# Patient Record
Sex: MALE | Race: BLACK OR AFRICAN AMERICAN | NOT HISPANIC OR LATINO | URBAN - METROPOLITAN AREA
[De-identification: names, ages, dates, MRNs, and addresses within clinical notes are randomized per-mention and may not be internally consistent; named-entity substitution may affect disease eponyms.]

---

## 2014-08-25 RX ORDER — NIFEDIPINE 30 MG
1 TABLET, EXTENDED RELEASE 24 HR ORAL
Qty: 0 | Refills: 0 | DISCHARGE
Start: 2014-08-25

## 2014-08-25 RX ORDER — METOPROLOL TARTRATE 50 MG
2 TABLET ORAL
Qty: 0 | Refills: 0 | COMMUNITY
Start: 2014-08-25

## 2014-08-25 RX ORDER — METOPROLOL TARTRATE 50 MG
1 TABLET ORAL
Qty: 0 | Refills: 0 | COMMUNITY
Start: 2014-08-25

## 2017-10-01 ENCOUNTER — INPATIENT (INPATIENT)
Facility: HOSPITAL | Age: 72
LOS: 3 days | Discharge: HOME | DRG: 69 | End: 2017-10-05
Attending: HOSPITALIST | Admitting: HOSPITALIST
Payer: MEDICARE

## 2017-10-01 VITALS
RESPIRATION RATE: 18 BRPM | SYSTOLIC BLOOD PRESSURE: 138 MMHG | HEART RATE: 77 BPM | TEMPERATURE: 98 F | DIASTOLIC BLOOD PRESSURE: 71 MMHG | OXYGEN SATURATION: 96 %

## 2017-10-01 DIAGNOSIS — Z98.89 OTHER SPECIFIED POSTPROCEDURAL STATES: Chronic | ICD-10-CM

## 2017-10-01 DIAGNOSIS — R47.81 SLURRED SPEECH: ICD-10-CM

## 2017-10-01 LAB
ALBUMIN SERPL ELPH-MCNC: 3.9 G/DL — SIGNIFICANT CHANGE UP (ref 3.3–5)
ALP SERPL-CCNC: 64 U/L — SIGNIFICANT CHANGE UP (ref 40–120)
ALT FLD-CCNC: 6 U/L RC — LOW (ref 10–45)
ANION GAP SERPL CALC-SCNC: 18 MMOL/L — HIGH (ref 5–17)
APTT BLD: 34.4 SEC — SIGNIFICANT CHANGE UP (ref 27.5–37.4)
AST SERPL-CCNC: 27 U/L — SIGNIFICANT CHANGE UP (ref 10–40)
BASOPHILS # BLD AUTO: 0 K/UL — SIGNIFICANT CHANGE UP (ref 0–0.2)
BASOPHILS NFR BLD AUTO: 0.5 % — SIGNIFICANT CHANGE UP (ref 0–2)
BILIRUB SERPL-MCNC: 0.3 MG/DL — SIGNIFICANT CHANGE UP (ref 0.2–1.2)
BLD GP AB SCN SERPL QL: NEGATIVE — SIGNIFICANT CHANGE UP
BUN SERPL-MCNC: 63 MG/DL — HIGH (ref 7–23)
CALCIUM SERPL-MCNC: 8.9 MG/DL — SIGNIFICANT CHANGE UP (ref 8.4–10.5)
CHLORIDE SERPL-SCNC: 96 MMOL/L — SIGNIFICANT CHANGE UP (ref 96–108)
CK MB CFR SERPL CALC: 1.6 NG/ML — SIGNIFICANT CHANGE UP (ref 0–6.7)
CK SERPL-CCNC: 56 U/L — SIGNIFICANT CHANGE UP (ref 30–200)
CO2 SERPL-SCNC: 23 MMOL/L — SIGNIFICANT CHANGE UP (ref 22–31)
CREAT SERPL-MCNC: 9.56 MG/DL — HIGH (ref 0.5–1.3)
EOSINOPHIL # BLD AUTO: 0.3 K/UL — SIGNIFICANT CHANGE UP (ref 0–0.5)
EOSINOPHIL NFR BLD AUTO: 3.6 % — SIGNIFICANT CHANGE UP (ref 0–6)
GLUCOSE SERPL-MCNC: 98 MG/DL — SIGNIFICANT CHANGE UP (ref 70–99)
HCT VFR BLD CALC: 32.8 % — LOW (ref 39–50)
HGB BLD-MCNC: 9.7 G/DL — LOW (ref 13–17)
INR BLD: 1.01 RATIO — SIGNIFICANT CHANGE UP (ref 0.88–1.16)
LYMPHOCYTES # BLD AUTO: 3 K/UL — SIGNIFICANT CHANGE UP (ref 1–3.3)
LYMPHOCYTES # BLD AUTO: 34 % — SIGNIFICANT CHANGE UP (ref 13–44)
MCHC RBC-ENTMCNC: 26 PG — LOW (ref 27–34)
MCHC RBC-ENTMCNC: 29.7 GM/DL — LOW (ref 32–36)
MCV RBC AUTO: 87.6 FL — SIGNIFICANT CHANGE UP (ref 80–100)
MONOCYTES # BLD AUTO: 0.8 K/UL — SIGNIFICANT CHANGE UP (ref 0–0.9)
MONOCYTES NFR BLD AUTO: 9.6 % — SIGNIFICANT CHANGE UP (ref 2–14)
NEUTROPHILS # BLD AUTO: 4.6 K/UL — SIGNIFICANT CHANGE UP (ref 1.8–7.4)
NEUTROPHILS NFR BLD AUTO: 52.2 % — SIGNIFICANT CHANGE UP (ref 43–77)
PLATELET # BLD AUTO: 150 K/UL — SIGNIFICANT CHANGE UP (ref 150–400)
POTASSIUM SERPL-MCNC: 5.8 MMOL/L — HIGH (ref 3.5–5.3)
POTASSIUM SERPL-SCNC: 5.8 MMOL/L — HIGH (ref 3.5–5.3)
PROT SERPL-MCNC: 8.7 G/DL — HIGH (ref 6–8.3)
PROTHROM AB SERPL-ACNC: 10.9 SEC — SIGNIFICANT CHANGE UP (ref 9.8–12.7)
RBC # BLD: 3.74 M/UL — LOW (ref 4.2–5.8)
RBC # FLD: 17.1 % — HIGH (ref 10.3–14.5)
RH IG SCN BLD-IMP: POSITIVE — SIGNIFICANT CHANGE UP
RH IG SCN BLD-IMP: POSITIVE — SIGNIFICANT CHANGE UP
SODIUM SERPL-SCNC: 137 MMOL/L — SIGNIFICANT CHANGE UP (ref 135–145)
TROPONIN T SERPL-MCNC: 0.16 NG/ML — HIGH (ref 0–0.06)
WBC # BLD: 8.8 K/UL — SIGNIFICANT CHANGE UP (ref 3.8–10.5)
WBC # FLD AUTO: 8.8 K/UL — SIGNIFICANT CHANGE UP (ref 3.8–10.5)

## 2017-10-01 PROCEDURE — 71010: CPT | Mod: 26

## 2017-10-01 PROCEDURE — 70450 CT HEAD/BRAIN W/O DYE: CPT | Mod: 26

## 2017-10-01 PROCEDURE — 99223 1ST HOSP IP/OBS HIGH 75: CPT | Mod: GC

## 2017-10-01 PROCEDURE — 93010 ELECTROCARDIOGRAM REPORT: CPT

## 2017-10-01 PROCEDURE — 99291 CRITICAL CARE FIRST HOUR: CPT | Mod: 25,GC

## 2017-10-01 RX ORDER — ACETAMINOPHEN 500 MG
650 TABLET ORAL ONCE
Qty: 0 | Refills: 0 | Status: COMPLETED | OUTPATIENT
Start: 2017-10-01 | End: 2017-10-01

## 2017-10-01 RX ORDER — ASPIRIN/CALCIUM CARB/MAGNESIUM 324 MG
81 TABLET ORAL DAILY
Qty: 0 | Refills: 0 | Status: DISCONTINUED | OUTPATIENT
Start: 2017-10-01 | End: 2017-10-05

## 2017-10-01 RX ORDER — ATORVASTATIN CALCIUM 80 MG/1
80 TABLET, FILM COATED ORAL AT BEDTIME
Qty: 0 | Refills: 0 | Status: DISCONTINUED | OUTPATIENT
Start: 2017-10-01 | End: 2017-10-05

## 2017-10-01 RX ORDER — HEPARIN SODIUM 5000 [USP'U]/ML
5000 INJECTION INTRAVENOUS; SUBCUTANEOUS EVERY 12 HOURS
Qty: 0 | Refills: 0 | Status: DISCONTINUED | OUTPATIENT
Start: 2017-10-01 | End: 2017-10-05

## 2017-10-01 RX ADMIN — Medication 650 MILLIGRAM(S): at 23:59

## 2017-10-01 NOTE — H&P ADULT - NSHPPHYSICALEXAM_GEN_ALL_CORE
ICU Vital Signs Last 24 Hrs  T(C): 36.6 (01 Oct 2017 21:55), Max: 36.6 (01 Oct 2017 21:55)  T(F): 97.8 (01 Oct 2017 21:55), Max: 97.8 (01 Oct 2017 21:55)  HR: 77 (01 Oct 2017 21:55) (77 - 77)  BP: 138/71 (01 Oct 2017 21:55) (138/71 - 138/71)  BP(mean): --  ABP: --  ABP(mean): --  RR: 18 (01 Oct 2017 21:55) (18 - 18)  SpO2: 96% (01 Oct 2017 21:55) (96% - 96%)      PHYSICAL EXAM:  GENERAL: NAD, well-developed  HEAD:  Atraumatic, Normocephalic  EYES: EOMI, PERRLA, conjunctiva and sclera clear  NECK: Supple, No JVD  CHEST/LUNG: Clear to auscultation bilaterally; No wheeze  HEART: Regular rate and rhythm; No murmurs, rubs, or gallops  ABDOMEN: Soft, Nontender, Nondistended; Bowel sounds present  EXTREMITIES:  2+ Peripheral Pulses, No clubbing, cyanosis, or edema  PSYCH: AAOx3  NEUROLOGY: non-focal  SKIN: No rashes or lesions ICU Vital Signs Last 24 Hrs  T(C): 36.6 (01 Oct 2017 21:55), Max: 36.6 (01 Oct 2017 21:55)  T(F): 97.8 (01 Oct 2017 21:55), Max: 97.8 (01 Oct 2017 21:55)  HR: 77 (01 Oct 2017 21:55) (77 - 77)  BP: 138/71 (01 Oct 2017 21:55) (138/71 - 138/71)  BP(mean): --  ABP: --  ABP(mean): --  RR: 18 (01 Oct 2017 21:55) (18 - 18)  SpO2: 96% (01 Oct 2017 21:55) (96% - 96%)      PHYSICAL EXAM:  GENERAL: Mild distress from headache  HEAD:  Atraumatic, Normocephalic  EYES: EOMI, PERRLA, conjunctiva and sclera clear  NECK: Supple, No JVD  CHEST/LUNG: Clear to auscultation bilaterally; No wheeze  HEART: Regular rate and rhythm; II/VI mis systolic murmur on LUSB, non radiating. No rubs or gallops  ABDOMEN: Soft, Nontender, Nondistended; Bowel sounds present  EXTREMITIES:  Significant LE edema 4++ with hyperpigmentation of LE--no skin breakdown noted or discharge   Pulses 2+ LE  PSYCH: AAOx3  NEUROLOGY: Mild dysarthria. AAOX3. Mild left sided facial droop. Following commands. NO lateralization upon tongue protrusion. Able to fully close eyes. Strength 5/5 B/L LE. Endorses mild numbness in LLE. Sensation otherwise grossly intact. CN II-XI intact. Reflexes 2+ throughout.   SKIN: Hyperpigmented LE from chronic venous insufficiency--significant LE edema but no skin breakdown noted at this time  LUE fistula with palpable thrill ICU Vital Signs Last 24 Hrs  T(C): 36.6 (01 Oct 2017 21:55), Max: 36.6 (01 Oct 2017 21:55)  T(F): 97.8 (01 Oct 2017 21:55), Max: 97.8 (01 Oct 2017 21:55)  HR: 77 (01 Oct 2017 21:55) (77 - 77)  BP: 138/71 (01 Oct 2017 21:55) (138/71 - 138/71)  BP(mean): --  ABP: --  ABP(mean): --  RR: 18 (01 Oct 2017 21:55) (18 - 18)  SpO2: 96% (01 Oct 2017 21:55) (96% - 96%)      PHYSICAL EXAM:  GENERAL: Mild distress from headache  HEAD:  Atraumatic, Normocephalic  ENT: EOMI, PERRLA, conjunctiva and sclera clear, supple, No JVD  CHEST/LUNG: Clear to auscultation bilaterally; No wheeze  HEART: Regular rate and rhythm; II/VI mis systolic murmur on LUSB, non radiating. No rubs or gallops  ABDOMEN: Soft, Nontender, Nondistended; Bowel sounds present  EXTREMITIES:  Significant LE edema 4++ with hyperpigmentation of LE--no skin breakdown noted or discharge   Pulses 2+ LE  PSYCH: AAOx3  NEUROLOGY: Mild dysarthria. AAOX3. Mild left sided facial droop. Following commands. NO lateralization upon tongue protrusion. Able to fully close eyes. Strength 5/5 B/L LE. Endorses mild numbness in LLE. Sensation otherwise grossly intact. CN II-XI intact. Reflexes 2+ throughout.   SKIN: Hyperpigmented LE from chronic venous insufficiency--significant LE edema but no skin breakdown noted at this time  LUE fistula with palpable thrill  PSYCH: no depression or anhedonia.   HEME: no cervical lymphadenopathy

## 2017-10-01 NOTE — ED PROVIDER NOTE - CRITICAL CARE PROVIDED
interpretation of diagnostic studies/consultation with other physicians/direct patient care (not related to procedure)/documentation/additional history taking

## 2017-10-01 NOTE — CONSULT NOTE ADULT - ASSESSMENT
72 RH AA M PMH Gout, COPD on home O2, CHF, HTN, Pulmonary HTN, ESRD started on HD 1 month ago, hx of PE (12 years prior) on Aspirin presents with a few minute episode of vertigo and slurred speech. Patient now back to baseline per family. PE grossly non-focal as detailed above. CTH no acute findings. NIHSS 1, Pr-MRS 1 May be 2/2 to toxic/metabolic issue but this may represent TIA vs resolving CVA    -MRI brain, MRA H/N  -A1c, Lipid panel  -C/W Aspirin, add statin if No CI and titrate to LDL  -medical work up and management

## 2017-10-01 NOTE — ED PROVIDER NOTE - PMH
CHF (congestive heart failure)    COPD (chronic obstructive pulmonary disease)    Glomerular disease  Segmental glomerular sclerosis  Gout    Hypertension    Peripheral edema    Pulmonary edema    Pulmonary hypertension    Sleep apnea

## 2017-10-01 NOTE — H&P ADULT - NSHPSOCIALHISTORY_GEN_ALL_CORE
10 pack year smoking hx   No alcohol use. No illicit drug use   Lives with family   On 2 liters home O2 for 3 years   Makes very little urine   Walks with a walker

## 2017-10-01 NOTE — H&P ADULT - ASSESSMENT
Patient is a 72 years old male with PMH of COPD on 2 liters home O2, Pulmonary HTN, ESRD on HD M, W, Fr), HTN, hx of provoked PE who presents with acute onset of slurred speech. Code stroke called with no intervention. CTH neg for ICH

## 2017-10-01 NOTE — H&P ADULT - HISTORY OF PRESENT ILLNESS
Patient is a 72 years old male with PMH of COPD, Pulmonary HTN on 2 liters home O2, ESRD on HD M, W, Fr), HTN, hx of provoked PE who presents with acute onset of slurred speech. Patient was admitted for hypertensive emergency earlier in the month at OSH and was started on new HD from LUE fistula. He is home oxygen since 2014 and uses a walker to ambulate. He was in the bathroom and suddenly developed slurred speech. He screamed for help and his wife was able to unlock the bathroom door and pull him put. No falls or head trauma. His usual BP was 160 systolic prior to HD and now runs around 90 systolic with decrease in his dosing of his BP meds. No recent illnesses, headaches, visual complaints, or worsening SOB prior to the onset of his slurred speech. No fecal or urinary incontinence. No chest pain or palpitations.    Patient afebrile and hemodynamically stable in ED. Code stroke called and patient's CT head done--neg for ICH. Admitted to medicine. Patient complaining of 5/10 frontal headache at the time of this encounter with nausea and one episode of NBNB vomiting during the encounter. Patient is a 72 years old male with PMH of COPD on 2 liters home O2, Pulmonary HTN, ESRD on HD M, W, Fr), HTN, hx of provoked PE who presents with acute onset of slurred speech. Patient was admitted for hypertensive emergency earlier in the month at OSH and was started on new HD from LUE fistula. He is home oxygen since 2014 and uses a walker to ambulate. He was in the bathroom and suddenly developed slurred speech. He screamed for help and his wife was able to unlock the bathroom door and pull him put. No falls or head trauma. His usual BP was 160 systolic prior to HD and now runs around 90 systolic with decrease in his dosing of his BP meds. No recent illnesses, headaches, visual complaints, or worsening SOB prior to the onset of his slurred speech. No fecal or urinary incontinence. No chest pain or palpitations.    Patient afebrile and hemodynamically stable in ED. Code stroke called and patient's CT head done--neg for ICH. Admitted to medicine. Patient complaining of 5/10 frontal headache at the time of this encounter with nausea and one episode of NBNB vomiting during the encounter.

## 2017-10-01 NOTE — H&P ADULT - NSHPREVIEWOFSYSTEMS_GEN_ALL_CORE
GEN: no fevers, chills, no night sweats no weight loss , no swollen lymph nodes    EYES: No blurry vision , no changes in vision  ENT: no sores, no runny nose, no sore throat   PULM: no cough, no shortness of breath   CARD: no chest pain, no palpitations    GI : no abdominal pain , no nausea, no vomiting  : no burning urination, no change in color of urine, no flank pain, no blood in urine   MSK: no swelling   SKIN: no bruising or bleeding, no sores in mouth  , no yellowing of the skin  Neuro: no lightheadedness, no headaches, no weakness, no fainting, no confusion , no seizures GEN: no fevers, chills, no night sweats, POSITIVE weight loss , no swollen lymph nodes    EYES: No blurry vision , no changes in vision  ENT: no sores, no runny nose, no sore throat   PULM: no cough, no shortness of breath   CARD: no chest pain, no palpitations    GI : no abdominal pain , no nausea, no vomiting  : no burning urination, no change in color of urine, no flank pain, no blood in urine   MSK: POSITIVE LE swelling B/L  SKIN: Chronic LE swelling and hyperpigmentation  Neuro: POSITIVE as per HPI GEN: no fevers, chills, no night sweats, POSITIVE weight loss , no swollen lymph nodes    EYES: No blurry vision , no changes in vision  ENT: no sores, no runny nose, no sore throat   PULM: no cough, no shortness of breath   CARD: no chest pain, no palpitations    GI : no abdominal pain , no nausea, no vomiting  : no burning urination, no change in color of urine, no flank pain, no blood in urine   MSK: POSITIVE LE swelling B/L  SKIN: Chronic LE swelling and hyperpigmentation  Neuro: POSITIVE as per HPI  Psych: no depression or anhedonia.

## 2017-10-01 NOTE — ED PROVIDER NOTE - INTERPRETATION
EKG reviewed for rate, rhythm, axis, intervals and segments, including QRS morphology, P wave appearance T wave appearance, WA interval, and QT interval.  I find the EKG to be unremarkable in all of these regards except as follows: 1st deg AVB, L axis, incomplete R bundle, poor R progression

## 2017-10-01 NOTE — ED PROVIDER NOTE - FAMILY HISTORY
Father  Still living? Unknown  Family history of colon cancer, Age at diagnosis: Age Unknown  Family history of heart disease, Age at diagnosis: Age Unknown

## 2017-10-01 NOTE — H&P ADULT - PROBLEM SELECTOR PLAN 3
c/w home PDE 5 inhibitor   Saturating well on 3 liters NC Hold home PDE 5 inhibitor in setting of recent hypotension at home.   Saturating well on 3 liters NC

## 2017-10-01 NOTE — ED ADULT NURSE NOTE - CHPI ED SYMPTOMS NEG
no blurred vision/no weakness/no numbness/no vomiting/no nausea/no fever/no loss of consciousness/no dizziness/no change in level of consciousness/no confusion

## 2017-10-01 NOTE — H&P ADULT - PROBLEM SELECTOR PLAN 1
Slurred speech with left sided facial droop, HA and numbness of LLE  Symptoms largely resolved as per family. Differential includes AMS 2/2 metabolic encephalopathy vs TIA vs Stroke   Likely TIA given presentation. Code stroke called with no intervention   CTH neg for ICH. Will obtain MRI/MRA without contrast given CKD-->ESRD on HD  Start high intensity statin. c/w ASA  Check TTE   Admit to Tele -Slurred speech with left sided facial droop, HA and numbness of LLE  Symptoms largely resolved as per family. Differential includes AMS 2/2 metabolic encephalopathy vs TIA vs Stroke   Likely TIA given presentation. Code stroke called with no intervention   CTH neg for ICH. Will obtain MRI/MRA without contrast given CKD-->ESRD on HD  Start high intensity statin. c/w ASA  Check TTE   Admit to Tele.

## 2017-10-01 NOTE — ED PROVIDER NOTE - ATTENDING CONTRIBUTION TO CARE
71 yo male, ESRD on dialysis M/W/F, noted to have acute onset of slurred speech by family, who called 911.  Code stroke on arrival to the ED.  CT head unrevealing.  Neuro at the bedside -- decided not to administer tPA at this time.  Will admit for inpatient MRI and further work-up.

## 2017-10-01 NOTE — H&P ADULT - NSHPLABSRESULTS_GEN_ALL_CORE
9.7    8.8   )-----------( 150      ( 01 Oct 2017 22:08 )             32.8       10-01    137  |  96  |  63<H>  ----------------------------<  98  5.8<H>   |  23  |  9.56<H>    Ca    8.9      01 Oct 2017 22:08    TPro  8.7<H>  /  Alb  3.9  /  TBili  0.3  /  DBili  x   /  AST  27  /  ALT  6<L>  /  AlkPhos  64  10-01      PT/INR - ( 01 Oct 2017 22:08 )   PT: 10.9 sec;   INR: 1.01 ratio         PTT - ( 01 Oct 2017 22:08 )  PTT:34.4 sec    < from: CT Head No Cont (10.01.17 @ 22:33) >      IMPRESSION:     No acute intracranial bleeding, mass effect, or shift. Advanced chronic   microvascular ischemic changes.    Findings discussed by Dr. Lara with Dr. Samuels on October 1, 2017 at   10:08 PM with read back.    YASMEEN LARA M.D., RADIOLOGY RESIDENT  This document has been electronically signed.  FERNANDO CASTELLANOS M.D., ATTENDING RADIOLOGIST  This document has been electronically signed. Oct  1 2017 10:14PM    EKG: First degree AVB, Incomplete RBBB 9.7    8.8   )-----------( 150      ( 01 Oct 2017 22:08 )             32.8       10-01    137  |  96  |  63<H>  ----------------------------<  98  5.8<H>   |  23  |  9.56<H>    Ca    8.9      01 Oct 2017 22:08    TPro  8.7<H>  /  Alb  3.9  /  TBili  0.3  /  DBili  x   /  AST  27  /  ALT  6<L>  /  AlkPhos  64  10-01      PT/INR - ( 01 Oct 2017 22:08 )   PT: 10.9 sec;   INR: 1.01 ratio         PTT - ( 01 Oct 2017 22:08 )  PTT:34.4 sec    < from: CT Head No Cont (10.01.17 @ 22:33) >      IMPRESSION:     No acute intracranial bleeding, mass effect, or shift. Advanced chronic   microvascular ischemic changes.    Findings discussed by Dr. Lara with Dr. Samuels on October 1, 2017 at   10:08 PM with read back.    YASMEEN LARA M.D., RADIOLOGY RESIDENT  This document has been electronically signed.  FERNANDO CASTELLANOS M.D., ATTENDING RADIOLOGIST  This document has been electronically signed. Oct  1 2017 10:14PM    EKG: First degree AVB, Incomplete LBBB 9.7    8.8   )-----------( 150      ( 01 Oct 2017 22:08 )             32.8       10-01    137  |  96  |  63<H>  ----------------------------<  98  5.8<H>   |  23  |  9.56<H>    Ca    8.9      01 Oct 2017 22:08    TPro  8.7<H>  /  Alb  3.9  /  TBili  0.3  /  DBili  x   /  AST  27  /  ALT  6<L>  /  AlkPhos  64  10-01      PT/INR - ( 01 Oct 2017 22:08 )   PT: 10.9 sec;   INR: 1.01 ratio         PTT - ( 01 Oct 2017 22:08 )  PTT:34.4 sec    < from: CT Head No Cont (10.01.17 @ 22:33) >      IMPRESSION:     No acute intracranial bleeding, mass effect, or shift. Advanced chronic   microvascular ischemic changes.    Findings discussed by Dr. Lara with Dr. Samuels on October 1, 2017 at   10:08 PM with read back.    YASMEEN LARA M.D., RADIOLOGY RESIDENT  This document has been electronically signed.  FERNANDO CASTELLANOS M.D., ATTENDING RADIOLOGIST  This document has been electronically signed. Oct  1 2017 10:14PM    CXR: Personal read: Cephalization with cardiomegaly. Right costal angle clearly visualized.     EKG: First degree AVB, Incomplete LBBB 9.7    8.8   )-----------( 150      ( 01 Oct 2017 22:08 )             32.8       10-01    137  |  96  |  63<H>  ----------------------------<  98  5.8<H>   |  23  |  9.56<H>    Ca    8.9      01 Oct 2017 22:08    TPro  8.7<H>  /  Alb  3.9  /  TBili  0.3  /  DBili  x   /  AST  27  /  ALT  6<L>  /  AlkPhos  64  10-01      PT/INR - ( 01 Oct 2017 22:08 )   PT: 10.9 sec;   INR: 1.01 ratio         PTT - ( 01 Oct 2017 22:08 )  PTT:34.4 sec    < from: CT Head No Cont (10.01.17 @ 22:33) >      IMPRESSION:     No acute intracranial bleeding, mass effect, or shift. Advanced chronic   microvascular ischemic changes.    Findings discussed by Dr. Lara with Dr. Samuels on October 1, 2017 at   10:08 PM with read back.    YASMEEN LARA M.D., RADIOLOGY RESIDENT  This document has been electronically signed.  FERNANDO CASTELALNOS M.D., ATTENDING RADIOLOGIST  This document has been electronically signed. Oct  1 2017 10:14PM    CXR: Personal read: Cephalization with cardiomegaly. Right costal angle clearly visualized.     EKG: First degree AVB, Incomplete LBBB    I personally reviewed & interpreted the lab findings above; chronic anemia; Also labs c/w ESRD (stable).   I personally reviewed & interpreted the radiographic findings; cardiomegaly on chest xray. Head CT shows no infarct.   I personally reviewed & interpreted the EKG findings; no ischemic changes.

## 2017-10-01 NOTE — H&P ADULT - PROBLEM SELECTOR PLAN 2
hx of CHF   Obtain TTE  Monitor Is/Os  Hold antihypertensives to allow for permissive HTN  c/w ASA. Start statin Euvolemic on exam  Obtain TTE  Monitor Is/Os  Hold antihypertensives to allow for permissive HTN  c/w ASA. Start statin.

## 2017-10-01 NOTE — H&P ADULT - PROBLEM SELECTOR PLAN 6
Hx of resistant HTN but wife endorsing relative hypotension ever since he started HD. Will hold off on meds for permissive HTN in setting of possible TIA/Stroke   DASH diet

## 2017-10-01 NOTE — ED ADULT NURSE NOTE - OBJECTIVE STATEMENT
72 y.o male w. PMH gout, pulmonary HTN, sleep apnea, COPD, CHF, HTN BIBA w. c/o slurred speech that started around 2030, pt's family member stated that pt began slurring his speech and "not making sense after his shower." Pt has c/o of headache, denies any CP, SOB, fever, n/v, dizziness, numbness, tingling, diaphoresis, weakness, trauma. Pt is ax0x3, PERRLA, neuro and sensory intact, lungs CTA, +bs abdomen soft non-distended, pt has full ROM of b/l upper and lower extremities, edema noted to b/l lower extremities, fistula on right arm w. +bruit and thrill. CT scan completed, vitals stable, EKG completed, pt placed on continuos cardiac monitoring, nasal cannula applied for comfort, safety and comfort maintained.

## 2017-10-01 NOTE — ED PROVIDER NOTE - OBJECTIVE STATEMENT
72 year old male, past medical history congestive heart failure, COPD, gout, HTN, peripheral edema, ESRD on dialysis MWF, presents to the ED for slurred speech. Wife reports at about 8pm patient was in the bathroom and began having slurred speech. Patient also reports he began experiencing dizziness. On arrival Code stroke was called, after CT scan patient's family states he returned to baseline.   Patient reports no fevers, chills, nausea, vomiting, changes in vision, ear pain, sore throat, chest pain, shortness of breath, abdominal pain, diarrhea, constipation, dysuria, or muscle/joint pain.     primary medical doctor: Dr. Ezio Garcia

## 2017-10-02 DIAGNOSIS — I50.9 HEART FAILURE, UNSPECIFIED: ICD-10-CM

## 2017-10-02 DIAGNOSIS — I10 ESSENTIAL (PRIMARY) HYPERTENSION: ICD-10-CM

## 2017-10-02 DIAGNOSIS — G45.9 TRANSIENT CEREBRAL ISCHEMIC ATTACK, UNSPECIFIED: ICD-10-CM

## 2017-10-02 DIAGNOSIS — J44.9 CHRONIC OBSTRUCTIVE PULMONARY DISEASE, UNSPECIFIED: ICD-10-CM

## 2017-10-02 DIAGNOSIS — M10.9 GOUT, UNSPECIFIED: ICD-10-CM

## 2017-10-02 DIAGNOSIS — N18.6 END STAGE RENAL DISEASE: ICD-10-CM

## 2017-10-02 DIAGNOSIS — I50.32 CHRONIC DIASTOLIC (CONGESTIVE) HEART FAILURE: ICD-10-CM

## 2017-10-02 DIAGNOSIS — Z29.9 ENCOUNTER FOR PROPHYLACTIC MEASURES, UNSPECIFIED: ICD-10-CM

## 2017-10-02 DIAGNOSIS — R60.0 LOCALIZED EDEMA: ICD-10-CM

## 2017-10-02 DIAGNOSIS — I27.2 OTHER SECONDARY PULMONARY HYPERTENSION: ICD-10-CM

## 2017-10-02 LAB
ALBUMIN SERPL ELPH-MCNC: 3.7 G/DL — SIGNIFICANT CHANGE UP (ref 3.3–5)
ALP SERPL-CCNC: 60 U/L — SIGNIFICANT CHANGE UP (ref 40–120)
ALT FLD-CCNC: 8 U/L — LOW (ref 10–45)
ANION GAP SERPL CALC-SCNC: 17 MMOL/L — SIGNIFICANT CHANGE UP (ref 5–17)
ANION GAP SERPL CALC-SCNC: 18 MMOL/L — HIGH (ref 5–17)
ANION GAP SERPL CALC-SCNC: 19 MMOL/L — HIGH (ref 5–17)
AST SERPL-CCNC: 35 U/L — SIGNIFICANT CHANGE UP (ref 10–40)
BASOPHILS # BLD AUTO: 0.02 K/UL — SIGNIFICANT CHANGE UP (ref 0–0.2)
BASOPHILS NFR BLD AUTO: 0.3 % — SIGNIFICANT CHANGE UP (ref 0–2)
BILIRUB SERPL-MCNC: 0.4 MG/DL — SIGNIFICANT CHANGE UP (ref 0.2–1.2)
BUN SERPL-MCNC: 62 MG/DL — HIGH (ref 7–23)
BUN SERPL-MCNC: 64 MG/DL — HIGH (ref 7–23)
BUN SERPL-MCNC: 67 MG/DL — HIGH (ref 7–23)
CALCIUM SERPL-MCNC: 8.7 MG/DL — SIGNIFICANT CHANGE UP (ref 8.4–10.5)
CALCIUM SERPL-MCNC: 9 MG/DL — SIGNIFICANT CHANGE UP (ref 8.4–10.5)
CALCIUM SERPL-MCNC: 9.2 MG/DL — SIGNIFICANT CHANGE UP (ref 8.4–10.5)
CHLORIDE SERPL-SCNC: 94 MMOL/L — LOW (ref 96–108)
CHLORIDE SERPL-SCNC: 96 MMOL/L — SIGNIFICANT CHANGE UP (ref 96–108)
CHLORIDE SERPL-SCNC: 97 MMOL/L — SIGNIFICANT CHANGE UP (ref 96–108)
CHOLEST SERPL-MCNC: 130 MG/DL — SIGNIFICANT CHANGE UP (ref 10–199)
CO2 SERPL-SCNC: 22 MMOL/L — SIGNIFICANT CHANGE UP (ref 22–31)
CO2 SERPL-SCNC: 23 MMOL/L — SIGNIFICANT CHANGE UP (ref 22–31)
CO2 SERPL-SCNC: 23 MMOL/L — SIGNIFICANT CHANGE UP (ref 22–31)
CREAT SERPL-MCNC: 10.04 MG/DL — HIGH (ref 0.5–1.3)
CREAT SERPL-MCNC: 9.36 MG/DL — HIGH (ref 0.5–1.3)
CREAT SERPL-MCNC: 9.59 MG/DL — HIGH (ref 0.5–1.3)
EOSINOPHIL # BLD AUTO: 0.04 K/UL — SIGNIFICANT CHANGE UP (ref 0–0.5)
EOSINOPHIL NFR BLD AUTO: 0.6 % — SIGNIFICANT CHANGE UP (ref 0–6)
FERRITIN SERPL-MCNC: 272 NG/ML — SIGNIFICANT CHANGE UP (ref 30–400)
GLUCOSE SERPL-MCNC: 130 MG/DL — HIGH (ref 70–99)
GLUCOSE SERPL-MCNC: 93 MG/DL — SIGNIFICANT CHANGE UP (ref 70–99)
GLUCOSE SERPL-MCNC: 99 MG/DL — SIGNIFICANT CHANGE UP (ref 70–99)
HBA1C BLD-MCNC: 4.8 % — SIGNIFICANT CHANGE UP (ref 4–5.6)
HBA1C BLD-MCNC: 5 % — SIGNIFICANT CHANGE UP (ref 4–5.6)
HCT VFR BLD CALC: 32 % — LOW (ref 39–50)
HDLC SERPL-MCNC: 40 MG/DL — SIGNIFICANT CHANGE UP (ref 40–125)
HGB BLD-MCNC: 10.3 G/DL — LOW (ref 13–17)
IMM GRANULOCYTES NFR BLD AUTO: 0.3 % — SIGNIFICANT CHANGE UP (ref 0–1.5)
IRON SATN MFR SERPL: 13 % — LOW (ref 16–55)
IRON SATN MFR SERPL: 24 UG/DL — LOW (ref 45–165)
LIPID PNL WITH DIRECT LDL SERPL: 70 MG/DL — SIGNIFICANT CHANGE UP
LYMPHOCYTES # BLD AUTO: 1.21 K/UL — SIGNIFICANT CHANGE UP (ref 1–3.3)
LYMPHOCYTES # BLD AUTO: 17.3 % — SIGNIFICANT CHANGE UP (ref 13–44)
MAGNESIUM SERPL-MCNC: 2.1 MG/DL — SIGNIFICANT CHANGE UP (ref 1.6–2.6)
MCHC RBC-ENTMCNC: 27.2 PG — SIGNIFICANT CHANGE UP (ref 27–34)
MCHC RBC-ENTMCNC: 32.2 GM/DL — SIGNIFICANT CHANGE UP (ref 32–36)
MCV RBC AUTO: 84.7 FL — SIGNIFICANT CHANGE UP (ref 80–100)
MONOCYTES # BLD AUTO: 0.27 K/UL — SIGNIFICANT CHANGE UP (ref 0–0.9)
MONOCYTES NFR BLD AUTO: 3.9 % — SIGNIFICANT CHANGE UP (ref 2–14)
NEUTROPHILS # BLD AUTO: 5.44 K/UL — SIGNIFICANT CHANGE UP (ref 1.8–7.4)
NEUTROPHILS NFR BLD AUTO: 77.6 % — HIGH (ref 43–77)
PHOSPHATE SERPL-MCNC: 5.7 MG/DL — HIGH (ref 2.5–4.5)
PLATELET # BLD AUTO: 172 K/UL — SIGNIFICANT CHANGE UP (ref 150–400)
POTASSIUM SERPL-MCNC: 4.5 MMOL/L — SIGNIFICANT CHANGE UP (ref 3.5–5.3)
POTASSIUM SERPL-MCNC: 5.2 MMOL/L — SIGNIFICANT CHANGE UP (ref 3.5–5.3)
POTASSIUM SERPL-MCNC: 6.2 MMOL/L — CRITICAL HIGH (ref 3.5–5.3)
POTASSIUM SERPL-SCNC: 4.5 MMOL/L — SIGNIFICANT CHANGE UP (ref 3.5–5.3)
POTASSIUM SERPL-SCNC: 5.2 MMOL/L — SIGNIFICANT CHANGE UP (ref 3.5–5.3)
POTASSIUM SERPL-SCNC: 6.2 MMOL/L — CRITICAL HIGH (ref 3.5–5.3)
PROT SERPL-MCNC: 8.5 G/DL — HIGH (ref 6–8.3)
RBC # BLD: 3.78 M/UL — LOW (ref 4.2–5.8)
RBC # FLD: 17.1 % — HIGH (ref 10.3–14.5)
SODIUM SERPL-SCNC: 134 MMOL/L — LOW (ref 135–145)
SODIUM SERPL-SCNC: 137 MMOL/L — SIGNIFICANT CHANGE UP (ref 135–145)
SODIUM SERPL-SCNC: 138 MMOL/L — SIGNIFICANT CHANGE UP (ref 135–145)
TIBC SERPL-MCNC: 190 UG/DL — LOW (ref 220–430)
TOTAL CHOLESTEROL/HDL RATIO MEASUREMENT: 3.3 RATIO — LOW (ref 3.4–9.6)
TRIGL SERPL-MCNC: 100 MG/DL — SIGNIFICANT CHANGE UP (ref 10–149)
TSH SERPL-MCNC: 0.79 UIU/ML — SIGNIFICANT CHANGE UP (ref 0.27–4.2)
UIBC SERPL-MCNC: 166 UG/DL — SIGNIFICANT CHANGE UP (ref 110–370)
WBC # BLD: 7 K/UL — SIGNIFICANT CHANGE UP (ref 3.8–10.5)
WBC # FLD AUTO: 7 K/UL — SIGNIFICANT CHANGE UP (ref 3.8–10.5)

## 2017-10-02 PROCEDURE — 99222 1ST HOSP IP/OBS MODERATE 55: CPT | Mod: GC

## 2017-10-02 PROCEDURE — 70544 MR ANGIOGRAPHY HEAD W/O DYE: CPT | Mod: 26,59

## 2017-10-02 PROCEDURE — 99233 SBSQ HOSP IP/OBS HIGH 50: CPT | Mod: GC

## 2017-10-02 PROCEDURE — 70551 MRI BRAIN STEM W/O DYE: CPT | Mod: 26

## 2017-10-02 RX ORDER — TADALAFIL 10 MG/1
20 TABLET, FILM COATED ORAL DAILY
Qty: 0 | Refills: 0 | Status: DISCONTINUED | OUTPATIENT
Start: 2017-10-02 | End: 2017-10-02

## 2017-10-02 RX ORDER — TADALAFIL 10 MG/1
1 TABLET, FILM COATED ORAL
Qty: 0 | Refills: 0 | COMMUNITY

## 2017-10-02 RX ORDER — DOXERCALCIFEROL 2.5 UG/1
1 CAPSULE ORAL EVERY OTHER DAY
Qty: 0 | Refills: 0 | Status: DISCONTINUED | OUTPATIENT
Start: 2017-10-02 | End: 2017-10-05

## 2017-10-02 RX ORDER — CALCITRIOL 0.5 UG/1
0.25 CAPSULE ORAL DAILY
Qty: 0 | Refills: 0 | Status: DISCONTINUED | OUTPATIENT
Start: 2017-10-02 | End: 2017-10-02

## 2017-10-02 RX ORDER — BUDESONIDE AND FORMOTEROL FUMARATE DIHYDRATE 160; 4.5 UG/1; UG/1
2 AEROSOL RESPIRATORY (INHALATION)
Qty: 0 | Refills: 0 | Status: DISCONTINUED | OUTPATIENT
Start: 2017-10-02 | End: 2017-10-05

## 2017-10-02 RX ORDER — IPRATROPIUM/ALBUTEROL SULFATE 18-103MCG
3 AEROSOL WITH ADAPTER (GRAM) INHALATION EVERY 6 HOURS
Qty: 0 | Refills: 0 | Status: DISCONTINUED | OUTPATIENT
Start: 2017-10-02 | End: 2017-10-05

## 2017-10-02 RX ORDER — DOCUSATE SODIUM 100 MG
100 CAPSULE ORAL THREE TIMES A DAY
Qty: 0 | Refills: 0 | Status: DISCONTINUED | OUTPATIENT
Start: 2017-10-02 | End: 2017-10-05

## 2017-10-02 RX ORDER — ALLOPURINOL 300 MG
100 TABLET ORAL DAILY
Qty: 0 | Refills: 0 | Status: DISCONTINUED | OUTPATIENT
Start: 2017-10-02 | End: 2017-10-05

## 2017-10-02 RX ORDER — FOLIC ACID 0.8 MG
1 TABLET ORAL DAILY
Qty: 0 | Refills: 0 | Status: DISCONTINUED | OUTPATIENT
Start: 2017-10-02 | End: 2017-10-05

## 2017-10-02 RX ORDER — ONDANSETRON 8 MG/1
4 TABLET, FILM COATED ORAL
Qty: 0 | Refills: 0 | Status: DISCONTINUED | OUTPATIENT
Start: 2017-10-02 | End: 2017-10-05

## 2017-10-02 RX ORDER — PANTOPRAZOLE SODIUM 20 MG/1
40 TABLET, DELAYED RELEASE ORAL
Qty: 0 | Refills: 0 | Status: DISCONTINUED | OUTPATIENT
Start: 2017-10-02 | End: 2017-10-05

## 2017-10-02 RX ORDER — ONDANSETRON 8 MG/1
4 TABLET, FILM COATED ORAL EVERY 6 HOURS
Qty: 0 | Refills: 0 | Status: DISCONTINUED | OUTPATIENT
Start: 2017-10-02 | End: 2017-10-02

## 2017-10-02 RX ORDER — TAMSULOSIN HYDROCHLORIDE 0.4 MG/1
0.4 CAPSULE ORAL AT BEDTIME
Qty: 0 | Refills: 0 | Status: DISCONTINUED | OUTPATIENT
Start: 2017-10-02 | End: 2017-10-05

## 2017-10-02 RX ADMIN — PANTOPRAZOLE SODIUM 40 MILLIGRAM(S): 20 TABLET, DELAYED RELEASE ORAL at 06:15

## 2017-10-02 RX ADMIN — HEPARIN SODIUM 5000 UNIT(S): 5000 INJECTION INTRAVENOUS; SUBCUTANEOUS at 18:48

## 2017-10-02 RX ADMIN — ONDANSETRON 4 MILLIGRAM(S): 8 TABLET, FILM COATED ORAL at 02:50

## 2017-10-02 RX ADMIN — HEPARIN SODIUM 5000 UNIT(S): 5000 INJECTION INTRAVENOUS; SUBCUTANEOUS at 06:15

## 2017-10-02 RX ADMIN — BUDESONIDE AND FORMOTEROL FUMARATE DIHYDRATE 2 PUFF(S): 160; 4.5 AEROSOL RESPIRATORY (INHALATION) at 18:48

## 2017-10-02 RX ADMIN — DOXERCALCIFEROL 1 MICROGRAM(S): 2.5 CAPSULE ORAL at 20:33

## 2017-10-02 RX ADMIN — Medication 650 MILLIGRAM(S): at 00:10

## 2017-10-02 RX ADMIN — Medication 100 MILLIGRAM(S): at 06:15

## 2017-10-02 RX ADMIN — Medication 81 MILLIGRAM(S): at 12:33

## 2017-10-02 RX ADMIN — Medication 100 MILLIGRAM(S): at 12:33

## 2017-10-02 RX ADMIN — Medication 1 MILLIGRAM(S): at 12:33

## 2017-10-02 RX ADMIN — BUDESONIDE AND FORMOTEROL FUMARATE DIHYDRATE 2 PUFF(S): 160; 4.5 AEROSOL RESPIRATORY (INHALATION) at 06:15

## 2017-10-02 RX ADMIN — CALCITRIOL 0.25 MICROGRAM(S): 0.5 CAPSULE ORAL at 12:33

## 2017-10-02 NOTE — PROGRESS NOTE ADULT - PROBLEM SELECTOR PLAN 6
Hx of resistant HTN but wife endorsing relative hypotension ever since he started HD. Will hold off on meds for permissive HTN in setting of possible TIA/Stroke   - DASH/renal diet Hx of resistant HTN but wife endorsing relative hypotension ever since he started HD. Will hold off on meds for permissive HTN in setting of possible TIA/Stroke   - DASH/renal diet once cleared by speech and swallow

## 2017-10-02 NOTE — CONSULT NOTE ADULT - ASSESSMENT
72 year old male with PMH of COPD (on home O2), Pulmonary HTN, ESRD on HD MWF from [facility] (x 1 month), HTN, hx of provoked PE admitted for evaluation of acute onset of slurred speech.

## 2017-10-02 NOTE — CONSULT NOTE ADULT - SUBJECTIVE AND OBJECTIVE BOX
[facility] DIVISION OF KIDNEY DISEASES AND HYPERTENSION -- INITIAL CONSULT NOTE  --------------------------------------------------------------------------------  HPI: 72 year old male with PMH of COPD (on home O2), Pulmonary HTN, ESRD on HD MWF from [facility] (x 1 month), HTN, hx of provoked PE admitted for evaluation of acute onset of slurred speech. Patient was recently started on HD one month prior. He follows with outpatient nephrologist Dr. Combs. Last HD was on Friday. Patient does not recall the etiology of his ESRD. Denies having a kidney biopsy in the past for diagnosis. Patient admits to weakness, slurred speech and chronic LE edema. Patient denies CP or SOB.         PAST HISTORY  --------------------------------------------------------------------------------  PAST MEDICAL & SURGICAL HISTORY:  Gout  Peripheral edema  Pulmonary edema  Sleep apnea  Pulmonary hypertension  COPD (chronic obstructive pulmonary disease)  CHF (congestive heart failure)  Glomerular disease: Segmental glomerular sclerosis  Hypertension  H/O right heart catheterization  History of abdominal surgery: polypectomy    FAMILY HISTORY:  Family history of heart disease (Father)  Family history of colon cancer (Father)    PAST SOCIAL HISTORY:    ALLERGIES & MEDICATIONS  --------------------------------------------------------------------------------  Allergies    No Known Allergies    Intolerances      Standing Inpatient Medications  aspirin enteric coated 81 milliGRAM(s) Oral daily  atorvastatin 80 milliGRAM(s) Oral at bedtime  heparin  Injectable 5000 Unit(s) SubCutaneous every 12 hours  pantoprazole    Tablet 40 milliGRAM(s) Oral before breakfast  tamsulosin 0.4 milliGRAM(s) Oral at bedtime  buDESOnide 160 MICROgram(s)/formoterol 4.5 MICROgram(s) Inhaler 2 Puff(s) Inhalation two times a day  allopurinol 100 milliGRAM(s) Oral daily  calcitriol   Capsule 0.25 MICROGram(s) Oral daily  docusate sodium 100 milliGRAM(s) Oral three times a day  folic acid 1 milliGRAM(s) Oral daily    PRN Inpatient Medications  ALBUTerol/ipratropium for Nebulization 3 milliLiter(s) Nebulizer every 6 hours PRN  ondansetron   Disintegrating Tablet 4 milliGRAM(s) Oral two times a day PRN      REVIEW OF SYSTEMS  --------------------------------------------------------------------------------  Gen: + weakness  Skin: No rashes  Head/Eyes/Ears/Mouth: No headache  Respiratory: No dyspnea  CV: No chest pain  GI: No abdominal pain  MSK: +edema  Neuro: No dizziness/lightheadedness    All other systems were reviewed and are negative, except as noted.    VITALS/PHYSICAL EXAM  --------------------------------------------------------------------------------  T(C): 36.8 (10-02-17 @ 07:08), Max: 36.8 (10-01-17 @ 23:52)  HR: 77 (10-02-17 @ 07:08) (73 - 77)  BP: 135/64 (10-02-17 @ 07:08) (130/70 - 139/72)  RR: 18 (10-02-17 @ 07:08) (18 - 18)  SpO2: 100% (10-02-17 @ 07:08) (96% - 100%)  Wt(kg): --        Physical Exam:  	Gen: NAD  	HEENT: supple neck  	Pulm: decreased BS at bases B/L  	CV: RRR, S1S2  	Abd: +BS, soft, nontender/nondistended  	LE: Warm, + lymph edema b/l  	Psych: Normal affect and mood  	Skin: Warm, without rashes  	Vascular access: LUE AVF + thrill, +bruit     LABS/STUDIES  --------------------------------------------------------------------------------              10.3   7.00  >-----------<  172      [10-02-17 @ 07:53]              32.0     134  |  94  |  62  ----------------------------<  130      [10-02-17 @ 07:32]  6.2   |  23  |  9.36        Ca     9.2     [10-02-17 @ 07:32]      Mg     2.1     [10-02-17 @ 07:32]      Phos  5.7     [10-02-17 @ 07:32]    TPro  8.5  /  Alb  3.7  /  TBili  0.4  /  DBili  x   /  AST  35  /  ALT  8   /  AlkPhos  60  [10-02-17 @ 07:32]    PT/INR: PT 10.9 , INR 1.01       [10-01-17 @ 22:08]  PTT: 34.4       [10-01-17 @ 22:08]    Troponin 0.16      [10-01-17 @ 22:08]  CK 56      [10-01-17 @ 22:08]    Creatinine Trend:  SCr 9.36 [10-02 @ 07:32]  SCr 9.59 [10-01 @ 23:56]  SCr 9.56 [10-01 @ 22:08]    Urinalysis - [08-26-14 @ 20:15]      Color YELLOW / Appearance CLEAR / SG 1.012 / pH 5.5      Gluc NEGATIVE / Ketone NEGATIVE  / Bili NEGATIVE / Urobili NORMAL       Blood NEGATIVE / Protein 150 / Leuk Est NEGATIVE / Nitrite NEGATIVE      RBC 0-2 / WBC 0-2 / Hyaline  / Gran  / Sq Epi OCC / Non Sq Epi  / Bacteria       Ferritin 272.0      [10-02-17 @ 07:32]  HbA1c 4.8      [10-02-17 @ 07:53]  TSH 0.79      [10-02-17 @ 07:32]  Lipid: chol 130, , HDL 40, LDL 70      [10-02-17 @ 07:32]

## 2017-10-02 NOTE — PROGRESS NOTE ADULT - PROBLEM SELECTOR PLAN 5
Patient with history of pHTN on tadalifil. Hold home PDE 5 inhibitor in setting of possible hypotension at home.   - continue 2L NC

## 2017-10-02 NOTE — PROGRESS NOTE ADULT - PROBLEM SELECTOR PLAN 3
Patient with hx of COPD on 2L home O2. No dyspnea or worsening work of breathing.   - continue on 2L NC  - Duonebs prn

## 2017-10-02 NOTE — CONSULT NOTE ADULT - ATTENDING COMMENTS
I have seen this patient with the fellow and agree with their assessment and plan. In addition, ESRD pt here with slurred speech.  Plan for HD today, 3.5 hours, UF 1-2kg. He has chronic lymphedema,so much that will come off with HD.  Will get records from the dialysis unit re MELISSA and vitamin D treatment    Buck Sawyer MD  Cell   Pager   Office  I have seen this patient with the fellow and agree with their assessment and plan. In addition, ESRD pt here with slurred speech.  Plan for HD today, 3.5 hours, UF 1-2kg. He has chronic lymphedema,so much that will come off with HD.  Will get records from the dialysis unit re MELISSA and vitamin D treatment  Dc PO calcitriol and start hecterol 1mcg with each dialysis. check PTH    Buck Sawyer MD  Cell   Pager   Office

## 2017-10-02 NOTE — PROGRESS NOTE ADULT - SUBJECTIVE AND OBJECTIVE BOX
Patient is a 72y old  Male who presents with a chief complaint of Slurred Speech (01 Oct 2017 23:35)      SUBJECTIVE / OVERNIGHT EVENTS:  Patient denies current head ache, believes his speech is no longer slurred, and denies any visual changes or blurriness. Additional history obtained, patient states that he was bending down in the bathroom and when he came up he felt dizzy and blurry like the room was spinning. Patient states he had a couple episodes of NBNB vomiting and endorses mild nausea but no further episodes of vomiting. Denies any feelings of weakness or numbness. Denies neck stiffness. Denies recent illness, sick contacts, fevers/chills, night sweats, cough, or URI symptoms. Denies chest pain or shortness of breath.     MEDICATIONS  (STANDING):  aspirin enteric coated 81 milliGRAM(s) Oral daily  atorvastatin 80 milliGRAM(s) Oral at bedtime  heparin  Injectable 5000 Unit(s) SubCutaneous every 12 hours  pantoprazole    Tablet 40 milliGRAM(s) Oral before breakfast  tamsulosin 0.4 milliGRAM(s) Oral at bedtime  buDESOnide 160 MICROgram(s)/formoterol 4.5 MICROgram(s) Inhaler 2 Puff(s) Inhalation two times a day  allopurinol 100 milliGRAM(s) Oral daily  calcitriol   Capsule 0.25 MICROGram(s) Oral daily  docusate sodium 100 milliGRAM(s) Oral three times a day  folic acid 1 milliGRAM(s) Oral daily    MEDICATIONS  (PRN):  ALBUTerol/ipratropium for Nebulization 3 milliLiter(s) Nebulizer every 6 hours PRN Shortness of Breath and/or Wheezing  ondansetron Injectable 4 milliGRAM(s) IV Push every 6 hours PRN Nausea and/or Vomiting      REVIEW OF SYSTEMS:  CONSTITUTIONAL: Denies weakness, fevers and chills  EYES/ENT: Denies visual changes;  denies vertigo and throat pain   NECK: Denies neck pain and stiffness  RESPIRATORY: Denies shortness of breath, endorses occasional cough, no wheezing, hemoptysis  CARDIOVASCULAR: Denies chest pain and palpitations  ENDOCRINE: Denies weight loss/gain. Denies cold or heat intolerance  GASTROINTESTINAL: Denies abdominal or epigastric pain. +nausea, no more episodes of vomiting, and hematemesis; Denies diarrhea & constipation. Denies melena and hematochezia.  GENITOURINARY: Denies dysuria, frequency and hematuria  NEUROLOGICAL: Denies numbness and weakness  SKIN: Denies itching and rashes    T(C): 36.8 (10-02-17 @ 07:08), Max: 36.8 (10-01-17 @ 23:52)  HR: 77 (10-02-17 @ 07:08) (73 - 77)  BP: 135/64 (10-02-17 @ 07:08) (130/70 - 139/72)  RR: 18 (10-02-17 @ 07:08) (18 - 18)  SpO2: 100% (10-02-17 @ 07:08) (96% - 100%)  CAPILLARY BLOOD GLUCOSE  98 (01 Oct 2017 22:39)  106 (01 Oct 2017 21:53)        I&O's Summary      PHYSICAL EXAM:  GENERAL: NAD, resting, overweight  HEAD:  Atraumatic, Normocephalic  EYES: EOMI, PERRLA, conjunctiva and sclera clear  NECK: Supple, No JVD  PULM: Clear to auscultation bilaterally; No wheeze, rales, or ronchi  CARDIAC: Regular rate and rhythm; soft mid-systolic murmur, no rubs or gallops  ABDOMEN: Soft, Nontender, Nondistended; normoactive bowel sounds  EXTREMITIES: 4+ LE edema with signs of chronic venous stasis L>R, scaling skin, no breakdown, difficult to assess pulses LE; +LUE fistula and thrill  PSYCH: AAOx3  NEUROLOGY: mildly slurred speech, ?R sided facial droop, CN intact, 5/5 UE and LE strength, intact sensation bilaterally, normal reflexes  SKIN: b/l signs of hyperpigmentation and chronic venous stasis    LABS:  (10-02 @ 07:53)                        10.3  7.00 )-----------( 172                 32.0    Neutrophils = 5.44 (77.6%)  Lymphocytes = 1.21 (17.3%)  Eosinophils = 0.04 (0.6%)  Basophils = 0.02 (0.3%)  Monocytes = 0.27 (3.9%)  Bands = --%    WBC Trend: 7.00<--, 8.8<--  Hb Trend: 10.3<--, 9.7<--  Plt Trend: 172<--, 150<--  10-01    138  |  97  |  64<H>  ----------------------------<  99  4.5   |  22  |  9.59<H>    Ca    8.7      01 Oct 2017 23:56    TPro  8.7<H>  /  Alb  3.9  /  TBili  0.3  /  DBili  x   /  AST  27  /  ALT  6<L>  /  AlkPhos  64  10-01    Creatinine Trend: 9.59<--, 9.56<--  ( 01 Oct 2017 22:08 )   PT: 10.9 sec;   INR: 1.01 ratio;       PTT:34.4 sec  CARDIAC MARKERS ( 01 Oct 2017 22:08 )  Trop 0.16 ng/mL<H> / CK 56 U/L / CKMB x           EXAM:  CT BRAIN                            PROCEDURE DATE:  10/01/2017            INTERPRETATION:  HISTORY: Dizziness, altered gait, and shortness of   breath.    COMPARISON: None.    TECHNIQUE: Axial noncontrast CT images from the skull base to the vertex   were obtained and submitted for interpretation. Coronal and sagittal   reformatted images were performed. Bone and soft tissue windows were   evaluated.    FINDINGS:         There is no acute intracranial mass-effect, hemorrhage, midline shift, or   abnormal extra-axial fluid collection.     Patchy and confluent regions of periventricular and deep cerebral white   matter hypoattenuation due to chronic microangiopathic ischemic changes.   Atheromatous calcifications along the carotid siphons are present.    Mild centrally predominant cerebral volume loss noted. Cavum septum   pellucidum et vergae is noted. No evidence of hydrocephalus. Basal   cisterns are patent.     Paranasal sinuses and mastoid air cells are clear. Calvarium is intact.     IMPRESSION:     No acute intracranial bleeding, mass effect, or shift. Advanced chronic   microvascular ischemic changes.    CXR:     EKG: First degree AVB, incomplete LBBB

## 2017-10-02 NOTE — PROGRESS NOTE ADULT - PROBLEM SELECTOR PLAN 1
Patient presented with acute onset slurred speech, dizziness, visual changes, and ?left sided facial droop, HA and numbness of LLE. Now denies HA, slurred speech, facial droop, weakness, or visual changes. Symptoms largely resolved as per family. Differential includes AMS 2/2 metabolic encephalopathy vs TIA vs Stroke   Likely TIA given presentation. Code stroke called with no intervention CTH neg for ICH. Will obtain MRI/MRA without contrast given ESRD on HD.  - Start high intensity statin  - c/w ASA  - consider carotid duplex  - Check TTE   - Admit to Tele Patient presented with acute onset slurred speech, dizziness, visual changes, and ?left sided facial droop, HA and numbness of LLE. Now denies HA, slurred speech, facial droop, weakness, or visual changes. Symptoms largely resolved as per family. Differential includes possible low flow TIA vs thrombotic TIA vs Stroke vs metabolic encephalopathy. Code stroke called with no intervention CTH neg for ICH.  - f/u MRI/MRA without contrast given ESRD on HD  - eval vertebral vessels given concern for low flow TIA  - Start high intensity statin  - c/w ASA  - consider carotid duplex vs vertebral duplex  - Check TTE   - Admit to Tele Patient presented with acute onset slurred speech, dizziness, visual changes, and ?left sided facial droop, HA and numbness of LLE. Now denies HA, slurred speech, facial droop, weakness, or visual changes. Symptoms largely resolved as per family. Differential includes possible low flow TIA vs thrombotic TIA vs Stroke vs metabolic encephalopathy. Code stroke called with no intervention CTH neg for ICH.  - f/u MRI/MRA without contrast given ESRD on HD  - consider CT Angiogram w/ contrast and then dialysis to eval vertebrals  - eval vertebral vessels given concern for low flow TIA  - Start high intensity statin  - c/w ASA  - consider carotid duplex vs vertebral duplex  - Check TTE   - Admit to Tele

## 2017-10-02 NOTE — PROGRESS NOTE ADULT - ATTENDING COMMENTS
Agree with above. Story sounds consistent w/ low flow vertebral artery TIA in ESRD patient w/ multiple vascular risk factors for severe atherosclerotic dz, especially while in bathroom and perhaps straining: this would explain transient dizziness and dysarthria. MRI head as above, CTA neck followed by dialysis, agree, w/ concern of low flow TIA. ASA/statin, if evidence of embolic or microvascular stroke on imaging would add dipyridamole to ASA as he was on this prior. Speech and swallow, escalate diet.

## 2017-10-02 NOTE — CONSULT NOTE ADULT - ASSESSMENT
72 year old man with transient episode of vertigo and dysarthria. Neurological exam as above. Would be concerned with TIA.    1) Agree with ASA 81 mg daily  2) Agree with echo, carotid duplex, MRI Head, and MRAHead and Neck  3) Lipid Profile

## 2017-10-02 NOTE — PROGRESS NOTE ADULT - ASSESSMENT
Patient is a 72 years old male with PMH of COPD on 2L home O2, Pulmonary HTN, ESRD (on HD M, W, Fr), HTN, hx of provoked PE who presents with acute onset of dizziness, blurred vision, and slurred speech. Code stroke called with no intervention. CTH neg for ICH. Differential diagnosis includes TIA vs CVA vs metabolic encephalopathy.

## 2017-10-02 NOTE — PROGRESS NOTE ADULT - PROBLEM SELECTOR PLAN 2
Patient started HD for ESRD in June and receives HD M, W, Fr  - consulted house nephrology for dialysis   - electrolytes stable

## 2017-10-02 NOTE — CONSULT NOTE ADULT - SUBJECTIVE AND OBJECTIVE BOX
Patient is a 72 year old man that was admitted with a transient episode of vertigo and dysarthria. Last evening he noted while at home dizziness felt as spinning and also noted that his speech was slurred. He notes a mild HA. He denies other associated neurological complaints. No history of fever, chest pain, trauma. Presently he denies ay complaints.    PMH: COPD, ESRD-dialysis, HTN, Pul HTN, Sleep Apnea, Gout, Peripheral edema    SH :No allergies    Exam: Awake, alert, appropriate            Pupils 2.5mm reactive, EOM intact, no nystagmus, VFF           Neck supple           CN II-XII intact           Motor tone and strength moves all extremities equally and well           Uncooperative with rest of exam.                          10.3   7.00  )-----------( 172      ( 02 Oct 2017 07:53 )             32.0      10-02    137  |  96  |  67<H>  ----------------------------<  93  5.2   |  23  |  10.04<H>    Ca    9.0      02 Oct 2017 13:09  Phos  5.7     10-02  Mg     2.1     10-02    TPro  8.5<H>  /  Alb  3.7  /  TBili  0.4  /  DBili  x   /  AST  35  /  ALT  8<L>  /  AlkPhos  60  10-02    < from: CT Head No Cont (10.01.17 @ 22:33) >    FINDINGS:         There is no acute intracranial mass-effect, hemorrhage, midline shift, or   abnormal extra-axial fluid collection.     Patchy and confluent regions of periventricular and deep cerebral white   matter hypoattenuation due to chronic microangiopathic ischemic changes.   Atheromatous calcifications along the carotid siphons are present.    Mild centrally predominant cerebral volume loss noted. Cavum septum   pellucidum et vergae is noted. No evidence of hydrocephalus. Basal   cisterns are patent.     Paranasal sinuses and mastoid air cells are clear. Calvarium is intact.     IMPRESSION:     No acute intracranial bleeding, mass effect, or shift. Advanced chronic   microvascular ischemic changes.

## 2017-10-03 ENCOUNTER — TRANSCRIPTION ENCOUNTER (OUTPATIENT)
Age: 72
End: 2017-10-03

## 2017-10-03 LAB
ANION GAP SERPL CALC-SCNC: 13 MMOL/L — SIGNIFICANT CHANGE UP (ref 5–17)
BASOPHILS # BLD AUTO: 0.03 K/UL — SIGNIFICANT CHANGE UP (ref 0–0.2)
BASOPHILS NFR BLD AUTO: 0.5 % — SIGNIFICANT CHANGE UP (ref 0–2)
BUN SERPL-MCNC: 37 MG/DL — HIGH (ref 7–23)
CALCIUM SERPL-MCNC: 9.1 MG/DL — SIGNIFICANT CHANGE UP (ref 8.4–10.5)
CALCIUM SERPL-MCNC: 9.1 MG/DL — SIGNIFICANT CHANGE UP (ref 8.4–10.5)
CHLORIDE SERPL-SCNC: 99 MMOL/L — SIGNIFICANT CHANGE UP (ref 96–108)
CO2 SERPL-SCNC: 26 MMOL/L — SIGNIFICANT CHANGE UP (ref 22–31)
CREAT SERPL-MCNC: 7.02 MG/DL — HIGH (ref 0.5–1.3)
EOSINOPHIL # BLD AUTO: 0.2 K/UL — SIGNIFICANT CHANGE UP (ref 0–0.5)
EOSINOPHIL NFR BLD AUTO: 3.1 % — SIGNIFICANT CHANGE UP (ref 0–6)
GLUCOSE SERPL-MCNC: 91 MG/DL — SIGNIFICANT CHANGE UP (ref 70–99)
HCT VFR BLD CALC: 31.4 % — LOW (ref 39–50)
HGB BLD-MCNC: 9.2 G/DL — LOW (ref 13–17)
IMM GRANULOCYTES NFR BLD AUTO: 0.2 % — SIGNIFICANT CHANGE UP (ref 0–1.5)
LYMPHOCYTES # BLD AUTO: 1.26 K/UL — SIGNIFICANT CHANGE UP (ref 1–3.3)
LYMPHOCYTES # BLD AUTO: 19.8 % — SIGNIFICANT CHANGE UP (ref 13–44)
MAGNESIUM SERPL-MCNC: 2.2 MG/DL — SIGNIFICANT CHANGE UP (ref 1.6–2.6)
MCHC RBC-ENTMCNC: 25 PG — LOW (ref 27–34)
MCHC RBC-ENTMCNC: 29.3 GM/DL — LOW (ref 32–36)
MCV RBC AUTO: 85.3 FL — SIGNIFICANT CHANGE UP (ref 80–100)
MONOCYTES # BLD AUTO: 0.52 K/UL — SIGNIFICANT CHANGE UP (ref 0–0.9)
MONOCYTES NFR BLD AUTO: 8.2 % — SIGNIFICANT CHANGE UP (ref 2–14)
NEUTROPHILS # BLD AUTO: 4.33 K/UL — SIGNIFICANT CHANGE UP (ref 1.8–7.4)
NEUTROPHILS NFR BLD AUTO: 68.2 % — SIGNIFICANT CHANGE UP (ref 43–77)
PHOSPHATE SERPL-MCNC: 5.1 MG/DL — HIGH (ref 2.5–4.5)
PLATELET # BLD AUTO: 163 K/UL — SIGNIFICANT CHANGE UP (ref 150–400)
POTASSIUM SERPL-MCNC: 5.1 MMOL/L — SIGNIFICANT CHANGE UP (ref 3.5–5.3)
POTASSIUM SERPL-SCNC: 5.1 MMOL/L — SIGNIFICANT CHANGE UP (ref 3.5–5.3)
PTH-INTACT FLD-MCNC: 184 PG/ML — HIGH (ref 15–65)
RBC # BLD: 3.68 M/UL — LOW (ref 4.2–5.8)
RBC # FLD: 17.3 % — HIGH (ref 10.3–14.5)
SODIUM SERPL-SCNC: 138 MMOL/L — SIGNIFICANT CHANGE UP (ref 135–145)
WBC # BLD: 6.35 K/UL — SIGNIFICANT CHANGE UP (ref 3.8–10.5)
WBC # FLD AUTO: 6.35 K/UL — SIGNIFICANT CHANGE UP (ref 3.8–10.5)

## 2017-10-03 PROCEDURE — 99233 SBSQ HOSP IP/OBS HIGH 50: CPT | Mod: GC

## 2017-10-03 PROCEDURE — 70547 MR ANGIOGRAPHY NECK W/O DYE: CPT | Mod: 26

## 2017-10-03 PROCEDURE — 93880 EXTRACRANIAL BILAT STUDY: CPT | Mod: 26

## 2017-10-03 RX ORDER — NIFEDIPINE 30 MG
90 TABLET, EXTENDED RELEASE 24 HR ORAL DAILY
Qty: 0 | Refills: 0 | Status: DISCONTINUED | OUTPATIENT
Start: 2017-10-03 | End: 2017-10-05

## 2017-10-03 RX ORDER — LOSARTAN POTASSIUM 100 MG/1
50 TABLET, FILM COATED ORAL DAILY
Qty: 0 | Refills: 0 | Status: DISCONTINUED | OUTPATIENT
Start: 2017-10-03 | End: 2017-10-05

## 2017-10-03 RX ORDER — HYDRALAZINE HCL 50 MG
50 TABLET ORAL THREE TIMES A DAY
Qty: 0 | Refills: 0 | Status: DISCONTINUED | OUTPATIENT
Start: 2017-10-03 | End: 2017-10-05

## 2017-10-03 RX ORDER — HYDRALAZINE HCL 50 MG
25 TABLET ORAL ONCE
Qty: 0 | Refills: 0 | Status: COMPLETED | OUTPATIENT
Start: 2017-10-03 | End: 2017-10-03

## 2017-10-03 RX ORDER — HYDRALAZINE HCL 50 MG
25 TABLET ORAL THREE TIMES A DAY
Qty: 0 | Refills: 0 | Status: DISCONTINUED | OUTPATIENT
Start: 2017-10-03 | End: 2017-10-03

## 2017-10-03 RX ADMIN — Medication 100 MILLIGRAM(S): at 12:26

## 2017-10-03 RX ADMIN — ONDANSETRON 4 MILLIGRAM(S): 8 TABLET, FILM COATED ORAL at 16:18

## 2017-10-03 RX ADMIN — Medication 50 MILLIGRAM(S): at 21:24

## 2017-10-03 RX ADMIN — Medication 100 MILLIGRAM(S): at 05:56

## 2017-10-03 RX ADMIN — Medication 90 MILLIGRAM(S): at 12:26

## 2017-10-03 RX ADMIN — PANTOPRAZOLE SODIUM 40 MILLIGRAM(S): 20 TABLET, DELAYED RELEASE ORAL at 05:56

## 2017-10-03 RX ADMIN — TAMSULOSIN HYDROCHLORIDE 0.4 MILLIGRAM(S): 0.4 CAPSULE ORAL at 00:12

## 2017-10-03 RX ADMIN — HEPARIN SODIUM 5000 UNIT(S): 5000 INJECTION INTRAVENOUS; SUBCUTANEOUS at 17:22

## 2017-10-03 RX ADMIN — LOSARTAN POTASSIUM 50 MILLIGRAM(S): 100 TABLET, FILM COATED ORAL at 12:26

## 2017-10-03 RX ADMIN — ATORVASTATIN CALCIUM 80 MILLIGRAM(S): 80 TABLET, FILM COATED ORAL at 21:24

## 2017-10-03 RX ADMIN — ATORVASTATIN CALCIUM 80 MILLIGRAM(S): 80 TABLET, FILM COATED ORAL at 00:12

## 2017-10-03 RX ADMIN — Medication 100 MILLIGRAM(S): at 21:25

## 2017-10-03 RX ADMIN — Medication 100 MILLIGRAM(S): at 00:12

## 2017-10-03 RX ADMIN — HEPARIN SODIUM 5000 UNIT(S): 5000 INJECTION INTRAVENOUS; SUBCUTANEOUS at 05:56

## 2017-10-03 RX ADMIN — Medication 25 MILLIGRAM(S): at 17:23

## 2017-10-03 RX ADMIN — TAMSULOSIN HYDROCHLORIDE 0.4 MILLIGRAM(S): 0.4 CAPSULE ORAL at 21:25

## 2017-10-03 RX ADMIN — BUDESONIDE AND FORMOTEROL FUMARATE DIHYDRATE 2 PUFF(S): 160; 4.5 AEROSOL RESPIRATORY (INHALATION) at 05:57

## 2017-10-03 RX ADMIN — Medication 25 MILLIGRAM(S): at 18:41

## 2017-10-03 RX ADMIN — Medication 1 MILLIGRAM(S): at 12:26

## 2017-10-03 RX ADMIN — Medication 81 MILLIGRAM(S): at 12:26

## 2017-10-03 RX ADMIN — BUDESONIDE AND FORMOTEROL FUMARATE DIHYDRATE 2 PUFF(S): 160; 4.5 AEROSOL RESPIRATORY (INHALATION) at 17:23

## 2017-10-03 NOTE — DISCHARGE NOTE ADULT - MEDICATION SUMMARY - MEDICATIONS TO TAKE
I will START or STAY ON the medications listed below when I get home from the hospital:    rolling walker  -- Please deliver 1 rolling walker. Diagnosis TIA ICD10 45.9  -- Indication: For TIA (transient ischemic attack)    Adcirca 20 mg oral tablet  -- 2 tab(s) by mouth once a day  -- Indication: For Pulmonary hypertension    aspirin 81 mg oral tablet  -- 1 tab(s) by mouth once a day  -- Indication: For CHF (congestive heart failure)    losartan 50 mg oral tablet  -- 1 tab(s) by mouth once a day  -- Indication: For Hypertension    Flomax 0.4 mg oral capsule  -- 1 cap(s) by mouth once a day  -- Indication: For BPH    ondansetron 4 mg oral tablet, disintegrating  -- 1 tab(s) by mouth 2 times a day, As needed, Nausea and/or Vomiting  -- Indication: For Nausea    allopurinol 100 mg oral tablet  -- 1 tab(s) by mouth once a day  -- Indication: For Gout    atorvastatin 80 mg oral tablet  -- 1 tab(s) by mouth once a day (at bedtime)  -- Indication: For Hypertension    metoprolol tartrate 100 mg oral tablet  -- 1 tab(s) by mouth 2 times a day  -- Indication: For Hypertension    albuterol 2.5 mg/3 mL (0.083%) inhalation solution  -- 3 milliliter(s) inhaled every 6-8 hours, As Needed  -- Indication: For COPD (chronic obstructive pulmonary disease)    tiotropium 18 mcg inhalation capsule  -- 1 cap(s) inhaled once a day  -- Indication: For COPD (chronic obstructive pulmonary disease)    Advair Diskus 250 mcg-50 mcg inhalation powder  -- 1 puff(s) inhaled 2 times a day  -- Indication: For COPD (chronic obstructive pulmonary disease)    NIFEdipine 90 mg oral tablet, extended release  -- 1 tab(s) by mouth once a day  -- Indication: For Hypertension    Colace 100 mg oral capsule  -- 1 cap(s) by mouth 3 times a day  -- Indication: For COnstipation    calcium acetate 667 mg oral tablet  -- 1 tab(s) by mouth 3 times a day   -- Indication: For ESRD (end stage renal disease)    PriLOSEC 20 mg oral delayed release capsule  -- 1 cap(s) by mouth once a day  -- Indication: For GERD    hydrALAZINE 50 mg oral tablet  -- 1 tab(s) by mouth 3 times a day  -- Indication: For Hypertension    Hectorol 2 mcg/mL injectable solution  -- 2 mcg/m2 injectable Monday, Wednesday, and Friday with dialysis  -- Indication: For ESRD (end stage renal disease)    folic acid 1 mg oral tablet  -- 1 tab(s) by mouth once a day  -- Indication: For ESRD (end stage renal disease)    calcitriol 0.25 mcg oral capsule  -- 1 cap(s) by mouth once a day  -- Indication: For ESRD (end stage renal disease) I will START or STAY ON the medications listed below when I get home from the hospital:    rolling walker  -- Please deliver 1 rolling walker. Diagnosis TIA ICD10 45.9  -- Indication: For TIA (transient ischemic attack)    Adcirca 20 mg oral tablet  -- 2 tab(s) by mouth once a day  -- Indication: For Pulmonary hypertension    aspirin 81 mg oral tablet  -- 1 tab(s) by mouth once a day  -- Indication: For CHF (congestive heart failure)    losartan 50 mg oral tablet  -- 1 tab(s) by mouth once a day  -- Indication: For Hypertension    Flomax 0.4 mg oral capsule  -- 1 cap(s) by mouth once a day  -- Indication: For BPH    ondansetron 4 mg oral tablet, disintegrating  -- 1 tab(s) by mouth 2 times a day, As needed, Nausea and/or Vomiting  -- Indication: For Nausea    allopurinol 100 mg oral tablet  -- 1 tab(s) by mouth once a day  -- Indication: For Gout    atorvastatin 80 mg oral tablet  -- 1 tab(s) by mouth once a day (at bedtime)  -- Indication: For Hypertension    metoprolol tartrate 100 mg oral tablet  -- 1 tab(s) by mouth 2 times a day  -- Indication: For Hypertension    albuterol 2.5 mg/3 mL (0.083%) inhalation solution  -- 3 milliliter(s) inhaled every 6-8 hours, As Needed  -- Indication: For COPD (chronic obstructive pulmonary disease)    Advair Diskus 250 mcg-50 mcg inhalation powder  -- 1 puff(s) inhaled 2 times a day  -- Indication: For COPD (chronic obstructive pulmonary disease)    tiotropium 18 mcg inhalation capsule  -- 1 cap(s) inhaled once a day  -- Indication: For COPD (chronic obstructive pulmonary disease)    NIFEdipine 90 mg oral tablet, extended release  -- 1 tab(s) by mouth once a day  -- Indication: For Hypertension    Colace 100 mg oral capsule  -- 1 cap(s) by mouth 3 times a day  -- Indication: For COnstipation    calcium acetate 667 mg oral tablet  -- 1 tab(s) by mouth 3 times a day   -- Indication: For ESRD (end stage renal disease)    PriLOSEC 20 mg oral delayed release capsule  -- 1 cap(s) by mouth once a day  -- Indication: For GERD    hydrALAZINE 25 mg oral tablet  -- 1 tab(s) by mouth 3 times a day  -- Indication: For Hypertension    Hectorol 2 mcg/mL injectable solution  -- 2 mcg/m2 injectable Monday, Wednesday, and Friday with dialysis  -- Indication: For ESRD (end stage renal disease)    folic acid 1 mg oral tablet  -- 1 tab(s) by mouth once a day  -- Indication: For ESRD (end stage renal disease)    calcitriol 0.25 mcg oral capsule  -- 1 cap(s) by mouth once a day  -- Indication: For ESRD (end stage renal disease) I will START or STAY ON the medications listed below when I get home from the hospital:    rolling walker  -- Please deliver 1 rolling walker. Diagnosis TIA ICD10 45.9  -- Indication: For TIA (transient ischemic attack)    Adcirca 20 mg oral tablet  -- 2 tab(s) by mouth once a day  -- Indication: For Pulmonary hypertension    aspirin 81 mg oral tablet  -- 1 tab(s) by mouth once a day  -- Indication: For CHF (congestive heart failure)    losartan 50 mg oral tablet  -- 1 tab(s) by mouth once a day  -- Indication: For Hypertension    Flomax 0.4 mg oral capsule  -- 1 cap(s) by mouth once a day  -- Indication: For BPH    ondansetron 4 mg oral tablet, disintegrating  -- 1 tab(s) by mouth 2 times a day, As needed, Nausea and/or Vomiting  -- Indication: For Nausea    allopurinol 100 mg oral tablet  -- 1 tab(s) by mouth once a day  -- Indication: For Gout    atorvastatin 80 mg oral tablet  -- 1 tab(s) by mouth once a day (at bedtime)  -- Indication: For TIA (transient ischemic attack)    metoprolol tartrate 100 mg oral tablet  -- 1 tab(s) by mouth 2 times a day  -- Indication: For Hypertension    albuterol 2.5 mg/3 mL (0.083%) inhalation solution  -- 3 milliliter(s) inhaled every 6-8 hours, As Needed  -- Indication: For COPD (chronic obstructive pulmonary disease)    Advair Diskus 250 mcg-50 mcg inhalation powder  -- 1 puff(s) inhaled 2 times a day  -- Indication: For COPD (chronic obstructive pulmonary disease)    tiotropium 18 mcg inhalation capsule  -- 1 cap(s) inhaled once a day  -- Indication: For COPD (chronic obstructive pulmonary disease)    NIFEdipine 90 mg oral tablet, extended release  -- 1 tab(s) by mouth once a day  -- Indication: For Hypertension    Colace 100 mg oral capsule  -- 1 cap(s) by mouth 3 times a day  -- Indication: For COnstipation    calcium acetate 667 mg oral tablet  -- 1 tab(s) by mouth 3 times a day   -- Indication: For ESRD (end stage renal disease)    PriLOSEC 20 mg oral delayed release capsule  -- 1 cap(s) by mouth once a day  -- Indication: For GERD    hydrALAZINE 25 mg oral tablet  -- 1 tab(s) by mouth 3 times a day   -- It is very important that you take or use this exactly as directed.  Do not skip doses or discontinue unless directed by your doctor.  Some non-prescription drugs may aggravate your condition.  Read all labels carefully.  If a warning appears, check with your doctor before taking.    -- Indication: For Hypertension    Hectorol 2 mcg/mL injectable solution  -- 2 mcg/m2 injectable Monday, Wednesday, and Friday with dialysis  -- Indication: For ESRD (end stage renal disease)    folic acid 1 mg oral tablet  -- 1 tab(s) by mouth once a day  -- Indication: For ESRD (end stage renal disease)    calcitriol 0.25 mcg oral capsule  -- 1 cap(s) by mouth once a day  -- Indication: For ESRD (end stage renal disease)

## 2017-10-03 NOTE — PROGRESS NOTE ADULT - PROBLEM SELECTOR PLAN 3
Hx of resistant HTN but wife endorsing relative hypotension ever since he started HD. Elevated BPs while in the hospital  - will restart losartan and nifedipine and monitor BPs closely

## 2017-10-03 NOTE — DISCHARGE NOTE ADULT - HOSPITAL COURSE
Patient is a 72 years old male with PMH of COPD on 2 liters home O2, Pulmonary HTN, ESRD on HD M, W, Fr), HTN, hx of provoked PE who presents with acute onset of slurred speech. Patient was admitted for hypertensive emergency earlier in the month at OSH and was started on new HD from LUE fistula. He is home oxygen since 2014 and uses a walker to ambulate. He was in the bathroom and suddenly developed slurred speech. He screamed for help and his wife was able to unlock the bathroom door and pull him put. No falls or head trauma. His usual BP was 160 systolic prior to HD and now runs around 90 systolic with decrease in his dosing of his BP meds. No recent illnesses, headaches, visual complaints, or worsening SOB prior to the onset of his slurred speech. No fecal or urinary incontinence. No chest pain or palpitations.    Patient afebrile and hemodynamically stable in ED. Code stroke called and patient's CT head done--neg for ICH. Admitted to medicine. Patient complaining of 5/10 frontal headache at the time of this encounter with nausea and one episode of NBNB vomiting during the encounter.   10/2: Patient admitted to medicine. Work up of low-flow TIA. MRA neck pending. TTE, SSS recs pending. Patient started on diet, tolerating well.   10/3: MRI/MRA head was negative. MRA neck pending. Carotid duplex negative. BPs elevated, restarted home losartan and nifedipine. PT to eval and treat.   MRA Neck: No evidence for hemodynamically significant stenosis within the anterior circulation. 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 Further evaluation with contrast-enhanced imaging can be obtained for further evaluation. Patient is a 71 yo man with PMH of COPD on 2 liters home O2, Pulmonary HTN, ESRD on HD M, W, Fr, HTN, hx of provoked PE who presents with acute onset of slurred speech. Patient was admitted for hypertensive emergency earlier in the month at OSH and was started on new HD from LUE fistula. He is home oxygen dependent since 2014 and uses a walker to ambulate. He was in the bathroom and suddenly developed slurred speech, dizziness, and vertigo. He screamed for help and his wife was able to unlock the bathroom door and pull him put. No falls or head trauma. His usual BP was 160 systolic prior to HD and now runs around 90 systolic with decrease in his dosing of his BP meds. No recent illnesses, headaches, visual complaints, or worsening SOB prior to the onset of his slurred speech. No fecal or urinary incontinence. No chest pain or palpitations. In the ED, patient was afebrile and hemodynamically stable. Code stroke called and patient's CT head done--neg for ICH. Admitted to medicine. Patient complaining of 5/10 frontal headache at the time of this encounter with nausea and one episode of NBNB vomiting during the encounter. Differential of event was suspicious for possible low-flow transient ischemic attack.     MRI/MRA head was negative for evidence of acute stroke. Carotid duplex performed showed no evidence of stenosis. 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   10/2: Patient admitted to medicine. Work up of low-flow TIA. MRA neck pending. TTE, SSS recs pending. Patient started on diet, tolerating well.   10/3: MRI/MRA head was negative. MRA neck pending. Carotid duplex negative. BPs elevated, restarted home losartan and nifedipine. PT to eval and treat.   MRA Neck: No evidence for hemodynamically significant stenosis within the anterior circulation. 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 Further evaluation with contrast-enhanced imaging can be obtained for further evaluation. Patient is a 71 yo man with PMH of COPD on 2 liters home O2, Pulmonary HTN, ESRD on HD M, W, Fr, HTN, hx of provoked PE who presents with acute onset of slurred speech. Patient was admitted for hypertensive emergency earlier in the month at OSH and was started on new HD from LUE fistula. He is home oxygen dependent since 2014 and uses a walker to ambulate. He was in the bathroom and suddenly developed slurred speech, dizziness, and vertigo. He screamed for help and his wife was able to unlock the bathroom door and pull him put. No falls or head trauma. His usual BP was 160 systolic prior to HD and now runs around 90 systolic with decrease in his dosing of his BP meds. No recent illnesses, headaches, visual complaints, or worsening SOB prior to the onset of his slurred speech. No fecal or urinary incontinence. No chest pain or palpitations. In the ED, patient was afebrile and hemodynamically stable. Code stroke called and patient's CT head done--neg for ICH. Admitted to medicine. Patient complaining of 5/10 frontal headache at the time of this encounter with nausea and one episode of NBNB vomiting during the encounter. Differential of event was suspicious for possible low-flow transient ischemic attack.     Neurology was consulted and recommended MRI/MRA head which was negative for evidence of acute stroke. Carotid duplex performed showed no evidence of stenosis. 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     Patient had elevated BPs while in the hospital and losartan and nifedipine were restarted. BPs ***************    Patient was followed by nephrology and received HD MWF, tolerated well without issue. Patient had some continued symptoms of nausea and dizziness while in the hospital, typically triggered by patient bending over.     Patient was seen by PT who recommended ****************** Patient is a 73 yo man with PMH of COPD on 2 liters home O2, Pulmonary HTN, ESRD on HD M, W, Fr, HTN, hx of provoked PE who presents with acute onset of slurred speech. Patient was admitted for hypertensive emergency earlier in the month at OSH and was started on new HD from LUE fistula. He is home oxygen dependent since 2014 and uses a walker to ambulate. He was in the bathroom and suddenly developed slurred speech, dizziness, and vertigo. He screamed for help and his wife was able to unlock the bathroom door and pull him put. No falls or head trauma. His usual BP was 160 systolic prior to HD and now runs around 90 systolic with decrease in his dosing of his BP meds. No recent illnesses, headaches, visual complaints, or worsening SOB prior to the onset of his slurred speech. No fecal or urinary incontinence. No chest pain or palpitations. In the ED, patient was afebrile and hemodynamically stable. Code stroke called and patient's CT head done--neg for ICH. Admitted to medicine. Patient complaining of 5/10 frontal headache at the time of this encounter with nausea and one episode of NBNB vomiting during the encounter. Differential of event was suspicious for possible low-flow transient ischemic attack.     Neurology was consulted and recommended MRI/MRA head which was negative for evidence of acute stroke. Carotid duplex performed showed no evidence of stenosis. 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     Patient had elevated BPs while in the hospital and losartan, nifedipine, and half home-dose of hydralazine were restarted. BPs improved.     Patient was followed by nephrology and received HD MWF, tolerated well without issue. Patient had some continued symptoms of nausea and dizziness while in the hospital, typically triggered by patient bending over. Was seen and evaluated by neurology, may pursue further work up as an outpatient.    Patient was seen by PT who recommended home with PT and rolling walker. Patient was discharged home in safe condition with close follow up with primary care physician, neurology, and nephrology. Patient is a 71 yo man with PMH of COPD on 2 liters home O2, Pulmonary HTN, ESRD on HD M, W, Fr, HTN, hx of provoked PE who presents with acute onset of slurred speech. Patient was admitted for hypertensive emergency earlier in the month at OSH and was started on new HD from LUE fistula. He is home oxygen dependent since 2014 and uses a walker to ambulate. He was in the bathroom and suddenly developed slurred speech, dizziness, and vertigo. He screamed for help and his wife was able to unlock the bathroom door and pull him put. No falls or head trauma. His usual BP was 160 systolic prior to HD and now runs around 90 systolic with decrease in his dosing of his BP meds. No recent illnesses, headaches, visual complaints, or worsening SOB prior to the onset of his slurred speech. No fecal or urinary incontinence. No chest pain or palpitations. In the ED, patient was afebrile and hemodynamically stable. Code stroke called and patient's CT head done--neg for ICH. Admitted to medicine. Patient complaining of 5/10 frontal headache at the time of this encounter with nausea and one episode of NBNB vomiting during the encounter. Differential of event was suspicious for possible low-flow transient ischemic attack.     Neurology was consulted and recommended MRI/MRA head which was negative for evidence of acute stroke. Carotid duplex performed showed no evidence of stenosis. 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     Patient had elevated BPs while in the hospital and losartan, nifedipine, metoprolol 100mg BID, adcirca, and half home-dose of hydralazine were restarted. BPs improved and stabilized in setting of HD.     Patient was followed by nephrology and received HD MWF, tolerated well without issue. Patient had some continued symptoms of nausea and dizziness while in the hospital, typically triggered by patient bending over. Was seen and evaluated by neurology, may pursue further work up as an outpatient.    Patient was seen by PT who recommended home with PT and rolling walker. Patient was discharged home in safe condition with close follow up with primary care physician, neurology, and nephrology. Patient is a 71 yo man with PMH of COPD on 2 liters home O2, Pulmonary HTN, ESRD on HD M, W, Fr, HTN, hx of provoked PE who presents with acute onset of slurred speech. Patient was admitted for hypertensive emergency earlier in the month at OSH and was started on new HD from LUE fistula. He is home oxygen dependent since 2014 and uses a walker to ambulate. He was in the bathroom and suddenly developed slurred speech, dizziness, and vertigo. He screamed for help and his wife was able to unlock the bathroom door and pull him put. No falls or head trauma. His usual BP was 160 systolic prior to HD and now runs around 90 systolic with decrease in his dosing of his BP meds. No recent illnesses, headaches, visual complaints, or worsening SOB prior to the onset of his slurred speech. No fecal or urinary incontinence. No chest pain or palpitations. In the ED, patient was afebrile and hemodynamically stable. Code stroke called and patient's CT head done--neg for ICH. Admitted to medicine. Patient complaining of 5/10 frontal headache at the time of this encounter with nausea and one episode of NBNB vomiting during the encounter. Differential of event was suspicious for possible low-flow transient ischemic attack.     Neurology was consulted and recommended MRI/MRA head which was negative for evidence of acute stroke. Carotid duplex performed showed no evidence of stenosis. 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     Patient had elevated BPs while in the hospital and losartan, nifedipine, metoprolol 100mg BID, adcirca, and 50mg hydralazine TID were restarted. BPs improved and stabilized in setting of HD. Hydralazine was further decreased to 25mg TID at discharge.    Patient was followed by nephrology and received HD MWF, tolerated well without issue. Patient had some continued symptoms of nausea and dizziness while in the hospital, typically triggered by patient bending over. Was seen and evaluated by neurology, may pursue further work up as an outpatient.    Patient was seen by PT who recommended home with PT and rolling walker. Patient was discharged home in safe condition with close follow up with primary care physician, neurology, and nephrology. Patient is a 71 yo man with PMH of COPD on 2 liters home O2, Pulmonary HTN, ESRD on HD M, W, Fr, HTN, hx of provoked PE who presents with acute onset of slurred speech. Patient was admitted for hypertensive emergency earlier in the month at OSH and was started on new HD from LUE fistula. He is home oxygen dependent since 2014 and uses a walker to ambulate. He was in the bathroom and suddenly developed slurred speech, dizziness, and vertigo. He screamed for help and his wife was able to unlock the bathroom door and pull him put. No falls or head trauma. His usual BP was 160 systolic prior to HD and now runs around 90 systolic with decrease in his dosing of his BP meds. No recent illnesses, headaches, visual complaints, or worsening SOB prior to the onset of his slurred speech. No fecal or urinary incontinence. No chest pain or palpitations. In the ED, patient was afebrile and hemodynamically stable. Code stroke called and patient's CT head done--neg for ICH. Admitted to medicine. Patient complaining of 5/10 frontal headache at the time of this encounter with nausea and one episode of NBNB vomiting during the encounter. Differential of event was suspicious for possible low-flow transient ischemic attack.     Neurology was consulted and recommended MRI/MRA head which was negative for evidence of acute stroke. Carotid duplex performed showed no evidence of stenosis. 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     Patient had elevated BPs while in the hospital and losartan, nifedipine, metoprolol 100mg BID, and 50mg hydralazine TID were restarted. BPs improved and stabilized in setting of HD. Hydralazine was further decreased to 25mg TID at discharge, will need to continue to monitor as an outpatient given low 100s systolic while in house. Patient was followed by nephrology and received HD MWF, tolerated well without issue. Patient had some continued symptoms of nausea and dizziness while in the hospital, typically triggered by patient bending over. Was seen and evaluated by neurology, may pursue further work up as an outpatient.    Patient was seen by PT who recommended home with PT and rolling walker. Patient was discharged home in safe condition with close follow up with primary care physician, neurology, and nephrology.      >30 minutes spent on discharge.

## 2017-10-03 NOTE — DISCHARGE NOTE ADULT - CARE PROVIDER_API CALL
Gabriela Wren), Neurology  [address] [place] [address]  Phone: [phone]    Ezio Garcia (MD), Cardiovascular Disease; Internal Medicine  [facility] [place] [id number]  Phone: [phone]  Fax: [phone]    Tristian Freedman), Internal Medicine  [address] [place] [address]  Phone: [phone]  Fax: [phone]

## 2017-10-03 NOTE — PROGRESS NOTE ADULT - ATTENDING COMMENTS
Agree w/ above and discussed with neuro. 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 Would avoid too tight BP control. Discussed w/ family. Still dizzy and nauseated, may be able to go home tomorrow. Rest as above. Agree w/ above and discussed with neuro. 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 Would avoid too tight BP control. Discussed w/ family. Still dizzy and nauseated, may be able to go home tomorrow. Don't feel strongly about echo w/ out infarct on MRI. Rest as above.

## 2017-10-03 NOTE — DISCHARGE NOTE ADULT - CARE PROVIDERS DIRECT ADDRESSES
,DirectAddress_Unknown,DirectAddress_Unknown,[email or web address]

## 2017-10-03 NOTE — DISCHARGE NOTE ADULT - PLAN OF CARE
Decrease symptoms of dizziness, visual changes, and nausea You came into the hospital with symptoms of dizziness, visual changes, slurred speech, and nausea. Some of these symptoms resolved and some continued to happen in the hospital due to some position changes. You were seen by the neurologists who recommended imaging of your brain/neck including MRI, MRA, and ultrasound. All of these tests were negative for signs of stroke. They did find that one of the arteries to your brain had somewhat decreased blood flow. This may be responsible for some of your symptoms, especially if your blood pressures drop and fluid is removed with dialysis. You should follow up with your primary care doctor to closely monitor your blood pressures. In addition, you may follow up with neurology as an outpatient to consider further work up of your dizziness. You were seen by nephrology in the hospital and maintained on dialysis Monday, Wednesday, Friday schedule. Your levels of phosphorous were high and you were started on a medication called calcium acetate to help bring down your levels. Please take this as prescribed. Please follow up with your nephrologist and continue to go to dialysis regularly in order to achieve best results and fluid balance. You have a history of CHF and you were continued on your home medications, but held some of your blood pressure medications due to reported low blood pressures at home. You should continue to take your medications as prescribed in the discharge paperwork. Please follow up with your primary care doctor within 1 week of discharge for further monitoring. Please continue on your home oxygen, spiriva, and albuterol as needed. Please follow up with your primary care doctor within 1 week of discharge for further monitoring. Your blood pressures were high in the hospital, but at home and with new dialysis they were reportedly running lower. We held some of your blood pressure medications while you were in the hospital in order to allow appropriate blood flow to the brain. Since your blood pressures were high, we restarted your home losartan, nifedipine, and half dose of your home hydralazine. Please follow the directions and take all your medicines as prescribed. Please follow up with your primary care doctor within 1 week of discharge for close monitoring of your blood pressures. You came into the hospital with symptoms of dizziness, visual changes, slurred speech, and nausea. These symptoms largely resolved and appeared to be triggered by changes in position. You were seen by the neurologists who recommended imaging of your brain/neck including MRI, MRA, and ultrasound. All of these tests were negative for signs of stroke. They did find that one of the arteries to your brain had somewhat decreased blood flow. This may be responsible for some of your symptoms, especially if your blood pressures drop and fluid is removed with dialysis. You should follow up with your primary care doctor to closely monitor your blood pressures. In addition, you may follow up with neurology as an outpatient to consider further work up of your dizziness. Your blood pressures were high in the hospital, but at home and with new dialysis they were reportedly running lower. We held some of your blood pressure medications while you were in the hospital in order to allow appropriate blood flow to the brain. Since your blood pressures were high, we restarted your home losartan, nifedipine, lower dose of your home metoprolol, adcirca, and half dose of your home hydralazine. Please follow the directions and take all your medicines as prescribed. Please follow up with your primary care doctor within 1 week of discharge for close monitoring of your blood pressures. Your blood pressures were high in the hospital, but at home and with new dialysis they were reportedly running lower. We held some of your blood pressure medications while you were in the hospital in order to allow appropriate blood flow to the brain. Since your blood pressures were high, we restarted your home losartan, nifedipine, lower dose of your home metoprolol and hydralazine, and adcirca. Please follow the directions and take all your medicines as prescribed. Please follow up with your primary care doctor within 1 week of discharge for close monitoring of your blood pressures.

## 2017-10-03 NOTE — PROVIDER CONTACT NOTE (OTHER) - ACTION/TREATMENT ORDERED:
MD Marroquin aware, will continue to monitor pt and assess VS Q4H. Pt scheduled to receive hydralazine 50mg oral at 22:00 give hydralazine as ordered as per MD. Will continue to monitor pt.

## 2017-10-03 NOTE — DISCHARGE NOTE ADULT - MEDICATION SUMMARY - MEDICATIONS TO CHANGE
I will SWITCH the dose or number of times a day I take the medications listed below when I get home from the hospital:    hydrALAZINE 100 mg oral tablet  -- 1 tab(s) by mouth every 8 hours    Metoprolol Tartrate 50 mg oral tablet  -- 1 tab(s) by mouth every 8 hours    Metoprolol Tartrate 50 mg oral tablet  -- 2 tab(s) by mouth 2 times a day

## 2017-10-03 NOTE — DISCHARGE NOTE ADULT - CARE PLAN
Principal Discharge DX:	Transient cerebral ischemia, unspecified type  Secondary Diagnosis:	ESRD (end stage renal disease)  Secondary Diagnosis:	CHF (congestive heart failure)  Secondary Diagnosis:	COPD (chronic obstructive pulmonary disease)  Secondary Diagnosis:	Gout  Secondary Diagnosis:	Hypertension Principal Discharge DX:	Transient cerebral ischemia, unspecified type  Goal:	Decrease symptoms of dizziness, visual changes, and nausea  Instructions for follow-up, activity and diet:	You came into the hospital with symptoms of dizziness, visual changes, slurred speech, and nausea. Some of these symptoms resolved and some continued to happen in the hospital due to some position changes. You were seen by the neurologists who recommended imaging of your brain/neck including MRI, MRA, and ultrasound. All of these tests were negative for signs of stroke. They did find that one of the arteries to your brain had somewhat decreased blood flow. This may be responsible for some of your symptoms, especially if your blood pressures drop and fluid is removed with dialysis. You should follow up with your primary care doctor to closely monitor your blood pressures. In addition, you may follow up with neurology as an outpatient to consider further work up of your dizziness.  Secondary Diagnosis:	ESRD (end stage renal disease)  Instructions for follow-up, activity and diet:	You were seen by nephrology in the hospital and maintained on dialysis Monday, Wednesday, Friday schedule. Your levels of phosphorous were high and you were started on a medication called calcium acetate to help bring down your levels. Please take this as prescribed. Please follow up with your nephrologist and continue to go to dialysis regularly in order to achieve best results and fluid balance.  Secondary Diagnosis:	CHF (congestive heart failure)  Instructions for follow-up, activity and diet:	You have a history of CHF and you were continued on your home medications, but held some of your blood pressure medications due to reported low blood pressures at home. You should continue to take your medications as prescribed in the discharge paperwork. Please follow up with your primary care doctor within 1 week of discharge for further monitoring.  Secondary Diagnosis:	COPD (chronic obstructive pulmonary disease)  Instructions for follow-up, activity and diet:	Please continue on your home oxygen, spiriva, and albuterol as needed. Please follow up with your primary care doctor within 1 week of discharge for further monitoring.  Secondary Diagnosis:	Hypertension  Instructions for follow-up, activity and diet:	Your blood pressures were high in the hospital, but at home and with new dialysis they were reportedly running lower. We held some of your blood pressure medications while you were in the hospital in order to allow appropriate blood flow to the brain. Since your blood pressures were high, we restarted your home losartan, nifedipine, and half dose of your home hydralazine. Please follow the directions and take all your medicines as prescribed. Please follow up with your primary care doctor within 1 week of discharge for close monitoring of your blood pressures. Principal Discharge DX:	Transient cerebral ischemia, unspecified type  Goal:	Decrease symptoms of dizziness, visual changes, and nausea  Instructions for follow-up, activity and diet:	You came into the hospital with symptoms of dizziness, visual changes, slurred speech, and nausea. These symptoms largely resolved and appeared to be triggered by changes in position. You were seen by the neurologists who recommended imaging of your brain/neck including MRI, MRA, and ultrasound. All of these tests were negative for signs of stroke. They did find that one of the arteries to your brain had somewhat decreased blood flow. This may be responsible for some of your symptoms, especially if your blood pressures drop and fluid is removed with dialysis. You should follow up with your primary care doctor to closely monitor your blood pressures. In addition, you may follow up with neurology as an outpatient to consider further work up of your dizziness.  Secondary Diagnosis:	ESRD (end stage renal disease)  Instructions for follow-up, activity and diet:	You were seen by nephrology in the hospital and maintained on dialysis Monday, Wednesday, Friday schedule. Your levels of phosphorous were high and you were started on a medication called calcium acetate to help bring down your levels. Please take this as prescribed. Please follow up with your nephrologist and continue to go to dialysis regularly in order to achieve best results and fluid balance.  Secondary Diagnosis:	CHF (congestive heart failure)  Instructions for follow-up, activity and diet:	You have a history of CHF and you were continued on your home medications, but held some of your blood pressure medications due to reported low blood pressures at home. You should continue to take your medications as prescribed in the discharge paperwork. Please follow up with your primary care doctor within 1 week of discharge for further monitoring.  Secondary Diagnosis:	COPD (chronic obstructive pulmonary disease)  Instructions for follow-up, activity and diet:	Please continue on your home oxygen, spiriva, and albuterol as needed. Please follow up with your primary care doctor within 1 week of discharge for further monitoring.  Secondary Diagnosis:	Hypertension  Instructions for follow-up, activity and diet:	Your blood pressures were high in the hospital, but at home and with new dialysis they were reportedly running lower. We held some of your blood pressure medications while you were in the hospital in order to allow appropriate blood flow to the brain. Since your blood pressures were high, we restarted your home losartan, nifedipine, and half dose of your home hydralazine. Please follow the directions and take all your medicines as prescribed. Please follow up with your primary care doctor within 1 week of discharge for close monitoring of your blood pressures. Principal Discharge DX:	Transient cerebral ischemia, unspecified type  Goal:	Decrease symptoms of dizziness, visual changes, and nausea  Instructions for follow-up, activity and diet:	You came into the hospital with symptoms of dizziness, visual changes, slurred speech, and nausea. These symptoms largely resolved and appeared to be triggered by changes in position. You were seen by the neurologists who recommended imaging of your brain/neck including MRI, MRA, and ultrasound. All of these tests were negative for signs of stroke. They did find that one of the arteries to your brain had somewhat decreased blood flow. This may be responsible for some of your symptoms, especially if your blood pressures drop and fluid is removed with dialysis. You should follow up with your primary care doctor to closely monitor your blood pressures. In addition, you may follow up with neurology as an outpatient to consider further work up of your dizziness.  Secondary Diagnosis:	ESRD (end stage renal disease)  Instructions for follow-up, activity and diet:	You were seen by nephrology in the hospital and maintained on dialysis Monday, Wednesday, Friday schedule. Your levels of phosphorous were high and you were started on a medication called calcium acetate to help bring down your levels. Please take this as prescribed. Please follow up with your nephrologist and continue to go to dialysis regularly in order to achieve best results and fluid balance.  Secondary Diagnosis:	CHF (congestive heart failure)  Instructions for follow-up, activity and diet:	You have a history of CHF and you were continued on your home medications, but held some of your blood pressure medications due to reported low blood pressures at home. You should continue to take your medications as prescribed in the discharge paperwork. Please follow up with your primary care doctor within 1 week of discharge for further monitoring.  Secondary Diagnosis:	COPD (chronic obstructive pulmonary disease)  Instructions for follow-up, activity and diet:	Please continue on your home oxygen, spiriva, and albuterol as needed. Please follow up with your primary care doctor within 1 week of discharge for further monitoring.  Secondary Diagnosis:	Hypertension  Instructions for follow-up, activity and diet:	Your blood pressures were high in the hospital, but at home and with new dialysis they were reportedly running lower. We held some of your blood pressure medications while you were in the hospital in order to allow appropriate blood flow to the brain. Since your blood pressures were high, we restarted your home losartan, nifedipine, lower dose of your home metoprolol, adcirca, and half dose of your home hydralazine. Please follow the directions and take all your medicines as prescribed. Please follow up with your primary care doctor within 1 week of discharge for close monitoring of your blood pressures. Principal Discharge DX:	Transient cerebral ischemia, unspecified type  Goal:	Decrease symptoms of dizziness, visual changes, and nausea  Instructions for follow-up, activity and diet:	You came into the hospital with symptoms of dizziness, visual changes, slurred speech, and nausea. These symptoms largely resolved and appeared to be triggered by changes in position. You were seen by the neurologists who recommended imaging of your brain/neck including MRI, MRA, and ultrasound. All of these tests were negative for signs of stroke. They did find that one of the arteries to your brain had somewhat decreased blood flow. This may be responsible for some of your symptoms, especially if your blood pressures drop and fluid is removed with dialysis. You should follow up with your primary care doctor to closely monitor your blood pressures. In addition, you may follow up with neurology as an outpatient to consider further work up of your dizziness.  Secondary Diagnosis:	ESRD (end stage renal disease)  Instructions for follow-up, activity and diet:	You were seen by nephrology in the hospital and maintained on dialysis Monday, Wednesday, Friday schedule. Your levels of phosphorous were high and you were started on a medication called calcium acetate to help bring down your levels. Please take this as prescribed. Please follow up with your nephrologist and continue to go to dialysis regularly in order to achieve best results and fluid balance.  Secondary Diagnosis:	CHF (congestive heart failure)  Instructions for follow-up, activity and diet:	You have a history of CHF and you were continued on your home medications, but held some of your blood pressure medications due to reported low blood pressures at home. You should continue to take your medications as prescribed in the discharge paperwork. Please follow up with your primary care doctor within 1 week of discharge for further monitoring.  Secondary Diagnosis:	COPD (chronic obstructive pulmonary disease)  Instructions for follow-up, activity and diet:	Please continue on your home oxygen, spiriva, and albuterol as needed. Please follow up with your primary care doctor within 1 week of discharge for further monitoring.  Secondary Diagnosis:	Hypertension  Instructions for follow-up, activity and diet:	Your blood pressures were high in the hospital, but at home and with new dialysis they were reportedly running lower. We held some of your blood pressure medications while you were in the hospital in order to allow appropriate blood flow to the brain. Since your blood pressures were high, we restarted your home losartan, nifedipine, lower dose of your home metoprolol and hydralazine, and adcirca. Please follow the directions and take all your medicines as prescribed. Please follow up with your primary care doctor within 1 week of discharge for close monitoring of your blood pressures.

## 2017-10-03 NOTE — PROGRESS NOTE ADULT - SUBJECTIVE AND OBJECTIVE BOX
Patient is a 72y old  Male who presents with a chief complaint of Slurred Speech (01 Oct 2017 23:35)    SUBJECTIVE / OVERNIGHT EVENTS:  No acute events. Patient more alert this AM, no apparent confusion, dysarthria, or focal weaknesses. Patient has no complaints.     MEDICATIONS  (STANDING):  allopurinol 100 milliGRAM(s) Oral daily  aspirin enteric coated 81 milliGRAM(s) Oral daily  atorvastatin 80 milliGRAM(s) Oral at bedtime  buDESOnide 160 MICROgram(s)/formoterol 4.5 MICROgram(s) Inhaler 2 Puff(s) Inhalation two times a day  docusate sodium 100 milliGRAM(s) Oral three times a day  doxercalciferol Injectable 1 MICROGram(s) IV Push every other day  folic acid 1 milliGRAM(s) Oral daily  heparin  Injectable 5000 Unit(s) SubCutaneous every 12 hours  pantoprazole    Tablet 40 milliGRAM(s) Oral before breakfast  tamsulosin 0.4 milliGRAM(s) Oral at bedtime    MEDICATIONS  (PRN):  ALBUTerol/ipratropium for Nebulization 3 milliLiter(s) Nebulizer every 6 hours PRN Shortness of Breath and/or Wheezing  ondansetron   Disintegrating Tablet 4 milliGRAM(s) Oral two times a day PRN Nausea and/or Vomiting      REVIEW OF SYSTEMS:  CONSTITUTIONAL: Denies weakness, fevers and chills  EYES/ENT: Denies visual changes;  denies vertigo and throat pain   NECK: Denies neck pain and stiffness  RESPIRATORY: Denies shortness of breath, denies cough, wheezing, hemoptysis  CARDIOVASCULAR: Denies chest pain and palpitations  ENDOCRINE: Denies weight loss/gain. Denies cold or heat intolerance  GASTROINTESTINAL: Denies abdominal or epigastric pain. denies nausea, vomiting, and hematemesis; Denies diarrhea & constipation. Denies melena and hematochezia.  GENITOURINARY: Denies dysuria, frequency and hematuria  NEUROLOGICAL: Denies numbness and weakness  SKIN: Denies itching and rashes    T(C): 37.2 (10-03-17 @ 08:25), Max: 78.9 (10-02-17 @ 18:30)  HR: 73 (10-03-17 @ 08:25) (73 - 85)  BP: 149/67 (10-03-17 @ 08:25) (149/67 - 183/96)  RR: 18 (10-03-17 @ 04:16) (18 - 19)  SpO2: 99% (10-03-17 @ 08:25) (97% - 100%)      PHYSICAL EXAM:  GENERAL: NAD, well-developed  HEAD:  Atraumatic, Normocephalic  EYES: EOMI, PERRLA, conjunctiva and sclera clear  NECK: Supple, No JVD  CHEST/LUNG: Clear to auscultation bilaterally; No wheeze  HEART: Regular rate and rhythm; No murmurs, rubs, or gallops  ABDOMEN: Soft, Nontender, Nondistended; Bowel sounds present  EXTREMITIES:  2+ Peripheral Pulses, No clubbing, cyanosis, or edema  PSYCH: AAOx3  NEUROLOGY: non-focal, no dysarthria  SKIN: No rashes or lesions    CAPILLARY BLOOD GLUCOSE        I&O's Summary    02 Oct 2017 07:01  -  03 Oct 2017 07:00  --------------------------------------------------------  IN: 1140 mL / OUT: 2900 mL / NET: -1760 mL    03 Oct 2017 07:01  -  03 Oct 2017 10:57  --------------------------------------------------------  IN: 120 mL / OUT: 0 mL / NET: 120 mL        LABS                        9.2    6.35  )-----------( 163      ( 03 Oct 2017 08:29 )             31.4     10-03    138  |  99  |  37<H>  ----------------------------<  91  5.1   |  26  |  7.02<H>    Ca    9.1      03 Oct 2017 08:34  Phos  5.1     10-03  Mg     2.2     10-03    TPro  8.5<H>  /  Alb  3.7  /  TBili  0.4  /  DBili  x   /  AST  35  /  ALT  8<L>  /  AlkPhos  60  10-02    PT/INR - ( 01 Oct 2017 22:08 )   PT: 10.9 sec;   INR: 1.01 ratio         PTT - ( 01 Oct 2017 22:08 )  PTT:34.4 sec  CARDIAC MARKERS ( 01 Oct 2017 22:08 )  x     / 0.16 ng/mL / 56 U/L / x     / 1.6 ng/mL          Microbiology:  Urine Cx:  Blood Cx:    RADIOLOGY & ADDITIONAL TESTS:  X- Ray:  CT:  Ultrasound: Patient is a 72y old  Male who presents with a chief complaint of Slurred Speech (01 Oct 2017 23:35)    SUBJECTIVE / OVERNIGHT EVENTS:  No acute events. Patient more alert this AM, no apparent confusion, dysarthria, or focal weaknesses. Tolerated dialysis yesterday without issue. Patient has no complaints, wondering when he can be discharged.     MEDICATIONS  (STANDING):  allopurinol 100 milliGRAM(s) Oral daily  aspirin enteric coated 81 milliGRAM(s) Oral daily  atorvastatin 80 milliGRAM(s) Oral at bedtime  buDESOnide 160 MICROgram(s)/formoterol 4.5 MICROgram(s) Inhaler 2 Puff(s) Inhalation two times a day  docusate sodium 100 milliGRAM(s) Oral three times a day  doxercalciferol Injectable 1 MICROGram(s) IV Push every other day  folic acid 1 milliGRAM(s) Oral daily  heparin  Injectable 5000 Unit(s) SubCutaneous every 12 hours  pantoprazole    Tablet 40 milliGRAM(s) Oral before breakfast  tamsulosin 0.4 milliGRAM(s) Oral at bedtime    MEDICATIONS  (PRN):  ALBUTerol/ipratropium for Nebulization 3 milliLiter(s) Nebulizer every 6 hours PRN Shortness of Breath and/or Wheezing  ondansetron   Disintegrating Tablet 4 milliGRAM(s) Oral two times a day PRN Nausea and/or Vomiting      REVIEW OF SYSTEMS:  CONSTITUTIONAL: Denies weakness, fevers and chills  EYES/ENT: Denies visual changes;  denies vertigo and throat pain   NECK: Denies neck pain and stiffness  RESPIRATORY: Denies shortness of breath, denies cough, wheezing, hemoptysis  CARDIOVASCULAR: Denies chest pain and palpitations  ENDOCRINE: Denies weight loss/gain. Denies cold or heat intolerance  GASTROINTESTINAL: Denies abdominal or epigastric pain. denies nausea, vomiting, and hematemesis; Denies diarrhea & constipation. Denies melena and hematochezia.  GENITOURINARY: Denies dysuria, frequency and hematuria  NEUROLOGICAL: Denies numbness and weakness  SKIN: Denies itching and rashes    T(C): 37.2 (10-03-17 @ 08:25), Max: 78.9 (10-02-17 @ 18:30)  HR: 73 (10-03-17 @ 08:25) (73 - 85)  BP: 149/67 (10-03-17 @ 08:25) (149/67 - 183/96)  RR: 18 (10-03-17 @ 04:16) (18 - 19)  SpO2: 99% (10-03-17 @ 08:25) (97% - 100%)    PHYSICAL EXAM:  GENERAL: NAD, resting, overweight  HEAD:  Atraumatic, Normocephalic  EYES: EOMI, PERRLA, conjunctiva and sclera clear  NECK: Supple, No JVD  PULM: Clear to auscultation bilaterally; No wheeze, rales, or ronchi  CARDIAC: Regular rate and rhythm; soft mid-systolic murmur, no rubs or gallops  ABDOMEN: Soft, Nontender, Nondistended; normoactive bowel sounds  EXTREMITIES: evidence of lymphadema, and 2-3+ LE edema with signs of chronic venous stasis L>R, scaling skin, no breakdown, difficult to assess pulses LE; +LUE fistula and thrill (decreased edema following dialysis)  PSYCH: AAOx3  NEUROLOGY: no longer slurred speech, no evidence of facial droop, non-focal exam  SKIN: b/l signs of hyperpigmentation and chronic venous stasis      CAPILLARY BLOOD GLUCOSE    I&O's Summary    02 Oct 2017 07:01  -  03 Oct 2017 07:00  --------------------------------------------------------  IN: 1140 mL / OUT: 2900 mL / NET: -1760 mL    03 Oct 2017 07:01  -  03 Oct 2017 10:57  --------------------------------------------------------  IN: 120 mL / OUT: 0 mL / NET: 120 mL        LABS                        9.2    6.35  )-----------( 163      ( 03 Oct 2017 08:29 )             31.4     10-03    138  |  99  |  37<H>  ----------------------------<  91  5.1   |  26  |  7.02<H>    Ca    9.1      03 Oct 2017 08:34  Phos  5.1     10-03  Mg     2.2     10-03    TPro  8.5<H>  /  Alb  3.7  /  TBili  0.4  /  DBili  x   /  AST  35  /  ALT  8<L>  /  AlkPhos  60  10-02    PT/INR - ( 01 Oct 2017 22:08 )   PT: 10.9 sec;   INR: 1.01 ratio         PTT - ( 01 Oct 2017 22:08 )  PTT:34.4 sec  CARDIAC MARKERS ( 01 Oct 2017 22:08 )  x     / 0.16 ng/mL / 56 U/L / x     / 1.6 ng/mL      MRI/MRA  Impression:  Brain MRI: No acute hemorrhage or infarct. Age-appropriate involutional   changes and microvascular ischemic change.  Brain MRA: No high-grade stenosis or vascular occlusion.

## 2017-10-03 NOTE — DISCHARGE NOTE ADULT - SECONDARY DIAGNOSIS.
ESRD (end stage renal disease) CHF (congestive heart failure) COPD (chronic obstructive pulmonary disease) Gout Hypertension

## 2017-10-03 NOTE — PROGRESS NOTE ADULT - SUBJECTIVE AND OBJECTIVE BOX
Patient and patient;'s wife state that he has been feeling unwell today. He states the dizziness has been persisting as a sense of everything spinning around him.. He also notes nausea. He and his wife denies slurred speech. He denies HA or other complaints.    Exam: Awake, slowed mentation, appropriate            Pupils 2mm reactive, EOM intact, no nystagmus,             CN  II-XII intact     Lipid Profile (10.02.17 @ 07:32)    HDL/Total Cholesterol Ratio Measurement: 3.3 RATIO    Cholesterol, Serum: 130 mg/dL    Triglycerides, Serum: 100 mg/dL    HDL Cholesterol, Serum: 40 mg/dL    Direct LDL: 70: LDL Cholesterol --- Interpretive Comment (for adults 18 and over)  Optimal LDL Level may vary based on clinical situation  Below 70                  Ideal for people at very high risk of heart  disease  Below 100                Ideal for people at risk of heart disease  100 - 129                   [place]  130 - 159                   Borderline high  160 - 189                   High  190 and Above          Very high mg/dL                  Motor tone and strength  normal  < from: VA Duplex Carotid, Bilat (10.03.17 @ 11:50) >    IMPRESSION: No hemodynamically significant carotid artery stenoses.    Measurement of carotid stenosis is based on velocity parameters that   correlate the residual internal carotid diameter with that of the more   distal vessel in accordance with a method such as the [place]   Symptomatic Carotid Endarterectomy Trial (JENISE    < from: MRA Neck w/o Cont (10.03.17 @ 13:09) >    Findings:    The common carotid, internal carotid external carotid arteries appear   within normal limits.    The right vertebral artery is normally visualized. Of the left vertebral   artery is poorly seen and may be hypoplastic.    Impression:    No evidence for hemodynamically significant stenosis within the anterior   circulation.  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 Further evaluation with contrast-enhanced imaging can be   obtained for further evaluation.          < from: MRA Head w/o Cont (10.02.17 @ 15:55) >    FINDINGS:     Brain MRI:    No acute hemorrhage or infarct is identified. Age-appropriate   involutional changes and mild microvascular ischemic change is present.    Flow voids at the skull base are within normal limits.     The superior ophthalmic veins are prominent bilaterally. The nonenhanced   cavernous sinuses are not remarkable in appearance.    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    The paranasal sinuses and tympanomastoid cavities are free of acute   disease.      Brain MRA:     There is no evidence for stenosis. There is focal dropout along the   proximal left posterior cerebral artery which may represent a hypoplastic   left P1 segment. The remainder of the posterior cerebral arteries patent   and there is a well-defined left posterior communicating artery. No   intracranial aneurysm is identified.    Impression:    Brain MRI: No acute hemorrhage or infarct. Age-appropriate involutional   changes and microvascular ischemic change.  Brain MRA: No high-grade stenosis or vascular occlusion.

## 2017-10-03 NOTE — DISCHARGE NOTE ADULT - CONDITION (STATED IN TERMS THAT PERMIT A SPECIFIC MEASURABLE COMPARISON WITH CONDITION ON ADMISSION):
Per family, patient was back to baseline in terms of speech and cognition, significantly improved compared to how he presented. He was discharged in safe condition with close follow up with primary care, neurology, and nephrology.

## 2017-10-03 NOTE — PROVIDER CONTACT NOTE (OTHER) - ASSESSMENT
Pt is A&Ox4 denies chest pain, dizziness, headache, lightheadedness, and SOB. Pt complains of visual changes that started during day shift, pt states "everything looks sideways".

## 2017-10-03 NOTE — PHYSICAL THERAPY INITIAL EVALUATION ADULT - PERTINENT HX OF CURRENT PROBLEM, REHAB EVAL
72 y/oM admitted 10/1 p/w acute onset slurred speech, L sided facial droop, HA and numbness of LLE. Pt states that when he straightened after bending down in bathroom felt dizzy. Endorses NBNB vomiting and mild nausea. Initial CTH neg for ICH. Diff diagnosis includes TIA vs CVA vs metabolic encephalopathy. PMH PMH of COPD on 2 liters home O2, Pulmonary HTN, ESRD on HD M, W, Fr), HTN, hx of provoked PE. Chronic (cont below) Pt is a 72 years old male with PMH of COPD on 2L home O2, Pulmonary HTN, ESRD (on HD M, W, Fr), HTN, hx of provoked PE who presents with acute onset of dizziness, blurred vision, and slurred speech. Pt states that when he straightened after bending down in bathroom he felt dizzy. +CT head Advanced chronic microvascular ischemic changes. MRI/MRA head neg. +Cxray congestive heart failure.

## 2017-10-03 NOTE — PHYSICAL THERAPY INITIAL EVALUATION ADULT - ADDITIONAL COMMENTS
lymphedema. Recent admission for hypertensive emergency at OSH and was started on new HD for LUE fistula.  Home situation/prior level of function- As per H&P, pt lives with wife,  uses rolling walker to ambulate, on home O2. PTA pt lived with wife in PTA pt lived with wife in pvt home, 4 steps to enter +HR, used SC to ambulate, was independent but wife can assist as needed

## 2017-10-03 NOTE — SWALLOW BEDSIDE ASSESSMENT ADULT - SWALLOW EVAL: DIAGNOSIS
SLP attempted to see Pt for swallowing evaluation however as per d/w Nsg the Pt is currently off the floor for testing.   In addition, order for swallowing evaluation was noted to be discontinued. SLP attempted to see Pt for swallowing evaluation however as per d/w Nsg the Pt is currently off the floor for testing.   In addition, consult order for swallowing evaluation was subsequently noted to be discontinued.

## 2017-10-03 NOTE — DISCHARGE NOTE ADULT - PATIENT PORTAL LINK FT
“You can access the [facility] Patient Portal, offered by [facility], by registering with the following website: [email or web address]”

## 2017-10-03 NOTE — PROGRESS NOTE ADULT - PROBLEM SELECTOR PLAN 1
Patient presented with acute onset slurred speech, dizziness, visual changes, and ?left sided facial droop, HA and numbness of LLE. Now denies HA, slurred speech, facial droop, weakness, or visual changes. Symptoms largely resolved as per family. Differential includes possible low flow TIA vs thrombotic TIA vs Stroke vs metabolic encephalopathy. Code stroke called with no intervention CTH neg for ICH.  - MRI/MRA head was negative for intracranial pathology  - f/u MRA neck and duplex of neck vessels  - c/w statin, ASA  - f/u TTE   - Admit to Tele

## 2017-10-03 NOTE — PROVIDER CONTACT NOTE (OTHER) - SITUATION
Pts BP reassessed s/p hydralazine administration by day RN. /79, HR 85. Pt received hydralazine 25mg oral at 17:23 and hydralazine 25mg oral again at 18:41.

## 2017-10-03 NOTE — DISCHARGE NOTE ADULT - NS AS DC STROKE ED MATERIALS
Need for Followup After Discharge/Stroke Warning Signs and Symptoms/Stroke Education Booklet/Call 911 for Stroke/Risk Factors for Stroke/Prescribed Medications

## 2017-10-03 NOTE — PROGRESS NOTE ADULT - PROBLEM SELECTOR PLAN 2
Patient started HD for ESRD in June and receives HD M, W, Fr  - consulted house nephrology for dialysis, received yesterday  - electrolytes stable

## 2017-10-03 NOTE — SWALLOW BEDSIDE ASSESSMENT ADULT - COMMENTS
SLP attempted to see Pt for swallowing evaluation however as per d/w Nsg the Pt is currently off the floor for testing. Consult order received and chart reviewed.

## 2017-10-03 NOTE — DISCHARGE NOTE ADULT - ADDITIONAL INSTRUCTIONS
Please follow up with primary care doctor within 1 week of discharge. Please follow up with Neurology Dr. Gabriela Wren.

## 2017-10-03 NOTE — PROGRESS NOTE ADULT - PROBLEM SELECTOR PLAN 5
Patient appears hypervolemic on exam with significant LE edema, but per patient they have been removing excess fluid at HD.   - Obtain TTE  - Monitor Is/Os  - restarted some of the home BP meds (nifedipine and losartan)  - c/w ASA/statin

## 2017-10-04 LAB
ANION GAP SERPL CALC-SCNC: 16 MMOL/L — SIGNIFICANT CHANGE UP (ref 5–17)
BUN SERPL-MCNC: 43 MG/DL — HIGH (ref 7–23)
CALCIUM SERPL-MCNC: 8.7 MG/DL — SIGNIFICANT CHANGE UP (ref 8.4–10.5)
CHLORIDE SERPL-SCNC: 96 MMOL/L — SIGNIFICANT CHANGE UP (ref 96–108)
CO2 SERPL-SCNC: 24 MMOL/L — SIGNIFICANT CHANGE UP (ref 22–31)
CREAT SERPL-MCNC: 8.65 MG/DL — HIGH (ref 0.5–1.3)
GLUCOSE SERPL-MCNC: 68 MG/DL — LOW (ref 70–99)
HCT VFR BLD CALC: 31.1 % — LOW (ref 39–50)
HGB BLD-MCNC: 9.2 G/DL — LOW (ref 13–17)
MAGNESIUM SERPL-MCNC: 2.1 MG/DL — SIGNIFICANT CHANGE UP (ref 1.6–2.6)
MCHC RBC-ENTMCNC: 25.3 PG — LOW (ref 27–34)
MCHC RBC-ENTMCNC: 29.6 GM/DL — LOW (ref 32–36)
MCV RBC AUTO: 85.7 FL — SIGNIFICANT CHANGE UP (ref 80–100)
PHOSPHATE SERPL-MCNC: 5.8 MG/DL — HIGH (ref 2.5–4.5)
PLATELET # BLD AUTO: 180 K/UL — SIGNIFICANT CHANGE UP (ref 150–400)
POTASSIUM SERPL-MCNC: 5.1 MMOL/L — SIGNIFICANT CHANGE UP (ref 3.5–5.3)
POTASSIUM SERPL-SCNC: 5.1 MMOL/L — SIGNIFICANT CHANGE UP (ref 3.5–5.3)
RBC # BLD: 3.63 M/UL — LOW (ref 4.2–5.8)
RBC # FLD: 17.4 % — HIGH (ref 10.3–14.5)
SODIUM SERPL-SCNC: 136 MMOL/L — SIGNIFICANT CHANGE UP (ref 135–145)
WBC # BLD: 6.39 K/UL — SIGNIFICANT CHANGE UP (ref 3.8–10.5)
WBC # FLD AUTO: 6.39 K/UL — SIGNIFICANT CHANGE UP (ref 3.8–10.5)

## 2017-10-04 PROCEDURE — 99233 SBSQ HOSP IP/OBS HIGH 50: CPT | Mod: GC

## 2017-10-04 PROCEDURE — 93306 TTE W/DOPPLER COMPLETE: CPT | Mod: 26

## 2017-10-04 PROCEDURE — 90935 HEMODIALYSIS ONE EVALUATION: CPT | Mod: GC

## 2017-10-04 RX ORDER — METOPROLOL TARTRATE 50 MG
1 TABLET ORAL
Qty: 0 | Refills: 0 | DISCHARGE
Start: 2017-10-04

## 2017-10-04 RX ORDER — CALCIUM ACETATE 667 MG
667 TABLET ORAL
Qty: 0 | Refills: 0 | Status: DISCONTINUED | OUTPATIENT
Start: 2017-10-04 | End: 2017-10-05

## 2017-10-04 RX ORDER — ATORVASTATIN CALCIUM 80 MG/1
1 TABLET, FILM COATED ORAL
Qty: 0 | Refills: 0 | COMMUNITY
Start: 2017-10-04

## 2017-10-04 RX ORDER — ONDANSETRON 8 MG/1
1 TABLET, FILM COATED ORAL
Qty: 10 | Refills: 0
Start: 2017-10-04 | End: 2017-10-09

## 2017-10-04 RX ORDER — DOXERCALCIFEROL 2.5 UG/1
2 CAPSULE ORAL
Qty: 0 | Refills: 0 | DISCHARGE
Start: 2017-10-04

## 2017-10-04 RX ORDER — HYDRALAZINE HCL 50 MG
1 TABLET ORAL
Qty: 0 | Refills: 0 | COMMUNITY
Start: 2017-10-04

## 2017-10-04 RX ORDER — ASPIRIN/CALCIUM CARB/MAGNESIUM 324 MG
81 TABLET ORAL DAILY
Qty: 0 | Refills: 0 | Status: DISCONTINUED | OUTPATIENT
Start: 2017-10-04 | End: 2017-10-05

## 2017-10-04 RX ORDER — TADALAFIL 10 MG/1
40 TABLET, FILM COATED ORAL DAILY
Qty: 0 | Refills: 0 | Status: DISCONTINUED | OUTPATIENT
Start: 2017-10-04 | End: 2017-10-05

## 2017-10-04 RX ORDER — METOPROLOL TARTRATE 50 MG
100 TABLET ORAL
Qty: 0 | Refills: 0 | Status: DISCONTINUED | OUTPATIENT
Start: 2017-10-04 | End: 2017-10-05

## 2017-10-04 RX ORDER — CALCIUM ACETATE 667 MG
1 TABLET ORAL
Qty: 90 | Refills: 1
Start: 2017-10-04 | End: 2017-12-02

## 2017-10-04 RX ADMIN — LOSARTAN POTASSIUM 50 MILLIGRAM(S): 100 TABLET, FILM COATED ORAL at 05:17

## 2017-10-04 RX ADMIN — Medication 667 MILLIGRAM(S): at 12:49

## 2017-10-04 RX ADMIN — BUDESONIDE AND FORMOTEROL FUMARATE DIHYDRATE 2 PUFF(S): 160; 4.5 AEROSOL RESPIRATORY (INHALATION) at 05:16

## 2017-10-04 RX ADMIN — Medication 100 MILLIGRAM(S): at 22:50

## 2017-10-04 RX ADMIN — Medication 81 MILLIGRAM(S): at 12:28

## 2017-10-04 RX ADMIN — Medication 667 MILLIGRAM(S): at 17:40

## 2017-10-04 RX ADMIN — HEPARIN SODIUM 5000 UNIT(S): 5000 INJECTION INTRAVENOUS; SUBCUTANEOUS at 22:36

## 2017-10-04 RX ADMIN — TAMSULOSIN HYDROCHLORIDE 0.4 MILLIGRAM(S): 0.4 CAPSULE ORAL at 22:35

## 2017-10-04 RX ADMIN — Medication 81 MILLIGRAM(S): at 12:25

## 2017-10-04 RX ADMIN — Medication 90 MILLIGRAM(S): at 05:17

## 2017-10-04 RX ADMIN — Medication 100 MILLIGRAM(S): at 14:10

## 2017-10-04 RX ADMIN — ATORVASTATIN CALCIUM 80 MILLIGRAM(S): 80 TABLET, FILM COATED ORAL at 22:35

## 2017-10-04 RX ADMIN — Medication 1 MILLIGRAM(S): at 12:28

## 2017-10-04 RX ADMIN — Medication 100 MILLIGRAM(S): at 22:36

## 2017-10-04 RX ADMIN — Medication 100 MILLIGRAM(S): at 12:49

## 2017-10-04 RX ADMIN — HEPARIN SODIUM 5000 UNIT(S): 5000 INJECTION INTRAVENOUS; SUBCUTANEOUS at 05:17

## 2017-10-04 RX ADMIN — Medication 50 MILLIGRAM(S): at 22:36

## 2017-10-04 RX ADMIN — Medication 50 MILLIGRAM(S): at 05:16

## 2017-10-04 RX ADMIN — PANTOPRAZOLE SODIUM 40 MILLIGRAM(S): 20 TABLET, DELAYED RELEASE ORAL at 05:15

## 2017-10-04 RX ADMIN — BUDESONIDE AND FORMOTEROL FUMARATE DIHYDRATE 2 PUFF(S): 160; 4.5 AEROSOL RESPIRATORY (INHALATION) at 22:36

## 2017-10-04 RX ADMIN — DOXERCALCIFEROL 1 MICROGRAM(S): 2.5 CAPSULE ORAL at 18:34

## 2017-10-04 RX ADMIN — Medication 100 MILLIGRAM(S): at 05:16

## 2017-10-04 NOTE — PROGRESS NOTE ADULT - ASSESSMENT
Patient is a 72 years old male with PMH of COPD on 2L home O2, Pulmonary HTN, ESRD (on HD M, W, Fr), HTN, hx of provoked PE who presents with acute onset of dizziness, blurred vision, and slurred speech. Code stroke called with no intervention. CTH neg for ICH. Differential diagnosis includes TIA vs CVA vs metabolic encephalopathy. Patient is a 72 years old male with PMH of COPD on 2L home O2, Pulmonary HTN, ESRD (on HD M, W, Fr), HTN, hx of provoked PE who presents with acute onset of dizziness, blurred vision, and slurred speech. Code stroke called with no intervention. CTH neg for ICH. Differential diagnosis includes TIA vs CVA vs metabolic encephalopathy vs peripheral labyrinthitis.

## 2017-10-04 NOTE — PROGRESS NOTE ADULT - PROBLEM SELECTOR PLAN 3
Hx of resistant HTN but wife endorsing relative hypotension ever since he started HD. Elevated BPs while in the hospital  - will restart losartan and nifedipine and monitor BPs closely Hx of resistant HTN but wife endorsing relative hypotension ever since he started HD. Elevated BPs while in the hospital, restarted on losartan and nifedipine  - will restart losartan and nifedipine and monitor BPs closely  - will avoid strict control of BPs given large fluid shifts and exacerbation of symptoms with hypotension Hx of resistant HTN but wife endorsing relative hypotension ever since he started HD. Elevated BPs while in the hospital, restarted on losartan and nifedipine  - continue losartan, nifedipine, and half home dose of hydralazine and monitor BPs closely  - will avoid strict control of BPs given large fluid shifts and exacerbation of symptoms with hypotension

## 2017-10-04 NOTE — PROGRESS NOTE ADULT - PROBLEM SELECTOR PLAN 6
Patient with history of pHTN on tadalifil. Hold home PDE 5 inhibitor in setting of possible hypotension at home.   - continue 2L NC Patient with history of pHTN on tadalifil. Will restart home meds prior to discharge.   - continue 2L NC

## 2017-10-04 NOTE — PROGRESS NOTE ADULT - SUBJECTIVE AND OBJECTIVE BOX
Patient is a 72y old  Male who presents with a chief complaint of Slurred Speech (03 Oct 2017 14:03)      SUBJECTIVE / OVERNIGHT EVENTS:    MEDICATIONS  (STANDING):  allopurinol 100 milliGRAM(s) Oral daily  aspirin enteric coated 81 milliGRAM(s) Oral daily  atorvastatin 80 milliGRAM(s) Oral at bedtime  buDESOnide 160 MICROgram(s)/formoterol 4.5 MICROgram(s) Inhaler 2 Puff(s) Inhalation two times a day  docusate sodium 100 milliGRAM(s) Oral three times a day  doxercalciferol Injectable 1 MICROGram(s) IV Push every other day  folic acid 1 milliGRAM(s) Oral daily  heparin  Injectable 5000 Unit(s) SubCutaneous every 12 hours  hydrALAZINE 50 milliGRAM(s) Oral three times a day  losartan 50 milliGRAM(s) Oral daily  NIFEdipine XL 90 milliGRAM(s) Oral daily  pantoprazole    Tablet 40 milliGRAM(s) Oral before breakfast  tamsulosin 0.4 milliGRAM(s) Oral at bedtime    MEDICATIONS  (PRN):  ALBUTerol/ipratropium for Nebulization 3 milliLiter(s) Nebulizer every 6 hours PRN Shortness of Breath and/or Wheezing  ondansetron   Disintegrating Tablet 4 milliGRAM(s) Oral two times a day PRN Nausea and/or Vomiting      REVIEW OF SYSTEMS:  CONSTITUTIONAL: Denies weakness, fevers and chills  EYES/ENT: Denies visual changes;  denies vertigo and throat pain   NECK: Denies neck pain and stiffness  RESPIRATORY: Denies shortness of breath, denies cough, wheezing, hemoptysis  CARDIOVASCULAR: Denies chest pain and palpitations  ENDOCRINE: Denies weight loss/gain. Denies cold or heat intolerance  GASTROINTESTINAL: Denies abdominal or epigastric pain. denies nausea, vomiting, and hematemesis; Denies diarrhea & constipation. Denies melena and hematochezia.  GENITOURINARY: Denies dysuria, frequency and hematuria  NEUROLOGICAL: Denies numbness and weakness  SKIN: Denies itching and rashes    T(C): 36.8 (10-04-17 @ 04:37), Max: 37.2 (10-03-17 @ 08:25)  HR: 73 (10-04-17 @ 06:05) (72 - 87)  BP: 149/72 (10-04-17 @ 04:37) (149/67 - 183/88)  RR: 18 (10-04-17 @ 04:37) (18 - 18)  SpO2: 99% (10-04-17 @ 06:05) (98% - 100%)      PHYSICAL EXAM:  GENERAL: NAD, well-developed  HEAD:  Atraumatic, Normocephalic  EYES: EOMI, PERRLA, conjunctiva and sclera clear  NECK: Supple, No JVD  CHEST/LUNG: Clear to auscultation bilaterally; No wheeze  HEART: Regular rate and rhythm; No murmurs, rubs, or gallops  ABDOMEN: Soft, Nontender, Nondistended; Bowel sounds present  EXTREMITIES:  2+ Peripheral Pulses, No clubbing, cyanosis, or edema  PSYCH: AAOx3  NEUROLOGY: non-focal  SKIN: No rashes or lesions    CAPILLARY BLOOD GLUCOSE  85 (03 Oct 2017 14:48)        I&O's Summary    03 Oct 2017 07:01  -  04 Oct 2017 07:00  --------------------------------------------------------  IN: 120 mL / OUT: 50 mL / NET: 70 mL        LABS                        9.2    6.35  )-----------( 163      ( 03 Oct 2017 08:29 )             31.4     10-03    138  |  99  |  37<H>  ----------------------------<  91  5.1   |  26  |  7.02<H>    Ca    9.1      03 Oct 2017 08:34  Phos  5.1     10-03  Mg     2.2     10-03                Microbiology:  Urine Cx:  Blood Cx:    RADIOLOGY & ADDITIONAL TESTS:  X- Ray:  CT:  Ultrasound: Patient is a 72y old  Male who presents with a chief complaint of Slurred Speech (03 Oct 2017 14:03)      SUBJECTIVE / OVERNIGHT EVENTS:  No acute events overnight. Patient reports some continued symptoms of dizziness, queasiness, and nausea yesterday. Symptoms appear to be very positionally dependent per history and exacerbated by bending over, laying on one side. Noted that things in the room appeared to be on their side at one point. Would resolve with repositioning. Patient reports decreased PO intake 2/2 some nausea.       MEDICATIONS  (STANDING):  allopurinol 100 milliGRAM(s) Oral daily  aspirin enteric coated 81 milliGRAM(s) Oral daily  atorvastatin 80 milliGRAM(s) Oral at bedtime  buDESOnide 160 MICROgram(s)/formoterol 4.5 MICROgram(s) Inhaler 2 Puff(s) Inhalation two times a day  docusate sodium 100 milliGRAM(s) Oral three times a day  doxercalciferol Injectable 1 MICROGram(s) IV Push every other day  folic acid 1 milliGRAM(s) Oral daily  heparin  Injectable 5000 Unit(s) SubCutaneous every 12 hours  hydrALAZINE 50 milliGRAM(s) Oral three times a day  losartan 50 milliGRAM(s) Oral daily  NIFEdipine XL 90 milliGRAM(s) Oral daily  pantoprazole    Tablet 40 milliGRAM(s) Oral before breakfast  tamsulosin 0.4 milliGRAM(s) Oral at bedtime    MEDICATIONS  (PRN):  ALBUTerol/ipratropium for Nebulization 3 milliLiter(s) Nebulizer every 6 hours PRN Shortness of Breath and/or Wheezing  ondansetron   Disintegrating Tablet 4 milliGRAM(s) Oral two times a day PRN Nausea and/or Vomiting      REVIEW OF SYSTEMS:  CONSTITUTIONAL: Denies weakness, fevers and chills  EYES/ENT: +visual changes;  +vertigo   NECK: +neck pain and stiffness  RESPIRATORY: Denies shortness of breath, denies cough, wheezing, hemoptysis  CARDIOVASCULAR: Denies chest pain and palpitations  ENDOCRINE: Denies weight loss/gain. Denies cold or heat intolerance  GASTROINTESTINAL: Denies abdominal or epigastric pain. +nausea, no vomiting or hematemesis; Denies diarrhea & constipation. Denies melena and hematochezia.  GENITOURINARY: Denies dysuria, frequency and hematuria  NEUROLOGICAL: Denies numbness and weakness, dysarthria, + dizziness  SKIN: Denies itching and rashes    T(C): 36.8 (10-04-17 @ 04:37), Max: 37.2 (10-03-17 @ 08:25)  HR: 73 (10-04-17 @ 06:05) (72 - 87)  BP: 149/72 (10-04-17 @ 04:37) (149/67 - 183/88)  RR: 18 (10-04-17 @ 04:37) (18 - 18)  SpO2: 99% (10-04-17 @ 06:05) (98% - 100%)      PHYSICAL EXAM:  GENERAL: NAD, well-developed  HEAD:  Atraumatic, Normocephalic  EYES: EOMI, PERRLA, conjunctiva and sclera clear  NECK: Supple, No JVD  CHEST/LUNG: Clear to auscultation bilaterally; No wheeze  HEART: Regular rate and rhythm; No murmurs, rubs, or gallops  ABDOMEN: Soft, Nontender, Nondistended; Bowel sounds present  EXTREMITIES: marked lower extremity edema 3+ and signs of chronic edema/lymphedema, dried scaling skin  PSYCH: AAOx3  NEUROLOGY: non-focal; visual changes  SKIN: No rashes or lesions    CAPILLARY BLOOD GLUCOSE  85 (03 Oct 2017 14:48)        I&O's Summary    03 Oct 2017 07:01  -  04 Oct 2017 07:00  --------------------------------------------------------  IN: 120 mL / OUT: 50 mL / NET: 70 mL        LABS                        9.2    6.35  )-----------( 163      ( 03 Oct 2017 08:29 )             31.4     10-03    138  |  99  |  37<H>  ----------------------------<  91  5.1   |  26  |  7.02<H>    Ca    9.1      03 Oct 2017 08:34  Phos  5.1     10-03  Mg     2.2     10-03    RADIOLOGY    EXAM:  MRA NECK W O CONTRAST                        PROCEDURE DATE:  10/03/2017    INTERPRETATION:  Neck MRA:  Clinical indication: Slurred speech, transient ischemic attack  Technique: 2-D time-of-flight technique was utilized to image the vessels   of the neck with 3-D time technique utilized to the bifurcation.  Comparison: Carotid duplex dated 10/3/2017  Findings:  The common carotid, internal carotid external carotid arteries appear   within normal limits.  The right vertebral artery is normally visualized. Of the left vertebral   artery is poorly seen and may be hypoplastic.  Impression:  No evidence for hemodynamically significant stenosis within the anterior   circulation.  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 Further evaluation with contrast-enhanced imaging can be   obtained for further evaluation.      EXAM:  CAROTID DUPLEX BILATERAL                        PROCEDURE DATE:  10/03/2017    INTERPRETATION:  Technique: Grayscale, color and spectral Doppler   examination of both carotid arteries was performed.     HISTORY:  End-stage renal disease, TIA.    There are nodules in both the right and left lobes of the thyroid gland.    There is moderate atheromatous intimal thickening along the course of the   carotid arteries in the neck.    Blood flow velocities are as follows:    RIGHT: PROX CCA = 111 ;  DIST CCA = 72 ;  PROX ICA = 85 ;  DIST ICA =   62 ;  ECA = 90    LEFT   :    PROX CCA = 92 ;  DIST CCA = 91 ;  PROX ICA = 77 ;  DIST ICA =   84 ;  ECA = 81    There is antegrade flow through both vertebral arteries.    IMPRESSION: No hemodynamically significant carotid artery stenoses.    Measurement of carotid stenosis is based on velocity parameters that   correlate the residual internal carotid diameter with that of the more   distal vessel in accordance with a method such as the [place]   Symptomatic Carotid Endarterectomy Trial (NASCET).

## 2017-10-04 NOTE — PROGRESS NOTE ADULT - SUBJECTIVE AND OBJECTIVE BOX
[facility] DIVISION OF KIDNEY DISEASES AND HYPERTENSION -- FOLLOW UP NOTE  --------------------------------------------------------------------------------  Chief Complaint: ESRD on HD    24 hour events/subjective:  Patient seen and examined at bedside. Has no complaints at this time. Plan for HD today.       PAST HISTORY  --------------------------------------------------------------------------------  No significant changes to PMH, PSH, FHx, SHx, unless otherwise noted    ALLERGIES & MEDICATIONS  --------------------------------------------------------------------------------  Allergies    No Known Allergies    Intolerances      Standing Inpatient Medications  allopurinol 100 milliGRAM(s) Oral daily  aspirin enteric coated 81 milliGRAM(s) Oral daily  aspirin enteric coated 81 milliGRAM(s) Oral daily  atorvastatin 80 milliGRAM(s) Oral at bedtime  buDESOnide 160 MICROgram(s)/formoterol 4.5 MICROgram(s) Inhaler 2 Puff(s) Inhalation two times a day  calcium acetate 667 milliGRAM(s) Oral three times a day with meals  docusate sodium 100 milliGRAM(s) Oral three times a day  doxercalciferol Injectable 1 MICROGram(s) IV Push every other day  folic acid 1 milliGRAM(s) Oral daily  heparin  Injectable 5000 Unit(s) SubCutaneous every 12 hours  hydrALAZINE 50 milliGRAM(s) Oral three times a day  losartan 50 milliGRAM(s) Oral daily  metoprolol 100 milliGRAM(s) Oral two times a day  NIFEdipine XL 90 milliGRAM(s) Oral daily  pantoprazole    Tablet 40 milliGRAM(s) Oral before breakfast  tamsulosin 0.4 milliGRAM(s) Oral at bedtime    PRN Inpatient Medications  ALBUTerol/ipratropium for Nebulization 3 milliLiter(s) Nebulizer every 6 hours PRN  ondansetron   Disintegrating Tablet 4 milliGRAM(s) Oral two times a day PRN      REVIEW OF SYSTEMS  --------------------------------------------------------------------------------  Gen: No weakness  Skin: No rashes  Head/Eyes/Ears/Mouth: No headache  Respiratory: No dyspnea  CV: No chest pain  GI: No abdominal pain  MSK: No edema  Neuro: No dizziness/lightheadedness      All other systems were reviewed and are negative, except as noted.    VITALS/PHYSICAL EXAM  --------------------------------------------------------------------------------  T(C): 36.7 (10-04-17 @ 11:52), Max: 36.8 (10-04-17 @ 04:37)  HR: 89 (10-04-17 @ 15:25) (72 - 94)  BP: 150/68 (10-04-17 @ 14:09) (136/68 - 174/83)  RR: 18 (10-04-17 @ 11:52) (18 - 18)  SpO2: 99% (10-04-17 @ 15:25) (96% - 100%)  Wt(kg): --  Height (cm): 170.18 (10-02-17 @ 18:30)  Weight (kg): 107.1 (10-02-17 @ 18:30)  BMI (kg/m2): 37 (10-02-17 @ 18:30)  BSA (m2): 2.17 (10-02-17 @ 18:30)      10-03-17 @ 07:01  -  10-04-17 @ 07:00  --------------------------------------------------------  IN: 120 mL / OUT: 50 mL / NET: 70 mL    10-04-17 @ 07:01  -  10-04-17 @ 17:17  --------------------------------------------------------  IN: 460 mL / OUT: 50 mL / NET: 410 mL      Physical Exam:  	Gen: NAD  	HEENT: supple neck  	Pulm: decreased BS at bases B/L  	CV: RRR, S1S2  	Abd: +BS, soft, nontender/nondistended  	LE: Warm, + lymph edema b/l  	Psych: Normal affect and mood  	Skin: Warm, without rashes  	Vascular access: NOE ABDI + thrill, +bruit   LABS/STUDIES  --------------------------------------------------------------------------------              9.2    6.39  >-----------<  180      [10-04-17 @ 07:55]              31.1     136  |  96  |  43  ----------------------------<  68      [10-04-17 @ 07:55]  5.1   |  24  |  8.65        Ca     8.7     [10-04-17 @ 07:55]      Mg     2.1     [10-04-17 @ 07:55]      Phos  5.8     [10-04-17 @ 07:55]            Creatinine Trend:  SCr 8.65 [10-04 @ 07:55]  SCr 7.02 [10-03 @ 08:34]  SCr 10.04 [10-02 @ 13:09]  SCr 9.36 [10-02 @ 07:32]  SCr 9.59 [10-01 @ 23:56]        Iron 24, TIBC 190, %sat 13      [10-02-17 @ 17:25]  Ferritin 272.0      [10-02-17 @ 07:32]  PTH -- (Ca 9.1)      [10-03-17 @ 08:40]   184  HbA1c 5.0      [10-02-17 @ 15:38]  TSH 0.79      [10-02-17 @ 07:32]  Lipid: chol 130, , HDL 40, LDL 70      [10-02-17 @ 07:32]

## 2017-10-04 NOTE — PROGRESS NOTE ADULT - PROBLEM SELECTOR PLAN 8
HSQ for DVT ppx  PT consult HSQ for DVT ppx  PT consult recommending home with PT and rolling walker

## 2017-10-04 NOTE — PROGRESS NOTE ADULT - PROBLEM SELECTOR PLAN 1
Patient presented with acute onset slurred speech, dizziness, visual changes, and ?left sided facial droop, HA and numbness of LLE. Now denies HA, slurred speech, facial droop, weakness, or visual changes. Symptoms largely resolved as per family. Differential includes possible low flow TIA vs thrombotic TIA vs Stroke vs metabolic encephalopathy. Code stroke called with no intervention CTH neg for ICH.  - MRI/MRA head was negative for intracranial pathology  - f/u MRA neck and duplex of neck vessels  - c/w statin, ASA  - f/u TTE   - Admit to Tele Patient presented with acute onset slurred speech, dizziness, visual changes, and ?left sided facial droop, HA and numbness of LLE. Code stroke called with no intervention CTH neg for ICH. Now without HA, slurred speech, facial droop, or weakness. Symptoms largely resolved as per family. Patient still has some residual dizziness, vertigo, and nausea. Could be in context of low flow TIA vs peripheral causes of vertigo.   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 Code stroke called with no intervention CTH neg for ICH. Now without HA, slurred speech, facial droop, or weakness. Symptoms largely resolved as per family. Patient still has some residual dizziness, vertigo, and nausea. Could be in context of low flow TIA vs peripheral causes of vertigo.   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

## 2017-10-04 NOTE — PROGRESS NOTE ADULT - PROBLEM SELECTOR PLAN 5
Patient appears hypervolemic on exam with significant LE edema, but per patient they have been removing excess fluid at HD.   - Obtain TTE  - Monitor Is/Os  - restarted some of the home BP meds (nifedipine and losartan)  - c/w ASA/statin Patient appears hypervolemic on exam with significant LE edema, but per patient they have been removing excess fluid at HD.   - will continue losartan and nifedipine for now  - Monitor Is/Os  - c/w ASA/statin Patient appears hypervolemic on exam with significant LE edema, but per patient they have been removing excess fluid at HD.   - will continue losartan and nifedipine for now in addition to half dose hydralazine  - Monitor Is/Os  - c/w ASA/statin

## 2017-10-04 NOTE — PROGRESS NOTE ADULT - SUBJECTIVE AND OBJECTIVE BOX
Patient states notes only minimal dizziness today. He also no longer notes nausea. He states no recurrence of slurred speech. He denies HA or other neurological complaints.    Exam: Awake, slowed mentation, appropriate            CN II-XII intact           Motor tone and strength  normal                          9.2    6.39  )-----------( 180      ( 04 Oct 2017 07:55 )             31.1     10-04    136  |  96  |  43<H>  ----------------------------<  68<L>  5.1   |  24  |  8.65<H>    Ca    8.7      04 Oct 2017 07:55  Phos  5.8     10-04  Mg     2.1     10-04

## 2017-10-05 VITALS
SYSTOLIC BLOOD PRESSURE: 145 MMHG | DIASTOLIC BLOOD PRESSURE: 77 MMHG | HEART RATE: 69 BPM | OXYGEN SATURATION: 96 % | RESPIRATION RATE: 18 BRPM | TEMPERATURE: 98 F

## 2017-10-05 PROCEDURE — 70551 MRI BRAIN STEM W/O DYE: CPT

## 2017-10-05 PROCEDURE — 82728 ASSAY OF FERRITIN: CPT

## 2017-10-05 PROCEDURE — 99239 HOSP IP/OBS DSCHRG MGMT >30: CPT

## 2017-10-05 PROCEDURE — 94640 AIRWAY INHALATION TREATMENT: CPT

## 2017-10-05 PROCEDURE — 80048 BASIC METABOLIC PNL TOTAL CA: CPT

## 2017-10-05 PROCEDURE — 82310 ASSAY OF CALCIUM: CPT

## 2017-10-05 PROCEDURE — 71045 X-RAY EXAM CHEST 1 VIEW: CPT

## 2017-10-05 PROCEDURE — 82553 CREATINE MB FRACTION: CPT

## 2017-10-05 PROCEDURE — 94660 CPAP INITIATION&MGMT: CPT

## 2017-10-05 PROCEDURE — 70450 CT HEAD/BRAIN W/O DYE: CPT

## 2017-10-05 PROCEDURE — 84100 ASSAY OF PHOSPHORUS: CPT

## 2017-10-05 PROCEDURE — 99261: CPT

## 2017-10-05 PROCEDURE — 84443 ASSAY THYROID STIM HORMONE: CPT

## 2017-10-05 PROCEDURE — 93306 TTE W/DOPPLER COMPLETE: CPT

## 2017-10-05 PROCEDURE — 83735 ASSAY OF MAGNESIUM: CPT

## 2017-10-05 PROCEDURE — 99291 CRITICAL CARE FIRST HOUR: CPT | Mod: 25

## 2017-10-05 PROCEDURE — 83970 ASSAY OF PARATHORMONE: CPT

## 2017-10-05 PROCEDURE — 85730 THROMBOPLASTIN TIME PARTIAL: CPT

## 2017-10-05 PROCEDURE — 82550 ASSAY OF CK (CPK): CPT

## 2017-10-05 PROCEDURE — 80061 LIPID PANEL: CPT

## 2017-10-05 PROCEDURE — 80053 COMPREHEN METABOLIC PANEL: CPT

## 2017-10-05 PROCEDURE — 85027 COMPLETE CBC AUTOMATED: CPT

## 2017-10-05 PROCEDURE — 86901 BLOOD TYPING SEROLOGIC RH(D): CPT

## 2017-10-05 PROCEDURE — 97162 PT EVAL MOD COMPLEX 30 MIN: CPT

## 2017-10-05 PROCEDURE — 84484 ASSAY OF TROPONIN QUANT: CPT

## 2017-10-05 PROCEDURE — 93880 EXTRACRANIAL BILAT STUDY: CPT

## 2017-10-05 PROCEDURE — 83550 IRON BINDING TEST: CPT

## 2017-10-05 PROCEDURE — 86900 BLOOD TYPING SEROLOGIC ABO: CPT

## 2017-10-05 PROCEDURE — 86850 RBC ANTIBODY SCREEN: CPT

## 2017-10-05 PROCEDURE — 93005 ELECTROCARDIOGRAM TRACING: CPT

## 2017-10-05 PROCEDURE — 83036 HEMOGLOBIN GLYCOSYLATED A1C: CPT

## 2017-10-05 PROCEDURE — 70547 MR ANGIOGRAPHY NECK W/O DYE: CPT

## 2017-10-05 PROCEDURE — 70544 MR ANGIOGRAPHY HEAD W/O DYE: CPT

## 2017-10-05 PROCEDURE — 85610 PROTHROMBIN TIME: CPT

## 2017-10-05 RX ORDER — HYDRALAZINE HCL 50 MG
1 TABLET ORAL
Qty: 90 | Refills: 1
Start: 2017-10-05 | End: 2017-12-03

## 2017-10-05 RX ORDER — HYDRALAZINE HCL 50 MG
1 TABLET ORAL
Qty: 0 | Refills: 0 | COMMUNITY
Start: 2017-10-05

## 2017-10-05 RX ORDER — TADALAFIL 10 MG/1
2 TABLET, FILM COATED ORAL
Qty: 0 | Refills: 0 | DISCHARGE
Start: 2017-10-05

## 2017-10-05 RX ORDER — ATORVASTATIN CALCIUM 80 MG/1
1 TABLET, FILM COATED ORAL
Qty: 30 | Refills: 1
Start: 2017-10-05 | End: 2017-12-03

## 2017-10-05 RX ORDER — HYDRALAZINE HCL 50 MG
25 TABLET ORAL THREE TIMES A DAY
Qty: 0 | Refills: 0 | Status: DISCONTINUED | OUTPATIENT
Start: 2017-10-05 | End: 2017-10-05

## 2017-10-05 RX ORDER — LOSARTAN POTASSIUM 100 MG/1
1 TABLET, FILM COATED ORAL
Qty: 30 | Refills: 0 | OUTPATIENT
Start: 2017-10-05 | End: 2017-11-04

## 2017-10-05 RX ORDER — LOSARTAN POTASSIUM 100 MG/1
1 TABLET, FILM COATED ORAL
Qty: 0 | Refills: 0 | COMMUNITY

## 2017-10-05 RX ORDER — TADALAFIL 10 MG/1
2 TABLET, FILM COATED ORAL
Qty: 0 | Refills: 0 | COMMUNITY

## 2017-10-05 RX ADMIN — Medication 25 MILLIGRAM(S): at 13:07

## 2017-10-05 RX ADMIN — Medication 100 MILLIGRAM(S): at 11:03

## 2017-10-05 RX ADMIN — Medication 100 MILLIGRAM(S): at 05:33

## 2017-10-05 RX ADMIN — TADALAFIL 40 MILLIGRAM(S): 10 TABLET, FILM COATED ORAL at 11:03

## 2017-10-05 RX ADMIN — Medication 90 MILLIGRAM(S): at 05:33

## 2017-10-05 RX ADMIN — HEPARIN SODIUM 5000 UNIT(S): 5000 INJECTION INTRAVENOUS; SUBCUTANEOUS at 07:35

## 2017-10-05 RX ADMIN — Medication 1 MILLIGRAM(S): at 11:03

## 2017-10-05 RX ADMIN — Medication 100 MILLIGRAM(S): at 13:08

## 2017-10-05 RX ADMIN — Medication 100 MILLIGRAM(S): at 05:34

## 2017-10-05 RX ADMIN — Medication 81 MILLIGRAM(S): at 11:03

## 2017-10-05 RX ADMIN — Medication 50 MILLIGRAM(S): at 05:33

## 2017-10-05 RX ADMIN — LOSARTAN POTASSIUM 50 MILLIGRAM(S): 100 TABLET, FILM COATED ORAL at 05:33

## 2017-10-05 RX ADMIN — Medication 667 MILLIGRAM(S): at 11:03

## 2017-10-05 RX ADMIN — Medication 667 MILLIGRAM(S): at 07:35

## 2017-10-05 RX ADMIN — BUDESONIDE AND FORMOTEROL FUMARATE DIHYDRATE 2 PUFF(S): 160; 4.5 AEROSOL RESPIRATORY (INHALATION) at 05:33

## 2017-10-05 RX ADMIN — PANTOPRAZOLE SODIUM 40 MILLIGRAM(S): 20 TABLET, DELAYED RELEASE ORAL at 05:33

## 2017-10-05 NOTE — PROGRESS NOTE ADULT - PROBLEM SELECTOR PLAN 5
Patient appears hypervolemic on exam with significant LE edema, but per patient they have been removing excess fluid at HD.   - will continue modified home BP medication regimen as above  - Monitor Is/Os  - c/w ASA/statin

## 2017-10-05 NOTE — PROGRESS NOTE ADULT - ASSESSMENT
Patient is a 72 years old male with PMH of COPD on 2L home O2, Pulmonary HTN, ESRD (on HD M, W, Fr), HTN, hx of provoked PE who presents with acute onset of dizziness, blurred vision, and slurred speech. Code stroke called with no intervention. CTH neg for ICH. Differential diagnosis includes TIA vs CVA vs metabolic encephalopathy vs peripheral labyrinthitis. MRI/MRA brain unremarkable. 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 Presentation likely consistent with low-flow TIA given labile blood pressures and fluid shifts 2/2 initiation of HD.

## 2017-10-05 NOTE — PROGRESS NOTE ADULT - PROBLEM SELECTOR PROBLEM 3
Hypertension
Hypertension
COPD (chronic obstructive pulmonary disease)
Hypertension, unspecified type
Hypertension

## 2017-10-05 NOTE — PROGRESS NOTE ADULT - PROBLEM SELECTOR PLAN 4
Patient with hx of COPD on 2L home O2. No dyspnea or worsening work of breathing.   - continue on 2L NC  - Duonebs prn
Patient with hx of COPD on 2L home O2. No dyspnea or worsening work of breathing.   - continue on 2L NC  - Duonebs prn
Patient appears hypervolemic on exam with significant LE edema, but per patient they have been removing excess fluid at HD.   - Obtain TTE  - Monitor Is/Os  - Hold antihypertensives to allow for permissive HTN, monitor closely  - c/w ASA  - start statin
Patient with hx of COPD on 2L home O2. No dyspnea or worsening work of breathing.   - continue on 2L NC  - Duonebs prn

## 2017-10-05 NOTE — PROGRESS NOTE ADULT - PROBLEM SELECTOR PLAN 1
Patient presented with acute onset slurred speech, dizziness, visual changes, and ?left sided facial droop, HA and numbness of LLE. Code stroke called with no intervention CTH neg for ICH. Now without HA, slurred speech, facial droop, or weakness. Symptoms largely resolved as per family. Patient still has some residual dizziness, vertigo, and nausea but largely resolved. Symptoms likely in context of low flow TIA given labile blood pressures and large fluid shifts.   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

## 2017-10-05 NOTE — PROGRESS NOTE ADULT - PROBLEM SELECTOR PLAN 3
Hx of resistant HTN but wife endorsing relative hypotension ever since he started HD. Elevated BPs while in the hospital, restarted on losartan and nifedipine  - continue losartan, nifedipine, and half home dose of hydralazine and monitor BPs closely  - restarted metoprolol 100mg BID and adcirca, BPs stable throughout  - will avoid strict control of BPs given large fluid shifts and exacerbation of symptoms with hypotension

## 2017-10-05 NOTE — PROGRESS NOTE ADULT - ATTENDING COMMENTS
Agree with above. Mr. Rivers looks great today. D/C home w/ home PT and rolling walker, d/c summary for more details.

## 2017-10-05 NOTE — PROVIDER CONTACT NOTE (OTHER) - ASSESSMENT
Pt is A&Ox4 denies chest pain, palpitations, dizziness, and SOB. Pt received hydralazine 50mg oral and metoprolol 100mg oral after BP was taken.

## 2017-10-05 NOTE — PROGRESS NOTE ADULT - PROVIDER SPECIALTY LIST ADULT
Internal Medicine
Internal Medicine
Nephrology
Neurology
Internal Medicine
Internal Medicine

## 2017-10-05 NOTE — PROGRESS NOTE ADULT - SUBJECTIVE AND OBJECTIVE BOX
Patient is a 72y old  Male who presents with a chief complaint of Slurred Speech (03 Oct 2017 14:03)    SUBJECTIVE / OVERNIGHT EVENTS:  Patient with concern of headache which resolved on its own. Per patient has had a couple mild HA since hospitalization. Also concern of burning with urination. Of note, patient is ESRD and makes very small amount of urine. Seems to be in slight pain when trying to express urine and unable to make sample for analysis. Patient states this symptom has been chronic for the last week or so. No other concerns regarding vision, dizziness, or chest pain or shortness of breath. Tolerated HD yesterday and BPs have been wnl.     MEDICATIONS  (STANDING):  allopurinol 100 milliGRAM(s) Oral daily  aspirin enteric coated 81 milliGRAM(s) Oral daily  atorvastatin 80 milliGRAM(s) Oral at bedtime  buDESOnide 160 MICROgram(s)/formoterol 4.5 MICROgram(s) Inhaler 2 Puff(s) Inhalation two times a day  calcium acetate 667 milliGRAM(s) Oral three times a day with meals  docusate sodium 100 milliGRAM(s) Oral three times a day  doxercalciferol Injectable 1 MICROGram(s) IV Push every other day  folic acid 1 milliGRAM(s) Oral daily  heparin  Injectable 5000 Unit(s) SubCutaneous every 12 hours  hydrALAZINE 50 milliGRAM(s) Oral three times a day  losartan 50 milliGRAM(s) Oral daily  metoprolol 100 milliGRAM(s) Oral two times a day  NIFEdipine XL 90 milliGRAM(s) Oral daily  pantoprazole    Tablet 40 milliGRAM(s) Oral before breakfast  tadalafil 40 milliGRAM(s) Oral daily  tamsulosin 0.4 milliGRAM(s) Oral at bedtime    MEDICATIONS  (PRN):  ALBUTerol/ipratropium for Nebulization 3 milliLiter(s) Nebulizer every 6 hours PRN Shortness of Breath and/or Wheezing  ondansetron   Disintegrating Tablet 4 milliGRAM(s) Oral two times a day PRN Nausea and/or Vomiting      REVIEW OF SYSTEMS:  CONSTITUTIONAL: Denies weakness, fevers and chills  EYES/ENT: Denies visual changes;  denies vertigo and throat pain   NECK: Denies neck pain and stiffness  RESPIRATORY: Denies shortness of breath, denies cough, wheezing, hemoptysis  CARDIOVASCULAR: Denies chest pain and palpitations  ENDOCRINE: Denies weight loss/gain. Denies cold or heat intolerance  GASTROINTESTINAL: Denies abdominal or epigastric pain. denies nausea, vomiting, and hematemesis; Denies diarrhea & constipation. Denies melena and hematochezia.  GENITOURINARY: +dysuria, patient almost anuric  NEUROLOGICAL: +mild headache. Denies numbness and weakness  SKIN: Denies itching and rashes    T(C): 36.8 (10-05-17 @ 08:28), Max: 36.9 (10-05-17 @ 05:12)  HR: 63 (10-05-17 @ 08:28) (60 - 98)  BP: 107/42 (10-05-17 @ 08:28) (107/42 - 176/96)  RR: 18 (10-05-17 @ 08:28) (18 - 19)  SpO2: 94% (10-05-17 @ 08:28) (91% - 100%)    PHYSICAL EXAM:  GENERAL: NAD, well-developed  HEAD:  Atraumatic, Normocephalic  EYES: EOMI, PERRLA, conjunctiva and sclera clear  NECK: Supple, No JVD  CHEST/LUNG: Clear to auscultation bilaterally, slightly decreased breath sounds at bases; No wheeze  HEART: Regular rate and rhythm; No murmurs, rubs, or gallops  ABDOMEN: Soft, Nontender, Nondistended; Bowel sounds present  EXTREMITIES: marked lower extremity edema 3+ and signs of chronic edema/lymphedema, dried scaling skin, collagen deposition; LUE fistula +thrill  PSYCH: AAOx3  NEUROLOGY: non-focal  SKIN: No rashes or lesions      CAPILLARY BLOOD GLUCOSE        I&O's Summary    04 Oct 2017 07:01  -  05 Oct 2017 07:00  --------------------------------------------------------  IN: 700 mL / OUT: 2050 mL / NET: -1350 mL        LABS                        9.2    6.39  )-----------( 180      ( 04 Oct 2017 07:55 )             31.1     10-04    136  |  96  |  43<H>  ----------------------------<  68<L>  5.1   |  24  |  8.65<H>    Ca    8.7      04 Oct 2017 07:55  Phos  5.8     10-04  Mg     2.1     10-04            Patient name: MAYE ZUNIGA  YOB: 1945   Age: 72 (M)   MR#: [id number]  Study Date: 10/4/2017  [facility]  Study quality: Technically difficult  Referring Physician: Estevan Joya MD  Blood Pressure: 150/68 mmHg  Height: 182 cm  Weight: 158 kg  BSA: 2.7 m2  ------------------------------------------------------------------------  PROCEDURE: Transthoracic echocardiogram with 2-D, M-Mode  and complete spectral and color flow Doppler.  INDICATION: Cardiac murmur, unspecified (R01.1)  ------------------------------------------------------------------------  Dimensions:    Normal Values:  LA:     5.1    2.0 - 4.0 cm  Ao:     4.1    2.0 - 3.8 cm  SEPTUM: 1.6    0.6 - 1.2 cm  PWT:    1.4   0.6 - 1.1 cm  LVIDd:  5.5    3.0 - 5.6 cm  LVIDs:  3.5    1.8 - 4.0 cm  Derived variables:  LVMI: 138 g/m2  RWT: 0.50  Doppler Peak Velocity (m/sec): AoV=1.6  ------------------------------------------------------------------------  Observations:  Mitral Valve: Normal mitral valve. Minimal mitral  regurgitation.  Aortic Valve/Aorta: Aortic valve not well visualized;  aortic valve is trileaflet with normal opening. Peak  transaortic valve gradient equals 10 mm Hg. Grossly mild  aortic regurgitation. Peak left ventricular outflow tract  gradient equals 7 mm Hg.  Aortic Root: 4.1 cm.  LVOT diameter: 2.5 cm.  Left Atrium: Mildly dilated left atrium.  LA volume index =  38 cc/m2. Atrial septal aneurysm seen.  Left Ventricle: Endocardium not well visualized; grossly  normal left ventricular systolic function. Concentric left  ventricular hypertrophy.  Right Heart: Normal right atrium. Normal right ventricular  size and function. Normal tricuspid valve. Minimal  tricuspid regurgitation. Normal pulmonic valve. Minimal  pulmonic regurgitation.  Pericardium/Pleura: Small posterior pericardial effusion.  The effusion measures up to 1.0-1.1 cm posterior to the LV.  No evidence of hemodynamic compromise.  Hemodynamic: Estimated right atrial pressure is 8 mm Hg.  Inadequate tricuspid regurgitation Doppler envelope  precludes estimation of RVSP.  ------------------------------------------------------------------------  Conclusions:  Technically difficult study.  1. Normal mitral valve. Minimal mitral regurgitation.  2. Aortic valve not well visualized; aortic valve is  trileaflet with normal opening. Grossly mild aortic  regurgitation.  3. Mildly dilated left atrium.  LA volume index = 38 cc/m2.  Atrial septal aneurysm seen.  4. Concentric left ventricular hypertrophy.  5. Endocardium not well visualized; grossly normal left  ventricular systolic function.  6. Normal right ventricular size and function.  7. Small posterior pericardial effusion. The effusion  measures up to 1.0-1.1 cm posterior to the LV. No evidence  of hemodynamic compromise.  *** No previous Echo exam.  ------------------------------------------------------------------------  Confirmed on  10/4/2017 - 16:55:02 by Melanie Saucedo M.D.  ------------------------------------------------------------------------

## 2017-10-05 NOTE — PROGRESS NOTE ADULT - SUBJECTIVE AND OBJECTIVE BOX
Patient states he feels well. He states no longer any dizziness. no recurrence of the slurred speech. He denies other complaints.    Exam: Awake, slowed mentation, appropriate          CN II-XII intact          Motor tone and strength normal    < from: Transthoracic Echocardiogram (10.04.17 @ 15:37) >  -----------------------------------  Observations:  Mitral Valve: Normal mitral valve. Minimal mitral  regurgitation.  Aortic Valve/Aorta: Aortic valve not well visualized;  aortic valve is trileaflet with normal opening. Peak  transaortic valve gradient equals 10 mm Hg. Grossly mild  aortic regurgitation. Peak left ventricular outflow tract  gradient equals 7 mm Hg.  Aortic Root: 4.1 cm.  LVOT diameter: 2.5 cm.  Left Atrium: Mildly dilated left atrium.  LA volume index =  38 cc/m2. Atrial septal aneurysm seen.  Left Ventricle: Endocardium not well visualized; grossly  normal left ventricular systolic function. Concentric left  ventricular hypertrophy.  Right Heart: Normal right atrium. Normal right ventricular  size and function. Normal tricuspid valve. Minimal  tricuspid regurgitation. Normal pulmonic valve. Minimal  pulmonic regurgitation.  Pericardium/Pleura: Small posterior pericardial effusion.  The effusion measures up to 1.0-1.1 cm posterior to the LV.  No evidence of hemodynamic compromise.  Hemodynamic: Estimated right atrial pressure is 8 mm Hg.  Inadequate tricuspid regurgitation Doppler envelope  precludes estimation of RVSP.  ------------------------------------------------------------------------  Conclusions:  Technically difficult study.  1. Normal mitral valve. Minimal mitral regurgitation.  2. Aortic valve not well visualized; aortic valve is  trileaflet with normal opening. Grossly mild aortic  regurgitation.

## 2017-10-05 NOTE — PROGRESS NOTE ADULT - PROBLEM SELECTOR PROBLEM 1
Transient cerebral ischemia, unspecified type
Transient cerebral ischemia, unspecified type
ESRD (end stage renal disease)
Transient cerebral ischemia, unspecified type
Transient cerebral ischemia, unspecified type

## 2017-10-05 NOTE — PROGRESS NOTE ADULT - PROBLEM SELECTOR PROBLEM 2
[facility]    [id number] [facility] [address]    Phone:  [phone]       Thank You for choosing us for your health care visit. We are glad to serve you and happy to provide you with this summary of your visit. Please help us to ensure we have accurate records. If you find anything that needs to be changed, please let our staff know as soon as possible.          Your Demographic Information     Patient Name Sex     Peggy Moise Female 1925       Ethnic Group Patient Race    Not of  or  Origin White      Your Visit Details     Date & Time Provider Department    2017 2:00 PM Steven J Heyden, MD [facility]      Your Upcoming Appointment*(Max 10)     2017 10:40 AM CDT   Complete Physical Exam with Tulio Jaime MD   [facility]    [id number] [facility] [address]   [phone]             11:15 AM CDT   Routine Eye Exam with Chana Black MD, [facility] OPH TECH 3   [facility] ([facility])    [address]  [facility] [address]   [phone]              Your Vitals Were     BP Pulse Temp Weight SpO2 BMI    124/80 (BP Location: [facility], Patient Position: Sitting, Cuff Size: Regular) 62 98.3 °F (36.8 °C) (Tympanic) 135 lb (61.2 kg) 96% 21.79 kg/m2    Smoking Status                   Former Smoker           Medications Prescribed or Re-Ordered Today     azithromycin (ZITHROMAX) 250 MG tablet    Si TABLETS TODAY, THEN 1 TABLET DAILY FOR 4 MORE DAYS    Class: Eprescribe    Pharmacy: [id number] - [place], [place] - [id number] AT [facility] [address]  #: [phone]    guaiFENesin-codeine (CHERATUSSIN AC) 100-10 MG/5ML liquid    Sig - Route: Take 5 mLs by mouth every 6 hours for 10 days. - Oral    Class: Normal    Pharmacy: 
[facility] [address] [place], [place] - [id number] AT [facility] [address] [place] Ph #: [phone]      Your Current Medications Are        Disp Refills Start End    azithromycin (ZITHROMAX) 250 MG tablet 6 tablet 0 2017 3/2/2017    Si TABLETS TODAY, THEN 1 TABLET DAILY FOR 4 MORE DAYS    Class: Eprescribe    guaiFENesin-codeine (CHERATUSSIN AC) 100-10 MG/5ML liquid 200 mL 0 2017 3/7/2017    Sig - Route: Take 5 mLs by mouth every 6 hours for 10 days. - Oral    traZODONE (DESYREL) 50 MG tablet 30 tablet 1 2017     Sig - Route: Take 1 tablet by mouth nightly. - Oral    Class: Eprescribe    irbesartan-hydrochlorothiazide (AVALIDE) 300-12.5 MG per tablet 90 tablet 0 2017     Sig - Route: Take 1 tablet by mouth daily. - Oral    Class: Eprescribe    ALPRAZolam (XANAX) 0.5 MG tablet 90 tablet 0 2017     Sig: TAKE 1 TABLET BY MOUTH AT BEDTIME AS NEEDED FOR SLEEP    Notes to Pharmacy: [phone]    Cosign for Ordering: Accepted by Leslie Moreno MD on 1/3/2017  4:33 PM    levothyroxine (SYNTHROID) 25 MCG tablet 90 tablet 0 2016     Sig: One tablet daily    Class: Eprescribe    BYSTOLIC 10 MG tablet 90 tablet 1 2016     Sig: TAKE 1 TABLET BY MOUTH DAILY    Class: Eprescribe    Notes to Pharmacy: **Patient requests 90 days supply**    raloxifene (EVISTA) 60 MG tablet 90 tablet 2 10/17/2016     Sig: TAKE 1 TABLET DAILY    Class: Eprescribe    azelastine (OPTIVAR) 0.05 % ophthalmic solution 6 mL 12 2016     Sig - Route: Place 1 drop into both eyes 2 times daily. - Both Eyes    Class: Eprescribe    Cholecalciferol (VITAMIN D) 2000 UNITS tablet 90 tablet 3 2016     Sig - Route: Take 2,000 Units by mouth daily. - Oral    Class: Script Not Printed    Docusate Calcium (STOOL SOFTENER PO)        Sig - Route: Daily - Oral    Class: Historical Med    aspirin 81 MG tablet 90 tablet 3 2011     Sig - Route: Take 1 tablet by mouth daily. - Oral    
Ascorbic Acid (VITAMIN C) 500 MG OR TABS   2010     Sig - Route: 1 TABLET DAILY - Oral    Class: Historical Med    MULTIPLE VITAMIN PO CAPS OTC 0 10/22/2008     Sig - Route: One daily - Oral    Class: Historical Med    CALCIUM + D 600-200 MG-IU PO TABS OTC 0 6/10/2004     Si tablet three times a day     Class: Script Not Printed      Allergies     Lisinopril     cough      Immunizations History as of 2017     Name Date    HERPES ZOSTER SHINGLES 2009    INFLUENZA QUADRIVALENT 10/14/2015, 10/28/2014    Influenza 10/7/2013, 10/17/2012, 10/12/2011, 10/5/2010, 2009, 10/22/2008, 10/9/2007, 10/30/2006, 10/28/2005, 10/13/2004, 10/15/2003, 10/1/2002    Influenza A novel H1N1 2009    Influenza High Dose Pres Free 2016    Pneumococcal Conjugate 13 Valent 2015    Pneumococcal Polysaccharide Adult 2004    Td:Adult type tetanus/diphtheria 2003    Tdap 10/28/2014      Problem List as of 2017     Essential hypertension, benign    Osteopenia    Unspecified vitamin D deficiency    Other and unspecified hyperlipidemia    Unspecified hypothyroidism    Pain in joint, lower leg    Other vitreous opacities    Posterior vitreous detachment of both eyes    Bilateral dry eyes    Sensorineural hearing loss, asymmetrical    Dizzy    Unspecified disease of nail    Toe pain, bilateral    Ingrown toenail              Patient Instructions    I HAVE GIVEN YOU ZITHROMAX.  YOU WILL TAKE 2 PILLS TODAY AS A SINGLE STARTING DOSE.  STARTING TOMORROW YOU WILL USE 1 PILL DAILY FOR 4 MORE DAYS.     PLEASE USE THE CHERATUSSIN AC AS NEEDED EVERY 6 HOURS FOR COUGH       
ESRD (end stage renal disease)
Edema of lower extremity
ESRD (end stage renal disease)

## 2017-10-05 NOTE — PROGRESS NOTE ADULT - PROBLEM SELECTOR PROBLEM 5
Chronic diastolic congestive heart failure
Chronic diastolic congestive heart failure
Pulmonary hypertension
Chronic diastolic congestive heart failure

## 2017-10-05 NOTE — PROGRESS NOTE ADULT - PROBLEM SELECTOR PLAN 6
Patient with history of pHTN on tadalifil. Will restart home meds prior to discharge.   - continue 2L NC

## 2017-10-05 NOTE — PROGRESS NOTE ADULT - PROBLEM SELECTOR PROBLEM 4
COPD (chronic obstructive pulmonary disease)
COPD (chronic obstructive pulmonary disease)
Chronic diastolic congestive heart failure
COPD (chronic obstructive pulmonary disease)

## 2017-10-05 NOTE — PROGRESS NOTE ADULT - PROBLEM SELECTOR PLAN 2
Patient started HD for ESRD in June and receives HD M, W, Fr  - nephrology following, patient tolerating HD

## 2018-01-01 NOTE — H&P ADULT - NSHPATTENDINGPLANDISCUSS_GEN_ALL_CORE
At [facility], one important tool we use to improve our patient services is our Patient Survey.  Following your visit you may receive our survey in the mail.    Please take the time to complete the survey.    If your visit with us was great, we want to hear about it.    If we can improve, please let us know how.           Your Child's Health  2-Month-Old Visit    Donaldo Mcconnell MD    [facility]  [address] [place]  [address]  Phone [phone]  Fax [phone]      Michael NEREIDA Narayansophie  June 12, 2016    Visit Vitals  Pulse 164   Resp 44   Ht (!) 25\" (63.5 cm)   Wt 5.897 kg   HC 41.5 cm (16.34\")   BMI 14.62 kg/m²       61 %ile (Z= 0.28) based on WHO (Boys, 0-2 years) weight-for-age data using vitals from 2018.  99 %ile (Z= 2.29) based on WHO (Boys, 0-2 years) length-for-age data using vitals from 2018.  97 %ile (Z= 1.83) based on WHO (Boys, 0-2 years) head circumference-for-age data using vitals from 2018.    NUTRITION:  Mother's milk or formula provides all the nutrition that babies need at this age. Because we have less sunlight in our part of the country, infants whose only source of nutrition is mother's milk should have nursing baby vitamins (available without prescription at the drugstore) each day. Formula fed babies should also take vitamins until they are drinking 32 oz of formula a day.    BABYSITTERS:  As much as you love Michael, you will occasionally need a break.  Be sure to leave the following information with your :  Your name, home address, and telephone number.  Where you will be, and a phone number where you can be reached.  Name and phone number of a person to call if you cannot be reached.  Police telephone number (or 911 if available in your area).  Fire Department number (or 911 if available in your area).    Poison Control number ([phone]).    DEVELOPMENT:  Most babies at this age will begin to make experimental noises,  smile, stare at their fists, and gain control over their head movements.    ACCIDENT PREVENTION:  Falls:  Many babies begin to roll over between 3 and 6 months of age.  Be careful not to leave Michael alone where he might roll over and fall.  Scalding:  Adjust your hot water heater to 120-130 degrees F, or use the low setting.  Most water heaters are set at 140 degrees, which will burn a child's skin within 4 seconds.  You will also save a lot of money on your gas or electric bill by turning your water heater down.   Walkers:  Please do not purchase or use walkers.  They are the cause of many accidents and have no developmental advantages.  Stationary \"saucers\" and jumpers provide the advantages without the dangers.  Toys:  Choose toys that have been approved for Michael's age and that encourage the development of appropriate motor skills.    CAR SAFETY:  An approved car seat in the back seat of the car is required by [place] law until Michael is 4 years old and weighs at least 40 pounds.  When you buy a car seat, please check for safety problems for your particular model by calling the [employer] Safety Hotline at 1-959.750.5296. To find a certified car seat  who can determine if your car seat is installed properly, call 4-769-SEAT CHECK.  Take advantage of one of the free programs provided by the police, hospitals, fire departments, and car dealers to make sure that your seat is properly installed.    SUN EXPOSURE:  If you plan to have Michael outside for more than 30 minutes, please follow the guidelines in our Sun Exposure handout.    SMOKING:  Children exposed to tobacco smoke have more ear infections and pneumonia.  The risk of Sudden Infant Death Syndrome (SIDS) is also increased.  If you smoke, please quit.  If you cannot quit, smoke outside.  Do not smoke near your baby, and do not let others smoke near your baby.    MEDICATION FOR FEVER OR PAIN:   Acetaminophen liquid (e.g., Tylenol or Tempra) may be given  every 4 hours as needed for pain or fever.      CHILDREN’S Tylenol/Acetaminophen (160 mg/5 mL)    Child’s Weight: Dose:    6 - 11 pounds:   40 mg (1.25 mL  (1/4 Teaspoon))  12 - 17 pounds:   80 mg (2.5 mL  (1/2 Teaspoon))    Most Recent Immunizations   Administered Date(s) Administered   • None   Deferred Date(s) Deferred   • Hep B, adolescent or pediatric 2018       If Michael develops any of the following reactions within 72 hours after an immunization, notify your pediatrician by calling the pediatric phone nurse:  A temperature of 105 degrees or above.  More than 3 hours of continuous crying.  A shrill, high-pitched cry.  A seizure or fainting spell.  In this case, you should call 911 or go immediately to the emergency room.    NEXT VISIT: 4 MONTHS OF AGE      How to Prevent Tooth Decay in your Baby  Taking good care of your child's teeth must start even before the first tooth appears.  You can prevent tooth decay in your child by following healthy dental habits.  Your pediatrician or pediatric dentist may also call a child's tooth decay early childhood caries.  In the past, it was called baby bottle tooth decay.    Tooth decay can develop as soon as the first tooth erupts, but brushing every day may not be enough to prevent it.  If tooth decay is not prevented, it can be costly to treat.  If left untreated, it can destroy the teeth. This can cause infection, early loss of baby teeth, crooked adult teeth, and decay in adult teeth. Read on to find out how you can help prevent tooth decay from developing in your child.      What causes tooth decay?  Tooth decay develops when a child's teeth and gums are exposed to any liquids or foods other than water for long periods. The liquid or food collects around the teeth, and the natural or added sugars are changed to acid by bacteria in the mouth. This acid then dissolves the outer part of the teeth, causing them to decay.      The most common way this happens is  when parents put their children to bed with a bottle of formula, milk, juice, soft drinks, sugar water, or sugared drinks. It can also occur when children are allowed to drink from a sippy cup, suck on a bottle, or breastfeeding for long periods during the day or night.      Signs of tooth decay:  Tooth decay first appears as white spots at the gum line on the upper front teeth. These spots are hard to see at first -- even for a pediatrician or dentist -- without proper equipment. A child with tooth decay needs to be examined and treated early to stop the decay from spreading and to prevent further damage.      How to prevent tooth decay:  Take the following steps to prevent tooth decay:   · Never put your child to bed with a bottle or food.  Not only does this expose your child's teeth to sugars, it can also put your child at risk for ear infections and choking.   · Only give your child a bottle during meals.  Do not use a bottle or sippy cup as a pacifier or let your child walk around with or drink from them for long periods.     · Teach your child to drink from a cup as soon as possible.  Drinking from a cup is less likely to cause the liquid to collect around the teeth. Also, a cup cannot be taken to bed.    · If your child must have a bottle or sippy cup for long periods, fill it only with water.  During car rides, offer only water if your child is thirsty.      How to clean your child's teeth:  Keep your baby's mouth clean by gently brushing the gums and teeth with water and a soft infant toothbrush or gauze. Once your baby has 8 teeth, you can start using a child-sized toothbrush for daily cleanings.      Brush your child's teeth 2 times a day. The best times to brush are after breakfast and before bed. Start by using a fluoride-free toothpaste. When your child is able to spit and not swallow the toothpaste (usually around 2 to 3 years old), you should continue brushing his or her teeth using a pea-sized  amount of toothpaste containing fluoride.  Swallowing too much toothpaste with fluoride in it can cause white and brown spots on your child's developing adult teeth. Most children are not able to brush their teeth by themselves until they are 6 to 8 years old, when their hand coordination and understanding of thorough plaque removal are better.      TESTING YOUR WATER FOR FLUORIDE    Most water has some fluoride in it but the amount varies. Well water derives it fluoride from the rock it flows through. Depending on the depth of the well, water may have fluoride that is low, just right, or in rare cases, too high. The fact that your neighbor's well is normal has no relationship to the amount of fluoride in your own well.     Teeth are strongest in areas where the fluoride concentration is between 6/10 parts per million and 2 parts per million.    Children need fluoride especially when they are between 6 months and 14 years. This is the time when they are enamelling their permanent teeth.          You can test you water easily, call:    [facility]  [phone]  [address] [place] [address]  [phone]    The test kit may be prepaid by personal check or paid by MasterCard or V[employer].      Once a sample is submitted, you will receive a complete report on your water's fluoride level. Your pediatrician will then be able to advise you whether or not additional fluoride is required. When you call, please have 1) the test results, 2) the ages of your children, 3) your preference for liquid, chewable, or tablet form.    Most municipal water supplies in the [facility] are fluoridated but if you have a reverse osmosis filter it may remove the fluoride. Charcoal filters do not remove fluoride. If your child drinks no water, they may need supplements even if the water has fluoride.      Remember:  Tooth decay can be prevented. Talk with your pediatrician or pediatric dentist if you see any signs of decay in your  child's teeth or you simply have questions about the teeth. With the right care, your child can grow up to have healthy teeth for a lifetime of smiles.      The American Academy of Pediatrics recommends that all infants receive oral health risk assessments by 6 months of age. Infants at higher risk of early tooth decay should be referred to a dentist as early as 6 months of age and no later than 6 months after the first tooth erupts or 12 months of age (whichever comes first).      All children in their early toddler years should have a complete dental exam by a dentist.                             resident, RN, patient

## 2018-08-07 ENCOUNTER — EMERGENCY (EMERGENCY)
Facility: HOSPITAL | Age: 73
LOS: 1 days | Discharge: HOME | End: 2018-08-07
Attending: EMERGENCY MEDICINE
Payer: MEDICARE

## 2018-08-07 VITALS
SYSTOLIC BLOOD PRESSURE: 83 MMHG | DIASTOLIC BLOOD PRESSURE: 49 MMHG | RESPIRATION RATE: 18 BRPM | OXYGEN SATURATION: 93 % | TEMPERATURE: 98 F | HEART RATE: 79 BPM

## 2018-08-07 VITALS
OXYGEN SATURATION: 97 % | HEART RATE: 66 BPM | TEMPERATURE: 98 F | RESPIRATION RATE: 17 BRPM | DIASTOLIC BLOOD PRESSURE: 66 MMHG | SYSTOLIC BLOOD PRESSURE: 113 MMHG

## 2018-08-07 DIAGNOSIS — Z98.89 OTHER SPECIFIED POSTPROCEDURAL STATES: Chronic | ICD-10-CM

## 2018-08-07 PROCEDURE — 99283 EMERGENCY DEPT VISIT LOW MDM: CPT | Mod: 25

## 2018-08-07 PROCEDURE — 71045 X-RAY EXAM CHEST 1 VIEW: CPT

## 2018-08-07 PROCEDURE — 99283 EMERGENCY DEPT VISIT LOW MDM: CPT | Mod: GC

## 2018-08-07 PROCEDURE — 71045 X-RAY EXAM CHEST 1 VIEW: CPT | Mod: 26

## 2018-08-07 RX ORDER — POLYMYXIN B SULF/TRIMETHOPRIM 10000-1/ML
1 DROPS OPHTHALMIC (EYE)
Qty: 1 | Refills: 0
Start: 2018-08-07 | End: 2018-08-13

## 2018-08-07 RX ORDER — POLYMYXIN B SULF/TRIMETHOPRIM 10000-1/ML
1 DROPS OPHTHALMIC (EYE) ONCE
Qty: 0 | Refills: 0 | Status: COMPLETED | OUTPATIENT
Start: 2018-08-07 | End: 2018-08-07

## 2018-08-07 RX ORDER — FLUORESCEIN SODIUM 9 MG
1 STRIP OPHTHALMIC (EYE) ONCE
Qty: 0 | Refills: 0 | Status: COMPLETED | OUTPATIENT
Start: 2018-08-07 | End: 2018-08-07

## 2018-08-07 RX ORDER — ERYTHROMYCIN BASE 5 MG/GRAM
1 OINTMENT (GRAM) OPHTHALMIC (EYE)
Qty: 1 | Refills: 0 | OUTPATIENT
Start: 2018-08-07 | End: 2018-08-16

## 2018-08-07 RX ADMIN — Medication 1 DROP(S): at 17:36

## 2018-08-07 RX ADMIN — Medication 1 APPLICATION(S): at 14:50

## 2018-08-07 NOTE — ED PROVIDER NOTE - MEDICAL DECISION MAKING DETAILS
Goldie Jacobs, DO: 73M here for conjunctivitis. Will check for corneal abrasion & treat clinically. HDS at baseline.

## 2018-08-07 NOTE — ED PROVIDER NOTE - PHYSICAL EXAMINATION
Goldie Jacobs, DO:  Gen: Well appearing, NAD  Head: NCAT  HEENT: PERRL 2mm, MMM, normal conjunctiva, injected scleral of b/l eyes L>R with crusting of L eyelashes, no eyelid edema/erythema, anicteric, neck supple  Lung: CTAB, no adventitious sounds  CV: RRR, no murmurs  Abd: soft, NTND, no rebound or guarding, no CVAT  MSK: Nonpitting edema, no visible deformities, palpable thrill of LUE   Neuro: Ambulatory with stable gait.   Skin: Warm and dry, no evidence of rash  Psych: normal mood and affect Goldie Jacobs, DO:  Gen: Well appearing, NAD  Head: NCAT  HEENT: PERRL 2mm, MMM, normal conjunctiva, injected scleral of b/l eyes L>R with crusting of L eyelashes, EOMI, L eye pressure 14, R eye pressure 11, no corneal abrasions, no eyelid edema/erythema, anicteric, neck supple  Lung: CTAB, no adventitious sounds  CV: RRR, no murmurs  Abd: soft, NTND, no rebound or guarding, no CVAT  MSK: Nonpitting edema, no visible deformities, palpable thrill of LUE   Neuro: Ambulatory with stable gait.   Skin: Warm and dry, no evidence of rash  Psych: normal mood and affect

## 2018-08-07 NOTE — ED PROVIDER NOTE - ATTENDING CONTRIBUTION TO CARE
****ATTENDING**** 74yo male hx listed on HD last dialyzed yesterday presents with b/l eyelid swelling, injection and discharge. As per wife and daughter pt was recently seen at [facility] s/p last dialysis 2 days ago w weakness and hypotension and thought to have too much fluid removed. Pt was hydrated and started on azithromycin "for something in his lung". Since discharge pt feels fine, noticed again lower BP after dialysis yesterday. Pt states he feels fine and only co L eye itching, discharge. Denies any pain in eyes or HA. No chest pain, palp, sob, abd pain. No fever or chills.   On exam, Patient is awake,alert,oriented x 3. Patient is well appearing and in no acute distress. Patient's chest is clear to ausculation, +s1s2. L > R eye injected mild discharge. No perioorbital swelling. Full EOMI. No fluorescein uptake. IOP see resident noted. Abdomen is soft nd/nt +BS. Extremity with no swelling or calf tenderness.   Spoke to pt and family at length. States he is only here to have his eye eval, BP a little low in ED states he has had that intermittently and has not eaten today. Agrees to xray chest to eval need for abx. Denies blood work at this time. Will eval for infection/ulcer. Xray chest. Monitor BP.

## 2018-08-07 NOTE — ED ADULT NURSE NOTE - CAS EDN DISCHARGE ASSESSMENT
Patient left without his home O2 tank, patient and wife do not want to wait for her to go home to retrieve it, they state that they are comfortable driving home without O2 and that patient has done this before, Dr Jacobs aware/Alert and oriented to person, place and time

## 2018-08-07 NOTE — ED PROVIDER NOTE - PLAN OF CARE
1. Please follow up with your PMD in 1-3 days.  2. Use antibiotics drops (1 bottle per eye) every 3 hours for 7 days.  3. Please return to the ED should you have any new or worsening symptoms or have any new concerns.   4. Read all attached.

## 2018-08-07 NOTE — ED PROVIDER NOTE - CARE PLAN
Principal Discharge DX:	Other conjunctivitis of both eyes  Assessment and plan of treatment:	1. Please follow up with your PMD in 1-3 days.  2. Use antibiotics drops (1 bottle per eye) every 3 hours for 7 days.  3. Please return to the ED should you have any new or worsening symptoms or have any new concerns.   4. Read all attached.

## 2018-08-07 NOTE — ED PROVIDER NOTE - PROGRESS NOTE DETAILS
Goldie Jacobs, DO: Patient tolerating PO. Reviewed CXR results with patient - given no clinical symptoms offered shared decision making re: d/cing abx as were prescribed on saturday at [facility] for unknown reasons. Patient never symptomatic. Goldie Jacobs, DO: Wife without O2 tank in car & patient and wife do not want to wait for her to go home to retrieve it. They endorse he goes as long as 1 hour without O2. They both understand and were able to verbalize that as a consequence of this decision they may be at risk for clinical deterioration including permanent disability and death and verbalized this understanding.

## 2018-08-07 NOTE — ED ADULT NURSE NOTE - NSIMPLEMENTINTERV_GEN_ALL_ED
Implemented All Fall Risk Interventions:  [place] [facility] to call system. Call bell, personal items and telephone within reach. Instruct patient to call for assistance. Room bathroom lighting operational. Non-slip footwear when patient is off stretcher. Physically safe environment: no spills, clutter or unnecessary equipment. Stretcher in lowest position, wheels locked, appropriate side rails in place. Provide visual cue, wrist band, yellow gown, etc. Monitor gait and stability. Monitor for mental status changes and reorient to person, place, and time. Review medications for side effects contributing to fall risk. Reinforce activity limits and safety measures with patient and family.

## 2018-08-07 NOTE — ED ADULT NURSE NOTE - OBJECTIVE STATEMENT
74 yo male with PMH HTN, ESRD on HD M/W/F, COPD, pulmonary hypertension on home O2, gout presents to ED c/o bilateral eye discharge, blurred vision, itchiness, and eyelid "puffiness" which is now improved, as per daughter. Patient was recently seen in a different ED for hypotension (patient states that he is normally hypotensive ever since starting dialysis Sept 2017). Also patient was started on azithromycin PO yesterday for eye redness and discharge. Family was concerned that patient could be having an allergic reaction. NO ear pain, throat pain, or shortness of breath.

## 2018-08-07 NOTE — ED PROVIDER NOTE - OBJECTIVE STATEMENT
Goldie Jacobs, DO: 73M with hx of ESRD (MWF) last dialyzed yesterday here for b/l eyelid Goldie Reynaldo, DO: 73M with hx of HTN, ESRD (MWF) last dialyzed yesterday here for b/l eyelid edema associated with scleral erythema with pruritis and L eye discharge on Friday. No fevers, HA, nausea/vomiting, cough, nasal congestion. Goldie Reynaldo, DO: 73M with hx of HTN, CHF, COPD on baseline O2, pulm HTN, ESRD (MWF) c/b baseline hypotension since dialysis Sept 2017 last dialyzed yesterday here for b/l eyelid edema associated with scleral erythema with pruritis and L eye discharge on Friday. No fevers, HA, nausea/vomiting, cough, nasal congestion. Goldie Jacobs, DO: 73M with hx of HTN, CHF, COPD on baseline O2, pulm HTN, ESRD (MWF) c/b baseline hypotension since dialysis Sept 2017 last dialyzed yesterday here for b/l eyelid edema associated with scleral erythema with pruritis and L eye discharge on Friday. No fevers, HA, nausea/vomiting, cough, nasal congestion. Prescribed Azithromycin on Saturday for unknown reason [facility] for SBP <60 believed to be overdialyzed Friday & rehydrated at [facility].

## 2019-05-23 NOTE — ED ADULT TRIAGE NOTE - AS O2 DELIVERY
Brief Postoperative Note      Eamon Rosas is a 82 supplemental O2

## 2019-10-03 NOTE — DISCHARGE NOTE ADULT - NS AS DC FU CFH EJECTION FRACTION >40 %
Patient:   SPENCER, KAMEESHA            MRN: [id number]            FIN: [id number]               Age:   28 years     Sex:  FEMALE     :  91   Associated Diagnoses:   Normal spontaneous vaginal delivery; Labor, precipitous, delivered; 39 weeks gestation of pregnancy   Author:   MIRZA TEJADA      Basic Information   Labor/Delivery Summary:       Results Review: Lab results   17 00:40 WBC 8.9 THOUSAND/mcL    RBC 4.58 MILLION/mcL    Hemoglobin 12.7 gm/dL    Hematocrit 39.5 %    MCV 86.2 fL    MCH 27.7 pg    MCHC 32.2 gm/dL    RDW-CV 13.9 %    Platelet 131 THOUSAND/mcL  LOW    Neutrophils 62 %    Abs Neut 5.5 THOUSAND/mcL    Segs NOT APPLICABLE    Lymph 29 %    Absolute Lymph 2.6 THOUSAND/mcL    Monocytes 8 %    Abs Mono 0.7 THOUSAND/mcL    Eosinophils 1 %    Absolute Eos 0.1 THOUSAND/mcL    Basophils 0 %    Absolute Baso 0.0 THOUSAND/mcL    Analyzer ANC NOT APPLICABLE    Percent NRBC NOT APPLICABLE   .    Gestational Age:          * Note: EGA calculated as of 2017    BALDO: 2017 EGA*: 39 weeks 1 day            Type: Authoritative Method Date: 2017    Method: Ultrasound (2017)  Confirmation: Confirmed  Description: --  Comments: --  Entered by: Daisha Borja on 2017     Other BALDO Calculations for this Pregnancy:   No additional BALDO calculations have been recorded for this pregnancy  .       Procedure   Vaginal Delivery Procedure   Delivery Type: Normal Spontaneous Vaginal Delivery, PRECIPITOUS LABOR; CONTRACTIONS STARTED AT 11:OO PM ON 2017.     Oxytocin Used for: Post Partum.     Anesthesia: Local.     Delivery Date/Time: Date/Time  17 12:47:00.     Infant gender: Male, TIGHT TRUE KNOT X 1 IN UMBIILICAL CORD AND 30% PLACENTAL ABRUPTION.     Weight: 7  lb., 12  oz..     APGAR: APGAR score 1 minute 9 /10, APGAR score 5 minutes 9 /10.     NICU Personnel Present at Delivery: No, PEDIATRICIAN: DR. CRISTY BENÍTEZ PRESENT.     Placenta: Spontaneous.      Umbilical cord vessels: 3.     Episiotomy: None.     Perineal laceration/extension: Other Lacerations LEFT LABIAL TEAR, Repair 3-0 VICRYL.     Estimated Blood Loss: 371  mL.     Uterus Explored: No.     Antibiotics: No.     Shoulder Dystocia: No.     Orders: ROUTINE POSTPARTUM ORDERS.     Diagnosis     Normal spontaneous vaginal delivery ([id number] O80, Diagnosis, Hospitalized, Medical).     Labor, precipitous, delivered ([id number] O62.3, Diagnosis, Hospitalized, Medical).     39 weeks gestation of pregnancy ([id number]-AD Z3A.39, Diagnosis, Hospitalized, Medical).        Counts: Sponge Count Correct, Instrument Count Correct, Needle Count Correct, Vaginal Sweep Performed.              Electronically Signed On 12/25/2017 02:04  __________________________________________________   MIRZA TEJADA                    DELIVERY TIME CORRECTED:  12/25/2017 @ 0047           Electronically Signed On 12/27/2017 01:07  __________________________________________________   MIRZA TEJADA     no yes

## 2021-08-22 ENCOUNTER — INPATIENT (INPATIENT)
Facility: HOSPITAL | Age: 76
LOS: 12 days | Discharge: NURSING, REHAB OR LONG-TERM CARE | End: 2021-09-04
Attending: GENERAL ACUTE CARE HOSPITAL | Admitting: GENERAL ACUTE CARE HOSPITAL
Payer: MEDICARE

## 2021-08-22 VITALS
SYSTOLIC BLOOD PRESSURE: 84 MMHG | RESPIRATION RATE: 20 BRPM | DIASTOLIC BLOOD PRESSURE: 45 MMHG | OXYGEN SATURATION: 84 % | TEMPERATURE: 98 F | HEART RATE: 104 BPM | HEIGHT: 67 IN

## 2021-08-22 DIAGNOSIS — Z98.89 OTHER SPECIFIED POSTPROCEDURAL STATES: Chronic | ICD-10-CM

## 2021-08-22 DIAGNOSIS — I95.9 HYPOTENSION, UNSPECIFIED: ICD-10-CM

## 2021-08-22 LAB
ALBUMIN SERPL ELPH-MCNC: 4.6 G/DL — SIGNIFICANT CHANGE UP (ref 3.3–5)
ALBUMIN SERPL ELPH-MCNC: 5 G/DL — SIGNIFICANT CHANGE UP (ref 3.3–5)
ALP SERPL-CCNC: 152 U/L — HIGH (ref 40–120)
ALP SERPL-CCNC: 160 U/L — HIGH (ref 40–120)
ALT FLD-CCNC: 9 U/L — SIGNIFICANT CHANGE UP (ref 4–41)
ALT FLD-CCNC: 9 U/L — SIGNIFICANT CHANGE UP (ref 4–41)
ANION GAP SERPL CALC-SCNC: 24 MMOL/L — HIGH (ref 7–14)
ANION GAP SERPL CALC-SCNC: 25 MMOL/L — HIGH (ref 7–14)
APTT BLD: 27.2 SEC — SIGNIFICANT CHANGE UP (ref 27–36.3)
AST SERPL-CCNC: 11 U/L — SIGNIFICANT CHANGE UP (ref 4–40)
AST SERPL-CCNC: 13 U/L — SIGNIFICANT CHANGE UP (ref 4–40)
BASE EXCESS BLDV CALC-SCNC: -3.4 MMOL/L — LOW (ref -2–3)
BASOPHILS # BLD AUTO: 0.08 K/UL — SIGNIFICANT CHANGE UP (ref 0–0.2)
BASOPHILS NFR BLD AUTO: 0.6 % — SIGNIFICANT CHANGE UP (ref 0–2)
BILIRUB SERPL-MCNC: 0.4 MG/DL — SIGNIFICANT CHANGE UP (ref 0.2–1.2)
BILIRUB SERPL-MCNC: 0.5 MG/DL — SIGNIFICANT CHANGE UP (ref 0.2–1.2)
BLOOD GAS VENOUS COMPREHENSIVE RESULT: SIGNIFICANT CHANGE UP
BLOOD GAS VENOUS COMPREHENSIVE RESULT: SIGNIFICANT CHANGE UP
BUN SERPL-MCNC: 45 MG/DL — HIGH (ref 7–23)
BUN SERPL-MCNC: 50 MG/DL — HIGH (ref 7–23)
CALCIUM SERPL-MCNC: 10.2 MG/DL — SIGNIFICANT CHANGE UP (ref 8.4–10.5)
CALCIUM SERPL-MCNC: 9.5 MG/DL — SIGNIFICANT CHANGE UP (ref 8.4–10.5)
CHLORIDE BLDV-SCNC: 95 MMOL/L — LOW (ref 96–108)
CHLORIDE SERPL-SCNC: 89 MMOL/L — LOW (ref 98–107)
CHLORIDE SERPL-SCNC: 90 MMOL/L — LOW (ref 98–107)
CO2 BLDV-SCNC: 26.3 MMOL/L — HIGH (ref 22–26)
CO2 SERPL-SCNC: 20 MMOL/L — LOW (ref 22–31)
CO2 SERPL-SCNC: 21 MMOL/L — LOW (ref 22–31)
CREAT SERPL-MCNC: 11.17 MG/DL — HIGH (ref 0.5–1.3)
CREAT SERPL-MCNC: 11.27 MG/DL — HIGH (ref 0.5–1.3)
EOSINOPHIL # BLD AUTO: 0.12 K/UL — SIGNIFICANT CHANGE UP (ref 0–0.5)
EOSINOPHIL NFR BLD AUTO: 0.9 % — SIGNIFICANT CHANGE UP (ref 0–6)
GAS PNL BLDV: 129 MMOL/L — LOW (ref 136–145)
GLUCOSE BLDV-MCNC: 169 MG/DL — HIGH (ref 70–99)
GLUCOSE SERPL-MCNC: 133 MG/DL — HIGH (ref 70–99)
GLUCOSE SERPL-MCNC: 153 MG/DL — HIGH (ref 70–99)
HCO3 BLDV-SCNC: 25 MMOL/L — SIGNIFICANT CHANGE UP (ref 22–29)
HCT VFR BLD CALC: 40.9 % — SIGNIFICANT CHANGE UP (ref 39–50)
HCT VFR BLD CALC: 43.2 % — SIGNIFICANT CHANGE UP (ref 39–50)
HCT VFR BLDA CALC: 39 % — SIGNIFICANT CHANGE UP (ref 39–51)
HGB BLD CALC-MCNC: 13 G/DL — SIGNIFICANT CHANGE UP (ref 13–17)
HGB BLD-MCNC: 12.5 G/DL — LOW (ref 13–17)
HGB BLD-MCNC: 13.1 G/DL — SIGNIFICANT CHANGE UP (ref 13–17)
IANC: 8.91 K/UL — HIGH (ref 1.5–8.5)
IMM GRANULOCYTES NFR BLD AUTO: 0.7 % — SIGNIFICANT CHANGE UP (ref 0–1.5)
INR BLD: 1.27 RATIO — HIGH (ref 0.88–1.16)
LACTATE BLDV-MCNC: 2.6 MMOL/L — HIGH (ref 0.5–2)
LYMPHOCYTES # BLD AUTO: 18.8 % — SIGNIFICANT CHANGE UP (ref 13–44)
LYMPHOCYTES # BLD AUTO: 2.39 K/UL — SIGNIFICANT CHANGE UP (ref 1–3.3)
MAGNESIUM SERPL-MCNC: 2.4 MG/DL — SIGNIFICANT CHANGE UP (ref 1.6–2.6)
MCHC RBC-ENTMCNC: 30.1 PG — SIGNIFICANT CHANGE UP (ref 27–34)
MCHC RBC-ENTMCNC: 30.3 GM/DL — LOW (ref 32–36)
MCHC RBC-ENTMCNC: 30.3 PG — SIGNIFICANT CHANGE UP (ref 27–34)
MCHC RBC-ENTMCNC: 30.6 GM/DL — LOW (ref 32–36)
MCV RBC AUTO: 98.6 FL — SIGNIFICANT CHANGE UP (ref 80–100)
MCV RBC AUTO: 99.8 FL — SIGNIFICANT CHANGE UP (ref 80–100)
MONOCYTES # BLD AUTO: 1.11 K/UL — HIGH (ref 0–0.9)
MONOCYTES NFR BLD AUTO: 8.7 % — SIGNIFICANT CHANGE UP (ref 2–14)
NEUTROPHILS # BLD AUTO: 8.91 K/UL — HIGH (ref 1.8–7.4)
NEUTROPHILS NFR BLD AUTO: 70.3 % — SIGNIFICANT CHANGE UP (ref 43–77)
NRBC # BLD: 0 /100 WBCS — SIGNIFICANT CHANGE UP
NRBC # BLD: 0 /100 WBCS — SIGNIFICANT CHANGE UP
NRBC # FLD: 0.02 K/UL — HIGH
NRBC # FLD: 0.03 K/UL — HIGH
PCO2 BLDV: 56 MMHG — HIGH (ref 42–55)
PH BLDV: 7.25 — LOW (ref 7.32–7.43)
PHOSPHATE SERPL-MCNC: 6.7 MG/DL — HIGH (ref 2.5–4.5)
PLATELET # BLD AUTO: 141 K/UL — LOW (ref 150–400)
PLATELET # BLD AUTO: 145 K/UL — LOW (ref 150–400)
PO2 BLDV: 24 MMHG — SIGNIFICANT CHANGE UP
POTASSIUM BLDV-SCNC: 4.5 MMOL/L — SIGNIFICANT CHANGE UP (ref 3.5–5.1)
POTASSIUM SERPL-MCNC: 4.6 MMOL/L — SIGNIFICANT CHANGE UP (ref 3.5–5.3)
POTASSIUM SERPL-MCNC: 5.1 MMOL/L — SIGNIFICANT CHANGE UP (ref 3.5–5.3)
POTASSIUM SERPL-SCNC: 4.6 MMOL/L — SIGNIFICANT CHANGE UP (ref 3.5–5.3)
POTASSIUM SERPL-SCNC: 5.1 MMOL/L — SIGNIFICANT CHANGE UP (ref 3.5–5.3)
PROT SERPL-MCNC: 10 G/DL — HIGH (ref 6–8.3)
PROT SERPL-MCNC: 11 G/DL — HIGH (ref 6–8.3)
PROTHROM AB SERPL-ACNC: 14.3 SEC — HIGH (ref 10.6–13.6)
RBC # BLD: 4.15 M/UL — LOW (ref 4.2–5.8)
RBC # BLD: 4.33 M/UL — SIGNIFICANT CHANGE UP (ref 4.2–5.8)
RBC # FLD: 16.2 % — HIGH (ref 10.3–14.5)
RBC # FLD: 16.5 % — HIGH (ref 10.3–14.5)
SAO2 % BLDV: 31 % — SIGNIFICANT CHANGE UP
SARS-COV-2 RNA SPEC QL NAA+PROBE: SIGNIFICANT CHANGE UP
SODIUM SERPL-SCNC: 133 MMOL/L — LOW (ref 135–145)
SODIUM SERPL-SCNC: 136 MMOL/L — SIGNIFICANT CHANGE UP (ref 135–145)
WBC # BLD: 12.7 K/UL — HIGH (ref 3.8–10.5)
WBC # BLD: 13.22 K/UL — HIGH (ref 3.8–10.5)
WBC # FLD AUTO: 12.7 K/UL — HIGH (ref 3.8–10.5)
WBC # FLD AUTO: 13.22 K/UL — HIGH (ref 3.8–10.5)

## 2021-08-22 PROCEDURE — 71045 X-RAY EXAM CHEST 1 VIEW: CPT | Mod: 26

## 2021-08-22 PROCEDURE — 93010 ELECTROCARDIOGRAM REPORT: CPT

## 2021-08-22 PROCEDURE — 99291 CRITICAL CARE FIRST HOUR: CPT | Mod: 25,GC

## 2021-08-22 PROCEDURE — 73590 X-RAY EXAM OF LOWER LEG: CPT | Mod: 26,RT

## 2021-08-22 PROCEDURE — 99291 CRITICAL CARE FIRST HOUR: CPT

## 2021-08-22 PROCEDURE — 74177 CT ABD & PELVIS W/CONTRAST: CPT | Mod: 26

## 2021-08-22 RX ORDER — FOLIC ACID 0.8 MG
1 TABLET ORAL DAILY
Refills: 0 | Status: DISCONTINUED | OUTPATIENT
Start: 2021-08-22 | End: 2021-09-04

## 2021-08-22 RX ORDER — NOREPINEPHRINE BITARTRATE/D5W 8 MG/250ML
0.02 PLASTIC BAG, INJECTION (ML) INTRAVENOUS
Qty: 8 | Refills: 0 | Status: DISCONTINUED | OUTPATIENT
Start: 2021-08-22 | End: 2021-08-23

## 2021-08-22 RX ORDER — ALLOPURINOL 300 MG
100 TABLET ORAL DAILY
Refills: 0 | Status: DISCONTINUED | OUTPATIENT
Start: 2021-08-22 | End: 2021-09-04

## 2021-08-22 RX ORDER — MIDODRINE HYDROCHLORIDE 2.5 MG/1
10 TABLET ORAL ONCE
Refills: 0 | Status: COMPLETED | OUTPATIENT
Start: 2021-08-22 | End: 2021-08-22

## 2021-08-22 RX ORDER — POLYETHYLENE GLYCOL 3350 17 G/17G
17 POWDER, FOR SOLUTION ORAL DAILY
Refills: 0 | Status: DISCONTINUED | OUTPATIENT
Start: 2021-08-22 | End: 2021-08-24

## 2021-08-22 RX ORDER — LIDOCAINE 4 G/100G
1 CREAM TOPICAL ONCE
Refills: 0 | Status: DISCONTINUED | OUTPATIENT
Start: 2021-08-22 | End: 2021-08-22

## 2021-08-22 RX ORDER — ACETAMINOPHEN 500 MG
650 TABLET ORAL ONCE
Refills: 0 | Status: COMPLETED | OUTPATIENT
Start: 2021-08-22 | End: 2021-08-22

## 2021-08-22 RX ORDER — SIMETHICONE 80 MG/1
80 TABLET, CHEWABLE ORAL THREE TIMES A DAY
Refills: 0 | Status: DISCONTINUED | OUTPATIENT
Start: 2021-08-22 | End: 2021-09-04

## 2021-08-22 RX ORDER — IPRATROPIUM/ALBUTEROL SULFATE 18-103MCG
3 AEROSOL WITH ADAPTER (GRAM) INHALATION EVERY 6 HOURS
Refills: 0 | Status: DISCONTINUED | OUTPATIENT
Start: 2021-08-22 | End: 2021-09-04

## 2021-08-22 RX ORDER — LIDOCAINE 4 G/100G
1 CREAM TOPICAL ONCE
Refills: 0 | Status: COMPLETED | OUTPATIENT
Start: 2021-08-22 | End: 2021-08-22

## 2021-08-22 RX ORDER — MIDODRINE HYDROCHLORIDE 2.5 MG/1
20 TABLET ORAL EVERY 8 HOURS
Refills: 0 | Status: DISCONTINUED | OUTPATIENT
Start: 2021-08-22 | End: 2021-08-23

## 2021-08-22 RX ORDER — SODIUM CHLORIDE 9 MG/ML
500 INJECTION, SOLUTION INTRAVENOUS ONCE
Refills: 0 | Status: DISCONTINUED | OUTPATIENT
Start: 2021-08-22 | End: 2021-08-22

## 2021-08-22 RX ORDER — HEPARIN SODIUM 5000 [USP'U]/ML
7500 INJECTION INTRAVENOUS; SUBCUTANEOUS EVERY 8 HOURS
Refills: 0 | Status: DISCONTINUED | OUTPATIENT
Start: 2021-08-22 | End: 2021-09-04

## 2021-08-22 RX ORDER — CHLORHEXIDINE GLUCONATE 213 G/1000ML
1 SOLUTION TOPICAL
Refills: 0 | Status: DISCONTINUED | OUTPATIENT
Start: 2021-08-22 | End: 2021-08-24

## 2021-08-22 RX ORDER — MIDODRINE HYDROCHLORIDE 2.5 MG/1
10 TABLET ORAL EVERY 8 HOURS
Refills: 0 | Status: DISCONTINUED | OUTPATIENT
Start: 2021-08-22 | End: 2021-08-22

## 2021-08-22 RX ORDER — ASPIRIN/CALCIUM CARB/MAGNESIUM 324 MG
1 TABLET ORAL
Qty: 0 | Refills: 0 | DISCHARGE

## 2021-08-22 RX ORDER — SODIUM CHLORIDE 9 MG/ML
500 INJECTION INTRAMUSCULAR; INTRAVENOUS; SUBCUTANEOUS ONCE
Refills: 0 | Status: COMPLETED | OUTPATIENT
Start: 2021-08-22 | End: 2021-08-22

## 2021-08-22 RX ORDER — CALCITRIOL 0.5 UG/1
1 CAPSULE ORAL
Qty: 0 | Refills: 0 | DISCHARGE

## 2021-08-22 RX ORDER — TAMSULOSIN HYDROCHLORIDE 0.4 MG/1
1 CAPSULE ORAL
Qty: 0 | Refills: 0 | DISCHARGE

## 2021-08-22 RX ORDER — SEVELAMER CARBONATE 2400 MG/1
800 POWDER, FOR SUSPENSION ORAL
Refills: 0 | Status: DISCONTINUED | OUTPATIENT
Start: 2021-08-22 | End: 2021-09-04

## 2021-08-22 RX ORDER — TIOTROPIUM BROMIDE 18 UG/1
1 CAPSULE ORAL; RESPIRATORY (INHALATION) DAILY
Refills: 0 | Status: DISCONTINUED | OUTPATIENT
Start: 2021-08-22 | End: 2021-09-04

## 2021-08-22 RX ORDER — FAMOTIDINE 10 MG/ML
20 INJECTION INTRAVENOUS ONCE
Refills: 0 | Status: COMPLETED | OUTPATIENT
Start: 2021-08-22 | End: 2021-08-22

## 2021-08-22 RX ORDER — FOLIC ACID 0.8 MG
1 TABLET ORAL
Qty: 0 | Refills: 0 | DISCHARGE

## 2021-08-22 RX ORDER — GABAPENTIN 400 MG/1
200 CAPSULE ORAL DAILY
Refills: 0 | Status: DISCONTINUED | OUTPATIENT
Start: 2021-08-22 | End: 2021-09-04

## 2021-08-22 RX ORDER — PANTOPRAZOLE SODIUM 20 MG/1
40 TABLET, DELAYED RELEASE ORAL
Refills: 0 | Status: DISCONTINUED | OUTPATIENT
Start: 2021-08-22 | End: 2021-08-26

## 2021-08-22 RX ADMIN — POLYETHYLENE GLYCOL 3350 17 GRAM(S): 17 POWDER, FOR SOLUTION ORAL at 18:36

## 2021-08-22 RX ADMIN — Medication 30 MILLILITER(S): at 15:55

## 2021-08-22 RX ADMIN — Medication 1 TABLET(S): at 18:36

## 2021-08-22 RX ADMIN — GABAPENTIN 200 MILLIGRAM(S): 400 CAPSULE ORAL at 18:57

## 2021-08-22 RX ADMIN — SEVELAMER CARBONATE 800 MILLIGRAM(S): 2400 POWDER, FOR SUSPENSION ORAL at 18:36

## 2021-08-22 RX ADMIN — LIDOCAINE 1 PATCH: 4 CREAM TOPICAL at 14:06

## 2021-08-22 RX ADMIN — HEPARIN SODIUM 7500 UNIT(S): 5000 INJECTION INTRAVENOUS; SUBCUTANEOUS at 22:41

## 2021-08-22 RX ADMIN — SIMETHICONE 80 MILLIGRAM(S): 80 TABLET, CHEWABLE ORAL at 22:40

## 2021-08-22 RX ADMIN — LIDOCAINE 1 PATCH: 4 CREAM TOPICAL at 20:00

## 2021-08-22 RX ADMIN — Medication 1 MILLIGRAM(S): at 18:36

## 2021-08-22 RX ADMIN — MIDODRINE HYDROCHLORIDE 10 MILLIGRAM(S): 2.5 TABLET ORAL at 14:58

## 2021-08-22 RX ADMIN — MIDODRINE HYDROCHLORIDE 10 MILLIGRAM(S): 2.5 TABLET ORAL at 09:35

## 2021-08-22 RX ADMIN — MIDODRINE HYDROCHLORIDE 20 MILLIGRAM(S): 2.5 TABLET ORAL at 22:41

## 2021-08-22 RX ADMIN — FAMOTIDINE 20 MILLIGRAM(S): 10 INJECTION INTRAVENOUS at 15:55

## 2021-08-22 RX ADMIN — Medication 650 MILLIGRAM(S): at 08:50

## 2021-08-22 RX ADMIN — SODIUM CHLORIDE 500 MILLILITER(S): 9 INJECTION INTRAMUSCULAR; INTRAVENOUS; SUBCUTANEOUS at 08:51

## 2021-08-22 RX ADMIN — Medication 4.88 MICROGRAM(S)/KG/MIN: at 09:35

## 2021-08-22 RX ADMIN — PANTOPRAZOLE SODIUM 40 MILLIGRAM(S): 20 TABLET, DELAYED RELEASE ORAL at 18:37

## 2021-08-22 RX ADMIN — Medication 100 MILLIGRAM(S): at 18:36

## 2021-08-22 NOTE — ED PROVIDER NOTE - PROGRESS NOTE DETAILS
Lloyd, PGY3: spoke with wife, pt is poor historian. has been having pain in the buttocks region for weeks tried applying cream to area and no improvement, bp is usually low but has been very low recently for the past 2 wks. Lloyd, PGY3: after trial of off levo, pt dropped pressure, ICU reconsulted

## 2021-08-22 NOTE — ED PROVIDER NOTE - ATTENDING CONTRIBUTION TO CARE
I performed a history and physical exam of the patient and discussed their management with the resident and /or advanced care provider. I reviewed the resident and /or ACP's note and agree with the documented findings and plan of care. My medical decision making and observations are found above.  Lungs clear, abd soft. Lumps on lower leg ?E Nodosum

## 2021-08-22 NOTE — ED PROVIDER NOTE - NSICDXPASTSURGICALHX_GEN_ALL_CORE_FT
PAST SURGICAL HISTORY:  H/O right heart catheterization     History of abdominal surgery polypectomy

## 2021-08-22 NOTE — ED PROVIDER NOTE - NS ED ROS FT
GENERAL: No fever, no chills  EYES: no change in vision  HEENT: no trouble swallowing, no trouble speaking  CARDIAC: no chest pain, no palpitations  PULMONARY: no cough, no SOB  GI: no abdominal pain, no nausea, no vomiting, no diarrhea, no constipation  : no dysuria, no frequency, no change in appearance, no odor of urine  SKIN: no rashes  NEURO: no headache, no weakness  MSK: +R hip pain

## 2021-08-22 NOTE — H&P ADULT - ASSESSMENT
77yo M PMHx ESRD on HD via AV fistula LUE MWF, COPD (on 2L home O2), CHF, pulm HTN, known hypotension on home midodrine, DVT s/p IVC filter w/ a work up consistent w/ DVT to the inferior aorta. Pt is hypotensive, needing pressor therapy to maintain appropriate MAPs. Pressor requirement is the primary reason for MICU admission.     #Neurology  - Normal mentation, no active issues    - No indication for sedation   - Monitor for changes in neurological status    #CV    1) Hypotension    - pt w/ a baseline SBP in the 70-80s   - BP 84/45 at triage, most recent = 99/63 w/ Levophed   - Dxd for pressor requirement = chronic hypotension versus sepsis, hypovolemia, cardiogenic, obstructive shock 2/2 PE in a pt w/ DVT, though this is less likely given the presence of IVC filter.   - Will work up for acute infection   - Bedside POCUS once in the unit   - Treat w/ fluids sparingly in a CHF pt   - C/w Levo and home midodrine   - Consider trending lactate to assess for hypoperfusion in a pt w/ chronic hypotension      2) CHF    3) IVC Filter   - No active issues   77yo M PMHx ESRD on HD via AV fistula LUE MWF, COPD (on 2L home O2), CHF, pulm HTN, known hypotension on home midodrine, DVT s/p IVC filter w/ a work up consistent w/ DVT to the inferior aorta. Pt is hypotensive, needing pressor therapy to maintain appropriate MAPs. Pressor requirement is the primary reason for MICU admission.     #Neurology  - Normal mentation, no active issues    - No indication for sedation   - Monitor for changes in neurological status    #CV    1) Hypotension    - pt w/ a baseline SBP in the 70-80s   - BP 84/45 at triage, most recent = 99/63 w/ Levophed   - Dxd for pressor requirement = chronic hypotension versus sepsis, hypovolemia, cardiogenic, obstructive shock 2/2 PE in a pt w/ DVT, though this is less likely given the presence of IVC filter.   - Will work up for acute infection   - Bedside POCUS once in the unit   - Treat w/ fluids sparingly in a CHF pt   - C/w Levo and home midodrine   - Consider trending lactate to assess for hypoperfusion in a pt w/ chronic hypotension      2) CHF  - No e/o of acute HF exacerbation   - Monitor for symptoms  - Judicious use of fluids    3) IVC Filter   - No active issues    # Pulmonary   -    75yo M PMHx ESRD on HD via AV fistula LUE MWF, COPD (on 2L home O2), CHF, pulm HTN, known hypotension on home midodrine, DVT s/p IVC filter w/ a work up consistent w/ DVT to the inferior aorta. Pt is hypotensive, needing pressor therapy to maintain appropriate MAPs. Pressor requirement is the primary reason for MICU admission.     #Neurology  - Normal mentation, no active issues    - No indication for sedation   - Monitor for changes in neurological status    #CV    1) Hypotension    - pt w/ a baseline SBP in the 70-80s   - BP 84/45 at triage, most recent = 99/63 w/ Levophed   - Dxd for pressor requirement = chronic hypotension versus sepsis, hypovolemia, cardiogenic, obstructive shock 2/2 PE in a pt w/ DVT, though this is less likely given the presence of IVC filter.   - Will work up for acute infection   - Bedside POCUS once in the unit   - Treat w/ fluids sparingly in a CHF pt   - C/w Levo and home midodrine   - Consider trending lactate to assess for hypoperfusion in a pt w/ chronic hypotension      2) DVT   - F/u w/ b/l LE duplex US   - Deter    3) CHF  - No e/o of acute HF exacerbation   - Monitor for symptoms  - Judicious use of fluids    4) IVC Filter   - No active issues    # Pulmonary   - COPD pt on home O2   - Hypoxic on admission on RA, normal SpO2 w/ home O2 level   - No active issues   - Continue to monitor O2 reqs, vitals and clinical status    # Renal   - ESRD w/ HD MWF  - Monitor daily e-   - Consult nephro to organize for in-pt HD     # GI  - No active issues   - Start renal diet     # Endo   - R/out adrenal insufficiency and hypothyroidism   - No hx of diabetes, monitor regular sugars and control to goal of < 180 as needed     I                 75yo M PMHx ESRD on HD via AV fistula LUE MWF, COPD (on 2L home O2), CHF, pulm HTN, known hypotension on home midodrine, DVT s/p IVC filter w/ a work up consistent w/ DVT to the inferior aorta. Pt is hypotensive, needing pressor therapy to maintain appropriate MAPs. Pressor requirement is the primary reason for MICU admission.     #Neurology  - Normal mentation, no active issues    - No indication for sedation   - Monitor for changes in neurological status    #CV    1) Hypotension    - pt w/ a baseline SBP in the 70-80s   - BP 84/45 at triage, most recent = 99/63 w/ Levophed   - Dxd for pressor requirement = chronic hypotension versus sepsis, hypovolemia, cardiogenic, obstructive shock 2/2 PE in a pt w/ DVT, though this is less likely given the presence of IVC filter.   - Will work up for acute infection   - Bedside POCUS once in the unit   - Treat w/ fluids sparingly in a CHF pt   - C/w Levo and home midodrine   - Trend lactate to assess for hypoperfusion in a pt w/ chronic hypotension      2) DVT   - E/o of this in CT  - F/u w/ b/l LE duplex US     3) CHF  - No e/o of acute HF exacerbation   - Monitor for symptoms  - Judicious use of fluids  - Last Echo was 4 years ago, will order new study to be done here    4) IVC Filter   - No active issues    # Pulmonary   - COPD pt on home O2   - Hypoxic on admission on RA, normal SpO2 w/ home O2 level   - No active issues   - Continue to monitor O2 reqs, vitals and clinical status    # Renal   - ESRD w/ HD MWF  - Monitor daily e-   - Consult nephro to organize for in-pt HD     # GI  - No active issues   - Start renal diet     # Endo   - R/out adrenal insufficiency and hypothyroidism   - No hx of diabetes, monitor regular sugars and control to goal of < 180 as needed     # ID   - Hypotension, r/out sepsis  - CXR was negative    - Cultures sent, f/u     # PPX  - Heparin DVT PPT     This note was charted by Dileep Talbot MD PGY-1 EM.

## 2021-08-22 NOTE — ED ADULT NURSE NOTE - NSIMPLEMENTINTERV_GEN_ALL_ED
Implemented All Fall Risk Interventions:  [place] to call system. Call bell, personal items and telephone within reach. Instruct patient to call for assistance. Room bathroom lighting operational. Non-slip footwear when patient is off stretcher. Physically safe environment: no spills, clutter or unnecessary equipment. Stretcher in lowest position, wheels locked, appropriate side rails in place. Provide visual cue, wrist band, yellow gown, etc. Monitor gait and stability. Monitor for mental status changes and reorient to person, place, and time. Review medications for side effects contributing to fall risk. Reinforce activity limits and safety measures with patient and family.

## 2021-08-22 NOTE — ED ADULT TRIAGE NOTE - CHIEF COMPLAINT QUOTE
patient C/O of hypotension and right hip pain. SPo2 84% on RA. BP 84/45 charge aware. patient brought directly to room. PM dialysis MWF

## 2021-08-22 NOTE — ED PROVIDER NOTE - OBJECTIVE STATEMENT
75y/o M w/ h/o HTN, CHF, COPD (on 2L O2), pulm HTN, ESRD (MWF) via R AVF last dialysis Friday p/w R hip pain started 4d ago gradual onset stabbing normally walks with assistance and now can't walk at all. Pain worse with movement, constant and non radiating. Pt was hypotensive at dialysis session on Friday and had to get midodrine to complete session. No fevers, chills, n/v/d, chest pain, urinary complaints, abdominal pain. 77y/o M w/ h/o HTN, CHF, COPD (on 2L O2), pulm HTN, hypotension (on midodrine 10mg BID), ESRD (MWF) via L AVF last dialysis Friday p/w R leg pain started today gradual onset stabbing normally walks with assistance and now can't walk at all. Pain worse with movement, constant and non radiating. Pt was hypotensive at dialysis session on Friday and had to get midodrine to complete session. No fevers, chills, n/v/d, chest pain, urinary complaints, abdominal pain.

## 2021-08-22 NOTE — ED PROVIDER NOTE - NSICDXFAMILYHX_GEN_ALL_CORE_FT
FAMILY HISTORY:  Father  Still living? Unknown  Family history of colon cancer, Age at diagnosis: Age Unknown  Family history of heart disease, Age at diagnosis: Age Unknown

## 2021-08-22 NOTE — H&P ADULT - NSHPPHYSICALEXAM_GEN_ALL_CORE
Vital Signs (24 Hrs):  T(C): 37 (08-22-21 @ 12:11), Max: 37.6 (08-22-21 @ 08:30)  HR: 107 (08-22-21 @ 15:32) (73 - 111)  BP: 81/44 (08-22-21 @ 15:32) (66/44 - 908/49)  RR: 18 (08-22-21 @ 15:32) (17 - 24)  SpO2: 97% (08-22-21 @ 15:32) (84% - 100%)  Wt(kg): --  Daily Height in cm: 170.18 (22 Aug 2021 08:02)    Daily     I&O's Summary    PHYSICAL EXAM  GENERAL: NAD, lying comfortably in bed   HEAD:  Atraumatic, Normocephalic  EYES: EOMI b/l, PERRLA b/l, conjunctiva and sclera clear  NECK: Supple, No JVD, No LAD   CHEST/LUNG: Clear to auscultation bilaterally; No wheeze or ronchi  HEART: Regular rate and rhythm; S1 and S2 present, No murmurs, rubs, or gallops  ABDOMEN: Soft, Nontender, Nondistended; Bowel sounds present  EXTREMITIES:  2+ Peripheral Pulses, No clubbing, cyanosis, or edema  NEURO: AAOx3, non-focal   SKIN: No rashes or lesions

## 2021-08-22 NOTE — ED PROVIDER NOTE - NSICDXPASTMEDICALHX_GEN_ALL_CORE_FT
PAST MEDICAL HISTORY:  CHF (congestive heart failure)     COPD (chronic obstructive pulmonary disease)     Glomerular disease Segmental glomerular sclerosis    Gout     Hypertension     Peripheral edema     Pulmonary edema     Pulmonary hypertension     Sleep apnea

## 2021-08-22 NOTE — H&P ADULT - NSHPLABSRESULTS_GEN_ALL_CORE
.  LABS:                         13.1   12.70 )-----------( 145      ( 22 Aug 2021 08:39 )             43.2     08-22    136  |  90<L>  |  45<H>  ----------------------------<  133<H>  5.1   |  21<L>  |  11.17<H>    Ca    10.2      22 Aug 2021 08:39    TPro  11.0<H>  /  Alb  5.0  /  TBili  0.5  /  DBili  x   /  AST  11  /  ALT  9   /  AlkPhos  160<H>  08-22    PT/INR - ( 22 Aug 2021 08:39 )   PT: 14.3 sec;   INR: 1.27 ratio         PTT - ( 22 Aug 2021 08:39 )  PTT:27.2 sec      < from: CT Abdomen and Pelvis w/ IV Cont (08.22.21 @ 13:04) >    PROCEDURE DATE:  Aug 22 2021         INTERPRETATION:  CLINICAL INFORMATION: Right hip pain.    COMPARISON: Correlation is made with CT angiogram of the chest dated 1/4/2008.    CONTRAST/COMPLICATIONS:  IV Contrast: Omnipaque 350  93 cc administered   7 cc discarded  Oral Contrast: NONE  Complications: None reported at time of study completion    PROCEDURE:  CT of the Abdomen and Pelvis was performed.  Sagittal and coronal reformats were performed.    FINDINGS:  LOWER CHEST: Bibasilar atelectasis. Left lower lobe and right middle lobe calcified granulomata. Coronary artery calcifications.    LIVER: Punctate right hepatic lobe calcification. Otherwise, within normal limits.  BILE DUCTS: Normal caliber.  GALLBLADDER: Within normal limits.  SPLEEN: Calcified granulomata. The Small splenule.  PANCREAS: Within normal limits.  ADRENALS: Thickened adrenal glands bilaterally.  KIDNEYS/URETERS: Bilateral atrophic kidneys. Bilateral cysts and indeterminate renal lesions. Indeterminate left upper lobe hypodense lesion with peripheral hyperdense focus measuring 2.6 x 2.0 cm. No hydronephrosis.    BLADDER: Collapsed, limiting evaluation.  REPRODUCTIVE ORGANS: Prostate gland is within normal limits.    BOWEL:Status post partial right hemicolectomy with anastomosis. Surgical clips within the right lower quadrant. No bowel obstruction. Small hiatal hernia.  PERITONEUM: No ascites.  VESSELS: Infrarenal IVC filter in place. Atherosclerotic changes. Bilateralfemoral, external iliac, and common iliac veins are distended and heterogeneous in appearance.  RETROPERITONEUM/LYMPH NODES: Scattered subcentimeter mesenteric lymph nodes.  ABDOMINAL WALL: Diastases of the rectus muscles. Several fat-containing ventral hernias.  BONES: Degenerative changes.    IMPRESSION:  Bilateral femoral, external iliac, and common iliac veins are distended with a heterogeneous appearance, raising consideration of deep venous thrombosis. Doppler is recommended for further evaluation. An infrarenal inferior vena caval filter is present.    Bilateral indeterminate renal lesions and cysts with left upper lobe complex hypodense lesion with peripheral hyperdense focus. Renal ultrasound is recommended for further characterization. Atrophic kidneys.    Findings were discussed with Dr. Russ on 8/22/2021 at 2:03 PM by Dr. Viera with read back confirmation.    < end of copied text >        RADIOLOGY, EKG & ADDITIONAL TESTS: Reviewed.

## 2021-08-22 NOTE — H&P ADULT - ATTENDING COMMENTS
In summary, this is a 75yo M PMHx ESRD on HD via AV fistula LUE MWF, COPD (on 2L home O2), CHF, pulm HTN, known hypotension on home midodrine, DVT s/p IVC filter w/ a work up consistent w/ DVT (unclear as to why he is not on therapeutic anticoagulation from history). Pt is hypotensive, needing pressor therapy to maintain appropriate MAPs. Admitted to ICU for distributive shock requiring intravenous vasopressor therapy. Awake, alert oriented, no neurological changes. Etiology of hypotension is unclear. As per patient, this is close to his baseline pressure. He takes midodrine oral 10mg, will increase to 20 mg and treat with intervenous levophed in the interim. Sepsis workup including cultures sent. Will treat with empiric antibiotics while cultures pending. MRSA swab. Patient also has a history of DVT and has a filter in place. CT Abdomen raised concern for thrombosis below the filter, will obtain dopplers. Will have to obtain further history regarding exact contra-indication to AC. Will also obtain echocardiogram. Continue supplemental O2 at 2lpm (uses 2lpm at home). ESRD with HD dependence, no acute indications for HD, will continue routine HD. Will also send work up for adrenal insufficiency and hypothyroidism. Monitor sugars, Heparin SQ for VTE Px. Goals of care - Full code. GDE performed.     Critically ill patient with frequent bedside visits. Patient seen and examined on rounds and plan of care discussed with team. Prognosis guarded.

## 2021-08-22 NOTE — H&P ADULT - HISTORY OF PRESENT ILLNESS
77yo M PMHx ESRD on HD via AV fistula LUE MWF, COPD (on 2L home O2), CHF, pulm HTN, known hypotension on home midodrine, DVT s/p IVC filter p/w R leg pain that started this morning. States it is a burning pain, thinks he can feels "knots" in his leg. 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 He also endorses generalized abdominal discomfort associated with constipation since Friday. Spoke with wife with patient, his baseline blood pressure is systolic in the 70s to 80s, and on dialysis days it is usually in the 60s. He checks his BP everyday. The wife notes that over the past 2 weeks it has been consistently low in the 70s. Otherwise denies other pain in the body, f/c, n/v, CP, SOB at rest, dysuria.    In the ED, , lowest BP 66/44, 84% RA improved to 99% on 2L NC, not tachypneic. WBC 12.7, bicarb 21 gap 25, VBG pH 7.32, lactate 3.4, CXR negative. CT a/p showed Bilateral femoral, external iliac, and common iliac veins are distended, concerning for DVT. S/p midodrine (home med), 500 cc bolus, started on levophed.

## 2021-08-22 NOTE — ED PROVIDER NOTE - PHYSICAL EXAMINATION
Gen: chronically ill appearing, NAD, non-toxic appearing  Head: normal appearing  HEENT: normal conjunctiva, oral mucosa moist  Lung: no respiratory distress, speaking in full sentences, CTA b/l     CV: regular rate and rhythm, no murmurs, L AVF with palpable thrill  Abd: soft, non distended, non tender  MSK: lymphedema in b/l lower extremities  Neuro: No focal deficits, AAOx3  Skin: Warm  Psych: normal affect Gen: chronically ill appearing, NAD, non-toxic appearing  Head: normal appearing  HEENT: normal conjunctiva, oral mucosa moist  Lung: no respiratory distress, speaking in full sentences, CTA b/l     CV: regular rate and rhythm, no murmurs, L AVF with palpable thrill  Abd: well healed scar midline, obese soft, non distended, non tender  MSK: lymphedema in b/l lower extremities  Neuro: No focal deficits, AAOx3  Skin: Warm, lower extremity with soft bump 2cm on anterior leg without erythema  Psych: normal affect

## 2021-08-22 NOTE — ED ADULT NURSE NOTE - OBJECTIVE STATEMENT
dionne RN: 75 yo male, hx of Gout, peripheral edema, pulmonary edema, HTN, CHF, COPD (on 2L NC at baseline), ESRD (L AV fistula, dialysis MWF- last session FRIDAY), brought in by EMS for c/o R LE pain, diffuse abdominal pain x 2 days. as per EMS, pt found to be hypotensive in field, with oxygen saturation os 86% on RA (pt was not on NC). upon assessment, pt found to be hypotensive. pt endorses he was found to be hypotensive at dialysis on Friday and was given midodrine prior to treatment. pt abdomen large, rounded. pt c/o pain to sacral area, no open wounds/ulcers noted on exam. krystyna LE large, swollen, 2 soft bumps noted to R LE. EKG performed, pt placed on CCM. 20g iv established to R AC, labs drawn as ordered. pt remains hypotensive, mentating well, aox4, receiving IVF as ordered by MD.  Handoff report given to primary RN. MD aware of VS.

## 2021-08-22 NOTE — ED PROVIDER NOTE - CLINICAL SUMMARY MEDICAL DECISION MAKING FREE TEXT BOX
75y/o M w/ multiple medical problems hypotensive and warm concern for septic shock 2/2 abscess in hip. Plan check wbc, lactate, cultures, and imaging. give IV fluids and antibiotics and reassess BP. if persistent hypotension will start levo and ICU consult. 77y/o M w/ multiple medical problems hypotensive and warm concern for septic shock 2/2 abscess?. Plan check wbc, lactate, cultures, and imaging. give IV fluids and antibiotics and reassess BP. if persistent hypotension will start levo and ICU consult. 77y/o M w/ multiple medical problems hypotensive and warm concern for septic shock 2/2 abscess?. Plan check wbc, lactate, cultures, and imaging. give IV fluids and antibiotics and reassess BP. if persistent hypotension will start levo and ICU consult.  [place]: 74yo awake alert with BP 60/p. Patient temp 99. Dialysis pt last got dialysis Friday. Had Low BP at that time as well. Will evaluate for sepsis, bleeding and hypovolemia. No chest pain. 77y/o M w/ multiple medical problems hypotensive and warm concern for septic shock 2/2 abscess, osteo?. Plan check wbc, lactate, cultures, and imaging. give IV fluids and antibiotics and reassess BP. if persistent hypotension will start levo and ICU consult.  [place]: 72yo awake alert with BP 60/p. Patient temp 99. Dialysis pt last got dialysis Friday. Had Low BP at that time as well. Will evaluate for sepsis, bleeding and hypovolemia. No chest pain.

## 2021-08-23 DIAGNOSIS — N18.6 END STAGE RENAL DISEASE: ICD-10-CM

## 2021-08-23 LAB
A1C WITH ESTIMATED AVERAGE GLUCOSE RESULT: 5.5 % — SIGNIFICANT CHANGE UP (ref 4–5.6)
ALBUMIN SERPL ELPH-MCNC: 4 G/DL — SIGNIFICANT CHANGE UP (ref 3.3–5)
ALP SERPL-CCNC: 141 U/L — HIGH (ref 40–120)
ALT FLD-CCNC: 10 U/L — SIGNIFICANT CHANGE UP (ref 4–41)
ANION GAP SERPL CALC-SCNC: 25 MMOL/L — HIGH (ref 7–14)
AST SERPL-CCNC: 12 U/L — SIGNIFICANT CHANGE UP (ref 4–40)
B PERT DNA SPEC QL NAA+PROBE: SIGNIFICANT CHANGE UP
B PERT+PARAPERT DNA PNL SPEC NAA+PROBE: SIGNIFICANT CHANGE UP
BASE EXCESS BLDV CALC-SCNC: -2 MMOL/L — SIGNIFICANT CHANGE UP (ref -2–3)
BASOPHILS # BLD AUTO: 0.11 K/UL — SIGNIFICANT CHANGE UP (ref 0–0.2)
BASOPHILS NFR BLD AUTO: 0.9 % — SIGNIFICANT CHANGE UP (ref 0–2)
BILIRUB SERPL-MCNC: 0.4 MG/DL — SIGNIFICANT CHANGE UP (ref 0.2–1.2)
BLOOD GAS VENOUS COMPREHENSIVE RESULT: SIGNIFICANT CHANGE UP
BORDETELLA PARAPERTUSSIS (RAPRVP): SIGNIFICANT CHANGE UP
BUN SERPL-MCNC: 53 MG/DL — HIGH (ref 7–23)
C PNEUM DNA SPEC QL NAA+PROBE: SIGNIFICANT CHANGE UP
CALCIUM SERPL-MCNC: 9.1 MG/DL — SIGNIFICANT CHANGE UP (ref 8.4–10.5)
CHLORIDE BLDV-SCNC: 96 MMOL/L — SIGNIFICANT CHANGE UP (ref 96–108)
CHLORIDE SERPL-SCNC: 91 MMOL/L — LOW (ref 98–107)
CO2 BLDV-SCNC: 27.4 MMOL/L — HIGH (ref 22–26)
CO2 SERPL-SCNC: 17 MMOL/L — LOW (ref 22–31)
CORTIS AM PEAK SERPL-MCNC: 16.8 UG/DL — SIGNIFICANT CHANGE UP (ref 6–18.4)
CREAT SERPL-MCNC: 11.61 MG/DL — HIGH (ref 0.5–1.3)
EOSINOPHIL # BLD AUTO: 0.15 K/UL — SIGNIFICANT CHANGE UP (ref 0–0.5)
EOSINOPHIL NFR BLD AUTO: 1.2 % — SIGNIFICANT CHANGE UP (ref 0–6)
ESTIMATED AVERAGE GLUCOSE: 111 — SIGNIFICANT CHANGE UP
FLUAV SUBTYP SPEC NAA+PROBE: SIGNIFICANT CHANGE UP
FLUBV RNA SPEC QL NAA+PROBE: SIGNIFICANT CHANGE UP
GAS PNL BLDV: 130 MMOL/L — LOW (ref 136–145)
GLUCOSE BLDV-MCNC: 120 MG/DL — HIGH (ref 70–99)
GLUCOSE SERPL-MCNC: 119 MG/DL — HIGH (ref 70–99)
HADV DNA SPEC QL NAA+PROBE: SIGNIFICANT CHANGE UP
HCO3 BLDV-SCNC: 26 MMOL/L — SIGNIFICANT CHANGE UP (ref 22–29)
HCOV 229E RNA SPEC QL NAA+PROBE: SIGNIFICANT CHANGE UP
HCOV HKU1 RNA SPEC QL NAA+PROBE: SIGNIFICANT CHANGE UP
HCOV NL63 RNA SPEC QL NAA+PROBE: SIGNIFICANT CHANGE UP
HCOV OC43 RNA SPEC QL NAA+PROBE: SIGNIFICANT CHANGE UP
HCT VFR BLD CALC: 38.6 % — LOW (ref 39–50)
HCT VFR BLDA CALC: 37 % — LOW (ref 39–51)
HGB BLD CALC-MCNC: 12.4 G/DL — LOW (ref 13–17)
HGB BLD-MCNC: 11.7 G/DL — LOW (ref 13–17)
HMPV RNA SPEC QL NAA+PROBE: SIGNIFICANT CHANGE UP
HPIV1 RNA SPEC QL NAA+PROBE: SIGNIFICANT CHANGE UP
HPIV2 RNA SPEC QL NAA+PROBE: SIGNIFICANT CHANGE UP
HPIV3 RNA SPEC QL NAA+PROBE: SIGNIFICANT CHANGE UP
HPIV4 RNA SPEC QL NAA+PROBE: SIGNIFICANT CHANGE UP
IANC: 8.68 K/UL — HIGH (ref 1.5–8.5)
IMM GRANULOCYTES NFR BLD AUTO: 0.5 % — SIGNIFICANT CHANGE UP (ref 0–1.5)
LACTATE BLDV-MCNC: 1.4 MMOL/L — SIGNIFICANT CHANGE UP (ref 0.5–2)
LYMPHOCYTES # BLD AUTO: 1.93 K/UL — SIGNIFICANT CHANGE UP (ref 1–3.3)
LYMPHOCYTES # BLD AUTO: 15.9 % — SIGNIFICANT CHANGE UP (ref 13–44)
M PNEUMO DNA SPEC QL NAA+PROBE: SIGNIFICANT CHANGE UP
MAGNESIUM SERPL-MCNC: 2.4 MG/DL — SIGNIFICANT CHANGE UP (ref 1.6–2.6)
MCHC RBC-ENTMCNC: 29.8 PG — SIGNIFICANT CHANGE UP (ref 27–34)
MCHC RBC-ENTMCNC: 30.3 GM/DL — LOW (ref 32–36)
MCV RBC AUTO: 98.5 FL — SIGNIFICANT CHANGE UP (ref 80–100)
MONOCYTES # BLD AUTO: 1.23 K/UL — HIGH (ref 0–0.9)
MONOCYTES NFR BLD AUTO: 10.1 % — SIGNIFICANT CHANGE UP (ref 2–14)
NEUTROPHILS # BLD AUTO: 8.68 K/UL — HIGH (ref 1.8–7.4)
NEUTROPHILS NFR BLD AUTO: 71.4 % — SIGNIFICANT CHANGE UP (ref 43–77)
NRBC # BLD: 0 /100 WBCS — SIGNIFICANT CHANGE UP
NRBC # FLD: 0 K/UL — SIGNIFICANT CHANGE UP
PCO2 BLDV: 56 MMHG — HIGH (ref 42–55)
PH BLDV: 7.27 — LOW (ref 7.32–7.43)
PHOSPHATE SERPL-MCNC: 7.1 MG/DL — HIGH (ref 2.5–4.5)
PLATELET # BLD AUTO: 138 K/UL — LOW (ref 150–400)
PO2 BLDV: 33 MMHG — SIGNIFICANT CHANGE UP
POTASSIUM BLDV-SCNC: 4.7 MMOL/L — SIGNIFICANT CHANGE UP (ref 3.5–5.1)
POTASSIUM SERPL-MCNC: 4.6 MMOL/L — SIGNIFICANT CHANGE UP (ref 3.5–5.3)
POTASSIUM SERPL-SCNC: 4.6 MMOL/L — SIGNIFICANT CHANGE UP (ref 3.5–5.3)
PROCALCITONIN SERPL-MCNC: 2.43 NG/ML — HIGH (ref 0.02–0.1)
PROT SERPL-MCNC: 9.1 G/DL — HIGH (ref 6–8.3)
RAPID RVP RESULT: SIGNIFICANT CHANGE UP
RBC # BLD: 3.92 M/UL — LOW (ref 4.2–5.8)
RBC # FLD: 16.1 % — HIGH (ref 10.3–14.5)
RSV RNA SPEC QL NAA+PROBE: SIGNIFICANT CHANGE UP
RV+EV RNA SPEC QL NAA+PROBE: SIGNIFICANT CHANGE UP
SAO2 % BLDV: 48.9 % — SIGNIFICANT CHANGE UP
SARS-COV-2 RNA SPEC QL NAA+PROBE: SIGNIFICANT CHANGE UP
SODIUM SERPL-SCNC: 133 MMOL/L — LOW (ref 135–145)
TSH SERPL-MCNC: 1 UIU/ML — SIGNIFICANT CHANGE UP (ref 0.27–4.2)
VANCOMYCIN FLD-MCNC: 20.2 UG/ML — SIGNIFICANT CHANGE UP
WBC # BLD: 12.16 K/UL — HIGH (ref 3.8–10.5)
WBC # FLD AUTO: 12.16 K/UL — HIGH (ref 3.8–10.5)

## 2021-08-23 PROCEDURE — 99291 CRITICAL CARE FIRST HOUR: CPT

## 2021-08-23 PROCEDURE — 99222 1ST HOSP IP/OBS MODERATE 55: CPT | Mod: GC

## 2021-08-23 PROCEDURE — 93306 TTE W/DOPPLER COMPLETE: CPT | Mod: 26

## 2021-08-23 PROCEDURE — 93925 LOWER EXTREMITY STUDY: CPT | Mod: 26

## 2021-08-23 RX ORDER — PIPERACILLIN AND TAZOBACTAM 4; .5 G/20ML; G/20ML
3.38 INJECTION, POWDER, LYOPHILIZED, FOR SOLUTION INTRAVENOUS EVERY 12 HOURS
Refills: 0 | Status: DISCONTINUED | OUTPATIENT
Start: 2021-08-23 | End: 2021-08-24

## 2021-08-23 RX ORDER — PIPERACILLIN AND TAZOBACTAM 4; .5 G/20ML; G/20ML
3.38 INJECTION, POWDER, LYOPHILIZED, FOR SOLUTION INTRAVENOUS ONCE
Refills: 0 | Status: COMPLETED | OUTPATIENT
Start: 2021-08-23 | End: 2021-08-23

## 2021-08-23 RX ORDER — NOREPINEPHRINE BITARTRATE/D5W 8 MG/250ML
0.05 PLASTIC BAG, INJECTION (ML) INTRAVENOUS
Qty: 8 | Refills: 0 | Status: DISCONTINUED | OUTPATIENT
Start: 2021-08-23 | End: 2021-08-24

## 2021-08-23 RX ORDER — VANCOMYCIN HCL 1 G
1250 VIAL (EA) INTRAVENOUS ONCE
Refills: 0 | Status: COMPLETED | OUTPATIENT
Start: 2021-08-23 | End: 2021-08-24

## 2021-08-23 RX ORDER — LIDOCAINE 4 G/100G
1 CREAM TOPICAL DAILY
Refills: 0 | Status: DISCONTINUED | OUTPATIENT
Start: 2021-08-23 | End: 2021-09-04

## 2021-08-23 RX ORDER — MIDODRINE HYDROCHLORIDE 2.5 MG/1
5 TABLET ORAL
Refills: 0 | Status: DISCONTINUED | OUTPATIENT
Start: 2021-08-23 | End: 2021-08-24

## 2021-08-23 RX ORDER — MIDODRINE HYDROCHLORIDE 2.5 MG/1
30 TABLET ORAL EVERY 8 HOURS
Refills: 0 | Status: DISCONTINUED | OUTPATIENT
Start: 2021-08-23 | End: 2021-09-04

## 2021-08-23 RX ORDER — LIDOCAINE AND PRILOCAINE CREAM 25; 25 MG/G; MG/G
1 CREAM TOPICAL ONCE
Refills: 0 | Status: COMPLETED | OUTPATIENT
Start: 2021-08-23 | End: 2021-08-25

## 2021-08-23 RX ORDER — ACETAMINOPHEN 500 MG
650 TABLET ORAL EVERY 6 HOURS
Refills: 0 | Status: DISCONTINUED | OUTPATIENT
Start: 2021-08-23 | End: 2021-09-04

## 2021-08-23 RX ORDER — VANCOMYCIN HCL 1 G
1750 VIAL (EA) INTRAVENOUS ONCE
Refills: 0 | Status: COMPLETED | OUTPATIENT
Start: 2021-08-23 | End: 2021-08-23

## 2021-08-23 RX ORDER — HYDROCORTISONE 20 MG
100 TABLET ORAL EVERY 8 HOURS
Refills: 0 | Status: DISCONTINUED | OUTPATIENT
Start: 2021-08-23 | End: 2021-08-24

## 2021-08-23 RX ORDER — LIDOCAINE 4 G/100G
1 CREAM TOPICAL EVERY 24 HOURS
Refills: 0 | Status: DISCONTINUED | OUTPATIENT
Start: 2021-08-23 | End: 2021-08-23

## 2021-08-23 RX ADMIN — SEVELAMER CARBONATE 800 MILLIGRAM(S): 2400 POWDER, FOR SUSPENSION ORAL at 18:03

## 2021-08-23 RX ADMIN — MIDODRINE HYDROCHLORIDE 30 MILLIGRAM(S): 2.5 TABLET ORAL at 14:02

## 2021-08-23 RX ADMIN — LIDOCAINE 1 PATCH: 4 CREAM TOPICAL at 20:00

## 2021-08-23 RX ADMIN — LIDOCAINE 1 PATCH: 4 CREAM TOPICAL at 02:30

## 2021-08-23 RX ADMIN — SIMETHICONE 80 MILLIGRAM(S): 80 TABLET, CHEWABLE ORAL at 23:03

## 2021-08-23 RX ADMIN — GABAPENTIN 200 MILLIGRAM(S): 400 CAPSULE ORAL at 11:48

## 2021-08-23 RX ADMIN — Medication 1 TABLET(S): at 11:48

## 2021-08-23 RX ADMIN — PIPERACILLIN AND TAZOBACTAM 25 GRAM(S): 4; .5 INJECTION, POWDER, LYOPHILIZED, FOR SOLUTION INTRAVENOUS at 18:02

## 2021-08-23 RX ADMIN — MIDODRINE HYDROCHLORIDE 5 MILLIGRAM(S): 2.5 TABLET ORAL at 20:02

## 2021-08-23 RX ADMIN — Medication 100 MILLIGRAM(S): at 23:03

## 2021-08-23 RX ADMIN — HEPARIN SODIUM 7500 UNIT(S): 5000 INJECTION INTRAVENOUS; SUBCUTANEOUS at 05:26

## 2021-08-23 RX ADMIN — SIMETHICONE 80 MILLIGRAM(S): 80 TABLET, CHEWABLE ORAL at 14:02

## 2021-08-23 RX ADMIN — Medication 11.5 MICROGRAM(S)/KG/MIN: at 08:06

## 2021-08-23 RX ADMIN — TIOTROPIUM BROMIDE 1 CAPSULE(S): 18 CAPSULE ORAL; RESPIRATORY (INHALATION) at 15:29

## 2021-08-23 RX ADMIN — SEVELAMER CARBONATE 800 MILLIGRAM(S): 2400 POWDER, FOR SUSPENSION ORAL at 08:07

## 2021-08-23 RX ADMIN — HEPARIN SODIUM 7500 UNIT(S): 5000 INJECTION INTRAVENOUS; SUBCUTANEOUS at 14:02

## 2021-08-23 RX ADMIN — SIMETHICONE 80 MILLIGRAM(S): 80 TABLET, CHEWABLE ORAL at 05:26

## 2021-08-23 RX ADMIN — MIDODRINE HYDROCHLORIDE 30 MILLIGRAM(S): 2.5 TABLET ORAL at 23:08

## 2021-08-23 RX ADMIN — HEPARIN SODIUM 7500 UNIT(S): 5000 INJECTION INTRAVENOUS; SUBCUTANEOUS at 23:09

## 2021-08-23 RX ADMIN — CHLORHEXIDINE GLUCONATE 1 APPLICATION(S): 213 SOLUTION TOPICAL at 06:50

## 2021-08-23 RX ADMIN — Medication 250 MILLIGRAM(S): at 08:41

## 2021-08-23 RX ADMIN — Medication 100 MILLIGRAM(S): at 05:25

## 2021-08-23 RX ADMIN — Medication 100 MILLIGRAM(S): at 11:48

## 2021-08-23 RX ADMIN — PANTOPRAZOLE SODIUM 40 MILLIGRAM(S): 20 TABLET, DELAYED RELEASE ORAL at 06:49

## 2021-08-23 RX ADMIN — LIDOCAINE 1 PATCH: 4 CREAM TOPICAL at 15:09

## 2021-08-23 RX ADMIN — SEVELAMER CARBONATE 800 MILLIGRAM(S): 2400 POWDER, FOR SUSPENSION ORAL at 11:48

## 2021-08-23 RX ADMIN — POLYETHYLENE GLYCOL 3350 17 GRAM(S): 17 POWDER, FOR SOLUTION ORAL at 11:49

## 2021-08-23 RX ADMIN — Medication 1 MILLIGRAM(S): at 11:52

## 2021-08-23 RX ADMIN — Medication 100 MILLIGRAM(S): at 14:02

## 2021-08-23 RX ADMIN — Medication 11.5 MICROGRAM(S)/KG/MIN: at 20:02

## 2021-08-23 RX ADMIN — MIDODRINE HYDROCHLORIDE 30 MILLIGRAM(S): 2.5 TABLET ORAL at 05:25

## 2021-08-23 RX ADMIN — PIPERACILLIN AND TAZOBACTAM 200 GRAM(S): 4; .5 INJECTION, POWDER, LYOPHILIZED, FOR SOLUTION INTRAVENOUS at 08:06

## 2021-08-23 NOTE — PHYSICAL THERAPY INITIAL EVALUATION ADULT - PERTINENT HX OF CURRENT PROBLEM, REHAB EVAL
Pt. is a 76 year old Male PMH: ESRD on HD, COPD, CHF, pulmonary HTN, known hypotension, DVT s/p IVC filter presents with Right leg pain

## 2021-08-23 NOTE — PROGRESS NOTE ADULT - ASSESSMENT
77yo M PMHx ESRD on HD via AV fistula LUE MWF, COPD (on 2L home O2), CHF, pulm HTN, known hypotension on home midodrine, DVT s/p IVC filter w/ a work up consistent w/ DVT to the inferior aorta. Admitted to MICU for hypotension requiring norepinephrine gtt    #Neurology  Alert and orientedx4   - Normal mentation, no active issues    - No indication for sedation   - Monitor for changes in neurological status    # Pulmonary   - COPD pt on home O2   - Hypoxic on admission on RA, normal SpO2 w/ home O2 level   - No active issues   - Continue to monitor O2 reqs, vitals and clinical status    CV    1) Hypotension    - pt w/ a baseline SBP in the 70-80s   - BP 84/45 at triage, most recent = 99/63 w/ Levophed   - Dxd for pressor requirement = chronic hypotension versus sepsis, hypovolemia, cardiogenic, obstructive shock 2/2 PE in a pt w/ DVT, though this is less likely given the presence of IVC filter.   - Will work up for acute infection   - Bedside POCUS once in the unit   - Treat w/ fluids sparingly in a CHF pt   - C/w Levo and home midodrine   - Trend lactate to assess for hypoperfusion in a pt w/ chronic hypotension      2) DVT   - E/o of this in CT  - F/u w/ b/l LE duplex US     3) CHF  - No e/o of acute HF exacerbation   - Monitor for symptoms  - Judicious use of fluids  - Last Echo was 4 years ago, will order new study to be done here    4) IVC Filter   - No active issues        # Renal   - ESRD w/ HD MWF  - Monitor daily e-   - Consult nephro to organize for in-pt HD     # GI  - No active issues   - Start renal diet     # Endo   - R/out adrenal insufficiency and hypothyroidism   - No hx of diabetes, monitor regular sugars and control to goal of < 180 as needed     # ID   - Hypotension, r/out sepsis  - CXR was negative    - Cultures sent, f/u     # PPX  - Heparin DVT PPT    77yo M PMHx ESRD on HD via AV fistula LUE MWF, COPD (on 2L home O2), CHF, pulm HTN, known hypotension on home midodrine, DVT s/p IVC filter w/ a work up consistent w/ DVT to the inferior aorta who presented to the ER with hypotension and leg pain. 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 Now in shock state requiring pressors unclear if sepsis vs chronic hypotension    - pt w/ baseline SBP in the 70-80s c/w midodrine PO and norepinephrine to maintain SBP 80-90's during HD   - Bedside POCUS LV>RV  - F/u b/l LE duplex US to lower extremities   - No evidence of acute CHF exacerbation   - Last Echo was 4 years ago, pending ECHO for this admission       # GI  - No active issues   - tolerating renal diet  -c/w bowel regimen      # /Renal   ESRD w/ HD MWF last HD session Friday   -renal consulted and plan for HD today   -anuric, no moore in place   -will watch electrolytes   -c/w sevelamer       # Endo    No hx of diabetes  -started on stress dose steroids AM cortisol 16.8  -monitor regular sugars and control to goal of < 180 as needed     # ID   No leukocytosis, hx of cellulitis in the past   -mild redness to B/L lower extremities R>L   - empirically started on zosyn (8/22-   ) and vancomycin by level   - CXR negative    - Cultures sent will f/u results     # PPX  - Heparin DVT PPT  -has IVC filter in place     #GOC  -full code   -wife involved in care     77yo M PMHx ESRD on HD via AV fistula LUE MWF, COPD (on 2L home O2), CHF, pulm HTN, known hypotension on home midodrine, DVT s/p IVC filter w/ a work up consistent w/ DVT to the inferior aorta who presented to the ER with hypotension and leg pain. 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 Now in shock state requiring pressors unclear if sepsis vs chronic hypotension    - pt w/ baseline SBP in the 70-80s c/w midodrine PO and norepinephrine to maintain SBP 80-90's during HD   - Bedside POCUS LV>RV  - F/u b/l LE duplex US to lower extremities   - No evidence of acute CHF exacerbation   - Last Echo was 4 years ago, pending ECHO for this admission       # GI  - No active issues   - tolerating renal diet  -c/w bowel regimen      # /Renal   ESRD w/ HD MWF last HD session Friday   -renal consulted and plan for HD today   -anuric, no moore in place   -will watch electrolytes   -c/w sevelamer       # Endo    No hx of diabetes  -started on stress dose steroids AM cortisol 16.8  -monitor regular sugars and control to goal of < 180 as needed     # ID  Mild leukocytosis, hx of cellulitis in the past   -mild redness to B/L lower extremities R>L   - empirically started on zosyn (8/22-   ) and vancomycin by level   - CXR negative    - Cultures sent will f/u results     # PPX  - Heparin DVT PPT  -has IVC filter in place     #GOC  -full code   -wife involved in care

## 2021-08-24 LAB
ALBUMIN SERPL ELPH-MCNC: 3.9 G/DL — SIGNIFICANT CHANGE UP (ref 3.3–5)
ALP SERPL-CCNC: 135 U/L — HIGH (ref 40–120)
ALT FLD-CCNC: 11 U/L — SIGNIFICANT CHANGE UP (ref 4–41)
ANION GAP SERPL CALC-SCNC: 20 MMOL/L — HIGH (ref 7–14)
AST SERPL-CCNC: 12 U/L — SIGNIFICANT CHANGE UP (ref 4–40)
BILIRUB SERPL-MCNC: 0.3 MG/DL — SIGNIFICANT CHANGE UP (ref 0.2–1.2)
BUN SERPL-MCNC: 43 MG/DL — HIGH (ref 7–23)
CALCIUM SERPL-MCNC: 9.3 MG/DL — SIGNIFICANT CHANGE UP (ref 8.4–10.5)
CHLORIDE SERPL-SCNC: 94 MMOL/L — LOW (ref 98–107)
CO2 SERPL-SCNC: 21 MMOL/L — LOW (ref 22–31)
CREAT SERPL-MCNC: 8.81 MG/DL — HIGH (ref 0.5–1.3)
GLUCOSE SERPL-MCNC: 147 MG/DL — HIGH (ref 70–99)
HBV SURFACE AB SER-ACNC: 107 MIU/ML — SIGNIFICANT CHANGE UP
HBV SURFACE AB SER-ACNC: REACTIVE
HBV SURFACE AG SER-ACNC: SIGNIFICANT CHANGE UP
HCT VFR BLD CALC: 39.3 % — SIGNIFICANT CHANGE UP (ref 39–50)
HGB BLD-MCNC: 11.8 G/DL — LOW (ref 13–17)
LACTATE SERPL-SCNC: 1.9 MMOL/L — SIGNIFICANT CHANGE UP (ref 0.5–2)
MAGNESIUM SERPL-MCNC: 2.3 MG/DL — SIGNIFICANT CHANGE UP (ref 1.6–2.6)
MCHC RBC-ENTMCNC: 29.4 PG — SIGNIFICANT CHANGE UP (ref 27–34)
MCHC RBC-ENTMCNC: 30 GM/DL — LOW (ref 32–36)
MCV RBC AUTO: 98 FL — SIGNIFICANT CHANGE UP (ref 80–100)
MRSA PCR RESULT.: SIGNIFICANT CHANGE UP
NRBC # BLD: 0 /100 WBCS — SIGNIFICANT CHANGE UP
NRBC # FLD: 0 K/UL — SIGNIFICANT CHANGE UP
PHOSPHATE SERPL-MCNC: 4.7 MG/DL — HIGH (ref 2.5–4.5)
PLATELET # BLD AUTO: 139 K/UL — LOW (ref 150–400)
POTASSIUM SERPL-MCNC: 4.1 MMOL/L — SIGNIFICANT CHANGE UP (ref 3.5–5.3)
POTASSIUM SERPL-SCNC: 4.1 MMOL/L — SIGNIFICANT CHANGE UP (ref 3.5–5.3)
PROCALCITONIN SERPL-MCNC: 2.41 NG/ML — HIGH (ref 0.02–0.1)
PROT SERPL-MCNC: 9.1 G/DL — HIGH (ref 6–8.3)
RBC # BLD: 4.01 M/UL — LOW (ref 4.2–5.8)
RBC # FLD: 16 % — HIGH (ref 10.3–14.5)
S AUREUS DNA NOSE QL NAA+PROBE: DETECTED
SODIUM SERPL-SCNC: 135 MMOL/L — SIGNIFICANT CHANGE UP (ref 135–145)
WBC # BLD: 12.09 K/UL — HIGH (ref 3.8–10.5)
WBC # FLD AUTO: 12.09 K/UL — HIGH (ref 3.8–10.5)

## 2021-08-24 PROCEDURE — 99232 SBSQ HOSP IP/OBS MODERATE 35: CPT | Mod: GC

## 2021-08-24 PROCEDURE — 99291 CRITICAL CARE FIRST HOUR: CPT

## 2021-08-24 PROCEDURE — 93970 EXTREMITY STUDY: CPT | Mod: 26

## 2021-08-24 RX ORDER — NOREPINEPHRINE BITARTRATE/D5W 8 MG/250ML
0.05 PLASTIC BAG, INJECTION (ML) INTRAVENOUS
Qty: 8 | Refills: 0 | Status: DISCONTINUED | OUTPATIENT
Start: 2021-08-24 | End: 2021-08-24

## 2021-08-24 RX ORDER — PIPERACILLIN AND TAZOBACTAM 4; .5 G/20ML; G/20ML
3.38 INJECTION, POWDER, LYOPHILIZED, FOR SOLUTION INTRAVENOUS EVERY 12 HOURS
Refills: 0 | Status: DISCONTINUED | OUTPATIENT
Start: 2021-08-24 | End: 2021-08-26

## 2021-08-24 RX ORDER — HYDROCORTISONE 20 MG
50 TABLET ORAL EVERY 8 HOURS
Refills: 0 | Status: DISCONTINUED | OUTPATIENT
Start: 2021-08-24 | End: 2021-08-26

## 2021-08-24 RX ORDER — MUPIROCIN 20 MG/G
1 OINTMENT TOPICAL EVERY 12 HOURS
Refills: 0 | Status: DISCONTINUED | OUTPATIENT
Start: 2021-08-24 | End: 2021-08-24

## 2021-08-24 RX ORDER — POLYETHYLENE GLYCOL 3350 17 G/17G
17 POWDER, FOR SOLUTION ORAL
Refills: 0 | Status: DISCONTINUED | OUTPATIENT
Start: 2021-08-24 | End: 2021-09-04

## 2021-08-24 RX ORDER — MIDODRINE HYDROCHLORIDE 2.5 MG/1
10 TABLET ORAL
Refills: 0 | Status: DISCONTINUED | OUTPATIENT
Start: 2021-08-24 | End: 2021-08-25

## 2021-08-24 RX ORDER — MUPIROCIN 20 MG/G
1 OINTMENT TOPICAL EVERY 12 HOURS
Refills: 0 | Status: COMPLETED | OUTPATIENT
Start: 2021-08-24 | End: 2021-08-29

## 2021-08-24 RX ADMIN — HEPARIN SODIUM 7500 UNIT(S): 5000 INJECTION INTRAVENOUS; SUBCUTANEOUS at 22:31

## 2021-08-24 RX ADMIN — SEVELAMER CARBONATE 800 MILLIGRAM(S): 2400 POWDER, FOR SUSPENSION ORAL at 17:17

## 2021-08-24 RX ADMIN — PIPERACILLIN AND TAZOBACTAM 25 GRAM(S): 4; .5 INJECTION, POWDER, LYOPHILIZED, FOR SOLUTION INTRAVENOUS at 06:27

## 2021-08-24 RX ADMIN — TIOTROPIUM BROMIDE 1 CAPSULE(S): 18 CAPSULE ORAL; RESPIRATORY (INHALATION) at 08:47

## 2021-08-24 RX ADMIN — Medication 5 MILLIGRAM(S): at 02:26

## 2021-08-24 RX ADMIN — SIMETHICONE 80 MILLIGRAM(S): 80 TABLET, CHEWABLE ORAL at 06:26

## 2021-08-24 RX ADMIN — SEVELAMER CARBONATE 800 MILLIGRAM(S): 2400 POWDER, FOR SUSPENSION ORAL at 13:13

## 2021-08-24 RX ADMIN — POLYETHYLENE GLYCOL 3350 17 GRAM(S): 17 POWDER, FOR SOLUTION ORAL at 11:35

## 2021-08-24 RX ADMIN — SEVELAMER CARBONATE 800 MILLIGRAM(S): 2400 POWDER, FOR SUSPENSION ORAL at 07:41

## 2021-08-24 RX ADMIN — Medication 1 TABLET(S): at 11:36

## 2021-08-24 RX ADMIN — Medication 50 MILLIGRAM(S): at 22:31

## 2021-08-24 RX ADMIN — HEPARIN SODIUM 7500 UNIT(S): 5000 INJECTION INTRAVENOUS; SUBCUTANEOUS at 06:26

## 2021-08-24 RX ADMIN — PIPERACILLIN AND TAZOBACTAM 25 GRAM(S): 4; .5 INJECTION, POWDER, LYOPHILIZED, FOR SOLUTION INTRAVENOUS at 18:52

## 2021-08-24 RX ADMIN — Medication 166.67 MILLIGRAM(S): at 01:20

## 2021-08-24 RX ADMIN — Medication 1 MILLIGRAM(S): at 11:36

## 2021-08-24 RX ADMIN — Medication 50 MILLIGRAM(S): at 15:20

## 2021-08-24 RX ADMIN — SIMETHICONE 80 MILLIGRAM(S): 80 TABLET, CHEWABLE ORAL at 15:18

## 2021-08-24 RX ADMIN — LIDOCAINE 1 PATCH: 4 CREAM TOPICAL at 00:30

## 2021-08-24 RX ADMIN — PANTOPRAZOLE SODIUM 40 MILLIGRAM(S): 20 TABLET, DELAYED RELEASE ORAL at 06:26

## 2021-08-24 RX ADMIN — Medication 100 MILLIGRAM(S): at 06:26

## 2021-08-24 RX ADMIN — CHLORHEXIDINE GLUCONATE 1 APPLICATION(S): 213 SOLUTION TOPICAL at 06:37

## 2021-08-24 RX ADMIN — MIDODRINE HYDROCHLORIDE 30 MILLIGRAM(S): 2.5 TABLET ORAL at 06:26

## 2021-08-24 RX ADMIN — MIDODRINE HYDROCHLORIDE 30 MILLIGRAM(S): 2.5 TABLET ORAL at 22:31

## 2021-08-24 RX ADMIN — LIDOCAINE 1 PATCH: 4 CREAM TOPICAL at 20:16

## 2021-08-24 RX ADMIN — HEPARIN SODIUM 7500 UNIT(S): 5000 INJECTION INTRAVENOUS; SUBCUTANEOUS at 15:18

## 2021-08-24 RX ADMIN — Medication 100 MILLIGRAM(S): at 11:36

## 2021-08-24 RX ADMIN — GABAPENTIN 200 MILLIGRAM(S): 400 CAPSULE ORAL at 13:08

## 2021-08-24 RX ADMIN — LIDOCAINE 1 PATCH: 4 CREAM TOPICAL at 11:36

## 2021-08-24 NOTE — PROGRESS NOTE ADULT - ASSESSMENT
Pt with ESRD on HD TWI.     Problem/Recommendation - 1:  Problem: ESRD on hemodialysis. Recommendation: pt with ESRD on HD. last HD done on 8/20/21 via LUE AVF. HD consent obtained and kept in pts chart.   Will arrange for HD tomorrow   midodrine increased for  intradialytic hypotension.   Dose medication as per gfr. c  ontinue with sevelamer for hyperphosphatemia.

## 2021-08-24 NOTE — PROGRESS NOTE ADULT - ASSESSMENT
77yo M PMHx ESRD on HD via AV fistula LUE MWF, COPD (on 2L home O2), CHF, pulm HTN, known hypotension on home midodrine, DVT s/p IVC filter w/ a work up consistent w/ DVT to the inferior aorta who presented to the ER with hypotension and leg pain. 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 Now in shock state requiring pressors unclear if sepsis vs chronic hypotension    - pt w/ baseline SBP in the 70-80s c/w midodrine PO and norepinephrine to maintain SBP 80-90's during HD   - Bedside POCUS LV>RV  - F/u b/l LE duplex US to lower extremities   - No evidence of acute CHF exacerbation   - Last Echo was 4 years ago, pending ECHO for this admission       # GI  - No active issues   - tolerating renal diet  -c/w bowel regimen      # /Renal   ESRD w/ HD MWF last HD session Friday   -renal consulted and plan for HD today   -anuric, no moore in place   -will watch electrolytes   -c/w sevelamer       # Endo    No hx of diabetes  -started on stress dose steroids AM cortisol 16.8  -monitor regular sugars and control to goal of < 180 as needed     # ID  Mild leukocytosis, hx of cellulitis in the past   -mild redness to B/L lower extremities R>L   - empirically started on zosyn (8/22-   ) and vancomycin by level   - CXR negative    - Cultures sent will f/u results     # PPX  - Heparin DVT PPT  -has IVC filter in place     #GOC  -full code   -wife involved in care     75yo M PMHx ESRD on HD via AV fistula LUE MWF, COPD (on 2L home O2), CHF, pulm HTN, known hypotension on home midodrine, DVT s/p IVC filter w/ a work up consistent w/ DVT to the inferior aorta who presented to the ER with hypotension and leg pain. Admitted to MICU for hypotension requiring norepinephrine gtt    #Neurology  Alert and oriented x4   - Normal mentation, no active issues   -PT consult in place        # Pulmonary   Hx of Pulmonary HTN (Dr. Freedman pulmononogist) COPD on home 2L home O2, uses trilogy ventilator at night   -remains on 2L NC   -bedside POCUS with mostly alines predominately does not look fluid overloaded   -nocturnal AVAPS    #CV  Hypotensive at baseline on midodrine at home, Hx CHF, DVT, PE, IVC filter, not on AC in the setting of GI bleeds in the past. Now in shock state requiring pressors unclear if sepsis vs chronic hypotension    - pt w/ baseline SBP in the 70-80s c/w midodrine 30mg PO TID   - Bedside POCUS LV>RV  - No evidence of acute CHF exacerbation   - Last Echo was 4 years ago  -s/p TTE on 8/23     # GI  Hx of GIB in the past   -No active issues  - tolerating renal diet  -c/w bowel regimen  increased miralax today   -c/w pantoprazole     # /Renal   ESRD w/ HD MWF   -last HD on 8/23 tolerated 1L off   -will get additional midodrine pre HD sessions in addition to standing midodrine   -renal following    -anuric, no moore in place   -will watch electrolytes   -c/w sevelamer       # Endo    No hx of diabetes  -started on stress dose steroids AM cortisol 16.8  -will continue weaning steroids   -monitor regular sugars and control to goal of < 180 as needed     # ID  Mild leukocytosis, hx of cellulitis in the past   -mild redness to B/L lower extremities R>L   - empirically started on zosyn (8/22-   ) and vancomycin by level but will D/C   - CXR negative    - Cultures sent blood cultures currently with NGTD     # PPX  Hx of DVT's and PE but not on anticoagulation at home d/t GIB in the past and falls   - Heparin SQ for DVT ppx  -has IVC filter in place  -8/24 US showed extensive bilateral deep venous thrombosis of the bilateral lower extremities     #GOC  -full code   -wife involved in care

## 2021-08-25 PROCEDURE — 99232 SBSQ HOSP IP/OBS MODERATE 35: CPT | Mod: GC

## 2021-08-25 RX ORDER — MIDODRINE HYDROCHLORIDE 2.5 MG/1
10 TABLET ORAL
Refills: 0 | Status: DISCONTINUED | OUTPATIENT
Start: 2021-08-25 | End: 2021-09-04

## 2021-08-25 RX ADMIN — SEVELAMER CARBONATE 800 MILLIGRAM(S): 2400 POWDER, FOR SUSPENSION ORAL at 12:04

## 2021-08-25 RX ADMIN — SEVELAMER CARBONATE 800 MILLIGRAM(S): 2400 POWDER, FOR SUSPENSION ORAL at 09:01

## 2021-08-25 RX ADMIN — PIPERACILLIN AND TAZOBACTAM 25 GRAM(S): 4; .5 INJECTION, POWDER, LYOPHILIZED, FOR SOLUTION INTRAVENOUS at 21:26

## 2021-08-25 RX ADMIN — MIDODRINE HYDROCHLORIDE 30 MILLIGRAM(S): 2.5 TABLET ORAL at 14:46

## 2021-08-25 RX ADMIN — HEPARIN SODIUM 7500 UNIT(S): 5000 INJECTION INTRAVENOUS; SUBCUTANEOUS at 12:08

## 2021-08-25 RX ADMIN — SIMETHICONE 80 MILLIGRAM(S): 80 TABLET, CHEWABLE ORAL at 12:03

## 2021-08-25 RX ADMIN — MIDODRINE HYDROCHLORIDE 30 MILLIGRAM(S): 2.5 TABLET ORAL at 21:27

## 2021-08-25 RX ADMIN — Medication 50 MILLIGRAM(S): at 14:47

## 2021-08-25 RX ADMIN — MUPIROCIN 1 APPLICATION(S): 20 OINTMENT TOPICAL at 21:27

## 2021-08-25 RX ADMIN — HEPARIN SODIUM 7500 UNIT(S): 5000 INJECTION INTRAVENOUS; SUBCUTANEOUS at 06:13

## 2021-08-25 RX ADMIN — Medication 50 MILLIGRAM(S): at 21:27

## 2021-08-25 RX ADMIN — MIDODRINE HYDROCHLORIDE 30 MILLIGRAM(S): 2.5 TABLET ORAL at 06:13

## 2021-08-25 RX ADMIN — Medication 50 MILLIGRAM(S): at 06:15

## 2021-08-25 RX ADMIN — HEPARIN SODIUM 7500 UNIT(S): 5000 INJECTION INTRAVENOUS; SUBCUTANEOUS at 21:28

## 2021-08-25 RX ADMIN — LIDOCAINE 1 PATCH: 4 CREAM TOPICAL at 23:21

## 2021-08-25 RX ADMIN — LIDOCAINE AND PRILOCAINE CREAM 1 APPLICATION(S): 25; 25 CREAM TOPICAL at 14:47

## 2021-08-25 RX ADMIN — PANTOPRAZOLE SODIUM 40 MILLIGRAM(S): 20 TABLET, DELAYED RELEASE ORAL at 06:13

## 2021-08-25 RX ADMIN — Medication 100 MILLIGRAM(S): at 12:04

## 2021-08-25 RX ADMIN — GABAPENTIN 200 MILLIGRAM(S): 400 CAPSULE ORAL at 12:08

## 2021-08-25 RX ADMIN — TIOTROPIUM BROMIDE 1 CAPSULE(S): 18 CAPSULE ORAL; RESPIRATORY (INHALATION) at 09:05

## 2021-08-25 RX ADMIN — POLYETHYLENE GLYCOL 3350 17 GRAM(S): 17 POWDER, FOR SOLUTION ORAL at 12:04

## 2021-08-25 RX ADMIN — Medication 1 TABLET(S): at 12:04

## 2021-08-25 RX ADMIN — LIDOCAINE 1 PATCH: 4 CREAM TOPICAL at 12:03

## 2021-08-25 RX ADMIN — Medication 1 MILLIGRAM(S): at 12:04

## 2021-08-25 RX ADMIN — PIPERACILLIN AND TAZOBACTAM 25 GRAM(S): 4; .5 INJECTION, POWDER, LYOPHILIZED, FOR SOLUTION INTRAVENOUS at 06:13

## 2021-08-25 RX ADMIN — SIMETHICONE 80 MILLIGRAM(S): 80 TABLET, CHEWABLE ORAL at 06:13

## 2021-08-25 RX ADMIN — SIMETHICONE 80 MILLIGRAM(S): 80 TABLET, CHEWABLE ORAL at 21:27

## 2021-08-25 RX ADMIN — SIMETHICONE 80 MILLIGRAM(S): 80 TABLET, CHEWABLE ORAL at 00:37

## 2021-08-25 NOTE — PROGRESS NOTE ADULT - ASSESSMENT
Pt with ESRD on HD TWI.     Problem/Recommendation - 1:  Problem: ESRD on hemodialysis. Recommendation: pt with ESRD on HD. last HD done on 8/20/21 via LUE AVF. HD consent obtained and kept in pts chart.   Will arrange for HD today. next 8/27.    midodrine increased for  intradialytic hypotension.   Dose medication as per gfr.   continue with sevelamer for hyperphosphatemia.

## 2021-08-26 LAB
ALBUMIN SERPL ELPH-MCNC: 3.8 G/DL — SIGNIFICANT CHANGE UP (ref 3.3–5)
ALP SERPL-CCNC: 110 U/L — SIGNIFICANT CHANGE UP (ref 40–120)
ALT FLD-CCNC: 10 U/L — SIGNIFICANT CHANGE UP (ref 4–41)
ANION GAP SERPL CALC-SCNC: 18 MMOL/L — HIGH (ref 7–14)
AST SERPL-CCNC: 11 U/L — SIGNIFICANT CHANGE UP (ref 4–40)
BILIRUB SERPL-MCNC: 0.2 MG/DL — SIGNIFICANT CHANGE UP (ref 0.2–1.2)
BUN SERPL-MCNC: 49 MG/DL — HIGH (ref 7–23)
CALCIUM SERPL-MCNC: 9.2 MG/DL — SIGNIFICANT CHANGE UP (ref 8.4–10.5)
CHLORIDE SERPL-SCNC: 92 MMOL/L — LOW (ref 98–107)
CO2 SERPL-SCNC: 23 MMOL/L — SIGNIFICANT CHANGE UP (ref 22–31)
CREAT SERPL-MCNC: 9.12 MG/DL — HIGH (ref 0.5–1.3)
GLUCOSE SERPL-MCNC: 91 MG/DL — SIGNIFICANT CHANGE UP (ref 70–99)
HCT VFR BLD CALC: 39.2 % — SIGNIFICANT CHANGE UP (ref 39–50)
HGB BLD-MCNC: 12.1 G/DL — LOW (ref 13–17)
MAGNESIUM SERPL-MCNC: 2.4 MG/DL — SIGNIFICANT CHANGE UP (ref 1.6–2.6)
MCHC RBC-ENTMCNC: 30 PG — SIGNIFICANT CHANGE UP (ref 27–34)
MCHC RBC-ENTMCNC: 30.9 GM/DL — LOW (ref 32–36)
MCV RBC AUTO: 97.3 FL — SIGNIFICANT CHANGE UP (ref 80–100)
NRBC # BLD: 0 /100 WBCS — SIGNIFICANT CHANGE UP
NRBC # FLD: 0 K/UL — SIGNIFICANT CHANGE UP
PHOSPHATE SERPL-MCNC: 5.7 MG/DL — HIGH (ref 2.5–4.5)
PLATELET # BLD AUTO: 168 K/UL — SIGNIFICANT CHANGE UP (ref 150–400)
POTASSIUM SERPL-MCNC: 4.9 MMOL/L — SIGNIFICANT CHANGE UP (ref 3.5–5.3)
POTASSIUM SERPL-SCNC: 4.9 MMOL/L — SIGNIFICANT CHANGE UP (ref 3.5–5.3)
PROT SERPL-MCNC: 9.1 G/DL — HIGH (ref 6–8.3)
RBC # BLD: 4.03 M/UL — LOW (ref 4.2–5.8)
RBC # FLD: 16 % — HIGH (ref 10.3–14.5)
SODIUM SERPL-SCNC: 133 MMOL/L — LOW (ref 135–145)
WBC # BLD: 11.22 K/UL — HIGH (ref 3.8–10.5)
WBC # FLD AUTO: 11.22 K/UL — HIGH (ref 3.8–10.5)

## 2021-08-26 PROCEDURE — 99358 PROLONG SERVICE W/O CONTACT: CPT

## 2021-08-26 PROCEDURE — 76770 US EXAM ABDO BACK WALL COMP: CPT | Mod: 26

## 2021-08-26 PROCEDURE — 99233 SBSQ HOSP IP/OBS HIGH 50: CPT | Mod: 25

## 2021-08-26 RX ORDER — HYDROCORTISONE 20 MG
25 TABLET ORAL EVERY 8 HOURS
Refills: 0 | Status: DISCONTINUED | OUTPATIENT
Start: 2021-08-26 | End: 2021-08-29

## 2021-08-26 RX ORDER — PANTOPRAZOLE SODIUM 20 MG/1
40 TABLET, DELAYED RELEASE ORAL
Refills: 0 | Status: DISCONTINUED | OUTPATIENT
Start: 2021-08-26 | End: 2021-09-02

## 2021-08-26 RX ADMIN — HEPARIN SODIUM 7500 UNIT(S): 5000 INJECTION INTRAVENOUS; SUBCUTANEOUS at 12:49

## 2021-08-26 RX ADMIN — Medication 50 MILLIGRAM(S): at 12:50

## 2021-08-26 RX ADMIN — SEVELAMER CARBONATE 800 MILLIGRAM(S): 2400 POWDER, FOR SUSPENSION ORAL at 08:25

## 2021-08-26 RX ADMIN — Medication 1 TABLET(S): at 12:49

## 2021-08-26 RX ADMIN — SIMETHICONE 80 MILLIGRAM(S): 80 TABLET, CHEWABLE ORAL at 21:43

## 2021-08-26 RX ADMIN — Medication 100 MILLIGRAM(S): at 12:48

## 2021-08-26 RX ADMIN — SEVELAMER CARBONATE 800 MILLIGRAM(S): 2400 POWDER, FOR SUSPENSION ORAL at 17:07

## 2021-08-26 RX ADMIN — SIMETHICONE 80 MILLIGRAM(S): 80 TABLET, CHEWABLE ORAL at 05:50

## 2021-08-26 RX ADMIN — MUPIROCIN 1 APPLICATION(S): 20 OINTMENT TOPICAL at 17:07

## 2021-08-26 RX ADMIN — POLYETHYLENE GLYCOL 3350 17 GRAM(S): 17 POWDER, FOR SOLUTION ORAL at 23:16

## 2021-08-26 RX ADMIN — TIOTROPIUM BROMIDE 1 CAPSULE(S): 18 CAPSULE ORAL; RESPIRATORY (INHALATION) at 08:25

## 2021-08-26 RX ADMIN — MIDODRINE HYDROCHLORIDE 30 MILLIGRAM(S): 2.5 TABLET ORAL at 12:50

## 2021-08-26 RX ADMIN — Medication 50 MILLIGRAM(S): at 05:51

## 2021-08-26 RX ADMIN — MUPIROCIN 1 APPLICATION(S): 20 OINTMENT TOPICAL at 05:51

## 2021-08-26 RX ADMIN — HEPARIN SODIUM 7500 UNIT(S): 5000 INJECTION INTRAVENOUS; SUBCUTANEOUS at 21:35

## 2021-08-26 RX ADMIN — GABAPENTIN 200 MILLIGRAM(S): 400 CAPSULE ORAL at 12:49

## 2021-08-26 RX ADMIN — LIDOCAINE 1 PATCH: 4 CREAM TOPICAL at 19:48

## 2021-08-26 RX ADMIN — LIDOCAINE 1 PATCH: 4 CREAM TOPICAL at 12:48

## 2021-08-26 RX ADMIN — Medication 25 MILLIGRAM(S): at 21:34

## 2021-08-26 RX ADMIN — SEVELAMER CARBONATE 800 MILLIGRAM(S): 2400 POWDER, FOR SUSPENSION ORAL at 12:49

## 2021-08-26 RX ADMIN — MIDODRINE HYDROCHLORIDE 30 MILLIGRAM(S): 2.5 TABLET ORAL at 21:36

## 2021-08-26 RX ADMIN — Medication 1 MILLIGRAM(S): at 12:49

## 2021-08-26 RX ADMIN — PANTOPRAZOLE SODIUM 40 MILLIGRAM(S): 20 TABLET, DELAYED RELEASE ORAL at 05:51

## 2021-08-26 RX ADMIN — HEPARIN SODIUM 7500 UNIT(S): 5000 INJECTION INTRAVENOUS; SUBCUTANEOUS at 05:51

## 2021-08-26 RX ADMIN — PIPERACILLIN AND TAZOBACTAM 25 GRAM(S): 4; .5 INJECTION, POWDER, LYOPHILIZED, FOR SOLUTION INTRAVENOUS at 08:25

## 2021-08-26 RX ADMIN — SIMETHICONE 80 MILLIGRAM(S): 80 TABLET, CHEWABLE ORAL at 12:49

## 2021-08-26 RX ADMIN — POLYETHYLENE GLYCOL 3350 17 GRAM(S): 17 POWDER, FOR SOLUTION ORAL at 12:48

## 2021-08-26 NOTE — PROGRESS NOTE ADULT - ASSESSMENT
· Assessment	  75yo M PMHx ESRD on HD via AV fistula LUE MWF, COPD (on 2L home O2), CHF, pulm HTN, known hypotension on home midodrine, DVT s/p IVC filter p/w R leg pain that started this morning. States it is a burning pain, thinks he can feels "knots" in his leg. 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 He also endorses generalized abdominal discomfort associated with constipation since Friday. Spoke with wife with patient, his baseline blood pressure is systolic in the 70s to 80s, and on dialysis days it is usually in the 60s. He checks his BP everyday. The wife notes that over the past 2 weeks it has been consistently low in the 70s. Otherwise denies other pain in the body, f/c, n/v, CP, SOB at rest, dysuria.    In the ED, , lowest BP 66/44, 84% RA improved to 99% on 2L NC, not tachypneic. WBC 12.7, bicarb 21 gap 25, VBG pH 7.32, lactate 3.4, CXR negative. CT a/p showed Bilateral femoral, external iliac, and common iliac veins are distended, concerning for DVT. S/p midodrine (home med), 500 cc bolus, started on levophed. (22 Aug 2021 15:32)    pt was admitted MICU for with shock of unclear etiology. recieved Abx and pressure support with pressors and midodrine.. started on stress steroids and now on medicine floor.   In MICU : "  b/l DVT, extensive.  Was off a/c in past due to risk of falls/bleeding and GI bleed, still need more collateral.  He is very clear that he is not supposed to be on a/c though and his pulmonologist agrees."     Shock : Mild leukocytosis, hx of cellulitis in the past   -mild redness to B/L lower extremities R>L   - empirically started on zosyn (8/22-   ) and vanco   - CXR negative    - Cultures sent blood cultures currently with NGTD   - off pressors and on steroids  stress dose and midodrine   - now observe off abx per ID    - fu CT : r/o PE and r/o occult pna     Hx of Pulmonary HTN (Dr. Freedman pulmononogist) COPD on home 2L home O2, uses trilogy ventilator at night   -remains on 2L NC   -bedside POCUS with mostly alines predominately does not look fluid overloaded   -nocturnal AVAPS     Hypotensive at baseline on midodrine at home, Hx CHF, DVT, PE, IVC filter, not on AC in the setting of GI bleeds in the past.    c/w midodrine 30mg PO TID    TTE on 8/23 : NL EF       ESRD w/ HD MWF   cont HD per renal       Hx of DVT's and PE but not on anticoagulation at home d/t GIB in the past and falls   - Heparin SQ for DVT ppx  -has IVC filter in place  -8/24 US showed extensive bilateral deep venous thrombosis of the bilateral lower extremities   - appreciate heme in put : CTPE ordered , had a discussion with wife pt had had two separate "clots" and was taken off AC due to GIB with no obvious source after cohen per her and thats when IVC was placed.   - will hold off on AC for now and consult GI / Vasc     - kidney lesion : will check US and will consider bx     #GOC  -full code

## 2021-08-26 NOTE — PROGRESS NOTE ADULT - ASSESSMENT
75yo with ESRD currently admitted for shock (resolved) and extensive b/l DVT    -----------------------------------------------  # ESRD ON HD  - SCHED: MOOK  - UNIT: [facility] ([place])  - PROVIDER: ----  - ACCESS: LUE AVF  - HEP B: in chart  - CONSENT: in chart  - LAST HD: 8/25  - NEXT HD: 8/27 (orders placed and reviewed)  - HD orders: 180 mins, Qb: 350 to 400, Qd: 400-500, Heparin: none, Na modeling, K Sliding scale, HCO3 35, Ca 2.5, Dialyzer: Revaclear 300, UF (as tolerated): 2L    # HYPOTENSION  - Takes Midodrine 30q8h ATC + midodrine 10mg prior to HD  - Volume overloaded but patient is preload-dependent 2/2 pulm HTN  - Will try 2L UF tomorrow as much as he tolerates    # ANEMIA DUE TO ESRD  - Hgb > 10    # CKD-MBD  - Phos and Ca WNL  - Binder: Renvela    # DISORDERS OF FLUIDS, ELECTROLYTES, AND ACID-BASE  - VOL: Vol overloaded but preload-dependent. Will allow for permissive edema as patient already hypotensive. Challenge with UF as much as hemodynamically tolerated.     # NUTRITION  - Nephro-eliana daily  - Renal diet. Low K. Low Phos.

## 2021-08-27 DIAGNOSIS — I95.9 HYPOTENSION, UNSPECIFIED: ICD-10-CM

## 2021-08-27 LAB
ALBUMIN SERPL ELPH-MCNC: 3.8 G/DL — SIGNIFICANT CHANGE UP (ref 3.3–5)
ALP SERPL-CCNC: 106 U/L — SIGNIFICANT CHANGE UP (ref 40–120)
ALT FLD-CCNC: 10 U/L — SIGNIFICANT CHANGE UP (ref 4–41)
ANION GAP SERPL CALC-SCNC: 22 MMOL/L — HIGH (ref 7–14)
AST SERPL-CCNC: 10 U/L — SIGNIFICANT CHANGE UP (ref 4–40)
BILIRUB SERPL-MCNC: 0.3 MG/DL — SIGNIFICANT CHANGE UP (ref 0.2–1.2)
BUN SERPL-MCNC: 65 MG/DL — HIGH (ref 7–23)
CALCIUM SERPL-MCNC: 9.2 MG/DL — SIGNIFICANT CHANGE UP (ref 8.4–10.5)
CHLORIDE SERPL-SCNC: 91 MMOL/L — LOW (ref 98–107)
CO2 SERPL-SCNC: 21 MMOL/L — LOW (ref 22–31)
CREAT SERPL-MCNC: 10.77 MG/DL — HIGH (ref 0.5–1.3)
CULTURE RESULTS: SIGNIFICANT CHANGE UP
CULTURE RESULTS: SIGNIFICANT CHANGE UP
GLUCOSE SERPL-MCNC: 82 MG/DL — SIGNIFICANT CHANGE UP (ref 70–99)
HCT VFR BLD CALC: 40 % — SIGNIFICANT CHANGE UP (ref 39–50)
HGB BLD-MCNC: 12.2 G/DL — LOW (ref 13–17)
MAGNESIUM SERPL-MCNC: 2.5 MG/DL — SIGNIFICANT CHANGE UP (ref 1.6–2.6)
MCHC RBC-ENTMCNC: 29.7 PG — SIGNIFICANT CHANGE UP (ref 27–34)
MCHC RBC-ENTMCNC: 30.5 GM/DL — LOW (ref 32–36)
MCV RBC AUTO: 97.3 FL — SIGNIFICANT CHANGE UP (ref 80–100)
NRBC # BLD: 0 /100 WBCS — SIGNIFICANT CHANGE UP
NRBC # FLD: 0 K/UL — SIGNIFICANT CHANGE UP
PHOSPHATE SERPL-MCNC: 6.6 MG/DL — HIGH (ref 2.5–4.5)
PLATELET # BLD AUTO: 197 K/UL — SIGNIFICANT CHANGE UP (ref 150–400)
POTASSIUM SERPL-MCNC: 5.3 MMOL/L — SIGNIFICANT CHANGE UP (ref 3.5–5.3)
POTASSIUM SERPL-SCNC: 5.3 MMOL/L — SIGNIFICANT CHANGE UP (ref 3.5–5.3)
PROT SERPL-MCNC: 8.9 G/DL — HIGH (ref 6–8.3)
RBC # BLD: 4.11 M/UL — LOW (ref 4.2–5.8)
RBC # FLD: 15.9 % — HIGH (ref 10.3–14.5)
SODIUM SERPL-SCNC: 134 MMOL/L — LOW (ref 135–145)
SPECIMEN SOURCE: SIGNIFICANT CHANGE UP
SPECIMEN SOURCE: SIGNIFICANT CHANGE UP
WBC # BLD: 11.49 K/UL — HIGH (ref 3.8–10.5)
WBC # FLD AUTO: 11.49 K/UL — HIGH (ref 3.8–10.5)

## 2021-08-27 PROCEDURE — 99233 SBSQ HOSP IP/OBS HIGH 50: CPT | Mod: 25

## 2021-08-27 PROCEDURE — 71275 CT ANGIOGRAPHY CHEST: CPT | Mod: 26

## 2021-08-27 RX ORDER — HYDROMORPHONE HYDROCHLORIDE 2 MG/ML
0.25 INJECTION INTRAMUSCULAR; INTRAVENOUS; SUBCUTANEOUS ONCE
Refills: 0 | Status: DISCONTINUED | OUTPATIENT
Start: 2021-08-27 | End: 2021-08-27

## 2021-08-27 RX ADMIN — HYDROMORPHONE HYDROCHLORIDE 0.25 MILLIGRAM(S): 2 INJECTION INTRAMUSCULAR; INTRAVENOUS; SUBCUTANEOUS at 17:56

## 2021-08-27 RX ADMIN — SIMETHICONE 80 MILLIGRAM(S): 80 TABLET, CHEWABLE ORAL at 22:58

## 2021-08-27 RX ADMIN — SIMETHICONE 80 MILLIGRAM(S): 80 TABLET, CHEWABLE ORAL at 14:15

## 2021-08-27 RX ADMIN — SEVELAMER CARBONATE 800 MILLIGRAM(S): 2400 POWDER, FOR SUSPENSION ORAL at 08:54

## 2021-08-27 RX ADMIN — HEPARIN SODIUM 7500 UNIT(S): 5000 INJECTION INTRAVENOUS; SUBCUTANEOUS at 05:27

## 2021-08-27 RX ADMIN — Medication 100 MILLIGRAM(S): at 14:13

## 2021-08-27 RX ADMIN — LIDOCAINE 1 PATCH: 4 CREAM TOPICAL at 01:00

## 2021-08-27 RX ADMIN — MIDODRINE HYDROCHLORIDE 30 MILLIGRAM(S): 2.5 TABLET ORAL at 05:27

## 2021-08-27 RX ADMIN — HEPARIN SODIUM 7500 UNIT(S): 5000 INJECTION INTRAVENOUS; SUBCUTANEOUS at 14:15

## 2021-08-27 RX ADMIN — HYDROMORPHONE HYDROCHLORIDE 0.25 MILLIGRAM(S): 2 INJECTION INTRAMUSCULAR; INTRAVENOUS; SUBCUTANEOUS at 18:07

## 2021-08-27 RX ADMIN — PANTOPRAZOLE SODIUM 40 MILLIGRAM(S): 20 TABLET, DELAYED RELEASE ORAL at 05:28

## 2021-08-27 RX ADMIN — GABAPENTIN 200 MILLIGRAM(S): 400 CAPSULE ORAL at 14:12

## 2021-08-27 RX ADMIN — HEPARIN SODIUM 7500 UNIT(S): 5000 INJECTION INTRAVENOUS; SUBCUTANEOUS at 23:03

## 2021-08-27 RX ADMIN — Medication 1 MILLIGRAM(S): at 14:12

## 2021-08-27 RX ADMIN — SIMETHICONE 80 MILLIGRAM(S): 80 TABLET, CHEWABLE ORAL at 05:27

## 2021-08-27 RX ADMIN — LIDOCAINE 1 PATCH: 4 CREAM TOPICAL at 19:15

## 2021-08-27 RX ADMIN — MIDODRINE HYDROCHLORIDE 30 MILLIGRAM(S): 2.5 TABLET ORAL at 20:51

## 2021-08-27 RX ADMIN — PANTOPRAZOLE SODIUM 40 MILLIGRAM(S): 20 TABLET, DELAYED RELEASE ORAL at 17:56

## 2021-08-27 RX ADMIN — MIDODRINE HYDROCHLORIDE 10 MILLIGRAM(S): 2.5 TABLET ORAL at 17:56

## 2021-08-27 RX ADMIN — MUPIROCIN 1 APPLICATION(S): 20 OINTMENT TOPICAL at 17:55

## 2021-08-27 RX ADMIN — Medication 25 MILLIGRAM(S): at 22:58

## 2021-08-27 RX ADMIN — LIDOCAINE 1 PATCH: 4 CREAM TOPICAL at 14:13

## 2021-08-27 RX ADMIN — MUPIROCIN 1 APPLICATION(S): 20 OINTMENT TOPICAL at 05:28

## 2021-08-27 RX ADMIN — Medication 25 MILLIGRAM(S): at 14:15

## 2021-08-27 RX ADMIN — Medication 25 MILLIGRAM(S): at 05:28

## 2021-08-27 RX ADMIN — Medication 1 TABLET(S): at 14:13

## 2021-08-27 RX ADMIN — SEVELAMER CARBONATE 800 MILLIGRAM(S): 2400 POWDER, FOR SUSPENSION ORAL at 14:15

## 2021-08-27 RX ADMIN — SEVELAMER CARBONATE 800 MILLIGRAM(S): 2400 POWDER, FOR SUSPENSION ORAL at 17:55

## 2021-08-27 RX ADMIN — TIOTROPIUM BROMIDE 1 CAPSULE(S): 18 CAPSULE ORAL; RESPIRATORY (INHALATION) at 08:54

## 2021-08-27 NOTE — CONSULT NOTE ADULT - CONSULT REQUESTED DATE/TIME
26-Aug-2021 16:14
26-Aug-2021 22:09
22-Aug-2021 15:07
23-Aug-2021 15:29
26-Aug-2021 11:35
27-Aug-2021 14:28

## 2021-08-27 NOTE — CONSULT NOTE ADULT - ASSESSMENT
Assessment  Hypotension: 76y Male with no history of adrenal insufficiency, has not used sterids in the past, admitted with hypotension, on stress dose steroids now, can not be tested for adrenal function, his am cortisol was high normal,? without steroids on board, stable.  Hypotension: On medications, monitored, stable.  ESRD: On HD, monitored.              Arsalan Tian MD  Cell: [phone]  Office: [phone]            
76y male with PMH significant for ESRD on HD via left forearm AV fistula LUE MWF, COPD (on 2L home O2), CHF, pulm HTN, known hypotension, baseline blood pressure is systolic in the 70s to 80s, on home midodrine, DVT s/p IVC filter presented on 8/22/21 with c/o R leg pain that started that morning.     In the ED, , lowest BP 66/44, 84% RA improved to 99% on 2L NC, not tachypneic.   WBC 12.7,   CXR negative.  Started on levophed & antibiotics given hypotension.   Dose of midodrine was increased & pressors weaned off.   CT imaging revealed extensive bilateral LE DVTs.   He remains on antibiotics  Has had no fevers, blood cx are sterile, exam non focal for infectious etiology  Hypotension was worse on presentation but a chronic problem - now stable with high dose midodrine    PLAN:  Stop empiric antibiotics  Follow cx & temp curve  Thank you, will follow  
# EXTENSIVE DVT, LIKELY CHRONIC  # BILATERAL COMMON ILLIAC VEINS  # BILATERAL EXTERNAL ILIAC, COMMON FEMORAL  # IVCF IN PLACE FOR PRIOR DVT  # HISTORY OF GI BLEED    ·	I tried to talk to the patient about his DVT and GIB history but he did not want to discuss the details  ·	Based on limited information available, it seems like he had DVT in the past which was treated with AC which then resulted in rectal bleeding. Unclear if source of rectal bleeding was found/addressed but it seems like IVCF was placed subsequently  ·	Imaging shows extensive DVT which is likely chronic, secondary to the IVC filter  ·	Ideally, full dose AC would be recommended for this but patient unwilling to take full dose AC  ·	Recommend vascular consult given extensive nature of clot  ·	Recommending discussing the pros and cons of AC with the patient; he was unwilling to discuss this with me  ·	Since patient presented with worsening hypotension (has chronic hypotension), CTPE might be reasonable to assess for underlying PE; could be from pulmonary HTN as well  ·	Continue DVT prophylaxis with subQ heparin for now        # ESRD, ON HD  # COPD, ON HOME O2  # CONGESTIVE HEART FAILURE  # PULMONARY HTN  # HYPOTENSION        Lori Pierre MD  Hematology/Oncology Consultant  [facility]  Cell: [phone]  
75yo M PMHx ESRD on HD via AV fistula LUE MWF, COPD (on 2L home O2), CHF, pulm HTN, known hypotension on home midodrine, DVT s/p IVC filter p/w R leg pain. MICU consulted for hypotension.    Pt has known baseline hypotension at home, normally in the systolics in the 70s, takes midodrine 10 mg BID at home. Lactate elevated to 3.4 i/s/o ESRD with associated gap to 25. CT a/p without foci of infection. At this time concern for septic shock is low.    Recommendations:  - Please give another midodrine 10 mg now  - Attempt to wean pressors if possible  - Please draw repeat labs including VBG with lactate  - MICU will continue to evaluate
76 year old man with bilateral DVTs    1. DVT, history of GI bleed. WHile the history is not clearly evident, there does not seem to be any immediate evidence for GI bleeding, as such would proceed with any needed anticoagulation.   -PPI given on a/c and steroids, multiple comorbidities    2. hx of hemicolectomy    3. CHF    4. ESRD    5. COPD
Pt with ESRD on HD TWI.

## 2021-08-27 NOTE — PROGRESS NOTE ADULT - ASSESSMENT
· Assessment	  75yo M PMHx ESRD on HD via AV fistula LUE MWF, COPD (on 2L home O2), CHF, pulm HTN, known hypotension on home midodrine, DVT s/p IVC filter p/w R leg pain that started this morning. States it is a burning pain, thinks he can feels "knots" in his leg. 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 He also endorses generalized abdominal discomfort associated with constipation since Friday. Spoke with wife with patient, his baseline blood pressure is systolic in the 70s to 80s, and on dialysis days it is usually in the 60s. He checks his BP everyday. The wife notes that over the past 2 weeks it has been consistently low in the 70s. Otherwise denies other pain in the body, f/c, n/v, CP, SOB at rest, dysuria.    In the ED, , lowest BP 66/44, 84% RA improved to 99% on 2L NC, not tachypneic. WBC 12.7, bicarb 21 gap 25, VBG pH 7.32, lactate 3.4, CXR negative. CT a/p showed Bilateral femoral, external iliac, and common iliac veins are distended, concerning for DVT. S/p midodrine (home med), 500 cc bolus, started on levophed. (22 Aug 2021 15:32)    pt was admitted MICU for with shock of unclear etiology. recieved Abx and pressure support with pressors and midodrine.. started on stress steroids and now on medicine floor.   In MICU : "  b/l DVT, extensive.  Was off a/c in past due to risk of falls/bleeding and GI bleed, still need more collateral.  He is very clear that he is not supposed to be on a/c though and his pulmonologist agrees."     Shock : Mild leukocytosis, hx of cellulitis in the past   -mild redness to B/L lower extremities R>L   - empirically started on zosyn (8/22-   ) and vanco   - CXR negative    - Cultures sent blood cultures currently with NGTD   - off pressors and on steroids  stress dose and midodrine   - now observe off abx per ID    - fu CT : r/o PE and r/o occult pna .. if postive for PE then likely source IVC filter and will need to consult vasc and discuss Risks/benefits with pt /wife since they have reservations .  restart abx if evidence of pna     Hx of Pulmonary HTN (Dr. Freedman pulmononogist) COPD on home 2L home O2, uses trilogy ventilator at night   -remains on 2L NC   -bedside POCUS with mostly alines predominately does not look fluid overloaded   -nocturnal AVAPS     Hypotensive at baseline on midodrine at home, Hx CHF, DVT, PE, IVC filter, not on AC in the setting of GI bleeds in the past.    c/w midodrine 30mg PO TID    TTE on 8/23 : NL EF     ESRD w/ HD MWF   cont HD per renal       Hx of DVT's and PE but not on anticoagulation at home d/t GIB in the past and falls   - Heparin SQ for DVT ppx  -has IVC filter in place  -8/24 US showed extensive bilateral deep venous thrombosis of the bilateral lower extremities   - appreciate heme in put : CTPE ordered , had a discussion with wife pt had had two separate "clots" and was taken off AC due to GIB with no obvious source after cohen per her and thats when IVC was placed.   - will hold off on AC for now as above   - appreciate GI       - kidney lesion : US noted   consider MR       #GOC  -full code

## 2021-08-27 NOTE — CONSULT NOTE ADULT - PROBLEM SELECTOR RECOMMENDATION 9
pt with ESRD on HD. last HD done on 8/20/21 via LUE AVF. HD consent obtained and kept in pts chart. Will arrange for HD today. Dialysis RN informed. midodrine ordered as pt reported of having intradialytic hypotension. Dose medication as per gfr. continue with sevelamer for hyperphosphatemia.    If you have any questions, please feel free to contact me  Chandra Gaytan  Nephrology Fellow  [phone]  (After 5pm or on weekends please page the on-call fellow).
Suggest to continue steroids for now, once stable may taper down, continue monitoring and FU

## 2021-08-27 NOTE — CONSULT NOTE ADULT - SUBJECTIVE AND OBJECTIVE BOX
[facility] DIVISION OF KIDNEY DISEASES AND HYPERTENSION -- [phone]  -- INITIAL CONSULT NOTE  --------------------------------------------------------------------------------  HPI: 76-year-old Male with PMHx ESRD on HD via LUE AV fistula (MWF), COPD (on 2L home O2), CHF, pulm HTN, known hypotension on home midodrine, DVT s/p IVC filter admitted to [facility] on 8/22/21 for R leg pain. Nephrology consultation requested for ESRD on HD management. pt reported of being on ESRD x 3 years. Doesnt remember the etiology for ERSD (likely HTN). Pt doesnt remember the name of nephrologist. Goes to [facility] hemodialysis center in [facility]. Per pt, last HD was done on 8/20/21 via LUE AVF. C/o pain in R LE. Also reoprted of swelling in b/l LE. Denies SOB, CP, HA, N/V, abdominal pain, or dizziness.     PAST HISTORY  --------------------------------------------------------------------------------  PAST MEDICAL & SURGICAL HISTORY:  Hypertension    Glomerular disease  Segmental glomerular sclerosis    CHF (congestive heart failure)    COPD (chronic obstructive pulmonary disease)    Pulmonary hypertension    Sleep apnea    Pulmonary edema    Peripheral edema    Gout    History of abdominal surgery  polypectomy    H/O right heart catheterization      FAMILY HISTORY:  Family history of colon cancer (Father)    Family history of heart disease (Father)    PAST SOCIAL HISTORY:    ALLERGIES & MEDICATIONS  --------------------------------------------------------------------------------  Allergies    latex (Rash)  No Known Drug Allergies    Intolerances    Standing Inpatient Medications  allopurinol 100 milliGRAM(s) Oral daily  chlorhexidine 4% Liquid 1 Application(s) Topical <User Schedule>  folic acid 1 milliGRAM(s) Oral daily  gabapentin 200 milliGRAM(s) Oral daily  heparin   Injectable 7500 Unit(s) SubCutaneous every 8 hours  hydrocortisone sodium succinate Injectable 100 milliGRAM(s) IV Push every 8 hours  lidocaine   5% Patch 1 Patch Transdermal daily  midodrine 30 milliGRAM(s) Oral every 8 hours  midodrine. 5 milliGRAM(s) Oral <User Schedule>  multivitamin 1 Tablet(s) Oral daily  norepinephrine Infusion 0.05 MICROgram(s)/kG/Min IV Continuous <Continuous>  pantoprazole    Tablet 40 milliGRAM(s) Oral before breakfast  piperacillin/tazobactam IVPB.. 3.375 Gram(s) IV Intermittent every 12 hours  polyethylene glycol 3350 17 Gram(s) Oral daily  sevelamer carbonate 800 milliGRAM(s) Oral three times a day with meals  simethicone 80 milliGRAM(s) Chew three times a day  tiotropium 18 MICROgram(s) Capsule 1 Capsule(s) Inhalation daily    PRN Inpatient Medications  acetaminophen   Tablet .. 650 milliGRAM(s) Oral every 6 hours PRN  albuterol/ipratropium for Nebulization 3 milliLiter(s) Nebulizer every 6 hours PRN    REVIEW OF SYSTEMS  --------------------------------------------------------------------------------  Gen: No fevers/chills  Respiratory: No dyspnea  CV: No chest pain  GI: No abdominal pain  : No dysuria,   MSK: See HPI  Neuro: No dizziness    All other systems were reviewed and are negative, except as noted.    VITALS/PHYSICAL EXAM  --------------------------------------------------------------------------------  T(C): 36.2 (08-23-21 @ 12:00), Max: 37.1 (08-22-21 @ 17:00)  HR: 68 (08-23-21 @ 15:00) (62 - 107)  BP: 92/49 (08-23-21 @ 15:00) (81/44 - 136/87)  RR: 23 (08-23-21 @ 15:00) (15 - 25)  SpO2: 97% (08-23-21 @ 15:00) (92% - 100%)  Wt(kg): --  Height (cm): 170.2 (08-22-21 @ 08:02)  Weight (kg): 122.9 (08-22-21 @ 17:00)  BMI (kg/m2): 42.4 (08-22-21 @ 17:00)  BSA (m2): 2.3 (08-22-21 @ 17:00)    08-22-21 @ 07:01  -  08-23-21 @ 07:00  --------------------------------------------------------  IN: 215.3 mL / OUT: 0 mL / NET: 215.3 mL    08-23-21 @ 07:01  -  08-23-21 @ 15:30  --------------------------------------------------------  IN: 883.4 mL / OUT: 0 mL / NET: 883.4 mL    Physical Exam:  	Gen: NAD, obese, in no distress  	HEENT: MMM, anicteric  	Pulm: CTA B/L  	CV: S1S2+  	Abd: Soft, +BS   	Ext: B/L LE edema +  	Neuro: Awake  	Skin: Warm and dry  	Vascular access: LUE AVF seen, palpable thrill +    LABS/STUDIES  --------------------------------------------------------------------------------              11.7   12.16 >-----------<  138      [08-23-21 @ 02:35]              38.6     133  |  91  |  53  ----------------------------<  119      [08-23-21 @ 02:35]  4.6   |  17  |  11.61        Ca     9.1     [08-23-21 @ 02:35]      Mg     2.40     [08-23-21 @ 02:35]      Phos  7.1     [08-23-21 @ 02:35]    TPro  9.1  /  Alb  4.0  /  TBili  0.4  /  DBili  x   /  AST  12  /  ALT  10  /  AlkPhos  141  [08-23-21 @ 02:35]    PT/INR: PT 14.3 , INR 1.27       [08-22-21 @ 08:39]  PTT: 27.2       [08-22-21 @ 08:39]    CK 54      [08-22-21 @ 08:40]    Creatinine Trend:  SCr 11.61 [08-23 @ 02:35]  SCr 11.27 [08-22 @ 20:20]  SCr 11.17 [08-22 @ 08:39]        HbA1c 5.0      [10-02-17 @ 15:38]  TSH 1.00      [08-23-21 @ 02:35]      
Reason for consult:    HPI:  75yo M PMHx ESRD on HD via AV fistula LUE MWF, COPD (on 2L home O2), CHF, pulm HTN, known hypotension on home midodrine, DVT s/p IVC filter p/w R leg pain that started this morning. States it is a burning pain, thinks he can feels "knots" in his leg. 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 He also endorses generalized abdominal discomfort associated with constipation since Friday. Spoke with wife with patient, his baseline blood pressure is systolic in the 70s to 80s, and on dialysis days it is usually in the 60s. He checks his BP everyday. The wife notes that over the past 2 weeks it has been consistently low in the 70s. Otherwise denies other pain in the body, f/c, n/v, CP, SOB at rest, dysuria.    In the ED, , lowest BP 66/44, 84% RA improved to 99% on 2L NC, not tachypneic. WBC 12.7, bicarb 21 gap 25, VBG pH 7.32, lactate 3.4, CXR negative. CT a/p showed Bilateral femoral, external iliac, and common iliac veins are distended, concerning for DVT. S/p midodrine (home med), 500 cc bolus, started on levophed. (22 Aug 2021 15:32)      PAST MEDICAL & SURGICAL HISTORY:  Hypertension    Glomerular disease  Segmental glomerular sclerosis    CHF (congestive heart failure)    COPD (chronic obstructive pulmonary disease)    Pulmonary hypertension    Sleep apnea    Pulmonary edema    Peripheral edema    Gout    History of abdominal surgery  polypectomy    H/O right heart catheterization        FAMILY HISTORY:  Family history of colon cancer (Father)    Family history of heart disease (Father)        Alochol: Denied  Smoking: Nonsmoker  Drug Use: Denied  Marital Status:         Allergies    latex (Rash)  No Known Drug Allergies    Intolerances        MEDICATIONS  (STANDING):  allopurinol 100 milliGRAM(s) Oral daily  folic acid 1 milliGRAM(s) Oral daily  gabapentin 200 milliGRAM(s) Oral daily  heparin   Injectable 7500 Unit(s) SubCutaneous every 8 hours  hydrocortisone sodium succinate Injectable 50 milliGRAM(s) IV Push every 8 hours  lidocaine   5% Patch 1 Patch Transdermal daily  midodrine 30 milliGRAM(s) Oral every 8 hours  midodrine. 10 milliGRAM(s) Oral <User Schedule>  multivitamin 1 Tablet(s) Oral daily  mupirocin 2% Ointment 1 Application(s) Topical every 12 hours  pantoprazole    Tablet 40 milliGRAM(s) Oral before breakfast  piperacillin/tazobactam IVPB.. 3.375 Gram(s) IV Intermittent every 12 hours  polyethylene glycol 3350 17 Gram(s) Oral two times a day  sevelamer carbonate 800 milliGRAM(s) Oral three times a day with meals  simethicone 80 milliGRAM(s) Chew three times a day  tiotropium 18 MICROgram(s) Capsule 1 Capsule(s) Inhalation daily    MEDICATIONS  (PRN):  acetaminophen   Tablet .. 650 milliGRAM(s) Oral every 6 hours PRN Moderate Pain (4 - 6)  albuterol/ipratropium for Nebulization 3 milliLiter(s) Nebulizer every 6 hours PRN Shortness of Breath and/or Wheezing      ROS  No fever, sweats, chills  No epistaxis, HA, sore throat  No CP, SOB, cough, sputum  No n/v/d, abd pain, melena, hematochezia  No edema  No rash  No anxiety  No back pain, joint pain  No bleeding, bruising  No dysuria, hematuria    T(C): 36.8 (08-26-21 @ 05:47), Max: 36.8 (08-26-21 @ 05:47)  HR: 57 (08-26-21 @ 05:47) (57 - 80)  BP: 122/65 (08-26-21 @ 05:47) (94/50 - 126/67)  RR: 16 (08-26-21 @ 05:47) (16 - 18)  SpO2: 99% (08-26-21 @ 05:47) (96% - 100%)  Wt(kg): --    PE  NAD  Awake, alert  Anicteric, MMM  RRR  CTAB  Abd soft, NT, ND  No c/c/e  No rash grossly  FROM                          12.1   11.22 )-----------( 168      ( 26 Aug 2021 08:33 )             39.2       08-26    133<L>  |  92<L>  |  49<H>  ----------------------------<  91  4.9   |  23  |  9.12<H>    Ca    9.2      26 Aug 2021 08:33  Phos  5.7     08-26  Mg     2.40     08-26    TPro  9.1<H>  /  Alb  3.8  /  TBili  0.2  /  DBili  x   /  AST  11  /  ALT  10  /  AlkPhos  110  08-26  
CHIEF COMPLAINT: R leg pain    HPI:    77yo M PMHx ESRD on HD via AV fistula LUE MWF, COPD (on 2L home O2), CHF, pulm HTN, known hypotension on home midodrine, DVT s/p IVC filter p/w R leg pain that started this morning. States it is a burning pain, thinks he can feels "knots" in his leg. 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 He also endorses generalized abdominal discomfort associated with constipation since Friday. Spoke with wife with patient, his baseline blood pressure is systolic in the 70s to 80s, and on dialysis days it is usually in the 60s. He checks his BP everyday. The wife notes that over the past 2 weeks it has been consistently low in the 70s. Otherwise denies other pain in the body, f/c, n/v, CP, SOB at rest, dysuria.    In the ED, , lowest BP 66/44, 84% RA improved to 99% on 2L NC, not tachypneic. WBC 12.7, bicarb 21 gap 25, VBG pH 7.32, lactate 3.4, CXR negative. CT a/p showed Bilateral femoral, external iliac, and common iliac veins are distended, concerning for DVT. S/p midodrine (home med), 500 cc bolus, started on levophed.    PAST MEDICAL & SURGICAL HISTORY:  Hypertension    Glomerular disease  Segmental glomerular sclerosis    CHF (congestive heart failure)    COPD (chronic obstructive pulmonary disease)    Pulmonary hypertension    Sleep apnea    Pulmonary edema    Peripheral edema    Gout    History of abdominal surgery  polypectomy    H/O right heart catheterization        FAMILY HISTORY:  Family history of colon cancer (Father)    Family history of heart disease (Father)        SOCIAL HISTORY:  Former tobacco user, quit in 2014. No alcohol or drug use. Lives with wife. Uses rolling walker for ambulation. Dependent on wife for most ADLs.    Allergies    No Known Allergies    Intolerances        HOME MEDICATIONS:    REVIEW OF SYSTEMS:    CONSTITUTIONAL: No weakness, fevers or chills  EYES/ENT: No visual changes;  No vertigo or throat pain   MOUTH: No oral lesion, moist  NECK: No pain or stiffness  RESPIRATORY: No cough, wheezing, hemoptysis; No shortness of breath  CARDIOVASCULAR: No chest pain or palpitations  GASTROINTESTINAL: +Abdominal discomfort. No nausea, vomiting, or hematemesis; No diarrhea or constipation. No melena or hematochezia.  GENITOURINARY: No dysuria, frequency or hematuria  NEUROLOGICAL: No numbness or weakness  EXTREMITIES: +right leg pain  SKIN: "leg bumps"  PSYCH: no confusion or altered mental status      OBJECTIVE:  ICU Vital Signs Last 24 Hrs  T(C): 37 (22 Aug 2021 12:11), Max: 37.6 (22 Aug 2021 08:30)  T(F): 98.6 (22 Aug 2021 12:11), Max: 99.7 (22 Aug 2021 08:30)  HR: 96 (22 Aug 2021 14:45) (73 - 106)  BP: 94/60 (22 Aug 2021 14:45) (66/44 - 908/49)  BP(mean): 71 (22 Aug 2021 14:45) (47 - 88)  ABP: --  ABP(mean): --  RR: 18 (22 Aug 2021 14:45) (17 - 24)  SpO2: 97% (22 Aug 2021 14:45) (84% - 100%)        CAPILLARY BLOOD GLUCOSE          PHYSICAL EXAM:  GENERAL: NAD, lying in bed comfortably  HEAD:  Atraumatic, normocephalic  EYES: EOMI, PERRLA, conjunctiva and sclera clear  ENT: Moist mucous membranes  NECK: Supple, no JVD  HEART: Regular rate and rhythm, no murmurs, rubs, or gallops  LUNGS: Unlabored respirations.  Clear to auscultation bilaterally, no crackles, wheezing, or rhonchi  ABDOMEN: Soft, nontender, distended, umbilical hernia present, surgical scar c/d/i  EXTREMITIES: Chronic microvascular changes present on both lower legs, b/l foot edema. Small subcutaneous bumps present along R lower lateral leg. Pedal pulses difficult to appreciate  NERVOUS SYSTEM:  A&Ox3, no focal deficits  SKIN: warm, dry    HOSPITAL MEDICATIONS:  MEDICATIONS  (STANDING):  norepinephrine Infusion 0.02 MICROgram(s)/kG/Min (4.88 mL/Hr) IV Continuous <Continuous>    MEDICATIONS  (PRN):      LABS:                        13.1   12.70 )-----------( 145      ( 22 Aug 2021 08:39 )             43.2     08-22    136  |  90<L>  |  45<H>  ----------------------------<  133<H>  5.1   |  21<L>  |  11.17<H>    Ca    10.2      22 Aug 2021 08:39    TPro  11.0<H>  /  Alb  5.0  /  TBili  0.5  /  DBili  x   /  AST  11  /  ALT  9   /  AlkPhos  160<H>  08-22    PT/INR - ( 22 Aug 2021 08:39 )   PT: 14.3 sec;   INR: 1.27 ratio         PTT - ( 22 Aug 2021 08:39 )  PTT:27.2 sec      Venous Blood Gas:  08-22 @ 08:39  7.32/53/24/27/27.6  VBG Lactate: 3.4    < from: CT Abdomen and Pelvis w/ IV Cont (08.22.21 @ 13:04) >  EXAM:  CT ABDOMEN AND PELVIS IC        PROCEDURE DATE:  Aug 22 2021         INTERPRETATION:  CLINICAL INFORMATION: Right hip pain.    COMPARISON: Correlation is made with CT angiogram of the chest dated 1/4/2008.    CONTRAST/COMPLICATIONS:  IV Contrast: Omnipaque 350  93 cc administered   7 cc discarded  Oral Contrast: NONE  Complications: None reported at time of study completion    PROCEDURE:  CT of the Abdomen and Pelvis was performed.  Sagittal and coronal reformats were performed.    FINDINGS:  LOWER CHEST: Bibasilar atelectasis. Left lower lobe and right middle lobe calcified granulomata. Coronary artery calcifications.    LIVER: Punctate right hepatic lobe calcification. Otherwise, within normal limits.  BILE DUCTS: Normal caliber.  GALLBLADDER: Within normal limits.  SPLEEN: Calcified granulomata. The Small splenule.  PANCREAS: Within normal limits.  ADRENALS: Thickened adrenal glands bilaterally.  KIDNEYS/URETERS: Bilateral atrophic kidneys. Bilateral cysts and indeterminate renal lesions. Indeterminate left upper lobe hypodense lesion with peripheral hyperdense focus measuring 2.6 x 2.0 cm. No hydronephrosis.    BLADDER: Collapsed, limiting evaluation.  REPRODUCTIVE ORGANS: Prostate gland is within normal limits.    BOWEL:Status post partial right hemicolectomy with anastomosis. Surgical clips within the right lower quadrant. No bowel obstruction. Small hiatal hernia.  PERITONEUM: No ascites.  VESSELS: Infrarenal IVC filter in place. Atherosclerotic changes. Bilateralfemoral, external iliac, and common iliac veins are distended and heterogeneous in appearance.  RETROPERITONEUM/LYMPH NODES: Scattered subcentimeter mesenteric lymph nodes.  ABDOMINAL WALL: Diastases of the rectus muscles. Several fat-containing ventral hernias.  BONES: Degenerative changes.    IMPRESSION:  Bilateral femoral, external iliac, and common iliac veins are distended with a heterogeneous appearance, raising consideration of deep venous thrombosis. Doppler is recommended for further evaluation. An infrarenal inferior vena caval filter is present.    Bilateral indeterminate renal lesions and cysts with left upper lobe complex hypodense lesion with peripheral hyperdense focus. Renal ultrasound is recommended for further characterization. Atrophic kidneys.    Findings were discussed with Dr. Russ on 8/22/2021 at 2:03 PM by Dr. Viera with read back confirmation.    --- End of Report ---    < end of copied text >  
Chief Complaint:  Patient is a 76y old  Male who presents with a chief complaint of right leg pain (26 Aug 2021 16:14)      Date of service: 21 @ 22:10    HPI:    The patient is a 76 M w CHF, pHTN, ESRD on HD, COPD who presented with leg pain. He was found to have bilateral DVTs. Per report has has a history of GI bleeding but he does not elaborate. He also had a right hemicolectomy for a colon polyp in . He cannot say when his last colonoscopy was. He currently denies melena.    The patient denies dysphagia, nausea and vomiting, abdominal pain, diarrhea, unintentional weight loss, change in bowel habits or NSAID use.      Allergies:  latex (Rash)  No Known Drug Allergies      Home Medications:    Hospital Medications:  acetaminophen   Tablet .. 650 milliGRAM(s) Oral every 6 hours PRN  albuterol/ipratropium for Nebulization 3 milliLiter(s) Nebulizer every 6 hours PRN  allopurinol 100 milliGRAM(s) Oral daily  folic acid 1 milliGRAM(s) Oral daily  gabapentin 200 milliGRAM(s) Oral daily  heparin   Injectable 7500 Unit(s) SubCutaneous every 8 hours  hydrocortisone sodium succinate Injectable 25 milliGRAM(s) IV Push every 8 hours  lidocaine   5% Patch 1 Patch Transdermal daily  midodrine 30 milliGRAM(s) Oral every 8 hours  midodrine. 10 milliGRAM(s) Oral <User Schedule>  multivitamin 1 Tablet(s) Oral daily  mupirocin 2% Ointment 1 Application(s) Topical every 12 hours  pantoprazole  Injectable 40 milliGRAM(s) IV Push two times a day  polyethylene glycol 3350 17 Gram(s) Oral two times a day  sevelamer carbonate 800 milliGRAM(s) Oral three times a day with meals  simethicone 80 milliGRAM(s) Chew three times a day  tiotropium 18 MICROgram(s) Capsule 1 Capsule(s) Inhalation daily      PMHX/PSHX:  Hypertension    Glomerular disease    CHF (congestive heart failure)    COPD (chronic obstructive pulmonary disease)    Pulmonary hypertension    Sleep apnea    Pulmonary edema    Peripheral edema    Gout    History of abdominal surgery    H/O right heart catheterization        Family history:  Family history of colon cancer (Father)    Family history of heart disease (Father)        Social History:   Denies ethanol use.  Denies illicit drug use.    ROS:     General:  No wt loss, fevers, chills, night sweats, fatigue,   Eyes:  Good vision, no reported pain  ENT:  No sore throat, pain, runny nose, dysphagia  CV:  No pain, palpitations, hypo/hypertension  Resp:  No dyspnea, cough, tachypnea, wheezing  GI:  See HPI  :  No pain, bleeding, incontinence, nocturia  Muscle:  No pain, weakness  Neuro:  No weakness, tingling, memory problems  Psych:  No fatigue, insomnia, mood problems, depression  Endocrine:  No polyuria, polydipsia, cold/heat intolerance  Heme:  No petechiae, ecchymosis, easy bruisability  Integumentary:  No rash, edema      PHYSICAL EXAM:     GENERAL:  Appears stated age, well-groomed, well-nourished, no distress  HEENT:  NC/AT,  conjunctivae anicteric, clear and pink,   NECK: supple, trachea midline  CHEST:  Full & symmetric excursion, no increased effort, breath sounds clear  HEART:  Regular rhythm, no JVD  ABDOMEN:  Soft, non-tender, non-distended, normoactive bowel sounds,  no masses , no hepatosplenomegaly  EXTREMITIES:  no cyanosis,clubbing or edema  SKIN:  No rash, erythema, or, ecchymoses, no jaundice  NEURO:  Alert, non-focal, no asterixis  PSYCH: Appropriate affect, oriented to place and time  RECTAL: Deferred      Vital Signs:  Vital Signs Last 24 Hrs  T(C): 36.9 (26 Aug 2021 21:30), Max: 36.9 (26 Aug 2021 12:43)  T(F): 98.4 (26 Aug 2021 21:30), Max: 98.5 (26 Aug 2021 12:43)  HR: 83 (26 Aug 2021 21:30) (57 - 83)  BP: 106/62 (26 Aug 2021 21:30) (106/62 - 122/65)  BP(mean): --  RR: 17 (26 Aug 2021 21:30) (16 - 18)  SpO2: 100% (26 Aug 2021 21:30) (98% - 100%)  Daily     Daily Weight in k.5 (26 Aug 2021 05:47)    LABS: Labs personally reviewed by me:                        12.1   11.22 )-----------( 168      ( 26 Aug 2021 08:33 )             39.2     08-    133<L>  |  92<L>  |  49<H>  ----------------------------<  91  4.9   |  23  |  9.12<H>    Ca    9.2      26 Aug 2021 08:33  Phos  5.7       Mg     2.40         TPro  9.1<H>  /  Alb  3.8  /  TBili  0.2  /  DBili  x   /  AST  11  /  ALT  10  /  AlkPhos  110      LIVER FUNCTIONS - ( 26 Aug 2021 08:33 )  Alb: 3.8 g/dL / Pro: 9.1 g/dL / ALK PHOS: 110 U/L / ALT: 10 U/L / AST: 11 U/L / GGT: x                   Imaging personally reviewed by me:          
HPI:   Patient is a 76y male with PMH significant for ESRD on HD via left forearm AV fistula LUE MWF, COPD (on 2L home O2), CHF, pulm HTN, known hypotension, baseline blood pressure is systolic in the 70s to 80s, on home midodrine, DVT s/p IVC filter presented on 8/22/21 with c/o R leg pain that started that morning. Reported it as a burning pain,like "knots" in his leg. He also reported generalized abdominal discomfort associated with constipation since 8/20/21. Wife reported that his baseline blood pressure on dialysis days is usually even lower & in the 60s.     In the ED, , lowest BP 66/44, 84% RA improved to 99% on 2L NC, not tachypneic. WBC 12.7, CXR negative. S/p midodrine (home med), 500 cc bolus, started on levophed. Given vancomycin & started on Zosyn. Was admitted to ICU, dose of midodrine was increased from 10 mg TID to 30 mg TID & pressors weaned off. Transferred to the floor but empiric antibiotics were continued. CT imaging revealed extensive bilateral LE DVTs. Started on heparin. he remains on antibiotics - ID called for antibiotics mgmt    REVIEW OF SYSTEMS:  All other review of systems negative (Comprehensive ROS) except chronic low back pain, his RLE pain has been there since 8/22 & it is intermittent, comes & goes & has not changed since he arrived. No abdominal discomfort. All other ROS is neg. Reports that he can bear wt & walk & wants to go home soon     PAST MEDICAL & SURGICAL HISTORY:  Hypertension  Segmental glomerular sclerosis  CHF (congestive heart failure)  COPD (chronic obstructive pulmonary disease)  Pulmonary hypertension  Sleep apnea  Pulmonary edema  Peripheral edema  Gout  History of abdominal surgery - polypectomy  H/O right heart catheterization    Allergies    latex (Rash)  No Known Drug Allergies    Intolerances      Antimicrobials Day #  : 4  piperacillin/tazobactam IVPB.. 3.375 Gram(s) IV Intermittent every 12 hours    Other Medications:  acetaminophen   Tablet .. 650 milliGRAM(s) Oral every 6 hours PRN  albuterol/ipratropium for Nebulization 3 milliLiter(s) Nebulizer every 6 hours PRN  allopurinol 100 milliGRAM(s) Oral daily  folic acid 1 milliGRAM(s) Oral daily  gabapentin 200 milliGRAM(s) Oral daily  heparin   Injectable 7500 Unit(s) SubCutaneous every 8 hours  hydrocortisone sodium succinate Injectable 25 milliGRAM(s) IV Push every 8 hours  lidocaine   5% Patch 1 Patch Transdermal daily  midodrine 30 milliGRAM(s) Oral every 8 hours  midodrine. 10 milliGRAM(s) Oral <User Schedule>  multivitamin 1 Tablet(s) Oral daily  mupirocin 2% Ointment 1 Application(s) Topical every 12 hours  pantoprazole    Tablet 40 milliGRAM(s) Oral before breakfast  polyethylene glycol 3350 17 Gram(s) Oral two times a day  sevelamer carbonate 800 milliGRAM(s) Oral three times a day with meals  simethicone 80 milliGRAM(s) Chew three times a day  tiotropium 18 MICROgram(s) Capsule 1 Capsule(s) Inhalation daily      FAMILY HISTORY:  Family history of colon cancer (Father)  Family history of heart disease (Father)        SOCIAL HISTORY:  Smoking: No    ETOH: No   Drug Use: No         T(F): 98.5 (08-26-21 @ 12:43), Max: 98.5 (08-26-21 @ 12:43)  HR: 65 (08-26-21 @ 12:43)  BP: 106/64 (08-26-21 @ 12:43)  RR: 18 (08-26-21 @ 12:43)  SpO2: 98% (08-26-21 @ 12:43)  Wt(kg): --    PHYSICAL EXAM:  General: alert, no acute distress  Eyes:  anicteric, no conjunctival injection, no discharge  Neck: supple  Lungs: clear to auscultation  Heart: regular rate and rhythm; no murmurs  Abdomen: soft, nondistended, nontender, without mass or organomegaly  Skin: no lesions  Extremities: no clubbing or cyanosis. bilateral 2+ edema. left forearm AVF  Neurologic: alert, oriented, moves all extremities    LAB RESULTS:                        12.1   11.22 )-----------( 168      ( 26 Aug 2021 08:33 )             39.2     08-26    133<L>  |  92<L>  |  49<H>  ----------------------------<  91  4.9   |  23  |  9.12<H>    Ca    9.2      26 Aug 2021 08:33  Phos  5.7     08-26  Mg     2.40     08-26    TPro  9.1<H>  /  Alb  3.8  /  TBili  0.2  /  DBili  x   /  AST  11  /  ALT  10  /  AlkPhos  110  08-26    LIVER FUNCTIONS - ( 26 Aug 2021 08:33 )  Alb: 3.8 g/dL / Pro: 9.1 g/dL / ALK PHOS: 110 U/L / ALT: 10 U/L / AST: 11 U/L / GGT: x               MICROBIOLOGY:  RECENT CULTURES:  08-22 @ 10:38 .Blood Blood-Peripheral     No growth to date.            RADIOLOGY REVIEWED:  < from: CT Abdomen and Pelvis w/ IV Cont (08.22.21 @ 13:04) >  IMPRESSION:  Bilateral femoral, external iliac, and common iliac veins are distended with a heterogeneous appearance, raising consideration of deep venous thrombosis. Doppler is recommended for further evaluation. An infrarenal inferior vena caval filter is present.    Bilateral indeterminate renal lesions and cysts with left upper lobe complex hypodense lesion with peripheral hyperdense focus. Renal ultrasound is recommended for further characterization. Atrophic kidneys.    Findings were discussed with Dr. Russ on 8/22/2021 at 2:03 PM by Dr. Viera with read back confirmation.    < end of copied text >  
      HPI:  77yo M PMHx ESRD on HD via AV fistula LUE MWF, COPD (on 2L home O2), CHF, pulm HTN, known hypotension on home midodrine, DVT s/p IVC filter p/w R leg pain that started this morning. States it is a burning pain, thinks he can feels "knots" in his leg. 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 He also endorses generalized abdominal discomfort associated with constipation since Friday. Spoke with wife with patient, his baseline blood pressure is systolic in the 70s to 80s, and on dialysis days it is usually in the 60s. He checks his BP everyday. The wife notes that over the past 2 weeks it has been consistently low in the 70s. Otherwise denies other pain in the body, f/c, n/v, CP, SOB at rest, dysuria.    In the ED, , lowest BP 66/44, 84% RA improved to 99% on 2L NC, not tachypneic. WBC 12.7, bicarb 21 gap 25, VBG pH 7.32, lactate 3.4, CXR negative. CT a/p showed Bilateral femoral, external iliac, and common iliac veins are distended, concerning for DVT. S/p midodrine (home med), 500 cc bolus, started on levophed. (22 Aug 2021 15:32)  Patient has history of hypotension, has not used steroids in the past, no history of adrenal insufficiency.  Hypertension    Glomerular disease  Segmental glomerular sclerosis    CHF (congestive heart failure)    COPD (chronic obstructive pulmonary disease)    Pulmonary hypertension    Sleep apnea    Pulmonary edema    Peripheral edema    Gout    History of abdominal surgery  polypectomy    H/O right heart catheterization        FAMILY HISTORY:  Family history of colon cancer (Father)    Family history of heart disease (Father)        Social History:    Outpatient Medications:    MEDICATIONS  (STANDING):  allopurinol 100 milliGRAM(s) Oral daily  folic acid 1 milliGRAM(s) Oral daily  gabapentin 200 milliGRAM(s) Oral daily  heparin   Injectable 7500 Unit(s) SubCutaneous every 8 hours  hydrocortisone sodium succinate Injectable 25 milliGRAM(s) IV Push every 8 hours  lidocaine   5% Patch 1 Patch Transdermal daily  midodrine 30 milliGRAM(s) Oral every 8 hours  midodrine. 10 milliGRAM(s) Oral <User Schedule>  multivitamin 1 Tablet(s) Oral daily  mupirocin 2% Ointment 1 Application(s) Topical every 12 hours  pantoprazole  Injectable 40 milliGRAM(s) IV Push two times a day  polyethylene glycol 3350 17 Gram(s) Oral two times a day  sevelamer carbonate 800 milliGRAM(s) Oral three times a day with meals  simethicone 80 milliGRAM(s) Chew three times a day  tiotropium 18 MICROgram(s) Capsule 1 Capsule(s) Inhalation daily    MEDICATIONS  (PRN):  acetaminophen   Tablet .. 650 milliGRAM(s) Oral every 6 hours PRN Moderate Pain (4 - 6)  albuterol/ipratropium for Nebulization 3 milliLiter(s) Nebulizer every 6 hours PRN Shortness of Breath and/or Wheezing      Allergies    latex (Rash)  No Known Drug Allergies    Intolerances      Review of Systems:  Constitutional: No fever, no chills  Eyes: No blurry vision  Neuro: No tremors  HEENT: No pain, no neck swelling  Cardiovascular: No chest pain, no palpitations  Respiratory: No SOB, no cough  GI: No nausea, vomiting, abdominal pain  : No dysuria  Skin: no rash  MSK: Has leg swelling.  Psych: no depression  Endocrine: no polyuria, polydipsia    UNABLE TO OBTAIN    ALL OTHER SYSTEMS REVIEWED AND NEGATIVE        PHYSICAL EXAM:  VITALS: T(C): 37 (08-27-21 @ 05:27)  T(F): 98.6 (08-27-21 @ 05:27), Max: 98.6 (08-27-21 @ 05:27)  HR: 101 (08-27-21 @ 10:30) (62 - 101)  BP: 117/62 (08-27-21 @ 05:27) (106/62 - 117/62)  RR:  (17 - 17)  SpO2:  (97% - 100%)  Wt(kg): --  GENERAL: NAD, well-groomed, well-developed  EYES: No proptosis, no lid lag  HEENT:  Atraumatic, Normocephalic  THYROID: Normal size, no palpable nodules  RESPIRATORY: Clear to auscultation bilaterally; No rales, rhonchi, wheezing  CARDIOVASCULAR: Si S2, No murmurs;  GI: Soft, non distended, normal bowel sounds  SKIN: Dry, intact, No rashes or lesions  MUSCULOSKELETAL: Has BL lower extremity edema.  NEURO:  no tremor, sensation decreased in feet BL,                              12.2   11.49 )-----------( 197      ( 27 Aug 2021 07:23 )             40.0       08-27    134<L>  |  91<L>  |  65<H>  ----------------------------<  82  5.3   |  21<L>  |  10.77<H>    EGFR if : 5<L>  EGFR if non : 4<L>    Ca    9.2      08-27  Mg     2.50     08-27  Phos  6.6     08-27    TPro  8.9<H>  /  Alb  3.8  /  TBili  0.3  /  DBili  x   /  AST  10  /  ALT  10  /  AlkPhos  106  08-27      Thyroid Function Tests:  08-23 @ 02:35 TSH 1.00 FreeT4 -- T3 -- Anti TPO -- Anti Thyroglobulin Ab -- TSI --              Radiology:

## 2021-08-27 NOTE — PROGRESS NOTE ADULT - ASSESSMENT
77yo with ESRD currently admitted for shock (resolved) and extensive b/l DVT    -----------------------------------------------  # ESRD ON HD  - SCHED: MOOK  - UNIT: [facility] ([place])  - PROVIDER: ----  - ACCESS: LUE AVF  - HEP B: in chart  - CONSENT: in chart  - LAST HD: 8/25  - NEXT HD: 8/27 AFTER CT scan (orders placed and reviewed)  - HD orders: 180 mins, Qb: 350 to 400, Qd: 400-500, Heparin: none, Na modeling, K Sliding scale, HCO3 35, Ca 2.5, Dialyzer: Revaclear 300, UF (as tolerated): 2L    # HYPOTENSION  - Takes Midodrine 30q8h ATC + midodrine 10mg prior to HD  - Volume overloaded but patient is preload-dependent 2/2 pulm HTN  - Will try 2L UF     # ANEMIA DUE TO ESRD  - Hgb > 10    # CKD-MBD  - Phos and Ca WNL  - Binder: Renvela    # DISORDERS OF FLUIDS, ELECTROLYTES, AND ACID-BASE  - VOL: Vol overloaded but preload-dependent. Will allow for permissive edema as patient already hypotensive. Challenge with UF as much as hemodynamically tolerated.     # NUTRITION  - Nephro-eliana daily  - Renal diet. Low K. Low Phos. 75yo with ESRD currently admitted for shock (resolved) and extensive b/l DVT    -----------------------------------------------  # ESRD ON HD  - SCHED: MOOK  - UNIT: [facility] ([place])  - PROVIDER: ----  - ACCESS: LUE AVF  - HEP B: in chart  - CONSENT: in chart  - LAST HD: 8/25  - NEXT HD: 8/27 AFTER CT scan (orders placed and reviewed)  - HD orders: 180 mins, Qb: 350 to 400, Qd: 400-500, Heparin: none, Na modeling, K Sliding scale, HCO3 35, Ca 2.5, Dialyzer: Revaclear 300, UF (as tolerated): 2L  - I suspect he may need Revaclear 400    # HYPOTENSION  - Takes Midodrine 30q8h ATC + midodrine 10mg prior to HD  - Volume overloaded but patient is preload-dependent 2/2 pulm HTN  - Will try 2L UF     # ANEMIA DUE TO ESRD  - Hgb > 10    # CKD-MBD  - Phos and Ca WNL  - Binder: Renvela    # DISORDERS OF FLUIDS, ELECTROLYTES, AND ACID-BASE  - VOL: Vol overloaded but preload-dependent. Will allow for permissive edema as patient already hypotensive. Challenge with UF as much as hemodynamically tolerated.     # NUTRITION  - Nephro-eliana daily  - Renal diet. Low K. Low Phos.

## 2021-08-27 NOTE — PROGRESS NOTE ADULT - ASSESSMENT
76y male with PMH significant for ESRD on HD via left forearm AV fistula LUE MWF, COPD (on 2L home O2), CHF, pulm HTN, known hypotension, baseline blood pressure is systolic in the 70s to 80s, on home midodrine, DVT s/p IVC filter presented on 8/22/21 with c/o R leg pain that started that morning.     In the ED, , lowest BP 66/44, 84% RA improved to 99% on 2L NC, not tachypneic.   WBC 12.7,   CXR negative.  Started on levophed & antibiotics given hypotension.   Dose of midodrine was increased & pressors weaned off.   CT imaging revealed extensive bilateral LE DVTs.   He remains on antibiotics  Has had no fevers, blood cx finalized sterile, exam non focal for infectious etiology  Hypotension was worse on presentation but a chronic problem - now stable with high dose midodrine  CT Angio Chest revealed no PE. Stigmata of prior granulomatous disease.    PLAN:  Stable off of empiric antibiotics  No infection found

## 2021-08-28 RX ADMIN — LIDOCAINE 1 PATCH: 4 CREAM TOPICAL at 19:41

## 2021-08-28 RX ADMIN — SIMETHICONE 80 MILLIGRAM(S): 80 TABLET, CHEWABLE ORAL at 22:15

## 2021-08-28 RX ADMIN — SEVELAMER CARBONATE 800 MILLIGRAM(S): 2400 POWDER, FOR SUSPENSION ORAL at 08:54

## 2021-08-28 RX ADMIN — Medication 100 MILLIGRAM(S): at 12:56

## 2021-08-28 RX ADMIN — HEPARIN SODIUM 7500 UNIT(S): 5000 INJECTION INTRAVENOUS; SUBCUTANEOUS at 06:12

## 2021-08-28 RX ADMIN — PANTOPRAZOLE SODIUM 40 MILLIGRAM(S): 20 TABLET, DELAYED RELEASE ORAL at 18:24

## 2021-08-28 RX ADMIN — Medication 1 TABLET(S): at 12:57

## 2021-08-28 RX ADMIN — LIDOCAINE 1 PATCH: 4 CREAM TOPICAL at 02:00

## 2021-08-28 RX ADMIN — HEPARIN SODIUM 7500 UNIT(S): 5000 INJECTION INTRAVENOUS; SUBCUTANEOUS at 12:55

## 2021-08-28 RX ADMIN — SIMETHICONE 80 MILLIGRAM(S): 80 TABLET, CHEWABLE ORAL at 06:12

## 2021-08-28 RX ADMIN — Medication 25 MILLIGRAM(S): at 06:24

## 2021-08-28 RX ADMIN — SEVELAMER CARBONATE 800 MILLIGRAM(S): 2400 POWDER, FOR SUSPENSION ORAL at 18:24

## 2021-08-28 RX ADMIN — SEVELAMER CARBONATE 800 MILLIGRAM(S): 2400 POWDER, FOR SUSPENSION ORAL at 12:56

## 2021-08-28 RX ADMIN — GABAPENTIN 200 MILLIGRAM(S): 400 CAPSULE ORAL at 12:57

## 2021-08-28 RX ADMIN — Medication 1 MILLIGRAM(S): at 12:57

## 2021-08-28 RX ADMIN — MUPIROCIN 1 APPLICATION(S): 20 OINTMENT TOPICAL at 18:24

## 2021-08-28 RX ADMIN — TIOTROPIUM BROMIDE 1 CAPSULE(S): 18 CAPSULE ORAL; RESPIRATORY (INHALATION) at 08:55

## 2021-08-28 RX ADMIN — MIDODRINE HYDROCHLORIDE 30 MILLIGRAM(S): 2.5 TABLET ORAL at 06:13

## 2021-08-28 RX ADMIN — Medication 25 MILLIGRAM(S): at 22:14

## 2021-08-28 RX ADMIN — MIDODRINE HYDROCHLORIDE 30 MILLIGRAM(S): 2.5 TABLET ORAL at 12:56

## 2021-08-28 RX ADMIN — MUPIROCIN 1 APPLICATION(S): 20 OINTMENT TOPICAL at 06:14

## 2021-08-28 RX ADMIN — PANTOPRAZOLE SODIUM 40 MILLIGRAM(S): 20 TABLET, DELAYED RELEASE ORAL at 06:12

## 2021-08-28 RX ADMIN — SIMETHICONE 80 MILLIGRAM(S): 80 TABLET, CHEWABLE ORAL at 12:56

## 2021-08-28 RX ADMIN — LIDOCAINE 1 PATCH: 4 CREAM TOPICAL at 12:57

## 2021-08-28 RX ADMIN — Medication 25 MILLIGRAM(S): at 12:56

## 2021-08-28 RX ADMIN — HEPARIN SODIUM 7500 UNIT(S): 5000 INJECTION INTRAVENOUS; SUBCUTANEOUS at 22:14

## 2021-08-28 NOTE — PROGRESS NOTE ADULT - ASSESSMENT
· Assessment	  77yo M PMHx ESRD on HD via AV fistula LUE MWF, COPD (on 2L home O2), CHF, pulm HTN, known hypotension on home midodrine, DVT s/p IVC filter p/w R leg pain that started this morning. States it is a burning pain, thinks he can feels "knots" in his leg. 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 He also endorses generalized abdominal discomfort associated with constipation since Friday. Spoke with wife with patient, his baseline blood pressure is systolic in the 70s to 80s, and on dialysis days it is usually in the 60s. He checks his BP everyday. The wife notes that over the past 2 weeks it has been consistently low in the 70s. Otherwise denies other pain in the body, f/c, n/v, CP, SOB at rest, dysuria.    In the ED, , lowest BP 66/44, 84% RA improved to 99% on 2L NC, not tachypneic. WBC 12.7, bicarb 21 gap 25, VBG pH 7.32, lactate 3.4, CXR negative. CT a/p showed Bilateral femoral, external iliac, and common iliac veins are distended, concerning for DVT. S/p midodrine (home med), 500 cc bolus, started on levophed. (22 Aug 2021 15:32)    pt was admitted MICU for with shock of unclear etiology. recieved Abx and pressure support with pressors and midodrine.. started on stress steroids and now on medicine floor.   In MICU : "  b/l DVT, extensive.  Was off a/c in past due to risk of falls/bleeding and GI bleed, still need more collateral.  He is very clear that he is not supposed to be on a/c though and his pulmonologist agrees."     Shock : Mild leukocytosis, hx of cellulitis in the past   -mild redness to B/L lower extremities R>L   - empirically started on zosyn (8/22-   ) and vanco   - CXR negative    - Cultures sent blood cultures currently with NGTD   - off pressors and on steroids  stress dose and midodrine   - taper IV hydrocortisone as per endocrinology  - now observe off abx per ID    - fu CT : r/o PE and r/o occult pna .. if postive for PE then likely source IVC filter and will need to consult vasc and discuss Risks/benefits with pt /wife since they have reservations .  restart abx if evidence of pna     Hx of Pulmonary HTN (Dr. Freedman pulmononogist) COPD on home 2L home O2, uses trilogy ventilator at night   -remains on 2L NC   -bedside POCUS with mostly alines predominately does not look fluid overloaded   -nocturnal AVAPS     Hypotensive at baseline on midodrine at home, Hx CHF, DVT, PE, IVC filter, not on AC in the setting of GI bleeds in the past.    c/w midodrine 30mg PO TID    TTE on 8/23 : NL EF     ESRD w/ HD MWF   cont HD per renal       Hx of DVT's and PE but not on anticoagulation at home d/t GIB in the past and falls   - Heparin SQ for DVT ppx  -has IVC filter in place  -8/24 US showed extensive bilateral deep venous thrombosis of the bilateral lower extremities   - appreciate heme in put : CTPE ordered , had a discussion with wife pt had had two separate "clots" and was taken off AC due to GIB with no obvious source after cohen per her and thats when IVC was placed.   - will hold off on AC for now as above   - appreciate GI       - kidney lesion : US noted   consider MR       #GOC  -full code

## 2021-08-28 NOTE — PROGRESS NOTE ADULT - ASSESSMENT
1. DVT, history of GI bleed. WHile the history is not clearly evident, there does not seem to be any immediate evidence for GI bleeding, as such would proceed with any needed anticoagulation.   -PPI given on a/c and steroids, multiple comorbidities    2. hx of hemicolectomy    3. CHF    4. ESRD    5. COPD

## 2021-08-28 NOTE — PROGRESS NOTE ADULT - ASSESSMENT
Assessment  Hypotension: 76y Male with no history of adrenal insufficiency, has not used sterids in the past, admitted with hypotension, on stress dose steroids now, can not be tested for adrenal function, awake and stable.  Hypotension: On medications, monitored, stable.  ESRD: On HD, monitored.              Arsalan Tian MD  Cell: [phone]  Office: [phone]

## 2021-08-29 RX ORDER — HYDROCORTISONE 20 MG
25 TABLET ORAL EVERY 12 HOURS
Refills: 0 | Status: DISCONTINUED | OUTPATIENT
Start: 2021-08-29 | End: 2021-08-30

## 2021-08-29 RX ADMIN — HEPARIN SODIUM 7500 UNIT(S): 5000 INJECTION INTRAVENOUS; SUBCUTANEOUS at 13:54

## 2021-08-29 RX ADMIN — SEVELAMER CARBONATE 800 MILLIGRAM(S): 2400 POWDER, FOR SUSPENSION ORAL at 13:55

## 2021-08-29 RX ADMIN — Medication 1 MILLIGRAM(S): at 13:55

## 2021-08-29 RX ADMIN — Medication 100 MILLIGRAM(S): at 13:56

## 2021-08-29 RX ADMIN — PANTOPRAZOLE SODIUM 40 MILLIGRAM(S): 20 TABLET, DELAYED RELEASE ORAL at 06:43

## 2021-08-29 RX ADMIN — LIDOCAINE 1 PATCH: 4 CREAM TOPICAL at 13:54

## 2021-08-29 RX ADMIN — SEVELAMER CARBONATE 800 MILLIGRAM(S): 2400 POWDER, FOR SUSPENSION ORAL at 08:46

## 2021-08-29 RX ADMIN — LIDOCAINE 1 PATCH: 4 CREAM TOPICAL at 20:39

## 2021-08-29 RX ADMIN — MUPIROCIN 1 APPLICATION(S): 20 OINTMENT TOPICAL at 06:44

## 2021-08-29 RX ADMIN — HEPARIN SODIUM 7500 UNIT(S): 5000 INJECTION INTRAVENOUS; SUBCUTANEOUS at 06:43

## 2021-08-29 RX ADMIN — LIDOCAINE 1 PATCH: 4 CREAM TOPICAL at 01:00

## 2021-08-29 RX ADMIN — SIMETHICONE 80 MILLIGRAM(S): 80 TABLET, CHEWABLE ORAL at 22:52

## 2021-08-29 RX ADMIN — SEVELAMER CARBONATE 800 MILLIGRAM(S): 2400 POWDER, FOR SUSPENSION ORAL at 18:36

## 2021-08-29 RX ADMIN — PANTOPRAZOLE SODIUM 40 MILLIGRAM(S): 20 TABLET, DELAYED RELEASE ORAL at 18:36

## 2021-08-29 RX ADMIN — SIMETHICONE 80 MILLIGRAM(S): 80 TABLET, CHEWABLE ORAL at 06:43

## 2021-08-29 RX ADMIN — Medication 25 MILLIGRAM(S): at 18:36

## 2021-08-29 RX ADMIN — MIDODRINE HYDROCHLORIDE 30 MILLIGRAM(S): 2.5 TABLET ORAL at 13:55

## 2021-08-29 RX ADMIN — GABAPENTIN 200 MILLIGRAM(S): 400 CAPSULE ORAL at 13:56

## 2021-08-29 RX ADMIN — Medication 1 TABLET(S): at 13:55

## 2021-08-29 RX ADMIN — HEPARIN SODIUM 7500 UNIT(S): 5000 INJECTION INTRAVENOUS; SUBCUTANEOUS at 22:52

## 2021-08-29 RX ADMIN — MUPIROCIN 1 APPLICATION(S): 20 OINTMENT TOPICAL at 18:36

## 2021-08-29 RX ADMIN — MIDODRINE HYDROCHLORIDE 30 MILLIGRAM(S): 2.5 TABLET ORAL at 06:44

## 2021-08-29 RX ADMIN — SIMETHICONE 80 MILLIGRAM(S): 80 TABLET, CHEWABLE ORAL at 13:55

## 2021-08-29 RX ADMIN — Medication 25 MILLIGRAM(S): at 06:44

## 2021-08-29 RX ADMIN — TIOTROPIUM BROMIDE 1 CAPSULE(S): 18 CAPSULE ORAL; RESPIRATORY (INHALATION) at 08:55

## 2021-08-29 NOTE — PROGRESS NOTE ADULT - ASSESSMENT
Assessment  Hypotension: 76y Male with no history of adrenal insufficiency, has not used sterids in the past, admitted with hypotension, on stress dose steroids now, can not be tested with cosyntropin stimulation test for adrenal function, awake and stable.  Hypotension: On medications, monitored, stable.  ESRD: On HD, monitored.              Arsalan Tian MD  Cell: [phone]  Office: [phone]

## 2021-08-29 NOTE — PROGRESS NOTE ADULT - ASSESSMENT
· Assessment	  75yo M PMHx ESRD on HD via AV fistula LUE MWF, COPD (on 2L home O2), CHF, pulm HTN, known hypotension on home midodrine, DVT s/p IVC filter p/w R leg pain that started this morning. States it is a burning pain, thinks he can feels "knots" in his leg. 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 He also endorses generalized abdominal discomfort associated with constipation since Friday. Spoke with wife with patient, his baseline blood pressure is systolic in the 70s to 80s, and on dialysis days it is usually in the 60s. He checks his BP everyday. The wife notes that over the past 2 weeks it has been consistently low in the 70s. Otherwise denies other pain in the body, f/c, n/v, CP, SOB at rest, dysuria.    In the ED, , lowest BP 66/44, 84% RA improved to 99% on 2L NC, not tachypneic. WBC 12.7, bicarb 21 gap 25, VBG pH 7.32, lactate 3.4, CXR negative. CT a/p showed Bilateral femoral, external iliac, and common iliac veins are distended, concerning for DVT. S/p midodrine (home med), 500 cc bolus, started on levophed. (22 Aug 2021 15:32)    pt was admitted MICU for with shock of unclear etiology. recieved Abx and pressure support with pressors and midodrine.. started on stress steroids and now on medicine floor.   In MICU : "  b/l DVT, extensive.  Was off a/c in past due to risk of falls/bleeding and GI bleed, still need more collateral.  He is very clear that he is not supposed to be on a/c though and his pulmonologist agrees."     Shock : Mild leukocytosis, hx of cellulitis in the past   -mild redness to B/L lower extremities R>L   - empirically started on zosyn (8/22-   ) and vanco   - CXR negative    - Cultures sent blood cultures currently with NGTD   - off pressors and on steroids  stress dose and midodrine   - taper IV hydrocortisone (now 25 mg IVPB bid) as per endocrinology  - now observe off abx per ID    - CTA 8/27/21 (-) for central PE and PNA    Hx of Pulmonary HTN (Dr. Freedman pulmononogist) COPD on home 2L home O2, uses trilogy ventilator at night   -remains on 2L NC   -bedside POCUS with mostly alines predominately does not look fluid overloaded   -nocturnal AVAPS     Hypotensive at baseline on midodrine at home, Hx CHF, DVT, PE, IVC filter, not on AC in the setting of GI bleeds in the past.    c/w midodrine 30mg PO TID    TTE on 8/23 : NL EF     ESRD w/ HD MWF   cont HD per renal       Hx of DVT's and PE but not on anticoagulation at home d/t GIB in the past and falls   - Heparin SQ for DVT ppx  -has IVC filter in place  -8/24 US showed extensive bilateral deep venous thrombosis of the bilateral lower extremities   - appreciate heme in put : CTPE ordered , had a discussion with wife pt had had two separate "clots" and was taken off AC due to GIB with no obvious source after cohen per her and thats when IVC was placed.   - will hold off on AC for now as above   - appreciate GI       - kidney lesion : US noted   consider MR       #GOC  -full code

## 2021-08-30 LAB
ANION GAP SERPL CALC-SCNC: 18 MMOL/L — HIGH (ref 7–14)
BUN SERPL-MCNC: 86 MG/DL — HIGH (ref 7–23)
CALCIUM SERPL-MCNC: 9.1 MG/DL — SIGNIFICANT CHANGE UP (ref 8.4–10.5)
CHLORIDE SERPL-SCNC: 90 MMOL/L — LOW (ref 98–107)
CO2 SERPL-SCNC: 22 MMOL/L — SIGNIFICANT CHANGE UP (ref 22–31)
CREAT SERPL-MCNC: 12.39 MG/DL — HIGH (ref 0.5–1.3)
GLUCOSE SERPL-MCNC: 108 MG/DL — HIGH (ref 70–99)
HCT VFR BLD CALC: 36.6 % — LOW (ref 39–50)
HGB BLD-MCNC: 11.2 G/DL — LOW (ref 13–17)
MCHC RBC-ENTMCNC: 29.6 PG — SIGNIFICANT CHANGE UP (ref 27–34)
MCHC RBC-ENTMCNC: 30.6 GM/DL — LOW (ref 32–36)
MCV RBC AUTO: 96.8 FL — SIGNIFICANT CHANGE UP (ref 80–100)
NRBC # BLD: 0 /100 WBCS — SIGNIFICANT CHANGE UP
NRBC # FLD: 0 K/UL — SIGNIFICANT CHANGE UP
PLATELET # BLD AUTO: 201 K/UL — SIGNIFICANT CHANGE UP (ref 150–400)
POTASSIUM SERPL-MCNC: 5.1 MMOL/L — SIGNIFICANT CHANGE UP (ref 3.5–5.3)
POTASSIUM SERPL-SCNC: 5.1 MMOL/L — SIGNIFICANT CHANGE UP (ref 3.5–5.3)
RBC # BLD: 3.78 M/UL — LOW (ref 4.2–5.8)
RBC # FLD: 15.9 % — HIGH (ref 10.3–14.5)
SARS-COV-2 RNA SPEC QL NAA+PROBE: SIGNIFICANT CHANGE UP
SODIUM SERPL-SCNC: 130 MMOL/L — LOW (ref 135–145)
WBC # BLD: 10.15 K/UL — SIGNIFICANT CHANGE UP (ref 3.8–10.5)
WBC # FLD AUTO: 10.15 K/UL — SIGNIFICANT CHANGE UP (ref 3.8–10.5)

## 2021-08-30 PROCEDURE — 90935 HEMODIALYSIS ONE EVALUATION: CPT | Mod: GC

## 2021-08-30 RX ORDER — HYDROCORTISONE 20 MG
20 TABLET ORAL DAILY
Refills: 0 | Status: DISCONTINUED | OUTPATIENT
Start: 2021-08-30 | End: 2021-08-31

## 2021-08-30 RX ORDER — HYDROCORTISONE 20 MG
10 TABLET ORAL DAILY
Refills: 0 | Status: DISCONTINUED | OUTPATIENT
Start: 2021-08-30 | End: 2021-08-31

## 2021-08-30 RX ADMIN — MIDODRINE HYDROCHLORIDE 30 MILLIGRAM(S): 2.5 TABLET ORAL at 21:53

## 2021-08-30 RX ADMIN — SIMETHICONE 80 MILLIGRAM(S): 80 TABLET, CHEWABLE ORAL at 21:57

## 2021-08-30 RX ADMIN — Medication 100 MILLIGRAM(S): at 13:12

## 2021-08-30 RX ADMIN — HEPARIN SODIUM 7500 UNIT(S): 5000 INJECTION INTRAVENOUS; SUBCUTANEOUS at 06:02

## 2021-08-30 RX ADMIN — LIDOCAINE 1 PATCH: 4 CREAM TOPICAL at 20:54

## 2021-08-30 RX ADMIN — Medication 1 MILLIGRAM(S): at 13:12

## 2021-08-30 RX ADMIN — SEVELAMER CARBONATE 800 MILLIGRAM(S): 2400 POWDER, FOR SUSPENSION ORAL at 17:27

## 2021-08-30 RX ADMIN — HEPARIN SODIUM 7500 UNIT(S): 5000 INJECTION INTRAVENOUS; SUBCUTANEOUS at 13:13

## 2021-08-30 RX ADMIN — PANTOPRAZOLE SODIUM 40 MILLIGRAM(S): 20 TABLET, DELAYED RELEASE ORAL at 17:27

## 2021-08-30 RX ADMIN — MIDODRINE HYDROCHLORIDE 10 MILLIGRAM(S): 2.5 TABLET ORAL at 05:57

## 2021-08-30 RX ADMIN — TIOTROPIUM BROMIDE 1 CAPSULE(S): 18 CAPSULE ORAL; RESPIRATORY (INHALATION) at 13:14

## 2021-08-30 RX ADMIN — PANTOPRAZOLE SODIUM 40 MILLIGRAM(S): 20 TABLET, DELAYED RELEASE ORAL at 05:58

## 2021-08-30 RX ADMIN — SIMETHICONE 80 MILLIGRAM(S): 80 TABLET, CHEWABLE ORAL at 05:58

## 2021-08-30 RX ADMIN — SEVELAMER CARBONATE 800 MILLIGRAM(S): 2400 POWDER, FOR SUSPENSION ORAL at 13:12

## 2021-08-30 RX ADMIN — Medication 10 MILLIGRAM(S): at 17:26

## 2021-08-30 RX ADMIN — LIDOCAINE 1 PATCH: 4 CREAM TOPICAL at 01:00

## 2021-08-30 RX ADMIN — HEPARIN SODIUM 7500 UNIT(S): 5000 INJECTION INTRAVENOUS; SUBCUTANEOUS at 21:55

## 2021-08-30 RX ADMIN — Medication 25 MILLIGRAM(S): at 05:58

## 2021-08-30 RX ADMIN — LIDOCAINE 1 PATCH: 4 CREAM TOPICAL at 13:13

## 2021-08-30 RX ADMIN — GABAPENTIN 200 MILLIGRAM(S): 400 CAPSULE ORAL at 13:12

## 2021-08-30 RX ADMIN — Medication 1 TABLET(S): at 13:12

## 2021-08-30 RX ADMIN — SIMETHICONE 80 MILLIGRAM(S): 80 TABLET, CHEWABLE ORAL at 13:13

## 2021-08-30 NOTE — PROVIDER CONTACT NOTE (OTHER) - BACKGROUND
Patient is admitted for hypotension. PMH ESRD on HD, DVT s/p IVC filter.
Patient ongoing Hemodialysis with noted BP dropping to 80's-70's
pt admitted for hypotension

## 2021-08-30 NOTE — PROVIDER CONTACT NOTE (OTHER) - ACTION/TREATMENT ORDERED:
Arrow replaced by a new ANABELA arrow under US guidance. Subq Phentolamine ordered, to be administered as per order.
Midodrine 30mg PO
ACP made aware. no new orders at this time reassess BP for improvements

## 2021-08-30 NOTE — PROGRESS NOTE ADULT - ATTENDING COMMENTS
76 M with ESRD on HD, COPD on 2L, CHF, pulm htn on tadalafil, dvt s/p ivc filter (not on a/c any longer) here with shock of unclear etiology.    b/l DVT, extensive.  Was off a/c in past due to risk of falls/bleeding and GI bleed, still need more collateral.  He is very clear that he is not supposed to be on a/c though and his pulmonologist agrees.    Weaned off pressors this AM.  c/w abx, f/u cultures.  Tolerated HD, c/w midodrine tid and extra dose prior to HD sessions.  ESRD on hd - no need for HD today  off tadalafil
76 M with ESRD on HD, COPD on 2L, CHF, pulm htn on tadalafil, dvt s/p ivc filter (not on a/c any longer) here with shock of unclear etiology.  Only complaint is RLE swelling and redness, ?septic shock due to cellulitis.  c/w pressors, f/u blood cultures  c/w empiric abx  POCUS shows grossly normal LVSF, RV not enlarged.  Need to clarify why he's off a/c, clarify past history from outpatient docs.   Monitor off a/c for now  ESRD - will reach out to his HD center for HD today  c/w pressors and target SBP of 90 (is chronically SBP 80s at home, per him and wife)
ESRD on HD    Plan for HD today, will cont to monitor labs    Cont HD TIW and prn as tolerated

## 2021-08-30 NOTE — PROVIDER CONTACT NOTE (OTHER) - SITUATION
ANABELA arrow difficulty flushing. Patient requiring Levophed gtt for hypotension.
pt blood pressure upon assessment is 96/60
VS as of 19:45 - 85/50 HR 73, UF turned off  as of 20:15 - 75/44, HR 72, given 100ml NSS

## 2021-08-30 NOTE — PROVIDER CONTACT NOTE (OTHER) - RECOMMENDATIONS
UF turned off, BFR reduced to 350, given 100ml NSS
Remove current ANABELA arrow, to be replaced under ultrasound guidance.
midodrine given as ordered

## 2021-08-30 NOTE — PROGRESS NOTE ADULT - ASSESSMENT
Assessment  Hypotension: 76y Male with no history of adrenal insufficiency, has not used sterids in the past, admitted with hypotension, was started on stress dose steroids by primary team, can not be tested with cosyntropin stimulation test for adrenal function, awake and stable.  Hypotension: On medications, monitored, stable.  ESRD: On HD, monitored.              Arsalan Tian MD  Cell: [phone]  Office: [phone]

## 2021-08-31 LAB
ANION GAP SERPL CALC-SCNC: 16 MMOL/L — HIGH (ref 7–14)
BUN SERPL-MCNC: 71 MG/DL — HIGH (ref 7–23)
CALCIUM SERPL-MCNC: 9.5 MG/DL — SIGNIFICANT CHANGE UP (ref 8.4–10.5)
CHLORIDE SERPL-SCNC: 93 MMOL/L — LOW (ref 98–107)
CO2 SERPL-SCNC: 26 MMOL/L — SIGNIFICANT CHANGE UP (ref 22–31)
CREAT SERPL-MCNC: 10.26 MG/DL — HIGH (ref 0.5–1.3)
GLUCOSE SERPL-MCNC: 89 MG/DL — SIGNIFICANT CHANGE UP (ref 70–99)
HBV CORE AB SER-ACNC: SIGNIFICANT CHANGE UP
HCT VFR BLD CALC: 37.3 % — LOW (ref 39–50)
HCV AB S/CO SERPL IA: 0.4 S/CO — SIGNIFICANT CHANGE UP (ref 0–0.99)
HCV AB SERPL-IMP: SIGNIFICANT CHANGE UP
HGB BLD-MCNC: 11.5 G/DL — LOW (ref 13–17)
MCHC RBC-ENTMCNC: 29.7 PG — SIGNIFICANT CHANGE UP (ref 27–34)
MCHC RBC-ENTMCNC: 30.8 GM/DL — LOW (ref 32–36)
MCV RBC AUTO: 96.4 FL — SIGNIFICANT CHANGE UP (ref 80–100)
NRBC # BLD: 0 /100 WBCS — SIGNIFICANT CHANGE UP
NRBC # FLD: 0 K/UL — SIGNIFICANT CHANGE UP
PLATELET # BLD AUTO: 187 K/UL — SIGNIFICANT CHANGE UP (ref 150–400)
POTASSIUM SERPL-MCNC: 5.3 MMOL/L — SIGNIFICANT CHANGE UP (ref 3.5–5.3)
POTASSIUM SERPL-SCNC: 5.3 MMOL/L — SIGNIFICANT CHANGE UP (ref 3.5–5.3)
RBC # BLD: 3.87 M/UL — LOW (ref 4.2–5.8)
RBC # FLD: 16.1 % — HIGH (ref 10.3–14.5)
SODIUM SERPL-SCNC: 135 MMOL/L — SIGNIFICANT CHANGE UP (ref 135–145)
WBC # BLD: 9.95 K/UL — SIGNIFICANT CHANGE UP (ref 3.8–10.5)
WBC # FLD AUTO: 9.95 K/UL — SIGNIFICANT CHANGE UP (ref 3.8–10.5)

## 2021-08-31 RX ORDER — HYDROCORTISONE 20 MG
20 TABLET ORAL
Refills: 0 | Status: DISCONTINUED | OUTPATIENT
Start: 2021-09-01 | End: 2021-09-01

## 2021-08-31 RX ORDER — HYDROCORTISONE 20 MG
10 TABLET ORAL
Refills: 0 | Status: COMPLETED | OUTPATIENT
Start: 2021-08-31 | End: 2021-09-01

## 2021-08-31 RX ADMIN — LIDOCAINE 1 PATCH: 4 CREAM TOPICAL at 21:06

## 2021-08-31 RX ADMIN — Medication 20 MILLIGRAM(S): at 06:42

## 2021-08-31 RX ADMIN — PANTOPRAZOLE SODIUM 40 MILLIGRAM(S): 20 TABLET, DELAYED RELEASE ORAL at 06:34

## 2021-08-31 RX ADMIN — Medication 10 MILLIGRAM(S): at 18:38

## 2021-08-31 RX ADMIN — MIDODRINE HYDROCHLORIDE 30 MILLIGRAM(S): 2.5 TABLET ORAL at 13:05

## 2021-08-31 RX ADMIN — SEVELAMER CARBONATE 800 MILLIGRAM(S): 2400 POWDER, FOR SUSPENSION ORAL at 09:17

## 2021-08-31 RX ADMIN — Medication 1 TABLET(S): at 13:03

## 2021-08-31 RX ADMIN — GABAPENTIN 200 MILLIGRAM(S): 400 CAPSULE ORAL at 13:03

## 2021-08-31 RX ADMIN — SEVELAMER CARBONATE 800 MILLIGRAM(S): 2400 POWDER, FOR SUSPENSION ORAL at 18:38

## 2021-08-31 RX ADMIN — HEPARIN SODIUM 7500 UNIT(S): 5000 INJECTION INTRAVENOUS; SUBCUTANEOUS at 13:04

## 2021-08-31 RX ADMIN — Medication 1 MILLIGRAM(S): at 13:03

## 2021-08-31 RX ADMIN — LIDOCAINE 1 PATCH: 4 CREAM TOPICAL at 02:08

## 2021-08-31 RX ADMIN — SIMETHICONE 80 MILLIGRAM(S): 80 TABLET, CHEWABLE ORAL at 13:03

## 2021-08-31 RX ADMIN — SEVELAMER CARBONATE 800 MILLIGRAM(S): 2400 POWDER, FOR SUSPENSION ORAL at 13:02

## 2021-08-31 RX ADMIN — HEPARIN SODIUM 7500 UNIT(S): 5000 INJECTION INTRAVENOUS; SUBCUTANEOUS at 06:37

## 2021-08-31 RX ADMIN — Medication 100 MILLIGRAM(S): at 13:03

## 2021-08-31 RX ADMIN — PANTOPRAZOLE SODIUM 40 MILLIGRAM(S): 20 TABLET, DELAYED RELEASE ORAL at 18:39

## 2021-08-31 RX ADMIN — SIMETHICONE 80 MILLIGRAM(S): 80 TABLET, CHEWABLE ORAL at 06:35

## 2021-08-31 RX ADMIN — MIDODRINE HYDROCHLORIDE 30 MILLIGRAM(S): 2.5 TABLET ORAL at 21:12

## 2021-08-31 RX ADMIN — SIMETHICONE 80 MILLIGRAM(S): 80 TABLET, CHEWABLE ORAL at 21:12

## 2021-08-31 RX ADMIN — TIOTROPIUM BROMIDE 1 CAPSULE(S): 18 CAPSULE ORAL; RESPIRATORY (INHALATION) at 10:16

## 2021-08-31 RX ADMIN — LIDOCAINE 1 PATCH: 4 CREAM TOPICAL at 13:03

## 2021-08-31 RX ADMIN — HEPARIN SODIUM 7500 UNIT(S): 5000 INJECTION INTRAVENOUS; SUBCUTANEOUS at 21:12

## 2021-08-31 NOTE — PROGRESS NOTE ADULT - ASSESSMENT
Assessment  Hypotension: 76y Male with no history of adrenal insufficiency, has not used sterids in the past, admitted with hypotension, was started on stress dose steroids by primary team, can not be tested with cosyntropin stimulation test for adrenal function, awake and stable, steroids tapered to maintenance dose.  Hypotension: On medications, monitored, stable.  ESRD: On HD, monitored.              Arsalan Tian MD  Cell: [phone]  Office: [phone]

## 2021-09-01 ENCOUNTER — TRANSCRIPTION ENCOUNTER (OUTPATIENT)
Age: 76
End: 2021-09-01

## 2021-09-01 DIAGNOSIS — E27.40 UNSPECIFIED ADRENOCORTICAL INSUFFICIENCY: ICD-10-CM

## 2021-09-01 LAB
ANION GAP SERPL CALC-SCNC: 20 MMOL/L — HIGH (ref 7–14)
BUN SERPL-MCNC: 85 MG/DL — HIGH (ref 7–23)
CALCIUM SERPL-MCNC: 9.5 MG/DL — SIGNIFICANT CHANGE UP (ref 8.4–10.5)
CHLORIDE SERPL-SCNC: 90 MMOL/L — LOW (ref 98–107)
CO2 SERPL-SCNC: 22 MMOL/L — SIGNIFICANT CHANGE UP (ref 22–31)
CREAT SERPL-MCNC: 11.76 MG/DL — HIGH (ref 0.5–1.3)
GLUCOSE SERPL-MCNC: 90 MG/DL — SIGNIFICANT CHANGE UP (ref 70–99)
MAGNESIUM SERPL-MCNC: 2.2 MG/DL — SIGNIFICANT CHANGE UP (ref 1.6–2.6)
PHOSPHATE SERPL-MCNC: 5.6 MG/DL — HIGH (ref 2.5–4.5)
POTASSIUM SERPL-MCNC: 4.7 MMOL/L — SIGNIFICANT CHANGE UP (ref 3.5–5.3)
POTASSIUM SERPL-SCNC: 4.7 MMOL/L — SIGNIFICANT CHANGE UP (ref 3.5–5.3)
SODIUM SERPL-SCNC: 132 MMOL/L — LOW (ref 135–145)

## 2021-09-01 PROCEDURE — 99232 SBSQ HOSP IP/OBS MODERATE 35: CPT

## 2021-09-01 RX ORDER — LIDOCAINE AND PRILOCAINE CREAM 25; 25 MG/G; MG/G
1 CREAM TOPICAL
Refills: 0 | Status: DISCONTINUED | OUTPATIENT
Start: 2021-09-01 | End: 2021-09-04

## 2021-09-01 RX ADMIN — TIOTROPIUM BROMIDE 1 CAPSULE(S): 18 CAPSULE ORAL; RESPIRATORY (INHALATION) at 13:56

## 2021-09-01 RX ADMIN — SIMETHICONE 80 MILLIGRAM(S): 80 TABLET, CHEWABLE ORAL at 13:13

## 2021-09-01 RX ADMIN — PANTOPRAZOLE SODIUM 40 MILLIGRAM(S): 20 TABLET, DELAYED RELEASE ORAL at 17:49

## 2021-09-01 RX ADMIN — LIDOCAINE 1 PATCH: 4 CREAM TOPICAL at 13:10

## 2021-09-01 RX ADMIN — Medication 1 TABLET(S): at 13:10

## 2021-09-01 RX ADMIN — LIDOCAINE 1 PATCH: 4 CREAM TOPICAL at 00:47

## 2021-09-01 RX ADMIN — HEPARIN SODIUM 7500 UNIT(S): 5000 INJECTION INTRAVENOUS; SUBCUTANEOUS at 05:31

## 2021-09-01 RX ADMIN — Medication 100 MILLIGRAM(S): at 13:13

## 2021-09-01 RX ADMIN — MIDODRINE HYDROCHLORIDE 30 MILLIGRAM(S): 2.5 TABLET ORAL at 21:25

## 2021-09-01 RX ADMIN — SEVELAMER CARBONATE 800 MILLIGRAM(S): 2400 POWDER, FOR SUSPENSION ORAL at 17:49

## 2021-09-01 RX ADMIN — LIDOCAINE 1 PATCH: 4 CREAM TOPICAL at 18:27

## 2021-09-01 RX ADMIN — HEPARIN SODIUM 7500 UNIT(S): 5000 INJECTION INTRAVENOUS; SUBCUTANEOUS at 13:12

## 2021-09-01 RX ADMIN — SEVELAMER CARBONATE 800 MILLIGRAM(S): 2400 POWDER, FOR SUSPENSION ORAL at 13:12

## 2021-09-01 RX ADMIN — SIMETHICONE 80 MILLIGRAM(S): 80 TABLET, CHEWABLE ORAL at 05:32

## 2021-09-01 RX ADMIN — Medication 1 MILLIGRAM(S): at 13:11

## 2021-09-01 RX ADMIN — GABAPENTIN 200 MILLIGRAM(S): 400 CAPSULE ORAL at 13:11

## 2021-09-01 RX ADMIN — HEPARIN SODIUM 7500 UNIT(S): 5000 INJECTION INTRAVENOUS; SUBCUTANEOUS at 21:40

## 2021-09-01 RX ADMIN — MIDODRINE HYDROCHLORIDE 30 MILLIGRAM(S): 2.5 TABLET ORAL at 13:12

## 2021-09-01 RX ADMIN — Medication 20 MILLIGRAM(S): at 05:33

## 2021-09-01 RX ADMIN — Medication 10 MILLIGRAM(S): at 16:32

## 2021-09-01 RX ADMIN — LIDOCAINE AND PRILOCAINE CREAM 1 APPLICATION(S): 25; 25 CREAM TOPICAL at 10:33

## 2021-09-01 RX ADMIN — SIMETHICONE 80 MILLIGRAM(S): 80 TABLET, CHEWABLE ORAL at 21:25

## 2021-09-01 RX ADMIN — PANTOPRAZOLE SODIUM 40 MILLIGRAM(S): 20 TABLET, DELAYED RELEASE ORAL at 05:31

## 2021-09-01 NOTE — DISCHARGE NOTE PROVIDER - NSDCCPCAREPLAN_GEN_ALL_CORE_FT
PRINCIPAL DISCHARGE DIAGNOSIS  Diagnosis: Hypotension  Assessment and Plan of Treatment: you had a stay in icu and were monitored closely, now your bnlood pressures have improved.   continue midodrine 30mg 3x/day+ 10mg on HD days and follow up with rehab doctor for slow titration down as tolerated***      SECONDARY DISCHARGE DIAGNOSES  Diagnosis: Adrenal insufficiency  Assessment and Plan of Treatment:   started on steroid per endocrinologist--------  -monitor regular sugars and control to goal of < 180 as needed     PRINCIPAL DISCHARGE DIAGNOSIS  Diagnosis: Hypotension  Assessment and Plan of Treatment: Blood pressure improved. Continue Midodrine 30mg 3x/day+ 10mg on HD days and follow up with rehab doctor for slow titration down as tolerated ***      SECONDARY DISCHARGE DIAGNOSES  Diagnosis: ESRD on hemodialysis  Assessment and Plan of Treatment: Please continue to follow your dialysis schedule and refer to your primary provider for further care/recommendations. Continue your medications and supplementation as directed.      Diagnosis: Adrenal insufficiency  Assessment and Plan of Treatment:     Diagnosis: Pulmonary hypertension due to COPD  Assessment and Plan of Treatment: Hold Adcirca upon discharge. Follow up outpatient PCP and/or pulmonologist in 1-2 weeks for further management and resuming as warranted.    Diagnosis: Kidney lesion  Assessment and Plan of Treatment: You were noted incidentally on imaging (CT/US) with bilateral renal lesons. Follow up outpatient PCP in 1-2 weeks for further management and imaging including MRI abdomen to further differentiate.     PRINCIPAL DISCHARGE DIAGNOSIS  Diagnosis: Hypotension  Assessment and Plan of Treatment: Blood pressure improved. Continue Midodrine 30mg 3x/day+ 10mg on HD days and follow up with rehab doctor for slow titration down as tolerated ***      SECONDARY DISCHARGE DIAGNOSES  Diagnosis: Adrenal insufficiency  Assessment and Plan of Treatment: Cosyntropin test done 9/3 and was negative. Pt does not need to be on steroids   ***US renal noted with kidney lesion. will need Outpatient follow up abdominal MRI    Diagnosis: ESRD on hemodialysis  Assessment and Plan of Treatment: Please continue to follow your dialysis schedule and refer to your primary provider for further care/recommendations. Continue your medications and supplementation as directed.      Diagnosis: Pulmonary hypertension due to COPD  Assessment and Plan of Treatment: Hold Adcirca upon discharge. Follow up outpatient PCP and/or pulmonologist in 1-2 weeks for further management and resuming as warranted.    Diagnosis: Kidney lesion  Assessment and Plan of Treatment: You were noted incidentally on imaging (CT/US) with bilateral renal lesons. Follow up outpatient PCP in 1-2 weeks for further management and imaging including MRI abdomen to further differentiate.

## 2021-09-01 NOTE — DISCHARGE NOTE PROVIDER - NSDCCPGOAL_GEN_ALL_CORE_FT
Quality 394a: Meningococcal Immunizations For Adolescents: Patient had one dose of meningococcal vaccine on or between the patient's 11th and 13th birthdays. To get better and follow your care plan as instructed. Detail Level: Detailed Quality 402: Tobacco Use And Help With Quitting Among Adolescents: Patient screened for tobacco and never smoked Quality 394c: Hpv Vaccine For Adolescents: Patient has had at least three HPV vaccines on or between the patient's 9th and 13th birthdays. Quality 394b: Td/Tdap Immunizations For Adolescents: Patient had one tetanus, diphtheria toxoids and acellular pertussis vaccine (Tdap) on or between the patient's 10th and 13th birthdays. Quality 110: Preventive Care And Screening: Influenza Immunization: Influenza Immunization previously received during influenza season

## 2021-09-01 NOTE — DISCHARGE NOTE PROVIDER - CARE PROVIDER_API CALL
ARAMIS COLLADO  Student in an Organized Health Care Education/Training Program  [employer], Phys,    Phone: ()-  Fax: ()-  Follow Up Time:

## 2021-09-01 NOTE — PROGRESS NOTE ADULT - ASSESSMENT
· Assessment	  77yo M PMHx ESRD on HD via AV fistula LUE MWF, COPD (on 2L home O2), CHF, pulm HTN, known hypotension on home midodrine, DVT s/p IVC filter p/w R leg pain that started this morning. States it is a burning pain, thinks he can feels "knots" in his leg. 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 He also endorses generalized abdominal discomfort associated with constipation since Friday. Spoke with wife with patient, his baseline blood pressure is systolic in the 70s to 80s, and on dialysis days it is usually in the 60s. He checks his BP everyday. The wife notes that over the past 2 weeks it has been consistently low in the 70s. Otherwise denies other pain in the body, f/c, n/v, CP, SOB at rest, dysuria.    In the ED, , lowest BP 66/44, 84% RA improved to 99% on 2L NC, not tachypneic. WBC 12.7, bicarb 21 gap 25, VBG pH 7.32, lactate 3.4, CXR negative. CT a/p showed Bilateral femoral, external iliac, and common iliac veins are distended, concerning for DVT. S/p midodrine (home med), 500 cc bolus, started on levophed. (22 Aug 2021 15:32)    pt was admitted MICU for with shock of unclear etiology. recieved Abx and pressure support with pressors and midodrine.. started on stress steroids and now on medicine floor.   In MICU : "  b/l DVT, extensive.  Was off a/c in past due to risk of falls/bleeding and GI bleed, still need more collateral.  He is very clear that he is not supposed to be on a/c though and his pulmonologist agrees."     Shock : Mild leukocytosis, hx of cellulitis in the past   -mild redness to B/L lower extremities R>L   - empirically started on zosyn (8/22-   ) and vanco   - CXR negative    - Cultures sent blood cultures currently with NGTD   - off pressors and on steroids  stress dose and midodrine   - stereoid taper  as per endocrinology: discussed wtih Dr. Tian : planned stim test  - now observe off abx per ID    - CTA 8/27/21 (-) for central PE and PNA    Hx of Pulmonary HTN (Dr. Freedman pulmononogist) COPD on home 2L home O2, uses trilogy ventilator at night   -remains on 2L NC   -bedside POCUS with mostly alines predominately does not look fluid overloaded   -nocturnal AVAPS     Hypotensive at baseline on midodrine at home, Hx CHF, DVT, PE, IVC filter, not on AC in the setting of GI bleeds in the past.    c/w midodrine 30mg PO TID    TTE on 8/23 : NL EF     ESRD w/ HD MWF   cont HD per renal       Hx of DVT's and PE but not on anticoagulation at home d/t GIB in the past and falls   - Heparin SQ for DVT ppx  -has IVC filter in place  -8/24 US showed extensive bilateral deep venous thrombosis of the bilateral lower extremities   - appreciate heme in put : CTPE ordered , had a discussion with wife pt had had two separate "clots" and was taken off AC due to GIB with no obvious source after cohen per her and thats when IVC was placed.   - pt refusing AC       - kidney lesion : US noted   consider MR       #GOC  -full code

## 2021-09-01 NOTE — PROGRESS NOTE ADULT - ASSESSMENT
Assessment  Hypotension: 76y Male with no history of adrenal insufficiency, has not used sterids in the past, admitted with hypotension, was started on stress dose steroids by primary team, can not be tested with cosyntropin stimulation test for adrenal function, awake and stable, steroids tapered to maintenance dose.  Hypotension: On medications, monitored, stable.  ESRD: On HD, monitored.              Arsalan Tian MD  Cell: [phone]  Office: [phone]

## 2021-09-01 NOTE — DISCHARGE NOTE PROVIDER - HOSPITAL COURSE
77yo M PMHx ESRD on HD via AV fistula LUE MWF, COPD (on 2L home O2), CHF, pulm HTN, known hypotension on home midodrine, DVT s/p IVC filter w/ a work up consistent w/ DVT to the inferior aorta who presented to the ER with hypotension and leg pain. Admitted to MICU for hypotension requiring norepinephrine gtt. MICU downgrade 8/24 to medicine.     Hospital Course:  Hypotension   -s/p micu/pressors, transferred to medicine 8/24  -now on midodrine 30mg TID+ 10mg on HD days   -slow titrate at rehab per MD    Pulmonary htn/COPD  -Hx of Pulmonary HTN (Dr. Freedman pulmononogist) COPD on home 2L home O2, uses trilogy ventilator at night   -remains on 2L NC   -bedside POCUS with mostly alines predominately does not look fluid overloaded   -nocturnal AVAPS     -CT Angio Chest 8/27- no PE. Stigmata of prior granulomatous disease.    CV  - Hypotensive at baseline on midodrine at home, Hx CHF, DVT, PE, IVC filter, not on AC in the setting of GI bleeds in the past. Now in shock state requiring pressors unclear if sepsis vs chronic hypotension    - pt w/ baseline SBP in the 70-80s c/w midodrine 30mg PO TID   - Bedside POCUS LV>RV  - No evidence of acute CHF exacerbation   - Last Echo was 4 years ago  - TTE 8/23 -EF 60%, normal LV/RV function, minimal MR     GI  -Hx of GIB in the past   -No active issues  - tolerating renal diet  -c/w bowel regimen  increased miralax   -c/w pantoprazole     /Renal   -ESRD w/ HD MWF   -additional midodrine pre HD sessions in addition to standing midodrine   -c/w sevelamer   -US renal- no hydro, BL renal cysts. +kidney lesion -Per MD obtain nonemergent contrast enhanced abdominal MRI as outpatient   -Hepatitis B/C- neg    RO adrenal insufficiency  -No hx of diabetes  -AM cortisol 16.8- started on stress dose steroids per endo  -weaning steroids 20mg qAM and 10mg qPM  -monitor regular sugars and control to goal of < 180 as needed     Mild leukocytosis, hx of cellulitis in the past   - mild redness to B/L lower extremities R>L   - s/p abx  - CXR negative    - 8/22 BCX ntd  - ID consulted    PPX  - Hx of DVT's and PE but not on anticoagulation at home d/t GIB in the past and falls   - Heparin SQ for DVT ppx  - has IVC filter in place  - 8/24 US showed extensive bilateral deep venous thrombosis of the bilateral lower extremities   - Heme/onc following --- to consider starting AC per recs-----------------    Dispo: rehab    On_________, case was discussed with Dr. Mcgee, patient is medically cleared and optimized for discharge today. All medications were reviewed with attending, and sent to mutually agreed upon pharmacy.   75yo M PMHx ESRD on HD via AV fistula LUE MWF, COPD (on 2L home O2), CHF, pulm HTN, known hypotension on home midodrine, DVT s/p IVC filter w/ a work up consistent w/ DVT to the inferior aorta who presented to the ER with hypotension and leg pain. Admitted to MICU for hypotension requiring norepinephrine gtt.  TTE 8/23 -EF 60%, Normal. MICU downgrade 8/24 to medicine. Patient was started on midodrine 30mg TID+ 10mg on HD days and started on stress dose steroids by endocrinology for possible adrenal insufficiency.     -US renal- no hydro, BL renal cysts. +kidney lesion -Per MD obtain nonemergent contrast enhanced abdominal MRI as outpatient     - 8/24 US showed extensive bilateral deep venous thrombosis of the bilateral lower extremities. CT Angio Chest 8/27- no PE. .  - Heme/onc following --- to consider starting AC per recs-----------------    Dispo: rehab    On_________, case was discussed with Dr. Mcgee, patient is medically cleared and optimized for discharge today. All medications were reviewed with attending, and sent to mutually agreed upon pharmacy.   76 M PMHx ESRD on HD via AV fistula LUE MWF, COPD (on 2L home O2), CHF, pHTN, hypotension on Midodrine, DVT S/P IVC filter wIith work up consistent w/ DVT to the inferior aorta who presented to the ER with hypotension and leg pain. Admitted to MICU for hypotension requiring norepinephrine gtt.  TTE 8/23 -EF 60%, Normal. MICU downgrade 8/24 to medicine. Pt was started on Midodrine 30mg TID+ 10mg on HD days and started on stress dose steroids for concern for adrenal insufficiency. Endocrinology consulted. Pt slowly taper off steroids, BP stable.     US renal noted with kidney lesion. Outpatient follow up abdominal MRI as per attending     Heme consulted for DVT management. Doppler 8/24 with extensive B/L DVT in LE extremities. CTA chest 8/27 negative PE.     On 9/3, case was discussed with Dr. Mcgee, patient is medically cleared and optimized for discharge today to rehab   76 M PMHx ESRD on HD via AV fistula LUE MWF, COPD (on 2L home O2), CHF, pHTN, hypotension on Midodrine, DVT S/P IVC filter wIith work up consistent w/ DVT to the inferior aorta who presented to the ER with hypotension and leg pain. Admitted to MICU for hypotension requiring norepinephrine gtt.  TTE 8/23 -EF 60%, Normal. MICU downgrade 8/24 to medicine. Pt was started on Midodrine 30mg TID+ 10mg on HD days and started on stress dose steroids for concern for adrenal insufficiency. Endocrinology consulted. Pt slowly taper off steroids, BP stable. Cosyntropin test done 9/3 __________________________    US renal noted with kidney lesion. Outpatient follow up abdominal MRI as per attending     Heme consulted for DVT management. Doppler 8/24 with extensive B/L DVT in LE extremities. CTA chest 8/27 negative PE.     On 9/3, case was discussed with Dr. Mcgee, patient is medically cleared and optimized for discharge today to rehab   76 M PMHx ESRD on HD via AV fistula LUE MWF, COPD (on 2L home O2), CHF, pHTN, hypotension on Midodrine, DVT S/P IVC filter wIith work up consistent w/ DVT to the inferior aorta who presented to the ER with hypotension and leg pain. Admitted to MICU for hypotension requiring norepinephrine gtt.  TTE 8/23 -EF 60%, Normal. MICU downgrade 8/24 to medicine. Pt was started on Midodrine 30mg TID+ 10mg on HD days and started on stress dose steroids for concern for adrenal insufficiency. Endocrinology consulted. Pt slowly taper off steroids, BP stable. Cosyntropin test done 9/3 __________________________    US renal noted with kidney lesion. Outpatient follow up abdominal MRI as per attending     Heme consulted for DVT management. Doppler 8/24 with extensive B/L DVT in LE extremities. CTA chest 8/27 negative PE. Pt recommended for A/C but refusing given Hx of bleed.     On 9/3, case was discussed with Dr. Mcgee, patient is medically cleared and optimized for discharge today to rehab   76 M PMHx ESRD on HD via AV fistula LUE MWF, COPD (on 2L home O2), CHF, pHTN, hypotension on Midodrine, DVT S/P IVC filter wIith work up consistent w/ DVT to the inferior aorta who presented to the ER with hypotension and leg pain. Admitted to MICU for hypotension requiring norepinephrine gtt.  TTE 8/23 -EF 60%, Normal. MICU downgrade 8/24 to medicine. Pt was started on Midodrine 30mg TID+ 10mg on HD days and started on stress dose steroids for concern for adrenal insufficiency. Endocrinology consulted. Pt slowly taper off steroids, BP stable. Cosyntropin test done 9/3 and was negative. Pt does not need to be on steroids   US renal noted with kidney lesion. Outpatient follow up abdominal MRI as per attending     Heme consulted for DVT management. Doppler 8/24 with extensive B/L DVT in LE extremities. CTA chest 8/27 negative PE. Pt recommended for A/C but refusing given Hx of bleed.     On 9/3, case was discussed with Dr. Mcgee, patient is medically cleared and optimized for discharge today to rehab

## 2021-09-01 NOTE — PROGRESS NOTE ADULT - ASSESSMENT
# EXTENSIVE DVT, LIKELY CHRONIC  # BILATERAL COMMON ILLIAC VEINS  # BILATERAL EXTERNAL ILIAC, COMMON FEMORAL  # IVCF IN PLACE FOR PRIOR DVT  # HISTORY OF GI BLEED    ·	Pt does not want AC given hx of bleeding  ·	Based on limited information available, it seems like he had DVT in the past which was treated with AC which then resulted in rectal bleeding. Unclear if source of rectal bleeding was found/addressed but it seems like IVCF was placed subsequently  ·	Imaging shows extensive DVT which is likely chronic, secondary to the IVC filter  ·	Ideally, full dose AC would be recommended for this but patient unwilling to take full dose AC given hx of bleed  ·	he has IVC filter in place    Thank you for the courtesy of this consultation and we will continue to follow.    Shiraz Martinez MD  [facility]  Cell: [phone]

## 2021-09-01 NOTE — CHART NOTE - NSCHARTNOTEFT_GEN_A_CORE
76 M with ESRD on HD, COPD on 2L, CHF, pulm htn on tadalafil, dvt s/p ivc filter (not on a/c any longer) here with shock of unclear etiology.  Only complaint is RLE swelling and redness, ?septic shock due to cellulitis.  c/w pressors, f/u blood cultures  c/w empiric abx  POCUS shows grossly normal LVSF, RV not enlarged.  Need to clarify why he's off a/c, clarify past history from outpatient docs.   Monitor off a/c for now  ESRD - will reach out to his HD center for HD today  c/w pressors and target SBP of 90 (is chronically SBP 80s at home, per him and wife)    This patient is critically ill and required frequent bedside visits with therapy change. I have personally provided 35 minutes of critical care time concurrently with the resident/fellow. This time excludes time spent on separate procedures and time spent teaching. I have reviewed the resident/fellow’s documentation and I agree with the assessment and plan of care.
MAR Accept Note  Transfer to: Medicine  Accepting Attending Physician:  Larisa David  Assigned Room:   548B    Patient seen and examined.   Labs and data reviewed.   No findings precluding transfer of service.       HPI/MICU COURSE:   Please refer to MICU transfer note for full details. Briefly, this is a 76M with pmh CHF, pHTN, ESRD on HD via AVF MWF, COPD on 2L home O2, DVT s/p IVC filter (thrombosis of inferior aorta), baseline hypotension on home midodrine.  Admitted to MICU for hypotension requiring levophed.  Weaned now on midodrine 30mg TID plus extra 10mg intra-dialysis.      FOR FOLLOW-UP:  [ ] Trilogy ventilator at night AVAPS, on 2L nc during day.  Hx pHTN.  [ ] IVC filter in place, on subq heparin, not on dvt ppx due to hx GIB.  Extensive DVTs in b/l LE on recent u/s.  [ ] assess need for abx  [ ] aggressive PT  [ ] steroid taper      Kelvin Chapa  EM/IM PGY-3  [id number]
MICU Transfer Note    Transfer from: MICU  Transfer to:  ( x ) Medicine    (  ) Telemetry    (  ) RCU    (  ) Palliative    (  ) Stroke Unit    (  ) _______________  Accepting physician: Dr. Larisa David     75yo M PMHx ESRD on HD via AV fistula LUE MWF, COPD (on 2L home O2), CHF, pulm HTN, known hypotension on home midodrine, DVT s/p IVC filter w/ a work up consistent w/ DVT to the inferior aorta who presented to the ER with hypotension and leg pain. Admitted to MICU for hypotension requiring norepinephrine gtt    #Neurology  Alert and oriented x4   - Normal mentation, no active issues   -PT consult in place        # Pulmonary   Hx of Pulmonary HTN (Dr. Freedman pulmononogist) COPD on home 2L home O2, uses trilogy ventilator at night   -remains on 2L NC   -bedside POCUS with mostly alines predominately does not look fluid overloaded   -nocturnal AVAPS    #CV  Hypotensive at baseline on midodrine at home, Hx CHF, DVT, PE, IVC filter, not on AC in the setting of GI bleeds in the past. Now in shock state requiring pressors unclear if sepsis vs chronic hypotension    - pt w/ baseline SBP in the 70-80s c/w midodrine 30mg PO TID   - Bedside POCUS LV>RV  - No evidence of acute CHF exacerbation   - Last Echo was 4 years ago  -s/p TTE on 8/23     # GI  Hx of GIB in the past   -No active issues  - tolerating renal diet  -c/w bowel regimen  increased miralax today   -c/w pantoprazole     # /Renal   ESRD w/ HD MWF   -last HD on 8/23 tolerated 1L off   -will get additional midodrine pre HD sessions in addition to standing midodrine   -renal following    -anuric, no moore in place   -will watch electrolytes   -c/w sevelamer       # Endo    No hx of diabetes  -started on stress dose steroids AM cortisol 16.8  -will continue weaning steroids   -monitor regular sugars and control to goal of < 180 as needed     # ID  Mild leukocytosis, hx of cellulitis in the past   -mild redness to B/L lower extremities R>L   - empirically started on zosyn (8/22-   ) and vancomycin by level but will D/C   - CXR negative    - Cultures sent blood cultures currently with NGTD     # PPX  Hx of DVT's and PE but not on anticoagulation at home d/t GIB in the past and falls   - Heparin SQ for DVT ppx  -has IVC filter in place  -8/24 US showed extensive bilateral deep venous thrombosis of the bilateral lower extremities     #GOC  -full code   -wife involved in care      For Follow-Up:  -assess need for abx  -aggressive PT   -AVAPs at night   -taper steroids             Vital Signs Last 24 Hrs  T(C): 36.5 (24 Aug 2021 08:00), Max: 36.7 (23 Aug 2021 19:35)  T(F): 97.7 (24 Aug 2021 08:00), Max: 98.1 (24 Aug 2021 00:00)  HR: 74 (24 Aug 2021 13:00) (60 - 99)  BP: 110/53 (24 Aug 2021 12:00) (75/54 - 140/64)  BP(mean): 66 (24 Aug 2021 12:00) (49 - 83)  RR: 19 (24 Aug 2021 13:00) (17 - 24)  SpO2: 96% (24 Aug 2021 13:00) (88% - 100%)  I&O's Summary    23 Aug 2021 07:01  -  24 Aug 2021 07:00  --------------------------------------------------------  IN: 2199.1 mL / OUT: 1900 mL / NET: 299.1 mL    24 Aug 2021 07:01  -  24 Aug 2021 13:41  --------------------------------------------------------  IN: 150 mL / OUT: 0 mL / NET: 150 mL                  MEDICATIONS  (STANDING):  allopurinol 100 milliGRAM(s) Oral daily  chlorhexidine 4% Liquid 1 Application(s) Topical <User Schedule>  folic acid 1 milliGRAM(s) Oral daily  gabapentin 200 milliGRAM(s) Oral daily  heparin   Injectable 7500 Unit(s) SubCutaneous every 8 hours  hydrocortisone sodium succinate Injectable 50 milliGRAM(s) IV Push every 8 hours  lidocaine   5% Patch 1 Patch Transdermal daily  lidocaine/prilocaine Cream 1 Application(s) Topical once  midodrine 30 milliGRAM(s) Oral every 8 hours  midodrine. 10 milliGRAM(s) Oral <User Schedule>  multivitamin 1 Tablet(s) Oral daily  pantoprazole    Tablet 40 milliGRAM(s) Oral before breakfast  piperacillin/tazobactam IVPB.. 3.375 Gram(s) IV Intermittent every 12 hours  polyethylene glycol 3350 17 Gram(s) Oral two times a day  sevelamer carbonate 800 milliGRAM(s) Oral three times a day with meals  simethicone 80 milliGRAM(s) Chew three times a day  tiotropium 18 MICROgram(s) Capsule 1 Capsule(s) Inhalation daily    MEDICATIONS  (PRN):  acetaminophen   Tablet .. 650 milliGRAM(s) Oral every 6 hours PRN Moderate Pain (4 - 6)  albuterol/ipratropium for Nebulization 3 milliLiter(s) Nebulizer every 6 hours PRN Shortness of Breath and/or Wheezing        LABS                                            11.8                  Neurophils% (auto):   x      (08-24 @ 01:47):    12.09)-----------(139          Lymphocytes% (auto):  x                                             39.3                   Eosinphils% (auto):   x        Manual%: Neutrophils x    ; Lymphocytes x    ; Eosinophils x    ; Bands%: x    ; Blasts x                                    135    |  94     |  43                  Calcium: 9.3   / iCa: x      (08-24 @ 01:47)    ----------------------------<  147       Magnesium: 2.30                             4.1     |  21     |  8.81             Phosphorous: 4.7      TPro  9.1    /  Alb  3.9    /  TBili  0.3    /  DBili  x      /  AST  12     /  ALT  11     /  AlkPhos  135    24 Aug 2021 01:47
Vital Signs Last 24 Hrs  T(C): 36.7 (01 Sep 2021 13:09), Max: 37.2 (31 Aug 2021 20:39)  T(F): 98 (01 Sep 2021 13:09), Max: 99 (31 Aug 2021 20:39)  HR: 74 (01 Sep 2021 16:02) (60 - 79)  BP: 108/79 (01 Sep 2021 13:09) (108/79 - 157/82)  BP(mean): --  RR: 18 (01 Sep 2021 13:09) (17 - 18)  SpO2: 96% (01 Sep 2021 16:02) (96% - 100%)                        11.5   9.95  )-----------( 187      ( 31 Aug 2021 06:45 )             37.3   09-01    132<L>  |  90<L>  |  85<H>  ----------------------------<  90  4.7   |  22  |  11.76<H>    Ca    9.5      01 Sep 2021 07:03  Phos  5.6     09-01  Mg     2.20     09-01      Results and case discussed with Dr. Mcgee, keep midodrine dose for now, no anticoagulation per discussion with patient and patient's wife.  NP and attending spoke with Echo Dr. Tian-> for safe dispo, per recs stop steroids today and perform LADD test on friday. Discussed with RN and SW.
Vital Signs Last 24 Hrs  T(C): 36.9 (26 Aug 2021 12:43), Max: 36.9 (26 Aug 2021 12:43)  T(F): 98.5 (26 Aug 2021 12:43), Max: 98.5 (26 Aug 2021 12:43)  HR: 65 (26 Aug 2021 12:43) (57 - 80)  BP: 106/64 (26 Aug 2021 12:43) (94/50 - 126/67)  BP(mean): --  RR: 18 (26 Aug 2021 12:43) (16 - 18)  SpO2: 98% (26 Aug 2021 12:43) (96% - 100%)                        12.1   11.22 )-----------( 168      ( 26 Aug 2021 08:33 )             39.2   08-26    133<L>  |  92<L>  |  49<H>  ----------------------------<  91  4.9   |  23  |  9.12<H>    Ca    9.2      26 Aug 2021 08:33  Phos  5.7     08-26  Mg     2.40     08-26    TPro  9.1<H>  /  Alb  3.8  /  TBili  0.2  /  DBili  x   /  AST  11  /  ALT  10  /  AlkPhos  110  08-26    Results and case discussed with Dr. Mcgee. Recommend to taper down IV Solumedrol to 25mg q8h for now, Endo consult called by attending.  Continue Zosyn for now, ID consulted by attending.  As per MD recs-> CT Chest w/ IVC r/o PE/pna as patient with BL DVTs, Heme/onc consulted.  Discussed with Nephro Dr. Joshua sanchez to perform CT with contrast as patient for HD tomorrow. Called CT tech to expedite.
pt and spouse updated with results, plan to taper down steroid and rehab, all questions answered    MAKENZIE Costa [id number]

## 2021-09-01 NOTE — DISCHARGE NOTE PROVIDER - NSDCMRMEDTOKEN_GEN_ALL_CORE_FT
Adcirca 20 mg oral tablet: 2 tab(s) orally once a day  Advair Diskus 250 mcg-50 mcg inhalation powder: 1 puff(s) inhaled 2 times a day  albuterol 2.5 mg/3 mL (0.083%) inhalation solution: 3 milliliter(s) inhaled every 6-8 hours, As Needed  allopurinol 100 mg oral tablet: 1 tab(s) orally once a day  Colace 100 mg oral capsule: 1 cap(s) orally 3 times a day  Folbee Plus oral tablet: 1 tab(s) orally once a day  gabapentin 100 mg oral tablet: 2 tab(s) orally once a day (at bedtime)  midodrine 10 mg oral tablet: 1 tab(s) orally 2 times a day  MiraLax oral powder for reconstitution: 17 milligram(s) orally once a day  PriLOSEC 20 mg oral delayed release capsule: 1 cap(s) orally once a day  sevelamer carbonate 800 mg oral tablet: 3 tab(s) orally 3 times a day (with meals)  simethicone 80 mg oral tablet, chewable: 1 tab(s) orally 3 times a day (after meals)  tiotropium 18 mcg inhalation capsule: 1 cap(s) inhaled once a day   Adcirca 20 mg oral tablet: 2 tab(s) orally once a day  Advair Diskus 250 mcg-50 mcg inhalation powder: 1 puff(s) inhaled 2 times a day  albuterol 2.5 mg/3 mL (0.083%) inhalation solution: 3 milliliter(s) inhaled every 6-8 hours, As Needed  allopurinol 100 mg oral tablet: 1 tab(s) orally once a day  Colace 100 mg oral capsule: 1 cap(s) orally 3 times a day  Folbee Plus oral tablet: 1 tab(s) orally once a day  gabapentin 100 mg oral tablet: 2 tab(s) orally once a day (at bedtime)  midodrine 10 mg oral tablet: 3 tab(s) orally every 8 hours  midodrine 10 mg oral tablet: 1 tab(s) orally once a day M/W/F prior to HD   MiraLax oral powder for reconstitution: 17 milligram(s) orally once a day  pantoprazole 40 mg oral delayed release tablet: 1 tab(s) orally 2 times a day  sevelamer carbonate 800 mg oral tablet: 1 tab(s) orally 3 times a day (with meals)  simethicone 80 mg oral tablet, chewable: 1 tab(s) orally 3 times a day (after meals)  tiotropium 18 mcg inhalation capsule: 1 cap(s) inhaled once a day   Advair Diskus 250 mcg-50 mcg inhalation powder: 1 puff(s) inhaled 2 times a day  albuterol 2.5 mg/3 mL (0.083%) inhalation solution: 3 milliliter(s) inhaled every 6-8 hours, As Needed  allopurinol 100 mg oral tablet: 1 tab(s) orally once a day  Colace 100 mg oral capsule: 1 cap(s) orally 3 times a day  Folbee Plus oral tablet: 1 tab(s) orally once a day  gabapentin 100 mg oral tablet: 2 tab(s) orally once a day (at bedtime)  lidocaine 5% topical film: Apply topically to affected area once a day  lidocaine-prilocaine 2.5%-2.5% topical cream: 1 application topically 3 times a week on HD days to affected area   midodrine 10 mg oral tablet: 3 tab(s) orally every 8 hours  midodrine 10 mg oral tablet: 1 tab(s) orally once a day M/W/F on HD days   MiraLax oral powder for reconstitution: 17 milligram(s) orally once a day  pantoprazole 40 mg oral delayed release tablet: 1 tab(s) orally 2 times a day  sevelamer carbonate 800 mg oral tablet: 1 tab(s) orally 3 times a day (with meals)  simethicone 80 mg oral tablet, chewable: 1 tab(s) orally 3 times a day (after meals)  tiotropium 18 mcg inhalation capsule: 1 cap(s) inhaled once a day

## 2021-09-01 NOTE — PROGRESS NOTE ADULT - ASSESSMENT
76y male with PMH significant for ESRD on HD via left forearm AV fistula LUE MWF, COPD (on 2L home O2), CHF, pulm HTN, known hypotension, baseline blood pressure is systolic in the 70s to 80s, on home midodrine, DVT s/p IVC filter presented on 8/22/21 with c/o R leg pain that started that morning.     In the ED, , lowest BP 66/44, 84% RA improved to 99% on 2L NC, not tachypneic.   WBC 12.7,   CXR negative.  Started on levophed & antibiotics given hypotension.   Dose of midodrine was increased & pressors weaned off.   CT imaging revealed extensive bilateral LE DVTs.    blood cx finalized no growth   Hypotension was worse on presentation but a chronic problem - now stable with high dose midodrine  He complete a course of antibiotics Zosyn  reviewed vitals signs he remains afebrile  wbc reviewed normal range   as per last ID note no infection was found     PLAN:  continue supportive care as per medical team and consultants   no signs of infection at present re consult us as needed

## 2021-09-02 LAB
ANION GAP SERPL CALC-SCNC: 17 MMOL/L — HIGH (ref 7–14)
BUN SERPL-MCNC: 59 MG/DL — HIGH (ref 7–23)
CALCIUM SERPL-MCNC: 9.6 MG/DL — SIGNIFICANT CHANGE UP (ref 8.4–10.5)
CHLORIDE SERPL-SCNC: 94 MMOL/L — LOW (ref 98–107)
CO2 SERPL-SCNC: 23 MMOL/L — SIGNIFICANT CHANGE UP (ref 22–31)
CREAT SERPL-MCNC: 9.51 MG/DL — HIGH (ref 0.5–1.3)
GLUCOSE SERPL-MCNC: 100 MG/DL — HIGH (ref 70–99)
MAGNESIUM SERPL-MCNC: 2 MG/DL — SIGNIFICANT CHANGE UP (ref 1.6–2.6)
PHOSPHATE SERPL-MCNC: 5.3 MG/DL — HIGH (ref 2.5–4.5)
POTASSIUM SERPL-MCNC: 4.5 MMOL/L — SIGNIFICANT CHANGE UP (ref 3.5–5.3)
POTASSIUM SERPL-SCNC: 4.5 MMOL/L — SIGNIFICANT CHANGE UP (ref 3.5–5.3)
SODIUM SERPL-SCNC: 134 MMOL/L — LOW (ref 135–145)

## 2021-09-02 PROCEDURE — 99232 SBSQ HOSP IP/OBS MODERATE 35: CPT

## 2021-09-02 RX ORDER — PANTOPRAZOLE SODIUM 20 MG/1
40 TABLET, DELAYED RELEASE ORAL
Refills: 0 | Status: DISCONTINUED | OUTPATIENT
Start: 2021-09-02 | End: 2021-09-04

## 2021-09-02 RX ADMIN — GABAPENTIN 200 MILLIGRAM(S): 400 CAPSULE ORAL at 12:25

## 2021-09-02 RX ADMIN — Medication 1 TABLET(S): at 12:24

## 2021-09-02 RX ADMIN — SEVELAMER CARBONATE 800 MILLIGRAM(S): 2400 POWDER, FOR SUSPENSION ORAL at 12:24

## 2021-09-02 RX ADMIN — Medication 100 MILLIGRAM(S): at 12:24

## 2021-09-02 RX ADMIN — SIMETHICONE 80 MILLIGRAM(S): 80 TABLET, CHEWABLE ORAL at 12:25

## 2021-09-02 RX ADMIN — HEPARIN SODIUM 7500 UNIT(S): 5000 INJECTION INTRAVENOUS; SUBCUTANEOUS at 21:49

## 2021-09-02 RX ADMIN — LIDOCAINE 1 PATCH: 4 CREAM TOPICAL at 23:46

## 2021-09-02 RX ADMIN — PANTOPRAZOLE SODIUM 40 MILLIGRAM(S): 20 TABLET, DELAYED RELEASE ORAL at 18:10

## 2021-09-02 RX ADMIN — HEPARIN SODIUM 7500 UNIT(S): 5000 INJECTION INTRAVENOUS; SUBCUTANEOUS at 12:26

## 2021-09-02 RX ADMIN — SEVELAMER CARBONATE 800 MILLIGRAM(S): 2400 POWDER, FOR SUSPENSION ORAL at 18:08

## 2021-09-02 RX ADMIN — LIDOCAINE 1 PATCH: 4 CREAM TOPICAL at 18:29

## 2021-09-02 RX ADMIN — PANTOPRAZOLE SODIUM 40 MILLIGRAM(S): 20 TABLET, DELAYED RELEASE ORAL at 05:33

## 2021-09-02 RX ADMIN — LIDOCAINE 1 PATCH: 4 CREAM TOPICAL at 00:14

## 2021-09-02 RX ADMIN — SIMETHICONE 80 MILLIGRAM(S): 80 TABLET, CHEWABLE ORAL at 21:14

## 2021-09-02 RX ADMIN — SIMETHICONE 80 MILLIGRAM(S): 80 TABLET, CHEWABLE ORAL at 05:32

## 2021-09-02 RX ADMIN — SEVELAMER CARBONATE 800 MILLIGRAM(S): 2400 POWDER, FOR SUSPENSION ORAL at 09:11

## 2021-09-02 RX ADMIN — HEPARIN SODIUM 7500 UNIT(S): 5000 INJECTION INTRAVENOUS; SUBCUTANEOUS at 05:32

## 2021-09-02 RX ADMIN — Medication 1 MILLIGRAM(S): at 12:24

## 2021-09-02 RX ADMIN — TIOTROPIUM BROMIDE 1 CAPSULE(S): 18 CAPSULE ORAL; RESPIRATORY (INHALATION) at 09:15

## 2021-09-02 RX ADMIN — LIDOCAINE 1 PATCH: 4 CREAM TOPICAL at 12:25

## 2021-09-02 NOTE — PROGRESS NOTE ADULT - ASSESSMENT
· Assessment	  75yo M PMHx ESRD on HD via AV fistula LUE MWF, COPD (on 2L home O2), CHF, pulm HTN, known hypotension on home midodrine, DVT s/p IVC filter p/w R leg pain that started this morning. States it is a burning pain, thinks he can feels "knots" in his leg. 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 He also endorses generalized abdominal discomfort associated with constipation since Friday. Spoke with wife with patient, his baseline blood pressure is systolic in the 70s to 80s, and on dialysis days it is usually in the 60s. He checks his BP everyday. The wife notes that over the past 2 weeks it has been consistently low in the 70s. Otherwise denies other pain in the body, f/c, n/v, CP, SOB at rest, dysuria.    In the ED, , lowest BP 66/44, 84% RA improved to 99% on 2L NC, not tachypneic. WBC 12.7, bicarb 21 gap 25, VBG pH 7.32, lactate 3.4, CXR negative. CT a/p showed Bilateral femoral, external iliac, and common iliac veins are distended, concerning for DVT. S/p midodrine (home med), 500 cc bolus, started on levophed. (22 Aug 2021 15:32)    pt was admitted MICU for with shock of unclear etiology. recieved Abx and pressure support with pressors and midodrine.. started on stress steroids and now on medicine floor.   In MICU : "  b/l DVT, extensive.  Was off a/c in past due to risk of falls/bleeding and GI bleed, still need more collateral.  He is very clear that he is not supposed to be on a/c though and his pulmonologist agrees."     Shock : Mild leukocytosis, hx of cellulitis in the past   -mild redness to B/L lower extremities R>L   - empirically started on zosyn (8/22-   ) and vanco   - CXR negative    - Cultures sent blood cultures currently with NGTD   - off pressors and on steroids  stress dose and midodrine   - taper IV hydrocortisone (now 25 mg IVPB bid) as per endocrinology: discussed wtih Dr. Tian : planned stim test  - now observe off abx per ID    - CTA 8/27/21 (-) for central PE and PNA    Hx of Pulmonary HTN (Dr. Freedman pulmononogist) COPD on home 2L home O2, uses trilogy ventilator at night   -remains on 2L NC   -bedside POCUS with mostly alines predominately does not look fluid overloaded   -nocturnal AVAPS     Hypotensive at baseline on midodrine at home, Hx CHF, DVT, PE, IVC filter, not on AC in the setting of GI bleeds in the past.    c/w midodrine 30mg PO TID    TTE on 8/23 : NL EF     ESRD w/ HD MWF   cont HD per renal       Hx of DVT's and PE but not on anticoagulation at home d/t GIB in the past and falls   - Heparin SQ for DVT ppx  -has IVC filter in place  -8/24 US showed extensive bilateral deep venous thrombosis of the bilateral lower extremities   - appreciate heme in put : CTPE ordered , had a discussion with wife pt had had two separate "clots" and was taken off AC due to GIB with no obvious source after cohen per her and thats when IVC was placed.   - pt refusing AC       - kidney lesion : US noted   consider MR       #GOC  -full code

## 2021-09-02 NOTE — PROGRESS NOTE ADULT - ASSESSMENT
Assessment  Hypotension: 76y Male with no history of adrenal insufficiency, has not used sterids in the past, admitted with hypotension, was started on stress dose steroids by primary team, can not be tested with cosyntropin stimulation test for adrenal function, awake and stable, steroids  on hold to get cosyntropin stress test done to R/O adrenal insufficiency.  Hypotension: On medications, monitored, stable.  ESRD: On HD, monitored.              Arsalan Tian MD  Cell: [phone]  Office: [phone]

## 2021-09-03 ENCOUNTER — TRANSCRIPTION ENCOUNTER (OUTPATIENT)
Age: 76
End: 2021-09-03

## 2021-09-03 LAB
ANION GAP SERPL CALC-SCNC: 14 MMOL/L — SIGNIFICANT CHANGE UP (ref 7–14)
BUN SERPL-MCNC: 76 MG/DL — HIGH (ref 7–23)
CALCIUM SERPL-MCNC: 9.6 MG/DL — SIGNIFICANT CHANGE UP (ref 8.4–10.5)
CHLORIDE SERPL-SCNC: 95 MMOL/L — LOW (ref 98–107)
CO2 SERPL-SCNC: 21 MMOL/L — LOW (ref 22–31)
CREAT SERPL-MCNC: 11.59 MG/DL — HIGH (ref 0.5–1.3)
GLUCOSE SERPL-MCNC: 103 MG/DL — HIGH (ref 70–99)
HCT VFR BLD CALC: 35 % — LOW (ref 39–50)
HGB BLD-MCNC: 11.1 G/DL — LOW (ref 13–17)
MAGNESIUM SERPL-MCNC: 2.1 MG/DL — SIGNIFICANT CHANGE UP (ref 1.6–2.6)
MCHC RBC-ENTMCNC: 30.4 PG — SIGNIFICANT CHANGE UP (ref 27–34)
MCHC RBC-ENTMCNC: 31.7 GM/DL — LOW (ref 32–36)
MCV RBC AUTO: 95.9 FL — SIGNIFICANT CHANGE UP (ref 80–100)
NRBC # BLD: 0 /100 WBCS — SIGNIFICANT CHANGE UP
NRBC # FLD: 0 K/UL — SIGNIFICANT CHANGE UP
PHOSPHATE SERPL-MCNC: 5.7 MG/DL — HIGH (ref 2.5–4.5)
PLATELET # BLD AUTO: 183 K/UL — SIGNIFICANT CHANGE UP (ref 150–400)
POTASSIUM SERPL-MCNC: 4.3 MMOL/L — SIGNIFICANT CHANGE UP (ref 3.5–5.3)
POTASSIUM SERPL-SCNC: 4.3 MMOL/L — SIGNIFICANT CHANGE UP (ref 3.5–5.3)
RBC # BLD: 3.65 M/UL — LOW (ref 4.2–5.8)
RBC # FLD: 15.9 % — HIGH (ref 10.3–14.5)
SODIUM SERPL-SCNC: 130 MMOL/L — LOW (ref 135–145)
WBC # BLD: 8.58 K/UL — SIGNIFICANT CHANGE UP (ref 3.8–10.5)
WBC # FLD AUTO: 8.58 K/UL — SIGNIFICANT CHANGE UP (ref 3.8–10.5)

## 2021-09-03 PROCEDURE — 99232 SBSQ HOSP IP/OBS MODERATE 35: CPT

## 2021-09-03 RX ORDER — MIDODRINE HYDROCHLORIDE 2.5 MG/1
1 TABLET ORAL
Qty: 0 | Refills: 0 | DISCHARGE

## 2021-09-03 RX ORDER — PANTOPRAZOLE SODIUM 20 MG/1
1 TABLET, DELAYED RELEASE ORAL
Qty: 0 | Refills: 0 | DISCHARGE
Start: 2021-09-03

## 2021-09-03 RX ORDER — MIDODRINE HYDROCHLORIDE 2.5 MG/1
3 TABLET ORAL
Qty: 0 | Refills: 0 | DISCHARGE
Start: 2021-09-03

## 2021-09-03 RX ORDER — LIDOCAINE 4 G/100G
1 CREAM TOPICAL
Qty: 0 | Refills: 0 | DISCHARGE
Start: 2021-09-03

## 2021-09-03 RX ORDER — LIDOCAINE AND PRILOCAINE CREAM 25; 25 MG/G; MG/G
1 CREAM TOPICAL
Qty: 0 | Refills: 0 | DISCHARGE
Start: 2021-09-03

## 2021-09-03 RX ORDER — SEVELAMER CARBONATE 2400 MG/1
3 POWDER, FOR SUSPENSION ORAL
Qty: 0 | Refills: 0 | DISCHARGE

## 2021-09-03 RX ORDER — OMEPRAZOLE 10 MG/1
1 CAPSULE, DELAYED RELEASE ORAL
Qty: 0 | Refills: 0 | DISCHARGE

## 2021-09-03 RX ORDER — COSYNTROPIN 0.25 MG/ML
0.25 INJECTION, SOLUTION INTRAVENOUS ONCE
Refills: 0 | Status: COMPLETED | OUTPATIENT
Start: 2021-09-03 | End: 2021-09-03

## 2021-09-03 RX ORDER — CHLORHEXIDINE GLUCONATE 213 G/1000ML
1 SOLUTION TOPICAL DAILY
Refills: 0 | Status: DISCONTINUED | OUTPATIENT
Start: 2021-09-03 | End: 2021-09-04

## 2021-09-03 RX ORDER — SEVELAMER CARBONATE 2400 MG/1
1 POWDER, FOR SUSPENSION ORAL
Qty: 0 | Refills: 0 | DISCHARGE
Start: 2021-09-03

## 2021-09-03 RX ADMIN — MIDODRINE HYDROCHLORIDE 30 MILLIGRAM(S): 2.5 TABLET ORAL at 13:03

## 2021-09-03 RX ADMIN — GABAPENTIN 200 MILLIGRAM(S): 400 CAPSULE ORAL at 12:54

## 2021-09-03 RX ADMIN — SIMETHICONE 80 MILLIGRAM(S): 80 TABLET, CHEWABLE ORAL at 05:46

## 2021-09-03 RX ADMIN — LIDOCAINE 1 PATCH: 4 CREAM TOPICAL at 12:52

## 2021-09-03 RX ADMIN — TIOTROPIUM BROMIDE 1 CAPSULE(S): 18 CAPSULE ORAL; RESPIRATORY (INHALATION) at 13:01

## 2021-09-03 RX ADMIN — PANTOPRAZOLE SODIUM 40 MILLIGRAM(S): 20 TABLET, DELAYED RELEASE ORAL at 05:46

## 2021-09-03 RX ADMIN — LIDOCAINE 1 PATCH: 4 CREAM TOPICAL at 19:31

## 2021-09-03 RX ADMIN — HEPARIN SODIUM 7500 UNIT(S): 5000 INJECTION INTRAVENOUS; SUBCUTANEOUS at 05:47

## 2021-09-03 RX ADMIN — HEPARIN SODIUM 7500 UNIT(S): 5000 INJECTION INTRAVENOUS; SUBCUTANEOUS at 13:02

## 2021-09-03 RX ADMIN — MIDODRINE HYDROCHLORIDE 10 MILLIGRAM(S): 2.5 TABLET ORAL at 06:49

## 2021-09-03 RX ADMIN — LIDOCAINE AND PRILOCAINE CREAM 1 APPLICATION(S): 25; 25 CREAM TOPICAL at 05:48

## 2021-09-03 RX ADMIN — Medication 1 TABLET(S): at 12:53

## 2021-09-03 RX ADMIN — SEVELAMER CARBONATE 800 MILLIGRAM(S): 2400 POWDER, FOR SUSPENSION ORAL at 13:02

## 2021-09-03 RX ADMIN — PANTOPRAZOLE SODIUM 40 MILLIGRAM(S): 20 TABLET, DELAYED RELEASE ORAL at 17:37

## 2021-09-03 RX ADMIN — SIMETHICONE 80 MILLIGRAM(S): 80 TABLET, CHEWABLE ORAL at 21:48

## 2021-09-03 RX ADMIN — COSYNTROPIN 0.25 MILLIGRAM(S): 0.25 INJECTION, SOLUTION INTRAVENOUS at 12:52

## 2021-09-03 RX ADMIN — Medication 100 MILLIGRAM(S): at 12:54

## 2021-09-03 RX ADMIN — Medication 1 MILLIGRAM(S): at 12:50

## 2021-09-03 RX ADMIN — CHLORHEXIDINE GLUCONATE 1 APPLICATION(S): 213 SOLUTION TOPICAL at 17:37

## 2021-09-03 RX ADMIN — HEPARIN SODIUM 7500 UNIT(S): 5000 INJECTION INTRAVENOUS; SUBCUTANEOUS at 21:48

## 2021-09-03 RX ADMIN — SEVELAMER CARBONATE 800 MILLIGRAM(S): 2400 POWDER, FOR SUSPENSION ORAL at 17:37

## 2021-09-03 RX ADMIN — SIMETHICONE 80 MILLIGRAM(S): 80 TABLET, CHEWABLE ORAL at 13:01

## 2021-09-03 RX ADMIN — MIDODRINE HYDROCHLORIDE 30 MILLIGRAM(S): 2.5 TABLET ORAL at 21:48

## 2021-09-03 NOTE — DIETITIAN INITIAL EVALUATION ADULT. - PERTINENT MEDS FT
MEDICATIONS  (STANDING):  allopurinol 100 milliGRAM(s) Oral daily  cosyntropin Injectable 0.25 milliGRAM(s) IV Push once  folic acid 1 milliGRAM(s) Oral daily  gabapentin 200 milliGRAM(s) Oral daily  heparin   Injectable 7500 Unit(s) SubCutaneous every 8 hours  lidocaine   5% Patch 1 Patch Transdermal daily  lidocaine/prilocaine Cream 1 Application(s) Topical <User Schedule>  midodrine 30 milliGRAM(s) Oral every 8 hours  midodrine. 10 milliGRAM(s) Oral <User Schedule>  multivitamin 1 Tablet(s) Oral daily  pantoprazole    Tablet 40 milliGRAM(s) Oral two times a day  polyethylene glycol 3350 17 Gram(s) Oral two times a day  sevelamer carbonate 800 milliGRAM(s) Oral three times a day with meals  simethicone 80 milliGRAM(s) Chew three times a day  tiotropium 18 MICROgram(s) Capsule 1 Capsule(s) Inhalation daily

## 2021-09-03 NOTE — DIETITIAN INITIAL EVALUATION ADULT. - OTHER INFO
77 yo. male with a PMH of ESRD on HD MWF, COPD, CHF, pulmonary HTN who presented with hypotension and leg pain then admitted to MICU for with shock of unclear etiology, received antibiotics and pressure support with pressors and midodrine, started on stress steroids and now on medicine floor. Patient reports having good appetite. Noted finishing 100% of breakfast per tray observation. Reports no GI distress or chewing/swallowing difficulties. Has no food allergies. Reports UBW is 267 lbs. x 1 month, CBW is 278.8 lbs. (9/3) +11.8 lbs. (+4%) which can likely be r/t patient receiving HD and fluid shifts. No pressure injuries noted per RN flow sheet. Per RN flow sheet, noted with L/R foot +2 edema (9/2) with none noted now (9/3).     Tried to provide patient renal nutrition therapy education, but patient refused stating "I will not follow it at home anyway". Patient was amenable however to take educational handout home, which RD provided.

## 2021-09-03 NOTE — DIETITIAN INITIAL EVALUATION ADULT. - PERTINENT LABORATORY DATA
09-03 Na 130 mmol/L<L> Glu 103 mg/dL<H> K+ 4.3 mmol/L Cr 11.59 mg/dL<H> BUN 76 mg/dL<H> Phos 5.7 mg/dL<H>

## 2021-09-03 NOTE — DISCHARGE NOTE NURSING/CASE MANAGEMENT/SOCIAL WORK - PATIENT PORTAL LINK FT
You can access the FollowMyHealth Patient Portal offered by [facility] by registering at the following website: [email or web address]. By joining [facility] FollowMyHealth portal, you will also be able to view your health information using other applications (apps) compatible with our system.

## 2021-09-03 NOTE — PROGRESS NOTE ADULT - ASSESSMENT
# EXTENSIVE DVT, LIKELY CHRONIC  # BILATERAL COMMON ILLIAC VEINS  # BILATERAL EXTERNAL ILIAC, COMMON FEMORAL  # IVCF IN PLACE FOR PRIOR DVT  # HISTORY OF GI BLEED    ·	Pt does not want AC given hx of bleeding  ·	Based on limited information available, it seems like he had DVT in the past which was treated with AC which then resulted in rectal bleeding. Unclear if source of rectal bleeding was found/addressed but it seems like IVCF was placed subsequently  ·	Imaging shows extensive DVT which is likely chronic, secondary to the IVC filter  ·	Ideally, full dose AC would be recommended for this but patient unwilling to take full dose AC given hx of bleed  ·	he has IVC filter in place  ·	continues to refuse anticoagulation due to h/o GI bleed   ·	repeat CTA neg for PE

## 2021-09-03 NOTE — DIETITIAN INITIAL EVALUATION ADULT. - ORAL INTAKE PTA/DIET HISTORY
Patient reports having good appetite and PO intake PTA. Patient reports not really following therapeutic diet at and that wife helps out. Patient reports having good appetite and PO intake PTA. Patient reports not really following therapeutic diet at and that wife helps out with meals.

## 2021-09-03 NOTE — DISCHARGE NOTE NURSING/CASE MANAGEMENT/SOCIAL WORK - NSDCPEFALRISK_GEN_ALL_CORE
For information on Fall & injury Prevention, visit [email or web address]

## 2021-09-03 NOTE — PROGRESS NOTE ADULT - ASSESSMENT
Assessment  Hypotension: 76y Male with no history of adrenal insufficiency, has not used sterids in the past, admitted with hypotension, was started on stress dose steroids by ICU/primary team, could not be tested with cosyntropin stimulation test for adrenal function, awake and stable, steroids  on hold to get cosyntropin stress test done to R/O adrenal insufficiency.  Hypotension: On medications, monitored, stable.  ESRD: On HD, monitored.              Arsalan Tian MD  Cell: [phone]  Office: [phone]

## 2021-09-04 VITALS — OXYGEN SATURATION: 95 % | HEART RATE: 77 BPM

## 2021-09-04 LAB — ACTH SER-ACNC: 27.9 PG/ML — SIGNIFICANT CHANGE UP (ref 7.2–63.3)

## 2021-09-04 RX ADMIN — LIDOCAINE 1 PATCH: 4 CREAM TOPICAL at 01:09

## 2021-09-04 RX ADMIN — PANTOPRAZOLE SODIUM 40 MILLIGRAM(S): 20 TABLET, DELAYED RELEASE ORAL at 17:39

## 2021-09-04 RX ADMIN — GABAPENTIN 200 MILLIGRAM(S): 400 CAPSULE ORAL at 12:38

## 2021-09-04 RX ADMIN — SIMETHICONE 80 MILLIGRAM(S): 80 TABLET, CHEWABLE ORAL at 06:20

## 2021-09-04 RX ADMIN — Medication 1 MILLIGRAM(S): at 12:37

## 2021-09-04 RX ADMIN — SEVELAMER CARBONATE 800 MILLIGRAM(S): 2400 POWDER, FOR SUSPENSION ORAL at 09:10

## 2021-09-04 RX ADMIN — LIDOCAINE 1 PATCH: 4 CREAM TOPICAL at 12:36

## 2021-09-04 RX ADMIN — HEPARIN SODIUM 7500 UNIT(S): 5000 INJECTION INTRAVENOUS; SUBCUTANEOUS at 06:20

## 2021-09-04 RX ADMIN — Medication 100 MILLIGRAM(S): at 12:39

## 2021-09-04 RX ADMIN — POLYETHYLENE GLYCOL 3350 17 GRAM(S): 17 POWDER, FOR SOLUTION ORAL at 12:35

## 2021-09-04 RX ADMIN — PANTOPRAZOLE SODIUM 40 MILLIGRAM(S): 20 TABLET, DELAYED RELEASE ORAL at 06:20

## 2021-09-04 RX ADMIN — LIDOCAINE 1 PATCH: 4 CREAM TOPICAL at 18:24

## 2021-09-04 RX ADMIN — SIMETHICONE 80 MILLIGRAM(S): 80 TABLET, CHEWABLE ORAL at 12:37

## 2021-09-04 RX ADMIN — SEVELAMER CARBONATE 800 MILLIGRAM(S): 2400 POWDER, FOR SUSPENSION ORAL at 17:38

## 2021-09-04 RX ADMIN — CHLORHEXIDINE GLUCONATE 1 APPLICATION(S): 213 SOLUTION TOPICAL at 12:39

## 2021-09-04 RX ADMIN — SEVELAMER CARBONATE 800 MILLIGRAM(S): 2400 POWDER, FOR SUSPENSION ORAL at 12:38

## 2021-09-04 RX ADMIN — Medication 1 TABLET(S): at 12:38

## 2021-09-04 RX ADMIN — POLYETHYLENE GLYCOL 3350 17 GRAM(S): 17 POWDER, FOR SOLUTION ORAL at 06:20

## 2021-09-04 RX ADMIN — MIDODRINE HYDROCHLORIDE 30 MILLIGRAM(S): 2.5 TABLET ORAL at 12:40

## 2021-09-04 RX ADMIN — TIOTROPIUM BROMIDE 1 CAPSULE(S): 18 CAPSULE ORAL; RESPIRATORY (INHALATION) at 09:11

## 2021-09-04 RX ADMIN — HEPARIN SODIUM 7500 UNIT(S): 5000 INJECTION INTRAVENOUS; SUBCUTANEOUS at 12:40

## 2021-09-04 NOTE — PROGRESS NOTE ADULT - SUBJECTIVE AND OBJECTIVE BOX
Chief Complaint:  Patient is a 76y old  Male who presents with a chief complaint of right leg pain (28 Aug 2021 13:06)      Date of service 21 @ 20:30      Interval Events:   no abd pain/GI bleeding  Hospital Medications:  acetaminophen   Tablet .. 650 milliGRAM(s) Oral every 6 hours PRN  albuterol/ipratropium for Nebulization 3 milliLiter(s) Nebulizer every 6 hours PRN  allopurinol 100 milliGRAM(s) Oral daily  folic acid 1 milliGRAM(s) Oral daily  gabapentin 200 milliGRAM(s) Oral daily  heparin   Injectable 7500 Unit(s) SubCutaneous every 8 hours  hydrocortisone sodium succinate Injectable 25 milliGRAM(s) IV Push every 8 hours  lidocaine   5% Patch 1 Patch Transdermal daily  midodrine 30 milliGRAM(s) Oral every 8 hours  midodrine. 10 milliGRAM(s) Oral <User Schedule>  multivitamin 1 Tablet(s) Oral daily  mupirocin 2% Ointment 1 Application(s) Topical every 12 hours  pantoprazole  Injectable 40 milliGRAM(s) IV Push two times a day  polyethylene glycol 3350 17 Gram(s) Oral two times a day  sevelamer carbonate 800 milliGRAM(s) Oral three times a day with meals  simethicone 80 milliGRAM(s) Chew three times a day  tiotropium 18 MICROgram(s) Capsule 1 Capsule(s) Inhalation daily        Review of Systems:  General:  No wt loss, fevers, chills, night sweats, fatigue,   Eyes:  Good vision, no reported pain  ENT:  No sore throat, pain, runny nose, dysphagia  CV:  No pain, palpitations, hypo/hypertension  Resp:  No dyspnea, cough, tachypnea, wheezing  GI:  See HPI  :  No pain, bleeding, incontinence, nocturia  Muscle:  No pain, weakness  Neuro:  No weakness, tingling, memory problems  Psych:  No fatigue, insomnia, mood problems, depression  Endocrine:  No polyuria, polydipsia, cold/heat intolerance  Heme:  No petechiae, ecchymosis, easy bruisability  Integumentary:  No rash, edema    PHYSICAL EXAM:   Vital Signs:  Vital Signs Last 24 Hrs  T(C): 36.4 (28 Aug 2021 12:55), Max: 36.7 (28 Aug 2021 06:12)  T(F): 97.5 (28 Aug 2021 12:55), Max: 98 (28 Aug 2021 06:12)  HR: 75 (28 Aug 2021 15:40) (59 - 100)  BP: 125/71 (28 Aug 2021 15:40) (91/61 - 126/63)  BP(mean): --  RR: 18 (28 Aug 2021 12:55) (18 - 18)  SpO2: 97% (28 Aug 2021 12:55) (97% - 99%)  Daily     Daily Weight in k.7 (27 Aug 2021 22:30)      PHYSICAL EXAM:     GENERAL:  Appears stated age, well-groomed, well-nourished, no distress  HEENT:  NC/AT,  conjunctivae anicteric, clear and pink,   NECK: supple, trachea midline  CHEST:  Full & symmetric excursion, no increased effort, breath sounds clear  HEART:  Regular rhythm, no JVD  ABDOMEN:  Soft, non-tender, non-distended, normoactive bowel sounds,  no masses , no hepatosplenomegaly  EXTREMITIES:  no cyanosis,clubbing or edema  SKIN:  No rash, erythema, or, ecchymoses, no jaundice  NEURO:  Alert, non-focal, no asterixis  PSYCH: Appropriate affect, oriented to place and time  RECTAL: Deferred      LABS Personally reviewed by me:                        12.2   11.49 )-----------( 197      ( 27 Aug 2021 07:23 )             40.0       0827    134<L>  |  91<L>  |  65<H>  ----------------------------<  82  5.3   |  21<L>  |  10.77<H>    Ca    9.2      27 Aug 2021 07:23  Phos  6.6     08  Mg     2.50     08    TPro  8.9<H>  /  Alb  3.8  /  TBili  0.3  /  DBili  x   /  AST  10  /  ALT  10  /  AlkPhos  106  08    LIVER FUNCTIONS - ( 27 Aug 2021 07:23 )  Alb: 3.8 g/dL / Pro: 8.9 g/dL / ALK PHOS: 106 U/L / ALT: 10 U/L / AST: 10 U/L / GGT: x                                       12.2   11.49 )-----------( 197      ( 27 Aug 2021 07:23 )             40.0                         12.1   11.22 )-----------( 168      ( 26 Aug 2021 08:33 )             39.2       Imaging personally reviewed by me:          
===========================================  [facility] NEPHROLOGY CONSULT TEAM  -----------------------------------------------------------------------------    Jeremy Haley  Pls contact me via [employer] Teams today for any q's or c's about the patient!  After 5 pm or on weekends use [employer] for fellow on call    ============================================  [facility] DIVISION OF KIDNEY DISEASES AND HYPERTENSION -- FOLLOW UP NOTE  [facility] Fellow pager # [phone]  Nephrology office # [phone]  -----------------------------------------------------------------------------    75yo M PMHx ESRD on HD via AV fistula LUE MWF, COPD (on 2L home O2), CHF, pulm HTN, known hypotension on home midodrine, DVT s/p IVC filter p/w R leg pain that started this morning. States it is a burning pain, thinks he can feels "knots" in his leg. 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 He also endorses generalized abdominal discomfort associated with constipation since Friday. Spoke with wife with patient, his baseline blood pressure is systolic in the 70s to 80s, and on dialysis days it is usually in the 60s. He checks his BP everyday. The wife notes that over the past 2 weeks it has been consistently low in the 70s. Otherwise denies other pain in the body, f/c, n/v, CP, SOB at rest, dysuria.    8/27/2021  - Chart reviewed. Events noted. Pt seen and examined earlier  - No current complaints      ALLERGIES & MEDICATIONS  --------------------------------------------------------------------------------  Allergies    latex (Rash)  No Known Drug Allergies    Intolerances      MEDICATIONS  (STANDING):  allopurinol 100 milliGRAM(s) Oral daily  folic acid 1 milliGRAM(s) Oral daily  gabapentin 200 milliGRAM(s) Oral daily  heparin   Injectable 7500 Unit(s) SubCutaneous every 8 hours  hydrocortisone sodium succinate Injectable 50 milliGRAM(s) IV Push every 8 hours  lidocaine   5% Patch 1 Patch Transdermal daily  midodrine 30 milliGRAM(s) Oral every 8 hours  midodrine. 10 milliGRAM(s) Oral <User Schedule>  multivitamin 1 Tablet(s) Oral daily  mupirocin 2% Ointment 1 Application(s) Topical every 12 hours  pantoprazole    Tablet 40 milliGRAM(s) Oral before breakfast  piperacillin/tazobactam IVPB.. 3.375 Gram(s) IV Intermittent every 12 hours  polyethylene glycol 3350 17 Gram(s) Oral two times a day  sevelamer carbonate 800 milliGRAM(s) Oral three times a day with meals  simethicone 80 milliGRAM(s) Chew three times a day  tiotropium 18 MICROgram(s) Capsule 1 Capsule(s) Inhalation daily    MEDICATIONS  (PRN):  acetaminophen   Tablet .. 650 milliGRAM(s) Oral every 6 hours PRN Moderate Pain (4 - 6)  albuterol/ipratropium for Nebulization 3 milliLiter(s) Nebulizer every 6 hours PRN Shortness of Breath and/or Wheezing      VITALS/PHYSICAL EXAM  --------------------------------------------------------------------------------  Vital Signs Last 24 Hrs  T(C): 37 (27 Aug 2021 05:27), Max: 37 (27 Aug 2021 05:27)  T(F): 98.6 (27 Aug 2021 05:27), Max: 98.6 (27 Aug 2021 05:27)  HR: 101 (27 Aug 2021 10:30) (62 - 101)  BP: 117/62 (27 Aug 2021 05:27) (106/62 - 117/62)  BP(mean): --  RR: 17 (27 Aug 2021 05:27) (17 - 17)  SpO2: 97% (27 Aug 2021 10:30) (97% - 100%)    Physical Exam:  	Gen NAD, obese   	HEENT: no JVD  	Pulm: CTABL  	CV: S1S2,  	Abd: Soft,   	Ext:   - edema B/L LE   	Neuro: Awake and alert  	Skin: Warm and dry          Vascular:  AVF + bruit        LABS/STUDIES  --------------------------------------------------------------------------------           08-27    134<L>  |  91<L>  |  65<H>  ----------------------------<  82  5.3   |  21<L>  |  10.77<H>    Ca    9.2      27 Aug 2021 07:23  Phos  6.6     08-27  Mg     2.50     08-27    TPro  8.9<H>  /  Alb  3.8  /  TBili  0.3  /  DBili  x   /  AST  10  /  ALT  10  /  AlkPhos  106  08-27  
[facility] DIVISION OF KIDNEY DISEASES AND HYPERTENSION -- FOLLOW UP NOTE  [facility] Fellow pager # [phone]  Nephrology office # [phone]  -----------------------------------------------------------------------------    77yo M PMHx ESRD on HD via AV fistula LUE MWF, COPD (on 2L home O2), CHF, pulm HTN, known hypotension on home midodrine, DVT s/p IVC filter p/w R leg pain that started this morning. States it is a burning pain, thinks he can feels "knots" in his leg. 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 He also endorses generalized abdominal discomfort associated with constipation since Friday. Spoke with wife with patient, his baseline blood pressure is systolic in the 70s to 80s, and on dialysis days it is usually in the 60s. He checks his BP everyday. The wife notes that over the past 2 weeks it has been consistently low in the 70s. Otherwise denies other pain in the body, f/c, n/v, CP, SOB at rest, dysuria.    In the ED, , lowest BP 66/44, 84% RA improved to 99% on 2L NC, not tachypneic. WBC 12.7, bicarb 21 gap 25, VBG pH 7.32, lactate 3.4, CXR negative. CT a/p showed Bilateral femoral, external iliac, and common iliac veins are distended, concerning for DVT. S/p midodrine (home med), 500 cc bolus, started on levophed. (22 Aug 2021 15:32)    today seen on the floor while eating . feels better               Hemodialysis Treatment.:     Schedule: Once, Modality: Hemodialysis, Access: Arteriovenous Fistula    Dialyzer: Revaclear 300, Time: 180 Min    Blood Flow: 400 mL/Min , Dialysate Flow: 500 mL/Min, Dialysate Temp: 35, Tubinmm (Adult)    Target Fluid Removal: 1 Liters    Dialysate Electrolytes (mEq/L): Potassium 2, Calcium 2.5, Sodium 140, Bicarbonate 35    Potassium Protocol  -->For serum potassium LESS THAN or EQUAL TO 3.4, notify MD/PA/NP       For serum potassium 3.5 - 4, use 3K dialysate bath       For serum potassium 4.1 - 5.9, use 2K dialysate bath       For serum potassium GREATER THAN or EQUAL TO 6, notify MD/PA/NP    Sodium Modeling (mEq/L): Initial 145, Final 140, Last 30 Min, Gradient Linear    Additional Instructions: Keep SBP>70.  Pt has Hx of intradialytic hypotension, (21 @ 10:43)        ALLERGIES & MEDICATIONS  --------------------------------------------------------------------------------  Allergies    latex (Rash)  No Known Drug Allergies    Intolerances      Standing Inpatient Medications  allopurinol 100 milliGRAM(s) Oral daily  folic acid 1 milliGRAM(s) Oral daily  gabapentin 200 milliGRAM(s) Oral daily  heparin   Injectable 7500 Unit(s) SubCutaneous every 8 hours  hydrocortisone sodium succinate Injectable 50 milliGRAM(s) IV Push every 8 hours  lidocaine   5% Patch 1 Patch Transdermal daily  midodrine 30 milliGRAM(s) Oral every 8 hours  midodrine. 10 milliGRAM(s) Oral <User Schedule>  multivitamin 1 Tablet(s) Oral daily  mupirocin 2% Ointment 1 Application(s) Topical every 12 hours  pantoprazole    Tablet 40 milliGRAM(s) Oral before breakfast  piperacillin/tazobactam IVPB.. 3.375 Gram(s) IV Intermittent every 12 hours  polyethylene glycol 3350 17 Gram(s) Oral two times a day  sevelamer carbonate 800 milliGRAM(s) Oral three times a day with meals  simethicone 80 milliGRAM(s) Chew three times a day  tiotropium 18 MICROgram(s) Capsule 1 Capsule(s) Inhalation daily    PRN Inpatient Medications  acetaminophen   Tablet .. 650 milliGRAM(s) Oral every 6 hours PRN  albuterol/ipratropium for Nebulization 3 milliLiter(s) Nebulizer every 6 hours PRN      VITALS/PHYSICAL EXAM  --------------------------------------------------------------------------------  T(C): 36.3 (21 @ 16:12), Max: 37.2 (21 @ 21:41)  HR: 58 (21 @ 16:12) (56 - 88)  BP: 115/63 (21 @ 16:12) (97/50 - 117/59)  RR: 17 (21 @ 16:12) (17 - 20)  SpO2: 69% (21 @ 14:42) (69% - 100%)  Wt(kg): --        21 @ 07:01  -  21 @ 07:00  --------------------------------------------------------  IN: 150 mL / OUT: 0 mL / NET: 150 mL      Physical Exam:  	Gen NAD, obese   	HEENT: no JVD  	Pulm: CTABL  	CV: S1S2,  	Abd: Soft,   	Ext:   - edema B/L LE   	Neuro: Awake and alert  	Skin: Warm and dry          Vascular:  AVF + bruit          LABS/STUDIES  --------------------------------------------------------------------------------              11.8   12.09 >-----------<  139      [21 @ 01:47]              39.3     Hemoglobin: 11.8 g/dL (21 @ 01:47)    Platelet Count - Automated: 139 K/uL (21 01:47)    135  |  94  |  43  ----------------------------<  147      [21]  4.1   |  21  |  8.81        Ca     9.3     [21]      Mg     2.30     [21]      Phos  4.7     [21]    TPro  9.1  /  Alb  3.9  /  TBili  0.3  /  DBili  x   /  AST  12  /  ALT  11  /  AlkPhos  135  [21]          Creatinine, Serum: 8.81 mg/dL (21)  Creatinine, Serum: 11.61 mg/dL (21 02:35)  Creatinine, Serum: 11.27 mg/dL (21 20:20)  Creatinine, Serum: 11.17 mg/dL (21 08:39)    SODIUM TREND:  Sodium 135 []  Sodium 133 [ 02:35]  Sodium 133 [ 20:20]  Sodium 136 [ 08:39]      Mode: standby    SCr 8.81 []  SCr 11.61 [ 02:35]  SCr 11.27 [ 20:20]  SCr 11.17 [ 08:39]        HbA1c 5.0      [10-02-17 @ 15:38]  TSH 1.00      [21 02:35]    HBsAb 107.0      [21]  HBsAb Reactive      [21]  HBsAg Nonreact      [21]      
Date of service: 21 @ 12:30      Patient is a 76y old  Male who presents with a chief complaint of right leg pain (03 Sep 2021 11:49)                                                               INTERVAL HPI/OVERNIGHT EVENTS:    REVIEW OF SYSTEMS:     CONSTITUTIONAL: No weakness, fevers or chills  EYES/ENT: No visual changes , no ear ache   NECK: No pain or stiffness  RESPIRATORY: No cough, wheezing,  No shortness of breath  CARDIOVASCULAR: No chest pain or palpitations  GASTROINTESTINAL: No abdominal pain  . No nausea, vomiting, or hematemesis; No diarrhea or constipation. No melena or hematochezia.  GENITOURINARY: No dysuria, frequency or hematuria  NEUROLOGICAL: No numbness or weakness  SKIN: No itching, burning, rashes, or lesions                                                                                                                                                                                                                                                                                 Medications:  MEDICATIONS  (STANDING):  allopurinol 100 milliGRAM(s) Oral daily  cosyntropin Injectable 0.25 milliGRAM(s) IV Push once  folic acid 1 milliGRAM(s) Oral daily  gabapentin 200 milliGRAM(s) Oral daily  heparin   Injectable 7500 Unit(s) SubCutaneous every 8 hours  lidocaine   5% Patch 1 Patch Transdermal daily  lidocaine/prilocaine Cream 1 Application(s) Topical <User Schedule>  midodrine 30 milliGRAM(s) Oral every 8 hours  midodrine. 10 milliGRAM(s) Oral <User Schedule>  multivitamin 1 Tablet(s) Oral daily  pantoprazole    Tablet 40 milliGRAM(s) Oral two times a day  polyethylene glycol 3350 17 Gram(s) Oral two times a day  sevelamer carbonate 800 milliGRAM(s) Oral three times a day with meals  simethicone 80 milliGRAM(s) Chew three times a day  tiotropium 18 MICROgram(s) Capsule 1 Capsule(s) Inhalation daily    MEDICATIONS  (PRN):  acetaminophen   Tablet .. 650 milliGRAM(s) Oral every 6 hours PRN Moderate Pain (4 - 6)  albuterol/ipratropium for Nebulization 3 milliLiter(s) Nebulizer every 6 hours PRN Shortness of Breath and/or Wheezing       Allergies    latex (Rash)  No Known Drug Allergies    Intolerances      Vital Signs Last 24 Hrs  T(C): 36.6 (03 Sep 2021 09:35), Max: 36.8 (03 Sep 2021 06:15)  T(F): 97.9 (03 Sep 2021 09:35), Max: 98.2 (03 Sep 2021 06:15)  HR: 61 (03 Sep 2021 09:35) (61 - 83)  BP: 129/69 (03 Sep 2021 09:35) (120/52 - 129/69)  BP(mean): --  RR: 16 (03 Sep 2021 09:35) (16 - 18)  SpO2: 100% (03 Sep 2021 05:44) (96% - 100%)  CAPILLARY BLOOD GLUCOSE           @ 07:01  -  09 @ 12:30  --------------------------------------------------------  IN: 600 mL / OUT: 2600 mL / NET: -2000 mL      Physical Exam:    Daily     Daily Weight in k.5 (03 Sep 2021 09:35)  General:  Well appearing, NAD, not cachetic  HEENT:  Nonicteric, PERRLA  CV:  RRR, S1S2   Lungs:  CTA B/L, no wheezes, rales, rhonchi  Abdomen:  Soft, non-tender, no distended, positive BS  Extremities: edema   Neuro:  AAOx3, non-focal, grossly intact                                                                                                                                                                                                                                                                                                LABS:                               11.1   8.58  )-----------( 183      ( 03 Sep 2021 07:58 )             35.0                      09-03    130<L>  |  95<L>  |  76<H>  ----------------------------<  103<H>  4.3   |  21<L>  |  11.59<H>    Ca    9.6      03 Sep 2021 07:58  Phos  5.7     09-03  Mg     2.10     09-03                         RADIOLOGY & ADDITIONAL TESTS         I personally reviewed: [  ]EKG   [  ]CXR    [  ] CT      A/P:         Discussed with :     Yael consultants' Notes   Time spent :  
Date of service: 21 @ 22:29      Patient is a 76y old  Male who presents with a chief complaint of right leg pain (30 Aug 2021 13:52)                                                               INTERVAL HPI/OVERNIGHT EVENTS:    REVIEW OF SYSTEMS:     CONSTITUTIONAL: No weakness, fevers or chills  RESPIRATORY: No cough, wheezing,  No shortness of breath  CARDIOVASCULAR: No chest pain or palpitations  GASTROINTESTINAL: No abdominal pain  . No nausea, vomiting, or hematemesis; No diarrhea or constipation. No melena or hematochezia.  GENITOURINARY: No dysuria, frequency or hematuria  NEUROLOGICAL: No numbness or weakness                                                                                                                                                                                                                                                                                  Medications:  MEDICATIONS  (STANDING):  allopurinol 100 milliGRAM(s) Oral daily  folic acid 1 milliGRAM(s) Oral daily  gabapentin 200 milliGRAM(s) Oral daily  heparin   Injectable 7500 Unit(s) SubCutaneous every 8 hours  hydrocortisone 20 milliGRAM(s) Oral daily  hydrocortisone 10 milliGRAM(s) Oral daily  lidocaine   5% Patch 1 Patch Transdermal daily  midodrine 30 milliGRAM(s) Oral every 8 hours  midodrine. 10 milliGRAM(s) Oral <User Schedule>  multivitamin 1 Tablet(s) Oral daily  pantoprazole  Injectable 40 milliGRAM(s) IV Push two times a day  polyethylene glycol 3350 17 Gram(s) Oral two times a day  sevelamer carbonate 800 milliGRAM(s) Oral three times a day with meals  simethicone 80 milliGRAM(s) Chew three times a day  tiotropium 18 MICROgram(s) Capsule 1 Capsule(s) Inhalation daily    MEDICATIONS  (PRN):  acetaminophen   Tablet .. 650 milliGRAM(s) Oral every 6 hours PRN Moderate Pain (4 - 6)  albuterol/ipratropium for Nebulization 3 milliLiter(s) Nebulizer every 6 hours PRN Shortness of Breath and/or Wheezing       Allergies    latex (Rash)  No Known Drug Allergies    Intolerances      Vital Signs Last 24 Hrs  T(C): 36.9 (30 Aug 2021 22:02), Max: 36.9 (30 Aug 2021 22:02)  T(F): 98.5 (30 Aug 2021 22:02), Max: 98.5 (30 Aug 2021 22:02)  HR: 83 (30 Aug 2021 22:02) (55 - 83)  BP: 96/60 (30 Aug 2021 22:02) (96/60 - 147/69)  BP(mean): --  RR: 17 (30 Aug 2021 22:02) (16 - 18)  SpO2: 94% (30 Aug 2021 22:02) (94% - 99%)  CAPILLARY BLOOD GLUCOSE          - @ 07:01  -  08 @ 22:29  --------------------------------------------------------  IN: 600 mL / OUT: 2400 mL / NET: -1800 mL      Physical Exam:    Daily     Daily Weight in k (30 Aug 2021 10:00)  General:  Well appearing, NAD, not cachetic  HEENT:  Nonicteric, PERRLA  CV:  RRR, S1S2   Lungs:  CTA B/L, no wheezes, rales, rhonchi  Abdomen:  Soft, non-tender, no distended, positive BS  Extremities:  edema   Neuro:  AAOx3, non-focal, grossly intact                                                                                                                                                                                                                                                                                                LABS:                               11.2   10.15 )-----------( 201      ( 30 Aug 2021 07:13 )             36.6                      08-30    130<L>  |  90<L>  |  86<H>  ----------------------------<  108<H>  5.1   |  22  |  12.39<H>    Ca    9.1      30 Aug 2021 07:13                         RADIOLOGY & ADDITIONAL TESTS         I personally reviewed: [  ]EKG   [  ]CXR    [  ] CT      A/P:         Discussed with :     Yael consultants' Notes   Time spent :  
accept note     HPI:  77yo M PMHx ESRD on HD via AV fistula LUE MWF, COPD (on 2L home O2), CHF, pulm HTN, known hypotension on home midodrine, DVT s/p IVC filter p/w R leg pain that started this morning. States it is a burning pain, thinks he can feels "knots" in his leg. 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 He also endorses generalized abdominal discomfort associated with constipation since Friday. Spoke with wife with patient, his baseline blood pressure is systolic in the 70s to 80s, and on dialysis days it is usually in the 60s. He checks his BP everyday. The wife notes that over the past 2 weeks it has been consistently low in the 70s. Otherwise denies other pain in the body, f/c, n/v, CP, SOB at rest, dysuria.    In the ED, , lowest BP 66/44, 84% RA improved to 99% on 2L NC, not tachypneic. WBC 12.7, bicarb 21 gap 25, VBG pH 7.32, lactate 3.4, CXR negative. CT a/p showed Bilateral femoral, external iliac, and common iliac veins are distended, concerning for DVT. S/p midodrine (home med), 500 cc bolus, started on levophed. (22 Aug 2021 15:32)    pt was admitted MICU for with shock of unclear etiology. recieved Abx and pressure support with pressors and midodrine.. started on stress steroids and now on medicine floor.   In MICU : "  b/l DVT, extensive.  Was off a/c in past due to risk of falls/bleeding and GI bleed, still need more collateral.  He is very clear that he is not supposed to be on a/c though and his pulmonologist agrees."       REVIEW OF SYSTEMS:    CONSTITUTIONAL: No weakness, fevers or chills  EYES/ENT: No visual changes;  No vertigo or throat pain   NECK: No pain or stiffness  RESPIRATORY: No cough, wheezing, hemoptysis; No shortness of breath  CARDIOVASCULAR: No chest pain or palpitations  GASTROINTESTINAL: No abdominal or epigastric pain. No nausea, vomiting, or hematemesis; No diarrhea or constipation. No melena or hematochezia.  GENITOURINARY: No dysuria, frequency or hematuria  NEUROLOGICAL: No numbness or weakness  SKIN: No itching, burning, rashes, or lesions   All other review of systems is negative unless indicated above.      Medications:   MEDICATIONS  (STANDING):  allopurinol 100 milliGRAM(s) Oral daily  folic acid 1 milliGRAM(s) Oral daily  gabapentin 200 milliGRAM(s) Oral daily  heparin   Injectable 7500 Unit(s) SubCutaneous every 8 hours  hydrocortisone sodium succinate Injectable 50 milliGRAM(s) IV Push every 8 hours  lidocaine   5% Patch 1 Patch Transdermal daily  midodrine 30 milliGRAM(s) Oral every 8 hours  midodrine. 10 milliGRAM(s) Oral <User Schedule>  multivitamin 1 Tablet(s) Oral daily  mupirocin 2% Ointment 1 Application(s) Topical every 12 hours  pantoprazole    Tablet 40 milliGRAM(s) Oral before breakfast  piperacillin/tazobactam IVPB.. 3.375 Gram(s) IV Intermittent every 12 hours  polyethylene glycol 3350 17 Gram(s) Oral two times a day  sevelamer carbonate 800 milliGRAM(s) Oral three times a day with meals  simethicone 80 milliGRAM(s) Chew three times a day  tiotropium 18 MICROgram(s) Capsule 1 Capsule(s) Inhalation daily    MEDICATIONS  (PRN):  acetaminophen   Tablet .. 650 milliGRAM(s) Oral every 6 hours PRN Moderate Pain (4 - 6)  albuterol/ipratropium for Nebulization 3 milliLiter(s) Nebulizer every 6 hours PRN Shortness of Breath and/or Wheezing      Allergies    latex (Rash)  No Known Drug Allergies    Intolerances        PAST MEDICAL & SURGICAL HISTORY:  Hypertension    Glomerular disease  Segmental glomerular sclerosis    CHF (congestive heart failure)    COPD (chronic obstructive pulmonary disease)    Pulmonary hypertension    Sleep apnea    Pulmonary edema    Peripheral edema    Gout    History of abdominal surgery  polypectomy    H/O right heart catheterization        Social :    No smoking       No ETOH use            Vital Signs Last 24 Hrs  T(C): 36.3 (25 Aug 2021 16:12), Max: 37.2 (24 Aug 2021 21:41)  T(F): 97.3 (25 Aug 2021 16:12), Max: 99 (24 Aug 2021 21:41)  HR: 58 (25 Aug 2021 16:12) (56 - 88)  BP: 115/63 (25 Aug 2021 16:12) (97/50 - 117/59)  BP(mean): 67 (24 Aug 2021 18:00) (67 - 67)  RR: 17 (25 Aug 2021 16:12) (17 - 20)  SpO2: 69% (25 Aug 2021 14:42) (69% - 100%)  CAPILLARY BLOOD GLUCOSE          08-24 @ 07:01  -  08-25 @ 07:00  --------------------------------------------------------  IN: 150 mL / OUT: 0 mL / NET: 150 mL        Physical Exam:    Daily     Daily   General:  Well appearing, NAD, not cachetic  HEENT:  Nonicteric, PERRLA  CV:  RRR, no murmur, no JVD  Lungs:  CTA B/L, no wheezes, rales, rhonchi  Abdomen:  Soft, non-tender, no distended, positive BS, no hepatosplenomegaly  Extremities:  2+ pulses, no c/c, no edema  Skin:  Warm and dry, no rashes  :  No moore  Neuro:  AAOx3, non-focal, CN II-XII grossly intact  No Restraints    LABS:                        11.8   12.09 )-----------( 139      ( 24 Aug 2021 01:47 )             39.3     08-24    135  |  94<L>  |  43<H>  ----------------------------<  147<H>  4.1   |  21<L>  |  8.81<H>    Ca    9.3      24 Aug 2021 01:47  Phos  4.7     08-24  Mg     2.30     08-24    TPro  9.1<H>  /  Alb  3.9  /  TBili  0.3  /  DBili  x   /  AST  12  /  ALT  11  /  AlkPhos  135<H>  08-24                RADIOLOGY & ADDITIONAL TESTS:    ---------------------------------------------------------------------------  I personally reviewed: [  ]EKG   [  ]CXR    [  ] CT    [  ]Other  ---------------------------------------------------------------------------  
CC: f/u for hypotension     Patient reports no new complaints. Back pain get aggravated with mvmt or with lying flat in the belly. Legs feeling okay    REVIEW OF SYSTEMS:  All other review of systems negative (Comprehensive ROS)    Antimicrobials Day #  :    Other Medications Reviewed    T(F): 98 (08-27-21 @ 14:36), Max: 98.6 (08-27-21 @ 05:27)  HR: 78 (08-27-21 @ 17:56)  BP: 106/55 (08-27-21 @ 17:56)  RR: 17 (08-27-21 @ 17:56)  SpO2: 100% (08-27-21 @ 17:56)  Wt(kg): --    PHYSICAL EXAM:  General: alert, no acute distress  Eyes:  anicteric, no conjunctival injection, no discharge  Neck: supple  Lungs: clear to auscultation  Heart: regular rate and rhythm; no murmurs  Abdomen: soft, nondistended, nontender  Extremities: no clubbing or cyanosis. Bilateral edema. Left forearm AVF - unremarkable   Neurologic: alert, oriented, moves all extremities    LAB RESULTS:                        12.2   11.49 )-----------( 197      ( 27 Aug 2021 07:23 )             40.0     08-27    134<L>  |  91<L>  |  65<H>  ----------------------------<  82  5.3   |  21<L>  |  10.77<H>    Ca    9.2      27 Aug 2021 07:23  Phos  6.6     08-27  Mg     2.50     08-27    TPro  8.9<H>  /  Alb  3.8  /  TBili  0.3  /  DBili  x   /  AST  10  /  ALT  10  /  AlkPhos  106  08-27    LIVER FUNCTIONS - ( 27 Aug 2021 07:23 )  Alb: 3.8 g/dL / Pro: 8.9 g/dL / ALK PHOS: 106 U/L / ALT: 10 U/L / AST: 10 U/L / GGT: x             MICROBIOLOGY:  RECENT CULTURES:            RADIOLOGY REVIEWED:    
Date of service: 08-31-21 @ 22:43      Patient is a 76y old  Male who presents with a chief complaint of right leg pain (31 Aug 2021 10:28)                                                               INTERVAL HPI/OVERNIGHT EVENTS:    REVIEW OF SYSTEMS:     CONSTITUTIONAL: No weakness, fevers or chills  EYES/ENT: No visual changes , no ear ache   NECK: No pain or stiffness  RESPIRATORY: No cough, wheezing,  No shortness of breath  CARDIOVASCULAR: No chest pain or palpitations  GASTROINTESTINAL: No abdominal pain  . No nausea, vomiting, or hematemesis; No diarrhea or constipation. No melena or hematochezia.  GENITOURINARY: No dysuria, frequency or hematuria  NEUROLOGICAL: No numbness or weakness  SKIN: No itching, burning, rashes, or lesions                                                                                                                                                                                                                                                                                 Medications:  MEDICATIONS  (STANDING):  allopurinol 100 milliGRAM(s) Oral daily  folic acid 1 milliGRAM(s) Oral daily  gabapentin 200 milliGRAM(s) Oral daily  heparin   Injectable 7500 Unit(s) SubCutaneous every 8 hours  hydrocortisone 10 milliGRAM(s) Oral <User Schedule>  lidocaine   5% Patch 1 Patch Transdermal daily  midodrine 30 milliGRAM(s) Oral every 8 hours  midodrine. 10 milliGRAM(s) Oral <User Schedule>  multivitamin 1 Tablet(s) Oral daily  pantoprazole  Injectable 40 milliGRAM(s) IV Push two times a day  polyethylene glycol 3350 17 Gram(s) Oral two times a day  sevelamer carbonate 800 milliGRAM(s) Oral three times a day with meals  simethicone 80 milliGRAM(s) Chew three times a day  tiotropium 18 MICROgram(s) Capsule 1 Capsule(s) Inhalation daily    MEDICATIONS  (PRN):  acetaminophen   Tablet .. 650 milliGRAM(s) Oral every 6 hours PRN Moderate Pain (4 - 6)  albuterol/ipratropium for Nebulization 3 milliLiter(s) Nebulizer every 6 hours PRN Shortness of Breath and/or Wheezing       Allergies    latex (Rash)  No Known Drug Allergies    Intolerances      Vital Signs Last 24 Hrs  T(C): 37.2 (31 Aug 2021 20:39), Max: 37.2 (31 Aug 2021 20:39)  T(F): 99 (31 Aug 2021 20:39), Max: 99 (31 Aug 2021 20:39)  HR: 79 (31 Aug 2021 20:39) (54 - 81)  BP: 115/70 (31 Aug 2021 20:39) (114/60 - 122/66)  BP(mean): --  RR: 18 (31 Aug 2021 20:39) (16 - 18)  SpO2: 97% (31 Aug 2021 20:39) (96% - 100%)  CAPILLARY BLOOD GLUCOSE          08-30 @ 07:01  -  08-31 @ 07:00  --------------------------------------------------------  IN: 600 mL / OUT: 2400 mL / NET: -1800 mL      Physical Exam:    Daily     Daily   General:  Well appearing, NAD, not cachetic  HEENT:  Nonicteric, PERRLA  CV:  RRR, S1S2   Lungs:  CTA B/L, no wheezes, rales, rhonchi  Abdomen:  Soft, non-tender, no distended, positive BS  Extremities:  edema   Neuro:  AAOx3, non-focal, grossly intact                                                                                                                                                                                                                                                                                                LABS:                               11.5   9.95  )-----------( 187      ( 31 Aug 2021 06:45 )             37.3                      08-31    135  |  93<L>  |  71<H>  ----------------------------<  89  5.3   |  26  |  10.26<H>    Ca    9.5      31 Aug 2021 06:45                         RADIOLOGY & ADDITIONAL TESTS         I personally reviewed: [  ]EKG   [  ]CXR    [  ] CT      A/P:         Discussed with :     Yael consultants' Notes   Time spent :  
    Chief complaint    Patient is a 76y old  Male who presents with a chief complaint of right leg pain (30 Aug 2021 22:29)   Review of systems  Patient in bed, appears comfortable.    Labs and Fingersticks  CAPILLARY BLOOD GLUCOSE          Anion Gap, Serum: 16 *H* (08-31 @ 06:45)  Anion Gap, Serum: 18 *H* (08-30 @ 07:13)      Calcium, Total Serum: 9.5 (08-31 @ 06:45)  Calcium, Total Serum: 9.1 (08-30 @ 07:13)          08-31    135  |  93<L>  |  71<H>  ----------------------------<  89  5.3   |  26  |  10.26<H>    Ca    9.5      31 Aug 2021 06:45                          11.5   9.95  )-----------( 187      ( 31 Aug 2021 06:45 )             37.3     Medications  MEDICATIONS  (STANDING):  allopurinol 100 milliGRAM(s) Oral daily  folic acid 1 milliGRAM(s) Oral daily  gabapentin 200 milliGRAM(s) Oral daily  heparin   Injectable 7500 Unit(s) SubCutaneous every 8 hours  hydrocortisone 20 milliGRAM(s) Oral daily  hydrocortisone 10 milliGRAM(s) Oral daily  lidocaine   5% Patch 1 Patch Transdermal daily  midodrine 30 milliGRAM(s) Oral every 8 hours  midodrine. 10 milliGRAM(s) Oral <User Schedule>  multivitamin 1 Tablet(s) Oral daily  pantoprazole  Injectable 40 milliGRAM(s) IV Push two times a day  polyethylene glycol 3350 17 Gram(s) Oral two times a day  sevelamer carbonate 800 milliGRAM(s) Oral three times a day with meals  simethicone 80 milliGRAM(s) Chew three times a day  tiotropium 18 MICROgram(s) Capsule 1 Capsule(s) Inhalation daily      Physical Exam  General: Patient comfortable in bed  Vital Signs Last 12 Hrs  T(F): 98.7 (08-31-21 @ 07:18), Max: 98.7 (08-31-21 @ 07:18)  HR: 56 (08-31-21 @ 08:15) (54 - 78)  BP: 122/66 (08-31-21 @ 07:18) (114/60 - 122/66)  BP(mean): --  RR: 17 (08-31-21 @ 07:18) (17 - 17)  SpO2: 96% (08-31-21 @ 08:15) (96% - 100%)  Neck: No palpable thyroid nodules.  CVS: S1S2, No murmurs  Respiratory: No wheezing, no crepitations  GI: Abdomen soft, bowel sounds positive  Musculoskeletal:  edema lower extremities.     Diagnostics          
  Chief Complaint:  Patient is a 76y old  Male who presents with a chief complaint of right leg pain (28 Aug 2021 13:06)          Interval Events:   no abd pain/GI bleeding  Hospital Medications:  acetaminophen   Tablet .. 650 milliGRAM(s) Oral every 6 hours PRN  albuterol/ipratropium for Nebulization 3 milliLiter(s) Nebulizer every 6 hours PRN  allopurinol 100 milliGRAM(s) Oral daily  folic acid 1 milliGRAM(s) Oral daily  gabapentin 200 milliGRAM(s) Oral daily  heparin   Injectable 7500 Unit(s) SubCutaneous every 8 hours  hydrocortisone sodium succinate Injectable 25 milliGRAM(s) IV Push every 8 hours  lidocaine   5% Patch 1 Patch Transdermal daily  midodrine 30 milliGRAM(s) Oral every 8 hours  midodrine. 10 milliGRAM(s) Oral <User Schedule>  multivitamin 1 Tablet(s) Oral daily  mupirocin 2% Ointment 1 Application(s) Topical every 12 hours  pantoprazole  Injectable 40 milliGRAM(s) IV Push two times a day  polyethylene glycol 3350 17 Gram(s) Oral two times a day  sevelamer carbonate 800 milliGRAM(s) Oral three times a day with meals  simethicone 80 milliGRAM(s) Chew three times a day  tiotropium 18 MICROgram(s) Capsule 1 Capsule(s) Inhalation daily        Review of Systems:  General:  No wt loss, fevers, chills, night sweats, fatigue,   Eyes:  Good vision, no reported pain  ENT:  No sore throat, pain, runny nose, dysphagia  CV:  No pain, palpitations, hypo/hypertension  Resp:  No dyspnea, cough, tachypnea, wheezing  GI:  See HPI  :  No pain, bleeding, incontinence, nocturia  Muscle:  No pain, weakness  Neuro:  No weakness, tingling, memory problems  Psych:  No fatigue, insomnia, mood problems, depression  Endocrine:  No polyuria, polydipsia, cold/heat intolerance  Heme:  No petechiae, ecchymosis, easy bruisability  Integumentary:  No rash, edema    PHYSICAL EXAM:   Vital Signs:  Vital Signs Last 24 Hrs  T(C): 36.4 (28 Aug 2021 12:55), Max: 36.7 (28 Aug 2021 06:12)  T(F): 97.5 (28 Aug 2021 12:55), Max: 98 (28 Aug 2021 06:12)  HR: 75 (28 Aug 2021 15:40) (59 - 100)  BP: 125/71 (28 Aug 2021 15:40) (91/61 - 126/63)  BP(mean): --  RR: 18 (28 Aug 2021 12:55) (18 - 18)  SpO2: 97% (28 Aug 2021 12:55) (97% - 99%)  Daily     Daily Weight in k.7 (27 Aug 2021 22:30)      PHYSICAL EXAM:     GENERAL:  Appears stated age, well-groomed, well-nourished, no distress  HEENT:  NC/AT,  conjunctivae anicteric, clear and pink,   NECK: supple, trachea midline  CHEST:  Full & symmetric excursion, no increased effort, breath sounds clear  HEART:  Regular rhythm, no JVD  ABDOMEN:  Soft, non-tender, non-distended, normoactive bowel sounds,  no masses , no hepatosplenomegaly  EXTREMITIES:  no cyanosis,clubbing or edema  SKIN:  No rash, erythema, or, ecchymoses, no jaundice  NEURO:  Alert, non-focal, no asterixis  PSYCH: Appropriate affect, oriented to place and time  RECTAL: Deferred      LABS Personally reviewed by me:                        12.2   11.49 )-----------( 197      ( 27 Aug 2021 07:23 )             40.0       08-27    134<L>  |  91<L>  |  65<H>  ----------------------------<  82  5.3   |  21<L>  |  10.77<H>    Ca    9.2      27 Aug 2021 07:23  Phos  6.6     0827  Mg     2.50     08-27    TPro  8.9<H>  /  Alb  3.8  /  TBili  0.3  /  DBili  x   /  AST  10  /  ALT  10  /  AlkPhos  106  0827    LIVER FUNCTIONS - ( 27 Aug 2021 07:23 )  Alb: 3.8 g/dL / Pro: 8.9 g/dL / ALK PHOS: 106 U/L / ALT: 10 U/L / AST: 10 U/L / GGT: x                                       12.2   11.49 )-----------( 197      ( 27 Aug 2021 07:23 )             40.0                         12.1   11.22 )-----------( 168      ( 26 Aug 2021 08:33 )             39.2       Imaging personally reviewed by me:          
CC: f/u for hypotension     Patient is awake and alert he is seated up in chair, he reports feeling OK, no fevers     REVIEW OF SYSTEMS:  All other review of systems negative (Comprehensive ROS)    Antimicrobials Day #  :    Other Medications Reviewed    Vital Signs Last 24 Hrs  T(C): 36.7 (01 Sep 2021 13:09), Max: 37.2 (31 Aug 2021 20:39)  T(F): 98 (01 Sep 2021 13:09), Max: 99 (31 Aug 2021 20:39)  HR: 74 (01 Sep 2021 16:02) (60 - 79)  BP: 108/79 (01 Sep 2021 13:09) (108/79 - 157/82)  BP(mean): --  RR: 18 (01 Sep 2021 13:09) (17 - 18)  SpO2: 96% (01 Sep 2021 16:02) (96% - 100%)    PHYSICAL EXAM:  General: alert, no acute distress  Eyes:  anicteric, no conjunctival injection, no discharge  Lungs: clear to auscultation  Heart: regular rate and rhythm; no murmurs  Abdomen: soft, nondistended, nontender  Extremities: + Bilateral LE  edema. Left forearm AVF +  Neurologic: alert, oriented, moves all extremities    LAB RESULTS:                                   11.5   9.95  )-----------( 187      ( 31 Aug 2021 06:45 )             37.3     09-01    132<L>  |  90<L>  |  85<H>  ----------------------------<  90  4.7   |  22  |  11.76<H>    Ca    9.5      01 Sep 2021 07:03  Phos  5.6     09-01  Mg     2.20     09-01              MICROBIOLOGY:  RECENT CULTURES:            RADIOLOGY REVIEWED:    
Covering for Dr. Mcgee 8/28-8/29  Sitting up in bed  No new symptoms overnight    Vital Signs Last 24 Hrs  T(C): 36.7 (29 Aug 2021 06:43), Max: 37.2 (28 Aug 2021 22:10)  T(F): 98 (29 Aug 2021 06:43), Max: 98.9 (28 Aug 2021 22:10)  HR: 61 (29 Aug 2021 06:43) (51 - 91)  BP: 108/58 (29 Aug 2021 06:43) (91/61 - 140/91)  BP(mean): --  RR: 17 (29 Aug 2021 06:43) (17 - 18)  SpO2: 98% (29 Aug 2021 06:43) (97% - 100%)    I&O's Summary      Physical Exam:    General: NAD   HEENT:  Nonicteric, PERRLA  CV:  RRR, S1S2   Lungs:  CTA B/L, no wheezes, rales, rhonchi  Abdomen:  Soft, non-tender, no distended, positive BS  Extremities:  non pitting  edema   Neuro:  AAOx3, non-focal, grossly intact                                                                                                                                                                                                                                                                                            LABS:            CAPILLARY BLOOD GLUCOSE                RADIOLOGY & ADDITIONAL TESTS:    Imaging Personally Reviewed:  [x] YES  [ ] NO    Will obtain old records:  [ ] YES  [x] NO          
Date of service: 21 @ 22:33      Patient is a 76y old  Male who presents with a chief complaint of right leg pain (01 Sep 2021 18:43)                                                               INTERVAL HPI/OVERNIGHT EVENTS:    REVIEW OF SYSTEMS:     CONSTITUTIONAL: No weakness, fevers or chills  EYES/ENT: No visual changes , no ear ache   NECK: No pain or stiffness  RESPIRATORY: No cough, wheezing,  No shortness of breath  CARDIOVASCULAR: No chest pain or palpitations  GASTROINTESTINAL: No abdominal pain  . No nausea, vomiting, or hematemesis; No diarrhea or constipation. No melena or hematochezia.  GENITOURINARY: No dysuria, frequency or hematuria  NEUROLOGICAL: No numbness or weakness  SKIN: No itching, burning, rashes, or lesions                                                                                                                                                                                                                                                                                 Medications:  MEDICATIONS  (STANDING):  allopurinol 100 milliGRAM(s) Oral daily  folic acid 1 milliGRAM(s) Oral daily  gabapentin 200 milliGRAM(s) Oral daily  heparin   Injectable 7500 Unit(s) SubCutaneous every 8 hours  lidocaine   5% Patch 1 Patch Transdermal daily  lidocaine/prilocaine Cream 1 Application(s) Topical <User Schedule>  midodrine 30 milliGRAM(s) Oral every 8 hours  midodrine. 10 milliGRAM(s) Oral <User Schedule>  multivitamin 1 Tablet(s) Oral daily  pantoprazole  Injectable 40 milliGRAM(s) IV Push two times a day  polyethylene glycol 3350 17 Gram(s) Oral two times a day  sevelamer carbonate 800 milliGRAM(s) Oral three times a day with meals  simethicone 80 milliGRAM(s) Chew three times a day  tiotropium 18 MICROgram(s) Capsule 1 Capsule(s) Inhalation daily    MEDICATIONS  (PRN):  acetaminophen   Tablet .. 650 milliGRAM(s) Oral every 6 hours PRN Moderate Pain (4 - 6)  albuterol/ipratropium for Nebulization 3 milliLiter(s) Nebulizer every 6 hours PRN Shortness of Breath and/or Wheezing       Allergies    latex (Rash)  No Known Drug Allergies    Intolerances      Vital Signs Last 24 Hrs  T(C): 36.6 (01 Sep 2021 21:22), Max: 36.7 (01 Sep 2021 13:09)  T(F): 97.8 (01 Sep 2021 21:22), Max: 98 (01 Sep 2021 13:09)  HR: 77 (01 Sep 2021 21:22) (60 - 79)  BP: 108/59 (01 Sep 2021 21:22) (108/59 - 157/82)  BP(mean): --  RR: 18 (01 Sep 2021 21:22) (17 - 18)  SpO2: 100% (01 Sep 2021 21:22) (96% - 100%)  CAPILLARY BLOOD GLUCOSE           @ 07:01  -   @ 22:33  --------------------------------------------------------  IN: 600 mL / OUT: 2200 mL / NET: -1600 mL      Physical Exam:    Daily     Daily Weight in k.5 (01 Sep 2021 10:05)  General:  Well appearing, NAD, not cachetic  HEENT:  Nonicteric, PERRLA  CV:  RRR, S1S2   Lungs:  CTA B/L, no wheezes, rales, rhonchi  Abdomen:  Soft, non-tender, no distended, positive BS  Extremities:  2+ pulses, no c/c, no edema  Skin:  Warm and dry, no rashes  :  No moore  Neuro:  AAOx3, non-focal, grossly intact                                                                                                                                                                                                                                                                                                LABS:                               11.5   9.95  )-----------( 187      ( 31 Aug 2021 06:45 )             37.3                          132<L>  |  90<L>  |  85<H>  ----------------------------<  90  4.7   |  22  |  11.76<H>    Ca    9.5      01 Sep 2021 07:03  Phos  5.6     09-  Mg     2.20     09-                         RADIOLOGY & ADDITIONAL TESTS         I personally reviewed: [  ]EKG   [  ]CXR    [  ] CT      A/P:         Discussed with :     Yael consultants' Notes   Time spent :  
Interval Events:   -coming down on pressors     HPI:  77yo M PMHx ESRD on HD via AV fistula LUE MWF, COPD (on 2L home O2), CHF, pulm HTN, known hypotension on home midodrine, DVT s/p IVC filter p/w R leg pain that started this morning. States it is a burning pain, thinks he can feels "knots" in his leg. 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 He also endorses generalized abdominal discomfort associated with constipation since Friday. Spoke with wife with patient, his baseline blood pressure is systolic in the 70s to 80s, and on dialysis days it is usually in the 60s. He checks his BP everyday. The wife notes that over the past 2 weeks it has been consistently low in the 70s. Otherwise denies other pain in the body, f/c, n/v, CP, SOB at rest, dysuria.    In the ED, , lowest BP 66/44, 84% RA improved to 99% on 2L NC, not tachypneic. WBC 12.7, bicarb 21 gap 25, VBG pH 7.32, lactate 3.4, CXR negative. CT a/p showed Bilateral femoral, external iliac, and common iliac veins are distended, concerning for DVT. S/p midodrine (home med), 500 cc bolus, started on levophed. (22 Aug 2021 15:32)      REVIEW OF SYSTEMS:  Constitutional: [ x] negative [ ] fevers [ ] chills [ ] weight loss [ ] weight gain  HEENT: [ x] negative [ ] dry eyes [ ] eye irritation [ ] postnasal drip [ ] nasal congestion  CV: [x ] negative  [ ] chest pain [ ] orthopnea [ ] palpitations [ ] murmur  Resp: [x ] negative [ ] cough [ ] shortness of breath [ ] dyspnea [ ] wheezing [ ] sputum [ ] hemoptysis  GI: [x ] negative [ ] nausea [ ] vomiting [ ] diarrhea [ ] constipation [ ] abd pain [ ] dysphagia   : [x ] negative [ ] dysuria [ ] nocturia [ ] hematuria [ ] increased urinary frequency  Musculoskeletal: [ ] negative [x ] back pain [ ] myalgias [ ] arthralgias [ ] fracture  Skin: [x ] negative [ ] rash [ ] itch  Neurological: [x ] negative [ ] headache [ ] dizziness [ ] syncope [ ] weakness [ ] numbness  Psychiatric: [x ] negative [ ] anxiety [ ] depression  Endocrine: [x ] negative [ ] diabetes [ ] thyroid problem  Hematologic/Lymphatic: [x ] negative [ ] anemia [ ] bleeding problem  Allergic/Immunologic: [x ] negative [ ] itchy eyes [ ] nasal discharge [ ] hives [ ] angioedema  [ ] All other systems negative  [ ] Unable to assess ROS because ________    OBJECTIVE:  ICU Vital Signs Last 24 Hrs  T(C): 36.2 (23 Aug 2021 12:00), Max: 36.2 (22 Aug 2021 20:00)  T(F): 97.1 (23 Aug 2021 12:00), Max: 97.1 (22 Aug 2021 20:00)  HR: 64 (23 Aug 2021 16:00) (62 - 100)  BP: 87/51 (23 Aug 2021 16:00) (87/51 - 136/87)  BP(mean): 59 (23 Aug 2021 16:00) (58 - 97)  ABP: --  ABP(mean): --  RR: 23 (23 Aug 2021 16:00) (16 - 25)  SpO2: 97% (23 Aug 2021 16:00) (92% - 100%)        08-22 @ 07:01  -  08-23 @ 07:00  --------------------------------------------------------  IN: 215.3 mL / OUT: 0 mL / NET: 215.3 mL    08-23 @ 07:01  -  08-23 @ 17:10  --------------------------------------------------------  IN: 887.6 mL / OUT: 0 mL / NET: 887.6 mL      CAPILLARY BLOOD GLUCOSE          Physical Exam:   Constitutional:  no acute distress   HEENT: + PERRLA, EOMI, no drainage or redness  Neck: supple,  No JVD  Respiratory:  dimished breath Sounds equal & clear bilaterally to auscultation, no accessory muscle use noted  Cardiovascular: Regular rate, regular rhythm, normal S1, S2; no murmurs or rub  Gastrointestinal: obese, soft, non-tender, non distended, no hepatosplenomegaly, normal bowel sounds  Extremities: + 2  edema to lower extremities, no cyanosis, no clubbing   Vascular: Equal and normal pulses: 2+ peripheral pulses throughout  Neurological: alert and oriented   Psychiatric: calm, normal mood, normal affect  Musculoskeletal: No joint swelling or deformity; no limitation of movement  Skin: mild redness to lower extremities R>L    HOSPITAL MEDICATIONS:  Standing Meds:  allopurinol 100 milliGRAM(s) Oral daily  chlorhexidine 4% Liquid 1 Application(s) Topical <User Schedule>  folic acid 1 milliGRAM(s) Oral daily  gabapentin 200 milliGRAM(s) Oral daily  heparin   Injectable 7500 Unit(s) SubCutaneous every 8 hours  hydrocortisone sodium succinate Injectable 100 milliGRAM(s) IV Push every 8 hours  lidocaine   5% Patch 1 Patch Transdermal daily  midodrine 30 milliGRAM(s) Oral every 8 hours  midodrine. 5 milliGRAM(s) Oral <User Schedule>  multivitamin 1 Tablet(s) Oral daily  norepinephrine Infusion 0.05 MICROgram(s)/kG/Min IV Continuous <Continuous>  pantoprazole    Tablet 40 milliGRAM(s) Oral before breakfast  piperacillin/tazobactam IVPB.. 3.375 Gram(s) IV Intermittent every 12 hours  polyethylene glycol 3350 17 Gram(s) Oral daily  sevelamer carbonate 800 milliGRAM(s) Oral three times a day with meals  simethicone 80 milliGRAM(s) Chew three times a day  tiotropium 18 MICROgram(s) Capsule 1 Capsule(s) Inhalation daily      PRN Meds:  acetaminophen   Tablet .. 650 milliGRAM(s) Oral every 6 hours PRN  albuterol/ipratropium for Nebulization 3 milliLiter(s) Nebulizer every 6 hours PRN      LABS:                        11.7   12.16 )-----------( 138      ( 23 Aug 2021 02:35 )             38.6     Hgb Trend: 11.7<--, 12.5<--, 13.1<--  08-23    133<L>  |  91<L>  |  53<H>  ----------------------------<  119<H>  4.6   |  17<L>  |  11.61<H>    Ca    9.1      23 Aug 2021 02:35  Phos  7.1     08-23  Mg     2.40     08-23    TPro  9.1<H>  /  Alb  4.0  /  TBili  0.4  /  DBili  x   /  AST  12  /  ALT  10  /  AlkPhos  141<H>  08-23    Creatinine Trend: 11.61<--, 11.27<--, 11.17<--  PT/INR - ( 22 Aug 2021 08:39 )   PT: 14.3 sec;   INR: 1.27 ratio         PTT - ( 22 Aug 2021 08:39 )  PTT:27.2 sec      Venous Blood Gas:  08-23 @ 02:35  7.27/56/33/26/48.9  VBG Lactate: 1.4  Venous Blood Gas:  08-22 @ 20:16  7.25/56/24/25/31.0  VBG Lactate: 2.6  Venous Blood Gas:  08-22 @ 08:39  7.32/53/24/27/27.6  VBG Lactate: 3.4      MICROBIOLOGY:     Culture - Blood (collected 22 Aug 2021 10:38)  Source: .Blood Blood-Peripheral  Preliminary Report (23 Aug 2021 11:02):    No growth to date.    Culture - Blood (collected 22 Aug 2021 10:38)  Source: .Blood Blood-Peripheral  Preliminary Report (23 Aug 2021 11:02):    No growth to date.      RADIOLOGY:  [ ] Reviewed and interpreted by me      
Interval Events:  -IV infiltration with norepinephrine   -tolerated HD     HPI:  75yo M PMHx ESRD on HD via AV fistula LUE MWF, COPD (on 2L home O2), CHF, pulm HTN, known hypotension on home midodrine, DVT s/p IVC filter p/w R leg pain that started this morning. States it is a burning pain, thinks he can feels "knots" in his leg. 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 He also endorses generalized abdominal discomfort associated with constipation since Friday. Spoke with wife with patient, his baseline blood pressure is systolic in the 70s to 80s, and on dialysis days it is usually in the 60s. He checks his BP everyday. The wife notes that over the past 2 weeks it has been consistently low in the 70s. Otherwise denies other pain in the body, f/c, n/v, CP, SOB at rest, dysuria.    In the ED, , lowest BP 66/44, 84% RA improved to 99% on 2L NC, not tachypneic. WBC 12.7, bicarb 21 gap 25, VBG pH 7.32, lactate 3.4, CXR negative. CT a/p showed Bilateral femoral, external iliac, and common iliac veins are distended, concerning for DVT. S/p midodrine (home med), 500 cc bolus, started on levophed. (22 Aug 2021 15:32)    REVIEW OF SYSTEMS:  Constitutional: [ x] negative [ ] fevers [ ] chills [ ] weight loss [ ] weight gain  HEENT: [ x] negative [ ] dry eyes [ ] eye irritation [ ] postnasal drip [ ] nasal congestion  CV: [x ] negative  [ ] chest pain [ ] orthopnea [ ] palpitations [ ] murmur  Resp: [x ] negative [ ] cough [ ] shortness of breath [ ] dyspnea [ ] wheezing [ ] sputum [ ] hemoptysis  GI: [x ] negative [ ] nausea [ ] vomiting [ ] diarrhea [ ] constipation [ ] abd pain [ ] dysphagia   : [x ] negative [ ] dysuria [ ] nocturia [ ] hematuria [ ] increased urinary frequency  Musculoskeletal: [ ] negative [x ] back pain [ ] myalgias [ ] arthralgias [ ] fracture  Skin: [x ] negative [ ] rash [ ] itch  Neurological: [x ] negative [ ] headache [ ] dizziness [ ] syncope [ ] weakness [ ] numbness  Psychiatric: [x ] negative [ ] anxiety [ ] depression  Endocrine: [x ] negative [ ] diabetes [ ] thyroid problem  Hematologic/Lymphatic: [x ] negative [ ] anemia [ ] bleeding problem  Allergic/Immunologic: [x ] negative [ ] itchy eyes [ ] nasal discharge [ ] hives [ ] angioedema  [ ] All other systems negative  [ ] Unable to assess ROS because ________          OBJECTIVE:  ICU Vital Signs Last 24 Hrs  T(C): 36.5 (24 Aug 2021 08:00), Max: 36.7 (23 Aug 2021 19:35)  T(F): 97.7 (24 Aug 2021 08:00), Max: 98.1 (24 Aug 2021 00:00)  HR: 69 (24 Aug 2021 10:00) (60 - 99)  BP: 109/57 (24 Aug 2021 10:00) (75/54 - 140/64)  BP(mean): 69 (24 Aug 2021 10:00) (49 - 83)  ABP: --  ABP(mean): --  RR: 19 (24 Aug 2021 10:00) (17 - 24)  SpO2: 100% (24 Aug 2021 10:00) (88% - 100%)        08-23 @ 07:01 - 08-24 @ 07:00  --------------------------------------------------------  IN: 2199.1 mL / OUT: 1900 mL / NET: 299.1 mL    08-24 @ 07:01  -  08-24 @ 12:51  --------------------------------------------------------  IN: 150 mL / OUT: 0 mL / NET: 150 mL      CAPILLARY BLOOD GLUCOSE      Physical Exam:   Constitutional:  no acute distress   HEENT: + PERRLA, EOMI, no drainage or redness  Neck: supple,  No JVD  Respiratory:  diminished breath Sounds equal & clear bilaterally to auscultation, no accessory muscle use noted  Cardiovascular: Regular rate, regular rhythm, normal S1, S2; no murmurs or rub  Gastrointestinal: obese, soft, non-tender, non distended, no hepatosplenomegaly, normal bowel sounds  Extremities: + 2  edema to lower extremities, no cyanosis, no clubbing   Vascular: Equal and normal pulses: 2+ peripheral pulses throughout  Neurological: alert and oriented   Psychiatric: calm, normal mood, normal affect  Musculoskeletal: No joint swelling or deformity; no limitation of movement  Skin: mild redness to lower extremities R>L          HOSPITAL MEDICATIONS:  Standing Meds:  allopurinol 100 milliGRAM(s) Oral daily  chlorhexidine 4% Liquid 1 Application(s) Topical <User Schedule>  folic acid 1 milliGRAM(s) Oral daily  gabapentin 200 milliGRAM(s) Oral daily  heparin   Injectable 7500 Unit(s) SubCutaneous every 8 hours  hydrocortisone sodium succinate Injectable 50 milliGRAM(s) IV Push every 8 hours  lidocaine   5% Patch 1 Patch Transdermal daily  lidocaine/prilocaine Cream 1 Application(s) Topical once  midodrine 30 milliGRAM(s) Oral every 8 hours  midodrine. 10 milliGRAM(s) Oral <User Schedule>  multivitamin 1 Tablet(s) Oral daily  pantoprazole    Tablet 40 milliGRAM(s) Oral before breakfast  piperacillin/tazobactam IVPB.. 3.375 Gram(s) IV Intermittent every 12 hours  polyethylene glycol 3350 17 Gram(s) Oral two times a day  sevelamer carbonate 800 milliGRAM(s) Oral three times a day with meals  simethicone 80 milliGRAM(s) Chew three times a day  tiotropium 18 MICROgram(s) Capsule 1 Capsule(s) Inhalation daily      PRN Meds:  acetaminophen   Tablet .. 650 milliGRAM(s) Oral every 6 hours PRN  albuterol/ipratropium for Nebulization 3 milliLiter(s) Nebulizer every 6 hours PRN      LABS:                        11.8   12.09 )-----------( 139      ( 24 Aug 2021 01:47 )             39.3     Hgb Trend: 11.8<--, 11.7<--, 12.5<--, 13.1<--  08-24    135  |  94<L>  |  43<H>  ----------------------------<  147<H>  4.1   |  21<L>  |  8.81<H>    Ca    9.3      24 Aug 2021 01:47  Phos  4.7     08-24  Mg     2.30     08-24    TPro  9.1<H>  /  Alb  3.9  /  TBili  0.3  /  DBili  x   /  AST  12  /  ALT  11  /  AlkPhos  135<H>  08-24    Creatinine Trend: 8.81<--, 11.61<--, 11.27<--, 11.17<--        Venous Blood Gas:  08-23 @ 02:35  7.27/56/33/26/48.9  VBG Lactate: 1.4  Venous Blood Gas:  08-22 @ 20:16  7.25/56/24/25/31.0  VBG Lactate: 2.6      MICROBIOLOGY:     Culture - Blood (collected 22 Aug 2021 10:38)  Source: .Blood Blood-Peripheral  Preliminary Report (23 Aug 2021 11:02):    No growth to date.    Culture - Blood (collected 22 Aug 2021 10:38)  Source: .Blood Blood-Peripheral  Preliminary Report (23 Aug 2021 11:02):    No growth to date.      RADIOLOGY:  [ ] Reviewed and interpreted by me      
===========================================  [facility] NEPHROLOGY CONSULT TEAM  -----------------------------------------------------------------------------    Jeremy Haley  Pls contact me via [employer] Teams today for any q's or c's about the patient!  After 5 pm or on weekends use [facility] for fellow on call    ============================================  [facility] DIVISION OF KIDNEY DISEASES AND HYPERTENSION -- FOLLOW UP NOTE  [facility] Fellow pager # [phone]  Nephrology office # [phone]  -----------------------------------------------------------------------------    75yo M PMHx ESRD on HD via AV fistula LUE MWF, COPD (on 2L home O2), CHF, pulm HTN, known hypotension on home midodrine, DVT s/p IVC filter p/w R leg pain that started this morning. States it is a burning pain, thinks he can feels "knots" in his leg. 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 He also endorses generalized abdominal discomfort associated with constipation since Friday. Spoke with wife with patient, his baseline blood pressure is systolic in the 70s to 80s, and on dialysis days it is usually in the 60s. He checks his BP everyday. The wife notes that over the past 2 weeks it has been consistently low in the 70s. Otherwise denies other pain in the body, f/c, n/v, CP, SOB at rest, dysuria.    8/26/2021  - Chart reviewed. Events noted. Pt seen and examined  - Pt with no complaints  - Wants to go home      ALLERGIES & MEDICATIONS  --------------------------------------------------------------------------------  Allergies    latex (Rash)  No Known Drug Allergies    Intolerances      MEDICATIONS  (STANDING):  allopurinol 100 milliGRAM(s) Oral daily  folic acid 1 milliGRAM(s) Oral daily  gabapentin 200 milliGRAM(s) Oral daily  heparin   Injectable 7500 Unit(s) SubCutaneous every 8 hours  hydrocortisone sodium succinate Injectable 50 milliGRAM(s) IV Push every 8 hours  lidocaine   5% Patch 1 Patch Transdermal daily  midodrine 30 milliGRAM(s) Oral every 8 hours  midodrine. 10 milliGRAM(s) Oral <User Schedule>  multivitamin 1 Tablet(s) Oral daily  mupirocin 2% Ointment 1 Application(s) Topical every 12 hours  pantoprazole    Tablet 40 milliGRAM(s) Oral before breakfast  piperacillin/tazobactam IVPB.. 3.375 Gram(s) IV Intermittent every 12 hours  polyethylene glycol 3350 17 Gram(s) Oral two times a day  sevelamer carbonate 800 milliGRAM(s) Oral three times a day with meals  simethicone 80 milliGRAM(s) Chew three times a day  tiotropium 18 MICROgram(s) Capsule 1 Capsule(s) Inhalation daily    MEDICATIONS  (PRN):  acetaminophen   Tablet .. 650 milliGRAM(s) Oral every 6 hours PRN Moderate Pain (4 - 6)  albuterol/ipratropium for Nebulization 3 milliLiter(s) Nebulizer every 6 hours PRN Shortness of Breath and/or Wheezing      VITALS/PHYSICAL EXAM  --------------------------------------------------------------------------------  Vital Signs Last 24 Hrs  T(C): 36.8 (26 Aug 2021 05:47), Max: 36.8 (26 Aug 2021 05:47)  T(F): 98.3 (26 Aug 2021 05:47), Max: 98.3 (26 Aug 2021 05:47)  HR: 57 (26 Aug 2021 05:47) (57 - 80)  BP: 122/65 (26 Aug 2021 05:47) (94/50 - 126/67)  BP(mean): --  RR: 16 (26 Aug 2021 05:47) (16 - 18)  SpO2: 99% (26 Aug 2021 05:47) (96% - 100%)    Physical Exam:  	Gen NAD, obese   	HEENT: no JVD  	Pulm: CTABL  	CV: S1S2,  	Abd: Soft,   	Ext:   - edema B/L LE   	Neuro: Awake and alert  	Skin: Warm and dry          Vascular:  AVF + bruit        LABS/STUDIES  --------------------------------------------------------------------------------            08-26    133<L>  |  92<L>  |  49<H>  ----------------------------<  91  4.9   |  23  |  9.12<H>    Ca    9.2      26 Aug 2021 08:33  Phos  5.7     08-26  Mg     2.40     08-26    TPro  9.1<H>  /  Alb  3.8  /  TBili  0.2  /  DBili  x   /  AST  11  /  ALT  10  /  AlkPhos  110  08-26        =======================================================================  NEPHROLOGY CARE COORDINATION DOCUMENTATION  [x] Inpatient Consult  [ ] Other:  =======================================================================  DIALYSIS INFORMATION:  --- Dialysis schedule: MWF  --- Dialysis provider: ---  --- Dialysis unit: [facility]  --- Dialysis access: LUE AVF  --- Consent obtained for inpatient dialysis: [ X] YES  [ ] NO  ------------------------------------------------------------------------  DIAGNOSTIC REVIEW:  Labs reviewed for acid-base and electrolyte abnormalities: [X ] YES  [ ] NO  Labs reviewed for blood count/ iron derangements: [ X] YES  [ ] NO  ------------------------------------------------------------------------  MEDICATION REVIEW:  --- MELISSA indicated: [ ] YES  [X ] NO  --- PO4 binder: [X ] YES  [ ] NO  --- Vitamin supplementation: [X ] YES  [ ] NO  ------------------------------------------------------------------------  COORDINATION OF CARE:  --- Nephrology consulted for: Maintenance dialysis  --- Patient assessed: 8/26  --- Patient previously seen by Nephroogy: YES  ------------------------------------------------------------------------  CARE PROVIDER DOCUMENTATION:  --- PT: Rehab facility  --- MEDICINE: Notes reviewed  --- MICU: Notes reviewed  --- CM: Pt has home 02 uses 2L N/C, o2 supply by [employer]  equipment [phone]. Pt states he has enough supply at home  ------------------------------------------------------------------------  --- Chart reviewed: 30 Minutes [including time used to gather, review and transfer data to this note]  --- Start: 10:30  --- End: 11:00  Prolonged services rendered, as part of this patient's care provided by Nephrology, include: i.chart review for provider and ancillary service documentation, ii.pertinent diagnostics including laboratory and imaging studies,iii. medication review including PRN use, iv. admission history including previous encounters and notes. Part of Nephrology extended evaluation and management also involves coordination of care with our IDT, the primary and consulting teams. Recommendations based on the information gathered and discussed are outlined in the A&P of our notes.    ************************************************************************        
Covering for Dr. Mcgee 8/28-8/29  Sitting in chair  Breathing feels about the same as yesterday  No chest pain    Vital Signs Last 24 Hrs  T(C): 36.7 (28 Aug 2021 06:12), Max: 36.7 (27 Aug 2021 14:36)  T(F): 98 (28 Aug 2021 06:12), Max: 98.1 (27 Aug 2021 19:10)  HR: 67 (28 Aug 2021 06:12) (59 - 100)  BP: 119/68 (28 Aug 2021 06:12) (106/55 - 126/63)  BP(mean): --  RR: 18 (28 Aug 2021 06:12) (17 - 18)  SpO2: 97% (28 Aug 2021 06:12) (97% - 100%)    I&O's Summary    08-27-21 @ 07:01  -  08-28-21 @ 07:00  --------------------------------------------------------  IN: 500 mL / OUT: 711 mL / NET: -211 mL        Physical Exam:    General: NAD   HEENT:  Nonicteric, PERRLA  CV:  RRR, S1S2   Lungs:  CTA B/L, no wheezes, rales, rhonchi  Abdomen:  Soft, non-tender, no distended, positive BS  Extremities:  non pitting  edema   Neuro:  AAOx3, non-focal, grossly intact                                                                                                                                                                                                                                                                                            LABS:                        12.2   11.49 )-----------( 197      ( 27 Aug 2021 07:23 )             40.0     08-27    134<L>  |  91<L>  |  65<H>  ----------------------------<  82  5.3   |  21<L>  |  10.77<H>    Ca    9.2      27 Aug 2021 07:23  Phos  6.6     08-27  Mg     2.50     08-27    TPro  8.9<H>  /  Alb  3.8  /  TBili  0.3  /  DBili  x   /  AST  10  /  ALT  10  /  AlkPhos  106  08-27      CAPILLARY BLOOD GLUCOSE                RADIOLOGY & ADDITIONAL TESTS:    Imaging Personally Reviewed:  [x] YES  [ ] NO    Will obtain old records:  [ ] YES  [x] NO  
[facility] DIVISION OF KIDNEY DISEASES AND HYPERTENSION -- FOLLOW UP NOTE  MARCY Fellow pager # [phone]  Nephrology office # [phone]  -----------------------------------------------------------------------------    77yo M PMHx ESRD on HD via AV fistula LUE MWF, COPD (on 2L home O2), CHF, pulm HTN, known hypotension on home midodrine, DVT s/p IVC filter p/w R leg pain that started this morning. States it is a burning pain, thinks he can feels "knots" in his leg. 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 He also endorses generalized abdominal discomfort associated with constipation since Friday. Spoke with wife with patient, his baseline blood pressure is systolic in the 70s to 80s, and on dialysis days it is usually in the 60s. He checks his BP everyday. The wife notes that over the past 2 weeks it has been consistently low in the 70s. Otherwise denies other pain in the body, f/c, n/v, CP, SOB at rest, dysuria.    In the ED, , lowest BP 66/44, 84% RA improved to 99% on 2L NC, not tachypneic. WBC 12.7, bicarb 21 gap 25, VBG pH 7.32, lactate 3.4, CXR negative. CT a/p showed Bilateral femoral, external iliac, and common iliac veins are distended, concerning for DVT. S/p midodrine (home med), 500 cc bolus, started on levophed. (22 Aug 2021 15:32)          24 hour events:   s/p Hd yesterday on increased Midodrine dose. off Pressor today VISHNU systolic 80's     Hemodialysis Treatment.:     Schedule: Once, Modality: Hemodialysis, Access: Arteriovenous Fistula    Dialyzer: Revaclear 300, Time: 180 Min    Blood Flow: 400 mL/Min , Dialysate Flow: 500 mL/Min, Dialysate Temp: 35, Tubinmm (Adult)    Target Fluid Removal: 1 Liters    Dialysate Electrolytes (mEq/L):  Calcium 2.5, Bicarbonate 35    Potassium Protocol  -->For serum potassium LESS THAN or EQUAL TO 3.4, notify MD/PA/NP       For serum potassium 3.5 - 4, use 3K dialysate bath       For serum potassium 4.1 - 5.9, use 2K dialysate bath       For serum potassium GREATER THAN or EQUAL TO 6, notify MD/PA/NP    Sodium Modeling (mEq/L): Initial 145, Final 140, Last 30 Min, Gradient Linear    Additional Instructions: Keep SBP>90.  Pt has Hx of intradialytic hypotension, kindly give midodrine 5 mg po as needed (21 @ 15:26)        ALLERGIES & MEDICATIONS  --------------------------------------------------------------------------------  Allergies    latex (Rash)  No Known Drug Allergies    Intolerances      Standing Inpatient Medications  allopurinol 100 milliGRAM(s) Oral daily  folic acid 1 milliGRAM(s) Oral daily  gabapentin 200 milliGRAM(s) Oral daily  heparin   Injectable 7500 Unit(s) SubCutaneous every 8 hours  hydrocortisone sodium succinate Injectable 50 milliGRAM(s) IV Push every 8 hours  lidocaine   5% Patch 1 Patch Transdermal daily  lidocaine/prilocaine Cream 1 Application(s) Topical once  midodrine 30 milliGRAM(s) Oral every 8 hours  midodrine. 10 milliGRAM(s) Oral <User Schedule>  multivitamin 1 Tablet(s) Oral daily  pantoprazole    Tablet 40 milliGRAM(s) Oral before breakfast  piperacillin/tazobactam IVPB.. 3.375 Gram(s) IV Intermittent every 12 hours  polyethylene glycol 3350 17 Gram(s) Oral two times a day  sevelamer carbonate 800 milliGRAM(s) Oral three times a day with meals  simethicone 80 milliGRAM(s) Chew three times a day  tiotropium 18 MICROgram(s) Capsule 1 Capsule(s) Inhalation daily    PRN Inpatient Medications  acetaminophen   Tablet .. 650 milliGRAM(s) Oral every 6 hours PRN  albuterol/ipratropium for Nebulization 3 milliLiter(s) Nebulizer every 6 hours PRN      REVIEW OF SYSTEMS  --------------------------------------------------------------------------------    Gen: No  fevers/chills  Skin: No rashes  Respiratory: no SOB  CV: No chest pain,   GI: No abdominal pain  :  +oliguria   MSK: joint pain/   -swelling;     All other systems were reviewed and are negative, except as noted.      VITALS/PHYSICAL EXAM  --------------------------------------------------------------------------------  T(C): 36.5 (21 @ 08:00), Max: 36.7 (21 @ 19:35)  HR: 74 (21 @ 13:00) (60 - 99)  BP: 110/53 (21 @ 12:00) (75/54 - 140/64)  RR: 19 (21 @ 13:00) (17 - 23)  SpO2: 96% (21 @ 13:00) (88% - 100%)  Wt(kg): --    Weight (kg): 122.9 (21 @ 17:00)      21 @ 07:01  -  21 @ 07:00  --------------------------------------------------------  IN: 2199.1 mL / OUT: 1900 mL / NET: 299.1 mL    21 @ 07:01  -  21 @ 14:34  --------------------------------------------------------  IN: 150 mL / OUT: 0 mL / NET: 150 mL      Physical Exam:  	Gen NAD, Obese   	HEENT: no JVD  	Pulm: CTABL  	CV: S1S2,  	Abd: Soft,   	Ext:  + - edema B/L LE   	Neuro: Awake and alert          Vascular: AVF + bruit          LABS/STUDIES  --------------------------------------------------------------------------------              11.8   12.09 >-----------<  139      [21:47]              39.3     Hemoglobin: 11.8 g/dL (21:47)  Hemoglobin: 11.7 g/dL (21 @ 02:35)    Platelet Count - Automated: 139 K/uL (21:47)  Platelet Count - Automated: 138 K/uL (21 @ 02:35)    135  |  94  |  43  ----------------------------<  147      [21:47]  4.1   |  21  |  8.81        Ca     9.3     [21:47]      Mg     2.30     [21:47]      Phos  4.7     [21:47]    TPro  9.1  /  Alb  3.9  /  TBili  0.3  /  DBili  x   /  AST  12  /  ALT  11  /  AlkPhos  135  [21:47]          Creatinine, Serum: 8.81 mg/dL (21:47)  Creatinine, Serum: 11.61 mg/dL (21 @ 02:35)  Creatinine, Serum: 11.27 mg/dL (08-22-21 @ 20:20)  Creatinine, Serum: 11.17 mg/dL (21 @ 08:39)    SODIUM TREND:  Sodium 135 [:47]  Sodium 133 [ 02:35]  Sodium 133 [ 20:20]  Sodium 136 [ 08:39]        SCr 8.81 [:47]  SCr 11.61 [ 02:35]  SCr 11.27 [ 20:20]  SCr 11.17 [ @ 08:39]        HbA1c 5.0      [10-02-17 @ 15:38]  TSH 1.00      [21 @ 02:35]    HBsAb 107.0      [21 @ 09:47]  HBsAb Reactive      [21 @ :47]  HBsAg Nonreact      [21 @ :47]      
pt seen, overall feeling well    MEDICATIONS  (STANDING):  allopurinol 100 milliGRAM(s) Oral daily  folic acid 1 milliGRAM(s) Oral daily  gabapentin 200 milliGRAM(s) Oral daily  heparin   Injectable 7500 Unit(s) SubCutaneous every 8 hours  hydrocortisone 20 milliGRAM(s) Oral <User Schedule>  hydrocortisone 10 milliGRAM(s) Oral <User Schedule>  lidocaine   5% Patch 1 Patch Transdermal daily  lidocaine/prilocaine Cream 1 Application(s) Topical <User Schedule>  midodrine 30 milliGRAM(s) Oral every 8 hours  midodrine. 10 milliGRAM(s) Oral <User Schedule>  multivitamin 1 Tablet(s) Oral daily  pantoprazole  Injectable 40 milliGRAM(s) IV Push two times a day  polyethylene glycol 3350 17 Gram(s) Oral two times a day  sevelamer carbonate 800 milliGRAM(s) Oral three times a day with meals  simethicone 80 milliGRAM(s) Chew three times a day  tiotropium 18 MICROgram(s) Capsule 1 Capsule(s) Inhalation daily    MEDICATIONS  (PRN):  acetaminophen   Tablet .. 650 milliGRAM(s) Oral every 6 hours PRN Moderate Pain (4 - 6)  albuterol/ipratropium for Nebulization 3 milliLiter(s) Nebulizer every 6 hours PRN Shortness of Breath and/or Wheezing      ROS  No fever, sweats, chills  No epistaxis, HA, sore throat  No CP, SOB, cough, sputum  No n/v/d, abd pain, melena, hematochezia  No edema  No rash  No anxiety  No back pain, joint pain  No bleeding, bruising  No dysuria, hematuria      PE  NAD  Awake, alert  Anicteric, MMM  RRR  CTAB  Abd soft, NT, ND  No c/c/e  No rash grossly  FROM                          11.1   8.58  )-----------( 183      ( 03 Sep 2021 07:58 )             35.0                           11.5   9.95  )-----------( 187      ( 31 Aug 2021 06:45 )             37.3       09-01    132<L>  |  90<L>  |  85<H>  ----------------------------<  90  4.7   |  22  |  11.76<H>    Ca    9.5      01 Sep 2021 07:03  Phos  5.6     09-01  Mg     2.20     09-01        
pt seen, overall feeling well    MEDICATIONS  (STANDING):  allopurinol 100 milliGRAM(s) Oral daily  folic acid 1 milliGRAM(s) Oral daily  gabapentin 200 milliGRAM(s) Oral daily  heparin   Injectable 7500 Unit(s) SubCutaneous every 8 hours  hydrocortisone 20 milliGRAM(s) Oral <User Schedule>  hydrocortisone 10 milliGRAM(s) Oral <User Schedule>  lidocaine   5% Patch 1 Patch Transdermal daily  lidocaine/prilocaine Cream 1 Application(s) Topical <User Schedule>  midodrine 30 milliGRAM(s) Oral every 8 hours  midodrine. 10 milliGRAM(s) Oral <User Schedule>  multivitamin 1 Tablet(s) Oral daily  pantoprazole  Injectable 40 milliGRAM(s) IV Push two times a day  polyethylene glycol 3350 17 Gram(s) Oral two times a day  sevelamer carbonate 800 milliGRAM(s) Oral three times a day with meals  simethicone 80 milliGRAM(s) Chew three times a day  tiotropium 18 MICROgram(s) Capsule 1 Capsule(s) Inhalation daily    MEDICATIONS  (PRN):  acetaminophen   Tablet .. 650 milliGRAM(s) Oral every 6 hours PRN Moderate Pain (4 - 6)  albuterol/ipratropium for Nebulization 3 milliLiter(s) Nebulizer every 6 hours PRN Shortness of Breath and/or Wheezing      ROS  No fever, sweats, chills  No epistaxis, HA, sore throat  No CP, SOB, cough, sputum  No n/v/d, abd pain, melena, hematochezia  No edema  No rash  No anxiety  No back pain, joint pain  No bleeding, bruising  No dysuria, hematuria    Vital Signs Last 24 Hrs  T(C): 36.3 (01 Sep 2021 10:05), Max: 37.2 (31 Aug 2021 20:39)  T(F): 97.4 (01 Sep 2021 10:05), Max: 99 (31 Aug 2021 20:39)  HR: 60 (01 Sep 2021 10:05) (60 - 79)  BP: 124/76 (01 Sep 2021 10:05) (115/70 - 157/82)  BP(mean): --  RR: 17 (01 Sep 2021 10:05) (17 - 18)  SpO2: 100% (01 Sep 2021 06:30) (96% - 100%)    PE  NAD  Awake, alert  Anicteric, MMM  RRR  CTAB  Abd soft, NT, ND  No c/c/e  No rash grossly  FROM                          11.5   9.95  )-----------( 187      ( 31 Aug 2021 06:45 )             37.3       09-01    132<L>  |  90<L>  |  85<H>  ----------------------------<  90  4.7   |  22  |  11.76<H>    Ca    9.5      01 Sep 2021 07:03  Phos  5.6     09-01  Mg     2.20     09-01        
  Chief complaint    Patient is a 76y old  Male who presents with a chief complaint of right leg pain (01 Sep 2021 22:32)   Review of systems  Patient in bed, appears comfortable.    Labs and Fingersticks  CAPILLARY BLOOD GLUCOSE          Anion Gap, Serum: 17 *H* (09-02 @ 05:47)  Anion Gap, Serum: 20 *H* (09-01 @ 07:03)      Calcium, Total Serum: 9.6 (09-02 @ 05:47)  Calcium, Total Serum: 9.5 (09-01 @ 07:03)          09-02    134<L>  |  94<L>  |  59<H>  ----------------------------<  100<H>  4.5   |  23  |  9.51<H>    Ca    9.6      02 Sep 2021 05:47  Phos  5.3     09-02  Mg     2.00     09-02      Medications  MEDICATIONS  (STANDING):  allopurinol 100 milliGRAM(s) Oral daily  folic acid 1 milliGRAM(s) Oral daily  gabapentin 200 milliGRAM(s) Oral daily  heparin   Injectable 7500 Unit(s) SubCutaneous every 8 hours  lidocaine   5% Patch 1 Patch Transdermal daily  lidocaine/prilocaine Cream 1 Application(s) Topical <User Schedule>  midodrine 30 milliGRAM(s) Oral every 8 hours  midodrine. 10 milliGRAM(s) Oral <User Schedule>  multivitamin 1 Tablet(s) Oral daily  pantoprazole    Tablet 40 milliGRAM(s) Oral two times a day  polyethylene glycol 3350 17 Gram(s) Oral two times a day  sevelamer carbonate 800 milliGRAM(s) Oral three times a day with meals  simethicone 80 milliGRAM(s) Chew three times a day  tiotropium 18 MICROgram(s) Capsule 1 Capsule(s) Inhalation daily      Physical Exam  General: Patient comfortable in bed  Vital Signs Last 12 Hrs  T(F): 98.3 (09-02-21 @ 05:37), Max: 98.3 (09-02-21 @ 05:37)  HR: 72 (09-02-21 @ 07:16) (62 - 77)  BP: 128/58 (09-02-21 @ 05:37) (108/59 - 128/58)  BP(mean): --  RR: 18 (09-02-21 @ 05:37) (18 - 18)  SpO2: 97% (09-02-21 @ 07:16) (97% - 100%)  Neck: No palpable thyroid nodules.  CVS: S1S2, No murmurs  Respiratory: No wheezing, no crepitations  GI: Abdomen soft, bowel sounds positive  Musculoskeletal:  edema lower extremities.     Diagnostics          
[facility] Division of Kidney Diseases & Hypertension  FOLLOW UP NOTE  --------------------------------------------------------------------------------  Chief Complaint:    24 hour events/subjective:    dialyzed yesterday. tolerated well         PAST HISTORY  --------------------------------------------------------------------------------  No significant changes to PMH, PSH, FHx, SHx, unless otherwise noted    ALLERGIES & MEDICATIONS  --------------------------------------------------------------------------------  Allergies    latex (Rash)  No Known Drug Allergies    Intolerances      Standing Inpatient Medications  allopurinol 100 milliGRAM(s) Oral daily  folic acid 1 milliGRAM(s) Oral daily  gabapentin 200 milliGRAM(s) Oral daily  heparin   Injectable 7500 Unit(s) SubCutaneous every 8 hours  lidocaine   5% Patch 1 Patch Transdermal daily  lidocaine/prilocaine Cream 1 Application(s) Topical <User Schedule>  midodrine 30 milliGRAM(s) Oral every 8 hours  midodrine. 10 milliGRAM(s) Oral <User Schedule>  multivitamin 1 Tablet(s) Oral daily  pantoprazole    Tablet 40 milliGRAM(s) Oral two times a day  polyethylene glycol 3350 17 Gram(s) Oral two times a day  sevelamer carbonate 800 milliGRAM(s) Oral three times a day with meals  simethicone 80 milliGRAM(s) Chew three times a day  tiotropium 18 MICROgram(s) Capsule 1 Capsule(s) Inhalation daily    PRN Inpatient Medications  acetaminophen   Tablet .. 650 milliGRAM(s) Oral every 6 hours PRN  albuterol/ipratropium for Nebulization 3 milliLiter(s) Nebulizer every 6 hours PRN      VITALS/PHYSICAL EXAM  --------------------------------------------------------------------------------  T(C): 36.8 (09-02-21 @ 05:37), Max: 36.8 (09-02-21 @ 05:37)  HR: 69 (09-02-21 @ 10:33) (62 - 77)  BP: 128/58 (09-02-21 @ 05:37) (108/59 - 128/58)  RR: 18 (09-02-21 @ 05:37) (18 - 18)  SpO2: 97% (09-02-21 @ 10:33) (96% - 100%)  Wt(kg): --        09-01-21 @ 07:01  -  09-02-21 @ 07:00  --------------------------------------------------------  IN: 600 mL / OUT: 2200 mL / NET: -1600 mL      Physical Exam:  	Gen: NAD, well-appearing  	HEENT: PERRL, supple neck, clear oropharynx  	Pulm: CTA B/L  	CV: RRR, S1S2; no rub  	Back: No spinal or CVA tenderness; no sacral edema  	Abd: +BS, soft, nontender/nondistended  	: No suprapubic tenderness  	LE: Warm, FROM, no clubbing, intact strength; no edema  	Psych: Normal affect and mood  	Skin: Warm, without rashes      LABS/STUDIES  --------------------------------------------------------------------------------    134  |  94  |  59  ----------------------------<  100      [09-02-21 @ 05:47]  4.5   |  23  |  9.51        Ca     9.6     [09-02-21 @ 05:47]      Mg     2.00     [09-02-21 @ 05:47]      Phos  5.3     [09-02-21 @ 05:47]            Creatinine Trend:  SCr 9.51 [09-02 @ 05:47]  SCr 11.76 [09-01 @ 07:03]  SCr 10.26 [08-31 @ 06:45]  SCr 12.39 [08-30 @ 07:13]  SCr 10.77 [08-27 @ 07:23]          HBcAb Nonreact      [08-31-21 @ 00:12]  HCV 0.40, Nonreact      [08-31-21 @ 00:12]    
[facility] DIVISION OF KIDNEY DISEASES AND HYPERTENSION -- FOLLOW UP NOTE  --------------------------------------------------------------------------------  Chief Complaint: 75yo M PMHx ESRD on HD via AV fistula [facility] [place], COPD (on 2L home O2), CHF, pulm HTN, known hypotension on home midodrine, DVT s/p IVC filter p/w R leg pain on the day of admission (8/22/21).     he is s/p hemodialysis today. tolerated well     PAST HISTORY  --------------------------------------------------------------------------------  No significant changes to PMH, PSH, FHx, SHx, unless otherwise noted    ALLERGIES & MEDICATIONS  --------------------------------------------------------------------------------  Allergies    latex (Rash)  No Known Drug Allergies    Intolerances    Standing Inpatient Medications  allopurinol 100 milliGRAM(s) Oral daily  folic acid 1 milliGRAM(s) Oral daily  gabapentin 200 milliGRAM(s) Oral daily  heparin   Injectable 7500 Unit(s) SubCutaneous every 8 hours  hydrocortisone 20 milliGRAM(s) Oral daily  hydrocortisone 10 milliGRAM(s) Oral daily  lidocaine   5% Patch 1 Patch Transdermal daily  midodrine 30 milliGRAM(s) Oral every 8 hours  midodrine. 10 milliGRAM(s) Oral <User Schedule>  multivitamin 1 Tablet(s) Oral daily  pantoprazole  Injectable 40 milliGRAM(s) IV Push two times a day  polyethylene glycol 3350 17 Gram(s) Oral two times a day  sevelamer carbonate 800 milliGRAM(s) Oral three times a day with meals  simethicone 80 milliGRAM(s) Chew three times a day  tiotropium 18 MICROgram(s) Capsule 1 Capsule(s) Inhalation daily    PRN Inpatient Medications  acetaminophen   Tablet .. 650 milliGRAM(s) Oral every 6 hours PRN  albuterol/ipratropium for Nebulization 3 milliLiter(s) Nebulizer every 6 hours PRN    REVIEW OF SYSTEMS  --------------------------------------------------------------------------------  Gen: No fevers   Respiratory: No dyspnea  CV: No chest pain  GI: No abdominal pain  : No dysuria  MSK: No  edema  Neuro: no dizziness     VITALS/PHYSICAL EXAM  --------------------------------------------------------------------------------  T(C): 36.7 (08-30-21 @ 10:00), Max: 36.7 (08-30-21 @ 10:00)  HR: 56 (08-30-21 @ 10:00) (55 - 77)  BP: 140/72 (08-30-21 @ 10:00) (108/60 - 147/69)  RR: 17 (08-30-21 @ 10:00) (17 - 18)  SpO2: 98% (08-30-21 @ 10:00) (98% - 99%)  Wt(kg): --    08-30-21 @ 07:01  -  08-30-21 @ 11:53  --------------------------------------------------------  IN: 600 mL / OUT: 2400 mL / NET: -1800 mL    Physical Exam:  	Gen NAD, obese   	HEENT: no JVD  	Pulm: CTABL  	CV: S1S2,  	Abd: Soft,   	Ext:   - edema B/L LE   	Neuro: Awake and alert  	Skin: Warm and dry          Vascular:  AVF + bruit    LABS/STUDIES  --------------------------------------------------------------------------------              11.2   10.15 >-----------<  201      [08-30-21 @ 07:13]              36.6     130  |  90  |  86  ----------------------------<  108      [08-30-21 @ 07:13]  5.1   |  22  |  12.39        Ca     9.1     [08-30-21 @ 07:13]    Creatinine Trend:  SCr 12.39 [08-30 @ 07:13]  SCr 10.77 [08-27 @ 07:23]  SCr 9.12 [08-26 @ 08:33]  SCr 8.81 [08-24 @ 01:47]  SCr 11.61 [08-23 @ 02:35]
    Chief complaint    Patient is a 76y old  Male who presents with a chief complaint of right leg pain (03 Sep 2021 15:41)   Review of systems  Patient in bed, appears comfortable.    Labs and Fingersticks  CAPILLARY BLOOD GLUCOSE          Anion Gap, Serum: 14 (09-03 @ 07:58)      Calcium, Total Serum: 9.6 (09-03 @ 07:58)          09-03    130<L>  |  95<L>  |  76<H>  ----------------------------<  103<H>  4.3   |  21<L>  |  11.59<H>    Ca    9.6      03 Sep 2021 07:58  Phos  5.7     09-03  Mg     2.10     09-03                          11.1   8.58  )-----------( 183      ( 03 Sep 2021 07:58 )             35.0     Medications  MEDICATIONS  (STANDING):  allopurinol 100 milliGRAM(s) Oral daily  chlorhexidine 4% Liquid 1 Application(s) Topical daily  folic acid 1 milliGRAM(s) Oral daily  gabapentin 200 milliGRAM(s) Oral daily  heparin   Injectable 7500 Unit(s) SubCutaneous every 8 hours  lidocaine   5% Patch 1 Patch Transdermal daily  lidocaine/prilocaine Cream 1 Application(s) Topical <User Schedule>  midodrine 30 milliGRAM(s) Oral every 8 hours  midodrine. 10 milliGRAM(s) Oral <User Schedule>  multivitamin 1 Tablet(s) Oral daily  pantoprazole    Tablet 40 milliGRAM(s) Oral two times a day  polyethylene glycol 3350 17 Gram(s) Oral two times a day  sevelamer carbonate 800 milliGRAM(s) Oral three times a day with meals  simethicone 80 milliGRAM(s) Chew three times a day  tiotropium 18 MICROgram(s) Capsule 1 Capsule(s) Inhalation daily      Physical Exam  General: Patient comfortable in bed  Vital Signs Last 12 Hrs  T(F): 98 (09-04-21 @ 13:45), Max: 98 (09-04-21 @ 06:18)  HR: 70 (09-04-21 @ 13:45) (64 - 78)  BP: 119/68 (09-04-21 @ 13:45) (119/68 - 136/67)  BP(mean): --  RR: 17 (09-04-21 @ 13:45) (17 - 18)  SpO2: 96% (09-04-21 @ 13:45) (94% - 100%)  Neck: No palpable thyroid nodules.  CVS: S1S2, No murmurs  Respiratory: No wheezing, no crepitations  GI: Abdomen soft, bowel sounds positive  Musculoskeletal:  edema lower extremities.     Diagnostics          
  Chief complaint    Patient is a 76y old  Male who presents with a chief complaint of right leg pain (03 Sep 2021 10:08)   Review of systems  Patient in bed, appears comfortable.    Labs and Fingersticks  CAPILLARY BLOOD GLUCOSE    Anion Gap, Serum: 14 (09-03 @ 07:58)  Anion Gap, Serum: 17 *H* (09-02 @ 05:47)      Calcium, Total Serum: 9.6 (09-03 @ 07:58)  Calcium, Total Serum: 9.6 (09-02 @ 05:47)          09-03    130<L>  |  95<L>  |  76<H>  ----------------------------<  103<H>  4.3   |  21<L>  |  11.59<H>    Ca    9.6      03 Sep 2021 07:58  Phos  5.7     09-03  Mg     2.10     09-03                          11.1   8.58  )-----------( 183      ( 03 Sep 2021 07:58 )             35.0     Medications  MEDICATIONS  (STANDING):  allopurinol 100 milliGRAM(s) Oral daily  cosyntropin Injectable 0.25 milliGRAM(s) IV Push once  folic acid 1 milliGRAM(s) Oral daily  gabapentin 200 milliGRAM(s) Oral daily  heparin   Injectable 7500 Unit(s) SubCutaneous every 8 hours  lidocaine   5% Patch 1 Patch Transdermal daily  lidocaine/prilocaine Cream 1 Application(s) Topical <User Schedule>  midodrine 30 milliGRAM(s) Oral every 8 hours  midodrine. 10 milliGRAM(s) Oral <User Schedule>  multivitamin 1 Tablet(s) Oral daily  pantoprazole    Tablet 40 milliGRAM(s) Oral two times a day  polyethylene glycol 3350 17 Gram(s) Oral two times a day  sevelamer carbonate 800 milliGRAM(s) Oral three times a day with meals  simethicone 80 milliGRAM(s) Chew three times a day  tiotropium 18 MICROgram(s) Capsule 1 Capsule(s) Inhalation daily      Physical Exam  General: Patient comfortable in bed  Vital Signs Last 12 Hrs  T(F): 97.9 (09-03-21 @ 09:35), Max: 98.2 (09-03-21 @ 06:15)  HR: 61 (09-03-21 @ 09:35) (61 - 73)  BP: 129/69 (09-03-21 @ 09:35) (120/52 - 129/69)  BP(mean): --  RR: 16 (09-03-21 @ 09:35) (16 - 18)  SpO2: 100% (09-03-21 @ 05:44) (98% - 100%)  Neck: No palpable thyroid nodules.  CVS: S1S2, No murmurs  Respiratory: No wheezing, no crepitations  GI: Abdomen soft, bowel sounds positive  Musculoskeletal:  edema lower extremities.     Diagnostics          
Date of service: 08-27-21 @ 14:57      Patient is a 76y old  Male who presents with a chief complaint of right leg pain (27 Aug 2021 14:28)                                                               INTERVAL HPI/OVERNIGHT EVENTS:    REVIEW OF SYSTEMS:     CONSTITUTIONAL: No weakness, fevers or chills  RESPIRATORY: No cough, wheezing,  No shortness of breath  CARDIOVASCULAR: No chest pain or palpitations  GASTROINTESTINAL: No abdominal pain  . No nausea, vomiting, or hematemesis; No diarrhea or constipation. No melena or hematochezia.  GENITOURINARY: No dysuria, frequency or hematuria  NEUROLOGICAL: No numbness or weakness                                                                                                                                                                                                                                                                        Medications:  MEDICATIONS  (STANDING):  allopurinol 100 milliGRAM(s) Oral daily  folic acid 1 milliGRAM(s) Oral daily  gabapentin 200 milliGRAM(s) Oral daily  heparin   Injectable 7500 Unit(s) SubCutaneous every 8 hours  hydrocortisone sodium succinate Injectable 25 milliGRAM(s) IV Push every 8 hours  lidocaine   5% Patch 1 Patch Transdermal daily  midodrine 30 milliGRAM(s) Oral every 8 hours  midodrine. 10 milliGRAM(s) Oral <User Schedule>  multivitamin 1 Tablet(s) Oral daily  mupirocin 2% Ointment 1 Application(s) Topical every 12 hours  pantoprazole  Injectable 40 milliGRAM(s) IV Push two times a day  polyethylene glycol 3350 17 Gram(s) Oral two times a day  sevelamer carbonate 800 milliGRAM(s) Oral three times a day with meals  simethicone 80 milliGRAM(s) Chew three times a day  tiotropium 18 MICROgram(s) Capsule 1 Capsule(s) Inhalation daily    MEDICATIONS  (PRN):  acetaminophen   Tablet .. 650 milliGRAM(s) Oral every 6 hours PRN Moderate Pain (4 - 6)  albuterol/ipratropium for Nebulization 3 milliLiter(s) Nebulizer every 6 hours PRN Shortness of Breath and/or Wheezing       Allergies    latex (Rash)  No Known Drug Allergies    Intolerances      Vital Signs Last 24 Hrs  T(C): 36.7 (27 Aug 2021 14:36), Max: 37 (27 Aug 2021 05:27)  T(F): 98 (27 Aug 2021 14:36), Max: 98.6 (27 Aug 2021 05:27)  HR: 93 (27 Aug 2021 14:36) (62 - 101)  BP: 109/52 (27 Aug 2021 14:36) (106/62 - 117/62)  BP(mean): --  RR: 18 (27 Aug 2021 14:36) (17 - 18)  SpO2: 99% (27 Aug 2021 14:36) (97% - 100%)  CAPILLARY BLOOD GLUCOSE          Physical Exam:    Daily     Daily   General: NAD   HEENT:  Nonicteric, PERRLA  CV:  RRR, S1S2   Lungs:  CTA B/L, no wheezes, rales, rhonchi  Abdomen:  Soft, non-tender, no distended, positive BS  Extremities:  non pitting  edema   Neuro:  AAOx3, non-focal, grossly intact                                                                                                                                                                                                                                                                                                LABS:                               12.2   11.49 )-----------( 197      ( 27 Aug 2021 07:23 )             40.0                      08-27    134<L>  |  91<L>  |  65<H>  ----------------------------<  82  5.3   |  21<L>  |  10.77<H>    Ca    9.2      27 Aug 2021 07:23  Phos  6.6     08-27  Mg     2.50     08-27    TPro  8.9<H>  /  Alb  3.8  /  TBili  0.3  /  DBili  x   /  AST  10  /  ALT  10  /  AlkPhos  106  08-27                       RADIOLOGY & ADDITIONAL TESTS         I personally reviewed: [  ]EKG   [  ]CXR    [  ] CT      A/P:         Discussed with :     Yael consultants' Notes   Time spent :  
Date of service: 21 @ 21:55      Patient is a 76y old  Male who presents with a chief complaint of right leg pain (26 Aug 2021 16:14)                                                               INTERVAL HPI/OVERNIGHT EVENTS:    REVIEW OF SYSTEMS:     CONSTITUTIONAL: No weakness, fevers or chills  RESPIRATORY: No cough, wheezing,  No shortness of breath  CARDIOVASCULAR: No chest pain or palpitations  GASTROINTESTINAL: No abdominal pain  . No nausea, vomiting, or hematemesis; No diarrhea or constipation. No melena or hematochezia.  GENITOURINARY: No dysuria, frequency or hematuria  NEUROLOGICAL: No numbness or weakness                                                                                                                                                                                                                                                                              Medications:  MEDICATIONS  (STANDING):  allopurinol 100 milliGRAM(s) Oral daily  folic acid 1 milliGRAM(s) Oral daily  gabapentin 200 milliGRAM(s) Oral daily  heparin   Injectable 7500 Unit(s) SubCutaneous every 8 hours  hydrocortisone sodium succinate Injectable 25 milliGRAM(s) IV Push every 8 hours  lidocaine   5% Patch 1 Patch Transdermal daily  midodrine 30 milliGRAM(s) Oral every 8 hours  midodrine. 10 milliGRAM(s) Oral <User Schedule>  multivitamin 1 Tablet(s) Oral daily  mupirocin 2% Ointment 1 Application(s) Topical every 12 hours  pantoprazole    Tablet 40 milliGRAM(s) Oral before breakfast  polyethylene glycol 3350 17 Gram(s) Oral two times a day  sevelamer carbonate 800 milliGRAM(s) Oral three times a day with meals  simethicone 80 milliGRAM(s) Chew three times a day  tiotropium 18 MICROgram(s) Capsule 1 Capsule(s) Inhalation daily    MEDICATIONS  (PRN):  acetaminophen   Tablet .. 650 milliGRAM(s) Oral every 6 hours PRN Moderate Pain (4 - 6)  albuterol/ipratropium for Nebulization 3 milliLiter(s) Nebulizer every 6 hours PRN Shortness of Breath and/or Wheezing       Allergies    latex (Rash)  No Known Drug Allergies    Intolerances      Vital Signs Last 24 Hrs  T(C): 36.9 (26 Aug 2021 21:30), Max: 36.9 (26 Aug 2021 12:43)  T(F): 98.4 (26 Aug 2021 21:30), Max: 98.5 (26 Aug 2021 12:43)  HR: 83 (26 Aug 2021 21:30) (57 - 83)  BP: 106/62 (26 Aug 2021 21:30) (106/62 - 122/65)  BP(mean): --  RR: 17 (26 Aug 2021 21:30) (16 - 18)  SpO2: 100% (26 Aug 2021 21:30) (98% - 100%)  CAPILLARY BLOOD GLUCOSE           @ 07:01  -   @ 07:00  --------------------------------------------------------  IN: 400 mL / OUT: 1400 mL / NET: -1000 mL      Physical Exam:    Daily     Daily Weight in k.5 (26 Aug 2021 05:47)  General:  Well appearing, NAD, not cachetic  HEENT:  Nonicteric, PERRLA  CV:  RRR, S1S2   Lungs:  CTA B/L, no wheezes, rales, rhonchi  Abdomen:  Soft, non-tender, no distended, positive BS  Extremities:  2+ pulses, no c/c, no edema  Skin:  Warm and dry, no rashes  :  No moore  Neuro:  AAOx3, non-focal, grossly intact                                                                                                                                                                                                                                                                                                LABS:                               12.1   11.22 )-----------( 168      ( 26 Aug 2021 08:33 )             39.2                          133<L>  |  92<L>  |  49<H>  ----------------------------<  91  4.9   |  23  |  9.12<H>    Ca    9.2      26 Aug 2021 08:33  Phos  5.7       Mg     2.40         TPro  9.1<H>  /  Alb  3.8  /  TBili  0.2  /  DBili  x   /  AST  11  /  ALT  10  /  AlkPhos  110                         RADIOLOGY & ADDITIONAL TESTS         I personally reviewed: [  ]EKG   [  ]CXR    [  ] CT      A/P:         Discussed with :     Yael consultants' Notes   Time spent :      
  Chief complaint    Patient is a 76y old  Male who presents with a chief complaint of right leg pain (01 Sep 2021 18:15)   Review of systems  Patient in bed, appears comfortable.    Labs and Fingersticks  CAPILLARY BLOOD GLUCOSE    Anion Gap, Serum: 20 *H* (09-01 @ 07:03)  Anion Gap, Serum: 16 *H* (08-31 @ 06:45)      Calcium, Total Serum: 9.5 (09-01 @ 07:03)  Calcium, Total Serum: 9.5 (08-31 @ 06:45)          09-01    132<L>  |  90<L>  |  85<H>  ----------------------------<  90  4.7   |  22  |  11.76<H>    Ca    9.5      01 Sep 2021 07:03  Phos  5.6     09-01  Mg     2.20     09-01                          11.5   9.95  )-----------( 187      ( 31 Aug 2021 06:45 )             37.3     Medications  MEDICATIONS  (STANDING):  allopurinol 100 milliGRAM(s) Oral daily  folic acid 1 milliGRAM(s) Oral daily  gabapentin 200 milliGRAM(s) Oral daily  heparin   Injectable 7500 Unit(s) SubCutaneous every 8 hours  lidocaine   5% Patch 1 Patch Transdermal daily  lidocaine/prilocaine Cream 1 Application(s) Topical <User Schedule>  midodrine 30 milliGRAM(s) Oral every 8 hours  midodrine. 10 milliGRAM(s) Oral <User Schedule>  multivitamin 1 Tablet(s) Oral daily  pantoprazole  Injectable 40 milliGRAM(s) IV Push two times a day  polyethylene glycol 3350 17 Gram(s) Oral two times a day  sevelamer carbonate 800 milliGRAM(s) Oral three times a day with meals  simethicone 80 milliGRAM(s) Chew three times a day  tiotropium 18 MICROgram(s) Capsule 1 Capsule(s) Inhalation daily      Physical Exam  General: Patient comfortable in bed  Vital Signs Last 12 Hrs  T(F): 98 (09-01-21 @ 13:09), Max: 98 (09-01-21 @ 13:09)  HR: 74 (09-01-21 @ 16:02) (60 - 74)  BP: 108/79 (09-01-21 @ 13:09) (108/79 - 157/82)  BP(mean): --  RR: 18 (09-01-21 @ 13:09) (17 - 18)  SpO2: 96% (09-01-21 @ 16:02) (96% - 100%)  Neck: No palpable thyroid nodules.  CVS: S1S2, No murmurs  Respiratory: No wheezing, no crepitations  GI: Abdomen soft, bowel sounds positive  Musculoskeletal:  edema lower extremities.     Diagnostics          
  Chief complaint    Patient is a 76y old  Male who presents with a chief complaint of right leg pain (27 Aug 2021 18:50)   Review of systems  Patient in bed, appears comfortable.    Labs and Fingersticks  CAPILLARY BLOOD GLUCOSE    Anion Gap, Serum: 22 *H* (08-27 @ 07:23)      Calcium, Total Serum: 9.2 (08-27 @ 07:23)  Albumin, Serum: 3.8 (08-27 @ 07:23)    Alanine Aminotransferase (ALT/SGPT): 10 (08-27 @ 07:23)  Alkaline Phosphatase, Serum: 106 (08-27 @ 07:23)  Aspartate Aminotransferase (AST/SGOT): 10 (08-27 @ 07:23)        08-27    134<L>  |  91<L>  |  65<H>  ----------------------------<  82  5.3   |  21<L>  |  10.77<H>    Ca    9.2      27 Aug 2021 07:23  Phos  6.6     08-27  Mg     2.50     08-27    TPro  8.9<H>  /  Alb  3.8  /  TBili  0.3  /  DBili  x   /  AST  10  /  ALT  10  /  AlkPhos  106  08-27                        12.2   11.49 )-----------( 197      ( 27 Aug 2021 07:23 )             40.0     Medications  MEDICATIONS  (STANDING):  allopurinol 100 milliGRAM(s) Oral daily  folic acid 1 milliGRAM(s) Oral daily  gabapentin 200 milliGRAM(s) Oral daily  heparin   Injectable 7500 Unit(s) SubCutaneous every 8 hours  hydrocortisone sodium succinate Injectable 25 milliGRAM(s) IV Push every 8 hours  lidocaine   5% Patch 1 Patch Transdermal daily  midodrine 30 milliGRAM(s) Oral every 8 hours  midodrine. 10 milliGRAM(s) Oral <User Schedule>  multivitamin 1 Tablet(s) Oral daily  mupirocin 2% Ointment 1 Application(s) Topical every 12 hours  pantoprazole  Injectable 40 milliGRAM(s) IV Push two times a day  polyethylene glycol 3350 17 Gram(s) Oral two times a day  sevelamer carbonate 800 milliGRAM(s) Oral three times a day with meals  simethicone 80 milliGRAM(s) Chew three times a day  tiotropium 18 MICROgram(s) Capsule 1 Capsule(s) Inhalation daily      Physical Exam  General: Patient comfortable in bed  Vital Signs Last 12 Hrs  T(F): 98 (08-28-21 @ 06:12), Max: 98 (08-28-21 @ 06:12)  HR: 67 (08-28-21 @ 06:12) (67 - 100)  BP: 119/68 (08-28-21 @ 06:12) (119/68 - 119/68)  BP(mean): --  RR: 18 (08-28-21 @ 06:12) (18 - 18)  SpO2: 97% (08-28-21 @ 06:12) (97% - 99%)  Neck: No palpable thyroid nodules.  CVS: S1S2, No murmurs  Respiratory: No wheezing, no crepitations  GI: Abdomen soft, bowel sounds positive  Musculoskeletal:  edema lower extremities.     Diagnostics          
Date of service: 09-02-21 @ 18:28      Patient is a 76y old  Male who presents with a chief complaint of right leg pain (02 Sep 2021 12:26)                                                               INTERVAL HPI/OVERNIGHT EVENTS:    REVIEW OF SYSTEMS:     CONSTITUTIONAL: No weakness, fevers or chills  EYES/ENT: No visual changes , no ear ache   NECK: No pain or stiffness  RESPIRATORY: No cough, wheezing,  No shortness of breath  CARDIOVASCULAR: No chest pain or palpitations  GASTROINTESTINAL: No abdominal pain  . No nausea, vomiting, or hematemesis; No diarrhea or constipation. No melena or hematochezia.  GENITOURINARY: No dysuria, frequency or hematuria  NEUROLOGICAL: No numbness or weakness  SKIN: No itching, burning, rashes, or lesions                                                                                                                                                                                                                                                                                 Medications:  MEDICATIONS  (STANDING):  allopurinol 100 milliGRAM(s) Oral daily  folic acid 1 milliGRAM(s) Oral daily  gabapentin 200 milliGRAM(s) Oral daily  heparin   Injectable 7500 Unit(s) SubCutaneous every 8 hours  lidocaine   5% Patch 1 Patch Transdermal daily  lidocaine/prilocaine Cream 1 Application(s) Topical <User Schedule>  midodrine 30 milliGRAM(s) Oral every 8 hours  midodrine. 10 milliGRAM(s) Oral <User Schedule>  multivitamin 1 Tablet(s) Oral daily  pantoprazole    Tablet 40 milliGRAM(s) Oral two times a day  polyethylene glycol 3350 17 Gram(s) Oral two times a day  sevelamer carbonate 800 milliGRAM(s) Oral three times a day with meals  simethicone 80 milliGRAM(s) Chew three times a day  tiotropium 18 MICROgram(s) Capsule 1 Capsule(s) Inhalation daily    MEDICATIONS  (PRN):  acetaminophen   Tablet .. 650 milliGRAM(s) Oral every 6 hours PRN Moderate Pain (4 - 6)  albuterol/ipratropium for Nebulization 3 milliLiter(s) Nebulizer every 6 hours PRN Shortness of Breath and/or Wheezing       Allergies    latex (Rash)  No Known Drug Allergies    Intolerances      Vital Signs Last 24 Hrs  T(C): 36.7 (02 Sep 2021 13:53), Max: 36.8 (02 Sep 2021 05:37)  T(F): 98 (02 Sep 2021 13:53), Max: 98.3 (02 Sep 2021 05:37)  HR: 72 (02 Sep 2021 15:53) (62 - 77)  BP: 126/59 (02 Sep 2021 13:53) (108/59 - 128/58)  BP(mean): --  RR: 18 (02 Sep 2021 13:53) (18 - 18)  SpO2: 96% (02 Sep 2021 15:53) (96% - 100%)  CAPILLARY BLOOD GLUCOSE          09-01 @ 07:01  -  09-02 @ 07:00  --------------------------------------------------------  IN: 600 mL / OUT: 2200 mL / NET: -1600 mL      Physical Exam:    Daily     Daily   General:  NAD   HEENT:  Nonicteric, PERRLA  CV:  RRR, S1S2   Lungs:  CTA B/L, no wheezes, rales, rhonchi  Abdomen:  Soft, non-tender, no distended, positive BS  Extremities:  2+ pulses, no c/c, no edema  Skin:  Warm and dry, no rashes  :  No moore  Neuro:  AAOx3, non-focal, grossly intact                                                                                                                                                                                                                                                                                                LABS:                            09-02    134<L>  |  94<L>  |  59<H>  ----------------------------<  100<H>  4.5   |  23  |  9.51<H>    Ca    9.6      02 Sep 2021 05:47  Phos  5.3     09-02  Mg     2.00     09-02                         RADIOLOGY & ADDITIONAL TESTS         I personally reviewed: [  ]EKG   [  ]CXR    [  ] CT      A/P:         Discussed with :     Yael consultants' Notes   Time spent :  
[facility] DIVISION OF KIDNEY DISEASES AND HYPERTENSION -- FOLLOW UP NOTE  --------------------------------------------------------------------------------  Chief Complaint: 75yo M PMHx ESRD on HD via AV fistula LUE MWF, COPD (on 2L home O2), CHF, pulm HTN, known hypotension on home midodrine, DVT s/p IVC filter p/w R leg pain on the day of admission (8/22/21). States it is a burning pain, thinks he can feels "knots" in his leg. 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 He also endorses generalized abdominal discomfort associated with constipation since 8/20/21. Spoke with wife with patient, his baseline blood pressure is systolic in the 70s to 80s, and on dialysis days it is usually in the 60s. He checks his BP everyday. The wife notes that over the past 2 weeks it has been consistently low in the 70s. Otherwise denies other pain in the body, f/c, n/v, CP, SOB at rest, dysuria.    24 hour events/subjective: Pt. was seen and examined during HD rounds today. Denies SOB, palpitations, CP, HA, or dizziness.    PAST HISTORY  --------------------------------------------------------------------------------  No significant changes to PMH, PSH, FHx, SHx, unless otherwise noted    ALLERGIES & MEDICATIONS  --------------------------------------------------------------------------------  Allergies    latex (Rash)  No Known Drug Allergies    Intolerances    Standing Inpatient Medications  allopurinol 100 milliGRAM(s) Oral daily  folic acid 1 milliGRAM(s) Oral daily  gabapentin 200 milliGRAM(s) Oral daily  heparin   Injectable 7500 Unit(s) SubCutaneous every 8 hours  hydrocortisone 20 milliGRAM(s) Oral daily  hydrocortisone 10 milliGRAM(s) Oral daily  lidocaine   5% Patch 1 Patch Transdermal daily  midodrine 30 milliGRAM(s) Oral every 8 hours  midodrine. 10 milliGRAM(s) Oral <User Schedule>  multivitamin 1 Tablet(s) Oral daily  pantoprazole  Injectable 40 milliGRAM(s) IV Push two times a day  polyethylene glycol 3350 17 Gram(s) Oral two times a day  sevelamer carbonate 800 milliGRAM(s) Oral three times a day with meals  simethicone 80 milliGRAM(s) Chew three times a day  tiotropium 18 MICROgram(s) Capsule 1 Capsule(s) Inhalation daily    PRN Inpatient Medications  acetaminophen   Tablet .. 650 milliGRAM(s) Oral every 6 hours PRN  albuterol/ipratropium for Nebulization 3 milliLiter(s) Nebulizer every 6 hours PRN    REVIEW OF SYSTEMS  --------------------------------------------------------------------------------  Gen: No fevers   Respiratory: No dyspnea  CV: No chest pain  GI: No abdominal pain  : No dysuria  MSK: No  edema  Neuro: no dizziness     VITALS/PHYSICAL EXAM  --------------------------------------------------------------------------------  T(C): 36.7 (08-30-21 @ 10:00), Max: 36.7 (08-30-21 @ 10:00)  HR: 56 (08-30-21 @ 10:00) (55 - 77)  BP: 140/72 (08-30-21 @ 10:00) (108/60 - 147/69)  RR: 17 (08-30-21 @ 10:00) (17 - 18)  SpO2: 98% (08-30-21 @ 10:00) (98% - 99%)  Wt(kg): --    08-30-21 @ 07:01  -  08-30-21 @ 11:53  --------------------------------------------------------  IN: 600 mL / OUT: 2400 mL / NET: -1800 mL    Physical Exam:  	Gen NAD, obese   	HEENT: no JVD  	Pulm: CTABL  	CV: S1S2,  	Abd: Soft,   	Ext:   - edema B/L LE   	Neuro: Awake and alert  	Skin: Warm and dry          Vascular:  AVF + bruit    LABS/STUDIES  --------------------------------------------------------------------------------              11.2   10.15 >-----------<  201      [08-30-21 @ 07:13]              36.6     130  |  90  |  86  ----------------------------<  108      [08-30-21 @ 07:13]  5.1   |  22  |  12.39        Ca     9.1     [08-30-21 @ 07:13]    Creatinine Trend:  SCr 12.39 [08-30 @ 07:13]  SCr 10.77 [08-27 @ 07:23]  SCr 9.12 [08-26 @ 08:33]  SCr 8.81 [08-24 @ 01:47]  SCr 11.61 [08-23 @ 02:35]
[facility] Division of Kidney Diseases & Hypertension  FOLLOW UP NOTE  --------------------------------------------------------------------------------  Chief Complaint:    24 hour events/subjective:    dialyzed earlier today. tolerated well         PAST HISTORY  --------------------------------------------------------------------------------  No significant changes to PMH, PSH, FHx, SHx, unless otherwise noted    ALLERGIES & MEDICATIONS  --------------------------------------------------------------------------------  Allergies    latex (Rash)  No Known Drug Allergies    Intolerances      Standing Inpatient Medications  allopurinol 100 milliGRAM(s) Oral daily  chlorhexidine 4% Liquid 1 Application(s) Topical daily  folic acid 1 milliGRAM(s) Oral daily  gabapentin 200 milliGRAM(s) Oral daily  heparin   Injectable 7500 Unit(s) SubCutaneous every 8 hours  lidocaine   5% Patch 1 Patch Transdermal daily  lidocaine/prilocaine Cream 1 Application(s) Topical <User Schedule>  midodrine 30 milliGRAM(s) Oral every 8 hours  midodrine. 10 milliGRAM(s) Oral <User Schedule>  multivitamin 1 Tablet(s) Oral daily  pantoprazole    Tablet 40 milliGRAM(s) Oral two times a day  polyethylene glycol 3350 17 Gram(s) Oral two times a day  sevelamer carbonate 800 milliGRAM(s) Oral three times a day with meals  simethicone 80 milliGRAM(s) Chew three times a day  tiotropium 18 MICROgram(s) Capsule 1 Capsule(s) Inhalation daily    PRN Inpatient Medications  acetaminophen   Tablet .. 650 milliGRAM(s) Oral every 6 hours PRN  albuterol/ipratropium for Nebulization 3 milliLiter(s) Nebulizer every 6 hours PRN      VITALS/PHYSICAL EXAM  --------------------------------------------------------------------------------  T(C): 36.6 (09-03-21 @ 12:50), Max: 36.8 (09-03-21 @ 06:15)  HR: 88 (09-03-21 @ 12:50) (61 - 88)  BP: 100/56 (09-03-21 @ 12:50) (100/56 - 129/69)  RR: 16 (09-03-21 @ 12:50) (16 - 18)  SpO2: 98% (09-03-21 @ 12:50) (96% - 100%)  Wt(kg): --        09-03-21 @ 07:01  -  09-03-21 @ 15:41  --------------------------------------------------------  IN: 600 mL / OUT: 2600 mL / NET: -2000 mL      Physical Exam:  	Gen: NAD, well-appearing  	HEENT: supple neck, clear oropharynx  	Pulm: CTA B/L  	CV: RRR, S1S2; no rub  	Abd: +BS, soft, nontender/nondistended  	LE: Warm, no edema  	Psych: Normal affect and mood  	Skin: Warm, without rashes  	    LABS/STUDIES  --------------------------------------------------------------------------------              11.1   8.58  >-----------<  183      [09-03-21 @ 07:58]              35.0     130  |  95  |  76  ----------------------------<  103      [09-03-21 @ 07:58]  4.3   |  21  |  11.59        Ca     9.6     [09-03-21 @ 07:58]      Mg     2.10     [09-03-21 @ 07:58]      Phos  5.7     [09-03-21 @ 07:58]            Creatinine Trend:  SCr 11.59 [09-03 @ 07:58]  SCr 9.51 [09-02 @ 05:47]  SCr 11.76 [09-01 @ 07:03]  SCr 10.26 [08-31 @ 06:45]  SCr 12.39 [08-30 @ 07:13]          HBcAb Nonreact      [08-31-21 @ 00:12]  HCV 0.40, Nonreact      [08-31-21 @ 00:12]    
Chief complaint    Patient is a 76y old  Male who presents with a chief complaint of right leg pain (30 Aug 2021 11:52)   Review of systems  Patient in bed, appears comfortable.    Labs and Fingersticks  CAPILLARY BLOOD GLUCOSE    Anion Gap, Serum: 18 *H* (08-30 @ 07:13)      Calcium, Total Serum: 9.1 (08-30 @ 07:13)          08-30    130<L>  |  90<L>  |  86<H>  ----------------------------<  108<H>  5.1   |  22  |  12.39<H>    Ca    9.1      30 Aug 2021 07:13                          11.2   10.15 )-----------( 201      ( 30 Aug 2021 07:13 )             36.6     Medications  MEDICATIONS  (STANDING):  allopurinol 100 milliGRAM(s) Oral daily  folic acid 1 milliGRAM(s) Oral daily  gabapentin 200 milliGRAM(s) Oral daily  heparin   Injectable 7500 Unit(s) SubCutaneous every 8 hours  hydrocortisone 20 milliGRAM(s) Oral daily  hydrocortisone 10 milliGRAM(s) Oral daily  lidocaine   5% Patch 1 Patch Transdermal daily  midodrine 30 milliGRAM(s) Oral every 8 hours  midodrine. 10 milliGRAM(s) Oral <User Schedule>  multivitamin 1 Tablet(s) Oral daily  pantoprazole  Injectable 40 milliGRAM(s) IV Push two times a day  polyethylene glycol 3350 17 Gram(s) Oral two times a day  sevelamer carbonate 800 milliGRAM(s) Oral three times a day with meals  simethicone 80 milliGRAM(s) Chew three times a day  tiotropium 18 MICROgram(s) Capsule 1 Capsule(s) Inhalation daily      Physical Exam  General: Patient comfortable in bed  Vital Signs Last 12 Hrs  T(F): 98 (08-30-21 @ 10:00), Max: 98 (08-30-21 @ 10:00)  HR: 56 (08-30-21 @ 10:00) (55 - 72)  BP: 140/72 (08-30-21 @ 10:00) (140/72 - 147/69)  BP(mean): --  RR: 17 (08-30-21 @ 10:00) (17 - 18)  SpO2: 98% (08-30-21 @ 10:00) (98% - 98%)  Neck: No palpable thyroid nodules.  CVS: S1S2, No murmurs  Respiratory: No wheezing, no crepitations  GI: Abdomen soft, bowel sounds positive  Musculoskeletal:  edema lower extremities.     Diagnostics          
  Chief complaint    Patient is a 76y old  Male who presents with a chief complaint of right leg pain (29 Aug 2021 11:24)   Review of systems  Patient in bed, appears comfortable.    Labs and Fingersticks  CAPILLARY BLOOD GLUCOSE  Medications  MEDICATIONS  (STANDING):  allopurinol 100 milliGRAM(s) Oral daily  folic acid 1 milliGRAM(s) Oral daily  gabapentin 200 milliGRAM(s) Oral daily  heparin   Injectable 7500 Unit(s) SubCutaneous every 8 hours  hydrocortisone sodium succinate Injectable 25 milliGRAM(s) IV Push every 12 hours  lidocaine   5% Patch 1 Patch Transdermal daily  midodrine 30 milliGRAM(s) Oral every 8 hours  midodrine. 10 milliGRAM(s) Oral <User Schedule>  multivitamin 1 Tablet(s) Oral daily  mupirocin 2% Ointment 1 Application(s) Topical every 12 hours  pantoprazole  Injectable 40 milliGRAM(s) IV Push two times a day  polyethylene glycol 3350 17 Gram(s) Oral two times a day  sevelamer carbonate 800 milliGRAM(s) Oral three times a day with meals  simethicone 80 milliGRAM(s) Chew three times a day  tiotropium 18 MICROgram(s) Capsule 1 Capsule(s) Inhalation daily      Physical Exam  General: Patient comfortable in bed  Vital Signs Last 12 Hrs  T(F): 97.7 (08-29-21 @ 13:55), Max: 98 (08-29-21 @ 06:43)  HR: 73 (08-29-21 @ 13:55) (61 - 73)  BP: 108/60 (08-29-21 @ 13:55) (108/58 - 108/60)  BP(mean): --  RR: 18 (08-29-21 @ 13:55) (17 - 18)  SpO2: 99% (08-29-21 @ 13:55) (98% - 99%)  Neck: No palpable thyroid nodules.  CVS: S1S2, No murmurs  Respiratory: No wheezing, no crepitations  GI: Abdomen soft, bowel sounds positive  Musculoskeletal:  edema lower extremities.     Diagnostics

## 2021-09-04 NOTE — PROGRESS NOTE ADULT - PROVIDER SPECIALTY LIST ADULT
Critical Care
Heme/Onc
Infectious Disease
Internal Medicine
Critical Care
Endocrinology
Endocrinology
Internal Medicine
Nephrology
Gastroenterology
Heme/Onc
Infectious Disease
Internal Medicine
Internal Medicine
Nephrology
Endocrinology
Gastroenterology
Internal Medicine
Internal Medicine
Endocrinology
Internal Medicine
Nephrology
Endocrinology
Endocrinology

## 2021-09-04 NOTE — PROGRESS NOTE ADULT - REASON FOR ADMISSION
right leg pain

## 2021-09-04 NOTE — PROGRESS NOTE ADULT - PROBLEM SELECTOR PROBLEM 1
Hypotension
ESRD on hemodialysis
ESRD on hemodialysis
Hypotension
ESRD on hemodialysis
ESRD on hemodialysis
Hypotension

## 2021-09-04 NOTE — PROGRESS NOTE ADULT - PROBLEM SELECTOR PLAN 1
Continue to hold steroids for now and get cosyntropin stim test done before DC. If needed can resume stress dose steroids.  Discussed with patient and primary team.
Pt. with ESRD on HD TWI (MWF) schedule. last HD was done on 8/27/21 via LUE AVF. Pt seen during HD rounds today. Tolerating treatment well. Continue with midodrine prior to HD as needed. Labs reviewed. Hb stable around 11.2 today.     If you have any questions, please feel free to contact me  Chandra Gaytan  Nephrology Fellow  [phone]  (After 5pm or on weekends please page the on-call fellow).
Since patient is stable on low dose steroids, may hold steroids for now and get cosyntropin stim test done before DC. If needed can resume stress dose steroids.  Discussed with patient and primary team.
Will continue taper down of steroids, monitoring. .
Pt. with ESRD on HD TWI (MW) schedule. For HD tomorrow Continue with midodrine prior to HD as needed. Labs reviewed. Hb stable     gay dhaliwal  nephrology attending   Cell# [phone]   Office- [phone]
Suggest to continue current steroid dose, may get further adjustment of steroids to see if he can get off steroids and be tested for adrenal insufficiency. Patient counseled.
Pt. with ESRD on HD (MWF) schedule. Dialyzed today. Continue with midodrine prior to HD as needed. Labs reviewed. Hb stable     gay dhaliwal  nephrology attending   Cell# [phone]   Office- [phone]
Will continue taper down of steroids, monitoring. .
Pt. with ESRD on HD TWI (MWF) schedule. dialysed today via LUE AVF. Continue with midodrine prior to HD as needed. Labs reviewed. Hb stable     gay dhaliwal  nephrology attending   Cell# [phone]   Office- [phone]
Would need taper down of steroids, monitoring. .
Continue to hold steroids for now. Will get cosyntropin stim test done before DC. If needed can resume stress dose steroids.  Discussed with patient and primary team.
Will continue monitoring labs, will FU  Discussed with patient and primary team.

## 2021-09-04 NOTE — PROGRESS NOTE ADULT - ASSESSMENT
Assessment  Hypotension: 76y Male with no history of adrenal insufficiency, has not used sterids in the past, admitted with hypotension, was started on stress dose steroids by ICU/primary team, awake and stable, off steroid now, am cortisol 18, cosyntropin stimulation test done, labs pending..  Hypotension: On medications, monitored, stable.  ESRD: On HD, monitored.              Arsalan Tian MD  Cell: [phone]  Office: [phone]

## 2021-09-04 NOTE — PROGRESS NOTE ADULT - PROBLEM SELECTOR PLAN 2
On HD, continue as scheduled, renal FU

## 2021-09-09 LAB — CORTIS SERPL-MCNC: 25 UG/DL — HIGH

## 2021-09-10 LAB — CORTIS SERPL-MCNC: 19 UG/DL — SIGNIFICANT CHANGE UP

## 2022-02-10 ENCOUNTER — APPOINTMENT (OUTPATIENT)
Dept: ORTHOPEDIC SURGERY | Facility: CLINIC | Age: 77
End: 2022-02-10

## 2022-04-21 NOTE — H&P ADULT - PSH
H/O right heart catheterization    History of abdominal surgery  polypectomy
Fluid sent for chemistry/Fluid sent for cytology/Fluid sent for gram stain and culture

## 2022-05-24 NOTE — ED PROVIDER NOTE - CADM POA URETHRAL CATHETER
Additional Notes: Patient consent was obtained to proceed with the visit and recommended plan of care after discussion of all risks and benefits, including the risks of COVID-19 exposure. Render Risk Assessment In Note?: no Detail Level: Simple No

## 2022-07-06 ENCOUNTER — INPATIENT (INPATIENT)
Facility: HOSPITAL | Age: 77
LOS: 19 days | Discharge: NURSING, REHAB OR LONG-TERM CARE | DRG: 553 | End: 2022-07-26
Attending: INTERNAL MEDICINE | Admitting: INTERNAL MEDICINE
Payer: MEDICARE

## 2022-07-06 VITALS
OXYGEN SATURATION: 92 % | WEIGHT: 274.92 LBS | TEMPERATURE: 98 F | SYSTOLIC BLOOD PRESSURE: 93 MMHG | DIASTOLIC BLOOD PRESSURE: 54 MMHG | HEIGHT: 67 IN | HEART RATE: 92 BPM | RESPIRATION RATE: 17 BRPM

## 2022-07-06 DIAGNOSIS — N18.6 END STAGE RENAL DISEASE: ICD-10-CM

## 2022-07-06 DIAGNOSIS — Z98.89 OTHER SPECIFIED POSTPROCEDURAL STATES: Chronic | ICD-10-CM

## 2022-07-06 DIAGNOSIS — M25.562 PAIN IN LEFT KNEE: ICD-10-CM

## 2022-07-06 LAB
ALBUMIN SERPL ELPH-MCNC: 4.4 G/DL — SIGNIFICANT CHANGE UP (ref 3.3–5)
ALP SERPL-CCNC: 143 U/L — HIGH (ref 40–120)
ALT FLD-CCNC: 14 U/L — SIGNIFICANT CHANGE UP (ref 10–45)
ANION GAP SERPL CALC-SCNC: 22 MMOL/L — HIGH (ref 5–17)
APTT BLD: 54.2 SEC — HIGH (ref 27.5–35.5)
AST SERPL-CCNC: 39 U/L — SIGNIFICANT CHANGE UP (ref 10–40)
BASOPHILS # BLD AUTO: 0.07 K/UL — SIGNIFICANT CHANGE UP (ref 0–0.2)
BASOPHILS NFR BLD AUTO: 0.7 % — SIGNIFICANT CHANGE UP (ref 0–2)
BILIRUB SERPL-MCNC: 0.4 MG/DL — SIGNIFICANT CHANGE UP (ref 0.2–1.2)
BUN SERPL-MCNC: 52 MG/DL — HIGH (ref 7–23)
CALCIUM SERPL-MCNC: 10.4 MG/DL — SIGNIFICANT CHANGE UP (ref 8.4–10.5)
CHLORIDE SERPL-SCNC: 91 MMOL/L — LOW (ref 96–108)
CO2 SERPL-SCNC: 23 MMOL/L — SIGNIFICANT CHANGE UP (ref 22–31)
CREAT SERPL-MCNC: 11.81 MG/DL — HIGH (ref 0.5–1.3)
EGFR: 4 ML/MIN/1.73M2 — LOW
EOSINOPHIL # BLD AUTO: 0.15 K/UL — SIGNIFICANT CHANGE UP (ref 0–0.5)
EOSINOPHIL NFR BLD AUTO: 1.5 % — SIGNIFICANT CHANGE UP (ref 0–6)
GLUCOSE SERPL-MCNC: 102 MG/DL — HIGH (ref 70–99)
HCT VFR BLD CALC: 43.7 % — SIGNIFICANT CHANGE UP (ref 39–50)
HGB BLD-MCNC: 13.2 G/DL — SIGNIFICANT CHANGE UP (ref 13–17)
IMM GRANULOCYTES NFR BLD AUTO: 0.4 % — SIGNIFICANT CHANGE UP (ref 0–1.5)
INR BLD: 4.3 RATIO — HIGH (ref 0.88–1.16)
LYMPHOCYTES # BLD AUTO: 1.49 K/UL — SIGNIFICANT CHANGE UP (ref 1–3.3)
LYMPHOCYTES # BLD AUTO: 15 % — SIGNIFICANT CHANGE UP (ref 13–44)
MAGNESIUM SERPL-MCNC: 2.4 MG/DL — SIGNIFICANT CHANGE UP (ref 1.6–2.6)
MCHC RBC-ENTMCNC: 28.4 PG — SIGNIFICANT CHANGE UP (ref 27–34)
MCHC RBC-ENTMCNC: 30.2 GM/DL — LOW (ref 32–36)
MCV RBC AUTO: 94 FL — SIGNIFICANT CHANGE UP (ref 80–100)
MONOCYTES # BLD AUTO: 0.81 K/UL — SIGNIFICANT CHANGE UP (ref 0–0.9)
MONOCYTES NFR BLD AUTO: 8.1 % — SIGNIFICANT CHANGE UP (ref 2–14)
NEUTROPHILS # BLD AUTO: 7.39 K/UL — SIGNIFICANT CHANGE UP (ref 1.8–7.4)
NEUTROPHILS NFR BLD AUTO: 74.3 % — SIGNIFICANT CHANGE UP (ref 43–77)
NRBC # BLD: 0 /100 WBCS — SIGNIFICANT CHANGE UP (ref 0–0)
PHOSPHATE SERPL-MCNC: 8.1 MG/DL — HIGH (ref 2.5–4.5)
PLATELET # BLD AUTO: 220 K/UL — SIGNIFICANT CHANGE UP (ref 150–400)
POTASSIUM SERPL-MCNC: 5.3 MMOL/L — SIGNIFICANT CHANGE UP (ref 3.5–5.3)
POTASSIUM SERPL-SCNC: 5.3 MMOL/L — SIGNIFICANT CHANGE UP (ref 3.5–5.3)
PROT SERPL-MCNC: 9.9 G/DL — HIGH (ref 6–8.3)
PROTHROM AB SERPL-ACNC: 50.2 SEC — HIGH (ref 10.5–13.4)
RBC # BLD: 4.65 M/UL — SIGNIFICANT CHANGE UP (ref 4.2–5.8)
RBC # FLD: 15.5 % — HIGH (ref 10.3–14.5)
SARS-COV-2 RNA SPEC QL NAA+PROBE: SIGNIFICANT CHANGE UP
SODIUM SERPL-SCNC: 136 MMOL/L — SIGNIFICANT CHANGE UP (ref 135–145)
WBC # BLD: 9.95 K/UL — SIGNIFICANT CHANGE UP (ref 3.8–10.5)
WBC # FLD AUTO: 9.95 K/UL — SIGNIFICANT CHANGE UP (ref 3.8–10.5)

## 2022-07-06 PROCEDURE — 71045 X-RAY EXAM CHEST 1 VIEW: CPT | Mod: 26

## 2022-07-06 PROCEDURE — 72131 CT LUMBAR SPINE W/O DYE: CPT | Mod: 26,MA

## 2022-07-06 PROCEDURE — 72170 X-RAY EXAM OF PELVIS: CPT | Mod: 26

## 2022-07-06 PROCEDURE — 73562 X-RAY EXAM OF KNEE 3: CPT | Mod: 26,LT

## 2022-07-06 PROCEDURE — 99285 EMERGENCY DEPT VISIT HI MDM: CPT | Mod: FS

## 2022-07-06 PROCEDURE — 70450 CT HEAD/BRAIN W/O DYE: CPT | Mod: 26

## 2022-07-06 PROCEDURE — 99223 1ST HOSP IP/OBS HIGH 75: CPT | Mod: GC

## 2022-07-06 RX ORDER — MIDODRINE HYDROCHLORIDE 2.5 MG/1
1 TABLET ORAL
Qty: 0 | Refills: 0 | DISCHARGE

## 2022-07-06 RX ORDER — SIMETHICONE 80 MG/1
80 TABLET, CHEWABLE ORAL THREE TIMES A DAY
Refills: 0 | Status: DISCONTINUED | OUTPATIENT
Start: 2022-07-06 | End: 2022-07-26

## 2022-07-06 RX ORDER — MIDODRINE HYDROCHLORIDE 2.5 MG/1
10 TABLET ORAL THREE TIMES A DAY
Refills: 0 | Status: DISCONTINUED | OUTPATIENT
Start: 2022-07-06 | End: 2022-07-12

## 2022-07-06 RX ORDER — TIOTROPIUM BROMIDE 18 UG/1
1 CAPSULE ORAL; RESPIRATORY (INHALATION) DAILY
Refills: 0 | Status: DISCONTINUED | OUTPATIENT
Start: 2022-07-06 | End: 2022-07-26

## 2022-07-06 RX ORDER — SEVELAMER CARBONATE 2400 MG/1
800 POWDER, FOR SUSPENSION ORAL
Refills: 0 | Status: DISCONTINUED | OUTPATIENT
Start: 2022-07-06 | End: 2022-07-26

## 2022-07-06 RX ORDER — GABAPENTIN 400 MG/1
100 CAPSULE ORAL AT BEDTIME
Refills: 0 | Status: DISCONTINUED | OUTPATIENT
Start: 2022-07-06 | End: 2022-07-06

## 2022-07-06 RX ORDER — DOCUSATE SODIUM 100 MG
1 CAPSULE ORAL
Qty: 0 | Refills: 0 | DISCHARGE

## 2022-07-06 RX ORDER — ACETAMINOPHEN 500 MG
975 TABLET ORAL ONCE
Refills: 0 | Status: COMPLETED | OUTPATIENT
Start: 2022-07-06 | End: 2022-07-06

## 2022-07-06 RX ORDER — ALBUTEROL 90 UG/1
3 AEROSOL, METERED ORAL
Qty: 0 | Refills: 0 | DISCHARGE

## 2022-07-06 RX ORDER — BUDESONIDE AND FORMOTEROL FUMARATE DIHYDRATE 160; 4.5 UG/1; UG/1
2 AEROSOL RESPIRATORY (INHALATION)
Refills: 0 | Status: DISCONTINUED | OUTPATIENT
Start: 2022-07-06 | End: 2022-07-26

## 2022-07-06 RX ORDER — SIMETHICONE 80 MG/1
1 TABLET, CHEWABLE ORAL
Qty: 0 | Refills: 0 | DISCHARGE

## 2022-07-06 RX ORDER — FLUTICASONE PROPIONATE AND SALMETEROL 50; 250 UG/1; UG/1
1 POWDER ORAL; RESPIRATORY (INHALATION)
Qty: 0 | Refills: 0 | DISCHARGE

## 2022-07-06 RX ORDER — ALLOPURINOL 300 MG
1 TABLET ORAL
Qty: 0 | Refills: 0 | DISCHARGE

## 2022-07-06 RX ORDER — GABAPENTIN 400 MG/1
2 CAPSULE ORAL
Qty: 0 | Refills: 0 | DISCHARGE

## 2022-07-06 RX ORDER — PANTOPRAZOLE SODIUM 20 MG/1
40 TABLET, DELAYED RELEASE ORAL
Refills: 0 | Status: DISCONTINUED | OUTPATIENT
Start: 2022-07-06 | End: 2022-07-26

## 2022-07-06 RX ORDER — POLYETHYLENE GLYCOL 3350 17 G/17G
17 POWDER, FOR SOLUTION ORAL DAILY
Refills: 0 | Status: DISCONTINUED | OUTPATIENT
Start: 2022-07-06 | End: 2022-07-26

## 2022-07-06 RX ORDER — ALLOPURINOL 300 MG
100 TABLET ORAL DAILY
Refills: 0 | Status: DISCONTINUED | OUTPATIENT
Start: 2022-07-06 | End: 2022-07-26

## 2022-07-06 RX ORDER — POLYETHYLENE GLYCOL 3350 17 G/17G
17 POWDER, FOR SOLUTION ORAL
Qty: 0 | Refills: 0 | DISCHARGE

## 2022-07-06 RX ORDER — LIDOCAINE AND PRILOCAINE CREAM 25; 25 MG/G; MG/G
1 CREAM TOPICAL DAILY
Refills: 0 | Status: DISCONTINUED | OUTPATIENT
Start: 2022-07-06 | End: 2022-07-06

## 2022-07-06 RX ADMIN — SEVELAMER CARBONATE 800 MILLIGRAM(S): 2400 POWDER, FOR SUSPENSION ORAL at 20:29

## 2022-07-06 RX ADMIN — Medication 50 MILLIGRAM(S): at 20:29

## 2022-07-06 RX ADMIN — Medication 975 MILLIGRAM(S): at 22:30

## 2022-07-06 RX ADMIN — SIMETHICONE 80 MILLIGRAM(S): 80 TABLET, CHEWABLE ORAL at 21:49

## 2022-07-06 RX ADMIN — MIDODRINE HYDROCHLORIDE 30 MILLIGRAM(S): 2.5 TABLET ORAL at 16:10

## 2022-07-06 RX ADMIN — Medication 975 MILLIGRAM(S): at 21:49

## 2022-07-06 RX ADMIN — BUDESONIDE AND FORMOTEROL FUMARATE DIHYDRATE 2 PUFF(S): 160; 4.5 AEROSOL RESPIRATORY (INHALATION) at 20:30

## 2022-07-06 NOTE — CONSULT NOTE ADULT - ATTENDING COMMENTS
#esrd on hd  HD today  #chronic hypotension- midodrine with HD; confirm outpt BP  #hyperkalemia- check cpk; HD today

## 2022-07-06 NOTE — ED PROVIDER NOTE - CLINICAL SUMMARY MEDICAL DECISION MAKING FREE TEXT BOX
76 yo male with PMHx of ESRD on HD via AV fistula LUE MWF, COPD (on 2L home O2), CHF, pHTN, hypotension on Midodrine, DVT S/P IVC filter p/w fall.  Patient states left knee "gave out." No head trauma or LOC. Patient unable to ambulate since fall.  Due for dialysis today. Fall appears to be mechanical in setting of chronic knee pain.  no focal weakness or concern for acute CVA.  Will obtain labs, CXR, pelvic/knee xray, Lumbar ct.  Patient will likely require admission for dialysis and PT eval for possible rehab.  -Monico Altman PA-C

## 2022-07-06 NOTE — ED PROVIDER NOTE - WR ORDER STATUS 1
Pt is refusing morning meds and IV infusion. IV got infiltrated, RN persuaded pt to get a new IV but he refused.   Performed

## 2022-07-06 NOTE — H&P ADULT - HISTORY OF PRESENT ILLNESS
78 yo male with PMHx of ESRD on HD via AV fistula LUE MWF, COPD (on 2L home O2), CHF, pHTN, hypotension on Midodrine, DVT S/P IVC filter p/w fall.  Patient reports that he was walking out of his house today to go to dialysis when he felt his left knee "give out." Patient landed on his buttocks.  no head trauma or LOC.  On coumadin.  Patient was unable to get up 2/2 left knee pain.  Patient also c/o acute on chronic low back pain since the fall.  Patient reports that he is otherwise feeling well.  Denies headache, blurry vision, focal weakness, numbness, slurred speech, CP, SOB, abd pain, NVD.  Patient does not make urine.  Of note, patient was discharged from [facility] one month ago after receiving PT for BL knee pain and difficulty ambulating.  Patient states that after discharge from rehab he was supposed to receive home PT, but that has not happened.

## 2022-07-06 NOTE — CONSULT NOTE ADULT - PROBLEM SELECTOR RECOMMENDATION 9
Pt. with ESRD on HD three times a week (MWF) presented to [facility] for a fall. Last HD was on 7/4 via LUE AVF. Pt. clinically stable. No CP, SOB or palpitations during evaluation. HD consent obtained from pt, placed in chart. Labs reviewed. Will arrange for HD today once clarification is obtained about BP and Midodrine dose. Pt. denies any lightheadedness and states that his his BP is normally 80s-90s/ 50s-60s. He endorses taking Midodrine but does not recall the dose. Consent obtained for HD and placed in the chart. Will arrange for HD today. Will obtain outpatient records to c/w phos binders and ESAs as previously prescribed.       If you have any questions, please feel free to contact me  Jai Alvarenga  Nephrology Fellow  [phone]; Prefer [employer] TEAMS  (After 5pm or on weekends please page the on-call fellow) Pt. with ESRD on HD three times a week (MWF) presented to [facility] for a fall. Last HD was on 7/4 via LUE AVF. Pt. clinically stable. No CP, SOB or palpitations during evaluation. HD consent obtained from pt, placed in chart. Labs reviewed. Will arrange for HD today once clarification is obtained about BP and Midodrine dose. Pt. denies any lightheadedness and states that his his BP is normally 80s-90s/ 50s-60s. He endorses taking Midodrine but does not recall the dose. Consent obtained for HD and placed in the chart. Will arrange for HD today. Will obtain outpatient records to c/w phos binders and ESAs as previously prescribed. Prior records state that Pt. was on 30mg Q8H here with additional Midodrine given during HD.       If you have any questions, please feel free to contact me  Jai Alvarenga  Nephrology Fellow  [phone]; Prefer [employer] TEAMS  (After 5pm or on weekends please page the on-call fellow)

## 2022-07-06 NOTE — CONSULT NOTE ADULT - ASSESSMENT
Pt. is a 77 y.o. M e/ PMHx. of HTN, HFrEF, SAADIA, Gout and ESRD on HD MWF at [facility] presents after having a fall enroute to the HD center. Nephrology consulted for ESRD management.

## 2022-07-06 NOTE — H&P ADULT - NSHPSOURCEINFORD_GEN_ALL_CORE
History of Present Illness


History of present illness: 


57-year-old woman with a history of hypertension, COPD, irritable bowel 

syndrome, coronary artery disease comes emergency room with complaints of chest 

pain for more than 2 days ago.  Patient states she has a history of coronary 

artery disease, diagnosed in 2014, no intervention was done after the stress 

test, she was placed on statin and aspirin. Pain is in the epigastric area which

she described as a elephant sitting on her chest, constant, intensity 5/10,  

radiating to the left arm, worse with movement, worse with morphine given in the

emergency room.  She stated that today she got dizzy with chest pain.  Admits to

nausea, shortness of breath, no diaphoresis, palpitation


Review Of  Systems:


Constitutional: no weight loss,  chills, fever


Ears, eyes, nose, mouth and throat: no nasal congestion, no nasal discharge, no 

sinus pressure, blurry vision, diplopia


Neck: No neck pain or rigidity.


Cardiovascular: No  palpitations, chest pain


Respiratory: No  cough


Gastrointestinal: No hematochezia, abdominal pain


Genitourinary : no dysuria, frequency , hematuria


Musculoskeletal: no muscle ache , joint pain


Integumentary: no rash, no pruritis


Neurological: no parathesias, focal weakness


Endocrine: no cold or heat intolerance, no polyuria or polydipsia


Hematologic/Lymphatic: no easy bruising, no easy bleeding, no gland swelling


Allergic/Immunologic: no urticaria, no angioedema.





PAST MEDICAL HISTORY: hypertension, COPD





PAST SURGICAL HISTORY:None





SOCIAL HISTORY: No alcohol, no tobacco or drugs





FAMILY HISTORY: hypertension








Medications and Allergies


                                    Allergies











Allergy/AdvReac Type Severity Reaction Status Date / Time


 


amlodipine [From Norvasc] Allergy  Unknown Verified 01/14/20 19:14


 


clarithromycin [From Biaxin] Allergy  Rash Verified 11/17/13 10:45


 


clonidine Allergy  Unknown Verified 01/14/20 19:14


 


grape Allergy  Unknown Verified 01/15/20 03:24


 


hydralazine [Hydralazine] Allergy  Unknown Verified 11/17/13 10:45


 


hydrochlorothiazide Allergy  Unknown Verified 01/14/20 19:14


 


lisinopril Allergy  Unknown Verified 01/14/20 19:14


 


losartan Allergy  Unknown Verified 01/14/20 19:14


 


Penicillins Allergy  Unknown Verified 11/17/13 10:45


 


regadenoson [From Lexiscan] Allergy  Unknown Verified 01/15/20 03:00


 


apple Allergy  Unknown Uncoded 01/15/20 03:24


 


peach Allergy  Unknown Uncoded 01/15/20 03:24


 


strawberry Allergy  Unknown Uncoded 01/15/20 03:24











                                Home Medications











 Medication  Instructions  Recorded  Confirmed  Last Taken  Type


 


ALBUTEROL NEB's [Proventil 0.083% 2.5 mg IH QID 01/14/20 01/14/20 Unknown 

History





NEBS]     


 


Albuterol Sulfate [Proair 90 mcg IH QID MDD 2 PUFFS 01/14/20 01/14/20 Unknown 

History





Digihaler]     


 


Aspirin EC [Halfprin EC] 81 mg PO QDAY 01/14/20 01/14/20 Unknown History


 


Budesonide/Formoterol Fumarate 10.2 gm IH BID MDD 2 PUFFS 01/14/20 01/14/20 

Unknown History





[Symbicort 160-4.5 Mcg Inhaler]     


 


Dicyclomine [Bentyl] 10 mg PO BID MDD 2 CAPSULES 01/14/20 01/14/20 Unknown 

History


 


EPINEPHrine [Epipen] 0.3 mg IJ PRN 01/14/20 01/15/20 Unknown History


 


Fluticasone [Flonase] 2 spray NS BID 01/14/20 01/14/20 Unknown History


 


Ipratropium [Atrovent NEB] 0.5 mg IH Q4HR 01/14/20 01/14/20 Unknown History


 


Loratadine [Claritin] 10 mg PO QDAY 01/14/20 01/14/20 Unknown History


 


Rosuvastatin Calcium [Crestor] 20 mg PO QDAY 01/14/20 01/14/20 Unknown History


 


Sennosides [Senna] 8.6 mg PO QHS MDD 2 TABLETS 01/14/20 01/14/20 Unknown History


 


Tiotropium Bromide [Spiriva] 18 mcg IH QDAY MDD 2 01/14/20 01/14/20 Unknown 

History


 


Triamcinolone 0.1% [Kenalog 0.1% 1 applic TP BID 01/14/20 01/14/20 Unknown 

History





CREAM]     


 


carvediloL [Coreg] 3.125 mg PO BID 01/14/20 01/14/20 Unknown History


 


Acetaminophen [Acetaminophen TAB] 2 tab PO Q4H PRN #15 tablet 01/15/20  Unknown 

Rx


 


oxyCODONE /ACETAMINOPHEN [Percocet 1 tab PO Q6H PRN #15 tablet 01/15/20  Unknown

 Rx





5/325 mg]     











Active Meds: 


Active Medications





Acetaminophen (Tylenol)  650 mg PO Q4H PRN


   PRN Reason: Pain MILD(1-3)/Fever >100.5/HA


Enoxaparin Sodium (Enoxaparin)  30 mg SUB-Q QDAY RIMA


Ondansetron HCl (Zofran)  4 mg IV Q8H PRN


   PRN Reason: Nausea And Vomiting


Oxycodone/Acetaminophen (Percocet 5/325)  1 tab PO Q6H PRN


   PRN Reason: Pain, Moderate (4-6)


Sodium Chloride (Sodium Chloride Flush Syringe 10 Ml)  10 ml IV BID RIMA


Sodium Chloride (Sodium Chloride Flush Syringe 10 Ml)  10 ml IV PRN PRN


   PRN Reason: LINE FLUSH











Exam





- Physical Exam


Narrative exam: 


Gen. appearance: Patient lying in bed, no apparent distress


HEENT: Normocephalic, atraumatic, pupils equally round and reactive to light, 

extraocular movement intact, and no sclericterus,. No JVD or thyromegaly or 

nodule,neck supple, no carotid bruit ,mucous membranes moist, no exudate or e

rythema


Heart: S1, S2, regular rate and rhythm


Lungs: Clear to auscultation bilaterally, breathing comfortable


Abdomen: Positive bowel sounds, nontender, nondistended, no organomegaly


Extremity: No edema, cyanosis, clubbing


Skin: No rash, nodules, warm, dry


Neuro: Oriented 3, cranial nerves II-12 intact, speech is fluent, motor and 

sensory intact








- Constitutional


Vitals: 


                                        











Temp Pulse Resp BP Pulse Ox


 


 98.2 F   51 L  17   145/84   100 


 


 01/14/20 19:30  01/14/20 21:45  01/14/20 21:45  01/14/20 21:45  01/14/20 21:45














Results





- Labs


CBC & Chem 7: 


                                 01/15/20 04:14





                                 01/15/20 04:14


Labs: 


                              Abnormal lab results











  01/14/20 01/14/20 Range/Units





  19:36 19:36 


 


RDW  16.4 H   (13.2-15.2)  %


 


Chloride   107.6 H  ()  mmol/L


 


Carbon Dioxide   20 L  (22-30)  mmol/L


 


Glucose   104 H  ()  mg/dL














- Imaging and Cardiology


Chest x-ray: report reviewed





Assessment and Plan


Assessment


Chest pain


Check cardiac enzymes, check stress test


start aspirin, percocet


Continue outpatient medication





Hypertension


Continue outpatient medications





COPD, stable





DVT prophylaxis Chart(s)/Patient

## 2022-07-06 NOTE — ED ADULT NURSE REASSESSMENT NOTE - NS ED NURSE REASSESS COMMENT FT1
Admitting contacted at ext [phone] to make aware pt requesting food.
Pt in dialysis- unable to obtain VS at this time.
Received call from dialysis RN, states pt completed 3 hrs of treatment w/ 500 mL removed. Awaiting pt return to ED. Will reassess upon return.
Report given to Harjit in dialysis. Made aware of pt VS and current state and situation.
Report received from KEM Mari. Pt A/O x4. Pt states he has "buttocks" pain after fall today and complaining of left knee pain. Full ROM, but difficulty lifting left leg due ot pain. Pt hypotensive and ED Team made aware- pt denies dizziness, HA, SOB. Safety and comfort maintained.
Pt returned form CT. Requesting for food, pt made aware admitting will be contacted to order diet order.
Pt returned to ED from dialysis, pt reporting lower back pain which he states he requested tylenol from dialysis team but they did not reach out to primary team.   Covering NP contacted for request for pain management, NP currently unavailable d/t emergency, will reattempt to contact for pt orders.

## 2022-07-06 NOTE — ED PROVIDER NOTE - ATTENDING CONTRIBUTION TO CARE
I, Rei Brennan, performed a history and physical exam of the patient and discussed their management with the resident and /or advanced care provider. I reviewed the resident and /or ACP's note and agree with the documented findings and plan of care. I was present and available for all procedures. I, Rei Brennan, performed a history and physical exam of the patient and discussed their management with the resident and /or advanced care provider. I reviewed the resident and /or ACP's note and agree with the documented findings and plan of care. I was present and available for all procedures. Labs with INR of 4.3, but otherwise labs WNL with K+ of 5.3 and EKG without acute T waves. CTH pending but clinically low concern ICH or significant head trauma. Patient admitted for HD and PT eval.

## 2022-07-06 NOTE — ED PROVIDER NOTE - NS ED ATTENDING STATEMENT MOD
I have seen and examined this patient and fully participated in the care of this patient as the teaching attending.  The service was shared with the KILO.  I reviewed and verified the documentation and independently performed the documented:

## 2022-07-06 NOTE — ED PROVIDER NOTE - PROGRESS NOTE DETAILS
Patient denies head trauma, but given supratheraputic INR with fall, will obtain head CT.  Patient also c/o right knee pain.  Will X-ray both R and L knee.  pain likely 2/2 chronic OA. Nephrology consulted, will see patient and consent for dialysis. -Monico Altman PA-C

## 2022-07-06 NOTE — ED PROVIDER NOTE - CARE PLAN
1 Principal Discharge DX:	Left knee pain  Secondary Diagnosis:	Fall  Secondary Diagnosis:	ESRD needing dialysis

## 2022-07-06 NOTE — ED PROVIDER NOTE - OBJECTIVE STATEMENT
76 yo male with PMHx of ESRD on HD via AV fistula LUE MWF, COPD (on 2L home O2), CHF, pHTN, hypotension on Midodrine, DVT S/P IVC filter p/w fall.  Patient reports that he was walking out of his house today to go to dialysis when he felt his left knee "give out." Patient landed on his buttocks.  no head trauma or LOC.  Not on AC.  Patient was unable to get up 2/2 left knee pain.  Patient also c/o acute on chronic low back pain since the fall.  Patient reports that he is otherwise feeling well.  Denies headache, blurry vision, focal weakness, numbness, slurred speech, CP, SOB, abd pain, NVD.  Patient does not make urine.  Of note, patient was discharged from [facility] one month ago after receiving PT for BL knee pain and difficulty ambulating.  Patient states that after discharge from rehab he was supposed to receive home PT, but that has not happened. 76 yo male with PMHx of ESRD on HD via AV fistula LUE MWF, COPD (on 2L home O2), CHF, pHTN, hypotension on Midodrine, DVT S/P IVC filter p/w fall.  Patient reports that he was walking out of his house today to go to dialysis when he felt his left knee "give out." Patient landed on his buttocks.  no head trauma or LOC.  On coumadin.  Patient was unable to get up 2/2 left knee pain.  Patient also c/o acute on chronic low back pain since the fall.  Patient reports that he is otherwise feeling well.  Denies headache, blurry vision, focal weakness, numbness, slurred speech, CP, SOB, abd pain, NVD.  Patient does not make urine.  Of note, patient was discharged from [facility] one month ago after receiving PT for BL knee pain and difficulty ambulating.  Patient states that after discharge from rehab he was supposed to receive home PT, but that has not happened.

## 2022-07-06 NOTE — H&P ADULT - ASSESSMENT
Pt. is a 77 y.o. M e/ PMHx. of HTN, HFrEF, SAADIA, Gout and ESRD on HD MWF at [facility] presents after having a fall enroute to the HD center. 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 c/o knee pain and inability to walk. nephrology called. PT kevin ordered. cont outptn meds, check orthostatics. card called  dvt ppx w sc lovenox. ptn w h/o DVT/PE, not on AC 2/2 h/o GI bleeds. h/o COPD with chronic hypoxic respiratory failure on 2 L home O2. presently stable.

## 2022-07-06 NOTE — ED ADULT NURSE NOTE - OBJECTIVE STATEMENT
Patient  is  alert  and  oriented x3.  Color is  good  and skin warm to touch.  His  knees izabela down  while he  was  ambulating, then he fell down.  He is  c/o  generalized body pain.  No  S/S  of  injury noted.

## 2022-07-06 NOTE — ED PROVIDER NOTE - PHYSICAL EXAMINATION
Gen: AAO x 3, NAD  Skin: No rashes............................  HEENT: NC/AT, PERRLA, EOMI, MMM  Resp: unlabored CTAB  Cardiac: rrr s1s2  GI: ND, + large ventral and umbilical hernia (reducible), +BS, Soft, NT  Ext: trace pedal edema BLE.  No calf tenderness  MSK: No midline cervical/thoracic TTP, mild midline lumbar ttp. +left knee ttp, full active and passive ROM, compartments soft, FROM in all extremities   Neuro: Strength 4/5 BLE and 5/5 BUE, sensation intact, clear speech, no facial asymmetry. Gen: AAO x 3, NAD  Skin: No rashes. Two Stage 2 upper gluteal cleft decubitus ulcers.  no bleeding or purulent discharge.  No surrounding erythema   HEENT: NC/AT, PERRLA, EOMI, MMM  Resp: unlabored CTAB  Cardiac: rrr s1s2  GI: ND, + large ventral and umbilical hernia (reducible), +BS, Soft, NT  Ext: trace pedal edema BLE.  No calf tenderness.  2+ DP and PT  MSK: No midline cervical/thoracic TTP, mild midline lumbar ttp. +left knee ttp, full active and passive ROM, compartments soft, FROM in all extremities   Neuro: Strength 4/5 BLE and 5/5 BUE, sensation intact, clear speech, no facial asymmetry. Gen: AAO x 3, NAD  Skin: No rashes. Two Stage 2 upper gluteal cleft decubitus ulcers.  no bleeding or purulent discharge.  No surrounding erythema   HEENT: NC/AT, PERRLA, EOMI, MMM  Resp: unlabored CTAB  Cardiac: rrr s1s2  GI: ND, + large ventral and umbilical hernia (reducible), +BS, Soft, NT  Ext: trace pedal edema BLE.  No calf tenderness.  2+ DP and PT  MSK: No midline cervical/thoracic TTP, mild midline lumbar ttp. +left knee ttp, full active and passive ROM with tenderness of the right and left knee with flexion, compartments soft, FROM in all extremities   Neuro: Strength 4/5 BLE and 5/5 BUE, sensation intact, clear speech, no facial asymmetry.

## 2022-07-06 NOTE — H&P ADULT - NSHPPHYSICALEXAM_GEN_ALL_CORE
T(F): 97.5 (07-06-22 @ 14:40), Max: 98.1 (07-06-22 @ 09:12)  HR: 68 (07-06-22 @ 14:40) (68 - 92)  BP: 98/58 (07-06-22 @ 14:40) (82/55 - 98/58)  RR: 20 (07-06-22 @ 14:40) (17 - 20)  SpO2: 100% (07-06-22 @ 14:40) (92% - 100%)    PHYSICAL EXAM:  GENERAL: NAD, well-developed  HEAD:  Atraumatic, Normocephalic  EYES: EOMI, PERRLA, conjunctiva and sclera clear  NECK: Supple, No JVD  CHEST/LUNG: Clear to auscultation bilaterally; No wheeze  HEART: Regular rate and rhythm; No murmurs, rubs, or gallops  ABDOMEN: Soft, Nontender, Nondistended; Bowel sounds present  EXTREMITIES:  2+ Peripheral Pulses, No clubbing, cyanosis, or edema  PSYCH: AAOx3  NEUROLOGY: non-focal  SKIN: No rashes or lesions

## 2022-07-06 NOTE — CONSULT NOTE ADULT - SUBJECTIVE AND OBJECTIVE BOX
DATE OF SERVICE: 07-06-22 @ 23:05    CHIEF COMPLAINT:Patient is a 77y old  Male who presents with a chief complaint of functional paraplegia post a fall (06 Jul 2022 14:54)      HISTORY OF PRESENT ILLNESS:HPI:  78 yo male with PMHx of ESRD on HD via AV fistula LUE MWF, COPD (on 2L home O2), CHF, pHTN, hypotension on Midodrine, DVT S/P IVC filter p/w fall.  Patient reports that he was walking out of his house today to go to dialysis when he felt his left knee "give out." Patient landed on his buttocks.  no head trauma or LOC.  On coumadin.  Patient was unable to get up 2/2 left knee pain.  Patient also c/o acute on chronic low back pain since the fall.  Patient reports that he is otherwise feeling well.  Denies headache, blurry vision, focal weakness, numbness, slurred speech, CP, SOB, abd pain, NVD.  Patient does not make urine.  Of note, patient was discharged from [facility] one month ago after receiving PT for BL knee pain and difficulty ambulating.  Patient states that after discharge from rehab he was supposed to receive home PT, but that has not happened. (06 Jul 2022 14:54)      PAST MEDICAL & SURGICAL HISTORY:  Hypertension      Glomerular disease  Segmental glomerular sclerosis      CHF (congestive heart failure)      COPD (chronic obstructive pulmonary disease)      Pulmonary hypertension      Sleep apnea      Pulmonary edema      Peripheral edema      Gout      History of abdominal surgery  polypectomy      H/O right heart catheterization              MEDICATIONS:  midodrine. 10 milliGRAM(s) Oral three times a day      budesonide 160 MICROgram(s)/formoterol 4.5 MICROgram(s) Inhaler 2 Puff(s) Inhalation two times a day  tiotropium 18 MICROgram(s) Capsule 1 Capsule(s) Inhalation daily    pregabalin 50 milliGRAM(s) Oral two times a day    pantoprazole    Tablet 40 milliGRAM(s) Oral before breakfast  polyethylene glycol 3350 17 Gram(s) Oral daily  simethicone 80 milliGRAM(s) Chew three times a day    allopurinol 100 milliGRAM(s) Oral daily    multivitamin 1 Tablet(s) Oral daily      FAMILY HISTORY:  Family history of colon cancer (Father)    Family history of heart disease (Father)        Non-contributory    SOCIAL HISTORY:    [ ] not a smoker    Allergies    latex (Rash)  No Known Drug Allergies    Intolerances    	    REVIEW OF SYSTEMS: PT VERY POOR HISTORIAN  CONSTITUTIONAL: No fever  EYES: No eye pain, visual disturbances, or discharge  ENMT:  No difficulty hearing, tinnitus  NECK: No pain or stiffness  RESPIRATORY: No cough, wheezing,  CARDIOVASCULAR: No chest pain, palpitations, passing out, dizziness, or leg swelling  GASTROINTESTINAL:  No nausea, vomiting, diarrhea or constipation. No melena.  GENITOURINARY: No dysuria, hematuria  NEUROLOGICAL: No stroke like symptoms  SKIN: No burning or lesions   ENDOCRINE: No heat or cold intolerance  MUSCULOSKELETAL: No joint pain or swelling  PSYCHIATRIC: No  anxiety, mood swings  HEME/LYMPH: No bleeding gums  ALLERGY AND IMMUNOLOGIC: No hives or eczema	    All other ROS negative    PHYSICAL EXAM:  T(C): 36.4 (07-06-22 @ 21:00), Max: 36.9 (07-06-22 @ 16:15)  HR: 77 (07-06-22 @ 21:00) (60 - 92)  BP: 91/54 (07-06-22 @ 21:00) (82/55 - 104/68)  RR: 20 (07-06-22 @ 21:00) (17 - 20)  SpO2: 95% (07-06-22 @ 21:00) (92% - 100%)  Wt(kg): --  I&O's Summary    06 Jul 2022 07:01  -  06 Jul 2022 23:05  --------------------------------------------------------  IN: 0 mL / OUT: 500 mL / NET: -500 mL        Appearance: Normal	  HEENT:   Normal oral mucosa, EOMI	  Cardiovascular:  S1 S2, No JVD,    Respiratory: Lungs clear to auscultation	  Psychiatry: Alert  Gastrointestinal:  Soft, Non-tender, + BS	  Skin: No rashes   Neurologic: Non-focal  Extremities:  No edema  Vascular: Peripheral pulses palpable    	    	  	  CARDIAC MARKERS:  Labs personally reviewed by me                                  13.2   9.95  )-----------( 220      ( 06 Jul 2022 10:29 )             43.7     07-06    136  |  91<L>  |  52<H>  ----------------------------<  102<H>  5.3   |  23  |  11.81<H>    Ca    10.4      06 Jul 2022 10:29  Phos  8.1     07-06  Mg     2.4     07-06    TPro  9.9<H>  /  Alb  4.4  /  TBili  0.4  /  DBili  x   /  AST  39  /  ALT  14  /  AlkPhos  143<H>  07-06        Radiology: Personally reviewed by me -   < from: Xray Chest 1 View AP/PA (07.06.22 @ 10:42) >  Impression:  Left midlung linear atelectasis. Otherwise, clear lungs as visualized.   Somewhat limited exam.          Assessment and Plan:   · Assessment	  78 yo male with PMHx of ESRD on HD via AV fistula LUE MWF, COPD (on 2L home O2), CHF, pHTN, hypotension on Midodrine, DVT S/P IVC filter p/w fall.  Patient reports that he was walking out of his house today to go to dialysis when he felt his left knee "give out." Patient landed on his buttocks.  no head trauma or LOC.  On coumadin.  Patient was unable to get up 2/2 left knee pain.  Patient also c/o acute on chronic low back pain since the fall.  Patient reports that he is otherwise feeling well.  Denies headache, blurry vision, focal weakness, numbness, slurred speech, CP, SOB, abd pain, NVD.  Patient does not make urine.  Of note, patient was discharged from [facility] one month ago after receiving PT for BL knee pain and difficulty ambulating.  Patient states that after discharge from rehab he was supposed to receive home PT, but that has not happened. (06 Jul 2022 14:54)    1. Chronic hypotension with baseline SBP noted in the 80s prior admit a year ago  - was discharged on Midodrine 10mg TID    2. Hx of LE DVT prior admit -- declined AC    3. ?Hx of HF - prior echo with preserved EF and no mention of pulm HTN      Differential diagnosis and plan of care discussed with patient after the evaluation. Counseling on diet, nutritional counseling, weight management, exercise and medication compliance was done.   Advanced care planning/advanced directives discussed with patient/family. DNR status including forceful chest compressions to attempt to restart the heart, ventilator support/artificial breathing, electric shock, artificial nutrition, health care proxy, Molst form all discussed with pt. Pt wishes to consider. More than fifteen minutes spent on discussing advanced directives.        Roberto Latif DO [facility]  Cardiovascular Medicine  [address] [place] [address], [address]  Office [phone]  Cell [phone]

## 2022-07-06 NOTE — ED ADULT NURSE NOTE - NSIMPLEMENTINTERV_GEN_ALL_ED
Implemented All Fall Risk Interventions:  [place] to call system. Call bell, personal items and telephone within reach. Instruct patient to call for assistance. Room bathroom lighting operational. Non-slip footwear when patient is off stretcher. Physically safe environment: no spills, clutter or unnecessary equipment. Stretcher in lowest position, wheels locked, appropriate side rails in place. Provide visual cue, wrist band, yellow gown, etc. Monitor gait and stability. Monitor for mental status changes and reorient to person, place, and time. Review medications for side effects contributing to fall risk. Reinforce activity limits and safety measures with patient and family.

## 2022-07-06 NOTE — CONSULT NOTE ADULT - SUBJECTIVE AND OBJECTIVE BOX
[facility] DIVISION OF KIDNEY DISEASES AND HYPERTENSION -- [phone]  -- INITIAL CONSULT NOTE  --------------------------------------------------------------------------------  HPI:  Pt. is a 77 y.o. M e/ PMHx. of HTN, HFrEF, SAADIA, Gout and ESRD on HD MWF at [facility] presents after having a fall enroute to the HD center. Nephrology consulted for ESRD management. Pt. does not recall his nephrologist and denies any issues with dialysis. He is edematous but states he is not worse than usual. Denies any syncope and attributes his fall to his knee "buckling." Pt. has notably low BP which Pt. states is chronic and for which he takes Midodrine.       PAST HISTORY  --------------------------------------------------------------------------------  PAST MEDICAL & SURGICAL HISTORY:  Hypertension      Glomerular disease  Segmental glomerular sclerosis      CHF (congestive heart failure)      COPD (chronic obstructive pulmonary disease)      Pulmonary hypertension      Sleep apnea      Pulmonary edema      Peripheral edema      Gout      History of abdominal surgery  polypectomy      H/O right heart catheterization        FAMILY HISTORY:  Family history of colon cancer (Father)    Family history of heart disease (Father)      PAST SOCIAL HISTORY:    ALLERGIES & MEDICATIONS  --------------------------------------------------------------------------------  Allergies    latex (Rash)  No Known Drug Allergies    Intolerances      Standing Inpatient Medications    PRN Inpatient Medications      REVIEW OF SYSTEMS  --------------------------------------------------------------------------------  Gen: No fevers/chills  Skin: No rashes  Head/Eyes/Ears: Normal hearing,   Respiratory: No dyspnea, cough  CV: No chest pain  GI: No abdominal pain, diarrhea  : No dysuria, hematuria  MSK: +edema, knee pain  Heme: No easy bruising or bleeding  Psych: No significant depression    All other systems were reviewed and are negative, except as noted.    VITALS/PHYSICAL EXAM  --------------------------------------------------------------------------------  T(C): 36.4 (07-06-22 @ 14:40), Max: 36.7 (07-06-22 @ 09:12)  HR: 68 (07-06-22 @ 14:40) (68 - 92)  BP: 98/58 (07-06-22 @ 14:40) (82/55 - 98/58)  RR: 20 (07-06-22 @ 14:40) (17 - 20)  SpO2: 100% (07-06-22 @ 14:40) (92% - 100%)  Wt(kg): --  Height (cm): 170.2 (07-06-22 @ 09:12)  Weight (kg): 124.7 (07-06-22 @ 09:12)  BMI (kg/m2): 43 (07-06-22 @ 09:12)  BSA (m2): 2.32 (07-06-22 @ 09:12)      Physical Exam:  	Gen: NAD  	HEENT: MMM  	Pulm: CTA B/L  	CV: S1S2  	Abd: Soft, +BS   	Ext: ++ LE edema B/L  	Neuro: Awake  	Skin: Warm and dry  	Vascular access: LUE AVF, thrills+    LABS/STUDIES  --------------------------------------------------------------------------------              13.2   9.95  >-----------<  220      [07-06-22 @ 10:29]              43.7     136  |  91  |  52  ----------------------------<  102      [07-06-22 @ 10:29]  5.3   |  23  |  11.81        Ca     10.4     [07-06-22 @ 10:29]      Mg     2.4     [07-06-22 @ 10:29]      Phos  8.1     [07-06-22 @ 10:29]    TPro  9.9  /  Alb  4.4  /  TBili  0.4  /  DBili  x   /  AST  39  /  ALT  14  /  AlkPhos  143  [07-06-22 @ 10:29]    PT/INR: PT 50.2 , INR 4.30       [07-06-22 @ 10:29]  PTT: 54.2       [07-06-22 @ 10:29]      Creatinine Trend:  SCr 11.81 [07-06 @ 10:29]    HbA1c 5.0      [10-02-17 @ 15:38]  TSH 1.00      [08-23-21 @ 02:35]    HBsAb 107.0      [08-24-21 @ 09:47]  HBsAb Reactive      [08-24-21 @ 09:47]  HBsAg Nonreact      [08-24-21 @ 09:47]  HBcAb Nonreact      [08-31-21 @ 00:12]  HCV 0.40, Nonreact      [08-31-21 @ 00:12]

## 2022-07-07 LAB
ANION GAP SERPL CALC-SCNC: 21 MMOL/L — HIGH (ref 5–17)
APTT BLD: 53.1 SEC — HIGH (ref 27.5–35.5)
BUN SERPL-MCNC: 40 MG/DL — HIGH (ref 7–23)
CALCIUM SERPL-MCNC: 9.9 MG/DL — SIGNIFICANT CHANGE UP (ref 8.4–10.5)
CHLORIDE SERPL-SCNC: 91 MMOL/L — LOW (ref 96–108)
CO2 SERPL-SCNC: 25 MMOL/L — SIGNIFICANT CHANGE UP (ref 22–31)
CREAT SERPL-MCNC: 9.67 MG/DL — HIGH (ref 0.5–1.3)
EGFR: 5 ML/MIN/1.73M2 — LOW
GLUCOSE BLDC GLUCOMTR-MCNC: 99 MG/DL — SIGNIFICANT CHANGE UP (ref 70–99)
GLUCOSE SERPL-MCNC: 106 MG/DL — HIGH (ref 70–99)
INR BLD: 4.46 RATIO — HIGH (ref 0.88–1.16)
POTASSIUM SERPL-MCNC: 4.6 MMOL/L — SIGNIFICANT CHANGE UP (ref 3.5–5.3)
POTASSIUM SERPL-SCNC: 4.6 MMOL/L — SIGNIFICANT CHANGE UP (ref 3.5–5.3)
PROTHROM AB SERPL-ACNC: 52.5 SEC — HIGH (ref 10.5–13.4)
SODIUM SERPL-SCNC: 137 MMOL/L — SIGNIFICANT CHANGE UP (ref 135–145)

## 2022-07-07 PROCEDURE — 99232 SBSQ HOSP IP/OBS MODERATE 35: CPT

## 2022-07-07 RX ADMIN — SIMETHICONE 80 MILLIGRAM(S): 80 TABLET, CHEWABLE ORAL at 05:39

## 2022-07-07 RX ADMIN — SEVELAMER CARBONATE 800 MILLIGRAM(S): 2400 POWDER, FOR SUSPENSION ORAL at 11:51

## 2022-07-07 RX ADMIN — MIDODRINE HYDROCHLORIDE 10 MILLIGRAM(S): 2.5 TABLET ORAL at 05:39

## 2022-07-07 RX ADMIN — Medication 50 MILLIGRAM(S): at 06:13

## 2022-07-07 RX ADMIN — SEVELAMER CARBONATE 800 MILLIGRAM(S): 2400 POWDER, FOR SUSPENSION ORAL at 18:34

## 2022-07-07 RX ADMIN — MIDODRINE HYDROCHLORIDE 10 MILLIGRAM(S): 2.5 TABLET ORAL at 11:51

## 2022-07-07 RX ADMIN — Medication 50 MILLIGRAM(S): at 21:25

## 2022-07-07 RX ADMIN — PANTOPRAZOLE SODIUM 40 MILLIGRAM(S): 20 TABLET, DELAYED RELEASE ORAL at 07:05

## 2022-07-07 RX ADMIN — TIOTROPIUM BROMIDE 1 CAPSULE(S): 18 CAPSULE ORAL; RESPIRATORY (INHALATION) at 13:45

## 2022-07-07 RX ADMIN — SIMETHICONE 80 MILLIGRAM(S): 80 TABLET, CHEWABLE ORAL at 21:25

## 2022-07-07 RX ADMIN — Medication 1 TABLET(S): at 11:27

## 2022-07-07 RX ADMIN — BUDESONIDE AND FORMOTEROL FUMARATE DIHYDRATE 2 PUFF(S): 160; 4.5 AEROSOL RESPIRATORY (INHALATION) at 05:39

## 2022-07-07 RX ADMIN — POLYETHYLENE GLYCOL 3350 17 GRAM(S): 17 POWDER, FOR SOLUTION ORAL at 18:55

## 2022-07-07 RX ADMIN — Medication 100 MILLIGRAM(S): at 11:27

## 2022-07-07 RX ADMIN — BUDESONIDE AND FORMOTEROL FUMARATE DIHYDRATE 2 PUFF(S): 160; 4.5 AEROSOL RESPIRATORY (INHALATION) at 18:35

## 2022-07-07 RX ADMIN — SEVELAMER CARBONATE 800 MILLIGRAM(S): 2400 POWDER, FOR SUSPENSION ORAL at 09:06

## 2022-07-07 RX ADMIN — SIMETHICONE 80 MILLIGRAM(S): 80 TABLET, CHEWABLE ORAL at 18:34

## 2022-07-07 RX ADMIN — MIDODRINE HYDROCHLORIDE 10 MILLIGRAM(S): 2.5 TABLET ORAL at 18:35

## 2022-07-07 NOTE — CONSULT NOTE ADULT - SUBJECTIVE AND OBJECTIVE BOX
PULMONARY CONSULT  Dragan Ortiz MD  [phone]    Initial HPI on admission:  HPI:  76 yo male with PMHx of ESRD on HD via AV fistula LUE MWF, COPD (on 2L home O2), CHF, pHTN, hypotension on Midodrine, DVT S/P IVC filter p/w fall.  Patient reports that he was walking out of his house today to go to dialysis when he felt his left knee "give out." Patient landed on his buttocks.  no head trauma or LOC.  On coumadin.  Patient was unable to get up 2/2 left knee pain.  Patient also c/o acute on chronic low back pain since the fall.  Patient reports that he is otherwise feeling well.  Denies headache, blurry vision, focal weakness, numbness, slurred speech, CP, SOB, abd pain, NVD.  Patient does not make urine.  Of note, patient was discharged from [facility] one month ago after receiving PT for BL knee pain and difficulty ambulating.  Patient states that after discharge from rehab he was supposed to receive home PT, but that has not happened. (06 Jul 2022 14:54)      BRIEF HOSPITAL COURSE: ***    PAST MEDICAL & SURGICAL HISTORY:  Hypertension      Glomerular disease  Segmental glomerular sclerosis      CHF (congestive heart failure)      COPD (chronic obstructive pulmonary disease)      Pulmonary hypertension      Sleep apnea      Pulmonary edema      Peripheral edema      Gout      History of abdominal surgery  polypectomy      H/O right heart catheterization        Allergies    latex (Rash)  No Known Drug Allergies    Intolerances      FAMILY HISTORY:  Family history of colon cancer (Father)    Family history of heart disease (Father)      REVIEW OF SYSTEMS      General:	    Skin/Breast:  	  Ophthalmologic:  	  ENMT:	    Respiratory and Thorax:  	  Cardiovascular:	    Gastrointestinal:	    Genitourinary:	    Musculoskeletal:	    Neurological:	    Psychiatric:	    Hematology/Lymphatics:	    Endocrine:	    Allergic/Immunologic:	  Social history:       Medications:  MEDICATIONS  (STANDING):  allopurinol 100 milliGRAM(s) Oral daily  budesonide 160 MICROgram(s)/formoterol 4.5 MICROgram(s) Inhaler 2 Puff(s) Inhalation two times a day  midodrine. 10 milliGRAM(s) Oral three times a day  multivitamin 1 Tablet(s) Oral daily  pantoprazole    Tablet 40 milliGRAM(s) Oral before breakfast  polyethylene glycol 3350 17 Gram(s) Oral daily  pregabalin 50 milliGRAM(s) Oral two times a day  sevelamer carbonate 800 milliGRAM(s) Oral three times a day with meals  simethicone 80 milliGRAM(s) Chew three times a day  tiotropium 18 MICROgram(s) Capsule 1 Capsule(s) Inhalation daily    MEDICATIONS  (PRN):    Vital Signs Last 24 Hrs  T(C): 36.6 (07 Jul 2022 11:51), Max: 36.9 (06 Jul 2022 16:15)  T(F): 97.8 (07 Jul 2022 11:51), Max: 98.5 (06 Jul 2022 16:15)  HR: 92 (07 Jul 2022 11:51) (60 - 92)  BP: 94/53 (07 Jul 2022 11:51) (90/56 - 104/68)  BP(mean): 71 (06 Jul 2022 14:40) (71 - 71)  RR: 20 (07 Jul 2022 11:51) (18 - 20)  SpO2: 100% (07 Jul 2022 11:51) (94% - 100%)          07-06 @ 07:01  -  07-07 @ 07:00  --------------------------------------------------------  IN: 0 mL / OUT: 500 mL / NET: -500 mL      LABS:                        13.2   9.95  )-----------( 220      ( 06 Jul 2022 10:29 )             43.7     07-06    136  |  91<L>  |  52<H>  ----------------------------<  102<H>  5.3   |  23  |  11.81<H>    Ca    10.4      06 Jul 2022 10:29  Phos  8.1     07-06  Mg     2.4     07-06    TPro  9.9<H>  /  Alb  4.4  /  TBili  0.4  /  DBili  x   /  AST  39  /  ALT  14  /  AlkPhos  143<H>  07-06      PT/INR - ( 06 Jul 2022 10:29 )   PT: 50.2 sec;   INR: 4.30 ratio         PTT - ( 06 Jul 2022 10:29 )  PTT:54.2 sec          Physical Examination:    General: No acute distress.      HEENT: Pupils equal, reactive to light.  Symmetric.    PULM: Clear to auscultation bilaterally, no significant sputum production    CVS: Regular rate and rhythm, no murmurs, rubs, or gallops    ABD: Soft, nondistended, nontender, normoactive bowel sounds, no masses    EXT: No edema, nontender    SKIN: Warm and well perfused, no rashes noted.    NEURO: Alert, oriented, interactive, nonfocal    RADIOLOGY REVIEWED PERSONALLY  CXR:    CT chest:    TTE:     PULMONARY CONSULT  Dragan Ortiz MD  [phone]    Initial HPI on admission:  HPI:  78 yo male with PMHx of ESRD on HD via AV fistula LUE MWF, COPD (on 2L home O2), CHF, pHTN, hypotension on Midodrine, DVT S/P IVC filter p/w fall.  Patient reports that he was walking out of his house today to go to dialysis when he felt his left knee "give out." Patient landed on his buttocks.  no head trauma or LOC.  On coumadin.  Patient was unable to get up 2/2 left knee pain.  Patient also c/o acute on chronic low back pain since the fall.  Patient reports that he is otherwise feeling well.  Denies headache, blurry vision, focal weakness, numbness, slurred speech, CP, SOB, abd pain, NVD.  Patient does not make urine.  Of note, patient was discharged from [facility] one month ago after receiving PT for BL knee pain and difficulty ambulating.  Patient states that after discharge from rehab he was supposed to receive home PT, but that has not happened.        PAST MEDICAL & SURGICAL HISTORY:  Hypertension      Glomerular disease  Segmental glomerular sclerosis      CHF (congestive heart failure)      COPD (chronic obstructive pulmonary disease)      Pulmonary hypertension      Sleep apnea      Pulmonary edema      Peripheral edema      Gout      History of abdominal surgery  polypectomy      H/O right heart catheterization        Allergies    latex (Rash)  No Known Drug Allergies    Intolerances      FAMILY HISTORY:  Family history of colon cancer (Father)    Family history of heart disease (Father)    SH:  Non smoker  Denies ETOH     Review of Systems:  Other Review of Systems: All other review of systems negative, except as noted in HPI      Allergies and Intolerances:        Allergies:  	No Known Drug Allergies:   	latex: Latex, Rash      Medications:  MEDICATIONS  (STANDING):  allopurinol 100 milliGRAM(s) Oral daily  budesonide 160 MICROgram(s)/formoterol 4.5 MICROgram(s) Inhaler 2 Puff(s) Inhalation two times a day  midodrine. 10 milliGRAM(s) Oral three times a day  multivitamin 1 Tablet(s) Oral daily  pantoprazole    Tablet 40 milliGRAM(s) Oral before breakfast  polyethylene glycol 3350 17 Gram(s) Oral daily  pregabalin 50 milliGRAM(s) Oral two times a day  sevelamer carbonate 800 milliGRAM(s) Oral three times a day with meals  simethicone 80 milliGRAM(s) Chew three times a day  tiotropium 18 MICROgram(s) Capsule 1 Capsule(s) Inhalation daily    MEDICATIONS  (PRN):    Vital Signs Last 24 Hrs  T(C): 36.6 (07 Jul 2022 11:51), Max: 36.9 (06 Jul 2022 16:15)  T(F): 97.8 (07 Jul 2022 11:51), Max: 98.5 (06 Jul 2022 16:15)  HR: 92 (07 Jul 2022 11:51) (60 - 92)  BP: 94/53 (07 Jul 2022 11:51) (90/56 - 104/68)  BP(mean): 71 (06 Jul 2022 14:40) (71 - 71)  RR: 20 (07 Jul 2022 11:51) (18 - 20)  SpO2: 100% (07 Jul 2022 11:51) (94% - 100%)      07-06 @ 07:01  -  07-07 @ 07:00  --------------------------------------------------------  IN: 0 mL / OUT: 500 mL / NET: -500 mL      LABS:                        13.2   9.95  )-----------( 220      ( 06 Jul 2022 10:29 )             43.7     07-06    136  |  91<L>  |  52<H>  ----------------------------<  102<H>  5.3   |  23  |  11.81<H>    Ca    10.4      06 Jul 2022 10:29  Phos  8.1     07-06  Mg     2.4     07-06    TPro  9.9<H>  /  Alb  4.4  /  TBili  0.4  /  DBili  x   /  AST  39  /  ALT  14  /  AlkPhos  143<H>  07-06      PT/INR - ( 06 Jul 2022 10:29 )   PT: 50.2 sec;   INR: 4.30 ratio         PTT - ( 06 Jul 2022 10:29 )  PTT:54.2 sec          Physical Examination:    General: Non toxic, No acute distress.      HEENT: Pupils equal, reactive to light.  Symmetric.    PULM: Distant, no wheeze or rhonchi    CVS: Regular rate and rhythm, no murmurs, rubs, or gallops    ABD: Soft, nondistended, nontender, normoactive bowel sounds, no masses    EXT: Trace - 1+ LE edema    SKIN: Warm and well perfused, no rashes noted.    NEURO: Alert, oriented, interactive, nonfocal    RADIOLOGY REVIEWED PERSONALLY  CXR:    CT chest:    TTE:

## 2022-07-07 NOTE — PROGRESS NOTE ADULT - ATTENDING COMMENTS
#esrd on hd  hd mwf  s/p hd yesterday  no urgent indication for hd today  next hd tomorrow  #chronic hypotension- midodrine with HD; confirm outpt BP  #hyperkalemia- check cpk;  lokelma as needed;   #anemia of ckd- hold MELISSA; monitor hb trend

## 2022-07-07 NOTE — PROGRESS NOTE ADULT - SUBJECTIVE AND OBJECTIVE BOX
Date of Service   07-07-22 @ 13:37    Patient is a 77y old  Male who presents with a chief complaint of functional paraplegia post a fall (06 Jul 2022 17:05)      INTERVAL HISTORY: pt feels ok, wife at bedside     REVIEW OF SYSTEMS:   CONSTITUTIONAL: No weakness  EYES/ENT: No visual changes; No throat pain  Neck: No pain or stiffness  Respiratory: No cough, wheezing, No shortness of breath  CARDIOVASCULAR: no chest pain or palpitations  GASTROINTESTINAL: No abdominal pain, no nausea, vomiting or hematemesis  GENITOURINARY: No dysuria, frequency or hematuria  NEUROLOGICAL: No stroke like symptoms  SKIN: No rashes    	  MEDICATIONS:  midodrine. 10 milliGRAM(s) Oral three times a day        PHYSICAL EXAM:  T(C): 36.6 (07-07-22 @ 11:51), Max: 36.9 (07-06-22 @ 16:15)  HR: 92 (07-07-22 @ 11:51) (60 - 92)  BP: 94/53 (07-07-22 @ 11:51) (90/56 - 104/68)  RR: 20 (07-07-22 @ 11:51) (18 - 20)  SpO2: 100% (07-07-22 @ 11:51) (94% - 100%)  Wt(kg): --  I&O's Summary    06 Jul 2022 07:01  -  07 Jul 2022 07:00  --------------------------------------------------------  IN: 0 mL / OUT: 500 mL / NET: -500 mL          Appearance: In no distress	  HEENT:    PERRL, EOMI	  Cardiovascular:  S1 S2, No JVD  Respiratory: Lungs clear to auscultation	  Gastrointestinal:  Soft, Non-tender, + BS	  Vasculature:  No edema of LE  Psychiatric: Appropriate affect   Neuro: no acute focal deficits                               13.2   9.95  )-----------( 220      ( 06 Jul 2022 10:29 )             43.7     07-06    136  |  91<L>  |  52<H>  ----------------------------<  102<H>  5.3   |  23  |  11.81<H>    Ca    10.4      06 Jul 2022 10:29  Phos  8.1     07-06  Mg     2.4     07-06    TPro  9.9<H>  /  Alb  4.4  /  TBili  0.4  /  DBili  x   /  AST  39  /  ALT  14  /  AlkPhos  143<H>  07-06        Labs personally reviewed      ASSESSMENT/PLAN: 	  78 yo male with PMHx of ESRD on HD via AV fistula LUE MWF, COPD (on 2L home O2), CHF, pHTN, hypotension on Midodrine, DVT S/P IVC filter p/w fall.  Patient reports that he was walking out of his house today to go to dialysis when he felt his left knee "give out." Patient landed on his buttocks.  no head trauma or LOC.  On coumadin.  Patient was unable to get up 2/2 left knee pain.  Patient also c/o acute on chronic low back pain since the fall.  Patient reports that he is otherwise feeling well.  Denies headache, blurry vision, focal weakness, numbness, slurred speech, CP, SOB, abd pain, NVD.  Patient does not make urine.  Of note, patient was discharged from [facility] one month ago after receiving PT for BL knee pain and difficulty ambulating.  Patient states that after discharge from rehab he was supposed to receive home PT, but that has not happened. (06 Jul 2022 14:54).    1. Chronic hypotension   - with baseline SBP noted in the 80s prior admit a year ago  - was discharged on Midodrine 10mg TID  - BP soft   - PT eval     2. Hx of LE DVT prior admit   - s/p IVC filter   - on warfarin at home   - declined AC    3. ?Hx of HF  - prior echo with preserved EF and no mention of pulm HTN  - on HD for fluid management       Mendy JUNG-BC   Roberto Latif DO [facility]  Cardiovascular Medicine  [address], [address]  Office: [phone]

## 2022-07-07 NOTE — PROGRESS NOTE ADULT - SUBJECTIVE AND OBJECTIVE BOX
[facility] DIVISION OF KIDNEY DISEASES AND HYPERTENSION -- FOLLOW UP NOTE  --------------------------------------------------------------------------------  Chief Complaint:/subjective: uncomfortable due to his 'back sore'  denies sob, chest pain    24 hour events: s/p hd yesterday        PAST HISTORY  --------------------------------------------------------------------------------  No significant changes to PMH, PSH, FHx, SHx, unless otherwise noted    ALLERGIES & MEDICATIONS  --------------------------------------------------------------------------------  Allergies    latex (Rash)  No Known Drug Allergies    Intolerances      Standing Inpatient Medications  allopurinol 100 milliGRAM(s) Oral daily  budesonide 160 MICROgram(s)/formoterol 4.5 MICROgram(s) Inhaler 2 Puff(s) Inhalation two times a day  midodrine. 10 milliGRAM(s) Oral three times a day  multivitamin 1 Tablet(s) Oral daily  pantoprazole    Tablet 40 milliGRAM(s) Oral before breakfast  polyethylene glycol 3350 17 Gram(s) Oral daily  pregabalin 50 milliGRAM(s) Oral two times a day  sevelamer carbonate 800 milliGRAM(s) Oral three times a day with meals  simethicone 80 milliGRAM(s) Chew three times a day  tiotropium 18 MICROgram(s) Capsule 1 Capsule(s) Inhalation daily    PRN Inpatient Medications      REVIEW OF SYSTEMS  --------------------------------------------------------------------------------  Gen: No weight changes, fatigue, fevers/chills, weakness  Skin: No rashes  Head/Eyes/Ears/Mouth: No headache;   Respiratory: No dyspnea, cough  CV: No chest pain, PND, orthopnea  GI: No abdominal pain, diarrhea, constipation, nausea, vomiting  : No increased frequency, dysuria, hematuria, nocturia  MSK: No joint pain/swelling; no back pain; no edema  Neuro: No dizziness/lightheadedness, weakness  Heme: No easy bruising or bleeding  Psych: No significant nervousness, anxiety, stress, depression    All other systems were reviewed and are negative, except as noted.    VITALS/PHYSICAL EXAM  --------------------------------------------------------------------------------  T(C): 36.6 (07-07-22 @ 11:51), Max: 36.9 (07-06-22 @ 16:15)  HR: 92 (07-07-22 @ 11:51) (60 - 92)  BP: 94/53 (07-07-22 @ 11:51) (90/56 - 104/68)  RR: 20 (07-07-22 @ 11:51) (18 - 20)  SpO2: 100% (07-07-22 @ 11:51) (94% - 100%)  Wt(kg): --  Adult Advanced Hemodynamics Last 24 Hrs  ABP: --  ABP(mean): --  CVP(mm Hg): --  CO: --  CI: --  PA: --  PA(mean): --  PCWP: --  SVR: --  SVRI: --  Height (cm): 170.2 (07-06-22 @ 09:12)  Weight (kg): 124.7 (07-06-22 @ 09:12)  BMI (kg/m2): 43 (07-06-22 @ 09:12)  BSA (m2): 2.32 (07-06-22 @ 09:12)      07-06-22 @ 07:01  -  07-07-22 @ 07:00  --------------------------------------------------------  IN: 0 mL / OUT: 500 mL / NET: -500 mL      Physical Exam:  	Gen: NAD,   	HEENT:   no jvp  	Pulm: CTA B/L  	CV: RRR, S1S2;    	Back:   no sacral edema  	Abd: +BS, soft,   	: No suprapubic tenderness  	Ext: 1+ BL LE edema  	Neuro: awake  	Psych: alert  	Skin: Warm,    	Vascular access: LAVF +B+T    LABS/STUDIES  --------------------------------------------------------------------------------              13.2   9.95  >-----------<  220      [07-06-22 @ 10:29]              43.7     Hemoglobin: 13.2 g/dL (07-06-22 @ 10:29)    Platelet Count - Automated: 220 K/uL (07-06-22 @ 10:29)    136  |  91  |  52  ----------------------------<  102      [07-06-22 @ 10:29]  5.3   |  23  |  11.81        Ca     10.4     [07-06-22 @ 10:29]      Mg     2.4     [07-06-22 @ 10:29]      Phos  8.1     [07-06-22 @ 10:29]    TPro  9.9  /  Alb  4.4  /  TBili  0.4  /  DBili  x   /  AST  39  /  ALT  14  /  AlkPhos  143  [07-06-22 @ 10:29]    PT/INR: PT 50.2 , INR 4.30       [07-06-22 @ 10:29]  PTT: 54.2       [07-06-22 @ 10:29]      Creatinine Trend:  SCr 11.81 [07-06 @ 10:29]        HbA1c 5.0      [10-02-17 @ 15:38]  TSH 1.00      [08-23-21 @ 02:35]

## 2022-07-07 NOTE — PROGRESS NOTE ADULT - ASSESSMENT
Pt. is a 77 y.o. M e/ PMHx. of HTN, HFrEF, SAADIA, Gout and ESRD on HD MWF at [facility], [place], operated by [facility] presents after having a fall enroute to the HD center. Nephrology consulted for ESRD management.

## 2022-07-07 NOTE — CONSULT NOTE ADULT - ASSESSMENT
77M with COPD on home O2, ESRD on HD, admitted with leg weakness, R knee pains/p mechanical fall. Patient denied recent fever, chills, change in chronic dyspnea, or productive cough.     REC    Titrate nasal cannula to sat 90-92%  Continue symbicort and spiriva  No further pulmonary intervention at this time

## 2022-07-07 NOTE — PROGRESS NOTE ADULT - ASSESSMENT
ASSESSMENT/PLAN: 	  78 yo male with PMHx of ESRD on HD via AV fistula LUE MWF, COPD (on 2L home O2), CHF, pHTN, hypotension on Midodrine, DVT S/P IVC filter p/w fall.  Patient reports that he was walking out of his house today to go to dialysis when he felt his left knee "give out." Patient landed on his buttocks.  no head trauma or LOC.  On coumadin.  Patient was unable to get up 2/2 left knee pain.  Patient also c/o acute on chronic low back pain since the fall.  Patient reports that he is otherwise feeling well.  Denies headache, blurry vision, focal weakness, numbness, slurred speech, CP, SOB, abd pain, NVD.  Patient does not make urine.  Of note, patient was discharged from [facility] one month ago after receiving PT for BL knee pain and difficulty ambulating.  Patient states that after discharge from rehab he was supposed to receive home PT, but that has not happened.    1. Chronic hypotension   - with baseline SBP noted in the 80s prior admit a year ago  - was discharged on Midodrine 10mg TID  - BP soft , check orthostatics  - PT eval     2. Hx of LE DVT prior admit   - s/p IVC filter   - on warfarin at home       3. ?Hx of HF  - prior echo with preserved EF and no mention of pulm HTN  - on HD for fluid management

## 2022-07-07 NOTE — PROGRESS NOTE ADULT - SUBJECTIVE AND OBJECTIVE BOX
77y old  Male who presents with a chief complaint of functional paraplegia post a fall (06 Jul 2022 17:05)    SUBJECTIVE: pt feels ok, wife at bedside     	  MEDICATIONS:  midodrine. 10 milliGRAM(s) Oral three times a day        PHYSICAL EXAM:  T(C): 36.6 (07-07-22 @ 11:51), Max: 36.9 (07-06-22 @ 16:15)  HR: 92 (07-07-22 @ 11:51) (60 - 92)  BP: 94/53 (07-07-22 @ 11:51) (90/56 - 104/68)  RR: 20 (07-07-22 @ 11:51) (18 - 20)  SpO2: 100% (07-07-22 @ 11:51) (94% - 100%)  Wt(kg): --  I&O's Summary    06 Jul 2022 07:01  -  07 Jul 2022 07:00  --------------------------------------------------------  IN: 0 mL / OUT: 500 mL / NET: -500 mL          Appearance: In no distress	  HEENT:    PERRL, EOMI	  Cardiovascular:  S1 S2, No JVD  Respiratory: Lungs clear to auscultation	  Gastrointestinal:  Soft, Non-tender, + BS	  Vasculature:  No edema of LE  Psychiatric: Appropriate affect   Neuro: no acute focal deficits                               13.2   9.95  )-----------( 220      ( 06 Jul 2022 10:29 )             43.7     07-06    136  |  91<L>  |  52<H>  ----------------------------<  102<H>  5.3   |  23  |  11.81<H>    Ca    10.4      06 Jul 2022 10:29  Phos  8.1     07-06  Mg     2.4     07-06    TPro  9.9<H>  /  Alb  4.4  /  TBili  0.4  /  DBili  x   /  AST  39  /  ALT  14  /  AlkPhos  143<H>  07-06

## 2022-07-08 LAB
CK SERPL-CCNC: 55 U/L — SIGNIFICANT CHANGE UP (ref 30–200)
HCT VFR BLD CALC: 41.9 % — SIGNIFICANT CHANGE UP (ref 39–50)
HGB BLD-MCNC: 12.6 G/DL — LOW (ref 13–17)
INR BLD: 3.52 RATIO — HIGH (ref 0.88–1.16)
MCHC RBC-ENTMCNC: 28.5 PG — SIGNIFICANT CHANGE UP (ref 27–34)
MCHC RBC-ENTMCNC: 30.1 GM/DL — LOW (ref 32–36)
MCV RBC AUTO: 94.8 FL — SIGNIFICANT CHANGE UP (ref 80–100)
NRBC # BLD: 0 /100 WBCS — SIGNIFICANT CHANGE UP (ref 0–0)
PLATELET # BLD AUTO: 207 K/UL — SIGNIFICANT CHANGE UP (ref 150–400)
PROTHROM AB SERPL-ACNC: 41 SEC — HIGH (ref 10.5–13.4)
RBC # BLD: 4.42 M/UL — SIGNIFICANT CHANGE UP (ref 4.2–5.8)
RBC # FLD: 15.3 % — HIGH (ref 10.3–14.5)
WBC # BLD: 7.83 K/UL — SIGNIFICANT CHANGE UP (ref 3.8–10.5)
WBC # FLD AUTO: 7.83 K/UL — SIGNIFICANT CHANGE UP (ref 3.8–10.5)

## 2022-07-08 PROCEDURE — 99233 SBSQ HOSP IP/OBS HIGH 50: CPT | Mod: GC

## 2022-07-08 RX ORDER — CHLORHEXIDINE GLUCONATE 213 G/1000ML
1 SOLUTION TOPICAL
Refills: 0 | Status: DISCONTINUED | OUTPATIENT
Start: 2022-07-08 | End: 2022-07-26

## 2022-07-08 RX ADMIN — Medication 50 MILLIGRAM(S): at 05:47

## 2022-07-08 RX ADMIN — CHLORHEXIDINE GLUCONATE 1 APPLICATION(S): 213 SOLUTION TOPICAL at 17:25

## 2022-07-08 RX ADMIN — SIMETHICONE 80 MILLIGRAM(S): 80 TABLET, CHEWABLE ORAL at 07:49

## 2022-07-08 RX ADMIN — SIMETHICONE 80 MILLIGRAM(S): 80 TABLET, CHEWABLE ORAL at 14:03

## 2022-07-08 RX ADMIN — PANTOPRAZOLE SODIUM 40 MILLIGRAM(S): 20 TABLET, DELAYED RELEASE ORAL at 07:49

## 2022-07-08 RX ADMIN — MIDODRINE HYDROCHLORIDE 10 MILLIGRAM(S): 2.5 TABLET ORAL at 17:11

## 2022-07-08 RX ADMIN — Medication 1 TABLET(S): at 14:04

## 2022-07-08 RX ADMIN — MIDODRINE HYDROCHLORIDE 10 MILLIGRAM(S): 2.5 TABLET ORAL at 12:17

## 2022-07-08 RX ADMIN — SEVELAMER CARBONATE 800 MILLIGRAM(S): 2400 POWDER, FOR SUSPENSION ORAL at 07:46

## 2022-07-08 RX ADMIN — MIDODRINE HYDROCHLORIDE 10 MILLIGRAM(S): 2.5 TABLET ORAL at 05:47

## 2022-07-08 RX ADMIN — Medication 50 MILLIGRAM(S): at 17:14

## 2022-07-08 RX ADMIN — Medication 100 MILLIGRAM(S): at 14:04

## 2022-07-08 RX ADMIN — SIMETHICONE 80 MILLIGRAM(S): 80 TABLET, CHEWABLE ORAL at 21:36

## 2022-07-08 RX ADMIN — SEVELAMER CARBONATE 800 MILLIGRAM(S): 2400 POWDER, FOR SUSPENSION ORAL at 17:11

## 2022-07-08 RX ADMIN — TIOTROPIUM BROMIDE 1 CAPSULE(S): 18 CAPSULE ORAL; RESPIRATORY (INHALATION) at 14:03

## 2022-07-08 RX ADMIN — SEVELAMER CARBONATE 800 MILLIGRAM(S): 2400 POWDER, FOR SUSPENSION ORAL at 14:04

## 2022-07-08 RX ADMIN — POLYETHYLENE GLYCOL 3350 17 GRAM(S): 17 POWDER, FOR SOLUTION ORAL at 14:04

## 2022-07-08 RX ADMIN — BUDESONIDE AND FORMOTEROL FUMARATE DIHYDRATE 2 PUFF(S): 160; 4.5 AEROSOL RESPIRATORY (INHALATION) at 05:51

## 2022-07-08 RX ADMIN — BUDESONIDE AND FORMOTEROL FUMARATE DIHYDRATE 2 PUFF(S): 160; 4.5 AEROSOL RESPIRATORY (INHALATION) at 17:11

## 2022-07-08 NOTE — PATIENT PROFILE ADULT - FALL HARM RISK - HARM RISK INTERVENTIONS

## 2022-07-08 NOTE — GOALS OF CARE CONVERSATION - ADVANCED CARE PLANNING - CONVERSATION DETAILS
Pt is currently at dialysis spoke with spouse Patty.  Introduced self and role of the PCE for goals of care conversation.  No documents found on patient window search.  Spouse reports she is the HCP and has a completed form at the dialysis center.  She has been caregiver to pt as he has had multiple hospital admissions.  Pt and spouse recently experienced the passing of pts sister and was on life support.  Very difficult decision to end life support.  Pt and spouse have spoken about what they would want in hospital treatment.      CPR/ intubation procedures and possible complications explained.  Pt has spoken with spouse and wants to maintain quality of life.  Does not want to prolong life on machines or tube feedings,   Treatment at this time is ongoing with dialysis and oxygen.  Remain full code and will discuss with private physician.  Introduced MOLST form and time limited trials of interventions.    Hospital staff option to complete forms was offered and will complete when at home.  Pt instructed to provide a copy when it is done for medical records.  Contact information for further needs provided.  No Jain exemptions noted. Code BHJN provided via email [email or web address]      Mila Smith RN  Palliative Care Educator  [phone] cell

## 2022-07-08 NOTE — PROGRESS NOTE ADULT - SUBJECTIVE AND OBJECTIVE BOX
[facility] DIVISION OF KIDNEY DISEASES AND HYPERTENSION -- FOLLOW UP NOTE  --------------------------------------------------------------------------------  HPI:  Pt. is a 77 y.o. M e/ PMHx. of HTN, HFrEF, SAADIA, Gout and ESRD on HD MWF at [facility] presents after having a fall enroute to the HD center. Nephrology consulted for ESRD management. Pt. does not recall his nephrologist and denies any issues with dialysis. He is edematous but states he is not worse than usual. Denies any syncope and attributes his fall to his knee "buckling." Pt. has notably low BP which Pt. states is chronic and for which he takes Midodrine.     Pt. seen this AM, Denies any acute complaints this AM. For HD today.    PAST HISTORY  --------------------------------------------------------------------------------  No significant changes to PMH, PSH, FHx, SHx, unless otherwise noted    ALLERGIES & MEDICATIONS  --------------------------------------------------------------------------------  Allergies    latex (Rash)  No Known Drug Allergies    Intolerances      Standing Inpatient Medications  allopurinol 100 milliGRAM(s) Oral daily  budesonide 160 MICROgram(s)/formoterol 4.5 MICROgram(s) Inhaler 2 Puff(s) Inhalation two times a day  midodrine. 10 milliGRAM(s) Oral three times a day  multivitamin 1 Tablet(s) Oral daily  pantoprazole    Tablet 40 milliGRAM(s) Oral before breakfast  polyethylene glycol 3350 17 Gram(s) Oral daily  pregabalin 50 milliGRAM(s) Oral two times a day  sevelamer carbonate 800 milliGRAM(s) Oral three times a day with meals  simethicone 80 milliGRAM(s) Chew three times a day  tiotropium 18 MICROgram(s) Capsule 1 Capsule(s) Inhalation daily    PRN Inpatient Medications      REVIEW OF SYSTEMS  --------------------------------------------------------------------------------  Gen: No fevers/chills  Skin: No rashes  Head/Eyes/Ears: Normal hearing,   Respiratory: No dyspnea, cough  CV: No chest pain  GI: No abdominal pain, diarrhea  : No dysuria, hematuria  MSK: No  edema  Heme: No easy bruising or bleeding  Psych: No significant depression      All other systems were reviewed and are negative, except as noted.    VITALS/PHYSICAL EXAM  --------------------------------------------------------------------------------  T(C): 36.6 (07-08-22 @ 01:00), Max: 36.6 (07-07-22 @ 11:51)  HR: 83 (07-08-22 @ 01:00) (74 - 92)  BP: 105/54 (07-08-22 @ 01:00) (92/55 - 105/54)  RR: 18 (07-08-22 @ 01:00) (17 - 20)  SpO2: 98% (07-08-22 @ 01:00) (96% - 100%)  Wt(kg): --  Height (cm): 170.2 (07-06-22 @ 09:12)  Weight (kg): 124.7 (07-06-22 @ 09:12)  BMI (kg/m2): 43 (07-06-22 @ 09:12)  BSA (m2): 2.32 (07-06-22 @ 09:12)      07-07-22 @ 07:01  -  07-08-22 @ 07:00  --------------------------------------------------------  IN: 0 mL / OUT: 0 mL / NET: 0 mL          Physical Exam:  	Gen: NAD  	HEENT: MMM  	Pulm: CTA B/L  	CV: S1S2  	Abd: Soft, +BS   	Ext: + LE edema B/L improving, chronic skin changes   	Neuro: Awake  	Skin: Warm and dry  	Vascular access: LUE AVF, thrills+      LABS/STUDIES  --------------------------------------------------------------------------------              13.2   9.95  >-----------<  220      [07-06-22 @ 10:29]              43.7     137  |  91  |  40  ----------------------------<  106      [07-07-22 @ 16:39]  4.6   |  25  |  9.67        Ca     9.9     [07-07-22 @ 16:39]      Mg     2.4     [07-06-22 @ 10:29]      Phos  8.1     [07-06-22 @ 10:29]    TPro  9.9  /  Alb  4.4  /  TBili  0.4  /  DBili  x   /  AST  39  /  ALT  14  /  AlkPhos  143  [07-06-22 @ 10:29]    PT/INR: PT 52.5 , INR 4.46       [07-07-22 @ 16:39]  PTT: 53.1       [07-07-22 @ 16:39]      Creatinine Trend:  SCr 9.67 [07-07 @ 16:39]  SCr 11.81 [07-06 @ 10:29]        HbA1c 5.0      [10-02-17 @ 15:38]  TSH 1.00      [08-23-21 @ 02:35]

## 2022-07-08 NOTE — PROGRESS NOTE ADULT - ASSESSMENT
COPD - stable at this time  ESRD  Knee pain and LE weakness post fall  Gout  HfPef    REC    Continue current bronchodilator regimen  Titrate oxygen sat to 90-92% on nasal cannula

## 2022-07-08 NOTE — PHYSICAL THERAPY INITIAL EVALUATION ADULT - PERTINENT HX OF CURRENT PROBLEM, REHAB EVAL
78 yo male with PMHx of ESRD on HD via AV fistula LUE MWF, COPD (on 2L home O2), CHF, pHTN, hypotension on Midodrine, DVT S/P IVC filter p/w fall.  Patient states left knee "gave out.

## 2022-07-08 NOTE — PHYSICAL THERAPY INITIAL EVALUATION ADULT - ADDITIONAL COMMENTS
as per care coordination notes: in a private house with 4 outdoor steps and 1 flight indoors.  Pt states that he ambulated with a rollator walker and assistance as well as required assistance with ADLs prior to hospitalization.

## 2022-07-08 NOTE — PROGRESS NOTE ADULT - SUBJECTIVE AND OBJECTIVE BOX
Patient is a 77y old  Male who presents with a chief complaint of functional paraplegia post a fall (08 Jul 2022 07:43)      SUBJECTIVE / OVERNIGHT EVENTS: no new c/o    MEDICATIONS  (STANDING):  allopurinol 100 milliGRAM(s) Oral daily  budesonide 160 MICROgram(s)/formoterol 4.5 MICROgram(s) Inhaler 2 Puff(s) Inhalation two times a day  midodrine. 10 milliGRAM(s) Oral three times a day  multivitamin 1 Tablet(s) Oral daily  pantoprazole    Tablet 40 milliGRAM(s) Oral before breakfast  polyethylene glycol 3350 17 Gram(s) Oral daily  pregabalin 50 milliGRAM(s) Oral two times a day  sevelamer carbonate 800 milliGRAM(s) Oral three times a day with meals  simethicone 80 milliGRAM(s) Chew three times a day  tiotropium 18 MICROgram(s) Capsule 1 Capsule(s) Inhalation daily    MEDICATIONS  (PRN):      Vital Signs Last 24 Hrs  T(F): 98 (07-08-22 @ 07:45), Max: 98 (07-08-22 @ 07:45)  HR: 76 (07-08-22 @ 07:45) (74 - 92)  BP: 115/56 (07-08-22 @ 07:45) (92/55 - 115/56)  RR: 18 (07-08-22 @ 07:45) (17 - 20)  SpO2: 99% (07-08-22 @ 07:45) (96% - 100%)  Telemetry:   CAPILLARY BLOOD GLUCOSE      POCT Blood Glucose.: 99 mg/dL (07 Jul 2022 12:19)    I&O's Summary    07 Jul 2022 07:01  -  08 Jul 2022 07:00  --------------------------------------------------------  IN: 0 mL / OUT: 0 mL / NET: 0 mL    08 Jul 2022 07:01  -  08 Jul 2022 09:21  --------------------------------------------------------  IN: 250 mL / OUT: 0 mL / NET: 250 mL        PHYSICAL EXAM:  GENERAL: NAD, well-developed  HEAD:  Atraumatic, Normocephalic  EYES: EOMI, PERRLA, conjunctiva and sclera clear  NECK: Supple, No JVD  CHEST/LUNG: Clear to auscultation bilaterally; No wheeze  HEART: Regular rate and rhythm; No murmurs, rubs, or gallops  ABDOMEN: Soft, Nontender, Nondistended; Bowel sounds present  EXTREMITIES:  2+ Peripheral Pulses, No clubbing, cyanosis, or edema  PSYCH: AAOx3  NEUROLOGY: non-focal  SKIN: No rashes or lesions    LABS:                        13.2   9.95  )-----------( 220      ( 06 Jul 2022 10:29 )             43.7     07-07    137  |  91<L>  |  40<H>  ----------------------------<  106<H>  4.6   |  25  |  9.67<H>    Ca    9.9      07 Jul 2022 16:39  Phos  8.1     07-06  Mg     2.4     07-06    TPro  9.9<H>  /  Alb  4.4  /  TBili  0.4  /  DBili  x   /  AST  39  /  ALT  14  /  AlkPhos  143<H>  07-06    PT/INR - ( 07 Jul 2022 16:39 )   PT: 52.5 sec;   INR: 4.46 ratio         PTT - ( 07 Jul 2022 16:39 )  PTT:53.1 sec          RADIOLOGY & ADDITIONAL TESTS:    Imaging Personally Reviewed:    Consultant(s) Notes Reviewed:      Care Discussed with Consultants/Other Providers:

## 2022-07-08 NOTE — PROGRESS NOTE ADULT - SUBJECTIVE AND OBJECTIVE BOX
Follow-up Pulm Progress Note  Dragan Ortiz MD  [phone]    Seen during HD  SLeeping comfortably supine on nasal cannula  No new resp issues    Medications:  Vital Signs Last 24 Hrs  T(C): 36.2 (08 Jul 2022 09:10), Max: 36.7 (08 Jul 2022 07:45)  T(F): 97.2 (08 Jul 2022 09:10), Max: 98 (08 Jul 2022 07:45)  HR: 69 (08 Jul 2022 09:10) (69 - 83)  BP: 91/45 (08 Jul 2022 09:10) (91/45 - 115/56)  BP(mean): --  RR: 17 (08 Jul 2022 09:10) (17 - 18)  SpO2: 95% (08 Jul 2022 09:10) (95% - 100%)    Parameters below as of 08 Jul 2022 09:10  Patient On (Oxygen Delivery Method): nasal cannula  O2 Flow (L/min): 2      07-07 @ 07:01  -  07-08 @ 07:00  --------------------------------------------------------  IN: 0 mL / OUT: 0 mL / NET: 0 mL    LABS:                        12.6   7.83  )-----------( 207      ( 08 Jul 2022 09:58 )             41.9     07-07    137  |  91<L>  |  40<H>  ----------------------------<  106<H>  4.6   |  25  |  9.67<H>    Ca    9.9      07 Jul 2022 16:39        PT/INR - ( 08 Jul 2022 09:58 )   PT: 41.0 sec;   INR: 3.52 ratio         PTT - ( 07 Jul 2022 16:39 )  PTT:53.1 sec      Physical Examination:  PULM: No wheeze or rhonchi  CVS: Regular rate and rhythm, no murmurs, rubs, or gallops  ABD: Soft, non-tender  EXT:  No clubbing, cyanosis, or edema    RADIOLOGY REVIEWED  CXR:    CT chest:    TTE:

## 2022-07-08 NOTE — PROGRESS NOTE ADULT - ASSESSMENT
Pt. is a 77 y.o. M e/ PMHx. of HTN, HFrEF, SAADIA, Gout and ESRD on HD MWF at [facility] presents after having a fall enroute to the HD center. Nephrology consulted for ESRD management.

## 2022-07-08 NOTE — PROGRESS NOTE ADULT - ASSESSMENT
Pt. is a 77 y.o. M e/ PMHx. of HTN, HFrEF, SAADIA, Gout and ESRD on HD MWF at [facility] presents after having a fall enroute to the HD center. 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 c/o knee pain and inability to walk. nephrology called. PT kevin ordered. cont outptn meds, check orthostatics. card called  dvt ppx w sc lovenox. ptn w h/o DVT/PE, in the past not on AC 2/2 h/o GI bleeds, but admitted on COumadin, will trend INR.  h/o COPD with chronic hypoxic respiratory failure on 2 L home O2. presently stable.

## 2022-07-08 NOTE — PROGRESS NOTE ADULT - ATTENDING COMMENTS
#esrd on hd  hd mwf  hd today  next hd 7/11  #chronic hypotension- midodrine with HD;    #hyperkalemia- check cpk;  lokelma as needed;   #anemia of ckd- hold MELISSA; monitor hb trend  #hyperphos- on renvela; check serum phos daily

## 2022-07-08 NOTE — PROGRESS NOTE ADULT - SUBJECTIVE AND OBJECTIVE BOX
DATE OF SERVICE: 07-08-22 @ 10:51    Patient is a 77y old  Male who presents with a chief complaint of functional paraplegia post a fall (08 Jul 2022 09:21)      INTERVAL HISTORY: Feels ok. C/o back pain where wound is. Awaiting Pain consult.     REVIEW OF SYSTEMS:  CONSTITUTIONAL: No weakness  EYES/ENT: No visual changes;  No throat pain   NECK: No pain or stiffness  RESPIRATORY: No cough, wheezing; No shortness of breath  CARDIOVASCULAR: No chest pain or palpitations  GASTROINTESTINAL: No abdominal  pain. No nausea, vomiting, or hematemesis  GENITOURINARY: No dysuria, frequency or hematuria  NEUROLOGICAL: No stroke like symptoms  SKIN: No rashes    	  MEDICATIONS:  midodrine. 10 milliGRAM(s) Oral three times a day        PHYSICAL EXAM:  T(C): 36.2 (07-08-22 @ 09:10), Max: 36.7 (07-08-22 @ 07:45)  HR: 69 (07-08-22 @ 09:10) (69 - 92)  BP: 91/45 (07-08-22 @ 09:10) (91/45 - 115/56)  RR: 17 (07-08-22 @ 09:10) (17 - 20)  SpO2: 95% (07-08-22 @ 09:10) (95% - 100%)  Wt(kg): --  I&O's Summary    07 Jul 2022 07:01  -  08 Jul 2022 07:00  --------------------------------------------------------  IN: 0 mL / OUT: 0 mL / NET: 0 mL    08 Jul 2022 07:01  -  08 Jul 2022 10:51  --------------------------------------------------------  IN: 250 mL / OUT: 0 mL / NET: 250 mL          Appearance: In no distress	  HEENT:    PERRL, EOMI	  Cardiovascular:  S1 S2, No JVD  Respiratory: Lungs clear to auscultation	  Gastrointestinal:  Soft, Non-tender, + BS	  Vascularature:  No edema of LE  Psychiatric: Appropriate affect   Neuro: no acute focal deficits                               12.6   7.83  )-----------( 207      ( 08 Jul 2022 09:58 )             41.9     07-07    137  |  91<L>  |  40<H>  ----------------------------<  106<H>  4.6   |  25  |  9.67<H>    Ca    9.9      07 Jul 2022 16:39          Labs personally reviewed      ASSESSMENT/PLAN: 	    78 yo male with PMHx of ESRD on HD via AV fistula LUE MWF, COPD (on 2L home O2), CHF, pHTN, hypotension on Midodrine, DVT S/P IVC filter p/w fall.  Patient reports that he was walking out of his house today to go to dialysis when he felt his left knee "give out." Patient landed on his buttocks.  no head trauma or LOC.  On coumadin.  Patient was unable to get up 2/2 left knee pain.  Patient also c/o acute on chronic low back pain since the fall.  Patient reports that he is otherwise feeling well.  Denies headache, blurry vision, focal weakness, numbness, slurred speech, CP, SOB, abd pain, NVD.  Patient does not make urine.  Of note, patient was discharged from [facility] one month ago after receiving PT for BL knee pain and difficulty ambulating.  Patient states that after discharge from rehab he was supposed to receive home PT, but that has not happened. (06 Jul 2022 14:54).    1. Chronic hypotension   - with baseline SBP noted in the 80s prior admit a year ago  - was discharged on Midodrine 10mg TID  - BP soft   - PT eval     2. Hx of LE DVT prior admit   - s/p IVC filter   - on warfarin at home   - declined AC    3. ?Hx of HF  - prior echo with preserved EF and no mention of pulm HTN  - on HD for fluid management     Estefania Forbes, AYLA-NP   Roberto Latif DO [facility]  Cardiovascular Medicine  [address], [address]  Office: [phone]  Cell: [phone] DATE OF SERVICE: 07-08-22 @ 10:51    Patient is a 77y old  Male who presents with a chief complaint of functional paraplegia post a fall (08 Jul 2022 09:21)      INTERVAL HISTORY: Feels ok. C/o back pain where wound is. Awaiting Pain consult.     REVIEW OF SYSTEMS:  CONSTITUTIONAL: No weakness  EYES/ENT: No visual changes;  No throat pain   NECK: No pain or stiffness  RESPIRATORY: No cough, wheezing; No shortness of breath  CARDIOVASCULAR: No chest pain or palpitations  GASTROINTESTINAL: No abdominal  pain. No nausea, vomiting, or hematemesis  GENITOURINARY: No dysuria, frequency or hematuria  NEUROLOGICAL: No stroke like symptoms  SKIN: No rashes    	  MEDICATIONS:  midodrine. 10 milliGRAM(s) Oral three times a day        PHYSICAL EXAM:  T(C): 36.2 (07-08-22 @ 09:10), Max: 36.7 (07-08-22 @ 07:45)  HR: 69 (07-08-22 @ 09:10) (69 - 92)  BP: 91/45 (07-08-22 @ 09:10) (91/45 - 115/56)  RR: 17 (07-08-22 @ 09:10) (17 - 20)  SpO2: 95% (07-08-22 @ 09:10) (95% - 100%)  Wt(kg): --  I&O's Summary    07 Jul 2022 07:01  -  08 Jul 2022 07:00  --------------------------------------------------------  IN: 0 mL / OUT: 0 mL / NET: 0 mL    08 Jul 2022 07:01  -  08 Jul 2022 10:51  --------------------------------------------------------  IN: 250 mL / OUT: 0 mL / NET: 250 mL          Appearance: In no distress	  HEENT:    PERRL, EOMI	  Cardiovascular:  S1 S2, No JVD  Respiratory: Lungs clear to auscultation	  Gastrointestinal:  Soft, Non-tender, + BS	  Vascularature:  No edema of LE  Psychiatric: Appropriate affect   Neuro: no acute focal deficits                               12.6   7.83  )-----------( 207      ( 08 Jul 2022 09:58 )             41.9     07-07    137  |  91<L>  |  40<H>  ----------------------------<  106<H>  4.6   |  25  |  9.67<H>    Ca    9.9      07 Jul 2022 16:39          Labs personally reviewed      ASSESSMENT/PLAN: 	    78 yo male with PMHx of ESRD on HD via AV fistula LUE MWF, COPD (on 2L home O2), CHF, pHTN, hypotension on Midodrine, DVT S/P IVC filter p/w fall.  Patient reports that he was walking out of his house today to go to dialysis when he felt his left knee "give out." Patient landed on his buttocks.  no head trauma or LOC.  On coumadin.  Patient was unable to get up 2/2 left knee pain.  Patient also c/o acute on chronic low back pain since the fall.  Patient reports that he is otherwise feeling well.  Denies headache, blurry vision, focal weakness, numbness, slurred speech, CP, SOB, abd pain, NVD.  Patient does not make urine.  Of note, patient was discharged from [facility] one month ago after receiving PT for BL knee pain and difficulty ambulating.  Patient states that after discharge from rehab he was supposed to receive home PT, but that has not happened. (06 Jul 2022 14:54).    1. Chronic hypotension   - with baseline SBP noted in the 80s prior admit a year ago  - was discharged on Midodrine 10mg TID  - BP soft   - PT eval     2. Hx of LE DVT prior admit   - s/p IVC filter   - on warfarin at home   - declined AC --> Multiple attempts to reach patient's wife, daughter  and the patient's pharmacy to verify whether the patient was previously taking AC unsuccessful.    3. ?Hx of HF  - prior echo with preserved EF and no mention of pulm HTN  - on HD for fluid management     Estefania Forbes, AYLA-NP   Roberto Latif DO [facility]  Cardiovascular Medicine  [address], [address]  Office: [phone]  Cell: [phone] DATE OF SERVICE: 07-08-22 @ 10:51    Patient is a 77y old  Male who presents with a chief complaint of functional paraplegia post a fall (08 Jul 2022 09:21)      INTERVAL HISTORY: Feels ok. C/o back pain where wound is. Awaiting Pain consult.     REVIEW OF SYSTEMS:  CONSTITUTIONAL: No weakness  EYES/ENT: No visual changes;  No throat pain   NECK: No pain or stiffness  RESPIRATORY: No cough, wheezing; No shortness of breath  CARDIOVASCULAR: No chest pain or palpitations  GASTROINTESTINAL: No abdominal  pain. No nausea, vomiting, or hematemesis  GENITOURINARY: No dysuria, frequency or hematuria  NEUROLOGICAL: No stroke like symptoms  SKIN: No rashes    	  MEDICATIONS:  midodrine. 10 milliGRAM(s) Oral three times a day        PHYSICAL EXAM:  T(C): 36.2 (07-08-22 @ 09:10), Max: 36.7 (07-08-22 @ 07:45)  HR: 69 (07-08-22 @ 09:10) (69 - 92)  BP: 91/45 (07-08-22 @ 09:10) (91/45 - 115/56)  RR: 17 (07-08-22 @ 09:10) (17 - 20)  SpO2: 95% (07-08-22 @ 09:10) (95% - 100%)  Wt(kg): --  I&O's Summary    07 Jul 2022 07:01  -  08 Jul 2022 07:00  --------------------------------------------------------  IN: 0 mL / OUT: 0 mL / NET: 0 mL    08 Jul 2022 07:01  -  08 Jul 2022 10:51  --------------------------------------------------------  IN: 250 mL / OUT: 0 mL / NET: 250 mL          Appearance: In no distress	  HEENT:    PERRL, EOMI	  Cardiovascular:  S1 S2, No JVD  Respiratory: Lungs clear to auscultation	  Gastrointestinal:  Soft, Non-tender, + BS	  Vascularature:  No edema of LE  Psychiatric: Appropriate affect   Neuro: no acute focal deficits                               12.6   7.83  )-----------( 207      ( 08 Jul 2022 09:58 )             41.9     07-07    137  |  91<L>  |  40<H>  ----------------------------<  106<H>  4.6   |  25  |  9.67<H>    Ca    9.9      07 Jul 2022 16:39          Labs personally reviewed      ASSESSMENT/PLAN: 	    76 yo male with PMHx of ESRD on HD via AV fistula LUE MWF, COPD (on 2L home O2), CHF, pHTN, hypotension on Midodrine, DVT S/P IVC filter p/w fall.  Patient reports that he was walking out of his house today to go to dialysis when he felt his left knee "give out." Patient landed on his buttocks.  no head trauma or LOC.  On coumadin.  Patient was unable to get up 2/2 left knee pain.  Patient also c/o acute on chronic low back pain since the fall.  Patient reports that he is otherwise feeling well.  Denies headache, blurry vision, focal weakness, numbness, slurred speech, CP, SOB, abd pain, NVD.  Patient does not make urine.  Of note, patient was discharged from [facility] one month ago after receiving PT for BL knee pain and difficulty ambulating.  Patient states that after discharge from rehab he was supposed to receive home PT, but that has not happened. (06 Jul 2022 14:54).    1. Chronic hypotension   - with baseline SBP noted in the 80s prior admit a year ago  - was discharged on Midodrine 10mg TID  - BP soft   - PT eval     2. Hx of LE DVT prior admit   - s/p IVC filter   - ? warfarin at home as INR is elevated  -  --> Multiple attempts to reach patient's wife, daughter  and the patient's pharmacy to verify whether the patient was previously taking AC unsuccessful.  - called pharmacy to assist with reconciliation    3. ?Hx of HF  - prior echo with preserved EF and no mention of pulm HTN  - on HD for fluid management         Estefania Forbes, AYLA-NP   Roberto Latif, DO [facility]  Cardiovascular Medicine  [address] [place] [address], [address]  Office: [phone]  Cell: [phone] DATE OF SERVICE: 07-08-22 @ 10:51    Patient is a 77y old  Male who presents with a chief complaint of functional paraplegia post a fall (08 Jul 2022 09:21)      INTERVAL HISTORY: Feels ok. C/o back pain where wound is. Awaiting Pain consult.     REVIEW OF SYSTEMS:  CONSTITUTIONAL: No weakness  EYES/ENT: No visual changes;  No throat pain   NECK: No pain or stiffness  RESPIRATORY: No cough, wheezing; No shortness of breath  CARDIOVASCULAR: No chest pain or palpitations  GASTROINTESTINAL: No abdominal  pain. No nausea, vomiting, or hematemesis  GENITOURINARY: No dysuria, frequency or hematuria  NEUROLOGICAL: No stroke like symptoms  SKIN: No rashes    	  MEDICATIONS:  midodrine. 10 milliGRAM(s) Oral three times a day        PHYSICAL EXAM:  T(C): 36.2 (07-08-22 @ 09:10), Max: 36.7 (07-08-22 @ 07:45)  HR: 69 (07-08-22 @ 09:10) (69 - 92)  BP: 91/45 (07-08-22 @ 09:10) (91/45 - 115/56)  RR: 17 (07-08-22 @ 09:10) (17 - 20)  SpO2: 95% (07-08-22 @ 09:10) (95% - 100%)  Wt(kg): --  I&O's Summary    07 Jul 2022 07:01  -  08 Jul 2022 07:00  --------------------------------------------------------  IN: 0 mL / OUT: 0 mL / NET: 0 mL    08 Jul 2022 07:01  -  08 Jul 2022 10:51  --------------------------------------------------------  IN: 250 mL / OUT: 0 mL / NET: 250 mL          Appearance: In no distress	  HEENT:    PERRL, EOMI	  Cardiovascular:  S1 S2, No JVD  Respiratory: Lungs clear to auscultation	  Gastrointestinal:  Soft, Non-tender, + BS	  Vascularature:  No edema of LE  Psychiatric: Appropriate affect   Neuro: no acute focal deficits                               12.6   7.83  )-----------( 207      ( 08 Jul 2022 09:58 )             41.9     07-07    137  |  91<L>  |  40<H>  ----------------------------<  106<H>  4.6   |  25  |  9.67<H>    Ca    9.9      07 Jul 2022 16:39          Labs personally reviewed      ASSESSMENT/PLAN: 	    78 yo male with PMHx of ESRD on HD via AV fistula LUE MWF, COPD (on 2L home O2), CHF, pHTN, hypotension on Midodrine, DVT S/P IVC filter p/w fall.  Patient reports that he was walking out of his house today to go to dialysis when he felt his left knee "give out." Patient landed on his buttocks.  no head trauma or LOC.  On coumadin.  Patient was unable to get up 2/2 left knee pain.  Patient also c/o acute on chronic low back pain since the fall.  Patient reports that he is otherwise feeling well.  Denies headache, blurry vision, focal weakness, numbness, slurred speech, CP, SOB, abd pain, NVD.  Patient does not make urine.  Of note, patient was discharged from [facility] one month ago after receiving PT for BL knee pain and difficulty ambulating.  Patient states that after discharge from rehab he was supposed to receive home PT, but that has not happened. (06 Jul 2022 14:54).    1. Chronic hypotension   - with baseline SBP noted in the 80s prior admit a year ago  - was discharged on Midodrine 10mg TID  - BP soft   - PT eval     2. 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 Discussed with wife Patty as bedside who is in agreement as well   - rec resume coumadin with INR <2.5, may need lower dose, home dose 7.5mg    3. ?Hx of HF  - prior echo with preserved EF and no mention of pulm HTN  - on HD for fluid management         Estefania Andrei, AG-NP   Roberto Javdan, DO [facility]  Cardiovascular Medicine  [address] [place] [address], [address]  Office: [phone]  Cell: [phone]

## 2022-07-08 NOTE — PROGRESS NOTE ADULT - PROBLEM SELECTOR PLAN 1
Pt. with ESRD on HD three times a week (MWF) presented to [facility] for a fall. Last HD was on 7/6 via LUE AVF. Pt. clinically stable. No CP, SOB or palpitations during evaluation. Most recent labs reviewed. Will arrange for HD today once clarification is obtained about BP and Midodrine dose. Pt. denies any lightheadedness and states that his his BP is normally 80s-90s/ 50s-60s. He endorses taking Midodrine but does not recall the dose. Consent obtained for HD and placed in the chart. Will arrange for HD today. Increase SEvelamer to 1600mg TID. HgB at goal. Prior records state that Pt. was on 30mg Q8H here with additional Midodrine given during HD.       If you have any questions, please feel free to contact me  Jai Alvarenga  Nephrology Fellow  [phone]; Prefer [employer] TEAMS  (After 5pm or on weekends please page the on-call fellow).

## 2022-07-09 LAB
APTT BLD: 31.1 SEC — SIGNIFICANT CHANGE UP (ref 27.5–35.5)
INR BLD: 1.76 RATIO — HIGH (ref 0.88–1.16)
PROTHROM AB SERPL-ACNC: 20.5 SEC — HIGH (ref 10.5–13.4)
SARS-COV-2 RNA SPEC QL NAA+PROBE: SIGNIFICANT CHANGE UP

## 2022-07-09 RX ADMIN — BUDESONIDE AND FORMOTEROL FUMARATE DIHYDRATE 2 PUFF(S): 160; 4.5 AEROSOL RESPIRATORY (INHALATION) at 17:16

## 2022-07-09 RX ADMIN — BUDESONIDE AND FORMOTEROL FUMARATE DIHYDRATE 2 PUFF(S): 160; 4.5 AEROSOL RESPIRATORY (INHALATION) at 05:56

## 2022-07-09 RX ADMIN — MIDODRINE HYDROCHLORIDE 10 MILLIGRAM(S): 2.5 TABLET ORAL at 05:55

## 2022-07-09 RX ADMIN — Medication 50 MILLIGRAM(S): at 05:55

## 2022-07-09 RX ADMIN — PANTOPRAZOLE SODIUM 40 MILLIGRAM(S): 20 TABLET, DELAYED RELEASE ORAL at 05:55

## 2022-07-09 RX ADMIN — SEVELAMER CARBONATE 800 MILLIGRAM(S): 2400 POWDER, FOR SUSPENSION ORAL at 14:46

## 2022-07-09 RX ADMIN — POLYETHYLENE GLYCOL 3350 17 GRAM(S): 17 POWDER, FOR SOLUTION ORAL at 11:28

## 2022-07-09 RX ADMIN — MIDODRINE HYDROCHLORIDE 10 MILLIGRAM(S): 2.5 TABLET ORAL at 17:16

## 2022-07-09 RX ADMIN — SEVELAMER CARBONATE 800 MILLIGRAM(S): 2400 POWDER, FOR SUSPENSION ORAL at 17:16

## 2022-07-09 RX ADMIN — SIMETHICONE 80 MILLIGRAM(S): 80 TABLET, CHEWABLE ORAL at 14:46

## 2022-07-09 RX ADMIN — SIMETHICONE 80 MILLIGRAM(S): 80 TABLET, CHEWABLE ORAL at 21:05

## 2022-07-09 RX ADMIN — TIOTROPIUM BROMIDE 1 CAPSULE(S): 18 CAPSULE ORAL; RESPIRATORY (INHALATION) at 11:29

## 2022-07-09 RX ADMIN — Medication 50 MILLIGRAM(S): at 17:17

## 2022-07-09 RX ADMIN — Medication 1 TABLET(S): at 11:29

## 2022-07-09 RX ADMIN — CHLORHEXIDINE GLUCONATE 1 APPLICATION(S): 213 SOLUTION TOPICAL at 08:24

## 2022-07-09 RX ADMIN — SIMETHICONE 80 MILLIGRAM(S): 80 TABLET, CHEWABLE ORAL at 05:56

## 2022-07-09 RX ADMIN — SEVELAMER CARBONATE 800 MILLIGRAM(S): 2400 POWDER, FOR SUSPENSION ORAL at 08:12

## 2022-07-09 RX ADMIN — MIDODRINE HYDROCHLORIDE 10 MILLIGRAM(S): 2.5 TABLET ORAL at 11:28

## 2022-07-09 RX ADMIN — Medication 100 MILLIGRAM(S): at 11:29

## 2022-07-09 NOTE — PROGRESS NOTE ADULT - SUBJECTIVE AND OBJECTIVE BOX
Patient is a 77y old  Male who presents with a chief complaint of functional paraplegia post a fall (08 Jul 2022 12:14)      SUBJECTIVE / OVERNIGHT EVENTS: ptn awaiting BORIS placement    MEDICATIONS  (STANDING):  allopurinol 100 milliGRAM(s) Oral daily  budesonide 160 MICROgram(s)/formoterol 4.5 MICROgram(s) Inhaler 2 Puff(s) Inhalation two times a day  chlorhexidine 2% Cloths 1 Application(s) Topical <User Schedule>  midodrine. 10 milliGRAM(s) Oral three times a day  multivitamin 1 Tablet(s) Oral daily  pantoprazole    Tablet 40 milliGRAM(s) Oral before breakfast  polyethylene glycol 3350 17 Gram(s) Oral daily  pregabalin 50 milliGRAM(s) Oral two times a day  sevelamer carbonate 800 milliGRAM(s) Oral three times a day with meals  simethicone 80 milliGRAM(s) Chew three times a day  tiotropium 18 MICROgram(s) Capsule 1 Capsule(s) Inhalation daily    MEDICATIONS  (PRN):      Vital Signs Last 24 Hrs  T(F): 97.6 (07-09-22 @ 00:24), Max: 97.8 (07-08-22 @ 15:20)  HR: 68 (07-09-22 @ 00:24) (68 - 89)  BP: 91/50 (07-09-22 @ 00:24) (82/50 - 115/54)  RR: 18 (07-09-22 @ 00:24) (18 - 18)  SpO2: 99% (07-09-22 @ 00:24) (95% - 99%)  Telemetry:   CAPILLARY BLOOD GLUCOSE        I&O's Summary    08 Jul 2022 07:01  -  09 Jul 2022 07:00  --------------------------------------------------------  IN: 430 mL / OUT: 1000 mL / NET: -570 mL        PHYSICAL EXAM:  GENERAL: NAD, well-developed  HEAD:  Atraumatic, Normocephalic  EYES: EOMI, PERRLA, conjunctiva and sclera clear  NECK: Supple, No JVD  CHEST/LUNG: Clear to auscultation bilaterally; No wheeze  HEART: Regular rate and rhythm; No murmurs, rubs, or gallops  ABDOMEN: Soft, Nontender, Nondistended; Bowel sounds present  EXTREMITIES:  2+ Peripheral Pulses, No clubbing, cyanosis, or edema  PSYCH: AAOx3  NEUROLOGY: non-focal  SKIN: No rashes or lesions    LABS:                        12.6   7.83  )-----------( 207      ( 08 Jul 2022 09:58 )             41.9     07-07    137  |  91<L>  |  40<H>  ----------------------------<  106<H>  4.6   |  25  |  9.67<H>    Ca    9.9      07 Jul 2022 16:39      PT/INR - ( 08 Jul 2022 09:58 )   PT: 41.0 sec;   INR: 3.52 ratio         PTT - ( 07 Jul 2022 16:39 )  PTT:53.1 sec  CARDIAC MARKERS ( 08 Jul 2022 09:58 )  x     / x     / 55 U/L / x     / x              RADIOLOGY & ADDITIONAL TESTS:    Imaging Personally Reviewed:    Consultant(s) Notes Reviewed:      Care Discussed with Consultants/Other Providers:

## 2022-07-09 NOTE — PROGRESS NOTE ADULT - ASSESSMENT
Pt. is a 77 y.o. M e/ PMHx. of HTN, HFrEF, SAADIA, Gout and [facility] presents after having a fall enroute to the HD center. 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 c/o knee pain and inability to walk. nephrology called. PT kevin ordered. cont outptn meds, check orthostatics. card called  dvt ppx w sc lovenox. ptn w h/o DVT/PE, in the past not on AC 2/2 h/o GI bleeds, but admitted on COumadin, will trend INR.  h/o COPD with chronic hypoxic respiratory failure on 2 L home O2. presently stable. awaiting BORIS placement

## 2022-07-10 ENCOUNTER — TRANSCRIPTION ENCOUNTER (OUTPATIENT)
Age: 77
End: 2022-07-10

## 2022-07-10 LAB
INR BLD: 1.66 RATIO — HIGH (ref 0.88–1.16)
PROTHROM AB SERPL-ACNC: 19.3 SEC — HIGH (ref 10.5–13.4)

## 2022-07-10 RX ORDER — ONDANSETRON 8 MG/1
4 TABLET, FILM COATED ORAL ONCE
Refills: 0 | Status: COMPLETED | OUTPATIENT
Start: 2022-07-10 | End: 2022-07-10

## 2022-07-10 RX ORDER — LIDOCAINE 4 G/100G
1 CREAM TOPICAL ONCE
Refills: 0 | Status: COMPLETED | OUTPATIENT
Start: 2022-07-10 | End: 2022-07-10

## 2022-07-10 RX ORDER — WARFARIN SODIUM 2.5 MG/1
5 TABLET ORAL ONCE
Refills: 0 | Status: COMPLETED | OUTPATIENT
Start: 2022-07-10 | End: 2022-07-10

## 2022-07-10 RX ADMIN — ONDANSETRON 4 MILLIGRAM(S): 8 TABLET, FILM COATED ORAL at 23:26

## 2022-07-10 RX ADMIN — MIDODRINE HYDROCHLORIDE 10 MILLIGRAM(S): 2.5 TABLET ORAL at 12:30

## 2022-07-10 RX ADMIN — LIDOCAINE 1 PATCH: 4 CREAM TOPICAL at 16:25

## 2022-07-10 RX ADMIN — SEVELAMER CARBONATE 800 MILLIGRAM(S): 2400 POWDER, FOR SUSPENSION ORAL at 07:46

## 2022-07-10 RX ADMIN — SEVELAMER CARBONATE 800 MILLIGRAM(S): 2400 POWDER, FOR SUSPENSION ORAL at 17:01

## 2022-07-10 RX ADMIN — Medication 100 MILLIGRAM(S): at 12:31

## 2022-07-10 RX ADMIN — Medication 50 MILLIGRAM(S): at 17:01

## 2022-07-10 RX ADMIN — LIDOCAINE 1 PATCH: 4 CREAM TOPICAL at 05:13

## 2022-07-10 RX ADMIN — SIMETHICONE 80 MILLIGRAM(S): 80 TABLET, CHEWABLE ORAL at 05:16

## 2022-07-10 RX ADMIN — WARFARIN SODIUM 5 MILLIGRAM(S): 2.5 TABLET ORAL at 07:46

## 2022-07-10 RX ADMIN — BUDESONIDE AND FORMOTEROL FUMARATE DIHYDRATE 2 PUFF(S): 160; 4.5 AEROSOL RESPIRATORY (INHALATION) at 05:18

## 2022-07-10 RX ADMIN — SIMETHICONE 80 MILLIGRAM(S): 80 TABLET, CHEWABLE ORAL at 21:31

## 2022-07-10 RX ADMIN — SIMETHICONE 80 MILLIGRAM(S): 80 TABLET, CHEWABLE ORAL at 13:32

## 2022-07-10 RX ADMIN — CHLORHEXIDINE GLUCONATE 1 APPLICATION(S): 213 SOLUTION TOPICAL at 06:21

## 2022-07-10 RX ADMIN — BUDESONIDE AND FORMOTEROL FUMARATE DIHYDRATE 2 PUFF(S): 160; 4.5 AEROSOL RESPIRATORY (INHALATION) at 17:01

## 2022-07-10 RX ADMIN — SEVELAMER CARBONATE 800 MILLIGRAM(S): 2400 POWDER, FOR SUSPENSION ORAL at 12:31

## 2022-07-10 RX ADMIN — MIDODRINE HYDROCHLORIDE 10 MILLIGRAM(S): 2.5 TABLET ORAL at 05:16

## 2022-07-10 RX ADMIN — TIOTROPIUM BROMIDE 1 CAPSULE(S): 18 CAPSULE ORAL; RESPIRATORY (INHALATION) at 12:31

## 2022-07-10 RX ADMIN — POLYETHYLENE GLYCOL 3350 17 GRAM(S): 17 POWDER, FOR SOLUTION ORAL at 12:31

## 2022-07-10 RX ADMIN — Medication 50 MILLIGRAM(S): at 05:16

## 2022-07-10 RX ADMIN — PANTOPRAZOLE SODIUM 40 MILLIGRAM(S): 20 TABLET, DELAYED RELEASE ORAL at 05:16

## 2022-07-10 RX ADMIN — Medication 1 TABLET(S): at 12:31

## 2022-07-10 RX ADMIN — LIDOCAINE 1 PATCH: 4 CREAM TOPICAL at 07:26

## 2022-07-10 RX ADMIN — MIDODRINE HYDROCHLORIDE 10 MILLIGRAM(S): 2.5 TABLET ORAL at 17:00

## 2022-07-10 NOTE — DIETITIAN INITIAL EVALUATION ADULT - ORAL INTAKE PTA/DIET HISTORY
Pt reports good PO intake and appetite PTA, consumes regular diet at home. Reports being aware of renal restrictions as well as need to restrict high potassium phosphorus, sodium foods at home as warranted, states he still consumes all foods but eats smaller portions of items. Meets with dietitian at dialysis center regularly. NKFA. Pt denies chewing/swallowing difficulty, nausea, vomiting, diarrhea, constipation.

## 2022-07-10 NOTE — DISCHARGE NOTE PROVIDER - NSDCCPCAREPLAN_GEN_ALL_CORE_FT
PRINCIPAL DISCHARGE DIAGNOSIS  Diagnosis: Left knee pain  Assessment and Plan of Treatment: pt s/p fall on his way to dialysis at home , imagings were negative for fracture . patient saw PT and recommended rehab      SECONDARY DISCHARGE DIAGNOSES  Diagnosis: ESRD needing dialysis  Assessment and Plan of Treatment: Avoid taking (NSAIDs) - (ex: Ibuprofen, Advil, Celebrex, Naprosyn)  Avoid taking any nephrotoxic agents (can harm kidneys) - Intravenous contrast for diagnostic testing, combination cold medications.  Have all medications adjusted for your renal function by your Health Care Provider.  Blood pressure control is important.  Take all medication as prescribed.      Diagnosis: Fall  Assessment and Plan of Treatment:      PRINCIPAL DISCHARGE DIAGNOSIS  Diagnosis: Left knee pain  Assessment and Plan of Treatment: pt s/p fall on his way to dialysis at home , imagings were negative for fracture . patient saw PT and recommended rehab      SECONDARY DISCHARGE DIAGNOSES  Diagnosis: N&V (nausea and vomiting)  Assessment and Plan of Treatment: You required an EGD  to evaluate the reason of our nasea and vomiting.  You endoscopy :with reflux esophagitis, gastritis and duodenitis. Please follow up with Gastroenterology for final biopsy results. Please continue taking reglan and carafate as prescibe.      Diagnosis: Hypoglycemia  Assessment and Plan of Treatment: Likely related to poor food intake and ESRD. You were ruled out for adrenal insufficency. Encourage to increase po intake, nutritional supplements as tolerated.    Diagnosis: Fall  Assessment and Plan of Treatment: Causes of fall may be due to illness, change in the medicines you take, unsafe or unfamiliar setting and conditions that affect eyesight, hearing, muscle strength, or balance.                                                            Certain medicines used for sleep, anxiety, or depression can cause falls. Adding new medicines, or changing doses of some medicines, can also affect your risk of falling. Your doctor might switch you to a different medicine.                                        Prevent falls by make your home safer – To avoid falling at home, get rid of things that might make you trip or slip. This might include furniture, electrical cords, clutter, and loose rugs. Keep your home well lit to avoid falls or accidents. Avoid storing things in high places so you don't have to reach or climb.                             Wear sturdy shoes that fit well – Wearing shoes with high heels or slippery soles, or shoes that are too loose, can lead to falls.                                                                                                                       Take vitamin D pills which might lower the risk of falls in older people. This is because vitamin D helps make bones and muscles stronger.                                                                                                                   Use a cane, walker, and other safety devices as these devices help you avoid falling, too. These include grab bars or a sturdy seat for the shower, non-slip bath mats, and hand rails or treads for the stairs (to prevent slipping). If you worry that you could fall, there are also alarm buttons that let you call for help if you fall and can't get up.   It is important to tell your doctor about any times you have fallen or almost fallen.  Seek immediate help for pain or injury after a fall.      Diagnosis: ESRD needing dialysis  Assessment and Plan of Treatment: Avoid taking (NSAIDs) - (ex: Ibuprofen, Advil, Celebrex, Naprosyn)  Avoid taking any nephrotoxic agents (can harm kidneys) - Intravenous contrast for diagnostic testing, combination cold medications.  Have all medications adjusted for your renal function by your Health Care Provider.  Blood pressure control is important.  Take all medication as prescribed.      Diagnosis: DVT, lower extremity  Assessment and Plan of Treatment: Please continue taking coumadin. Please have BORIS monitor INR levels as you are not at therpeutic dose.

## 2022-07-10 NOTE — DIETITIAN INITIAL EVALUATION ADULT - OTHER INFO
Weight:  Pt reports usual dry weight as ~ 117-118Kg (257-259lbs) Weight per previous RD note was 278.8 lbs. (9/3/21). Current dosing weight is 274lbs. Post HD weight noted as 249lbs. Weight fluctuations likely related to fluid shifts, will continue to monitor.

## 2022-07-10 NOTE — PROGRESS NOTE ADULT - SUBJECTIVE AND OBJECTIVE BOX
Patient is a 77y old  Male who presents with a chief complaint of functional paraplegia post a fall (10 Jul 2022 17:08)      SUBJECTIVE / OVERNIGHT EVENTS: no new c/o    MEDICATIONS  (STANDING):  allopurinol 100 milliGRAM(s) Oral daily  budesonide 160 MICROgram(s)/formoterol 4.5 MICROgram(s) Inhaler 2 Puff(s) Inhalation two times a day  chlorhexidine 2% Cloths 1 Application(s) Topical <User Schedule>  midodrine. 10 milliGRAM(s) Oral three times a day  multivitamin 1 Tablet(s) Oral daily  pantoprazole    Tablet 40 milliGRAM(s) Oral before breakfast  polyethylene glycol 3350 17 Gram(s) Oral daily  pregabalin 50 milliGRAM(s) Oral two times a day  sevelamer carbonate 800 milliGRAM(s) Oral three times a day with meals  simethicone 80 milliGRAM(s) Chew three times a day  tiotropium 18 MICROgram(s) Capsule 1 Capsule(s) Inhalation daily    MEDICATIONS  (PRN):      Vital Signs Last 24 Hrs  T(F): 97.7 (07-10-22 @ 09:05), Max: 98.8 (07-09-22 @ 23:51)  HR: 73 (07-10-22 @ 09:05) (63 - 73)  BP: 90/58 (07-10-22 @ 09:30) (84/44 - 94/48)  RR: 18 (07-10-22 @ 09:05) (18 - 18)  SpO2: 96% (07-10-22 @ 09:05) (96% - 96%)  Telemetry:   CAPILLARY BLOOD GLUCOSE        I&O's Summary    09 Jul 2022 07:01  -  10 Jul 2022 07:00  --------------------------------------------------------  IN: 620 mL / OUT: 0 mL / NET: 620 mL        PHYSICAL EXAM:  GENERAL: NAD, well-developed  HEAD:  Atraumatic, Normocephalic  EYES: EOMI, PERRLA, conjunctiva and sclera clear  NECK: Supple, No JVD  CHEST/LUNG: Clear to auscultation bilaterally; No wheeze  HEART: Regular rate and rhythm; No murmurs, rubs, or gallops  ABDOMEN: Soft, Nontender, Nondistended; Bowel sounds present  EXTREMITIES:  2+ Peripheral Pulses, No clubbing, cyanosis, or edema  PSYCH: AAOx3  NEUROLOGY: non-focal  SKIN: No rashes or lesions    LABS:          PT/INR - ( 10 Jul 2022 06:46 )   PT: 19.3 sec;   INR: 1.66 ratio         PTT - ( 09 Jul 2022 21:59 )  PTT:31.1 sec          RADIOLOGY & ADDITIONAL TESTS:    Imaging Personally Reviewed:    Consultant(s) Notes Reviewed:      Care Discussed with Consultants/Other Providers:

## 2022-07-10 NOTE — PROGRESS NOTE ADULT - ASSESSMENT
Pt. is a 77 y.o. M e/ PMHx. of HTN, HFrEF, SAADIA, Gout and ESRD on HD MWF at [facility] presents after having a fall enroute to the HD center. 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 c/o knee pain and inability to walk. nephrology called. PT kevin ordered. cont outptn meds, check orthostatics. card called  dvt ppx w sc lovenox. ptn w h/o DVT/PE, in the past not on AC 2/2 h/o GI bleeds, but admitted on COumadin, will trend INR.  h/o COPD with chronic hypoxic respiratory failure on 2 L home O2. presently stable. awaiting BORIS placement

## 2022-07-10 NOTE — DISCHARGE NOTE PROVIDER - HOSPITAL COURSE
78 yo male with PMHx of ESRD on HD via AV fistula LUE MWF, COPD (on 2L home O2), CHF, pHTN, hypotension on Midodrine, DVT S/P IVC filter p/w fall.  Patient reports that he was walking out of his house today to go to dialysis when he felt his left knee "give out." Patient landed on his buttocks.  no head trauma or LOC.  On coumadin.  Patient was unable to get up 2/2 left knee pain.  Patient also c/o acute on chronic low back pain since the fall.  Patient reports that he is otherwise feeling well.  Denies headache, blurry vision, focal weakness, numbness, slurred speech, CP, SOB, abd pain, NVD.  Patient does not make urine.  Of note, patient was discharged from [facility] one month ago after receiving PT for BL knee pain and difficulty ambulating.  Patient states that after discharge from rehab he was supposed to receive home PT, but that has not happened.    PAtient was admitted for evaluation following a fall , all imaging were negative , seen by Physical therapy and recommended [facility]. Patient received dialysis while inpatient . Patient medically cleared for discharge . Please monitor Pt/INR for LE DVT.   78 yo male with PMHx of ESRD on HD via AV fistula LUE MWF, COPD (on 2L home O2), CHF, pHTN, hypotension on Midodrine, DVT S/P IVC filter p/w fall.  Patient reports that he was walking out of his house today to go to dialysis when he felt his left knee "give out." Patient landed on his buttocks. Pt denies any  head trauma or LOC.  On coumadin.  Patient was unable to get up 2/2 left knee pain.  Patient also c/o acute on chronic low back pain since the fall.  Patient reports that he is otherwise feeling well.  Denies headache, blurry vision, focal weakness, numbness, slurred speech, CP, SOB, abd pain, NVD.  Patient does not make urine. Of note, patient was discharged from [facility] one month ago after receiving PT for BL knee pain and difficulty ambulating. Patient states that after discharge from rehab he was supposed to receive home PT, but that has not happened.  Patient was admitted for evaluation following a fall, all imaging were negative. Patient received dialysis while inpatient. HD MWF, volume status is stable. Pt also noted with elevated INR on admission, coumadin held and then resumed.   Pt course c/b episodes of vomiting and episodes of hypoglycemia. CT A/P scan with dilated stomach, no obstruction noted. Pt was  seen by GI, tolerated CLD. S/p EGD 7/19, has severe " linitis" type erythema with multiple gastric ulcers, biopsies were taken. Awaiting pathology.  Pt also  seen by Endocrine, Acth stim test completed and patient ruled out for AI.  Fingerstick readings often inaccurate in patient. Encourage to increase po intake, nutritional supplements as tolerated.   Pt with no further vomiting, now  tolerating a soft diet, has BMs, no further  episodes of hypoglycemia; on Carafate, reglan, PPI,     seen by Physical therapy and recommended [facility]. In  discussion  with Dr. Benavides, pt is medically stable for discharge to [facility] and to follow up with his PCP and GI.   76 yo male with PMHx of ESRD on HD via AV fistula LUE MWF, COPD (on 2L home O2), CHF, pHTN, hypotension on Midodrine, DVT S/P IVC filter p/w fall.  Patient reports that he was walking out of his house today to go to dialysis when he felt his left knee "give out." Patient landed on his buttocks. Pt denies any  head trauma or LOC.  On coumadin.  Patient was unable to get up 2/2 left knee pain.  Patient also c/o acute on chronic low back pain since the fall.  Patient reports that he is otherwise feeling well.  Denies headache, blurry vision, focal weakness, numbness, slurred speech, CP, SOB, abd pain, NVD.  Patient does not make urine. Of note, patient was discharged from [facility] one month ago after receiving PT for BL knee pain and difficulty ambulating. Patient states that after discharge from rehab he was supposed to receive home PT, but that has not happened.  Patient was admitted for evaluation following a fall, all imaging were negative. Patient received dialysis while inpatient. HD MWF, volume status is stable. Pt also noted with elevated INR on admission, coumadin held and then resumed.   Pt course c/b episodes of vomiting and episodes of hypoglycemia. CT A/P scan with dilated stomach, no obstruction noted. Pt was  seen by GI, tolerated CLD. S/p EGD 7/19, has severe " linitis" type erythema with multiple gastric ulcers, biopsies were taken. Awaiting pathology.  Pt also  seen by Endocrine, Acth stim test completed and patient ruled out for AI.  Fingerstick readings often inaccurate in patient. Encourage to increase po intake, nutritional supplements as tolerated.   Pt with no further vomiting, now  tolerating a soft diet, has BMs, no further  episodes of hypoglycemia; on Carafate, reglan, PPI,     Pt seen by Physical therapy and recommended [facility]. In  discussion  with Dr. Hein, pt is medically stable for discharge to [facility] and to follow up with his PCP and GI.

## 2022-07-10 NOTE — DISCHARGE NOTE PROVIDER - DETAILS OF MALNUTRITION DIAGNOSIS/DIAGNOSES
This patient has been assessed with a concern for Malnutrition and was treated during this hospitalization for the following Nutrition diagnosis/diagnoses:     -  07/22/2022: Severe protein-calorie malnutrition   -  07/22/2022: Morbid obesity (BMI > 40)

## 2022-07-10 NOTE — DIETITIAN INITIAL EVALUATION ADULT - REASON FOR ADMISSION
Left knee pain    Chart reviewed, events noted. This is a "77 y.o. M e/ PMHx. of HTN, HFrEF, SAADIA, Gout and ESRD on HD MWF at [facility] presents after having a fall enroute to the HD center."

## 2022-07-10 NOTE — DIETITIAN INITIAL EVALUATION ADULT - ENERGY INTAKE
Adequate (%) In-house pt reports appetite is good though notes he doesn't always complete meals due to dislike of items. Obtained food preferences, will honor as able. Pt encouraged to participate in ordering meals, provided menu for review. No acute GI distress noted. Pt declining need for renal diet education at this time in both written and verbal form. Patient with no nutrition-related questions at this time. Made aware RD remains available as needed.

## 2022-07-10 NOTE — DIETITIAN INITIAL EVALUATION ADULT - NS FNS DIET ORDER
Diet, Renal Restrictions:   For patients receiving Renal Replacement - No Protein Restr, No Conc K, No Conc Phos, Low Sodium (07-07-22 @ 05:46) [Active]

## 2022-07-10 NOTE — DISCHARGE NOTE PROVIDER - CARE PROVIDER_API CALL
Ortega Fitch (DO)  Gastroenterology; Internal Medicine  [address] [place] [id number]  Phone: [phone]  Fax: [phone]  Follow Up Time:

## 2022-07-10 NOTE — DISCHARGE NOTE PROVIDER - NSDCCPTREATMENT_GEN_ALL_CORE_FT
PRINCIPAL PROCEDURE  Procedure: UGI endoscopy  Findings and Treatment: - Mildly severe reflux esophagitis.                       - Gastritis. Biopsied.                       - Duodenitis. Biopsied.  Recommendation:      - Return patient to [facility] for ongoing care.                       - Full liquid diet.                       - Await pathology results.                       - Cont present medications                       - Add carafate 1g four times a day

## 2022-07-10 NOTE — DIETITIAN INITIAL EVALUATION ADULT - PERTINENT MEDS FT
MEDICATIONS  (STANDING):  allopurinol 100 milliGRAM(s) Oral daily  budesonide 160 MICROgram(s)/formoterol 4.5 MICROgram(s) Inhaler 2 Puff(s) Inhalation two times a day  chlorhexidine 2% Cloths 1 Application(s) Topical <User Schedule>  midodrine. 10 milliGRAM(s) Oral three times a day  multivitamin 1 Tablet(s) Oral daily  pantoprazole    Tablet 40 milliGRAM(s) Oral before breakfast  polyethylene glycol 3350 17 Gram(s) Oral daily  pregabalin 50 milliGRAM(s) Oral two times a day  sevelamer carbonate 800 milliGRAM(s) Oral three times a day with meals  simethicone 80 milliGRAM(s) Chew three times a day  tiotropium 18 MICROgram(s) Capsule 1 Capsule(s) Inhalation daily    MEDICATIONS  (PRN):

## 2022-07-10 NOTE — DIETITIAN INITIAL EVALUATION ADULT - ADD RECOMMEND
1) Continue current renal diet as tolerated. 2) Recommend addition of Nepro 1x daily. 3) Recommend addition of Nephrovite daily as feasible. 4) RD to remain available and follow-up as medically appropriate.  1) Continue current renal diet as tolerated. 2) Recommend addition of Nepro 1x daily. 3) Consider change multivitamin to Nephrovite daily as feasible. 4) RD to remain available and follow-up as medically appropriate.

## 2022-07-10 NOTE — DIETITIAN INITIAL EVALUATION ADULT - PERTINENT LABORATORY DATA
7/8: Potassium 4.6, Creatinine 9.67, BUN 40  A1C with Estimated Average Glucose Result: 5.5 % (08-23-21 @ 02:35)

## 2022-07-10 NOTE — DISCHARGE NOTE PROVIDER - NSDCMRMEDTOKEN_GEN_ALL_CORE_FT
albuterol 0.63 mg/3 mL (0.021%) inhalation solution: 3 milliliter(s) inhaled 3 times a day, As Needed  allopurinol 100 mg oral tablet: 1 tab(s) orally once a day  Colace 100 mg oral capsule: 1 cap(s) orally 3 times a day  folic acid 1 mg oral tablet: 1 tab(s) orally once a day  hydrocortisone 1% topical cream: Apply topically to affected area 2 times a day, As Needed  lidocaine 4% patch: Apply topically to affected area (bilateral knees) once a day  lidocaine 5% patch: Apply topically to affected area (back) once a day  Lyrica 50 mg oral capsule: 1 cap(s) orally 2 times a day  midodrine 10 mg oral tablet: 1 tab(s) orally 3 times a day  MiraLax oral powder for reconstitution: 17 gram(s) orally once a day, As Needed  Nephro-Gita oral tablet: 1 tab(s) orally once a day  pantoprazole 40 mg oral delayed release tablet: 1 tab(s) orally once a day  senna (sennosides) 8.6 mg oral tablet: 2 tab(s) orally once a day (at bedtime)  sevelamer carbonate 800 mg oral tablet: 1 tab(s) orally 3 times a day (with meals)  Trelegy Ellipta 100 mcg-62.5 mcg-25 mcg/inh inhalation powder: 1 puff(s) inhaled once a day  Tylenol 325 mg oral tablet: 2 tab(s) orally 2 times a day  Vitamin D3 25 mcg (1000 intl units) oral tablet: 1 tab(s) orally once a day  warfarin 7.5 mg oral tablet: 1 tab(s) orally once a day (in the evening)  zinc oxide 20% topical ointment: Apply topically to affected area (on buttocks) once a day   albuterol 0.63 mg/3 mL (0.021%) inhalation solution: 3 milliliter(s) inhaled 3 times a day, As Needed  allopurinol 100 mg oral tablet: 1 tab(s) orally once a day  budesonide-formoterol 160 mcg-4.5 mcg/inh inhalation aerosol: 2 puff(s) inhaled 2 times a day  Colace 100 mg oral capsule: 1 cap(s) orally 3 times a day  folic acid 1 mg oral tablet: 1 tab(s) orally once a day  metoclopramide 5 mg oral tablet: 1 tab(s) orally every 8 hours  midodrine 10 mg oral tablet: 2 tab(s) orally 3 times a day  MiraLax oral powder for reconstitution: 17 gram(s) orally once a day, As Needed  Multiple Vitamins oral tablet: 1 tab(s) orally once a day  Nephro-Gita oral tablet: 1 tab(s) orally once a day  pantoprazole 40 mg oral delayed release tablet: 1 tab(s) orally once a day  polyethylene glycol 3350 oral powder for reconstitution: 17 gram(s) orally once a day  pregabalin 50 mg oral capsule: 1 cap(s) orally 2 times a day  senna (sennosides) 8.6 mg oral tablet: 2 tab(s) orally once a day (at bedtime)  sevelamer carbonate 800 mg oral tablet: 1 tab(s) orally 3 times a day (with meals)  simethicone 80 mg oral tablet, chewable: 1 tab(s) orally 3 times a day  sucralfate 1 g/10 mL oral suspension: 10 milliliter(s) orally 2 times a day  Trelegy Ellipta 100 mcg-62.5 mcg-25 mcg/inh inhalation powder: 1 puff(s) inhaled once a day  Vitamin D3 25 mcg (1000 intl units) oral tablet: 1 tab(s) orally once a day  warfarin 7.5 mg oral tablet: 1 tab(s) orally once a day (in the evening)  zinc oxide 20% topical ointment: Apply topically to affected area (on buttocks) once a day

## 2022-07-11 LAB
ANION GAP SERPL CALC-SCNC: 21 MMOL/L — HIGH (ref 5–17)
BUN SERPL-MCNC: 43 MG/DL — HIGH (ref 7–23)
CALCIUM SERPL-MCNC: 10.4 MG/DL — SIGNIFICANT CHANGE UP (ref 8.4–10.5)
CHLORIDE SERPL-SCNC: 90 MMOL/L — LOW (ref 96–108)
CO2 SERPL-SCNC: 27 MMOL/L — SIGNIFICANT CHANGE UP (ref 22–31)
CREAT SERPL-MCNC: 8.4 MG/DL — HIGH (ref 0.5–1.3)
EGFR: 6 ML/MIN/1.73M2 — LOW
GLUCOSE SERPL-MCNC: 91 MG/DL — SIGNIFICANT CHANGE UP (ref 70–99)
HAV IGM SER-ACNC: SIGNIFICANT CHANGE UP
HBV CORE IGM SER-ACNC: SIGNIFICANT CHANGE UP
HBV SURFACE AG SER-ACNC: SIGNIFICANT CHANGE UP
HCV AB S/CO SERPL IA: 0.39 S/CO — SIGNIFICANT CHANGE UP (ref 0–0.99)
HCV AB SERPL-IMP: SIGNIFICANT CHANGE UP
INR BLD: 1.36 RATIO — HIGH (ref 0.88–1.16)
MRSA PCR RESULT.: SIGNIFICANT CHANGE UP
POTASSIUM SERPL-MCNC: 4.6 MMOL/L — SIGNIFICANT CHANGE UP (ref 3.5–5.3)
POTASSIUM SERPL-SCNC: 4.6 MMOL/L — SIGNIFICANT CHANGE UP (ref 3.5–5.3)
PREALB SERPL-MCNC: 35 MG/DL — SIGNIFICANT CHANGE UP (ref 20–40)
PROTHROM AB SERPL-ACNC: 15.8 SEC — HIGH (ref 10.5–13.4)
S AUREUS DNA NOSE QL NAA+PROBE: SIGNIFICANT CHANGE UP
SODIUM SERPL-SCNC: 138 MMOL/L — SIGNIFICANT CHANGE UP (ref 135–145)

## 2022-07-11 PROCEDURE — 99233 SBSQ HOSP IP/OBS HIGH 50: CPT | Mod: GC

## 2022-07-11 PROCEDURE — 74176 CT ABD & PELVIS W/O CONTRAST: CPT | Mod: 26

## 2022-07-11 RX ORDER — METOCLOPRAMIDE HCL 10 MG
10 TABLET ORAL ONCE
Refills: 0 | Status: COMPLETED | OUTPATIENT
Start: 2022-07-11 | End: 2022-07-11

## 2022-07-11 RX ADMIN — SEVELAMER CARBONATE 800 MILLIGRAM(S): 2400 POWDER, FOR SUSPENSION ORAL at 08:08

## 2022-07-11 RX ADMIN — MIDODRINE HYDROCHLORIDE 10 MILLIGRAM(S): 2.5 TABLET ORAL at 12:32

## 2022-07-11 RX ADMIN — SIMETHICONE 80 MILLIGRAM(S): 80 TABLET, CHEWABLE ORAL at 05:28

## 2022-07-11 RX ADMIN — Medication 1 TABLET(S): at 12:32

## 2022-07-11 RX ADMIN — TIOTROPIUM BROMIDE 1 CAPSULE(S): 18 CAPSULE ORAL; RESPIRATORY (INHALATION) at 12:33

## 2022-07-11 RX ADMIN — Medication 50 MILLIGRAM(S): at 05:29

## 2022-07-11 RX ADMIN — BUDESONIDE AND FORMOTEROL FUMARATE DIHYDRATE 2 PUFF(S): 160; 4.5 AEROSOL RESPIRATORY (INHALATION) at 05:29

## 2022-07-11 RX ADMIN — Medication 50 MILLIGRAM(S): at 17:21

## 2022-07-11 RX ADMIN — SEVELAMER CARBONATE 800 MILLIGRAM(S): 2400 POWDER, FOR SUSPENSION ORAL at 12:32

## 2022-07-11 RX ADMIN — BUDESONIDE AND FORMOTEROL FUMARATE DIHYDRATE 2 PUFF(S): 160; 4.5 AEROSOL RESPIRATORY (INHALATION) at 17:22

## 2022-07-11 RX ADMIN — CHLORHEXIDINE GLUCONATE 1 APPLICATION(S): 213 SOLUTION TOPICAL at 05:34

## 2022-07-11 RX ADMIN — Medication 10 MILLIGRAM(S): at 18:26

## 2022-07-11 RX ADMIN — PANTOPRAZOLE SODIUM 40 MILLIGRAM(S): 20 TABLET, DELAYED RELEASE ORAL at 05:28

## 2022-07-11 RX ADMIN — MIDODRINE HYDROCHLORIDE 10 MILLIGRAM(S): 2.5 TABLET ORAL at 05:28

## 2022-07-11 RX ADMIN — SIMETHICONE 80 MILLIGRAM(S): 80 TABLET, CHEWABLE ORAL at 21:30

## 2022-07-11 RX ADMIN — MIDODRINE HYDROCHLORIDE 10 MILLIGRAM(S): 2.5 TABLET ORAL at 17:21

## 2022-07-11 RX ADMIN — Medication 100 MILLIGRAM(S): at 12:32

## 2022-07-11 NOTE — PROGRESS NOTE ADULT - ASSESSMENT
Pt. is a 77 y.o. M e/ PMHx. of HTN, HFrEF, SAADIA, Gout and ESRD on HD MWF at [facility] presents after having a fall enroute to the HD center. Nephrology consulted for ESRD management.

## 2022-07-11 NOTE — CONSULT NOTE ADULT - ASSESSMENT
vomiting  ESRD    check CT a/p   zofran prn   cont with simethicone   diet as tolerated  HD as per renal   will follow  dw pt     Advanced care planning was discussed with patient and family.  Advanced care planning forms were reviewed and discussed.  Risks, benefits and alternatives of gastroenterologic procedures were discussed in detail and all questions were answered.    30 minutes spent.

## 2022-07-11 NOTE — PROGRESS NOTE ADULT - PROBLEM SELECTOR PLAN 1
Pt. with ESRD on HD three times a week (MWF) presented to [facility] for a fall. Last HD was on 7/9 via LUE AVF. Pt. clinically stable. No CP, SOB or palpitations during evaluation. Most recent labs reviewed. Will arrange for HD today once clarification is obtained about BP and Midodrine dose. Pt. denies any lightheadedness and states that his his BP is normally 80s-90s/ 50s-60s. He endorses taking Midodrine but does not recall the dose. Consent obtained for HD and placed in the chart. Will arrange for HD today. Increase Sevelamer to 1600mg TID. HgB at goal. Prior records state that Pt. was on 30mg Q8H here with additional Midodrine given during HD.       If you have any questions, please feel free to contact me  Jai Alvarenga  Nephrology Fellow  [phone]; Prefer [employer] TEAMS  (After 5pm or on weekends please page the on-call fellow).

## 2022-07-11 NOTE — PROGRESS NOTE ADULT - SUBJECTIVE AND OBJECTIVE BOX
[facility] DIVISION OF KIDNEY DISEASES AND HYPERTENSION -- FOLLOW UP NOTE  --------------------------------------------------------------------------------  HPI:  Pt. is a 77 y.o. M e/ PMHx. of HTN, HFrEF, SAADIA, Gout and ESRD on HD MWF at [facility] presents after having a fall enroute to the HD center. Nephrology consulted for ESRD management. Pt. does not recall his nephrologist and denies any issues with dialysis. He is edematous but states he is not worse than usual. Denies any syncope and attributes his fall to his knee "buckling." Pt. has notably low BP which Pt. states is chronic and for which he takes Midodrine.     Pt. seen this AM. Pt. vomited overnight. Now on NC. sleeping. for HD today.      PAST HISTORY  --------------------------------------------------------------------------------  No significant changes to PMH, PSH, FHx, SHx, unless otherwise noted    ALLERGIES & MEDICATIONS  --------------------------------------------------------------------------------  Allergies    latex (Rash)  No Known Drug Allergies    Intolerances      Standing Inpatient Medications  allopurinol 100 milliGRAM(s) Oral daily  budesonide 160 MICROgram(s)/formoterol 4.5 MICROgram(s) Inhaler 2 Puff(s) Inhalation two times a day  chlorhexidine 2% Cloths 1 Application(s) Topical <User Schedule>  midodrine. 10 milliGRAM(s) Oral three times a day  multivitamin 1 Tablet(s) Oral daily  pantoprazole    Tablet 40 milliGRAM(s) Oral before breakfast  polyethylene glycol 3350 17 Gram(s) Oral daily  pregabalin 50 milliGRAM(s) Oral two times a day  sevelamer carbonate 800 milliGRAM(s) Oral three times a day with meals  simethicone 80 milliGRAM(s) Chew three times a day  tiotropium 18 MICROgram(s) Capsule 1 Capsule(s) Inhalation daily    PRN Inpatient Medications      REVIEW OF SYSTEMS  --------------------------------------------------------------------------------  Ddi not obtain    VITALS/PHYSICAL EXAM  --------------------------------------------------------------------------------  T(C): 36.6 (07-10-22 @ 23:34), Max: 36.6 (07-10-22 @ 23:34)  HR: 81 (07-11-22 @ 05:24) (73 - 81)  BP: 102/59 (07-11-22 @ 05:24) (84/44 - 107/56)  RR: 18 (07-10-22 @ 23:34) (18 - 18)  SpO2: 99% (07-10-22 @ 23:34) (96% - 99%)  Wt(kg): --        07-10-22 @ 07:01  -  07-11-22 @ 07:00  --------------------------------------------------------  IN: 600 mL / OUT: 0 mL / NET: 600 mL        Physical Exam:  	Gen: NAD  	HEENT: MMM  	Pulm: lying flat on 2LNC breathing comfortbaly  	CV: no reported chest pain  	Abd: Soft, +BS   	Ext: + LE edema B/L improving, chronic skin changes   	Neuro: Awake  	Skin: Warm and dry  	Vascular access: LUE AVF, thrills+      LABS/STUDIES  --------------------------------------------------------------------------------          PT/INR: PT 19.3 , INR 1.66       [07-10-22 @ 06:46]  PTT: 31.1       [07-09-22 @ 21:59]      Creatinine Trend:  SCr 9.67 [07-07 @ 16:39]  SCr 11.81 [07-06 @ 10:29]        HbA1c 5.0      [10-02-17 @ 15:38]  TSH 1.00      [08-23-21 @ 02:35]

## 2022-07-11 NOTE — PROGRESS NOTE ADULT - SUBJECTIVE AND OBJECTIVE BOX
Date of Service   07-11-22 @ 14:58    Patient is a 77y old  Male who presents with a chief complaint of functional paraplegia post a fall (11 Jul 2022 07:44)      INTERVAL HISTORY: pt feels ok     REVIEW OF SYSTEMS:   CONSTITUTIONAL: No weakness  EYES/ENT: No visual changes; No throat pain  Neck: No pain or stiffness  Respiratory: No cough, wheezing, No shortness of breath  CARDIOVASCULAR: no chest pain or palpitations  GASTROINTESTINAL: No abdominal pain, no nausea, vomiting or hematemesis  GENITOURINARY: No dysuria, frequency or hematuria  NEUROLOGICAL: No stroke like symptoms  SKIN: No rashes    	  MEDICATIONS:  midodrine. 10 milliGRAM(s) Oral three times a day        PHYSICAL EXAM:  T(C): 36.3 (07-11-22 @ 12:51), Max: 36.6 (07-10-22 @ 23:34)  HR: 90 (07-11-22 @ 12:51) (73 - 90)  BP: 94/57 (07-11-22 @ 12:51) (94/57 - 107/56)  RR: 17 (07-11-22 @ 12:51) (17 - 18)  SpO2: 94% (07-11-22 @ 12:51) (93% - 99%)  Wt(kg): --  I&O's Summary    10 Jul 2022 07:01  -  11 Jul 2022 07:00  --------------------------------------------------------  IN: 600 mL / OUT: 0 mL / NET: 600 mL    11 Jul 2022 07:01  -  11 Jul 2022 14:58  --------------------------------------------------------  IN: 0 mL / OUT: 0 mL / NET: 0 mL          Appearance: In no distress	  HEENT:    PERRL, EOMI	  Cardiovascular:  S1 S2, No JVD  Respiratory: Lungs clear to auscultation	  Gastrointestinal:  Soft, Non-tender, + BS	  Vasculature:  No edema of LE  Psychiatric: Appropriate affect   Neuro: no acute focal deficits           07-11    138  |  90<L>  |  43<H>  ----------------------------<  91  4.6   |  27  |  8.40<H>    Ca    10.4      11 Jul 2022 13:23          Labs personally reviewed      ASSESSMENT/PLAN: 	  78 yo male with PMHx of ESRD on HD via AV fistula LUE MWF, COPD (on 2L home O2), CHF, pHTN, hypotension on Midodrine, DVT S/P IVC filter p/w fall.  Patient reports that he was walking out of his house today to go to dialysis when he felt his left knee "give out." Patient landed on his buttocks.  no head trauma or LOC.  On coumadin.  Patient was unable to get up 2/2 left knee pain.  Patient also c/o acute on chronic low back pain since the fall.  Patient reports that he is otherwise feeling well.  Denies headache, blurry vision, focal weakness, numbness, slurred speech, CP, SOB, abd pain, NVD.  Patient does not make urine.  Of note, patient was discharged from [facility] one month ago after receiving PT for BL knee pain and difficulty ambulating.  Patient states that after discharge from rehab he was supposed to receive home PT, but that has not happened. (06 Jul 2022 14:54).    1. Chronic hypotension   - with baseline SBP noted in the 80s prior admit a year ago  - was discharged on Midodrine 10mg TID  - BP soft   - PT eval     2. Hx of LE DVT prior admit   - s/p IVC filter   - on Warfarin at home as INR is elevated  - rec resume coumadin with INR <2.5,   - INR 1.36 today     3. ?Hx of HF  - prior echo with preserved EF 60% and no mention of pulm HTN  - on HD for fluid management     4. ESRD   - HD per renal   - monitor Lytes and creat   - avoid nephrotoxins       Mendy Valle [facility]   Roberto Latif DO [facility]  Cardiovascular Medicine  [address], [address]  Office: [phone]

## 2022-07-11 NOTE — CONSULT NOTE ADULT - SUBJECTIVE AND OBJECTIVE BOX
[facility]  Avi Riddle PA-C  [facility] [place] [address]  [phone]      Chief Complaint:  Patient is a 77y old  Male who presents with a chief complaint of functional paraplegia post a fall (2022 07:44)      HPI: 76 yo male with PMHx of ESRD on HD via AV fistula LUE MWF, COPD (on 2L home O2), CHF, pHTN, hypotension on Midodrine, DVT S/P IVC filter p/w fall.  Patient reports that he was walking out of his house today to go to dialysis when he felt his left knee "give out." Patient landed on his buttocks.  no head trauma or LOC.  On coumadin.  Patient was unable to get up 2/2 left knee pain.  Patient also c/o acute on chronic low back pain since the fall.  Patient reports that he is otherwise feeling well.  Denies headache, blurry vision, focal weakness, numbness, slurred speech, CP, SOB, abd pain, NVD.  Patient does not make urine.  Of note, patient was discharged from [facility] one month ago after receiving PT for BL knee pain and difficulty ambulating.  Patient states that after discharge from rehab he was supposed to receive home PT, but that has not happened.    GI asked to consult for vomiting  -pt seen and examined. Pt reports episode of emesis last night after eating chicken for dinner and again this morning at dialysis. Received Zofran last night which helped with his symptoms.   -Currently he denies being nauseous but feels like he will vomit again. Also c/o feeling gassy and distended. His last BM was yesterday, no diarrhea. No abdominal pain.  -Of note pt continues to by hypotensive.     Allergies:  latex (Rash)  No Known Drug Allergies      Medications:  allopurinol 100 milliGRAM(s) Oral daily  budesonide 160 MICROgram(s)/formoterol 4.5 MICROgram(s) Inhaler 2 Puff(s) Inhalation two times a day  chlorhexidine 2% Cloths 1 Application(s) Topical <User Schedule>  midodrine. 10 milliGRAM(s) Oral three times a day  multivitamin 1 Tablet(s) Oral daily  pantoprazole    Tablet 40 milliGRAM(s) Oral before breakfast  polyethylene glycol 3350 17 Gram(s) Oral daily  pregabalin 50 milliGRAM(s) Oral two times a day  sevelamer carbonate 800 milliGRAM(s) Oral three times a day with meals  simethicone 80 milliGRAM(s) Chew three times a day  tiotropium 18 MICROgram(s) Capsule 1 Capsule(s) Inhalation daily      PMHX/PSHX:  Hypertension    Glomerular disease    CHF (congestive heart failure)    COPD (chronic obstructive pulmonary disease)    Pulmonary hypertension    Sleep apnea    Pulmonary edema    Peripheral edema    Gout    History of abdominal surgery    H/O right heart catheterization        Family history:  Family history of colon cancer (Father)    Family history of heart disease (Father)        Social History:     ROS:     General:  No wt loss, fevers, chills, night sweats, fatigue,   Eyes:  Good vision, no reported pain  ENT:  No sore throat, pain, runny nose, dysphagia  CV:  No pain, palpitations, + hypo/hypertension  Resp:  No dyspnea, cough, tachypnea, wheezing  GI:  No pain, + nausea,  + vomiting, No diarrhea, No constipation, No weight loss, No fever, No pruritis, No rectal bleeding, No tarry stools, No dysphagia,  :  No pain, bleeding, incontinence, nocturia  Muscle:  No pain, weakness  Neuro:  No weakness, tingling, memory problems  Psych:  No fatigue, insomnia, mood problems, depression  Endocrine:  No polyuria, polydipsia, cold/heat intolerance  Heme:  No petechiae, ecchymosis, easy bruisability  Skin:  No rash, tattoos, scars, edema      PHYSICAL EXAM:   Vital Signs:  Vital Signs Last 24 Hrs  T(C): 36.3 (2022 12:51), Max: 36.6 (10 Jul 2022 23:34)  T(F): 97.4 (2022 12:51), Max: 97.8 (10 Jul 2022 23:34)  HR: 90 (2022 12:51) (73 - 90)  BP: 94/57 (2022 12:51) (94/57 - 107/56)  BP(mean): --  RR: 17 (2022 12:51) (17 - 18)  SpO2: 94% (2022 12:51) (93% - 99%)    Parameters below as of 2022 12:51  Patient On (Oxygen Delivery Method): nasal cannula  O2 Flow (L/min): 2    Daily     Daily Weight in k (2022 08:40)    GENERAL:  Appears stated age,   HEENT:  NC/AT,    CHEST:  Full & symmetric excursion,   HEART:  Regular rhythm  ABDOMEN:  Soft, non-tender, distended,   EXTEREMITIES:  no cyanosis,clubbing or edema  SKIN:  No rash  NEURO:  Alert,    LABS:        138  |  90<L>  |  43<H>  ----------------------------<  91  4.6   |  27  |  8.40<H>    Ca    10.4      2022 13:23        PT/INR - ( 2022 13:23 )   PT: 15.8 sec;   INR: 1.36 ratio         PTT - ( 2022 21:59 )  PTT:31.1 sec        Imaging:

## 2022-07-11 NOTE — CHART NOTE - NSCHARTNOTEFT_GEN_A_CORE
Notified by RN, patient c/o nausea s/p vomitus x1 after eating chicken salad. s/p Zofran x1  Patient seen at bedside. Patient alert and oriented. Reported feeling improved after zofran with no more nausea or vomiting events. Denied fever, chills, SOB, CP, or abdominal pain. Reports regular BMs, + flatus.   Noted vomitus in the basin. Non bloody, visible food particles.  Exam:   Alert and oriented, NAD. lungs clear. s1s2, RRR, Abdomen obese, soft NTND, + bowel sounds.  - improved s/p zofran  - if recurrent symptoms, will obtain imaging  - monitor pt closely  - f/u with Attending in am    Vitals:T(F): 97.8 (07-10-22 @ 23:34)  HR: 76 (07-10-22 @ 23:34)  BP: 107/56 (07-10-22 @ 23:34)  RR: 18 (07-10-22 @ 23:34)  SpO2: 99% (07-10-22 @ 23:34)    Mei Escalante NP  [facility]  [id number]

## 2022-07-11 NOTE — PROGRESS NOTE ADULT - ASSESSMENT
Pt. is a 77 y.o. M e/ PMHx. of HTN, HFrEF, SAADIA, Gout and ESRD on HD MWF at [facility] presents after having a fall enroute to the HD center. 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 c/o knee pain and inability to walk. nephrology called. PT kevin ordered. cont outptn meds, check orthostatics. card called   ptn w h/o DVT/PE, in the past not on AC 2/2 h/o GI bleeds, but admitted on COumadin, will trend INR.  h/o COPD with chronic hypoxic respiratory failure on 2 L home O2. presently stable. episode of vomiting, seen by GI, awaiting CT A/P. awaiting BORIS placement

## 2022-07-11 NOTE — CHART NOTE - NSCHARTNOTEFT_GEN_A_CORE
Informed by RN that pt had episode of emesis at dialysis and again upon returning. Pt seen in bed and endorsing feeling nauseous. Informed by RN that pt had episode of emesis at dialysis and again upon returning. Pt seen in bed and endorsing feeling nauseous. Per RN pt vomited dark brown liquid   Patient is a 77y old  Male who presents with a chief complaint of functional paraplegia post a fall (11 Jul 2022 16:42)      Vital Signs Last 24 Hrs  T(C): 36.4 (11 Jul 2022 15:25), Max: 36.6 (10 Jul 2022 23:34)  T(F): 97.5 (11 Jul 2022 15:25), Max: 97.8 (10 Jul 2022 23:34)  HR: 100 (11 Jul 2022 15:25) (73 - 100)  BP: 90/57 (11 Jul 2022 15:25) (90/57 - 107/56)  BP(mean): --  RR: 18 (11 Jul 2022 15:25) (17 - 18)  SpO2: 95% (11 Jul 2022 15:25) (93% - 99%)    Parameters below as of 11 Jul 2022 15:25  Patient On (Oxygen Delivery Method): nasal cannula  O2 Flow (L/min): 2        Labs:      07-11    138  |  90<L>  |  43<H>  ----------------------------<  91  4.6   |  27  |  8.40<H>    Ca    10.4      11 Jul 2022 13:23        Physical Exam:  General: WN/WD NAD  Neurology: A&Ox3, nonfocal, QUINONES x 4  Head:  Normocephalic, atraumatic  Respiratory: CTA B/L  CV: RRR, S1S2  Abdominal: Soft, NT, ND no palpable mass, hypoactive sounds   MSK:  + peripheral pulses, FROM all 4 extremity    Assessment & Plan:  HPI:  76 yo male with PMHx of ESRD on HD via AV fistula LUE MWF, COPD (on 2L home O2), CHF, pHTN, hypotension on Midodrine, DVT S/P IVC filter p/w fall.  Patient reports that he was walking out of his house today to go to dialysis when he felt his left knee "give out." Patient landed on his buttocks.  no head trauma or LOC.  On coumadin.  Patient was unable to get up 2/2 left knee pain.  Patient also c/o acute on chronic low back pain since the fall.  Patient reports that he is otherwise feeling well.  Denies headache, blurry vision, focal weakness, numbness, slurred speech, CP, SOB, abd pain, NVD.  Patient does not make urine.  Of note, patient was discharged from [facility] one month ago after receiving PT for BL knee pain and difficulty ambulating.  Patient states that after discharge from rehab he was supposed to receive home PT, but that has not happened. (06 Jul 2022 14:54)  Pt now with dark brown emesis   >Will keep NPO   >GI consult - rec CT A/P   >will hold coumadin after CT results   >reglan for nausea prn   DEVAUGHN Raymond NP

## 2022-07-11 NOTE — PROGRESS NOTE ADULT - SUBJECTIVE AND OBJECTIVE BOX
Patient is a 77y old  Male who presents with a chief complaint of functional paraplegia post a fall (11 Jul 2022 14:55)      SUBJECTIVE / OVERNIGHT EVENTS: ptn had an episode of vomiting in HD today    MEDICATIONS  (STANDING):  allopurinol 100 milliGRAM(s) Oral daily  budesonide 160 MICROgram(s)/formoterol 4.5 MICROgram(s) Inhaler 2 Puff(s) Inhalation two times a day  chlorhexidine 2% Cloths 1 Application(s) Topical <User Schedule>  midodrine. 10 milliGRAM(s) Oral three times a day  multivitamin 1 Tablet(s) Oral daily  pantoprazole    Tablet 40 milliGRAM(s) Oral before breakfast  polyethylene glycol 3350 17 Gram(s) Oral daily  pregabalin 50 milliGRAM(s) Oral two times a day  sevelamer carbonate 800 milliGRAM(s) Oral three times a day with meals  simethicone 80 milliGRAM(s) Chew three times a day  tiotropium 18 MICROgram(s) Capsule 1 Capsule(s) Inhalation daily    MEDICATIONS  (PRN):      Vital Signs Last 24 Hrs  T(F): 97.5 (07-11-22 @ 15:25), Max: 97.8 (07-10-22 @ 23:34)  HR: 100 (07-11-22 @ 15:25) (73 - 100)  BP: 90/57 (07-11-22 @ 15:25) (90/57 - 107/56)  RR: 18 (07-11-22 @ 15:25) (17 - 18)  SpO2: 95% (07-11-22 @ 15:25) (93% - 99%)  Telemetry:   CAPILLARY BLOOD GLUCOSE        I&O's Summary    10 Jul 2022 07:01  -  11 Jul 2022 07:00  --------------------------------------------------------  IN: 600 mL / OUT: 0 mL / NET: 600 mL    11 Jul 2022 07:01  -  11 Jul 2022 16:42  --------------------------------------------------------  IN: 0 mL / OUT: 0 mL / NET: 0 mL        PHYSICAL EXAM:  GENERAL: NAD, well-developed  HEAD:  Atraumatic, Normocephalic  EYES: EOMI, PERRLA, conjunctiva and sclera clear  NECK: Supple, No JVD  CHEST/LUNG: Clear to auscultation bilaterally; No wheeze  HEART: Regular rate and rhythm; No murmurs, rubs, or gallops  ABDOMEN: Soft, Nontender, Nondistended; Bowel sounds present  EXTREMITIES:  2+ Peripheral Pulses, No clubbing, cyanosis, or edema  PSYCH: AAOx3  NEUROLOGY: non-focal  SKIN: No rashes or lesions    LABS:    07-11    138  |  90<L>  |  43<H>  ----------------------------<  91  4.6   |  27  |  8.40<H>    Ca    10.4      11 Jul 2022 13:23      PT/INR - ( 11 Jul 2022 13:23 )   PT: 15.8 sec;   INR: 1.36 ratio         PTT - ( 09 Jul 2022 21:59 )  PTT:31.1 sec          RADIOLOGY & ADDITIONAL TESTS:    Imaging Personally Reviewed:    Consultant(s) Notes Reviewed:      Care Discussed with Consultants/Other Providers:

## 2022-07-11 NOTE — PROGRESS NOTE ADULT - ATTENDING COMMENTS
#esrd on hd  hd mwf  hd today  next hd wed  #chronic hypotension- midodrine with HD;    #hyperkalemia- k stable;  lokelma as needed;   #anemia of ckd- hold MELISSA; monitor hb trend  #hyperphos- on renvela; check serum phos daily

## 2022-07-12 LAB
A1C WITH ESTIMATED AVERAGE GLUCOSE RESULT: 5.8 % — HIGH (ref 4–5.6)
ANION GAP SERPL CALC-SCNC: 19 MMOL/L — HIGH (ref 5–17)
BUN SERPL-MCNC: 67 MG/DL — HIGH (ref 7–23)
CALCIUM SERPL-MCNC: 10.4 MG/DL — SIGNIFICANT CHANGE UP (ref 8.4–10.5)
CHLORIDE SERPL-SCNC: 90 MMOL/L — LOW (ref 96–108)
CO2 SERPL-SCNC: 27 MMOL/L — SIGNIFICANT CHANGE UP (ref 22–31)
CREAT SERPL-MCNC: 10.61 MG/DL — HIGH (ref 0.5–1.3)
EGFR: 5 ML/MIN/1.73M2 — LOW
ESTIMATED AVERAGE GLUCOSE: 120 MG/DL — HIGH (ref 68–114)
GLUCOSE BLDC GLUCOMTR-MCNC: 111 MG/DL — HIGH (ref 70–99)
GLUCOSE BLDC GLUCOMTR-MCNC: 176 MG/DL — HIGH (ref 70–99)
GLUCOSE BLDC GLUCOMTR-MCNC: 36 MG/DL — CRITICAL LOW (ref 70–99)
GLUCOSE BLDC GLUCOMTR-MCNC: 43 MG/DL — CRITICAL LOW (ref 70–99)
GLUCOSE BLDC GLUCOMTR-MCNC: 49 MG/DL — CRITICAL LOW (ref 70–99)
GLUCOSE BLDC GLUCOMTR-MCNC: 49 MG/DL — CRITICAL LOW (ref 70–99)
GLUCOSE BLDC GLUCOMTR-MCNC: 66 MG/DL — LOW (ref 70–99)
GLUCOSE BLDC GLUCOMTR-MCNC: 76 MG/DL — SIGNIFICANT CHANGE UP (ref 70–99)
GLUCOSE BLDC GLUCOMTR-MCNC: 78 MG/DL — SIGNIFICANT CHANGE UP (ref 70–99)
GLUCOSE BLDC GLUCOMTR-MCNC: 82 MG/DL — SIGNIFICANT CHANGE UP (ref 70–99)
GLUCOSE BLDC GLUCOMTR-MCNC: 84 MG/DL — SIGNIFICANT CHANGE UP (ref 70–99)
GLUCOSE BLDC GLUCOMTR-MCNC: 87 MG/DL — SIGNIFICANT CHANGE UP (ref 70–99)
GLUCOSE BLDC GLUCOMTR-MCNC: 92 MG/DL — SIGNIFICANT CHANGE UP (ref 70–99)
GLUCOSE SERPL-MCNC: 87 MG/DL — SIGNIFICANT CHANGE UP (ref 70–99)
HCT VFR BLD CALC: 44.4 % — SIGNIFICANT CHANGE UP (ref 39–50)
HGB BLD-MCNC: 13.4 G/DL — SIGNIFICANT CHANGE UP (ref 13–17)
INR BLD: 1.37 RATIO — HIGH (ref 0.88–1.16)
MCHC RBC-ENTMCNC: 28 PG — SIGNIFICANT CHANGE UP (ref 27–34)
MCHC RBC-ENTMCNC: 30.2 GM/DL — LOW (ref 32–36)
MCV RBC AUTO: 92.7 FL — SIGNIFICANT CHANGE UP (ref 80–100)
NRBC # BLD: 0 /100 WBCS — SIGNIFICANT CHANGE UP (ref 0–0)
PLATELET # BLD AUTO: 182 K/UL — SIGNIFICANT CHANGE UP (ref 150–400)
POTASSIUM SERPL-MCNC: 5 MMOL/L — SIGNIFICANT CHANGE UP (ref 3.5–5.3)
POTASSIUM SERPL-SCNC: 5 MMOL/L — SIGNIFICANT CHANGE UP (ref 3.5–5.3)
PROTHROM AB SERPL-ACNC: 15.8 SEC — HIGH (ref 10.5–13.4)
RBC # BLD: 4.79 M/UL — SIGNIFICANT CHANGE UP (ref 4.2–5.8)
RBC # FLD: 15.5 % — HIGH (ref 10.3–14.5)
SODIUM SERPL-SCNC: 136 MMOL/L — SIGNIFICANT CHANGE UP (ref 135–145)
WBC # BLD: 8.36 K/UL — SIGNIFICANT CHANGE UP (ref 3.8–10.5)
WBC # FLD AUTO: 8.36 K/UL — SIGNIFICANT CHANGE UP (ref 3.8–10.5)

## 2022-07-12 RX ORDER — MIDODRINE HYDROCHLORIDE 2.5 MG/1
5 TABLET ORAL ONCE
Refills: 0 | Status: COMPLETED | OUTPATIENT
Start: 2022-07-12 | End: 2022-07-12

## 2022-07-12 RX ORDER — DEXTROSE 50 % IN WATER 50 %
25 SYRINGE (ML) INTRAVENOUS ONCE
Refills: 0 | Status: COMPLETED | OUTPATIENT
Start: 2022-07-12 | End: 2022-07-12

## 2022-07-12 RX ORDER — WARFARIN SODIUM 2.5 MG/1
5 TABLET ORAL ONCE
Refills: 0 | Status: COMPLETED | OUTPATIENT
Start: 2022-07-12 | End: 2022-07-12

## 2022-07-12 RX ORDER — DEXTROSE 50 % IN WATER 50 %
50 SYRINGE (ML) INTRAVENOUS ONCE
Refills: 0 | Status: COMPLETED | OUTPATIENT
Start: 2022-07-12 | End: 2022-07-12

## 2022-07-12 RX ORDER — ONDANSETRON 8 MG/1
4 TABLET, FILM COATED ORAL ONCE
Refills: 0 | Status: COMPLETED | OUTPATIENT
Start: 2022-07-12 | End: 2022-07-12

## 2022-07-12 RX ORDER — MIDODRINE HYDROCHLORIDE 2.5 MG/1
15 TABLET ORAL THREE TIMES A DAY
Refills: 0 | Status: DISCONTINUED | OUTPATIENT
Start: 2022-07-12 | End: 2022-07-18

## 2022-07-12 RX ADMIN — SIMETHICONE 80 MILLIGRAM(S): 80 TABLET, CHEWABLE ORAL at 21:29

## 2022-07-12 RX ADMIN — PANTOPRAZOLE SODIUM 40 MILLIGRAM(S): 20 TABLET, DELAYED RELEASE ORAL at 05:24

## 2022-07-12 RX ADMIN — ONDANSETRON 4 MILLIGRAM(S): 8 TABLET, FILM COATED ORAL at 21:29

## 2022-07-12 RX ADMIN — SEVELAMER CARBONATE 800 MILLIGRAM(S): 2400 POWDER, FOR SUSPENSION ORAL at 11:50

## 2022-07-12 RX ADMIN — MIDODRINE HYDROCHLORIDE 10 MILLIGRAM(S): 2.5 TABLET ORAL at 18:12

## 2022-07-12 RX ADMIN — BUDESONIDE AND FORMOTEROL FUMARATE DIHYDRATE 2 PUFF(S): 160; 4.5 AEROSOL RESPIRATORY (INHALATION) at 18:12

## 2022-07-12 RX ADMIN — MIDODRINE HYDROCHLORIDE 10 MILLIGRAM(S): 2.5 TABLET ORAL at 05:22

## 2022-07-12 RX ADMIN — SIMETHICONE 80 MILLIGRAM(S): 80 TABLET, CHEWABLE ORAL at 05:21

## 2022-07-12 RX ADMIN — Medication 25 MILLILITER(S): at 06:23

## 2022-07-12 RX ADMIN — Medication 30 MILLILITER(S): at 21:29

## 2022-07-12 RX ADMIN — Medication 1 TABLET(S): at 11:51

## 2022-07-12 RX ADMIN — WARFARIN SODIUM 5 MILLIGRAM(S): 2.5 TABLET ORAL at 12:43

## 2022-07-12 RX ADMIN — SIMETHICONE 80 MILLIGRAM(S): 80 TABLET, CHEWABLE ORAL at 14:36

## 2022-07-12 RX ADMIN — Medication 50 MILLIGRAM(S): at 05:22

## 2022-07-12 RX ADMIN — CHLORHEXIDINE GLUCONATE 1 APPLICATION(S): 213 SOLUTION TOPICAL at 05:24

## 2022-07-12 RX ADMIN — Medication 100 MILLIGRAM(S): at 11:51

## 2022-07-12 RX ADMIN — TIOTROPIUM BROMIDE 1 CAPSULE(S): 18 CAPSULE ORAL; RESPIRATORY (INHALATION) at 11:50

## 2022-07-12 RX ADMIN — Medication 25 MILLILITER(S): at 07:01

## 2022-07-12 RX ADMIN — Medication 50 MILLIGRAM(S): at 18:17

## 2022-07-12 RX ADMIN — BUDESONIDE AND FORMOTEROL FUMARATE DIHYDRATE 2 PUFF(S): 160; 4.5 AEROSOL RESPIRATORY (INHALATION) at 05:21

## 2022-07-12 RX ADMIN — SEVELAMER CARBONATE 800 MILLIGRAM(S): 2400 POWDER, FOR SUSPENSION ORAL at 18:12

## 2022-07-12 RX ADMIN — Medication 50 MILLILITER(S): at 12:43

## 2022-07-12 RX ADMIN — MIDODRINE HYDROCHLORIDE 10 MILLIGRAM(S): 2.5 TABLET ORAL at 11:50

## 2022-07-12 NOTE — PROGRESS NOTE ADULT - ASSESSMENT
77yo M PMHx ESRD on HD via AV fistula LUE MWF, COPD (on 2L home O2), CHF, pulm HTN, known hypotension on home midodrine, DVT s/p IVC filter p/w R leg pain that started this morning. States it is a burning pain, thinks he can feels "knots" in his leg. 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 He also endorses generalized abdominal discomfort associated with constipation since Friday. Spoke with wife with patient, his baseline blood pressure is systolic in the 70s to 80s, and on dialysis days it is usually in the 60s. He checks his BP everyday. The wife notes that over the past 2 weeks it has been consistently low in the 70s. Otherwise denies other pain in the body, f/c, n/v, CP, SOB at rest, dysuria.    In the ED, , lowest BP 66/44, 84% RA improved to 99% on 2L NC, not tachypneic. WBC 12.7, bicarb 21 gap 25, VBG pH 7.32, lactate 3.4, CXR negative. CT a/p showed Bilateral femoral, external iliac, and common iliac veins are distended, concerning for DVT. S/p midodrine (home med), 500 cc bolus, started on levophed. (22 Aug 2021 15:32)    pt was admitted MICU for with shock of unclear etiology. recieved Abx and pressure support with pressors and midodrine.. started on stress steroids and now on medicine floor.   In MICU : "  b/l DVT, extensive.  Was off a/c in past due to risk of falls/bleeding and GI bleed, still need more collateral.  He is very clear that he is not supposed to be on a/c though and his pulmonologist agrees."     Shock : Mild leukocytosis, hx of cellulitis in the past   -mild redness to B/L lower extremities R>L   - empirically started on zosyn (8/22-   ) and vanco   - CXR negative    - Cultures sent blood cultures currently with NGTD   - off pressors and on steroids  stress dose and midodrine     Hx of Pulmonary HTN (Dr. Freedman pulmononogist) COPD on home 2L home O2, uses trilogy ventilator at night   -remains on 2L NC   -bedside POCUS with mostly alines predominately does not look fluid overloaded   -nocturnal AVAPS     Hypotensive at baseline on midodrine at home, Hx CHF, DVT, PE, IVC filter, not on AC in the setting of GI bleeds in the past.    c/w midodrine 30mg PO TID    TTE on 8/23 : NL EF       ESRD w/ HD MWF   cont HD per renal       Hx of DVT's and PE but not on anticoagulation at home d/t GIB in the past and falls   - Heparin SQ for DVT ppx  -has IVC filter in place  -8/24 US showed extensive bilateral deep venous thrombosis of the bilateral lower extremities     #GOC  -full code      Yes

## 2022-07-12 NOTE — ADVANCED PRACTICE NURSE CONSULT - REASON FOR CONSULT
Requested by staff to assess skin status: buttocks. PMH is noted:  78 yo male with PMHx of ESRD on HD via AV fistula LUE MWF, COPD (on 2L home O2), CHF, pHTN, hypotension on Midodrine, DVT S/P IVC filter p/w fall.  Patient reports that he was walking out of his house today to go to dialysis when he felt his left knee "give out." Patient landed on his buttocks.  no head trauma or LOC.  On coumadin.  Patient was unable to get up 2/2 left knee pain.  Patient also c/o acute on chronic low back pain since the fall.  Patient reports that he is otherwise feeling well.  Denies headache, blurry vision, focal weakness, numbness, slurred speech, CP, SOB, abd pain, NVD.  Patient does not make urine.  Of note, patient was discharged from [facility] one month ago after receiving PT for BL knee pain and difficulty ambulating.  Patient states that after discharge from rehab he was supposed to receive home PT, but that has not happened.

## 2022-07-12 NOTE — PROGRESS NOTE ADULT - ASSESSMENT
vomiting  ESRD    CT a/p with moderately distended stomach, no obstruction   zofran prn nausea   cont with simethicone   diet as tolerated  HD as per renal   will follow  dw pt     Advanced care planning was discussed with patient and family.  Advanced care planning forms were reviewed and discussed.  Risks, benefits and alternatives of gastroenterologic procedures were discussed in detail and all questions were answered.    30 minutes spent.  vomiting  ESRD    CT a/p with moderately distended stomach, no obstruction   zofran prn nausea   cont with simethicone   PPI daily  diet as tolerated  HD as per renal   will follow  dw pt     Advanced care planning was discussed with patient and family.  Advanced care planning forms were reviewed and discussed.  Risks, benefits and alternatives of gastroenterologic procedures were discussed in detail and all questions were answered.    30 minutes spent.

## 2022-07-12 NOTE — ADVANCED PRACTICE NURSE CONSULT - ASSESSMENT
The pt was encountered on [employer]- Mr Rivers is in a [employer] P 500 support surface and was able to assist somewhat with turning himself to his side- will recommend Complete [employer]ir boots for off-loading the heels.  As the pt was a/w a pressure injury, he was seen by nutrition and a consult is noted.  Upon assessment, staff had applied a foam dressing to the sacrum and b/l buttocks- it was gently peeled back to assess the skin. The pt presents with 2 small superficial wounds- one on each buttocks which measure approximately 2cmx 1cm x0cm the wounds are open, red, moist. the periwound skin is hyperpigmented  Given location and presentation, suggest that this may be a stage 2 pressure injury and MASD- as the pt fell on his buttocks prior to admission suggest that trauma may have also contributed.  Pt reports a comfort with the foam dressing - will recommend to continue with same.

## 2022-07-12 NOTE — PROGRESS NOTE ADULT - SUBJECTIVE AND OBJECTIVE BOX
DATE OF SERVICE: 07-12-22 @ 10:50    Patient is a 77y old  Male who presents with a chief complaint of functional paraplegia post a fall (12 Jul 2022 09:35)      INTERVAL HISTORY: Feels ok. Hypoglycemic this am. Planning for BORIS as dispo.     REVIEW OF SYSTEMS:  CONSTITUTIONAL: No weakness  EYES/ENT: No visual changes;  No throat pain   NECK: No pain or stiffness  RESPIRATORY: No cough, wheezing; No shortness of breath  CARDIOVASCULAR: No chest pain or palpitations  GASTROINTESTINAL: No abdominal  pain. No nausea, vomiting, or hematemesis  GENITOURINARY: No dysuria, frequency or hematuria  NEUROLOGICAL: No stroke like symptoms  SKIN: No rashes    	  MEDICATIONS:  midodrine. 10 milliGRAM(s) Oral three times a day        PHYSICAL EXAM:  T(C): 36.9 (07-12-22 @ 08:32), Max: 37.2 (07-11-22 @ 23:41)  HR: 68 (07-12-22 @ 08:32) (68 - 100)  BP: 90/47 (07-12-22 @ 08:32) (90/47 - 95/52)  RR: 18 (07-12-22 @ 08:32) (17 - 18)  SpO2: 98% (07-12-22 @ 08:32) (92% - 98%)  Wt(kg): --  I&O's Summary    11 Jul 2022 07:01  -  12 Jul 2022 07:00  --------------------------------------------------------  IN: 0 mL / OUT: 0 mL / NET: 0 mL    12 Jul 2022 07:01  -  12 Jul 2022 10:50  --------------------------------------------------------  IN: 0 mL / OUT: 0 mL / NET: 0 mL          Appearance: In no distress	  HEENT:    PERRL, EOMI	  Cardiovascular:  S1 S2, No JVD  Respiratory: Lungs clear to auscultation	  Gastrointestinal:  Soft, Non-tender, + BS	  Vascularature:  No edema of LE  Psychiatric: Appropriate affect   Neuro: no acute focal deficits           07-11    138  |  90<L>  |  43<H>  ----------------------------<  91  4.6   |  27  |  8.40<H>    Ca    10.4      11 Jul 2022 13:23          Labs personally reviewed      ASSESSMENT/PLAN: 	    76 yo male with PMHx of ESRD on HD via AV fistula LUE MWF, COPD (on 2L home O2), CHF, pHTN, hypotension on Midodrine, DVT S/P IVC filter p/w fall.  Patient reports that he was walking out of his house today to go to dialysis when he felt his left knee "give out." Patient landed on his buttocks.  no head trauma or LOC.  On coumadin.  Patient was unable to get up 2/2 left knee pain.  Patient also c/o acute on chronic low back pain since the fall.  Patient reports that he is otherwise feeling well.  Denies headache, blurry vision, focal weakness, numbness, slurred speech, CP, SOB, abd pain, NVD.  Patient does not make urine.  Of note, patient was discharged from [facility] one month ago after receiving PT for BL knee pain and difficulty ambulating.  Patient states that after discharge from rehab he was supposed to receive home PT, but that has not happened. (06 Jul 2022 14:54).    1. Chronic hypotension   - with baseline SBP noted in the 80s prior admit a year ago  - was discharged on Midodrine 10mg TID  - BP soft   - PT eval   - BP continues to be SBP 90s: consider increase of Midodrine to 15mg PO TID    2. Hx of LE DVT prior admit   - s/p IVC filter   - on Warfarin at home as INR is elevated  - rec resume coumadin with INR <2.5,   - INR 1.36 7/11    3. ?Hx of HF  - prior echo with preserved EF 60% and no mention of pulm HTN  - on HD for fluid management     4. ESRD   - HD per renal   - monitor Lytes and creat   - avoid nephrotoxins         Estefania Forbes, AYLA-LEONA Latif DO [facility]  Cardiovascular Medicine  [address] [place] [address], [address]  Office: [phone]  Cell: [phone]

## 2022-07-12 NOTE — PROGRESS NOTE ADULT - SUBJECTIVE AND OBJECTIVE BOX
Patient is a 77y old  Male who presents with a chief complaint of functional paraplegia post a fall (12 Jul 2022 11:51)      SUBJECTIVE / OVERNIGHT EVENTS: tolerating po today, HD MWF, didnt complete full HD yesterday 2/2 vomiting, volume status is stable. if vomiting recurs will start Reglan for possible gastroparesis. got D50 today for an episode of hypoglycemia while NPO. GI following    MEDICATIONS  (STANDING):  allopurinol 100 milliGRAM(s) Oral daily  budesonide 160 MICROgram(s)/formoterol 4.5 MICROgram(s) Inhaler 2 Puff(s) Inhalation two times a day  chlorhexidine 2% Cloths 1 Application(s) Topical <User Schedule>  midodrine. 10 milliGRAM(s) Oral three times a day  multivitamin 1 Tablet(s) Oral daily  pantoprazole    Tablet 40 milliGRAM(s) Oral before breakfast  polyethylene glycol 3350 17 Gram(s) Oral daily  pregabalin 50 milliGRAM(s) Oral two times a day  sevelamer carbonate 800 milliGRAM(s) Oral three times a day with meals  simethicone 80 milliGRAM(s) Chew three times a day  tiotropium 18 MICROgram(s) Capsule 1 Capsule(s) Inhalation daily    MEDICATIONS  (PRN):      Vital Signs Last 24 Hrs  T(F): 98.8 (07-12-22 @ 16:16), Max: 98.9 (07-11-22 @ 23:41)  HR: 74 (07-12-22 @ 16:16) (68 - 92)  BP: 94/50 (07-12-22 @ 16:16) (88/42 - 95/52)  RR: 17 (07-12-22 @ 16:16) (17 - 18)  SpO2: 96% (07-12-22 @ 16:16) (92% - 98%)  Telemetry:   CAPILLARY BLOOD GLUCOSE      POCT Blood Glucose.: 92 mg/dL (12 Jul 2022 16:41)  POCT Blood Glucose.: 176 mg/dL (12 Jul 2022 13:28)  POCT Blood Glucose.: 36 mg/dL (12 Jul 2022 12:15)  POCT Blood Glucose.: 49 mg/dL (12 Jul 2022 12:13)  POCT Blood Glucose.: 82 mg/dL (12 Jul 2022 07:50)  POCT Blood Glucose.: 84 mg/dL (12 Jul 2022 07:21)  POCT Blood Glucose.: 87 mg/dL (12 Jul 2022 06:55)  POCT Blood Glucose.: 66 mg/dL (12 Jul 2022 06:52)  POCT Blood Glucose.: 78 mg/dL (12 Jul 2022 06:40)  POCT Blood Glucose.: 43 mg/dL (12 Jul 2022 06:37)  POCT Blood Glucose.: 49 mg/dL (12 Jul 2022 06:36)  POCT Blood Glucose.: 76 mg/dL (12 Jul 2022 05:45)    I&O's Summary    11 Jul 2022 07:01  -  12 Jul 2022 07:00  --------------------------------------------------------  IN: 0 mL / OUT: 0 mL / NET: 0 mL    12 Jul 2022 07:01  -  12 Jul 2022 17:04  --------------------------------------------------------  IN: 480 mL / OUT: 0 mL / NET: 480 mL        PHYSICAL EXAM:  GENERAL: NAD, well-developed  HEAD:  Atraumatic, Normocephalic  EYES: EOMI, PERRLA, conjunctiva and sclera clear  NECK: Supple, No JVD  CHEST/LUNG: Clear to auscultation bilaterally; No wheeze  HEART: Regular rate and rhythm; No murmurs, rubs, or gallops  ABDOMEN: Soft, Nontender, Nondistended; Bowel sounds present  EXTREMITIES:  2+ Peripheral Pulses, No clubbing, cyanosis, or edema  PSYCH: AAOx3  NEUROLOGY: non-focal  SKIN: No rashes or lesions    LABS:                        13.4   8.36  )-----------( 182      ( 12 Jul 2022 11:22 )             44.4     07-12    136  |  90<L>  |  67<H>  ----------------------------<  87  5.0   |  27  |  10.61<H>    Ca    10.4      12 Jul 2022 11:22      PT/INR - ( 12 Jul 2022 11:22 )   PT: 15.8 sec;   INR: 1.37 ratio                   RADIOLOGY & ADDITIONAL TESTS:    Imaging Personally Reviewed:    Consultant(s) Notes Reviewed:      Care Discussed with Consultants/Other Providers:

## 2022-07-12 NOTE — PROGRESS NOTE ADULT - ASSESSMENT
COPD - stable at this time  ESRD  Knee pain and LE weakness post fall  Gout  HfPef  Vomiting    REC    Continue current bronchodilator regimen  Titrate oxygen sat to 90-92% on nasal cannula

## 2022-07-12 NOTE — PROGRESS NOTE ADULT - SUBJECTIVE AND OBJECTIVE BOX
Follow-up Pulm Progress Note  Dragan Ortiz MD  [phone]    AFebrile  No new respiratory complaints  Breathing comfortably in bed on nasal cannula    Vital Signs Last 24 Hrs  T(C): 36.9 (12 Jul 2022 08:32), Max: 37.2 (11 Jul 2022 23:41)  T(F): 98.4 (12 Jul 2022 08:32), Max: 98.9 (11 Jul 2022 23:41)  HR: 68 (12 Jul 2022 08:32) (68 - 100)  BP: 90/47 (12 Jul 2022 08:32) (90/47 - 95/52)  BP(mean): --  RR: 18 (12 Jul 2022 08:32) (17 - 18)  SpO2: 98% (12 Jul 2022 08:32) (92% - 98%)    Parameters below as of 12 Jul 2022 08:32  Patient On (Oxygen Delivery Method): nasal cannula  O2 Flow (L/min): 2                          13.4   8.36  )-----------( 182      ( 12 Jul 2022 11:22 )             44.4     07-11    138  |  90<L>  |  43<H>  ----------------------------<  91  4.6   |  27  |  8.40<H>    Ca    10.4      11 Jul 2022 13:23      Physical Examination:  PULM: No wheeze or rhonchi  CVS: Regular rate and rhythm, no murmurs, rubs, or gallops  ABD: Soft, non-tender  EXT:  No clubbing, cyanosis, or edema    RADIOLOGY REVIEWED  CXR:    CT chest:    TTE:

## 2022-07-12 NOTE — ADVANCED PRACTICE NURSE CONSULT - RECOMMEDATIONS
Will recommend the followin. Sacrum, b/l buttocks: Cavilon to periwound skin, Allevyn foam, change every 3 days and prn for soiling/drainage continue to monitor  2. continue to encourage mobility  3. complete CAir boots  4. Seat cushion when OOB to chair  5.Nutrition support as pt condition allows  Tx plan discussed with RN

## 2022-07-12 NOTE — PROGRESS NOTE ADULT - SUBJECTIVE AND OBJECTIVE BOX
[facility]  Avi Riddle PA-C  [facility] [place] [address]  [phone]      INTERVAL HPI/OVERNIGHT EVENTS:  pt seen and examined sitting in bed comfortably  feeling much better today, no nausea or vomiting since last night   wants to try breakfast (wants solid food not CLD)    MEDICATIONS  (STANDING):  allopurinol 100 milliGRAM(s) Oral daily  budesonide 160 MICROgram(s)/formoterol 4.5 MICROgram(s) Inhaler 2 Puff(s) Inhalation two times a day  chlorhexidine 2% Cloths 1 Application(s) Topical <User Schedule>  midodrine. 10 milliGRAM(s) Oral three times a day  multivitamin 1 Tablet(s) Oral daily  pantoprazole    Tablet 40 milliGRAM(s) Oral before breakfast  polyethylene glycol 3350 17 Gram(s) Oral daily  pregabalin 50 milliGRAM(s) Oral two times a day  sevelamer carbonate 800 milliGRAM(s) Oral three times a day with meals  simethicone 80 milliGRAM(s) Chew three times a day  tiotropium 18 MICROgram(s) Capsule 1 Capsule(s) Inhalation daily    MEDICATIONS  (PRN):      Allergies    latex (Rash)  No Known Drug Allergies    Intolerances        ROS:   General:  No wt loss, fevers, chills, night sweats, fatigue,   Eyes:  Good vision, no reported pain  ENT:  No sore throat, pain, runny nose, dysphagia  CV:  No pain, palpitations, hypo/hypertension  Resp:  No dyspnea, cough, tachypnea, wheezing  GI:  No pain, No nausea, No vomiting, No diarrhea, No constipation, No weight loss, No fever, No pruritis, No rectal bleeding, No tarry stools, No dysphagia,  :  No pain, bleeding, incontinence, nocturia  Muscle:  No pain, weakness  Neuro:  No weakness, tingling, memory problems  Psych:  No fatigue, insomnia, mood problems, depression  Endocrine:  No polyuria, polydipsia, cold/heat intolerance  Heme:  No petechiae, ecchymosis, easy bruisability  Skin:  No rash, tattoos, scars, edema      PHYSICAL EXAM:   Vital Signs:  Vital Signs Last 24 Hrs  T(C): 36.9 (12 Jul 2022 08:32), Max: 37.2 (11 Jul 2022 23:41)  T(F): 98.4 (12 Jul 2022 08:32), Max: 98.9 (11 Jul 2022 23:41)  HR: 68 (12 Jul 2022 08:32) (68 - 100)  BP: 90/47 (12 Jul 2022 08:32) (90/47 - 95/52)  BP(mean): --  RR: 18 (12 Jul 2022 08:32) (17 - 18)  SpO2: 98% (12 Jul 2022 08:32) (92% - 98%)    Parameters below as of 12 Jul 2022 08:32  Patient On (Oxygen Delivery Method): nasal cannula  O2 Flow (L/min): 2    Daily     Daily     GENERAL:  Appears stated age,   HEENT:  NC/AT,    CHEST:  Full & symmetric excursion,   HEART:  Regular rhythm,  ABDOMEN:  Soft, non-tender, non-distended,  EXTEREMITIES:  no cyanosis  SKIN:  No rash  NEURO:  Alert,       LABS:    07-11    138  |  90<L>  |  43<H>  ----------------------------<  91  4.6   |  27  |  8.40<H>    Ca    10.4      11 Jul 2022 13:23      PT/INR - ( 11 Jul 2022 13:23 )   PT: 15.8 sec;   INR: 1.36 ratio               RADIOLOGY & ADDITIONAL TESTS:  < from: CT Abdomen and Pelvis No Cont (07.11.22 @ 22:02) >    ACC: [id number] EXAM:  CT ABDOMEN AND PELVIS                          PROCEDURE DATE:  07/11/2022          INTERPRETATION:  CLINICAL INFORMATION: Vomiting, gassy distention.    COMPARISON: CT abdomen pelvis 8/22/2022. CT chest 8/27/2021.    CONTRAST/COMPLICATIONS:  IV Contrast: NONE  Oral Contrast: NONE  Complications: None reported at time of study completion    PROCEDURE:  CT of the Abdomen and Pelvis was performed.  Sagittal and coronal reformats were performed.    FINDINGS:  Evaluation of thesolid organs and vasculature is limited without   intravenous contrast.    LOWER CHEST: Basilar subsegmental atelectasis. Left lower lobe and right   middle lobe calcified granulomas. Coronary artery calcification.   Calcified right hilar lymph nodes.    LIVER: Few punctate calcified granulomas.  BILE DUCTS: Normal caliber.  GALLBLADDER: Within normal limits.  SPLEEN: Scattered tiny calcified granulomas.  PANCREAS: Within normal limits.  ADRENALS: Bilateral adrenal gland thickening, unchanged.  KIDNEYS/URETERS: Atrophic bilateral kidneys. Bilateral renal cysts and   subcentimeter hypodensities too small to characterize.    BLADDER: Minimally distended.  REPRODUCTIVE ORGANS: Prostate within normal limits.    BOWEL: Stomach is moderately distended. No small bowel obstruction.   Status post right hemicolectomy. Mild to moderate stool in the distal   rectosigmoid colon.  PERITONEUM: No ascites.  VESSELS: Atherosclerotic changes. Infrarenal IVC filter.  RETROPERITONEUM/LYMPH NODES: No lymphadenopathy.  ABDOMINAL WALL: Rectus diastases. Multiple ventral hernias containing fat   and small knuckle of bowel. Abdominal wall collateral vessels.  BONES: Degenerative changes.    IMPRESSION:  No small bowel obstruction. Stomach is moderately distended. Mild to   moderate stool in the distal rectosigmoid colon.        --- End of Report ---

## 2022-07-13 LAB
APTT BLD: 29.1 SEC — SIGNIFICANT CHANGE UP (ref 27.5–35.5)
GAMMA INTERFERON BACKGROUND BLD IA-ACNC: 0.01 IU/ML — SIGNIFICANT CHANGE UP
GLUCOSE BLDC GLUCOMTR-MCNC: 107 MG/DL — HIGH (ref 70–99)
GLUCOSE BLDC GLUCOMTR-MCNC: 109 MG/DL — HIGH (ref 70–99)
GLUCOSE BLDC GLUCOMTR-MCNC: 92 MG/DL — SIGNIFICANT CHANGE UP (ref 70–99)
INR BLD: 1.38 RATIO — HIGH (ref 0.88–1.16)
M TB IFN-G BLD-IMP: NEGATIVE — SIGNIFICANT CHANGE UP
M TB IFN-G CD4+ BCKGRND COR BLD-ACNC: 0 IU/ML — SIGNIFICANT CHANGE UP
M TB IFN-G CD4+CD8+ BCKGRND COR BLD-ACNC: 0 IU/ML — SIGNIFICANT CHANGE UP
PROTHROM AB SERPL-ACNC: 16.1 SEC — HIGH (ref 10.5–13.4)
QUANT TB PLUS MITOGEN MINUS NIL: 6.03 IU/ML — SIGNIFICANT CHANGE UP
SARS-COV-2 RNA SPEC QL NAA+PROBE: SIGNIFICANT CHANGE UP

## 2022-07-13 PROCEDURE — 99233 SBSQ HOSP IP/OBS HIGH 50: CPT | Mod: GC

## 2022-07-13 PROCEDURE — 74018 RADEX ABDOMEN 1 VIEW: CPT | Mod: 26

## 2022-07-13 RX ORDER — PANTOPRAZOLE SODIUM 20 MG/1
40 TABLET, DELAYED RELEASE ORAL ONCE
Refills: 0 | Status: COMPLETED | OUTPATIENT
Start: 2022-07-13 | End: 2022-07-13

## 2022-07-13 RX ORDER — ACETAMINOPHEN 500 MG
1000 TABLET ORAL ONCE
Refills: 0 | Status: COMPLETED | OUTPATIENT
Start: 2022-07-13 | End: 2022-07-13

## 2022-07-13 RX ORDER — WARFARIN SODIUM 2.5 MG/1
5 TABLET ORAL ONCE
Refills: 0 | Status: COMPLETED | OUTPATIENT
Start: 2022-07-13 | End: 2022-07-13

## 2022-07-13 RX ORDER — ONDANSETRON 8 MG/1
4 TABLET, FILM COATED ORAL ONCE
Refills: 0 | Status: COMPLETED | OUTPATIENT
Start: 2022-07-13 | End: 2022-07-13

## 2022-07-13 RX ORDER — METOCLOPRAMIDE HCL 10 MG
10 TABLET ORAL EVERY 6 HOURS
Refills: 0 | Status: DISCONTINUED | OUTPATIENT
Start: 2022-07-13 | End: 2022-07-13

## 2022-07-13 RX ORDER — METOCLOPRAMIDE HCL 10 MG
10 TABLET ORAL EVERY 6 HOURS
Refills: 0 | Status: DISCONTINUED | OUTPATIENT
Start: 2022-07-13 | End: 2022-07-14

## 2022-07-13 RX ORDER — METOCLOPRAMIDE HCL 10 MG
10 TABLET ORAL ONCE
Refills: 0 | Status: DISCONTINUED | OUTPATIENT
Start: 2022-07-13 | End: 2022-07-13

## 2022-07-13 RX ADMIN — MIDODRINE HYDROCHLORIDE 15 MILLIGRAM(S): 2.5 TABLET ORAL at 11:55

## 2022-07-13 RX ADMIN — Medication 400 MILLIGRAM(S): at 20:09

## 2022-07-13 RX ADMIN — MIDODRINE HYDROCHLORIDE 15 MILLIGRAM(S): 2.5 TABLET ORAL at 06:14

## 2022-07-13 RX ADMIN — Medication 1000 MILLIGRAM(S): at 21:00

## 2022-07-13 RX ADMIN — SIMETHICONE 80 MILLIGRAM(S): 80 TABLET, CHEWABLE ORAL at 14:31

## 2022-07-13 RX ADMIN — CHLORHEXIDINE GLUCONATE 1 APPLICATION(S): 213 SOLUTION TOPICAL at 06:10

## 2022-07-13 RX ADMIN — TIOTROPIUM BROMIDE 1 CAPSULE(S): 18 CAPSULE ORAL; RESPIRATORY (INHALATION) at 14:31

## 2022-07-13 RX ADMIN — MIDODRINE HYDROCHLORIDE 15 MILLIGRAM(S): 2.5 TABLET ORAL at 18:41

## 2022-07-13 RX ADMIN — Medication 50 MILLIGRAM(S): at 06:09

## 2022-07-13 RX ADMIN — POLYETHYLENE GLYCOL 3350 17 GRAM(S): 17 POWDER, FOR SOLUTION ORAL at 14:32

## 2022-07-13 RX ADMIN — MIDODRINE HYDROCHLORIDE 5 MILLIGRAM(S): 2.5 TABLET ORAL at 00:06

## 2022-07-13 RX ADMIN — SIMETHICONE 80 MILLIGRAM(S): 80 TABLET, CHEWABLE ORAL at 23:11

## 2022-07-13 RX ADMIN — SEVELAMER CARBONATE 800 MILLIGRAM(S): 2400 POWDER, FOR SUSPENSION ORAL at 14:31

## 2022-07-13 RX ADMIN — PANTOPRAZOLE SODIUM 40 MILLIGRAM(S): 20 TABLET, DELAYED RELEASE ORAL at 23:10

## 2022-07-13 RX ADMIN — ONDANSETRON 4 MILLIGRAM(S): 8 TABLET, FILM COATED ORAL at 16:08

## 2022-07-13 RX ADMIN — Medication 100 MILLIGRAM(S): at 14:31

## 2022-07-13 RX ADMIN — Medication 1 TABLET(S): at 14:32

## 2022-07-13 RX ADMIN — PANTOPRAZOLE SODIUM 40 MILLIGRAM(S): 20 TABLET, DELAYED RELEASE ORAL at 06:09

## 2022-07-13 RX ADMIN — SEVELAMER CARBONATE 800 MILLIGRAM(S): 2400 POWDER, FOR SUSPENSION ORAL at 08:02

## 2022-07-13 RX ADMIN — WARFARIN SODIUM 5 MILLIGRAM(S): 2.5 TABLET ORAL at 23:11

## 2022-07-13 RX ADMIN — SIMETHICONE 80 MILLIGRAM(S): 80 TABLET, CHEWABLE ORAL at 06:09

## 2022-07-13 RX ADMIN — Medication 10 MILLIGRAM(S): at 20:08

## 2022-07-13 RX ADMIN — BUDESONIDE AND FORMOTEROL FUMARATE DIHYDRATE 2 PUFF(S): 160; 4.5 AEROSOL RESPIRATORY (INHALATION) at 06:09

## 2022-07-13 NOTE — PROGRESS NOTE ADULT - ASSESSMENT
Pt. is a 77 y.o. M e/ PMHx. of HTN, HFrEF, SAADIA, Gout and ESRD on HD MWF at [facility] presents after having a fall enroute to the HD center. Nephrology consulted for ESRD management.

## 2022-07-13 NOTE — PROGRESS NOTE ADULT - SUBJECTIVE AND OBJECTIVE BOX
Follow-up Pulm Progress Note  Dragan Ortiz MD  [phone]    AFebrile  No new respiratory complaints - see undergoing HD  CPAP 8 started  Unable to reach outpatient pulmonogist for Trilogy settings ( Dr Tristian Freedman [phone])    Vital Signs Last 24 Hrs  T(C): 36.5 (13 Jul 2022 09:30), Max: 37.1 (12 Jul 2022 16:16)  T(F): 97.7 (13 Jul 2022 09:30), Max: 98.8 (12 Jul 2022 16:16)  HR: 64 (13 Jul 2022 09:30) (64 - 81)  BP: 93/52 (13 Jul 2022 09:30) (81/47 - 95/50)  BP(mean): --  RR: 18 (13 Jul 2022 09:30) (17 - 18)  SpO2: 98% (13 Jul 2022 09:30) (92% - 98%)    Parameters below as of 13 Jul 2022 09:30  Patient On (Oxygen Delivery Method): nasal cannula                         13.4   8.36  )-----------( 182      ( 12 Jul 2022 11:22 )             44.4     07-12    136  |  90<L>  |  67<H>  ----------------------------<  87  5.0   |  27  |  10.61<H>    Ca    10.4      12 Jul 2022 11:22      Physical Examination:  PULM: No wheeze or rhonchi  CVS: Regular rate and rhythm, no murmurs, rubs, or gallops  ABD: Soft, non-tender  EXT:  No clubbing, cyanosis, or edema    RADIOLOGY REVIEWED  CXR:    CT chest:    TTE:

## 2022-07-13 NOTE — PROGRESS NOTE ADULT - SUBJECTIVE AND OBJECTIVE BOX
[facility] DIVISION OF KIDNEY DISEASES AND HYPERTENSION -- FOLLOW UP NOTE  --------------------------------------------------------------------------------  HPI:  Pt. is a 77 y.o. M e/ PMHx. of HTN, HFrEF, SAADIA, Gout and ESRD on HD MWF at [facility] presents after having a fall enroute to the HD center. Nephrology consulted for ESRD management. Pt. does not recall his nephrologist and denies any issues with dialysis. He is edematous but states he is not worse than usual. Denies any syncope and attributes his fall to his knee "buckling." Pt. has notably low BP which Pt. states is chronic and for which he takes Midodrine.     Pt. seen this AM. Denies any nausea currently, states no abdominal pain now. For HD today. Midodrine increased.     PAST HISTORY  --------------------------------------------------------------------------------  No significant changes to PMH, PSH, FHx, SHx, unless otherwise noted    ALLERGIES & MEDICATIONS  --------------------------------------------------------------------------------  Allergies    latex (Rash)  No Known Drug Allergies    Intolerances      Standing Inpatient Medications  allopurinol 100 milliGRAM(s) Oral daily  budesonide 160 MICROgram(s)/formoterol 4.5 MICROgram(s) Inhaler 2 Puff(s) Inhalation two times a day  chlorhexidine 2% Cloths 1 Application(s) Topical <User Schedule>  midodrine. 15 milliGRAM(s) Oral three times a day  multivitamin 1 Tablet(s) Oral daily  pantoprazole    Tablet 40 milliGRAM(s) Oral before breakfast  polyethylene glycol 3350 17 Gram(s) Oral daily  pregabalin 50 milliGRAM(s) Oral two times a day  sevelamer carbonate 800 milliGRAM(s) Oral three times a day with meals  simethicone 80 milliGRAM(s) Chew three times a day  tiotropium 18 MICROgram(s) Capsule 1 Capsule(s) Inhalation daily    PRN Inpatient Medications      REVIEW OF SYSTEMS  --------------------------------------------------------------------------------  Gen: No fevers/chills  Skin: No rashes  Head/Eyes/Ears: Normal hearing,   Respiratory: No dyspnea, cough  CV: No chest pain  GI: No abdominal pain, diarrhea  : No dysuria, hematuria  MSK: + edema, LE weakness      All other systems were reviewed and are negative, except as noted.    VITALS/PHYSICAL EXAM  --------------------------------------------------------------------------------  T(C): 37 (07-12-22 @ 23:57), Max: 37.1 (07-12-22 @ 16:16)  HR: 65 (07-13-22 @ 05:50) (65 - 92)  BP: 95/50 (07-13-22 @ 05:50) (81/47 - 95/50)  RR: 18 (07-12-22 @ 23:57) (17 - 18)  SpO2: 94% (07-13-22 @ 03:54) (92% - 98%)  Wt(kg): --        07-12-22 @ 07:01  -  07-13-22 @ 07:00  --------------------------------------------------------  IN: 480 mL / OUT: 0 mL / NET: 480 mL      Physical Exam:  	Gen: NAD  	HEENT: MMM  	Pulm: CTA B/L  	CV: S1S2  	Abd: Soft, +BS   	Ext: + LE edema B/L improving, chronic skin changes   	Neuro: Awake  	Skin: Warm and dry  	Vascular access: LUE AVF, thrills+      LABS/STUDIES  --------------------------------------------------------------------------------              13.4   8.36  >-----------<  182      [07-12-22 @ 11:22]              44.4     136  |  90  |  67  ----------------------------<  87      [07-12-22 @ 11:22]  5.0   |  27  |  10.61        Ca     10.4     [07-12-22 @ 11:22]      PT/INR: PT 15.8 , INR 1.37       [07-12-22 @ 11:22]      Creatinine Trend:  SCr 10.61 [07-12 @ 11:22]  SCr 8.40 [07-11 @ 13:23]  SCr 9.67 [07-07 @ 16:39]  SCr 11.81 [07-06 @ 10:29]        HbA1c 5.0      [10-02-17 @ 15:38]  TSH 1.00      [08-23-21 @ 02:35]    HBsAg Nonreact      [07-11-22 @ 13:23]  HCV 0.39, Nonreact      [07-11-22 @ 13:23]

## 2022-07-13 NOTE — PROGRESS NOTE ADULT - SUBJECTIVE AND OBJECTIVE BOX
[place] [facility]  Avi Riddle PA-C  [facility] [place] [address]  [phone]      INTERVAL HPI/OVERNIGHT EVENTS:  pt seen and examined sitting in bed comfortably  no further nausea or vomiting  tolerating diet     MEDICATIONS  (STANDING):  allopurinol 100 milliGRAM(s) Oral daily  budesonide 160 MICROgram(s)/formoterol 4.5 MICROgram(s) Inhaler 2 Puff(s) Inhalation two times a day  chlorhexidine 2% Cloths 1 Application(s) Topical <User Schedule>  midodrine. 10 milliGRAM(s) Oral three times a day  multivitamin 1 Tablet(s) Oral daily  pantoprazole    Tablet 40 milliGRAM(s) Oral before breakfast  polyethylene glycol 3350 17 Gram(s) Oral daily  pregabalin 50 milliGRAM(s) Oral two times a day  sevelamer carbonate 800 milliGRAM(s) Oral three times a day with meals  simethicone 80 milliGRAM(s) Chew three times a day  tiotropium 18 MICROgram(s) Capsule 1 Capsule(s) Inhalation daily    MEDICATIONS  (PRN):      Allergies    latex (Rash)  No Known Drug Allergies    Intolerances        ROS:   General:  No wt loss, fevers, chills, night sweats, fatigue,   Eyes:  Good vision, no reported pain  ENT:  No sore throat, pain, runny nose, dysphagia  CV:  No pain, palpitations, hypo/hypertension  Resp:  No dyspnea, cough, tachypnea, wheezing  GI:  No pain, No nausea, No vomiting, No diarrhea, No constipation, No weight loss, No fever, No pruritis, No rectal bleeding, No tarry stools, No dysphagia,  :  No pain, bleeding, incontinence, nocturia  Muscle:  No pain, weakness  Neuro:  No weakness, tingling, memory problems  Psych:  No fatigue, insomnia, mood problems, depression  Endocrine:  No polyuria, polydipsia, cold/heat intolerance  Heme:  No petechiae, ecchymosis, easy bruisability  Skin:  No rash, tattoos, scars, edema      PHYSICAL EXAM:   Vital Signs Last 24 Hrs  T(C): 36.5 (13 Jul 2022 09:30), Max: 37.1 (12 Jul 2022 16:16)  T(F): 97.7 (13 Jul 2022 09:30), Max: 98.8 (12 Jul 2022 16:16)  HR: 64 (13 Jul 2022 09:30) (64 - 92)  BP: 93/52 (13 Jul 2022 09:30) (81/47 - 95/50)  BP(mean): --  RR: 18 (13 Jul 2022 09:30) (17 - 18)  SpO2: 98% (13 Jul 2022 09:30) (92% - 98%)    Parameters below as of 13 Jul 2022 09:30  Patient On (Oxygen Delivery Method): nasal cannula       Daily     Daily     GENERAL:  Appears stated age,   HEENT:  NC/AT,    CHEST:  Full & symmetric excursion,   HEART:  Regular rhythm,  ABDOMEN:  Soft, non-tender, non-distended,  EXTEREMITIES:  no cyanosis  SKIN:  No rash  NEURO:  Alert,       LABS:                        13.4   8.36  )-----------( 182      ( 12 Jul 2022 11:22 )             44.4   07-12    136  |  90<L>  |  67<H>  ----------------------------<  87  5.0   |  27  |  10.61<H>    Ca    10.4      12 Jul 2022 11:22        RADIOLOGY & ADDITIONAL TESTS:  < from: CT Abdomen and Pelvis No Cont (07.11.22 @ 22:02) >    ACC: [id number] EXAM:  CT ABDOMEN AND PELVIS                          PROCEDURE DATE:  07/11/2022          INTERPRETATION:  CLINICAL INFORMATION: Vomiting, gassy distention.    COMPARISON: CT abdomen pelvis 8/22/2022. CT chest 8/27/2021.    CONTRAST/COMPLICATIONS:  IV Contrast: NONE  Oral Contrast: NONE  Complications: None reported at time of study completion    PROCEDURE:  CT of the Abdomen and Pelvis was performed.  Sagittal and coronal reformats were performed.    FINDINGS:  Evaluation of thesolid organs and vasculature is limited without   intravenous contrast.    LOWER CHEST: Basilar subsegmental atelectasis. Left lower lobe and right   middle lobe calcified granulomas. Coronary artery calcification.   Calcified right hilar lymph nodes.    LIVER: Few punctate calcified granulomas.  BILE DUCTS: Normal caliber.  GALLBLADDER: Within normal limits.  SPLEEN: Scattered tiny calcified granulomas.  PANCREAS: Within normal limits.  ADRENALS: Bilateral adrenal gland thickening, unchanged.  KIDNEYS/URETERS: Atrophic bilateral kidneys. Bilateral renal cysts and   subcentimeter hypodensities too small to characterize.    BLADDER: Minimally distended.  REPRODUCTIVE ORGANS: Prostate within normal limits.    BOWEL: Stomach is moderately distended. No small bowel obstruction.   Status post right hemicolectomy. Mild to moderate stool in the distal   rectosigmoid colon.  PERITONEUM: No ascites.  VESSELS: Atherosclerotic changes. Infrarenal IVC filter.  RETROPERITONEUM/LYMPH NODES: No lymphadenopathy.  ABDOMINAL WALL: Rectus diastases. Multiple ventral hernias containing fat   and small knuckle of bowel. Abdominal wall collateral vessels.  BONES: Degenerative changes.    IMPRESSION:  No small bowel obstruction. Stomach is moderately distended. Mild to   moderate stool in the distal rectosigmoid colon.        --- End of Report ---

## 2022-07-13 NOTE — PROGRESS NOTE ADULT - SUBJECTIVE AND OBJECTIVE BOX
DATE OF SERVICE: 07-13-22 @ 13:24    Patient is a 77y old  Male who presents with a chief complaint of functional paraplegia post a fall (13 Jul 2022 10:52)      INTERVAL HISTORY: Feels ok.     REVIEW OF SYSTEMS:  CONSTITUTIONAL: No weakness  EYES/ENT: No visual changes;  No throat pain   NECK: No pain or stiffness  RESPIRATORY: No cough, wheezing; No shortness of breath  CARDIOVASCULAR: No chest pain or palpitations  GASTROINTESTINAL: No abdominal  pain. No nausea, vomiting, or hematemesis  GENITOURINARY: No dysuria, frequency or hematuria  NEUROLOGICAL: No stroke like symptoms  SKIN: No rashes      	  MEDICATIONS:  midodrine. 15 milliGRAM(s) Oral three times a day        PHYSICAL EXAM:  T(C): 36.5 (07-13-22 @ 09:30), Max: 37.1 (07-12-22 @ 16:16)  HR: 64 (07-13-22 @ 09:30) (64 - 92)  BP: 93/52 (07-13-22 @ 09:30) (81/47 - 95/50)  RR: 18 (07-13-22 @ 09:30) (17 - 18)  SpO2: 98% (07-13-22 @ 09:30) (92% - 98%)  Wt(kg): --  I&O's Summary    12 Jul 2022 07:01  -  13 Jul 2022 07:00  --------------------------------------------------------  IN: 480 mL / OUT: 0 mL / NET: 480 mL          Appearance: In no distress	  HEENT:    PERRL, EOMI	  Cardiovascular:  S1 S2, No JVD  Respiratory: Lungs clear to auscultation	  Gastrointestinal:  Soft, Non-tender, + BS	  Vascularature:  No edema of LE  Psychiatric: Appropriate affect   Neuro: no acute focal deficits                               13.4   8.36  )-----------( 182      ( 12 Jul 2022 11:22 )             44.4     07-12    136  |  90<L>  |  67<H>  ----------------------------<  87  5.0   |  27  |  10.61<H>    Ca    10.4      12 Jul 2022 11:22          Labs personally reviewed      ASSESSMENT/PLAN: 	    78 yo male with PMHx of ESRD on HD via AV fistula LUE MWF, COPD (on 2L home O2), CHF, pHTN, hypotension on Midodrine, DVT S/P IVC filter p/w fall.  Patient reports that he was walking out of his house today to go to dialysis when he felt his left knee "give out." Patient landed on his buttocks.  no head trauma or LOC.  On coumadin.  Patient was unable to get up 2/2 left knee pain.  Patient also c/o acute on chronic low back pain since the fall.  Patient reports that he is otherwise feeling well.  Denies headache, blurry vision, focal weakness, numbness, slurred speech, CP, SOB, abd pain, NVD.  Patient does not make urine.  Of note, patient was discharged from [facility] one month ago after receiving PT for BL knee pain and difficulty ambulating.  Patient states that after discharge from rehab he was supposed to receive home PT, but that has not happened. (06 Jul 2022 14:54).    1. Chronic hypotension   - with baseline SBP noted in the 80s prior admit a year ago  - was discharged on Midodrine 10mg TID  - BP soft   - PT eval   - BP continues to be SBP 90s: consider increase of Midodrine to 15mg PO TID  - Midodrine 15mg PO TID, cont to closely monitor BP    2. Hx of LE DVT prior admit   - s/p IVC filter   - on Warfarin at home as INR is elevated  - rec resume coumadin with INR <2.5,   - INR 1.36 7/11    3. ?Hx of HF  - prior echo with preserved EF 60% and no mention of pulm HTN  - on HD for fluid management     4. ESRD   - HD per renal   - monitor Lytes and creat   - avoid nephrotoxins             AYLA Velez-LEONA Latif DO [facility]  Cardiovascular Medicine  [address], [address]  Office: [phone]  Cell: [phone]

## 2022-07-13 NOTE — PROGRESS NOTE ADULT - ASSESSMENT
Pt. is a 77 y.o. M e/ PMHx. of HTN, HFrEF, SAADIA, Gout and ESRD on HD MWF at [facility] presents after having a fall enroute to the HD center. 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 c/o knee pain and inability to walk. nephrology called. PT kevin ordered. cont outptn meds, check orthostatics. card called  ptn w h/o DVT/PE, in the past not on AC 2/2 h/o GI bleeds, but admitted on COumadin, will trend INR.    h/o COPD with chronic hypoxic respiratory failure on 2 L home O2. presently stable.   episode of vomiting on 7/11 while in HD, CT scan w dilated stomach, no obstruction, seen by GI,   tolerating po today, HD MWF, didnt complete full HD yesterday 2/2 vomiting, volume status is stable.   if vomiting recurs will start Reglan for possible gastroparesis. got D50 today for an episode of hypoglycemia while NPO. GI following    awaiting BORIS placement

## 2022-07-13 NOTE — PROGRESS NOTE ADULT - SUBJECTIVE AND OBJECTIVE BOX
Patient is a 77y old  Male who presents with a chief complaint of functional paraplegia post a fall (13 Jul 2022 13:40)      SUBJECTIVE / OVERNIGHT EVENTS: d/w renal if vomiting could be 2/2 uremia since ptn didnt tolerate HD on Monday. will try for longer HD today, BP improved on Midodrine 15 mg tid. ptn hasn't vomited for 24 hrs    MEDICATIONS  (STANDING):  allopurinol 100 milliGRAM(s) Oral daily  budesonide 160 MICROgram(s)/formoterol 4.5 MICROgram(s) Inhaler 2 Puff(s) Inhalation two times a day  chlorhexidine 2% Cloths 1 Application(s) Topical <User Schedule>  midodrine. 15 milliGRAM(s) Oral three times a day  multivitamin 1 Tablet(s) Oral daily  pantoprazole    Tablet 40 milliGRAM(s) Oral before breakfast  polyethylene glycol 3350 17 Gram(s) Oral daily  pregabalin 50 milliGRAM(s) Oral two times a day  sevelamer carbonate 800 milliGRAM(s) Oral three times a day with meals  simethicone 80 milliGRAM(s) Chew three times a day  tiotropium 18 MICROgram(s) Capsule 1 Capsule(s) Inhalation daily    MEDICATIONS  (PRN):      Vital Signs Last 24 Hrs  T(F): 97.3 (07-13-22 @ 14:00), Max: 98.8 (07-12-22 @ 16:16)  HR: 84 (07-13-22 @ 14:00) (64 - 84)  BP: 104/53 (07-13-22 @ 14:00) (81/47 - 104/53)  RR: 18 (07-13-22 @ 14:00) (17 - 18)  SpO2: 98% (07-13-22 @ 14:00) (92% - 98%)  Telemetry:   CAPILLARY BLOOD GLUCOSE      POCT Blood Glucose.: 111 mg/dL (12 Jul 2022 22:21)  POCT Blood Glucose.: 92 mg/dL (12 Jul 2022 16:41)    I&O's Summary    12 Jul 2022 07:01  -  13 Jul 2022 07:00  --------------------------------------------------------  IN: 480 mL / OUT: 0 mL / NET: 480 mL    13 Jul 2022 07:01  -  13 Jul 2022 15:51  --------------------------------------------------------  IN: 1100 mL / OUT: 2800 mL / NET: -1700 mL        PHYSICAL EXAM:  GENERAL: NAD, well-developed  HEAD:  Atraumatic, Normocephalic  EYES: EOMI, PERRLA, conjunctiva and sclera clear  NECK: Supple, No JVD  CHEST/LUNG: Clear to auscultation bilaterally; No wheeze  HEART: Regular rate and rhythm; No murmurs, rubs, or gallops  ABDOMEN: Soft, Nontender, Nondistended; Bowel sounds present  EXTREMITIES:  2+ Peripheral Pulses, No clubbing, cyanosis, or edema  PSYCH: AAOx3  NEUROLOGY: non-focal  SKIN: No rashes or lesions    LABS:                        13.4   8.36  )-----------( 182      ( 12 Jul 2022 11:22 )             44.4     07-12    136  |  90<L>  |  67<H>  ----------------------------<  87  5.0   |  27  |  10.61<H>    Ca    10.4      12 Jul 2022 11:22      PT/INR - ( 13 Jul 2022 10:44 )   PT: 16.1 sec;   INR: 1.38 ratio         PTT - ( 13 Jul 2022 10:44 )  PTT:29.1 sec          RADIOLOGY & ADDITIONAL TESTS:    Imaging Personally Reviewed:    Consultant(s) Notes Reviewed:      Care Discussed with Consultants/Other Providers:

## 2022-07-13 NOTE — PROGRESS NOTE ADULT - ATTENDING COMMENTS
#esrd on hd  hd mwf  hd today  next hd fri  #chronic hypotension- midodrine with HD;    #hyperkalemia- k stable;  lokelma as needed; Hd today  #anemia of ckd- hold MELISSA; monitor hb trend  #hyperphos- on renvela; check serum phos daily  #vomiting/nausea improved today ?gastroparesis;

## 2022-07-13 NOTE — PROGRESS NOTE ADULT - PROBLEM SELECTOR PLAN 1
Pt. with ESRD on HD three times a week (MWF) presented to [facility] for a fall. Last HD was on 7/9 via LUE AVF. Pt. clinically stable. No CP, SOB or palpitations during evaluation. Most recent labs reviewed. Will arrange for HD today. Pt. denies any lightheadedness and states that his his BP is normally 80s-90s/ 50s-60s. He endorses taking Midodrine but does not recall the dose. Consent obtained for HD and placed in the chart. Will arrange for HD today. C/w Sevelamer to 1600mg TID. HgB at goal. Prior records state that Pt. was on 30mg Q8H here with additional Midodrine given during HD during prior admission. Now on Midodrine 15mg. Will extend HD time to 4 hours and incrase DFR to 600. Unlikely that BUN is causing nausea, more likely 2/2 distended stomach 2/2 gastroparesis?.       If you have any questions, please feel free to contact me  Jai Alvarenga  Nephrology Fellow  [phone]; Prefer [employer] TEAMS  (After 5pm or on weekends please page the on-call fellow). Pt. with ESRD on HD three times a week (MWF) presented to [facility] for a fall. Last HD was on 7/9 via LUE AVF. Pt. clinically stable. No CP, SOB or palpitations during evaluation. Most recent labs reviewed. Will arrange for HD today. Pt. denies any lightheadedness and states that his his BP is normally 80s-90s/ 50s-60s. He endorses taking Midodrine but does not recall the dose. Consent obtained for HD and placed in the chart. Will arrange for HD today. C/w Sevelamer to 1600mg TID. HgB at goal. Prior records state that Pt. was on 30mg Q8H here with additional Midodrine given during HD during prior admission. Now on Midodrine 15mg. Will extend HD time to 4 hours and increase DFR to 600. Unlikely that BUN is causing nausea       If you have any questions, please feel free to contact me  Jai Alvarenga  Nephrology Fellow  [phone]; Prefer [employer] TEAMS  (After 5pm or on weekends please page the on-call fellow).

## 2022-07-13 NOTE — PROGRESS NOTE ADULT - ASSESSMENT
vomiting  ESRD    CT a/p with moderately distended stomach, no obstruction   zofran prn nausea   cont with simethicone   PPI daily  diet as tolerated  HD as per renal   will follow   dw pt     Advanced care planning was discussed with patient and family.  Advanced care planning forms were reviewed and discussed.  Risks, benefits and alternatives of gastroenterologic procedures were discussed in detail and all questions were answered.    30 minutes spent.

## 2022-07-13 NOTE — PROGRESS NOTE ADULT - ASSESSMENT
COPD - stable at this time  ESRD  Knee pain and LE weakness post fall  Gout  HfPef  Vomiting    REC    Continue current bronchodilator regimen  CPAP 8cm overnight pending outpatient settings (Pulm office closed)  Titrate oxygen sat to 90-92% on nasal cannula

## 2022-07-14 LAB
ANION GAP SERPL CALC-SCNC: 19 MMOL/L — HIGH (ref 5–17)
BUN SERPL-MCNC: 56 MG/DL — HIGH (ref 7–23)
CALCIUM SERPL-MCNC: 10.5 MG/DL — SIGNIFICANT CHANGE UP (ref 8.4–10.5)
CHLORIDE SERPL-SCNC: 90 MMOL/L — LOW (ref 96–108)
CO2 SERPL-SCNC: 25 MMOL/L — SIGNIFICANT CHANGE UP (ref 22–31)
CREAT SERPL-MCNC: 8.43 MG/DL — HIGH (ref 0.5–1.3)
EGFR: 6 ML/MIN/1.73M2 — LOW
GLUCOSE BLDC GLUCOMTR-MCNC: 104 MG/DL — HIGH (ref 70–99)
GLUCOSE BLDC GLUCOMTR-MCNC: 109 MG/DL — HIGH (ref 70–99)
GLUCOSE BLDC GLUCOMTR-MCNC: 31 MG/DL — CRITICAL LOW (ref 70–99)
GLUCOSE BLDC GLUCOMTR-MCNC: 37 MG/DL — CRITICAL LOW (ref 70–99)
GLUCOSE SERPL-MCNC: 93 MG/DL — SIGNIFICANT CHANGE UP (ref 70–99)
HCT VFR BLD CALC: 44.2 % — SIGNIFICANT CHANGE UP (ref 39–50)
HGB BLD-MCNC: 13.7 G/DL — SIGNIFICANT CHANGE UP (ref 13–17)
INR BLD: 1.46 RATIO — HIGH (ref 0.88–1.16)
MCHC RBC-ENTMCNC: 28.5 PG — SIGNIFICANT CHANGE UP (ref 27–34)
MCHC RBC-ENTMCNC: 31 GM/DL — LOW (ref 32–36)
MCV RBC AUTO: 91.9 FL — SIGNIFICANT CHANGE UP (ref 80–100)
NRBC # BLD: 0 /100 WBCS — SIGNIFICANT CHANGE UP (ref 0–0)
PLATELET # BLD AUTO: 150 K/UL — SIGNIFICANT CHANGE UP (ref 150–400)
POTASSIUM SERPL-MCNC: 5 MMOL/L — SIGNIFICANT CHANGE UP (ref 3.5–5.3)
POTASSIUM SERPL-SCNC: 5 MMOL/L — SIGNIFICANT CHANGE UP (ref 3.5–5.3)
PROTHROM AB SERPL-ACNC: 16.9 SEC — HIGH (ref 10.5–13.4)
RBC # BLD: 4.81 M/UL — SIGNIFICANT CHANGE UP (ref 4.2–5.8)
RBC # FLD: 15.4 % — HIGH (ref 10.3–14.5)
SODIUM SERPL-SCNC: 134 MMOL/L — LOW (ref 135–145)
WBC # BLD: 10.38 K/UL — SIGNIFICANT CHANGE UP (ref 3.8–10.5)
WBC # FLD AUTO: 10.38 K/UL — SIGNIFICANT CHANGE UP (ref 3.8–10.5)

## 2022-07-14 RX ORDER — DEXTROSE 50 % IN WATER 50 %
50 SYRINGE (ML) INTRAVENOUS ONCE
Refills: 0 | Status: COMPLETED | OUTPATIENT
Start: 2022-07-14 | End: 2022-07-14

## 2022-07-14 RX ORDER — METOCLOPRAMIDE HCL 10 MG
5 TABLET ORAL EVERY 6 HOURS
Refills: 0 | Status: DISCONTINUED | OUTPATIENT
Start: 2022-07-14 | End: 2022-07-14

## 2022-07-14 RX ORDER — METOCLOPRAMIDE HCL 10 MG
5 TABLET ORAL EVERY 8 HOURS
Refills: 0 | Status: DISCONTINUED | OUTPATIENT
Start: 2022-07-14 | End: 2022-07-26

## 2022-07-14 RX ORDER — WARFARIN SODIUM 2.5 MG/1
5 TABLET ORAL ONCE
Refills: 0 | Status: COMPLETED | OUTPATIENT
Start: 2022-07-14 | End: 2022-07-14

## 2022-07-14 RX ADMIN — Medication 50 MILLIGRAM(S): at 06:30

## 2022-07-14 RX ADMIN — Medication 10 MILLIGRAM(S): at 06:31

## 2022-07-14 RX ADMIN — BUDESONIDE AND FORMOTEROL FUMARATE DIHYDRATE 2 PUFF(S): 160; 4.5 AEROSOL RESPIRATORY (INHALATION) at 17:22

## 2022-07-14 RX ADMIN — Medication 100 MILLIGRAM(S): at 11:39

## 2022-07-14 RX ADMIN — SEVELAMER CARBONATE 800 MILLIGRAM(S): 2400 POWDER, FOR SUSPENSION ORAL at 08:27

## 2022-07-14 RX ADMIN — SIMETHICONE 80 MILLIGRAM(S): 80 TABLET, CHEWABLE ORAL at 06:29

## 2022-07-14 RX ADMIN — TIOTROPIUM BROMIDE 1 CAPSULE(S): 18 CAPSULE ORAL; RESPIRATORY (INHALATION) at 11:43

## 2022-07-14 RX ADMIN — MIDODRINE HYDROCHLORIDE 15 MILLIGRAM(S): 2.5 TABLET ORAL at 11:38

## 2022-07-14 RX ADMIN — PANTOPRAZOLE SODIUM 40 MILLIGRAM(S): 20 TABLET, DELAYED RELEASE ORAL at 06:29

## 2022-07-14 RX ADMIN — Medication 5 MILLIGRAM(S): at 21:08

## 2022-07-14 RX ADMIN — SIMETHICONE 80 MILLIGRAM(S): 80 TABLET, CHEWABLE ORAL at 14:22

## 2022-07-14 RX ADMIN — SIMETHICONE 80 MILLIGRAM(S): 80 TABLET, CHEWABLE ORAL at 21:05

## 2022-07-14 RX ADMIN — BUDESONIDE AND FORMOTEROL FUMARATE DIHYDRATE 2 PUFF(S): 160; 4.5 AEROSOL RESPIRATORY (INHALATION) at 06:29

## 2022-07-14 RX ADMIN — SEVELAMER CARBONATE 800 MILLIGRAM(S): 2400 POWDER, FOR SUSPENSION ORAL at 17:24

## 2022-07-14 RX ADMIN — Medication 1 TABLET(S): at 11:38

## 2022-07-14 RX ADMIN — MIDODRINE HYDROCHLORIDE 15 MILLIGRAM(S): 2.5 TABLET ORAL at 17:22

## 2022-07-14 RX ADMIN — POLYETHYLENE GLYCOL 3350 17 GRAM(S): 17 POWDER, FOR SOLUTION ORAL at 11:38

## 2022-07-14 RX ADMIN — Medication 50 MILLIGRAM(S): at 17:21

## 2022-07-14 RX ADMIN — Medication 5 MILLIGRAM(S): at 14:22

## 2022-07-14 RX ADMIN — WARFARIN SODIUM 5 MILLIGRAM(S): 2.5 TABLET ORAL at 21:05

## 2022-07-14 RX ADMIN — SEVELAMER CARBONATE 800 MILLIGRAM(S): 2400 POWDER, FOR SUSPENSION ORAL at 11:43

## 2022-07-14 RX ADMIN — CHLORHEXIDINE GLUCONATE 1 APPLICATION(S): 213 SOLUTION TOPICAL at 06:29

## 2022-07-14 RX ADMIN — MIDODRINE HYDROCHLORIDE 15 MILLIGRAM(S): 2.5 TABLET ORAL at 06:28

## 2022-07-14 RX ADMIN — Medication 50 MILLILITER(S): at 17:17

## 2022-07-14 RX ADMIN — Medication 5 MILLIGRAM(S): at 09:24

## 2022-07-14 NOTE — PROGRESS NOTE ADULT - ASSESSMENT
vomiting  ESRD    CT a/p with moderately distended stomach, no obstruction   cont with simethicone   PPI daily  diet as tolerated  start reglan q6h OTC for N/V  HD as per renal   will follow   dw pt and wife over the phone     Advanced care planning was discussed with patient and family.  Advanced care planning forms were reviewed and discussed.  Risks, benefits and alternatives of gastroenterologic procedures were discussed in detail and all questions were answered.    30 minutes spent.  vomiting  ESRD    CT a/p with moderately distended stomach, no obstruction   cont with simethicone   PPI daily  diet as tolerated  start reglan q8h OTC for N/V  HD as per renal   will follow   dw pt and wife over the phone     Advanced care planning was discussed with patient and family.  Advanced care planning forms were reviewed and discussed.  Risks, benefits and alternatives of gastroenterologic procedures were discussed in detail and all questions were answered.    30 minutes spent.

## 2022-07-14 NOTE — PROGRESS NOTE ADULT - SUBJECTIVE AND OBJECTIVE BOX
Patient is a 77y old  Male who presents with a chief complaint of functional paraplegia post a fall (14 Jul 2022 15:37)      SUBJECTIVE / OVERNIGHT EVENTS: no new events today. tolerating CLD. on reglan, GI following. post prandial vomiting most likely 2/2 gastroparesis    MEDICATIONS  (STANDING):  allopurinol 100 milliGRAM(s) Oral daily  budesonide 160 MICROgram(s)/formoterol 4.5 MICROgram(s) Inhaler 2 Puff(s) Inhalation two times a day  chlorhexidine 2% Cloths 1 Application(s) Topical <User Schedule>  metoclopramide 5 milliGRAM(s) Oral every 8 hours  midodrine. 15 milliGRAM(s) Oral three times a day  multivitamin 1 Tablet(s) Oral daily  pantoprazole    Tablet 40 milliGRAM(s) Oral before breakfast  polyethylene glycol 3350 17 Gram(s) Oral daily  pregabalin 50 milliGRAM(s) Oral two times a day  sevelamer carbonate 800 milliGRAM(s) Oral three times a day with meals  simethicone 80 milliGRAM(s) Chew three times a day  tiotropium 18 MICROgram(s) Capsule 1 Capsule(s) Inhalation daily    MEDICATIONS  (PRN):      Vital Signs Last 24 Hrs  T(F): 97.4 (07-14-22 @ 16:01), Max: 98.1 (07-14-22 @ 00:48)  HR: 74 (07-14-22 @ 16:01) (74 - 89)  BP: 84/54 (07-14-22 @ 16:01) (84/54 - 139/82)  RR: 18 (07-14-22 @ 16:01) (18 - 18)  SpO2: 92% (07-14-22 @ 16:01) (91% - 95%)  Telemetry:   CAPILLARY BLOOD GLUCOSE      POCT Blood Glucose.: 92 mg/dL (13 Jul 2022 21:00)  POCT Blood Glucose.: 107 mg/dL (13 Jul 2022 18:20)  POCT Blood Glucose.: 109 mg/dL (13 Jul 2022 16:48)    I&O's Summary    13 Jul 2022 07:01  -  14 Jul 2022 07:00  --------------------------------------------------------  IN: 1300 mL / OUT: 2800 mL / NET: -1500 mL        PHYSICAL EXAM:  GENERAL: NAD, well-developed  HEAD:  Atraumatic, Normocephalic  EYES: EOMI, PERRLA, conjunctiva and sclera clear  NECK: Supple, No JVD  CHEST/LUNG: Clear to auscultation bilaterally; No wheeze  HEART: Regular rate and rhythm; No murmurs, rubs, or gallops  ABDOMEN: Soft, Nontender, Nondistended; Bowel sounds present  EXTREMITIES:  2+ Peripheral Pulses, No clubbing, cyanosis, or edema  PSYCH: AAOx3  NEUROLOGY: non-focal  SKIN: No rashes or lesions    LABS:                        13.7   10.38 )-----------( 150      ( 14 Jul 2022 10:02 )             44.2     07-14    134<L>  |  90<L>  |  56<H>  ----------------------------<  93  5.0   |  25  |  8.43<H>    Ca    10.5      14 Jul 2022 10:03      PT/INR - ( 14 Jul 2022 10:02 )   PT: 16.9 sec;   INR: 1.46 ratio         PTT - ( 13 Jul 2022 10:44 )  PTT:29.1 sec          RADIOLOGY & ADDITIONAL TESTS:    Imaging Personally Reviewed:    Consultant(s) Notes Reviewed:      Care Discussed with Consultants/Other Providers:

## 2022-07-14 NOTE — PROGRESS NOTE ADULT - SUBJECTIVE AND OBJECTIVE BOX
Date of Service   07-14-22 @ 15:37    Patient is a 77y old  Male who presents with a chief complaint of functional paraplegia post a fall (14 Jul 2022 11:11)      INTERVAL HISTORY: pt feels ok     REVIEW OF SYSTEMS:   CONSTITUTIONAL: No weakness  EYES/ENT: No visual changes; No throat pain  Neck: No pain or stiffness  Respiratory: No cough, wheezing, No shortness of breath  CARDIOVASCULAR: no chest pain or palpitations  GASTROINTESTINAL: No abdominal pain, no nausea, vomiting or hematemesis  GENITOURINARY: No dysuria, frequency or hematuria  NEUROLOGICAL: No stroke like symptoms  SKIN: No rashes    	  MEDICATIONS:  midodrine. 15 milliGRAM(s) Oral three times a day        PHYSICAL EXAM:  T(C): 36.6 (07-14-22 @ 08:47), Max: 36.7 (07-14-22 @ 00:48)  HR: 81 (07-14-22 @ 10:12) (77 - 89)  BP: 99/54 (07-14-22 @ 08:47) (96/56 - 139/82)  RR: 18 (07-14-22 @ 08:47) (18 - 18)  SpO2: 92% (07-14-22 @ 10:12) (91% - 95%)  Wt(kg): --  I&O's Summary    13 Jul 2022 07:01  -  14 Jul 2022 07:00  --------------------------------------------------------  IN: 1300 mL / OUT: 2800 mL / NET: -1500 mL          Appearance: In no distress	  HEENT:    PERRL, EOMI	  Cardiovascular:  S1 S2, No JVD  Respiratory: Lungs clear to auscultation	  Gastrointestinal:  Soft, Non-tender, + BS	  Vasculature:  No edema of LE  Psychiatric: Appropriate affect   Neuro: no acute focal deficits                               13.7   10.38 )-----------( 150      ( 14 Jul 2022 10:02 )             44.2     07-14    134<L>  |  90<L>  |  56<H>  ----------------------------<  93  5.0   |  25  |  8.43<H>    Ca    10.5      14 Jul 2022 10:03          Labs personally reviewed      ASSESSMENT/PLAN: 	  78 yo male with PMHx of ESRD on HD via AV fistula LUE MWF, COPD (on 2L home O2), CHF, pHTN, hypotension on Midodrine, DVT S/P IVC filter p/w fall.  Patient reports that he was walking out of his house today to go to dialysis when he felt his left knee "give out." Patient landed on his buttocks.  no head trauma or LOC.  On coumadin.  Patient was unable to get up 2/2 left knee pain.  Patient also c/o acute on chronic low back pain since the fall.  Patient reports that he is otherwise feeling well.  Denies headache, blurry vision, focal weakness, numbness, slurred speech, CP, SOB, abd pain, NVD.  Patient does not make urine.  Of note, patient was discharged from [facility] one month ago after receiving PT for BL knee pain and difficulty ambulating.  Patient states that after discharge from rehab he was supposed to receive home PT, but that has not happened. (06 Jul 2022 14:54).    1. Chronic hypotension   - with baseline SBP noted in the 80s prior admit a year ago  - was discharged on Midodrine 10mg TID  - BP soft   - PT eval   - BP continues to be SBP 90s: consider increase of Midodrine to 15mg PO TID  - Midodrine 15mg PO TID, cont to closely monitor BP  - monitor for nausea and vomiting     2. Hx of LE DVT prior admit   - s/p IVC filter   - on Warfarin at home as INR is elevated  - rec resume coumadin with INR <2.5,   - INR 1.46    3. ?Hx of HF  - prior echo with preserved EF 60% and no mention of pulm HTN  - on HD for fluid management     4. ESRD   - HD per renal   - monitor Lytes and creat   - avoid nephrotoxins         Mendy JUNG-BC   Roberto Latif DO [facility]  Cardiovascular Medicine  [address], [address]  Office: [phone]

## 2022-07-14 NOTE — PROGRESS NOTE ADULT - ASSESSMENT
Pt. is a 77 y.o. M e/ PMHx. of HTN, HFrEF, SAADIA, Gout and ESRD on HD MWF at [facility] presents after having a fall enroute to the HD center. 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 c/o knee pain and inability to walk. nephrology called. PT kevin ordered. cont outptn meds, check orthostatics. card called  ptn w h/o DVT/PE, in the past not on AC 2/2 h/o GI bleeds, but admitted on COumadin, will trend INR.    h/o COPD with chronic hypoxic respiratory failure on 2 L home O2. presently stable.   episode of vomiting on 7/11 while in HD, CT scan w dilated stomach, no obstruction, seen by GI,   tolerating po today, HD MWF,volume status is stable.   tolerating CLD. on reglan, GI following. post prandial vomiting most likely 2/2 gastroparesis    awaiting BORIS placement

## 2022-07-14 NOTE — CHART NOTE - NSCHARTNOTEFT_GEN_A_CORE
Informed by RN that pt was hypoglycemic 37. Pt seen laying in bed A+O X 4. Pt still endorsing having nausea today. Instructed RN to give 1 amp of D50. Pt is currently on clear liquids and GI is following ; recommended to start Reglan q 8 hrs standing. Discussed episode with Dr. Hein, and will call endocrine consult. Will continue to monitor blood sugars 3x /day.   Paul DELGADILLO   [phone]

## 2022-07-14 NOTE — PROGRESS NOTE ADULT - SUBJECTIVE AND OBJECTIVE BOX
[facility]  Avi Riddle PA-C  [facility] [place] [address]  [phone]      INTERVAL HPI/OVERNIGHT EVENTS:  pt seen and examined, events noted  had 3 episodes of N/V last night after eating chicken salad     MEDICATIONS  (STANDING):  allopurinol 100 milliGRAM(s) Oral daily  budesonide 160 MICROgram(s)/formoterol 4.5 MICROgram(s) Inhaler 2 Puff(s) Inhalation two times a day  chlorhexidine 2% Cloths 1 Application(s) Topical <User Schedule>  midodrine. 10 milliGRAM(s) Oral three times a day  multivitamin 1 Tablet(s) Oral daily  pantoprazole    Tablet 40 milliGRAM(s) Oral before breakfast  polyethylene glycol 3350 17 Gram(s) Oral daily  pregabalin 50 milliGRAM(s) Oral two times a day  sevelamer carbonate 800 milliGRAM(s) Oral three times a day with meals  simethicone 80 milliGRAM(s) Chew three times a day  tiotropium 18 MICROgram(s) Capsule 1 Capsule(s) Inhalation daily    MEDICATIONS  (PRN):      Allergies    latex (Rash)  No Known Drug Allergies    Intolerances        ROS:   General:  No wt loss, fevers, chills, night sweats, fatigue,   Eyes:  Good vision, no reported pain  ENT:  No sore throat, pain, runny nose, dysphagia  CV:  No pain, palpitations, hypo/hypertension  Resp:  No dyspnea, cough, tachypnea, wheezing  GI:  No pain, + nausea, + vomiting, No diarrhea, No constipation, No weight loss, No fever, No pruritis, No rectal bleeding, No tarry stools, No dysphagia,  :  No pain, bleeding, incontinence, nocturia  Muscle:  No pain, weakness  Neuro:  No weakness, tingling, memory problems  Psych:  No fatigue, insomnia, mood problems, depression  Endocrine:  No polyuria, polydipsia, cold/heat intolerance  Heme:  No petechiae, ecchymosis, easy bruisability  Skin:  No rash, tattoos, scars, edema      PHYSICAL EXAM:   Vital Signs Last 24 Hrs  T(C): 36.6 (14 Jul 2022 08:47), Max: 36.7 (14 Jul 2022 00:48)  T(F): 97.9 (14 Jul 2022 08:47), Max: 98.1 (14 Jul 2022 00:48)  HR: 81 (14 Jul 2022 10:12) (77 - 89)  BP: 99/54 (14 Jul 2022 08:47) (96/56 - 139/82)  BP(mean): --  RR: 18 (14 Jul 2022 08:47) (18 - 18)  SpO2: 92% (14 Jul 2022 10:12) (91% - 98%)    Parameters below as of 14 Jul 2022 08:47  Patient On (Oxygen Delivery Method): nasal cannula  O2 Flow (L/min): 2    Daily     Daily     GENERAL:  Appears stated age,   HEENT:  NC/AT,    CHEST:  Full & symmetric excursion,   HEART:  Regular rhythm,  ABDOMEN:  Soft, non-tender, non-distended,  EXTEREMITIES:  no cyanosis  SKIN:  No rash  NEURO:  Alert,       LABS:                        13.7   10.38 )-----------( 150      ( 14 Jul 2022 10:02 )             44.2   07-14    134<L>  |  90<L>  |  56<H>  ----------------------------<  93  5.0   |  25  |  8.43<H>    Ca    10.5      14 Jul 2022 10:03          RADIOLOGY & ADDITIONAL TESTS:  < from: CT Abdomen and Pelvis No Cont (07.11.22 @ 22:02) >    ACC: [id number] EXAM:  CT ABDOMEN AND PELVIS                          PROCEDURE DATE:  07/11/2022          INTERPRETATION:  CLINICAL INFORMATION: Vomiting, gassy distention.    COMPARISON: CT abdomen pelvis 8/22/2022. CT chest 8/27/2021.    CONTRAST/COMPLICATIONS:  IV Contrast: NONE  Oral Contrast: NONE  Complications: None reported at time of study completion    PROCEDURE:  CT of the Abdomen and Pelvis was performed.  Sagittal and coronal reformats were performed.    FINDINGS:  Evaluation of thesolid organs and vasculature is limited without   intravenous contrast.    LOWER CHEST: Basilar subsegmental atelectasis. Left lower lobe and right   middle lobe calcified granulomas. Coronary artery calcification.   Calcified right hilar lymph nodes.    LIVER: Few punctate calcified granulomas.  BILE DUCTS: Normal caliber.  GALLBLADDER: Within normal limits.  SPLEEN: Scattered tiny calcified granulomas.  PANCREAS: Within normal limits.  ADRENALS: Bilateral adrenal gland thickening, unchanged.  KIDNEYS/URETERS: Atrophic bilateral kidneys. Bilateral renal cysts and   subcentimeter hypodensities too small to characterize.    BLADDER: Minimally distended.  REPRODUCTIVE ORGANS: Prostate within normal limits.    BOWEL: Stomach is moderately distended. No small bowel obstruction.   Status post right hemicolectomy. Mild to moderate stool in the distal   rectosigmoid colon.  PERITONEUM: No ascites.  VESSELS: Atherosclerotic changes. Infrarenal IVC filter.  RETROPERITONEUM/LYMPH NODES: No lymphadenopathy.  ABDOMINAL WALL: Rectus diastases. Multiple ventral hernias containing fat   and small knuckle of bowel. Abdominal wall collateral vessels.  BONES: Degenerative changes.    IMPRESSION:  No small bowel obstruction. Stomach is moderately distended. Mild to   moderate stool in the distal rectosigmoid colon.        --- End of Report ---

## 2022-07-15 DIAGNOSIS — K31.84 GASTROPARESIS: ICD-10-CM

## 2022-07-15 DIAGNOSIS — E16.2 HYPOGLYCEMIA, UNSPECIFIED: ICD-10-CM

## 2022-07-15 DIAGNOSIS — I95.9 HYPOTENSION, UNSPECIFIED: ICD-10-CM

## 2022-07-15 LAB
ANION GAP SERPL CALC-SCNC: 21 MMOL/L — HIGH (ref 5–17)
BUN SERPL-MCNC: 75 MG/DL — HIGH (ref 7–23)
CALCIUM SERPL-MCNC: 10.2 MG/DL — SIGNIFICANT CHANGE UP (ref 8.4–10.5)
CHLORIDE SERPL-SCNC: 86 MMOL/L — LOW (ref 96–108)
CO2 SERPL-SCNC: 24 MMOL/L — SIGNIFICANT CHANGE UP (ref 22–31)
CREAT SERPL-MCNC: 10.21 MG/DL — HIGH (ref 0.5–1.3)
EGFR: 5 ML/MIN/1.73M2 — LOW
GLUCOSE BLDC GLUCOMTR-MCNC: 102 MG/DL — HIGH (ref 70–99)
GLUCOSE BLDC GLUCOMTR-MCNC: 144 MG/DL — HIGH (ref 70–99)
GLUCOSE BLDC GLUCOMTR-MCNC: 76 MG/DL — SIGNIFICANT CHANGE UP (ref 70–99)
GLUCOSE SERPL-MCNC: 91 MG/DL — SIGNIFICANT CHANGE UP (ref 70–99)
INR BLD: 1.65 RATIO — HIGH (ref 0.88–1.16)
POTASSIUM SERPL-MCNC: 5.7 MMOL/L — HIGH (ref 3.5–5.3)
POTASSIUM SERPL-SCNC: 5.7 MMOL/L — HIGH (ref 3.5–5.3)
PROTHROM AB SERPL-ACNC: 19.2 SEC — HIGH (ref 10.5–13.4)
SODIUM SERPL-SCNC: 131 MMOL/L — LOW (ref 135–145)

## 2022-07-15 PROCEDURE — 99232 SBSQ HOSP IP/OBS MODERATE 35: CPT | Mod: GC

## 2022-07-15 RX ORDER — MIDODRINE HYDROCHLORIDE 2.5 MG/1
5 TABLET ORAL ONCE
Refills: 0 | Status: COMPLETED | OUTPATIENT
Start: 2022-07-15 | End: 2022-07-15

## 2022-07-15 RX ORDER — ACETAMINOPHEN 500 MG
650 TABLET ORAL ONCE
Refills: 0 | Status: COMPLETED | OUTPATIENT
Start: 2022-07-15 | End: 2022-07-15

## 2022-07-15 RX ADMIN — SEVELAMER CARBONATE 800 MILLIGRAM(S): 2400 POWDER, FOR SUSPENSION ORAL at 14:42

## 2022-07-15 RX ADMIN — MIDODRINE HYDROCHLORIDE 15 MILLIGRAM(S): 2.5 TABLET ORAL at 17:05

## 2022-07-15 RX ADMIN — Medication 50 MILLIGRAM(S): at 17:07

## 2022-07-15 RX ADMIN — SIMETHICONE 80 MILLIGRAM(S): 80 TABLET, CHEWABLE ORAL at 05:21

## 2022-07-15 RX ADMIN — MIDODRINE HYDROCHLORIDE 15 MILLIGRAM(S): 2.5 TABLET ORAL at 05:22

## 2022-07-15 RX ADMIN — SEVELAMER CARBONATE 800 MILLIGRAM(S): 2400 POWDER, FOR SUSPENSION ORAL at 08:18

## 2022-07-15 RX ADMIN — Medication 5 MILLIGRAM(S): at 21:05

## 2022-07-15 RX ADMIN — BUDESONIDE AND FORMOTEROL FUMARATE DIHYDRATE 2 PUFF(S): 160; 4.5 AEROSOL RESPIRATORY (INHALATION) at 17:06

## 2022-07-15 RX ADMIN — SEVELAMER CARBONATE 800 MILLIGRAM(S): 2400 POWDER, FOR SUSPENSION ORAL at 17:06

## 2022-07-15 RX ADMIN — PANTOPRAZOLE SODIUM 40 MILLIGRAM(S): 20 TABLET, DELAYED RELEASE ORAL at 05:22

## 2022-07-15 RX ADMIN — MIDODRINE HYDROCHLORIDE 15 MILLIGRAM(S): 2.5 TABLET ORAL at 12:26

## 2022-07-15 RX ADMIN — Medication 10 MILLIGRAM(S): at 17:06

## 2022-07-15 RX ADMIN — Medication 1 TABLET(S): at 14:43

## 2022-07-15 RX ADMIN — Medication 650 MILLIGRAM(S): at 14:30

## 2022-07-15 RX ADMIN — Medication 5 MILLIGRAM(S): at 05:22

## 2022-07-15 RX ADMIN — SIMETHICONE 80 MILLIGRAM(S): 80 TABLET, CHEWABLE ORAL at 21:05

## 2022-07-15 RX ADMIN — Medication 100 MILLIGRAM(S): at 14:43

## 2022-07-15 RX ADMIN — POLYETHYLENE GLYCOL 3350 17 GRAM(S): 17 POWDER, FOR SOLUTION ORAL at 14:43

## 2022-07-15 RX ADMIN — TIOTROPIUM BROMIDE 1 CAPSULE(S): 18 CAPSULE ORAL; RESPIRATORY (INHALATION) at 14:42

## 2022-07-15 RX ADMIN — Medication 650 MILLIGRAM(S): at 13:30

## 2022-07-15 RX ADMIN — BUDESONIDE AND FORMOTEROL FUMARATE DIHYDRATE 2 PUFF(S): 160; 4.5 AEROSOL RESPIRATORY (INHALATION) at 05:21

## 2022-07-15 RX ADMIN — MIDODRINE HYDROCHLORIDE 5 MILLIGRAM(S): 2.5 TABLET ORAL at 10:34

## 2022-07-15 RX ADMIN — CHLORHEXIDINE GLUCONATE 1 APPLICATION(S): 213 SOLUTION TOPICAL at 05:28

## 2022-07-15 RX ADMIN — Medication 5 MILLIGRAM(S): at 14:44

## 2022-07-15 RX ADMIN — Medication 50 MILLIGRAM(S): at 05:25

## 2022-07-15 RX ADMIN — SIMETHICONE 80 MILLIGRAM(S): 80 TABLET, CHEWABLE ORAL at 14:43

## 2022-07-15 NOTE — PROGRESS NOTE ADULT - ASSESSMENT
vomiting  ESRD    CT a/p with moderately distended stomach, no obstruction   cont with simethicone   PPI daily  full liquid  advance diet as tolerated  cont reglan q8h OTC for N/V   HD as per renal   will follow   dw pt and wife over the phone     Advanced care planning was discussed with patient and family.  Advanced care planning forms were reviewed and discussed.  Risks, benefits and alternatives of gastroenterologic procedures were discussed in detail and all questions were answered.    30 minutes spent.

## 2022-07-15 NOTE — PROGRESS NOTE ADULT - SUBJECTIVE AND OBJECTIVE BOX
Patient is a 77y old  Male who presents with a chief complaint of functional paraplegia post a fall (15 Jul 2022 14:07)      SUBJECTIVE / OVERNIGHT EVENTS: no further vomiting, tolerating clears, seen in HD. advance diet as per GI to full liquid    MEDICATIONS  (STANDING):  acetaminophen     Tablet .. 650 milliGRAM(s) Oral once  allopurinol 100 milliGRAM(s) Oral daily  bisacodyl Suppository 10 milliGRAM(s) Rectal once  budesonide 160 MICROgram(s)/formoterol 4.5 MICROgram(s) Inhaler 2 Puff(s) Inhalation two times a day  chlorhexidine 2% Cloths 1 Application(s) Topical <User Schedule>  metoclopramide 5 milliGRAM(s) Oral every 8 hours  midodrine. 15 milliGRAM(s) Oral three times a day  multivitamin 1 Tablet(s) Oral daily  pantoprazole    Tablet 40 milliGRAM(s) Oral before breakfast  polyethylene glycol 3350 17 Gram(s) Oral daily  pregabalin 50 milliGRAM(s) Oral two times a day  sevelamer carbonate 800 milliGRAM(s) Oral three times a day with meals  simethicone 80 milliGRAM(s) Chew three times a day  tiotropium 18 MICROgram(s) Capsule 1 Capsule(s) Inhalation daily    MEDICATIONS  (PRN):      Vital Signs Last 24 Hrs  T(F): 96.8 (07-15-22 @ 14:00), Max: 98.9 (07-15-22 @ 00:37)  HR: 90 (07-15-22 @ 14:00) (74 - 105)  BP: 98/50 (07-15-22 @ 14:00) (84/45 - 105/58)  RR: 17 (07-15-22 @ 14:00) (17 - 20)  SpO2: 98% (07-15-22 @ 14:00) (91% - 98%)  Telemetry:   CAPILLARY BLOOD GLUCOSE      POCT Blood Glucose.: 76 mg/dL (15 Jul 2022 07:57)  POCT Blood Glucose.: 104 mg/dL (14 Jul 2022 22:07)  POCT Blood Glucose.: 109 mg/dL (14 Jul 2022 18:51)  POCT Blood Glucose.: 31 mg/dL (14 Jul 2022 16:43)  POCT Blood Glucose.: 37 mg/dL (14 Jul 2022 16:40)    I&O's Summary    14 Jul 2022 07:01  -  15 Jul 2022 07:00  --------------------------------------------------------  IN: 0 mL / OUT: 0 mL / NET: 0 mL    15 Jul 2022 07:01  -  15 Jul 2022 14:13  --------------------------------------------------------  IN: 0 mL / OUT: 1000 mL / NET: -1000 mL        PHYSICAL EXAM:  GENERAL: NAD, well-developed  HEAD:  Atraumatic, Normocephalic  EYES: EOMI, PERRLA, conjunctiva and sclera clear  NECK: Supple, No JVD  CHEST/LUNG: Clear to auscultation bilaterally; No wheeze  HEART: Regular rate and rhythm; No murmurs, rubs, or gallops  ABDOMEN: Soft, Nontender, Nondistended; Bowel sounds present  EXTREMITIES:  2+ Peripheral Pulses, No clubbing, cyanosis, or edema  PSYCH: AAOx3  NEUROLOGY: non-focal  SKIN: No rashes or lesions    LABS:                        13.7   10.38 )-----------( 150      ( 14 Jul 2022 10:02 )             44.2     07-15    131<L>  |  86<L>  |  75<H>  ----------------------------<  91  5.7<H>   |  24  |  10.21<H>    Ca    10.2      15 Jul 2022 07:38      PT/INR - ( 15 Jul 2022 07:43 )   PT: 19.2 sec;   INR: 1.65 ratio                   RADIOLOGY & ADDITIONAL TESTS:    Imaging Personally Reviewed:    Consultant(s) Notes Reviewed:      Care Discussed with Consultants/Other Providers:

## 2022-07-15 NOTE — PROGRESS NOTE ADULT - ASSESSMENT
Pt. is a 77 y.o. M e/ PMHx. of HTN, HFrEF, SAADIA, Gout and ESRD on HD MWF at [facility] [place] presents after having a fall enroute to the HD center. 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 c/o knee pain and inability to walk. nephrology called. PT kevin ordered. cont outptn meds, check orthostatics. card called  ptn w h/o DVT/PE, in the past not on AC 2/2 h/o GI bleeds, but admitted on COumadin, will trend INR.    h/o COPD with chronic hypoxic respiratory failure on 2 L home O2. presently stable.   episode of vomiting on 7/11 while in HD, CT scan w dilated stomach, no obstruction, seen by GI,   tolerating po today, HD MWF,volume status is stable.   tolerating CLD. on reglan, GI following. post prandial vomiting most likely 2/2 gastroparesis. advance diet to full liquids today    awaiting BORIS placement

## 2022-07-15 NOTE — PROGRESS NOTE ADULT - SUBJECTIVE AND OBJECTIVE BOX
Date of Service   07-15-22 @ 15:26    Patient is a 77y old  Male who presents with a chief complaint of functional paraplegia post a fall (15 Jul 2022 14:13)      INTERVAL HISTORY: pt feels ok     REVIEW OF SYSTEMS:   CONSTITUTIONAL: No weakness  EYES/ENT: No visual changes; No throat pain  Neck: No pain or stiffness  Respiratory: No cough, wheezing, No shortness of breath  CARDIOVASCULAR: no chest pain or palpitations  GASTROINTESTINAL: No abdominal pain, no nausea, vomiting or hematemesis  GENITOURINARY: No dysuria, frequency or hematuria  NEUROLOGICAL: No stroke like symptoms  SKIN: No rashes    	  MEDICATIONS:  midodrine. 15 milliGRAM(s) Oral three times a day        PHYSICAL EXAM:  T(C): 36 (07-15-22 @ 14:00), Max: 37.2 (07-15-22 @ 00:37)  HR: 90 (07-15-22 @ 14:00) (74 - 105)  BP: 98/50 (07-15-22 @ 14:00) (84/45 - 105/58)  RR: 17 (07-15-22 @ 14:00) (17 - 20)  SpO2: 98% (07-15-22 @ 14:00) (91% - 98%)  Wt(kg): --  I&O's Summary    14 Jul 2022 07:01  -  15 Jul 2022 07:00  --------------------------------------------------------  IN: 0 mL / OUT: 0 mL / NET: 0 mL    15 Jul 2022 07:01  -  15 Jul 2022 15:26  --------------------------------------------------------  IN: 0 mL / OUT: 1000 mL / NET: -1000 mL          Appearance: In no distress	  HEENT:    PERRL, EOMI	  Cardiovascular:  S1 S2, No JVD  Respiratory: Lungs clear to auscultation	  Gastrointestinal:  Soft, Non-tender, + BS	  Vasculature:  No edema of LE  Psychiatric: Appropriate affect   Neuro: no acute focal deficits                               13.7   10.38 )-----------( 150      ( 14 Jul 2022 10:02 )             44.2     07-15    131<L>  |  86<L>  |  75<H>  ----------------------------<  91  5.7<H>   |  24  |  10.21<H>    Ca    10.2      15 Jul 2022 07:38          Labs personally reviewed      ASSESSMENT/PLAN: 	  76 yo male with PMHx of ESRD on HD via AV fistula LUE MWF, COPD (on 2L home O2), CHF, pHTN, hypotension on Midodrine, DVT S/P IVC filter p/w fall.  Patient reports that he was walking out of his house today to go to dialysis when he felt his left knee "give out." Patient landed on his buttocks. No head trauma or LOC.  On coumadin.  Patient was unable to get up 2/2 left knee pain.  Patient also c/o acute on chronic low back pain since the fall.  Patient reports that he is otherwise feeling well.  Denies headache, blurry vision, focal weakness, numbness, slurred speech, CP, SOB, abd pain, NVD.  Patient does not make urine.  Of note, patient was discharged from [facility] one month ago after receiving PT for BL knee pain and difficulty ambulating.  Patient states that after discharge from rehab he was supposed to receive home PT, but that has not happened. (06 Jul 2022 14:54).    1. Chronic hypotension   - with baseline SBP noted in the 80s prior admit a year ago  - was discharged on Midodrine 10mg TID  - BP soft   - PT eval   - BP continues to be SBP 90s: consider increase of Midodrine to 15mg PO TID  - Midodrine 15mg PO TID, cont to closely monitor BP  - monitor for nausea and vomiting     2. Hx of LE DVT prior admit   - s/p IVC filter   - on Warfarin at home as INR is elevated  - rec resume coumadin with INR <2.5,   - INR 1.65    3. ?Hx of HF  - prior echo with preserved EF 60% and no mention of pulm HTN  - on HD for fluid management     4. ESRD   - HD per renal   - monitor Lytes and creat   - avoid nephrotoxins           Mendy Valle [facility]   Roberto Latif DO [facility]  Cardiovascular Medicine  [address] [place] [address], [address]  Office: [phone]

## 2022-07-15 NOTE — CONSULT NOTE ADULT - PROBLEM SELECTOR RECOMMENDATION 2
Will check AM cortisol. Suggest to continue medications, monitoring, FU primary team recommendations. negative...

## 2022-07-15 NOTE — CONSULT NOTE ADULT - SUBJECTIVE AND OBJECTIVE BOX
HPI:  78 yo male with PMHx of ESRD on HD via AV fistula LUE MWF, COPD (on 2L home O2), CHF, pHTN, hypotension on Midodrine, DVT S/P IVC filter p/w fall.  Patient reports that he was walking out of his house today to go to dialysis when he felt his left knee "give out." Patient landed on his buttocks.  no head trauma or LOC.  On coumadin.  Patient was unable to get up 2/2 left knee pain.  Patient also c/o acute on chronic low back pain since the fall.  Patient reports that he is otherwise feeling well.  Denies headache, blurry vision, focal weakness, numbness, slurred speech, CP, SOB, abd pain, NVD.  Patient does not make urine.  Of note, patient was discharged from [facility] one month ago after receiving PT for BL knee pain and difficulty ambulating.  Patient states that after discharge from rehab he was supposed to receive home PT, but that has not happened. (06 Jul 2022 14:54)    Patient is nondiabetic, was not taking any hypoglycemic agents, had several hypoglycemic episode.  Endo was consulted for further management and recommendations.    PAST MEDICAL & SURGICAL HISTORY:  Hypertension      Glomerular disease  Segmental glomerular sclerosis      CHF (congestive heart failure)      COPD (chronic obstructive pulmonary disease)      Pulmonary hypertension      Sleep apnea      Pulmonary edema      Peripheral edema      Gout      History of abdominal surgery  polypectomy      H/O right heart catheterization          FAMILY HISTORY:  Family history of colon cancer (Father)    Family history of heart disease (Father)        Social History:    Outpatient Medications:    MEDICATIONS  (STANDING):  acetaminophen     Tablet .. 650 milliGRAM(s) Oral once  allopurinol 100 milliGRAM(s) Oral daily  bisacodyl Suppository 10 milliGRAM(s) Rectal once  budesonide 160 MICROgram(s)/formoterol 4.5 MICROgram(s) Inhaler 2 Puff(s) Inhalation two times a day  chlorhexidine 2% Cloths 1 Application(s) Topical <User Schedule>  metoclopramide 5 milliGRAM(s) Oral every 8 hours  midodrine. 15 milliGRAM(s) Oral three times a day  multivitamin 1 Tablet(s) Oral daily  pantoprazole    Tablet 40 milliGRAM(s) Oral before breakfast  polyethylene glycol 3350 17 Gram(s) Oral daily  pregabalin 50 milliGRAM(s) Oral two times a day  sevelamer carbonate 800 milliGRAM(s) Oral three times a day with meals  simethicone 80 milliGRAM(s) Chew three times a day  tiotropium 18 MICROgram(s) Capsule 1 Capsule(s) Inhalation daily    MEDICATIONS  (PRN):      Allergies    latex (Rash)  No Known Drug Allergies    Intolerances      Review of Systems:  Constitutional: No fever, no chills  Eyes: No blurry vision  Neuro: No tremors  HEENT: No pain, no neck swelling  Cardiovascular: No chest pain, no palpitations  Respiratory: Has SOB, no cough  GI: No nausea, vomiting, abdominal pain  : No dysuria  Skin: no rash  MSK: Has leg swelling.  Psych: no depression  Endocrine: no polyuria, polydipsia    ALL OTHER SYSTEMS REVIEWED AND NEGATIVE    UNABLE TO OBTAIN    PHYSICAL EXAM:  VITALS: T(C): 36 (07-15-22 @ 14:00)  T(F): 96.8 (07-15-22 @ 14:00), Max: 98.9 (07-15-22 @ 00:37)  HR: 90 (07-15-22 @ 14:00) (74 - 105)  BP: 98/50 (07-15-22 @ 14:00) (84/45 - 105/58)  RR:  (17 - 20)  SpO2:  (91% - 98%)  Wt(kg): --  GENERAL: NAD, well-groomed, well-developed  EYES: No proptosis, no lid lag  HEENT:  Atraumatic, Normocephalic  THYROID: Normal size, no palpable nodules  RESPIRATORY: Clear to auscultation bilaterally; No rales, rhonchi, wheezing  CARDIOVASCULAR: Si S2, No murmurs;  GI: Soft, non distended, normal bowel sounds  SKIN: Dry, intact, No rashes or lesions  MUSCULOSKELETAL: Has BL lower extremity edema.  NEURO:  no tremor, sensation decreased in feet BL,    POCT Blood Glucose.: 76 mg/dL (07-15-22 @ 07:57)  POCT Blood Glucose.: 104 mg/dL (07-14-22 @ 22:07)  POCT Blood Glucose.: 109 mg/dL (07-14-22 @ 18:51)  POCT Blood Glucose.: 31 mg/dL (07-14-22 @ 16:43)  POCT Blood Glucose.: 37 mg/dL (07-14-22 @ 16:40)  POCT Blood Glucose.: 92 mg/dL (07-13-22 @ 21:00)  POCT Blood Glucose.: 107 mg/dL (07-13-22 @ 18:20)  POCT Blood Glucose.: 109 mg/dL (07-13-22 @ 16:48)  POCT Blood Glucose.: 111 mg/dL (07-12-22 @ 22:21)  POCT Blood Glucose.: 92 mg/dL (07-12-22 @ 16:41)                            13.7   10.38 )-----------( 150      ( 14 Jul 2022 10:02 )             44.2       07-15    131<L>  |  86<L>  |  75<H>  ----------------------------<  91  5.7<H>   |  24  |  10.21<H>    eGFR: 5<L>    Ca    10.2      07-15        Thyroid Function Tests:              Radiology:              HPI:  76 yo male with PMHx of ESRD on HD via AV fistula LUE MWF, COPD (on 2L home O2), CHF, pHTN, hypotension on Midodrine, DVT S/P IVC filter p/w fall.  Patient reports that he was walking out of his house today to go to dialysis when he felt his left knee "give out." Patient landed on his buttocks.  no head trauma or LOC.  On coumadin.  Patient was unable to get up 2/2 left knee pain.  Patient also c/o acute on chronic low back pain since the fall.  Patient reports that he is otherwise feeling well.  Denies headache, blurry vision, focal weakness, numbness, slurred speech, CP, SOB, abd pain, NVD.  Patient does not make urine.  Of note, patient was discharged from [facility] one month ago after receiving PT for BL knee pain and difficulty ambulating.  Patient states that after discharge from rehab he was supposed to receive home PT, but that has not happened. (06 Jul 2022 14:54)    Patient is nondiabetic, was not taking any hypoglycemic agents, had several hypoglycemic episode.  Endo was consulted for further management and recommendations.    PAST MEDICAL & SURGICAL HISTORY:  Hypertension      Glomerular disease  Segmental glomerular sclerosis      CHF (congestive heart failure)      COPD (chronic obstructive pulmonary disease)      Pulmonary hypertension      Sleep apnea      Pulmonary edema      Peripheral edema      Gout      History of abdominal surgery  polypectomy      H/O right heart catheterization          FAMILY HISTORY:  Family history of colon cancer (Father)    Family history of heart disease (Father)        Social History:    Outpatient Medications:    MEDICATIONS  (STANDING):  acetaminophen     Tablet .. 650 milliGRAM(s) Oral once  allopurinol 100 milliGRAM(s) Oral daily  bisacodyl Suppository 10 milliGRAM(s) Rectal once  budesonide 160 MICROgram(s)/formoterol 4.5 MICROgram(s) Inhaler 2 Puff(s) Inhalation two times a day  chlorhexidine 2% Cloths 1 Application(s) Topical <User Schedule>  metoclopramide 5 milliGRAM(s) Oral every 8 hours  midodrine. 15 milliGRAM(s) Oral three times a day  multivitamin 1 Tablet(s) Oral daily  pantoprazole    Tablet 40 milliGRAM(s) Oral before breakfast  polyethylene glycol 3350 17 Gram(s) Oral daily  pregabalin 50 milliGRAM(s) Oral two times a day  sevelamer carbonate 800 milliGRAM(s) Oral three times a day with meals  simethicone 80 milliGRAM(s) Chew three times a day  tiotropium 18 MICROgram(s) Capsule 1 Capsule(s) Inhalation daily    MEDICATIONS  (PRN):      Allergies    latex (Rash)  No Known Drug Allergies    Intolerances      Review of Systems:  Constitutional: No fever, no chills  Eyes: No blurry vision  Neuro: No tremors  HEENT: No pain, no neck swelling  Cardiovascular: No chest pain, no palpitations  Respiratory: Has SOB, no cough  GI: No nausea, vomiting, abdominal pain  : No dysuria  Skin: no rash  MSK: Has leg swelling.  Psych: no depression  Endocrine: no polyuria, polydipsia    ALL OTHER SYSTEMS REVIEWED AND NEGATIVE    UNABLE TO OBTAIN    PHYSICAL EXAM:  VITALS: T(C): 36 (07-15-22 @ 14:00)  T(F): 96.8 (07-15-22 @ 14:00), Max: 98.9 (07-15-22 @ 00:37)  HR: 90 (07-15-22 @ 14:00) (74 - 105)  BP: 98/50 (07-15-22 @ 14:00) (84/45 - 105/58)  RR:  (17 - 20)  SpO2:  (91% - 98%)  Wt(kg): --  GENERAL: NAD, well-groomed, well-developed  EYES: No proptosis, no lid lag  HEENT:  Atraumatic, Normocephalic  THYROID: Normal size, no palpable nodules  RESPIRATORY: Clear to auscultation bilaterally; No rales, rhonchi, wheezing  CARDIOVASCULAR: Si S2, No murmurs;  GI: Soft, non distended, normal bowel sounds  SKIN: Dry, intact, No rashes or lesions  MUSCULOSKELETAL: Has BL lower extremity edema.  NEURO:  no tremor, sensation decreased in feet BL,    POCT Blood Glucose.: 76 mg/dL (07-15-22 @ 07:57)  POCT Blood Glucose.: 104 mg/dL (07-14-22 @ 22:07)  POCT Blood Glucose.: 109 mg/dL (07-14-22 @ 18:51)  POCT Blood Glucose.: 31 mg/dL (07-14-22 @ 16:43)  POCT Blood Glucose.: 37 mg/dL (07-14-22 @ 16:40)  POCT Blood Glucose.: 92 mg/dL (07-13-22 @ 21:00)  POCT Blood Glucose.: 107 mg/dL (07-13-22 @ 18:20)  POCT Blood Glucose.: 109 mg/dL (07-13-22 @ 16:48)  POCT Blood Glucose.: 111 mg/dL (07-12-22 @ 22:21)  POCT Blood Glucose.: 92 mg/dL (07-12-22 @ 16:41)                            13.7   10.38 )-----------( 150      ( 14 Jul 2022 10:02 )             44.2       07-15    131<L>  |  86<L>  |  75<H>  ----------------------------<  91  5.7<H>   |  24  |  10.21<H>    eGFR: 5<L>    Ca    10.2      07-15        Thyroid Function Tests:              Radiology:

## 2022-07-15 NOTE — CONSULT NOTE ADULT - ASSESSMENT
Assessment  Hypoglycemia: 77y nondiabetic male, was not taking any hypoglycemic agents, admitted s/p mechanical fall, patient had several hypoglycemic episodes, had N/V, on liquid diet.  Hypotension: on midodrine, monitored.  ?Gastroparesis: On meds, monitored.  ESRD: On hemodialysis, Monitor labs/BMP      Arsalan Tian MD  Cell: [phone]  Office: [phone]         Assessment  Hypoglycemia: 77y nondiabetic male, was not taking any hypoglycemic agents, admitted s/p mechanical fall, patient had several hypoglycemic episodes, had N/V,  on liquid diet.  Hypotension: on midodrine, monitored.  ?Gastroparesis: On meds, monitored.  ESRD: On hemodialysis, Monitor labs/BMP      Arsalan Tian MD  Cell: [phone]  Office: [phone]

## 2022-07-15 NOTE — PROGRESS NOTE ADULT - SUBJECTIVE AND OBJECTIVE BOX
[facility]  Avi Riddle PA-C  [address] [place] [address]  [phone]      INTERVAL HPI/OVERNIGHT EVENTS:  pt seen and examined, no new events   continued to have nausea yesterday  not eating too much, doesn't like the CLD, will try Fulls and monitor   also has not had BM in a few days, last documented BM was 7/12    MEDICATIONS  (STANDING):  allopurinol 100 milliGRAM(s) Oral daily  budesonide 160 MICROgram(s)/formoterol 4.5 MICROgram(s) Inhaler 2 Puff(s) Inhalation two times a day  chlorhexidine 2% Cloths 1 Application(s) Topical <User Schedule>  midodrine. 10 milliGRAM(s) Oral three times a day  multivitamin 1 Tablet(s) Oral daily  pantoprazole    Tablet 40 milliGRAM(s) Oral before breakfast  polyethylene glycol 3350 17 Gram(s) Oral daily  pregabalin 50 milliGRAM(s) Oral two times a day  sevelamer carbonate 800 milliGRAM(s) Oral three times a day with meals  simethicone 80 milliGRAM(s) Chew three times a day  tiotropium 18 MICROgram(s) Capsule 1 Capsule(s) Inhalation daily    MEDICATIONS  (PRN):      Allergies    latex (Rash)  No Known Drug Allergies    Intolerances        ROS:   General:  No wt loss, fevers, chills, night sweats, fatigue,   Eyes:  Good vision, no reported pain  ENT:  No sore throat, pain, runny nose, dysphagia  CV:  No pain, palpitations, hypo/hypertension  Resp:  No dyspnea, cough, tachypnea, wheezing  GI:  No pain, + nausea, + vomiting, No diarrhea, + constipation, No weight loss, No fever, No pruritis, No rectal bleeding, No tarry stools, No dysphagia,  :  No pain, bleeding, incontinence, nocturia  Muscle:  No pain, weakness  Neuro:  No weakness, tingling, memory problems  Psych:  No fatigue, insomnia, mood problems, depression  Endocrine:  No polyuria, polydipsia, cold/heat intolerance  Heme:  No petechiae, ecchymosis, easy bruisability  Skin:  No rash, tattoos, scars, edema      PHYSICAL EXAM:   Vital Signs Last 24 Hrs  T(C): 35.9 (15 Jul 2022 09:34), Max: 37.2 (15 Jul 2022 00:37)  T(F): 96.6 (15 Jul 2022 09:34), Max: 98.9 (15 Jul 2022 00:37)  HR: 105 (15 Jul 2022 09:34) (74 - 105)  BP: 84/45 (15 Jul 2022 09:34) (84/45 - 105/58)  BP(mean): --  RR: 18 (15 Jul 2022 09:34) (18 - 20)  SpO2: 91% (15 Jul 2022 09:34) (91% - 94%)    Parameters below as of 15 Jul 2022 09:34  Patient On (Oxygen Delivery Method): nasal cannula  O2 Flow (L/min): 2    Daily     Daily     GENERAL:  Appears stated age,   HEENT:  NC/AT,    CHEST:  Full & symmetric excursion,   HEART:  Regular rhythm,  ABDOMEN:  Soft, non-tender, non-distended,  EXTEREMITIES:  no cyanosis  SKIN:  No rash  NEURO:  Alert,       LABS:                        13.7   10.38 )-----------( 150      ( 14 Jul 2022 10:02 )             44.2   07-15    131<L>  |  86<L>  |  75<H>  ----------------------------<  91  5.7<H>   |  24  |  10.21<H>    Ca    10.2      15 Jul 2022 07:38      RADIOLOGY & ADDITIONAL TESTS:  < from: CT Abdomen and Pelvis No Cont (07.11.22 @ 22:02) >    ACC: [id number] EXAM:  CT ABDOMEN AND PELVIS                          PROCEDURE DATE:  07/11/2022          INTERPRETATION:  CLINICAL INFORMATION: Vomiting, gassy distention.    COMPARISON: CT abdomen pelvis 8/22/2022. CT chest 8/27/2021.    CONTRAST/COMPLICATIONS:  IV Contrast: NONE  Oral Contrast: NONE  Complications: None reported at time of study completion    PROCEDURE:  CT of the Abdomen and Pelvis was performed.  Sagittal and coronal reformats were performed.    FINDINGS:  Evaluation of thesolid organs and vasculature is limited without   intravenous contrast.    LOWER CHEST: Basilar subsegmental atelectasis. Left lower lobe and right   middle lobe calcified granulomas. Coronary artery calcification.   Calcified right hilar lymph nodes.    LIVER: Few punctate calcified granulomas.  BILE DUCTS: Normal caliber.  GALLBLADDER: Within normal limits.  SPLEEN: Scattered tiny calcified granulomas.  PANCREAS: Within normal limits.  ADRENALS: Bilateral adrenal gland thickening, unchanged.  KIDNEYS/URETERS: Atrophic bilateral kidneys. Bilateral renal cysts and   subcentimeter hypodensities too small to characterize.    BLADDER: Minimally distended.  REPRODUCTIVE ORGANS: Prostate within normal limits.    BOWEL: Stomach is moderately distended. No small bowel obstruction.   Status post right hemicolectomy. Mild to moderate stool in the distal   rectosigmoid colon.  PERITONEUM: No ascites.  VESSELS: Atherosclerotic changes. Infrarenal IVC filter.  RETROPERITONEUM/LYMPH NODES: No lymphadenopathy.  ABDOMINAL WALL: Rectus diastases. Multiple ventral hernias containing fat   and small knuckle of bowel. Abdominal wall collateral vessels.  BONES: Degenerative changes.    IMPRESSION:  No small bowel obstruction. Stomach is moderately distended. Mild to   moderate stool in the distal rectosigmoid colon.        --- End of Report ---

## 2022-07-15 NOTE — PROGRESS NOTE ADULT - ATTENDING COMMENTS
I have seen this patient with the fellow and agree with their assessment and plan. I was physically present for significant portions of the evaluation and management (E/M) service provided.  I agree with the above history, physical, and plan which I have reviewed and edited where appropriate.    low bp during hd  required additional 5mg of midodrine, uf minimal  keep blood flow 350 for now  needs 20mg TID standing midodrine likely    Buck Sawyer MD  Pager   Office     Contact me directly via [employer] Teams     (After 5 pm or on weekends please page the on-call fellow/attending, can check [email or web address] for schedule. Login is [employer] ns, schedule under [facility] medicine, psych, derm)      For weekend coverage, call Dr. Bear Mayes( fellow) or Dr Mary Rawls( attending)

## 2022-07-15 NOTE — CONSULT NOTE ADULT - PROBLEM SELECTOR RECOMMENDATION 9
Likely nutritional hypoglycemia in the setting of N/V and ESRD.  Will check AM cortisol and TFTs.  Encourage to increase po intake, nutritional supplements as tolerated. May need continuous dextrose for now.  Will continue monitoring and FU.

## 2022-07-15 NOTE — PROGRESS NOTE ADULT - ASSESSMENT
Pt. is a 77 y.o. M e/ PMHx. of HTN, HFrEF, SAADIA, Gout and ESRD on HD MWF at [facility] presents after having a fall enroute to the HD center. Nephrology consulted for ESRD management.

## 2022-07-15 NOTE — PROGRESS NOTE ADULT - PROBLEM SELECTOR PLAN 1
Pt. with ESRD on HD three times a week (MWF) presented to [facility] for a fall. Last HD was on 7/13 via LUE AVF. Pt. clinically stable. No CP, SOB or palpitations during evaluation. Most recent labs reviewed. Will arrange for HD today. Pt. denies any lightheadedness and states that his his BP is normally 80s-90s/ 50s-60s. He endorses taking Midodrine but does not recall the dose. Consent obtained for HD and placed in the chart. Will arrange for HD today. HgB at goal. Prior records state that Pt. was on 30mg Q8H here with additional Midodrine given during HD during prior admission. Increase Midodrine to 20mg TID. Consider discharging on this dose. C/w HD time to 4 hours and DFR to 600.     #Hyperphosphatemia- c/w Sevelamer 800mg TID.     If you have any questions, please feel free to contact me  Jai Alvarenga  Nephrology Fellow  [phone]; Prefer [employer] TEAMS  (After 5pm or on weekends please page the on-call fellow).

## 2022-07-15 NOTE — PROGRESS NOTE ADULT - SUBJECTIVE AND OBJECTIVE BOX
[facility] DIVISION OF KIDNEY DISEASES AND HYPERTENSION -- FOLLOW UP NOTE  --------------------------------------------------------------------------------  HPI:  Pt. is a 77 y.o. M e/ PMHx. of HTN, HFrEF, SAADIA, Gout and ESRD on HD MWF at [facility] [place] presents after having a fall enroute to the HD center. Nephrology consulted for ESRD management. Pt. does not recall his nephrologist and denies any issues with dialysis. He is edematous but states he is not worse than usual. Denies any syncope and attributes his fall to his knee "buckling." Pt. has notably low BP which Pt. states is chronic and for which he takes Midodrine.     Pt. seen this AM. Denies any nausea currently, states no abdominal pain now. For HD today. Hypotensive more than usual this AM Midodrine increased. Pt. asymptomatic.       PAST HISTORY  --------------------------------------------------------------------------------  No significant changes to PMH, PSH, FHx, SHx, unless otherwise noted    ALLERGIES & MEDICATIONS  --------------------------------------------------------------------------------  Allergies    latex (Rash)  No Known Drug Allergies    Intolerances      Standing Inpatient Medications  acetaminophen     Tablet .. 650 milliGRAM(s) Oral once  allopurinol 100 milliGRAM(s) Oral daily  bisacodyl Suppository 10 milliGRAM(s) Rectal once  budesonide 160 MICROgram(s)/formoterol 4.5 MICROgram(s) Inhaler 2 Puff(s) Inhalation two times a day  chlorhexidine 2% Cloths 1 Application(s) Topical <User Schedule>  metoclopramide 5 milliGRAM(s) Oral every 8 hours  midodrine. 15 milliGRAM(s) Oral three times a day  multivitamin 1 Tablet(s) Oral daily  pantoprazole    Tablet 40 milliGRAM(s) Oral before breakfast  polyethylene glycol 3350 17 Gram(s) Oral daily  pregabalin 50 milliGRAM(s) Oral two times a day  sevelamer carbonate 800 milliGRAM(s) Oral three times a day with meals  simethicone 80 milliGRAM(s) Chew three times a day  tiotropium 18 MICROgram(s) Capsule 1 Capsule(s) Inhalation daily    PRN Inpatient Medications      REVIEW OF SYSTEMS  --------------------------------------------------------------------------------  Gen: No fevers/chills  Skin: No rashes  Head/Eyes/Ears: Normal hearing,   Respiratory: No dyspnea, cough  CV: No chest pain  GI: No abdominal pain, diarrhea  : No dysuria, hematuria  MSK: + edema, LE weakness    All other systems were reviewed and are negative, except as noted.    VITALS/PHYSICAL EXAM  --------------------------------------------------------------------------------  T(C): 36 (07-15-22 @ 14:00), Max: 37.2 (07-15-22 @ 00:37)  HR: 90 (07-15-22 @ 14:00) (74 - 105)  BP: 98/50 (07-15-22 @ 14:00) (84/45 - 105/58)  RR: 17 (07-15-22 @ 14:00) (17 - 20)  SpO2: 98% (07-15-22 @ 14:00) (91% - 98%)  Wt(kg): --        07-14-22 @ 07:01  -  07-15-22 @ 07:00  --------------------------------------------------------  IN: 0 mL / OUT: 0 mL / NET: 0 mL    07-15-22 @ 07:01  -  07-15-22 @ 14:07  --------------------------------------------------------  IN: 0 mL / OUT: 1000 mL / NET: -1000 mL    Physical Exam:  	Gen: NAD  	HEENT: MMM  	Pulm: CTA B/L  	CV: S1S2  	Abd: Soft, +BS   	Ext: + LE edema B/L improving, chronic skin changes   	Neuro: Awake  	Skin: Warm and dry  	Vascular access: franco GUTIÉRREZ+    LABS/STUDIES  --------------------------------------------------------------------------------              13.7   10.38 >-----------<  150      [07-14-22 @ 10:02]              44.2     131  |  86  |  75  ----------------------------<  91      [07-15-22 @ 07:38]  5.7   |  24  |  10.21        Ca     10.2     [07-15-22 @ 07:38]      PT/INR: PT 19.2 , INR 1.65       [07-15-22 @ 07:43]      Creatinine Trend:  SCr 10.21 [07-15 @ 07:38]  SCr 8.43 [07-14 @ 10:03]  SCr 10.61 [07-12 @ 11:22]  SCr 8.40 [07-11 @ 13:23]  SCr 9.67 [07-07 @ 16:39]        HbA1c 5.0      [10-02-17 @ 15:38]  TSH 1.00      [08-23-21 @ 02:35]    HBsAg Nonreact      [07-11-22 @ 13:23]  HCV 0.39, Nonreact      [07-11-22 @ 13:23]

## 2022-07-16 LAB
ANION GAP SERPL CALC-SCNC: 17 MMOL/L — SIGNIFICANT CHANGE UP (ref 5–17)
BUN SERPL-MCNC: 47 MG/DL — HIGH (ref 7–23)
CALCIUM SERPL-MCNC: 10.4 MG/DL — SIGNIFICANT CHANGE UP (ref 8.4–10.5)
CHLORIDE SERPL-SCNC: 90 MMOL/L — LOW (ref 96–108)
CO2 SERPL-SCNC: 27 MMOL/L — SIGNIFICANT CHANGE UP (ref 22–31)
CORTIS AM PEAK SERPL-MCNC: 18.2 UG/DL — SIGNIFICANT CHANGE UP (ref 6–18.4)
CREAT SERPL-MCNC: 7.39 MG/DL — HIGH (ref 0.5–1.3)
EGFR: 7 ML/MIN/1.73M2 — LOW
GLUCOSE BLDC GLUCOMTR-MCNC: 109 MG/DL — HIGH (ref 70–99)
GLUCOSE BLDC GLUCOMTR-MCNC: 109 MG/DL — HIGH (ref 70–99)
GLUCOSE BLDC GLUCOMTR-MCNC: 35 MG/DL — CRITICAL LOW (ref 70–99)
GLUCOSE BLDC GLUCOMTR-MCNC: 51 MG/DL — CRITICAL LOW (ref 70–99)
GLUCOSE BLDC GLUCOMTR-MCNC: 56 MG/DL — LOW (ref 70–99)
GLUCOSE BLDC GLUCOMTR-MCNC: 92 MG/DL — SIGNIFICANT CHANGE UP (ref 70–99)
GLUCOSE BLDC GLUCOMTR-MCNC: 96 MG/DL — SIGNIFICANT CHANGE UP (ref 70–99)
GLUCOSE SERPL-MCNC: 101 MG/DL — HIGH (ref 70–99)
HCT VFR BLD CALC: 42.8 % — SIGNIFICANT CHANGE UP (ref 39–50)
HGB BLD-MCNC: 13.3 G/DL — SIGNIFICANT CHANGE UP (ref 13–17)
MCHC RBC-ENTMCNC: 28.7 PG — SIGNIFICANT CHANGE UP (ref 27–34)
MCHC RBC-ENTMCNC: 31.1 GM/DL — LOW (ref 32–36)
MCV RBC AUTO: 92.2 FL — SIGNIFICANT CHANGE UP (ref 80–100)
NRBC # BLD: 0 /100 WBCS — SIGNIFICANT CHANGE UP (ref 0–0)
PLATELET # BLD AUTO: 127 K/UL — LOW (ref 150–400)
POTASSIUM SERPL-MCNC: 5 MMOL/L — SIGNIFICANT CHANGE UP (ref 3.5–5.3)
POTASSIUM SERPL-SCNC: 5 MMOL/L — SIGNIFICANT CHANGE UP (ref 3.5–5.3)
RBC # BLD: 4.64 M/UL — SIGNIFICANT CHANGE UP (ref 4.2–5.8)
RBC # FLD: 15.5 % — HIGH (ref 10.3–14.5)
SODIUM SERPL-SCNC: 134 MMOL/L — LOW (ref 135–145)
T4 FREE SERPL-MCNC: 1.2 NG/DL — SIGNIFICANT CHANGE UP (ref 0.9–1.8)
TSH SERPL-MCNC: 1.48 UIU/ML — SIGNIFICANT CHANGE UP (ref 0.27–4.2)
WBC # BLD: 14.6 K/UL — HIGH (ref 3.8–10.5)
WBC # FLD AUTO: 14.6 K/UL — HIGH (ref 3.8–10.5)

## 2022-07-16 RX ADMIN — Medication 100 MILLIGRAM(S): at 12:07

## 2022-07-16 RX ADMIN — Medication 5 MILLIGRAM(S): at 05:57

## 2022-07-16 RX ADMIN — Medication 5 MILLIGRAM(S): at 22:01

## 2022-07-16 RX ADMIN — TIOTROPIUM BROMIDE 1 CAPSULE(S): 18 CAPSULE ORAL; RESPIRATORY (INHALATION) at 12:07

## 2022-07-16 RX ADMIN — SIMETHICONE 80 MILLIGRAM(S): 80 TABLET, CHEWABLE ORAL at 13:04

## 2022-07-16 RX ADMIN — SEVELAMER CARBONATE 800 MILLIGRAM(S): 2400 POWDER, FOR SUSPENSION ORAL at 12:08

## 2022-07-16 RX ADMIN — MIDODRINE HYDROCHLORIDE 15 MILLIGRAM(S): 2.5 TABLET ORAL at 05:57

## 2022-07-16 RX ADMIN — Medication 50 MILLIGRAM(S): at 05:56

## 2022-07-16 RX ADMIN — Medication 1 TABLET(S): at 12:08

## 2022-07-16 RX ADMIN — Medication 5 MILLIGRAM(S): at 13:05

## 2022-07-16 RX ADMIN — SIMETHICONE 80 MILLIGRAM(S): 80 TABLET, CHEWABLE ORAL at 22:01

## 2022-07-16 RX ADMIN — BUDESONIDE AND FORMOTEROL FUMARATE DIHYDRATE 2 PUFF(S): 160; 4.5 AEROSOL RESPIRATORY (INHALATION) at 18:14

## 2022-07-16 RX ADMIN — SEVELAMER CARBONATE 800 MILLIGRAM(S): 2400 POWDER, FOR SUSPENSION ORAL at 18:13

## 2022-07-16 RX ADMIN — Medication 50 MILLIGRAM(S): at 18:14

## 2022-07-16 RX ADMIN — SIMETHICONE 80 MILLIGRAM(S): 80 TABLET, CHEWABLE ORAL at 05:57

## 2022-07-16 RX ADMIN — SEVELAMER CARBONATE 800 MILLIGRAM(S): 2400 POWDER, FOR SUSPENSION ORAL at 09:16

## 2022-07-16 RX ADMIN — CHLORHEXIDINE GLUCONATE 1 APPLICATION(S): 213 SOLUTION TOPICAL at 09:17

## 2022-07-16 RX ADMIN — MIDODRINE HYDROCHLORIDE 15 MILLIGRAM(S): 2.5 TABLET ORAL at 18:13

## 2022-07-16 RX ADMIN — PANTOPRAZOLE SODIUM 40 MILLIGRAM(S): 20 TABLET, DELAYED RELEASE ORAL at 07:37

## 2022-07-16 RX ADMIN — BUDESONIDE AND FORMOTEROL FUMARATE DIHYDRATE 2 PUFF(S): 160; 4.5 AEROSOL RESPIRATORY (INHALATION) at 05:57

## 2022-07-16 RX ADMIN — MIDODRINE HYDROCHLORIDE 15 MILLIGRAM(S): 2.5 TABLET ORAL at 12:07

## 2022-07-16 NOTE — PROGRESS NOTE ADULT - ASSESSMENT
Assessment  Hypoglycemia: 77y nondiabetic male, was not taking any hypoglycemic agents, admitted s/p mechanical fall, patient had  hypoglycemic episodes, had N/V,  on liquid diet.  blood sugar this AM 96 .saw the nutritionist this AM.  Hypotension: on midodrine, monitored.  ?Gastroparesis: On meds, monitored.  ESRD: On hemodialysis, Monitor labs/BMP      tonia Wilkins MD  Cell: [phone]             Problem/Recommendation - 1:  ·  Problem: Hypoglycemia.   ·  Recommendation: Likely nutritional hypoglycemia in the setting of N/V and ESRD. blood sugar this AM is good at 96.  please get AM cortisol and TFTs.( discussed with the NP)  Encourage to increase po intake, nutritional supplements as tolerated. May need continuous dextrose for now.  Will continue monitoring and FU.     Problem/Recommendation - 2:  ·  Problem: Hypotension.   ·  Recommendation: Will check AM cortisol. Suggest to continue medications, monitoring, FU primary team recommendations.     Problem/Recommendation - 3:  ·  Problem: Gastroparesis.   ·  Recommendation: Suggest to continue medications, monitoring, FU primary team recommendations.     Problem/Recommendation - 4:  ·  Problem: ESRD on hemodialysis.   ·  Recommendation: On HD, continue as scheduled, renal FU

## 2022-07-16 NOTE — PROGRESS NOTE ADULT - ASSESSMENT
vomiting  ESRD    CT a/p with moderately distended stomach, no obstruction   cont with simethicone   PPI daily  cont full liquid  advance diet as tolerated  cont reglan q8h OTC for N/V   HD as per renal   will follow   dw pt and wife over the phone     Advanced care planning was discussed with patient and family.  Advanced care planning forms were reviewed and discussed.  Risks, benefits and alternatives of gastroenterologic procedures were discussed in detail and all questions were answered.    30 minutes spent.

## 2022-07-16 NOTE — PROGRESS NOTE ADULT - SUBJECTIVE AND OBJECTIVE BOX
Date of Service   07-16-22 @ 11:37    Patient is a 77y old  Male who presents with a chief complaint of functional paraplegia post a fall (16 Jul 2022 10:27)      INTERVAL HISTORY: pt feels ok  REVIEW OF SYSTEMS:   CONSTITUTIONAL: No weakness  EYES/ENT: No visual changes; No throat pain  Neck: No pain or stiffness  Respiratory: No cough, wheezing, No shortness of breath  CARDIOVASCULAR: no chest pain or palpitations  GASTROINTESTINAL: No abdominal pain, no nausea, vomiting or hematemesis  GENITOURINARY: No dysuria, frequency or hematuria  NEUROLOGICAL: No stroke like symptoms  SKIN: No rashes    	  MEDICATIONS:  midodrine. 15 milliGRAM(s) Oral three times a day        PHYSICAL EXAM:  T(C): 36.8 (07-16-22 @ 10:45), Max: 37.2 (07-15-22 @ 23:28)  HR: 91 (07-16-22 @ 10:45) (88 - 100)  BP: 75/46 (07-16-22 @ 10:45) (75/46 - 98/50)  RR: 18 (07-16-22 @ 10:45) (17 - 18)  SpO2: 95% (07-16-22 @ 10:45) (92% - 98%)  Wt(kg): --  I&O's Summary    15 Jul 2022 07:01  -  16 Jul 2022 07:00  --------------------------------------------------------  IN: 0 mL / OUT: 1000 mL / NET: -1000 mL    16 Jul 2022 07:01  -  16 Jul 2022 11:37  --------------------------------------------------------  IN: 240 mL / OUT: 0 mL / NET: 240 mL          Appearance: In no distress	  HEENT:    PERRL, EOMI	  Cardiovascular:  S1 S2, No JVD  Respiratory: Lungs clear to auscultation	  Gastrointestinal:  Soft, Non-tender, + BS	  Vasculature:  No edema of LE  Psychiatric: Appropriate affect   Neuro: no acute focal deficits                               13.3   14.60 )-----------( 127      ( 16 Jul 2022 07:23 )             42.8     07-16    134<L>  |  90<L>  |  47<H>  ----------------------------<  101<H>  5.0   |  27  |  7.39<H>    Ca    10.4      16 Jul 2022 07:14          Labs personally reviewed      ASSESSMENT/PLAN: 	  76 yo male with PMHx of ESRD on HD via AV fistula LUE MWF, COPD (on 2L home O2), CHF, pHTN, hypotension on Midodrine, DVT S/P IVC filter p/w fall.  Patient reports that he was walking out of his house today to go to dialysis when he felt his left knee "give out." Patient landed on his buttocks. No head trauma or LOC.  On coumadin.  Patient was unable to get up 2/2 left knee pain.  Patient also c/o acute on chronic low back pain since the fall.  Patient reports that he is otherwise feeling well.  Denies headache, blurry vision, focal weakness, numbness, slurred speech, CP, SOB, abd pain, NVD.  Patient does not make urine.  Of note, patient was discharged from [facility] one month ago after receiving PT for BL knee pain and difficulty ambulating.  Patient states that after discharge from rehab he was supposed to receive home PT, but that has not happened. (06 Jul 2022 14:54).    1. Chronic hypotension   - with baseline SBP noted in the 80s prior admit a year ago  - was discharged on Midodrine 10mg TID  - BP soft   - PT eval   - BP continues to be SBP 90s  - Midodrine 15mg PO TID, cont to closely monitor BP  - monitor for nausea and vomiting     2. Hx of LE DVT prior admit   - s/p IVC filter   - on Warfarin at home as INR is elevated  - rec resume coumadin with INR <2.5,   - INR 1.65    3. ?Hx of HF  - prior echo with preserved EF 60% and no mention of pulm HTN  - on HD for fluid management     4. ESRD   - HD per renal   - monitor Lytes and creat   - avoid nephrotoxins     Mendy Valle [facility]   Roberto Latif DO [facility]  Cardiovascular Medicine  [address] [place] [address], [address]  Office: [phone]   Date of Service   07-16-22 @ 11:37    Patient is a 77y old  Male who presents with a chief complaint of functional paraplegia post a fall (16 Jul 2022 10:27)      INTERVAL HISTORY: pt feels ok  REVIEW OF SYSTEMS:   CONSTITUTIONAL: No weakness  EYES/ENT: No visual changes; No throat pain  Neck: No pain or stiffness  Respiratory: No cough, wheezing, No shortness of breath  CARDIOVASCULAR: no chest pain or palpitations  GASTROINTESTINAL: No abdominal pain, no nausea, vomiting or hematemesis  GENITOURINARY: No dysuria, frequency or hematuria  NEUROLOGICAL: No stroke like symptoms  SKIN: No rashes    	  MEDICATIONS:  midodrine. 15 milliGRAM(s) Oral three times a day        PHYSICAL EXAM:  T(C): 36.8 (07-16-22 @ 10:45), Max: 37.2 (07-15-22 @ 23:28)  HR: 91 (07-16-22 @ 10:45) (88 - 100)  BP: 75/46 (07-16-22 @ 10:45) (75/46 - 98/50)  RR: 18 (07-16-22 @ 10:45) (17 - 18)  SpO2: 95% (07-16-22 @ 10:45) (92% - 98%)  Wt(kg): --  I&O's Summary    15 Jul 2022 07:01  -  16 Jul 2022 07:00  --------------------------------------------------------  IN: 0 mL / OUT: 1000 mL / NET: -1000 mL    16 Jul 2022 07:01  -  16 Jul 2022 11:37  --------------------------------------------------------  IN: 240 mL / OUT: 0 mL / NET: 240 mL          Appearance: In no distress	  HEENT:    PERRL, EOMI	  Cardiovascular:  S1 S2, No JVD  Respiratory: Lungs clear to auscultation	  Gastrointestinal:  Soft, Non-tender, + BS	  Vasculature:  No edema of LE  Psychiatric: Appropriate affect   Neuro: no acute focal deficits                               13.3   14.60 )-----------( 127      ( 16 Jul 2022 07:23 )             42.8     07-16    134<L>  |  90<L>  |  47<H>  ----------------------------<  101<H>  5.0   |  27  |  7.39<H>    Ca    10.4      16 Jul 2022 07:14          Labs personally reviewed      ASSESSMENT/PLAN: 	  76 yo male with PMHx of ESRD on HD via AV fistula LUE MWF, COPD (on 2L home O2), CHF, pHTN, hypotension on Midodrine, DVT S/P IVC filter p/w fall.  Patient reports that he was walking out of his house today to go to dialysis when he felt his left knee "give out." Patient landed on his buttocks. No head trauma or LOC.  On coumadin.  Patient was unable to get up 2/2 left knee pain.  Patient also c/o acute on chronic low back pain since the fall.  Patient reports that he is otherwise feeling well.  Denies headache, blurry vision, focal weakness, numbness, slurred speech, CP, SOB, abd pain, NVD.  Patient does not make urine.  Of note, patient was discharged from [facility] one month ago after receiving PT for BL knee pain and difficulty ambulating.  Patient states that after discharge from rehab he was supposed to receive home PT, but that has not happened. (06 Jul 2022 14:54).    1. Chronic hypotension   - with baseline SBP noted in the 80s prior admit a year ago  - was discharged on Midodrine 10mg TID  - BP soft   - PT eval   - BP continues to be SBP 90s  - Midodrine 15mg PO TID, cont to closely monitor BP  - monitor for nausea and vomiting     2. Hx of LE DVT prior admit   - s/p IVC filter   - on Warfarin at home as INR is elevated  - rec resume coumadin with INR <2.5,   - INR 1.65    3. ?Hx of HF  - prior echo with preserved EF 60% and no mention of pulm HTN  - on HD for fluid management     4. ESRD   - HD per renal   - monitor Lytes and creat   - avoid nephrotoxins           Mendy Valle [facility]   Roberto Latif DO [facility]  Cardiovascular Medicine  [address] [place] [address], [address]  Office: [phone]   Date of Service   07-16-22 @ 11:37    Patient is a 77y old  Male who presents with a chief complaint of functional paraplegia post a fall (16 Jul 2022 10:27)      INTERVAL HISTORY: pt feels ok  REVIEW OF SYSTEMS:   CONSTITUTIONAL: No weakness  EYES/ENT: No visual changes; No throat pain  Neck: No pain or stiffness  Respiratory: No cough, wheezing, No shortness of breath  CARDIOVASCULAR: no chest pain or palpitations  GASTROINTESTINAL: No abdominal pain, no nausea, vomiting or hematemesis  GENITOURINARY: No dysuria, frequency or hematuria  NEUROLOGICAL: No stroke like symptoms  SKIN: No rashes    	  MEDICATIONS:  midodrine. 15 milliGRAM(s) Oral three times a day        PHYSICAL EXAM:  T(C): 36.8 (07-16-22 @ 10:45), Max: 37.2 (07-15-22 @ 23:28)  HR: 91 (07-16-22 @ 10:45) (88 - 100)  BP: 75/46 (07-16-22 @ 10:45) (75/46 - 98/50)  RR: 18 (07-16-22 @ 10:45) (17 - 18)  SpO2: 95% (07-16-22 @ 10:45) (92% - 98%)  Wt(kg): --  I&O's Summary    15 Jul 2022 07:01  -  16 Jul 2022 07:00  --------------------------------------------------------  IN: 0 mL / OUT: 1000 mL / NET: -1000 mL    16 Jul 2022 07:01  -  16 Jul 2022 11:37  --------------------------------------------------------  IN: 240 mL / OUT: 0 mL / NET: 240 mL          Appearance: In no distress	  HEENT:    PERRL, EOMI	  Cardiovascular:  S1 S2, No JVD  Respiratory: Lungs clear to auscultation	  Gastrointestinal:  Soft, Non-tender, + BS	  Vasculature:  No edema of LE  Psychiatric: Appropriate affect   Neuro: no acute focal deficits                               13.3   14.60 )-----------( 127      ( 16 Jul 2022 07:23 )             42.8     07-16    134<L>  |  90<L>  |  47<H>  ----------------------------<  101<H>  5.0   |  27  |  7.39<H>    Ca    10.4      16 Jul 2022 07:14          Labs personally reviewed      ASSESSMENT/PLAN: 	  76 yo male with PMHx of ESRD on HD via AV fistula LUE MWF, COPD (on 2L home O2), CHF, pHTN, hypotension on Midodrine, DVT S/P IVC filter p/w fall.  Patient reports that he was walking out of his house today to go to dialysis when he felt his left knee "give out." Patient landed on his buttocks. No head trauma or LOC.  On coumadin.  Patient was unable to get up 2/2 left knee pain.  Patient also c/o acute on chronic low back pain since the fall.  Patient reports that he is otherwise feeling well.  Denies headache, blurry vision, focal weakness, numbness, slurred speech, CP, SOB, abd pain, NVD.  Patient does not make urine.  Of note, patient was discharged from [facility] one month ago after receiving PT for BL knee pain and difficulty ambulating.  Patient states that after discharge from rehab he was supposed to receive home PT, but that has not happened. (06 Jul 2022 14:54).    1. Chronic hypotension   - with baseline SBP noted in the 80s prior admit a year ago  - was discharged on Midodrine 10mg TID  - BP soft   - PT eval   - BP continues to be SBP 90s  - Midodrine 15mg PO TID, cont to closely monitor BP  - monitor for nausea and vomiting   - may add a trial of florinef 0.1mg if BP remains low despite midodrine     2. Hx of LE DVT prior admit   - s/p IVC filter   - on Warfarin at home as INR is elevated  - rec resume coumadin with INR <2.5,   - INR 1.65    3. ?Hx of HF  - prior echo with preserved EF 60% and no mention of pulm HTN  - on HD for fluid management     4. ESRD   - HD per renal   - monitor Lytes and creat   - avoid nephrotoxins           Mendy Valle [facility]   Roberto Latif DO [facility]  Cardiovascular Medicine  [address] [place] [address], [address]  Office: [phone]

## 2022-07-16 NOTE — PROGRESS NOTE ADULT - ASSESSMENT
Pt. is a 77 y.o. M e/ PMHx. of HTN, HFrEF, SAADIA, Gout and ESRD on HD MWF at [facility] [place] presents after having a fall enroute to the HD center. 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 c/o knee pain and inability to walk. nephrology called. PT kevin ordered. cont outptn meds, check orthostatics. card called  ptn w h/o DVT/PE, in the past not on AC 2/2 h/o GI bleeds, but admitted on COumadin, will trend INR.    h/o COPD with chronic hypoxic respiratory failure on 2 L home O2. presently stable.   episode of vomiting on 7/11 while in HD, CT scan w dilated stomach, no obstruction, seen by GI,   tolerating po today, HD MWF,volume status is stable.   tolerating full liquids. on reglan, GI following. post prandial vomiting most likely 2/2 gastroparesis. advance diet to solids  today    awaiting BORIS placement

## 2022-07-16 NOTE — PROGRESS NOTE ADULT - SUBJECTIVE AND OBJECTIVE BOX
Endocrinology Attending Covering for Dr. Tian      Chief complaint  Patient is a 77y old  Male who presents with a chief complaint of functional paraplegia post a fall (16 Jul 2022 11:37)   Review of systems  Patient in bed, looks comfortable, no fever,  had no hypoglycemia.    Labs and Fingersticks  CAPILLARY BLOOD GLUCOSE      POCT Blood Glucose.: 109 mg/dL (16 Jul 2022 11:48)  POCT Blood Glucose.: 96 mg/dL (16 Jul 2022 07:50)  POCT Blood Glucose.: 144 mg/dL (15 Jul 2022 21:29)  POCT Blood Glucose.: 102 mg/dL (15 Jul 2022 16:35)      Anion Gap, Serum: 17 (07-16 @ 07:14)  Anion Gap, Serum: 21 *H* (07-15 @ 07:38)      Calcium, Total Serum: 10.4 (07-16 @ 07:14)  Calcium, Total Serum: 10.2 (07-15 @ 07:38)          07-16    134<L>  |  90<L>  |  47<H>  ----------------------------<  101<H>  5.0   |  27  |  7.39<H>    Ca    10.4      16 Jul 2022 07:14                          13.3   14.60 )-----------( 127      ( 16 Jul 2022 07:23 )             42.8     Medications  MEDICATIONS  (STANDING):  allopurinol 100 milliGRAM(s) Oral daily  budesonide 160 MICROgram(s)/formoterol 4.5 MICROgram(s) Inhaler 2 Puff(s) Inhalation two times a day  chlorhexidine 2% Cloths 1 Application(s) Topical <User Schedule>  metoclopramide 5 milliGRAM(s) Oral every 8 hours  midodrine. 15 milliGRAM(s) Oral three times a day  multivitamin 1 Tablet(s) Oral daily  pantoprazole    Tablet 40 milliGRAM(s) Oral before breakfast  polyethylene glycol 3350 17 Gram(s) Oral daily  pregabalin 50 milliGRAM(s) Oral two times a day  sevelamer carbonate 800 milliGRAM(s) Oral three times a day with meals  simethicone 80 milliGRAM(s) Chew three times a day  tiotropium 18 MICROgram(s) Capsule 1 Capsule(s) Inhalation daily      Physical Exam  General: Patient comfortable in bed  Vital Signs Last 12 Hrs  T(F): 98.2 (07-16-22 @ 10:45), Max: 98.2 (07-16-22 @ 10:45)  HR: 91 (07-16-22 @ 10:45) (91 - 95)  BP: 75/46 (07-16-22 @ 10:45) (75/46 - 75/46)  BP(mean): --  RR: 18 (07-16-22 @ 10:45) (18 - 18)  SpO2: 95% (07-16-22 @ 10:45) (92% - 95%)  Neck: No palpable thyroid nodules.  CVS: S1S2, No murmurs  Respiratory: No wheezing, no crepitations  GI: Abdomen soft, bowel sounds positive  Musculoskeletal:  edema lower extremities.   Skin: No skin rashes, no ecchymosis    Diagnostics

## 2022-07-16 NOTE — PROGRESS NOTE ADULT - SUBJECTIVE AND OBJECTIVE BOX
Patient is a 77y old  Male who presents with a chief complaint of functional paraplegia post a fall (16 Jul 2022 12:55)      SUBJECTIVE / OVERNIGHT EVENTS: no further vomiting, tolerating full liquids    MEDICATIONS  (STANDING):  allopurinol 100 milliGRAM(s) Oral daily  budesonide 160 MICROgram(s)/formoterol 4.5 MICROgram(s) Inhaler 2 Puff(s) Inhalation two times a day  chlorhexidine 2% Cloths 1 Application(s) Topical <User Schedule>  metoclopramide 5 milliGRAM(s) Oral every 8 hours  midodrine. 15 milliGRAM(s) Oral three times a day  multivitamin 1 Tablet(s) Oral daily  pantoprazole    Tablet 40 milliGRAM(s) Oral before breakfast  polyethylene glycol 3350 17 Gram(s) Oral daily  pregabalin 50 milliGRAM(s) Oral two times a day  sevelamer carbonate 800 milliGRAM(s) Oral three times a day with meals  simethicone 80 milliGRAM(s) Chew three times a day  tiotropium 18 MICROgram(s) Capsule 1 Capsule(s) Inhalation daily    MEDICATIONS  (PRN):      Vital Signs Last 24 Hrs  T(F): 98.2 (07-16-22 @ 10:45), Max: 99 (07-15-22 @ 23:28)  HR: 91 (07-16-22 @ 15:16) (91 - 100)  BP: 75/46 (07-16-22 @ 10:45) (75/46 - 88/50)  RR: 18 (07-16-22 @ 10:45) (17 - 18)  SpO2: 95% (07-16-22 @ 15:16) (92% - 95%)  Telemetry:   CAPILLARY BLOOD GLUCOSE      POCT Blood Glucose.: 109 mg/dL (16 Jul 2022 11:48)  POCT Blood Glucose.: 96 mg/dL (16 Jul 2022 07:50)  POCT Blood Glucose.: 144 mg/dL (15 Jul 2022 21:29)  POCT Blood Glucose.: 102 mg/dL (15 Jul 2022 16:35)    I&O's Summary    15 Jul 2022 07:01  -  16 Jul 2022 07:00  --------------------------------------------------------  IN: 0 mL / OUT: 1000 mL / NET: -1000 mL    16 Jul 2022 07:01  -  16 Jul 2022 16:06  --------------------------------------------------------  IN: 240 mL / OUT: 0 mL / NET: 240 mL        PHYSICAL EXAM:  GENERAL: NAD, well-developed  HEAD:  Atraumatic, Normocephalic  EYES: EOMI, PERRLA, conjunctiva and sclera clear  NECK: Supple, No JVD  CHEST/LUNG: Clear to auscultation bilaterally; No wheeze  HEART: Regular rate and rhythm; No murmurs, rubs, or gallops  ABDOMEN: Soft, Nontender, Nondistended; Bowel sounds present  EXTREMITIES:  2+ Peripheral Pulses, No clubbing, cyanosis, or edema  PSYCH: AAOx3  NEUROLOGY: non-focal  SKIN: No rashes or lesions    LABS:                        13.3   14.60 )-----------( 127      ( 16 Jul 2022 07:23 )             42.8     07-16    134<L>  |  90<L>  |  47<H>  ----------------------------<  101<H>  5.0   |  27  |  7.39<H>    Ca    10.4      16 Jul 2022 07:14      PT/INR - ( 15 Jul 2022 07:43 )   PT: 19.2 sec;   INR: 1.65 ratio                   RADIOLOGY & ADDITIONAL TESTS:    Imaging Personally Reviewed:    Consultant(s) Notes Reviewed:      Care Discussed with Consultants/Other Providers:

## 2022-07-16 NOTE — PROGRESS NOTE ADULT - SUBJECTIVE AND OBJECTIVE BOX
[facility]  Avi Riddle PA-C  [address] [place] [address] [phone]      INTERVAL HPI/OVERNIGHT EVENTS:  pt seen and examined, no new events   states he isn't nauseous but his stomach is "messed up" from dulcolax yesterday   had multiple BMs yesterday   does not want to eat    MEDICATIONS  (STANDING):  allopurinol 100 milliGRAM(s) Oral daily  budesonide 160 MICROgram(s)/formoterol 4.5 MICROgram(s) Inhaler 2 Puff(s) Inhalation two times a day  chlorhexidine 2% Cloths 1 Application(s) Topical <User Schedule>  midodrine. 10 milliGRAM(s) Oral three times a day  multivitamin 1 Tablet(s) Oral daily  pantoprazole    Tablet 40 milliGRAM(s) Oral before breakfast  polyethylene glycol 3350 17 Gram(s) Oral daily  pregabalin 50 milliGRAM(s) Oral two times a day  sevelamer carbonate 800 milliGRAM(s) Oral three times a day with meals  simethicone 80 milliGRAM(s) Chew three times a day  tiotropium 18 MICROgram(s) Capsule 1 Capsule(s) Inhalation daily    MEDICATIONS  (PRN):      Allergies    latex (Rash)  No Known Drug Allergies    Intolerances        ROS:   General:  No wt loss, fevers, chills, night sweats, fatigue,   Eyes:  Good vision, no reported pain  ENT:  No sore throat, pain, runny nose, dysphagia  CV:  No pain, palpitations, hypo/hypertension  Resp:  No dyspnea, cough, tachypnea, wheezing  GI:  No pain, + nausea, No vomiting, No diarrhea, + constipation, No weight loss, No fever, No pruritis, No rectal bleeding, No tarry stools, No dysphagia,  :  No pain, bleeding, incontinence, nocturia  Muscle:  No pain, weakness  Neuro:  No weakness, tingling, memory problems  Psych:  No fatigue, insomnia, mood problems, depression  Endocrine:  No polyuria, polydipsia, cold/heat intolerance  Heme:  No petechiae, ecchymosis, easy bruisability  Skin:  No rash, tattoos, scars, edema      PHYSICAL EXAM:   Vital Signs Last 24 Hrs  T(C): 37.2 (15 Jul 2022 23:28), Max: 37.2 (15 Jul 2022 23:28)  T(F): 99 (15 Jul 2022 23:28), Max: 99 (15 Jul 2022 23:28)  HR: 95 (16 Jul 2022 08:33) (88 - 100)  BP: 88/50 (15 Jul 2022 23:28) (82/52 - 98/50)  BP(mean): --  RR: 17 (15 Jul 2022 23:28) (17 - 18)  SpO2: 92% (16 Jul 2022 08:33) (92% - 98%)    Parameters below as of 15 Jul 2022 23:28  Patient On (Oxygen Delivery Method): nasal cannula      Daily     Daily     GENERAL:  Appears stated age,   HEENT:  NC/AT,    CHEST:  Full & symmetric excursion,   HEART:  Regular rhythm,  ABDOMEN:  Soft, non-tender, non-distended,  EXTEREMITIES:  no cyanosis  SKIN:  No rash  NEURO:  Alert,       LABS:                        13.3   14.60 )-----------( 127      ( 16 Jul 2022 07:23 )             42.8   07-16    134<L>  |  90<L>  |  47<H>  ----------------------------<  101<H>  5.0   |  27  |  7.39<H>    Ca    10.4      16 Jul 2022 07:14        RADIOLOGY & ADDITIONAL TESTS:  < from: CT Abdomen and Pelvis No Cont (07.11.22 @ 22:02) >    ACC: [id number] EXAM:  CT ABDOMEN AND PELVIS                          PROCEDURE DATE:  07/11/2022          INTERPRETATION:  CLINICAL INFORMATION: Vomiting, gassy distention.    COMPARISON: CT abdomen pelvis 8/22/2022. CT chest 8/27/2021.    CONTRAST/COMPLICATIONS:  IV Contrast: NONE  Oral Contrast: NONE  Complications: None reported at time of study completion    PROCEDURE:  CT of the Abdomen and Pelvis was performed.  Sagittal and coronal reformats were performed.    FINDINGS:  Evaluation of thesolid organs and vasculature is limited without   intravenous contrast.    LOWER CHEST: Basilar subsegmental atelectasis. Left lower lobe and right   middle lobe calcified granulomas. Coronary artery calcification.   Calcified right hilar lymph nodes.    LIVER: Few punctate calcified granulomas.  BILE DUCTS: Normal caliber.  GALLBLADDER: Within normal limits.  SPLEEN: Scattered tiny calcified granulomas.  PANCREAS: Within normal limits.  ADRENALS: Bilateral adrenal gland thickening, unchanged.  KIDNEYS/URETERS: Atrophic bilateral kidneys. Bilateral renal cysts and   subcentimeter hypodensities too small to characterize.    BLADDER: Minimally distended.  REPRODUCTIVE ORGANS: Prostate within normal limits.    BOWEL: Stomach is moderately distended. No small bowel obstruction.   Status post right hemicolectomy. Mild to moderate stool in the distal   rectosigmoid colon.  PERITONEUM: No ascites.  VESSELS: Atherosclerotic changes. Infrarenal IVC filter.  RETROPERITONEUM/LYMPH NODES: No lymphadenopathy.  ABDOMINAL WALL: Rectus diastases. Multiple ventral hernias containing fat   and small knuckle of bowel. Abdominal wall collateral vessels.  BONES: Degenerative changes.    IMPRESSION:  No small bowel obstruction. Stomach is moderately distended. Mild to   moderate stool in the distal rectosigmoid colon.        --- End of Report ---

## 2022-07-17 LAB
ANION GAP SERPL CALC-SCNC: 16 MMOL/L — SIGNIFICANT CHANGE UP (ref 5–17)
BUN SERPL-MCNC: 64 MG/DL — HIGH (ref 7–23)
CALCIUM SERPL-MCNC: 10.1 MG/DL — SIGNIFICANT CHANGE UP (ref 8.4–10.5)
CHLORIDE SERPL-SCNC: 90 MMOL/L — LOW (ref 96–108)
CK MB CFR SERPL CALC: 1.1 NG/ML — SIGNIFICANT CHANGE UP (ref 0–6.7)
CK MB CFR SERPL CALC: 1.1 NG/ML — SIGNIFICANT CHANGE UP (ref 0–6.7)
CK MB CFR SERPL CALC: 1.3 NG/ML — SIGNIFICANT CHANGE UP (ref 0–6.7)
CK SERPL-CCNC: 24 U/L — LOW (ref 30–200)
CK SERPL-CCNC: 25 U/L — LOW (ref 30–200)
CK SERPL-CCNC: 26 U/L — LOW (ref 30–200)
CO2 SERPL-SCNC: 28 MMOL/L — SIGNIFICANT CHANGE UP (ref 22–31)
CORTIS AM PEAK SERPL-MCNC: 19.2 UG/DL — HIGH (ref 6–18.4)
CREAT SERPL-MCNC: 9.79 MG/DL — HIGH (ref 0.5–1.3)
EGFR: 5 ML/MIN/1.73M2 — LOW
GLUCOSE BLDC GLUCOMTR-MCNC: 100 MG/DL — HIGH (ref 70–99)
GLUCOSE BLDC GLUCOMTR-MCNC: 102 MG/DL — HIGH (ref 70–99)
GLUCOSE BLDC GLUCOMTR-MCNC: 118 MG/DL — HIGH (ref 70–99)
GLUCOSE BLDC GLUCOMTR-MCNC: 93 MG/DL — SIGNIFICANT CHANGE UP (ref 70–99)
GLUCOSE SERPL-MCNC: 102 MG/DL — HIGH (ref 70–99)
HCT VFR BLD CALC: 41.4 % — SIGNIFICANT CHANGE UP (ref 39–50)
HGB BLD-MCNC: 12.5 G/DL — LOW (ref 13–17)
MCHC RBC-ENTMCNC: 28.2 PG — SIGNIFICANT CHANGE UP (ref 27–34)
MCHC RBC-ENTMCNC: 30.2 GM/DL — LOW (ref 32–36)
MCV RBC AUTO: 93.2 FL — SIGNIFICANT CHANGE UP (ref 80–100)
NRBC # BLD: 0 /100 WBCS — SIGNIFICANT CHANGE UP (ref 0–0)
PLATELET # BLD AUTO: 173 K/UL — SIGNIFICANT CHANGE UP (ref 150–400)
POTASSIUM SERPL-MCNC: 5.6 MMOL/L — HIGH (ref 3.5–5.3)
POTASSIUM SERPL-SCNC: 5.6 MMOL/L — HIGH (ref 3.5–5.3)
RBC # BLD: 4.44 M/UL — SIGNIFICANT CHANGE UP (ref 4.2–5.8)
RBC # FLD: 15.5 % — HIGH (ref 10.3–14.5)
SODIUM SERPL-SCNC: 134 MMOL/L — LOW (ref 135–145)
T4 AB SER-ACNC: 4.6 UG/DL — SIGNIFICANT CHANGE UP (ref 4.6–12)
TROPONIN T, HIGH SENSITIVITY RESULT: 200 NG/L — HIGH (ref 0–51)
TROPONIN T, HIGH SENSITIVITY RESULT: 202 NG/L — HIGH (ref 0–51)
TROPONIN T, HIGH SENSITIVITY RESULT: 210 NG/L — HIGH (ref 0–51)
TSH SERPL-MCNC: 1.32 UIU/ML — SIGNIFICANT CHANGE UP (ref 0.27–4.2)
WBC # BLD: 14.42 K/UL — HIGH (ref 3.8–10.5)
WBC # FLD AUTO: 14.42 K/UL — HIGH (ref 3.8–10.5)

## 2022-07-17 PROCEDURE — 74018 RADEX ABDOMEN 1 VIEW: CPT | Mod: 26

## 2022-07-17 PROCEDURE — 93010 ELECTROCARDIOGRAM REPORT: CPT

## 2022-07-17 PROCEDURE — 71250 CT THORAX DX C-: CPT | Mod: 26

## 2022-07-17 PROCEDURE — 71045 X-RAY EXAM CHEST 1 VIEW: CPT | Mod: 26

## 2022-07-17 RX ORDER — SODIUM CHLORIDE 9 MG/ML
1000 INJECTION, SOLUTION INTRAVENOUS
Refills: 0 | Status: DISCONTINUED | OUTPATIENT
Start: 2022-07-17 | End: 2022-07-26

## 2022-07-17 RX ORDER — FAMOTIDINE 10 MG/ML
10 INJECTION INTRAVENOUS ONCE
Refills: 0 | Status: COMPLETED | OUTPATIENT
Start: 2022-07-17 | End: 2022-07-17

## 2022-07-17 RX ORDER — PANTOPRAZOLE SODIUM 20 MG/1
30 TABLET, DELAYED RELEASE ORAL ONCE
Refills: 0 | Status: COMPLETED | OUTPATIENT
Start: 2022-07-17 | End: 2022-07-17

## 2022-07-17 RX ADMIN — MIDODRINE HYDROCHLORIDE 15 MILLIGRAM(S): 2.5 TABLET ORAL at 17:40

## 2022-07-17 RX ADMIN — SEVELAMER CARBONATE 800 MILLIGRAM(S): 2400 POWDER, FOR SUSPENSION ORAL at 07:52

## 2022-07-17 RX ADMIN — PANTOPRAZOLE SODIUM 40 MILLIGRAM(S): 20 TABLET, DELAYED RELEASE ORAL at 05:15

## 2022-07-17 RX ADMIN — PANTOPRAZOLE SODIUM 30 MILLIGRAM(S): 20 TABLET, DELAYED RELEASE ORAL at 01:29

## 2022-07-17 RX ADMIN — MIDODRINE HYDROCHLORIDE 15 MILLIGRAM(S): 2.5 TABLET ORAL at 12:12

## 2022-07-17 RX ADMIN — BUDESONIDE AND FORMOTEROL FUMARATE DIHYDRATE 2 PUFF(S): 160; 4.5 AEROSOL RESPIRATORY (INHALATION) at 07:52

## 2022-07-17 RX ADMIN — Medication 5 MILLIGRAM(S): at 15:43

## 2022-07-17 RX ADMIN — Medication 50 MILLIGRAM(S): at 05:15

## 2022-07-17 RX ADMIN — TIOTROPIUM BROMIDE 1 CAPSULE(S): 18 CAPSULE ORAL; RESPIRATORY (INHALATION) at 12:12

## 2022-07-17 RX ADMIN — SIMETHICONE 80 MILLIGRAM(S): 80 TABLET, CHEWABLE ORAL at 15:43

## 2022-07-17 RX ADMIN — Medication 50 MILLIGRAM(S): at 17:40

## 2022-07-17 RX ADMIN — SEVELAMER CARBONATE 800 MILLIGRAM(S): 2400 POWDER, FOR SUSPENSION ORAL at 12:11

## 2022-07-17 RX ADMIN — Medication 1 TABLET(S): at 12:13

## 2022-07-17 RX ADMIN — CHLORHEXIDINE GLUCONATE 1 APPLICATION(S): 213 SOLUTION TOPICAL at 05:22

## 2022-07-17 RX ADMIN — Medication 100 MILLIGRAM(S): at 12:12

## 2022-07-17 RX ADMIN — SIMETHICONE 80 MILLIGRAM(S): 80 TABLET, CHEWABLE ORAL at 21:37

## 2022-07-17 RX ADMIN — BUDESONIDE AND FORMOTEROL FUMARATE DIHYDRATE 2 PUFF(S): 160; 4.5 AEROSOL RESPIRATORY (INHALATION) at 17:41

## 2022-07-17 RX ADMIN — SEVELAMER CARBONATE 800 MILLIGRAM(S): 2400 POWDER, FOR SUSPENSION ORAL at 17:40

## 2022-07-17 RX ADMIN — Medication 5 MILLIGRAM(S): at 21:37

## 2022-07-17 RX ADMIN — Medication 5 MILLIGRAM(S): at 05:14

## 2022-07-17 RX ADMIN — SIMETHICONE 80 MILLIGRAM(S): 80 TABLET, CHEWABLE ORAL at 05:14

## 2022-07-17 RX ADMIN — SODIUM CHLORIDE 30 MILLILITER(S): 9 INJECTION, SOLUTION INTRAVENOUS at 00:18

## 2022-07-17 RX ADMIN — POLYETHYLENE GLYCOL 3350 17 GRAM(S): 17 POWDER, FOR SOLUTION ORAL at 12:12

## 2022-07-17 RX ADMIN — MIDODRINE HYDROCHLORIDE 15 MILLIGRAM(S): 2.5 TABLET ORAL at 05:14

## 2022-07-17 RX ADMIN — FAMOTIDINE 10 MILLIGRAM(S): 10 INJECTION INTRAVENOUS at 00:18

## 2022-07-17 NOTE — PROGRESS NOTE ADULT - SUBJECTIVE AND OBJECTIVE BOX
Date of Service   07-17-22 @ 11:25    Patient is a 77y old  Male who presents with a chief complaint of functional paraplegia post a fall (17 Jul 2022 10:23)      INTERVAL HISTORY: pt feels ok, had nausea and chest pain last night     REVIEW OF SYSTEMS:   CONSTITUTIONAL: No weakness  EYES/ENT: No visual changes; No throat pain  Neck: No pain or stiffness  Respiratory: No cough, wheezing, No shortness of breath  CARDIOVASCULAR: no chest pain or palpitations  GASTROINTESTINAL: No abdominal pain, no nausea, vomiting or hematemesis  GENITOURINARY: No dysuria, frequency or hematuria  NEUROLOGICAL: No stroke like symptoms  SKIN: No rashes    	  MEDICATIONS:  midodrine. 15 milliGRAM(s) Oral three times a day        PHYSICAL EXAM:  T(C): 37.2 (07-17-22 @ 10:46), Max: 37.2 (07-17-22 @ 10:46)  HR: 81 (07-17-22 @ 10:46) (81 - 98)  BP: 89/51 (07-17-22 @ 10:46) (77/48 - 96/58)  RR: 18 (07-17-22 @ 10:46) (18 - 18)  SpO2: 97% (07-17-22 @ 10:46) (93% - 97%)  Wt(kg): --  I&O's Summary    16 Jul 2022 07:01  -  17 Jul 2022 07:00  --------------------------------------------------------  IN: 240 mL / OUT: 0 mL / NET: 240 mL          Appearance: In no distress	  HEENT:    PERRL, EOMI	  Cardiovascular:  S1 S2, No JVD  Respiratory: Lungs clear to auscultation	  Gastrointestinal:  Soft, Non-tender, + BS	  Vasculature:  No edema of LE  Psychiatric: Appropriate affect   Neuro: no acute focal deficits                               12.5   14.42 )-----------( 173      ( 17 Jul 2022 06:53 )             41.4     07-17    134<L>  |  90<L>  |  64<H>  ----------------------------<  102<H>  5.6<H>   |  28  |  9.79<H>    Ca    10.1      17 Jul 2022 06:51          Labs personally reviewed      ASSESSMENT/PLAN: 		  78 yo male with PMHx of ESRD on HD via AV fistula LUE MWF, COPD (on 2L home O2), CHF, pHTN, hypotension on Midodrine, DVT S/P IVC filter p/w fall.  Patient reports that he was walking out of his house today to go to dialysis when he felt his left knee "give out." Patient landed on his buttocks. No head trauma or LOC.  On coumadin.  Patient was unable to get up 2/2 left knee pain.  Patient also c/o acute on chronic low back pain since the fall.  Patient reports that he is otherwise feeling well.  Denies headache, blurry vision, focal weakness, numbness, slurred speech, CP, SOB, abd pain, NVD.  Patient does not make urine.  Of note, patient was discharged from [facility] one month ago after receiving PT for BL knee pain and difficulty ambulating.  Patient states that after discharge from rehab he was supposed to receive home PT, but that has not happened. (06 Jul 2022 14:54).    1. Chronic hypotension   - with baseline SBP noted in the 80s prior admit a year ago  - was discharged on Midodrine 10mg TID  - BP soft   - PT eval   - BP continues to be SBP 90s  - Midodrine 15mg PO TID, cont to closely monitor BP  - monitor for nausea and vomiting   - may add a trial of florinef 0.1mg if BP remains low despite midodrine     2. Hx of LE DVT prior admit   - s/p IVC filter   - on Warfarin at home as INR is elevated  - rec resume coumadin with INR <2.5,   - monitor INR     3. ?Hx of HF  - prior echo with preserved EF 60% and no mention of pulm HTN  - on HD for fluid management     4. ESRD   - HD per renal   - monitor Lytes and creat   - avoid nephrotoxins     5. Chest Pain   - Repeat EKG noted   - Troponin noted   - TTE orderedd       Mendy JUNG-BC   Roberto Latif DO [facility]  [address], [address]  Office: [phone]

## 2022-07-17 NOTE — PROGRESS NOTE ADULT - ASSESSMENT
Assessment  Hypoglycemia: 77y nondiabetic male, was not taking any hypoglycemic agents, admitted s/p mechanical fall, blood sugars are improving and in acceptable range now, last hypoglycemic episode yesterday, ?pseudohypoglycemias?. Patient appears comfortable, remains on full liquid diet.  Hypotension: AM cortisol WNL, on midodrine, monitored.  ?Gastroparesis: On meds, monitored.  ESRD: On hemodialysis, Monitor labs/BMP      tonia Claudette HUGO  Cell: [phone]             Problem/Recommendation - 1:  ·  Problem: Hypoglycemia.   ·  Recommendation: ?Pseudohypoglycemia. Hypoglycemias have all been with low FS and not blood sugar.. Suggest to check blood sugar at the time of hypoglycemia to confirm real low.  Likely nutritional hypoglycemia in the setting of N/V and ESRD.   Encourage to increase po intake, nutritional supplements as tolerated. May need continuous dextrose for now.  Will continue monitoring and FU.     Problem/Recommendation - 2:  ·  Problem: Hypotension.   ·  Recommendation: AM cortisol WNL. Suggest to continue medications, monitoring, FU primary team recommendations.     Problem/Recommendation - 3:  ·  Problem: Gastroparesis.   ·  Recommendation: Suggest to continue medications, monitoring, FU primary team recommendations.     Problem/Recommendation - 4:  ·  Problem: ESRD on hemodialysis.   ·  Recommendation: On HD, continue as scheduled, renal FU Assessment  Hypoglycemia: 77y nondiabetic male, was not taking any hypoglycemic agents, admitted s/p mechanical fall, blood sugars are improving and in acceptable range now, last hypoglycemic episode yesterday, ?pseudohypoglycemias?. Patient appears comfortable, remains on full liquid diet.  Hypotension: AM cortisol WNL, on midodrine, monitored.  ?Gastroparesis: On meds, monitored.  ESRD: On hemodialysis, Monitor labs/BMP      tonia Claudette HUGO  Cell: [phone]             Problem/Recommendation - 1:  ·  Problem: Hypoglycemia.   ·  Recommendation: ?Pseudohypoglycemia. Hypoglycemias have all been with low FS and not blood sugar.. Suggest to check blood sugar at the time of hypoglycemia to confirm real low. Also check insulin antibody, igf-1, igf-2, cortisol level at the time of hypoglycemia.  Likely nutritional hypoglycemia in the setting of N/V and ESRD.   Encourage to increase po intake, nutritional supplements as tolerated. May need continuous dextrose for now.  Will continue monitoring and FU.     Problem/Recommendation - 2:  ·  Problem: Hypotension.   ·  Recommendation: AM cortisol WNL. Suggest to continue medications, monitoring, FU primary team recommendations.     Problem/Recommendation - 3:  ·  Problem: Gastroparesis.   ·  Recommendation: Suggest to continue medications, monitoring, FU primary team recommendations.     Problem/Recommendation - 4:  ·  Problem: ESRD on hemodialysis.   ·  Recommendation: On HD, continue as scheduled, renal FU

## 2022-07-17 NOTE — PROGRESS NOTE ADULT - SUBJECTIVE AND OBJECTIVE BOX
[facility]  Avi Riddle PA-C  [facility] [address] [place] [address]  [phone]      INTERVAL HPI/OVERNIGHT EVENTS:  pt seen and examined, no new events   denies nausea, tolerating solid diet    MEDICATIONS  (STANDING):  allopurinol 100 milliGRAM(s) Oral daily  budesonide 160 MICROgram(s)/formoterol 4.5 MICROgram(s) Inhaler 2 Puff(s) Inhalation two times a day  chlorhexidine 2% Cloths 1 Application(s) Topical <User Schedule>  midodrine. 10 milliGRAM(s) Oral three times a day  multivitamin 1 Tablet(s) Oral daily  pantoprazole    Tablet 40 milliGRAM(s) Oral before breakfast  polyethylene glycol 3350 17 Gram(s) Oral daily  pregabalin 50 milliGRAM(s) Oral two times a day  sevelamer carbonate 800 milliGRAM(s) Oral three times a day with meals  simethicone 80 milliGRAM(s) Chew three times a day  tiotropium 18 MICROgram(s) Capsule 1 Capsule(s) Inhalation daily    MEDICATIONS  (PRN):      Allergies    latex (Rash)  No Known Drug Allergies    Intolerances        ROS:   General:  No wt loss, fevers, chills, night sweats, fatigue,   Eyes:  Good vision, no reported pain  ENT:  No sore throat, pain, runny nose, dysphagia  CV:  No pain, palpitations, hypo/hypertension  Resp:  No dyspnea, cough, tachypnea, wheezing  GI:  No pain, + nausea, No vomiting, No diarrhea, + constipation, No weight loss, No fever, No pruritis, No rectal bleeding, No tarry stools, No dysphagia,  :  No pain, bleeding, incontinence, nocturia  Muscle:  No pain, weakness  Neuro:  No weakness, tingling, memory problems  Psych:  No fatigue, insomnia, mood problems, depression  Endocrine:  No polyuria, polydipsia, cold/heat intolerance  Heme:  No petechiae, ecchymosis, easy bruisability  Skin:  No rash, tattoos, scars, edema      PHYSICAL EXAM:   Vital Signs Last 24 Hrs  T(C): 37.2 (15 Jul 2022 23:28), Max: 37.2 (15 Jul 2022 23:28)  T(F): 99 (15 Jul 2022 23:28), Max: 99 (15 Jul 2022 23:28)  HR: 95 (16 Jul 2022 08:33) (88 - 100)  BP: 88/50 (15 Jul 2022 23:28) (82/52 - 98/50)  BP(mean): --  RR: 17 (15 Jul 2022 23:28) (17 - 18)  SpO2: 92% (16 Jul 2022 08:33) (92% - 98%)    Parameters below as of 15 Jul 2022 23:28  Patient On (Oxygen Delivery Method): nasal cannula      Daily     Daily     GENERAL:  Appears stated age,   HEENT:  NC/AT,    CHEST:  Full & symmetric excursion,   HEART:  Regular rhythm,  ABDOMEN:  Soft, non-tender, non-distended,  EXTEREMITIES:  no cyanosis  SKIN:  No rash  NEURO:  Alert,       LABS:                        13.3   14.60 )-----------( 127      ( 16 Jul 2022 07:23 )             42.8   07-16    134<L>  |  90<L>  |  47<H>  ----------------------------<  101<H>  5.0   |  27  |  7.39<H>    Ca    10.4      16 Jul 2022 07:14        RADIOLOGY & ADDITIONAL TESTS:  < from: CT Abdomen and Pelvis No Cont (07.11.22 @ 22:02) >    ACC: [id number] EXAM:  CT ABDOMEN AND PELVIS                          PROCEDURE DATE:  07/11/2022          INTERPRETATION:  CLINICAL INFORMATION: Vomiting, gassy distention.    COMPARISON: CT abdomen pelvis 8/22/2022. CT chest 8/27/2021.    CONTRAST/COMPLICATIONS:  IV Contrast: NONE  Oral Contrast: NONE  Complications: None reported at time of study completion    PROCEDURE:  CT of the Abdomen and Pelvis was performed.  Sagittal and coronal reformats were performed.    FINDINGS:  Evaluation of thesolid organs and vasculature is limited without   intravenous contrast.    LOWER CHEST: Basilar subsegmental atelectasis. Left lower lobe and right   middle lobe calcified granulomas. Coronary artery calcification.   Calcified right hilar lymph nodes.    LIVER: Few punctate calcified granulomas.  BILE DUCTS: Normal caliber.  GALLBLADDER: Within normal limits.  SPLEEN: Scattered tiny calcified granulomas.  PANCREAS: Within normal limits.  ADRENALS: Bilateral adrenal gland thickening, unchanged.  KIDNEYS/URETERS: Atrophic bilateral kidneys. Bilateral renal cysts and   subcentimeter hypodensities too small to characterize.    BLADDER: Minimally distended.  REPRODUCTIVE ORGANS: Prostate within normal limits.    BOWEL: Stomach is moderately distended. No small bowel obstruction.   Status post right hemicolectomy. Mild to moderate stool in the distal   rectosigmoid colon.  PERITONEUM: No ascites.  VESSELS: Atherosclerotic changes. Infrarenal IVC filter.  RETROPERITONEUM/LYMPH NODES: No lymphadenopathy.  ABDOMINAL WALL: Rectus diastases. Multiple ventral hernias containing fat   and small knuckle of bowel. Abdominal wall collateral vessels.  BONES: Degenerative changes.    IMPRESSION:  No small bowel obstruction. Stomach is moderately distended. Mild to   moderate stool in the distal rectosigmoid colon.        --- End of Report ---

## 2022-07-17 NOTE — PROGRESS NOTE ADULT - SUBJECTIVE AND OBJECTIVE BOX
Chief complaint  Patient is a 77y old  Male who presents with a chief complaint of functional paraplegia post a fall (17 Jul 2022 11:25)   Review of systems  Patient in bed, looks comfortable, last hypoglycemic episode yesterday.    Labs and Fingersticks  CAPILLARY BLOOD GLUCOSE      POCT Blood Glucose.: 118 mg/dL (17 Jul 2022 11:29)  POCT Blood Glucose.: 100 mg/dL (17 Jul 2022 08:15)  POCT Blood Glucose.: 92 mg/dL (16 Jul 2022 21:28)  POCT Blood Glucose.: 109 mg/dL (16 Jul 2022 17:55)  POCT Blood Glucose.: 56 mg/dL (16 Jul 2022 17:22)  POCT Blood Glucose.: 35 mg/dL (16 Jul 2022 17:03)  POCT Blood Glucose.: 51 mg/dL (16 Jul 2022 16:59)      Anion Gap, Serum: 16 (07-17 @ 06:51)  Anion Gap, Serum: 17 (07-16 @ 07:14)      Calcium, Total Serum: 10.1 (07-17 @ 06:51)  Calcium, Total Serum: 10.4 (07-16 @ 07:14)          07-17    134<L>  |  90<L>  |  64<H>  ----------------------------<  102<H>  5.6<H>   |  28  |  9.79<H>    Ca    10.1      17 Jul 2022 06:51                          12.5   14.42 )-----------( 173      ( 17 Jul 2022 06:53 )             41.4     Medications  MEDICATIONS  (STANDING):  allopurinol 100 milliGRAM(s) Oral daily  budesonide 160 MICROgram(s)/formoterol 4.5 MICROgram(s) Inhaler 2 Puff(s) Inhalation two times a day  chlorhexidine 2% Cloths 1 Application(s) Topical <User Schedule>  lactated ringers. 1000 milliLiter(s) (30 mL/Hr) IV Continuous <Continuous>  metoclopramide 5 milliGRAM(s) Oral every 8 hours  midodrine. 15 milliGRAM(s) Oral three times a day  multivitamin 1 Tablet(s) Oral daily  pantoprazole    Tablet 40 milliGRAM(s) Oral before breakfast  polyethylene glycol 3350 17 Gram(s) Oral daily  pregabalin 50 milliGRAM(s) Oral two times a day  sevelamer carbonate 800 milliGRAM(s) Oral three times a day with meals  simethicone 80 milliGRAM(s) Chew three times a day  tiotropium 18 MICROgram(s) Capsule 1 Capsule(s) Inhalation daily      Physical Exam  General: Patient comfortable in bed  Vital Signs Last 12 Hrs  T(F): 99 (07-17-22 @ 10:46), Max: 99 (07-17-22 @ 10:46)  HR: 81 (07-17-22 @ 10:46) (81 - 90)  BP: 89/51 (07-17-22 @ 10:46) (89/51 - 89/51)  BP(mean): --  RR: 18 (07-17-22 @ 10:46) (18 - 18)  SpO2: 97% (07-17-22 @ 10:46) (97% - 97%)    Diagnostics    Cortisol AM, Serum: AM Sched. Collection: 16-Jul-2022 06:00 (07-15 @ 14:04)  Free Thyroxine, Serum: AM Sched. Collection: 16-Jul-2022 06:00 (07-15 @ 14:04)  Thyroid Stimulating Hormone, Serum: AM Sched. Collection: 16-Jul-2022 06:00 (07-15 @ 14:04)

## 2022-07-17 NOTE — PROGRESS NOTE ADULT - SUBJECTIVE AND OBJECTIVE BOX
Patient is a 77y old  Male who presents with a chief complaint of functional paraplegia post a fall (17 Jul 2022 12:38)      SUBJECTIVE / OVERNIGHT EVENTS:    MEDICATIONS  (STANDING):  allopurinol 100 milliGRAM(s) Oral daily  budesonide 160 MICROgram(s)/formoterol 4.5 MICROgram(s) Inhaler 2 Puff(s) Inhalation two times a day  chlorhexidine 2% Cloths 1 Application(s) Topical <User Schedule>  lactated ringers. 1000 milliLiter(s) (30 mL/Hr) IV Continuous <Continuous>  metoclopramide 5 milliGRAM(s) Oral every 8 hours  midodrine. 15 milliGRAM(s) Oral three times a day  multivitamin 1 Tablet(s) Oral daily  pantoprazole    Tablet 40 milliGRAM(s) Oral before breakfast  polyethylene glycol 3350 17 Gram(s) Oral daily  pregabalin 50 milliGRAM(s) Oral two times a day  sevelamer carbonate 800 milliGRAM(s) Oral three times a day with meals  simethicone 80 milliGRAM(s) Chew three times a day  tiotropium 18 MICROgram(s) Capsule 1 Capsule(s) Inhalation daily    MEDICATIONS  (PRN):      Vital Signs Last 24 Hrs  T(F): 97.6 (07-17-22 @ 15:43), Max: 99 (07-17-22 @ 10:46)  HR: 79 (07-17-22 @ 15:43) (79 - 98)  BP: 109/59 (07-17-22 @ 15:43) (77/48 - 109/59)  RR: 18 (07-17-22 @ 15:43) (18 - 18)  SpO2: 96% (07-17-22 @ 15:43) (93% - 97%)  Telemetry:   CAPILLARY BLOOD GLUCOSE      POCT Blood Glucose.: 93 mg/dL (17 Jul 2022 16:26)  POCT Blood Glucose.: 118 mg/dL (17 Jul 2022 11:29)  POCT Blood Glucose.: 100 mg/dL (17 Jul 2022 08:15)  POCT Blood Glucose.: 92 mg/dL (16 Jul 2022 21:28)  POCT Blood Glucose.: 109 mg/dL (16 Jul 2022 17:55)  POCT Blood Glucose.: 56 mg/dL (16 Jul 2022 17:22)  POCT Blood Glucose.: 35 mg/dL (16 Jul 2022 17:03)  POCT Blood Glucose.: 51 mg/dL (16 Jul 2022 16:59)    I&O's Summary    16 Jul 2022 07:01  -  17 Jul 2022 07:00  --------------------------------------------------------  IN: 240 mL / OUT: 0 mL / NET: 240 mL    17 Jul 2022 07:01  -  17 Jul 2022 16:44  --------------------------------------------------------  IN: 480 mL / OUT: 0 mL / NET: 480 mL        PHYSICAL EXAM:  GENERAL: NAD, well-developed  HEAD:  Atraumatic, Normocephalic  EYES: EOMI, PERRLA, conjunctiva and sclera clear  NECK: Supple, No JVD  CHEST/LUNG: Clear to auscultation bilaterally; No wheeze  HEART: Regular rate and rhythm; No murmurs, rubs, or gallops  ABDOMEN: Soft, Nontender, Nondistended; Bowel sounds present  EXTREMITIES:  2+ Peripheral Pulses, No clubbing, cyanosis, or edema  PSYCH: AAOx3  NEUROLOGY: non-focal  SKIN: No rashes or lesions    LABS:                        12.5   14.42 )-----------( 173      ( 17 Jul 2022 06:53 )             41.4     07-17    134<L>  |  90<L>  |  64<H>  ----------------------------<  102<H>  5.6<H>   |  28  |  9.79<H>    Ca    10.1      17 Jul 2022 06:51        CARDIAC MARKERS ( 17 Jul 2022 09:25 )  x     / x     / 25 U/L / x     / 1.3 ng/mL  CARDIAC MARKERS ( 17 Jul 2022 01:28 )  x     / x     / 24 U/L / x     / 1.1 ng/mL  CARDIAC MARKERS ( 17 Jul 2022 00:38 )  x     / x     / 26 U/L / x     / 1.1 ng/mL          RADIOLOGY & ADDITIONAL TESTS:    Imaging Personally Reviewed:    Consultant(s) Notes Reviewed:      Care Discussed with Consultants/Other Providers:   Patient is a 77y old  Male who presents with a chief complaint of functional paraplegia post a fall (17 Jul 2022 12:38)      SUBJECTIVE / OVERNIGHT EVENTS: ongoing vomiting, will d/w GI. get Ct A/P    MEDICATIONS  (STANDING):  allopurinol 100 milliGRAM(s) Oral daily  budesonide 160 MICROgram(s)/formoterol 4.5 MICROgram(s) Inhaler 2 Puff(s) Inhalation two times a day  chlorhexidine 2% Cloths 1 Application(s) Topical <User Schedule>  lactated ringers. 1000 milliLiter(s) (30 mL/Hr) IV Continuous <Continuous>  metoclopramide 5 milliGRAM(s) Oral every 8 hours  midodrine. 15 milliGRAM(s) Oral three times a day  multivitamin 1 Tablet(s) Oral daily  pantoprazole    Tablet 40 milliGRAM(s) Oral before breakfast  polyethylene glycol 3350 17 Gram(s) Oral daily  pregabalin 50 milliGRAM(s) Oral two times a day  sevelamer carbonate 800 milliGRAM(s) Oral three times a day with meals  simethicone 80 milliGRAM(s) Chew three times a day  tiotropium 18 MICROgram(s) Capsule 1 Capsule(s) Inhalation daily    MEDICATIONS  (PRN):      Vital Signs Last 24 Hrs  T(F): 97.6 (07-17-22 @ 15:43), Max: 99 (07-17-22 @ 10:46)  HR: 79 (07-17-22 @ 15:43) (79 - 98)  BP: 109/59 (07-17-22 @ 15:43) (77/48 - 109/59)  RR: 18 (07-17-22 @ 15:43) (18 - 18)  SpO2: 96% (07-17-22 @ 15:43) (93% - 97%)  Telemetry:   CAPILLARY BLOOD GLUCOSE      POCT Blood Glucose.: 93 mg/dL (17 Jul 2022 16:26)  POCT Blood Glucose.: 118 mg/dL (17 Jul 2022 11:29)  POCT Blood Glucose.: 100 mg/dL (17 Jul 2022 08:15)  POCT Blood Glucose.: 92 mg/dL (16 Jul 2022 21:28)  POCT Blood Glucose.: 109 mg/dL (16 Jul 2022 17:55)  POCT Blood Glucose.: 56 mg/dL (16 Jul 2022 17:22)  POCT Blood Glucose.: 35 mg/dL (16 Jul 2022 17:03)  POCT Blood Glucose.: 51 mg/dL (16 Jul 2022 16:59)    I&O's Summary    16 Jul 2022 07:01  -  17 Jul 2022 07:00  --------------------------------------------------------  IN: 240 mL / OUT: 0 mL / NET: 240 mL    17 Jul 2022 07:01  -  17 Jul 2022 16:44  --------------------------------------------------------  IN: 480 mL / OUT: 0 mL / NET: 480 mL        PHYSICAL EXAM:  GENERAL: NAD, well-developed  HEAD:  Atraumatic, Normocephalic  EYES: EOMI, PERRLA, conjunctiva and sclera clear  NECK: Supple, No JVD  CHEST/LUNG: Clear to auscultation bilaterally; No wheeze  HEART: Regular rate and rhythm; No murmurs, rubs, or gallops  ABDOMEN: Soft, Nontender, Nondistended; Bowel sounds present  EXTREMITIES:  2+ Peripheral Pulses, No clubbing, cyanosis, or edema  PSYCH: AAOx3  NEUROLOGY: non-focal  SKIN: No rashes or lesions    LABS:                        12.5   14.42 )-----------( 173      ( 17 Jul 2022 06:53 )             41.4     07-17    134<L>  |  90<L>  |  64<H>  ----------------------------<  102<H>  5.6<H>   |  28  |  9.79<H>    Ca    10.1      17 Jul 2022 06:51        CARDIAC MARKERS ( 17 Jul 2022 09:25 )  x     / x     / 25 U/L / x     / 1.3 ng/mL  CARDIAC MARKERS ( 17 Jul 2022 01:28 )  x     / x     / 24 U/L / x     / 1.1 ng/mL  CARDIAC MARKERS ( 17 Jul 2022 00:38 )  x     / x     / 26 U/L / x     / 1.1 ng/mL          RADIOLOGY & ADDITIONAL TESTS:    Imaging Personally Reviewed:    Consultant(s) Notes Reviewed:      Care Discussed with Consultants/Other Providers:

## 2022-07-17 NOTE — PROGRESS NOTE ADULT - ASSESSMENT
Pt. is a 77 y.o. M e/ PMHx. of HTN, HFrEF, SAADIA, Gout and ESRD on HD MWF at [facility] [place] presents after having a fall enroute to the HD center. 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 c/o knee pain and inability to walk. nephrology called. PT kevin ordered. cont outptn meds, check orthostatics. card called  ptn w h/o DVT/PE, in the past not on AC 2/2 h/o GI bleeds, but admitted on COumadin, will hold until EGD is clear  h/o COPD with chronic hypoxic respiratory failure on 2 L home O2. presently stable.   episode of vomiting on 7/11 while in HD, CT scan w dilated stomach, no obstruction, seen by GI, tolerate CLD, didnt tolerate full liquids.  ptn w ongoing vomiting, will order rpt Ct A/P , does the ptn need an EGD? , will d/w GI   HD MWF,volume status is stable.   on reglan, bc vomiting was presumed to be 2/2 gastroparesis.     awaiting BORIS placement once medically cleared

## 2022-07-18 LAB
ANION GAP SERPL CALC-SCNC: 19 MMOL/L — HIGH (ref 5–17)
APTT BLD: 30 SEC — SIGNIFICANT CHANGE UP (ref 27.5–35.5)
BUN SERPL-MCNC: 81 MG/DL — HIGH (ref 7–23)
CALCIUM SERPL-MCNC: 9.8 MG/DL — SIGNIFICANT CHANGE UP (ref 8.4–10.5)
CHLORIDE SERPL-SCNC: 87 MMOL/L — LOW (ref 96–108)
CO2 SERPL-SCNC: 24 MMOL/L — SIGNIFICANT CHANGE UP (ref 22–31)
CREAT SERPL-MCNC: 11.84 MG/DL — HIGH (ref 0.5–1.3)
EGFR: 4 ML/MIN/1.73M2 — LOW
GLUCOSE BLDC GLUCOMTR-MCNC: 105 MG/DL — HIGH (ref 70–99)
GLUCOSE BLDC GLUCOMTR-MCNC: 116 MG/DL — HIGH (ref 70–99)
GLUCOSE SERPL-MCNC: 89 MG/DL — SIGNIFICANT CHANGE UP (ref 70–99)
HCT VFR BLD CALC: 41.2 % — SIGNIFICANT CHANGE UP (ref 39–50)
HGB BLD-MCNC: 12.5 G/DL — LOW (ref 13–17)
INR BLD: 1.64 RATIO — HIGH (ref 0.88–1.16)
MCHC RBC-ENTMCNC: 28 PG — SIGNIFICANT CHANGE UP (ref 27–34)
MCHC RBC-ENTMCNC: 30.3 GM/DL — LOW (ref 32–36)
MCV RBC AUTO: 92.4 FL — SIGNIFICANT CHANGE UP (ref 80–100)
NRBC # BLD: 0 /100 WBCS — SIGNIFICANT CHANGE UP (ref 0–0)
PLATELET # BLD AUTO: 227 K/UL — SIGNIFICANT CHANGE UP (ref 150–400)
POTASSIUM SERPL-MCNC: 5.5 MMOL/L — HIGH (ref 3.5–5.3)
POTASSIUM SERPL-SCNC: 5.5 MMOL/L — HIGH (ref 3.5–5.3)
PROTHROM AB SERPL-ACNC: 18.9 SEC — HIGH (ref 10.5–13.4)
RBC # BLD: 4.46 M/UL — SIGNIFICANT CHANGE UP (ref 4.2–5.8)
RBC # FLD: 15.4 % — HIGH (ref 10.3–14.5)
SARS-COV-2 RNA SPEC QL NAA+PROBE: SIGNIFICANT CHANGE UP
SODIUM SERPL-SCNC: 130 MMOL/L — LOW (ref 135–145)
WBC # BLD: 11.95 K/UL — HIGH (ref 3.8–10.5)
WBC # FLD AUTO: 11.95 K/UL — HIGH (ref 3.8–10.5)

## 2022-07-18 PROCEDURE — 90935 HEMODIALYSIS ONE EVALUATION: CPT | Mod: GC

## 2022-07-18 PROCEDURE — 74177 CT ABD & PELVIS W/CONTRAST: CPT | Mod: 26

## 2022-07-18 RX ORDER — MIDODRINE HYDROCHLORIDE 2.5 MG/1
5 TABLET ORAL THREE TIMES A DAY
Refills: 0 | Status: DISCONTINUED | OUTPATIENT
Start: 2022-07-18 | End: 2022-07-18

## 2022-07-18 RX ORDER — MIDODRINE HYDROCHLORIDE 2.5 MG/1
20 TABLET ORAL THREE TIMES A DAY
Refills: 0 | Status: DISCONTINUED | OUTPATIENT
Start: 2022-07-18 | End: 2022-07-26

## 2022-07-18 RX ORDER — MIDODRINE HYDROCHLORIDE 2.5 MG/1
5 TABLET ORAL ONCE
Refills: 0 | Status: COMPLETED | OUTPATIENT
Start: 2022-07-18 | End: 2022-07-18

## 2022-07-18 RX ADMIN — SEVELAMER CARBONATE 800 MILLIGRAM(S): 2400 POWDER, FOR SUSPENSION ORAL at 07:54

## 2022-07-18 RX ADMIN — Medication 1 TABLET(S): at 14:07

## 2022-07-18 RX ADMIN — TIOTROPIUM BROMIDE 1 CAPSULE(S): 18 CAPSULE ORAL; RESPIRATORY (INHALATION) at 14:04

## 2022-07-18 RX ADMIN — Medication 50 MILLIGRAM(S): at 06:00

## 2022-07-18 RX ADMIN — SIMETHICONE 80 MILLIGRAM(S): 80 TABLET, CHEWABLE ORAL at 21:03

## 2022-07-18 RX ADMIN — CHLORHEXIDINE GLUCONATE 1 APPLICATION(S): 213 SOLUTION TOPICAL at 07:55

## 2022-07-18 RX ADMIN — POLYETHYLENE GLYCOL 3350 17 GRAM(S): 17 POWDER, FOR SOLUTION ORAL at 14:05

## 2022-07-18 RX ADMIN — Medication 5 MILLIGRAM(S): at 14:05

## 2022-07-18 RX ADMIN — Medication 5 MILLIGRAM(S): at 21:03

## 2022-07-18 RX ADMIN — MIDODRINE HYDROCHLORIDE 15 MILLIGRAM(S): 2.5 TABLET ORAL at 14:04

## 2022-07-18 RX ADMIN — SIMETHICONE 80 MILLIGRAM(S): 80 TABLET, CHEWABLE ORAL at 05:59

## 2022-07-18 RX ADMIN — Medication 50 MILLIGRAM(S): at 17:30

## 2022-07-18 RX ADMIN — SIMETHICONE 80 MILLIGRAM(S): 80 TABLET, CHEWABLE ORAL at 14:04

## 2022-07-18 RX ADMIN — MIDODRINE HYDROCHLORIDE 20 MILLIGRAM(S): 2.5 TABLET ORAL at 17:30

## 2022-07-18 RX ADMIN — PANTOPRAZOLE SODIUM 40 MILLIGRAM(S): 20 TABLET, DELAYED RELEASE ORAL at 06:00

## 2022-07-18 RX ADMIN — Medication 100 MILLIGRAM(S): at 14:06

## 2022-07-18 RX ADMIN — BUDESONIDE AND FORMOTEROL FUMARATE DIHYDRATE 2 PUFF(S): 160; 4.5 AEROSOL RESPIRATORY (INHALATION) at 17:29

## 2022-07-18 RX ADMIN — Medication 5 MILLIGRAM(S): at 05:59

## 2022-07-18 RX ADMIN — SEVELAMER CARBONATE 800 MILLIGRAM(S): 2400 POWDER, FOR SUSPENSION ORAL at 14:05

## 2022-07-18 RX ADMIN — MIDODRINE HYDROCHLORIDE 15 MILLIGRAM(S): 2.5 TABLET ORAL at 05:58

## 2022-07-18 RX ADMIN — SEVELAMER CARBONATE 800 MILLIGRAM(S): 2400 POWDER, FOR SUSPENSION ORAL at 17:29

## 2022-07-18 RX ADMIN — BUDESONIDE AND FORMOTEROL FUMARATE DIHYDRATE 2 PUFF(S): 160; 4.5 AEROSOL RESPIRATORY (INHALATION) at 05:57

## 2022-07-18 RX ADMIN — MIDODRINE HYDROCHLORIDE 5 MILLIGRAM(S): 2.5 TABLET ORAL at 09:40

## 2022-07-18 NOTE — PROGRESS NOTE ADULT - ASSESSMENT
Pt. is a 77 y.o. M e/ PMHx. of HTN, HFrEF, SAADIA, Gout and ESRD on HD MWF at [facility] [place] presents after having a fall enroute to the HD center. 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 c/o knee pain and inability to walk. nephrology called. PT kevin ordered. cont outptn meds, check orthostatics. card called  ptn w h/o DVT/PE, in the past not on AC 2/2 h/o GI bleeds, but admitted on COumadin, will hold until EGD is clear  h/o COPD with chronic hypoxic respiratory failure on 2 L home O2. presently stable.   episode of vomiting on 7/11 while in HD, CT scan w dilated stomach, no obstruction, seen by GI, tolerate CLD, didnt tolerate full liquids.  ptn w ongoing vomiting, awaiting rpt Ct A/P and EGD is being planned by GI in am  HD MWF,volume status is stable.   on reglan, bc vomiting was presumed to be 2/2 gastroparesis.     awaiting BORIS placement once medically cleared

## 2022-07-18 NOTE — PROGRESS NOTE ADULT - SUBJECTIVE AND OBJECTIVE BOX
[facility]  Avi Riddle PA-C  [facility] [place] [address]  [phone]      INTERVAL HPI/OVERNIGHT EVENTS:  pt seen and examined, events noted  vomited again last night and this morning  denies nausea      MEDICATIONS  (STANDING):  allopurinol 100 milliGRAM(s) Oral daily  budesonide 160 MICROgram(s)/formoterol 4.5 MICROgram(s) Inhaler 2 Puff(s) Inhalation two times a day  chlorhexidine 2% Cloths 1 Application(s) Topical <User Schedule>  midodrine. 10 milliGRAM(s) Oral three times a day  multivitamin 1 Tablet(s) Oral daily  pantoprazole    Tablet 40 milliGRAM(s) Oral before breakfast  polyethylene glycol 3350 17 Gram(s) Oral daily  pregabalin 50 milliGRAM(s) Oral two times a day  sevelamer carbonate 800 milliGRAM(s) Oral three times a day with meals  simethicone 80 milliGRAM(s) Chew three times a day  tiotropium 18 MICROgram(s) Capsule 1 Capsule(s) Inhalation daily    MEDICATIONS  (PRN):      Allergies    latex (Rash)  No Known Drug Allergies    Intolerances        ROS:   General:  No wt loss, fevers, chills, night sweats, fatigue,   Eyes:  Good vision, no reported pain  ENT:  No sore throat, pain, runny nose, dysphagia  CV:  No pain, palpitations, hypo/hypertension  Resp:  No dyspnea, cough, tachypnea, wheezing  GI:  No pain, No nausea, + vomiting, No diarrhea, + constipation, No weight loss, No fever, No pruritis, No rectal bleeding, No tarry stools, No dysphagia,  :  No pain, bleeding, incontinence, nocturia  Muscle:  No pain, weakness  Neuro:  No weakness, tingling, memory problems  Psych:  No fatigue, insomnia, mood problems, depression  Endocrine:  No polyuria, polydipsia, cold/heat intolerance  Heme:  No petechiae, ecchymosis, easy bruisability  Skin:  No rash, tattoos, scars, edema      PHYSICAL EXAM:   Vital Signs Last 24 Hrs  T(C): 36.8 (18 Jul 2022 08:49), Max: 37.2 (17 Jul 2022 10:46)  T(F): 98.2 (18 Jul 2022 08:49), Max: 99 (17 Jul 2022 10:46)  HR: 81 (18 Jul 2022 08:49) (78 - 90)  BP: 90/54 (18 Jul 2022 08:49) (89/51 - 113/57)  BP(mean): --  RR: 18 (18 Jul 2022 08:49) (17 - 18)  SpO2: 90% (18 Jul 2022 08:49) (90% - 99%)    Parameters below as of 18 Jul 2022 08:49  Patient On (Oxygen Delivery Method): nasal cannula  O2 Flow (L/min): 2    Daily     Daily     GENERAL:  Appears stated age,   HEENT:  NC/AT,    CHEST:  Full & symmetric excursion,   HEART:  Regular rhythm,  ABDOMEN:  Soft, non-tender, non-distended,  EXTEREMITIES:  no cyanosis  SKIN:  No rash  NEURO:  Alert,       LABS:                                   12.5   14.42 )-----------( 173      ( 17 Jul 2022 06:53 )             41.4   07-17    134<L>  |  90<L>  |  64<H>  ----------------------------<  102<H>  5.6<H>   |  28  |  9.79<H>    Ca    10.1      17 Jul 2022 06:51        RADIOLOGY & ADDITIONAL TESTS:  < from: CT Abdomen and Pelvis No Cont (07.11.22 @ 22:02) >    ACC: [id number] EXAM:  CT ABDOMEN AND PELVIS                          PROCEDURE DATE:  07/11/2022          INTERPRETATION:  CLINICAL INFORMATION: Vomiting, gassy distention.    COMPARISON: CT abdomen pelvis 8/22/2022. CT chest 8/27/2021.    CONTRAST/COMPLICATIONS:  IV Contrast: NONE  Oral Contrast: NONE  Complications: None reported at time of study completion    PROCEDURE:  CT of the Abdomen and Pelvis was performed.  Sagittal and coronal reformats were performed.    FINDINGS:  Evaluation of thesolid organs and vasculature is limited without   intravenous contrast.    LOWER CHEST: Basilar subsegmental atelectasis. Left lower lobe and right   middle lobe calcified granulomas. Coronary artery calcification.   Calcified right hilar lymph nodes.    LIVER: Few punctate calcified granulomas.  BILE DUCTS: Normal caliber.  GALLBLADDER: Within normal limits.  SPLEEN: Scattered tiny calcified granulomas.  PANCREAS: Within normal limits.  ADRENALS: Bilateral adrenal gland thickening, unchanged.  KIDNEYS/URETERS: Atrophic bilateral kidneys. Bilateral renal cysts and   subcentimeter hypodensities too small to characterize.    BLADDER: Minimally distended.  REPRODUCTIVE ORGANS: Prostate within normal limits.    BOWEL: Stomach is moderately distended. No small bowel obstruction.   Status post right hemicolectomy. Mild to moderate stool in the distal   rectosigmoid colon.  PERITONEUM: No ascites.  VESSELS: Atherosclerotic changes. Infrarenal IVC filter.  RETROPERITONEUM/LYMPH NODES: No lymphadenopathy.  ABDOMINAL WALL: Rectus diastases. Multiple ventral hernias containing fat   and small knuckle of bowel. Abdominal wall collateral vessels.  BONES: Degenerative changes.    IMPRESSION:  No small bowel obstruction. Stomach is moderately distended. Mild to   moderate stool in the distal rectosigmoid colon.        --- End of Report ---

## 2022-07-18 NOTE — PROGRESS NOTE ADULT - ASSESSMENT
Assessment  Hypoglycemia: 77y nondiabetic male, was not taking any hypoglycemic agents, admitted s/p mechanical fall, blood sugars are improving and in acceptable range now, no new hypoglycemias. Patient had new episode of N/V, back on clear liquid diet, planning endoscopy tomorrow.  Hypotension: AM cortisol WNL, on midodrine, monitored.  ?Gastroparesis: On meds, monitored.  ESRD: On hemodialysis, Monitor labs/BMP      tonia Claudette HUGO  Cell: [phone]             Problem/Recommendation - 1:  ·  Problem: Hypoglycemia.   ·  Recommendation: Likely nutritional hypoglycemia in the setting of N/V and ESRD.   ?Pseudohypoglycemia. Hypoglycemias have all been with low FS and not blood sugar.. Suggest to check blood sugar at the time of hypoglycemia to confirm real low. Also check insulin antibody, igf-1, igf-2, cortisol level at the time of hypoglycemia.  Encourage to increase po intake, nutritional supplements as tolerated. May need continuous dextrose for now.  Will continue monitoring and FU.     Problem/Recommendation - 2:  ·  Problem: Hypotension.   ·  Recommendation: AM cortisol WNL. Suggest to continue medications, monitoring, FU primary team recommendations.     Problem/Recommendation - 3:  ·  Problem: Gastroparesis.   ·  Recommendation: Suggest to continue medications, monitoring, FU primary team recommendations.     Problem/Recommendation - 4:  ·  Problem: ESRD on hemodialysis.   ·  Recommendation: On HD, continue as scheduled, renal FU

## 2022-07-18 NOTE — PROGRESS NOTE ADULT - ASSESSMENT
Pt. is a 77 y.o. M e/ PMHx. of HTN, HFrEF, SAADIA, Gout and ESRD on HD MWF at [facility] presents after having a fall enroute to the HD center. Nephrology consulted for ESRD management.

## 2022-07-18 NOTE — CONSULT NOTE ADULT - SUBJECTIVE AND OBJECTIVE BOX
[facility], Division of Infectious Diseases  EMELYN Elaine, RATNA Lockett G. Casimir  [phone]  ([phone] - weekdays after 5pm and weekends)    MAYE ZUNIGA  77y, Male  [id number]    HPI--  HPI:  76 y/o male PMHx of ESRD on HD via AV fistula LUE MWF, COPD (on 2L home O2), CHF, pHTN, hypotension on Midodrine, DVT S/P IVC filter who presented after his L knee gave out. Patient reports on his way to dialysis he felt his left knee "give out." Patient landed on his buttocks. Patient was unable to get up 2/2 left knee pain. Patient also c/o acute on chronic low back pain since the fall. Patient reports that he is otherwise feeling well. Endorses nausea and emesis during this hospitalization. GI was consulted and he is tentatively planned for EGD. He also endorsed diarrhea while inpatient but states this has resolved.   He denies fever, chills, SOB, abd pain headache, blurry vision, focal weakness, numbness, slurred speech, CP, SOB, abd pain, NVD.  Patient does not make urine.  Of note, patient was discharged from [facility] one month ago after receiving PT for BL knee pain and difficulty ambulating.  Patient states that after discharge from rehab he was supposed to receive home PT, but that has not happened. (06 Jul 2022 14:54)    ROS: 10 point review of systems completed, pertinent positives and negatives as per HPI.    Allergies: latex (Rash)  No Known Drug Allergies    PMH -- Hypertension    Glomerular disease    CHF (congestive heart failure)    COPD (chronic obstructive pulmonary disease)    Pulmonary hypertension    Sleep apnea    Pulmonary edema    Peripheral edema    Gout      PSH -- History of abdominal surgery    H/O right heart catheterization      FH -- Family history of colon cancer (Father)    Family history of heart disease (Father)      Social History -- denies tobacco, alcohol or illicit drug use  Travel/Environmental/Occupational History: ***    Physical Exam--  Vital Signs Last 24 Hrs  T(F): 97.5 (18 Jul 2022 12:59), Max: 98.2 (18 Jul 2022 08:49)  HR: 80 (18 Jul 2022 14:16) (78 - 86)  BP: 93/41 (18 Jul 2022 14:16) (90/54 - 113/57)  RR: 18 (18 Jul 2022 14:16) (17 - 18)  SpO2: 94% (18 Jul 2022 14:16) (90% - 99%)  General: nontoxic-appearing, no acute distress  HEENT: anicteric  Neck: Not rigid. No LAD  Lungs: Clear bilaterally without rales, wheezing or rhonchi  Heart: S1, S2, normal rate. No murmur, AVF L arm covered w/ dressings  Abdomen: Soft. Nondistended. Nontender  Back: No costovertebral angle tenderness.  Extremities: No LE edema. no decreased passive ROM of either knee 2/2 pain, some stiffness noted w/ movement of L knee  Skin: Warm. Dry. Good turgor. No rash. No excoriations between toes  Psychiatric: Appropriate affect and mood for situation.   Lines: PIV w/o surrounding tenderness or erythema    Laboratory & Imaging Data--  CBC:                       12.5   11.95 )-----------( 227      ( 18 Jul 2022 10:46 )             41.2     WBC Count: 11.95 K/uL (07-18-22 @ 10:46)  WBC Count: 14.42 K/uL (07-17-22 @ 06:53)  WBC Count: 14.60 K/uL (07-16-22 @ 07:23)  WBC Count: 10.38 K/uL (07-14-22 @ 10:02)  WBC Count: 8.36 K/uL (07-12-22 @ 11:22)    CMP: 07-18    130<L>  |  87<L>  |  81<H>  ----------------------------<  89  5.5<H>   |  24  |  11.84<H>    Ca    9.8      18 Jul 2022 10:47          Microbiology:     Radiology--  ***  Active Medications--  allopurinol 100 milliGRAM(s) Oral daily  budesonide 160 MICROgram(s)/formoterol 4.5 MICROgram(s) Inhaler 2 Puff(s) Inhalation two times a day  chlorhexidine 2% Cloths 1 Application(s) Topical <User Schedule>  lactated ringers. 1000 milliLiter(s) IV Continuous <Continuous>  metoclopramide 5 milliGRAM(s) Oral every 8 hours  midodrine. 15 milliGRAM(s) Oral three times a day  multivitamin 1 Tablet(s) Oral daily  pantoprazole    Tablet 40 milliGRAM(s) Oral before breakfast  polyethylene glycol 3350 17 Gram(s) Oral daily  pregabalin 50 milliGRAM(s) Oral two times a day  sevelamer carbonate 800 milliGRAM(s) Oral three times a day with meals  simethicone 80 milliGRAM(s) Chew three times a day  tiotropium 18 MICROgram(s) Capsule 1 Capsule(s) Inhalation daily    Antimicrobials:     Immunologic:

## 2022-07-18 NOTE — PROGRESS NOTE ADULT - SUBJECTIVE AND OBJECTIVE BOX
Chief complaint  Patient is a 77y old  Male who presents with a chief complaint of functional paraplegia post a fall (18 Jul 2022 10:29)   Review of systems  Patient for HD, no new hypoglycemic episodes.    Labs and Fingersticks  CAPILLARY BLOOD GLUCOSE      POCT Blood Glucose.: 116 mg/dL (18 Jul 2022 07:44)  POCT Blood Glucose.: 102 mg/dL (17 Jul 2022 22:19)  POCT Blood Glucose.: 93 mg/dL (17 Jul 2022 16:26)      Anion Gap, Serum: 19 *H* (07-18 @ 10:47)  Anion Gap, Serum: 16 (07-17 @ 06:51)      Calcium, Total Serum: 9.8 (07-18 @ 10:47)  Calcium, Total Serum: 10.1 (07-17 @ 06:51)          07-18    130<L>  |  87<L>  |  81<H>  ----------------------------<  89  5.5<H>   |  24  |  11.84<H>    Ca    9.8      18 Jul 2022 10:47                          12.5   11.95 )-----------( 227      ( 18 Jul 2022 10:46 )             41.2     Medications  MEDICATIONS  (STANDING):  allopurinol 100 milliGRAM(s) Oral daily  budesonide 160 MICROgram(s)/formoterol 4.5 MICROgram(s) Inhaler 2 Puff(s) Inhalation two times a day  chlorhexidine 2% Cloths 1 Application(s) Topical <User Schedule>  lactated ringers. 1000 milliLiter(s) (30 mL/Hr) IV Continuous <Continuous>  metoclopramide 5 milliGRAM(s) Oral every 8 hours  midodrine. 15 milliGRAM(s) Oral three times a day  multivitamin 1 Tablet(s) Oral daily  pantoprazole    Tablet 40 milliGRAM(s) Oral before breakfast  polyethylene glycol 3350 17 Gram(s) Oral daily  pregabalin 50 milliGRAM(s) Oral two times a day  sevelamer carbonate 800 milliGRAM(s) Oral three times a day with meals  simethicone 80 milliGRAM(s) Chew three times a day  tiotropium 18 MICROgram(s) Capsule 1 Capsule(s) Inhalation daily      Physical Exam  Vital Signs Last 12 Hrs  T(F): 97.5 (07-18-22 @ 12:59), Max: 98.2 (07-18-22 @ 08:49)  HR: 81 (07-18-22 @ 12:59) (81 - 86)  BP: 101/52 (07-18-22 @ 12:59) (90/54 - 113/57)  BP(mean): --  RR: 18 (07-18-22 @ 12:59) (17 - 18)  SpO2: 96% (07-18-22 @ 12:59) (90% - 98%)    Diagnostics    Cortisol AM, Serum: AM Sched. Collection: 16-Jul-2022 06:00 (07-15 @ 14:04)  Free Thyroxine, Serum: AM Sched. Collection: 16-Jul-2022 06:00 (07-15 @ 14:04)  Thyroid Stimulating Hormone, Serum: AM Sched. Collection: 16-Jul-2022 06:00 (07-15 @ 14:04)

## 2022-07-18 NOTE — PROGRESS NOTE ADULT - SUBJECTIVE AND OBJECTIVE BOX
[facility] DIVISION OF KIDNEY DISEASES AND HYPERTENSION -- FOLLOW UP NOTE  -------------------------------------------------------------------------------  HPI:  Pt. is a 77 y.o. M e/ PMHx. of HTN, HFrEF, SAADIA, Gout and ESRD on HD MWF at [facility] presents after having a fall enroute to the HD center. Nephrology consulted for ESRD management. Pt. does not recall his nephrologist and denies any issues with dialysis. He is edematous but states he is not worse than usual. Denies any syncope and attributes his fall to his knee "buckling." Pt. has notably low BP which Pt. states is chronic and for which he takes Midodrine.     Pt. seen this AM. States he [id number] early this AM but denies any nausea before hand or now. Now back on NC. Planned for HD today. Decreased PO intake.      PAST HISTORY  --------------------------------------------------------------------------------  No significant changes to PMH, PSH, FHx, SHx, unless otherwise noted    ALLERGIES & MEDICATIONS  --------------------------------------------------------------------------------  Allergies    latex (Rash)  No Known Drug Allergies    Intolerances      Standing Inpatient Medications  allopurinol 100 milliGRAM(s) Oral daily  budesonide 160 MICROgram(s)/formoterol 4.5 MICROgram(s) Inhaler 2 Puff(s) Inhalation two times a day  chlorhexidine 2% Cloths 1 Application(s) Topical <User Schedule>  lactated ringers. 1000 milliLiter(s) IV Continuous <Continuous>  metoclopramide 5 milliGRAM(s) Oral every 8 hours  midodrine. 15 milliGRAM(s) Oral three times a day  multivitamin 1 Tablet(s) Oral daily  pantoprazole    Tablet 40 milliGRAM(s) Oral before breakfast  polyethylene glycol 3350 17 Gram(s) Oral daily  pregabalin 50 milliGRAM(s) Oral two times a day  sevelamer carbonate 800 milliGRAM(s) Oral three times a day with meals  simethicone 80 milliGRAM(s) Chew three times a day  tiotropium 18 MICROgram(s) Capsule 1 Capsule(s) Inhalation daily    PRN Inpatient Medications      REVIEW OF SYSTEMS  --------------------------------------------------------------------------------  Gen: No fevers/chills  Skin: No rashes  Head/Eyes/Ears: Normal hearing,   Respiratory: No dyspnea, cough  CV: No chest pain  GI: No abdominal pain, diarrhea; vomited+  : No dysuria, hematuria  MSK: + edema, LE weakness    All other systems were reviewed and are negative, except as noted.    VITALS/PHYSICAL EXAM  --------------------------------------------------------------------------------  T(C): 36.6 (07-18-22 @ 05:55), Max: 37.2 (07-17-22 @ 10:46)  HR: 86 (07-18-22 @ 06:38) (78 - 90)  BP: 113/57 (07-18-22 @ 05:55) (89/51 - 113/57)  RR: 17 (07-18-22 @ 05:55) (17 - 18)  SpO2: 95% (07-18-22 @ 06:38) (95% - 99%)  Wt(kg): --        07-17-22 @ 07:01  -  07-18-22 @ 07:00  --------------------------------------------------------  IN: 980 mL / OUT: 0 mL / NET: 980 mL      Physical Exam:  	Gen: NAD  	HEENT: MMM, on NC  	Pulm: CTA B/L  	CV: S1S2  	Abd: Soft, +BS   	Ext: + LE edema B/L improving, chronic skin changes   	Neuro: Awake  	Skin: Warm and dry  	Vascular access: LUE AVF, thrills+    LABS/STUDIES  --------------------------------------------------------------------------------              12.5   14.42 >-----------<  173      [07-17-22 @ 06:53]              41.4     134  |  90  |  64  ----------------------------<  102      [07-17-22 @ 06:51]  5.6   |  28  |  9.79        Ca     10.1     [07-17-22 @ 06:51]          CK 25      [07-17-22 @ 09:25]    Creatinine Trend:  SCr 9.79 [07-17 @ 06:51]  SCr 7.39 [07-16 @ 07:14]  SCr 10.21 [07-15 @ 07:38]  SCr 8.43 [07-14 @ 10:03]  SCr 10.61 [07-12 @ 11:22]        HbA1c 5.0      [10-02-17 @ 15:38]  TSH 1.32      [07-17-22 @ 06:53]    HBsAg Nonreact      [07-11-22 @ 13:23]  HCV 0.39, Nonreact      [07-11-22 @ 13:23]

## 2022-07-18 NOTE — PROGRESS NOTE ADULT - ASSESSMENT
vomiting  ESRD    CT a/p with moderately distended stomach, no obstruction   cont with simethicone   PPI daily  cont reglan q8h OTC for N/V   HD as per renal   plan for upper gastrointestinal endoscopy tomorrow morning   CLD today   NPO after MN  will need covid swab within 72H of procedure   will follow   dw pt and wife over the phone     Advanced care planning was discussed with patient and family.  Advanced care planning forms were reviewed and discussed.  Risks, benefits and alternatives of gastroenterologic procedures were discussed in detail and all questions were answered.    30 minutes spent.

## 2022-07-18 NOTE — CONSULT NOTE ADULT - CONSULT REQUESTED DATE/TIME
18-Jul-2022 14:58
06-Jul-2022
06-Jul-2022 12:52
07-Jul-2022 13:45
11-Jul-2022 14:56
15-Jul-2022 14:08

## 2022-07-18 NOTE — CONSULT NOTE ADULT - REASON FOR ADMISSION
functional paraplegia post a fall

## 2022-07-18 NOTE — PROGRESS NOTE ADULT - PROBLEM SELECTOR PLAN 1
Pt. with ESRD on HD three times a week (MWF) presented to [facility] for a fall. Last HD was on 7/13 via LUE AVF. Pt. clinically stable. No CP, SOB or palpitations during evaluation. Most recent labs reviewed. Will arrange for HD today. Pt. denies any lightheadedness and states that his his BP is normally 80s-90s/ 50s-60s. He endorses taking Midodrine but does not recall the dose. Consent obtained for HD and placed in the chart. Will arrange for HD today. HgB at goal. Prior records state that Pt. was on 30mg Q8H here with additional Midodrine given during HD during prior admission. Increase Midodrine to 20mg TID. Consider discharging on this dose. C/w HD time to 4 hours and DFR to 600.     #Hyperphosphatemia- c/w Sevelamer 800mg TID. Check Phos.    If you have any questions, please feel free to contact me  Jai Alvarenga  Nephrology Fellow  [phone]; Prefer [employer] TEAMS  (After 5pm or on weekends please page the on-call fellow).

## 2022-07-18 NOTE — PROGRESS NOTE ADULT - SUBJECTIVE AND OBJECTIVE BOX
Patient is a 77y old  Male who presents with a chief complaint of functional paraplegia post a fall (18 Jul 2022 14:00)      SUBJECTIVE / OVERNIGHT EVENTS: ptn w ongoing vomiting, awaiting rpt Ct A/P and EGD is being planned by GI    MEDICATIONS  (STANDING):  allopurinol 100 milliGRAM(s) Oral daily  budesonide 160 MICROgram(s)/formoterol 4.5 MICROgram(s) Inhaler 2 Puff(s) Inhalation two times a day  chlorhexidine 2% Cloths 1 Application(s) Topical <User Schedule>  lactated ringers. 1000 milliLiter(s) (30 mL/Hr) IV Continuous <Continuous>  metoclopramide 5 milliGRAM(s) Oral every 8 hours  midodrine. 15 milliGRAM(s) Oral three times a day  multivitamin 1 Tablet(s) Oral daily  pantoprazole    Tablet 40 milliGRAM(s) Oral before breakfast  polyethylene glycol 3350 17 Gram(s) Oral daily  pregabalin 50 milliGRAM(s) Oral two times a day  sevelamer carbonate 800 milliGRAM(s) Oral three times a day with meals  simethicone 80 milliGRAM(s) Chew three times a day  tiotropium 18 MICROgram(s) Capsule 1 Capsule(s) Inhalation daily    MEDICATIONS  (PRN):      Vital Signs Last 24 Hrs  T(F): 97.5 (07-18-22 @ 12:59), Max: 98.2 (07-18-22 @ 08:49)  HR: 80 (07-18-22 @ 14:16) (78 - 86)  BP: 93/41 (07-18-22 @ 14:16) (90/54 - 113/57)  RR: 18 (07-18-22 @ 14:16) (17 - 18)  SpO2: 94% (07-18-22 @ 14:16) (90% - 99%)  Telemetry:   CAPILLARY BLOOD GLUCOSE      POCT Blood Glucose.: 116 mg/dL (18 Jul 2022 07:44)  POCT Blood Glucose.: 102 mg/dL (17 Jul 2022 22:19)  POCT Blood Glucose.: 93 mg/dL (17 Jul 2022 16:26)    I&O's Summary    17 Jul 2022 07:01  -  18 Jul 2022 07:00  --------------------------------------------------------  IN: 980 mL / OUT: 0 mL / NET: 980 mL    18 Jul 2022 07:01  -  18 Jul 2022 15:00  --------------------------------------------------------  IN: 0 mL / OUT: 1000 mL / NET: -1000 mL        PHYSICAL EXAM:  GENERAL: NAD, well-developed  HEAD:  Atraumatic, Normocephalic  EYES: EOMI, PERRLA, conjunctiva and sclera clear  NECK: Supple, No JVD  CHEST/LUNG: Clear to auscultation bilaterally; No wheeze  HEART: Regular rate and rhythm; No murmurs, rubs, or gallops  ABDOMEN: Soft, Nontender, Nondistended; Bowel sounds present  EXTREMITIES:  2+ Peripheral Pulses, No clubbing, cyanosis, or edema  PSYCH: AAOx3  NEUROLOGY: non-focal  SKIN: No rashes or lesions    LABS:                        12.5   11.95 )-----------( 227      ( 18 Jul 2022 10:46 )             41.2     07-18    130<L>  |  87<L>  |  81<H>  ----------------------------<  89  5.5<H>   |  24  |  11.84<H>    Ca    9.8      18 Jul 2022 10:47      PT/INR - ( 18 Jul 2022 10:47 )   PT: 18.9 sec;   INR: 1.64 ratio         PTT - ( 18 Jul 2022 10:47 )  PTT:30.0 sec  CARDIAC MARKERS ( 17 Jul 2022 09:25 )  x     / x     / 25 U/L / x     / 1.3 ng/mL  CARDIAC MARKERS ( 17 Jul 2022 01:28 )  x     / x     / 24 U/L / x     / 1.1 ng/mL  CARDIAC MARKERS ( 17 Jul 2022 00:38 )  x     / x     / 26 U/L / x     / 1.1 ng/mL          RADIOLOGY & ADDITIONAL TESTS:    Imaging Personally Reviewed:    Consultant(s) Notes Reviewed:      Care Discussed with Consultants/Other Providers:

## 2022-07-18 NOTE — CONSULT NOTE ADULT - ASSESSMENT
76 y/o male PMHx of ESRD on HD via AV fistula LUE MWF, COPD (on 2L home O2), CHF, pHTN, hypotension on Midodrine, DVT S/P IVC filter who presented after his L knee gave out.     leukocytosis  remains afebrile for duration of hospital course  leukocytosis first noted 7/16  s/p CT chest w/o evidence of focal consolidation  s/p CT abd/pelvis 7/11- No small bowel obstruction. Stomach is moderately distended. Mild to moderate stool in the distal rectosigmoid colon.  only localizing symptoms are chronic b/l knee pains (L>R) which patient attributes to "bone on bone", bouts of nausea/ emesis  no erythema or warmth or decreased ROM of either knee on exam  PIV w/o surrounding erythema or tenderness  small superficial wound on buttock, unable to visualize buttock completely but spoke with nurse- stage 2  leukocytosis improving off antibiotics  continue to monitor off antibiotics  f/u EGD tentatively planned for tomorrow  monitor temps, continue to trend wbc    ESRD  HD per nephrology    Edmundo Olmstead M.D.  [facility], Division of Infectious Diseases  [phone]  After 5pm on weekdays and all day on weekends - please call [phone]  76 y/o male PMHx of ESRD on HD via AV fistula LUE MWF, COPD (on 2L home O2), CHF, pHTN, hypotension on Midodrine, DVT S/P IVC filter who presented after his L knee gave out.     leukocytosis  remains afebrile for duration of hospital course  leukocytosis first noted 7/16  s/p CT chest w/o evidence of focal consolidation  s/p CT abd/pelvis 7/11- No small bowel obstruction. Stomach is moderately distended. Mild to moderate stool in the distal rectosigmoid colon.  only localizing symptoms are chronic b/l knee pains (L>R) which patient attributes to "bone on bone", bouts of nausea/ emesis  no erythema or warmth or decreased ROM of either knee on exam  PIV w/o surrounding erythema or tenderness  small superficial wound on buttock, unable to visualize buttock completely but spoke with nurse- stage 2  leukocytosis improving off antibiotics  continue to monitor off antibiotics  f/u EGD tentatively planned for tomorrow  monitor temps, continue to trend wbc  f/u repeat CT abd/pelvis    ESRD  HD per nephrology    Edmundo Olmstead M.D.  [facility], Division of Infectious Diseases  [phone]  After 5pm on weekdays and all day on weekends - please call [phone]

## 2022-07-18 NOTE — PROGRESS NOTE ADULT - ATTENDING COMMENTS
ESRD:   HD today  BP remains low. On midodrine    Rest per Dr. Lyle Meek MD  O: [phone]  Contact me on teams ESRD:   Seen at HD  Tolerating OK  Maintain on current prescription  BP remains low. On midodrine  For EGD in am     Rest per Dr. Lyle Meek MD  O: [phone]  Contact me on teams

## 2022-07-18 NOTE — PROGRESS NOTE ADULT - SUBJECTIVE AND OBJECTIVE BOX
Date of Service   07-18-22 @ 13:56    Patient is a 77y old  Male who presents with a chief complaint of functional paraplegia post a fall (18 Jul 2022 10:29)      INTERVAL HISTORY: pt feels ok     REVIEW OF SYSTEMS:   CONSTITUTIONAL: No weakness  EYES/ENT: No visual changes; No throat pain  Neck: No pain or stiffness  Respiratory: No cough, wheezing, No shortness of breath  CARDIOVASCULAR: no chest pain or palpitations  GASTROINTESTINAL: No abdominal pain, no nausea, vomiting or hematemesis  GENITOURINARY: No dysuria, frequency or hematuria  NEUROLOGICAL: No stroke like symptoms  SKIN: No rashes    	  MEDICATIONS:  midodrine. 15 milliGRAM(s) Oral three times a day        PHYSICAL EXAM:  T(C): 36.4 (07-18-22 @ 12:59), Max: 36.8 (07-18-22 @ 08:49)  HR: 81 (07-18-22 @ 12:59) (78 - 90)  BP: 101/52 (07-18-22 @ 12:59) (90/54 - 113/57)  RR: 18 (07-18-22 @ 12:59) (17 - 18)  SpO2: 96% (07-18-22 @ 12:59) (90% - 99%)  Wt(kg): --  I&O's Summary    17 Jul 2022 07:01  -  18 Jul 2022 07:00  --------------------------------------------------------  IN: 980 mL / OUT: 0 mL / NET: 980 mL    18 Jul 2022 07:01  -  18 Jul 2022 13:56  --------------------------------------------------------  IN: 0 mL / OUT: 1000 mL / NET: -1000 mL          Appearance: In no distress	  HEENT:    PERRL, EOMI	  Cardiovascular:  S1 S2, No JVD  Respiratory: Lungs clear to auscultation	  Gastrointestinal:  Soft, Non-tender, + BS	  Vasculature:  No edema of LE  Psychiatric: Appropriate affect   Neuro: no acute focal deficits                               12.5   11.95 )-----------( 227      ( 18 Jul 2022 10:46 )             41.2     07-18    130<L>  |  87<L>  |  81<H>  ----------------------------<  89  5.5<H>   |  24  |  11.84<H>    Ca    9.8      18 Jul 2022 10:47          Labs personally reviewed      ASSESSMENT/PLAN: 			  78 yo male with PMHx of ESRD on HD via AV fistula LUE MWF, COPD (on 2L home O2), CHF, pHTN, hypotension on Midodrine, DVT S/P IVC filter p/w fall.  Patient reports that he was walking out of his house today to go to dialysis when he felt his left knee "give out." Patient landed on his buttocks. No head trauma or LOC.  On coumadin.  Patient was unable to get up 2/2 left knee pain.  Patient also c/o acute on chronic low back pain since the fall.  Patient reports that he is otherwise feeling well.  Denies headache, blurry vision, focal weakness, numbness, slurred speech, CP, SOB, abd pain, NVD.  Patient does not make urine.  Of note, patient was discharged from [facility] one month ago after receiving PT for BL knee pain and difficulty ambulating.  Patient states that after discharge from rehab he was supposed to receive home PT, but that has not happened. (06 Jul 2022 14:54).    1. Chronic hypotension   - with baseline SBP noted in the 80s prior admit a year ago  - was discharged on Midodrine 10mg TID  - BP soft   - PT eval   - BP continues to be SBP 90s  - Midodrine 15mg PO TID, cont to closely monitor BP  - monitor for nausea and vomiting   - may add a trial of low dose florinef if BP remains low despite midodrine    2. Hx of LE DVT prior admit   - s/p IVC filter   - on Warfarin at home as INR is elevated  - rec resume coumadin with INR <2.5,   - monitor INR     3. ?Hx of HF  - prior echo with preserved EF 60% and no mention of pulm HTN  - on HD for fluid management     4. ESRD   - HD per renal   - monitor Lytes and creat   - avoid nephrotoxins     5. Chest Pain   - Repeat EKG noted   - Troponin noted   - TTE ordered  - GI following  plan for upper gastrointestinal endoscopy tomorrow morning         Mendy JUNG-BC   Roberto Latif DO [facility]  Cardiovascular Medicine  [address] [place] [address], [address]  Office: [phone]

## 2022-07-19 ENCOUNTER — RESULT REVIEW (OUTPATIENT)
Age: 77
End: 2022-07-19

## 2022-07-19 LAB
ALBUMIN SERPL ELPH-MCNC: 3.6 G/DL — SIGNIFICANT CHANGE UP (ref 3.3–5)
ALP SERPL-CCNC: 134 U/L — HIGH (ref 40–120)
ALT FLD-CCNC: 10 U/L — SIGNIFICANT CHANGE UP (ref 10–45)
ANION GAP SERPL CALC-SCNC: 17 MMOL/L — SIGNIFICANT CHANGE UP (ref 5–17)
AST SERPL-CCNC: 24 U/L — SIGNIFICANT CHANGE UP (ref 10–40)
BASOPHILS # BLD AUTO: 0.06 K/UL — SIGNIFICANT CHANGE UP (ref 0–0.2)
BASOPHILS NFR BLD AUTO: 0.6 % — SIGNIFICANT CHANGE UP (ref 0–2)
BILIRUB SERPL-MCNC: 0.4 MG/DL — SIGNIFICANT CHANGE UP (ref 0.2–1.2)
BUN SERPL-MCNC: 50 MG/DL — HIGH (ref 7–23)
CALCIUM SERPL-MCNC: 10 MG/DL — SIGNIFICANT CHANGE UP (ref 8.4–10.5)
CHLORIDE SERPL-SCNC: 91 MMOL/L — LOW (ref 96–108)
CO2 SERPL-SCNC: 23 MMOL/L — SIGNIFICANT CHANGE UP (ref 22–31)
CREAT SERPL-MCNC: 8.74 MG/DL — HIGH (ref 0.5–1.3)
EGFR: 6 ML/MIN/1.73M2 — LOW
EOSINOPHIL # BLD AUTO: 0.31 K/UL — SIGNIFICANT CHANGE UP (ref 0–0.5)
EOSINOPHIL NFR BLD AUTO: 2.9 % — SIGNIFICANT CHANGE UP (ref 0–6)
GLUCOSE BLDC GLUCOMTR-MCNC: 117 MG/DL — HIGH (ref 70–99)
GLUCOSE BLDC GLUCOMTR-MCNC: 122 MG/DL — HIGH (ref 70–99)
GLUCOSE BLDC GLUCOMTR-MCNC: 84 MG/DL — SIGNIFICANT CHANGE UP (ref 70–99)
GLUCOSE BLDC GLUCOMTR-MCNC: 84 MG/DL — SIGNIFICANT CHANGE UP (ref 70–99)
GLUCOSE SERPL-MCNC: 79 MG/DL — SIGNIFICANT CHANGE UP (ref 70–99)
HCT VFR BLD CALC: 42.8 % — SIGNIFICANT CHANGE UP (ref 39–50)
HGB BLD-MCNC: 13 G/DL — SIGNIFICANT CHANGE UP (ref 13–17)
IMM GRANULOCYTES NFR BLD AUTO: 0.4 % — SIGNIFICANT CHANGE UP (ref 0–1.5)
LYMPHOCYTES # BLD AUTO: 2.15 K/UL — SIGNIFICANT CHANGE UP (ref 1–3.3)
LYMPHOCYTES # BLD AUTO: 20.3 % — SIGNIFICANT CHANGE UP (ref 13–44)
MCHC RBC-ENTMCNC: 28.4 PG — SIGNIFICANT CHANGE UP (ref 27–34)
MCHC RBC-ENTMCNC: 30.4 GM/DL — LOW (ref 32–36)
MCV RBC AUTO: 93.4 FL — SIGNIFICANT CHANGE UP (ref 80–100)
MONOCYTES # BLD AUTO: 0.96 K/UL — HIGH (ref 0–0.9)
MONOCYTES NFR BLD AUTO: 9.1 % — SIGNIFICANT CHANGE UP (ref 2–14)
NEUTROPHILS # BLD AUTO: 7.06 K/UL — SIGNIFICANT CHANGE UP (ref 1.8–7.4)
NEUTROPHILS NFR BLD AUTO: 66.7 % — SIGNIFICANT CHANGE UP (ref 43–77)
NRBC # BLD: 0 /100 WBCS — SIGNIFICANT CHANGE UP (ref 0–0)
PLATELET # BLD AUTO: 172 K/UL — SIGNIFICANT CHANGE UP (ref 150–400)
POTASSIUM SERPL-MCNC: 5.6 MMOL/L — HIGH (ref 3.5–5.3)
POTASSIUM SERPL-SCNC: 5.6 MMOL/L — HIGH (ref 3.5–5.3)
PROT SERPL-MCNC: 8.7 G/DL — HIGH (ref 6–8.3)
RBC # BLD: 4.58 M/UL — SIGNIFICANT CHANGE UP (ref 4.2–5.8)
RBC # FLD: 15.6 % — HIGH (ref 10.3–14.5)
SODIUM SERPL-SCNC: 131 MMOL/L — LOW (ref 135–145)
WBC # BLD: 10.58 K/UL — HIGH (ref 3.8–10.5)
WBC # FLD AUTO: 10.58 K/UL — HIGH (ref 3.8–10.5)

## 2022-07-19 PROCEDURE — 88305 TISSUE EXAM BY PATHOLOGIST: CPT | Mod: 26

## 2022-07-19 PROCEDURE — 88341 IMHCHEM/IMCYTCHM EA ADD ANTB: CPT | Mod: 26

## 2022-07-19 PROCEDURE — 88342 IMHCHEM/IMCYTCHM 1ST ANTB: CPT | Mod: 26

## 2022-07-19 RX ORDER — SUCRALFATE 1 G
1 TABLET ORAL
Refills: 0 | Status: DISCONTINUED | OUTPATIENT
Start: 2022-07-19 | End: 2022-07-24

## 2022-07-19 RX ADMIN — SEVELAMER CARBONATE 800 MILLIGRAM(S): 2400 POWDER, FOR SUSPENSION ORAL at 15:07

## 2022-07-19 RX ADMIN — PANTOPRAZOLE SODIUM 40 MILLIGRAM(S): 20 TABLET, DELAYED RELEASE ORAL at 06:37

## 2022-07-19 RX ADMIN — BUDESONIDE AND FORMOTEROL FUMARATE DIHYDRATE 2 PUFF(S): 160; 4.5 AEROSOL RESPIRATORY (INHALATION) at 06:36

## 2022-07-19 RX ADMIN — Medication 1 TABLET(S): at 15:07

## 2022-07-19 RX ADMIN — MIDODRINE HYDROCHLORIDE 20 MILLIGRAM(S): 2.5 TABLET ORAL at 21:53

## 2022-07-19 RX ADMIN — SIMETHICONE 80 MILLIGRAM(S): 80 TABLET, CHEWABLE ORAL at 15:06

## 2022-07-19 RX ADMIN — SIMETHICONE 80 MILLIGRAM(S): 80 TABLET, CHEWABLE ORAL at 06:36

## 2022-07-19 RX ADMIN — Medication 50 MILLIGRAM(S): at 06:36

## 2022-07-19 RX ADMIN — TIOTROPIUM BROMIDE 1 CAPSULE(S): 18 CAPSULE ORAL; RESPIRATORY (INHALATION) at 15:06

## 2022-07-19 RX ADMIN — CHLORHEXIDINE GLUCONATE 1 APPLICATION(S): 213 SOLUTION TOPICAL at 06:45

## 2022-07-19 RX ADMIN — Medication 5 MILLIGRAM(S): at 06:37

## 2022-07-19 RX ADMIN — Medication 100 MILLIGRAM(S): at 15:07

## 2022-07-19 RX ADMIN — Medication 5 MILLIGRAM(S): at 21:54

## 2022-07-19 RX ADMIN — MIDODRINE HYDROCHLORIDE 20 MILLIGRAM(S): 2.5 TABLET ORAL at 15:06

## 2022-07-19 RX ADMIN — MIDODRINE HYDROCHLORIDE 20 MILLIGRAM(S): 2.5 TABLET ORAL at 06:41

## 2022-07-19 RX ADMIN — BUDESONIDE AND FORMOTEROL FUMARATE DIHYDRATE 2 PUFF(S): 160; 4.5 AEROSOL RESPIRATORY (INHALATION) at 16:50

## 2022-07-19 RX ADMIN — Medication 50 MILLIGRAM(S): at 16:49

## 2022-07-19 RX ADMIN — SIMETHICONE 80 MILLIGRAM(S): 80 TABLET, CHEWABLE ORAL at 21:54

## 2022-07-19 RX ADMIN — Medication 1 GRAM(S): at 16:49

## 2022-07-19 RX ADMIN — Medication 5 MILLIGRAM(S): at 15:07

## 2022-07-19 RX ADMIN — POLYETHYLENE GLYCOL 3350 17 GRAM(S): 17 POWDER, FOR SOLUTION ORAL at 15:07

## 2022-07-19 RX ADMIN — Medication 1 GRAM(S): at 21:54

## 2022-07-19 NOTE — PROGRESS NOTE ADULT - SUBJECTIVE AND OBJECTIVE BOX
Patient is a 77y old  Male who presents with a chief complaint of functional paraplegia post a fall (19 Jul 2022 10:33)      SUBJECTIVE / OVERNIGHT EVENTS: s/p EGD, has severe " linitis" type erythema with multiple gastric ulcers, biopsies were taken. for now will cont on full liquids only. d/w GI    MEDICATIONS  (STANDING):  allopurinol 100 milliGRAM(s) Oral daily  budesonide 160 MICROgram(s)/formoterol 4.5 MICROgram(s) Inhaler 2 Puff(s) Inhalation two times a day  chlorhexidine 2% Cloths 1 Application(s) Topical <User Schedule>  lactated ringers. 1000 milliLiter(s) (30 mL/Hr) IV Continuous <Continuous>  metoclopramide 5 milliGRAM(s) Oral every 8 hours  midodrine. 20 milliGRAM(s) Oral three times a day  multivitamin 1 Tablet(s) Oral daily  pantoprazole    Tablet 40 milliGRAM(s) Oral before breakfast  polyethylene glycol 3350 17 Gram(s) Oral daily  pregabalin 50 milliGRAM(s) Oral two times a day  sevelamer carbonate 800 milliGRAM(s) Oral three times a day with meals  simethicone 80 milliGRAM(s) Chew three times a day  sucralfate suspension 1 Gram(s) Oral four times a day  tiotropium 18 MICROgram(s) Capsule 1 Capsule(s) Inhalation daily    MEDICATIONS  (PRN):      Vital Signs Last 24 Hrs  T(F): 97.4 (07-19-22 @ 12:50), Max: 99.4 (07-18-22 @ 17:06)  HR: 92 (07-19-22 @ 13:50) (58 - 96)  BP: 95/52 (07-19-22 @ 13:50) (80/49 - 109/58)  RR: 20 (07-19-22 @ 13:50) (14 - 20)  SpO2: 99% (07-19-22 @ 13:50) (91% - 100%)  Telemetry:   CAPILLARY BLOOD GLUCOSE      POCT Blood Glucose.: 84 mg/dL (19 Jul 2022 08:35)  POCT Blood Glucose.: 105 mg/dL (18 Jul 2022 16:43)    I&O's Summary    18 Jul 2022 07:01  -  19 Jul 2022 07:00  --------------------------------------------------------  IN: 150 mL / OUT: 1000 mL / NET: -850 mL        PHYSICAL EXAM:  GENERAL: NAD, well-developed  HEAD:  Atraumatic, Normocephalic  EYES: EOMI, PERRLA, conjunctiva and sclera clear  NECK: Supple, No JVD  CHEST/LUNG: Clear to auscultation bilaterally; No wheeze  HEART: Regular rate and rhythm; No murmurs, rubs, or gallops  ABDOMEN: Soft, Nontender, Nondistended; Bowel sounds present  EXTREMITIES:  2+ Peripheral Pulses, No clubbing, cyanosis, or edema  PSYCH: AAOx3  NEUROLOGY: non-focal  SKIN: No rashes or lesions    LABS:                        13.0   10.58 )-----------( 172      ( 19 Jul 2022 07:25 )             42.8     07-19    131<L>  |  91<L>  |  50<H>  ----------------------------<  79  5.6<H>   |  23  |  8.74<H>    Ca    10.0      19 Jul 2022 07:25    TPro  8.7<H>  /  Alb  3.6  /  TBili  0.4  /  DBili  x   /  AST  24  /  ALT  10  /  AlkPhos  134<H>  07-19    PT/INR - ( 18 Jul 2022 10:47 )   PT: 18.9 sec;   INR: 1.64 ratio         PTT - ( 18 Jul 2022 10:47 )  PTT:30.0 sec          RADIOLOGY & ADDITIONAL TESTS:    Imaging Personally Reviewed:    Consultant(s) Notes Reviewed:      Care Discussed with Consultants/Other Providers:

## 2022-07-19 NOTE — PRE PROCEDURE NOTE - PRE PROCEDURE EVALUATION
Attending Physician:                            Procedure: egd    Indication for Procedure: abnormal CT   ________________________________________________________  PAST MEDICAL & SURGICAL HISTORY:  Hypertension      Glomerular disease  Segmental glomerular sclerosis      CHF (congestive heart failure)      COPD (chronic obstructive pulmonary disease)      Pulmonary hypertension      Sleep apnea      Pulmonary edema      Peripheral edema      Gout      History of abdominal surgery  polypectomy      H/O right heart catheterization        ALLERGIES:  latex (Rash)  No Known Drug Allergies    HOME MEDICATIONS:  albuterol 0.63 mg/3 mL (0.021%) inhalation solution: 3 milliliter(s) inhaled 3 times a day, As Needed  allopurinol 100 mg oral tablet: 1 tab(s) orally once a day  Colace 100 mg oral capsule: 1 cap(s) orally 3 times a day  folic acid 1 mg oral tablet: 1 tab(s) orally once a day  hydrocortisone 1% topical cream: Apply topically to affected area 2 times a day, As Needed  lidocaine 4% patch: Apply topically to affected area (bilateral knees) once a day  lidocaine 5% patch: Apply topically to affected area (back) once a day  Lyrica 50 mg oral capsule: 1 cap(s) orally 2 times a day  midodrine 10 mg oral tablet: 1 tab(s) orally 3 times a day  MiraLax oral powder for reconstitution: 17 gram(s) orally once a day, As Needed  Nephro-Gita oral tablet: 1 tab(s) orally once a day  pantoprazole 40 mg oral delayed release tablet: 1 tab(s) orally once a day  senna (sennosides) 8.6 mg oral tablet: 2 tab(s) orally once a day (at bedtime)  sevelamer carbonate 800 mg oral tablet: 1 tab(s) orally 3 times a day (with meals)  Trelegy Ellipta 100 mcg-62.5 mcg-25 mcg/inh inhalation powder: 1 puff(s) inhaled once a day  Tylenol 325 mg oral tablet: 2 tab(s) orally 2 times a day  Vitamin D3 25 mcg (1000 intl units) oral tablet: 1 tab(s) orally once a day  warfarin 7.5 mg oral tablet: 1 tab(s) orally once a day (in the evening)  zinc oxide 20% topical ointment: Apply topically to affected area (on buttocks) once a day    AICD/PPM: [ ] yes   [x ] no    PERTINENT LAB DATA:                        13.0   10.58 )-----------( 172      ( 19 Jul 2022 07:25 )             42.8     07-19    131<L>  |  91<L>  |  50<H>  ----------------------------<  79  5.6<H>   |  23  |  8.74<H>    Ca    10.0      19 Jul 2022 07:25    TPro  8.7<H>  /  Alb  3.6  /  TBili  0.4  /  DBili  x   /  AST  24  /  ALT  10  /  AlkPhos  134<H>  07-19    PT/INR - ( 18 Jul 2022 10:47 )   PT: 18.9 sec;   INR: 1.64 ratio         PTT - ( 18 Jul 2022 10:47 )  PTT:30.0 sec            PHYSICAL EXAMINATION:    T(C): 36.2  HR: 79  BP: 101/54  RR: 17  SpO2: 98%    Constitutional: NAD  HEENT: PERRLA, EOMI,    Neck:  No JVD  Respiratory: CTAB/L  Cardiovascular: S1 and S2  Gastrointestinal: BS+, soft, NT/ND  Extremities: No peripheral edema  Neurological: A/O x 3, no focal deficits  Psychiatric: Normal mood, normal affect  Skin: No rashes    ASA Class: I [ ]  II [ ]  III [x ]  IV [ ]    COMMENTS:    The patient is a suitable candidate for the planned procedure unless box checked [ ]  No, explain:

## 2022-07-19 NOTE — PROGRESS NOTE ADULT - SUBJECTIVE AND OBJECTIVE BOX
[facility], Division of Infectious Diseases  EMELYN Elaine Y. Patel, S. Shah, G. Casimir  [phone]  ([phone] - weekdays after 5pm and weekends)    Name: MAYE ZUNIGA  Age/Gender: 77y Male  MRN: [id number]    Interval History:  Patient seen this morning.   Notes reviewed.   No concerning overnight events.  Afebrile.   spouse at bedside  reports patient w/ abd pain this morning though this not present currently  pt states he feels nauseated     Allergies: latex (Rash)  No Known Drug Allergies      Objective:  Vitals:   T(F): 99.2 (07-19-22 @ 08:43), Max: 99.4 (07-18-22 @ 17:06)  HR: 80 (07-19-22 @ 08:43) (58 - 89)  BP: 109/58 (07-19-22 @ 08:43) (93/41 - 109/58)  RR: 18 (07-19-22 @ 08:43) (18 - 18)  SpO2: 93% (07-19-22 @ 08:43) (91% - 96%)  Physical Examination:  General: no acute distress, appears uncomfortable  HEENT: anicteric, 2L NC  Cardio: S1, S2, normal rate, L arm AVF covered w/ dressing  Resp: clear to auscultation b/l  Abd: soft, distended, non-tender  Ext: no LE edema  Skin: warm, dry, no visible rash   lines: PIV w/o surrounding erythema or tenderness    Laboratory Studies:  CBC:                       13.0   10.58 )-----------( 172      ( 19 Jul 2022 07:25 )             42.8     WBC Trend:  10.58 07-19-22 @ 07:25  11.95 07-18-22 @ 10:46  14.42 07-17-22 @ 06:53  14.60 07-16-22 @ 07:23  10.38 07-14-22 @ 10:02  8.36 07-12-22 @ 11:22    CMP: 07-19    131<L>  |  91<L>  |  50<H>  ----------------------------<  79  5.6<H>   |  23  |  8.74<H>    Ca    10.0      19 Jul 2022 07:25    TPro  8.7<H>  /  Alb  3.6  /  TBili  0.4  /  DBili  x   /  AST  24  /  ALT  10  /  AlkPhos  134<H>  07-19      LIVER FUNCTIONS - ( 19 Jul 2022 07:25 )  Alb: 3.6 g/dL / Pro: 8.7 g/dL / ALK PHOS: 134 U/L / ALT: 10 U/L / AST: 24 U/L / GGT: x               Microbiology: reviewed     Radiology: reviewed     Medications:  allopurinol 100 milliGRAM(s) Oral daily  budesonide 160 MICROgram(s)/formoterol 4.5 MICROgram(s) Inhaler 2 Puff(s) Inhalation two times a day  chlorhexidine 2% Cloths 1 Application(s) Topical <User Schedule>  lactated ringers. 1000 milliLiter(s) IV Continuous <Continuous>  metoclopramide 5 milliGRAM(s) Oral every 8 hours  midodrine. 20 milliGRAM(s) Oral three times a day  multivitamin 1 Tablet(s) Oral daily  pantoprazole    Tablet 40 milliGRAM(s) Oral before breakfast  polyethylene glycol 3350 17 Gram(s) Oral daily  pregabalin 50 milliGRAM(s) Oral two times a day  sevelamer carbonate 800 milliGRAM(s) Oral three times a day with meals  simethicone 80 milliGRAM(s) Chew three times a day  tiotropium 18 MICROgram(s) Capsule 1 Capsule(s) Inhalation daily    Antimicrobials:

## 2022-07-19 NOTE — PROGRESS NOTE ADULT - ASSESSMENT
Assessment  Hypoglycemia: 77y nondiabetic male, was not taking any hypoglycemic agents, admitted s/p mechanical fall, blood sugars are trending within overall acceptable range now, no new hypoglycemias, AM cortisol WNL. Patient with N/V, s/p endoscopy today, diet advanced to full liquids.  Hypotension: AM cortisol WNL, on midodrine, monitored.  ?Gastroparesis: On meds, monitored.  ESRD: On hemodialysis, Monitor labs/BMP      tonia Wilkins MD  Cell: [phone]             Problem/Recommendation - 1:  ·  Problem: Hypoglycemia.   ·  Recommendation: Likely nutritional hypoglycemia in the setting of N/V and ESRD.   ?Pseudohypoglycemia. Hypoglycemias have all been with low FS and not blood sugar.. Suggest to check blood sugar at the time of hypoglycemia to confirm real low. Also check insulin antibody, igf-1, igf-2, cortisol level at the time of hypoglycemia.  Encourage to increase po intake, nutritional supplements as tolerated. May need continuous dextrose for now.  Will continue monitoring and FU.     Problem/Recommendation - 2:  ·  Problem: Hypotension.   ·  Recommendation: AM cortisol WNL. Suggest to continue medications, monitoring, FU primary team recommendations.     Problem/Recommendation - 3:  ·  Problem: Gastroparesis.   ·  Recommendation: Suggest to continue medications, monitoring, FU primary team recommendations.     Problem/Recommendation - 4:  ·  Problem: ESRD on hemodialysis.   ·  Recommendation: On HD, continue as scheduled, renal FU

## 2022-07-19 NOTE — PROGRESS NOTE ADULT - ASSESSMENT
Pt. is a 77 y.o. M e/ PMHx. of HTN, HFrEF, SAADIA, Gout and ESRD on HD MWF at [facility] presents after having a fall enroute to the HD center. Nephrology consulted for ESRD management.

## 2022-07-19 NOTE — PROGRESS NOTE ADULT - PROBLEM SELECTOR PLAN 1
Pt. with ESRD on HD three times a week (MWF) presented to [facility] for a fall. Last HD was on 7/13 via LUE AVF. Pt. clinically stable. No CP, SOB or palpitations during evaluation. Most recent labs reviewed. Will arrange for HD today. Pt. denies any lightheadedness and states that his his BP is normally 80s-90s/ 50s-60s. He endorses taking Midodrine but does not recall the dose. Consent obtained for HD and placed in the chart. Will arrange for HD today. HgB at goal. Prior records state that Pt. was on 30mg Q8H here with additional Midodrine given during HD during prior admission. Increase Midodrine to 20mg TID. Consider discharging on this dose. C/w HD time to 4 hours and DFR to 600 on regular HD days. Plan for short session today.     All other factors for measuring effective HD were ok. On appropriate diet.     #Hyperphosphatemia- c/w Sevelamer 800mg TID. Check Phos.    If you have any questions, please feel free to contact me  Jai Alvarenga  Nephrology Fellow  [phone]; Prefer [employer] TEAMS  (After 5pm or on weekends please page the on-call fellow).

## 2022-07-19 NOTE — PROGRESS NOTE ADULT - ASSESSMENT
Pt. is a 77 y.o. M e/ PMHx. of HTN, HFrEF, SAADIA, Gout and ESRD on HD MWF at [facility] presents after having a fall enroute to the HD center. 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 c/o knee pain and inability to walk. nephrology called. PT kevin ordered. cont outptn meds, check orthostatics. card called  ptn w h/o DVT/PE, in the past not on AC 2/2 h/o GI bleeds, but admitted on COumadin, will hold until EGD is clear  h/o COPD with chronic hypoxic respiratory failure on 2 L home O2. presently stable.   episode of vomiting on 7/11 while in HD, CT scan w dilated stomach, no obstruction, seen by GI, tolerated CLD,   s/p EGD 7/19, has severe " linitis" type erythema with multiple gastric ulcers, biopsies were taken. for now will cont on full liquids only. d/w GI    HD MWF,volume status is stable.   will cont reglan for gastroparesis    awaiting BORIS placement once medically cleared

## 2022-07-19 NOTE — PROGRESS NOTE ADULT - SUBJECTIVE AND OBJECTIVE BOX
Chief complaint  Patient is a 77y old  Male who presents with a chief complaint of functional paraplegia post a fall (19 Jul 2022 14:12)   Review of systems  Endoscopy today, no hypoglycemic episodes.    Labs and Fingersticks  CAPILLARY BLOOD GLUCOSE      POCT Blood Glucose.: 84 mg/dL (19 Jul 2022 08:35)  POCT Blood Glucose.: 105 mg/dL (18 Jul 2022 16:43)      Anion Gap, Serum: 17 (07-19 @ 07:25)  Anion Gap, Serum: 19 *H* (07-18 @ 10:47)      Calcium, Total Serum: 10.0 (07-19 @ 07:25)  Calcium, Total Serum: 9.8 (07-18 @ 10:47)  Albumin, Serum: 3.6 (07-19 @ 07:25)    Alanine Aminotransferase (ALT/SGPT): 10 (07-19 @ 07:25)  Alkaline Phosphatase, Serum: 134 *H* (07-19 @ 07:25)  Aspartate Aminotransferase (AST/SGOT): 24 (07-19 @ 07:25)        07-19    131<L>  |  91<L>  |  50<H>  ----------------------------<  79  5.6<H>   |  23  |  8.74<H>    Ca    10.0      19 Jul 2022 07:25    TPro  8.7<H>  /  Alb  3.6  /  TBili  0.4  /  DBili  x   /  AST  24  /  ALT  10  /  AlkPhos  134<H>  07-19                        13.0   10.58 )-----------( 172      ( 19 Jul 2022 07:25 )             42.8     Medications  MEDICATIONS  (STANDING):  allopurinol 100 milliGRAM(s) Oral daily  budesonide 160 MICROgram(s)/formoterol 4.5 MICROgram(s) Inhaler 2 Puff(s) Inhalation two times a day  chlorhexidine 2% Cloths 1 Application(s) Topical <User Schedule>  lactated ringers. 1000 milliLiter(s) (30 mL/Hr) IV Continuous <Continuous>  metoclopramide 5 milliGRAM(s) Oral every 8 hours  midodrine. 20 milliGRAM(s) Oral three times a day  multivitamin 1 Tablet(s) Oral daily  pantoprazole    Tablet 40 milliGRAM(s) Oral before breakfast  polyethylene glycol 3350 17 Gram(s) Oral daily  pregabalin 50 milliGRAM(s) Oral two times a day  sevelamer carbonate 800 milliGRAM(s) Oral three times a day with meals  simethicone 80 milliGRAM(s) Chew three times a day  sucralfate suspension 1 Gram(s) Oral four times a day  tiotropium 18 MICROgram(s) Capsule 1 Capsule(s) Inhalation daily      Physical Exam  Vital Signs Last 12 Hrs  T(F): 97.4 (07-19-22 @ 12:50), Max: 99.2 (07-19-22 @ 08:43)  HR: 92 (07-19-22 @ 13:50) (79 - 96)  BP: 95/52 (07-19-22 @ 13:50) (80/49 - 109/58)  BP(mean): --  RR: 20 (07-19-22 @ 13:50) (14 - 20)  SpO2: 99% (07-19-22 @ 13:50) (93% - 100%)    Diagnostics    Cortisol AM, Serum: AM Sched. Collection: 16-Jul-2022 06:00 (07-15 @ 14:04)  Free Thyroxine, Serum: AM Sched. Collection: 16-Jul-2022 06:00 (07-15 @ 14:04)  Thyroid Stimulating Hormone, Serum: AM Sched. Collection: 16-Jul-2022 06:00 (07-15 @ 14:04)

## 2022-07-19 NOTE — PROGRESS NOTE ADULT - SUBJECTIVE AND OBJECTIVE BOX
DATE OF SERVICE: 07-19-22 @ 16:49    Patient is a 77y old  Male who presents with a chief complaint of functional paraplegia post a fall (19 Jul 2022 15:09)      INTERVAL HISTORY: Feels ok.     REVIEW OF SYSTEMS:  CONSTITUTIONAL: No weakness  EYES/ENT: No visual changes;  No throat pain   NECK: No pain or stiffness  RESPIRATORY: No cough, wheezing; No shortness of breath  CARDIOVASCULAR: No chest pain or palpitations  GASTROINTESTINAL: No abdominal  pain. No nausea, vomiting, or hematemesis  GENITOURINARY: No dysuria, frequency or hematuria  NEUROLOGICAL: No stroke like symptoms  SKIN: No rashes    	  MEDICATIONS:  midodrine. 20 milliGRAM(s) Oral three times a day        PHYSICAL EXAM:  T(C): 36.7 (07-19-22 @ 15:21), Max: 37.4 (07-18-22 @ 17:06)  HR: 75 (07-19-22 @ 15:25) (58 - 96)  BP: 88/46 (07-19-22 @ 15:21) (80/49 - 109/58)  RR: 18 (07-19-22 @ 15:21) (14 - 20)  SpO2: 90% (07-19-22 @ 15:25) (90% - 100%)  Wt(kg): --  I&O's Summary    18 Jul 2022 07:01  -  19 Jul 2022 07:00  --------------------------------------------------------  IN: 150 mL / OUT: 1000 mL / NET: -850 mL      Height (cm): 170.2 (07-19 @ 11:52)  Weight (kg): 124.7 (07-19 @ 11:52)  BMI (kg/m2): 43 (07-19 @ 11:52)  BSA (m2): 2.32 (07-19 @ 11:52)    Appearance: In no distress	  HEENT:    PERRL, EOMI	  Cardiovascular:  S1 S2, No JVD  Respiratory: Lungs clear to auscultation	  Gastrointestinal:  Soft, Non-tender, + BS	  Vascularature:  No edema of LE  Psychiatric: Appropriate affect   Neuro: no acute focal deficits                               13.0   10.58 )-----------( 172      ( 19 Jul 2022 07:25 )             42.8     07-19    131<L>  |  91<L>  |  50<H>  ----------------------------<  79  5.6<H>   |  23  |  8.74<H>    Ca    10.0      19 Jul 2022 07:25    TPro  8.7<H>  /  Alb  3.6  /  TBili  0.4  /  DBili  x   /  AST  24  /  ALT  10  /  AlkPhos  134<H>  07-19        Labs personally reviewed      ASSESSMENT/PLAN: 	    76 yo male with PMHx of ESRD on HD via AV fistula LUE MWF, COPD (on 2L home O2), CHF, pHTN, hypotension on Midodrine, DVT S/P IVC filter p/w fall.  Patient reports that he was walking out of his house today to go to dialysis when he felt his left knee "give out." Patient landed on his buttocks. No head trauma or LOC.  On coumadin.  Patient was unable to get up 2/2 left knee pain.  Patient also c/o acute on chronic low back pain since the fall.  Patient reports that he is otherwise feeling well.  Denies headache, blurry vision, focal weakness, numbness, slurred speech, CP, SOB, abd pain, NVD.  Patient does not make urine.  Of note, patient was discharged from [facility] one month ago after receiving PT for BL knee pain and difficulty ambulating.  Patient states that after discharge from rehab he was supposed to receive home PT, but that has not happened. (06 Jul 2022 14:54).    1. Chronic hypotension   - with baseline SBP noted in the 80s prior admit a year ago  - was discharged on Midodrine 10mg TID  - BP soft   - PT eval   - BP continues to be SBP 90s  - Midodrine 15mg PO TID, cont to closely monitor BP  - monitor for nausea and vomiting   - may add a trial of low dose florinef if BP remains low despite midodrine    2. Hx of LE DVT prior admit   - s/p IVC filter   - on Warfarin at home as INR is elevated  - rec resume coumadin with INR <2.5,   - monitor INR     3. ?Hx of HF  - prior echo with preserved EF 60% and no mention of pulm HTN  - on HD for fluid management     4. ESRD   - HD per renal   - monitor Lytes and creat   - avoid nephrotoxins     5. Chest Pain   - Repeat EKG noted   - Troponin noted   - TTE ordered  - GI following  plan for upper gastrointestinal endoscopy tomorrow morning   - s/p Endoscopy: severe " linitis" type erythema with multiple gastric ulcers, biopsies were taken  - CLD for now, GI following          Estefania Forbes, AYLA-NP   Roberto Latif DO [facility]  Cardiovascular Medicine  [address] [place] [address], [address]  Office: [phone]  Cell: [phone]

## 2022-07-19 NOTE — PROGRESS NOTE ADULT - SUBJECTIVE AND OBJECTIVE BOX
[facility] DIVISION OF KIDNEY DISEASES AND HYPERTENSION -- FOLLOW UP NOTE  --------------------------------------------------------------------------------  HPI:  Pt. is a 77 y.o. M e/ PMHx. of HTN, HFrEF, SAADIA, Gout and ESRD on HD MWF at [facility] presents after having a fall enroute to the HD center. Nephrology consulted for ESRD management. Pt. does not recall his nephrologist and denies any issues with dialysis. He is edematous but states he is not worse than usual. Denies any syncope and attributes his fall to his knee "buckling." Pt. has notably low BP which Pt. states is chronic and for which he takes Midodrine.     Pt. seen this afternoon after EGD. Potassium remained elevated even after HD yesterday. Will do short session of HD today      PAST HISTORY  --------------------------------------------------------------------------------  No significant changes to PMH, PSH, FHx, SHx, unless otherwise noted    ALLERGIES & MEDICATIONS  --------------------------------------------------------------------------------  Allergies    latex (Rash)  No Known Drug Allergies    Intolerances      Standing Inpatient Medications  allopurinol 100 milliGRAM(s) Oral daily  budesonide 160 MICROgram(s)/formoterol 4.5 MICROgram(s) Inhaler 2 Puff(s) Inhalation two times a day  chlorhexidine 2% Cloths 1 Application(s) Topical <User Schedule>  lactated ringers. 1000 milliLiter(s) IV Continuous <Continuous>  metoclopramide 5 milliGRAM(s) Oral every 8 hours  midodrine. 20 milliGRAM(s) Oral three times a day  multivitamin 1 Tablet(s) Oral daily  pantoprazole    Tablet 40 milliGRAM(s) Oral before breakfast  polyethylene glycol 3350 17 Gram(s) Oral daily  pregabalin 50 milliGRAM(s) Oral two times a day  sevelamer carbonate 800 milliGRAM(s) Oral three times a day with meals  simethicone 80 milliGRAM(s) Chew three times a day  sucralfate suspension 1 Gram(s) Oral four times a day  tiotropium 18 MICROgram(s) Capsule 1 Capsule(s) Inhalation daily    PRN Inpatient Medications      REVIEW OF SYSTEMS  --------------------------------------------------------------------------------  Gen: No fevers/chills  Skin: No rashes  Head/Eyes/Ears: Normal hearing,   Respiratory: Sore throat  CV: No chest pain  GI: No abdominal pain, diarrhea  : No dysuria, hematuria  MSK: No  edema  Heme: No easy bruising or bleeding  Psych: No significant depression      All other systems were reviewed and are negative, except as noted.    VITALS/PHYSICAL EXAM  --------------------------------------------------------------------------------  T(C): 36.3 (07-19-22 @ 12:50), Max: 37.4 (07-18-22 @ 17:06)  HR: 92 (07-19-22 @ 13:50) (58 - 96)  BP: 95/52 (07-19-22 @ 13:50) (80/49 - 109/58)  RR: 20 (07-19-22 @ 13:50) (14 - 20)  SpO2: 99% (07-19-22 @ 13:50) (91% - 100%)  Wt(kg): --  Height (cm): 170.2 (07-19-22 @ 11:52)  Weight (kg): 124.7 (07-19-22 @ 11:52)  BMI (kg/m2): 43 (07-19-22 @ 11:52)  BSA (m2): 2.32 (07-19-22 @ 11:52)      07-18-22 @ 07:01  -  07-19-22 @ 07:00  --------------------------------------------------------  IN: 150 mL / OUT: 1000 mL / NET: -850 mL    Physical Exam:  	Gen: NAD  	HEENT: MMM, on NC  	Pulm: CTA B/L  	CV: S1S2  	Abd: Soft, +BS   	Ext: + LE edema B/L improving, chronic skin changes   	Neuro: Awake  	Skin: Warm and dry  	Vascular access: LUE AVF, thrills+    LABS/STUDIES  --------------------------------------------------------------------------------              13.0   10.58 >-----------<  172      [07-19-22 @ 07:25]              42.8     131  |  91  |  50  ----------------------------<  79      [07-19-22 @ 07:25]  5.6   |  23  |  8.74        Ca     10.0     [07-19-22 @ 07:25]    TPro  8.7  /  Alb  3.6  /  TBili  0.4  /  DBili  x   /  AST  24  /  ALT  10  /  AlkPhos  134  [07-19-22 @ 07:25]    PT/INR: PT 18.9 , INR 1.64       [07-18-22 @ 10:47]  PTT: 30.0       [07-18-22 @ 10:47]      Creatinine Trend:  SCr 8.74 [07-19 @ 07:25]  SCr 11.84 [07-18 @ 10:47]  SCr 9.79 [07-17 @ 06:51]  SCr 7.39 [07-16 @ 07:14]  SCr 10.21 [07-15 @ 07:38]        HbA1c 5.0      [10-02-17 @ 15:38]  TSH 1.32      [07-17-22 @ 06:53]    HBsAg Nonreact      [07-11-22 @ 13:23]  HCV 0.39, Nonreact      [07-11-22 @ 13:23]

## 2022-07-19 NOTE — PROGRESS NOTE ADULT - ASSESSMENT
76 y/o male PMHx of ESRD on HD via AV fistula LUE MWF, COPD (on 2L home O2), CHF, pHTN, hypotension on Midodrine, DVT S/P IVC filter who presented after his L knee gave out.     leukocytosis  remains afebrile for duration of hospital course  leukocytosis first noted 7/16  s/p CT chest w/o evidence of focal consolidation  s/p CT abd/pelvis 7/11- No small bowel obstruction. Stomach is moderately distended. Mild to moderate stool in the distal rectosigmoid colon.  only localizing symptoms are chronic b/l knee pains (L>R) which patient attributes to "bone on bone", bouts of nausea/ emesis  no erythema or warmth or decreased ROM of either knee on exam  no evidence of SSTI on exam  leukocytosis improving off antibiotics  continue to monitor off antibiotics  monitor temps, continue to trend wbc  f/u CT abd/pelvis read  f/u EGD tentatively planned for today    nausea  currently on scheduled reglan    ESRD  HD per nephrology    Edmundo Olmstead M.D.  [facility], Division of Infectious Diseases  [phone]  After 5pm on weekdays and all day on weekends - please call [phone]  76 y/o male PMHx of ESRD on HD via AV fistula LUE MWF, COPD (on 2L home O2), CHF, pHTN, hypotension on Midodrine, DVT S/P IVC filter who presented after his L knee gave out.     leukocytosis- improving  remains afebrile for duration of hospital course  leukocytosis first noted 7/16  s/p CT chest w/o evidence of focal consolidation  s/p CT abd/pelvis 7/11- No small bowel obstruction. Stomach is moderately distended. Mild to moderate stool in the distal rectosigmoid colon.  only localizing symptoms are chronic b/l knee pains (L>R) which patient attributes to "bone on bone", bouts of nausea/ emesis  no erythema or warmth or decreased ROM of either knee on exam  no evidence of SSTI on exam  leukocytosis improving off antibiotics  continue to monitor off antibiotics  monitor temps, continue to trend wbc  f/u CT abd/pelvis read  f/u EGD tentatively planned for today    nausea  currently on scheduled reglan    ESRD  HD per nephrology    Edmundo Olmstead M.D.  [facility], Division of Infectious Diseases  [phone]  After 5pm on weekdays and all day on weekends - please call [phone]

## 2022-07-20 LAB
ALBUMIN SERPL ELPH-MCNC: 3.8 G/DL — SIGNIFICANT CHANGE UP (ref 3.3–5)
ALP SERPL-CCNC: 123 U/L — HIGH (ref 40–120)
ALT FLD-CCNC: 9 U/L — LOW (ref 10–45)
ANION GAP SERPL CALC-SCNC: 14 MMOL/L — SIGNIFICANT CHANGE UP (ref 5–17)
AST SERPL-CCNC: 20 U/L — SIGNIFICANT CHANGE UP (ref 10–40)
BILIRUB SERPL-MCNC: 0.3 MG/DL — SIGNIFICANT CHANGE UP (ref 0.2–1.2)
BUN SERPL-MCNC: 44 MG/DL — HIGH (ref 7–23)
CALCIUM SERPL-MCNC: 9.9 MG/DL — SIGNIFICANT CHANGE UP (ref 8.4–10.5)
CHLORIDE SERPL-SCNC: 92 MMOL/L — LOW (ref 96–108)
CO2 SERPL-SCNC: 26 MMOL/L — SIGNIFICANT CHANGE UP (ref 22–31)
CORTIS F PM SERPL-MCNC: 11.9 UG/DL — HIGH (ref 2.7–10.5)
CREAT SERPL-MCNC: 7.86 MG/DL — HIGH (ref 0.5–1.3)
EGFR: 7 ML/MIN/1.73M2 — LOW
GLUCOSE BLDC GLUCOMTR-MCNC: 105 MG/DL — HIGH (ref 70–99)
GLUCOSE BLDC GLUCOMTR-MCNC: 118 MG/DL — HIGH (ref 70–99)
GLUCOSE BLDC GLUCOMTR-MCNC: 128 MG/DL — HIGH (ref 70–99)
GLUCOSE BLDC GLUCOMTR-MCNC: 25 MG/DL — CRITICAL LOW (ref 70–99)
GLUCOSE BLDC GLUCOMTR-MCNC: 32 MG/DL — CRITICAL LOW (ref 70–99)
GLUCOSE BLDC GLUCOMTR-MCNC: 46 MG/DL — CRITICAL LOW (ref 70–99)
GLUCOSE BLDC GLUCOMTR-MCNC: 62 MG/DL — LOW (ref 70–99)
GLUCOSE BLDC GLUCOMTR-MCNC: 80 MG/DL — SIGNIFICANT CHANGE UP (ref 70–99)
GLUCOSE BLDC GLUCOMTR-MCNC: 97 MG/DL — SIGNIFICANT CHANGE UP (ref 70–99)
GLUCOSE SERPL-MCNC: 131 MG/DL — HIGH (ref 70–99)
GLUCOSE SERPL-MCNC: 138 MG/DL — HIGH (ref 70–99)
HCT VFR BLD CALC: 41.4 % — SIGNIFICANT CHANGE UP (ref 39–50)
HGB BLD-MCNC: 12.5 G/DL — LOW (ref 13–17)
INR BLD: 1.62 RATIO — HIGH (ref 0.88–1.16)
MCHC RBC-ENTMCNC: 27.7 PG — SIGNIFICANT CHANGE UP (ref 27–34)
MCHC RBC-ENTMCNC: 30.2 GM/DL — LOW (ref 32–36)
MCV RBC AUTO: 91.8 FL — SIGNIFICANT CHANGE UP (ref 80–100)
NRBC # BLD: 0 /100 WBCS — SIGNIFICANT CHANGE UP (ref 0–0)
PLATELET # BLD AUTO: 182 K/UL — SIGNIFICANT CHANGE UP (ref 150–400)
POTASSIUM SERPL-MCNC: 5.1 MMOL/L — SIGNIFICANT CHANGE UP (ref 3.5–5.3)
POTASSIUM SERPL-SCNC: 5.1 MMOL/L — SIGNIFICANT CHANGE UP (ref 3.5–5.3)
PROT SERPL-MCNC: 8.6 G/DL — HIGH (ref 6–8.3)
PROTHROM AB SERPL-ACNC: 18.7 SEC — HIGH (ref 10.5–13.4)
RBC # BLD: 4.51 M/UL — SIGNIFICANT CHANGE UP (ref 4.2–5.8)
RBC # FLD: 15.4 % — HIGH (ref 10.3–14.5)
SARS-COV-2 RNA SPEC QL NAA+PROBE: SIGNIFICANT CHANGE UP
SODIUM SERPL-SCNC: 132 MMOL/L — LOW (ref 135–145)
WBC # BLD: 11.32 K/UL — HIGH (ref 3.8–10.5)
WBC # FLD AUTO: 11.32 K/UL — HIGH (ref 3.8–10.5)

## 2022-07-20 PROCEDURE — 90935 HEMODIALYSIS ONE EVALUATION: CPT | Mod: GC

## 2022-07-20 RX ORDER — WARFARIN SODIUM 2.5 MG/1
5 TABLET ORAL ONCE
Refills: 0 | Status: COMPLETED | OUTPATIENT
Start: 2022-07-20 | End: 2022-07-20

## 2022-07-20 RX ORDER — DEXTROSE 50 % IN WATER 50 %
50 SYRINGE (ML) INTRAVENOUS ONCE
Refills: 0 | Status: COMPLETED | OUTPATIENT
Start: 2022-07-20 | End: 2022-07-20

## 2022-07-20 RX ADMIN — Medication 5 MILLIGRAM(S): at 14:08

## 2022-07-20 RX ADMIN — SIMETHICONE 80 MILLIGRAM(S): 80 TABLET, CHEWABLE ORAL at 21:47

## 2022-07-20 RX ADMIN — SIMETHICONE 80 MILLIGRAM(S): 80 TABLET, CHEWABLE ORAL at 14:08

## 2022-07-20 RX ADMIN — Medication 1 TABLET(S): at 14:01

## 2022-07-20 RX ADMIN — SIMETHICONE 80 MILLIGRAM(S): 80 TABLET, CHEWABLE ORAL at 05:06

## 2022-07-20 RX ADMIN — Medication 100 MILLIGRAM(S): at 14:03

## 2022-07-20 RX ADMIN — Medication 5 MILLIGRAM(S): at 21:47

## 2022-07-20 RX ADMIN — MIDODRINE HYDROCHLORIDE 20 MILLIGRAM(S): 2.5 TABLET ORAL at 08:04

## 2022-07-20 RX ADMIN — MIDODRINE HYDROCHLORIDE 20 MILLIGRAM(S): 2.5 TABLET ORAL at 17:36

## 2022-07-20 RX ADMIN — BUDESONIDE AND FORMOTEROL FUMARATE DIHYDRATE 2 PUFF(S): 160; 4.5 AEROSOL RESPIRATORY (INHALATION) at 05:07

## 2022-07-20 RX ADMIN — Medication 50 MILLIGRAM(S): at 05:05

## 2022-07-20 RX ADMIN — BUDESONIDE AND FORMOTEROL FUMARATE DIHYDRATE 2 PUFF(S): 160; 4.5 AEROSOL RESPIRATORY (INHALATION) at 17:37

## 2022-07-20 RX ADMIN — Medication 50 MILLILITER(S): at 13:48

## 2022-07-20 RX ADMIN — POLYETHYLENE GLYCOL 3350 17 GRAM(S): 17 POWDER, FOR SOLUTION ORAL at 14:00

## 2022-07-20 RX ADMIN — Medication 1 GRAM(S): at 21:47

## 2022-07-20 RX ADMIN — MIDODRINE HYDROCHLORIDE 20 MILLIGRAM(S): 2.5 TABLET ORAL at 14:00

## 2022-07-20 RX ADMIN — Medication 1 GRAM(S): at 17:35

## 2022-07-20 RX ADMIN — WARFARIN SODIUM 5 MILLIGRAM(S): 2.5 TABLET ORAL at 13:59

## 2022-07-20 RX ADMIN — Medication 1 GRAM(S): at 05:09

## 2022-07-20 RX ADMIN — Medication 5 MILLIGRAM(S): at 05:06

## 2022-07-20 RX ADMIN — Medication 1 GRAM(S): at 14:01

## 2022-07-20 RX ADMIN — SEVELAMER CARBONATE 800 MILLIGRAM(S): 2400 POWDER, FOR SUSPENSION ORAL at 17:36

## 2022-07-20 RX ADMIN — Medication 50 MILLIGRAM(S): at 17:35

## 2022-07-20 RX ADMIN — SEVELAMER CARBONATE 800 MILLIGRAM(S): 2400 POWDER, FOR SUSPENSION ORAL at 08:03

## 2022-07-20 RX ADMIN — CHLORHEXIDINE GLUCONATE 1 APPLICATION(S): 213 SOLUTION TOPICAL at 05:12

## 2022-07-20 RX ADMIN — SEVELAMER CARBONATE 800 MILLIGRAM(S): 2400 POWDER, FOR SUSPENSION ORAL at 14:01

## 2022-07-20 RX ADMIN — TIOTROPIUM BROMIDE 1 CAPSULE(S): 18 CAPSULE ORAL; RESPIRATORY (INHALATION) at 14:00

## 2022-07-20 RX ADMIN — PANTOPRAZOLE SODIUM 40 MILLIGRAM(S): 20 TABLET, DELAYED RELEASE ORAL at 05:06

## 2022-07-20 NOTE — PROGRESS NOTE ADULT - SUBJECTIVE AND OBJECTIVE BOX
Patient is a 77y old  Male who presents with a chief complaint of functional paraplegia post a fall (20 Jul 2022 07:46)      SUBJECTIVE / OVERNIGHT EVENTS: no new events, on full liquid diet. will dc on same    MEDICATIONS  (STANDING):  allopurinol 100 milliGRAM(s) Oral daily  budesonide 160 MICROgram(s)/formoterol 4.5 MICROgram(s) Inhaler 2 Puff(s) Inhalation two times a day  chlorhexidine 2% Cloths 1 Application(s) Topical <User Schedule>  lactated ringers. 1000 milliLiter(s) (30 mL/Hr) IV Continuous <Continuous>  metoclopramide 5 milliGRAM(s) Oral every 8 hours  midodrine. 20 milliGRAM(s) Oral three times a day  multivitamin 1 Tablet(s) Oral daily  pantoprazole    Tablet 40 milliGRAM(s) Oral before breakfast  polyethylene glycol 3350 17 Gram(s) Oral daily  pregabalin 50 milliGRAM(s) Oral two times a day  sevelamer carbonate 800 milliGRAM(s) Oral three times a day with meals  simethicone 80 milliGRAM(s) Chew three times a day  sucralfate suspension 1 Gram(s) Oral four times a day  tiotropium 18 MICROgram(s) Capsule 1 Capsule(s) Inhalation daily    MEDICATIONS  (PRN):      Vital Signs Last 24 Hrs  T(F): 97.3 (07-20-22 @ 08:55), Max: 98.8 (07-20-22 @ 00:01)  HR: 77 (07-20-22 @ 09:20) (68 - 96)  BP: 83/47 (07-20-22 @ 08:55) (80/49 - 120/60)  RR: 18 (07-20-22 @ 08:55) (14 - 20)  SpO2: 92% (07-20-22 @ 09:20) (90% - 100%)  Telemetry:   CAPILLARY BLOOD GLUCOSE      POCT Blood Glucose.: 97 mg/dL (20 Jul 2022 07:25)  POCT Blood Glucose.: 122 mg/dL (19 Jul 2022 21:35)  POCT Blood Glucose.: 117 mg/dL (19 Jul 2022 16:33)  POCT Blood Glucose.: 84 mg/dL (19 Jul 2022 15:01)    I&O's Summary    19 Jul 2022 07:01  -  20 Jul 2022 07:00  --------------------------------------------------------  IN: 0 mL / OUT: 500 mL / NET: -500 mL        PHYSICAL EXAM:  GENERAL: NAD, well-developed  HEAD:  Atraumatic, Normocephalic  EYES: EOMI, PERRLA, conjunctiva and sclera clear  NECK: Supple, No JVD  CHEST/LUNG: Clear to auscultation bilaterally; No wheeze  HEART: Regular rate and rhythm; No murmurs, rubs, or gallops  ABDOMEN: Soft, Nontender, Nondistended; Bowel sounds present  EXTREMITIES:  2+ Peripheral Pulses, No clubbing, cyanosis, or edema  PSYCH: AAOx3  NEUROLOGY: non-focal  SKIN: No rashes or lesions    LABS:                        12.5   11.32 )-----------( 182      ( 20 Jul 2022 09:08 )             41.4     07-19    131<L>  |  91<L>  |  50<H>  ----------------------------<  79  5.6<H>   |  23  |  8.74<H>    Ca    10.0      19 Jul 2022 07:25    TPro  8.7<H>  /  Alb  3.6  /  TBili  0.4  /  DBili  x   /  AST  24  /  ALT  10  /  AlkPhos  134<H>  07-19    PT/INR - ( 18 Jul 2022 10:47 )   PT: 18.9 sec;   INR: 1.64 ratio         PTT - ( 18 Jul 2022 10:47 )  PTT:30.0 sec          RADIOLOGY & ADDITIONAL TESTS:    Imaging Personally Reviewed:    Consultant(s) Notes Reviewed:      Care Discussed with Consultants/Other Providers:

## 2022-07-20 NOTE — PROGRESS NOTE ADULT - SUBJECTIVE AND OBJECTIVE BOX
Chief complaint  Patient is a 77y old  Male who presents with a chief complaint of functional paraplegia post a fall (20 Jul 2022 12:03)   Review of systems  Patient in bed, had ?hypoglycemia after dialysis, appears asymptomatic in NAD.    Labs and Fingersticks  CAPILLARY BLOOD GLUCOSE      POCT Blood Glucose.: 62 mg/dL (20 Jul 2022 14:15)  POCT Blood Glucose.: 46 mg/dL (20 Jul 2022 13:37)  POCT Blood Glucose.: 25 mg/dL (20 Jul 2022 13:35)  POCT Blood Glucose.: 97 mg/dL (20 Jul 2022 07:25)  POCT Blood Glucose.: 122 mg/dL (19 Jul 2022 21:35)  POCT Blood Glucose.: 117 mg/dL (19 Jul 2022 16:33)  POCT Blood Glucose.: 84 mg/dL (19 Jul 2022 15:01)      Anion Gap, Serum: 14 (07-20 @ 09:08)  Anion Gap, Serum: 17 (07-19 @ 07:25)      Calcium, Total Serum: 9.9 (07-20 @ 09:08)  Calcium, Total Serum: 10.0 (07-19 @ 07:25)  Albumin, Serum: 3.8 (07-20 @ 09:08)  Albumin, Serum: 3.6 (07-19 @ 07:25)    Alanine Aminotransferase (ALT/SGPT): 9 *L* (07-20 @ 09:08)  Alanine Aminotransferase (ALT/SGPT): 10 (07-19 @ 07:25)  Alkaline Phosphatase, Serum: 123 *H* (07-20 @ 09:08)  Alkaline Phosphatase, Serum: 134 *H* (07-19 @ 07:25)  Aspartate Aminotransferase (AST/SGOT): 20 (07-20 @ 09:08)  Aspartate Aminotransferase (AST/SGOT): 24 (07-19 @ 07:25)        07-20    132<L>  |  92<L>  |  44<H>  ----------------------------<  131<H>  5.1   |  26  |  7.86<H>    Ca    9.9      20 Jul 2022 09:08    TPro  8.6<H>  /  Alb  3.8  /  TBili  0.3  /  DBili  x   /  AST  20  /  ALT  9<L>  /  AlkPhos  123<H>  07-20                        12.5   11.32 )-----------( 182      ( 20 Jul 2022 09:08 )             41.4     Medications  MEDICATIONS  (STANDING):  allopurinol 100 milliGRAM(s) Oral daily  budesonide 160 MICROgram(s)/formoterol 4.5 MICROgram(s) Inhaler 2 Puff(s) Inhalation two times a day  chlorhexidine 2% Cloths 1 Application(s) Topical <User Schedule>  lactated ringers. 1000 milliLiter(s) (30 mL/Hr) IV Continuous <Continuous>  metoclopramide 5 milliGRAM(s) Oral every 8 hours  midodrine. 20 milliGRAM(s) Oral three times a day  multivitamin 1 Tablet(s) Oral daily  pantoprazole    Tablet 40 milliGRAM(s) Oral before breakfast  polyethylene glycol 3350 17 Gram(s) Oral daily  pregabalin 50 milliGRAM(s) Oral two times a day  sevelamer carbonate 800 milliGRAM(s) Oral three times a day with meals  simethicone 80 milliGRAM(s) Chew three times a day  sucralfate suspension 1 Gram(s) Oral four times a day  tiotropium 18 MICROgram(s) Capsule 1 Capsule(s) Inhalation daily      Physical Exam  General: Patient comfortable in bed  Vital Signs Last 12 Hrs  T(F): 97 (07-20-22 @ 13:05), Max: 97.9 (07-20-22 @ 08:10)  HR: 70 (07-20-22 @ 13:32) (63 - 77)  BP: 90/58 (07-20-22 @ 13:32) (83/47 - 90/58)  BP(mean): --  RR: 18 (07-20-22 @ 13:32) (18 - 18)  SpO2: 98% (07-20-22 @ 13:32) (92% - 98%)    Diagnostics    Cortisol AM, Serum: AM Sched. Collection: 16-Jul-2022 06:00 (07-15 @ 14:04)  Free Thyroxine, Serum: AM Sched. Collection: 16-Jul-2022 06:00 (07-15 @ 14:04)  Thyroid Stimulating Hormone, Serum: AM Sched. Collection: 16-Jul-2022 06:00 (07-15 @ 14:04)

## 2022-07-20 NOTE — PROVIDER CONTACT NOTE (HYPOGLYCEMIA EVENT) - NS PROVIDER CONTACT NOTE-TREATMENT-HYPO
4 oz Fruit Juice (Specify quantity, date/time)/Dextrose 50% 25 Grams IV Push
Dextrose 50% 25 Grams IV Push

## 2022-07-20 NOTE — PROGRESS NOTE ADULT - SUBJECTIVE AND OBJECTIVE BOX
[facility] DIVISION OF KIDNEY DISEASES AND HYPERTENSION -- FOLLOW UP NOTE  --------------------------------------------------------------------------------  HPI:  Pt. is a 77 y.o. M e/ PMHx. of HTN, HFrEF, SAADIA, Gout and ESRD on HD MWF at [facility] presents after having a fall enroute to the HD center. Nephrology consulted for ESRD management. Pt. does not recall his nephrologist and denies any issues with dialysis. He is edematous but states he is not worse than usual. Denies any syncope and attributes his fall to his knee "buckling." Pt. has notably low BP which Pt. states is chronic and for which he takes Midodrine.     Pt. seen this AM. Tolerated HD yesterday. Planned for HD today. Pt. on NC at home. Remains asymptomatic even when SBP is in 60s-70s. Nausea better.    PAST HISTORY  --------------------------------------------------------------------------------  No significant changes to PMH, PSH, FHx, SHx, unless otherwise noted    ALLERGIES & MEDICATIONS  --------------------------------------------------------------------------------  Allergies    latex (Rash)  No Known Drug Allergies    Intolerances      Standing Inpatient Medications  allopurinol 100 milliGRAM(s) Oral daily  budesonide 160 MICROgram(s)/formoterol 4.5 MICROgram(s) Inhaler 2 Puff(s) Inhalation two times a day  chlorhexidine 2% Cloths 1 Application(s) Topical <User Schedule>  lactated ringers. 1000 milliLiter(s) IV Continuous <Continuous>  metoclopramide 5 milliGRAM(s) Oral every 8 hours  midodrine. 20 milliGRAM(s) Oral three times a day  multivitamin 1 Tablet(s) Oral daily  pantoprazole    Tablet 40 milliGRAM(s) Oral before breakfast  polyethylene glycol 3350 17 Gram(s) Oral daily  pregabalin 50 milliGRAM(s) Oral two times a day  sevelamer carbonate 800 milliGRAM(s) Oral three times a day with meals  simethicone 80 milliGRAM(s) Chew three times a day  sucralfate suspension 1 Gram(s) Oral four times a day  tiotropium 18 MICROgram(s) Capsule 1 Capsule(s) Inhalation daily    PRN Inpatient Medications      REVIEW OF SYSTEMS  --------------------------------------------------------------------------------  Gen: No fevers/chills  Skin: No rashes  Head/Eyes/Ears: Normal hearing,   Respiratory: Sore throat  CV: No chest pain  GI: No abdominal pain, diarrhea  : No dysuria, hematuria  MSK: No edema  Heme: No easy bruising or bleeding  Psych: No significant depression      All other systems were reviewed and are negative, except as noted.    VITALS/PHYSICAL EXAM  --------------------------------------------------------------------------------  T(C): 37.1 (07-20-22 @ 00:01), Max: 37.3 (07-19-22 @ 08:43)  HR: 81 (07-20-22 @ 00:01) (70 - 96)  BP: 94/49 (07-20-22 @ 00:01) (80/49 - 120/60)  RR: 18 (07-20-22 @ 00:01) (14 - 20)  SpO2: 93% (07-20-22 @ 00:01) (90% - 100%)  Wt(kg): --  Height (cm): 170.2 (07-19-22 @ 11:52)  Weight (kg): 124.7 (07-19-22 @ 11:52)  BMI (kg/m2): 43 (07-19-22 @ 11:52)  BSA (m2): 2.32 (07-19-22 @ 11:52)      07-19-22 @ 07:01  -  07-20-22 @ 07:00  --------------------------------------------------------  IN: 0 mL / OUT: 500 mL / NET: -500 mL      Physical Exam:  	Gen: NAD  	HEENT: MMM, on NC  	Pulm: CTA B/L  	CV: S1S2  	Abd: Soft, +BS   	Ext: + LE edema B/L improving, chronic skin changes   	Neuro: Awake  	Skin: Warm and dry  	Vascular access: franco GUTIÉRREZ+    LABS/STUDIES  --------------------------------------------------------------------------------              13.0   10.58 >-----------<  172      [07-19-22 @ 07:25]              42.8     131  |  91  |  50  ----------------------------<  79      [07-19-22 @ 07:25]  5.6   |  23  |  8.74        Ca     10.0     [07-19-22 @ 07:25]    TPro  8.7  /  Alb  3.6  /  TBili  0.4  /  DBili  x   /  AST  24  /  ALT  10  /  AlkPhos  134  [07-19-22 @ 07:25]    PT/INR: PT 18.9 , INR 1.64       [07-18-22 @ 10:47]  PTT: 30.0       [07-18-22 @ 10:47]      Creatinine Trend:  SCr 8.74 [07-19 @ 07:25]  SCr 11.84 [07-18 @ 10:47]  SCr 9.79 [07-17 @ 06:51]  SCr 7.39 [07-16 @ 07:14]  SCr 10.21 [07-15 @ 07:38]        HbA1c 5.0      [10-02-17 @ 15:38]  TSH 1.32      [07-17-22 @ 06:53]    HBsAg Nonreact      [07-11-22 @ 13:23]  HCV 0.39, Nonreact      [07-11-22 @ 13:23]

## 2022-07-20 NOTE — PROGRESS NOTE ADULT - ASSESSMENT
76 y/o male PMHx of ESRD on HD via AV fistula LUE MWF, COPD (on 2L home O2), CHF, pHTN, hypotension on Midodrine, DVT S/P IVC filter who presented after his L knee gave out.     leukocytosis  remains afebrile for duration of hospital course  leukocytosis first noted 7/16  s/p CT chest w/o evidence of focal consolidation  s/p CT abd/pelvis 7/11- No small bowel obstruction. Stomach is moderately distended. Mild to moderate stool in the distal rectosigmoid colon.  no erythema or warmth or decreased ROM of either knee on exam  no evidence of SSTI on exam  s/p repeat CT abd/pelvis unrevealing  nausea and abdominal discomfort now resolved  leukocytosis likely reactive  patient w/o signs or symptoms of infection at this time  continue to monitor off antibiotics  attempted to call patient's spouse to give an update, however, unable to reach    nausea- pt reports resolved today  on scheduled reglan  GI following  s/p EGD 7/20- esophagitis, gastritis, gastric ulcers, duodenitis, s/p biopsies  f/u pathology  started on carafate    ESRD  HD per nephrology    Edmundo Olmstead M.D.  [facility], Division of Infectious Diseases  [phone]  After 5pm on weekdays and all day on weekends - please call [phone]

## 2022-07-20 NOTE — PROGRESS NOTE ADULT - ASSESSMENT
vomiting  ESRD    CT a/p with moderately distended stomach, no obstruction   cont with simethicone   PPI PID  add Carafate QID  cont reglan q8h OTC for N/V   HD as per renal   s/p upper gastrointestinal endoscopy with reflux esophagitis, gastritis and duodenitis  cont full liquid diet   f/u path results   will follow   dw pt and wife over the phone     Advanced care planning was discussed with patient and family.  Advanced care planning forms were reviewed and discussed.  Risks, benefits and alternatives of gastroenterologic procedures were discussed in detail and all questions were answered.    30 minutes spent.

## 2022-07-20 NOTE — PROGRESS NOTE ADULT - ATTENDING COMMENTS
ESRD:   Seen at HD  Tolerating OK  Maintain on current prescription  BP remains low. On midodrine  CT with distended stomach, esophagitis+. Biopsy done  Would use carafate only for a short period of time as it contains aluminium and is at risk for aluminium accumulation given he is ESRD    Rest per Dr. Lyle Meek MD  O: [phone]  Contact me on teams

## 2022-07-20 NOTE — PROGRESS NOTE ADULT - SUBJECTIVE AND OBJECTIVE BOX
[facility]  Avi Riddle PA-C  [address] [place] [address]  [phone]      INTERVAL HPI/OVERNIGHT EVENTS:  pt seen and examined, no new events  s/p upper gastrointestinal endoscopy  tolerating fulls so far     MEDICATIONS  (STANDING):  allopurinol 100 milliGRAM(s) Oral daily  budesonide 160 MICROgram(s)/formoterol 4.5 MICROgram(s) Inhaler 2 Puff(s) Inhalation two times a day  chlorhexidine 2% Cloths 1 Application(s) Topical <User Schedule>  midodrine. 10 milliGRAM(s) Oral three times a day  multivitamin 1 Tablet(s) Oral daily  pantoprazole    Tablet 40 milliGRAM(s) Oral before breakfast  polyethylene glycol 3350 17 Gram(s) Oral daily  pregabalin 50 milliGRAM(s) Oral two times a day  sevelamer carbonate 800 milliGRAM(s) Oral three times a day with meals  simethicone 80 milliGRAM(s) Chew three times a day  tiotropium 18 MICROgram(s) Capsule 1 Capsule(s) Inhalation daily    MEDICATIONS  (PRN):      Allergies    latex (Rash)  No Known Drug Allergies    Intolerances        ROS:   General:  No wt loss, fevers, chills, night sweats, fatigue,   Eyes:  Good vision, no reported pain  ENT:  No sore throat, pain, runny nose, dysphagia  CV:  No pain, palpitations, hypo/hypertension  Resp:  No dyspnea, cough, tachypnea, wheezing  GI:  No pain, No nausea, No vomiting, No diarrhea, No constipation, No weight loss, No fever, No pruritis, No rectal bleeding, No tarry stools, No dysphagia,  :  No pain, bleeding, incontinence, nocturia  Muscle:  No pain, weakness  Neuro:  No weakness, tingling, memory problems  Psych:  No fatigue, insomnia, mood problems, depression  Endocrine:  No polyuria, polydipsia, cold/heat intolerance  Heme:  No petechiae, ecchymosis, easy bruisability  Skin:  No rash, tattoos, scars, edema      PHYSICAL EXAM:   Vital Signs Last 24 Hrs  T(C): 36.3 (20 Jul 2022 08:55), Max: 37.1 (20 Jul 2022 00:01)  T(F): 97.3 (20 Jul 2022 08:55), Max: 98.8 (20 Jul 2022 00:01)  HR: 77 (20 Jul 2022 09:20) (68 - 96)  BP: 83/47 (20 Jul 2022 08:55) (80/49 - 120/60)  BP(mean): --  RR: 18 (20 Jul 2022 08:55) (14 - 20)  SpO2: 92% (20 Jul 2022 09:20) (90% - 100%)    Parameters below as of 20 Jul 2022 08:55  Patient On (Oxygen Delivery Method): nasal cannula  O2 Flow (L/min): 2    Daily     Daily     GENERAL:  Appears stated age,   HEENT:  NC/AT,    CHEST:  Full & symmetric excursion,   HEART:  Regular rhythm,  ABDOMEN:  Soft, non-tender, non-distended,  EXTEREMITIES:  no cyanosis  SKIN:  No rash  NEURO:  Alert,       LABS:                        12.5   11.32 )-----------( 182      ( 20 Jul 2022 09:08 )             41.4   07-20    132<L>  |  92<L>  |  44<H>  ----------------------------<  131<H>  5.1   |  26  |  7.86<H>    Ca    9.9      20 Jul 2022 09:08    TPro  8.6<H>  /  Alb  3.8  /  TBili  0.3  /  DBili  x   /  AST  20  /  ALT  9<L>  /  AlkPhos  123<H>  07-20    RADIOLOGY & ADDITIONAL TESTS:  < from: CT Abdomen and Pelvis No Cont (07.11.22 @ 22:02) >    ACC: [id number] EXAM:  CT ABDOMEN AND PELVIS                          PROCEDURE DATE:  07/11/2022          INTERPRETATION:  CLINICAL INFORMATION: Vomiting, gassy distention.    COMPARISON: CT abdomen pelvis 8/22/2022. CT chest 8/27/2021.    CONTRAST/COMPLICATIONS:  IV Contrast: NONE  Oral Contrast: NONE  Complications: None reported at time of study completion    PROCEDURE:  CT of the Abdomen and Pelvis was performed.  Sagittal and coronal reformats were performed.    FINDINGS:  Evaluation of thesolid organs and vasculature is limited without   intravenous contrast.    LOWER CHEST: Basilar subsegmental atelectasis. Left lower lobe and right   middle lobe calcified granulomas. Coronary artery calcification.   Calcified right hilar lymph nodes.    LIVER: Few punctate calcified granulomas.  BILE DUCTS: Normal caliber.  GALLBLADDER: Within normal limits.  SPLEEN: Scattered tiny calcified granulomas.  PANCREAS: Within normal limits.  ADRENALS: Bilateral adrenal gland thickening, unchanged.  KIDNEYS/URETERS: Atrophic bilateral kidneys. Bilateral renal cysts and   subcentimeter hypodensities too small to characterize.    BLADDER: Minimally distended.  REPRODUCTIVE ORGANS: Prostate within normal limits.    BOWEL: Stomach is moderately distended. No small bowel obstruction.   Status post right hemicolectomy. Mild to moderate stool in the distal   rectosigmoid colon.  PERITONEUM: No ascites.  VESSELS: Atherosclerotic changes. Infrarenal IVC filter.  RETROPERITONEUM/LYMPH NODES: No lymphadenopathy.  ABDOMINAL WALL: Rectus diastases. Multiple ventral hernias containing fat   and small knuckle of bowel. Abdominal wall collateral vessels.  BONES: Degenerative changes.    IMPRESSION:  No small bowel obstruction. Stomach is moderately distended. Mild to   moderate stool in the distal rectosigmoid colon.        --- End of Report ---    < from: Upper Endoscopy (07.19.22 @ 11:42) >  [facility]  ____________________________________________________________________________________________________  Patient Name: Kaleb Rivers                    MRN: [id number]  Account Number: [id number]                     YOB: 1945  Room: Endoscopy Room 2                           Gender: Male  Attending MD: Ortega Fitch MD                 Procedure Date No Time: 7/19/2022  ____________________________________________________________________________________________________     Procedure:           Upper GI endoscopy  Indications:         Epigastric abdominal pain, Abnormal CT of the GI tract  Providers:           Ortega Fitch MD  Medicines:           General Anesthesia  Complications:       No immediate complications.  ____________________________________________________________________________________________________    < end of copied text >  < from: Upper Endoscopy (07.19.22 @ 11:42) >  Impression:          - Mildly severe reflux esophagitis.                       - Gastritis. Biopsied.                       - Duodenitis. Biopsied.  Recommendation:      - Return patient to hospital [facility] for ongoing care.                       - Full liquid diet.                       - Await pathology results.                       - Cont present medications                       - Add carafate 1g four times a day    < end of copied text >

## 2022-07-20 NOTE — PROGRESS NOTE ADULT - SUBJECTIVE AND OBJECTIVE BOX
[facility], Division of Infectious Diseases  EMELYN Elaine Y. Patel, S. Shah, G. Casimir  [phone]  ([phone] - weekdays after 5pm and weekends)    Name: MAYE ZUNIGA  Age/Gender: 77y Male  MRN: [id number]    Interval History:  Patient seen this morning.   Notes reviewed.   No concerning overnight events.  Afebrile.  reports abd pain/ discomfort and nausea resolved  s/p EGD today      Allergies: latex (Rash)  No Known Drug Allergies      Objective:  Vitals:   T(F): 97.3 (07-20-22 @ 08:55), Max: 98.8 (07-20-22 @ 00:01)  HR: 77 (07-20-22 @ 09:20) (68 - 96)  BP: 83/47 (07-20-22 @ 08:55) (80/49 - 120/60)  RR: 18 (07-20-22 @ 08:55) (14 - 20)  SpO2: 92% (07-20-22 @ 09:20) (90% - 100%)  Physical Examination:  General: no acute distress  HEENT: anicteric, NC  Cardio: S1, S2, normal rate, L arm AVF covered w/ dressing  Resp: clear to auscultation anteriorly  Abd: soft, distended, non-tender  Ext: trace LE edema  Skin: warm, dry    Laboratory Studies:  CBC:                       12.5   11.32 )-----------( 182      ( 20 Jul 2022 09:08 )             41.4     WBC Trend:  11.32 07-20-22 @ 09:08  10.58 07-19-22 @ 07:25  11.95 07-18-22 @ 10:46  14.42 07-17-22 @ 06:53  14.60 07-16-22 @ 07:23  10.38 07-14-22 @ 10:02    CMP: 07-20    132<L>  |  92<L>  |  44<H>  ----------------------------<  131<H>  5.1   |  26  |  7.86<H>    Ca    9.9      20 Jul 2022 09:08    TPro  8.6<H>  /  Alb  3.8  /  TBili  0.3  /  DBili  x   /  AST  20  /  ALT  9<L>  /  AlkPhos  123<H>  07-20      LIVER FUNCTIONS - ( 20 Jul 2022 09:08 )  Alb: 3.8 g/dL / Pro: 8.6 g/dL / ALK PHOS: 123 U/L / ALT: 9 U/L / AST: 20 U/L / GGT: x               Microbiology: reviewed     Radiology: reviewed     Medications:  allopurinol 100 milliGRAM(s) Oral daily  budesonide 160 MICROgram(s)/formoterol 4.5 MICROgram(s) Inhaler 2 Puff(s) Inhalation two times a day  chlorhexidine 2% Cloths 1 Application(s) Topical <User Schedule>  lactated ringers. 1000 milliLiter(s) IV Continuous <Continuous>  metoclopramide 5 milliGRAM(s) Oral every 8 hours  midodrine. 20 milliGRAM(s) Oral three times a day  multivitamin 1 Tablet(s) Oral daily  pantoprazole    Tablet 40 milliGRAM(s) Oral before breakfast  polyethylene glycol 3350 17 Gram(s) Oral daily  pregabalin 50 milliGRAM(s) Oral two times a day  sevelamer carbonate 800 milliGRAM(s) Oral three times a day with meals  simethicone 80 milliGRAM(s) Chew three times a day  sucralfate suspension 1 Gram(s) Oral four times a day  tiotropium 18 MICROgram(s) Capsule 1 Capsule(s) Inhalation daily  warfarin 5 milliGRAM(s) Oral once    Antimicrobials:

## 2022-07-20 NOTE — PROGRESS NOTE ADULT - PROBLEM SELECTOR PLAN 1
Pt. with ESRD on HD three times a week (MWF) presented to [facility] for a fall. Last HD was on 7/13 via LUE AVF. Pt. clinically stable. No CP, SOB or palpitations during evaluation. Most recent labs reviewed. Will arrange for HD today. Pt. denies any lightheadedness and states that his his BP is normally 80s-90s/ 50s-60s. He endorses taking Midodrine but does not recall the dose. Consent obtained for HD and placed in the chart. Will arrange for HD today. HgB at goal. Prior records state that Pt. was on 30mg Q8H here with additional Midodrine given during HD during prior admission. C/w Midodrine to 20mg TID. Consider discharging on this dose. C/w HD time to 4 hours and DFR to 600 on regular HD days. Plan for HD today.     #Hyperphosphatemia- c/w Sevelamer 800mg TID. Check Phos.    If you have any questions, please feel free to contact me  Jai Alvarenga  Nephrology Fellow  [phone]; Prefer [employer] TEAMS  (After 5pm or on weekends please page the on-call fellow).

## 2022-07-20 NOTE — PROGRESS NOTE ADULT - ASSESSMENT
Pt. is a 77 y.o. M e/ PMHx. of HTN, HFrEF, SAADIA, Gout and ESRD on HD MWF at [facility] presents after having a fall enroute to the HD center. 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 c/o knee pain and inability to walk. nephrology called. PT kevin ordered. cont outptn meds, check orthostatics. card called  ptn w h/o DVT/PE, in the past not on AC 2/2 h/o GI bleeds, but admitted on COumadin, will resume  h/o COPD with chronic hypoxic respiratory failure on 2 L home O2. presently stable.   episode of vomiting on 7/11 while in HD, CT scan w dilated stomach, no obstruction, seen by GI, tolerated CLD,   s/p EGD 7/19, has severe " linitis" type erythema with multiple gastric ulcers, biopsies were taken. for now will cont on full liquids only. d/w GI    HD MWF,volume status is stable.   will cont reglan for gastroparesis    awaiting BORIS placement once medically cleared

## 2022-07-20 NOTE — PROVIDER CONTACT NOTE (HYPOGLYCEMIA EVENT) - NS PROVIDER CONTACT BACKGROUND-HYPO
Age: 77y    Gender: Male    POCT Blood Glucose:  118 mg/dL (07-20-22 @ 15:19)  80 mg/dL (07-20-22 @ 14:50)  32 mg/dL (07-20-22 @ 14:48)  62 mg/dL (07-20-22 @ 14:15)  46 mg/dL (07-20-22 @ 13:37)  25 mg/dL (07-20-22 @ 13:35)  97 mg/dL (07-20-22 @ 07:25)  122 mg/dL (07-19-22 @ 21:35)      eMAR:allopurinol   100 milliGRAM(s) Oral (07-20-22 @ 14:03)    dextrose 50% Injectable   50 milliLiter(s) IV Push (07-20-22 @ 13:48)    
Age: 77y    Gender: Male    POCT Blood Glucose:  109 mg/dL (07-14-22 @ 18:51)  31 mg/dL (07-14-22 @ 16:43)  37 mg/dL (07-14-22 @ 16:40)  92 mg/dL (07-13-22 @ 21:00)      eMAR:allopurinol   100 milliGRAM(s) Oral (07-14-22 @ 11:39)    dextrose 50% Injectable   50 milliLiter(s) IV Push (07-14-22 @ 17:17)

## 2022-07-20 NOTE — PROGRESS NOTE ADULT - ASSESSMENT
Assessment  Hypoglycemia: 77y nondiabetic male, was not taking any hypoglycemic agents, admitted s/p mechanical fall, blood sugars were in overall acceptable range, had ?hypoglycemic episode s/p dialysis. Patient asymptomatic with severely low FS readings (25, 46, 52), question if these are real hypoglycemias or low FS due to poor perfusion.. AM cortisol WNL. Patient is s/p endoscopy, tolerating full liquid diet, NAD.  Hypotension: AM cortisol WNL, on midodrine, monitored.  ?Gastroparesis: On meds, monitored.  ESRD: On hemodialysis, Monitor labs/BMP      tonia Claudette HUGO  Cell: [phone]             Problem/Recommendation - 1:  ·  Problem: Hypoglycemia.   ·  Recommendation: ?Pseudohypoglycemia. Hypoglycemias have all been with low FS and not blood sugar..  Will order serum glucose, insulin antibody, igf-1, igf-2, cortisol level at the time of hypoglycemia.  Suggest to start dextrose infusion D5% 30mL/hr after these labs are obtained.  Encourage to increase po intake, nutritional supplements as tolerated.   Will continue monitoring and FU.     Problem/Recommendation - 2:  ·  Problem: Hypotension.   ·  Recommendation: AM cortisol WNL. Suggest to continue medications, monitoring, FU primary team recommendations.     Problem/Recommendation - 3:  ·  Problem: Gastroparesis.   ·  Recommendation: Suggest to continue medications, monitoring, FU primary team recommendations.     Problem/Recommendation - 4:  ·  Problem: ESRD on hemodialysis.   ·  Recommendation: On HD, continue as scheduled, renal FU

## 2022-07-20 NOTE — PROGRESS NOTE ADULT - SUBJECTIVE AND OBJECTIVE BOX
Date of Service   07-20-22 @ 11:22    Patient is a 77y old  Male who presents with a chief complaint of functional paraplegia post a fall (20 Jul 2022 09:34)      INTERVAL HISTORY: pt feels ok     REVIEW OF SYSTEMS:   CONSTITUTIONAL: No weakness  EYES/ENT: No visual changes; No throat pain  Neck: No pain or stiffness  Respiratory: No cough, wheezing, No shortness of breath  CARDIOVASCULAR: no chest pain or palpitations  GASTROINTESTINAL: No abdominal pain, no nausea, vomiting or hematemesis  GENITOURINARY: No dysuria, frequency or hematuria  NEUROLOGICAL: No stroke like symptoms  SKIN: No rashes    	  MEDICATIONS:  midodrine. 20 milliGRAM(s) Oral three times a day        PHYSICAL EXAM:  T(C): 36.3 (07-20-22 @ 08:55), Max: 37.1 (07-20-22 @ 00:01)  HR: 77 (07-20-22 @ 09:20) (68 - 96)  BP: 83/47 (07-20-22 @ 08:55) (80/49 - 120/60)  RR: 18 (07-20-22 @ 08:55) (14 - 20)  SpO2: 92% (07-20-22 @ 09:20) (90% - 100%)  Wt(kg): --  I&O's Summary    19 Jul 2022 07:01  -  20 Jul 2022 07:00  --------------------------------------------------------  IN: 0 mL / OUT: 500 mL / NET: -500 mL      Height (cm): 170.2 (07-19 @ 11:52)  Weight (kg): 124.7 (07-19 @ 11:52)  BMI (kg/m2): 43 (07-19 @ 11:52)  BSA (m2): 2.32 (07-19 @ 11:52)    Appearance: In no distress	  HEENT:    PERRL, EOMI	  Cardiovascular:  S1 S2, No JVD  Respiratory: Lungs clear to auscultation	  Gastrointestinal:  Soft, Non-tender, + BS	  Vasculature:  No edema of LE  Psychiatric: Appropriate affect   Neuro: no acute focal deficits                               12.5   11.32 )-----------( 182      ( 20 Jul 2022 09:08 )             41.4     07-20    132<L>  |  92<L>  |  44<H>  ----------------------------<  131<H>  5.1   |  26  |  7.86<H>    Ca    9.9      20 Jul 2022 09:08    TPro  8.6<H>  /  Alb  3.8  /  TBili  0.3  /  DBili  x   /  AST  20  /  ALT  9<L>  /  AlkPhos  123<H>  07-20        Labs personally reviewed      ASSESSMENT/PLAN: 	  78 yo male with PMHx of ESRD on HD via AV fistula LUE MWF, COPD (on 2L home O2), CHF, pHTN, hypotension on Midodrine, DVT S/P IVC filter p/w fall.  Patient reports that he was walking out of his house today to go to dialysis when he felt his left knee "give out." Patient landed on his buttocks. No head trauma or LOC.  On coumadin.  Patient was unable to get up 2/2 left knee pain.  Patient also c/o acute on chronic low back pain since the fall.  Patient reports that he is otherwise feeling well.  Denies headache, blurry vision, focal weakness, numbness, slurred speech, CP, SOB, abd pain, NVD.  Patient does not make urine.  Of note, patient was discharged from [facility] one month ago after receiving PT for BL knee pain and difficulty ambulating.  Patient states that after discharge from rehab he was supposed to receive home PT, but that has not happened. (06 Jul 2022 14:54).    1. Chronic hypotension   - with baseline SBP noted in the 80s prior admit a year ago  - was discharged on Midodrine 10mg TID  - BP soft   - PT eval   - BP continues to be SBP 90s  - monitor for nausea and vomiting   - may add a trial of low dose florinef if BP remains low despite midodrine  - uptitrated to  Midodrine 20mg PO TID, cont to closely monitor BP    2. Hx of LE DVT prior admit   - s/p IVC filter   - on Warfarin at home as INR is elevated  - rec resume coumadin with INR <2.5,   - monitor INR     3. ?Hx of HF  - prior echo with preserved EF 60% and no mention of pulm HTN  - on HD for fluid management     4. ESRD   - HD per renal   - monitor Lytes and creat   - avoid nephrotoxins     5. Chest Pain   - Repeat EKG noted   - Troponin noted   - TTE ordered  - GI following  plan for upper gastrointestinal endoscopy tomorrow morning   - s/p Endoscopy: severe " linitis" type erythema with multiple gastric ulcers, biopsies were taken  - Full liquid diet for now, GI following          Mendy Valle [facility]   Roberto Latif DO [facility]  Cardiovascular Medicine  [address] [place] [address], [address]  Office: [phone]

## 2022-07-21 LAB
ALBUMIN SERPL ELPH-MCNC: 3.6 G/DL — SIGNIFICANT CHANGE UP (ref 3.3–5)
ALP SERPL-CCNC: 125 U/L — HIGH (ref 40–120)
ALT FLD-CCNC: 11 U/L — SIGNIFICANT CHANGE UP (ref 10–45)
ANION GAP SERPL CALC-SCNC: 15 MMOL/L — SIGNIFICANT CHANGE UP (ref 5–17)
AST SERPL-CCNC: 27 U/L — SIGNIFICANT CHANGE UP (ref 10–40)
BASOPHILS # BLD AUTO: 0.05 K/UL — SIGNIFICANT CHANGE UP (ref 0–0.2)
BASOPHILS NFR BLD AUTO: 0.5 % — SIGNIFICANT CHANGE UP (ref 0–2)
BILIRUB SERPL-MCNC: 0.3 MG/DL — SIGNIFICANT CHANGE UP (ref 0.2–1.2)
BUN SERPL-MCNC: 32 MG/DL — HIGH (ref 7–23)
CALCIUM SERPL-MCNC: 9.7 MG/DL — SIGNIFICANT CHANGE UP (ref 8.4–10.5)
CHLORIDE SERPL-SCNC: 92 MMOL/L — LOW (ref 96–108)
CO2 SERPL-SCNC: 26 MMOL/L — SIGNIFICANT CHANGE UP (ref 22–31)
CREAT SERPL-MCNC: 7.57 MG/DL — HIGH (ref 0.5–1.3)
EGFR: 7 ML/MIN/1.73M2 — LOW
EOSINOPHIL # BLD AUTO: 0.45 K/UL — SIGNIFICANT CHANGE UP (ref 0–0.5)
EOSINOPHIL NFR BLD AUTO: 4.9 % — SIGNIFICANT CHANGE UP (ref 0–6)
GLUCOSE BLDC GLUCOMTR-MCNC: 103 MG/DL — HIGH (ref 70–99)
GLUCOSE BLDC GLUCOMTR-MCNC: 131 MG/DL — HIGH (ref 70–99)
GLUCOSE BLDC GLUCOMTR-MCNC: 88 MG/DL — SIGNIFICANT CHANGE UP (ref 70–99)
GLUCOSE BLDC GLUCOMTR-MCNC: 99 MG/DL — SIGNIFICANT CHANGE UP (ref 70–99)
GLUCOSE SERPL-MCNC: 96 MG/DL — SIGNIFICANT CHANGE UP (ref 70–99)
HCT VFR BLD CALC: 43.2 % — SIGNIFICANT CHANGE UP (ref 39–50)
HGB BLD-MCNC: 12.6 G/DL — LOW (ref 13–17)
IMM GRANULOCYTES NFR BLD AUTO: 1 % — SIGNIFICANT CHANGE UP (ref 0–1.5)
INR BLD: 1.36 RATIO — HIGH (ref 0.88–1.16)
INR BLD: 1.36 RATIO — HIGH (ref 0.88–1.16)
LYMPHOCYTES # BLD AUTO: 2.11 K/UL — SIGNIFICANT CHANGE UP (ref 1–3.3)
LYMPHOCYTES # BLD AUTO: 23.1 % — SIGNIFICANT CHANGE UP (ref 13–44)
MCHC RBC-ENTMCNC: 27.8 PG — SIGNIFICANT CHANGE UP (ref 27–34)
MCHC RBC-ENTMCNC: 29.2 GM/DL — LOW (ref 32–36)
MCV RBC AUTO: 95.4 FL — SIGNIFICANT CHANGE UP (ref 80–100)
MONOCYTES # BLD AUTO: 1.02 K/UL — HIGH (ref 0–0.9)
MONOCYTES NFR BLD AUTO: 11.2 % — SIGNIFICANT CHANGE UP (ref 2–14)
NEUTROPHILS # BLD AUTO: 5.4 K/UL — SIGNIFICANT CHANGE UP (ref 1.8–7.4)
NEUTROPHILS NFR BLD AUTO: 59.3 % — SIGNIFICANT CHANGE UP (ref 43–77)
NRBC # BLD: 0 /100 WBCS — SIGNIFICANT CHANGE UP (ref 0–0)
PLATELET # BLD AUTO: 141 K/UL — LOW (ref 150–400)
POTASSIUM SERPL-MCNC: 4.9 MMOL/L — SIGNIFICANT CHANGE UP (ref 3.5–5.3)
POTASSIUM SERPL-SCNC: 4.9 MMOL/L — SIGNIFICANT CHANGE UP (ref 3.5–5.3)
PROT SERPL-MCNC: 8.5 G/DL — HIGH (ref 6–8.3)
PROTHROM AB SERPL-ACNC: 15.8 SEC — HIGH (ref 10.5–13.4)
PROTHROM AB SERPL-ACNC: 15.8 SEC — HIGH (ref 10.5–13.4)
RBC # BLD: 4.53 M/UL — SIGNIFICANT CHANGE UP (ref 4.2–5.8)
RBC # FLD: 15.2 % — HIGH (ref 10.3–14.5)
SODIUM SERPL-SCNC: 133 MMOL/L — LOW (ref 135–145)
WBC # BLD: 9.12 K/UL — SIGNIFICANT CHANGE UP (ref 3.8–10.5)
WBC # FLD AUTO: 9.12 K/UL — SIGNIFICANT CHANGE UP (ref 3.8–10.5)

## 2022-07-21 PROCEDURE — 93306 TTE W/DOPPLER COMPLETE: CPT | Mod: 26

## 2022-07-21 RX ORDER — COSYNTROPIN 0.25 MG/ML
0.25 INJECTION, SOLUTION INTRAVENOUS ONCE
Refills: 0 | Status: COMPLETED | OUTPATIENT
Start: 2022-07-21 | End: 2022-07-21

## 2022-07-21 RX ORDER — SODIUM CHLORIDE 9 MG/ML
250 INJECTION, SOLUTION INTRAVENOUS ONCE
Refills: 0 | Status: COMPLETED | OUTPATIENT
Start: 2022-07-21 | End: 2022-07-21

## 2022-07-21 RX ORDER — WARFARIN SODIUM 2.5 MG/1
5 TABLET ORAL ONCE
Refills: 0 | Status: COMPLETED | OUTPATIENT
Start: 2022-07-21 | End: 2022-07-21

## 2022-07-21 RX ORDER — ACETAMINOPHEN 500 MG
650 TABLET ORAL ONCE
Refills: 0 | Status: COMPLETED | OUTPATIENT
Start: 2022-07-21 | End: 2022-07-21

## 2022-07-21 RX ADMIN — MIDODRINE HYDROCHLORIDE 20 MILLIGRAM(S): 2.5 TABLET ORAL at 05:36

## 2022-07-21 RX ADMIN — SIMETHICONE 80 MILLIGRAM(S): 80 TABLET, CHEWABLE ORAL at 05:58

## 2022-07-21 RX ADMIN — SEVELAMER CARBONATE 800 MILLIGRAM(S): 2400 POWDER, FOR SUSPENSION ORAL at 08:36

## 2022-07-21 RX ADMIN — SIMETHICONE 80 MILLIGRAM(S): 80 TABLET, CHEWABLE ORAL at 11:31

## 2022-07-21 RX ADMIN — TIOTROPIUM BROMIDE 1 CAPSULE(S): 18 CAPSULE ORAL; RESPIRATORY (INHALATION) at 11:31

## 2022-07-21 RX ADMIN — Medication 5 MILLIGRAM(S): at 21:28

## 2022-07-21 RX ADMIN — Medication 1 GRAM(S): at 17:43

## 2022-07-21 RX ADMIN — BUDESONIDE AND FORMOTEROL FUMARATE DIHYDRATE 2 PUFF(S): 160; 4.5 AEROSOL RESPIRATORY (INHALATION) at 17:43

## 2022-07-21 RX ADMIN — Medication 5 MILLIGRAM(S): at 06:00

## 2022-07-21 RX ADMIN — Medication 50 MILLIGRAM(S): at 17:43

## 2022-07-21 RX ADMIN — CHLORHEXIDINE GLUCONATE 1 APPLICATION(S): 213 SOLUTION TOPICAL at 05:59

## 2022-07-21 RX ADMIN — MIDODRINE HYDROCHLORIDE 20 MILLIGRAM(S): 2.5 TABLET ORAL at 11:31

## 2022-07-21 RX ADMIN — BUDESONIDE AND FORMOTEROL FUMARATE DIHYDRATE 2 PUFF(S): 160; 4.5 AEROSOL RESPIRATORY (INHALATION) at 05:58

## 2022-07-21 RX ADMIN — Medication 1 GRAM(S): at 23:34

## 2022-07-21 RX ADMIN — Medication 650 MILLIGRAM(S): at 12:46

## 2022-07-21 RX ADMIN — SODIUM CHLORIDE 125 MILLILITER(S): 9 INJECTION, SOLUTION INTRAVENOUS at 06:30

## 2022-07-21 RX ADMIN — Medication 1 GRAM(S): at 11:47

## 2022-07-21 RX ADMIN — POLYETHYLENE GLYCOL 3350 17 GRAM(S): 17 POWDER, FOR SOLUTION ORAL at 11:30

## 2022-07-21 RX ADMIN — Medication 1 TABLET(S): at 11:32

## 2022-07-21 RX ADMIN — Medication 650 MILLIGRAM(S): at 11:47

## 2022-07-21 RX ADMIN — Medication 1 GRAM(S): at 05:58

## 2022-07-21 RX ADMIN — MIDODRINE HYDROCHLORIDE 20 MILLIGRAM(S): 2.5 TABLET ORAL at 17:42

## 2022-07-21 RX ADMIN — Medication 5 MILLIGRAM(S): at 11:32

## 2022-07-21 RX ADMIN — COSYNTROPIN 0.25 MILLIGRAM(S): 0.25 INJECTION, SOLUTION INTRAVENOUS at 15:17

## 2022-07-21 RX ADMIN — SEVELAMER CARBONATE 800 MILLIGRAM(S): 2400 POWDER, FOR SUSPENSION ORAL at 17:42

## 2022-07-21 RX ADMIN — WARFARIN SODIUM 5 MILLIGRAM(S): 2.5 TABLET ORAL at 21:28

## 2022-07-21 RX ADMIN — SIMETHICONE 80 MILLIGRAM(S): 80 TABLET, CHEWABLE ORAL at 21:28

## 2022-07-21 RX ADMIN — PANTOPRAZOLE SODIUM 40 MILLIGRAM(S): 20 TABLET, DELAYED RELEASE ORAL at 05:58

## 2022-07-21 RX ADMIN — SEVELAMER CARBONATE 800 MILLIGRAM(S): 2400 POWDER, FOR SUSPENSION ORAL at 11:31

## 2022-07-21 RX ADMIN — Medication 100 MILLIGRAM(S): at 11:30

## 2022-07-21 NOTE — PROGRESS NOTE ADULT - SUBJECTIVE AND OBJECTIVE BOX
Patient is a 77y old  Male who presents with a chief complaint of functional paraplegia post a fall (21 Jul 2022 13:20)      SUBJECTIVE / OVERNIGHT EVENTS: no further vomiting today, cleared by GI for dc planning    MEDICATIONS  (STANDING):  allopurinol 100 milliGRAM(s) Oral daily  budesonide 160 MICROgram(s)/formoterol 4.5 MICROgram(s) Inhaler 2 Puff(s) Inhalation two times a day  chlorhexidine 2% Cloths 1 Application(s) Topical <User Schedule>  lactated ringers. 1000 milliLiter(s) (30 mL/Hr) IV Continuous <Continuous>  metoclopramide 5 milliGRAM(s) Oral every 8 hours  midodrine. 20 milliGRAM(s) Oral three times a day  multivitamin 1 Tablet(s) Oral daily  pantoprazole    Tablet 40 milliGRAM(s) Oral before breakfast  polyethylene glycol 3350 17 Gram(s) Oral daily  pregabalin 50 milliGRAM(s) Oral two times a day  sevelamer carbonate 800 milliGRAM(s) Oral three times a day with meals  simethicone 80 milliGRAM(s) Chew three times a day  sucralfate suspension 1 Gram(s) Oral four times a day  tiotropium 18 MICROgram(s) Capsule 1 Capsule(s) Inhalation daily    MEDICATIONS  (PRN):      Vital Signs Last 24 Hrs  T(F): 98.6 (07-21-22 @ 00:03), Max: 98.6 (07-21-22 @ 00:03)  HR: 67 (07-21-22 @ 09:57) (67 - 82)  BP: 103/50 (07-21-22 @ 08:54) (60/50 - 103/50)  RR: 18 (07-21-22 @ 08:54) (18 - 18)  SpO2: 100% (07-21-22 @ 09:57) (93% - 100%)  Telemetry:   CAPILLARY BLOOD GLUCOSE      POCT Blood Glucose.: 103 mg/dL (21 Jul 2022 11:46)  POCT Blood Glucose.: 88 mg/dL (21 Jul 2022 07:59)  POCT Blood Glucose.: 128 mg/dL (20 Jul 2022 21:46)  POCT Blood Glucose.: 105 mg/dL (20 Jul 2022 16:35)  POCT Blood Glucose.: 118 mg/dL (20 Jul 2022 15:19)  POCT Blood Glucose.: 80 mg/dL (20 Jul 2022 14:50)  POCT Blood Glucose.: 32 mg/dL (20 Jul 2022 14:48)  POCT Blood Glucose.: 62 mg/dL (20 Jul 2022 14:15)    I&O's Summary    20 Jul 2022 07:01  -  21 Jul 2022 07:00  --------------------------------------------------------  IN: 200 mL / OUT: 2000 mL / NET: -1800 mL        PHYSICAL EXAM:  GENERAL: NAD, well-developed  HEAD:  Atraumatic, Normocephalic  EYES: EOMI, PERRLA, conjunctiva and sclera clear  NECK: Supple, No JVD  CHEST/LUNG: Clear to auscultation bilaterally; No wheeze  HEART: Regular rate and rhythm; No murmurs, rubs, or gallops  ABDOMEN: Soft, Nontender, Nondistended; Bowel sounds present  EXTREMITIES:  2+ Peripheral Pulses, No clubbing, cyanosis, or edema  PSYCH: AAOx3  NEUROLOGY: non-focal  SKIN: No rashes or lesions    LABS:                        12.5   11.32 )-----------( 182      ( 20 Jul 2022 09:08 )             41.4     07-20    x   |  x   |  x   ----------------------------<  138<H>  x    |  x   |  x     Ca    9.9      20 Jul 2022 09:08    TPro  8.6<H>  /  Alb  3.8  /  TBili  0.3  /  DBili  x   /  AST  20  /  ALT  9<L>  /  AlkPhos  123<H>  07-20    PT/INR - ( 20 Jul 2022 09:08 )   PT: 18.7 sec;   INR: 1.62 ratio                   RADIOLOGY & ADDITIONAL TESTS:    Imaging Personally Reviewed:    Consultant(s) Notes Reviewed:      Care Discussed with Consultants/Other Providers:

## 2022-07-21 NOTE — PROGRESS NOTE ADULT - ASSESSMENT
vomiting  ESRD    CT a/p with moderately distended stomach, no obstruction   cont with simethicone   PPI PID  Carafate QID; will re-eval need for long term based on pathology  cont reglan q8h OTC for N/V   HD as per renal   s/p upper gastrointestinal endoscopy with reflux esophagitis, gastritis and duodenitis  cont full liquid diet ; if stable then advance to soft diet on 7/22 am  f/u path results   will follow       Advanced care planning was discussed with patient and family.  Advanced care planning forms were reviewed and discussed.  Risks, benefits and alternatives of gastroenterologic procedures were discussed in detail and all questions were answered.    30 minutes spent.

## 2022-07-21 NOTE — PROGRESS NOTE ADULT - SUBJECTIVE AND OBJECTIVE BOX
DATE OF SERVICE: 07-21-22 @ 11:26    Patient is a 77y old  Male who presents with a chief complaint of functional paraplegia post a fall (20 Jul 2022 14:46)      INTERVAL HISTORY: Feels ok. Was hypotensive overnight.    REVIEW OF SYSTEMS:  CONSTITUTIONAL: No weakness  EYES/ENT: No visual changes;  No throat pain   NECK: No pain or stiffness  RESPIRATORY: No cough, wheezing; No shortness of breath  CARDIOVASCULAR: No chest pain or palpitations  GASTROINTESTINAL: No abdominal  pain. No nausea, vomiting, or hematemesis  GENITOURINARY: No dysuria, frequency or hematuria  NEUROLOGICAL: No stroke like symptoms  SKIN: No rashes    	  MEDICATIONS:  midodrine. 20 milliGRAM(s) Oral three times a day        PHYSICAL EXAM:  T(C): 37 (07-21-22 @ 00:03), Max: 37 (07-21-22 @ 00:03)  HR: 67 (07-21-22 @ 09:57) (63 - 82)  BP: 103/50 (07-21-22 @ 08:54) (60/50 - 103/50)  RR: 18 (07-21-22 @ 08:54) (18 - 18)  SpO2: 100% (07-21-22 @ 09:57) (93% - 100%)  Wt(kg): --  I&O's Summary    20 Jul 2022 07:01  -  21 Jul 2022 07:00  --------------------------------------------------------  IN: 200 mL / OUT: 2000 mL / NET: -1800 mL          Appearance: In no distress	  HEENT:    PERRL, EOMI	  Cardiovascular:  S1 S2, No JVD  Respiratory: Lungs clear to auscultation	  Gastrointestinal:  Soft, Non-tender, + BS	  Vascularature:  No edema of LE  Psychiatric: Appropriate affect   Neuro: no acute focal deficits                               12.5   11.32 )-----------( 182      ( 20 Jul 2022 09:08 )             41.4     07-20    x   |  x   |  x   ----------------------------<  138<H>  x    |  x   |  x     Ca    9.9      20 Jul 2022 09:08    TPro  8.6<H>  /  Alb  3.8  /  TBili  0.3  /  DBili  x   /  AST  20  /  ALT  9<L>  /  AlkPhos  123<H>  07-20        Labs personally reviewed      ASSESSMENT/PLAN: 	    78 yo male with PMHx of ESRD on HD via AV fistula LUE MWF, COPD (on 2L home O2), CHF, pHTN, hypotension on Midodrine, DVT S/P IVC filter p/w fall.  Patient reports that he was walking out of his house today to go to dialysis when he felt his left knee "give out." Patient landed on his buttocks. No head trauma or LOC.  On coumadin.  Patient was unable to get up 2/2 left knee pain.  Patient also c/o acute on chronic low back pain since the fall.  Patient reports that he is otherwise feeling well.  Denies headache, blurry vision, focal weakness, numbness, slurred speech, CP, SOB, abd pain, NVD.  Patient does not make urine.  Of note, patient was discharged from [facility] one month ago after receiving PT for BL knee pain and difficulty ambulating.  Patient states that after discharge from rehab he was supposed to receive home PT, but that has not happened. (06 Jul 2022 14:54).    1. Chronic hypotension   - with baseline SBP noted in the 80s prior admit a year ago  - was discharged on Midodrine 10mg TID  - BP soft   - PT eval   - BP continues to be SBP 90s  - monitor for nausea and vomiting   - may add a trial of low dose florinef if BP remains low despite midodrine  - uptitrated to  Midodrine 20mg PO TID, cont to closely monitor BP  - Appreciate Endocrine recs  - C/w Midodrine 20mg PO TID. Consider Florinef 0/.1mg PO daily and uptitrate as needed.    2. Hx of LE DVT prior admit   - s/p IVC filter   - on Warfarin at home as INR is elevated  - rec resume coumadin with INR <2.5,   - monitor INR     3. ?Hx of HF  - prior echo with preserved EF 60% and no mention of pulm HTN  - on HD for fluid management     4. ESRD   - HD per renal   - monitor Lytes and creat   - avoid nephrotoxins     5. Chest Pain   - Repeat EKG noted   - Troponin noted   - TTE ordered  - GI following  plan for upper gastrointestinal endoscopy tomorrow morning   - s/p Endoscopy: severe " linitis" type erythema with multiple gastric ulcers, biopsies were taken  - Full liquid diet for now, GI following          AYLA Velez-NP   Roberto Latif DO [facility]  Cardiovascular Medicine  [address] [place] [address], [address]  Office: [phone]  Cell: [phone]

## 2022-07-21 NOTE — PROGRESS NOTE ADULT - ASSESSMENT
Assessment  Hypoglycemia: 77y nondiabetic male, was not taking any hypoglycemic agents, admitted s/p mechanical fall, blood sugars are fluctuating, had ?hypoglycemic episode s/p dialysis yesterday, blood sugars improving today, no new hypoglycemic episodes, eating partial meals on full liquid diet, denies acute complaints.  Patient asymptomatic with apparent severely low FS readings (25, 46, 52), question if these are real hypoglycemias or low FS due to poor perfusion.. AM cortisol appropriately elevated, remaining labs pending.   Hypotension: AM cortisol WNL, on midodrine, monitored.  ?Gastroparesis: On meds, monitored.  ESRD: On hemodialysis, Monitor labs/BMP      tonia Claudette HUGO  Cell: [phone]             Problem/Recommendation - 1:  ·  Problem: Hypoglycemia.   ·  Recommendation: ?Pseudohypoglycemia. Hypoglycemias have all been with low FS and not blood sugar..  Insulin antibody, igf-1, igf-2, cortisol level obtained at the time of hypoglycemia, labs pending..  Encourage to increase po intake, nutritional supplements as tolerated.   Will continue monitoring and FU.  Discussed plan with patient and wife.     Problem/Recommendation - 2:  ·  Problem: Hypotension.   ·  Recommendation: AM cortisol WNL. Suggest to continue medications, monitoring, FU primary team recommendations.     Problem/Recommendation - 3:  ·  Problem: Gastroparesis.   ·  Recommendation: Suggest to continue medications, monitoring, FU primary team recommendations.     Problem/Recommendation - 4:  ·  Problem: ESRD on hemodialysis.   ·  Recommendation: On HD, continue as scheduled, renal FU Assessment  Hypoglycemia: 77y nondiabetic male, was not taking any hypoglycemic agents, admitted s/p mechanical fall, blood sugars are fluctuating, had ?hypoglycemic episode s/p dialysis yesterday, blood sugars improving today, no new hypoglycemic episodes, eating partial meals on full liquid diet, denies acute complaints.  Cortisol level inappropriately low at the time of hypoglycemia. He denies history of steroid use though is hypotensive, hyponatremic, will need cosyntropin stim test to R/O AI..  Fingerstick readings often inaccurate in patients with ESRD. Patient asymptomatic with apparent severely low FS readings (25, 46, 52), question if these are real hypoglycemias or low FS due to poor perfusion.. AM cortisol appropriately elevated, remaining labs pending.   Hypotension: AM cortisol WNL, on midodrine, monitored.  ?Gastroparesis: On meds, monitored.  ESRD: On hemodialysis, Monitor labs/BMP      tonia Wilkins MD  Cell: [phone]             Problem/Recommendation - 1:  ·  Problem: Hypoglycemia.   ·  Recommendation: Will do cosyntropin stim test to R/O AI..  ?Pseudohypoglycemia. Hypoglycemias have all been with low FS and not blood sugar.  **Please check serum blood glucose the next time FS < 70 BEFORE dextrose administration**.  Insulin antibody, igf-1, igf-2, cortisol level obtained at the time of hypoglycemia, labs pending..  Encourage to increase po intake, nutritional supplements as tolerated.   Will continue monitoring and FU.  Discussed plan with patient and wife.     Problem/Recommendation - 2:  ·  Problem: Hypotension.   ·  Recommendation: AM cortisol WNL. Suggest to continue medications, monitoring, FU primary team recommendations.     Problem/Recommendation - 3:  ·  Problem: Gastroparesis.   ·  Recommendation: Suggest to continue medications, monitoring, FU primary team recommendations.     Problem/Recommendation - 4:  ·  Problem: ESRD on hemodialysis.   ·  Recommendation: On HD, continue as scheduled, renal FU Assessment  Hypoglycemia: 77y nondiabetic male, was not taking any hypoglycemic agents, admitted s/p mechanical fall, blood sugars are fluctuating, had ?hypoglycemic episode s/p dialysis yesterday, blood sugars improving today, no new hypoglycemic episodes, eating partial meals on full liquid diet, denies acute complaints.  Cortisol level inappropriately low at the time of hypoglycemia. He denies history of steroid use though is hypotensive, hyponatremic, will need cosyntropin stim test to R/O AI..  Fingerstick readings often inaccurate in patients with ESRD. Patient asymptomatic with apparent severely low FS readings (25, 46, 52), question if these are real hypoglycemias or low FS due to poor perfusion.. AM cortisol appropriately elevated, remaining labs pending.   Hypotension: AM cortisol WNL, on midodrine, monitored.  ?Gastroparesis: On meds, monitored.  ESRD: On hemodialysis, Monitor labs/BMP      tonia Wilkins MD  Cell: [phone]             Problem/Recommendation - 1:  ·  Problem: Hypoglycemia.   ·  Recommendation: Will do cosyntropin stim test to R/O AI..  ?Pseudohypoglycemia. Hypoglycemias have all been with low FS and not blood sugar.  **Please check serum blood glucose the next time FS < 70 BEFORE dextrose administration**.   get cortisol Insulin antibody, igf-1, igf-2, cortisol level obtained at the time of hypoglycemia, labs pending..  Encourage to increase po intake, nutritional supplements as tolerated.   Will continue monitoring and FU.  Discussed plan with patient and wife.  Will Get Acth  (cosyntropin)stm test     Problem/Recommendation - 2:  ·  Problem: Hypotension.   ·  Recommendation: AM cortisol WNL. Suggest to continue medications, monitoring, FU primary team recommendations.     Problem/Recommendation - 3:  ·  Problem: Gastroparesis.   ·  Recommendation: Suggest to continue medications, monitoring, FU primary team recommendations.     Problem/Recommendation - 4:  ·  Problem: ESRD on hemodialysis.   ·  Recommendation: On HD, continue as scheduled, renal FU

## 2022-07-21 NOTE — PROGRESS NOTE ADULT - SUBJECTIVE AND OBJECTIVE BOX
[facility]  Avi Riddle PA-C  [address] [place] [address]  [phone]      INTERVAL HPI/OVERNIGHT EVENTS:  pt seen and examined, no new events  tolerating fulls so far     MEDICATIONS  (STANDING):  allopurinol 100 milliGRAM(s) Oral daily  budesonide 160 MICROgram(s)/formoterol 4.5 MICROgram(s) Inhaler 2 Puff(s) Inhalation two times a day  chlorhexidine 2% Cloths 1 Application(s) Topical <User Schedule>  midodrine. 10 milliGRAM(s) Oral three times a day  multivitamin 1 Tablet(s) Oral daily  pantoprazole    Tablet 40 milliGRAM(s) Oral before breakfast  polyethylene glycol 3350 17 Gram(s) Oral daily  pregabalin 50 milliGRAM(s) Oral two times a day  sevelamer carbonate 800 milliGRAM(s) Oral three times a day with meals  simethicone 80 milliGRAM(s) Chew three times a day  tiotropium 18 MICROgram(s) Capsule 1 Capsule(s) Inhalation daily    MEDICATIONS  (PRN):      Allergies    latex (Rash)  No Known Drug Allergies    Intolerances        ROS:   General:  No wt loss, fevers, chills, night sweats, fatigue,   Eyes:  Good vision, no reported pain  ENT:  No sore throat, pain, runny nose, dysphagia  CV:  No pain, palpitations, hypo/hypertension  Resp:  No dyspnea, cough, tachypnea, wheezing  GI:  No pain, No nausea, No vomiting, No diarrhea, No constipation, No weight loss, No fever, No pruritis, No rectal bleeding, No tarry stools, No dysphagia,  :  No pain, bleeding, incontinence, nocturia  Muscle:  No pain, weakness  Neuro:  No weakness, tingling, memory problems  Psych:  No fatigue, insomnia, mood problems, depression  Endocrine:  No polyuria, polydipsia, cold/heat intolerance  Heme:  No petechiae, ecchymosis, easy bruisability  Skin:  No rash, tattoos, scars, edema      PHYSICAL EXAM:   Vital Signs Last 24 Hrs  T(C): 36.3 (20 Jul 2022 08:55), Max: 37.1 (20 Jul 2022 00:01)  T(F): 97.3 (20 Jul 2022 08:55), Max: 98.8 (20 Jul 2022 00:01)  HR: 77 (20 Jul 2022 09:20) (68 - 96)  BP: 83/47 (20 Jul 2022 08:55) (80/49 - 120/60)  BP(mean): --  RR: 18 (20 Jul 2022 08:55) (14 - 20)  SpO2: 92% (20 Jul 2022 09:20) (90% - 100%)    Parameters below as of 20 Jul 2022 08:55  Patient On (Oxygen Delivery Method): nasal cannula  O2 Flow (L/min): 2    Daily     Daily     GENERAL:  Appears stated age,   HEENT:  NC/AT,    CHEST:  Full & symmetric excursion,   HEART:  Regular rhythm,  ABDOMEN:  Soft, non-tender, non-distended,  EXTEREMITIES:  no cyanosis  SKIN:  No rash  NEURO:  Alert,       LABS:                        12.5   11.32 )-----------( 182      ( 20 Jul 2022 09:08 )             41.4   07-20    132<L>  |  92<L>  |  44<H>  ----------------------------<  131<H>  5.1   |  26  |  7.86<H>    Ca    9.9      20 Jul 2022 09:08    TPro  8.6<H>  /  Alb  3.8  /  TBili  0.3  /  DBili  x   /  AST  20  /  ALT  9<L>  /  AlkPhos  123<H>  07-20    RADIOLOGY & ADDITIONAL TESTS:  < from: CT Abdomen and Pelvis No Cont (07.11.22 @ 22:02) >    ACC: [id number] EXAM:  CT ABDOMEN AND PELVIS                          PROCEDURE DATE:  07/11/2022          INTERPRETATION:  CLINICAL INFORMATION: Vomiting, gassy distention.    COMPARISON: CT abdomen pelvis 8/22/2022. CT chest 8/27/2021.    CONTRAST/COMPLICATIONS:  IV Contrast: NONE  Oral Contrast: NONE  Complications: None reported at time of study completion    PROCEDURE:  CT of the Abdomen and Pelvis was performed.  Sagittal and coronal reformats were performed.    FINDINGS:  Evaluation of thesolid organs and vasculature is limited without   intravenous contrast.    LOWER CHEST: Basilar subsegmental atelectasis. Left lower lobe and right   middle lobe calcified granulomas. Coronary artery calcification.   Calcified right hilar lymph nodes.    LIVER: Few punctate calcified granulomas.  BILE DUCTS: Normal caliber.  GALLBLADDER: Within normal limits.  SPLEEN: Scattered tiny calcified granulomas.  PANCREAS: Within normal limits.  ADRENALS: Bilateral adrenal gland thickening, unchanged.  KIDNEYS/URETERS: Atrophic bilateral kidneys. Bilateral renal cysts and   subcentimeter hypodensities too small to characterize.    BLADDER: Minimally distended.  REPRODUCTIVE ORGANS: Prostate within normal limits.    BOWEL: Stomach is moderately distended. No small bowel obstruction.   Status post right hemicolectomy. Mild to moderate stool in the distal   rectosigmoid colon.  PERITONEUM: No ascites.  VESSELS: Atherosclerotic changes. Infrarenal IVC filter.  RETROPERITONEUM/LYMPH NODES: No lymphadenopathy.  ABDOMINAL WALL: Rectus diastases. Multiple ventral hernias containing fat   and small knuckle of bowel. Abdominal wall collateral vessels.  BONES: Degenerative changes.    IMPRESSION:  No small bowel obstruction. Stomach is moderately distended. Mild to   moderate stool in the distal rectosigmoid colon.        --- End of Report ---    < from: Upper Endoscopy (07.19.22 @ 11:42) >  [facility]  ____________________________________________________________________________________________________  Patient Name: Kaleb Rivers                    MRN: [id number]  Account Number: [id number]                     YOB: 1945  Room: Endoscopy Room 2                           Gender: Male  Attending MD: Ortega Fitch MD                 Procedure Date No Time: 7/19/2022  ____________________________________________________________________________________________________     Procedure:           Upper GI endoscopy  Indications:         Epigastric abdominal pain, Abnormal CT of the GI tract  Providers:           Ortega Fitch MD  Medicines:           General Anesthesia  Complications:       No immediate complications.  ____________________________________________________________________________________________________    < end of copied text >  < from: Upper Endoscopy (07.19.22 @ 11:42) >  Impression:          - Mildly severe reflux esophagitis.                       - Gastritis. Biopsied.                       - Duodenitis. Biopsied.  Recommendation:      - Return patient to hospital [facility] for ongoing care.                       - Full liquid diet.                       - Await pathology results.                       - Cont present medications                       - Add carafate 1g four times a day    < end of copied text >

## 2022-07-21 NOTE — PROGRESS NOTE ADULT - SUBJECTIVE AND OBJECTIVE BOX
[facility], Division of Infectious Diseases  EMELYN Elaine Y. Patel, S. Shah, G. Casimir  [phone]  ([phone] - weekdays after 5pm and weekends)    Name: MAYE ZUNIGA  Age/Gender: 77y Male  MRN: [id number]    Interval History:  Patient seen this morning.   Notes reviewed.   No concerning overnight events.  Afebrile.   denies SOB, chest pain, abd pain, diarrhea  feels he has not received enough PT    Allergies: latex (Rash)  No Known Drug Allergies      Objective:  Vitals:   T(F): 98.6 (07-21-22 @ 00:03), Max: 98.6 (07-21-22 @ 00:03)  HR: 67 (07-21-22 @ 09:57) (67 - 82)  BP: 103/50 (07-21-22 @ 08:54) (60/50 - 103/50)  RR: 18 (07-21-22 @ 08:54) (18 - 18)  SpO2: 100% (07-21-22 @ 09:57) (93% - 100%)  Physical Examination:  General: no acute distress  HEENT: anicteric, NC  Cardio: S1, S2, normal rate, L arm AVF  Resp: clear to auscultation anteriorly  Abd: soft, distended, non-tender  Ext: trace LE edema  Skin: warm, dry  PIV w/o surrounding erythema, tenderness or warmth    Laboratory Studies:  CBC:                       12.5   11.32 )-----------( 182      ( 20 Jul 2022 09:08 )             41.4     WBC Trend:  11.32 07-20-22 @ 09:08  10.58 07-19-22 @ 07:25  11.95 07-18-22 @ 10:46  14.42 07-17-22 @ 06:53  14.60 07-16-22 @ 07:23    CMP: 07-20    x   |  x   |  x   ----------------------------<  138<H>  x    |  x   |  x     Ca    9.9      20 Jul 2022 09:08    TPro  8.6<H>  /  Alb  3.8  /  TBili  0.3  /  DBili  x   /  AST  20  /  ALT  9<L>  /  AlkPhos  123<H>  07-20      LIVER FUNCTIONS - ( 20 Jul 2022 09:08 )  Alb: 3.8 g/dL / Pro: 8.6 g/dL / ALK PHOS: 123 U/L / ALT: 9 U/L / AST: 20 U/L / GGT: x               Microbiology: reviewed     Radiology: reviewed     Medications:  allopurinol 100 milliGRAM(s) Oral daily  budesonide 160 MICROgram(s)/formoterol 4.5 MICROgram(s) Inhaler 2 Puff(s) Inhalation two times a day  chlorhexidine 2% Cloths 1 Application(s) Topical <User Schedule>  lactated ringers. 1000 milliLiter(s) IV Continuous <Continuous>  metoclopramide 5 milliGRAM(s) Oral every 8 hours  midodrine. 20 milliGRAM(s) Oral three times a day  multivitamin 1 Tablet(s) Oral daily  pantoprazole    Tablet 40 milliGRAM(s) Oral before breakfast  polyethylene glycol 3350 17 Gram(s) Oral daily  pregabalin 50 milliGRAM(s) Oral two times a day  sevelamer carbonate 800 milliGRAM(s) Oral three times a day with meals  simethicone 80 milliGRAM(s) Chew three times a day  sucralfate suspension 1 Gram(s) Oral four times a day  tiotropium 18 MICROgram(s) Capsule 1 Capsule(s) Inhalation daily    Antimicrobials:

## 2022-07-21 NOTE — PROGRESS NOTE ADULT - ASSESSMENT
Pt. is a 77 y.o. M e/ PMHx. of HTN, HFrEF, SAADIA, Gout and ESRD on HD MWF at [facility] presents after having a fall enroute to the HD center. 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 c/o knee pain and inability to walk. nephrology called. PT kevin ordered. cont outptn meds, check orthostatics. card called  ptn w h/o DVT/PE, in the past not on AC 2/2 h/o GI bleeds, but admitted on COumadin, will resume  h/o COPD with chronic hypoxic respiratory failure on 2 L home O2. presently stable.   episode of vomiting on 7/11 while in HD, CT scan w dilated stomach, no obstruction, seen by GI, tolerated CLD,   s/p EGD 7/19, has severe " linitis" type erythema with multiple gastric ulcers, biopsies were taken. for now will cont on full liquids only. d/w GI. cleared for DC. tolerating liquid diet    HD MWF,volume status is stable.   will cont reglan for gastroparesis    awaiting BORIS placement once medically cleared

## 2022-07-21 NOTE — PROGRESS NOTE ADULT - SUBJECTIVE AND OBJECTIVE BOX
Chief complaint  Patient is a 77y old  Male who presents with a chief complaint of functional paraplegia post a fall (21 Jul 2022 11:26)   Review of systems  Patient in bed, appears alert and comfortable, no new hypoglycemic episodes today.    Labs and Fingersticks  CAPILLARY BLOOD GLUCOSE      POCT Blood Glucose.: 103 mg/dL (21 Jul 2022 11:46)  POCT Blood Glucose.: 88 mg/dL (21 Jul 2022 07:59)  POCT Blood Glucose.: 128 mg/dL (20 Jul 2022 21:46)  POCT Blood Glucose.: 105 mg/dL (20 Jul 2022 16:35)  POCT Blood Glucose.: 118 mg/dL (20 Jul 2022 15:19)  POCT Blood Glucose.: 80 mg/dL (20 Jul 2022 14:50)  POCT Blood Glucose.: 32 mg/dL (20 Jul 2022 14:48)  POCT Blood Glucose.: 62 mg/dL (20 Jul 2022 14:15)  POCT Blood Glucose.: 46 mg/dL (20 Jul 2022 13:37)  POCT Blood Glucose.: 25 mg/dL (20 Jul 2022 13:35)      Anion Gap, Serum: 14 (07-20 @ 09:08)      Calcium, Total Serum: 9.9 (07-20 @ 09:08)  Albumin, Serum: 3.8 (07-20 @ 09:08)    Alanine Aminotransferase (ALT/SGPT): 9 *L* (07-20 @ 09:08)  Alkaline Phosphatase, Serum: 123 *H* (07-20 @ 09:08)  Aspartate Aminotransferase (AST/SGOT): 20 (07-20 @ 09:08)        07-20    x   |  x   |  x   ----------------------------<  138<H>  x    |  x   |  x     Ca    9.9      20 Jul 2022 09:08    TPro  8.6<H>  /  Alb  3.8  /  TBili  0.3  /  DBili  x   /  AST  20  /  ALT  9<L>  /  AlkPhos  123<H>  07-20                        12.5   11.32 )-----------( 182      ( 20 Jul 2022 09:08 )             41.4     Medications  MEDICATIONS  (STANDING):  allopurinol 100 milliGRAM(s) Oral daily  budesonide 160 MICROgram(s)/formoterol 4.5 MICROgram(s) Inhaler 2 Puff(s) Inhalation two times a day  chlorhexidine 2% Cloths 1 Application(s) Topical <User Schedule>  lactated ringers. 1000 milliLiter(s) (30 mL/Hr) IV Continuous <Continuous>  metoclopramide 5 milliGRAM(s) Oral every 8 hours  midodrine. 20 milliGRAM(s) Oral three times a day  multivitamin 1 Tablet(s) Oral daily  pantoprazole    Tablet 40 milliGRAM(s) Oral before breakfast  polyethylene glycol 3350 17 Gram(s) Oral daily  pregabalin 50 milliGRAM(s) Oral two times a day  sevelamer carbonate 800 milliGRAM(s) Oral three times a day with meals  simethicone 80 milliGRAM(s) Chew three times a day  sucralfate suspension 1 Gram(s) Oral four times a day  tiotropium 18 MICROgram(s) Capsule 1 Capsule(s) Inhalation daily      Physical Exam  General: Patient comfortable in bed  Vital Signs Last 12 Hrs  T(F): --  HR: 67 (07-21-22 @ 09:57) (67 - 82)  BP: 103/50 (07-21-22 @ 08:54) (60/50 - 103/50)  BP(mean): --  RR: 18 (07-21-22 @ 08:54) (18 - 18)  SpO2: 100% (07-21-22 @ 09:57) (94% - 100%)  Neck: No palpable thyroid nodules.    Diagnostics    Insulin Antibodies: STAT  Addl Info: PLEASE DRAW LABS WHILE PATIENT IS HYPOGLYCEMIC (07-20 @ 14:57)  Insulin-like Growth Factor Protein 2: STAT  Addl Info: PLEASE DRAW LABS WHILE PATIENT IS HYPOGLYCEMIC (07-20 @ 14:54)  Insulin-Like Growth Factor 1: STAT  Addl Info: PLEASE DRAW LABS WHILE PATIENT IS HYPOGLYCEMIC (07-20 @ 14:54)  Cortisol PM, Serum: STAT  Addl Info: PLEASE DRAW LABS WHILE PATIENT IS HYPOGLYCEMIC (07-20 @ 14:54)  Glucose, Serum: STAT  Addl Info: PLEASE CHECK SERUM GLUCOSE WHILE HYPOGLYCEMIC (07-20 @ 14:54)  Cortisol AM, Serum: AM Sched. Collection: 16-Jul-2022 06:00 (07-15 @ 14:04)  Free Thyroxine, Serum: AM Sched. Collection: 16-Jul-2022 06:00 (07-15 @ 14:04)  Thyroid Stimulating Hormone, Serum: AM Sched. Collection: 16-Jul-2022 06:00 (07-15 @ 14:04)

## 2022-07-21 NOTE — PROVIDER CONTACT NOTE (OTHER) - RECOMMENDATIONS
Recommended giving another dose of midodrine or changing medication.
Notify Provider
Notify provider.
NP aware
provider notified

## 2022-07-22 LAB
ANION GAP SERPL CALC-SCNC: 17 MMOL/L — SIGNIFICANT CHANGE UP (ref 5–17)
BUN SERPL-MCNC: 42 MG/DL — HIGH (ref 7–23)
CALCIUM SERPL-MCNC: 9.3 MG/DL — SIGNIFICANT CHANGE UP (ref 8.4–10.5)
CHLORIDE SERPL-SCNC: 91 MMOL/L — LOW (ref 96–108)
CO2 SERPL-SCNC: 23 MMOL/L — SIGNIFICANT CHANGE UP (ref 22–31)
CORTIS F PM SERPL-MCNC: 27.1 UG/DL — HIGH (ref 2.7–10.5)
CREAT SERPL-MCNC: 9.75 MG/DL — HIGH (ref 0.5–1.3)
EGFR: 5 ML/MIN/1.73M2 — LOW
GLUCOSE BLDC GLUCOMTR-MCNC: 106 MG/DL — HIGH (ref 70–99)
GLUCOSE BLDC GLUCOMTR-MCNC: 95 MG/DL — SIGNIFICANT CHANGE UP (ref 70–99)
GLUCOSE BLDC GLUCOMTR-MCNC: 98 MG/DL — SIGNIFICANT CHANGE UP (ref 70–99)
GLUCOSE BLDC GLUCOMTR-MCNC: 99 MG/DL — SIGNIFICANT CHANGE UP (ref 70–99)
GLUCOSE SERPL-MCNC: 116 MG/DL — HIGH (ref 70–99)
HCT VFR BLD CALC: 41.5 % — SIGNIFICANT CHANGE UP (ref 39–50)
HGB BLD-MCNC: 12.5 G/DL — LOW (ref 13–17)
INR BLD: 1.77 RATIO — HIGH (ref 0.88–1.16)
MCHC RBC-ENTMCNC: 28.2 PG — SIGNIFICANT CHANGE UP (ref 27–34)
MCHC RBC-ENTMCNC: 30.1 GM/DL — LOW (ref 32–36)
MCV RBC AUTO: 93.5 FL — SIGNIFICANT CHANGE UP (ref 80–100)
NRBC # BLD: 0 /100 WBCS — SIGNIFICANT CHANGE UP (ref 0–0)
PLATELET # BLD AUTO: 200 K/UL — SIGNIFICANT CHANGE UP (ref 150–400)
POTASSIUM SERPL-MCNC: 4.6 MMOL/L — SIGNIFICANT CHANGE UP (ref 3.5–5.3)
POTASSIUM SERPL-SCNC: 4.6 MMOL/L — SIGNIFICANT CHANGE UP (ref 3.5–5.3)
PROTHROM AB SERPL-ACNC: 20.5 SEC — HIGH (ref 10.5–13.4)
RBC # BLD: 4.44 M/UL — SIGNIFICANT CHANGE UP (ref 4.2–5.8)
RBC # FLD: 15.1 % — HIGH (ref 10.3–14.5)
SODIUM SERPL-SCNC: 131 MMOL/L — LOW (ref 135–145)
WBC # BLD: 10.34 K/UL — SIGNIFICANT CHANGE UP (ref 3.8–10.5)
WBC # FLD AUTO: 10.34 K/UL — SIGNIFICANT CHANGE UP (ref 3.8–10.5)

## 2022-07-22 PROCEDURE — 99233 SBSQ HOSP IP/OBS HIGH 50: CPT | Mod: FS,GC

## 2022-07-22 RX ORDER — WARFARIN SODIUM 2.5 MG/1
5 TABLET ORAL ONCE
Refills: 0 | Status: COMPLETED | OUTPATIENT
Start: 2022-07-22 | End: 2022-07-22

## 2022-07-22 RX ADMIN — Medication 50 MILLIGRAM(S): at 05:31

## 2022-07-22 RX ADMIN — Medication 1 GRAM(S): at 12:22

## 2022-07-22 RX ADMIN — Medication 5 MILLIGRAM(S): at 22:37

## 2022-07-22 RX ADMIN — POLYETHYLENE GLYCOL 3350 17 GRAM(S): 17 POWDER, FOR SOLUTION ORAL at 12:22

## 2022-07-22 RX ADMIN — PANTOPRAZOLE SODIUM 40 MILLIGRAM(S): 20 TABLET, DELAYED RELEASE ORAL at 05:30

## 2022-07-22 RX ADMIN — SEVELAMER CARBONATE 800 MILLIGRAM(S): 2400 POWDER, FOR SUSPENSION ORAL at 17:05

## 2022-07-22 RX ADMIN — Medication 100 MILLIGRAM(S): at 12:22

## 2022-07-22 RX ADMIN — Medication 50 MILLIGRAM(S): at 17:05

## 2022-07-22 RX ADMIN — MIDODRINE HYDROCHLORIDE 20 MILLIGRAM(S): 2.5 TABLET ORAL at 05:29

## 2022-07-22 RX ADMIN — WARFARIN SODIUM 5 MILLIGRAM(S): 2.5 TABLET ORAL at 22:40

## 2022-07-22 RX ADMIN — Medication 1 GRAM(S): at 05:29

## 2022-07-22 RX ADMIN — MIDODRINE HYDROCHLORIDE 20 MILLIGRAM(S): 2.5 TABLET ORAL at 12:22

## 2022-07-22 RX ADMIN — SIMETHICONE 80 MILLIGRAM(S): 80 TABLET, CHEWABLE ORAL at 05:29

## 2022-07-22 RX ADMIN — BUDESONIDE AND FORMOTEROL FUMARATE DIHYDRATE 2 PUFF(S): 160; 4.5 AEROSOL RESPIRATORY (INHALATION) at 05:29

## 2022-07-22 RX ADMIN — SIMETHICONE 80 MILLIGRAM(S): 80 TABLET, CHEWABLE ORAL at 14:18

## 2022-07-22 RX ADMIN — Medication 1 GRAM(S): at 23:10

## 2022-07-22 RX ADMIN — Medication 1 GRAM(S): at 17:05

## 2022-07-22 RX ADMIN — BUDESONIDE AND FORMOTEROL FUMARATE DIHYDRATE 2 PUFF(S): 160; 4.5 AEROSOL RESPIRATORY (INHALATION) at 17:06

## 2022-07-22 RX ADMIN — MIDODRINE HYDROCHLORIDE 20 MILLIGRAM(S): 2.5 TABLET ORAL at 17:05

## 2022-07-22 RX ADMIN — SEVELAMER CARBONATE 800 MILLIGRAM(S): 2400 POWDER, FOR SUSPENSION ORAL at 12:22

## 2022-07-22 RX ADMIN — Medication 5 MILLIGRAM(S): at 05:29

## 2022-07-22 RX ADMIN — Medication 5 MILLIGRAM(S): at 14:17

## 2022-07-22 RX ADMIN — TIOTROPIUM BROMIDE 1 CAPSULE(S): 18 CAPSULE ORAL; RESPIRATORY (INHALATION) at 12:27

## 2022-07-22 RX ADMIN — SEVELAMER CARBONATE 800 MILLIGRAM(S): 2400 POWDER, FOR SUSPENSION ORAL at 07:47

## 2022-07-22 RX ADMIN — SIMETHICONE 80 MILLIGRAM(S): 80 TABLET, CHEWABLE ORAL at 22:39

## 2022-07-22 RX ADMIN — Medication 1 TABLET(S): at 12:22

## 2022-07-22 RX ADMIN — CHLORHEXIDINE GLUCONATE 1 APPLICATION(S): 213 SOLUTION TOPICAL at 07:49

## 2022-07-22 NOTE — PROGRESS NOTE ADULT - SUBJECTIVE AND OBJECTIVE BOX
[facility], Division of Infectious Diseases  EMELYN Elaine Y. Patel, S. Shah, G. Casimir  [phone]  ([phone] - weekdays after 5pm and weekends)    Name: MAYE ZUNIGA  Age/Gender: 77y Male  MRN: [id number]    Interval History:  Patient seen this morning.   Notes reviewed.   No concerning overnight events.  Afebrile.   states he wishes to leave the hospital soon    Allergies: latex (Rash)  No Known Drug Allergies      Objective:  Vitals:   T(F): 98.7 (07-22-22 @ 08:32), Max: 98.7 (07-22-22 @ 08:32)  HR: 69 (07-22-22 @ 10:25) (60 - 74)  BP: 90/58 (07-22-22 @ 09:00) (90/58 - 104/56)  RR: 18 (07-22-22 @ 08:32) (17 - 18)  SpO2: 97% (07-22-22 @ 10:25) (97% - 100%)  Physical Examination:  General: no acute distress  HEENT: anicteric, NC  Cardio: S1, S2, normal rate, L arm AVF  Resp: clear to auscultation anteriorly  Abd: soft, distended, non-tender  Ext: trace LE edema  Skin: warm, dry  PIV w/o surrounding erythema, tenderness or warmth    Laboratory Studies:  CBC:                       12.6   9.12  )-----------( 141      ( 21 Jul 2022 17:22 )             43.2     WBC Trend:  9.12 07-21-22 @ 17:22  11.32 07-20-22 @ 09:08  10.58 07-19-22 @ 07:25  11.95 07-18-22 @ 10:46  14.42 07-17-22 @ 06:53  14.60 07-16-22 @ 07:23    CMP: 07-21    133<L>  |  92<L>  |  32<H>  ----------------------------<  96  4.9   |  26  |  7.57<H>    Ca    9.7      21 Jul 2022 17:22    TPro  8.5<H>  /  Alb  3.6  /  TBili  0.3  /  DBili  x   /  AST  27  /  ALT  11  /  AlkPhos  125<H>  07-21      LIVER FUNCTIONS - ( 21 Jul 2022 17:22 )  Alb: 3.6 g/dL / Pro: 8.5 g/dL / ALK PHOS: 125 U/L / ALT: 11 U/L / AST: 27 U/L / GGT: x               Microbiology: reviewed     Radiology: reviewed     Medications:  allopurinol 100 milliGRAM(s) Oral daily  budesonide 160 MICROgram(s)/formoterol 4.5 MICROgram(s) Inhaler 2 Puff(s) Inhalation two times a day  chlorhexidine 2% Cloths 1 Application(s) Topical <User Schedule>  lactated ringers. 1000 milliLiter(s) IV Continuous <Continuous>  metoclopramide 5 milliGRAM(s) Oral every 8 hours  midodrine. 20 milliGRAM(s) Oral three times a day  multivitamin 1 Tablet(s) Oral daily  pantoprazole    Tablet 40 milliGRAM(s) Oral before breakfast  polyethylene glycol 3350 17 Gram(s) Oral daily  pregabalin 50 milliGRAM(s) Oral two times a day  sevelamer carbonate 800 milliGRAM(s) Oral three times a day with meals  simethicone 80 milliGRAM(s) Chew three times a day  sucralfate suspension 1 Gram(s) Oral four times a day  tiotropium 18 MICROgram(s) Capsule 1 Capsule(s) Inhalation daily    Antimicrobials:

## 2022-07-22 NOTE — PROGRESS NOTE ADULT - ASSESSMENT
Pt. is a 77 y.o. M e/ PMHx. of HTN, HFrEF, SAADIA, Gout and ESRD on HD MWF at [facility] [place] presents after having a fall enroute to the HD center. 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 c/o knee pain and inability to walk. nephrology called. PT kevin ordered. cont outptn meds, check orthostatics. card called  ptn w h/o DVT/PE, in the past not on AC 2/2 h/o GI bleeds, but admitted on COumadin, will resume  h/o COPD with chronic hypoxic respiratory failure on 2 L home O2. presently stable.   episode of vomiting on 7/11 while in HD, CT scan w dilated stomach, no obstruction, seen by GI, tolerated CLD,   s/p EGD 7/19, has severe " linitis" type erythema with multiple gastric ulcers, biopsies were taken. for now will cont on full liquids only. d/w GI. cleared for DC. tolerating liquid diet    HD MWF,volume status is stable.   will cont reglan for gastroparesis    awaiting BORIS placement once medically cleared

## 2022-07-22 NOTE — PROGRESS NOTE ADULT - ASSESSMENT
vomiting  ESRD    CT a/p with moderately distended stomach, no obstruction   cont with simethicone   PPI PID  Carafate QID; will re-eval need for long term based on pathology  cont reglan q8h OTC for N/V   HD as per renal   s/p upper gastrointestinal endoscopy with reflux esophagitis, gastritis and duodenitis  diet as mari  f/u path results   d/w patient and wife over phone  will follow       Advanced care planning was discussed with patient and family.  Advanced care planning forms were reviewed and discussed.  Risks, benefits and alternatives of gastroenterologic procedures were discussed in detail and all questions were answered.    30 minutes spent.

## 2022-07-22 NOTE — DIETITIAN NUTRITION RISK NOTIFICATION - TREATMENT: THE FOLLOWING DIET HAS BEEN RECOMMENDED
Diet, Renal Restrictions:   For patients receiving Renal Replacement - No Protein Restr, No Conc K, No Conc Phos, Low Sodium  Soft and Bite Sized (SOFTBTSZ)  Supplement Feeding Modality:  Oral  Nepro Cans or Servings Per Day:  1       Frequency:  Daily (07-21-22 @ 20:27) [Active]

## 2022-07-22 NOTE — CHART NOTE - NSCHARTNOTEFT_GEN_A_CORE
Nutrition Follow Up Note  Patient seen for: nutrition follow-up     Chart reviewed, events noted. Per chart "Pt. is a 77 y.o. M e/ PMHx. of HTN, HFrEF, SAADIA, Gout and ESRD on HD MWF at [facility] [place] presents after having a fall enroute to the HD center." DCP likely BORIS.    Source: [X] Patient       [x] EMR        [X] Family at bedside (Wife)    Diet Order:   Diet, Renal Restrictions:   For patients receiving Renal Replacement - No Protein Restr, No Conc K, No Conc Phos, Low Sodium  Soft and Bite Sized (SOFTBTSZ)  Supplement Feeding Modality:  Oral  Nepro Cans or Servings Per Day:  1       Frequency:  Daily (22)    - Is current order appropriate/adequate? [] Yes  [X]  No:     - PO intake :   [] >75%  Adequate    [] 50-75%  Fair       [X] <50%  Poor    - Nutrition-related concerns:      - Pt reports poor appetite/PO intake, <50% of meals x > 1 week in setting of GI distress.       - S/p upper gastrointestinal endoscopy with reflux esophagitis, gastritis and duodenitis. Diet advanced clear liquids --> full liquids --> soft and bite-sized.      - Now C/o poor PO intake secondary to texture modified diet, requesting diet advancement as feasible.       - Amenable to receiving oral nutritional supplement to optimize PO intake: Nepro 3x/day = 1275 janie, 57 Gm protein       - DM2 (A1C 5.8%). BG controlled, no hypoglycemic episodes noted. No insulin regimen noted.       - ESRD on HD MWF, Nephrology following. K+ WNL today , no updated Phos but trending WNL this admission. Per chart monitor for need of Phos binders.     GI:   - N/V improving (ordered for Reglan)   - Last BM today .     - Bowel Regimen? [X] Yes  (Miralax)  - Ordered for Simethicone    Nutrition focused physical exam conducted:    Subcutaneous fat loss: [moderate ] Orbital fat pads region, [mild ]Buccal fat region  Muscle wasting: [ moderate]Temples region    Weights:   Dosing weight in k.6 (-06)  Daily Weight in k (-), Weight in k (-), Weight in k.4 (-20), Weight in k (), Weight in k.2 (), Weight in k.2 ()  ** Weight fluctuating in setting of HD. Weight changes likely secondary to fluid shifts. RD to continue to monitor weight trends as able.     Nutritionally Pertinent MEDICATIONS  (STANDING):  allopurinol  lactated ringers.  metoclopramide  midodrine.  multivitamin  pantoprazole    Tablet  polyethylene glycol 3350  simethicone  sucralfate suspension    Pertinent Labs:  @ 17:22: Na 133<L>, BUN 32<H>, Cr 7.57<H>, BG 96, K+ 4.9, Alk Phos 125<H>, ALT/SGPT 11, AST/SGOT 27, HbA1c --    A1C with Estimated Average Glucose Result: 5.8 % (22 @ 11:22)    Finger Sticks:  POCT Blood Glucose.: 106 mg/dL ( @ 11:57)  POCT Blood Glucose.: 98 mg/dL ( @ 08:07)  POCT Blood Glucose.: 131 mg/dL ( @ 21:20)  POCT Blood Glucose.: 99 mg/dL ( @ 16:30)    Skin per nursing documentation: krystyna. buttocks pressure injury (stage 2)   Edema: 2+ krystyna. feet    Estimated Needs:   [X] no change since previous assessment  Estimated Nutrient Needs:  - Energy: 5600-3758 janie/day (20-23 al/kg) ** based on dosing weight 113 kg  - Protein: 80-93 Gm/day (1.2-1.4 Gm/kg) ** Based on IBW   - Fluid: Defer to team     Previous Nutrition Diagnosis: Increased nutrient needs   Nutrition Diagnosis is: [X] ongoing  [] resolved [] not applicable     New Nutrition Diagnosis: [X] Severe acute Malnutrition RT decreased ability to consume adequate protein-energy in setting of increased physiological demand for nutrients AEB <50% E Rx > 5 days, moderate muscle/fat depletion, mild edema     Nutrition Care Plan:  [X] In Progress  [] Achieved  [] Not applicable    Nutrition Interventions:     Education Provided:       [X] Yes:  [] No: Reviewed renal diet with emphasis on increased protein-energy needs secondary to HD. Literature provided, questions answered.       Recommendations:         [X] Advance diet as medically feasible: soft and bite sized --> Regular, RRT restrictions            [X] Increase oral nutrition supplement: Nepro 3x/day     [X] Encourage adequate consumption of meals/supplements to optimize protein-energy intake.      [X] Change multivitamin to Nephro-Gita.      [X] Diet education reviewed, reinforce as needed.      [X] New malnutrition notification sent.     Monitoring and Evaluation:   Continue to monitor nutritional intake, tolerance to diet prescription, weights, labs, skin integrity    RD remains available upon request and will follow up per protocol  SKY Obrien [facility] Pager #[phone] Nutrition Follow Up Note  Patient seen for: nutrition follow-up     Chart reviewed, events noted. Per chart "Pt. is a 77 y.o. M e/ PMHx. of HTN, HFrEF, SAADIA, Gout and ESRD on HD MWF at [facility] [place] presents after having a fall enroute to the HD center." DCP likely BORIS.    Source: [X] Patient       [x] EMR        [X] Family at bedside (Wife)    Diet Order:   Diet, Renal Restrictions:   For patients receiving Renal Replacement - No Protein Restr, No Conc K, No Conc Phos, Low Sodium  Soft and Bite Sized (SOFTBTSZ)  Supplement Feeding Modality:  Oral  Nepro Cans or Servings Per Day:  1       Frequency:  Daily (22)    - Is current order appropriate/adequate? [] Yes  [X]  No:     - PO intake :   [] >75%  Adequate    [] 50-75%  Fair       [X] <50%  Poor    - Nutrition-related concerns:      - Pt reports poor appetite/PO intake, <50% of meals x > 1 week in setting of GI distress.       - S/p upper gastrointestinal endoscopy with reflux esophagitis, gastritis and duodenitis. Diet advanced clear liquids --> full liquids --> soft and bite-sized.      - Now C/o poor PO intake secondary to texture modified diet, requesting diet advancement as feasible.       - Amenable to receiving oral nutritional supplement to optimize PO intake: Nepro 3x/day = 1275 janie, 57 Gm protein       - DM2 (A1C 5.8%). BG controlled, no hypoglycemic episodes noted. No insulin regimen noted.       - ESRD on HD MWF, Nephrology following. K+ WNL today , no updated Phos but trending WNL this admission. Per chart monitor for need of Phos binders.     GI:   - N/V improving (ordered for Reglan)   - Last BM today .     - Bowel Regimen? [X] Yes  (Miralax)  - Ordered for Simethicone    Nutrition focused physical exam conducted:    Subcutaneous fat loss: [moderate ] Orbital fat pads region, [mild ]Buccal fat region  Muscle wasting: [ moderate]Temples region    Weights:   Dosing weight in k.6 (-06)  Daily Weight in k (-), Weight in k (-), Weight in k.4 (-20), Weight in k (), Weight in k.2 (), Weight in k.2 ()  ** Weight fluctuating in setting of HD. Weight changes likely secondary to fluid shifts. RD to continue to monitor weight trends as able.     Nutritionally Pertinent MEDICATIONS  (STANDING):  allopurinol  lactated ringers.  metoclopramide  midodrine.  multivitamin  pantoprazole    Tablet  polyethylene glycol 3350  simethicone  sucralfate suspension    Pertinent Labs:  @ 17:22: Na 133<L>, BUN 32<H>, Cr 7.57<H>, BG 96, K+ 4.9, Alk Phos 125<H>, ALT/SGPT 11, AST/SGOT 27, HbA1c --    A1C with Estimated Average Glucose Result: 5.8 % (22 @ 11:22)    Finger Sticks:  POCT Blood Glucose.: 106 mg/dL ( @ 11:57)  POCT Blood Glucose.: 98 mg/dL ( @ 08:07)  POCT Blood Glucose.: 131 mg/dL ( @ 21:20)  POCT Blood Glucose.: 99 mg/dL ( @ 16:30)    Skin per nursing documentation: krystyna. buttocks pressure injury (stage 2)   Edema: 2+ krystyna. feet    Estimated Needs:   [X] no change since previous assessment  Estimated Nutrient Needs:  - Energy: 1884-8432 janie/day (20-23 al/kg) ** based on dosing weight 113 kg  - Protein: 80-93 Gm/day (1.2-1.4 Gm/kg) ** Based on IBW   - Fluid: Defer to team     Previous Nutrition Diagnosis: Increased nutrient needs   Nutrition Diagnosis is: [X] ongoing  [] resolved [] not applicable     New Nutrition Diagnosis: [X] Severe acute Malnutrition RT decreased ability to consume adequate protein-energy in setting of increased physiological demand for nutrients AEB <50% E Rx > 5 days, moderate muscle/fat depletion, mild edema     Nutrition Care Plan:  [X] In Progress  [] Achieved  [] Not applicable    Nutrition Interventions:     Education Provided:       [X] Yes:  [] No: Reviewed renal diet with emphasis on increased protein-energy needs secondary to HD. Literature provided, questions answered.       Recommendations:         [X] Advance diet as medically feasible: soft and bite sized --> Regular, RRT restrictions            [X] Increase oral nutrition supplement: Nepro 3x/day     [X] Encourage adequate consumption of meals/supplements to optimize protein-energy intake.      [X] Change multivitamin to Nephro-Gita.      [X] Continue Phos binders unless contraindicated.      [X] Diet education reviewed, reinforce as needed.      [X] New malnutrition notification sent.     Monitoring and Evaluation:   Continue to monitor nutritional intake, tolerance to diet prescription, weights, labs, skin integrity    RD remains available upon request and will follow up per protocol  Sydnee Smith RDN CDN [facility] Pager #[phone]

## 2022-07-22 NOTE — PROGRESS NOTE ADULT - PROBLEM SELECTOR PLAN 1
Pt. with ESRD on HD three times a week (MWF) presented to [facility] for a fall. Last HD was on 7/13 via LUE AVF. Pt. clinically stable. No CP, SOB or palpitations during evaluation. Most recent labs reviewed. Will arrange for HD today. Pt. denies any lightheadedness and states that his his BP is normally 80s-90s/ 50s-60s. He endorses taking Midodrine but does not recall the dose. Consent obtained for HD and placed in the chart. Will arrange for HD today. HgB at goal. Prior records state that Pt. was on 30mg Q8H here with additional Midodrine given during HD during prior admission. C/w Midodrine to 20mg TID. Consider discharging on this dose. C/w HD time to 4 hours and DFR to 600 on regular HD days. Plan for HD today.     No long-term Aluminum based PO substances.     #Hyperphosphatemia- c/w Sevelamer 800mg TID. Check Phos as Aluminum based substances are potent phos binders. Based on level re-evaluate need for Sevelamer now.      If you have any questions, please feel free to contact me  Jai Alvarenga  Nephrology Fellow  [phone]; Prefer [employer] TEAMS  (After 5pm or on weekends please page the on-call fellow).

## 2022-07-22 NOTE — PROGRESS NOTE ADULT - SUBJECTIVE AND OBJECTIVE BOX
Chief complaint  Patient is a 77y old  Male who presents with a chief complaint of functional paraplegia post a fall (22 Jul 2022 08:12)   Review of systems  Patient in bed, looks comfortable, no new hypoglycemic episodes.    Labs and Fingersticks  CAPILLARY BLOOD GLUCOSE      POCT Blood Glucose.: 106 mg/dL (22 Jul 2022 11:57)  POCT Blood Glucose.: 98 mg/dL (22 Jul 2022 08:07)  POCT Blood Glucose.: 131 mg/dL (21 Jul 2022 21:20)  POCT Blood Glucose.: 99 mg/dL (21 Jul 2022 16:30)      Anion Gap, Serum: 15 (07-21 @ 17:22)      Calcium, Total Serum: 9.7 (07-21 @ 17:22)  Albumin, Serum: 3.6 (07-21 @ 17:22)    Alanine Aminotransferase (ALT/SGPT): 11 (07-21 @ 17:22)  Alkaline Phosphatase, Serum: 125 *H* (07-21 @ 17:22)  Aspartate Aminotransferase (AST/SGOT): 27 (07-21 @ 17:22)        07-21    133<L>  |  92<L>  |  32<H>  ----------------------------<  96  4.9   |  26  |  7.57<H>    Ca    9.7      21 Jul 2022 17:22    TPro  8.5<H>  /  Alb  3.6  /  TBili  0.3  /  DBili  x   /  AST  27  /  ALT  11  /  AlkPhos  125<H>  07-21                        12.6   9.12  )-----------( 141      ( 21 Jul 2022 17:22 )             43.2     Medications  MEDICATIONS  (STANDING):  allopurinol 100 milliGRAM(s) Oral daily  budesonide 160 MICROgram(s)/formoterol 4.5 MICROgram(s) Inhaler 2 Puff(s) Inhalation two times a day  chlorhexidine 2% Cloths 1 Application(s) Topical <User Schedule>  lactated ringers. 1000 milliLiter(s) (30 mL/Hr) IV Continuous <Continuous>  metoclopramide 5 milliGRAM(s) Oral every 8 hours  midodrine. 20 milliGRAM(s) Oral three times a day  multivitamin 1 Tablet(s) Oral daily  pantoprazole    Tablet 40 milliGRAM(s) Oral before breakfast  polyethylene glycol 3350 17 Gram(s) Oral daily  pregabalin 50 milliGRAM(s) Oral two times a day  sevelamer carbonate 800 milliGRAM(s) Oral three times a day with meals  simethicone 80 milliGRAM(s) Chew three times a day  sucralfate suspension 1 Gram(s) Oral four times a day  tiotropium 18 MICROgram(s) Capsule 1 Capsule(s) Inhalation daily      Physical Exam  General: Patient comfortable in bed  Vital Signs Last 12 Hrs  T(F): 98.7 (07-22-22 @ 08:32), Max: 98.7 (07-22-22 @ 08:32)  HR: 69 (07-22-22 @ 10:25) (60 - 74)  BP: 90/58 (07-22-22 @ 09:00) (90/58 - 101/57)  BP(mean): --  RR: 18 (07-22-22 @ 08:32) (17 - 18)  SpO2: 97% (07-22-22 @ 10:25) (97% - 100%)  Neck: No palpable thyroid nodules.  CVS: S1S2, No murmurs  Respiratory: No wheezing, no crepitations  GI: Abdomen soft, bowel sounds positive  Musculoskeletal:  edema lower extremities.   Skin: No skin rashes, no ecchymosis    Diagnostics    Cortisol PM, Serum: Routine  Addl Info: Please draw cortisol lab exactly 45 minutes after cosyntropin injection. Thank you (07-21 @ 14:33)  Glucose, Serum: Routine  Addl Info: Please draw lab when FS < 70. Must obtain lab BEFORE dextrose administration. Thank you. (07-21 @ 14:33)  Insulin Antibodies: STAT  Addl Info: PLEASE DRAW LABS WHILE PATIENT IS HYPOGLYCEMIC (07-20 @ 14:57)  Insulin-like Growth Factor Protein 2: STAT  Addl Info: PLEASE DRAW LABS WHILE PATIENT IS HYPOGLYCEMIC (07-20 @ 14:54)  Insulin-Like Growth Factor 1: STAT  Addl Info: PLEASE DRAW LABS WHILE PATIENT IS HYPOGLYCEMIC (07-20 @ 14:54)  Cortisol PM, Serum: STAT  Addl Info: PLEASE DRAW LABS WHILE PATIENT IS HYPOGLYCEMIC (07-20 @ 14:54)  Glucose, Serum: STAT  Addl Info: PLEASE CHECK SERUM GLUCOSE WHILE HYPOGLYCEMIC (07-20 @ 14:54)  Cortisol AM, Serum: AM Sched. Collection: 16-Jul-2022 06:00 (07-15 @ 14:04)  Free Thyroxine, Serum: AM Sched. Collection: 16-Jul-2022 06:00 (07-15 @ 14:04)  Thyroid Stimulating Hormone, Serum: AM Sched. Collection: 16-Jul-2022 06:00 (07-15 @ 14:04)

## 2022-07-22 NOTE — PROGRESS NOTE ADULT - ASSESSMENT
Pt. is a 77 y.o. M e/ PMHx. of HTN, HFrEF, SAADIA, Gout and ESRD on HD MWF at [facility], [place], operated by [facility] presents after having a fall enroute to the HD center. Nephrology consulted for ESRD management.

## 2022-07-22 NOTE — PROGRESS NOTE ADULT - SUBJECTIVE AND OBJECTIVE BOX
DATE OF SERVICE: 07-22-22 @ 15:20    Patient is a 77y old  Male who presents with a chief complaint of functional paraplegia post a fall (22 Jul 2022 13:35)      INTERVAL HISTORY: Feels ok.     REVIEW OF SYSTEMS:  CONSTITUTIONAL: No weakness  EYES/ENT: No visual changes;  No throat pain   NECK: No pain or stiffness  RESPIRATORY: No cough, wheezing; No shortness of breath  CARDIOVASCULAR: No chest pain or palpitations  GASTROINTESTINAL: No abdominal  pain. No nausea, vomiting, or hematemesis  GENITOURINARY: No dysuria, frequency or hematuria  NEUROLOGICAL: No stroke like symptoms  SKIN: No rashes    	  MEDICATIONS:  midodrine. 20 milliGRAM(s) Oral three times a day        PHYSICAL EXAM:  T(C): 37.1 (07-22-22 @ 08:32), Max: 37.1 (07-22-22 @ 08:32)  HR: 69 (07-22-22 @ 10:25) (60 - 74)  BP: 90/58 (07-22-22 @ 09:00) (90/58 - 104/56)  RR: 18 (07-22-22 @ 08:32) (17 - 18)  SpO2: 97% (07-22-22 @ 10:25) (97% - 100%)  Wt(kg): --  I&O's Summary    21 Jul 2022 07:01  -  22 Jul 2022 07:00  --------------------------------------------------------  IN: 650 mL / OUT: 0 mL / NET: 650 mL          Appearance: In no distress	  HEENT:    PERRL, EOMI	  Cardiovascular:  S1 S2, No JVD  Respiratory: Lungs clear to auscultation	  Gastrointestinal:  Soft, Non-tender, + BS	  Vascularature:  No edema of LE  Psychiatric: Appropriate affect   Neuro: no acute focal deficits                               12.6   9.12  )-----------( 141      ( 21 Jul 2022 17:22 )             43.2     07-21    133<L>  |  92<L>  |  32<H>  ----------------------------<  96  4.9   |  26  |  7.57<H>    Ca    9.7      21 Jul 2022 17:22    TPro  8.5<H>  /  Alb  3.6  /  TBili  0.3  /  DBili  x   /  AST  27  /  ALT  11  /  AlkPhos  125<H>  07-21        Labs personally reviewed    TTE with Doppler (w/Cont) (07.21.22 @ 10:04) >  1. Increased relative wall thickness with normal left  ventricular mass index, consistent with concentric left  ventricular remodeling.  2. Hyperdynamic left ventricular systolic function.  Endocardial visualization enhanced with intravenous  injection of Ultrasonic Enhancing Agent (Lumason).  3. The right ventricle is not well visualized; grossly  normal right ventricular systolic function.  No parastenal images without Definity        ASSESSMENT/PLAN: 	      76 yo male with PMHx of ESRD on HD via AV fistula LUE MWF, COPD (on 2L home O2), CHF, pHTN, hypotension on Midodrine, DVT S/P IVC filter p/w fall.  Patient reports that he was walking out of his house today to go to dialysis when he felt his left knee "give out." Patient landed on his buttocks. No head trauma or LOC.  On coumadin.  Patient was unable to get up 2/2 left knee pain.  Patient also c/o acute on chronic low back pain since the fall.  Patient reports that he is otherwise feeling well.  Denies headache, blurry vision, focal weakness, numbness, slurred speech, CP, SOB, abd pain, NVD.  Patient does not make urine.  Of note, patient was discharged from [facility] one month ago after receiving PT for BL knee pain and difficulty ambulating.  Patient states that after discharge from rehab he was supposed to receive home PT, but that has not happened. (06 Jul 2022 14:54).    1. Chronic hypotension   - with baseline SBP noted in the 80s prior admit a year ago  - was discharged on Midodrine 10mg TID  - BP soft   - PT eval   - BP continues to be SBP 90s  - monitor for nausea and vomiting   - may add a trial of low dose florinef if BP remains low despite midodrine  - uptitrated to  Midodrine 20mg PO TID, cont to closely monitor BP  - Appreciate Endocrine recs  - C/w Midodrine 20mg PO TID. Consider Florinef 0/.1mg PO daily and uptitrate as needed.    2. Hx of LE DVT prior admit   - s/p IVC filter   - on Warfarin at home as INR is elevated  - rec resume coumadin with INR <2.5,   - monitor INR     3. ?Hx of HF  - prior echo with preserved EF 60% and no mention of pulm HTN  - on HD for fluid management     4. ESRD   - HD per renal   - monitor Lytes and creat   - avoid nephrotoxins     5. Chest Pain   - Repeat EKG noted   - Troponin noted   - TTE shows LVEF 70-75%, hyperdynamic LV systolic function, grossly normal RV systolic function  - GI following  plan for upper gastrointestinal endoscopy tomorrow morning   - s/p Endoscopy: severe " linitis" type erythema with multiple gastric ulcers, biopsies were taken  - Full liquid diet for now, GI following      Estefania Forbes, AYLA-NP   Roberto Latif DO [facility]  Cardiovascular Medicine  [address] [place] [address], [address]  Office: [phone]  Cell: [phone]

## 2022-07-22 NOTE — PROGRESS NOTE ADULT - SUBJECTIVE AND OBJECTIVE BOX
[facility]  Avi Riddle PA-C  [address] [place] [address]  [phone]      INTERVAL HPI/OVERNIGHT EVENTS:  pt seen and examined, no new events  stomach back to normal  no vomiting  +bm    MEDICATIONS  (STANDING):  allopurinol 100 milliGRAM(s) Oral daily  budesonide 160 MICROgram(s)/formoterol 4.5 MICROgram(s) Inhaler 2 Puff(s) Inhalation two times a day  chlorhexidine 2% Cloths 1 Application(s) Topical <User Schedule>  midodrine. 10 milliGRAM(s) Oral three times a day  multivitamin 1 Tablet(s) Oral daily  pantoprazole    Tablet 40 milliGRAM(s) Oral before breakfast  polyethylene glycol 3350 17 Gram(s) Oral daily  pregabalin 50 milliGRAM(s) Oral two times a day  sevelamer carbonate 800 milliGRAM(s) Oral three times a day with meals  simethicone 80 milliGRAM(s) Chew three times a day  tiotropium 18 MICROgram(s) Capsule 1 Capsule(s) Inhalation daily    MEDICATIONS  (PRN):      Allergies    latex (Rash)  No Known Drug Allergies    Intolerances        ROS:   General:  No wt loss, fevers, chills, night sweats, fatigue,   Eyes:  Good vision, no reported pain  ENT:  No sore throat, pain, runny nose, dysphagia  CV:  No pain, palpitations, hypo/hypertension  Resp:  No dyspnea, cough, tachypnea, wheezing  GI:  No pain, No nausea, No vomiting, No diarrhea, No constipation, No weight loss, No fever, No pruritis, No rectal bleeding, No tarry stools, No dysphagia,  :  No pain, bleeding, incontinence, nocturia  Muscle:  No pain, weakness  Neuro:  No weakness, tingling, memory problems  Psych:  No fatigue, insomnia, mood problems, depression  Endocrine:  No polyuria, polydipsia, cold/heat intolerance  Heme:  No petechiae, ecchymosis, easy bruisability  Skin:  No rash, tattoos, scars, edema      PHYSICAL EXAM:   Vital Signs Last 24 Hrs  T(C): 36.3 (20 Jul 2022 08:55), Max: 37.1 (20 Jul 2022 00:01)  T(F): 97.3 (20 Jul 2022 08:55), Max: 98.8 (20 Jul 2022 00:01)  HR: 77 (20 Jul 2022 09:20) (68 - 96)  BP: 83/47 (20 Jul 2022 08:55) (80/49 - 120/60)  BP(mean): --  RR: 18 (20 Jul 2022 08:55) (14 - 20)  SpO2: 92% (20 Jul 2022 09:20) (90% - 100%)    Parameters below as of 20 Jul 2022 08:55  Patient On (Oxygen Delivery Method): nasal cannula  O2 Flow (L/min): 2    Daily     Daily     GENERAL:  Appears stated age,   HEENT:  NC/AT,    CHEST:  Full & symmetric excursion,   HEART:  Regular rhythm,  ABDOMEN:  Soft, non-tender, non-distended,  EXTEREMITIES:  no cyanosis  SKIN:  No rash  NEURO:  Alert,       LABS:                        12.5   11.32 )-----------( 182      ( 20 Jul 2022 09:08 )             41.4   07-20    132<L>  |  92<L>  |  44<H>  ----------------------------<  131<H>  5.1   |  26  |  7.86<H>    Ca    9.9      20 Jul 2022 09:08    TPro  8.6<H>  /  Alb  3.8  /  TBili  0.3  /  DBili  x   /  AST  20  /  ALT  9<L>  /  AlkPhos  123<H>  07-20    RADIOLOGY & ADDITIONAL TESTS:  < from: CT Abdomen and Pelvis No Cont (07.11.22 @ 22:02) >    ACC: [id number] EXAM:  CT ABDOMEN AND PELVIS                          PROCEDURE DATE:  07/11/2022          INTERPRETATION:  CLINICAL INFORMATION: Vomiting, gassy distention.    COMPARISON: CT abdomen pelvis 8/22/2022. CT chest 8/27/2021.    CONTRAST/COMPLICATIONS:  IV Contrast: NONE  Oral Contrast: NONE  Complications: None reported at time of study completion    PROCEDURE:  CT of the Abdomen and Pelvis was performed.  Sagittal and coronal reformats were performed.    FINDINGS:  Evaluation of thesolid organs and vasculature is limited without   intravenous contrast.    LOWER CHEST: Basilar subsegmental atelectasis. Left lower lobe and right   middle lobe calcified granulomas. Coronary artery calcification.   Calcified right hilar lymph nodes.    LIVER: Few punctate calcified granulomas.  BILE DUCTS: Normal caliber.  GALLBLADDER: Within normal limits.  SPLEEN: Scattered tiny calcified granulomas.  PANCREAS: Within normal limits.  ADRENALS: Bilateral adrenal gland thickening, unchanged.  KIDNEYS/URETERS: Atrophic bilateral kidneys. Bilateral renal cysts and   subcentimeter hypodensities too small to characterize.    BLADDER: Minimally distended.  REPRODUCTIVE ORGANS: Prostate within normal limits.    BOWEL: Stomach is moderately distended. No small bowel obstruction.   Status post right hemicolectomy. Mild to moderate stool in the distal   rectosigmoid colon.  PERITONEUM: No ascites.  VESSELS: Atherosclerotic changes. Infrarenal IVC filter.  RETROPERITONEUM/LYMPH NODES: No lymphadenopathy.  ABDOMINAL WALL: Rectus diastases. Multiple ventral hernias containing fat   and small knuckle of bowel. Abdominal wall collateral vessels.  BONES: Degenerative changes.    IMPRESSION:  No small bowel obstruction. Stomach is moderately distended. Mild to   moderate stool in the distal rectosigmoid colon.        --- End of Report ---    < from: Upper Endoscopy (07.19.22 @ 11:42) >  [facility]  ____________________________________________________________________________________________________  Patient Name: Kaleb Rivers                    MRN: [id number]  Account Number: [id number]                     YOB: 1945  Room: Endoscopy Room 2                           Gender: Male  Attending MD: Ortega Fitch MD                 Procedure Date No Time: 7/19/2022  ____________________________________________________________________________________________________     Procedure:           Upper GI endoscopy  Indications:         Epigastric abdominal pain, Abnormal CT of the GI tract  Providers:           Ortega Fitch MD  Medicines:           General Anesthesia  Complications:       No immediate complications.  ____________________________________________________________________________________________________    < end of copied text >  < from: Upper Endoscopy (07.19.22 @ 11:42) >  Impression:          - Mildly severe reflux esophagitis.                       - Gastritis. Biopsied.                       - Duodenitis. Biopsied.  Recommendation:      - Return patient to hospital [facility] for ongoing care.                       - Full liquid diet.                       - Await pathology results.                       - Cont present medications                       - Add carafate 1g four times a day    < end of copied text >

## 2022-07-22 NOTE — PROGRESS NOTE ADULT - ASSESSMENT
Assessment  Hypoglycemia: 77y nondiabetic male, was not taking any hypoglycemic agents, admitted s/p mechanical fall, blood sugars are fluctuating, had prior hypoglycemic episodes, blood sugars improving and in acceptable range now, no new hypoglycemic episodes. Diet advanced now, eating meals, appears comfortable in NAD, eager for DC.  Cortisol level inappropriately low at the time of hypoglycemia. He denies history of steroid use though hypotensive, hyponatremic, stim test completed and patient ruled out for AI.  Fingerstick readings often inaccurate in patients with ESRD. Patient asymptomatic with apparent severely low FS readings (25, 46, 52), question if these are real hypoglycemias or low FS due to poor perfusion.. AM cortisol appropriately elevated, remaining labs pending.   Hypotension: AM cortisol WNL, on midodrine, monitored.  ?Gastroparesis: On meds, monitored.  ESRD: On hemodialysis, Monitor labs/BMP      toniahoda Wilkins MD  Cell: [phone]             Problem/Recommendation - 1:  ·  Problem: Hypoglycemia.   ·  Recommendation: S/P cosyntropin stim test, patient ruled out for AI.  ?Pseudohypoglycemia. Hypoglycemias have all been with low FS and not blood sugar.  **Please check serum blood glucose the next time FS < 70 BEFORE dextrose administration**.   get cortisol Insulin antibody, igf-1, igf-2, cortisol level obtained at the time of hypoglycemia, labs pending..  Encourage to increase po intake, nutritional supplements as tolerated.   Will continue monitoring and FU.  Discussed plan with patient and wife.     Problem/Recommendation - 2:  ·  Problem: Hypotension.   ·  Recommendation: AM cortisol WNL. Suggest to continue medications, monitoring, FU primary team recommendations.     Problem/Recommendation - 3:  ·  Problem: Gastroparesis.   ·  Recommendation: Suggest to continue medications, monitoring, FU primary team recommendations.     Problem/Recommendation - 4:  ·  Problem: ESRD on hemodialysis.   ·  Recommendation: On HD, continue as scheduled, renal FU

## 2022-07-22 NOTE — PROGRESS NOTE ADULT - ASSESSMENT
76 y/o male PMHx of ESRD on HD via AV fistula LUE MWF, COPD (on 2L home O2), CHF, pHTN, hypotension on Midodrine, DVT S/P IVC filter who presented after his L knee gave out.     leukocytosis- resolved  remains afebrile for duration of hospital course  leukocytosis first noted 7/16  s/p CT chest w/o evidence of focal consolidation  s/p CT abd/pelvis 7/11- No small bowel obstruction. Stomach is moderately distended. Mild to moderate stool in the distal rectosigmoid colon.  no erythema or warmth or decreased ROM of either knee on exam  no evidence of SSTI on exam  s/p repeat CT abd/pelvis unrevealing  nausea and abdominal discomfort now resolved  leukocytosis likely reactive  patient w/o signs or symptoms of infection at this time  monitor off antibiotics    nausea- improved  on scheduled reglan  s/p EGD 7/20- esophagitis, gastritis, gastric ulcers, duodenitis, s/p biopsies  on PPI, carafate, simethicone  f/u pathology  GI following    ESRD  HD per nephrology    Edmundo Olmstead M.D.  [facility], Division of Infectious Diseases  [phone]  After 5pm on weekdays and all day on weekends - please call [phone]

## 2022-07-22 NOTE — PROGRESS NOTE ADULT - ATTENDING COMMENTS
ESRD:   for HD today  BP remains low. On midodrine  CT with distended stomach, esophagitis+. Biopsy done  Would use carafate only for a short period of time as it contains aluminium and is at risk for aluminium accumulation given he is ESRD    Rest per Dr. Lyle Meek MD  O: [phone]  Contact me on teams

## 2022-07-22 NOTE — PROGRESS NOTE ADULT - SUBJECTIVE AND OBJECTIVE BOX
Patient is a 77y old  Male who presents with a chief complaint of functional paraplegia post a fall (21 Jul 2022 16:46)      SUBJECTIVE / OVERNIGHT EVENTS: ptn is refusing to wear CPAP at night    MEDICATIONS  (STANDING):  allopurinol 100 milliGRAM(s) Oral daily  budesonide 160 MICROgram(s)/formoterol 4.5 MICROgram(s) Inhaler 2 Puff(s) Inhalation two times a day  chlorhexidine 2% Cloths 1 Application(s) Topical <User Schedule>  lactated ringers. 1000 milliLiter(s) (30 mL/Hr) IV Continuous <Continuous>  metoclopramide 5 milliGRAM(s) Oral every 8 hours  midodrine. 20 milliGRAM(s) Oral three times a day  multivitamin 1 Tablet(s) Oral daily  pantoprazole    Tablet 40 milliGRAM(s) Oral before breakfast  polyethylene glycol 3350 17 Gram(s) Oral daily  pregabalin 50 milliGRAM(s) Oral two times a day  sevelamer carbonate 800 milliGRAM(s) Oral three times a day with meals  simethicone 80 milliGRAM(s) Chew three times a day  sucralfate suspension 1 Gram(s) Oral four times a day  tiotropium 18 MICROgram(s) Capsule 1 Capsule(s) Inhalation daily    MEDICATIONS  (PRN):      Vital Signs Last 24 Hrs  T(F): 97.8 (07-22-22 @ 05:27), Max: 98.5 (07-21-22 @ 23:54)  HR: 65 (07-22-22 @ 06:12) (60 - 73)  BP: 101/57 (07-22-22 @ 05:27) (96/56 - 104/56)  RR: 17 (07-22-22 @ 05:27) (17 - 18)  SpO2: 98% (07-22-22 @ 06:12) (98% - 100%)  Telemetry:   CAPILLARY BLOOD GLUCOSE      POCT Blood Glucose.: 131 mg/dL (21 Jul 2022 21:20)  POCT Blood Glucose.: 99 mg/dL (21 Jul 2022 16:30)  POCT Blood Glucose.: 103 mg/dL (21 Jul 2022 11:46)  POCT Blood Glucose.: 88 mg/dL (21 Jul 2022 07:59)    I&O's Summary    21 Jul 2022 07:01  -  22 Jul 2022 07:00  --------------------------------------------------------  IN: 250 mL / OUT: 0 mL / NET: 250 mL        PHYSICAL EXAM:  GENERAL: NAD, well-developed  HEAD:  Atraumatic, Normocephalic  EYES: EOMI, PERRLA, conjunctiva and sclera clear  NECK: Supple, No JVD  CHEST/LUNG: Clear to auscultation bilaterally; No wheeze  HEART: Regular rate and rhythm; No murmurs, rubs, or gallops  ABDOMEN: Soft, Nontender, Nondistended; Bowel sounds present  EXTREMITIES:  2+ Peripheral Pulses, No clubbing, cyanosis, or edema  PSYCH: AAOx3  NEUROLOGY: non-focal  SKIN: No rashes or lesions    LABS:                        12.6   9.12  )-----------( 141      ( 21 Jul 2022 17:22 )             43.2     07-21    133<L>  |  92<L>  |  32<H>  ----------------------------<  96  4.9   |  26  |  7.57<H>    Ca    9.7      21 Jul 2022 17:22    TPro  8.5<H>  /  Alb  3.6  /  TBili  0.3  /  DBili  x   /  AST  27  /  ALT  11  /  AlkPhos  125<H>  07-21    PT/INR - ( 21 Jul 2022 17:22 )   PT: 15.8 sec;   INR: 1.36 ratio                   RADIOLOGY & ADDITIONAL TESTS:    Imaging Personally Reviewed:    Consultant(s) Notes Reviewed:      Care Discussed with Consultants/Other Providers:

## 2022-07-22 NOTE — PROGRESS NOTE ADULT - SUBJECTIVE AND OBJECTIVE BOX
[facility] DIVISION OF KIDNEY DISEASES AND HYPERTENSION -- FOLLOW UP NOTE  --------------------------------------------------------------------------------  HPI:  Pt. is a 77 y.o. M e/ PMHx. of HTN, HFrEF, SAADIA, Gout and ESRD on HD MWF at [facility] presents after having a fall enroute to the HD center. Nephrology consulted for ESRD management. Pt. does not recall his nephrologist and denies any issues with dialysis. He is edematous but states he is not worse than usual. Denies any syncope and attributes his fall to his knee "buckling." Pt. has notably low BP which Pt. states is chronic and for which he takes Midodrine.     Pt. seen this AM. States had some abdominal pain after drinking apple juice, pain not associated with eating. States needs to have a bowel movement. Given 250 cc bolus of fluids yesterday.      PAST HISTORY  --------------------------------------------------------------------------------  No significant changes to PMH, PSH, FHx, SHx, unless otherwise noted    ALLERGIES & MEDICATIONS  --------------------------------------------------------------------------------  Allergies    latex (Rash)  No Known Drug Allergies    Intolerances      Standing Inpatient Medications  allopurinol 100 milliGRAM(s) Oral daily  budesonide 160 MICROgram(s)/formoterol 4.5 MICROgram(s) Inhaler 2 Puff(s) Inhalation two times a day  chlorhexidine 2% Cloths 1 Application(s) Topical <User Schedule>  lactated ringers. 1000 milliLiter(s) IV Continuous <Continuous>  metoclopramide 5 milliGRAM(s) Oral every 8 hours  midodrine. 20 milliGRAM(s) Oral three times a day  multivitamin 1 Tablet(s) Oral daily  pantoprazole    Tablet 40 milliGRAM(s) Oral before breakfast  polyethylene glycol 3350 17 Gram(s) Oral daily  pregabalin 50 milliGRAM(s) Oral two times a day  sevelamer carbonate 800 milliGRAM(s) Oral three times a day with meals  simethicone 80 milliGRAM(s) Chew three times a day  sucralfate suspension 1 Gram(s) Oral four times a day  tiotropium 18 MICROgram(s) Capsule 1 Capsule(s) Inhalation daily    PRN Inpatient Medications      REVIEW OF SYSTEMS  --------------------------------------------------------------------------------  Gen: No fevers/chills  Skin: No rashes  Head/Eyes/Ears: Normal hearing,   Respiratory: No dyspnea, cough  CV: No chest pain  GI: some abdominal pain overnight  : No dysuria, hematuria  MSK: No  edema  Heme: No easy bruising or bleeding  Psych: No significant depression      All other systems were reviewed and are negative, except as noted.    VITALS/PHYSICAL EXAM  --------------------------------------------------------------------------------  T(C): 36.6 (07-22-22 @ 05:27), Max: 36.9 (07-21-22 @ 23:54)  HR: 65 (07-22-22 @ 06:12) (60 - 73)  BP: 101/57 (07-22-22 @ 05:27) (96/56 - 104/56)  RR: 17 (07-22-22 @ 05:27) (17 - 18)  SpO2: 98% (07-22-22 @ 06:12) (98% - 100%)  Wt(kg): --        07-21-22 @ 07:01  -  07-22-22 @ 07:00  --------------------------------------------------------  IN: 650 mL / OUT: 0 mL / NET: 650 mL    Physical Exam:  	Gen: NAD  	HEENT: MMM, on NC  	Pulm: CTA B/L  	CV: S1S2  	Abd: Soft, +BS   	Ext: + LE edema B/L improving, chronic skin changes   	Neuro: Awake  	Skin: Warm and dry  	Vascular access: LUE AVF, thrills+      LABS/STUDIES  --------------------------------------------------------------------------------              12.6   9.12  >-----------<  141      [07-21-22 @ 17:22]              43.2     133  |  92  |  32  ----------------------------<  96      [07-21-22 @ 17:22]  4.9   |  26  |  7.57        Ca     9.7     [07-21-22 @ 17:22]    TPro  8.5  /  Alb  3.6  /  TBili  0.3  /  DBili  x   /  AST  27  /  ALT  11  /  AlkPhos  125  [07-21-22 @ 17:22]    PT/INR: PT 15.8 , INR 1.36       [07-21-22 @ 17:22]      Creatinine Trend:  SCr 7.57 [07-21 @ 17:22]  SCr 7.86 [07-20 @ 09:08]  SCr 8.74 [07-19 @ 07:25]  SCr 11.84 [07-18 @ 10:47]  SCr 9.79 [07-17 @ 06:51]        HbA1c 5.0      [10-02-17 @ 15:38]  TSH 1.32      [07-17-22 @ 06:53]    HBsAg Nonreact      [07-11-22 @ 13:23]  HCV 0.39, Nonreact      [07-11-22 @ 13:23]

## 2022-07-23 LAB
ANION GAP SERPL CALC-SCNC: 16 MMOL/L — SIGNIFICANT CHANGE UP (ref 5–17)
BUN SERPL-MCNC: 30 MG/DL — HIGH (ref 7–23)
CALCIUM SERPL-MCNC: 10.3 MG/DL — SIGNIFICANT CHANGE UP (ref 8.4–10.5)
CHLORIDE SERPL-SCNC: 92 MMOL/L — LOW (ref 96–108)
CO2 SERPL-SCNC: 27 MMOL/L — SIGNIFICANT CHANGE UP (ref 22–31)
CREAT SERPL-MCNC: 7.22 MG/DL — HIGH (ref 0.5–1.3)
EGFR: 7 ML/MIN/1.73M2 — LOW
GLUCOSE BLDC GLUCOMTR-MCNC: 102 MG/DL — HIGH (ref 70–99)
GLUCOSE BLDC GLUCOMTR-MCNC: 106 MG/DL — HIGH (ref 70–99)
GLUCOSE BLDC GLUCOMTR-MCNC: 85 MG/DL — SIGNIFICANT CHANGE UP (ref 70–99)
GLUCOSE BLDC GLUCOMTR-MCNC: 89 MG/DL — SIGNIFICANT CHANGE UP (ref 70–99)
GLUCOSE SERPL-MCNC: 64 MG/DL — LOW (ref 70–99)
INR BLD: 1.6 RATIO — HIGH (ref 0.88–1.16)
INSULIN-LIKE GROWTH FACTOR 1 INTERPRETATION: SIGNIFICANT CHANGE UP
INSULIN-LIKE GROWTH FACTOR 1: 112 NG/ML — SIGNIFICANT CHANGE UP (ref 22–221)
POTASSIUM SERPL-MCNC: 4.9 MMOL/L — SIGNIFICANT CHANGE UP (ref 3.5–5.3)
POTASSIUM SERPL-SCNC: 4.9 MMOL/L — SIGNIFICANT CHANGE UP (ref 3.5–5.3)
PROTHROM AB SERPL-ACNC: 18.7 SEC — HIGH (ref 10.5–13.4)
SODIUM SERPL-SCNC: 135 MMOL/L — SIGNIFICANT CHANGE UP (ref 135–145)
TSH SERPL-MCNC: 0.81 UIU/ML — SIGNIFICANT CHANGE UP (ref 0.27–4.2)

## 2022-07-23 RX ORDER — WARFARIN SODIUM 2.5 MG/1
5 TABLET ORAL ONCE
Refills: 0 | Status: COMPLETED | OUTPATIENT
Start: 2022-07-23 | End: 2022-07-23

## 2022-07-23 RX ADMIN — PANTOPRAZOLE SODIUM 40 MILLIGRAM(S): 20 TABLET, DELAYED RELEASE ORAL at 06:01

## 2022-07-23 RX ADMIN — Medication 5 MILLIGRAM(S): at 21:17

## 2022-07-23 RX ADMIN — MIDODRINE HYDROCHLORIDE 20 MILLIGRAM(S): 2.5 TABLET ORAL at 11:26

## 2022-07-23 RX ADMIN — BUDESONIDE AND FORMOTEROL FUMARATE DIHYDRATE 2 PUFF(S): 160; 4.5 AEROSOL RESPIRATORY (INHALATION) at 06:00

## 2022-07-23 RX ADMIN — CHLORHEXIDINE GLUCONATE 1 APPLICATION(S): 213 SOLUTION TOPICAL at 08:36

## 2022-07-23 RX ADMIN — Medication 50 MILLIGRAM(S): at 17:21

## 2022-07-23 RX ADMIN — SEVELAMER CARBONATE 800 MILLIGRAM(S): 2400 POWDER, FOR SUSPENSION ORAL at 08:32

## 2022-07-23 RX ADMIN — Medication 1 GRAM(S): at 11:26

## 2022-07-23 RX ADMIN — Medication 1 GRAM(S): at 23:17

## 2022-07-23 RX ADMIN — Medication 1 GRAM(S): at 17:21

## 2022-07-23 RX ADMIN — Medication 100 MILLIGRAM(S): at 11:27

## 2022-07-23 RX ADMIN — MIDODRINE HYDROCHLORIDE 20 MILLIGRAM(S): 2.5 TABLET ORAL at 17:21

## 2022-07-23 RX ADMIN — Medication 5 MILLIGRAM(S): at 13:18

## 2022-07-23 RX ADMIN — SEVELAMER CARBONATE 800 MILLIGRAM(S): 2400 POWDER, FOR SUSPENSION ORAL at 17:19

## 2022-07-23 RX ADMIN — SIMETHICONE 80 MILLIGRAM(S): 80 TABLET, CHEWABLE ORAL at 21:17

## 2022-07-23 RX ADMIN — Medication 1 TABLET(S): at 11:27

## 2022-07-23 RX ADMIN — WARFARIN SODIUM 5 MILLIGRAM(S): 2.5 TABLET ORAL at 17:21

## 2022-07-23 RX ADMIN — Medication 5 MILLIGRAM(S): at 06:00

## 2022-07-23 RX ADMIN — SIMETHICONE 80 MILLIGRAM(S): 80 TABLET, CHEWABLE ORAL at 06:00

## 2022-07-23 RX ADMIN — BUDESONIDE AND FORMOTEROL FUMARATE DIHYDRATE 2 PUFF(S): 160; 4.5 AEROSOL RESPIRATORY (INHALATION) at 17:22

## 2022-07-23 RX ADMIN — SIMETHICONE 80 MILLIGRAM(S): 80 TABLET, CHEWABLE ORAL at 13:19

## 2022-07-23 RX ADMIN — Medication 1 GRAM(S): at 06:00

## 2022-07-23 RX ADMIN — TIOTROPIUM BROMIDE 1 CAPSULE(S): 18 CAPSULE ORAL; RESPIRATORY (INHALATION) at 11:27

## 2022-07-23 RX ADMIN — MIDODRINE HYDROCHLORIDE 20 MILLIGRAM(S): 2.5 TABLET ORAL at 06:01

## 2022-07-23 RX ADMIN — SEVELAMER CARBONATE 800 MILLIGRAM(S): 2400 POWDER, FOR SUSPENSION ORAL at 17:22

## 2022-07-23 RX ADMIN — Medication 50 MILLIGRAM(S): at 06:02

## 2022-07-23 NOTE — PROGRESS NOTE ADULT - SUBJECTIVE AND OBJECTIVE BOX
[facility]  Avi Riddle PA-C  [address] [place] [address]  [phone]      INTERVAL HPI/OVERNIGHT EVENTS:  pt seen and examined, no new events  stomach back to normal  no vomiting  +bm    MEDICATIONS  (STANDING):  allopurinol 100 milliGRAM(s) Oral daily  budesonide 160 MICROgram(s)/formoterol 4.5 MICROgram(s) Inhaler 2 Puff(s) Inhalation two times a day  chlorhexidine 2% Cloths 1 Application(s) Topical <User Schedule>  midodrine. 10 milliGRAM(s) Oral three times a day  multivitamin 1 Tablet(s) Oral daily  pantoprazole    Tablet 40 milliGRAM(s) Oral before breakfast  polyethylene glycol 3350 17 Gram(s) Oral daily  pregabalin 50 milliGRAM(s) Oral two times a day  sevelamer carbonate 800 milliGRAM(s) Oral three times a day with meals  simethicone 80 milliGRAM(s) Chew three times a day  tiotropium 18 MICROgram(s) Capsule 1 Capsule(s) Inhalation daily    MEDICATIONS  (PRN):      Allergies    latex (Rash)  No Known Drug Allergies    Intolerances        ROS:   General:  No wt loss, fevers, chills, night sweats, fatigue,   Eyes:  Good vision, no reported pain  ENT:  No sore throat, pain, runny nose, dysphagia  CV:  No pain, palpitations, hypo/hypertension  Resp:  No dyspnea, cough, tachypnea, wheezing  GI:  No pain, No nausea, No vomiting, No diarrhea, No constipation, No weight loss, No fever, No pruritis, No rectal bleeding, No tarry stools, No dysphagia,  :  No pain, bleeding, incontinence, nocturia  Muscle:  No pain, weakness  Neuro:  No weakness, tingling, memory problems  Psych:  No fatigue, insomnia, mood problems, depression  Endocrine:  No polyuria, polydipsia, cold/heat intolerance  Heme:  No petechiae, ecchymosis, easy bruisability  Skin:  No rash, tattoos, scars, edema      PHYSICAL EXAM:   Vital Signs Last 24 Hrs  T(C): 36.3 (20 Jul 2022 08:55), Max: 37.1 (20 Jul 2022 00:01)  T(F): 97.3 (20 Jul 2022 08:55), Max: 98.8 (20 Jul 2022 00:01)  HR: 77 (20 Jul 2022 09:20) (68 - 96)  BP: 83/47 (20 Jul 2022 08:55) (80/49 - 120/60)  BP(mean): --  RR: 18 (20 Jul 2022 08:55) (14 - 20)  SpO2: 92% (20 Jul 2022 09:20) (90% - 100%)    Parameters below as of 20 Jul 2022 08:55  Patient On (Oxygen Delivery Method): nasal cannula  O2 Flow (L/min): 2    Daily     Daily     GENERAL:  Appears stated age,   HEENT:  NC/AT,    CHEST:  Full & symmetric excursion,   HEART:  Regular rhythm,  ABDOMEN:  Soft, non-tender, non-distended,  EXTEREMITIES:  no cyanosis  SKIN:  No rash  NEURO:  Alert,       LABS:                        12.5   11.32 )-----------( 182      ( 20 Jul 2022 09:08 )             41.4   07-20    132<L>  |  92<L>  |  44<H>  ----------------------------<  131<H>  5.1   |  26  |  7.86<H>    Ca    9.9      20 Jul 2022 09:08    TPro  8.6<H>  /  Alb  3.8  /  TBili  0.3  /  DBili  x   /  AST  20  /  ALT  9<L>  /  AlkPhos  123<H>  07-20    RADIOLOGY & ADDITIONAL TESTS:  < from: CT Abdomen and Pelvis No Cont (07.11.22 @ 22:02) >    ACC: [id number] EXAM:  CT ABDOMEN AND PELVIS                          PROCEDURE DATE:  07/11/2022          INTERPRETATION:  CLINICAL INFORMATION: Vomiting, gassy distention.    COMPARISON: CT abdomen pelvis 8/22/2022. CT chest 8/27/2021.    CONTRAST/COMPLICATIONS:  IV Contrast: NONE  Oral Contrast: NONE  Complications: None reported at time of study completion    PROCEDURE:  CT of the Abdomen and Pelvis was performed.  Sagittal and coronal reformats were performed.    FINDINGS:  Evaluation of thesolid organs and vasculature is limited without   intravenous contrast.    LOWER CHEST: Basilar subsegmental atelectasis. Left lower lobe and right   middle lobe calcified granulomas. Coronary artery calcification.   Calcified right hilar lymph nodes.    LIVER: Few punctate calcified granulomas.  BILE DUCTS: Normal caliber.  GALLBLADDER: Within normal limits.  SPLEEN: Scattered tiny calcified granulomas.  PANCREAS: Within normal limits.  ADRENALS: Bilateral adrenal gland thickening, unchanged.  KIDNEYS/URETERS: Atrophic bilateral kidneys. Bilateral renal cysts and   subcentimeter hypodensities too small to characterize.    BLADDER: Minimally distended.  REPRODUCTIVE ORGANS: Prostate within normal limits.    BOWEL: Stomach is moderately distended. No small bowel obstruction.   Status post right hemicolectomy. Mild to moderate stool in the distal   rectosigmoid colon.  PERITONEUM: No ascites.  VESSELS: Atherosclerotic changes. Infrarenal IVC filter.  RETROPERITONEUM/LYMPH NODES: No lymphadenopathy.  ABDOMINAL WALL: Rectus diastases. Multiple ventral hernias containing fat   and small knuckle of bowel. Abdominal wall collateral vessels.  BONES: Degenerative changes.    IMPRESSION:  No small bowel obstruction. Stomach is moderately distended. Mild to   moderate stool in the distal rectosigmoid colon.        --- End of Report ---    < from: Upper Endoscopy (07.19.22 @ 11:42) >  [facility]  ____________________________________________________________________________________________________  Patient Name: Kaleb Rivers                    MRN: [id number]  Account Number: [id number]                     YOB: 1945  Room: Endoscopy Room 2                           Gender: Male  Attending MD: Ortega Fitch MD                 Procedure Date No Time: 7/19/2022  ____________________________________________________________________________________________________     Procedure:           Upper GI endoscopy  Indications:         Epigastric abdominal pain, Abnormal CT of the GI tract  Providers:           Ortega Fitch MD  Medicines:           General Anesthesia  Complications:       No immediate complications.  ____________________________________________________________________________________________________    < end of copied text >  < from: Upper Endoscopy (07.19.22 @ 11:42) >  Impression:          - Mildly severe reflux esophagitis.                       - Gastritis. Biopsied.                       - Duodenitis. Biopsied.  Recommendation:      - Return patient to [facility] for ongoing care.                       - Full liquid diet.                       - Await pathology results.                       - Cont present medications                       - Add carafate 1g four times a day    < end of copied text >

## 2022-07-23 NOTE — PROGRESS NOTE ADULT - ASSESSMENT
Pt. is a 77 y.o. M e/ PMHx. of HTN, HFrEF, SAADIA, Gout and ESRD on HD MWF at [facility] [place] presents after having a fall enroute to the HD center. 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 c/o knee pain and inability to walk. nephrology called. PT kevin ordered. cont outptn meds, check orthostatics. card called  ptn w h/o DVT/PE, in the past not on AC 2/2 h/o GI bleeds, but admitted on COumadin, will resume  h/o COPD with chronic hypoxic respiratory failure on 2 L home O2. presently stable.   episode of vomiting on 7/11 while in HD, CT scan w dilated stomach, no obstruction, seen by GI, tolerated CLD,   s/p EGD 7/19, has severe " linitis" type erythema with multiple gastric ulcers, biopsies were taken.     HD MWF,volume status is stable.   will cont reglan for gastroparesis  F/up with biopsy

## 2022-07-23 NOTE — PROGRESS NOTE ADULT - SUBJECTIVE AND OBJECTIVE BOX
Endocrinology Attending Covering for Dr. Tian      Chief complaint  Patient is a 77y old  Male who presents with a chief complaint of functional paraplegia post a fall (22 Jul 2022 17:06)   Review of systems  Patient in bed, looks comfortable, no fever,  had no hypoglycemia.    Labs and Fingersticks  CAPILLARY BLOOD GLUCOSE      POCT Blood Glucose.: 85 mg/dL (23 Jul 2022 08:06)  POCT Blood Glucose.: 99 mg/dL (22 Jul 2022 22:52)  POCT Blood Glucose.: 95 mg/dL (22 Jul 2022 16:55)  POCT Blood Glucose.: 106 mg/dL (22 Jul 2022 11:57)      Anion Gap, Serum: 17 (07-22 @ 18:02)  Anion Gap, Serum: 15 (07-21 @ 17:22)      Calcium, Total Serum: 9.3 (07-22 @ 18:02)  Calcium, Total Serum: 9.7 (07-21 @ 17:22)  Albumin, Serum: 3.6 (07-21 @ 17:22)    Alanine Aminotransferase (ALT/SGPT): 11 (07-21 @ 17:22)  Alkaline Phosphatase, Serum: 125 *H* (07-21 @ 17:22)  Aspartate Aminotransferase (AST/SGOT): 27 (07-21 @ 17:22)        07-22    131<L>  |  91<L>  |  42<H>  ----------------------------<  116<H>  4.6   |  23  |  9.75<H>    Ca    9.3      22 Jul 2022 18:02    TPro  8.5<H>  /  Alb  3.6  /  TBili  0.3  /  DBili  x   /  AST  27  /  ALT  11  /  AlkPhos  125<H>  07-21                        12.5   10.34 )-----------( 200      ( 22 Jul 2022 18:02 )             41.5     Medications  MEDICATIONS  (STANDING):  allopurinol 100 milliGRAM(s) Oral daily  budesonide 160 MICROgram(s)/formoterol 4.5 MICROgram(s) Inhaler 2 Puff(s) Inhalation two times a day  chlorhexidine 2% Cloths 1 Application(s) Topical <User Schedule>  lactated ringers. 1000 milliLiter(s) (30 mL/Hr) IV Continuous <Continuous>  metoclopramide 5 milliGRAM(s) Oral every 8 hours  midodrine. 20 milliGRAM(s) Oral three times a day  multivitamin 1 Tablet(s) Oral daily  pantoprazole    Tablet 40 milliGRAM(s) Oral before breakfast  polyethylene glycol 3350 17 Gram(s) Oral daily  pregabalin 50 milliGRAM(s) Oral two times a day  sevelamer carbonate 800 milliGRAM(s) Oral three times a day with meals  simethicone 80 milliGRAM(s) Chew three times a day  sucralfate suspension 1 Gram(s) Oral four times a day  tiotropium 18 MICROgram(s) Capsule 1 Capsule(s) Inhalation daily      Physical Exam  General: Patient comfortable in bed  Vital Signs Last 12 Hrs  T(F): 99.1 (07-23-22 @ 10:03), Max: 99.1 (07-23-22 @ 10:03)  HR: 79 (07-23-22 @ 10:03) (79 - 100)  BP: 73/46 (07-23-22 @ 10:03) (73/46 - 93/52)  BP(mean): --  RR: 18 (07-23-22 @ 10:03) (17 - 18)  SpO2: 97% (07-23-22 @ 10:03) (97% - 99%)  Neck: No palpable thyroid nodules.  CVS: S1S2, No murmurs  Respiratory: No wheezing, no crepitations  GI: Abdomen soft, bowel sounds positive  Musculoskeletal:  edema lower extremities.   Skin: No skin rashes, no ecchymosis    Diagnostics

## 2022-07-23 NOTE — PROGRESS NOTE ADULT - ASSESSMENT
Assessment  Hypoglycemia: 77y nondiabetic male, was not taking any hypoglycemic agents, admitted s/p mechanical fall, blood sugars are fluctuating, had prior hypoglycemic episodes, blood sugars improving and in acceptable range now, no new hypoglycemic episodes. eating meals, appears comfortable in NAD, eager for DC.  Acth stim test completed and patient ruled out for AI.  Fingerstick readings often inaccurate in patients with ESRD. Patient asymptomatic with apparent severely low FS readings (25, 46, 52), question if these are real hypoglycemias or low FS due to poor perfusion.. AM cortisol appropriately elevated, remaining labs pending.   Hypotension: AM cortisol WNL, on midodrine, monitored.  ?Gastroparesis: On meds, monitored.  ESRD: On hemodialysis, Monitor labs/BMP      tonia Claudette HUGO  Cell: [phone]             Problem/Recommendation - 1:  ·  Problem: Hypoglycemia.   ·  Recommendation: S/P cosyntropin stim test, patient ruled out for AI.  ?Pseudohypoglycemia. Hypoglycemias have all been with low FS and not blood sugar.  **Please check serum blood glucose the next time FS < 70 BEFORE dextrose administration**.   get cortisol Insulin antibody, igf-1, igf-2, cortisol level obtained at the time of hypoglycemia,  Encourage to increase po intake, nutritional supplements as tolerated.   Will continue monitoring and FU.  Discussed plan with patient and wife.     Problem/Recommendation - 2:  ·  Problem: Hypotension.   ·  Recommendation: AM cortisol WNL. Suggest to continue medications, monitoring, FU primary team recommendations.     Problem/Recommendation - 3:  ·  Problem: Gastroparesis.   ·  Recommendation: Suggest to continue medications, monitoring, FU primary team recommendations.     Problem/Recommendation - 4:  ·  Problem: ESRD on hemodialysis.   ·  Recommendation: On HD, continue as scheduled, renal FU

## 2022-07-23 NOTE — PROGRESS NOTE ADULT - SUBJECTIVE AND OBJECTIVE BOX
Patient is a 77y old  Male who presents with a chief complaint of functional paraplegia post a fall (23 Jul 2022 14:49)      SUBJECTIVE / OVERNIGHT EVENTS:    Events noted.  CONSTITUTIONAL: No fever,  or fatigue  RESPIRATORY: No cough, wheezing,  No shortness of breath  CARDIOVASCULAR: No chest pain, palpitations, dizziness, or leg swelling  GASTROINTESTINAL: No abdominal or epigastric pain. No nausea, vomiting.  NEUROLOGICAL: No headache    MEDICATIONS  (STANDING):  allopurinol 100 milliGRAM(s) Oral daily  budesonide 160 MICROgram(s)/formoterol 4.5 MICROgram(s) Inhaler 2 Puff(s) Inhalation two times a day  chlorhexidine 2% Cloths 1 Application(s) Topical <User Schedule>  lactated ringers. 1000 milliLiter(s) (30 mL/Hr) IV Continuous <Continuous>  metoclopramide 5 milliGRAM(s) Oral every 8 hours  midodrine. 20 milliGRAM(s) Oral three times a day  multivitamin 1 Tablet(s) Oral daily  pantoprazole    Tablet 40 milliGRAM(s) Oral before breakfast  polyethylene glycol 3350 17 Gram(s) Oral daily  pregabalin 50 milliGRAM(s) Oral two times a day  sevelamer carbonate 800 milliGRAM(s) Oral three times a day with meals  simethicone 80 milliGRAM(s) Chew three times a day  sucralfate suspension 1 Gram(s) Oral four times a day  tiotropium 18 MICROgram(s) Capsule 1 Capsule(s) Inhalation daily    MEDICATIONS  (PRN):        CAPILLARY BLOOD GLUCOSE      POCT Blood Glucose.: 102 mg/dL (23 Jul 2022 21:19)  POCT Blood Glucose.: 89 mg/dL (23 Jul 2022 16:45)  POCT Blood Glucose.: 106 mg/dL (23 Jul 2022 12:15)  POCT Blood Glucose.: 85 mg/dL (23 Jul 2022 08:06)    I&O's Summary    22 Jul 2022 07:01  -  23 Jul 2022 07:00  --------------------------------------------------------  IN: 300 mL / OUT: 2000 mL / NET: -1700 mL    23 Jul 2022 07:01  -  24 Jul 2022 00:42  --------------------------------------------------------  IN: 150 mL / OUT: 0 mL / NET: 150 mL        T(C): 36.5 (07-23-22 @ 23:39), Max: 37.4 (07-23-22 @ 17:30)  HR: 83 (07-23-22 @ 23:39) (79 - 98)  BP: 76/47 (07-23-22 @ 23:39) (73/46 - 94/52)  RR: 18 (07-23-22 @ 23:39) (17 - 18)  SpO2: 97% (07-23-22 @ 23:39) (97% - 99%)    PHYSICAL EXAM:  GENERAL: NAD  NECK: Supple, No JVD  CHEST/LUNG: Clear to auscultation bilaterally; No wheezing.  HEART: Regular rate and rhythm; No murmurs, rubs, or gallops  ABDOMEN: Soft, Nontender, Nondistended; Bowel sounds present  EXTREMITIES:   No edema  NEUROLOGY: AAO       LABS:                        12.5   10.34 )-----------( 200      ( 22 Jul 2022 18:02 )             41.5     07-23    135  |  92<L>  |  30<H>  ----------------------------<  64<L>  4.9   |  27  |  7.22<H>    Ca    10.3      23 Jul 2022 12:36      PT/INR - ( 23 Jul 2022 12:40 )   PT: 18.7 sec;   INR: 1.60 ratio                 CAPILLARY BLOOD GLUCOSE      POCT Blood Glucose.: 102 mg/dL (23 Jul 2022 21:19)  POCT Blood Glucose.: 89 mg/dL (23 Jul 2022 16:45)  POCT Blood Glucose.: 106 mg/dL (23 Jul 2022 12:15)  POCT Blood Glucose.: 85 mg/dL (23 Jul 2022 08:06)        RADIOLOGY & ADDITIONAL TESTS:    Imaging Personally Reviewed:    Consultant(s) Notes Reviewed:      Care Discussed with Consultants/Other Providers:    Man Alida, MD, CMD, FACP    [address] [place] [address]  Office Tel: [phone]  Cell: [phone]

## 2022-07-23 NOTE — PROVIDER CONTACT NOTE (OTHER) - BACKGROUND
pt admitted for fall at home
pt had dialysis on 7/22. Pt arrived to floor at 2215 back from dialysis. 4 hr treatment 2Liters removed
77 year old male admitted for Knee pain and s/p fall.
pt. admitted for left Knee pain, s/p fall,  PMH of CHF, ESRD on HD, HTN
Pt admitted with acute renal failure
pt admitted for fall at home and left knee pain
77 year old male admitted for knee pain s/p fall.

## 2022-07-24 LAB
GLUCOSE BLDC GLUCOMTR-MCNC: 111 MG/DL — HIGH (ref 70–99)
GLUCOSE BLDC GLUCOMTR-MCNC: 115 MG/DL — HIGH (ref 70–99)
GLUCOSE BLDC GLUCOMTR-MCNC: 128 MG/DL — HIGH (ref 70–99)
GLUCOSE BLDC GLUCOMTR-MCNC: 94 MG/DL — SIGNIFICANT CHANGE UP (ref 70–99)
INR BLD: 1.55 RATIO — HIGH (ref 0.88–1.16)
PROTHROM AB SERPL-ACNC: 17.9 SEC — HIGH (ref 10.5–13.4)
SARS-COV-2 RNA SPEC QL NAA+PROBE: SIGNIFICANT CHANGE UP

## 2022-07-24 RX ORDER — MIDODRINE HYDROCHLORIDE 2.5 MG/1
5 TABLET ORAL ONCE
Refills: 0 | Status: COMPLETED | OUTPATIENT
Start: 2022-07-24 | End: 2022-07-24

## 2022-07-24 RX ORDER — SENNA PLUS 8.6 MG/1
1 TABLET ORAL ONCE
Refills: 0 | Status: COMPLETED | OUTPATIENT
Start: 2022-07-24 | End: 2022-07-24

## 2022-07-24 RX ORDER — WARFARIN SODIUM 2.5 MG/1
5 TABLET ORAL ONCE
Refills: 0 | Status: COMPLETED | OUTPATIENT
Start: 2022-07-24 | End: 2022-07-24

## 2022-07-24 RX ORDER — SUCRALFATE 1 G
1 TABLET ORAL
Refills: 0 | Status: DISCONTINUED | OUTPATIENT
Start: 2022-07-24 | End: 2022-07-26

## 2022-07-24 RX ADMIN — Medication 5 MILLIGRAM(S): at 13:57

## 2022-07-24 RX ADMIN — Medication 1 GRAM(S): at 11:07

## 2022-07-24 RX ADMIN — MIDODRINE HYDROCHLORIDE 20 MILLIGRAM(S): 2.5 TABLET ORAL at 17:05

## 2022-07-24 RX ADMIN — Medication 1 GRAM(S): at 17:05

## 2022-07-24 RX ADMIN — Medication 100 MILLIGRAM(S): at 11:08

## 2022-07-24 RX ADMIN — WARFARIN SODIUM 5 MILLIGRAM(S): 2.5 TABLET ORAL at 21:27

## 2022-07-24 RX ADMIN — Medication 5 MILLIGRAM(S): at 21:28

## 2022-07-24 RX ADMIN — MIDODRINE HYDROCHLORIDE 5 MILLIGRAM(S): 2.5 TABLET ORAL at 23:59

## 2022-07-24 RX ADMIN — Medication 1 TABLET(S): at 11:08

## 2022-07-24 RX ADMIN — Medication 5 MILLIGRAM(S): at 05:02

## 2022-07-24 RX ADMIN — MIDODRINE HYDROCHLORIDE 20 MILLIGRAM(S): 2.5 TABLET ORAL at 11:08

## 2022-07-24 RX ADMIN — SIMETHICONE 80 MILLIGRAM(S): 80 TABLET, CHEWABLE ORAL at 05:02

## 2022-07-24 RX ADMIN — MIDODRINE HYDROCHLORIDE 20 MILLIGRAM(S): 2.5 TABLET ORAL at 00:00

## 2022-07-24 RX ADMIN — TIOTROPIUM BROMIDE 1 CAPSULE(S): 18 CAPSULE ORAL; RESPIRATORY (INHALATION) at 11:08

## 2022-07-24 RX ADMIN — Medication 50 MILLIGRAM(S): at 17:04

## 2022-07-24 RX ADMIN — POLYETHYLENE GLYCOL 3350 17 GRAM(S): 17 POWDER, FOR SOLUTION ORAL at 11:07

## 2022-07-24 RX ADMIN — SEVELAMER CARBONATE 800 MILLIGRAM(S): 2400 POWDER, FOR SUSPENSION ORAL at 13:56

## 2022-07-24 RX ADMIN — SEVELAMER CARBONATE 800 MILLIGRAM(S): 2400 POWDER, FOR SUSPENSION ORAL at 17:05

## 2022-07-24 RX ADMIN — CHLORHEXIDINE GLUCONATE 1 APPLICATION(S): 213 SOLUTION TOPICAL at 08:57

## 2022-07-24 RX ADMIN — BUDESONIDE AND FORMOTEROL FUMARATE DIHYDRATE 2 PUFF(S): 160; 4.5 AEROSOL RESPIRATORY (INHALATION) at 05:04

## 2022-07-24 RX ADMIN — SIMETHICONE 80 MILLIGRAM(S): 80 TABLET, CHEWABLE ORAL at 13:57

## 2022-07-24 RX ADMIN — Medication 1 GRAM(S): at 05:03

## 2022-07-24 RX ADMIN — PANTOPRAZOLE SODIUM 40 MILLIGRAM(S): 20 TABLET, DELAYED RELEASE ORAL at 05:04

## 2022-07-24 RX ADMIN — SIMETHICONE 80 MILLIGRAM(S): 80 TABLET, CHEWABLE ORAL at 21:28

## 2022-07-24 RX ADMIN — MIDODRINE HYDROCHLORIDE 5 MILLIGRAM(S): 2.5 TABLET ORAL at 05:32

## 2022-07-24 RX ADMIN — BUDESONIDE AND FORMOTEROL FUMARATE DIHYDRATE 2 PUFF(S): 160; 4.5 AEROSOL RESPIRATORY (INHALATION) at 17:05

## 2022-07-24 RX ADMIN — SEVELAMER CARBONATE 800 MILLIGRAM(S): 2400 POWDER, FOR SUSPENSION ORAL at 08:56

## 2022-07-24 RX ADMIN — Medication 50 MILLIGRAM(S): at 05:02

## 2022-07-24 RX ADMIN — SENNA PLUS 1 TABLET(S): 8.6 TABLET ORAL at 22:35

## 2022-07-24 NOTE — PROGRESS NOTE ADULT - SUBJECTIVE AND OBJECTIVE BOX
Patient is a 77y old  Male who presents with a chief complaint of functional paraplegia post a fall (24 Jul 2022 11:54)      SUBJECTIVE / OVERNIGHT EVENTS:    Events noted.  CONSTITUTIONAL: No fever,  or fatigue  RESPIRATORY: No cough, wheezing,  No shortness of breath  CARDIOVASCULAR: No chest pain, palpitations, dizziness, or leg swelling  GASTROINTESTINAL: No abdominal or epigastric pain. No nausea, vomiting.  NEUROLOGICAL: No headache    MEDICATIONS  (STANDING):  allopurinol 100 milliGRAM(s) Oral daily  budesonide 160 MICROgram(s)/formoterol 4.5 MICROgram(s) Inhaler 2 Puff(s) Inhalation two times a day  chlorhexidine 2% Cloths 1 Application(s) Topical <User Schedule>  lactated ringers. 1000 milliLiter(s) (30 mL/Hr) IV Continuous <Continuous>  metoclopramide 5 milliGRAM(s) Oral every 8 hours  midodrine. 20 milliGRAM(s) Oral three times a day  multivitamin 1 Tablet(s) Oral daily  pantoprazole    Tablet 40 milliGRAM(s) Oral before breakfast  polyethylene glycol 3350 17 Gram(s) Oral daily  pregabalin 50 milliGRAM(s) Oral two times a day  sevelamer carbonate 800 milliGRAM(s) Oral three times a day with meals  simethicone 80 milliGRAM(s) Chew three times a day  sucralfate suspension 1 Gram(s) Oral two times a day  tiotropium 18 MICROgram(s) Capsule 1 Capsule(s) Inhalation daily  warfarin 5 milliGRAM(s) Oral once    MEDICATIONS  (PRN):        CAPILLARY BLOOD GLUCOSE      POCT Blood Glucose.: 111 mg/dL (24 Jul 2022 16:56)  POCT Blood Glucose.: 115 mg/dL (24 Jul 2022 12:12)  POCT Blood Glucose.: 94 mg/dL (24 Jul 2022 07:38)  POCT Blood Glucose.: 102 mg/dL (23 Jul 2022 21:19)    I&O's Summary    23 Jul 2022 07:01  -  24 Jul 2022 07:00  --------------------------------------------------------  IN: 450 mL / OUT: 0 mL / NET: 450 mL        T(C): 36.6 (07-24-22 @ 16:30), Max: 37.1 (07-24-22 @ 09:48)  HR: 82 (07-24-22 @ 16:45) (75 - 86)  BP: 84/41 (07-24-22 @ 16:30) (72/40 - 95/44)  RR: 18 (07-24-22 @ 16:30) (17 - 18)  SpO2: 99% (07-24-22 @ 16:45) (97% - 100%)    PHYSICAL EXAM:  GENERAL: NAD  NECK: Supple, No JVD  CHEST/LUNG: Clear to auscultation bilaterally; No wheezing.  HEART: Regular rate and rhythm; No murmurs, rubs, or gallops  ABDOMEN: Soft, Nontender, Nondistended; Bowel sounds present  EXTREMITIES:   No edema  NEUROLOGY: AAO       LABS:    07-23    135  |  92<L>  |  30<H>  ----------------------------<  64<L>  4.9   |  27  |  7.22<H>    Ca    10.3      23 Jul 2022 12:36      PT/INR - ( 24 Jul 2022 09:53 )   PT: 17.9 sec;   INR: 1.55 ratio                 CAPILLARY BLOOD GLUCOSE      POCT Blood Glucose.: 111 mg/dL (24 Jul 2022 16:56)  POCT Blood Glucose.: 115 mg/dL (24 Jul 2022 12:12)  POCT Blood Glucose.: 94 mg/dL (24 Jul 2022 07:38)  POCT Blood Glucose.: 102 mg/dL (23 Jul 2022 21:19)        RADIOLOGY & ADDITIONAL TESTS:    Imaging Personally Reviewed:    Consultant(s) Notes Reviewed:      Care Discussed with Consultants/Other Providers:    Man Irwin MD, CMD, FACP    [address] [place] [address]  Office Tel: [phone]  Cell: [phone]

## 2022-07-24 NOTE — PROVIDER CONTACT NOTE (OTHER) - REASON
Patient NPO- FS 76 this AM and BP 90/48
pt BP 72/40. HR 86. MEWs score 7
pt vomited in dialysis
hypotension
pt BP 76/47. HR 83- MEWs score is now 7
Hypotension
Pt. complained of pain 10/10
pt BP 77/42. MEWs score 7
Pt /105

## 2022-07-24 NOTE — PROGRESS NOTE ADULT - ASSESSMENT
Pt. is a 77 y.o. M e/ PMHx. of HTN, HFrEF, SAADIA, Gout and ESRD on HD MWF at [facility] [place] presents after having a fall enroute to the HD center. 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 c/o knee pain and inability to walk. nephrology called. PT kevin ordered. cont outptn meds, check orthostatics. card called  ptn w h/o DVT/PE, in the past not on AC 2/2 h/o GI bleeds, but admitted on COumadin, will resume  h/o COPD with chronic hypoxic respiratory failure on 2 L home O2. presently stable.   episode of vomiting on 7/11 while in HD, CT scan w dilated stomach, no obstruction, seen by GI, tolerated CLD,   s/p EGD 7/19, has severe " linitis" type erythema with multiple gastric ulcers, biopsies were taken.     HD MWF,volume status is stable.   will cont reglan for gastroparesis  F/up with biopsy

## 2022-07-24 NOTE — PROGRESS NOTE ADULT - SUBJECTIVE AND OBJECTIVE BOX
Endocrinology Attending Covering for Dr. Tian      Chief complaint  Patient is a 77y old  Male who presents with a chief complaint of functional paraplegia post a fall (23 Jul 2022 15:42)   Review of systems  Patient in bed, looks comfortable, no fever,  had no hypoglycemia.    Labs and Fingersticks  CAPILLARY BLOOD GLUCOSE      POCT Blood Glucose.: 94 mg/dL (24 Jul 2022 07:38)  POCT Blood Glucose.: 102 mg/dL (23 Jul 2022 21:19)  POCT Blood Glucose.: 89 mg/dL (23 Jul 2022 16:45)  POCT Blood Glucose.: 106 mg/dL (23 Jul 2022 12:15)      Anion Gap, Serum: 16 (07-23 @ 12:36)  Anion Gap, Serum: 17 (07-22 @ 18:02)      Calcium, Total Serum: 10.3 (07-23 @ 12:36)  Calcium, Total Serum: 9.3 (07-22 @ 18:02)          07-23    135  |  92<L>  |  30<H>  ----------------------------<  64<L>  4.9   |  27  |  7.22<H>    Ca    10.3      23 Jul 2022 12:36                          12.5   10.34 )-----------( 200      ( 22 Jul 2022 18:02 )             41.5     Medications  MEDICATIONS  (STANDING):  allopurinol 100 milliGRAM(s) Oral daily  budesonide 160 MICROgram(s)/formoterol 4.5 MICROgram(s) Inhaler 2 Puff(s) Inhalation two times a day  chlorhexidine 2% Cloths 1 Application(s) Topical <User Schedule>  lactated ringers. 1000 milliLiter(s) (30 mL/Hr) IV Continuous <Continuous>  metoclopramide 5 milliGRAM(s) Oral every 8 hours  midodrine. 20 milliGRAM(s) Oral three times a day  multivitamin 1 Tablet(s) Oral daily  pantoprazole    Tablet 40 milliGRAM(s) Oral before breakfast  polyethylene glycol 3350 17 Gram(s) Oral daily  pregabalin 50 milliGRAM(s) Oral two times a day  sevelamer carbonate 800 milliGRAM(s) Oral three times a day with meals  simethicone 80 milliGRAM(s) Chew three times a day  sucralfate suspension 1 Gram(s) Oral four times a day  tiotropium 18 MICROgram(s) Capsule 1 Capsule(s) Inhalation daily      Physical Exam  General: Patient comfortable in bed  Vital Signs Last 12 Hrs  T(F): 98.8 (07-24-22 @ 09:48), Max: 98.8 (07-24-22 @ 09:48)  HR: 81 (07-24-22 @ 09:48) (75 - 86)  BP: 84/44 (07-24-22 @ 09:48) (72/40 - 95/44)  BP(mean): --  RR: 18 (07-24-22 @ 09:48) (17 - 18)  SpO2: 97% (07-24-22 @ 09:48) (97% - 99%)  Neck: No palpable thyroid nodules.  CVS: S1S2, No murmurs  Respiratory: No wheezing, no crepitations  GI: Abdomen soft, bowel sounds positive  Musculoskeletal:  edema lower extremities.   Skin: No skin rashes, no ecchymosis    Diagnostics

## 2022-07-24 NOTE — PROVIDER CONTACT NOTE (OTHER) - DATE AND TIME:
21-Jul-2022 00:15
23-Jul-2022 05:54
24-Jul-2022 05:25
06-Jul-2022 21:28
21-Jul-2022 06:00
12-Jul-2022 06:00
23-Jul-2022 23:50
10-Jul-2022 00:12
11-Jul-2022 12:44

## 2022-07-24 NOTE — PROVIDER CONTACT NOTE (OTHER) - ASSESSMENT
Pt with BP of 86/48. pt is AOX4, asymptomatic. Denies chest pain, SOB or any discomfort at this time. all other vitals within normal limits
Pt's initial BP was 60/50 @0500. Asymptomatic at that time, all other vitals within normal limits. Midodrine given and BP rechecked 1 hour after. BP still only at 82/42. Pt asymptomatic at this time. Denies chest pain, SOB, or any discomfort. Pt in no apparent distress.
Pt. a&o x4, pt. stated that he has a generalized pain 10/10.
No acute distress noted
Pt /105  AO4, asymptomatic, no c/o pain or discomfort
all other VSS. pt afebrile. pt denies SOB, chest pain and n/v/d. Pt asymptomatic. pt A&OX4.
pt asymptomatic, pt denies SOB, chest pain and n/v/d. no s/s of active bleeding
vss but hypotensive at baseline. no signs of bleeding
all other VSS, pt asymptomatic, pt denies SOB, chest pain and n/v/d

## 2022-07-24 NOTE — PROGRESS NOTE ADULT - ASSESSMENT
Assessment  Hypoglycemia: 77y nondiabetic male, was not taking any hypoglycemic agents, admitted s/p mechanical fall, blood sugars are fluctuating, had prior hypoglycemic episodes, blood sugars improving and in acceptable range now, no new hypoglycemic episodes. eating meals, appears comfortable in NAD, eager for DC.  Acth stim test completed and patient ruled out for AI.  Fingerstick readings often inaccurate in patients with ESRD. Patient asymptomatic with apparent severely low FS readings (25, 46, 52), question if these are real hypoglycemias or low FS due to poor perfusion.. AM cortisol appropriately elevated, remaining labs pending.   Hypotension: AM cortisol WNL, on midodrine, monitored.  ?Gastroparesis: On meds, monitored.  ESRD: On hemodialysis, Monitor labs/BMP      tonia Claudette HUGO  Cell: [phone]             Problem/Recommendation - 1:  ·  Problem: Hypoglycemia.   ·  Recommendation: S/P cosyntropin stim test, patient ruled out for AI.  ?Pseudohypoglycemia. Hypoglycemias have all been with low FS and not blood sugar.  **Please check serum blood glucose the next time FS < 70 BEFORE dextrose administration**.   get cortisol Insulin antibody, igf-1, igf-2, cortisol level obtained at the time of hypoglycemia,  Encourage to increase po intake, nutritional supplements as tolerated.   Will continue monitoring and FU.  Discussed plan with patient and wife.     Problem/Recommendation - 2:  ·  Problem: Hypotension.   ·  Recommendation: AM cortisol WNL. Suggest to continue medications, monitoring, FU primary team recommendations.     Problem/Recommendation - 3:  ·  Problem: Gastroparesis.   ·  Recommendation: Suggest to continue medications, monitoring, FU primary team recommendations.     Problem/Recommendation - 4:  ·  Problem: ESRD on hemodialysis.   ·  Recommendation: On HD, continue as scheduled, renal FU

## 2022-07-24 NOTE — PROVIDER CONTACT NOTE (OTHER) - SITUATION
pt BP 72/40. HR 86. MEWs score 7
pt BP 76/47. HR 83- MEWs score is now 7
Pt /105
pt vomited in dialysis, RN called to say he vomited dark liquid. pt vomited overnight x1 as well, zofran was given at that time
Pt with BP of 86/48, HR 79
pt BP 77/42. MEWs score 7
Patient NPO- FS 76 this AM and BP 90/48    FS reassessed with multiple skewed results, highest results received was 87    FS after second half amp of d50- 84- provider notified
Pt with BP of 82/42.
Pt. complained of pain 10/10

## 2022-07-24 NOTE — PROVIDER CONTACT NOTE (OTHER) - NAME OF MD/NP/PA/DO NOTIFIED:
Dottie HERNANDEZ
Stephenie Leo
DAVID Matta
Damaris Gracia
DAVID Matta
Ionie Chambers NP
DAVID Gracia
LEONA Raymond
Monico Lugo

## 2022-07-24 NOTE — PROGRESS NOTE ADULT - ASSESSMENT
vomiting  ESRD    CT a/p with moderately distended stomach, no obstruction   cont with simethicone   PPI PID  decrease carafate to bid to see if he can tolerate de-escalation  cont reglan q8h OTC for N/V   HD as per renal   s/p upper gastrointestinal endoscopy with reflux esophagitis, gastritis and duodenitis  diet as mari  f/u path results   d/w patient and wife over phone  will follow       Advanced care planning was discussed with patient and family.  Advanced care planning forms were reviewed and discussed.  Risks, benefits and alternatives of gastroenterologic procedures were discussed in detail and all questions were answered.    30 minutes spent.

## 2022-07-24 NOTE — PROGRESS NOTE ADULT - SUBJECTIVE AND OBJECTIVE BOX
[facility]  Avi Riddle PA-C  [address] [place] [address]  [phone]      INTERVAL HPI/OVERNIGHT EVENTS:  pt seen and examined, no new events  stomach back to normal  no vomiting  +bm    MEDICATIONS  (STANDING):  allopurinol 100 milliGRAM(s) Oral daily  budesonide 160 MICROgram(s)/formoterol 4.5 MICROgram(s) Inhaler 2 Puff(s) Inhalation two times a day  chlorhexidine 2% Cloths 1 Application(s) Topical <User Schedule>  midodrine. 10 milliGRAM(s) Oral three times a day  multivitamin 1 Tablet(s) Oral daily  pantoprazole    Tablet 40 milliGRAM(s) Oral before breakfast  polyethylene glycol 3350 17 Gram(s) Oral daily  pregabalin 50 milliGRAM(s) Oral two times a day  sevelamer carbonate 800 milliGRAM(s) Oral three times a day with meals  simethicone 80 milliGRAM(s) Chew three times a day  tiotropium 18 MICROgram(s) Capsule 1 Capsule(s) Inhalation daily    MEDICATIONS  (PRN):      Allergies    latex (Rash)  No Known Drug Allergies    Intolerances        ROS:   General:  No wt loss, fevers, chills, night sweats, fatigue,   Eyes:  Good vision, no reported pain  ENT:  No sore throat, pain, runny nose, dysphagia  CV:  No pain, palpitations, hypo/hypertension  Resp:  No dyspnea, cough, tachypnea, wheezing  GI:  No pain, No nausea, No vomiting, No diarrhea, No constipation, No weight loss, No fever, No pruritis, No rectal bleeding, No tarry stools, No dysphagia,  :  No pain, bleeding, incontinence, nocturia  Muscle:  No pain, weakness  Neuro:  No weakness, tingling, memory problems  Psych:  No fatigue, insomnia, mood problems, depression  Endocrine:  No polyuria, polydipsia, cold/heat intolerance  Heme:  No petechiae, ecchymosis, easy bruisability  Skin:  No rash, tattoos, scars, edema      PHYSICAL EXAM:   Vital Signs Last 24 Hrs  T(C): 36.3 (20 Jul 2022 08:55), Max: 37.1 (20 Jul 2022 00:01)  T(F): 97.3 (20 Jul 2022 08:55), Max: 98.8 (20 Jul 2022 00:01)  HR: 77 (20 Jul 2022 09:20) (68 - 96)  BP: 83/47 (20 Jul 2022 08:55) (80/49 - 120/60)  BP(mean): --  RR: 18 (20 Jul 2022 08:55) (14 - 20)  SpO2: 92% (20 Jul 2022 09:20) (90% - 100%)    Parameters below as of 20 Jul 2022 08:55  Patient On (Oxygen Delivery Method): nasal cannula  O2 Flow (L/min): 2    Daily     Daily     GENERAL:  Appears stated age,   HEENT:  NC/AT,    CHEST:  Full & symmetric excursion,   HEART:  Regular rhythm,  ABDOMEN:  Soft, non-tender, non-distended,  EXTEREMITIES:  no cyanosis  SKIN:  No rash  NEURO:  Alert,       LABS:                        12.5   11.32 )-----------( 182      ( 20 Jul 2022 09:08 )             41.4   07-20    132<L>  |  92<L>  |  44<H>  ----------------------------<  131<H>  5.1   |  26  |  7.86<H>    Ca    9.9      20 Jul 2022 09:08    TPro  8.6<H>  /  Alb  3.8  /  TBili  0.3  /  DBili  x   /  AST  20  /  ALT  9<L>  /  AlkPhos  123<H>  07-20    RADIOLOGY & ADDITIONAL TESTS:  < from: CT Abdomen and Pelvis No Cont (07.11.22 @ 22:02) >    ACC: [id number] EXAM:  CT ABDOMEN AND PELVIS                          PROCEDURE DATE:  07/11/2022          INTERPRETATION:  CLINICAL INFORMATION: Vomiting, gassy distention.    COMPARISON: CT abdomen pelvis 8/22/2022. CT chest 8/27/2021.    CONTRAST/COMPLICATIONS:  IV Contrast: NONE  Oral Contrast: NONE  Complications: None reported at time of study completion    PROCEDURE:  CT of the Abdomen and Pelvis was performed.  Sagittal and coronal reformats were performed.    FINDINGS:  Evaluation of thesolid organs and vasculature is limited without   intravenous contrast.    LOWER CHEST: Basilar subsegmental atelectasis. Left lower lobe and right   middle lobe calcified granulomas. Coronary artery calcification.   Calcified right hilar lymph nodes.    LIVER: Few punctate calcified granulomas.  BILE DUCTS: Normal caliber.  GALLBLADDER: Within normal limits.  SPLEEN: Scattered tiny calcified granulomas.  PANCREAS: Within normal limits.  ADRENALS: Bilateral adrenal gland thickening, unchanged.  KIDNEYS/URETERS: Atrophic bilateral kidneys. Bilateral renal cysts and   subcentimeter hypodensities too small to characterize.    BLADDER: Minimally distended.  REPRODUCTIVE ORGANS: Prostate within normal limits.    BOWEL: Stomach is moderately distended. No small bowel obstruction.   Status post right hemicolectomy. Mild to moderate stool in the distal   rectosigmoid colon.  PERITONEUM: No ascites.  VESSELS: Atherosclerotic changes. Infrarenal IVC filter.  RETROPERITONEUM/LYMPH NODES: No lymphadenopathy.  ABDOMINAL WALL: Rectus diastases. Multiple ventral hernias containing fat   and small knuckle of bowel. Abdominal wall collateral vessels.  BONES: Degenerative changes.    IMPRESSION:  No small bowel obstruction. Stomach is moderately distended. Mild to   moderate stool in the distal rectosigmoid colon.        --- End of Report ---    < from: Upper Endoscopy (07.19.22 @ 11:42) >  [facility]  ____________________________________________________________________________________________________  Patient Name: Kaleb Rivers                    MRN: [id number]  Account Number: [id number]                     YOB: 1945  Room: Endoscopy Room 2                           Gender: Male  Attending MD: Otrega Fitch MD                 Procedure Date No Time: 7/19/2022  ____________________________________________________________________________________________________     Procedure:           Upper GI endoscopy  Indications:         Epigastric abdominal pain, Abnormal CT of the GI tract  Providers:           Ortega Fitch MD  Medicines:           General Anesthesia  Complications:       No immediate complications.  ____________________________________________________________________________________________________    < end of copied text >  < from: Upper Endoscopy (07.19.22 @ 11:42) >  Impression:          - Mildly severe reflux esophagitis.                       - Gastritis. Biopsied.                       - Duodenitis. Biopsied.  Recommendation:      - Return patient to hospital [facility] for ongoing care.                       - Full liquid diet.                       - Await pathology results.                       - Cont present medications                       - Add carafate 1g four times a day    < end of copied text >

## 2022-07-24 NOTE — PROVIDER CONTACT NOTE (OTHER) - ACTION/TREATMENT ORDERED:
DAVID Gracia says okay to give 6AM dose of midodrine 20 mg oral now
np aware will order CT/xray
provider aware   Will continue with current POC and update as needed.
Monico Lugo says to give the 6am- 20mg ordered dose of midodrine. No new orders at this time
Provider aware. 1 time order for 927 mg tylenol.
Administer a half an amp of d50 and reassess FS. Administer standing dose of midodrine as ordered  FS of 87- Administer another half an amp of D50 and reassess FS. FS 84- BMP ordered
DAVID Gracia ordered 5mg midodrine
Provider made aware. Order for LR bolus of 250ml over 2 hours. Bolus started as ordered. No other orders at this time.
Provider made aware. no new orders at this time. Will continue to monitor vitals.

## 2022-07-25 LAB
ANION GAP SERPL CALC-SCNC: 16 MMOL/L — SIGNIFICANT CHANGE UP (ref 5–17)
APTT BLD: 30.2 SEC — SIGNIFICANT CHANGE UP (ref 27.5–35.5)
BUN SERPL-MCNC: 56 MG/DL — HIGH (ref 7–23)
CALCIUM SERPL-MCNC: 9.5 MG/DL — SIGNIFICANT CHANGE UP (ref 8.4–10.5)
CHLORIDE SERPL-SCNC: 89 MMOL/L — LOW (ref 96–108)
CO2 SERPL-SCNC: 24 MMOL/L — SIGNIFICANT CHANGE UP (ref 22–31)
CREAT SERPL-MCNC: 10.78 MG/DL — HIGH (ref 0.5–1.3)
EGFR: 5 ML/MIN/1.73M2 — LOW
GLUCOSE BLDC GLUCOMTR-MCNC: 103 MG/DL — HIGH (ref 70–99)
GLUCOSE BLDC GLUCOMTR-MCNC: 81 MG/DL — SIGNIFICANT CHANGE UP (ref 70–99)
GLUCOSE BLDC GLUCOMTR-MCNC: 98 MG/DL — SIGNIFICANT CHANGE UP (ref 70–99)
GLUCOSE SERPL-MCNC: 119 MG/DL — HIGH (ref 70–99)
HCT VFR BLD CALC: 39.1 % — SIGNIFICANT CHANGE UP (ref 39–50)
HGB BLD-MCNC: 11.9 G/DL — LOW (ref 13–17)
INR BLD: 1.45 RATIO — HIGH (ref 0.88–1.16)
MCHC RBC-ENTMCNC: 28.1 PG — SIGNIFICANT CHANGE UP (ref 27–34)
MCHC RBC-ENTMCNC: 30.4 GM/DL — LOW (ref 32–36)
MCV RBC AUTO: 92.4 FL — SIGNIFICANT CHANGE UP (ref 80–100)
NRBC # BLD: 0 /100 WBCS — SIGNIFICANT CHANGE UP (ref 0–0)
PLATELET # BLD AUTO: 209 K/UL — SIGNIFICANT CHANGE UP (ref 150–400)
POTASSIUM SERPL-MCNC: 4.8 MMOL/L — SIGNIFICANT CHANGE UP (ref 3.5–5.3)
POTASSIUM SERPL-SCNC: 4.8 MMOL/L — SIGNIFICANT CHANGE UP (ref 3.5–5.3)
PROTHROM AB SERPL-ACNC: 16.9 SEC — HIGH (ref 10.5–13.4)
RBC # BLD: 4.23 M/UL — SIGNIFICANT CHANGE UP (ref 4.2–5.8)
RBC # FLD: 15.2 % — HIGH (ref 10.3–14.5)
SARS-COV-2 RNA SPEC QL NAA+PROBE: SIGNIFICANT CHANGE UP
SODIUM SERPL-SCNC: 129 MMOL/L — LOW (ref 135–145)
WBC # BLD: 13.74 K/UL — HIGH (ref 3.8–10.5)
WBC # FLD AUTO: 13.74 K/UL — HIGH (ref 3.8–10.5)

## 2022-07-25 PROCEDURE — 90935 HEMODIALYSIS ONE EVALUATION: CPT | Mod: GC

## 2022-07-25 RX ORDER — WARFARIN SODIUM 2.5 MG/1
7 TABLET ORAL ONCE
Refills: 0 | Status: COMPLETED | OUTPATIENT
Start: 2022-07-25 | End: 2022-07-25

## 2022-07-25 RX ORDER — MIDODRINE HYDROCHLORIDE 2.5 MG/1
10 TABLET ORAL ONCE
Refills: 0 | Status: COMPLETED | OUTPATIENT
Start: 2022-07-25 | End: 2022-07-25

## 2022-07-25 RX ADMIN — SIMETHICONE 80 MILLIGRAM(S): 80 TABLET, CHEWABLE ORAL at 21:10

## 2022-07-25 RX ADMIN — SEVELAMER CARBONATE 800 MILLIGRAM(S): 2400 POWDER, FOR SUSPENSION ORAL at 08:28

## 2022-07-25 RX ADMIN — Medication 5 MILLIGRAM(S): at 21:10

## 2022-07-25 RX ADMIN — CHLORHEXIDINE GLUCONATE 1 APPLICATION(S): 213 SOLUTION TOPICAL at 08:29

## 2022-07-25 RX ADMIN — Medication 100 MILLIGRAM(S): at 14:20

## 2022-07-25 RX ADMIN — MIDODRINE HYDROCHLORIDE 10 MILLIGRAM(S): 2.5 TABLET ORAL at 08:35

## 2022-07-25 RX ADMIN — Medication 50 MILLIGRAM(S): at 17:17

## 2022-07-25 RX ADMIN — MIDODRINE HYDROCHLORIDE 20 MILLIGRAM(S): 2.5 TABLET ORAL at 14:18

## 2022-07-25 RX ADMIN — SEVELAMER CARBONATE 800 MILLIGRAM(S): 2400 POWDER, FOR SUSPENSION ORAL at 14:19

## 2022-07-25 RX ADMIN — MIDODRINE HYDROCHLORIDE 20 MILLIGRAM(S): 2.5 TABLET ORAL at 05:56

## 2022-07-25 RX ADMIN — BUDESONIDE AND FORMOTEROL FUMARATE DIHYDRATE 2 PUFF(S): 160; 4.5 AEROSOL RESPIRATORY (INHALATION) at 05:56

## 2022-07-25 RX ADMIN — PANTOPRAZOLE SODIUM 40 MILLIGRAM(S): 20 TABLET, DELAYED RELEASE ORAL at 08:27

## 2022-07-25 RX ADMIN — SIMETHICONE 80 MILLIGRAM(S): 80 TABLET, CHEWABLE ORAL at 05:55

## 2022-07-25 RX ADMIN — Medication 1 GRAM(S): at 05:55

## 2022-07-25 RX ADMIN — Medication 5 MILLIGRAM(S): at 05:56

## 2022-07-25 RX ADMIN — SIMETHICONE 80 MILLIGRAM(S): 80 TABLET, CHEWABLE ORAL at 14:18

## 2022-07-25 RX ADMIN — POLYETHYLENE GLYCOL 3350 17 GRAM(S): 17 POWDER, FOR SOLUTION ORAL at 14:19

## 2022-07-25 RX ADMIN — TIOTROPIUM BROMIDE 1 CAPSULE(S): 18 CAPSULE ORAL; RESPIRATORY (INHALATION) at 14:19

## 2022-07-25 RX ADMIN — Medication 1 GRAM(S): at 17:12

## 2022-07-25 RX ADMIN — MIDODRINE HYDROCHLORIDE 20 MILLIGRAM(S): 2.5 TABLET ORAL at 17:12

## 2022-07-25 RX ADMIN — WARFARIN SODIUM 7 MILLIGRAM(S): 2.5 TABLET ORAL at 21:10

## 2022-07-25 RX ADMIN — Medication 5 MILLIGRAM(S): at 14:20

## 2022-07-25 RX ADMIN — Medication 50 MILLIGRAM(S): at 05:56

## 2022-07-25 RX ADMIN — BUDESONIDE AND FORMOTEROL FUMARATE DIHYDRATE 2 PUFF(S): 160; 4.5 AEROSOL RESPIRATORY (INHALATION) at 17:13

## 2022-07-25 RX ADMIN — SEVELAMER CARBONATE 800 MILLIGRAM(S): 2400 POWDER, FOR SUSPENSION ORAL at 17:13

## 2022-07-25 RX ADMIN — Medication 1 TABLET(S): at 14:18

## 2022-07-25 NOTE — PROGRESS NOTE ADULT - SUBJECTIVE AND OBJECTIVE BOX
[facility]  Avi Riddle PA-C  [address] [place] [address]  [phone]      INTERVAL HPI/OVERNIGHT EVENTS:  pt seen and examined, no new events      MEDICATIONS  (STANDING):  allopurinol 100 milliGRAM(s) Oral daily  budesonide 160 MICROgram(s)/formoterol 4.5 MICROgram(s) Inhaler 2 Puff(s) Inhalation two times a day  chlorhexidine 2% Cloths 1 Application(s) Topical <User Schedule>  midodrine. 10 milliGRAM(s) Oral three times a day  multivitamin 1 Tablet(s) Oral daily  pantoprazole    Tablet 40 milliGRAM(s) Oral before breakfast  polyethylene glycol 3350 17 Gram(s) Oral daily  pregabalin 50 milliGRAM(s) Oral two times a day  sevelamer carbonate 800 milliGRAM(s) Oral three times a day with meals  simethicone 80 milliGRAM(s) Chew three times a day  tiotropium 18 MICROgram(s) Capsule 1 Capsule(s) Inhalation daily    MEDICATIONS  (PRN):      Allergies    latex (Rash)  No Known Drug Allergies    Intolerances        ROS:   General:  No wt loss, fevers, chills, night sweats, fatigue,   Eyes:  Good vision, no reported pain  ENT:  No sore throat, pain, runny nose, dysphagia  CV:  No pain, palpitations, hypo/hypertension  Resp:  No dyspnea, cough, tachypnea, wheezing  GI:  No pain, No nausea, No vomiting, No diarrhea, No constipation, No weight loss, No fever, No pruritis, No rectal bleeding, No tarry stools, No dysphagia,  :  No pain, bleeding, incontinence, nocturia  Muscle:  No pain, weakness  Neuro:  No weakness, tingling, memory problems  Psych:  No fatigue, insomnia, mood problems, depression  Endocrine:  No polyuria, polydipsia, cold/heat intolerance  Heme:  No petechiae, ecchymosis, easy bruisability  Skin:  No rash, tattoos, scars, edema      PHYSICAL EXAM:   Vital Signs Last 24 Hrs  T(C): 36.3 (20 Jul 2022 08:55), Max: 37.1 (20 Jul 2022 00:01)  T(F): 97.3 (20 Jul 2022 08:55), Max: 98.8 (20 Jul 2022 00:01)  HR: 77 (20 Jul 2022 09:20) (68 - 96)  BP: 83/47 (20 Jul 2022 08:55) (80/49 - 120/60)  BP(mean): --  RR: 18 (20 Jul 2022 08:55) (14 - 20)  SpO2: 92% (20 Jul 2022 09:20) (90% - 100%)    Parameters below as of 20 Jul 2022 08:55  Patient On (Oxygen Delivery Method): nasal cannula  O2 Flow (L/min): 2    Daily     Daily     GENERAL:  Appears stated age,   HEENT:  NC/AT,    CHEST:  Full & symmetric excursion,   HEART:  Regular rhythm,  ABDOMEN:  Soft, non-tender, non-distended,  EXTEREMITIES:  no cyanosis  SKIN:  No rash  NEURO:  Alert,       LABS:                        12.5   11.32 )-----------( 182      ( 20 Jul 2022 09:08 )             41.4   07-20    132<L>  |  92<L>  |  44<H>  ----------------------------<  131<H>  5.1   |  26  |  7.86<H>    Ca    9.9      20 Jul 2022 09:08    TPro  8.6<H>  /  Alb  3.8  /  TBili  0.3  /  DBili  x   /  AST  20  /  ALT  9<L>  /  AlkPhos  123<H>  07-20    RADIOLOGY & ADDITIONAL TESTS:  < from: CT Abdomen and Pelvis No Cont (07.11.22 @ 22:02) >    ACC: [id number] EXAM:  CT ABDOMEN AND PELVIS                          PROCEDURE DATE:  07/11/2022          INTERPRETATION:  CLINICAL INFORMATION: Vomiting, gassy distention.    COMPARISON: CT abdomen pelvis 8/22/2022. CT chest 8/27/2021.    CONTRAST/COMPLICATIONS:  IV Contrast: NONE  Oral Contrast: NONE  Complications: None reported at time of study completion    PROCEDURE:  CT of the Abdomen and Pelvis was performed.  Sagittal and coronal reformats were performed.    FINDINGS:  Evaluation of thesolid organs and vasculature is limited without   intravenous contrast.    LOWER CHEST: Basilar subsegmental atelectasis. Left lower lobe and right   middle lobe calcified granulomas. Coronary artery calcification.   Calcified right hilar lymph nodes.    LIVER: Few punctate calcified granulomas.  BILE DUCTS: Normal caliber.  GALLBLADDER: Within normal limits.  SPLEEN: Scattered tiny calcified granulomas.  PANCREAS: Within normal limits.  ADRENALS: Bilateral adrenal gland thickening, unchanged.  KIDNEYS/URETERS: Atrophic bilateral kidneys. Bilateral renal cysts and   subcentimeter hypodensities too small to characterize.    BLADDER: Minimally distended.  REPRODUCTIVE ORGANS: Prostate within normal limits.    BOWEL: Stomach is moderately distended. No small bowel obstruction.   Status post right hemicolectomy. Mild to moderate stool in the distal   rectosigmoid colon.  PERITONEUM: No ascites.  VESSELS: Atherosclerotic changes. Infrarenal IVC filter.  RETROPERITONEUM/LYMPH NODES: No lymphadenopathy.  ABDOMINAL WALL: Rectus diastases. Multiple ventral hernias containing fat   and small knuckle of bowel. Abdominal wall collateral vessels.  BONES: Degenerative changes.    IMPRESSION:  No small bowel obstruction. Stomach is moderately distended. Mild to   moderate stool in the distal rectosigmoid colon.        --- End of Report ---    < from: Upper Endoscopy (07.19.22 @ 11:42) >  [facility]  ____________________________________________________________________________________________________  Patient Name: Kaleb Rivers                    MRN: [id number]  Account Number: [id number]                     YOB: 1945  Room: Endoscopy Room 2                           Gender: Male  Attending MD: Ortega Fitch MD                 Procedure Date No Time: 7/19/2022  ____________________________________________________________________________________________________     Procedure:           Upper GI endoscopy  Indications:         Epigastric abdominal pain, Abnormal CT of the GI tract  Providers:           Ortega Fitch MD  Medicines:           General Anesthesia  Complications:       No immediate complications.  ____________________________________________________________________________________________________    < end of copied text >  < from: Upper Endoscopy (07.19.22 @ 11:42) >  Impression:          - Mildly severe reflux esophagitis.                       - Gastritis. Biopsied.                       - Duodenitis. Biopsied.  Recommendation:      - Return patient to hospital [facility] for ongoing care.                       - Full liquid diet.                       - Await pathology results.                       - Cont present medications                       - Add carafate 1g four times a day    < end of copied text >

## 2022-07-25 NOTE — PROGRESS NOTE ADULT - SUBJECTIVE AND OBJECTIVE BOX
[facility], Division of Infectious Diseases  EMELYN Elaine Y. Patel, S. Shah, G. Casimir  [phone]  ([phone] - weekdays after 5pm and weekends)    Name: MAYE ZUNIGA  Age/Gender: 77y Male  MRN: [id number]    Interval History:  Patient seen and examined this morning.   Feels well, has no complaints, wants to go home.   Notes reviewed. No concerning overnight events  Afebrile     Allergies: latex (Rash)  No Known Drug Allergies    Objective:  Vitals:   T(F): 97 (07-25-22 @ 09:10), Max: 99 (07-24-22 @ 23:37)  HR: 81 (07-25-22 @ 09:10) (77 - 98)  BP: 89/50 (07-25-22 @ 09:10) (76/50 - 89/50)  RR: 18 (07-25-22 @ 09:10) (18 - 18)  SpO2: 92% (07-25-22 @ 09:10) (92% - 100%)  Physical Examination:  General: no acute distress, nontoxic   HEENT: NC/AT, anicteric, neck supple  Respiratory: clear to auscultation bilaterally  Cardiovascular: S1 and S2 present, normal rate  Gastrointestinal: soft, nontender, nondistended  Extremities: no edema, no cyanosis  Skin: no visible rash    Laboratory Studies:  CBC:                       11.9   13.74 )-----------( 209      ( 25 Jul 2022 10:43 )             39.1     WBC Trend:  13.74 07-25-22 @ 10:43  10.34 07-22-22 @ 18:02  9.12 07-21-22 @ 17:22  11.32 07-20-22 @ 09:08  10.58 07-19-22 @ 07:25    CMP: 07-25    129<L>  |  89<L>  |  56<H>  ----------------------------<  119<H>  4.8   |  24  |  10.78<H>    Ca    9.5      25 Jul 2022 10:43    Creatinine, Serum: 10.78 mg/dL (07-25-22 @ 10:43)  Creatinine, Serum: 7.22 mg/dL (07-23-22 @ 12:36)  Creatinine, Serum: 9.75 mg/dL (07-22-22 @ 18:02)  Creatinine, Serum: 7.57 mg/dL (07-21-22 @ 17:22)  Creatinine, Serum: 7.86 mg/dL (07-20-22 @ 09:08)  Creatinine, Serum: 8.74 mg/dL (07-19-22 @ 07:25)    Microbiology: reviewed   Radiology: reviewed     Medications:  allopurinol 100 milliGRAM(s) Oral daily  budesonide 160 MICROgram(s)/formoterol 4.5 MICROgram(s) Inhaler 2 Puff(s) Inhalation two times a day  chlorhexidine 2% Cloths 1 Application(s) Topical <User Schedule>  lactated ringers. 1000 milliLiter(s) IV Continuous <Continuous>  metoclopramide 5 milliGRAM(s) Oral every 8 hours  midodrine. 20 milliGRAM(s) Oral three times a day  multivitamin 1 Tablet(s) Oral daily  pantoprazole    Tablet 40 milliGRAM(s) Oral before breakfast  polyethylene glycol 3350 17 Gram(s) Oral daily  pregabalin 50 milliGRAM(s) Oral two times a day  sevelamer carbonate 800 milliGRAM(s) Oral three times a day with meals  simethicone 80 milliGRAM(s) Chew three times a day  sucralfate suspension 1 Gram(s) Oral two times a day  tiotropium 18 MICROgram(s) Capsule 1 Capsule(s) Inhalation daily    Antimicrobials:

## 2022-07-25 NOTE — PROGRESS NOTE ADULT - SUBJECTIVE AND OBJECTIVE BOX
[facility] DIVISION OF KIDNEY DISEASES AND HYPERTENSION -- FOLLOW UP NOTE  --------------------------------------------------------------------------------  HPI:  Pt. is a 77 y.o. M e/ PMHx. of HTN, HFrEF, SAADIA, Gout and ESRD on HD MWF at [facility] presents after having a fall enroute to the HD center. Nephrology consulted for ESRD management. Pt. does not recall his nephrologist and denies any issues with dialysis. He is edematous but states he is not worse than usual. Denies any syncope and attributes his fall to his knee "buckling." Pt. has notably low BP which Pt. states is chronic and for which he takes Midodrine.     Pt. seen this AM. Nausea has improved. Plan for HD today with additional PRN Midodrine if needed.       PAST HISTORY  --------------------------------------------------------------------------------  No significant changes to PMH, PSH, FHx, SHx, unless otherwise noted    ALLERGIES & MEDICATIONS  --------------------------------------------------------------------------------  Allergies    latex (Rash)  No Known Drug Allergies    Intolerances      Standing Inpatient Medications  allopurinol 100 milliGRAM(s) Oral daily  budesonide 160 MICROgram(s)/formoterol 4.5 MICROgram(s) Inhaler 2 Puff(s) Inhalation two times a day  chlorhexidine 2% Cloths 1 Application(s) Topical <User Schedule>  lactated ringers. 1000 milliLiter(s) IV Continuous <Continuous>  metoclopramide 5 milliGRAM(s) Oral every 8 hours  midodrine. 20 milliGRAM(s) Oral three times a day  multivitamin 1 Tablet(s) Oral daily  pantoprazole    Tablet 40 milliGRAM(s) Oral before breakfast  polyethylene glycol 3350 17 Gram(s) Oral daily  pregabalin 50 milliGRAM(s) Oral two times a day  sevelamer carbonate 800 milliGRAM(s) Oral three times a day with meals  simethicone 80 milliGRAM(s) Chew three times a day  sucralfate suspension 1 Gram(s) Oral two times a day  tiotropium 18 MICROgram(s) Capsule 1 Capsule(s) Inhalation daily    PRN Inpatient Medications  midodrine. 10 milliGRAM(s) Oral once PRN      REVIEW OF SYSTEMS  --------------------------------------------------------------------------------  Gen: No fevers/chills  Skin: No rashes  Head/Eyes/Ears: Normal hearing,   Respiratory: No dyspnea, cough  CV: No chest pain  GI: no abdominal pain overnight, nausea improved  : No dysuria, hematuria  MSK: No  edema  Heme: No easy bruising or bleeding  Psych: No significant depression      All other systems were reviewed and are negative, except as noted.    VITALS/PHYSICAL EXAM  --------------------------------------------------------------------------------  T(C): 37.2 (07-24-22 @ 23:37), Max: 37.2 (07-24-22 @ 23:37)  HR: 85 (07-25-22 @ 05:30) (75 - 98)  BP: 76/50 (07-24-22 @ 23:37) (76/50 - 84/44)  RR: 18 (07-24-22 @ 23:37) (18 - 18)  SpO2: 98% (07-25-22 @ 05:30) (96% - 100%)  Wt(kg): --        07-24-22 @ 07:01  -  07-25-22 @ 07:00  --------------------------------------------------------  IN: 0 mL / OUT: 0 mL / NET: 0 mL    Physical Exam:  	Gen: NAD  	HEENT: MMM, on NC  	Pulm: CTA B/L  	CV: S1S2  	Abd: Soft, +BS   	Ext: + LE edema B/L improving, chronic skin changes   	Neuro: Awake  	Skin: Warm and dry  	Vascular access: LUE AVF, thrills+      LABS/STUDIES  --------------------------------------------------------------------------------    135  |  92  |  30  ----------------------------<  64      [07-23-22 @ 12:36]  4.9   |  27  |  7.22        Ca     10.3     [07-23-22 @ 12:36]      PT/INR: PT 17.9 , INR 1.55       [07-24-22 @ 09:53]      Creatinine Trend:  SCr 7.22 [07-23 @ 12:36]  SCr 9.75 [07-22 @ 18:02]  SCr 7.57 [07-21 @ 17:22]  SCr 7.86 [07-20 @ 09:08]  SCr 8.74 [07-19 @ 07:25]        HbA1c 5.0      [10-02-17 @ 15:38]  TSH 0.81      [07-22-22 @ 18:02]    HBsAg Nonreact      [07-11-22 @ 13:23]  HCV 0.39, Nonreact      [07-11-22 @ 13:23]

## 2022-07-25 NOTE — PROGRESS NOTE ADULT - ATTENDING COMMENTS
ESRD:   for HD today  BP remains low. On midodrine  CT with distended stomach, esophagitis+. Biopsy done       Rest per Dr. Lyle Meek MD  O: [phone]  Contact me on teams ESRD:   Seen at HD. Tolerating OK  Using sodium profile and low dialysate temp  BP remains low. received additional dose of  midodrine prior to HD  CT with distended stomach, esophagitis+. Biopsy done  consider starting northera for low BP       Rest per Dr. Lyle Meek MD  O: [phone]  Contact me on teams

## 2022-07-25 NOTE — PROGRESS NOTE ADULT - SUBJECTIVE AND OBJECTIVE BOX
Patient is a 77y old  Male who presents with a chief complaint of functional paraplegia post a fall (25 Jul 2022 14:21)      SUBJECTIVE / OVERNIGHT EVENTS: no further vomiting, tolerating a soft diet, has BMs, no episodes of hypoglecemia, on carafate, reglan, PPI, INR still subtherapeutic, will place on coumadin 7 mg tonight    MEDICATIONS  (STANDING):  allopurinol 100 milliGRAM(s) Oral daily  budesonide 160 MICROgram(s)/formoterol 4.5 MICROgram(s) Inhaler 2 Puff(s) Inhalation two times a day  chlorhexidine 2% Cloths 1 Application(s) Topical <User Schedule>  lactated ringers. 1000 milliLiter(s) (30 mL/Hr) IV Continuous <Continuous>  metoclopramide 5 milliGRAM(s) Oral every 8 hours  midodrine. 20 milliGRAM(s) Oral three times a day  multivitamin 1 Tablet(s) Oral daily  pantoprazole    Tablet 40 milliGRAM(s) Oral before breakfast  polyethylene glycol 3350 17 Gram(s) Oral daily  pregabalin 50 milliGRAM(s) Oral two times a day  sevelamer carbonate 800 milliGRAM(s) Oral three times a day with meals  simethicone 80 milliGRAM(s) Chew three times a day  sucralfate suspension 1 Gram(s) Oral two times a day  tiotropium 18 MICROgram(s) Capsule 1 Capsule(s) Inhalation daily  warfarin 7 milliGRAM(s) Oral once    MEDICATIONS  (PRN):      Vital Signs Last 24 Hrs  T(F): 97 (07-25-22 @ 13:40), Max: 99 (07-24-22 @ 23:37)  HR: 82 (07-25-22 @ 16:07) (68 - 98)  BP: 98/56 (07-25-22 @ 13:40) (76/50 - 98/56)  RR: 18 (07-25-22 @ 13:40) (18 - 18)  SpO2: 98% (07-25-22 @ 16:07) (92% - 100%)  Telemetry:   CAPILLARY BLOOD GLUCOSE      POCT Blood Glucose.: 98 mg/dL (25 Jul 2022 08:12)  POCT Blood Glucose.: 128 mg/dL (24 Jul 2022 21:53)  POCT Blood Glucose.: 111 mg/dL (24 Jul 2022 16:56)    I&O's Summary    24 Jul 2022 07:01  -  25 Jul 2022 07:00  --------------------------------------------------------  IN: 0 mL / OUT: 0 mL / NET: 0 mL    25 Jul 2022 07:01  -  25 Jul 2022 16:14  --------------------------------------------------------  IN: 0 mL / OUT: 1500 mL / NET: -1500 mL        PHYSICAL EXAM:  GENERAL: NAD, well-developed  HEAD:  Atraumatic, Normocephalic  EYES: EOMI, PERRLA, conjunctiva and sclera clear  NECK: Supple, No JVD  CHEST/LUNG: Clear to auscultation bilaterally; No wheeze  HEART: Regular rate and rhythm; No murmurs, rubs, or gallops  ABDOMEN: Soft, Nontender, Nondistended; Bowel sounds present  EXTREMITIES:  2+ Peripheral Pulses, No clubbing, cyanosis, or edema  PSYCH: AAOx3  NEUROLOGY: non-focal  SKIN: No rashes or lesions    LABS:                        11.9   13.74 )-----------( 209      ( 25 Jul 2022 10:43 )             39.1     07-25    129<L>  |  89<L>  |  56<H>  ----------------------------<  119<H>  4.8   |  24  |  10.78<H>    Ca    9.5      25 Jul 2022 10:43      PT/INR - ( 25 Jul 2022 10:43 )   PT: 16.9 sec;   INR: 1.45 ratio         PTT - ( 25 Jul 2022 10:43 )  PTT:30.2 sec          RADIOLOGY & ADDITIONAL TESTS:    Imaging Personally Reviewed:    Consultant(s) Notes Reviewed:      Care Discussed with Consultants/Other Providers:

## 2022-07-25 NOTE — PROGRESS NOTE ADULT - PROBLEM SELECTOR PLAN 1
Pt. with ESRD on HD three times a week (MWF) presented to [facility] for a fall. Last HD was on 7/13 via LUE AVF. Pt. clinically stable. No CP, SOB or palpitations during evaluation. Most recent labs reviewed. Will arrange for HD today. Pt. denies any lightheadedness and states that his his BP is normally 80s-90s/ 50s-60s. He endorses taking Midodrine but does not recall the dose. Consent obtained for HD and placed in the chart. Will arrange for HD today. HgB at goal. Prior records state that Pt. was on 30mg Q8H here with additional Midodrine given during HD during prior admission. C/w Midodrine to 20mg TID. Consider discharging on this dose. C/w HD time to 4 hours and DFR to 600 on regular HD days. Plan for HD today.     No long-term Aluminum based PO substances.     #Hyperphosphatemia- c/w Sevelamer 800mg TID. Check Phos as Aluminum based substances are potent phos binders. Based on level re-evaluate need for Sevelamer now.      If you have any questions, please feel free to contact me  Jai Alvarenga  Nephrology Fellow  [phone]; Prefer [employer] TEAMS  (After 5pm or on weekends please page the on-call fellow). Pt. with ESRD on HD three times a week (MWF) presented to [facility] for a fall. Last HD was on 7/13 via LUE AVF. Pt. clinically stable. No CP, SOB or palpitations during evaluation. Most recent labs reviewed. Will arrange for HD today. Pt. denies any lightheadedness and states that his his BP is normally 80s-90s/ 50s-60s. He endorses taking Midodrine but does not recall the dose. Consent obtained for HD and placed in the chart. Will arrange for HD today. HgB at goal. Prior records state that Pt. was on 30mg Q8H here with additional Midodrine given during HD during prior admission. C/w Midodrine to 20mg TID. Consider discharging on this dose. C/w HD time to 4 hours and DFR to 600 on regular HD days. Plan for HD today. Consider starting Droxidopa 100mg TID to help increase blood pressure.     No long-term Aluminum based PO substances.     #Hyperphosphatemia- c/w Sevelamer 800mg TID. Check Phos as Aluminum based substances are potent phos binders. Based on level re-evaluate need for Sevelamer now.      If you have any questions, please feel free to contact me  Jai Alvarenga  Nephrology Fellow  [phone]; Prefer [employer] TEAMS  (After 5pm or on weekends please page the on-call fellow).

## 2022-07-25 NOTE — PROGRESS NOTE ADULT - ASSESSMENT
Assessment  Hypoglycemia: 77y nondiabetic male, was not taking any hypoglycemic agents, admitted s/p mechanical fall, blood sugars are improving and in acceptable range now, no new hypoglycemic episodes, eating meals, no acute events, pending egd pathology.  Acth stim test completed and patient ruled out for AI.  Fingerstick readings often inaccurate in patients with ESRD. Patient asymptomatic with apparent severely low FS readings (25, 46, 52), question if these are real hypoglycemias or low FS due to poor perfusion.. AM cortisol appropriately elevated.  Hypotension: AM cortisol WNL, on midodrine, monitored.  ?Gastroparesis: On meds, monitored.  ESRD: On hemodialysis, Monitor labs/BMP      tonia Wilkins MD  Cell: [phone]             Problem/Recommendation - 1:  ·  Problem: Hypoglycemia.   ·  Recommendation: Likely nutritional hypoglycemias. Blood sugars improved with diet advancement/improved appetite.  S/P cosyntropin stim test, patient ruled out for AI.  ?Pseudohypoglycemia. Hypoglycemias have all been with low FS and not blood sugar.  **Please check serum blood glucose the next time FS < 70 BEFORE dextrose administration**.   get cortisol Insulin antibody, igf-1, igf-2, cortisol level obtained at the time of hypoglycemia,  Encourage to increase po intake, nutritional supplements as tolerated.   Will continue monitoring and FU.  Discussed plan with patient and wife.     Problem/Recommendation - 2:  ·  Problem: Hypotension.   ·  Recommendation: AM cortisol WNL. Suggest to continue medications, monitoring, FU primary team recommendations.     Problem/Recommendation - 3:  ·  Problem: Gastroparesis.   ·  Recommendation: Suggest to continue medications, monitoring, FU primary team recommendations.     Problem/Recommendation - 4:  ·  Problem: ESRD on hemodialysis.   ·  Recommendation: On HD, continue as scheduled, renal FU

## 2022-07-25 NOTE — PROGRESS NOTE ADULT - ASSESSMENT
Pt. is a 77 y.o. M e/ PMHx. of HTN, HFrEF, SAADIA, Gout and ESRD on HD MWF at [facility] presents after having a fall enroute to the HD center. Nephrology consulted for ESRD management.

## 2022-07-25 NOTE — PROGRESS NOTE ADULT - ASSESSMENT
76 y/o male PMHx of ESRD on HD via AV fistula LUE MWF, COPD (on 2L home O2), CHF, pHTN, hypotension on Midodrine, DVT S/P IVC filter who presented after his L knee gave out.     leukocytosis  remains afebrile for duration of hospital course  leukocytosis first noted 7/16, reoslved but trended up to ~13k today  s/p CT chest w/o evidence of focal consolidation  s/p CT abd/pelvis 7/11- No small bowel obstruction. Stomach is moderately distended. Mild to moderate stool in the distal rectosigmoid colon.  no erythema or warmth or decreased ROM of either knee on exam  no evidence of SSTI on exam  s/p repeat CT abd/pelvis unrevealing  nausea and abdominal discomfort now resolved  leukocytosis likely reactive  no focal findings on exam, no new complaints, remains afebrile  patient w/o signs or symptoms of infection at this time  monitor off antibiotics    nausea- improved  on scheduled reglan  s/p EGD 7/20- reflux esophagitis, gastritis, gastric ulcers, duodenitis, s/p biopsies  on PPI, carafate, simethicone  f/u pathology  GI following    ESRD  HD per nephrology      D/w patient and wife, Patty, over the phone  Neal Singh M.D.  [facility], Division of Infectious Diseases  [phone]  After 5pm on weekdays and all day on weekends - please call [phone]

## 2022-07-25 NOTE — PROGRESS NOTE ADULT - ASSESSMENT
Pt. is a 77 y.o. M e/ PMHx. of HTN, HFrEF, SAADIA, Gout and ESRD on HD MWF at [facility] presents after having a fall enroute to the HD center. 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 c/o knee pain and inability to walk. nephrology called. PT kevin ordered. cont outptn meds, check orthostatics. card called  ptn w h/o DVT/PE, in the past not on AC 2/2 h/o GI bleeds, but admitted on Coumadin, will resume  h/o COPD with chronic hypoxic respiratory failure on 2 L home O2. presently stable.   episode of vomiting on 7/11 while in HD, CT scan w dilated stomach, no obstruction, seen by GI, tolerated CLD,   s/p EGD 7/19, has severe " linitis" type erythema with multiple gastric ulcers, biopsies were taken.     HD MWF,volume status is stable.   no further vomiting, tolerating a soft diet, has BMs, no episodes of hypoglycemia  on carafate, reglan, PPI,   INR still subtherapeutic, will place on coumadin 7 mg tonight  F/up with biopsy

## 2022-07-25 NOTE — PROGRESS NOTE ADULT - SUBJECTIVE AND OBJECTIVE BOX
Chief complaint  Patient is a 77y old  Male who presents with a chief complaint of functional paraplegia post a fall (25 Jul 2022 08:53)   Review of systems  No acute events, no hypoglycemic episodes.    Labs and Fingersticks  CAPILLARY BLOOD GLUCOSE      POCT Blood Glucose.: 98 mg/dL (25 Jul 2022 08:12)  POCT Blood Glucose.: 128 mg/dL (24 Jul 2022 21:53)  POCT Blood Glucose.: 111 mg/dL (24 Jul 2022 16:56)      Anion Gap, Serum: 16 (07-25 @ 10:43)      Calcium, Total Serum: 9.5 (07-25 @ 10:43)          07-25    129<L>  |  89<L>  |  56<H>  ----------------------------<  119<H>  4.8   |  24  |  10.78<H>    Ca    9.5      25 Jul 2022 10:43                          11.9   13.74 )-----------( 209      ( 25 Jul 2022 10:43 )             39.1     Medications  MEDICATIONS  (STANDING):  allopurinol 100 milliGRAM(s) Oral daily  budesonide 160 MICROgram(s)/formoterol 4.5 MICROgram(s) Inhaler 2 Puff(s) Inhalation two times a day  chlorhexidine 2% Cloths 1 Application(s) Topical <User Schedule>  lactated ringers. 1000 milliLiter(s) (30 mL/Hr) IV Continuous <Continuous>  metoclopramide 5 milliGRAM(s) Oral every 8 hours  midodrine. 20 milliGRAM(s) Oral three times a day  multivitamin 1 Tablet(s) Oral daily  pantoprazole    Tablet 40 milliGRAM(s) Oral before breakfast  polyethylene glycol 3350 17 Gram(s) Oral daily  pregabalin 50 milliGRAM(s) Oral two times a day  sevelamer carbonate 800 milliGRAM(s) Oral three times a day with meals  simethicone 80 milliGRAM(s) Chew three times a day  sucralfate suspension 1 Gram(s) Oral two times a day  tiotropium 18 MICROgram(s) Capsule 1 Capsule(s) Inhalation daily      Physical Exam  Vital Signs Last 12 Hrs  T(F): 97 (07-25-22 @ 13:40), Max: 99 (07-25-22 @ 08:19)  HR: 68 (07-25-22 @ 13:40) (68 - 85)  BP: 98/56 (07-25-22 @ 13:40) (77/48 - 98/56)  BP(mean): --  RR: 18 (07-25-22 @ 13:40) (18 - 18)  SpO2: 97% (07-25-22 @ 13:40) (92% - 100%)    Diagnostics    Cortisol PM, Serum: Routine  Addl Info: Please draw cortisol lab exactly 45 minutes after cosyntropin injection. Thank you (07-21 @ 14:33)  Glucose, Serum: Routine  Addl Info: Please draw lab when FS < 70. Must obtain lab BEFORE dextrose administration. Thank you.  Cancel Reason: Lab Operations Cancel (07-21 @ 14:33)  Insulin Antibodies: STAT  Addl Info: PLEASE DRAW LABS WHILE PATIENT IS HYPOGLYCEMIC (07-20 @ 14:57)  Insulin-like Growth Factor Protein 2: STAT  Addl Info: PLEASE DRAW LABS WHILE PATIENT IS HYPOGLYCEMIC (07-20 @ 14:54)  Insulin-Like Growth Factor 1: STAT  Addl Info: PLEASE DRAW LABS WHILE PATIENT IS HYPOGLYCEMIC (07-20 @ 14:54)  Cortisol PM, Serum: STAT  Addl Info: PLEASE DRAW LABS WHILE PATIENT IS HYPOGLYCEMIC (07-20 @ 14:54)  Glucose, Serum: STAT  Addl Info: PLEASE CHECK SERUM GLUCOSE WHILE HYPOGLYCEMIC (07-20 @ 14:54)  Cortisol AM, Serum: AM Sched. Collection: 16-Jul-2022 06:00 (07-15 @ 14:04)  Free Thyroxine, Serum: AM Sched. Collection: 16-Jul-2022 06:00 (07-15 @ 14:04)  Thyroid Stimulating Hormone, Serum: AM Sched. Collection: 16-Jul-2022 06:00 (07-15 @ 14:04)

## 2022-07-26 ENCOUNTER — TRANSCRIPTION ENCOUNTER (OUTPATIENT)
Age: 77
End: 2022-07-26

## 2022-07-26 VITALS
RESPIRATION RATE: 18 BRPM | TEMPERATURE: 99 F | SYSTOLIC BLOOD PRESSURE: 98 MMHG | DIASTOLIC BLOOD PRESSURE: 55 MMHG | OXYGEN SATURATION: 100 % | HEART RATE: 72 BPM

## 2022-07-26 LAB
GLUCOSE BLDC GLUCOMTR-MCNC: 73 MG/DL — SIGNIFICANT CHANGE UP (ref 70–99)
GLUCOSE BLDC GLUCOMTR-MCNC: 85 MG/DL — SIGNIFICANT CHANGE UP (ref 70–99)
SURGICAL PATHOLOGY STUDY: SIGNIFICANT CHANGE UP

## 2022-07-26 PROCEDURE — 87641 MR-STAPH DNA AMP PROBE: CPT

## 2022-07-26 PROCEDURE — 85027 COMPLETE CBC AUTOMATED: CPT

## 2022-07-26 PROCEDURE — 84436 ASSAY OF TOTAL THYROXINE: CPT

## 2022-07-26 PROCEDURE — 97110 THERAPEUTIC EXERCISES: CPT

## 2022-07-26 PROCEDURE — 80053 COMPREHEN METABOLIC PANEL: CPT

## 2022-07-26 PROCEDURE — 88305 TISSUE EXAM BY PATHOLOGIST: CPT

## 2022-07-26 PROCEDURE — 74177 CT ABD & PELVIS W/CONTRAST: CPT

## 2022-07-26 PROCEDURE — 84100 ASSAY OF PHOSPHORUS: CPT

## 2022-07-26 PROCEDURE — 94660 CPAP INITIATION&MGMT: CPT

## 2022-07-26 PROCEDURE — 74176 CT ABD & PELVIS W/O CONTRAST: CPT

## 2022-07-26 PROCEDURE — 93005 ELECTROCARDIOGRAM TRACING: CPT

## 2022-07-26 PROCEDURE — 82550 ASSAY OF CK (CPK): CPT

## 2022-07-26 PROCEDURE — 97530 THERAPEUTIC ACTIVITIES: CPT

## 2022-07-26 PROCEDURE — 73562 X-RAY EXAM OF KNEE 3: CPT

## 2022-07-26 PROCEDURE — 99261: CPT

## 2022-07-26 PROCEDURE — 72131 CT LUMBAR SPINE W/O DYE: CPT | Mod: MA

## 2022-07-26 PROCEDURE — 93306 TTE W/DOPPLER COMPLETE: CPT

## 2022-07-26 PROCEDURE — 80074 ACUTE HEPATITIS PANEL: CPT

## 2022-07-26 PROCEDURE — 74018 RADEX ABDOMEN 1 VIEW: CPT

## 2022-07-26 PROCEDURE — 88341 IMHCHEM/IMCYTCHM EA ADD ANTB: CPT

## 2022-07-26 PROCEDURE — U0005: CPT

## 2022-07-26 PROCEDURE — 72170 X-RAY EXAM OF PELVIS: CPT

## 2022-07-26 PROCEDURE — 84134 ASSAY OF PREALBUMIN: CPT

## 2022-07-26 PROCEDURE — 83520 IMMUNOASSAY QUANT NOS NONAB: CPT

## 2022-07-26 PROCEDURE — 71250 CT THORAX DX C-: CPT

## 2022-07-26 PROCEDURE — 85610 PROTHROMBIN TIME: CPT

## 2022-07-26 PROCEDURE — 82947 ASSAY GLUCOSE BLOOD QUANT: CPT

## 2022-07-26 PROCEDURE — 83036 HEMOGLOBIN GLYCOSYLATED A1C: CPT

## 2022-07-26 PROCEDURE — 83735 ASSAY OF MAGNESIUM: CPT

## 2022-07-26 PROCEDURE — 97162 PT EVAL MOD COMPLEX 30 MIN: CPT

## 2022-07-26 PROCEDURE — 86480 TB TEST CELL IMMUN MEASURE: CPT

## 2022-07-26 PROCEDURE — 84305 ASSAY OF SOMATOMEDIN: CPT

## 2022-07-26 PROCEDURE — 82962 GLUCOSE BLOOD TEST: CPT

## 2022-07-26 PROCEDURE — 80048 BASIC METABOLIC PNL TOTAL CA: CPT

## 2022-07-26 PROCEDURE — 99285 EMERGENCY DEPT VISIT HI MDM: CPT

## 2022-07-26 PROCEDURE — 82533 TOTAL CORTISOL: CPT

## 2022-07-26 PROCEDURE — 84484 ASSAY OF TROPONIN QUANT: CPT

## 2022-07-26 PROCEDURE — 85730 THROMBOPLASTIN TIME PARTIAL: CPT

## 2022-07-26 PROCEDURE — 71045 X-RAY EXAM CHEST 1 VIEW: CPT

## 2022-07-26 PROCEDURE — 70450 CT HEAD/BRAIN W/O DYE: CPT | Mod: MA

## 2022-07-26 PROCEDURE — 94640 AIRWAY INHALATION TREATMENT: CPT

## 2022-07-26 PROCEDURE — U0003: CPT

## 2022-07-26 PROCEDURE — 36415 COLL VENOUS BLD VENIPUNCTURE: CPT

## 2022-07-26 PROCEDURE — 84439 ASSAY OF FREE THYROXINE: CPT

## 2022-07-26 PROCEDURE — C9399: CPT

## 2022-07-26 PROCEDURE — 85025 COMPLETE CBC W/AUTO DIFF WBC: CPT

## 2022-07-26 PROCEDURE — 84443 ASSAY THYROID STIM HORMONE: CPT

## 2022-07-26 PROCEDURE — 87640 STAPH A DNA AMP PROBE: CPT

## 2022-07-26 PROCEDURE — 86337 INSULIN ANTIBODIES: CPT

## 2022-07-26 PROCEDURE — 82553 CREATINE MB FRACTION: CPT

## 2022-07-26 RX ORDER — LIDOCAINE 4 G/100G
0 CREAM TOPICAL
Qty: 0 | Refills: 0 | DISCHARGE

## 2022-07-26 RX ORDER — SIMETHICONE 80 MG/1
1 TABLET, CHEWABLE ORAL
Qty: 0 | Refills: 0 | DISCHARGE
Start: 2022-07-26

## 2022-07-26 RX ORDER — ALLOPURINOL 300 MG
1 TABLET ORAL
Qty: 0 | Refills: 0 | DISCHARGE

## 2022-07-26 RX ORDER — POLYETHYLENE GLYCOL 3350 17 G/17G
17 POWDER, FOR SOLUTION ORAL
Qty: 0 | Refills: 0 | DISCHARGE
Start: 2022-07-26

## 2022-07-26 RX ORDER — LIDOCAINE 4 G/100G
1 CREAM TOPICAL
Qty: 0 | Refills: 0 | DISCHARGE

## 2022-07-26 RX ORDER — SEVELAMER CARBONATE 2400 MG/1
1 POWDER, FOR SUSPENSION ORAL
Qty: 0 | Refills: 0 | DISCHARGE

## 2022-07-26 RX ORDER — SUCRALFATE 1 G
10 TABLET ORAL
Qty: 0 | Refills: 0 | DISCHARGE
Start: 2022-07-26

## 2022-07-26 RX ORDER — BUDESONIDE AND FORMOTEROL FUMARATE DIHYDRATE 160; 4.5 UG/1; UG/1
2 AEROSOL RESPIRATORY (INHALATION)
Qty: 0 | Refills: 0 | DISCHARGE
Start: 2022-07-26

## 2022-07-26 RX ORDER — WARFARIN SODIUM 2.5 MG/1
7.5 TABLET ORAL ONCE
Refills: 0 | Status: COMPLETED | OUTPATIENT
Start: 2022-07-26 | End: 2022-07-26

## 2022-07-26 RX ORDER — MIDODRINE HYDROCHLORIDE 2.5 MG/1
2 TABLET ORAL
Qty: 0 | Refills: 0 | DISCHARGE
Start: 2022-07-26

## 2022-07-26 RX ORDER — METOCLOPRAMIDE HCL 10 MG
1 TABLET ORAL
Qty: 0 | Refills: 0 | DISCHARGE
Start: 2022-07-26

## 2022-07-26 RX ORDER — SEVELAMER CARBONATE 2400 MG/1
1 POWDER, FOR SUSPENSION ORAL
Qty: 0 | Refills: 0 | DISCHARGE
Start: 2022-07-26

## 2022-07-26 RX ORDER — MIDODRINE HYDROCHLORIDE 2.5 MG/1
1 TABLET ORAL
Qty: 0 | Refills: 0 | DISCHARGE

## 2022-07-26 RX ORDER — HYDROCORTISONE 1 %
1 OINTMENT (GRAM) TOPICAL
Qty: 0 | Refills: 0 | DISCHARGE

## 2022-07-26 RX ORDER — ALLOPURINOL 300 MG
1 TABLET ORAL
Qty: 0 | Refills: 0 | DISCHARGE
Start: 2022-07-26

## 2022-07-26 RX ORDER — ACETAMINOPHEN 500 MG
2 TABLET ORAL
Qty: 0 | Refills: 0 | DISCHARGE

## 2022-07-26 RX ADMIN — PANTOPRAZOLE SODIUM 40 MILLIGRAM(S): 20 TABLET, DELAYED RELEASE ORAL at 06:05

## 2022-07-26 RX ADMIN — Medication 100 MILLIGRAM(S): at 12:18

## 2022-07-26 RX ADMIN — Medication 5 MILLIGRAM(S): at 13:43

## 2022-07-26 RX ADMIN — CHLORHEXIDINE GLUCONATE 1 APPLICATION(S): 213 SOLUTION TOPICAL at 09:49

## 2022-07-26 RX ADMIN — Medication 5 MILLIGRAM(S): at 13:45

## 2022-07-26 RX ADMIN — TIOTROPIUM BROMIDE 1 CAPSULE(S): 18 CAPSULE ORAL; RESPIRATORY (INHALATION) at 12:19

## 2022-07-26 RX ADMIN — SIMETHICONE 80 MILLIGRAM(S): 80 TABLET, CHEWABLE ORAL at 06:05

## 2022-07-26 RX ADMIN — BUDESONIDE AND FORMOTEROL FUMARATE DIHYDRATE 2 PUFF(S): 160; 4.5 AEROSOL RESPIRATORY (INHALATION) at 06:04

## 2022-07-26 RX ADMIN — SIMETHICONE 80 MILLIGRAM(S): 80 TABLET, CHEWABLE ORAL at 13:45

## 2022-07-26 RX ADMIN — Medication 5 MILLIGRAM(S): at 06:05

## 2022-07-26 RX ADMIN — MIDODRINE HYDROCHLORIDE 20 MILLIGRAM(S): 2.5 TABLET ORAL at 06:05

## 2022-07-26 RX ADMIN — SEVELAMER CARBONATE 800 MILLIGRAM(S): 2400 POWDER, FOR SUSPENSION ORAL at 12:17

## 2022-07-26 RX ADMIN — POLYETHYLENE GLYCOL 3350 17 GRAM(S): 17 POWDER, FOR SOLUTION ORAL at 12:17

## 2022-07-26 RX ADMIN — SEVELAMER CARBONATE 800 MILLIGRAM(S): 2400 POWDER, FOR SUSPENSION ORAL at 09:47

## 2022-07-26 RX ADMIN — WARFARIN SODIUM 7.5 MILLIGRAM(S): 2.5 TABLET ORAL at 17:19

## 2022-07-26 RX ADMIN — Medication 1 TABLET(S): at 12:17

## 2022-07-26 RX ADMIN — Medication 1 GRAM(S): at 06:05

## 2022-07-26 RX ADMIN — MIDODRINE HYDROCHLORIDE 20 MILLIGRAM(S): 2.5 TABLET ORAL at 13:44

## 2022-07-26 RX ADMIN — Medication 50 MILLIGRAM(S): at 17:21

## 2022-07-26 RX ADMIN — Medication 50 MILLIGRAM(S): at 06:05

## 2022-07-26 NOTE — PROGRESS NOTE ADULT - ASSESSMENT
Assessment  Hypoglycemia: 77y nondiabetic male, was not taking any hypoglycemic agents, admitted s/p mechanical fall, blood sugars are trending within overall acceptable range, FS trending down today (73, 81, 85), no new hypoglycemias. Patient reports frustration with the foods he is getting, refusing to eat, no acute events, pending egd pathology.  Acth stim test completed and patient ruled out for AI.  Fingerstick readings often inaccurate in patients with ESRD. Patient asymptomatic with apparent severely low FS readings (25, 46, 52), question if these are real hypoglycemias or low FS due to poor perfusion.. AM cortisol appropriately elevated.  Hypotension: AM cortisol WNL, on midodrine, monitored.  ?Gastroparesis: On meds, monitored.  ESRD: On hemodialysis, Monitor labs/BMP      tonia Wilkins MD  Cell: [phone]             Problem/Recommendation - 1:  ·  Problem: Hypoglycemia.   ·  Recommendation: Likely nutritional hypoglycemias. Blood sugars improved with diet advancement/improved appetite. Encouraged patient to increase PO intake, nutritional supplements as tolerated.  S/P cosyntropin stim test, patient ruled out for AI.  ?Pseudohypoglycemia. Hypoglycemias have all been with low FS and not blood sugar.  **Please check serum blood glucose the next time FS < 70 BEFORE dextrose administration**.   get cortisol Insulin antibody, igf-1, igf-2, cortisol level obtained at the time of hypoglycemia,  Encourage to increase po intake, nutritional supplements as tolerated.   Will continue monitoring and FU.  Discussed plan with patient and wife.     Problem/Recommendation - 2:  ·  Problem: Hypotension.   ·  Recommendation: AM cortisol WNL. Suggest to continue medications, monitoring, FU primary team recommendations.     Problem/Recommendation - 3:  ·  Problem: Gastroparesis.   ·  Recommendation: Suggest to continue medications, monitoring, FU primary team recommendations.     Problem/Recommendation - 4:  ·  Problem: ESRD on hemodialysis.   ·  Recommendation: On HD, continue as scheduled, renal FU

## 2022-07-26 NOTE — PROGRESS NOTE ADULT - PROBLEM SELECTOR PROBLEM 1
ESRD on hemodialysis

## 2022-07-26 NOTE — PROGRESS NOTE ADULT - SUBJECTIVE AND OBJECTIVE BOX
Chief complaint  Patient is a 77y old  Male who presents with a chief complaint of functional paraplegia post a fall (26 Jul 2022 11:36)   Review of systems  Patient in bed, looks comfortable, no hypoglycemic episodes.    Labs and Fingersticks  CAPILLARY BLOOD GLUCOSE      POCT Blood Glucose.: 85 mg/dL (26 Jul 2022 11:49)  POCT Blood Glucose.: 73 mg/dL (26 Jul 2022 08:00)  POCT Blood Glucose.: 81 mg/dL (25 Jul 2022 21:18)  POCT Blood Glucose.: 103 mg/dL (25 Jul 2022 16:54)      Anion Gap, Serum: 16 (07-25 @ 10:43)      Calcium, Total Serum: 9.5 (07-25 @ 10:43)          07-25    129<L>  |  89<L>  |  56<H>  ----------------------------<  119<H>  4.8   |  24  |  10.78<H>    Ca    9.5      25 Jul 2022 10:43                          11.9   13.74 )-----------( 209      ( 25 Jul 2022 10:43 )             39.1     Medications  MEDICATIONS  (STANDING):  allopurinol 100 milliGRAM(s) Oral daily  bisacodyl 5 milliGRAM(s) Oral once  budesonide 160 MICROgram(s)/formoterol 4.5 MICROgram(s) Inhaler 2 Puff(s) Inhalation two times a day  chlorhexidine 2% Cloths 1 Application(s) Topical <User Schedule>  lactated ringers. 1000 milliLiter(s) (30 mL/Hr) IV Continuous <Continuous>  metoclopramide 5 milliGRAM(s) Oral every 8 hours  midodrine. 20 milliGRAM(s) Oral three times a day  multivitamin 1 Tablet(s) Oral daily  pantoprazole    Tablet 40 milliGRAM(s) Oral before breakfast  polyethylene glycol 3350 17 Gram(s) Oral daily  pregabalin 50 milliGRAM(s) Oral two times a day  sevelamer carbonate 800 milliGRAM(s) Oral three times a day with meals  simethicone 80 milliGRAM(s) Chew three times a day  sucralfate suspension 1 Gram(s) Oral two times a day  tiotropium 18 MICROgram(s) Capsule 1 Capsule(s) Inhalation daily      Physical Exam  General: Patient comfortable in bed  Vital Signs Last 12 Hrs  T(F): 98.6 (07-26-22 @ 09:01), Max: 98.6 (07-26-22 @ 09:01)  HR: 74 (07-26-22 @ 10:10) (70 - 74)  BP: 98/51 (07-26-22 @ 09:01) (98/51 - 98/51)  BP(mean): --  RR: 18 (07-26-22 @ 09:01) (18 - 18)  SpO2: 98% (07-26-22 @ 10:10) (98% - 100%)  Neck: No palpable thyroid nodules.  CVS: S1S2, No murmurs  Respiratory: No wheezing, no crepitations  GI: Abdomen soft, bowel sounds positive  Musculoskeletal:  edema lower extremities.   Skin: No skin rashes, no ecchymosis    Diagnostics    Cortisol PM, Serum: Routine  Addl Info: Please draw cortisol lab exactly 45 minutes after cosyntropin injection. Thank you (07-21 @ 14:33)  Glucose, Serum: Routine  Addl Info: Please draw lab when FS < 70. Must obtain lab BEFORE dextrose administration. Thank you.  Cancel Reason: Lab Operations Cancel (07-21 @ 14:33)  Insulin Antibodies: STAT  Addl Info: PLEASE DRAW LABS WHILE PATIENT IS HYPOGLYCEMIC (07-20 @ 14:57)  Insulin-like Growth Factor Protein 2: STAT  Addl Info: PLEASE DRAW LABS WHILE PATIENT IS HYPOGLYCEMIC (07-20 @ 14:54)  Insulin-Like Growth Factor 1: STAT  Addl Info: PLEASE DRAW LABS WHILE PATIENT IS HYPOGLYCEMIC (07-20 @ 14:54)  Cortisol PM, Serum: STAT  Addl Info: PLEASE DRAW LABS WHILE PATIENT IS HYPOGLYCEMIC (07-20 @ 14:54)  Glucose, Serum: STAT  Addl Info: PLEASE CHECK SERUM GLUCOSE WHILE HYPOGLYCEMIC (07-20 @ 14:54)  Cortisol AM, Serum: AM Sched. Collection: 16-Jul-2022 06:00 (07-15 @ 14:04)  Free Thyroxine, Serum: AM Sched. Collection: 16-Jul-2022 06:00 (07-15 @ 14:04)  Thyroid Stimulating Hormone, Serum: AM Sched. Collection: 16-Jul-2022 06:00 (07-15 @ 14:04)

## 2022-07-26 NOTE — PROGRESS NOTE ADULT - ASSESSMENT
vomiting  ESRD    CT a/p with moderately distended stomach, no obstruction   cont with simethicone   PPI PID  decrease carafate to bid to see if he can tolerate de-escalation  cont reglan q8h OTC for N/V   HD as per renal   s/p upper gastrointestinal endoscopy with reflux esophagitis, gastritis and duodenitis  diet as mari  f/u path results   d/w patient and wife over phone  will follow       Advanced care planning was discussed with patient and family.  Advanced care planning forms were reviewed and discussed.  Risks, benefits and alternatives of gastroenterologic procedures were discussed in detail and all questions were answered.    30 minutes spent.  vomiting  ESRD    CT a/p with moderately distended stomach, no obstruction   cont with simethicone   PPI PID  decrease carafate to bid to see if he can tolerate de-escalation  cont reglan q8h OTC for N/V   HD as per renal   s/p upper gastrointestinal endoscopy with reflux esophagitis, gastritis and duodenitis  diet as mari  path results noted, dw with pt and wife  d/w patient and wife over phone  will follow       Advanced care planning was discussed with patient and family.  Advanced care planning forms were reviewed and discussed.  Risks, benefits and alternatives of gastroenterologic procedures were discussed in detail and all questions were answered.    30 minutes spent.

## 2022-07-26 NOTE — PROGRESS NOTE ADULT - NS ATTEND BILL GEN_ALL_CORE
Attending to bill

## 2022-07-26 NOTE — PROGRESS NOTE ADULT - PROVIDER SPECIALTY LIST ADULT
Cardiology
Cardiology
Endocrinology
Gastroenterology
Nephrology
Cardiology
Endocrinology
Gastroenterology
Gastroenterology
Internal Medicine
Cardiology
Endocrinology
Gastroenterology
Infectious Disease
Internal Medicine
Pulmonology
Pulmonology
Endocrinology
Endocrinology
Gastroenterology
Infectious Disease
Internal Medicine
Nephrology
Nephrology
Pulmonology
Internal Medicine
Nephrology

## 2022-07-26 NOTE — PROGRESS NOTE ADULT - SUBJECTIVE AND OBJECTIVE BOX
[facility]  Avi Riddle PA-C  [address] [place] [address]  [phone]      INTERVAL HPI/OVERNIGHT EVENTS:  pt seen and examined, no new events  tolerating diet without N/V  no bm for 3 days       MEDICATIONS  (STANDING):  allopurinol 100 milliGRAM(s) Oral daily  budesonide 160 MICROgram(s)/formoterol 4.5 MICROgram(s) Inhaler 2 Puff(s) Inhalation two times a day  chlorhexidine 2% Cloths 1 Application(s) Topical <User Schedule>  midodrine. 10 milliGRAM(s) Oral three times a day  multivitamin 1 Tablet(s) Oral daily  pantoprazole    Tablet 40 milliGRAM(s) Oral before breakfast  polyethylene glycol 3350 17 Gram(s) Oral daily  pregabalin 50 milliGRAM(s) Oral two times a day  sevelamer carbonate 800 milliGRAM(s) Oral three times a day with meals  simethicone 80 milliGRAM(s) Chew three times a day  tiotropium 18 MICROgram(s) Capsule 1 Capsule(s) Inhalation daily    MEDICATIONS  (PRN):      Allergies    latex (Rash)  No Known Drug Allergies    Intolerances        ROS:   General:  No wt loss, fevers, chills, night sweats, fatigue,   Eyes:  Good vision, no reported pain  ENT:  No sore throat, pain, runny nose, dysphagia  CV:  No pain, palpitations, hypo/hypertension  Resp:  No dyspnea, cough, tachypnea, wheezing  GI:  No pain, No nausea, No vomiting, No diarrhea, + constipation, No weight loss, No fever, No pruritis, No rectal bleeding, No tarry stools, No dysphagia,  :  No pain, bleeding, incontinence, nocturia  Muscle:  No pain, weakness  Neuro:  No weakness, tingling, memory problems  Psych:  No fatigue, insomnia, mood problems, depression  Endocrine:  No polyuria, polydipsia, cold/heat intolerance  Heme:  No petechiae, ecchymosis, easy bruisability  Skin:  No rash, tattoos, scars, edema      PHYSICAL EXAM:   Vital Signs Last 24 Hrs  T(C): 37 (26 Jul 2022 09:01), Max: 37.8 (25 Jul 2022 23:26)  T(F): 98.6 (26 Jul 2022 09:01), Max: 100 (25 Jul 2022 23:26)  HR: 74 (26 Jul 2022 10:10) (68 - 85)  BP: 98/51 (26 Jul 2022 09:01) (81/45 - 98/56)  BP(mean): --  RR: 18 (26 Jul 2022 09:01) (18 - 18)  SpO2: 98% (26 Jul 2022 10:10) (97% - 100%)    Parameters below as of 26 Jul 2022 09:01  Patient On (Oxygen Delivery Method): nasal cannula      Daily     Daily     GENERAL:  Appears stated age,   HEENT:  NC/AT,    CHEST:  Full & symmetric excursion,   HEART:  Regular rhythm,  ABDOMEN:  Soft, non-tender, non-distended,  EXTEREMITIES:  no cyanosis  SKIN:  No rash  NEURO:  Alert,       LABS:                        11.9   13.74 )-----------( 209      ( 25 Jul 2022 10:43 )             39.1   07-25    129<L>  |  89<L>  |  56<H>  ----------------------------<  119<H>  4.8   |  24  |  10.78<H>    Ca    9.5      25 Jul 2022 10:43        RADIOLOGY & ADDITIONAL TESTS:  < from: CT Abdomen and Pelvis No Cont (07.11.22 @ 22:02) >    ACC: [id number] EXAM:  CT ABDOMEN AND PELVIS                          PROCEDURE DATE:  07/11/2022          INTERPRETATION:  CLINICAL INFORMATION: Vomiting, gassy distention.    COMPARISON: CT abdomen pelvis 8/22/2022. CT chest 8/27/2021.    CONTRAST/COMPLICATIONS:  IV Contrast: NONE  Oral Contrast: NONE  Complications: None reported at time of study completion    PROCEDURE:  CT of the Abdomen and Pelvis was performed.  Sagittal and coronal reformats were performed.    FINDINGS:  Evaluation of thesolid organs and vasculature is limited without   intravenous contrast.    LOWER CHEST: Basilar subsegmental atelectasis. Left lower lobe and right   middle lobe calcified granulomas. Coronary artery calcification.   Calcified right hilar lymph nodes.    LIVER: Few punctate calcified granulomas.  BILE DUCTS: Normal caliber.  GALLBLADDER: Within normal limits.  SPLEEN: Scattered tiny calcified granulomas.  PANCREAS: Within normal limits.  ADRENALS: Bilateral adrenal gland thickening, unchanged.  KIDNEYS/URETERS: Atrophic bilateral kidneys. Bilateral renal cysts and   subcentimeter hypodensities too small to characterize.    BLADDER: Minimally distended.  REPRODUCTIVE ORGANS: Prostate within normal limits.    BOWEL: Stomach is moderately distended. No small bowel obstruction.   Status post right hemicolectomy. Mild to moderate stool in the distal   rectosigmoid colon.  PERITONEUM: No ascites.  VESSELS: Atherosclerotic changes. Infrarenal IVC filter.  RETROPERITONEUM/LYMPH NODES: No lymphadenopathy.  ABDOMINAL WALL: Rectus diastases. Multiple ventral hernias containing fat   and small knuckle of bowel. Abdominal wall collateral vessels.  BONES: Degenerative changes.    IMPRESSION:  No small bowel obstruction. Stomach is moderately distended. Mild to   moderate stool in the distal rectosigmoid colon.        --- End of Report ---    < from: Upper Endoscopy (07.19.22 @ 11:42) >  [facility]  ____________________________________________________________________________________________________  Patient Name: Kaleb Rivers                    MRN: [id number]  Account Number: [id number]                     YOB: 1945  Room: Endoscopy Room 2                           Gender: Male  Attending MD: Ortega Fitch MD                 Procedure Date No Time: 7/19/2022  ____________________________________________________________________________________________________     Procedure:           Upper GI endoscopy  Indications:         Epigastric abdominal pain, Abnormal CT of the GI tract  Providers:           rOtega Fitch MD  Medicines:           General Anesthesia  Complications:       No immediate complications.  ____________________________________________________________________________________________________    < end of copied text >  < from: Upper Endoscopy (07.19.22 @ 11:42) >  Impression:          - Mildly severe reflux esophagitis.                       - Gastritis. Biopsied.                       - Duodenitis. Biopsied.  Recommendation:      - Return patient to hospital [facility] for ongoing care.                       - Full liquid diet.                       - Await pathology results.                       - Cont present medications                       - Add carafate 1g four times a day    < end of copied text >

## 2022-07-26 NOTE — PROGRESS NOTE ADULT - ASSESSMENT
78 y/o male PMHx of ESRD on HD via AV fistula LUE MWF, COPD (on 2L home O2), CHF, pHTN, hypotension on Midodrine, DVT S/P IVC filter who presented after his L knee gave out.     leukocytosis  remains afebrile for duration of hospital course  leukocytosis first noted 7/16, resolved but trended up to ~13k yesterday, clinically feels better  s/p CT chest w/o evidence of focal consolidation  s/p CT abd/pelvis 7/11- No small bowel obstruction. Stomach is moderately distended. Mild to moderate stool in the distal rectosigmoid colon.  no erythema or warmth or decreased ROM of either knee on exam  no evidence of SSTI on exam  s/p repeat CT abd/pelvis unrevealing  nausea and abdominal discomfort now resolved  leukocytosis likely reactive  no focal findings on exam, no new complaints, remains afebrile  patient w/o signs or symptoms of infection at this time  continue off antibiotics    nausea- improved  on scheduled reglan  s/p EGD 7/20- reflux esophagitis, gastritis, gastric ulcers, duodenitis, s/p biopsies  on PPI, carafate, simethicone  pathology reviewed, has active gastritis, no evidence of H.pylori, no parasites   GI following    ESRD  HD per nephrology      Neal Singh M.D.  [facility], Division of Infectious Diseases  [phone]  After 5pm on weekdays and all day on weekends - please call [phone]

## 2022-07-26 NOTE — PROGRESS NOTE ADULT - SUBJECTIVE AND OBJECTIVE BOX
[facility], Division of Infectious Diseases  EMELYN Elaine Y. Patel, S. Shah, G. Casimir  [phone]  ([phone] - weekdays after 5pm and weekends)    Name: MAYE ZUNIGA  Age/Gender: 77y Male  MRN: [id number]    Interval History:  Patient seen and examined this morning.   Says he feels well, frustrated about his INR.  Denies fever, chills, pain or any other complaints.   Notes reviewed. Afebrile     Allergies: latex (Rash)  No Known Drug Allergies    Objective:  Vitals:   T(F): 98.6 (07-26-22 @ 09:01), Max: 100 (07-25-22 @ 23:26)  HR: 74 (07-26-22 @ 10:10) (70 - 85)  BP: 98/51 (07-26-22 @ 09:01) (81/45 - 98/51)  RR: 18 (07-26-22 @ 09:01) (18 - 18)  SpO2: 98% (07-26-22 @ 10:10) (98% - 100%)  Physical Examination:  General: no acute distress  HEENT: NC/AT, anicteric, neck supple  Respiratory: no acc muscle use, breathing comfortably  Cardiovascular: S1 and S2 present  Gastrointestinal: normal appearing, nondistended  Extremities: no edema, no cyanosis  Skin: no visible rash    Laboratory Studies:  CBC:                       11.9   13.74 )-----------( 209      ( 25 Jul 2022 10:43 )             39.1     WBC Trend:  13.74 07-25-22 @ 10:43  10.34 07-22-22 @ 18:02  9.12 07-21-22 @ 17:22  11.32 07-20-22 @ 09:08    CMP: 07-25    129<L>  |  89<L>  |  56<H>  ----------------------------<  119<H>  4.8   |  24  |  10.78<H>    Ca    9.5      25 Jul 2022 10:43      Creatinine, Serum: 10.78 mg/dL (07-25-22 @ 10:43)  Creatinine, Serum: 7.22 mg/dL (07-23-22 @ 12:36)  Creatinine, Serum: 9.75 mg/dL (07-22-22 @ 18:02)  Creatinine, Serum: 7.57 mg/dL (07-21-22 @ 17:22)  Creatinine, Serum: 7.86 mg/dL (07-20-22 @ 09:08)    Microbiology: reviewed   Radiology: reviewed     Medications:  allopurinol 100 milliGRAM(s) Oral daily  budesonide 160 MICROgram(s)/formoterol 4.5 MICROgram(s) Inhaler 2 Puff(s) Inhalation two times a day  chlorhexidine 2% Cloths 1 Application(s) Topical <User Schedule>  lactated ringers. 1000 milliLiter(s) IV Continuous <Continuous>  metoclopramide 5 milliGRAM(s) Oral every 8 hours  midodrine. 20 milliGRAM(s) Oral three times a day  multivitamin 1 Tablet(s) Oral daily  pantoprazole    Tablet 40 milliGRAM(s) Oral before breakfast  polyethylene glycol 3350 17 Gram(s) Oral daily  pregabalin 50 milliGRAM(s) Oral two times a day  sevelamer carbonate 800 milliGRAM(s) Oral three times a day with meals  simethicone 80 milliGRAM(s) Chew three times a day  sucralfate suspension 1 Gram(s) Oral two times a day  tiotropium 18 MICROgram(s) Capsule 1 Capsule(s) Inhalation daily

## 2022-07-26 NOTE — DISCHARGE NOTE NURSING/CASE MANAGEMENT/SOCIAL WORK - NSDCPEFALRISK_GEN_ALL_CORE
For information on Fall & Injury Prevention, visit: [email or web address] OR  [email or web address] OR  https://www.cdc.gov/steadi/patient.html

## 2022-07-26 NOTE — DISCHARGE NOTE NURSING/CASE MANAGEMENT/SOCIAL WORK - PATIENT PORTAL LINK FT
You can access the FollowMyHealth Patient Portal offered by [facility] by registering at the following website: [email or web address]. By joining [facility]’s FollowMyHealth portal, you will also be able to view your health information using other applications (apps) compatible with our system.

## 2022-07-26 NOTE — PROGRESS NOTE ADULT - ASSESSMENT
Pt. is a 77 y.o. M e/ PMHx. of HTN, HFrEF, SAADIA, Gout and ESRD on HD MWF at [facility] [place] presents after having a fall enroute to the HD center. 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 c/o knee pain and inability to walk. nephrology called. PT kevin ordered. cont outptn meds, check orthostatics. card called  ptn w h/o DVT/PE, in the past not on AC 2/2 h/o GI bleeds, but admitted on Coumadin, will resume  h/o COPD with chronic hypoxic respiratory failure on 2 L home O2. presently stable.   episode of vomiting on 7/11 while in HD, CT scan w dilated stomach, no obstruction, seen by GI, tolerated CLD,   s/p EGD 7/19, has severe " linitis" type erythema with multiple gastric ulcers, biopsies were taken.     HD MWF,volume status is stable.   no further vomiting, tolerating a soft diet, has BMs, no episodes of hypoglycemia  on carafate, reglan, PPI,   INR still subtherapeutic, will place on coumadin 7 mg tonight  F/up with biopsy

## 2022-07-26 NOTE — PROGRESS NOTE ADULT - NS_MD_PANP_GEN_ALL_CORE

## 2022-07-26 NOTE — PROGRESS NOTE ADULT - NS ATTEND OPT1 GEN_ALL_CORE
I attest my time as attending is greater than 50% of the total combined time spent on qualifying patient care activities by the PA/NP and attending.

## 2022-07-26 NOTE — PROGRESS NOTE ADULT - NS ATTEND AMEND GEN_ALL_CORE FT
Pt care and plan discussed and reviewed with NP. Plan as outlined above edited by me to reflect our discussion.
I have made amendments to the documentation where necessary. Additional comments: I have made amendments to the documentation where necessary. Additional comments: I attest my time as attending is greater than 50% of the total combined time spent on qualifying patient care activities by the PA/NP and attending.     I have made amendments to the documentation where necessary. Additional comments: Pt seen and examined, plan of care discussed with the NP, spent 30 min on the case.
I have made amendments to the documentation where necessary. Additional comments: I have made amendments to the documentation where necessary. Additional comments: I have made amendments to the documentation where necessary. Additional comments: I attest my time as attending is greater than 50% of the total combined time spent on qualifying patient care activities by the PA/NP and attending.     I have made amendments to the documentation where necessary. Additional comments: Pt seen and examined, plan of care discussed with the NP, spent 30 min on the case.
Pt care and plan discussed and reviewed with NP. Plan as outlined above edited by me to reflect our discussion.
I have made amendments to the documentation where necessary. Additional comments: I attest my time as attending is greater than 50% of the total combined time spent on qualifying patient care activities by the PA/NP and attending.     I have made amendments to the documentation where necessary. Additional comments: Pt seen and examined, plan of care discussed with the NP, spent 30 min on the case.
I have made amendments to the documentation where necessary. Additional comments: I have made amendments to the documentation where necessary. Additional comments: I have made amendments to the documentation where necessary. Additional comments: I have made amendments to the documentation where necessary. Additional comments: I have made amendments to the documentation where necessary. Additional comments: I have made amendments to the documentation where necessary. Additional comments: I attest my time as attending is greater than 50% of the total combined time spent on qualifying patient care activities by the PA/NP and attending.
I have made amendments to the documentation where necessary. Additional comments: Patient seen and examined today at bedside. Chart, labs, vitals, radiology reviewed. Above H&P reviewed and edited where appropriate. Agree with history and physical exam. Agree with assessment and plan. I reviewed the overnight course of events and discussed the care with the patient and their family  35 minutes spent on total encounter of which more than fifty percent of the encounter was spent counseling and/or coordinating care by the attending physician.
Pt care and plan discussed and reviewed with NP. Plan as outlined above edited by me to reflect our discussion.
I have made amendments to the documentation where necessary. Additional comments: I have made amendments to the documentation where necessary. Additional comments: I attest my time as attending is greater than 50% of the total combined time spent on qualifying patient care activities by the PA/NP and attending.     I have made amendments to the documentation where necessary. Additional comments: Pt seen and examined, plan of care discussed with the NP, spent 30 min on the case.
I have made amendments to the documentation where necessary. Additional comments: I have made amendments to the documentation where necessary. Additional comments: I have made amendments to the documentation where necessary. Additional comments: I attest my time as attending is greater than 50% of the total combined time spent on qualifying patient care activities by the PA/NP and attending.     I have made amendments to the documentation where necessary. Additional comments: Pt seen and examined, plan of care discussed with the NP, spent 30 min on the case.
I have made amendments to the documentation where necessary. Additional comments: I have made amendments to the documentation where necessary. Additional comments: I have made amendments to the documentation where necessary. Additional comments: I have made amendments to the documentation where necessary. Additional comments: I attest my time as attending is greater than 50% of the total combined time spent on qualifying patient care activities by the PA/NP and attending.

## 2022-07-26 NOTE — PROGRESS NOTE ADULT - REASON FOR ADMISSION
functional paraplegia post a fall

## 2022-07-26 NOTE — PROGRESS NOTE ADULT - SUBJECTIVE AND OBJECTIVE BOX
DATE OF SERVICE: 07-26-22 @ 11:36    Patient is a 77y old  Male who presents with a chief complaint of functional paraplegia post a fall (26 Jul 2022 11:31)      INTERVAL HISTORY: Feels ok.     REVIEW OF SYSTEMS:  CONSTITUTIONAL: No weakness  EYES/ENT: No visual changes;  No throat pain   NECK: No pain or stiffness  RESPIRATORY: No cough, wheezing; No shortness of breath  CARDIOVASCULAR: No chest pain or palpitations  GASTROINTESTINAL: No abdominal  pain. No nausea, vomiting, or hematemesis  GENITOURINARY: No dysuria, frequency or hematuria  NEUROLOGICAL: No stroke like symptoms  SKIN: No rashes    	  MEDICATIONS:  midodrine. 20 milliGRAM(s) Oral three times a day        PHYSICAL EXAM:  T(C): 37 (07-26-22 @ 09:01), Max: 37.8 (07-25-22 @ 23:26)  HR: 74 (07-26-22 @ 10:10) (68 - 85)  BP: 98/51 (07-26-22 @ 09:01) (81/45 - 98/56)  RR: 18 (07-26-22 @ 09:01) (18 - 18)  SpO2: 98% (07-26-22 @ 10:10) (97% - 100%)  Wt(kg): --  I&O's Summary    25 Jul 2022 07:01  -  26 Jul 2022 07:00  --------------------------------------------------------  IN: 0 mL / OUT: 1500 mL / NET: -1500 mL          Appearance: In no distress	  HEENT:    PERRL, EOMI	  Cardiovascular:  S1 S2, No JVD  Respiratory: Lungs clear to auscultation	  Gastrointestinal:  Soft, Non-tender, + BS	  Vascularature:  No edema of LE  Psychiatric: Appropriate affect   Neuro: no acute focal deficits                               11.9   13.74 )-----------( 209      ( 25 Jul 2022 10:43 )             39.1     07-25    129<L>  |  89<L>  |  56<H>  ----------------------------<  119<H>  4.8   |  24  |  10.78<H>    Ca    9.5      25 Jul 2022 10:43          Labs personally reviewed      ASSESSMENT/PLAN: 	    76 yo male with PMHx of ESRD on HD via AV fistula LUE MWF, COPD (on 2L home O2), CHF, pHTN, hypotension on Midodrine, DVT S/P IVC filter p/w fall.  Patient reports that he was walking out of his house today to go to dialysis when he felt his left knee "give out." Patient landed on his buttocks. No head trauma or LOC.  On coumadin.  Patient was unable to get up 2/2 left knee pain.  Patient also c/o acute on chronic low back pain since the fall.  Patient reports that he is otherwise feeling well.  Denies headache, blurry vision, focal weakness, numbness, slurred speech, CP, SOB, abd pain, NVD.  Patient does not make urine.  Of note, patient was discharged from [facility] one month ago after receiving PT for BL knee pain and difficulty ambulating.  Patient states that after discharge from rehab he was supposed to receive home PT, but that has not happened. (06 Jul 2022 14:54).    1. Chronic hypotension   - with baseline SBP noted in the 80s prior admit a year ago  - was discharged on Midodrine 10mg TID  - BP soft   - PT eval   - BP continues to be SBP 90s  - monitor for nausea and vomiting   - may add a trial of low dose florinef if BP remains low despite midodrine  - uptitrated to  Midodrine 20mg PO TID, cont to closely monitor BP  - Appreciate Endocrine recs  - C/w Midodrine 20mg PO TID. Consider Florinef 0.1mg PO daily and uptitrate as needed.    2. Hx of LE DVT prior admit   - s/p IVC filter   - on Warfarin at home as INR is elevated  - rec resume coumadin with INR <2.5,   - monitor INR     3. ?Hx of HF  - prior echo with preserved EF 60% and no mention of pulm HTN  - on HD for fluid management     4. ESRD   - HD per renal   - monitor Lytes and creat   - avoid nephrotoxins     5. Chest Pain   - Repeat EKG noted   - Troponin noted   - TTE shows LVEF 70-75%, hyperdynamic LV systolic function, grossly normal RV systolic function  - GI following  plan for upper gastrointestinal endoscopy tomorrow morning   - s/p Endoscopy: severe " linitis" type erythema with multiple gastric ulcers, biopsies were taken  - Full liquid diet for now, GI following        EMPERATRIZ Velez DO [facility]  Cardiovascular Medicine  [address] [place] [address], [address]  Office: [phone]  Cell: [phone]

## 2022-07-26 NOTE — PROGRESS NOTE ADULT - NUTRITIONAL ASSESSMENT
This patient has been assessed with a concern for Malnutrition and has been determined to have a diagnosis/diagnoses of Severe protein-calorie malnutrition and Morbid obesity (BMI > 40).    This patient is being managed with:   Diet Renal Restrictions-  For patients receiving Renal Replacement - No Protein Restr No Conc K No Conc Phos Low Sodium  Soft and Bite Sized (SOFTBTSZ)  Supplement Feeding Modality:  Oral  Nepro Cans or Servings Per Day:  1       Frequency:  Three Times a day  Entered: Jul 22 2022  4:34PM    Diet Renal Restrictions-  For patients receiving Renal Replacement - No Protein Restr No Conc K No Conc Phos Low Sodium  Soft and Bite Sized (SOFTBTSZ)  Supplement Feeding Modality:  Oral  Nepro Cans or Servings Per Day:  1       Frequency:  Daily  Entered: Jul 21 2022  8:26PM    The following pending diet order is being considered for treatment of Severe protein-calorie malnutrition and Morbid obesity (BMI > 40):null

## 2022-07-26 NOTE — PROGRESS NOTE ADULT - SUBJECTIVE AND OBJECTIVE BOX
Patient is a 77y old  Male who presents with a chief complaint of functional paraplegia post a fall (26 Jul 2022 12:51)      SUBJECTIVE / OVERNIGHT EVENTS: no new events, tolerating po intake but doesn't like the diet prescribed by GI, path still pending     MEDICATIONS  (STANDING):  allopurinol 100 milliGRAM(s) Oral daily  budesonide 160 MICROgram(s)/formoterol 4.5 MICROgram(s) Inhaler 2 Puff(s) Inhalation two times a day  chlorhexidine 2% Cloths 1 Application(s) Topical <User Schedule>  lactated ringers. 1000 milliLiter(s) (30 mL/Hr) IV Continuous <Continuous>  metoclopramide 5 milliGRAM(s) Oral every 8 hours  midodrine. 20 milliGRAM(s) Oral three times a day  multivitamin 1 Tablet(s) Oral daily  pantoprazole    Tablet 40 milliGRAM(s) Oral before breakfast  polyethylene glycol 3350 17 Gram(s) Oral daily  pregabalin 50 milliGRAM(s) Oral two times a day  sevelamer carbonate 800 milliGRAM(s) Oral three times a day with meals  simethicone 80 milliGRAM(s) Chew three times a day  sucralfate suspension 1 Gram(s) Oral two times a day  tiotropium 18 MICROgram(s) Capsule 1 Capsule(s) Inhalation daily    MEDICATIONS  (PRN):      Vital Signs Last 24 Hrs  T(F): 98.7 (07-26-22 @ 15:27), Max: 100 (07-25-22 @ 23:26)  HR: 72 (07-26-22 @ 15:27) (70 - 83)  BP: 98/55 (07-26-22 @ 15:27) (81/45 - 98/55)  RR: 18 (07-26-22 @ 15:27) (18 - 18)  SpO2: 100% (07-26-22 @ 15:27) (98% - 100%)  Telemetry:   CAPILLARY BLOOD GLUCOSE      POCT Blood Glucose.: 85 mg/dL (26 Jul 2022 11:49)  POCT Blood Glucose.: 73 mg/dL (26 Jul 2022 08:00)    I&O's Summary    25 Jul 2022 07:01  -  26 Jul 2022 07:00  --------------------------------------------------------  IN: 0 mL / OUT: 1500 mL / NET: -1500 mL        PHYSICAL EXAM:  GENERAL: NAD, well-developed  HEAD:  Atraumatic, Normocephalic  EYES: EOMI, PERRLA, conjunctiva and sclera clear  NECK: Supple, No JVD  CHEST/LUNG: Clear to auscultation bilaterally; No wheeze  HEART: Regular rate and rhythm; No murmurs, rubs, or gallops  ABDOMEN: Soft, Nontender, Nondistended; Bowel sounds present  EXTREMITIES:  2+ Peripheral Pulses, No clubbing, cyanosis, or edema  PSYCH: AAOx3  NEUROLOGY: non-focal  SKIN: No rashes or lesions    LABS:                        11.9   13.74 )-----------( 209      ( 25 Jul 2022 10:43 )             39.1     07-25    129<L>  |  89<L>  |  56<H>  ----------------------------<  119<H>  4.8   |  24  |  10.78<H>    Ca    9.5      25 Jul 2022 10:43      PT/INR - ( 25 Jul 2022 10:43 )   PT: 16.9 sec;   INR: 1.45 ratio         PTT - ( 25 Jul 2022 10:43 )  PTT:30.2 sec          RADIOLOGY & ADDITIONAL TESTS:    Imaging Personally Reviewed:    Consultant(s) Notes Reviewed:      Care Discussed with Consultants/Other Providers:

## 2022-07-30 LAB — INSULIN ANTIBODIES: <5 UU/ML — SIGNIFICANT CHANGE UP

## 2022-08-01 LAB — IGF BP2 SERPL-MCNC: 908 NG/ML — SIGNIFICANT CHANGE UP

## 2022-10-21 NOTE — ED PROVIDER NOTE - CONSTITUTIONAL APPEARANCE HYGIENE, MLM
Screening Questions  BLUE  KIND OF PREP RED  LOCATION [review exclusion criteria] GREEN  SEDATION TYPE        Y Are you active on mychart?       Carlene Boothe, OVIDIO CNP    Ordering/Referring Provider?        MEDICA What type of coverage do you have?      N Have you had a positive covid test in the last 90 days?     23.4 1. BMI  [BMI 40+ - review exclusion criteria]    Y  2. Are you able to give consent for your medical care? [IF NO,RN REVIEW]        N  3. Are you taking any prescription pain medications on a routine schedule?        3a. EXTENDED PREP What kind of prescription?     N 4. Do you have any chemical dependencies such as alcohol, street drugs, or methadone?    N 5. Do you have any history of post-traumatic stress syndrome, severe anxiety or history of psychosis?      **If yes 3- 5 , please schedule with MAC sedation.**          IF YES TO ANY 6 - 10 - HOSPITAL SETTING ONLY.     N 6.   Do you need assistance transferring?     N 7.   Have you had a heart or lung transplant?    N 8.   Are you currently on dialysis?   N 9.   Do you use daily home oxygen?   N 10. Do you take nitroglycerin?   10a.  If yes, how often?     11. [FEMALES]  N Are you currently pregnant?    11a.  If yes, how many weeks? [ Greater than 12 weeks, OR NEEDED]    N 12. Do you have Pulmonary Hypertension? *NEED PAC APPT AT [facility]*     N 13. [review exclusion criteria]  Do you have any implantable devices in your body (pacemaker, defib, LVAD)?    N 14. In the past 6 months, have you had any heart related issues including cardiomyopathy or heart attack?     14a.  If yes, did it require cardiac stenting if so when?     N 15. Have you had a stroke or Transient ischemic attack (TIA - aka  mini stroke ) within 6 months?      N 16. Do you have mod to severe Obstructive Sleep Apnea?  [Hospital only - Ok at [facility]]    N 17. Do you have SEVERE AND UNCONTROLLED asthma? *NEED PAC APPT AT [facility]*     N 18. Are you currently taking  "any blood thinners?     18a. If yes, inform patient to \"follow up w/ ordering provider for bridging instructions.\"    N 19. Do you take the medication Phentermine?    19a. If yes, \"Hold for 7 days before procedure.  Please consult your prescribing provider if you have questions about holding this medication.\"     N  20. Do you have chronic kidney disease?      N  21. Do you have a diagnosis of diabetes?     Y  22. On a regular basis do you go 3-5 days between bowel movements?      23. Preferred LOCAL Pharmacy for Pre Prescription    [ LIST ONLY ONE PHARMACY]        [id number] [place] [facility] [address] AT [facility] [address]        - CLOSING REMINDERS -    Informed patient they will need an adult    Cannot take any type of public or medical transportation alone    Conscious Sedation- Needs  for 6 hours after the procedure       MAC/General-Needs  for 24 hours after procedure    Pre-Procedure Covid test to be completed [[facility] PCR Testing Required]    Confirmed Nurse will call to complete assessment       - SCHEDULING DETAILS -     TAWNYA  Surgeon    11/16  Date of Procedure  Lower Endoscopy [Colonoscopy]  Type of Procedure Scheduled   [facility] Location  EXTENDED PREP - If you answer yes to questions #1, #3, #22 ([place] and CF pts)Which Colonoscopy Prep was Sent?     MAC, PER LOCATION Sedation Type     N PAC / Pre-op Required         Additional comments:          " well appearing

## 2022-10-31 NOTE — ED ADULT NURSE NOTE - INCIDENT LOCATION
home Consent (Scalp)/Introductory Paragraph: The rationale for Mohs was explained to the patient and consent was obtained. The risks, benefits and alternatives to therapy were discussed in detail. Specifically, the risks of changes in hair growth pattern secondary to repair, infection, scarring, bleeding, prolonged wound healing, incomplete removal, allergy to anesthesia, nerve injury and recurrence were addressed. Prior to the procedure, the treatment site was clearly identified and confirmed by the patient. All components of Universal Protocol/PAUSE Rule completed.

## 2023-01-16 ENCOUNTER — INPATIENT (INPATIENT)
Facility: HOSPITAL | Age: 78
LOS: 0 days | Discharge: NURSING, REHAB OR LONG-TERM CARE | End: 2023-01-17
Attending: INTERNAL MEDICINE | Admitting: INTERNAL MEDICINE
Payer: MEDICARE

## 2023-01-16 VITALS
DIASTOLIC BLOOD PRESSURE: 43 MMHG | OXYGEN SATURATION: 95 % | SYSTOLIC BLOOD PRESSURE: 95 MMHG | TEMPERATURE: 98 F | HEART RATE: 88 BPM | RESPIRATION RATE: 20 BRPM

## 2023-01-16 DIAGNOSIS — I95.9 HYPOTENSION, UNSPECIFIED: ICD-10-CM

## 2023-01-16 DIAGNOSIS — Z98.89 OTHER SPECIFIED POSTPROCEDURAL STATES: Chronic | ICD-10-CM

## 2023-01-16 DIAGNOSIS — E87.5 HYPERKALEMIA: ICD-10-CM

## 2023-01-16 DIAGNOSIS — N18.6 END STAGE RENAL DISEASE: ICD-10-CM

## 2023-01-16 LAB
ALBUMIN SERPL ELPH-MCNC: 4.4 G/DL — SIGNIFICANT CHANGE UP (ref 3.3–5)
ALP SERPL-CCNC: 107 U/L — SIGNIFICANT CHANGE UP (ref 40–120)
ALT FLD-CCNC: 7 U/L — SIGNIFICANT CHANGE UP (ref 4–41)
ANION GAP SERPL CALC-SCNC: 17 MMOL/L — HIGH (ref 7–14)
ANION GAP SERPL CALC-SCNC: 24 MMOL/L — HIGH (ref 7–14)
AST SERPL-CCNC: 26 U/L — SIGNIFICANT CHANGE UP (ref 4–40)
BASE EXCESS BLDV CALC-SCNC: -13.8 MMOL/L — LOW (ref -2–3)
BASE EXCESS BLDV CALC-SCNC: 2.4 MMOL/L — SIGNIFICANT CHANGE UP (ref -2–3)
BASOPHILS # BLD AUTO: 0.08 K/UL — SIGNIFICANT CHANGE UP (ref 0–0.2)
BASOPHILS NFR BLD AUTO: 0.9 % — SIGNIFICANT CHANGE UP (ref 0–2)
BILIRUB SERPL-MCNC: 0.3 MG/DL — SIGNIFICANT CHANGE UP (ref 0.2–1.2)
BLOOD GAS VENOUS COMPREHENSIVE RESULT: SIGNIFICANT CHANGE UP
BLOOD GAS VENOUS COMPREHENSIVE RESULT: SIGNIFICANT CHANGE UP
BUN SERPL-MCNC: 54 MG/DL — HIGH (ref 7–23)
BUN SERPL-MCNC: 58 MG/DL — HIGH (ref 7–23)
CALCIUM SERPL-MCNC: 7 MG/DL — LOW (ref 8.4–10.5)
CALCIUM SERPL-MCNC: 8 MG/DL — LOW (ref 8.4–10.5)
CHLORIDE BLDV-SCNC: 93 MMOL/L — LOW (ref 96–108)
CHLORIDE BLDV-SCNC: 98 MMOL/L — SIGNIFICANT CHANGE UP (ref 96–108)
CHLORIDE SERPL-SCNC: 102 MMOL/L — SIGNIFICANT CHANGE UP (ref 98–107)
CHLORIDE SERPL-SCNC: 92 MMOL/L — LOW (ref 98–107)
CO2 BLDV-SCNC: 18.8 MMOL/L — LOW (ref 22–26)
CO2 BLDV-SCNC: 31.7 MMOL/L — HIGH (ref 22–26)
CO2 SERPL-SCNC: 20 MMOL/L — LOW (ref 22–31)
CO2 SERPL-SCNC: 21 MMOL/L — LOW (ref 22–31)
CREAT SERPL-MCNC: 10.01 MG/DL — HIGH (ref 0.5–1.3)
CREAT SERPL-MCNC: 11.57 MG/DL — HIGH (ref 0.5–1.3)
DIALYSIS INSTRUMENT RESULT - HEPATITIS B SURFACE ANTIGEN: NEGATIVE — SIGNIFICANT CHANGE UP
EGFR: 4 ML/MIN/1.73M2 — LOW
EGFR: 5 ML/MIN/1.73M2 — LOW
EOSINOPHIL # BLD AUTO: 0.5 K/UL — SIGNIFICANT CHANGE UP (ref 0–0.5)
EOSINOPHIL NFR BLD AUTO: 5.8 % — SIGNIFICANT CHANGE UP (ref 0–6)
FLUAV AG NPH QL: SIGNIFICANT CHANGE UP
FLUBV AG NPH QL: SIGNIFICANT CHANGE UP
GAS PNL BLDV: 110 MMOL/L — CRITICAL LOW (ref 136–145)
GAS PNL BLDV: 128 MMOL/L — LOW (ref 136–145)
GLUCOSE BLDV-MCNC: 85 MG/DL — SIGNIFICANT CHANGE UP (ref 70–99)
GLUCOSE BLDV-MCNC: 89 MG/DL — SIGNIFICANT CHANGE UP (ref 70–99)
GLUCOSE SERPL-MCNC: 81 MG/DL — SIGNIFICANT CHANGE UP (ref 70–99)
GLUCOSE SERPL-MCNC: 88 MG/DL — SIGNIFICANT CHANGE UP (ref 70–99)
HCO3 BLDV-SCNC: 17 MMOL/L — LOW (ref 22–29)
HCO3 BLDV-SCNC: 30 MMOL/L — HIGH (ref 22–29)
HCT VFR BLD CALC: 42.6 % — SIGNIFICANT CHANGE UP (ref 39–50)
HCT VFR BLDA CALC: 40 % — SIGNIFICANT CHANGE UP (ref 39–51)
HCT VFR BLDA CALC: 41 % — SIGNIFICANT CHANGE UP (ref 39–51)
HGB BLD CALC-MCNC: 13.4 G/DL — SIGNIFICANT CHANGE UP (ref 13–17)
HGB BLD CALC-MCNC: 13.8 G/DL — SIGNIFICANT CHANGE UP (ref 13–17)
HGB BLD-MCNC: 12.9 G/DL — LOW (ref 13–17)
IANC: 5.26 K/UL — SIGNIFICANT CHANGE UP (ref 1.8–7.4)
IMM GRANULOCYTES NFR BLD AUTO: 0.4 % — SIGNIFICANT CHANGE UP (ref 0–0.9)
INR BLD: 2.61 RATIO — HIGH (ref 0.88–1.16)
LACTATE BLDV-MCNC: 2.2 MMOL/L — HIGH (ref 0.5–2)
LACTATE BLDV-MCNC: 2.6 MMOL/L — HIGH (ref 0.5–2)
LYMPHOCYTES # BLD AUTO: 1.99 K/UL — SIGNIFICANT CHANGE UP (ref 1–3.3)
LYMPHOCYTES # BLD AUTO: 23.3 % — SIGNIFICANT CHANGE UP (ref 13–44)
MCHC RBC-ENTMCNC: 29.1 PG — SIGNIFICANT CHANGE UP (ref 27–34)
MCHC RBC-ENTMCNC: 30.3 GM/DL — LOW (ref 32–36)
MCV RBC AUTO: 95.9 FL — SIGNIFICANT CHANGE UP (ref 80–100)
MONOCYTES # BLD AUTO: 0.69 K/UL — SIGNIFICANT CHANGE UP (ref 0–0.9)
MONOCYTES NFR BLD AUTO: 8.1 % — SIGNIFICANT CHANGE UP (ref 2–14)
NEUTROPHILS # BLD AUTO: 5.26 K/UL — SIGNIFICANT CHANGE UP (ref 1.8–7.4)
NEUTROPHILS NFR BLD AUTO: 61.5 % — SIGNIFICANT CHANGE UP (ref 43–77)
NRBC # BLD: 0 /100 WBCS — SIGNIFICANT CHANGE UP (ref 0–0)
NRBC # FLD: 0 K/UL — SIGNIFICANT CHANGE UP (ref 0–0)
PCO2 BLDV: 58 MMHG — HIGH (ref 42–55)
PCO2 BLDV: 60 MMHG — HIGH (ref 42–55)
PH BLDV: 7.06 — LOW (ref 7.32–7.43)
PH BLDV: 7.32 — SIGNIFICANT CHANGE UP (ref 7.32–7.43)
PLATELET # BLD AUTO: 203 K/UL — SIGNIFICANT CHANGE UP (ref 150–400)
PO2 BLDV: 148 MMHG — SIGNIFICANT CHANGE UP
PO2 BLDV: 49 MMHG — SIGNIFICANT CHANGE UP
POTASSIUM BLDV-SCNC: SIGNIFICANT CHANGE UP MMOL/L (ref 3.5–5.1)
POTASSIUM BLDV-SCNC: SIGNIFICANT CHANGE UP MMOL/L (ref 3.5–5.1)
POTASSIUM SERPL-MCNC: 5.3 MMOL/L — SIGNIFICANT CHANGE UP (ref 3.5–5.3)
POTASSIUM SERPL-MCNC: 5.9 MMOL/L — HIGH (ref 3.5–5.3)
POTASSIUM SERPL-SCNC: 5.3 MMOL/L — SIGNIFICANT CHANGE UP (ref 3.5–5.3)
POTASSIUM SERPL-SCNC: 5.9 MMOL/L — HIGH (ref 3.5–5.3)
PROT SERPL-MCNC: 9.2 G/DL — HIGH (ref 6–8.3)
PROTHROM AB SERPL-ACNC: 30.6 SEC — HIGH (ref 10.5–13.4)
RBC # BLD: 4.44 M/UL — SIGNIFICANT CHANGE UP (ref 4.2–5.8)
RBC # FLD: 15 % — HIGH (ref 10.3–14.5)
RSV RNA NPH QL NAA+NON-PROBE: SIGNIFICANT CHANGE UP
SAO2 % BLDV: 76.5 % — SIGNIFICANT CHANGE UP
SAO2 % BLDV: 99.1 % — SIGNIFICANT CHANGE UP
SARS-COV-2 RNA SPEC QL NAA+PROBE: SIGNIFICANT CHANGE UP
SODIUM SERPL-SCNC: 137 MMOL/L — SIGNIFICANT CHANGE UP (ref 135–145)
SODIUM SERPL-SCNC: 139 MMOL/L — SIGNIFICANT CHANGE UP (ref 135–145)
WBC # BLD: 8.55 K/UL — SIGNIFICANT CHANGE UP (ref 3.8–10.5)
WBC # FLD AUTO: 8.55 K/UL — SIGNIFICANT CHANGE UP (ref 3.8–10.5)

## 2023-01-16 PROCEDURE — 99285 EMERGENCY DEPT VISIT HI MDM: CPT

## 2023-01-16 RX ORDER — BUDESONIDE AND FORMOTEROL FUMARATE DIHYDRATE 160; 4.5 UG/1; UG/1
2 AEROSOL RESPIRATORY (INHALATION)
Refills: 0 | Status: DISCONTINUED | OUTPATIENT
Start: 2023-01-16 | End: 2023-01-17

## 2023-01-16 RX ORDER — METOCLOPRAMIDE HCL 10 MG
5 TABLET ORAL
Refills: 0 | Status: DISCONTINUED | OUTPATIENT
Start: 2023-01-16 | End: 2023-01-17

## 2023-01-16 RX ORDER — SEVELAMER CARBONATE 2400 MG/1
800 POWDER, FOR SUSPENSION ORAL THREE TIMES A DAY
Refills: 0 | Status: DISCONTINUED | OUTPATIENT
Start: 2023-01-16 | End: 2023-01-17

## 2023-01-16 RX ORDER — MIDODRINE HYDROCHLORIDE 2.5 MG/1
30 TABLET ORAL THREE TIMES A DAY
Refills: 0 | Status: DISCONTINUED | OUTPATIENT
Start: 2023-01-16 | End: 2023-01-17

## 2023-01-16 RX ADMIN — SEVELAMER CARBONATE 800 MILLIGRAM(S): 2400 POWDER, FOR SUSPENSION ORAL at 21:32

## 2023-01-16 NOTE — H&P ADULT - NSHPPHYSICALEXAM_GEN_ALL_CORE
T(C): 36.4 (01-16-23 @ 21:03), Max: 36.7 (01-16-23 @ 16:14)  HR: 73 (01-16-23 @ 21:03) (68 - 88)  BP: 104/58 (01-16-23 @ 21:03) (95/43 - 114/53)  RR: 16 (01-16-23 @ 21:03) (15 - 20)  SpO2: 99% (01-16-23 @ 21:03) (95% - 99%)    PHYSICAL EXAM:  GENERAL: NAD, well-developed  HEAD:  Atraumatic, Normocephalic  EYES: EOMI, PERRLA, conjunctiva and sclera clear  NECK: Supple, No JVD  CHEST/LUNG: Clear to auscultation bilaterally; No wheeze  HEART: Regular rate and rhythm; No murmurs, rubs, or gallops  ABDOMEN: Soft, Nontender, Nondistended; Bowel sounds present  EXTREMITIES:  2+ Peripheral Pulses, No clubbing, cyanosis, or edema  PSYCH: AAOx3  NEUROLOGY: non-focal  SKIN: No rashes or lesions

## 2023-01-16 NOTE — ED PROVIDER NOTE - OBJECTIVE STATEMENT
ESRD on HD via AV fistula LUE MWF, COPD (on 2L home O2), CHF, pHTN, hypotension on Midodrine, DVT S/P IVC presents from rehab for hypotension. Pt was scheduled for HD today had BP taken before hand was 80/50s missed HD and was told to come to ED> pt reports taking midodrine 30 min prior to HD. not having any sx. no cp sob abd pain n/v HA dizziness.

## 2023-01-16 NOTE — ED PROVIDER NOTE - ATTENDING CONTRIBUTION TO CARE
Attending Statement: I have personally seen and examined this patient. I have fully participated in the care of this patient. I have reviewed all pertinent clinical information, including history physical exam, plan and the Resident's note and agree except as noted  77-year-old male history of hypotension on Midrin, end-stage renal disease on hemodialysis Monday Wednesday Friday, COPD on nasal cannula 2 L baseline, CHF, DVT status post IVC brought in by EMS from hemodialysis center for low blood pressure.  Patient was going for routine hemodialysis, blood pressure was 80 over 50s therefore staff did not feel comfortable dialyzing him patient was sent to the ER.  Patient denies any complaints at this time other than being hungry.  States he was given his midodrine before he left to hemodialysis and was given IV fluids with EMS, feels "I am fine" denies chest pain, no shortness of breath, not dizziness.  Vital signs noted patient resting in bed ANO x3 no respiratory distress soft nontender abdomen.  Has a fistula to left upper extremity.  Plan EKG labs and renal for hemodialysis.  Telemetry monitoring monitor pressure

## 2023-01-16 NOTE — ED PROVIDER NOTE - PHYSICAL EXAMINATION
Gen: NAD, non-toxic appearing  Head: normal appearing  HEENT: normal conjunctiva, oral mucosa moist  Lung: no respiratory distress, speaking in full sentences, CTA b/l     CV: regular rate and rhythm, no murmurs /58 in room   Abd: soft, non distended, non tender   MSK: fistula LUE   Neuro: No focal deficits, AAOx3  Skin: Warm  Psych: normal affect

## 2023-01-16 NOTE — ED PROVIDER NOTE - PROGRESS NOTE DETAILS
Repeat potassium improved at 5.3 moderately hemolyzed pending repeat VBG and admission for hemodialysis.  Renal following outpatient Trevor Ortiz MD:  Discussed with Dr. Hein, admitting to hospital for dialysis, patient stable awake and alert.

## 2023-01-16 NOTE — CONSULT NOTE ADULT - ATTENDING COMMENTS
Hypotension  ESRD on HD  Hyperkalemia    HD overnight without UF, patient does not make urine. Please monitor respiratory status as we could not take off fluid with BP.   Cont midodrine, also would fluid restrict  Monitor labs closely

## 2023-01-16 NOTE — CONSULT NOTE ADULT - PROBLEM SELECTOR RECOMMENDATION 9
Pt. with ESRD on HD TIW (MWF) admitted to [facility] for hypotension. Last outpatient HD was on Friday, 1/13/23 at [facility] via [facility]. Pt. clinically stable. Labs reviewed. HD consent obtained from pt, placed in chart. Will arrange for HD tomorrow AM 1/17/23. HD orders placed and discussed with RN to keep pt on first shift. Continue Midodrine prior to HD. Check serum phosphorus levels. Monitor BP and labs.

## 2023-01-16 NOTE — ED PROVIDER NOTE - CLINICAL SUMMARY MEDICAL DECISION MAKING FREE TEXT BOX
ESRD on HD via AV fistula LUE MWF, COPD (on 2L home O2), CHF, pHTN, hypotension on Midodrine, DVT S/P IVC p/w episode of hypo T and missed HD. pt asymptomatic normotensive in ed room. other vss. c/f known hypotension  low c/f for infectious etiology given resolution. will check e- abnormality vs metabolic vs cardiac. plan labs ekg renal cs for HD and admission.

## 2023-01-16 NOTE — ED ADULT TRIAGE NOTE - CHIEF COMPLAINT QUOTE
Pt arrives from [address] [place] [address] rehab for hypotension. Pt was unable to get dialysis because his BP was to low. BP in 80's upon EMS arrival. Pt hx COPD and 02 dependent. Pt with left AV fistula.

## 2023-01-16 NOTE — ED ADULT NURSE NOTE - OBJECTIVE STATEMENT
Patient presents to the ED from NH for hypotension and was unable to get dialysis because of low BP. AV fistula left arm. As per triage, EMS stated BP in 80s systolic. Patient is AA&Ox3, in no acute distress. No pallor, no cyanosis. Respirations even, unlabored on 2 L O2 via nasal canula. No pallor, no cyanosis. Patient states he is on 2L in rehab. Hx. COPD. Labs drawn and sent.

## 2023-01-16 NOTE — H&P ADULT - ASSESSMENT
76 yo male w h/o orthostatic hypotension on mIdodrine, ESRD on HD via AV fistula LUE MWF, COPD (on 2L home O2), CHF, pHTN,, DVT S/P IVC filter presents from rehab for hypotension.   Pt was scheduled for HD today had BP taken before hand was 80/50s missed HD and was told to come to ED.  Ptn reports taking midodrine 30 min prior to HD. not having any sx. no cp sob abd pain n/v HA dizziness. Initially had Hyperkalemia in the ED which was improved post fluids. House Renal consulted  ptn is medically stable  resume outptn meds  check PT INR  renal to arrange for HD

## 2023-01-16 NOTE — H&P ADULT - HISTORY OF PRESENT ILLNESS
78 yo male w h/o orthostatic hypotension on mIdodrine, ESRD on HD via AV fistula LUE MWF, COPD (on 2L home O2), CHF, pHTN,, DVT S/P IVC filter presents from rehab for hypotension.   Pt was scheduled for HD today had BP taken before hand was 80/50s missed HD and was told to come to ED.  Ptn reports taking midodrine 30 min prior to HD. not having any sx. no cp sob abd pain n/v HA dizziness. Initially had Hyperkalemia in the ED which was improved post fluids. House Renal consulted

## 2023-01-16 NOTE — ED ADULT NURSE NOTE - CHIEF COMPLAINT QUOTE
Pt arrives from [address] [place] [address] rehab for hypotension. Pt was unable to get dialysis because his BP was to low. BP in 80's upon EMS arrival. Pt hx COPD and 02 dependent. Pt with left AV fistula.

## 2023-01-16 NOTE — CONSULT NOTE ADULT - SUBJECTIVE AND OBJECTIVE BOX
[facility] DIVISION OF KIDNEY DISEASES AND HYPERTENSION -- [phone]  -- INITIAL CONSULT NOTE  --------------------------------------------------------------------------------  HPI: 77-year-old  with PMH of ESRD on HD TIW (MWF), sent to [facility] rehab center due to profound hypotension. Pt was noted to have low BP in 80s prior to HD today at rehab. HD was not performed and was hence referred to [facility] ER for further evaluation. Nephrology consulted for ESRD/HD management.     Pt. seen and examined today. Pt. receives his maintenance HD TIW MWF at [facility]. Pt.'s does not remember name of his outpatient nephrologist. Last OP HD was done on 1/13/23 via LUE AVF. Pt says he gets HD for 3.5 hours. Pt was noted to have low SBP in 90s on arrival in ER. Pt received IVF, repeat BP improved to 114/53 mm hg.     Pt reports feeling better. No fever, CP, SOB, HA, or dizziness during evaluation today. Pt. says he has chronic hypotension and takes midodrine 10 mg prior to HD.    PAST HISTORY  --------------------------------------------------------------------------------  PAST MEDICAL & SURGICAL HISTORY:  Hypertension  Glomerular disease  Segmental glomerular sclerosis  CHF (congestive heart failure)  COPD (chronic obstructive pulmonary disease)  Pulmonary hypertension  Sleep apnea  Pulmonary edema  Peripheral edema  Gout  History of abdominal surgery  polypectomy  H/O right heart catheterization        FAMILY HISTORY:  Family history of colon cancer (Father)    Family history of heart disease (Father)      PAST SOCIAL HISTORY:    ALLERGIES & MEDICATIONS  --------------------------------------------------------------------------------  Allergies    latex (Rash)  No Known Drug Allergies    Intolerances      Standing Inpatient Medications  budesonide 160 MICROgram(s)/formoterol 4.5 MICROgram(s) Inhaler 2 Puff(s) Inhalation two times a day  metoclopramide 5 milliGRAM(s) Oral three times a day before meals  midodrine. 30 milliGRAM(s) Oral three times a day  pregabalin 50 milliGRAM(s) Oral two times a day  sevelamer carbonate 800 milliGRAM(s) Oral three times a day    PRN Inpatient Medications      REVIEW OF SYSTEMS  --------------------------------------------------------------------------------  Gen: No fevers/chills  Head/Eyes/Ears: No HA  Respiratory: No dyspnea, cough  CV: No chest pain, see HPI  GI: No abdominal pain, diarrhea  : see HPI  MSK: No  edema  Skin: No rashes  Heme: No easy bruising or bleeding    All other systems were reviewed and are negative, except as noted.    VITALS/PHYSICAL EXAM  --------------------------------------------------------------------------------  T(C): 36.4 (01-16-23 @ 21:03), Max: 36.7 (01-16-23 @ 16:14)  HR: 73 (01-16-23 @ 21:03) (68 - 88)  BP: 104/58 (01-16-23 @ 21:03) (95/43 - 114/53)  RR: 16 (01-16-23 @ 21:03) (15 - 20)  SpO2: 99% (01-16-23 @ 21:03) (95% - 99%)  Wt(kg): --      Physical Exam:  	Gen: elderly male, resting, NAD  	HEENT: Anicteric  	Pulm: Fair entry B/L  	CV: S1S2+  	Abd: Soft, +BS   	Ext: trace LE edema B/L,   	Neuro: Awake        	Skin: Warm and dry, chronic LE skin changes  	Dialysis access: LUE AVF, thrill felt and bruit heard    LABS/STUDIES  --------------------------------------------------------------------------------              12.9   8.55  >-----------<  203      [01-16-23 @ 17:10]              42.6     139  |  102  |  54  ----------------------------<  81      [01-16-23 @ 18:53]  5.3   |  20  |  10.01        Ca     7.0     [01-16-23 @ 18:53]    Creatinine Trend:  SCr 10.01 [01-16 @ 18:53]  SCr 11.57 [01-16 @ 17:10]    HBsAb 107.0      [08-24-21 @ 09:47]  HBsAb Reactive      [08-24-21 @ 09:47]  HBsAg Nonreact      [07-11-22 @ 13:23]  HBcAb Nonreact      [08-31-21 @ 00:12]  HCV 0.39, Nonreact      [07-11-22 @ 13:23] [facility] DIVISION OF KIDNEY DISEASES AND HYPERTENSION -- [phone]  -- INITIAL CONSULT NOTE  --------------------------------------------------------------------------------  HPI: 77-year-old  with PMH of ESRD on HD TIW (MWF), sent to [facility] rehab center due to profound hypotension. Pt was noted to have low BP in 80s prior to HD today at rehab. HD was not performed and was hence referred to [facility] ER for further evaluation. Nephrology consulted for ESRD/HD management.     Pt. seen and examined today. Pt. receives his maintenance HD TIW MWF at [facility]. Pt.'s does not remember name of his outpatient nephrologist. Last OP HD was done on 1/13/23 via LUE AVF. Pt says he gets HD for 3.5 hours. Pt was noted to have low SBP in 90s on arrival in ER. Pt received IVF, repeat BP improved to 114/53 mm hg.     Pt reports feeling better. No fever, CP, SOB, HA, or dizziness during evaluation today. Pt. says he has chronic hypotension and takes midodrine 10 mg prior to HD.    PAST HISTORY  --------------------------------------------------------------------------------  PAST MEDICAL & SURGICAL HISTORY:  Hypertension  Glomerular disease  Segmental glomerular sclerosis  CHF (congestive heart failure)  COPD (chronic obstructive pulmonary disease)  Pulmonary hypertension  Sleep apnea  Pulmonary edema  Peripheral edema  Gout  History of abdominal surgery  polypectomy  H/O right heart catheterization        FAMILY HISTORY:  Family history of colon cancer (Father)    Family history of heart disease (Father)      PAST SOCIAL HISTORY: No smoking, drugs or alcohol use    ALLERGIES & MEDICATIONS  --------------------------------------------------------------------------------  Allergies    latex (Rash)  No Known Drug Allergies    Intolerances      Standing Inpatient Medications  budesonide 160 MICROgram(s)/formoterol 4.5 MICROgram(s) Inhaler 2 Puff(s) Inhalation two times a day  metoclopramide 5 milliGRAM(s) Oral three times a day before meals  midodrine. 30 milliGRAM(s) Oral three times a day  pregabalin 50 milliGRAM(s) Oral two times a day  sevelamer carbonate 800 milliGRAM(s) Oral three times a day    PRN Inpatient Medications      REVIEW OF SYSTEMS  --------------------------------------------------------------------------------  Gen: No fevers/chills  Head/Eyes/Ears: No HA  Respiratory: No dyspnea, cough  CV: No chest pain, see HPI  GI: No abdominal pain, diarrhea  : see HPI  MSK: No  edema  Skin: No rashes  Heme: No easy bruising or bleeding    All other systems were reviewed and are negative, except as noted.    VITALS/PHYSICAL EXAM  --------------------------------------------------------------------------------  T(C): 36.4 (01-16-23 @ 21:03), Max: 36.7 (01-16-23 @ 16:14)  HR: 73 (01-16-23 @ 21:03) (68 - 88)  BP: 104/58 (01-16-23 @ 21:03) (95/43 - 114/53)  RR: 16 (01-16-23 @ 21:03) (15 - 20)  SpO2: 99% (01-16-23 @ 21:03) (95% - 99%)  Wt(kg): --      Physical Exam:  	Gen: elderly male, resting, NAD  	HEENT: Anicteric  	Pulm: Fair entry B/L  	CV: S1S2+  	Abd: Soft, +BS   	Ext: trace LE edema B/L,   	Neuro: Awake        	Skin: Warm and dry, chronic LE skin changes  	Dialysis access: LUE AVF, thrill felt and bruit heard    LABS/STUDIES  --------------------------------------------------------------------------------              12.9   8.55  >-----------<  203      [01-16-23 @ 17:10]              42.6     139  |  102  |  54  ----------------------------<  81      [01-16-23 @ 18:53]  5.3   |  20  |  10.01        Ca     7.0     [01-16-23 @ 18:53]    Creatinine Trend:  SCr 10.01 [01-16 @ 18:53]  SCr 11.57 [01-16 @ 17:10]    HBsAb 107.0      [08-24-21 @ 09:47]  HBsAb Reactive      [08-24-21 @ 09:47]  HBsAg Nonreact      [07-11-22 @ 13:23]  HBcAb Nonreact      [08-31-21 @ 00:12]  HCV 0.39, Nonreact      [07-11-22 @ 13:23]

## 2023-01-17 ENCOUNTER — TRANSCRIPTION ENCOUNTER (OUTPATIENT)
Age: 78
End: 2023-01-17

## 2023-01-17 VITALS
SYSTOLIC BLOOD PRESSURE: 99 MMHG | RESPIRATION RATE: 19 BRPM | DIASTOLIC BLOOD PRESSURE: 52 MMHG | OXYGEN SATURATION: 98 % | HEART RATE: 68 BPM

## 2023-01-17 LAB
ANION GAP SERPL CALC-SCNC: 20 MMOL/L — HIGH (ref 7–14)
BUN SERPL-MCNC: 64 MG/DL — HIGH (ref 7–23)
CALCIUM SERPL-MCNC: 7.8 MG/DL — LOW (ref 8.4–10.5)
CHLORIDE SERPL-SCNC: 95 MMOL/L — LOW (ref 98–107)
CO2 SERPL-SCNC: 26 MMOL/L — SIGNIFICANT CHANGE UP (ref 22–31)
CREAT SERPL-MCNC: 12.63 MG/DL — HIGH (ref 0.5–1.3)
EGFR: 4 ML/MIN/1.73M2 — LOW
GLUCOSE SERPL-MCNC: 82 MG/DL — SIGNIFICANT CHANGE UP (ref 70–99)
HBV CORE AB SER-ACNC: SIGNIFICANT CHANGE UP
HBV SURFACE AB SER-ACNC: 46.9 MIU/ML — SIGNIFICANT CHANGE UP
HBV SURFACE AG SER-ACNC: SIGNIFICANT CHANGE UP
HCT VFR BLD CALC: 39.5 % — SIGNIFICANT CHANGE UP (ref 39–50)
HCV AB S/CO SERPL IA: 0.35 S/CO — SIGNIFICANT CHANGE UP (ref 0–0.99)
HCV AB SERPL-IMP: SIGNIFICANT CHANGE UP
HGB BLD-MCNC: 11.6 G/DL — LOW (ref 13–17)
INR BLD: 2.63 RATIO — HIGH (ref 0.88–1.16)
MCHC RBC-ENTMCNC: 28.4 PG — SIGNIFICANT CHANGE UP (ref 27–34)
MCHC RBC-ENTMCNC: 29.4 GM/DL — LOW (ref 32–36)
MCV RBC AUTO: 96.8 FL — SIGNIFICANT CHANGE UP (ref 80–100)
NRBC # BLD: 0 /100 WBCS — SIGNIFICANT CHANGE UP (ref 0–0)
NRBC # FLD: 0 K/UL — SIGNIFICANT CHANGE UP (ref 0–0)
PLATELET # BLD AUTO: 196 K/UL — SIGNIFICANT CHANGE UP (ref 150–400)
POTASSIUM SERPL-MCNC: 4.8 MMOL/L — SIGNIFICANT CHANGE UP (ref 3.5–5.3)
POTASSIUM SERPL-SCNC: 4.8 MMOL/L — SIGNIFICANT CHANGE UP (ref 3.5–5.3)
PROTHROM AB SERPL-ACNC: 30.8 SEC — HIGH (ref 10.5–13.4)
RBC # BLD: 4.08 M/UL — LOW (ref 4.2–5.8)
RBC # FLD: 15.1 % — HIGH (ref 10.3–14.5)
SODIUM SERPL-SCNC: 141 MMOL/L — SIGNIFICANT CHANGE UP (ref 135–145)
WBC # BLD: 7.91 K/UL — SIGNIFICANT CHANGE UP (ref 3.8–10.5)
WBC # FLD AUTO: 7.91 K/UL — SIGNIFICANT CHANGE UP (ref 3.8–10.5)

## 2023-01-17 PROCEDURE — 71045 X-RAY EXAM CHEST 1 VIEW: CPT | Mod: 26

## 2023-01-17 PROCEDURE — 99223 1ST HOSP IP/OBS HIGH 75: CPT | Mod: FS,GC

## 2023-01-17 RX ORDER — FLUTICASONE FUROATE, UMECLIDINIUM BROMIDE AND VILANTEROL TRIFENATATE 200; 62.5; 25 UG/1; UG/1; UG/1
1 POWDER RESPIRATORY (INHALATION)
Qty: 0 | Refills: 0 | DISCHARGE

## 2023-01-17 RX ORDER — MIDODRINE HYDROCHLORIDE 2.5 MG/1
5 TABLET ORAL ONCE
Refills: 0 | Status: COMPLETED | OUTPATIENT
Start: 2023-01-17 | End: 2023-01-17

## 2023-01-17 RX ORDER — FOLIC ACID 0.8 MG
1 TABLET ORAL
Qty: 0 | Refills: 0 | DISCHARGE

## 2023-01-17 RX ORDER — SENNA PLUS 8.6 MG/1
2 TABLET ORAL
Qty: 0 | Refills: 0 | DISCHARGE

## 2023-01-17 RX ORDER — POLYETHYLENE GLYCOL 3350 17 G/17G
17 POWDER, FOR SOLUTION ORAL
Qty: 0 | Refills: 0 | DISCHARGE

## 2023-01-17 RX ORDER — CHOLECALCIFEROL (VITAMIN D3) 125 MCG
1 CAPSULE ORAL
Qty: 0 | Refills: 0 | DISCHARGE

## 2023-01-17 RX ORDER — MIDODRINE HYDROCHLORIDE 2.5 MG/1
3 TABLET ORAL
Qty: 0 | Refills: 0 | DISCHARGE
Start: 2023-01-17

## 2023-01-17 RX ORDER — WARFARIN SODIUM 2.5 MG/1
7 TABLET ORAL ONCE
Refills: 0 | Status: DISCONTINUED | OUTPATIENT
Start: 2023-01-17 | End: 2023-01-17

## 2023-01-17 RX ORDER — ALBUTEROL 90 UG/1
3 AEROSOL, METERED ORAL
Qty: 0 | Refills: 0 | DISCHARGE

## 2023-01-17 RX ORDER — ZINC OXIDE 200 MG/G
0 OINTMENT TOPICAL
Qty: 0 | Refills: 0 | DISCHARGE

## 2023-01-17 RX ORDER — PANTOPRAZOLE SODIUM 20 MG/1
1 TABLET, DELAYED RELEASE ORAL
Qty: 0 | Refills: 0 | DISCHARGE

## 2023-01-17 RX ORDER — DOCUSATE SODIUM 100 MG
1 CAPSULE ORAL
Qty: 0 | Refills: 0 | DISCHARGE

## 2023-01-17 RX ORDER — WARFARIN SODIUM 2.5 MG/1
1 TABLET ORAL
Qty: 0 | Refills: 0 | DISCHARGE

## 2023-01-17 RX ORDER — MIDODRINE HYDROCHLORIDE 2.5 MG/1
1 TABLET ORAL
Qty: 0 | Refills: 0 | DISCHARGE

## 2023-01-17 RX ORDER — CALCIUM ACETATE 667 MG
2 TABLET ORAL
Qty: 0 | Refills: 0 | DISCHARGE

## 2023-01-17 RX ADMIN — MIDODRINE HYDROCHLORIDE 30 MILLIGRAM(S): 2.5 TABLET ORAL at 13:19

## 2023-01-17 RX ADMIN — MIDODRINE HYDROCHLORIDE 30 MILLIGRAM(S): 2.5 TABLET ORAL at 05:26

## 2023-01-17 RX ADMIN — Medication 50 MILLIGRAM(S): at 05:26

## 2023-01-17 RX ADMIN — SEVELAMER CARBONATE 800 MILLIGRAM(S): 2400 POWDER, FOR SUSPENSION ORAL at 13:19

## 2023-01-17 RX ADMIN — SEVELAMER CARBONATE 800 MILLIGRAM(S): 2400 POWDER, FOR SUSPENSION ORAL at 05:26

## 2023-01-17 RX ADMIN — Medication 5 MILLIGRAM(S): at 13:19

## 2023-01-17 RX ADMIN — MIDODRINE HYDROCHLORIDE 5 MILLIGRAM(S): 2.5 TABLET ORAL at 03:05

## 2023-01-17 NOTE — DISCHARGE NOTE NURSING/CASE MANAGEMENT/SOCIAL WORK - PATIENT PORTAL LINK FT
You can access the FollowMyHealth Patient Portal offered by [facility] by registering at the following website: [email or web address]. By joining [facility]’s FollowMyHealth portal, you will also be able to view your health information using other applications (apps) compatible with our system.

## 2023-01-17 NOTE — CHART NOTE - NSCHARTNOTEFT_GEN_A_CORE
SW made aware patient has been cleared for discharge and requires transportation to return to facility, [facility] @ [address] [place] [address], Phone: [phone], spoke with admissions department to confirm patient can return. Ambulance set up with [facility] [phone], spoke with Janessa, confirmation 371A,  within the hour. Non emergent ambulance form placed in chart. Team aware. SW to remain available and assist as needed.

## 2023-01-17 NOTE — PROGRESS NOTE ADULT - SUBJECTIVE AND OBJECTIVE BOX
Patient is a 77y old  Male who presents with a chief complaint of hypotension (17 Jan 2023 14:15)      SUBJECTIVE / OVERNIGHT EVENTS: s/p HD, medically stable, BP is stable, dc back to [facility]    MEDICATIONS  (STANDING):  budesonide 160 MICROgram(s)/formoterol 4.5 MICROgram(s) Inhaler 2 Puff(s) Inhalation two times a day  metoclopramide 5 milliGRAM(s) Oral three times a day before meals  midodrine. 30 milliGRAM(s) Oral three times a day  pregabalin 50 milliGRAM(s) Oral two times a day  sevelamer carbonate 800 milliGRAM(s) Oral three times a day  warfarin 7 milliGRAM(s) Oral once    MEDICATIONS  (PRN):      Vital Signs Last 24 Hrs  T(F): 98.4 (01-17-23 @ 11:04), Max: 98.4 (01-17-23 @ 11:04)  HR: 68 (01-17-23 @ 14:25) (57 - 73)  BP: 99/52 (01-17-23 @ 14:25) (91/47 - 104/58)  RR: 19 (01-17-23 @ 14:25) (15 - 19)  SpO2: 98% (01-17-23 @ 14:25) (98% - 100%)  Telemetry:   CAPILLARY BLOOD GLUCOSE        I&O's Summary    17 Jan 2023 07:01  -  17 Jan 2023 20:00  --------------------------------------------------------  IN: 600 mL / OUT: 1500 mL / NET: -900 mL        PHYSICAL EXAM:  GENERAL: NAD, well-developed  HEAD:  Atraumatic, Normocephalic  EYES: EOMI, PERRLA, conjunctiva and sclera clear  NECK: Supple, No JVD  CHEST/LUNG: Clear to auscultation bilaterally; No wheeze  HEART: Regular rate and rhythm; No murmurs, rubs, or gallops  ABDOMEN: Soft, Nontender, Nondistended; Bowel sounds present  EXTREMITIES:  2+ Peripheral Pulses, No clubbing, cyanosis, or edema  PSYCH: AAOx3  NEUROLOGY: non-focal  SKIN: No rashes or lesions    LABS:                        11.6   7.91  )-----------( 196      ( 17 Jan 2023 06:10 )             39.5     01-17    141  |  95<L>  |  64<H>  ----------------------------<  82  4.8   |  26  |  12.63<H>    Ca    7.8<L>      17 Jan 2023 06:10    TPro  9.2<H>  /  Alb  4.4  /  TBili  0.3  /  DBili  x   /  AST  26  /  ALT  7   /  AlkPhos  107  01-16    PT/INR - ( 17 Jan 2023 06:10 )   PT: 30.8 sec;   INR: 2.63 ratio                   RADIOLOGY & ADDITIONAL TESTS:    Imaging Personally Reviewed:    Consultant(s) Notes Reviewed:      Care Discussed with Consultants/Other Providers:

## 2023-01-17 NOTE — ED ADULT NURSE REASSESSMENT NOTE - NS ED NURSE REASSESS COMMENT FT1
Break coverage RN. Patient resting in stretcher, respirations even and unlabored. Continues on cardiac monitor-NS. Continues on 3L nasal canula spO2 100%. Patient denies any complaints at this time. Report given to Dialysis RN. Patient awaiting transport. Stretcher in lowest position, wheels locked, appropriate side rails in place.
Morning labs collected and sent to lab, morning medications administered per orders prior to dialysis as requested by dialysis RN. Patient taken to dialysis with transport. IV line intact and patent.
Pt is alert orientedx4, pt going back to [facility]. Report given to EMS and pts D/C. 2 IV removed
Pt alert orientedx4, resting in bed, call bell within reach. Pt denies Cp, SOB or lightheadedness
pt received A&Ox4 offering no complaints at this time. pt resting comfortably in bed at this time. remains on continuos monitor and 3 L NC, 99%. NSR noted. L AV fistula intact. respirations even and unlabored. no acute distress noted. denies SOB, c/p, N/V/D, HA. pt to be dialyzed tomorrow morning as per admitting MD. awaiting bed assignment. safety maintained side rails up, call bell within reach

## 2023-01-17 NOTE — DISCHARGE NOTE PROVIDER - HOSPITAL COURSE
· Assessment    76 yo male w h/o orthostatic hypotension on mIdodrine, ESRD on HD via AV fistula LUE MWF, COPD (on 2L home O2), CHF, pHTN,, DVT S/P IVC filter presents from rehab for hypotension.  Pt was scheduled for HD today had BP taken before hand was 80/50s missed HD but was told to come to ED. Ptn reports taking midodrine 30 min prior to HD. not having any sx. no cp sob abd pain n/v HA dizziness. Initially had Hyperkalemia in the ED which was improved post fluids. House Renal consulted and coordinated HD.  Dialysis was completed and patient remained stable.    Case reviewed with Dr. Hein on 1/17/23 and patient was cleared for discharge.

## 2023-01-17 NOTE — PROGRESS NOTE ADULT - ASSESSMENT
76 yo male w h/o orthostatic hypotension on mIdodrine, ESRD on HD via AV fistula LUE MWF, COPD (on 2L home O2), CHF, pHTN,, DVT S/P IVC filter presents from rehab for hypotension.   1/16: Pt was scheduled for HD today had BP taken before hand was 80/50s missed HD and was told to come to ED.  Ptn reports taking midodrine 30 min prior to HD. not having any sx. no cp sob abd pain n/v HA dizziness. Initially had Hyperkalemia in the ED which was improved post fluids. House Renal consulted  ptn is medically stable  resume outptn meds  check PT INR  renal to arrange for HD  1/17: s/p HD, medically stable, BP is stable, dc back to SNF. dose w coumadin, INR is therapeutic

## 2023-01-17 NOTE — DISCHARGE NOTE PROVIDER - CARE PROVIDER_API CALL
Vilma Edmonds)  Internal Medicine  [address] [place] [address]  Phone: [phone]  Fax: [phone]  Follow Up Time:

## 2023-01-17 NOTE — PHARMACOTHERAPY INTERVENTION NOTE - COMMENTS
Medication history is complete. Medication list updated in Outpatient Medication Record (OMR). Please call spectra [phone] if you have any questions.

## 2023-01-17 NOTE — DISCHARGE NOTE PROVIDER - NSDCCPCAREPLAN_GEN_ALL_CORE_FT
PRINCIPAL DISCHARGE DIAGNOSIS  Diagnosis: Hypotension  Assessment and Plan of Treatment: You came to the hospital with low blood pressure. Your midodrine dose was adjusted and you were able to complete dialysis. Please resume your medications and dialysis as scheduled.      SECONDARY DISCHARGE DIAGNOSES  Diagnosis: ESRD on hemodialysis  Assessment and Plan of Treatment: Please follow up with your nephrologist for management, and continue you schedule hemodialysis.

## 2023-01-17 NOTE — DISCHARGE NOTE PROVIDER - NSDCMRMEDTOKEN_GEN_ALL_CORE_FT
albuterol 0.63 mg/3 mL (0.021%) inhalation solution: 3 milliliter(s) inhaled 3 times a day, As Needed  allopurinol 100 mg oral tablet: 1 tab(s) orally once a day  budesonide-formoterol 160 mcg-4.5 mcg/inh inhalation aerosol: 2 puff(s) inhaled 2 times a day  Colace 100 mg oral capsule: 1 cap(s) orally 3 times a day  folic acid 1 mg oral tablet: 1 tab(s) orally once a day  metoclopramide 5 mg oral tablet: 1 tab(s) orally every 8 hours  midodrine 10 mg oral tablet: 2 tab(s) orally 3 times a day  MiraLax oral powder for reconstitution: 17 gram(s) orally once a day, As Needed  Multiple Vitamins oral tablet: 1 tab(s) orally once a day  Nephro-Gita oral tablet: 1 tab(s) orally once a day  pantoprazole 40 mg oral delayed release tablet: 1 tab(s) orally once a day  polyethylene glycol 3350 oral powder for reconstitution: 17 gram(s) orally once a day  pregabalin 50 mg oral capsule: 1 cap(s) orally 2 times a day  senna (sennosides) 8.6 mg oral tablet: 2 tab(s) orally once a day (at bedtime)  sevelamer carbonate 800 mg oral tablet: 1 tab(s) orally 3 times a day (with meals)  simethicone 80 mg oral tablet, chewable: 1 tab(s) orally 3 times a day  sucralfate 1 g/10 mL oral suspension: 10 milliliter(s) orally 2 times a day  Trelegy Ellipta 100 mcg-62.5 mcg-25 mcg/inh inhalation powder: 1 puff(s) inhaled once a day  Vitamin D3 25 mcg (1000 intl units) oral tablet: 1 tab(s) orally once a day  warfarin 7.5 mg oral tablet: 1 tab(s) orally once a day (in the evening)  zinc oxide 20% topical ointment: Apply topically to affected area (on buttocks) once a day   allopurinol 100 mg oral tablet: 1 tab(s) orally once a day  Aspercreme with Lidocaine 4% topical film: Apply topically to affected area once a day  Bengay Vanishing Scent 2.5% topical gel: Apply topically to affected area 2 times a day  budesonide 0.5 mg/2 mL inhalation suspension: 2 milliliter(s) inhaled 2 times a day  Cepacol Sore Throat 15 mg-3.6 mg mucous membrane lozenge: 1 lozenge mucous membrane 4 times a day  DuoNeb 0.5 mg-2.5 mg/3 mL inhalation solution: 3 milliliter(s) inhaled 4 times a day, As Needed  famotidine 20 mg oral tablet: 1 tab(s) orally once a day  levETIRAcetam 500 mg oral tablet: 1 tab(s) orally once a day  Lyrica 100 mg oral capsule: 1 cap(s) orally 2 times a day  midodrine 10 mg oral tablet: 3 tab(s) orally 3 times a day  oxyCODONE 5 mg oral tablet: 1 tab(s) orally every 6 hours, As Needed  Senna 8.6 mg oral tablet: 2 tab(s) orally once a day (at bedtime)  Sensipar 60 mg oral tablet: 1 tab(s) orally once a day  Tylenol Extra Strength 500 mg oral tablet: 2 tab(s) orally every 8 hours, As Needed  warfarin 4 mg oral tablet: 2 tab(s) orally once a day (at bedtime)

## 2023-01-17 NOTE — DISCHARGE NOTE NURSING/CASE MANAGEMENT/SOCIAL WORK - NSDCPEFALRISK_GEN_ALL_CORE
For information on Fall & Injury Prevention, visit: [email or web address] OR  [email or web address] OR  https://www.cdc.gov/steadi/patient.html

## 2023-01-19 ENCOUNTER — INPATIENT (INPATIENT)
Facility: HOSPITAL | Age: 78
LOS: 5 days | Discharge: NURSING, REHAB OR LONG-TERM CARE | End: 2023-01-25
Attending: INTERNAL MEDICINE | Admitting: INTERNAL MEDICINE
Payer: MEDICARE

## 2023-01-19 VITALS
SYSTOLIC BLOOD PRESSURE: 98 MMHG | HEART RATE: 81 BPM | TEMPERATURE: 98 F | RESPIRATION RATE: 16 BRPM | OXYGEN SATURATION: 90 % | DIASTOLIC BLOOD PRESSURE: 44 MMHG

## 2023-01-19 DIAGNOSIS — I95.9 HYPOTENSION, UNSPECIFIED: ICD-10-CM

## 2023-01-19 DIAGNOSIS — Z98.89 OTHER SPECIFIED POSTPROCEDURAL STATES: Chronic | ICD-10-CM

## 2023-01-19 LAB
ALBUMIN SERPL ELPH-MCNC: 5.3 G/DL — HIGH (ref 3.3–5)
ALP SERPL-CCNC: 133 U/L — HIGH (ref 40–120)
ALT FLD-CCNC: 7 U/L — SIGNIFICANT CHANGE UP (ref 4–41)
ANION GAP SERPL CALC-SCNC: 20 MMOL/L — HIGH (ref 7–14)
APTT BLD: 34.2 SEC — SIGNIFICANT CHANGE UP (ref 27–36.3)
AST SERPL-CCNC: 27 U/L — SIGNIFICANT CHANGE UP (ref 4–40)
BASOPHILS # BLD AUTO: 0.06 K/UL — SIGNIFICANT CHANGE UP (ref 0–0.2)
BASOPHILS NFR BLD AUTO: 0.7 % — SIGNIFICANT CHANGE UP (ref 0–2)
BILIRUB SERPL-MCNC: 0.4 MG/DL — SIGNIFICANT CHANGE UP (ref 0.2–1.2)
BLD GP AB SCN SERPL QL: NEGATIVE — SIGNIFICANT CHANGE UP
BLOOD GAS VENOUS COMPREHENSIVE RESULT: SIGNIFICANT CHANGE UP
BUN SERPL-MCNC: 35 MG/DL — HIGH (ref 7–23)
CALCIUM SERPL-MCNC: 10.2 MG/DL — SIGNIFICANT CHANGE UP (ref 8.4–10.5)
CHLORIDE SERPL-SCNC: 91 MMOL/L — LOW (ref 98–107)
CO2 SERPL-SCNC: 30 MMOL/L — SIGNIFICANT CHANGE UP (ref 22–31)
CREAT SERPL-MCNC: 8.97 MG/DL — HIGH (ref 0.5–1.3)
EGFR: 6 ML/MIN/1.73M2 — LOW
EOSINOPHIL # BLD AUTO: 0.16 K/UL — SIGNIFICANT CHANGE UP (ref 0–0.5)
EOSINOPHIL NFR BLD AUTO: 1.8 % — SIGNIFICANT CHANGE UP (ref 0–6)
FLUAV AG NPH QL: SIGNIFICANT CHANGE UP
FLUBV AG NPH QL: SIGNIFICANT CHANGE UP
GLUCOSE SERPL-MCNC: 96 MG/DL — SIGNIFICANT CHANGE UP (ref 70–99)
HCT VFR BLD CALC: 49.6 % — SIGNIFICANT CHANGE UP (ref 39–50)
HGB BLD-MCNC: 14.9 G/DL — SIGNIFICANT CHANGE UP (ref 13–17)
IANC: 5.95 K/UL — SIGNIFICANT CHANGE UP (ref 1.8–7.4)
IMM GRANULOCYTES NFR BLD AUTO: 0.3 % — SIGNIFICANT CHANGE UP (ref 0–0.9)
INR BLD: 2.22 RATIO — HIGH (ref 0.88–1.16)
LYMPHOCYTES # BLD AUTO: 2.25 K/UL — SIGNIFICANT CHANGE UP (ref 1–3.3)
LYMPHOCYTES # BLD AUTO: 24.6 % — SIGNIFICANT CHANGE UP (ref 13–44)
MAGNESIUM SERPL-MCNC: 2.5 MG/DL — SIGNIFICANT CHANGE UP (ref 1.6–2.6)
MCHC RBC-ENTMCNC: 28.8 PG — SIGNIFICANT CHANGE UP (ref 27–34)
MCHC RBC-ENTMCNC: 30 GM/DL — LOW (ref 32–36)
MCV RBC AUTO: 95.8 FL — SIGNIFICANT CHANGE UP (ref 80–100)
MONOCYTES # BLD AUTO: 0.69 K/UL — SIGNIFICANT CHANGE UP (ref 0–0.9)
MONOCYTES NFR BLD AUTO: 7.5 % — SIGNIFICANT CHANGE UP (ref 2–14)
NEUTROPHILS # BLD AUTO: 5.95 K/UL — SIGNIFICANT CHANGE UP (ref 1.8–7.4)
NEUTROPHILS NFR BLD AUTO: 65.1 % — SIGNIFICANT CHANGE UP (ref 43–77)
NRBC # BLD: 0 /100 WBCS — SIGNIFICANT CHANGE UP (ref 0–0)
NRBC # FLD: 0 K/UL — SIGNIFICANT CHANGE UP (ref 0–0)
NT-PROBNP SERPL-SCNC: 749 PG/ML — HIGH
PLATELET # BLD AUTO: 203 K/UL — SIGNIFICANT CHANGE UP (ref 150–400)
POTASSIUM SERPL-MCNC: 5 MMOL/L — SIGNIFICANT CHANGE UP (ref 3.5–5.3)
POTASSIUM SERPL-SCNC: 5 MMOL/L — SIGNIFICANT CHANGE UP (ref 3.5–5.3)
PROT SERPL-MCNC: 10.7 G/DL — HIGH (ref 6–8.3)
PROTHROM AB SERPL-ACNC: 26 SEC — HIGH (ref 10.5–13.4)
RBC # BLD: 5.18 M/UL — SIGNIFICANT CHANGE UP (ref 4.2–5.8)
RBC # FLD: 15.4 % — HIGH (ref 10.3–14.5)
RH IG SCN BLD-IMP: POSITIVE — SIGNIFICANT CHANGE UP
RSV RNA NPH QL NAA+NON-PROBE: SIGNIFICANT CHANGE UP
SARS-COV-2 RNA SPEC QL NAA+PROBE: SIGNIFICANT CHANGE UP
SODIUM SERPL-SCNC: 141 MMOL/L — SIGNIFICANT CHANGE UP (ref 135–145)
TROPONIN T, HIGH SENSITIVITY RESULT: 217 NG/L — CRITICAL HIGH
TROPONIN T, HIGH SENSITIVITY RESULT: 261 NG/L — CRITICAL HIGH
TSH SERPL-MCNC: 1.31 UIU/ML — SIGNIFICANT CHANGE UP (ref 0.27–4.2)
WBC # BLD: 9.14 K/UL — SIGNIFICANT CHANGE UP (ref 3.8–10.5)
WBC # FLD AUTO: 9.14 K/UL — SIGNIFICANT CHANGE UP (ref 3.8–10.5)

## 2023-01-19 PROCEDURE — 99285 EMERGENCY DEPT VISIT HI MDM: CPT

## 2023-01-19 RX ORDER — MIDODRINE HYDROCHLORIDE 2.5 MG/1
30 TABLET ORAL ONCE
Refills: 0 | Status: COMPLETED | OUTPATIENT
Start: 2023-01-19 | End: 2023-01-19

## 2023-01-19 RX ORDER — ACETAMINOPHEN 500 MG
650 TABLET ORAL EVERY 6 HOURS
Refills: 0 | Status: DISCONTINUED | OUTPATIENT
Start: 2023-01-19 | End: 2023-01-25

## 2023-01-19 RX ORDER — SODIUM CHLORIDE 9 MG/ML
100 INJECTION INTRAMUSCULAR; INTRAVENOUS; SUBCUTANEOUS
Refills: 0 | Status: DISCONTINUED | OUTPATIENT
Start: 2023-01-19 | End: 2023-01-25

## 2023-01-19 RX ORDER — OXYCODONE HYDROCHLORIDE 5 MG/1
5 TABLET ORAL EVERY 6 HOURS
Refills: 0 | Status: DISCONTINUED | OUTPATIENT
Start: 2023-01-19 | End: 2023-01-25

## 2023-01-19 RX ORDER — LEVETIRACETAM 250 MG/1
500 TABLET, FILM COATED ORAL
Refills: 0 | Status: DISCONTINUED | OUTPATIENT
Start: 2023-01-19 | End: 2023-01-25

## 2023-01-19 RX ORDER — BUDESONIDE, MICRONIZED 100 %
0.5 POWDER (GRAM) MISCELLANEOUS
Refills: 0 | Status: DISCONTINUED | OUTPATIENT
Start: 2023-01-19 | End: 2023-01-25

## 2023-01-19 RX ORDER — ALLOPURINOL 300 MG
100 TABLET ORAL DAILY
Refills: 0 | Status: DISCONTINUED | OUTPATIENT
Start: 2023-01-19 | End: 2023-01-25

## 2023-01-19 RX ORDER — SENNA PLUS 8.6 MG/1
2 TABLET ORAL AT BEDTIME
Refills: 0 | Status: DISCONTINUED | OUTPATIENT
Start: 2023-01-19 | End: 2023-01-25

## 2023-01-19 RX ORDER — FAMOTIDINE 10 MG/ML
20 INJECTION INTRAVENOUS DAILY
Refills: 0 | Status: DISCONTINUED | OUTPATIENT
Start: 2023-01-19 | End: 2023-01-25

## 2023-01-19 RX ORDER — MIDODRINE HYDROCHLORIDE 2.5 MG/1
30 TABLET ORAL THREE TIMES A DAY
Refills: 0 | Status: DISCONTINUED | OUTPATIENT
Start: 2023-01-19 | End: 2023-01-22

## 2023-01-19 RX ORDER — WARFARIN SODIUM 2.5 MG/1
7 TABLET ORAL ONCE
Refills: 0 | Status: COMPLETED | OUTPATIENT
Start: 2023-01-19 | End: 2023-01-19

## 2023-01-19 RX ORDER — CINACALCET 30 MG/1
60 TABLET, FILM COATED ORAL DAILY
Refills: 0 | Status: DISCONTINUED | OUTPATIENT
Start: 2023-01-19 | End: 2023-01-23

## 2023-01-19 RX ORDER — LIDOCAINE 4 G/100G
1 CREAM TOPICAL EVERY 24 HOURS
Refills: 0 | Status: DISCONTINUED | OUTPATIENT
Start: 2023-01-19 | End: 2023-01-25

## 2023-01-19 RX ADMIN — WARFARIN SODIUM 7 MILLIGRAM(S): 2.5 TABLET ORAL at 22:58

## 2023-01-19 RX ADMIN — Medication 650 MILLIGRAM(S): at 21:06

## 2023-01-19 RX ADMIN — LIDOCAINE 1 PATCH: 4 CREAM TOPICAL at 21:06

## 2023-01-19 RX ADMIN — MIDODRINE HYDROCHLORIDE 30 MILLIGRAM(S): 2.5 TABLET ORAL at 19:02

## 2023-01-19 RX ADMIN — SENNA PLUS 2 TABLET(S): 8.6 TABLET ORAL at 22:58

## 2023-01-19 RX ADMIN — Medication 0.5 MILLIGRAM(S): at 20:11

## 2023-01-19 NOTE — ED ADULT NURSE NOTE - NSIMPLEMENTINTERV_GEN_ALL_ED
Implemented All Universal Safety Interventions:  [place] to call system. Call bell, personal items and telephone within reach. Instruct patient to call for assistance. Room bathroom lighting operational. Non-slip footwear when patient is off stretcher. Physically safe environment: no spills, clutter or unnecessary equipment. Stretcher in lowest position, wheels locked, appropriate side rails in place.

## 2023-01-19 NOTE — ED PROVIDER NOTE - OBJECTIVE STATEMENT
76 yo male w h/o orthostatic hypotension on mIdodrine, ESRD on HD via AV fistula LUE MWF, COPD (on 2L home O2), CHF, pHTN,, DVT S/P IVC filter presents from rehab for hypotension. Went to HD yesterday, was very tired afterwards more so than usual. BP noted to be ~60/40 after, but was monitored given post HD. This AM was again hypotensive to 70s, so given 250cc then 400cc and his AM dose of midodrine 30. However 1hr later, BP again was down to 80s so sent to ED. Was admitted for 36hr 1/16/23 for same sxs, ultimately dc after HD session. Baseline BP ~100/50 upon reviewing prior charts. Denies HA, ams, focal weakness/paresthesias, CP, SOB, abd pain NVDC, fevers, rashes.

## 2023-01-19 NOTE — ED PROVIDER NOTE - CLINICAL SUMMARY MEDICAL DECISION MAKING FREE TEXT BOX
Magan PGY2: 76 yo male w h/o orthostatic hypotension on midodrine, ESRD on HD via AV fistula LUE MWF, COPD (on 2L home O2), CHF, pHTN,, DVT S/P IVC filter presents from rehab for persistent episodic hypotension. No clear infectious sxs, hx of orthostatics but now low even on his midodrine. Will check labs including BCx, VBG, trop, EKG. Check TSH, cortisol. Will need re-admit for eval of these persistent sxs.

## 2023-01-19 NOTE — H&P ADULT - HISTORY OF PRESENT ILLNESS
76 yo male w h/o severe orthostatic hypotension on midodrine, ESRD on HD via AV fistula NOE DELVALLE, who was DCed few days ago from [facility] for severe orthostatic hypotension. ptn did well during his hospitalization. His chronic NF is not comfortable dispensing 30 mg of Midodrine( his usual dose is 30 mg tid) Ptn was sent to the ED for further management Here his SBP is 70  Ptn went to HD yesterday, was very tired afterwards more so than usual. BP noted to be ~60/40 after, but was monitored given he was post HD. This AM was again hypotensive to 70s, so given 250cc then 400cc and his AM dose of midodrine 30. However 1hr later, BP again was down to 80s so sent to ED.      Denies HA, ams, focal weakness/paresthesias, CP, SOB, abd pain N/V/D/C, fevers, rashes.    PMH also includes: COPD (on 2L home O2), CHF, pHTN,, DVT S/P IVC filter

## 2023-01-19 NOTE — ED ADULT NURSE NOTE - CHIEF COMPLAINT QUOTE
Pt w/ hx of htn copd O2 dependent 2L, CHF, ESRD on HD MWF arrives from [facility] for asymptomatic hypotension x 1 month Per EMS PT was 70/40 received 1 Liter NS, and Midodrine 30 mg tablet x 3 today at NH.  Pt is aaox4 denies SOB, dizziness, numbness tingling headache, confusion,

## 2023-01-19 NOTE — ED ADULT TRIAGE NOTE - CHIEF COMPLAINT QUOTE
Pt w/ hx of htn copd O2 dependent 2L, CHF, ESRD on HD MWF arrives from [facility] for asymptomatic hypotension x 1 month Per EMS PT was 70/40 received 1 Liter NS, and Midodrine 30 mg tablet x 3 today at NH.  Pt is aaox4 denies SOB, dizziness, numbness tingling headache, confusion,

## 2023-01-19 NOTE — ED ADULT NURSE NOTE - OBJECTIVE STATEMENT
Pt a&ox4, sent by [facility] for hypotension. pmhx orthostatic hypotension on midodrine, ESRD on HD via AV fistula LUE MWF, COPD (on 2L home O2), CHF, pHTN,, DVT S/P IVC filter presents from rehab for hypotension. Pt has left AV fistula, last dialysis yesterday, with 4L removed. Pt is asymptomatic and denies cp, SOB, n/v, fever/chills, lightheadedness, dizziness. Breathing even, unlabored; no signs of distress.

## 2023-01-19 NOTE — ED PROVIDER NOTE - PHYSICAL EXAMINATION
CONSTITUTIONAL: obese M lying in stretcher, tired appearing  SKIN: Warm dry, some tenting of skin  HEAD: NCAT  EYES: NL inspection  ENT: dry oral mucosa  NECK: Supple; non tender.  CARD: S1s2, regular.   RESP: no acute respiratory distress, exam limited by positioning/body habitus, no large crackles  ABD: large pannus, NTND  EXT: trace pitting pedal edema  NEURO: Grossly non-focal  PSYCH: Cooperative, appropriate.

## 2023-01-19 NOTE — H&P ADULT - ASSESSMENT
76 yo male w h/o orthostatic hypotension on mIdodrine, ESRD on HD via AV fistula LUE MWF, COPD (on 2L home O2), CHF, pHTN,, DVT S/P IVC filter presents from rehab for hypotension. Ptn was admitted 1/16-17 for severe hypotension, tolerated HD while inptn without hypotension while on Midodrine 30 mg tid and DCed back to the facility  Ptn now presents w severe hypotension post HD again. Ptn states 4 liters of UF was removed  IVF and Midodrine given  Renal consult w Dr Ferrari called, Card consult w Dr. Richards called. Other causes of hypotension to be ruled out  will rpt TTE( last one in 7/2022)  Maybe less fluid should be removed during HD  cont outptn meds  SBP is now in the 90s

## 2023-01-19 NOTE — ED PROVIDER NOTE - ATTENDING CONTRIBUTION TO CARE
Brief HPI:  78 yo male w h/o orthostatic hypotension on mIdodrine, ESRD on HD via AV fistula LUE MWF, COPD (on 2L home O2), CHF, pHTN,, DVT S/P IVC filter presents from rehab for hypotension.  Patient reports compliance with midodrine.      Vitals:   Reviewed    Exam:    GEN:  Non-toxic appearing, non-distressed, speaking full sentences, non-diaphoretic, AAOx3  HEENT:  NCAT, neck supple, EOMI, PERRLA, sclera anicteric, no conjunctival pallor or injection, no stridor, normal voice, no tonsillar exudate, uvula midline  CV:  regular rhythm and rate, s1/s2 audible, no murmurs, rubs or gallops, peripheral pulses 2+ and symmetric  PULM:  non-labored respirations, lungs clear to auscultation bilaterally, no wheezes, crackles or rales  ABD:  non distended, non-tender, no rebound, no guarding, negative Umanzor's sign, bowel sounds normal, no cvat  MSK:  no gross deformity, non-tender extremities and joints, range of motion grossly normal appearing, no extremity edema, extremities warm and well perfused   NEURO:  AAOx3, CN II-XII intact, motor 5/5 in upper and lower extremities bilaterally, sensation grossly intact in extremities and trunk, finger to nose testing wnl, no nystagmus, negative Romberg, no pronator drift, no gait deficit  SKIN:  warm, dry, no rash or vesicles     A/P:  78 yo male w h/o orthostatic hypotension on mIdodrine, ESRD on HD via AV fistula-*-+   LUE MWF, COPD (on 2L home O2), CHF, pHTN,, DVT S/P IVC filter presents from rehab for hypotension.  Hypotensive on arrival.  Well perfused on exam.  No obvious infectious etiology.  Plan for labs.  Spoke with Dr. Ferrari (nephrologist) who would like patient admitted for cardiology workup.

## 2023-01-19 NOTE — H&P ADULT - NSHPPHYSICALEXAM_GEN_ALL_CORE
T(C): 36.7 (01-19-23 @ 17:41), Max: 36.9 (01-19-23 @ 16:46)  HR: 78 (01-19-23 @ 19:02) (75 - 81)  BP: 91/51 (01-19-23 @ 19:47) (85/56 - 103/58)  RR: 18 (01-19-23 @ 19:02) (16 - 18)  SpO2: 94% (01-19-23 @ 19:02) (90% - 100%)    PHYSICAL EXAM:  GENERAL: NAD, well-developed  HEAD:  Atraumatic, Normocephalic  EYES: EOMI, PERRLA, conjunctiva and sclera clear  NECK: Supple, No JVD  CHEST/LUNG: Clear to auscultation bilaterally; No wheeze  HEART: Regular rate and rhythm; No murmurs, rubs, or gallops  ABDOMEN: Soft, Nontender, Nondistended; Bowel sounds present  EXTREMITIES:  2+ Peripheral Pulses, No clubbing, cyanosis, or edema  PSYCH: AAOx3  NEUROLOGY: non-focal  SKIN: No rashes or lesions

## 2023-01-19 NOTE — ED PROVIDER NOTE - PROGRESS NOTE DETAILS
Magan PGY2: spoke to Dr Hein, admit to her service. Dr Haro called as well, expressed concern that bp is incompatible with HD. WIll need further eval and improvement before dc.

## 2023-01-20 LAB
ALBUMIN SERPL ELPH-MCNC: 4.5 G/DL — SIGNIFICANT CHANGE UP (ref 3.3–5)
ALBUMIN SERPL ELPH-MCNC: 4.6 G/DL — SIGNIFICANT CHANGE UP (ref 3.3–5)
ALP SERPL-CCNC: 112 U/L — SIGNIFICANT CHANGE UP (ref 40–120)
ALT FLD-CCNC: 14 U/L — SIGNIFICANT CHANGE UP (ref 4–41)
ANION GAP SERPL CALC-SCNC: 19 MMOL/L — HIGH (ref 7–14)
ANION GAP SERPL CALC-SCNC: 21 MMOL/L — HIGH (ref 7–14)
AST SERPL-CCNC: 42 U/L — HIGH (ref 4–40)
BASOPHILS # BLD AUTO: 0.07 K/UL — SIGNIFICANT CHANGE UP (ref 0–0.2)
BASOPHILS NFR BLD AUTO: 0.7 % — SIGNIFICANT CHANGE UP (ref 0–2)
BILIRUB SERPL-MCNC: 0.4 MG/DL — SIGNIFICANT CHANGE UP (ref 0.2–1.2)
BUN SERPL-MCNC: 36 MG/DL — HIGH (ref 7–23)
BUN SERPL-MCNC: 44 MG/DL — HIGH (ref 7–23)
CALCIUM SERPL-MCNC: 9.2 MG/DL — SIGNIFICANT CHANGE UP (ref 8.4–10.5)
CALCIUM SERPL-MCNC: 9.3 MG/DL — SIGNIFICANT CHANGE UP (ref 8.4–10.5)
CHLORIDE SERPL-SCNC: 92 MMOL/L — LOW (ref 98–107)
CHLORIDE SERPL-SCNC: 92 MMOL/L — LOW (ref 98–107)
CK MB BLD-MCNC: 1.2 % — SIGNIFICANT CHANGE UP (ref 0–2.5)
CK MB CFR SERPL CALC: 2 NG/ML — SIGNIFICANT CHANGE UP
CK SERPL-CCNC: 169 U/L — SIGNIFICANT CHANGE UP (ref 30–200)
CO2 SERPL-SCNC: 22 MMOL/L — SIGNIFICANT CHANGE UP (ref 22–31)
CO2 SERPL-SCNC: 26 MMOL/L — SIGNIFICANT CHANGE UP (ref 22–31)
CREAT SERPL-MCNC: 8.59 MG/DL — HIGH (ref 0.5–1.3)
CREAT SERPL-MCNC: 9.99 MG/DL — HIGH (ref 0.5–1.3)
EGFR: 5 ML/MIN/1.73M2 — LOW
EGFR: 6 ML/MIN/1.73M2 — LOW
EOSINOPHIL # BLD AUTO: 0.59 K/UL — HIGH (ref 0–0.5)
EOSINOPHIL NFR BLD AUTO: 6.3 % — HIGH (ref 0–6)
GLUCOSE SERPL-MCNC: 78 MG/DL — SIGNIFICANT CHANGE UP (ref 70–99)
GLUCOSE SERPL-MCNC: 87 MG/DL — SIGNIFICANT CHANGE UP (ref 70–99)
HCT VFR BLD CALC: 47.9 % — SIGNIFICANT CHANGE UP (ref 39–50)
HGB BLD-MCNC: 14.1 G/DL — SIGNIFICANT CHANGE UP (ref 13–17)
IANC: 5.62 K/UL — SIGNIFICANT CHANGE UP (ref 1.8–7.4)
IMM GRANULOCYTES NFR BLD AUTO: 0.4 % — SIGNIFICANT CHANGE UP (ref 0–0.9)
INR BLD: 2.43 RATIO — HIGH (ref 0.88–1.16)
LYMPHOCYTES # BLD AUTO: 2.12 K/UL — SIGNIFICANT CHANGE UP (ref 1–3.3)
LYMPHOCYTES # BLD AUTO: 22.6 % — SIGNIFICANT CHANGE UP (ref 13–44)
MCHC RBC-ENTMCNC: 28.7 PG — SIGNIFICANT CHANGE UP (ref 27–34)
MCHC RBC-ENTMCNC: 29.4 GM/DL — LOW (ref 32–36)
MCV RBC AUTO: 97.6 FL — SIGNIFICANT CHANGE UP (ref 80–100)
MONOCYTES # BLD AUTO: 0.94 K/UL — HIGH (ref 0–0.9)
MONOCYTES NFR BLD AUTO: 10 % — SIGNIFICANT CHANGE UP (ref 2–14)
NEUTROPHILS # BLD AUTO: 5.62 K/UL — SIGNIFICANT CHANGE UP (ref 1.8–7.4)
NEUTROPHILS NFR BLD AUTO: 60 % — SIGNIFICANT CHANGE UP (ref 43–77)
NRBC # BLD: 0 /100 WBCS — SIGNIFICANT CHANGE UP (ref 0–0)
NRBC # FLD: 0 K/UL — SIGNIFICANT CHANGE UP (ref 0–0)
PHOSPHATE SERPL-MCNC: 6.2 MG/DL — HIGH (ref 2.5–4.5)
PLATELET # BLD AUTO: 189 K/UL — SIGNIFICANT CHANGE UP (ref 150–400)
POTASSIUM SERPL-MCNC: 5.3 MMOL/L — SIGNIFICANT CHANGE UP (ref 3.5–5.3)
POTASSIUM SERPL-MCNC: SIGNIFICANT CHANGE UP MMOL/L (ref 3.5–5.3)
POTASSIUM SERPL-SCNC: 5.3 MMOL/L — SIGNIFICANT CHANGE UP (ref 3.5–5.3)
POTASSIUM SERPL-SCNC: SIGNIFICANT CHANGE UP MMOL/L (ref 3.5–5.3)
PROT SERPL-MCNC: 10.1 G/DL — HIGH (ref 6–8.3)
PROTHROM AB SERPL-ACNC: 28.5 SEC — HIGH (ref 10.5–13.4)
PTH-INTACT FLD-MCNC: 630 PG/ML — HIGH (ref 15–65)
RBC # BLD: 4.91 M/UL — SIGNIFICANT CHANGE UP (ref 4.2–5.8)
RBC # FLD: 15.2 % — HIGH (ref 10.3–14.5)
SODIUM SERPL-SCNC: 135 MMOL/L — SIGNIFICANT CHANGE UP (ref 135–145)
SODIUM SERPL-SCNC: 137 MMOL/L — SIGNIFICANT CHANGE UP (ref 135–145)
TROPONIN T, HIGH SENSITIVITY RESULT: 212 NG/L — CRITICAL HIGH
WBC # BLD: 9.38 K/UL — SIGNIFICANT CHANGE UP (ref 3.8–10.5)
WBC # FLD AUTO: 9.38 K/UL — SIGNIFICANT CHANGE UP (ref 3.8–10.5)

## 2023-01-20 PROCEDURE — 99223 1ST HOSP IP/OBS HIGH 75: CPT | Mod: GC

## 2023-01-20 PROCEDURE — 93306 TTE W/DOPPLER COMPLETE: CPT | Mod: 26

## 2023-01-20 RX ORDER — MIDODRINE HYDROCHLORIDE 2.5 MG/1
30 TABLET ORAL ONCE
Refills: 0 | Status: COMPLETED | OUTPATIENT
Start: 2023-01-20 | End: 2023-01-20

## 2023-01-20 RX ORDER — CHLORHEXIDINE GLUCONATE 213 G/1000ML
1 SOLUTION TOPICAL DAILY
Refills: 0 | Status: DISCONTINUED | OUTPATIENT
Start: 2023-01-20 | End: 2023-01-25

## 2023-01-20 RX ORDER — WARFARIN SODIUM 2.5 MG/1
7 TABLET ORAL ONCE
Refills: 0 | Status: COMPLETED | OUTPATIENT
Start: 2023-01-20 | End: 2023-01-21

## 2023-01-20 RX ORDER — SODIUM CHLORIDE 9 MG/ML
250 INJECTION INTRAMUSCULAR; INTRAVENOUS; SUBCUTANEOUS ONCE
Refills: 0 | Status: COMPLETED | OUTPATIENT
Start: 2023-01-20 | End: 2023-01-20

## 2023-01-20 RX ORDER — SODIUM CHLORIDE 9 MG/ML
200 INJECTION INTRAMUSCULAR; INTRAVENOUS; SUBCUTANEOUS ONCE
Refills: 0 | Status: DISCONTINUED | OUTPATIENT
Start: 2023-01-20 | End: 2023-01-20

## 2023-01-20 RX ADMIN — LIDOCAINE 1 PATCH: 4 CREAM TOPICAL at 12:08

## 2023-01-20 RX ADMIN — MIDODRINE HYDROCHLORIDE 30 MILLIGRAM(S): 2.5 TABLET ORAL at 18:31

## 2023-01-20 RX ADMIN — Medication 0.5 MILLIGRAM(S): at 09:43

## 2023-01-20 RX ADMIN — CHLORHEXIDINE GLUCONATE 1 APPLICATION(S): 213 SOLUTION TOPICAL at 18:31

## 2023-01-20 RX ADMIN — FAMOTIDINE 20 MILLIGRAM(S): 10 INJECTION INTRAVENOUS at 09:44

## 2023-01-20 RX ADMIN — MIDODRINE HYDROCHLORIDE 30 MILLIGRAM(S): 2.5 TABLET ORAL at 13:03

## 2023-01-20 RX ADMIN — LEVETIRACETAM 500 MILLIGRAM(S): 250 TABLET, FILM COATED ORAL at 06:37

## 2023-01-20 RX ADMIN — CINACALCET 60 MILLIGRAM(S): 30 TABLET, FILM COATED ORAL at 09:47

## 2023-01-20 RX ADMIN — Medication 100 MILLIGRAM(S): at 06:37

## 2023-01-20 RX ADMIN — Medication 100 MILLIGRAM(S): at 18:32

## 2023-01-20 RX ADMIN — SODIUM CHLORIDE 250 MILLILITER(S): 9 INJECTION INTRAMUSCULAR; INTRAVENOUS; SUBCUTANEOUS at 03:41

## 2023-01-20 RX ADMIN — Medication 100 MILLIGRAM(S): at 09:44

## 2023-01-20 RX ADMIN — MIDODRINE HYDROCHLORIDE 30 MILLIGRAM(S): 2.5 TABLET ORAL at 02:19

## 2023-01-20 RX ADMIN — LEVETIRACETAM 500 MILLIGRAM(S): 250 TABLET, FILM COATED ORAL at 18:31

## 2023-01-20 RX ADMIN — MIDODRINE HYDROCHLORIDE 30 MILLIGRAM(S): 2.5 TABLET ORAL at 09:43

## 2023-01-20 NOTE — ED ADULT NURSE REASSESSMENT NOTE - NS ED NURSE REASSESS COMMENT FT1
Covering MD Lowe made aware of patient vital signs. No new orders at this time. Patient in no distress and offered no complaints. Nursing care continues

## 2023-01-20 NOTE — PROVIDER CONTACT NOTE (OTHER) - ASSESSMENT
Patient hypotensive. 63/35, recheck 66/40 manual, recheck 71/43. Patient at baseline mental status, denies symptoms. Patient supposed to be going to dialysis now. RN administered midodrine 30mg PO as per orders.

## 2023-01-20 NOTE — PROVIDER CONTACT NOTE (OTHER) - ASSESSMENT
Patient BP low. 86/39, recheck 88/54. Patient asymptomatic, states he feels "like I usually feel." AM dose of midodrine was rescheduled by night nurse. Patient last received midodrine around 2am.

## 2023-01-20 NOTE — CONSULT NOTE ADULT - ASSESSMENT
77yM with PMHx ESRD on HD (AV fistula NOE, MWF), COPD (2L home O2) with mild pulmonary hypertension, DVT s/p IVC filter, hypotension on midodrine 30mg 4x/day presents from nephrology office for low BP. admitted to medicine for management of hypotension and MICU consulted for persistently low BP.    Assessment: BP has increased after most recent dose of midodrine from 60s/40s to 85/51. Pt now wearing appropriately sized BP cuff, unable to take BP from L arm as he has an AV fistula. Pt asymptomatic on MICU exam. Has received 5 doses of midodrine and 250cc bolus in the 26 hours, reports to be taking his midodrine 4x daily at home. Pt is due for HD today but due to the hypotension and no acute indications on labwork of exam to necessitate HD tonight and no volume overload or increase in O2 requirements to indicate volume overload at this time. TTE doesn't show evidence of HF or obstructive pathology that would be contributing to his hypotension although his HR is inappropriately low for his BP (midodrine likely contributing), no signs on exam or in lab work to indicate sepsis, no new bleeding that would cause hemorrhagic shock.      - Hold HD for tonight and reassess tomorrow  - Ensure midodrine is given q6hrs rather than TID  - Pt not a candidate for MICU at this time, does not require IV vasopressors at this time
76 yo male w h/o severe orthostatic hypotension on midodrine, ESRD on HD via AV fistula NOE DELVALLE, who was DCed few days ago from [facility] for severe orthostatic hypotension. ptn did well during his hospitalization. His chronic NF is not comfortable dispensing 30 mg of Midodrine( his usual dose is 30 mg tid) Ptn was sent to the ED for further management Here his SBP is 70  Ptn went to HD yesterday, was very tired afterwards more so than usual. BP noted to be ~60/40 after, but was monitored given he was post HD. This AM was again hypotensive to 70s, so given 250cc then 400cc and his AM dose of midodrine 30. However 1hr later, BP again was down to 80s so sent to ED.  Nephrology was consulted for dialysis needs.      ESRD on HD Monday, Wednesday and Friday   Via AVF  Last HD was on 1/18  Nephrologist: Dr. Ty Massey  No need for urgent HD tonight  HD tomorrow  Consent in the dialysis unit  Renal diet  Renal dose all medications for GFR<10.   BP remains low. On midodrine      Hypotension  Currently acceptable  Continue Midodrine      Respiratory acidosis on VBG  Defer to primary.     CKD-MBD  Tertiary  hyperparathyroidism.   On Sensipar of 60 mg po daily  PTH once  Phosphorus and calcium daily.

## 2023-01-20 NOTE — PATIENT PROFILE ADULT - FALL HARM RISK - HARM RISK INTERVENTIONS
Assistance with ambulation/Assistance OOB with selected safe patient handling equipment/Communicate Risk of Fall with Harm to all staff/Reinforce activity limits and safety measures with patient and family/Sit up slowly, dangle for a short time, stand at bedside before walking/Tailored Fall Risk Interventions/Visual Cue: Yellow wristband and red socks/Bed in lowest position, wheels locked, appropriate side rails in place/Call bell, personal items and telephone in reach/Instruct patient to call for assistance before getting out of bed or chair/Non-slip footwear when patient is out of bed/[place] to call system/Physically safe environment - no spills, clutter or unnecessary equipment/Purposeful Proactive Rounding/Room/bathroom lighting operational, light cord in reach

## 2023-01-20 NOTE — PROVIDER CONTACT NOTE (OTHER) - ACTION/TREATMENT ORDERED:
Provider aware. Give midodrine. Provider aware. Give midodrine. Patient should still receive 3 doses ordered for today.

## 2023-01-20 NOTE — PROVIDER CONTACT NOTE (OTHER) - BACKGROUND
admitted with hypotension
Patient admitted for hypotension
admitted with hypotension
admitted with hyoptension

## 2023-01-20 NOTE — CONSULT NOTE ADULT - SUBJECTIVE AND OBJECTIVE BOX
Reason for Consultation:    HPI:  77yM with PMHx ESRD on HD (AV fistula LUE, MWF), COPD (2L home O2) with mild pulmonary hypertension, DVT s/p IVC filter, hypotension on midodrine 30mg 4x/day presents from nephrology office for low BP. Recently hospitalized from 1/16-1/17 for low BP during which he received HD and was DC'd on the midodrine. Pt denies any symptoms associated with his hypotension but reports he has always had low BP. Feels well at this time. In the ER was found to have BP 80s/40s-90s/50s, otherwise labs appear to be at baseline, no WBC elevation and no tachycardia or fever to indicate new infection. Was given midodrine 30mg 5 times since arrival (1/19 @ 1902, 1/20 @ 0219, 0943, 1303, 1831) and given a 250cc NS bolus. Maintaining 100%on 2L NC. This afternoon his BP dropped to 63/35, pt still asymptomatic, increased to 85/51 by 2112 after his midodrine at 1831. Admitted to medicine for management of hypotension. TTE showing grossly normal LV and RV function, mild pulmonary hypertension, no major valve abnormalities.     MICU consulted for persistent hypotension and BP in the 60s/40s.       PAST MEDICAL & SURGICAL HISTORY:  Hypertension      Glomerular disease  Segmental glomerular sclerosis      CHF (congestive heart failure)      COPD (chronic obstructive pulmonary disease)      Pulmonary hypertension      Sleep apnea      Pulmonary edema      Peripheral edema      Gout      History of abdominal surgery  polypectomy      H/O right heart catheterization          FAMILY HISTORY:  Family history of colon cancer (Father)    Family history of heart disease (Father)          Allergies    latex (Rash)  No Known Drug Allergies    Intolerances        HOME MEDICATIONS:  midodrine 30mg QID    OBJECTIVE:  ICU Vital Signs Last 24 Hrs  T(C): 36.6 (20 Jan 2023 21:12), Max: 36.6 (20 Jan 2023 02:09)  T(F): 97.9 (20 Jan 2023 21:12), Max: 97.9 (20 Jan 2023 07:35)  HR: 68 (20 Jan 2023 21:12) (62 - 78)  BP: 85/51 (20 Jan 2023 21:12) (63/35 - 93/43)  BP(mean): 65 (20 Jan 2023 14:40) (63 - 65)  ABP: --  ABP(mean): --  RR: 17 (20 Jan 2023 21:12) (15 - 17)  SpO2: 100% (20 Jan 2023 21:12) (97% - 100%)    O2 Parameters below as of 20 Jan 2023 21:12  Patient On (Oxygen Delivery Method): nasal cannula  O2 Flow (L/min): 2            CAPILLARY BLOOD GLUCOSE      POCT Blood Glucose.: 82 mg/dL (19 Jan 2023 16:06)      PHYSICAL EXAM:  GENERAL: Sitting comfortably in bed in no acute distress, arouses to voice and answers questions appropriately  NEURO: Alert and Oriented to person, place, date and situation. Pupils symmetric, No ptosis. No facial asymmetry or dysarthria, no tremor noted.  HEENT: No conjunctival injection or scleral icterus.   CARD: Normal rate and regular rhythm, no murmurs and no gallops appreciated.  RESP: Clear to auscultation bilaterally, No wheezes, rales or rhonchi.   ABD:  Nondistended, Soft and nontender to palpation in all quadrants, no guarding, no rigidity. No masses appreciated.  EXT: trace pedal edema. 2+DP pulses bilaterally.      HOSPITAL MEDICATIONS:  MEDICATIONS  (STANDING):  allopurinol 100 milliGRAM(s) Oral daily  buDESOnide    Inhalation Suspension 0.5 milliGRAM(s) Inhalation two times a day  chlorhexidine 2% Cloths 1 Application(s) Topical daily  cinacalcet 60 milliGRAM(s) Oral daily  famotidine    Tablet 20 milliGRAM(s) Oral daily  levETIRAcetam 500 milliGRAM(s) Oral two times a day  lidocaine   4% Patch 1 Patch Transdermal every 24 hours  midodrine. 30 milliGRAM(s) Oral three times a day  pregabalin 100 milliGRAM(s) Oral two times a day  senna 2 Tablet(s) Oral at bedtime  warfarin 7 milliGRAM(s) Oral once    MEDICATIONS  (PRN):  acetaminophen     Tablet .. 650 milliGRAM(s) Oral every 6 hours PRN Temp greater or equal to 38C (100.4F), Mild Pain (1 - 3)  oxyCODONE    IR 5 milliGRAM(s) Oral every 6 hours PRN Severe Pain (7 - 10)  sodium chloride 0.9% Bolus. 100 milliLiter(s) IV Bolus every 5 minutes PRN SBP LESS THAN or EQUAL to 90 mmHg      LABS:                        14.1   9.38  )-----------( 189      ( 20 Jan 2023 06:35 )             47.9     01-20    137  |  92<L>  |  44<H>  ----------------------------<  78  5.3   |  26  |  9.99<H>    Ca    9.2      20 Jan 2023 06:35  Phos  6.2     01-20  Mg     2.50     01-19    TPro  x   /  Alb  4.6  /  TBili  x   /  DBili  x   /  AST  x   /  ALT  x   /  AlkPhos  x   01-20    PT/INR - ( 20 Jan 2023 06:35 )   PT: 28.5 sec;   INR: 2.43 ratio         PTT - ( 19 Jan 2023 17:57 )  PTT:34.2 sec      Venous Blood Gas:  01-19 @ 17:57  7.30/69/33/34/45.8  VBG Lactate: 3.1    
[facility]   MD CHESTER DUEÑAS MD KRISTINE SOLTANPOUR, DO ANGELA WONG, PA        TEL:  OFFICE: [phone]  From 5pm-7am answering service [phone]    --- INITIAL RENAL CONSULT NOTE ---date of service 01-19-23 @ 22:18    HPI:  78 yo male w h/o severe orthostatic hypotension on midodrine, ESRD on HD via AV fistula NOE DELVALLE, who was DCed few days ago from [facility] for severe orthostatic hypotension. ptn did well during his hospitalization. His chronic NF is not comfortable dispensing 30 mg of Midodrine( his usual dose is 30 mg tid) Ptn was sent to the ED for further management Here his SBP is 70  Ptn went to HD yesterday, was very tired afterwards more so than usual. BP noted to be ~60/40 after, but was monitored given he was post HD. This AM was again hypotensive to 70s, so given 250cc then 400cc and his AM dose of midodrine 30. However 1hr later, BP again was down to 80s so sent to ED.  Nephrology was consulted for dialysis needs.        Allergies:  latex (Rash)  No Known Drug Allergies      PAST MEDICAL & SURGICAL HISTORY:  Hypertension      Glomerular disease  Segmental glomerular sclerosis      CHF (congestive heart failure)      COPD (chronic obstructive pulmonary disease)      Pulmonary hypertension      Sleep apnea      Pulmonary edema      Peripheral edema      Gout      History of abdominal surgery  polypectomy      H/O right heart catheterization        Home Medications:  allopurinol 100 mg oral tablet: 1 tab(s) orally once a day (19 Jan 2023 19:49)  Aspercreme with Lidocaine 4% topical film: Apply topically to affected area once a day (19 Jan 2023 19:49)  Bengay Vanishing Scent 2.5% topical gel: Apply topically to affected area 2 times a day (19 Jan 2023 19:49)  budesonide 0.5 mg/2 mL inhalation suspension: 2 milliliter(s) inhaled 2 times a day (19 Jan 2023 19:49)  Cepacol Sore Throat 15 mg-3.6 mg mucous membrane lozenge: 1 lozenge mucous membrane 4 times a day (19 Jan 2023 19:49)  DuoNeb 0.5 mg-2.5 mg/3 mL inhalation solution: 3 milliliter(s) inhaled 4 times a day, As Needed (19 Jan 2023 19:49)  famotidine 20 mg oral tablet: 1 tab(s) orally once a day (19 Jan 2023 19:49)  levETIRAcetam 500 mg oral tablet: 1 tab(s) orally once a day (19 Jan 2023 19:49)  Lyrica 100 mg oral capsule: 1 cap(s) orally 2 times a day (19 Jan 2023 19:49)  midodrine 10 mg oral tablet: 3 tab(s) orally 3 times a day (19 Jan 2023 19:49)  oxyCODONE 5 mg oral tablet: 1 tab(s) orally every 6 hours, As Needed (19 Jan 2023 19:49)  Senna 8.6 mg oral tablet: 2 tab(s) orally once a day (at bedtime) (19 Jan 2023 19:49)  Sensipar 60 mg oral tablet: 1 tab(s) orally once a day (19 Jan 2023 19:49)  Tylenol Extra Strength 500 mg oral tablet: 2 tab(s) orally every 8 hours, As Needed (19 Jan 2023 19:49)  warfarin 4 mg oral tablet: 2 tab(s) orally once a day (at bedtime) (19 Jan 2023 19:49)    Home Medications Reviewed    Hospital Medications:   MEDICATIONS  (STANDING):  allopurinol 100 milliGRAM(s) Oral daily  buDESOnide    Inhalation Suspension 0.5 milliGRAM(s) Inhalation two times a day  cinacalcet 60 milliGRAM(s) Oral daily  famotidine    Tablet 20 milliGRAM(s) Oral daily  levETIRAcetam 500 milliGRAM(s) Oral two times a day  lidocaine   4% Patch 1 Patch Transdermal every 24 hours  midodrine. 30 milliGRAM(s) Oral three times a day  pregabalin 100 milliGRAM(s) Oral two times a day  senna 2 Tablet(s) Oral at bedtime  warfarin 7 milliGRAM(s) Oral once      SOCIAL HISTORY:  Denies ETOh, Smoking,     FAMILY HISTORY:  Family history of colon cancer (Father)    Family history of heart disease (Father)        REVIEW OF SYSTEMS:  CONSTITUTIONAL: No weakness, fevers or chills  EYES/ENT: No visual changes;  No vertigo or throat pain   NECK: No pain or stiffness  RESPIRATORY: No cough, wheezing, hemoptysis; No shortness of breath  CARDIOVASCULAR: No chest pain or palpitations.  GASTROINTESTINAL: No abdominal or epigastric pain. No nausea, vomiting, or hematemesis; No diarrhea or constipation. No melena or hematochezia.  GENITOURINARY: No dysuria, frequency, foamy urine, urinary urgency, incontinence or hematuria  NEUROLOGICAL: No numbness or weakness  SKIN: No itching, burning, rashes, or lesions   VASCULAR: No bilateral lower extremity edema.   All other review of systems is negative unless indicated above.    VITALS:  T(F): 98.1 (01-19-23 @ 17:41), Max: 98.4 (01-19-23 @ 16:46)  HR: 78 (01-19-23 @ 19:02)  BP: 101/53 (01-19-23 @ 21:05)  RR: 18 (01-19-23 @ 19:02)  SpO2: 94% (01-19-23 @ 19:02)  Wt(kg): --        PHYSICAL EXAM:  General: NAD  HEENT: anicteric sclera, oropharynx clear, MMM  Neck: No JVD  Respiratory: CTAB, no wheezes, rales or rhonchi  Cardiovascular: S1, S2, RRR  Gastrointestinal: BS+, soft, NT/ND  Extremities: No cyanosis or clubbing. No peripheral edema  Neurological: A/O x 3, no focal deficits  Psychiatric: Normal mood, normal affect  : No CVA tenderness. No moore.   Skin: No rashes  Vascular Access: AVF with positive thrill and bruit.     LABS:  01-19    141  |  91<L>  |  35<H>  ----------------------------<  96  5.0   |  30  |  8.97<H>    Ca    10.2      19 Jan 2023 17:57  Mg     2.50     01-19    TPro  10.7<H>  /  Alb  5.3<H>  /  TBili  0.4  /  DBili      /  AST  27  /  ALT  7   /  AlkPhos  133<H>  01-19    Creatinine Trend: 8.97 <--, 12.63 <--, 10.01 <--, 11.57 <--                        14.9   9.14  )-----------( 203      ( 19 Jan 2023 17:57 )             49.6     Urine Studies:        RADIOLOGY & ADDITIONAL STUDIES:  ACC: [id number] EXAM:  XR CHEST AP OR PA 1V                          PROCEDURE DATE:  01/17/2023          INTERPRETATION:  Chest one view    HISTORY: Hypotension    COMPARISON STUDY: 7/17/2022    Frontal expiratory view of the chest shows the heart to be similar in   size. The lungs show mild bilateral atelectasis and there is no evidence   of pneumothorax nor pleural effusion.    IMPRESSION:  Bilateral atelectasis.        Thank you for the courtesy of this referral.    --- End of Report ---            STEWART MACDONALD MD; Attending Interventional Radiologist  This document has been electronically signed. Jan 19 2023  8:51AM                
Cardiovascular Disease Initial Evaluation  Date of Service: 01-20-23 @ 10:15    CHIEF COMPLAINT: Low BP    HISTORY OF PRESENT ILLNESS:    This is a 77 year old man with orthostatic hypotension on midodrine, ESRD on HD, prior DVT on warfarin and compensated systolic CHF who presented to [facility] on [facility]/19/2023 for further management of hypotension.   The patient went to HD, was very tired afterwards more so than usual. BP noted to be ~60/40 after.    Currently he denies chest pain or SOB.       Allergies  latex (Rash)  No Known Drug Allergies        MEDICATIONS:  midodrine. 30 milliGRAM(s) Oral three times a day  warfarin 7 milliGRAM(s) Oral once      buDESOnide    Inhalation Suspension 0.5 milliGRAM(s) Inhalation two times a day    acetaminophen     Tablet .. 650 milliGRAM(s) Oral every 6 hours PRN  levETIRAcetam 500 milliGRAM(s) Oral two times a day  oxyCODONE    IR 5 milliGRAM(s) Oral every 6 hours PRN  pregabalin 100 milliGRAM(s) Oral two times a day    famotidine    Tablet 20 milliGRAM(s) Oral daily  senna 2 Tablet(s) Oral at bedtime    allopurinol 100 milliGRAM(s) Oral daily  cinacalcet 60 milliGRAM(s) Oral daily    chlorhexidine 2% Cloths 1 Application(s) Topical daily  lidocaine   4% Patch 1 Patch Transdermal every 24 hours  sodium chloride 0.9% Bolus. 100 milliLiter(s) IV Bolus every 5 minutes PRN      PAST MEDICAL & SURGICAL HISTORY:  Hypertension      Glomerular disease  Segmental glomerular sclerosis      CHF (congestive heart failure)      COPD (chronic obstructive pulmonary disease)      Pulmonary hypertension      Sleep apnea      Pulmonary edema      Peripheral edema      Gout      History of abdominal surgery  polypectomy      H/O right heart catheterization          FAMILY HISTORY:  Family history of colon cancer (Father)    Family history of heart disease (Father)        SOCIAL HISTORY:    The patient is a nonsmoker       REVIEW OF SYSTEMS:  See HPI, otherwise complete 14 point review of systems negative      PHYSICAL EXAM:  T(C): 36.6 (01-20-23 @ 07:35), Max: 36.9 (01-19-23 @ 16:46)  HR: 64 (01-20-23 @ 09:11) (62 - 81)  BP: 88/54 (01-20-23 @ 09:11) (76/56 - 103/58)  RR: 15 (01-20-23 @ 09:10) (15 - 18)  SpO2: 100% (01-20-23 @ 09:10) (90% - 100%)  Wt(kg): --  I&O's Summary      Appearance: No Acute Distress; resting comfortably  HEENT:  Normal oral mucosa, PERRL, EOMI	  Cardiovascular: Normal S1 S2, No JVD, No murmurs/rubs/gallops  Respiratory: Normal respiratory effort; Lungs clear to auscultation bilaterally  Gastrointestinal:  Soft, Non-tender, + BS	  Skin: No rashes, No ecchymoses, No cyanosis	  Neurologic: Non-focal; no weakness  Extremities: 1+ edema  Vascular: Peripheral pulses palpable 2+ bilaterally  Psychiatry: A & O x 3, Mood & affect appropriate    Laboratory Data:	 	    CBC Full  -  ( 20 Jan 2023 06:35 )  WBC Count : 9.38 K/uL  Hemoglobin : 14.1 g/dL  Hematocrit : 47.9 %  Platelet Count - Automated : 189 K/uL  Mean Cell Volume : 97.6 fL  Mean Cell Hemoglobin : 28.7 pg  Mean Cell Hemoglobin Concentration : 29.4 gm/dL  Auto Neutrophil # : 5.62 K/uL  Auto Lymphocyte # : 2.12 K/uL  Auto Monocyte # : 0.94 K/uL  Auto Eosinophil # : 0.59 K/uL  Auto Basophil # : 0.07 K/uL  Auto Neutrophil % : 60.0 %  Auto Lymphocyte % : 22.6 %  Auto Monocyte % : 10.0 %  Auto Eosinophil % : 6.3 %  Auto Basophil % : 0.7 %    01-20    137  |  92<L>  |  44<H>  ----------------------------<  78  5.3   |  26  |  9.99<H>  01-19    135  |  92<L>  |  36<H>  ----------------------------<  87  TNP   |  22  |  8.59<H>    Ca    9.2      20 Jan 2023 06:35  Ca    9.3      19 Jan 2023 23:02  Phos  6.2     01-20  Mg     2.50     01-19    TPro  x   /  Alb  4.6  /  TBili  x   /  DBili  x   /  AST  x   /  ALT  x   /  AlkPhos  x   01-20  TPro  10.1<H>  /  Alb  4.5  /  TBili  0.4  /  DBili  x   /  AST  42<H>  /  ALT  14  /  AlkPhos  112  01-19      proBNP: Serum Pro-Brain Natriuretic Peptide: 749 pg/mL (01-19 @ 17:57)    Lipid Profile:   HgA1c:   TSH: Thyroid Stimulating Hormone, Serum: 1.31 uIU/mL (01-19 @ 17:57)        Interpretation of Telemetry: Sinus	    ECG:  	Sinus; first degree AV block; L axis deviation; diffuse q-waves      Assessment: 77 year old man with orthostatic hypotension on midodrine, ESRD on HD, prior DVT on warfarin and compensated systolic CHF presents with hypotension.     Plan of Care:    #Orthostatic hypotension-  Likely due to poor vascular tone and calcified vessels in the setting of ESRD.  EKG is notable for diffuse q-waves.   Please obtain an echocardiogram.  Continue midodrine as ordered.     #Prior DVT-  Extensive DVT burden in 2021.  Warfarin as per the primary team.    #ESRD-  HD as BP tolerates.  Renal input noted.       52 minutes spent on total encounter; more than 50% of the visit was spent counseling and/or coordinating care by the attending physician.   	  Darrion Richards MD Located within [facility]  Cardiovascular Diseases  [phone]

## 2023-01-20 NOTE — PROVIDER CONTACT NOTE (OTHER) - NAME OF MD/NP/PA/DO NOTIFIED:
ACP Johnnie Augustin
ACP [place] [id number]
GEORGE LARA
[facility] [id number]
GEORGE LUNDBERG
Johnnie Augustin, ACP

## 2023-01-20 NOTE — PROVIDER CONTACT NOTE (OTHER) - DATE AND TIME:
20-Jan-2023 09:15
20-Jan-2023 19:30
20-Jan-2023 11:02
20-Jan-2023 13:18
20-Jan-2023 14:55
20-Jan-2023 18:51

## 2023-01-20 NOTE — PROVIDER CONTACT NOTE (OTHER) - ASSESSMENT
Patient BP low. 84/50, recheck 80/60 manual. Patient asymptomatic. Received midodrine 30mg around 0943.

## 2023-01-20 NOTE — ED ADULT NURSE REASSESSMENT NOTE - NS ED NURSE REASSESS COMMENT FT1
Covering MD Lowe made aware of patient vitals. Patient offered no complaints and no emergent distress noted. Nursing care continues

## 2023-01-20 NOTE — PROVIDER CONTACT NOTE (OTHER) - ASSESSMENT
Patient BP 78/33, asymptomatic. Pending transport to dialysis if SBP >80. Patient received evening dose of midodrine 30mg PO. Patient BP 78/33, asymptomatic. Pending transport to dialysis if SBP >80. Patient received evening dose of midodrine 30mg PO around 1830.

## 2023-01-21 LAB
ANION GAP SERPL CALC-SCNC: 20 MMOL/L — HIGH (ref 7–14)
BUN SERPL-MCNC: 62 MG/DL — HIGH (ref 7–23)
CALCIUM SERPL-MCNC: 8.5 MG/DL — SIGNIFICANT CHANGE UP (ref 8.4–10.5)
CHLORIDE SERPL-SCNC: 92 MMOL/L — LOW (ref 98–107)
CO2 SERPL-SCNC: 24 MMOL/L — SIGNIFICANT CHANGE UP (ref 22–31)
CREAT SERPL-MCNC: 11.85 MG/DL — HIGH (ref 0.5–1.3)
EGFR: 4 ML/MIN/1.73M2 — LOW
GLUCOSE SERPL-MCNC: 72 MG/DL — SIGNIFICANT CHANGE UP (ref 70–99)
HCT VFR BLD CALC: 48.2 % — SIGNIFICANT CHANGE UP (ref 39–50)
HGB BLD-MCNC: 14.3 G/DL — SIGNIFICANT CHANGE UP (ref 13–17)
INR BLD: 3.28 RATIO — HIGH (ref 0.88–1.16)
MAGNESIUM SERPL-MCNC: 2.4 MG/DL — SIGNIFICANT CHANGE UP (ref 1.6–2.6)
MCHC RBC-ENTMCNC: 28.4 PG — SIGNIFICANT CHANGE UP (ref 27–34)
MCHC RBC-ENTMCNC: 29.7 GM/DL — LOW (ref 32–36)
MCV RBC AUTO: 95.6 FL — SIGNIFICANT CHANGE UP (ref 80–100)
MRSA PCR RESULT.: SIGNIFICANT CHANGE UP
NRBC # BLD: 0 /100 WBCS — SIGNIFICANT CHANGE UP (ref 0–0)
NRBC # FLD: 0 K/UL — SIGNIFICANT CHANGE UP (ref 0–0)
PHOSPHATE SERPL-MCNC: 6.7 MG/DL — HIGH (ref 2.5–4.5)
PLATELET # BLD AUTO: 193 K/UL — SIGNIFICANT CHANGE UP (ref 150–400)
POTASSIUM SERPL-MCNC: 5.6 MMOL/L — HIGH (ref 3.5–5.3)
POTASSIUM SERPL-SCNC: 5.6 MMOL/L — HIGH (ref 3.5–5.3)
PROTHROM AB SERPL-ACNC: 38.5 SEC — HIGH (ref 10.5–13.4)
RBC # BLD: 5.04 M/UL — SIGNIFICANT CHANGE UP (ref 4.2–5.8)
RBC # FLD: 14.9 % — HIGH (ref 10.3–14.5)
S AUREUS DNA NOSE QL NAA+PROBE: SIGNIFICANT CHANGE UP
SODIUM SERPL-SCNC: 136 MMOL/L — SIGNIFICANT CHANGE UP (ref 135–145)
WBC # BLD: 9.67 K/UL — SIGNIFICANT CHANGE UP (ref 3.8–10.5)
WBC # FLD AUTO: 9.67 K/UL — SIGNIFICANT CHANGE UP (ref 3.8–10.5)

## 2023-01-21 RX ORDER — CHLORHEXIDINE GLUCONATE 213 G/1000ML
1 SOLUTION TOPICAL DAILY
Refills: 0 | Status: DISCONTINUED | OUTPATIENT
Start: 2023-01-21 | End: 2023-01-25

## 2023-01-21 RX ORDER — WARFARIN SODIUM 2.5 MG/1
5 TABLET ORAL ONCE
Refills: 0 | Status: DISCONTINUED | OUTPATIENT
Start: 2023-01-21 | End: 2023-01-21

## 2023-01-21 RX ADMIN — WARFARIN SODIUM 7 MILLIGRAM(S): 2.5 TABLET ORAL at 01:36

## 2023-01-21 RX ADMIN — Medication 100 MILLIGRAM(S): at 18:26

## 2023-01-21 RX ADMIN — LEVETIRACETAM 500 MILLIGRAM(S): 250 TABLET, FILM COATED ORAL at 06:19

## 2023-01-21 RX ADMIN — MIDODRINE HYDROCHLORIDE 30 MILLIGRAM(S): 2.5 TABLET ORAL at 11:35

## 2023-01-21 RX ADMIN — Medication 0.5 MILLIGRAM(S): at 21:28

## 2023-01-21 RX ADMIN — LIDOCAINE 1 PATCH: 4 CREAM TOPICAL at 22:10

## 2023-01-21 RX ADMIN — LIDOCAINE 1 PATCH: 4 CREAM TOPICAL at 01:37

## 2023-01-21 RX ADMIN — MIDODRINE HYDROCHLORIDE 30 MILLIGRAM(S): 2.5 TABLET ORAL at 06:19

## 2023-01-21 RX ADMIN — MIDODRINE HYDROCHLORIDE 30 MILLIGRAM(S): 2.5 TABLET ORAL at 18:27

## 2023-01-21 RX ADMIN — SENNA PLUS 2 TABLET(S): 8.6 TABLET ORAL at 22:10

## 2023-01-21 RX ADMIN — SENNA PLUS 2 TABLET(S): 8.6 TABLET ORAL at 01:37

## 2023-01-21 RX ADMIN — Medication 100 MILLIGRAM(S): at 06:19

## 2023-01-21 RX ADMIN — LEVETIRACETAM 500 MILLIGRAM(S): 250 TABLET, FILM COATED ORAL at 18:27

## 2023-01-22 LAB
ANION GAP SERPL CALC-SCNC: 15 MMOL/L — HIGH (ref 7–14)
BUN SERPL-MCNC: 45 MG/DL — HIGH (ref 7–23)
CALCIUM SERPL-MCNC: 9.3 MG/DL — SIGNIFICANT CHANGE UP (ref 8.4–10.5)
CHLORIDE SERPL-SCNC: 93 MMOL/L — LOW (ref 98–107)
CO2 SERPL-SCNC: 28 MMOL/L — SIGNIFICANT CHANGE UP (ref 22–31)
CREAT SERPL-MCNC: 9.97 MG/DL — HIGH (ref 0.5–1.3)
EGFR: 5 ML/MIN/1.73M2 — LOW
GLUCOSE BLDC GLUCOMTR-MCNC: 84 MG/DL — SIGNIFICANT CHANGE UP (ref 70–99)
GLUCOSE SERPL-MCNC: 87 MG/DL — SIGNIFICANT CHANGE UP (ref 70–99)
HCT VFR BLD CALC: 47.1 % — SIGNIFICANT CHANGE UP (ref 39–50)
HGB BLD-MCNC: 14 G/DL — SIGNIFICANT CHANGE UP (ref 13–17)
INR BLD: 2.96 RATIO — HIGH (ref 0.88–1.16)
MAGNESIUM SERPL-MCNC: 2.2 MG/DL — SIGNIFICANT CHANGE UP (ref 1.6–2.6)
MCHC RBC-ENTMCNC: 28.3 PG — SIGNIFICANT CHANGE UP (ref 27–34)
MCHC RBC-ENTMCNC: 29.7 GM/DL — LOW (ref 32–36)
MCV RBC AUTO: 95.3 FL — SIGNIFICANT CHANGE UP (ref 80–100)
NRBC # BLD: 0 /100 WBCS — SIGNIFICANT CHANGE UP (ref 0–0)
NRBC # FLD: 0 K/UL — SIGNIFICANT CHANGE UP (ref 0–0)
PHOSPHATE SERPL-MCNC: 6.7 MG/DL — HIGH (ref 2.5–4.5)
PLATELET # BLD AUTO: 176 K/UL — SIGNIFICANT CHANGE UP (ref 150–400)
POTASSIUM SERPL-MCNC: 5.1 MMOL/L — SIGNIFICANT CHANGE UP (ref 3.5–5.3)
POTASSIUM SERPL-SCNC: 5.1 MMOL/L — SIGNIFICANT CHANGE UP (ref 3.5–5.3)
PROTHROM AB SERPL-ACNC: 34.7 SEC — HIGH (ref 10.5–13.4)
RBC # BLD: 4.94 M/UL — SIGNIFICANT CHANGE UP (ref 4.2–5.8)
RBC # FLD: 15 % — HIGH (ref 10.3–14.5)
SODIUM SERPL-SCNC: 136 MMOL/L — SIGNIFICANT CHANGE UP (ref 135–145)
WBC # BLD: 8.96 K/UL — SIGNIFICANT CHANGE UP (ref 3.8–10.5)
WBC # FLD AUTO: 8.96 K/UL — SIGNIFICANT CHANGE UP (ref 3.8–10.5)

## 2023-01-22 RX ORDER — WARFARIN SODIUM 2.5 MG/1
3 TABLET ORAL ONCE
Refills: 0 | Status: COMPLETED | OUTPATIENT
Start: 2023-01-22 | End: 2023-01-22

## 2023-01-22 RX ORDER — MIDODRINE HYDROCHLORIDE 2.5 MG/1
30 TABLET ORAL
Refills: 0 | Status: DISCONTINUED | OUTPATIENT
Start: 2023-01-22 | End: 2023-01-25

## 2023-01-22 RX ORDER — MIDODRINE HYDROCHLORIDE 2.5 MG/1
60 TABLET ORAL
Refills: 0 | Status: DISCONTINUED | OUTPATIENT
Start: 2023-01-22 | End: 2023-01-22

## 2023-01-22 RX ORDER — MIDODRINE HYDROCHLORIDE 2.5 MG/1
20 TABLET ORAL
Refills: 0 | Status: DISCONTINUED | OUTPATIENT
Start: 2023-01-22 | End: 2023-01-25

## 2023-01-22 RX ORDER — MIDODRINE HYDROCHLORIDE 2.5 MG/1
40 TABLET ORAL THREE TIMES A DAY
Refills: 0 | Status: DISCONTINUED | OUTPATIENT
Start: 2023-01-22 | End: 2023-01-22

## 2023-01-22 RX ADMIN — CINACALCET 60 MILLIGRAM(S): 30 TABLET, FILM COATED ORAL at 12:57

## 2023-01-22 RX ADMIN — WARFARIN SODIUM 3 MILLIGRAM(S): 2.5 TABLET ORAL at 22:48

## 2023-01-22 RX ADMIN — MIDODRINE HYDROCHLORIDE 30 MILLIGRAM(S): 2.5 TABLET ORAL at 05:22

## 2023-01-22 RX ADMIN — Medication 0.5 MILLIGRAM(S): at 07:48

## 2023-01-22 RX ADMIN — MIDODRINE HYDROCHLORIDE 40 MILLIGRAM(S): 2.5 TABLET ORAL at 12:57

## 2023-01-22 RX ADMIN — LEVETIRACETAM 500 MILLIGRAM(S): 250 TABLET, FILM COATED ORAL at 05:22

## 2023-01-22 RX ADMIN — CHLORHEXIDINE GLUCONATE 1 APPLICATION(S): 213 SOLUTION TOPICAL at 13:08

## 2023-01-22 RX ADMIN — Medication 0.5 MILLIGRAM(S): at 22:06

## 2023-01-22 RX ADMIN — LIDOCAINE 1 PATCH: 4 CREAM TOPICAL at 05:23

## 2023-01-22 RX ADMIN — FAMOTIDINE 20 MILLIGRAM(S): 10 INJECTION INTRAVENOUS at 12:57

## 2023-01-22 RX ADMIN — LEVETIRACETAM 500 MILLIGRAM(S): 250 TABLET, FILM COATED ORAL at 17:41

## 2023-01-22 RX ADMIN — MIDODRINE HYDROCHLORIDE 30 MILLIGRAM(S): 2.5 TABLET ORAL at 17:41

## 2023-01-22 RX ADMIN — SENNA PLUS 2 TABLET(S): 8.6 TABLET ORAL at 22:48

## 2023-01-22 RX ADMIN — MIDODRINE HYDROCHLORIDE 30 MILLIGRAM(S): 2.5 TABLET ORAL at 23:04

## 2023-01-22 RX ADMIN — Medication 100 MILLIGRAM(S): at 17:41

## 2023-01-22 RX ADMIN — Medication 100 MILLIGRAM(S): at 05:22

## 2023-01-22 RX ADMIN — LIDOCAINE 1 PATCH: 4 CREAM TOPICAL at 10:00

## 2023-01-22 RX ADMIN — Medication 100 MILLIGRAM(S): at 12:57

## 2023-01-22 NOTE — PROVIDER CONTACT NOTE (CHANGE IN STATUS NOTIFICATION) - ASSESSMENT
Pt A&Ox4, states feeling like he is about to fall asleep, denies dizziness, lightheadedness, shortness of breath

## 2023-01-22 NOTE — RAPID RESPONSE TEAM SUMMARY - NSADDTLFINDINGSRRT_GEN_ALL_CORE
On my arrival, the patient is noted to be alert and oriented to person, place, and time. His temperature measures about 97 degrees orally. His heart rates is in the 70s and a normal sinus rhythm is noted on bedside telemetry. He is maintaining an oxygen saturation greater than 95% on nasal cannula. On repeat blood pressure assessment, the patient is noted to have a blood pressure that measures in the 80s systolic with a mean arterial pressure between 60 and 65.

## 2023-01-22 NOTE — RAPID RESPONSE TEAM SUMMARY - NSSITUATIONBACKGROUNDRRT_GEN_ALL_CORE
The patient is a 77-year-old man who is followed for end-stage kidney disease on hemodialysis, heart failure with preserved ejection fraction, and previous deep venous thrombus who was admitted for evaluation and management of hypotension, requiring administration of 40 mg of oral midodrine three times daily, and limiting the patient's ability to participate with hemodialysis. A medical rapid response was called today because the patient's blood pressure was noted to measure in the 60s systolic on doppler.

## 2023-01-22 NOTE — RAPID RESPONSE TEAM SUMMARY - NSOTHERINTERVENTIONSRRT_GEN_ALL_CORE
On further chart review performed subsequent to the medical rapid response, I note that the patient has had recurrent admissions to our hospital for this same issue--that his blood pressure is very low and that this limits his ability to receive hemodialysis. Yesterday, only  On further chart review performed subsequent to the medical rapid response, I note that the patient has had recurrent admissions to our hospital for this same issue--that his blood pressure is very low and that this limits his ability to receive hemodialysis. Yesterday, only 200 ccs of fluid (net) could be removed by hemodialysis. This admission, a robust workup has been performed looking for reversible etiologies of this patient's hypotension. He is not febrile, and he his blood cultures have demonstrated no growth to date. His echocardiogram does not demonstrate a large pericardial effusion or a distended right ventricle to suggest obstructive shock. The patient is very edematous and is not having vomiting, diarrhea, or blood loss. He is not in an acute arrhythmia, and he has not had any acute detriment to his myocardium (ventricular function similar on tte this admission as compared to tte on previous admissions).     Unfortunately, I feel that it is most likely that this patient's hypotension is a result of progressive vascular stiffening that has occurred over time as this patient has suffered from hypertension and end-stage kidney disease. This is limiting his ability to compensate hemodynamically for fluid shifts that occur during hemodialysis. While the primary team can consider transitioning the patient's midodrine from 40 mg every eight hours to 30 mg every six hours (same total daily dose but less time between each dose may limit blood pressure dayna between doses), ultimately, this patient's condition will progress, and it will be very difficult for him to obtain effective hemodialysis. I recommend strongly that the primary team discuss with the patient and with his family that the patient is nearing the end of his life. They can consult palliative care as needed to navigate this discussion.

## 2023-01-23 LAB
ANION GAP SERPL CALC-SCNC: 21 MMOL/L — HIGH (ref 7–14)
BUN SERPL-MCNC: 70 MG/DL — HIGH (ref 7–23)
CALCIUM SERPL-MCNC: 8 MG/DL — LOW (ref 8.4–10.5)
CHLORIDE SERPL-SCNC: 92 MMOL/L — LOW (ref 98–107)
CO2 SERPL-SCNC: 22 MMOL/L — SIGNIFICANT CHANGE UP (ref 22–31)
CREAT SERPL-MCNC: 12.16 MG/DL — HIGH (ref 0.5–1.3)
EGFR: 4 ML/MIN/1.73M2 — LOW
GLUCOSE SERPL-MCNC: 135 MG/DL — HIGH (ref 70–99)
HCT VFR BLD CALC: 43.6 % — SIGNIFICANT CHANGE UP (ref 39–50)
HGB BLD-MCNC: 13.3 G/DL — SIGNIFICANT CHANGE UP (ref 13–17)
INR BLD: 2.31 RATIO — HIGH (ref 0.88–1.16)
MAGNESIUM SERPL-MCNC: 2.1 MG/DL — SIGNIFICANT CHANGE UP (ref 1.6–2.6)
MCHC RBC-ENTMCNC: 28.9 PG — SIGNIFICANT CHANGE UP (ref 27–34)
MCHC RBC-ENTMCNC: 30.5 GM/DL — LOW (ref 32–36)
MCV RBC AUTO: 94.6 FL — SIGNIFICANT CHANGE UP (ref 80–100)
NRBC # BLD: 0 /100 WBCS — SIGNIFICANT CHANGE UP (ref 0–0)
NRBC # FLD: 0 K/UL — SIGNIFICANT CHANGE UP (ref 0–0)
PHOSPHATE SERPL-MCNC: 7.7 MG/DL — HIGH (ref 2.5–4.5)
PLATELET # BLD AUTO: 199 K/UL — SIGNIFICANT CHANGE UP (ref 150–400)
POTASSIUM SERPL-MCNC: 4.9 MMOL/L — SIGNIFICANT CHANGE UP (ref 3.5–5.3)
POTASSIUM SERPL-SCNC: 4.9 MMOL/L — SIGNIFICANT CHANGE UP (ref 3.5–5.3)
PROTHROM AB SERPL-ACNC: 27 SEC — HIGH (ref 10.5–13.4)
RBC # BLD: 4.61 M/UL — SIGNIFICANT CHANGE UP (ref 4.2–5.8)
RBC # FLD: 14.8 % — HIGH (ref 10.3–14.5)
SODIUM SERPL-SCNC: 135 MMOL/L — SIGNIFICANT CHANGE UP (ref 135–145)
WBC # BLD: 8.4 K/UL — SIGNIFICANT CHANGE UP (ref 3.8–10.5)
WBC # FLD AUTO: 8.4 K/UL — SIGNIFICANT CHANGE UP (ref 3.8–10.5)

## 2023-01-23 RX ORDER — CINACALCET 30 MG/1
30 TABLET, FILM COATED ORAL
Refills: 0 | Status: DISCONTINUED | OUTPATIENT
Start: 2023-01-23 | End: 2023-01-25

## 2023-01-23 RX ORDER — SEVELAMER CARBONATE 2400 MG/1
1600 POWDER, FOR SUSPENSION ORAL
Refills: 0 | Status: DISCONTINUED | OUTPATIENT
Start: 2023-01-23 | End: 2023-01-25

## 2023-01-23 RX ORDER — WARFARIN SODIUM 2.5 MG/1
4 TABLET ORAL ONCE
Refills: 0 | Status: DISCONTINUED | OUTPATIENT
Start: 2023-01-23 | End: 2023-01-23

## 2023-01-23 RX ORDER — WARFARIN SODIUM 2.5 MG/1
5 TABLET ORAL ONCE
Refills: 0 | Status: COMPLETED | OUTPATIENT
Start: 2023-01-23 | End: 2023-01-23

## 2023-01-23 RX ADMIN — FAMOTIDINE 20 MILLIGRAM(S): 10 INJECTION INTRAVENOUS at 15:28

## 2023-01-23 RX ADMIN — Medication 100 MILLIGRAM(S): at 15:28

## 2023-01-23 RX ADMIN — MIDODRINE HYDROCHLORIDE 30 MILLIGRAM(S): 2.5 TABLET ORAL at 17:30

## 2023-01-23 RX ADMIN — SEVELAMER CARBONATE 1600 MILLIGRAM(S): 2400 POWDER, FOR SUSPENSION ORAL at 17:31

## 2023-01-23 RX ADMIN — Medication 0.5 MILLIGRAM(S): at 22:44

## 2023-01-23 RX ADMIN — Medication 100 MILLIGRAM(S): at 06:07

## 2023-01-23 RX ADMIN — CHLORHEXIDINE GLUCONATE 1 APPLICATION(S): 213 SOLUTION TOPICAL at 15:30

## 2023-01-23 RX ADMIN — MIDODRINE HYDROCHLORIDE 30 MILLIGRAM(S): 2.5 TABLET ORAL at 12:36

## 2023-01-23 RX ADMIN — LEVETIRACETAM 500 MILLIGRAM(S): 250 TABLET, FILM COATED ORAL at 06:07

## 2023-01-23 RX ADMIN — Medication 100 MILLIGRAM(S): at 17:34

## 2023-01-23 RX ADMIN — CHLORHEXIDINE GLUCONATE 1 APPLICATION(S): 213 SOLUTION TOPICAL at 09:33

## 2023-01-23 RX ADMIN — WARFARIN SODIUM 5 MILLIGRAM(S): 2.5 TABLET ORAL at 21:26

## 2023-01-23 RX ADMIN — LEVETIRACETAM 500 MILLIGRAM(S): 250 TABLET, FILM COATED ORAL at 17:31

## 2023-01-23 RX ADMIN — MIDODRINE HYDROCHLORIDE 20 MILLIGRAM(S): 2.5 TABLET ORAL at 09:32

## 2023-01-23 RX ADMIN — Medication 0.5 MILLIGRAM(S): at 09:43

## 2023-01-23 RX ADMIN — SENNA PLUS 2 TABLET(S): 8.6 TABLET ORAL at 21:27

## 2023-01-23 RX ADMIN — MIDODRINE HYDROCHLORIDE 30 MILLIGRAM(S): 2.5 TABLET ORAL at 06:08

## 2023-01-23 NOTE — PHYSICAL THERAPY INITIAL EVALUATION ADULT - ASR WT BEARING STATUS EVAL
Patient's home called and spoke to parent to let know orthotics are in and to setup appointment with nurse for orthotic pickup.   no weight-bearing restrictions

## 2023-01-23 NOTE — PHYSICAL THERAPY INITIAL EVALUATION ADULT - PATIENT/FAMILY/SIGNIFICANT OTHER GOALS STATEMENT, PT EVAL
Assessment    1  Encounter for preventive health examination (V70 0) (Z00 00)    Plan  Health Maintenance    · Begin a limited exercise program ; Status:Complete;   Done: 20NBJ5679   · There are many exercise options for seniors ; Status:Complete;   Done: 21VAF2762    Discussion/Summary  Impression: Subsequent Annual Wellness Visit  Cardiovascular screening and counseling: screening is current  Diabetes screening and counseling: screening is current  Colorectal cancer screening and counseling: screening not indicated  Breast cancer screening and counseling: screening not indicated  Cervical cancer screening and counseling: screening not indicated  Immunizations: influenza vaccine is up to date this year and the lifetime pneumococcal vaccine has been completed  Patient Discussion: plan discussed with the patient, follow-up visit needed in one year  History of Present Illness  Welcome to Medicare and Wellness Visits: The patient is being seen for the subsequent annual wellness visit and The patient's questionnaire was reviewed with them and a copy is in the chart as well  Diet and Physical Activity: Current diet includes WATER 2  Functional Ability/Level of Safety:   Home safety risk factors:  loose rugs  Co-Managers and Medical Equipment/Suppliers: See Patient Care Team   Reviewed Updated [address] [place] [address] [address]:   Last Medicare Wellness Visit Information was reviewed, patient interviewed and updates made to the record today  Patient Care Team    Care Team Member Role Specialty Office Number   Heather HADLEY  Cardiology [phone]   Glenna HADLEY  Dermatology [phone]     Review of Systems    Cardiovascular: no chest pain  Respiratory: no shortness of breath  Gastrointestinal: no abdominal pain  Active Problems     1  Abnormal blood chemistry (790 6) (R79 9)   2  Anticoagulated by anticoagulation treatment (V58 61) (Z79 01)   3   History of Bereavement, uncomplicated (G58 38) (Z63 4)   4  Encounter for screening mammogram for breast cancer (V76 12) (Z12 31)   5  Impaired fasting glucose (790 21) (R73 01)   6  Mild mitral regurgitation (424 0) (I34 0)   7  Need for influenza vaccination (V04 81) (Z23)   8  Need for pneumococcal vaccine (V03 82) (Z23)   9  Osteoporosis screening (V82 81) (Z13 820)   10  Palpitations (785 1) (R00 2)   11  Paroxysmal ventricular tachycardia (427 1) (I47 2)   12  Screening for genitourinary condition (V81 6) (Z13 89)   13  Screening for skin condition (V82 0) (Z13 89)   14  Seborrheic keratosis (702 19) (L82 1)   15  Visit for screening mammogram (V76 12) (Z12 31)   16  Wellness examination (V70 0) (Z00 00)    Hypertension (401 9) (I10)       Atrial fibrillation (427 31) (I48 91)          Past Medical History    · History of Abnormal glucose (790 29) (R73 09)   · History of Cataract, right (366 9) (H26 9)   · History of atrial fibrillation (V12 59) (Z86 79)   · History of atrial fibrillation (V12 59) (Z86 79)   · History of cardiac disorder (V12 50) (Z86 79)   · History of carotid artery stenosis (V12 59) (Z86 79)   · History of hyperglycemia (V12 29) (Z86 39)   · History of hypertension (V12 59) (Z86 79)   · History of hypokalemia (V12 29) (Z86 39)   · Denied: History of mental disorder   · History of seborrheic keratosis (V13 3) (Z87 2)   · History of Palpitations (785 1) (R00 2)   · History of Paroxysmal ventricular tachycardia (427 1) (I47 2)    The active problems and past medical history were reviewed and updated today  Surgical History    · History of Breast Surgery Puncture Aspiration Of Cyst Right Breast   · History of Tubal Ligation    The surgical history was reviewed and updated today         Family History  Mother    · Family history of   Father    · Family history of   Family History    · Family history of Coronary Artery Disease (V17 49)   · Denied: Family history of mental disorder   · Denied: Family history of substance abuse    The family history was reviewed and updated today  Social History    · Denied: History of Alcohol Use (History)   · History of Bereavement, uncomplicated (R98 36) (J60 3)   · Denied: History of High risk sexual behavior   · Denied: History of Illicit drug use   · Never smoked cigarettes (V49 89) (Z78 9)   · Retired   · Office work   ·   The social history was reviewed and updated today  Current Meds   1  Cartia  MG Oral Capsule Extended Release 24 Hour; take one capsule by mouth   daily; Therapy: 93WMC3571 to (Evaluate:57Zgc6637)  Requested for: 60ZPO4881; Last   Rx:32Cqu7081 Ordered   2  Furosemide 20 MG Oral Tablet; Take 1 tablet daily; Therapy: 24ZBM3951 to (Last Rx:81Wbq4934)  Requested for: 54Bxm5290 Ordered   3  Valsartan 80 MG Oral Tablet; Take 1 tablet daily; Therapy: 81WRU9882 to (Last Rx:69Ipb2978)  Requested for: 90Kfs7109 Ordered   4  Xarelto 15 MG Oral Tablet; Take 1 tablet daily; Therapy: 25TJB9835 to (Last Rx:03Wfj9098)  Requested for: 17Jmt5533 Ordered    The medication list was reviewed and updated today  Allergies    1  Toprol XL TB24    Immunizations   1 2 3    Influenza  01-Sep-2015 02-Sep-2016 03-Oct-2017    PCV  03-Mar-2015      PPSV  01-Mar-2016       Physical Exam    Constitutional   General appearance: No acute distress, well appearing and well nourished  Psychiatric   Judgment and insight: Normal     Orientation to person, place, and time: Normal     Recent and remote memory: Intact  Mood and affect: Normal        Health Management  History of Medicare annual wellness visit, initial   *VB-Depression Screening; every 1 year; Last 89TLY0650; Next Due: 71Yng8455; Active  Medicare Annual Wellness Visit; every 1 year; Last 66LIH1753; Next Due: 28QBL7869; Overdue  Wellness examination   *VB - Fall Risk Assessment  (Dx Z13 89 Screen for Neurologic Disorder); every 1 year; Last 43IKB3536; Next Due: 53GZR3031;  Overdue    Signatures Electronically signed by : Brie Zhang MD; Oct 12 2017  2:23PM EST                       (Author) to go home

## 2023-01-23 NOTE — PHYSICAL THERAPY INITIAL EVALUATION ADULT - GAIT DISTANCE, PT EVAL
6 feet, 4 lateral steps x 2, 2 steps forward/backwards; pt denied any dizziness/lightheadedness; pt required occasional sitting rest break

## 2023-01-23 NOTE — PHYSICAL THERAPY INITIAL EVALUATION ADULT - ADDITIONAL COMMENTS
Pt lives with his family in a house with 4 stairs to enter; Pt resides on the first floor. prior to admission pt was independent with household ambulation and used a straight cane. Pt also owns a rollator.    Pt. left comfortable in bed with all tubes/lines intact, head of bed elevated 30 degrees, +bed alarm, call bell in reach and in NAD. RN aware of session

## 2023-01-23 NOTE — PHYSICAL THERAPY INITIAL EVALUATION ADULT - GENERAL OBSERVATIONS, REHAB EVAL
Pt received semi-supine, +2L oxygen via Nasal Canula, all lines/tubes intact, NAD. Per RNSarmad, pt okay to ambulate without oxygen,

## 2023-01-23 NOTE — CHART NOTE - NSCHARTNOTEFT_GEN_A_CORE
Spoke to Palliative team, Pt already has a MOLST, is a DNR, no role for palliative Consult since HD is still being offered.
A medical rapid response was called today because the patient's blood pressure was noted to measure in the 60s systolic on doppler.  The patient is noted to be alert and oriented to person, place, and time. His temperature measures about 97 degrees orally. His heart rates is in the 70s and a normal sinus rhythm is noted on bedside telemetry. He is maintaining an oxygen saturation greater than 95% on nasal cannula. On repeat blood pressure assessment, the patient is noted to have a blood pressure that measures in the 80s systolic with a mean arterial pressure between 60 and 65.  NO interventions were done, an BP on its own improved to 80's systolic  Midodrine dosing was adjusted to increased frequncey.  I ordered a palliative cs to address GOC with patient and wife.  I spoke to wife on the phone during RRT.  The patient and her confirmed that there are papers in the chart previously that the patient wanted to be DNR and he confirms that now.   D/W attending
RN notified that patient's BP after midodrine was 78/33. Patient seen at bedside. Patient asymptomatic and denies any chest pain, SOB or dizziness. Patient is due for HD and was awaiting transport to HD.     BP: 78/33  General: NO distress, A&Ox4  Lungs: CTA  hearT: +S1. +S2  LE: 1+ edema b/l     Hypotension;      -Patient with hx of hypotension on midodrine 30mg TID     -Patient last received midodrine at 1830 and has received at least 4 doses over the last 24 hours.      -Will hold fluids now given patient due for HD     -Discussed with Dr. Hein, Dr. Richards and Dr. Guerra     [facility].

## 2023-01-24 LAB
ANION GAP SERPL CALC-SCNC: 22 MMOL/L — HIGH (ref 7–14)
BASOPHILS # BLD AUTO: 0.06 K/UL — SIGNIFICANT CHANGE UP (ref 0–0.2)
BASOPHILS NFR BLD AUTO: 0.7 % — SIGNIFICANT CHANGE UP (ref 0–2)
BUN SERPL-MCNC: 42 MG/DL — HIGH (ref 7–23)
CALCIUM SERPL-MCNC: 8.5 MG/DL — SIGNIFICANT CHANGE UP (ref 8.4–10.5)
CHLORIDE SERPL-SCNC: 92 MMOL/L — LOW (ref 98–107)
CO2 SERPL-SCNC: 25 MMOL/L — SIGNIFICANT CHANGE UP (ref 22–31)
CREAT SERPL-MCNC: 8.66 MG/DL — HIGH (ref 0.5–1.3)
CULTURE RESULTS: SIGNIFICANT CHANGE UP
CULTURE RESULTS: SIGNIFICANT CHANGE UP
EGFR: 6 ML/MIN/1.73M2 — LOW
EOSINOPHIL # BLD AUTO: 0.78 K/UL — HIGH (ref 0–0.5)
EOSINOPHIL NFR BLD AUTO: 9 % — HIGH (ref 0–6)
GLUCOSE SERPL-MCNC: 69 MG/DL — LOW (ref 70–99)
HCT VFR BLD CALC: 46.4 % — SIGNIFICANT CHANGE UP (ref 39–50)
HGB BLD-MCNC: 13.7 G/DL — SIGNIFICANT CHANGE UP (ref 13–17)
IANC: 5.39 K/UL — SIGNIFICANT CHANGE UP (ref 1.8–7.4)
IMM GRANULOCYTES NFR BLD AUTO: 0.2 % — SIGNIFICANT CHANGE UP (ref 0–0.9)
INR BLD: 1.83 RATIO — HIGH (ref 0.88–1.16)
LYMPHOCYTES # BLD AUTO: 1.7 K/UL — SIGNIFICANT CHANGE UP (ref 1–3.3)
LYMPHOCYTES # BLD AUTO: 19.6 % — SIGNIFICANT CHANGE UP (ref 13–44)
MAGNESIUM SERPL-MCNC: 2.2 MG/DL — SIGNIFICANT CHANGE UP (ref 1.6–2.6)
MCHC RBC-ENTMCNC: 28.5 PG — SIGNIFICANT CHANGE UP (ref 27–34)
MCHC RBC-ENTMCNC: 29.5 GM/DL — LOW (ref 32–36)
MCV RBC AUTO: 96.7 FL — SIGNIFICANT CHANGE UP (ref 80–100)
MONOCYTES # BLD AUTO: 0.72 K/UL — SIGNIFICANT CHANGE UP (ref 0–0.9)
MONOCYTES NFR BLD AUTO: 8.3 % — SIGNIFICANT CHANGE UP (ref 2–14)
NEUTROPHILS # BLD AUTO: 5.39 K/UL — SIGNIFICANT CHANGE UP (ref 1.8–7.4)
NEUTROPHILS NFR BLD AUTO: 62.2 % — SIGNIFICANT CHANGE UP (ref 43–77)
NRBC # BLD: 0 /100 WBCS — SIGNIFICANT CHANGE UP (ref 0–0)
NRBC # FLD: 0 K/UL — SIGNIFICANT CHANGE UP (ref 0–0)
PHOSPHATE SERPL-MCNC: 5.7 MG/DL — HIGH (ref 2.5–4.5)
PLATELET # BLD AUTO: 171 K/UL — SIGNIFICANT CHANGE UP (ref 150–400)
POTASSIUM SERPL-MCNC: 4.7 MMOL/L — SIGNIFICANT CHANGE UP (ref 3.5–5.3)
POTASSIUM SERPL-SCNC: 4.7 MMOL/L — SIGNIFICANT CHANGE UP (ref 3.5–5.3)
PROTHROM AB SERPL-ACNC: 21.3 SEC — HIGH (ref 10.5–13.4)
RBC # BLD: 4.8 M/UL — SIGNIFICANT CHANGE UP (ref 4.2–5.8)
RBC # FLD: 14.9 % — HIGH (ref 10.3–14.5)
SARS-COV-2 RNA SPEC QL NAA+PROBE: SIGNIFICANT CHANGE UP
SODIUM SERPL-SCNC: 139 MMOL/L — SIGNIFICANT CHANGE UP (ref 135–145)
SPECIMEN SOURCE: SIGNIFICANT CHANGE UP
SPECIMEN SOURCE: SIGNIFICANT CHANGE UP
WBC # BLD: 8.67 K/UL — SIGNIFICANT CHANGE UP (ref 3.8–10.5)
WBC # FLD AUTO: 8.67 K/UL — SIGNIFICANT CHANGE UP (ref 3.8–10.5)

## 2023-01-24 RX ORDER — WARFARIN SODIUM 2.5 MG/1
7 TABLET ORAL ONCE
Refills: 0 | Status: COMPLETED | OUTPATIENT
Start: 2023-01-24 | End: 2023-01-24

## 2023-01-24 RX ADMIN — Medication 100 MILLIGRAM(S): at 13:48

## 2023-01-24 RX ADMIN — MIDODRINE HYDROCHLORIDE 30 MILLIGRAM(S): 2.5 TABLET ORAL at 23:07

## 2023-01-24 RX ADMIN — WARFARIN SODIUM 7 MILLIGRAM(S): 2.5 TABLET ORAL at 21:53

## 2023-01-24 RX ADMIN — MIDODRINE HYDROCHLORIDE 30 MILLIGRAM(S): 2.5 TABLET ORAL at 05:43

## 2023-01-24 RX ADMIN — CINACALCET 30 MILLIGRAM(S): 30 TABLET, FILM COATED ORAL at 09:59

## 2023-01-24 RX ADMIN — LIDOCAINE 1 PATCH: 4 CREAM TOPICAL at 22:12

## 2023-01-24 RX ADMIN — SEVELAMER CARBONATE 1600 MILLIGRAM(S): 2400 POWDER, FOR SUSPENSION ORAL at 18:03

## 2023-01-24 RX ADMIN — LEVETIRACETAM 500 MILLIGRAM(S): 250 TABLET, FILM COATED ORAL at 18:03

## 2023-01-24 RX ADMIN — CHLORHEXIDINE GLUCONATE 1 APPLICATION(S): 213 SOLUTION TOPICAL at 13:50

## 2023-01-24 RX ADMIN — SEVELAMER CARBONATE 1600 MILLIGRAM(S): 2400 POWDER, FOR SUSPENSION ORAL at 13:46

## 2023-01-24 RX ADMIN — MIDODRINE HYDROCHLORIDE 30 MILLIGRAM(S): 2.5 TABLET ORAL at 13:47

## 2023-01-24 RX ADMIN — MIDODRINE HYDROCHLORIDE 30 MILLIGRAM(S): 2.5 TABLET ORAL at 00:20

## 2023-01-24 RX ADMIN — LEVETIRACETAM 500 MILLIGRAM(S): 250 TABLET, FILM COATED ORAL at 05:44

## 2023-01-24 RX ADMIN — Medication 0.5 MILLIGRAM(S): at 21:54

## 2023-01-24 RX ADMIN — MIDODRINE HYDROCHLORIDE 30 MILLIGRAM(S): 2.5 TABLET ORAL at 18:03

## 2023-01-24 RX ADMIN — FAMOTIDINE 20 MILLIGRAM(S): 10 INJECTION INTRAVENOUS at 13:47

## 2023-01-24 RX ADMIN — SEVELAMER CARBONATE 1600 MILLIGRAM(S): 2400 POWDER, FOR SUSPENSION ORAL at 09:39

## 2023-01-24 RX ADMIN — Medication 100 MILLIGRAM(S): at 05:43

## 2023-01-24 RX ADMIN — Medication 100 MILLIGRAM(S): at 18:03

## 2023-01-25 ENCOUNTER — TRANSCRIPTION ENCOUNTER (OUTPATIENT)
Age: 78
End: 2023-01-25

## 2023-01-25 VITALS
TEMPERATURE: 97 F | SYSTOLIC BLOOD PRESSURE: 126 MMHG | DIASTOLIC BLOOD PRESSURE: 74 MMHG | OXYGEN SATURATION: 100 % | RESPIRATION RATE: 18 BRPM | HEART RATE: 67 BPM

## 2023-01-25 LAB
ANION GAP SERPL CALC-SCNC: 18 MMOL/L — HIGH (ref 7–14)
BUN SERPL-MCNC: 62 MG/DL — HIGH (ref 7–23)
CALCIUM SERPL-MCNC: 8.7 MG/DL — SIGNIFICANT CHANGE UP (ref 8.4–10.5)
CHLORIDE SERPL-SCNC: 92 MMOL/L — LOW (ref 98–107)
CO2 SERPL-SCNC: 23 MMOL/L — SIGNIFICANT CHANGE UP (ref 22–31)
CREAT SERPL-MCNC: 11.12 MG/DL — HIGH (ref 0.5–1.3)
EGFR: 4 ML/MIN/1.73M2 — LOW
GLUCOSE SERPL-MCNC: 92 MG/DL — SIGNIFICANT CHANGE UP (ref 70–99)
HCT VFR BLD CALC: 45.3 % — SIGNIFICANT CHANGE UP (ref 39–50)
HGB BLD-MCNC: 13.6 G/DL — SIGNIFICANT CHANGE UP (ref 13–17)
INR BLD: 1.65 RATIO — HIGH (ref 0.88–1.16)
MAGNESIUM SERPL-MCNC: 2.2 MG/DL — SIGNIFICANT CHANGE UP (ref 1.6–2.6)
MCHC RBC-ENTMCNC: 28.7 PG — SIGNIFICANT CHANGE UP (ref 27–34)
MCHC RBC-ENTMCNC: 30 GM/DL — LOW (ref 32–36)
MCV RBC AUTO: 95.6 FL — SIGNIFICANT CHANGE UP (ref 80–100)
NRBC # BLD: 0 /100 WBCS — SIGNIFICANT CHANGE UP (ref 0–0)
NRBC # FLD: 0 K/UL — SIGNIFICANT CHANGE UP (ref 0–0)
PHOSPHATE SERPL-MCNC: 7.5 MG/DL — HIGH (ref 2.5–4.5)
PLATELET # BLD AUTO: 196 K/UL — SIGNIFICANT CHANGE UP (ref 150–400)
POTASSIUM SERPL-MCNC: 5.3 MMOL/L — SIGNIFICANT CHANGE UP (ref 3.5–5.3)
POTASSIUM SERPL-SCNC: 5.3 MMOL/L — SIGNIFICANT CHANGE UP (ref 3.5–5.3)
PROTHROM AB SERPL-ACNC: 19.2 SEC — HIGH (ref 10.5–13.4)
RBC # BLD: 4.74 M/UL — SIGNIFICANT CHANGE UP (ref 4.2–5.8)
RBC # FLD: 14.7 % — HIGH (ref 10.3–14.5)
SODIUM SERPL-SCNC: 133 MMOL/L — LOW (ref 135–145)
WBC # BLD: 9.74 K/UL — SIGNIFICANT CHANGE UP (ref 3.8–10.5)
WBC # FLD AUTO: 9.74 K/UL — SIGNIFICANT CHANGE UP (ref 3.8–10.5)

## 2023-01-25 RX ORDER — FAMOTIDINE 10 MG/ML
1 INJECTION INTRAVENOUS
Qty: 0 | Refills: 0 | DISCHARGE

## 2023-01-25 RX ORDER — MIDODRINE HYDROCHLORIDE 2.5 MG/1
2 TABLET ORAL
Qty: 0 | Refills: 0 | DISCHARGE
Start: 2023-01-25

## 2023-01-25 RX ORDER — ALLOPURINOL 300 MG
1 TABLET ORAL
Qty: 0 | Refills: 0 | DISCHARGE

## 2023-01-25 RX ORDER — CINACALCET 30 MG/1
1 TABLET, FILM COATED ORAL
Qty: 0 | Refills: 0 | DISCHARGE
Start: 2023-01-25

## 2023-01-25 RX ORDER — BUDESONIDE, MICRONIZED 100 %
2 POWDER (GRAM) MISCELLANEOUS
Qty: 0 | Refills: 0 | DISCHARGE

## 2023-01-25 RX ORDER — MIDODRINE HYDROCHLORIDE 2.5 MG/1
3 TABLET ORAL
Qty: 0 | Refills: 0 | DISCHARGE
Start: 2023-01-25

## 2023-01-25 RX ORDER — WARFARIN SODIUM 2.5 MG/1
7 TABLET ORAL
Qty: 0 | Refills: 0 | DISCHARGE
Start: 2023-01-25

## 2023-01-25 RX ORDER — IPRATROPIUM/ALBUTEROL SULFATE 18-103MCG
3 AEROSOL WITH ADAPTER (GRAM) INHALATION
Qty: 0 | Refills: 0 | DISCHARGE

## 2023-01-25 RX ORDER — BUDESONIDE, MICRONIZED 100 %
2 POWDER (GRAM) MISCELLANEOUS
Qty: 0 | Refills: 0 | DISCHARGE
Start: 2023-01-25

## 2023-01-25 RX ORDER — LEVETIRACETAM 250 MG/1
1 TABLET, FILM COATED ORAL
Qty: 0 | Refills: 0 | DISCHARGE
Start: 2023-01-25

## 2023-01-25 RX ORDER — SENNA PLUS 8.6 MG/1
2 TABLET ORAL
Qty: 0 | Refills: 0 | DISCHARGE
Start: 2023-01-25

## 2023-01-25 RX ORDER — BENZOCAINE AND MENTHOL 5; 1 G/100ML; G/100ML
1 LIQUID ORAL
Qty: 0 | Refills: 0 | DISCHARGE

## 2023-01-25 RX ORDER — OXYCODONE HYDROCHLORIDE 5 MG/1
1 TABLET ORAL
Qty: 0 | Refills: 0 | DISCHARGE

## 2023-01-25 RX ORDER — ACETAMINOPHEN 500 MG
2 TABLET ORAL
Qty: 0 | Refills: 0 | DISCHARGE
Start: 2023-01-25

## 2023-01-25 RX ORDER — LIDOCAINE 4 G/100G
1 CREAM TOPICAL
Qty: 0 | Refills: 0 | DISCHARGE
Start: 2023-01-25

## 2023-01-25 RX ORDER — ACETAMINOPHEN 500 MG
2 TABLET ORAL
Qty: 0 | Refills: 0 | DISCHARGE

## 2023-01-25 RX ORDER — FAMOTIDINE 10 MG/ML
1 INJECTION INTRAVENOUS
Qty: 0 | Refills: 0 | DISCHARGE
Start: 2023-01-25

## 2023-01-25 RX ORDER — LIDOCAINE 4 G/100G
1 CREAM TOPICAL
Qty: 0 | Refills: 0 | DISCHARGE

## 2023-01-25 RX ORDER — LEVETIRACETAM 250 MG/1
1 TABLET, FILM COATED ORAL
Qty: 0 | Refills: 0 | DISCHARGE

## 2023-01-25 RX ORDER — WARFARIN SODIUM 2.5 MG/1
7 TABLET ORAL ONCE
Refills: 0 | Status: DISCONTINUED | OUTPATIENT
Start: 2023-01-25 | End: 2023-01-25

## 2023-01-25 RX ORDER — CINACALCET 30 MG/1
1 TABLET, FILM COATED ORAL
Qty: 0 | Refills: 0 | DISCHARGE

## 2023-01-25 RX ORDER — ALLOPURINOL 300 MG
1 TABLET ORAL
Qty: 0 | Refills: 0 | DISCHARGE
Start: 2023-01-25

## 2023-01-25 RX ORDER — WARFARIN SODIUM 2.5 MG/1
2 TABLET ORAL
Qty: 0 | Refills: 0 | DISCHARGE

## 2023-01-25 RX ORDER — SENNA PLUS 8.6 MG/1
2 TABLET ORAL
Qty: 0 | Refills: 0 | DISCHARGE

## 2023-01-25 RX ORDER — OXYCODONE HYDROCHLORIDE 5 MG/1
1 TABLET ORAL
Qty: 0 | Refills: 0 | DISCHARGE
Start: 2023-01-25

## 2023-01-25 RX ADMIN — Medication 100 MILLIGRAM(S): at 18:02

## 2023-01-25 RX ADMIN — MIDODRINE HYDROCHLORIDE 20 MILLIGRAM(S): 2.5 TABLET ORAL at 10:00

## 2023-01-25 RX ADMIN — SEVELAMER CARBONATE 1600 MILLIGRAM(S): 2400 POWDER, FOR SUSPENSION ORAL at 18:04

## 2023-01-25 RX ADMIN — LIDOCAINE 1 PATCH: 4 CREAM TOPICAL at 07:00

## 2023-01-25 RX ADMIN — LEVETIRACETAM 500 MILLIGRAM(S): 250 TABLET, FILM COATED ORAL at 18:01

## 2023-01-25 RX ADMIN — Medication 100 MILLIGRAM(S): at 05:07

## 2023-01-25 RX ADMIN — MIDODRINE HYDROCHLORIDE 30 MILLIGRAM(S): 2.5 TABLET ORAL at 18:03

## 2023-01-25 RX ADMIN — Medication 0.5 MILLIGRAM(S): at 09:31

## 2023-01-25 RX ADMIN — MIDODRINE HYDROCHLORIDE 30 MILLIGRAM(S): 2.5 TABLET ORAL at 05:07

## 2023-01-25 RX ADMIN — MIDODRINE HYDROCHLORIDE 30 MILLIGRAM(S): 2.5 TABLET ORAL at 14:03

## 2023-01-25 RX ADMIN — SEVELAMER CARBONATE 1600 MILLIGRAM(S): 2400 POWDER, FOR SUSPENSION ORAL at 09:52

## 2023-01-25 RX ADMIN — LEVETIRACETAM 500 MILLIGRAM(S): 250 TABLET, FILM COATED ORAL at 05:07

## 2023-01-25 RX ADMIN — LIDOCAINE 1 PATCH: 4 CREAM TOPICAL at 10:00

## 2023-01-25 NOTE — DISCHARGE NOTE PROVIDER - CARE PROVIDERS DIRECT ADDRESSES
,[email or web address],DirectAddress_[email or web address],DirectAddress_Unknown

## 2023-01-25 NOTE — PROGRESS NOTE ADULT - SUBJECTIVE AND OBJECTIVE BOX
Cardiovascular Disease Progress Note  Date of Service: 01-21-23 @ 09:37    Overnight events: Hypotension noted overnight.    Patient denies chest pain or SOB.   Otherwise review of systems negative    Objective Findings:  T(C): 36.3 (01-21-23 @ 09:02), Max: 36.6 (01-20-23 @ 21:12)  HR: 62 (01-21-23 @ 09:02) (62 - 78)  BP: 93/57 (01-21-23 @ 09:02) (63/35 - 93/57)  RR: 18 (01-21-23 @ 09:02) (17 - 18)  SpO2: 100% (01-21-23 @ 09:02) (100% - 100%)  Wt(kg): --  Daily     Daily       Physical Exam:  Gen: NAD; Patient resting comfortably  HEENT: EOMI, Normocephalic/ atraumatic  CV: RRR, normal S1 + S2, no m/r/g  Lungs:  Normal respiratory effort; clear to auscultation bilaterally  Abd: soft, non-tender; bowel sounds present  Ext: No edema; warm and well perfused    Telemetry: Sinus     Laboratory Data:                        14.3   9.67  )-----------( 193      ( 21 Jan 2023 07:04 )             48.2     01-21    136  |  92<L>  |  62<H>  ----------------------------<  72  5.6<H>   |  24  |  11.85<H>    Ca    8.5      21 Jan 2023 07:04  Phos  6.7     01-21  Mg     2.40     01-21    TPro  x   /  Alb  4.6  /  TBili  x   /  DBili  x   /  AST  x   /  ALT  x   /  AlkPhos  x   01-20    PT/INR - ( 21 Jan 2023 07:04 )   PT: 38.5 sec;   INR: 3.28 ratio         PTT - ( 19 Jan 2023 17:57 )  PTT:34.2 sec  CARDIAC MARKERS ( 19 Jan 2023 23:02 )  x     / x     / 169 U/L / x     / 2.0 ng/mL          Inpatient Medications:  MEDICATIONS  (STANDING):  allopurinol 100 milliGRAM(s) Oral daily  buDESOnide    Inhalation Suspension 0.5 milliGRAM(s) Inhalation two times a day  chlorhexidine 2% Cloths 1 Application(s) Topical daily  chlorhexidine 2% Cloths 1 Application(s) Topical daily  cinacalcet 60 milliGRAM(s) Oral daily  famotidine    Tablet 20 milliGRAM(s) Oral daily  levETIRAcetam 500 milliGRAM(s) Oral two times a day  lidocaine   4% Patch 1 Patch Transdermal every 24 hours  midodrine. 30 milliGRAM(s) Oral three times a day  pregabalin 100 milliGRAM(s) Oral two times a day  senna 2 Tablet(s) Oral at bedtime      Assessment: 77 year old man with orthostatic hypotension on midodrine, ESRD on HD, prior DVT on warfarin and compensated systolic CHF presents with hypotension.     Plan of Care:    #Orthostatic hypotension-  Likely due to poor vascular tone and calcified vessels in the setting of ESRD.  Echocardiogram is negative for any structural heart disease to explain hypotension.  Continue midodrine as ordered.   If patient is unable to tolerate HD due to hypotension, he will need IV pressor assisted HD.   MICU input noted.     #Prior DVT-  Extensive DVT burden in 2021.  Warfarin as per the primary team.    #ESRD-  HD as BP tolerates.  Renal input noted.       Over 35 minutes spent on total encounter; more than 50% of the visit was spent counseling and/or coordinating care by the attending physician.      Darrion Richards MD [facility]  Cardiovascular Disease  [phone]
Cardiovascular Disease Progress Note  Date of Service: 01-23-23 @ 09:08    Overnight events: Hypotension noted overnight.    Patient is asymptomatic and in no distress.     Objective Findings:  T(C): 36.7 (01-23-23 @ 08:30), Max: 36.7 (01-22-23 @ 19:39)  HR: 64 (01-23-23 @ 08:30) (60 - 77)  BP: 109/62 (01-23-23 @ 08:30) (64/38 - 109/62)  RR: 18 (01-23-23 @ 08:30) (17 - 18)  SpO2: 99% (01-23-23 @ 08:30) (95% - 100%)  Wt(kg): --  Daily     Daily       Physical Exam:  Gen: NAD; Patient resting comfortably  HEENT: EOMI, Normocephalic/ atraumatic  CV: RRR, normal S1 + S2, no m/r/g  Lungs:  Normal respiratory effort; clear to auscultation bilaterally  Abd: soft, non-tender; bowel sounds present  Ext: No edema; warm and well perfused    Telemetry: n/a    Laboratory Data:                        14.0   8.96  )-----------( 176      ( 22 Jan 2023 09:08 )             47.1     01-22    136  |  93<L>  |  45<H>  ----------------------------<  87  5.1   |  28  |  9.97<H>    Ca    9.3      22 Jan 2023 09:08  Phos  6.7     01-22  Mg     2.20     01-22      PT/INR - ( 22 Jan 2023 09:08 )   PT: 34.7 sec;   INR: 2.96 ratio                   Inpatient Medications:  MEDICATIONS  (STANDING):  allopurinol 100 milliGRAM(s) Oral daily  buDESOnide    Inhalation Suspension 0.5 milliGRAM(s) Inhalation two times a day  chlorhexidine 2% Cloths 1 Application(s) Topical daily  chlorhexidine 2% Cloths 1 Application(s) Topical daily  cinacalcet 60 milliGRAM(s) Oral daily  famotidine    Tablet 20 milliGRAM(s) Oral daily  levETIRAcetam 500 milliGRAM(s) Oral two times a day  lidocaine   4% Patch 1 Patch Transdermal every 24 hours  midodrine. 20 milliGRAM(s) Oral <User Schedule>  midodrine. 30 milliGRAM(s) Oral <User Schedule>  pregabalin 100 milliGRAM(s) Oral two times a day  senna 2 Tablet(s) Oral at bedtime      Assessment: 77 year old man with orthostatic hypotension on midodrine, ESRD on HD, prior DVT on warfarin and compensated systolic CHF presents with hypotension.     Plan of Care:    #Hypotension-  Likely due to poor vascular tone and calcified vessels in the setting of ESRD.  Echocardiogram is negative for any structural heart disease to explain hypotension.  Patient is already on high doses of midodrine.  Monitor closely for peripheral ischemia.     #Prior DVT-  Extensive DVT burden in 2021.  Warfarin as per the primary team.    #ESRD-  HD as BP tolerates.  Renal input noted.     Cardiac findings were discussed with the patient and his wife via phone.       Over 35 minutes spent on total encounter; more than 50% of the visit was spent counseling and/or coordinating care by the attending physician.      Darrion Richards MD [facility]  Cardiovascular Disease  [phone]
Cardiovascular Disease Progress Note  Date of Service: 01-25-23 @ 06:55    Overnight events: No acute events overnight.    Patient is in no distress.   Otherwise review of systems negative    Objective Findings:  T(C): 36.3 (01-25-23 @ 05:10), Max: 37.1 (01-24-23 @ 23:03)  HR: 60 (01-25-23 @ 05:10) (60 - 67)  BP: 104/60 (01-25-23 @ 05:10) (91/82 - 112/68)  RR: 18 (01-25-23 @ 05:10) (16 - 19)  SpO2: 97% (01-25-23 @ 05:10) (95% - 100%)  Wt(kg): --  Daily     Daily       Physical Exam:  Gen: NAD; Patient resting comfortably  HEENT: EOMI, Normocephalic/ atraumatic  CV: RRR, normal S1 + S2, no m/r/g  Lungs:  Normal respiratory effort; clear to auscultation bilaterally  Abd: soft, non-tender; bowel sounds present  Ext: No edema;      Telemetry: n/a    Laboratory Data:                        13.7   8.67  )-----------( 171      ( 24 Jan 2023 05:20 )             46.4     01-24    139  |  92<L>  |  42<H>  ----------------------------<  69<L>  4.7   |  25  |  8.66<H>    Ca    8.5      24 Jan 2023 05:20  Phos  5.7     01-24  Mg     2.20     01-24      PT/INR - ( 24 Jan 2023 05:20 )   PT: 21.3 sec;   INR: 1.83 ratio                   Inpatient Medications:  MEDICATIONS  (STANDING):  allopurinol 100 milliGRAM(s) Oral daily  buDESOnide    Inhalation Suspension 0.5 milliGRAM(s) Inhalation two times a day  chlorhexidine 2% Cloths 1 Application(s) Topical daily  chlorhexidine 2% Cloths 1 Application(s) Topical daily  cinacalcet 30 milliGRAM(s) Oral <User Schedule>  famotidine    Tablet 20 milliGRAM(s) Oral daily  levETIRAcetam 500 milliGRAM(s) Oral two times a day  lidocaine   4% Patch 1 Patch Transdermal every 24 hours  midodrine. 20 milliGRAM(s) Oral <User Schedule>  midodrine. 30 milliGRAM(s) Oral <User Schedule>  pregabalin 100 milliGRAM(s) Oral two times a day  senna 2 Tablet(s) Oral at bedtime  sevelamer carbonate 1600 milliGRAM(s) Oral three times a day with meals      Assessment: 77 year old man with orthostatic hypotension on midodrine, ESRD on HD, prior DVT on warfarin and compensated systolic CHF presents with hypotension.     Plan of Care:    #Hypotension-  Likely due to poor vascular tone and calcified vessels in the setting of ESRD.  Echocardiogram is negative for any structural heart disease to explain hypotension.  Patient is already on high doses of midodrine.  Monitor closely for peripheral ischemia.     #Prior DVT-  Extensive DVT burden in 2021.  Warfarin as per the primary team.    #ESRD-  HD as BP tolerates.  Renal input noted.     #ACP (advance care planning)-  Advanced care planning was discussed with the patient.  Advance care planning forms were discussed. Risks, benefits and alternatives of medical treatment and procedures were discussed in detail and all questions were answered. 30 additional minutes spent addressing advance care plans.      Over 55 minutes spent on total encounter; more than 50% of the visit was spent counseling and/or coordinating care by the attending physician.      Darrion Richards MD [facility]  Cardiovascular Disease  [phone]
Cardiovascular Disease Progress Note  Date of Service: 23 @ 09:42    Overnight events: No acute events overnight.    Patient denies chest pain or SOB.   Otherwise review of systems negative    Objective Findings:  T(C): 36.4 (23 @ 09:05), Max: 37.1 (23 @ 10:28)  HR: 63 (23 @ 09:05) (56 - 88)  BP: 93/56 (23 @ 09:05) (92/62 - 103/48)  RR: 18 (23 @ 09:05) (17 - 18)  SpO2: 98% (23 @ 09:05) (96% - 100%)  Wt(kg): --  Daily     Daily Weight in k.9 (2023 13:45)      Physical Exam:  Gen: NAD; Patient resting comfortably  HEENT: EOMI, Normocephalic/ atraumatic  CV: RRR, normal S1 + S2, no m/r/g  Lungs:  Normal respiratory effort; clear to auscultation bilaterally  Abd: soft, non-tender; bowel sounds present  Ext: No edema;      Telemetry: Sinus    Laboratory Data:                        14.0   8.96  )-----------( 176      ( 2023 09:08 )             47.1         136  |  92<L>  |  62<H>  ----------------------------<  72  5.6<H>   |  24  |  11.85<H>    Ca    8.5      2023 07:04  Phos  6.7       Mg     2.40           PT/INR - ( 2023 07:04 )   PT: 38.5 sec;   INR: 3.28 ratio                   Inpatient Medications:  MEDICATIONS  (STANDING):  allopurinol 100 milliGRAM(s) Oral daily  buDESOnide    Inhalation Suspension 0.5 milliGRAM(s) Inhalation two times a day  chlorhexidine 2% Cloths 1 Application(s) Topical daily  chlorhexidine 2% Cloths 1 Application(s) Topical daily  cinacalcet 60 milliGRAM(s) Oral daily  famotidine    Tablet 20 milliGRAM(s) Oral daily  levETIRAcetam 500 milliGRAM(s) Oral two times a day  lidocaine   4% Patch 1 Patch Transdermal every 24 hours  midodrine. 30 milliGRAM(s) Oral three times a day  pregabalin 100 milliGRAM(s) Oral two times a day  senna 2 Tablet(s) Oral at bedtime      Assessment: 77 year old man with orthostatic hypotension on midodrine, ESRD on HD, prior DVT on warfarin and compensated systolic CHF presents with hypotension.     Plan of Care:    #Hypotension-  Likely due to poor vascular tone and calcified vessels in the setting of ESRD.  Echocardiogram is negative for any structural heart disease to explain hypotension.  Patient is already on high doses of midodrine.  Monitor closely for peripheral ischemia.   MICU input noted.     #Prior DVT-  Extensive DVT burden in .  Warfarin as per the primary team.    #ESRD-  HD as BP tolerates.  Renal input noted.     Discussed with Dr. Hein of the primary team.     Over 35 minutes spent on total encounter; more than 50% of the visit was spent counseling and/or coordinating care by the attending physician.      Darrion Richards MD [facility]  Cardiovascular Disease  [phone]
[facility] [place] [facility]   MD CHESTER DUEÑAS MD KRISTINE SOLTANPOUR, DO ANGELA WONG, PA    TEL:  OFFICE: [phone]  From 5pm-7am Answering Service [phone]    -- RENAL FOLLOW UP NOTE ---Date of Service 01-20-23 @ 14:34    Patient is a 77y old  Male who presents with a chief complaint of hypotension (20 Jan 2023 10:14)      Patient seen and examined at bedside. No chest pain/sob    VITALS:  T(F): 97.9 (01-20-23 @ 07:35), Max: 98.4 (01-19-23 @ 16:46)  HR: 78 (01-20-23 @ 10:59)  BP: 83/56 (01-20-23 @ 13:06)  RR: 15 (01-20-23 @ 09:10)  SpO2: 100% (01-20-23 @ 09:10)  Wt(kg): --        PHYSICAL EXAM:  General: NAD  Neck: No JVD  Respiratory: CTAB, no wheezes, rales or rhonchi  Cardiovascular: S1, S2, RRR  Gastrointestinal: BS+, soft, NT/ND  Extremities: No peripheral edema    Hospital Medications:   MEDICATIONS  (STANDING):  allopurinol 100 milliGRAM(s) Oral daily  buDESOnide    Inhalation Suspension 0.5 milliGRAM(s) Inhalation two times a day  chlorhexidine 2% Cloths 1 Application(s) Topical daily  cinacalcet 60 milliGRAM(s) Oral daily  famotidine    Tablet 20 milliGRAM(s) Oral daily  levETIRAcetam 500 milliGRAM(s) Oral two times a day  lidocaine   4% Patch 1 Patch Transdermal every 24 hours  midodrine. 30 milliGRAM(s) Oral three times a day  pregabalin 100 milliGRAM(s) Oral two times a day  senna 2 Tablet(s) Oral at bedtime  warfarin 7 milliGRAM(s) Oral once      LABS:  01-20    137  |  92<L>  |  44<H>  ----------------------------<  78  5.3   |  26  |  9.99<H>    Ca    9.2      20 Jan 2023 06:35  Phos  6.2     01-20  Mg     2.50     01-19    TPro      /  Alb  4.6  /  TBili      /  DBili      /  AST      /  ALT      /  AlkPhos      01-20    Creatinine Trend: 9.99 <--, 8.59 <--, 8.97 <--, 12.63 <--, 10.01 <--, 11.57 <--    Albumin, Serum: 4.6 g/dL (01-20 @ 06:35)  Phosphorus Level, Serum: 6.2 mg/dL (01-20 @ 06:35)  Albumin, Serum: 4.5 g/dL (01-19 @ 23:02)  Albumin, Serum: 5.3 g/dL (01-19 @ 17:57)    calcium--  intact [id number]  parathyroid hormone intact, serum--                            14.1   9.38  )-----------( 189      ( 20 Jan 2023 06:35 )             47.9     Urine Studies:      PTH -- (Ca --)      [01-20-23 @ 06:35]   630  TSH 1.31      [01-19-23 @ 17:57]    HBsAb 46.9      [01-16-23 @ 21:38]  HBsAg Nonreact      [01-16-23 @ 21:38]  HBcAb Nonreact      [01-16-23 @ 21:38]  HCV 0.35, Nonreact      [01-16-23 @ 21:38]      RADIOLOGY & ADDITIONAL STUDIES:  
Patient is a 77y old  Male who presents with a chief complaint of hypotension (20 Jan 2023 14:34)      SUBJECTIVE / OVERNIGHT EVENTS: no CP , SOB    MEDICATIONS  (STANDING):  allopurinol 100 milliGRAM(s) Oral daily  buDESOnide    Inhalation Suspension 0.5 milliGRAM(s) Inhalation two times a day  chlorhexidine 2% Cloths 1 Application(s) Topical daily  cinacalcet 60 milliGRAM(s) Oral daily  famotidine    Tablet 20 milliGRAM(s) Oral daily  levETIRAcetam 500 milliGRAM(s) Oral two times a day  lidocaine   4% Patch 1 Patch Transdermal every 24 hours  midodrine. 30 milliGRAM(s) Oral three times a day  pregabalin 100 milliGRAM(s) Oral two times a day  senna 2 Tablet(s) Oral at bedtime  warfarin 7 milliGRAM(s) Oral once    MEDICATIONS  (PRN):  acetaminophen     Tablet .. 650 milliGRAM(s) Oral every 6 hours PRN Temp greater or equal to 38C (100.4F), Mild Pain (1 - 3)  oxyCODONE    IR 5 milliGRAM(s) Oral every 6 hours PRN Severe Pain (7 - 10)  sodium chloride 0.9% Bolus. 100 milliLiter(s) IV Bolus every 5 minutes PRN SBP LESS THAN or EQUAL to 90 mmHg      Vital Signs Last 24 Hrs  T(F): 97.9 (01-20-23 @ 07:35), Max: 98.4 (01-19-23 @ 16:46)  HR: 78 (01-20-23 @ 10:59) (62 - 81)  BP: 83/56 (01-20-23 @ 14:40) (76/56 - 103/58)  RR: 15 (01-20-23 @ 09:10) (15 - 18)  SpO2: 100% (01-20-23 @ 09:10) (90% - 100%)  Telemetry:   CAPILLARY BLOOD GLUCOSE      POCT Blood Glucose.: 82 mg/dL (19 Jan 2023 16:06)    I&O's Summary      PHYSICAL EXAM:  GENERAL: NAD, well-developed  HEAD:  Atraumatic, Normocephalic  EYES: EOMI, PERRLA, conjunctiva and sclera clear  NECK: Supple, No JVD  CHEST/LUNG: Clear to auscultation bilaterally; No wheeze  HEART: Regular rate and rhythm; No murmurs, rubs, or gallops  ABDOMEN: Soft, Nontender, Nondistended; Bowel sounds present  EXTREMITIES:  2+ Peripheral Pulses, No clubbing, cyanosis, or edema  PSYCH: AAOx3  NEUROLOGY: non-focal  SKIN: No rashes or lesions    LABS:                        14.1   9.38  )-----------( 189      ( 20 Jan 2023 06:35 )             47.9     01-20    137  |  92<L>  |  44<H>  ----------------------------<  78  5.3   |  26  |  9.99<H>    Ca    9.2      20 Jan 2023 06:35  Phos  6.2     01-20  Mg     2.50     01-19    TPro  x   /  Alb  4.6  /  TBili  x   /  DBili  x   /  AST  x   /  ALT  x   /  AlkPhos  x   01-20    PT/INR - ( 20 Jan 2023 06:35 )   PT: 28.5 sec;   INR: 2.43 ratio         PTT - ( 19 Jan 2023 17:57 )  PTT:34.2 sec  CARDIAC MARKERS ( 19 Jan 2023 23:02 )  x     / x     / 169 U/L / x     / 2.0 ng/mL          RADIOLOGY & ADDITIONAL TESTS:    Imaging Personally Reviewed:    Consultant(s) Notes Reviewed:      Care Discussed with Consultants/Other Providers:  
[facility] [place]   MD CHESTER DUEÑAS MD KRISTINE SOLTANPOUR, DAVID TRUJILLO    TEL:  OFFICE: [phone]  From 5pm-7am Answering Service [phone]    -- RENAL FOLLOW UP NOTE ---Date of Service 01-22-23 @ 23:47    Patient is a 77y old  Male who presents with a chief complaint of hypotension (22 Jan 2023 16:05)      Patient seen and examined at bedside. No chest pain/sob    VITALS:  T(F): 97.5 (01-23-23 @ 01:00), Max: 98.1 (01-22-23 @ 05:05)  HR: 61 (01-23-23 @ 01:00)  BP: 103/62 (01-23-23 @ 01:00)  RR: 18 (01-23-23 @ 01:00)  SpO2: 100% (01-23-23 @ 01:00)  Wt(kg): --    01-21 @ 07:01  -  01-22 @ 07:00  --------------------------------------------------------  IN: 400 mL / OUT: 613 mL / NET: -213 mL      Height (cm): 195.6 (01-22 @ 09:05)  Weight (kg): 116 (01-22 @ 09:05)  BMI (kg/m2): 30.3 (01-22 @ 09:05)  BSA (m2): 2.48 (01-22 @ 09:05)    PHYSICAL EXAM:  General: NAD  Neck: No JVD  Respiratory: CTAB, no wheezes, rales or rhonchi  Cardiovascular: S1, S2, RRR  Gastrointestinal: BS+, soft, NT/ND  Extremities: No peripheral edema    Hospital Medications:   MEDICATIONS  (STANDING):  allopurinol 100 milliGRAM(s) Oral daily  buDESOnide    Inhalation Suspension 0.5 milliGRAM(s) Inhalation two times a day  chlorhexidine 2% Cloths 1 Application(s) Topical daily  chlorhexidine 2% Cloths 1 Application(s) Topical daily  cinacalcet 60 milliGRAM(s) Oral daily  famotidine    Tablet 20 milliGRAM(s) Oral daily  levETIRAcetam 500 milliGRAM(s) Oral two times a day  lidocaine   4% Patch 1 Patch Transdermal every 24 hours  midodrine. 20 milliGRAM(s) Oral <User Schedule>  midodrine. 30 milliGRAM(s) Oral <User Schedule>  pregabalin 100 milliGRAM(s) Oral two times a day  senna 2 Tablet(s) Oral at bedtime      LABS:  01-22    136  |  93<L>  |  45<H>  ----------------------------<  87  5.1   |  28  |  9.97<H>    Ca    9.3      22 Jan 2023 09:08  Phos  6.7     01-22  Mg     2.20     01-22      Creatinine Trend: 9.97 <--, 11.85 <--, 9.99 <--, 8.59 <--, 8.97 <--, 12.63 <--, 10.01 <--, 11.57 <--    Phosphorus Level, Serum: 6.7 mg/dL (01-22 @ 09:08)                              14.0   8.96  )-----------( 176      ( 22 Jan 2023 09:08 )             47.1     Urine Studies:      PTH -- (Ca --)      [01-20-23 @ 06:35]   630  TSH 1.31      [01-19-23 @ 17:57]    HBsAb 46.9      [01-16-23 @ 21:38]  HBsAg Nonreact      [01-16-23 @ 21:38]  HBcAb Nonreact      [01-16-23 @ 21:38]  HCV 0.35, Nonreact      [01-16-23 @ 21:38]      RADIOLOGY & ADDITIONAL STUDIES:  
Cardiovascular Disease Progress Note  Date of Service: 23 @ 07:00    Overnight events: No acute events overnight.    Patient is in no apparent distress.   Otherwise review of systems negative    Objective Findings:  T(C): 36.4 (23 @ 05:40), Max: 36.7 (23 @ 08:30)  HR: 63 (23 @ 05:40) (58 - 91)  BP: 107/55 (23 @ 05:40) (85/50 - 109/62)  RR: 16 (23 @ 05:40) (16 - 18)  SpO2: 100% (23 @ 05:40) (96% - 100%)  Wt(kg): --  Daily     Daily Weight in k.4 (2023 13:51)      Physical Exam:  Gen: NAD; Patient resting comfortably  HEENT: EOMI, Normocephalic/ atraumatic  CV: RRR, normal S1 + S2, no m/r/g  Lungs:  Normal respiratory effort; clear to auscultation bilaterally  Abd: soft, non-tender; bowel sounds present  Ext: No edema;      Telemetry: n/a    Laboratory Data:                        13.3   8.40  )-----------( 199      ( 2023 10:20 )             43.6         135  |  92<L>  |  70<H>  ----------------------------<  135<H>  4.9   |  22  |  12.16<H>    Ca    8.0<L>      2023 10:20  Phos  7.7       Mg     2.10           PT/INR - ( 2023 05:20 )   PT: 21.3 sec;   INR: 1.83 ratio                   Inpatient Medications:  MEDICATIONS  (STANDING):  allopurinol 100 milliGRAM(s) Oral daily  buDESOnide    Inhalation Suspension 0.5 milliGRAM(s) Inhalation two times a day  chlorhexidine 2% Cloths 1 Application(s) Topical daily  chlorhexidine 2% Cloths 1 Application(s) Topical daily  cinacalcet 30 milliGRAM(s) Oral <User Schedule>  famotidine    Tablet 20 milliGRAM(s) Oral daily  levETIRAcetam 500 milliGRAM(s) Oral two times a day  lidocaine   4% Patch 1 Patch Transdermal every 24 hours  midodrine. 20 milliGRAM(s) Oral <User Schedule>  midodrine. 30 milliGRAM(s) Oral <User Schedule>  pregabalin 100 milliGRAM(s) Oral two times a day  senna 2 Tablet(s) Oral at bedtime  sevelamer carbonate 1600 milliGRAM(s) Oral three times a day with meals      Assessment: 77 year old man with orthostatic hypotension on midodrine, ESRD on HD, prior DVT on warfarin and compensated systolic CHF presents with hypotension.     Plan of Care:    #Hypotension-  Likely due to poor vascular tone and calcified vessels in the setting of ESRD.  Echocardiogram is negative for any structural heart disease to explain hypotension.  Patient is already on high doses of midodrine.  Monitor closely for peripheral ischemia.     #Prior DVT-  Extensive DVT burden in .  Warfarin as per the primary team.    #ESRD-  HD as BP tolerates.  Renal input noted.       Over 25 minutes spent on total encounter; more than 50% of the visit was spent counseling and/or coordinating care by the attending physician.      Drarion Richards MD [facility]  Cardiovascular Disease  [phone]
Patient is a 77y old  Male who presents with a chief complaint of hypotension (22 Jan 2023 09:42)      SUBJECTIVE / OVERNIGHT EVENTS: this am ptn was stable, had RRT later in the day, Midodrine was raised to 40 mg tid and pre dialysis will get additional 20 mg. this was d/w cardiology and renal    MEDICATIONS  (STANDING):  allopurinol 100 milliGRAM(s) Oral daily  buDESOnide    Inhalation Suspension 0.5 milliGRAM(s) Inhalation two times a day  chlorhexidine 2% Cloths 1 Application(s) Topical daily  chlorhexidine 2% Cloths 1 Application(s) Topical daily  cinacalcet 60 milliGRAM(s) Oral daily  famotidine    Tablet 20 milliGRAM(s) Oral daily  levETIRAcetam 500 milliGRAM(s) Oral two times a day  lidocaine   4% Patch 1 Patch Transdermal every 24 hours  midodrine. 40 milliGRAM(s) Oral three times a day  midodrine. 20 milliGRAM(s) Oral <User Schedule>  pregabalin 100 milliGRAM(s) Oral two times a day  senna 2 Tablet(s) Oral at bedtime  warfarin 3 milliGRAM(s) Oral once    MEDICATIONS  (PRN):  acetaminophen     Tablet .. 650 milliGRAM(s) Oral every 6 hours PRN Temp greater or equal to 38C (100.4F), Mild Pain (1 - 3)  oxyCODONE    IR 5 milliGRAM(s) Oral every 6 hours PRN Severe Pain (7 - 10)  sodium chloride 0.9% Bolus. 100 milliLiter(s) IV Bolus every 5 minutes PRN SBP LESS THAN or EQUAL to 90 mmHg      Vital Signs Last 24 Hrs  T(F): 97.6 (01-22-23 @ 14:51), Max: 98.3 (01-21-23 @ 19:01)  HR: 70 (01-22-23 @ 14:51) (61 - 88)  BP: 80/51 (01-22-23 @ 14:51) (80/50 - 99/59)  RR: 18 (01-22-23 @ 14:51) (18 - 18)  SpO2: 99% (01-22-23 @ 14:51) (96% - 100%)  Telemetry:   CAPILLARY BLOOD GLUCOSE      POCT Blood Glucose.: 84 mg/dL (22 Jan 2023 14:38)    I&O's Summary    21 Jan 2023 07:01  -  22 Jan 2023 07:00  --------------------------------------------------------  IN: 400 mL / OUT: 613 mL / NET: -213 mL        PHYSICAL EXAM:  GENERAL: NAD, well-developed  HEAD:  Atraumatic, Normocephalic  EYES: EOMI, PERRLA, conjunctiva and sclera clear  NECK: Supple, No JVD  CHEST/LUNG: Clear to auscultation bilaterally; No wheeze  HEART: Regular rate and rhythm; No murmurs, rubs, or gallops  ABDOMEN: Soft, Nontender, Nondistended; Bowel sounds present  EXTREMITIES:  2+ Peripheral Pulses, No clubbing, cyanosis, or edema  PSYCH: AAOx3  NEUROLOGY: non-focal  SKIN: No rashes or lesions    LABS:                        14.0   8.96  )-----------( 176      ( 22 Jan 2023 09:08 )             47.1     01-22    136  |  93<L>  |  45<H>  ----------------------------<  87  5.1   |  28  |  9.97<H>    Ca    9.3      22 Jan 2023 09:08  Phos  6.7     01-22  Mg     2.20     01-22      PT/INR - ( 22 Jan 2023 09:08 )   PT: 34.7 sec;   INR: 2.96 ratio                   RADIOLOGY & ADDITIONAL TESTS:    Imaging Personally Reviewed:    Consultant(s) Notes Reviewed:      Care Discussed with Consultants/Other Providers:  
[facility] – [place] [facility]PHROLOGY PRACTICE   MD CHESTER DUEÑAS MD KRISTINE SOLTANPOUR, DO ANGELA WONG, PA    TEL:  OFFICE: [phone]  From 5pm-7am Answering Service [phone]    -- RENAL FOLLOW UP NOTE ---Date of Service 01-24-23 @ 14:31    Patient is a 77y old  Male who presents with a chief complaint of hypotension (24 Jan 2023 06:59)      Patient seen and examined at bedside. No chest pain/sob    VITALS:  T(F): 97.3 (01-24-23 @ 13:12), Max: 98.1 (01-24-23 @ 00:00)  HR: 62 (01-24-23 @ 13:12)  BP: 112/68 (01-24-23 @ 13:12)  RR: 17 (01-24-23 @ 13:12)  SpO2: 97% (01-24-23 @ 13:12)  Wt(kg): --    01-23 @ 07:01  -  01-24 @ 07:00  --------------------------------------------------------  IN: 400 mL / OUT: 1062 mL / NET: -662 mL          PHYSICAL EXAM:  General: NAD  Neck: No JVD  Respiratory: basilar crackles  Cardiovascular: S1, S2, RRR  Gastrointestinal: BS+, soft, NT/ND  Extremities: No peripheral edema    Hospital Medications:   MEDICATIONS  (STANDING):  allopurinol 100 milliGRAM(s) Oral daily  buDESOnide    Inhalation Suspension 0.5 milliGRAM(s) Inhalation two times a day  chlorhexidine 2% Cloths 1 Application(s) Topical daily  chlorhexidine 2% Cloths 1 Application(s) Topical daily  cinacalcet 30 milliGRAM(s) Oral <User Schedule>  famotidine    Tablet 20 milliGRAM(s) Oral daily  levETIRAcetam 500 milliGRAM(s) Oral two times a day  lidocaine   4% Patch 1 Patch Transdermal every 24 hours  midodrine. 20 milliGRAM(s) Oral <User Schedule>  midodrine. 30 milliGRAM(s) Oral <User Schedule>  pregabalin 100 milliGRAM(s) Oral two times a day  senna 2 Tablet(s) Oral at bedtime  sevelamer carbonate 1600 milliGRAM(s) Oral three times a day with meals  warfarin 7 milliGRAM(s) Oral once      LABS:  01-24    139  |  92<L>  |  42<H>  ----------------------------<  69<L>  4.7   |  25  |  8.66<H>    Ca    8.5      24 Jan 2023 05:20  Phos  5.7     01-24  Mg     2.20     01-24      Creatinine Trend: 8.66 <--, 12.16 <--, 9.97 <--, 11.85 <--, 9.99 <--, 8.59 <--, 8.97 <--    Phosphorus Level, Serum: 5.7 mg/dL (01-24 @ 05:20)                              13.7   8.67  )-----------( 171      ( 24 Jan 2023 05:20 )             46.4     Urine Studies:      PTH -- (Ca --)      [01-20-23 @ 06:35]   630  TSH 1.31      [01-19-23 @ 17:57]    HBsAb 46.9      [01-16-23 @ 21:38]  HBsAg Nonreact      [01-16-23 @ 21:38]  HBcAb Nonreact      [01-16-23 @ 21:38]  HCV 0.35, Nonreact      [01-16-23 @ 21:38]      RADIOLOGY & ADDITIONAL STUDIES:  
Patient is a 77y old  Male who presents with a chief complaint of hypotension (21 Jan 2023 09:37)      SUBJECTIVE / OVERNIGHT EVENTS: ptn remains hypotensive, required 60 mg pre HD to tolerate 3 hrs of HD. case d/w cardiology and renal    MEDICATIONS  (STANDING):  allopurinol 100 milliGRAM(s) Oral daily  buDESOnide    Inhalation Suspension 0.5 milliGRAM(s) Inhalation two times a day  chlorhexidine 2% Cloths 1 Application(s) Topical daily  chlorhexidine 2% Cloths 1 Application(s) Topical daily  cinacalcet 60 milliGRAM(s) Oral daily  famotidine    Tablet 20 milliGRAM(s) Oral daily  levETIRAcetam 500 milliGRAM(s) Oral two times a day  lidocaine   4% Patch 1 Patch Transdermal every 24 hours  midodrine. 30 milliGRAM(s) Oral three times a day  pregabalin 100 milliGRAM(s) Oral two times a day  senna 2 Tablet(s) Oral at bedtime    MEDICATIONS  (PRN):  acetaminophen     Tablet .. 650 milliGRAM(s) Oral every 6 hours PRN Temp greater or equal to 38C (100.4F), Mild Pain (1 - 3)  oxyCODONE    IR 5 milliGRAM(s) Oral every 6 hours PRN Severe Pain (7 - 10)  sodium chloride 0.9% Bolus. 100 milliLiter(s) IV Bolus every 5 minutes PRN SBP LESS THAN or EQUAL to 90 mmHg      Vital Signs Last 24 Hrs  T(F): 98.3 (01-21-23 @ 19:01), Max: 98.7 (01-21-23 @ 10:28)  HR: 85 (01-21-23 @ 21:28) (56 - 88)  BP: 92/62 (01-21-23 @ 19:01) (84/53 - 103/48)  RR: 18 (01-21-23 @ 19:01) (17 - 18)  SpO2: 100% (01-21-23 @ 21:28) (100% - 100%)  Telemetry:   CAPILLARY BLOOD GLUCOSE        I&O's Summary    21 Jan 2023 07:01  -  21 Jan 2023 22:55  --------------------------------------------------------  IN: 400 mL / OUT: 613 mL / NET: -213 mL        PHYSICAL EXAM:  GENERAL: NAD, well-developed  HEAD:  Atraumatic, Normocephalic  EYES: EOMI, PERRLA, conjunctiva and sclera clear  NECK: Supple, No JVD  CHEST/LUNG: Clear to auscultation bilaterally; No wheeze  HEART: Regular rate and rhythm; No murmurs, rubs, or gallops  ABDOMEN: Soft, Nontender, Nondistended; Bowel sounds present  EXTREMITIES:  2+ Peripheral Pulses, No clubbing, cyanosis, or edema  PSYCH: AAOx3  NEUROLOGY: non-focal  SKIN: No rashes or lesions    LABS:                        14.3   9.67  )-----------( 193      ( 21 Jan 2023 07:04 )             48.2     01-21    136  |  92<L>  |  62<H>  ----------------------------<  72  5.6<H>   |  24  |  11.85<H>    Ca    8.5      21 Jan 2023 07:04  Phos  6.7     01-21  Mg     2.40     01-21    TPro  x   /  Alb  4.6  /  TBili  x   /  DBili  x   /  AST  x   /  ALT  x   /  AlkPhos  x   01-20    PT/INR - ( 21 Jan 2023 07:04 )   PT: 38.5 sec;   INR: 3.28 ratio           CARDIAC MARKERS ( 19 Jan 2023 23:02 )  x     / x     / 169 U/L / x     / 2.0 ng/mL          RADIOLOGY & ADDITIONAL TESTS:    Imaging Personally Reviewed:    Consultant(s) Notes Reviewed:      Care Discussed with Consultants/Other Providers:  
Patient is a 77y old  Male who presents with a chief complaint of hypotension (24 Jan 2023 14:31)      SUBJECTIVE / OVERNIGHT EVENTS: overall BPs have improved, also at HD, less fluid is being removed. no syncopal events since meds were adjusted. medically cleared for DC    MEDICATIONS  (STANDING):  allopurinol 100 milliGRAM(s) Oral daily  buDESOnide    Inhalation Suspension 0.5 milliGRAM(s) Inhalation two times a day  chlorhexidine 2% Cloths 1 Application(s) Topical daily  chlorhexidine 2% Cloths 1 Application(s) Topical daily  cinacalcet 30 milliGRAM(s) Oral <User Schedule>  famotidine    Tablet 20 milliGRAM(s) Oral daily  levETIRAcetam 500 milliGRAM(s) Oral two times a day  lidocaine   4% Patch 1 Patch Transdermal every 24 hours  midodrine. 20 milliGRAM(s) Oral <User Schedule>  midodrine. 30 milliGRAM(s) Oral <User Schedule>  pregabalin 100 milliGRAM(s) Oral two times a day  senna 2 Tablet(s) Oral at bedtime  sevelamer carbonate 1600 milliGRAM(s) Oral three times a day with meals  warfarin 7 milliGRAM(s) Oral once    MEDICATIONS  (PRN):  acetaminophen     Tablet .. 650 milliGRAM(s) Oral every 6 hours PRN Temp greater or equal to 38C (100.4F), Mild Pain (1 - 3)  oxyCODONE    IR 5 milliGRAM(s) Oral every 6 hours PRN Severe Pain (7 - 10)  sodium chloride 0.9% Bolus. 100 milliLiter(s) IV Bolus every 5 minutes PRN SBP LESS THAN or EQUAL to 90 mmHg      Vital Signs Last 24 Hrs  T(F): 97.3 (01-24-23 @ 13:12), Max: 98.1 (01-24-23 @ 00:00)  HR: 62 (01-24-23 @ 13:12) (62 - 91)  BP: 112/68 (01-24-23 @ 13:12) (92/53 - 112/68)  RR: 17 (01-24-23 @ 13:12) (16 - 17)  SpO2: 97% (01-24-23 @ 13:12) (96% - 100%)  Telemetry:   CAPILLARY BLOOD GLUCOSE        I&O's Summary    23 Jan 2023 07:01  -  24 Jan 2023 07:00  --------------------------------------------------------  IN: 400 mL / OUT: 1062 mL / NET: -662 mL        PHYSICAL EXAM:  GENERAL: NAD, well-developed  HEAD:  Atraumatic, Normocephalic  EYES: EOMI, PERRLA, conjunctiva and sclera clear  NECK: Supple, No JVD  CHEST/LUNG: Clear to auscultation bilaterally; No wheeze  HEART: Regular rate and rhythm; No murmurs, rubs, or gallops  ABDOMEN: Soft, Nontender, Nondistended; Bowel sounds present  EXTREMITIES:  2+ Peripheral Pulses, No clubbing, cyanosis, or edema  PSYCH: AAOx3  NEUROLOGY: non-focal  SKIN: No rashes or lesions    LABS:                        13.7   8.67  )-----------( 171      ( 24 Jan 2023 05:20 )             46.4     01-24    139  |  92<L>  |  42<H>  ----------------------------<  69<L>  4.7   |  25  |  8.66<H>    Ca    8.5      24 Jan 2023 05:20  Phos  5.7     01-24  Mg     2.20     01-24      PT/INR - ( 24 Jan 2023 05:20 )   PT: 21.3 sec;   INR: 1.83 ratio                   RADIOLOGY & ADDITIONAL TESTS:    Imaging Personally Reviewed:    Consultant(s) Notes Reviewed:      Care Discussed with Consultants/Other Providers:  
Patient is a 77y old  Male who presents with a chief complaint of hypotension (25 Jan 2023 14:53)      SUBJECTIVE / OVERNIGHT EVENTS: no new events. BPs much improved    MEDICATIONS  (STANDING):  allopurinol 100 milliGRAM(s) Oral daily  buDESOnide    Inhalation Suspension 0.5 milliGRAM(s) Inhalation two times a day  chlorhexidine 2% Cloths 1 Application(s) Topical daily  chlorhexidine 2% Cloths 1 Application(s) Topical daily  cinacalcet 30 milliGRAM(s) Oral <User Schedule>  famotidine    Tablet 20 milliGRAM(s) Oral daily  levETIRAcetam 500 milliGRAM(s) Oral two times a day  lidocaine   4% Patch 1 Patch Transdermal every 24 hours  midodrine. 30 milliGRAM(s) Oral <User Schedule>  midodrine. 20 milliGRAM(s) Oral <User Schedule>  pregabalin 100 milliGRAM(s) Oral two times a day  senna 2 Tablet(s) Oral at bedtime  sevelamer carbonate 1600 milliGRAM(s) Oral three times a day with meals  warfarin 7 milliGRAM(s) Oral once    MEDICATIONS  (PRN):  acetaminophen     Tablet .. 650 milliGRAM(s) Oral every 6 hours PRN Temp greater or equal to 38C (100.4F), Mild Pain (1 - 3)  oxyCODONE    IR 5 milliGRAM(s) Oral every 6 hours PRN Severe Pain (7 - 10)  sodium chloride 0.9% Bolus. 100 milliLiter(s) IV Bolus every 5 minutes PRN SBP LESS THAN or EQUAL to 90 mmHg      Vital Signs Last 24 Hrs  T(F): 97.4 (01-25-23 @ 15:34), Max: 98.7 (01-24-23 @ 23:03)  HR: 67 (01-25-23 @ 15:34) (59 - 67)  BP: 126/74 (01-25-23 @ 15:34) (91/82 - 126/74)  RR: 18 (01-25-23 @ 15:34) (16 - 19)  SpO2: 100% (01-25-23 @ 15:34) (95% - 100%)  Telemetry:   CAPILLARY BLOOD GLUCOSE        I&O's Summary    25 Jan 2023 07:01  -  25 Jan 2023 18:15  --------------------------------------------------------  IN: 400 mL / OUT: 1500 mL / NET: -1100 mL        PHYSICAL EXAM:  GENERAL: NAD, well-developed  HEAD:  Atraumatic, Normocephalic  EYES: EOMI, PERRLA, conjunctiva and sclera clear  NECK: Supple, No JVD  CHEST/LUNG: Clear to auscultation bilaterally; No wheeze  HEART: Regular rate and rhythm; No murmurs, rubs, or gallops  ABDOMEN: Soft, Nontender, Nondistended; Bowel sounds present  EXTREMITIES:  2+ Peripheral Pulses, No clubbing, cyanosis, or edema  PSYCH: AAOx3  NEUROLOGY: non-focal  SKIN: No rashes or lesions    LABS:                        13.6   9.74  )-----------( 196      ( 25 Jan 2023 11:12 )             45.3     01-25    133<L>  |  92<L>  |  62<H>  ----------------------------<  92  5.3   |  23  |  11.12<H>    Ca    8.7      25 Jan 2023 11:12  Phos  7.5     01-25  Mg     2.20     01-25      PT/INR - ( 25 Jan 2023 11:12 )   PT: 19.2 sec;   INR: 1.65 ratio                   RADIOLOGY & ADDITIONAL TESTS:    Imaging Personally Reviewed:    Consultant(s) Notes Reviewed:      Care Discussed with Consultants/Other Providers:  
[facility] [place] [facility]   MD CHESTER DUEÑAS MD KRISTINE SOLTANPOUR, DO ANGELA WONG, PA    TEL:  OFFICE: [phone]  From 5pm-7am Answering Service [phone]    -- RENAL FOLLOW UP NOTE ---Date of Service 01-23-23 @ 14:53    Patient is a 77y old  Male who presents with a chief complaint of hypotension (23 Jan 2023 09:08)      Patient seen and examined in hd. No chest pain/sob    VITALS:  T(F): 97.5 (01-23-23 @ 10:18), Max: 98.1 (01-23-23 @ 08:30)  HR: 68 (01-23-23 @ 10:18)  BP: 85/50 (01-23-23 @ 10:18)  RR: 18 (01-23-23 @ 10:18)  SpO2: 99% (01-23-23 @ 10:18)  Wt(kg): --  flow 300        PHYSICAL EXAM:  General: NAD  Neck: No JVD  Respiratory: CTAB, no wheezes, rales or rhonchi  Cardiovascular: S1, S2, RRR  Gastrointestinal: BS+, soft, NT/ND  Extremities: No peripheral edema    Hospital Medications:   MEDICATIONS  (STANDING):  allopurinol 100 milliGRAM(s) Oral daily  buDESOnide    Inhalation Suspension 0.5 milliGRAM(s) Inhalation two times a day  chlorhexidine 2% Cloths 1 Application(s) Topical daily  chlorhexidine 2% Cloths 1 Application(s) Topical daily  cinacalcet 60 milliGRAM(s) Oral daily  famotidine    Tablet 20 milliGRAM(s) Oral daily  levETIRAcetam 500 milliGRAM(s) Oral two times a day  lidocaine   4% Patch 1 Patch Transdermal every 24 hours  midodrine. 20 milliGRAM(s) Oral <User Schedule>  midodrine. 30 milliGRAM(s) Oral <User Schedule>  pregabalin 100 milliGRAM(s) Oral two times a day  senna 2 Tablet(s) Oral at bedtime      LABS:  01-23    135  |  92<L>  |  70<H>  ----------------------------<  135<H>  4.9   |  22  |  12.16<H>    Ca    8.0<L>      23 Jan 2023 10:20  Phos  7.7     01-23  Mg     2.10     01-23      Creatinine Trend: 12.16 <--, 9.97 <--, 11.85 <--, 9.99 <--, 8.59 <--, 8.97 <--, 12.63 <--, 10.01 <--, 11.57 <--    Phosphorus Level, Serum: 7.7 mg/dL (01-23 @ 10:20)                              13.3   8.40  )-----------( 199      ( 23 Jan 2023 10:20 )             43.6     Urine Studies:      PTH -- (Ca --)      [01-20-23 @ 06:35]   630  TSH 1.31      [01-19-23 @ 17:57]    HBsAb 46.9      [01-16-23 @ 21:38]  HBsAg Nonreact      [01-16-23 @ 21:38]  HBcAb Nonreact      [01-16-23 @ 21:38]  HCV 0.35, Nonreact      [01-16-23 @ 21:38]      RADIOLOGY & ADDITIONAL STUDIES:  
[facility] [place] [facility]ROLOGY [facility]ACTICE   MD CHESTER DUEÑAS MD KRISTINE SOLTANPOUR, DO ANGELA WONG, PA    TEL:  OFFICE: [phone]  From 5pm-7am Answering Service [phone]    -- RENAL FOLLOW UP NOTE ---Date of Service 01-21-23 @ 20:04    Patient is a 77y old  Male who presents with a chief complaint of hypotension (21 Jan 2023 22:55)      Patient seen and examined at bedside. No chest pain/sob    VITALS:  T(F): 98.3 (01-21-23 @ 19:01), Max: 98.7 (01-21-23 @ 10:28)  HR: 85 (01-21-23 @ 21:28)  BP: 92/62 (01-21-23 @ 19:01)  RR: 18 (01-21-23 @ 19:01)  SpO2: 100% (01-21-23 @ 21:28)  Wt(kg): --    01-21 @ 07:01  -  01-22 @ 00:04  --------------------------------------------------------  IN: 400 mL / OUT: 613 mL / NET: -213 mL          PHYSICAL EXAM:  General: NAD  Neck: No JVD  Respiratory: CTAB, no wheezes, rales or rhonchi  Cardiovascular: S1, S2, RRR  Gastrointestinal: BS+, soft, NT/ND  Extremities: No peripheral edema    Hospital Medications:   MEDICATIONS  (STANDING):  allopurinol 100 milliGRAM(s) Oral daily  buDESOnide    Inhalation Suspension 0.5 milliGRAM(s) Inhalation two times a day  chlorhexidine 2% Cloths 1 Application(s) Topical daily  chlorhexidine 2% Cloths 1 Application(s) Topical daily  cinacalcet 60 milliGRAM(s) Oral daily  famotidine    Tablet 20 milliGRAM(s) Oral daily  levETIRAcetam 500 milliGRAM(s) Oral two times a day  lidocaine   4% Patch 1 Patch Transdermal every 24 hours  midodrine. 30 milliGRAM(s) Oral three times a day  pregabalin 100 milliGRAM(s) Oral two times a day  senna 2 Tablet(s) Oral at bedtime      LABS:  01-21    136  |  92<L>  |  62<H>  ----------------------------<  72  5.6<H>   |  24  |  11.85<H>    Ca    8.5      21 Jan 2023 07:04  Phos  6.7     01-21  Mg     2.40     01-21    TPro      /  Alb  4.6  /  TBili      /  DBili      /  AST      /  ALT      /  AlkPhos      01-20    Creatinine Trend: 11.85 <--, 9.99 <--, 8.59 <--, 8.97 <--, 12.63 <--, 10.01 <--, 11.57 <--    Phosphorus Level, Serum: 6.7 mg/dL (01-21 @ 07:04)                              14.3   9.67  )-----------( 193      ( 21 Jan 2023 07:04 )             48.2     Urine Studies:      PTH -- (Ca --)      [01-20-23 @ 06:35]   630  TSH 1.31      [01-19-23 @ 17:57]    HBsAb 46.9      [01-16-23 @ 21:38]  HBsAg Nonreact      [01-16-23 @ 21:38]  HBcAb Nonreact      [01-16-23 @ 21:38]  HCV 0.35, Nonreact      [01-16-23 @ 21:38]      RADIOLOGY & ADDITIONAL STUDIES:  
[facility] [place] [facility]ROLOGY [facility]ACTICE   MD CHESTER DUEÑAS MD KRISTINE SOLTANPOUR, DO ANGELA WONG, PA    TEL:  OFFICE: [phone]  From 5pm-7am Answering Service [phone]    -- RENAL FOLLOW UP NOTE ---Date of Service 01-25-23 @ 14:53    Patient is a 77y old  Male who presents with a chief complaint of hypotension (25 Jan 2023 11:21)      Patient seen and examined in hd. No chest pain/sob    VITALS:  T(F): 97.7 (01-25-23 @ 11:06), Max: 98.7 (01-24-23 @ 23:03)  HR: 66 (01-25-23 @ 11:06)  BP: 93/58 (01-25-23 @ 11:06)  RR: 18 (01-25-23 @ 11:06)  SpO2: 95% (01-25-23 @ 09:31)  Wt(kg): --  flow 350        PHYSICAL EXAM:  General: NAD  Neck: No JVD  Respiratory: crackles  Cardiovascular: S1, S2, RRR  Gastrointestinal: BS+, soft, NT/ND  Extremities: No peripheral edema    Hospital Medications:   MEDICATIONS  (STANDING):  allopurinol 100 milliGRAM(s) Oral daily  buDESOnide    Inhalation Suspension 0.5 milliGRAM(s) Inhalation two times a day  chlorhexidine 2% Cloths 1 Application(s) Topical daily  chlorhexidine 2% Cloths 1 Application(s) Topical daily  cinacalcet 30 milliGRAM(s) Oral <User Schedule>  famotidine    Tablet 20 milliGRAM(s) Oral daily  levETIRAcetam 500 milliGRAM(s) Oral two times a day  lidocaine   4% Patch 1 Patch Transdermal every 24 hours  midodrine. 20 milliGRAM(s) Oral <User Schedule>  midodrine. 30 milliGRAM(s) Oral <User Schedule>  pregabalin 100 milliGRAM(s) Oral two times a day  senna 2 Tablet(s) Oral at bedtime  sevelamer carbonate 1600 milliGRAM(s) Oral three times a day with meals  warfarin 7 milliGRAM(s) Oral once      LABS:  01-25    133<L>  |  92<L>  |  62<H>  ----------------------------<  92  5.3   |  23  |  11.12<H>    Ca    8.7      25 Jan 2023 11:12  Phos  7.5     01-25  Mg     2.20     01-25      Creatinine Trend: 11.12 <--, 8.66 <--, 12.16 <--, 9.97 <--, 11.85 <--, 9.99 <--, 8.59 <--, 8.97 <--    Phosphorus Level, Serum: 7.5 mg/dL (01-25 @ 11:12)                              13.6   9.74  )-----------( 196      ( 25 Jan 2023 11:12 )             45.3     Urine Studies:      PTH -- (Ca --)      [01-20-23 @ 06:35]   630  TSH 1.31      [01-19-23 @ 17:57]    HBsAb 46.9      [01-16-23 @ 21:38]  HBsAg Nonreact      [01-16-23 @ 21:38]  HBcAb Nonreact      [01-16-23 @ 21:38]  HCV 0.35, Nonreact      [01-16-23 @ 21:38]      RADIOLOGY & ADDITIONAL STUDIES:  
Patient is a 77y old  Male who presents with a chief complaint of hypotension (23 Jan 2023 14:53)      SUBJECTIVE / OVERNIGHT EVENTS: s/p HD today, Midodrine dose raised yesterday, today overall BPs are better and SBP mostly above 100. will cont 30 mg q6H and 20 mg in addition prior to HD. dose w coumadin tonight    MEDICATIONS  (STANDING):  allopurinol 100 milliGRAM(s) Oral daily  buDESOnide    Inhalation Suspension 0.5 milliGRAM(s) Inhalation two times a day  chlorhexidine 2% Cloths 1 Application(s) Topical daily  chlorhexidine 2% Cloths 1 Application(s) Topical daily  cinacalcet 30 milliGRAM(s) Oral <User Schedule>  famotidine    Tablet 20 milliGRAM(s) Oral daily  levETIRAcetam 500 milliGRAM(s) Oral two times a day  lidocaine   4% Patch 1 Patch Transdermal every 24 hours  midodrine. 20 milliGRAM(s) Oral <User Schedule>  midodrine. 30 milliGRAM(s) Oral <User Schedule>  pregabalin 100 milliGRAM(s) Oral two times a day  senna 2 Tablet(s) Oral at bedtime  sevelamer carbonate 1600 milliGRAM(s) Oral three times a day with meals  warfarin 4 milliGRAM(s) Oral once    MEDICATIONS  (PRN):  acetaminophen     Tablet .. 650 milliGRAM(s) Oral every 6 hours PRN Temp greater or equal to 38C (100.4F), Mild Pain (1 - 3)  oxyCODONE    IR 5 milliGRAM(s) Oral every 6 hours PRN Severe Pain (7 - 10)  sodium chloride 0.9% Bolus. 100 milliLiter(s) IV Bolus every 5 minutes PRN SBP LESS THAN or EQUAL to 90 mmHg      Vital Signs Last 24 Hrs  T(F): 97.6 (01-23-23 @ 15:30), Max: 98.1 (01-23-23 @ 08:30)  HR: 58 (01-23-23 @ 17:20) (58 - 72)  BP: 109/60 (01-23-23 @ 17:20) (85/50 - 109/62)  RR: 18 (01-23-23 @ 15:30) (17 - 18)  SpO2: 99% (01-23-23 @ 15:30) (95% - 100%)  Telemetry:   CAPILLARY BLOOD GLUCOSE        I&O's Summary    23 Jan 2023 07:01  -  23 Jan 2023 20:34  --------------------------------------------------------  IN: 400 mL / OUT: 1062 mL / NET: -662 mL        PHYSICAL EXAM:  GENERAL: NAD, well-developed  HEAD:  Atraumatic, Normocephalic  EYES: EOMI, PERRLA, conjunctiva and sclera clear  NECK: Supple, No JVD  CHEST/LUNG: Clear to auscultation bilaterally; No wheeze  HEART: Regular rate and rhythm; No murmurs, rubs, or gallops  ABDOMEN: Soft, Nontender, Nondistended; Bowel sounds present  EXTREMITIES:  2+ Peripheral Pulses, No clubbing, cyanosis, or edema  PSYCH: AAOx3  NEUROLOGY: non-focal  SKIN: No rashes or lesions    LABS:                        13.3   8.40  )-----------( 199      ( 23 Jan 2023 10:20 )             43.6     01-23    135  |  92<L>  |  70<H>  ----------------------------<  135<H>  4.9   |  22  |  12.16<H>    Ca    8.0<L>      23 Jan 2023 10:20  Phos  7.7     01-23  Mg     2.10     01-23      PT/INR - ( 23 Jan 2023 10:20 )   PT: 27.0 sec;   INR: 2.31 ratio                   RADIOLOGY & ADDITIONAL TESTS:    Imaging Personally Reviewed:    Consultant(s) Notes Reviewed:      Care Discussed with Consultants/Other Providers:

## 2023-01-25 NOTE — DISCHARGE NOTE PROVIDER - NSDCCPCAREPLAN_GEN_ALL_CORE_FT
PRINCIPAL DISCHARGE DIAGNOSIS  Diagnosis: Low blood pressure  Assessment and Plan of Treatment: You have been having low blood pressure during dialysis, likely due to poor vascular tone. You were placed on midodrine to keep your blood pressure higher during dialysis. Please continue and follow up with your primary care physician and nephrologist.      SECONDARY DISCHARGE DIAGNOSES  Diagnosis: ESRD on dialysis  Assessment and Plan of Treatment: Please continue to follow your dialysis schedule and refer to your primary provider/nephrologist for further care and monitoring of kidney function and electrolytes. Continue a renal restricted diet (Avoiding foods high in potassium and phosphorus), your prescribed medications, and supplementations as directed.    Diagnosis: DVT (deep venous thrombosis)  Assessment and Plan of Treatment: Please continue to dose your warfarin by INR levels. Follow up with your primary care physician or cardiologist.    Diagnosis: Hyperparathyroidism  Assessment and Plan of Treatment: Please continue your sensipar. Follow up with your phosphorous and calcium level. Follow up with your primary care physician.

## 2023-01-25 NOTE — DISCHARGE NOTE NURSING/CASE MANAGEMENT/SOCIAL WORK - PATIENT PORTAL LINK FT
You can access the FollowMyHealth Patient Portal offered by [facility] by registering at the following website: [email or web address] By joining [employer]’s FollowMyHealth portal, you will also be able to view your health information using other applications (apps) compatible with our system.

## 2023-01-25 NOTE — DISCHARGE NOTE PROVIDER - NSDCMRMEDTOKEN_GEN_ALL_CORE_FT
acetaminophen 325 mg oral tablet: 2 tab(s) orally every 6 hours, As needed, Temp greater or equal to 38C (100.4F), Mild Pain (1 - 3)  allopurinol 100 mg oral tablet: 1 tab(s) orally once a day  budesonide: 2 puff(s) inhaled 2 times a day  cinacalcet 30 mg oral tablet: 1 tab(s) orally   DuoNeb 0.5 mg-2.5 mg/3 mL inhalation solution: 3 milliliter(s) inhaled 4 times a day, As Needed  famotidine 20 mg oral tablet: 1 tab(s) orally once a day  levETIRAcetam 500 mg oral tablet: 1 tab(s) orally 2 times a day  lidocaine 4% topical film: Apply topically to affected area once a day  midodrine 10 mg oral tablet: 2 tab(s) orally   midodrine 10 mg oral tablet: 3 tab(s) orally   oxyCODONE 5 mg oral tablet: 1 tab(s) orally every 6 hours, As needed, Severe Pain (7 - 10)  pregabalin 100 mg oral capsule: 1 cap(s) orally 2 times a day  senna leaf extract oral tablet: 2 tab(s) orally once a day (at bedtime)  warfarin 4 mg oral tablet: 2 tab(s) orally once a day (at bedtime)   acetaminophen 325 mg oral tablet: 2 tab(s) orally every 6 hours, As needed, Temp greater or equal to 38C (100.4F), Mild Pain (1 - 3)  allopurinol 100 mg oral tablet: 1 tab(s) orally once a day  budesonide: 2 puff(s) inhaled 2 times a day  cinacalcet 30 mg oral tablet: 1 tab(s) orally   famotidine 20 mg oral tablet: 1 tab(s) orally once a day  levETIRAcetam 500 mg oral tablet: 1 tab(s) orally 2 times a day  lidocaine 4% topical film: Apply topically to affected area once a day  midodrine 10 mg oral tablet: 2 tab(s) orally   midodrine 10 mg oral tablet: 3 tab(s) orally   oxyCODONE 5 mg oral tablet: 1 tab(s) orally every 6 hours, As needed, Severe Pain (7 - 10)  pregabalin 100 mg oral capsule: 1 cap(s) orally 2 times a day  senna leaf extract oral tablet: 2 tab(s) orally once a day (at bedtime)  warfarin 1 mg oral tablet: 7 tab(s) orally once   acetaminophen 325 mg oral tablet: 2 tab(s) orally every 6 hours, As needed, Temp greater or equal to 38C (100.4F), Mild Pain (1 - 3)  allopurinol 100 mg oral tablet: 1 tab(s) orally once a day  budesonide: 2 puff(s) inhaled 2 times a day  cinacalcet 30 mg oral tablet: 1 tab(s) orally every 24 hrs at 8 AM on Tuesday, Thursday, Saturday  famotidine 20 mg oral tablet: 1 tab(s) orally once a day  levETIRAcetam 500 mg oral tablet: 1 tab(s) orally 2 times a day  lidocaine 4% topical film: Apply topically to affected area once a day  midodrine 10 mg oral tablet: 2 tab(s) orally Monday, Wednesday, and Friday (at 8 AM)  midodrine 10 mg oral tablet: 3 tab(s) orally 4 times a day (00:00, 06:00, 12:00, 18:00)  oxyCODONE 5 mg oral tablet: 1 tab(s) orally every 6 hours, As needed, Severe Pain (7 - 10)  pregabalin 100 mg oral capsule: 1 cap(s) orally 2 times a day  senna leaf extract oral tablet: 2 tab(s) orally once a day (at bedtime)  warfarin 1 mg oral tablet: 7 tab(s) orally once

## 2023-01-25 NOTE — PROGRESS NOTE ADULT - ASSESSMENT
76 yo male w h/o orthostatic hypotension on mIdodrine, ESRD on HD via AV fistula LUE MWF, COPD (on 2L home O2), CHF, pHTN,, DVT S/P IVC filter presents from rehab for hypotension. Ptn was admitted 1/16-17 for severe hypotension, tolerated HD while inptn without hypotension while on Midodrine 30 mg tid and DCed back to the facility  Ptn admitted  w severe hypotension post HD again. Ptn states 4 liters of UF was removed  IVF and Midodrine given  Renal consult reviewed, Card consult reviewed. Other causes of hypotension to be ruled out as per cardiology but prob 2/2 poor vascular tone   TTE( last one in 7/2022) is benign  tolerated 3 hrs of HD w extra dosage of Midodrine  ptn may need IV pressor assisted HD in the future  cont outptn meds  cont COumadin for h/o extensive DVT w INR goal 2-3  
76 yo male w h/o orthostatic hypotension on mIdodrine, ESRD on HD via AV fistula LUE MWF, COPD (on 2L home O2), CHF, pHTN,, DVT S/P IVC filter presents from rehab for hypotension. Ptn was admitted 1/16-17 for severe hypotension, tolerated HD while inptn without hypotension while on Midodrine 30 mg tid and DCed back to the facility  Ptn admitted  w severe hypotension post HD again. Ptn states 4 liters of UF was removed pre admission  IVF and Midodrine given in the ED  Renal consult reviewed, Card consult reviewed. Other causes of hypotension were ruled out as per cardiology , prob 2/2 poor vascular tone   TTE( last one in 7/2022) is benign  tolerated 3 hrs of HD w extra dosage of Midodrine on 1/21 1/22: this am ptn was stable, had RRT later in the day, Midodrine was raised to 40 mg tid and pre dialysis will get additional 20 mg. this was d/w cardiology and renal  1/23:  s/p HD today, Midodrine dose raised yesterday, today overall BPs are better and SBP mostly above 100. will cont 30 mg q6H and 20 mg in addition prior to HD. dose w coumadin tonight  1/24-25: overall BPs have improved, also at HD, less fluid is being removed. no syncopal events since meds were adjusted. medically cleared for DC. this was d/w ACP. cont dosing coumadin w INR goal 2-3    cont outptn meds  cont COumadin for h/o extensive DVT w INR goal 2-3  
76 yo male w h/o orthostatic hypotension on mIdodrine, ESRD on HD via AV fistula LUE MWF, COPD (on 2L home O2), CHF, pHTN,, DVT S/P IVC filter presents from rehab for hypotension. Ptn was admitted 1/16-17 for severe hypotension, tolerated HD while inptn without hypotension while on Midodrine 30 mg tid and DCed back to the facility  Ptn admitted  w severe hypotension post HD again. Ptn states 4 liters of UF was removed pre admission  IVF and Midodrine given in the ED  Renal consult reviewed, Card consult reviewed. Other causes of hypotension were ruled out as per cardiology , prob 2/2 poor vascular tone   TTE( last one in 7/2022) is benign  tolerated 3 hrs of HD w extra dosage of Midodrine on 1/21 1/22: this am ptn was stable, had RRT later in the day, Midodrine was raised to 40 mg tid and pre dialysis will get additional 20 mg. this was d/w cardiology and renal  1/23:  s/p HD today, Midodrine dose raised yesterday, today overall BPs are better and SBP mostly above 100. will cont 30 mg q6H and 20 mg in addition prior to HD. dose w coumadin tonight  1/24: overall BPs have improved, also at HD, less fluid is being removed. no syncopal events since meds were adjusted. medically cleared for DC. this was d/w ACP    cont outptn meds  cont COumadin for h/o extensive DVT w INR goal 2-3  
76 yo male w h/o severe orthostatic hypotension on midodrine, ESRD on HD via AV fistula NOE DELVALLE, who was DCed few days ago from [facility] for severe orthostatic hypotension. ptn did well during his hospitalization. His chronic NF is not comfortable dispensing 30 mg of Midodrine( his usual dose is 30 mg tid) Ptn was sent to the ED for further management Here his SBP is 70  Ptn went to HD yesterday, was very tired afterwards more so than usual. BP noted to be ~60/40 after, but was monitored given he was post HD. This AM was again hypotensive to 70s, so given 250cc then 400cc and his AM dose of midodrine 30. However 1hr later, BP again was down to 80s so sent to ED.  Nephrology was consulted for dialysis needs.      ESRD on HD Monday, Wednesday and Friday   Via AVF  Last HD was on 1/18 prior to admission on 1/20 could not dialyze due to hypotension.   [facility] consulted on 1/20 and did not feel pt should be accepted yet they stated no HD.   Nephrologist: Dr. Crawford Desroaches  hd on 1/21 no UF today  Seen in HD today. bp soft but asymptomatic. given midodrine. UF as toelrated   midodrine as ordered   Consent in the dialysis unit  Renal diet  Renal dose all medications for GFR<10.   BP remains low. On midodrine      Hypotension  asymptomatic   Continue Midodrine      Respiratory acidosis on VBG  Defer to primary.     CKD-MBD  Tertiary  hyperparathyroidism.   On Sensipar of 60 mg po daily decrease to 30 tiw  PTH elevated  Phosphorus and calcium daily.       
78 yo male w h/o severe orthostatic hypotension on midodrine, ESRD on HD via AV fistula NOE DELVALLE, who was DCed few days ago from [facility] for severe orthostatic hypotension. ptn did well during his hospitalization. His chronic NF is not comfortable dispensing 30 mg of Midodrine( his usual dose is 30 mg tid) Ptn was sent to the ED for further management Here his SBP is 70  Ptn went to HD yesterday, was very tired afterwards more so than usual. BP noted to be ~60/40 after, but was monitored given he was post HD. This AM was again hypotensive to 70s, so given 250cc then 400cc and his AM dose of midodrine 30. However 1hr later, BP again was down to 80s so sent to ED.  Nephrology was consulted for dialysis needs.      ESRD on HD Monday, Wednesday and Friday   Via AVF  Last HD was on 1/18 prior to admission on 1/20 could not dialyze due to hypotension.   [facility]U consulted on 1/20 and did not feel pt should be accepted yet they stated no HD.   Nephrologist: Dr. Crawford Desroaches  hd on 1/23 uf 600cc after 50mg of midodrine   Seen in HD today. Continue HD as ordered. tolerating well. vitals stable. access working well   midodrine as ordered   Consent in the dialysis unit  Renal diet  Renal dose all medications for GFR<10.   BP remains low. On midodrine      Hypotension  asymptomatic   Continue Midodrine  better      Respiratory acidosis on VBG  Defer to primary.     CKD-MBD  Tertiary  hyperparathyroidism.   On Sensipar of 60 mg po daily decrease to 30 tiw  PTH elevated  Phosphorus and calcium daily.       
76 yo male w h/o orthostatic hypotension on mIdodrine, ESRD on HD via AV fistula LUE MWF, COPD (on 2L home O2), CHF, pHTN,, DVT S/P IVC filter presents from rehab for hypotension. Ptn was admitted 1/16-17 for severe hypotension, tolerated HD while inptn without hypotension while on Midodrine 30 mg tid and DCed back to the facility  Ptn admitted  w severe hypotension post HD again. Ptn states 4 liters of UF was removed pre admission  IVF and Midodrine given in the ED  Renal consult reviewed, Card consult reviewed. Other causes of hypotension were ruled out as per cardiology , prob 2/2 poor vascular tone   TTE( last one in 7/2022) is benign  tolerated 3 hrs of HD w extra dosage of Midodrine on 1/21 1/22: this am ptn was stable, had RRT later in the day, Midodrine was raised to 40 mg tid and pre dialysis will get additional 20 mg. this was d/w cardiology and renal  1/23:  s/p HD today, Midodrine dose raised yesterday, today overall BPs are better and SBP mostly above 100. will cont 30 mg q6H and 20 mg in addition prior to HD. dose w coumadin tonight    cont outptn meds  cont COumadin for h/o extensive DVT w INR goal 2-3  
78 yo male w h/o orthostatic hypotension on mIdodrine, ESRD on HD via AV fistula LUE MWF, COPD (on 2L home O2), CHF, pHTN,, DVT S/P IVC filter presents from rehab for hypotension. Ptn was admitted 1/16-17 for severe hypotension, tolerated HD while inptn without hypotension while on Midodrine 30 mg tid and DCed back to the facility  Ptn admitted  w severe hypotension post HD again. Ptn states 4 liters of UF was removed  IVF and Midodrine given  Renal consult reviewed, Card consult reviewed. Other causes of hypotension to be ruled out as per cardiology but prob 2/2 poor vascular tone  will rpt TTE( last one in 7/2022)  Maybe less fluid should be removed during HD  cont outptn meds  cont COumadin for h/o extensive DVT  
78 yo male w h/o severe orthostatic hypotension on midodrine, ESRD on HD via AV fistula NOE DELVALLE, who was DCed few days ago from [facility] for severe orthostatic hypotension. ptn did well during his hospitalization. His chronic NF is not comfortable dispensing 30 mg of Midodrine( his usual dose is 30 mg tid) Ptn was sent to the ED for further management Here his SBP is 70  Ptn went to HD yesterday, was very tired afterwards more so than usual. BP noted to be ~60/40 after, but was monitored given he was post HD. This AM was again hypotensive to 70s, so given 250cc then 400cc and his AM dose of midodrine 30. However 1hr later, BP again was down to 80s so sent to ED.  Nephrology was consulted for dialysis needs.      ESRD on HD Monday, Wednesday and Friday   Via AVF  Last HD was on 1/18 prior to admission on 1/20 could not dialyze due to hypotension.   [facility]U consulted on 1/20 and did not feel pt should be accepted yet they stated no HD.   Nephrologist: Dr. Crawford Desroaches  hd on 1/21 no UF today  Will go back to Monday schedule  midodrine as ordered   Consent in the dialysis unit  Renal diet  Renal dose all medications for GFR<10.   BP remains low. On midodrine      Hypotension  asymptomatic   Continue Midodrine      Respiratory acidosis on VBG  Defer to primary.     CKD-MBD  Tertiary  hyperparathyroidism.   On Sensipar of 60 mg po daily  PTH elevated  Phosphorus and calcium daily.       
78 yo male w h/o severe orthostatic hypotension on midodrine, ESRD on HD via AV fistula NOE DELVALLE, who was DCed few days ago from [facility] for severe orthostatic hypotension. ptn did well during his hospitalization. His chronic NF is not comfortable dispensing 30 mg of Midodrine( his usual dose is 30 mg tid) Ptn was sent to the ED for further management Here his SBP is 70  Ptn went to HD yesterday, was very tired afterwards more so than usual. BP noted to be ~60/40 after, but was monitored given he was post HD. This AM was again hypotensive to 70s, so given 250cc then 400cc and his AM dose of midodrine 30. However 1hr later, BP again was down to 80s so sent to ED.  Nephrology was consulted for dialysis needs.      ESRD on HD Monday, Wednesday and Friday   Via AVF  Last HD was on 1/18  Nephrologist: Dr. Ty Massey  hd today no UF today  Will go back to Monday schedule  midodrine as ordered   Consent in the dialysis unit  Renal diet  Renal dose all medications for GFR<10.   BP remains low. On midodrine      Hypotension  asymptomatic   Continue Midodrine      Respiratory acidosis on VBG  Defer to primary.     CKD-MBD  Tertiary  hyperparathyroidism.   On Sensipar of 60 mg po daily  PTH elevated  Phosphorus and calcium daily.       
76 yo male w h/o orthostatic hypotension on mIdodrine, ESRD on HD via AV fistula LUE MWF, COPD (on 2L home O2), CHF, pHTN,, DVT S/P IVC filter presents from rehab for hypotension. Ptn was admitted 1/16-17 for severe hypotension, tolerated HD while inptn without hypotension while on Midodrine 30 mg tid and DCed back to the facility  Ptn admitted  w severe hypotension post HD again. Ptn states 4 liters of UF was removed pre admission  IVF and Midodrine given in the ED  Renal consult reviewed, Card consult reviewed. Other causes of hypotension were ruled out as per cardiology , prob 2/2 poor vascular tone   TTE( last one in 7/2022) is benign  tolerated 3 hrs of HD w extra dosage of Midodrine on 1/21 1/22: this am ptn was stable, had RRT later in the day, Midodrine was raised to 40 mg tid and pre dialysis will get additional 20 mg. this was d/w cardiology and renal    cont outptn meds  cont COumadin for h/o extensive DVT w INR goal 2-3  
76 yo male w h/o severe orthostatic hypotension on midodrine, ESRD on HD via AV fistula NOE DELVALLE, who was DCed few days ago from [facility] for severe orthostatic hypotension. ptn did well during his hospitalization. His chronic NF is not comfortable dispensing 30 mg of Midodrine( his usual dose is 30 mg tid) Ptn was sent to the ED for further management Here his SBP is 70  Ptn went to HD yesterday, was very tired afterwards more so than usual. BP noted to be ~60/40 after, but was monitored given he was post HD. This AM was again hypotensive to 70s, so given 250cc then 400cc and his AM dose of midodrine 30. However 1hr later, BP again was down to 80s so sent to ED.  Nephrology was consulted for dialysis needs.      ESRD on HD Monday, Wednesday and Friday   Via AVF  Last HD was on 1/18 prior to admission on 1/20 could not dialyze due to hypotension.   [facility] consulted on 1/20 and did not feel pt should be accepted yet they stated no HD.   Nephrologist: Dr. Crawford Desantoniaaches  hd on 1/23 uf 600cc after 50mg of midodrine   hd tmr  midodrine as ordered   Consent in the dialysis unit  Renal diet  Renal dose all medications for GFR<10.   BP remains low. On midodrine      Hypotension  asymptomatic   Continue Midodrine      Respiratory acidosis on VBG  Defer to primary.     CKD-MBD  Tertiary  hyperparathyroidism.   On Sensipar of 60 mg po daily decrease to 30 tiw  PTH elevated  Phosphorus and calcium daily.       
78 yo male w h/o severe orthostatic hypotension on midodrine, ESRD on HD via AV fistula NOE DELVALLE, who was DCed few days ago from [facility] for severe orthostatic hypotension. ptn did well during his hospitalization. His chronic NF is not comfortable dispensing 30 mg of Midodrine( his usual dose is 30 mg tid) Ptn was sent to the ED for further management Here his SBP is 70  Ptn went to HD yesterday, was very tired afterwards more so than usual. BP noted to be ~60/40 after, but was monitored given he was post HD. This AM was again hypotensive to 70s, so given 250cc then 400cc and his AM dose of midodrine 30. However 1hr later, BP again was down to 80s so sent to ED.  Nephrology was consulted for dialysis needs.      ESRD on HD Monday, Wednesday and Friday   Via AVF  Last HD was on 1/18  Nephrologist: Dr. Ty Massey  hd today. uf as tolerated  midodrine as ordered   Consent in the dialysis unit  Renal diet  Renal dose all medications for GFR<10.   BP remains low. On midodrine      Hypotension  asymptomatic   Continue Midodrine      Respiratory acidosis on VBG  Defer to primary.     CKD-MBD  Tertiary  hyperparathyroidism.   On Sensipar of 60 mg po daily  PTH elevated  Phosphorus and calcium daily.

## 2023-01-25 NOTE — DISCHARGE NOTE PROVIDER - PROVIDER TOKENS
PROVIDER:[TOKEN:[[id number]],FOLLOWUP:[1 week]],FREE:[LAST:[Your primary care physician],PHONE:[(   )    -],FAX:[(   )    -],FOLLOWUP:[1 week]],PROVIDER:[TOKEN:[[id number]],FOLLOWUP:[1 week]]

## 2023-01-25 NOTE — DISCHARGE NOTE PROVIDER - CARE PROVIDER_API CALL
Lanny Guerra (DO)  Internal Medicine  [address] [place] [address]  Phone: [phone]  Fax: [phone]  Follow Up Time: 1 week    Your primary care physician,   Phone: (   )    -  Fax: (   )    -  Follow Up Time: 1 week    Darrion Richards)  Internal Medicine  [address] [place] [address]  Phone: [phone]  Fax: [phone]  Follow Up Time: 1 week

## 2023-01-25 NOTE — PROGRESS NOTE ADULT - PROVIDER SPECIALTY LIST ADULT
Cardiology
Internal Medicine
Nephrology
Cardiology
Internal Medicine
Nephrology
Internal Medicine
Internal Medicine
Nephrology

## 2023-01-25 NOTE — DISCHARGE NOTE PROVIDER - HOSPITAL COURSE
77M with PMHx orthostatic hypotension on mIdodrine, ESRD on HD via AV fistula LUE MWF, COPD (on 2L home O2), CHF, pHTN,, DVT S/P IVC filter presents from rehab for hypotension. Ptn was admitted 1/16-17 for severe hypotension, tolerated HD while inptn without hypotension while on Midodrine 30 mg tid and DCed back to the facility    Hypotension  - Likely due to poor vascular tone and calcified vessels in the setting of ESRD.  - admitted 1/16-17 for severe hypotension, tolerated HD while inptn without hypotension while on Midodrine 30 mg tid and DCed back to the facility. Ptn admitted  w severe hypotension post HD again. Ptn states 4 liters of UF was removed pre admission  -s/p IVF and Midodrine given in the ED  - Echocardiogram is negative for any structural heart disease to explain hypotension.  - Patient is already on high doses of midodrine.  - Monitor closely for peripheral ischemia.     Prior DVT  - Extensive DVT burden in 2021.  - Warfarin as per the primary team.    ESRD on HD Monday, Wednesday and Friday   Via AVF  - Last HD was on 1/18 prior to admission on 1/20 could not dialyze due to hypotension.   - MICU consulted on 1/20 and did not feel pt should be accepted yet they stated no HD.   - Nephrologist: Dr. Ty Massey  - hd on 1/23 uf 600cc after 50mg of midodrine   - hd tmr  - midodrine as ordered   - Renal diet  - Renal dose all medications for GFR<10.   - BP remains low. On midodrine    Tertiary hyperparatyhroidism   - On Sensipar of 60 mg po daily decrease to 30 tiw  - PTH elevated  - Phosphorus and calcium daily.     On 01/25/23, case was discussed with , patient is medically cleared and optimized for discharge today. All medications were reviewed with attending, and sent to mutually agreed upon pharmacy. 77M with PMHx orthostatic hypotension on mIdodrine, ESRD on HD via AV fistula LUE MWF, COPD (on 2L home O2), CHF, pHTN,, DVT S/P IVC filter presents from rehab for hypotension. Ptn was admitted 1/16-17 for severe hypotension, tolerated HD while inptn without hypotension while on Midodrine 30 mg tid and DCed back to the facility    Hypotension  - Likely due to poor vascular tone and calcified vessels in the setting of ESRD.  - admitted 1/16-17 for severe hypotension, tolerated HD while inptn without hypotension while on Midodrine 30 mg tid and DCed back to the facility. Ptn admitted  w severe hypotension post HD again. Ptn states 4 liters of UF was removed pre admission  -s/p IVF and Midodrine given in the ED  - Echocardiogram is negative for any structural heart disease to explain hypotension.  - Patient is already on high doses of midodrine.  - Monitor closely for peripheral ischemia.     Prior DVT  - Extensive DVT burden in 2021.  - Warfarin as per the primary team.    ESRD on HD Monday, Wednesday and Friday   Via AVF  - Last HD was on 1/18 prior to admission on 1/20 could not dialyze due to hypotension.   - MICU consulted on 1/20 and did not feel pt should be accepted yet they stated no HD.   - Nephrologist: Dr. Ty Massey  - hd on 1/23 uf 600cc after 50mg of midodrine   - midodrine as ordered   - Renal diet  - Renal dose all medications for GFR<10.   - BP remains low. On midodrine    Tertiary hyperparatyhroidism   - On Sensipar of 60 mg po daily decrease to 30 tiw  - PTH elevated  - Phosphorus and calcium daily.     On 01/25/23, case was discussed with , patient is medically cleared and optimized for discharge today. All medications were reviewed with attending, and sent to mutually agreed upon pharmacy.

## 2023-01-27 LAB
CORTICOSTEROID BINDING GLOBULIN RESULT: 1.9 MG/DL — SIGNIFICANT CHANGE UP
CORTIS F/TOTAL MFR SERPL: 35 % — SIGNIFICANT CHANGE UP
CORTIS SERPL-MCNC: 19 UG/DL — SIGNIFICANT CHANGE UP
CORTISOL, FREE RESULT: 6.6 UG/DL — HIGH

## 2023-02-23 ENCOUNTER — INPATIENT (INPATIENT)
Facility: HOSPITAL | Age: 78
LOS: 4 days | Discharge: NURSING, REHAB OR LONG-TERM CARE | End: 2023-02-28
Attending: HOSPITALIST | Admitting: HOSPITALIST
Payer: MEDICARE

## 2023-02-23 VITALS
TEMPERATURE: 98 F | RESPIRATION RATE: 16 BRPM | OXYGEN SATURATION: 100 % | HEIGHT: 77 IN | SYSTOLIC BLOOD PRESSURE: 87 MMHG | HEART RATE: 85 BPM | DIASTOLIC BLOOD PRESSURE: 55 MMHG

## 2023-02-23 DIAGNOSIS — I95.9 HYPOTENSION, UNSPECIFIED: ICD-10-CM

## 2023-02-23 DIAGNOSIS — K62.5 HEMORRHAGE OF ANUS AND RECTUM: ICD-10-CM

## 2023-02-23 DIAGNOSIS — Z91.89 OTHER SPECIFIED PERSONAL RISK FACTORS, NOT ELSEWHERE CLASSIFIED: ICD-10-CM

## 2023-02-23 DIAGNOSIS — Z98.89 OTHER SPECIFIED POSTPROCEDURAL STATES: Chronic | ICD-10-CM

## 2023-02-23 DIAGNOSIS — D62 ACUTE POSTHEMORRHAGIC ANEMIA: ICD-10-CM

## 2023-02-23 DIAGNOSIS — N18.6 END STAGE RENAL DISEASE: ICD-10-CM

## 2023-02-23 DIAGNOSIS — Z86.69 PERSONAL HISTORY OF OTHER DISEASES OF THE NERVOUS SYSTEM AND SENSE ORGANS: ICD-10-CM

## 2023-02-23 DIAGNOSIS — K92.1 MELENA: ICD-10-CM

## 2023-02-23 DIAGNOSIS — I82.409 ACUTE EMBOLISM AND THROMBOSIS OF UNSPECIFIED DEEP VEINS OF UNSPECIFIED LOWER EXTREMITY: ICD-10-CM

## 2023-02-23 DIAGNOSIS — Z29.9 ENCOUNTER FOR PROPHYLACTIC MEASURES, UNSPECIFIED: ICD-10-CM

## 2023-02-23 LAB
ALBUMIN SERPL ELPH-MCNC: 4.4 G/DL — SIGNIFICANT CHANGE UP (ref 3.3–5)
ALP SERPL-CCNC: 118 U/L — SIGNIFICANT CHANGE UP (ref 40–120)
ALT FLD-CCNC: 5 U/L — SIGNIFICANT CHANGE UP (ref 4–41)
ANION GAP SERPL CALC-SCNC: 17 MMOL/L — HIGH (ref 7–14)
APTT BLD: 37.7 SEC — HIGH (ref 27–36.3)
AST SERPL-CCNC: 20 U/L — SIGNIFICANT CHANGE UP (ref 4–40)
BASOPHILS # BLD AUTO: 0.09 K/UL — SIGNIFICANT CHANGE UP (ref 0–0.2)
BASOPHILS NFR BLD AUTO: 0.7 % — SIGNIFICANT CHANGE UP (ref 0–2)
BILIRUB SERPL-MCNC: 0.3 MG/DL — SIGNIFICANT CHANGE UP (ref 0.2–1.2)
BLD GP AB SCN SERPL QL: NEGATIVE — SIGNIFICANT CHANGE UP
BUN SERPL-MCNC: 27 MG/DL — HIGH (ref 7–23)
CALCIUM SERPL-MCNC: 9.4 MG/DL — SIGNIFICANT CHANGE UP (ref 8.4–10.5)
CHLORIDE SERPL-SCNC: 91 MMOL/L — LOW (ref 98–107)
CO2 SERPL-SCNC: 30 MMOL/L — SIGNIFICANT CHANGE UP (ref 22–31)
CREAT SERPL-MCNC: 7.74 MG/DL — HIGH (ref 0.5–1.3)
EGFR: 7 ML/MIN/1.73M2 — LOW
EOSINOPHIL # BLD AUTO: 0.42 K/UL — SIGNIFICANT CHANGE UP (ref 0–0.5)
EOSINOPHIL NFR BLD AUTO: 3.2 % — SIGNIFICANT CHANGE UP (ref 0–6)
FLUAV AG NPH QL: SIGNIFICANT CHANGE UP
FLUBV AG NPH QL: SIGNIFICANT CHANGE UP
GLUCOSE SERPL-MCNC: 97 MG/DL — SIGNIFICANT CHANGE UP (ref 70–99)
HCT VFR BLD CALC: 38 % — LOW (ref 39–50)
HCT VFR BLD CALC: 38.3 % — LOW (ref 39–50)
HGB BLD-MCNC: 11.3 G/DL — LOW (ref 13–17)
HGB BLD-MCNC: 11.4 G/DL — LOW (ref 13–17)
IANC: 9.23 K/UL — HIGH (ref 1.8–7.4)
IMM GRANULOCYTES NFR BLD AUTO: 0.5 % — SIGNIFICANT CHANGE UP (ref 0–0.9)
INR BLD: 2.32 RATIO — HIGH (ref 0.88–1.16)
LYMPHOCYTES # BLD AUTO: 17.5 % — SIGNIFICANT CHANGE UP (ref 13–44)
LYMPHOCYTES # BLD AUTO: 2.28 K/UL — SIGNIFICANT CHANGE UP (ref 1–3.3)
MCHC RBC-ENTMCNC: 28.2 PG — SIGNIFICANT CHANGE UP (ref 27–34)
MCHC RBC-ENTMCNC: 28.7 PG — SIGNIFICANT CHANGE UP (ref 27–34)
MCHC RBC-ENTMCNC: 29.7 GM/DL — LOW (ref 32–36)
MCHC RBC-ENTMCNC: 29.8 GM/DL — LOW (ref 32–36)
MCV RBC AUTO: 94.8 FL — SIGNIFICANT CHANGE UP (ref 80–100)
MCV RBC AUTO: 96.4 FL — SIGNIFICANT CHANGE UP (ref 80–100)
MONOCYTES # BLD AUTO: 0.94 K/UL — HIGH (ref 0–0.9)
MONOCYTES NFR BLD AUTO: 7.2 % — SIGNIFICANT CHANGE UP (ref 2–14)
NEUTROPHILS # BLD AUTO: 9.23 K/UL — HIGH (ref 1.8–7.4)
NEUTROPHILS NFR BLD AUTO: 70.9 % — SIGNIFICANT CHANGE UP (ref 43–77)
NRBC # BLD: 0 /100 WBCS — SIGNIFICANT CHANGE UP (ref 0–0)
NRBC # BLD: 0 /100 WBCS — SIGNIFICANT CHANGE UP (ref 0–0)
NRBC # FLD: 0 K/UL — SIGNIFICANT CHANGE UP (ref 0–0)
NRBC # FLD: 0 K/UL — SIGNIFICANT CHANGE UP (ref 0–0)
OB PNL STL: POSITIVE
PLATELET # BLD AUTO: 240 K/UL — SIGNIFICANT CHANGE UP (ref 150–400)
PLATELET # BLD AUTO: 255 K/UL — SIGNIFICANT CHANGE UP (ref 150–400)
POTASSIUM SERPL-MCNC: 3.4 MMOL/L — LOW (ref 3.5–5.3)
POTASSIUM SERPL-SCNC: 3.4 MMOL/L — LOW (ref 3.5–5.3)
PROT SERPL-MCNC: 8.6 G/DL — HIGH (ref 6–8.3)
PROTHROM AB SERPL-ACNC: 27.2 SEC — HIGH (ref 10.5–13.4)
RBC # BLD: 3.94 M/UL — LOW (ref 4.2–5.8)
RBC # BLD: 4.04 M/UL — LOW (ref 4.2–5.8)
RBC # FLD: 15 % — HIGH (ref 10.3–14.5)
RBC # FLD: 15.1 % — HIGH (ref 10.3–14.5)
RH IG SCN BLD-IMP: POSITIVE — SIGNIFICANT CHANGE UP
RSV RNA NPH QL NAA+NON-PROBE: SIGNIFICANT CHANGE UP
SARS-COV-2 RNA SPEC QL NAA+PROBE: SIGNIFICANT CHANGE UP
SODIUM SERPL-SCNC: 138 MMOL/L — SIGNIFICANT CHANGE UP (ref 135–145)
WBC # BLD: 12.86 K/UL — HIGH (ref 3.8–10.5)
WBC # BLD: 13.03 K/UL — HIGH (ref 3.8–10.5)
WBC # FLD AUTO: 12.86 K/UL — HIGH (ref 3.8–10.5)
WBC # FLD AUTO: 13.03 K/UL — HIGH (ref 3.8–10.5)

## 2023-02-23 PROCEDURE — 99223 1ST HOSP IP/OBS HIGH 75: CPT | Mod: GC

## 2023-02-23 PROCEDURE — 99285 EMERGENCY DEPT VISIT HI MDM: CPT | Mod: GC

## 2023-02-23 RX ORDER — LEVETIRACETAM 250 MG/1
500 TABLET, FILM COATED ORAL
Refills: 0 | Status: DISCONTINUED | OUTPATIENT
Start: 2023-02-23 | End: 2023-02-28

## 2023-02-23 RX ORDER — LIDOCAINE 4 G/100G
1 CREAM TOPICAL EVERY 24 HOURS
Refills: 0 | Status: DISCONTINUED | OUTPATIENT
Start: 2023-02-23 | End: 2023-02-28

## 2023-02-23 RX ORDER — SENNA PLUS 8.6 MG/1
2 TABLET ORAL AT BEDTIME
Refills: 0 | Status: DISCONTINUED | OUTPATIENT
Start: 2023-02-23 | End: 2023-02-28

## 2023-02-23 RX ORDER — OXYCODONE HYDROCHLORIDE 5 MG/1
5 TABLET ORAL EVERY 6 HOURS
Refills: 0 | Status: DISCONTINUED | OUTPATIENT
Start: 2023-02-23 | End: 2023-02-28

## 2023-02-23 RX ORDER — BUDESONIDE, MICRONIZED 100 %
0.5 POWDER (GRAM) MISCELLANEOUS
Refills: 0 | Status: DISCONTINUED | OUTPATIENT
Start: 2023-02-23 | End: 2023-02-28

## 2023-02-23 RX ORDER — ALLOPURINOL 300 MG
100 TABLET ORAL DAILY
Refills: 0 | Status: DISCONTINUED | OUTPATIENT
Start: 2023-02-23 | End: 2023-02-28

## 2023-02-23 RX ORDER — ACETAMINOPHEN 500 MG
650 TABLET ORAL EVERY 6 HOURS
Refills: 0 | Status: DISCONTINUED | OUTPATIENT
Start: 2023-02-23 | End: 2023-02-28

## 2023-02-23 RX ORDER — CINACALCET 30 MG/1
30 TABLET, FILM COATED ORAL EVERY 24 HOURS
Refills: 0 | Status: DISCONTINUED | OUTPATIENT
Start: 2023-02-23 | End: 2023-02-24

## 2023-02-23 RX ORDER — LANOLIN ALCOHOL/MO/W.PET/CERES
3 CREAM (GRAM) TOPICAL AT BEDTIME
Refills: 0 | Status: DISCONTINUED | OUTPATIENT
Start: 2023-02-23 | End: 2023-02-28

## 2023-02-23 RX ORDER — MIDODRINE HYDROCHLORIDE 2.5 MG/1
20 TABLET ORAL
Refills: 0 | Status: DISCONTINUED | OUTPATIENT
Start: 2023-02-23 | End: 2023-02-28

## 2023-02-23 RX ORDER — FAMOTIDINE 10 MG/ML
20 INJECTION INTRAVENOUS
Refills: 0 | Status: DISCONTINUED | OUTPATIENT
Start: 2023-02-23 | End: 2023-02-24

## 2023-02-23 RX ORDER — PANTOPRAZOLE SODIUM 20 MG/1
80 TABLET, DELAYED RELEASE ORAL ONCE
Refills: 0 | Status: COMPLETED | OUTPATIENT
Start: 2023-02-23 | End: 2023-02-23

## 2023-02-23 RX ORDER — MIDODRINE HYDROCHLORIDE 2.5 MG/1
30 TABLET ORAL
Refills: 0 | Status: DISCONTINUED | OUTPATIENT
Start: 2023-02-23 | End: 2023-02-28

## 2023-02-23 RX ORDER — PANTOPRAZOLE SODIUM 20 MG/1
40 TABLET, DELAYED RELEASE ORAL EVERY 12 HOURS
Refills: 0 | Status: DISCONTINUED | OUTPATIENT
Start: 2023-02-23 | End: 2023-02-28

## 2023-02-23 RX ORDER — MIDODRINE HYDROCHLORIDE 2.5 MG/1
30 TABLET ORAL ONCE
Refills: 0 | Status: COMPLETED | OUTPATIENT
Start: 2023-02-23 | End: 2023-02-23

## 2023-02-23 RX ADMIN — PANTOPRAZOLE SODIUM 80 MILLIGRAM(S): 20 TABLET, DELAYED RELEASE ORAL at 20:53

## 2023-02-23 RX ADMIN — Medication 100 MILLIGRAM(S): at 23:49

## 2023-02-23 RX ADMIN — MIDODRINE HYDROCHLORIDE 30 MILLIGRAM(S): 2.5 TABLET ORAL at 21:00

## 2023-02-23 RX ADMIN — Medication 650 MILLIGRAM(S): at 23:49

## 2023-02-23 RX ADMIN — FAMOTIDINE 20 MILLIGRAM(S): 10 INJECTION INTRAVENOUS at 23:50

## 2023-02-23 NOTE — ED ADULT NURSE NOTE - CHIEF COMPLAINT QUOTE
[facility]o [facility] bleed.  Denies dizziness, constipation, chest pain, sob.  Pt on Coumadin.  Pt 02  dependent 2 L.  Dialysis Mon/Wed/Frid. 20 G RAC, fluids in progress

## 2023-02-23 NOTE — H&P ADULT - PROBLEM SELECTOR PLAN 6
Home Meds: Lyrica, Oxy, Tylenol  - c/w home regimen  - pharmacy recommended decreasing lyrica 2/2 ESRD, kept the same for now since has been getting chronically. Unclear if has seizure disorder, but on keppra 500 BID home med.   - c/w home keppra, pharmacy recommended to decrease dose but kept same since has been receiving it as such (has been chronically ESRD on HD). Unclear if has seizure disorder, but on keppra 500 BID home med.   - c/w home keppra, pharmacy recommended to decrease dose but kept same since has been receiving it as such (has been chronically ESRD on HD).  - keppra level in AM Pt with leukocytosis to 13. Possibly reactive leukocytosis from GI bleed, hypotension is at baseline and has already responded to home dose midodrine. No fever or infectious signs/symptoms.  - Treat GI bleed and anemia as above  - Monitor vitals q4hrs  - Monitor off antibiotics for now  - Trend CBC

## 2023-02-23 NOTE — ED PROVIDER NOTE - OBJECTIVE STATEMENT
78 yo male w h/o orthostatic hypotension on mIdodrine, ESRD on HD via AV fistula LUE MWF, COPD (on 2L home O2), CHF, pHTN,, DVT pm coumadin S/P IVC filter coming in w/ rectal bleeding 76 yo male w h/o orthostatic hypotension on mIdodrine, ESRD on HD via AV fistula LUE MWF, COPD (on 2L home O2), CHF, pHTN,, DVT pm coumadin S/P IVC filter coming in w/ rectal bleeding. Patient states he had abdominal pain yesterday that has resolved and today had 1 bloody BM and was noted to be hypotensive at rehab facility. Currently denies any abdominal pain, nausea, vomiting or fevers.

## 2023-02-23 NOTE — H&P ADULT - NSHPPHYSICALEXAM_GEN_ALL_CORE
ICU Vital Signs Last 24 Hrs  T(C): 36.8 (23 Feb 2023 16:12), Max: 36.8 (23 Feb 2023 16:12)  T(F): 98.3 (23 Feb 2023 16:12), Max: 98.3 (23 Feb 2023 16:12)  HR: 88 (23 Feb 2023 20:25) (80 - 88)  BP: 80/59 (23 Feb 2023 20:25) (76/53 - 92/55)  BP(mean): --  ABP: --  ABP(mean): --  RR: 18 (23 Feb 2023 20:25) (16 - 19)  SpO2: 98% (23 Feb 2023 20:25) (97% - 100%)    O2 Parameters below as of 23 Feb 2023 20:25  Patient On (Oxygen Delivery Method): nasal cannula  O2 Flow (L/min): 2    GENERAL APPEARANCE: Well developed, NAD  HEENT:  PERRL, EOMI. hearing grossly intact.  NECK: Neck supple, non-tender no lymphadenopathy, masses or thyromegaly.  CARDIAC: Normal S1 and S2. no mrg. RRR  LUNGS: Clear to auscultation B/L, no rales, rhonchi, or wheezing  ABDOMEN: Soft , NTND, bowel sounds normal. No guarding or rebound.   MUSCULOSKELETAL: ROM intact.  No joint erythema or tenderness.   EXTREMITIES: No edema. Peripheral pulses intact.   NEUROLOGICAL: Non focal. Strength and sensation symmetric and intact throughout.   SKIN: Warm and dry , Well perfused  PSYCHIATRIC: AOx4 , Normal mood and affect ICU Vital Signs Last 24 Hrs  T(C): 36.8 (23 Feb 2023 16:12), Max: 36.8 (23 Feb 2023 16:12)  T(F): 98.3 (23 Feb 2023 16:12), Max: 98.3 (23 Feb 2023 16:12)  HR: 88 (23 Feb 2023 20:25) (80 - 88)  BP: 80/59 (23 Feb 2023 20:25) (76/53 - 92/55)  BP(mean): --  ABP: --  ABP(mean): --  RR: 18 (23 Feb 2023 20:25) (16 - 19)  SpO2: 98% (23 Feb 2023 20:25) (97% - 100%)    O2 Parameters below as of 23 Feb 2023 20:25  Patient On (Oxygen Delivery Method): nasal cannula  O2 Flow (L/min): 2    Limited due to agitation.   GENERAL APPEARANCE: Well developed, NAD, slightly agitated.   HEENT:  Hearing grossly intact.  NECK: Neck supple, non-tender no lymphadenopathy, masses or thyromegaly.  CARDIAC: Normal S1 and S2. no mrg. RRR  LUNGS: Clear to auscultation B/L anteriorly, no rales, rhonchi, or wheezing  ABDOMEN: Soft , NTND, bowel sounds normal. No guarding or rebound.   MUSCULOSKELETAL: ROM intact.  No joint erythema or tenderness.   EXTREMITIES: + LE dry skin changes. No edema.  NEUROLOGICAL: Non focal. Strength and sensation symmetric and intact throughout.   SKIN: Warm and dry , Well perfused  PSYCHIATRIC: AOx4 , Slightly agitated. Vital Signs Last 24 Hrs  T(C): 36.8 (23 Feb 2023 16:12), Max: 36.8 (23 Feb 2023 16:12)  T(F): 98.3 (23 Feb 2023 16:12), Max: 98.3 (23 Feb 2023 16:12)  HR: 88 (23 Feb 2023 20:25) (80 - 88)  BP: 80/59 (23 Feb 2023 20:25) (76/53 - 92/55)  RR: 18 (23 Feb 2023 20:25) (16 - 19)  SpO2: 98% (23 Feb 2023 20:25) (97% - 100%)    O2 Parameters below as of 23 Feb 2023 20:25  Patient On (Oxygen Delivery Method): nasal cannula  O2 Flow (L/min): 2    Limited due to agitation.   GENERAL APPEARANCE: Well developed, NAD, slightly agitated.   EYES: PERRL, EOMI, no conjunctival pallor or scleral icterus  EARS: Hearing grossly intact b/l  MOUTH: MMM, no oropharyngeal exudates  NECK: Supple, full ROM, no JVD, no lymphadenopathy, masses or thyromegaly  CARDIAC: RRR, normal S1 and S2, no murmurs/rubs/gallops. No pitting LE edema b/l.  LUNGS: Unlabored respirations. Clear to auscultation b/l anteriorly, no rales/rhonchi/wheezing  ABDOMEN: Bowel sounds normal, soft, nontender, distended with no fluid wave. No guarding or rebound. No palpable masses.  MUSCULOSKELETAL: No joint erythema or tenderness.    EXTREMITIES: LE dry skin changes. No cyanosis. Peripheral pulses 2+ b/l.   NEUROLOGICAL: A&Ox4.  No facial asymmetry.  Strength and sensation symmetric and intact throughout.  No focal deficits.  SKIN: Warm and dry, well perfused  PSYCHIATRIC: A&Ox4, slightly agitated.

## 2023-02-23 NOTE — ED ADULT NURSE NOTE - OBJECTIVE STATEMENT
Pt received from nursing home with rectal bleed. Pt hypotensive, MD Foley, Estefania aware. Pt is alert and oriented x 4 but appears lethargic. Pt on coumadin. Pt on 2L NC, home O2 dependent. Pt denies CP, dizziness. L AV fistula, dialysis days M/W/F. Pt arrives with 20g IV in R AC placed by EMS. Labs drawn as ordered. Pt placed on cardiac monitor. EKG in progress. Safety maintained. Awaiting further orders.

## 2023-02-23 NOTE — ED ADULT TRIAGE NOTE - MEANS OF ARRIVAL
[facility], [place], Emergency Department  [address]  [facility] [address]  Phone:  [phone]  Fax:  [phone]                                    Cameron Benson   MRN: [id number]    Department:  [facility], Emergency Department   Date of Visit:  9/20/2019           After Visit Summary Signature Page    I have received my discharge instructions, and my questions have been answered. I have discussed any challenges I see with this plan with the nurse or doctor.    ..........................................................................................................................................  Patient/Patient Representative Signature      ..........................................................................................................................................  Patient Representative Print Name and Relationship to Patient    ..................................................               ................................................  Date                                   Time    ..........................................................................................................................................  Reviewed by Signature/Title    ...................................................              ..............................................  Date                                               Time          22EPIC Rev 08/18       
stretcher

## 2023-02-23 NOTE — ED PROVIDER NOTE - ATTENDING CONTRIBUTION TO CARE
Attending MD Foley:  I performed a history and physical exam of the patient and discussed their management with the resident. I reviewed the resident's note and agree with the documented findings and plan of care. My medical decision making and observations are found above.

## 2023-02-23 NOTE — CONSULT NOTE ADULT - SUBJECTIVE AND OBJECTIVE BOX
[facility]   MD CHESTER DUEÑAS MD KRISTINE SOLTANPOUR, DO ANGELA WONG, PA        TEL:  OFFICE: [phone]  From 5pm-7am answering service [phone]    --- INITIAL RENAL CONSULT NOTE ---date of service 02-23-23 @ 23:49    HPI:  76 yo male w h/o orthostatic hypotension on midodrine, ESRD on HD via AV fistula LUE MWF at [facility], COPD (on 2L home O2), CHF, pHTN,, DVT pm coumadin S/P IVC filter coming in w/ rectal bleeding. Patient states he had abdominal pain yesterday that has resolved and today had 1 bloody BM and was noted to be hypotensive at rehab facility. Currently denies any abdominal pain, nausea, vomiting or fevers.  Currently pt is tired and complains of being hungry but has not been fed. His last HD was on 2/21. Nephrology consulted for dialysis needs.       ED Course:    (23 Feb 2023 22:36)        Allergies:  baclofen (Other)  latex (Rash)      PAST MEDICAL & SURGICAL HISTORY:  Hypertension      Glomerular disease  Segmental glomerular sclerosis      CHF (congestive heart failure)      COPD (chronic obstructive pulmonary disease)      Pulmonary hypertension      Sleep apnea      Pulmonary edema      Peripheral edema      Gout      History of abdominal surgery  polypectomy      H/O right heart catheterization        Home Medications:  acetaminophen 325 mg oral tablet: 2 tab(s) orally every 6 hours, As needed, Temp greater or equal to 38C (100.4F), Mild Pain (1 - 3) (23 Feb 2023 22:35)  allopurinol 100 mg oral tablet: 1 tab(s) orally once a day (23 Feb 2023 22:35)  budesonide: 2 puff(s) inhaled 2 times a day (23 Feb 2023 22:35)  cinacalcet 30 mg oral tablet: 1 tab(s) orally every 24 hrs at 8 AM on Tuesday, Thursday, Saturday (23 Feb 2023 22:35)  famotidine 20 mg oral tablet: 1 tab(s) orally once a day (23 Feb 2023 22:35)  levETIRAcetam 500 mg oral tablet: 1 tab(s) orally 2 times a day (23 Feb 2023 22:35)  lidocaine 4% topical film: Apply topically to affected area once a day (23 Feb 2023 22:35)  midodrine 10 mg oral tablet: 2 tab(s) orally Monday, Wednesday, and Friday (at 8 AM) (23 Feb 2023 22:35)  midodrine 10 mg oral tablet: 3 tab(s) orally 4 times a day (00:00, 06:00, 12:00, 18:00) (23 Feb 2023 22:35)  oxyCODONE 5 mg oral tablet: 1 tab(s) orally every 6 hours, As needed, Severe Pain (7 - 10) (23 Feb 2023 22:35)  pregabalin 100 mg oral capsule: 1 cap(s) orally 2 times a day (23 Feb 2023 22:35)  senna leaf extract oral tablet: 2 tab(s) orally once a day (at bedtime) (23 Feb 2023 22:35)  warfarin 1 mg oral tablet: 7 tab(s) orally once (23 Feb 2023 22:35)    Home Medications Reviewed    Hospital Medications:   MEDICATIONS  (STANDING):  acetaminophen     Tablet .. 650 milliGRAM(s) Oral every 6 hours  allopurinol 100 milliGRAM(s) Oral daily  buDESOnide    Inhalation Suspension 0.5 milliGRAM(s) Inhalation two times a day  cinacalcet 30 milliGRAM(s) Oral every 24 hours  famotidine    Tablet 20 milliGRAM(s) Oral every 48 hours  levETIRAcetam 500 milliGRAM(s) Oral two times a day  lidocaine   4% Patch 1 Patch Transdermal every 24 hours  midodrine. 30 milliGRAM(s) Oral <User Schedule>  pregabalin 100 milliGRAM(s) Oral two times a day  senna 2 Tablet(s) Oral at bedtime      SOCIAL HISTORY:  Denies ETOh, Smoking,     FAMILY HISTORY:  Family history of colon cancer (Father)    Family history of heart disease (Father)        REVIEW OF SYSTEMS:  CONSTITUTIONAL: No weakness, fevers or chills  EYES/ENT: No visual changes;  No vertigo or throat pain   NECK: No pain or stiffness  RESPIRATORY: No cough, wheezing, hemoptysis; No shortness of breath  CARDIOVASCULAR: No chest pain or palpitations.  GASTROINTESTINAL: See HPI  GENITOURINARY: No dysuria, frequency, foamy urine, urinary urgency, incontinence or hematuria  NEUROLOGICAL: No numbness or weakness  SKIN: No itching, burning, rashes, or lesions   VASCULAR: No bilateral lower extremity edema.   All other review of systems is negative unless indicated above.    VITALS:  T(F): 98.3 (02-23-23 @ 16:12), Max: 98.3 (02-23-23 @ 16:12)  HR: 69 (02-23-23 @ 22:58)  BP: 103/64 (02-23-23 @ 22:58)  RR: 17 (02-23-23 @ 22:58)  SpO2: 99% (02-23-23 @ 22:58)  Wt(kg): --    Height (cm): 195.6 (02-23 @ 16:12)    PHYSICAL EXAM:  General: NAD  HEENT: anicteric sclera, oropharynx clear, MMM  Neck: No JVD  Respiratory: CTAB, no wheezes, rales or rhonchi  Cardiovascular: S1, S2, RRR  Gastrointestinal: BS+, soft, NT/ND  Extremities: No cyanosis or clubbing. No peripheral edema  Neurological: A/O x 3, no focal deficits  Psychiatric: Normal mood, normal affect  : No CVA tenderness. No moore.   Skin: No rashes  Vascular Access: Left AVF with positive thrill and bruit.     LABS:  02-23    138  |  91<L>  |  27<H>  ----------------------------<  97  3.4<L>   |  30  |  7.74<H>    Ca    9.4      23 Feb 2023 16:58    TPro  8.6<H>  /  Alb  4.4  /  TBili  0.3  /  DBili      /  AST  20  /  ALT  5   /  AlkPhos  118  02-23    Creatinine Trend: 7.74 <--                        11.3   12.86 )-----------( 240      ( 23 Feb 2023 20:50 )             38.0     Urine Studies:        RADIOLOGY & ADDITIONAL STUDIES:  Patient name: MAYE ZUNIGA  YOB: 1945   Age: 77 (M)   MR#: [id number]  Study Date: 1/20/2023  [facility]  Study quality: Technically Fair  Referring Physician: Vilma Rivera MD  Blood Pressure: 83/56 mmHg  Height: 168 cm  Weight: 112 kg  BSA: 2.2 m2  ------------------------------------------------------------------------  PROCEDURE: Transthoracic echocardiogram with 2-D, M-Mode  and complete spectral and color flow Doppler.  INDICATION: Abnormal electrocardiogram (ECG) (EKG) (R94.31)  ------------------------------------------------------------------------  DIMENSIONS:  Dimensions:     Normal Values:  LA:     3.9 cm    2.0 - 4.0 cm  Ao:     3.9 cm    2.0 - 3.8 cm  SEPTUM: 0.9 cm    0.6 - 1.2 cm  PWT:    0.9 cm    0.6 - 1.1 cm  LVIDd:  4.7 cm    3.0 - 5.6 cm  LVIDs:    ---     1.8 - 4.0 cm  Derived Variables:  LVMI: 65 g/m2  RWT: 0.38  ------------------------------------------------------------------------  OBSERVATIONS:  Mitral Valve: Normal mitral valve.  Aortic Root: Normal aortic root.  Aortic Valve: Aortic valve not well visualized. Mild aortic  regurgitation.  Left Atrium: Normal left atrium.  Left Ventricle: Endocardium not well visualized; grossly  normal left ventricular systolic function. Normal left  ventricular internal dimensions and wall thicknesses.  Right Heart: Normal right atrium. The right ventricle is  not well visualized; grossly normal right ventricular  systolic function. Normal tricuspid valve. Normal pulmonic  valve.  Pericardium/PleuraNormal pericardium with no pericardial  effusion.  Hemodynamic: Estimated right ventricular systolic pressure  equals 44 mm Hg, assuming right atrial pressure equals 10  mm Hg, consistent with mild pulmonary hypertension.  ------------------------------------------------------------------------  CONCLUSIONS:  1. Normal left ventricular internal dimensions and wall  thicknesses.  2. Normal left ventricular systolic function. No segmental  wall motion abnormalities.  3. The right ventricle is not well visualized; grossly  normal right ventricular systolic function.  ------------------------------------------------------------------------  Confirmed on  1/20/2023 - 18:25:09 by GUILLE Navarro

## 2023-02-23 NOTE — H&P ADULT - HISTORY OF PRESENT ILLNESS
pending      ED Course:    76 yo male w h/o severe orthostatic hypotension on midodrine, ESRD on HD via AV fistula [facility] MWF, ?seizure disorder, recently admitted for hypotension presents from long term care facility (in ?[place]) for bloody BM and hypotension. Patient preferred to sleep, slightly agitated, so interview limited. He did say that he only had 1 bloody BM at the LTC facility, which had no black. Denies fevers, chills, n/v/d. Patient refusing that I call any family, wants to be left to sleep. Chart reviewed from facility, medications reconciled.       ED Course:   HR 60-80s, BP minimum 76/53  increased to 103/64. Afebrile, saturating % on home O2 of 2L NC.   Received: Protonix 80 IV push x1, Midodrine 30 PO x1  76 yo male w h/o severe orthostatic hypotension on midodrine, ESRD on HD via AV fistula LUE MWF, Chronic DVTs since 8/2021 (w IVC filter and on Coumadin, INR goal 2-3), ?seizure disorder, recently admitted for hypotension presents from long term care facility (in ?[place]) for bloody BM and hypotension. Patient preferred to sleep, slightly agitated, so interview limited. He did say that he only had 1 bloody BM at the LTC facility, which had no black. Denies fevers, chills, n/v/d. Patient refusing that I call any family, wants to be left to sleep. Chart reviewed from facility, medications reconciled.       ED Course:   HR 60-80s, BP minimum 76/53  increased to 103/64. Afebrile, saturating % on home O2 of 2L NC.   Received: Protonix 80 IV push x1, Midodrine 30 PO x1  76 yo male with history of severe orthostatic hypotension on midodrine, ESRD on HD via LUE AVF (MWF), chronic LE DVTs since 8/2021 (s/p IVC filter and on Coumadin, INR goal 2-3), ?seizure disorder, recently admitted for hypotension presents from long term care facility (in ?[place]) for bloody BM and hypotension. Patient preferred to sleep, slightly agitated, so interview limited. He did say that he only had 1 bloody BM at the LTC facility, with no dark/black stools. Denies fevers, chills, abdominal pain, nausea, vomiting, diarrhea, chest pain, shortness of breath, or palpitations. Patient refusing that I call any family, wants to be left to sleep. Chart reviewed from facility, medications reconciled.       ED Course:   HR 60-80s, BP minimum 76/53  increased to 103/64. Afebrile, saturating % on home O2 of 2L NC.   Received: Protonix 80 IV push x1, Midodrine 30 PO x1

## 2023-02-23 NOTE — ED PROVIDER NOTE - NS ED ROS FT
General: denies fever, chills  HENT: denies nasal congestion, rhinorrhea  Eyes: denies visual changes, blurred vision  CV: denies chest pain, palpitations  Resp: denies difficulty breathing, cough  Abdominal: + abdominal pain and bloody BM  : denies urinary pain or discharge  MSK: denies muscle aches, leg swelling  Neuro: denies headaches, numbness, tingling  Skin: denies rashes, bruises

## 2023-02-23 NOTE — H&P ADULT - PROBLEM SELECTOR PLAN 5
Unclear if has seizure disorder, but on keppra 500 BID home med.   - c/w home keppra, pharmacy recommended to decrease dose but kept same since has been receiving it as such (has been chronically ESRD on HD). Leukocytosis to 13, Hypotension (though baseline)  Likely reactive leukocytosis from GI bleed, hypotension is at baseline and has already responded to home dose midodrine.   No fever or infectious symptoms  - treat anemia and GI bleed per above  - monitor vitals q4  - monitor off abx S/p IVC filter. Pt on Coumadin 12 mg qHS per paper chart review, goal INR 2-3.  INR on admission 2.32.  - Hold Coumadin for now i/s/o GI bleed and anemia  - Consider resuming in AM following risk/benefit discussion

## 2023-02-23 NOTE — H&P ADULT - PROBLEM SELECTOR PLAN 3
at baseline, mentating well  Home dose midodrine 30q6 additional 20 prior to HD  - c/w home dose midodrine  - vitals q4 At baseline, mentating well. Pt's home dose of midodrine 30 mg q6hrs, additional 20 mg prior to HD.  - C/w home dose of midodrine 30 mg q6hrs, additional 20 mg prior to HD  - Monitor vitals q4hrs

## 2023-02-23 NOTE — ED ADULT TRIAGE NOTE - CHIEF COMPLAINT QUOTE
Pt from [facility] c/o rectal bleed.  Denies dizziness, constipation, chest pain, sob.  Pt on Coumadin.  Pt 02  dependent 2 L.  Dialysis Mon/Wed/Frid. 20 G RAC, fluids in progress

## 2023-02-23 NOTE — H&P ADULT - PROBLEM SELECTOR PLAN 7
With IVC filter  On coumadin 12mg qhs per paper chart review, goal INR 2-3  INR on admission 2.32  - hold coumadin for now i/s/o GI bleed and anemia  - consider resuming in AM, risk benefit discussion  - consider cards consult for above in AM Home Meds: Lyrica, Oxy, Tylenol  - c/w home regimen  - pharmacy recommended decreasing lyrica 2/2 ESRD, kept the same for now since has been getting chronically. Unclear if pt has seizure disorder, but on Keppra 500 mg BID at LTC facility.  - C/w home Keppra, pharmacy recommended to decrease dose but kept same since has been receiving it as such (has been chronically ESRD on HD).  - Check Keppra level in AM  - Seizure precautions

## 2023-02-23 NOTE — ED PROVIDER NOTE - CLINICAL SUMMARY MEDICAL DECISION MAKING FREE TEXT BOX
Attending MD Foley.  Agree with above.  Pt is a 78 yo male with pmhx of gout, pulmonary edema, pulm HTN on baseline 2L NC, COPD, CHF and HTN on baseline coumadin who presents to ED with complaint of bright red/mixed maroon stools earlier today following abdominal pain.  Pt had no reported back pain, rectal pain.  Does have hx of diverticulosis/partial colectomy and internal hemorrhoids.  Abdomen soft non-tender in ED.  No palpable internal hemorrhoids, no blood on SAHIL.  Scant mucous in vault without apparent blood tinge at this time.  Planned INR check and probable admission for GIB obs/supportive care and scope or IR as needed for persistent or heavy bleeding.  Pt is not currently SOB or evidencing worsening of baseline pulmonologic sxs. Attending MD Foley.  Agree with above.  Pt is a 78 yo male with pmhx of gout, pulmonary edema, pulm HTN on baseline 2L NC, COPD, CHF and HTN on baseline coumadin who presents to ED with complaint of bright red/mixed maroon stools earlier today following abdominal pain.  Pt had no reported back pain, rectal pain.  Does have hx of diverticulosis/partial colectomy and internal hemorrhoids.  Abdomen soft non-tender in ED.  No palpable internal hemorrhoids, no blood on SAHIL.  Scant mucous in vault without apparent blood tinge at this time.  Planned INR check and probable admission for GIB obs/supportive care and scope or IR as needed for persistent or heavy bleeding.  Pt is not currently SOB or evidencing worsening of baseline pulmonologic sxs. Of note - pt has reduced H/H and soft BP.  Pt's records reviewed and soft BP is not unusual for him.  Current MAP is c/w baseline MAP and pt dosed his home midodrine for known hypotension.  No further episodes of bleeding in ED.  Abdomen soft, non-tender non-distended.  Stool occult + however no gross blood in rectum.  Do not suspect GIB is etiology of hypotension but rather baseline hypotension in setting of GIB.  Pt denies current sxs. Stable for admission to medicine.

## 2023-02-23 NOTE — ED PROVIDER NOTE - PHYSICAL EXAMINATION
GENERAL: well appearing in no acute distress, non-toxic appearing  HEAD: normocephalic, atraumatic  HENT: airway intact, neck supple  EYES: normal conjunctiva  CARDIAC: regular rate and rhythm, normal S1S2, no appreciable murmurs, 2+ pulses in UE/LE b/l  PULM: normal breath sounds, clear to ascultation bilaterally, no rales, rhonchi, wheezing  GI: abdomen nondistended, soft, nontender, no guarding, rebound tenderness; no melena on exam, no external/internal hemorrhoids   NEURO: no focal motor or sensory deficits  MSK: no peripheral edema, +LUE fistula w/ palpable thrill  SKIN: well-perfused, extremities warm, no visible rashes

## 2023-02-23 NOTE — ED ADULT NURSE NOTE - NS ED NURSE REPORT GIVEN TO FT
Problem: Falls - Risk of  Goal: *Absence of Falls  Description: Document [place] Flow Fall Risk and appropriate interventions in the flowsheet. 1/7/2021 1015 by Willian Avalos  Outcome: Resolved/Met  1/7/2021 1014 by Willain Avalos  Outcome: Progressing Towards Goal  Note: Fall Risk Interventions:  Mobility Interventions: Bed/chair exit alarm, OT consult for ADLs, Patient to call before getting OOB, PT Consult for mobility concerns, Utilize walker, cane, or other assistive device         Medication Interventions: Assess postural VS orthostatic hypotension, Bed/chair exit alarm, Patient to call before getting OOB, Teach patient to arise slowly    Elimination Interventions: Bed/chair exit alarm, Call light in reach, Toilet paper/wipes in reach    History of Falls Interventions: Bed/chair exit alarm, Room close to nurse's station         Problem: Patient Education: Go to Patient Education Activity  Goal: Patient/Family Education  1/7/2021 1015 by Willian Avalos  Outcome: Resolved/Met  1/7/2021 1014 by Willian Avalos  Outcome: Progressing Towards Goal     Problem: Pressure Injury - Risk of  Goal: *Prevention of pressure injury  Description: Document Cordell Scale and appropriate interventions in the flowsheet.   Outcome: Resolved/Met     Problem: Patient Education: Go to Patient Education Activity  Goal: Patient/Family Education  Outcome: Resolved/Met     Problem: Patient Education: Go to Patient Education Activity  Goal: Patient/Family Education  Outcome: Resolved/Met     Problem: Patient Education: Go to Patient Education Activity  Goal: Patient/Family Education  Outcome: Resolved/Met KEM rivera

## 2023-02-23 NOTE — H&P ADULT - PROBLEM SELECTOR PLAN 8
Diet: NPO for now since LGIB until GI eval  DVT PPx: SCDs for now, otherwise as above in DVT problem.  Dispo: management of rectal bleed, hypotension    GOC: DNR/DNI, MOLST from prior in chart. Confirmed with patient on admission. With IVC filter  On coumadin 12mg qhs per paper chart review, goal INR 2-3  INR on admission 2.32  - hold coumadin for now i/s/o GI bleed and anemia  - consider resuming in AM, risk benefit discussion  - consider cards consult for above in AM Home Meds: Lyrica, Lidocaine patch, Tylenol, and Oxycodone PRN  - C/w home regimen  - Pharmacy recommended decreasing Lyrica 2/2 ESRD, but kept the same for now since has been getting chronically

## 2023-02-23 NOTE — CHART NOTE - NSCHARTNOTEFT_GEN_A_CORE
Confidential Drug Utilization Report  Search Terms: Kaleb ron, 1945Search Date: 02/23/2023 22:34:21 PM  Searching on behalf of: [address] [place] [address]  The Drug Utilization Report below displays all of the controlled substance prescriptions, if any, that your patient has filled in the last twelve months. The information displayed on this report is compiled from pharmacy submissions to the Department, and accurately reflects the information as submitted by the pharmacies.    This report was requested by: Tristian Hand | Reference #: [id number]    Others' Prescriptions  Patient Name: Kaleb Rodrigez Date: 1945  Address: [address] [place] [id number]: Male  Rx Written	Rx Dispensed	Drug	Quantity	Days Supply	Prescriber Name	Prescriber Toyin #	Payment Method	Dispenser  02/18/2023	02/18/2023	pregabalin 100 mg capsule	10	5	Negin Jeong MD	[id number]	Medicare	[facility]  01/25/2023	01/26/2023	oxycodone hcl (ir) 5 mg tablet	60	15	Familusi, Yaneth	[id number]	[place]	[facility]  01/25/2023	01/26/2023	pregabalin 100 mg capsule	30	15	Familusi, Yaneth	[id number]	[place]	[facility]  01/15/2023	01/16/2023	pregabalin 100 mg capsule	28	14	Perla Alvarado	[id number]	Medicare	[facility]  12/30/2022	12/30/2022	pregabalin 100 mg capsule	30	15	Jai Solano)	[id number]	Medicare	[facility]  12/14/2022	12/14/2022	pregabalin 100 mg capsule	30	15	Jai Solano)	[id number]	Medicare	[facility]  12/01/2022	12/01/2022	pregabalin 100 mg capsule	30	15	Jai Solano)	[id number]	Cash	[facility]  10/28/2022	10/28/2022	pregabalin 100 mg capsule	60	30	Jai Solano)	[id number]	Cash	[facility]  10/19/2022	10/20/2022	pregabalin 100 mg capsule	15	15	Familusi, Yaneth	[id number]	[place]	[facility]  10/13/2022	10/13/2022	pregabalin 100 mg capsule	28	14	Perla Alvarado	[id number]	[place]	[facility]  10/06/2022	10/07/2022	pregabalin 50 mg capsule	28	14	Perla Avlarado	[id number]	[place]	[facility]  09/20/2022	09/20/2022	pregabalin 50 mg capsule	28	14	Perla Alvarado	[id number]	[place]	[facility]  09/06/2022	09/06/2022	pregabalin 50 mg capsule	28	14	Perla Alvarado	[id number]	[place]	[facility]  08/24/2022	08/24/2022	pregabalin 50 mg capsule	28	14	Perla Alvarado	[id number]	[place]	[facility]  08/11/2022	08/11/2022	pregabalin 50 mg capsule	28	14	Perla Alvarado	[id number]	[place]	[facility]  05/11/2022	05/11/2022	pregabalin 25 mg capsule	15	15	Jai Solano)	[id number]	Cash	[facility]  04/27/2022	04/27/2022	pregabalin 25 mg capsule	15	15	Jai Solano)	[id number]	Cash	[facility]  04/19/2022	04/19/2022	pregabalin 25 mg capsule	14	14	Perla Alvarado	[id number]	[place]	[facility]  04/05/2022	04/05/2022	pregabalin 25 mg capsule	14	14	Perla Alvarado	[id number]	[place]	[facility]  03/18/2022	03/18/2022	pregabalin 25 mg capsule	15	15	Jai Solano)	[id number]	Cash	[facility]  03/02/2022	03/02/2022	pregabalin 25 mg capsule	15	15	Jai Solano)	[id number]	Cash	[facility]    Patient Name: Kaleb Price Date: 1945  Address: [address] [place] [id number]: Male  Rx Written	Rx Dispensed	Drug	Quantity	Days Supply	Prescriber Name	Prescriber Toyin #	Payment Method	Dispenser  05/19/2022	05/20/2022	pregabalin 25 mg capsule	10	10	Jai Solano)	[id number]	Medicaid	[id number]  * - Drugs marked with an asterisk are compound drugs. If the compound drug is made up of more than one controlled substance, then each controlled substance will be a separate row in the table.

## 2023-02-23 NOTE — H&P ADULT - PROBLEM SELECTOR PLAN 4
No need for urgent HD  - renal consult in AM for coordination of HD  - midodrine as above w additional pre-HD MWF  No need for urgent HD  - renal consult in AM for coordination of HD  - midodrine as above w additional pre-HD MWF  No need for urgent HD  - renal consult ed, coordinated HD  - midodrine as above w additional pre-HD On HD MWF. No indication for urgent HD at this time. Last HD was on Wednesday 2/21/23.  - Renal consulted, appreciate recs  - Will arrange for HD today after CTA AP with IV contrast performed   - C/w midodrine as above  - C/w Sensipar three times weekly   - Monitor electrolytes, including serum K, HCO3, Ca, and Phos

## 2023-02-23 NOTE — H&P ADULT - NSHPSOCIALHISTORY_GEN_ALL_CORE
From long term care. Denies any alcohol, tobacco, or illicit drug use.  Lives at a long-term care facility.

## 2023-02-23 NOTE — H&P ADULT - ATTENDING COMMENTS
78 yo male with history of severe orthostatic hypotension on midodrine, ESRD on HD via LUE AVF (MWF), chronic LE DVTs since 8/2021 (s/p IVC filter and on Coumadin, INR goal 2-3), ?seizure disorder, recently admitted for hypotension presents from [facility] (in ?[place]) for bloody BM and hypotension. Pt with 1 episode of hematochezia. Pt refusing rectal exam on admission. FOBT positive. Hgb 11.4 on admission, baseline ~13-14. GI consult placed, f/u recs. C/w IV Protonix 40 mg BID for now, as HPI limited from pt. CTA AP with IV contrast ordered to possibly localize bleed. Renal following, will arrange for HD post CT imaging with IV contrast administration.

## 2023-02-23 NOTE — H&P ADULT - PROBLEM SELECTOR PLAN 1
1x bowel movement in LTC facility   Red, not black.  - treat anemia as per below  - GI emailed, f/u recs, though likely poor candidate for interventions  - Hold home coumadin which is being given for LE DVTs   - CT vs CTA  - unlikely UGIB, received Protonix 80 IV x1 in ED  -- will start Protonix 40IV BID for now 1x bowel movement in LTC facility   Red, not black.  - treat anemia as per below  - GI (private) answering service called, and left consult. f/u recs, though likely poor candidate for interventions  - Hold home coumadin which is being given for LE DVTs   - CT vs CTA  - unlikely UGIB, received Protonix 80 IV x1 in ED  -- will start Protonix 40IV BID for now 1x bowel movement in LTC facility   Red, not black.  - treat anemia as per below  - GI (private) answering service called, and left consult. f/u recs, though likely poor candidate for interventions  - Hold home coumadin which is being given for LE DVTs   - CTA AP to evaluate, ordered, pre-HD likely to be done  - unlikely UGIB, received Protonix 80 IV x1 in ED  -- will start Protonix 40IV BID for now until GI eval  - NPO for now until GI eval  - stool count Pt with 1 episode of bloody BM at his LTC facility. No melena. Pt refusing rectal exam on admission.  - Treat anemia as below  - GI (private) answering service called, and left consult. F/u recs, though likely poor candidate for endoscopic interventions.  - Hold home Coumadin which pt is on for LE DVTs   - CTA AP with IV contrast ordered to evaluate possible source of bleed, will arrange for HD with Renal afterwards  - Unlikely UGIB, received IV Protonix 80 mg x1 in ED  -- will start IV Protonix 40 mg BID for now until GI eval  - NPO for now until GI eval  - Monitor stool count

## 2023-02-23 NOTE — CONSULT NOTE ADULT - ASSESSMENT
78 yo male w h/o severe orthostatic hypotension on midodrine, ESRD on HD via AV fistula [facility] DELVALLE, who was [facility] for  for severe orthostatic hypotension. ptn did well during his hospitalization. His chronic NF is not comfortable dispensing 30 mg of Midodrine( his usual dose is 30 mg tid) Ptn was sent to the ED for further management Here his SBP is 103/64 used to be in the 70s  He was noted to have bloody BM and was admitted for rectal bleeding. Nephrology was consulted for dialysis needs.      ESRD on HD Monday, Wednesday and Friday   Via AVF at [facility]  Last HD was on Wednesday 2/21  Nephrologist: Dr. Crawford Desroaches  HD tomorrow  midodrine please order  Consent in the chart  Renal diet  Renal dose all medications for GFR<10.   BP remains low. On midodrine      Hypotension  asymptomatic   Continue Midodrine  better    Rectal bleeding  Defer to primary     CKD-MBD  Tertiary  hyperparathyroidism.   On Sensipar of 30 mg three times per week  PTH elevated  Phosphorus and calcium daily.

## 2023-02-23 NOTE — H&P ADULT - NSHPLABSRESULTS_GEN_ALL_CORE
Labs:                          11.3   12.86 )-----------( 240      ( 23 Feb 2023 20:50 )             38.0     02-23    138  |  91<L>  |  27<H>  ----------------------------<  97  3.4<L>   |  30  |  7.74<H>    Ca    9.4      23 Feb 2023 16:58    TPro  8.6<H>  /  Alb  4.4  /  TBili  0.3  /  DBili  x   /  AST  20  /  ALT  5   /  AlkPhos  118  02-23    LIVER FUNCTIONS - ( 23 Feb 2023 16:58 )  Alb: 4.4 g/dL / Pro: 8.6 g/dL / ALK PHOS: 118 U/L / ALT: 5 U/L / AST: 20 U/L / GGT: x           PT/INR - ( 23 Feb 2023 16:58 )   PT: 27.2 sec;   INR: 2.32 ratio         PTT - ( 23 Feb 2023 16:58 )  PTT:37.7 sec  CAPILLARY BLOOD GLUCOSE                      Micro:      RADIOLOGY & ADDITIONAL TESTS:    EKG:      Echo:      Xray:      US/CT/MRI: Labs:                          11.3   12.86 )-----------( 240      ( 23 Feb 2023 20:50 )             38.0     02-23    138  |  91<L>  |  27<H>  ----------------------------<  97  3.4<L>   |  30  |  7.74<H>    Ca    9.4      23 Feb 2023 16:58    TPro  8.6<H>  /  Alb  4.4  /  TBili  0.3  /  DBili  x   /  AST  20  /  ALT  5   /  AlkPhos  118  02-23    LIVER FUNCTIONS - ( 23 Feb 2023 16:58 )  Alb: 4.4 g/dL / Pro: 8.6 g/dL / ALK PHOS: 118 U/L / ALT: 5 U/L / AST: 20 U/L / GGT: x           PT/INR - ( 23 Feb 2023 16:58 )   PT: 27.2 sec;   INR: 2.32 ratio         PTT - ( 23 Feb 2023 16:58 )  PTT:37.7 sec  CAPILLARY BLOOD GLUCOSE    Micro:      RADIOLOGY & ADDITIONAL TESTS:    EKG:  Sinus w APC, rate 73, QTc 427, Poor R wave progression.     Echo:  < from: Transthoracic Echocardiogram (01.20.23 @ 17:22) >    CONCLUSIONS:  1. Normal left ventricular internal dimensions and wall  thicknesses.  2. Normal left ventricular systolic function. No segmental  wall motionabnormalities.  3. The right ventricle is not well visualized; grossly  normal right ventricular systolic function.    < end of copied text > EKG personally reviewed.  SR with PACs at 73 bpm, LAD, no acute ischemic changes, QTc 427 ms.    Labs personally reviewed.                        11.3   12.86 )-----------( 240      ( 23 Feb 2023 20:50 )             38.0     02-23    138  |  91<L>  |  27<H>  ----------------------------<  97  3.4<L>   |  30  |  7.74<H>    Ca    9.4      23 Feb 2023 16:58    TPro  8.6<H>  /  Alb  4.4  /  TBili  0.3  /  DBili  x   /  AST  20  /  ALT  5   /  AlkPhos  118  02-23    LIVER FUNCTIONS - ( 23 Feb 2023 16:58 )  Alb: 4.4 g/dL / Pro: 8.6 g/dL / ALK PHOS: 118 U/L / ALT: 5 U/L / AST: 20 U/L / GGT: x           PT/INR - ( 23 Feb 2023 16:58 )   PT: 27.2 sec;   INR: 2.32 ratio    PTT - ( 23 Feb 2023 16:58 )  PTT:37.7 sec    Prior echo personally reviewed.  Echo:  < from: Transthoracic Echocardiogram (01.20.23 @ 17:22) >    CONCLUSIONS:  1. Normal left ventricular internal dimensions and wall  thicknesses.  2. Normal left ventricular systolic function. No segmental  wall motion abnormalities.  3. The right ventricle is not well visualized; grossly  normal right ventricular systolic function.

## 2023-02-23 NOTE — H&P ADULT - NSHPREVIEWOFSYSTEMS_GEN_ALL_CORE
CONSTITUTIONAL:  No fever  HEENT:  Eyes:  No visual loss, blurred vision, double vision or yellow sclerae. Ears, Nose, Throat:  No hearing loss, sneezing, congestion, runny nose or sore throat.  SKIN:  No rash or itching.  CARDIOVASCULAR:  No chest pain, chest pressure or chest discomfort. No palpitations.  RESPIRATORY:  No shortness of breath, cough or sputum.  GASTROINTESTINAL:  + hematochezia x1  NEUROLOGICAL:  Denies headache, dizziness, lightheadedness.   MUSCULOSKELETAL:  No muscle, back pain, joint pain or stiffness.  HEMATOLOGIC:  + rectal bleed as above   LYMPHATICS:  No enlarged nodes.   PSYCHIATRIC:  No history of depression or anxiety.  ENDOCRINOLOGIC:  No reports of sweating, cold or heat intolerance. No polyuria or polydipsia.  ALLERGIES:  No history of asthma, hives, eczema or rhinitis. Constitutional: No generalized weakness, fevers, chills, or weight loss  Eyes: No visual changes, double vision, or eye pain  Ears, Nose, Mouth, Throat: No runny nose, sinus pain, ear pain, tinnitus, sore throat, dysphagia, or odynophagia  Cardiovascular: No chest pain, palpitations, or LE edema  Respiratory: No cough, wheezing, hemoptysis, or shortness of breath  Gastrointestinal: +BRBPR x1. No abdominal pain, nausea/vomiting, diarrhea/constipation, hematemesis, or melena.  Genitourinary: No dysuria, frequency, urgency, or hematuria  Musculoskeletal: No back pain or joint pain, swelling, or decreased ROM  Skin: No pruritus, rashes, lesions, or wounds  Neurologic: No seizures, headache, paresthesias, numbness, or limb weakness  Psychiatric: No depression, anxiety, difficulty concentrating, anhedonia, or lack of energy  Endocrine: No heat/cold intolerance, mood swings, sweats, polydipsia, or polyuria  Hematologic/lymphatic: No purpura, petechia, or prolonged or excessive bleeding after dental extraction / injury  Allergic/Immunologic: No anaphylaxis or allergic response to materials, foods, animals    Positives and pertinent negatives noted and all other systems negative.

## 2023-02-23 NOTE — H&P ADULT - TIME BILLING
I had a face to face encounter with this patient. I spent 80 total minutes on chart review, bedside interview and physical exam, orders, interpretation of results, documentation, and coordination of care for this patient.

## 2023-02-23 NOTE — H&P ADULT - ASSESSMENT
76 yo male w h/o severe orthostatic hypotension on midodrine, ESRD on HD via AV fistula LUE MWF, ?seizure disorder, recently admitted for hypotension presents from long term care facility (in ?[place]) for bloody BM and hypotension admitted for anemia due to hematochezia.         78 yo male w h/o severe orthostatic hypotension on midodrine, ESRD on HD via AV fistula LUE MWF, Chronic DVTs since 8/2021 (w IVC filter and on Coumadin, INR goal 2-3), ?seizure disorder, recently admitted for hypotension presents from long term care facility (in ?[place]) for bloody BM and hypotension admitted for anemia due to hematochezia.       78 yo male w h/o severe orthostatic hypotension on midodrine, ESRD on HD via AV fistula LUE MWF, Chronic DVTs since 8/2021 (w IVC filter and on Coumadin, INR goal 2-3), ?seizure disorder, recently admitted for hypotension presents from [facility] term care facility (in ?[place]) for bloody BM and hypotension, admitted for anemia due to hematochezia.

## 2023-02-23 NOTE — H&P ADULT - PROBLEM SELECTOR PLAN 2
Hb stable around 11.5, it seems his baseline from quite recently is ~13  Likely from hematochezia  Hemodynamically at baseline hypotension level. Not tachycardic, though of note on midodrine.    - 2 large bore IVs  - Monitor Hb, transfuse to Hb > 7  - treat hematochezia as above  - monitor vitals q4 Hb 11.5 on admission, seems his baseline from quite recently is ~13-14. Likely from hematochezia.  Hemodynamically, at baseline hypotension level. Not tachycardic, though of note on midodrine.    - 2 large bore IVs  - Monitor Hb, transfuse to Hb > 7  - Treat hematochezia as above  - Check iron studies (give blood loss), folate, vitamin B12  - Monitor vitals q4hrs

## 2023-02-23 NOTE — H&P ADULT - PROBLEM SELECTOR PROBLEM 5
History of seizure disorder Severe systemic inflammatory response syndrome (SIRS) SIRS (systemic inflammatory response syndrome) DVT, lower extremity

## 2023-02-23 NOTE — H&P ADULT - PROBLEM SELECTOR PLAN 9
Diet: NPO for now since LGIB until GI eval  DVT PPx: SCDs for now, otherwise as above in DVT problem.  Dispo: management of rectal bleed, hypotension    GOC: DNR/DNI, MOLST from prior in chart. Confirmed with patient on admission. Diet: NPO for now given recent hematochezia until GI eval  DVT PPx: No pharmacologic ppx for now given recent hematochezia. SCDs for now, otherwise as above in DVT problem.  Dispo: management of rectal bleed, hypotension    GOC: DNR/DNI, MOLST from prior in chart. Confirmed with patient on admission.

## 2023-02-24 DIAGNOSIS — R65.10 SYSTEMIC INFLAMMATORY RESPONSE SYNDROME (SIRS) OF NON-INFECTIOUS ORIGIN WITHOUT ACUTE ORGAN DYSFUNCTION: ICD-10-CM

## 2023-02-24 DIAGNOSIS — D72.829 ELEVATED WHITE BLOOD CELL COUNT, UNSPECIFIED: ICD-10-CM

## 2023-02-24 LAB
A1C WITH ESTIMATED AVERAGE GLUCOSE RESULT: 5 % — SIGNIFICANT CHANGE UP (ref 4–5.6)
ALBUMIN SERPL ELPH-MCNC: 4.2 G/DL — SIGNIFICANT CHANGE UP (ref 3.3–5)
ALP SERPL-CCNC: 123 U/L — HIGH (ref 40–120)
ALT FLD-CCNC: 5 U/L — SIGNIFICANT CHANGE UP (ref 4–41)
ANION GAP SERPL CALC-SCNC: 17 MMOL/L — HIGH (ref 7–14)
APTT BLD: 43.9 SEC — HIGH (ref 27–36.3)
AST SERPL-CCNC: 23 U/L — SIGNIFICANT CHANGE UP (ref 4–40)
BASOPHILS # BLD AUTO: 0.08 K/UL — SIGNIFICANT CHANGE UP (ref 0–0.2)
BASOPHILS NFR BLD AUTO: 0.8 % — SIGNIFICANT CHANGE UP (ref 0–2)
BILIRUB SERPL-MCNC: 0.4 MG/DL — SIGNIFICANT CHANGE UP (ref 0.2–1.2)
BUN SERPL-MCNC: 35 MG/DL — HIGH (ref 7–23)
CALCIUM SERPL-MCNC: 9.8 MG/DL — SIGNIFICANT CHANGE UP (ref 8.4–10.5)
CHLORIDE SERPL-SCNC: 92 MMOL/L — LOW (ref 98–107)
CO2 SERPL-SCNC: 28 MMOL/L — SIGNIFICANT CHANGE UP (ref 22–31)
CREAT SERPL-MCNC: 9.61 MG/DL — HIGH (ref 0.5–1.3)
EGFR: 5 ML/MIN/1.73M2 — LOW
EOSINOPHIL # BLD AUTO: 0.95 K/UL — HIGH (ref 0–0.5)
EOSINOPHIL NFR BLD AUTO: 9.3 % — HIGH (ref 0–6)
ESTIMATED AVERAGE GLUCOSE: 97 — SIGNIFICANT CHANGE UP
GLUCOSE SERPL-MCNC: 76 MG/DL — SIGNIFICANT CHANGE UP (ref 70–99)
HCT VFR BLD CALC: 41.2 % — SIGNIFICANT CHANGE UP (ref 39–50)
HGB BLD-MCNC: 12.1 G/DL — LOW (ref 13–17)
HIV 1+2 AB+HIV1 P24 AG SERPL QL IA: SIGNIFICANT CHANGE UP
IANC: 6.16 K/UL — SIGNIFICANT CHANGE UP (ref 1.8–7.4)
IMM GRANULOCYTES NFR BLD AUTO: 0.3 % — SIGNIFICANT CHANGE UP (ref 0–0.9)
INR BLD: 2.4 RATIO — HIGH (ref 0.88–1.16)
LYMPHOCYTES # BLD AUTO: 2.14 K/UL — SIGNIFICANT CHANGE UP (ref 1–3.3)
LYMPHOCYTES # BLD AUTO: 21 % — SIGNIFICANT CHANGE UP (ref 13–44)
MAGNESIUM SERPL-MCNC: 2.2 MG/DL — SIGNIFICANT CHANGE UP (ref 1.6–2.6)
MCHC RBC-ENTMCNC: 28.3 PG — SIGNIFICANT CHANGE UP (ref 27–34)
MCHC RBC-ENTMCNC: 29.4 GM/DL — LOW (ref 32–36)
MCV RBC AUTO: 96.3 FL — SIGNIFICANT CHANGE UP (ref 80–100)
MONOCYTES # BLD AUTO: 0.81 K/UL — SIGNIFICANT CHANGE UP (ref 0–0.9)
MONOCYTES NFR BLD AUTO: 8 % — SIGNIFICANT CHANGE UP (ref 2–14)
MRSA PCR RESULT.: SIGNIFICANT CHANGE UP
NEUTROPHILS # BLD AUTO: 6.16 K/UL — SIGNIFICANT CHANGE UP (ref 1.8–7.4)
NEUTROPHILS NFR BLD AUTO: 60.6 % — SIGNIFICANT CHANGE UP (ref 43–77)
NRBC # BLD: 0 /100 WBCS — SIGNIFICANT CHANGE UP (ref 0–0)
NRBC # FLD: 0 K/UL — SIGNIFICANT CHANGE UP (ref 0–0)
PHOSPHATE SERPL-MCNC: 7.1 MG/DL — HIGH (ref 2.5–4.5)
PLATELET # BLD AUTO: 269 K/UL — SIGNIFICANT CHANGE UP (ref 150–400)
POTASSIUM SERPL-MCNC: 4.4 MMOL/L — SIGNIFICANT CHANGE UP (ref 3.5–5.3)
POTASSIUM SERPL-SCNC: 4.4 MMOL/L — SIGNIFICANT CHANGE UP (ref 3.5–5.3)
PROT SERPL-MCNC: 8.8 G/DL — HIGH (ref 6–8.3)
PROTHROM AB SERPL-ACNC: 28.1 SEC — HIGH (ref 10.5–13.4)
RBC # BLD: 4.28 M/UL — SIGNIFICANT CHANGE UP (ref 4.2–5.8)
RBC # FLD: 15.3 % — HIGH (ref 10.3–14.5)
S AUREUS DNA NOSE QL NAA+PROBE: SIGNIFICANT CHANGE UP
SODIUM SERPL-SCNC: 137 MMOL/L — SIGNIFICANT CHANGE UP (ref 135–145)
TSH SERPL-MCNC: 0.92 UIU/ML — SIGNIFICANT CHANGE UP (ref 0.27–4.2)
WBC # BLD: 10.17 K/UL — SIGNIFICANT CHANGE UP (ref 3.8–10.5)
WBC # FLD AUTO: 10.17 K/UL — SIGNIFICANT CHANGE UP (ref 3.8–10.5)

## 2023-02-24 PROCEDURE — 99222 1ST HOSP IP/OBS MODERATE 55: CPT | Mod: GC

## 2023-02-24 PROCEDURE — 74174 CTA ABD&PLVS W/CONTRAST: CPT | Mod: 26

## 2023-02-24 PROCEDURE — 99233 SBSQ HOSP IP/OBS HIGH 50: CPT

## 2023-02-24 RX ORDER — CHLORHEXIDINE GLUCONATE 213 G/1000ML
1 SOLUTION TOPICAL DAILY
Refills: 0 | Status: DISCONTINUED | OUTPATIENT
Start: 2023-02-24 | End: 2023-02-28

## 2023-02-24 RX ORDER — SODIUM CHLORIDE 9 MG/ML
100 INJECTION INTRAMUSCULAR; INTRAVENOUS; SUBCUTANEOUS
Refills: 0 | Status: DISCONTINUED | OUTPATIENT
Start: 2023-02-24 | End: 2023-02-28

## 2023-02-24 RX ORDER — CINACALCET 30 MG/1
30 TABLET, FILM COATED ORAL
Refills: 0 | Status: DISCONTINUED | OUTPATIENT
Start: 2023-02-24 | End: 2023-02-28

## 2023-02-24 RX ADMIN — Medication 100 MILLIGRAM(S): at 12:08

## 2023-02-24 RX ADMIN — Medication 100 MILLIGRAM(S): at 05:56

## 2023-02-24 RX ADMIN — MIDODRINE HYDROCHLORIDE 30 MILLIGRAM(S): 2.5 TABLET ORAL at 03:10

## 2023-02-24 RX ADMIN — Medication 650 MILLIGRAM(S): at 05:57

## 2023-02-24 RX ADMIN — MIDODRINE HYDROCHLORIDE 30 MILLIGRAM(S): 2.5 TABLET ORAL at 12:08

## 2023-02-24 RX ADMIN — MIDODRINE HYDROCHLORIDE 30 MILLIGRAM(S): 2.5 TABLET ORAL at 05:56

## 2023-02-24 RX ADMIN — LEVETIRACETAM 500 MILLIGRAM(S): 250 TABLET, FILM COATED ORAL at 18:39

## 2023-02-24 RX ADMIN — Medication 650 MILLIGRAM(S): at 19:39

## 2023-02-24 RX ADMIN — LIDOCAINE 1 PATCH: 4 CREAM TOPICAL at 19:48

## 2023-02-24 RX ADMIN — PANTOPRAZOLE SODIUM 40 MILLIGRAM(S): 20 TABLET, DELAYED RELEASE ORAL at 05:57

## 2023-02-24 RX ADMIN — Medication 650 MILLIGRAM(S): at 18:39

## 2023-02-24 RX ADMIN — PANTOPRAZOLE SODIUM 40 MILLIGRAM(S): 20 TABLET, DELAYED RELEASE ORAL at 18:40

## 2023-02-24 RX ADMIN — LEVETIRACETAM 500 MILLIGRAM(S): 250 TABLET, FILM COATED ORAL at 05:56

## 2023-02-24 RX ADMIN — Medication 650 MILLIGRAM(S): at 05:30

## 2023-02-24 RX ADMIN — MIDODRINE HYDROCHLORIDE 30 MILLIGRAM(S): 2.5 TABLET ORAL at 18:42

## 2023-02-24 RX ADMIN — CHLORHEXIDINE GLUCONATE 1 APPLICATION(S): 213 SOLUTION TOPICAL at 12:09

## 2023-02-24 RX ADMIN — LIDOCAINE 1 PATCH: 4 CREAM TOPICAL at 12:10

## 2023-02-24 RX ADMIN — Medication 650 MILLIGRAM(S): at 13:07

## 2023-02-24 RX ADMIN — Medication 100 MILLIGRAM(S): at 18:44

## 2023-02-24 RX ADMIN — Medication 650 MILLIGRAM(S): at 12:07

## 2023-02-24 NOTE — PATIENT PROFILE ADULT - FUNCTIONAL ASSESSMENT - BASIC MOBILITY 6.
2-calculated by average/Not able to assess (calculate score using [facility] averaging method)

## 2023-02-24 NOTE — PROGRESS NOTE ADULT - SUBJECTIVE AND OBJECTIVE BOX
[facility]   MD CHESTER DUEÑAS MD KRISTINE SOLTANPOUR, DO ANGELA WONG, [place]    TEL:  OFFICE: [phone]  From 5pm-7am Answering Service [phone]    -- RENAL FOLLOW UP NOTE ---Date of Service 02-24-23 @ 13:15    Patient is a 77y old  Male who presents with a chief complaint of Rectal Bleed, Hypotension (24 Feb 2023 10:51)      Patient seen and examined at bedside. No chest pain/sob    VITALS:  T(F): 97.7 (02-24-23 @ 12:11), Max: 98.3 (02-23-23 @ 16:12)  HR: 61 (02-24-23 @ 12:11)  BP: 99/57 (02-24-23 @ 12:11)  RR: 16 (02-24-23 @ 12:11)  SpO2: 100% (02-24-23 @ 12:11)  Wt(kg): --    02-23 @ 07:01  -  02-24 @ 07:00  --------------------------------------------------------  IN: 120 mL / OUT: 0 mL / NET: 120 mL      Height (cm): 195.6 (02-23 @ 16:12)    PHYSICAL EXAM:  General: NAD  Neck: No JVD  Respiratory: CTAB, no wheezes, rales or rhonchi  Cardiovascular: S1, S2, RRR  Gastrointestinal: BS+, soft, NT/ND  Extremities: No peripheral edema    Hospital Medications:   MEDICATIONS  (STANDING):  acetaminophen     Tablet .. 650 milliGRAM(s) Oral every 6 hours  allopurinol 100 milliGRAM(s) Oral daily  buDESOnide    Inhalation Suspension 0.5 milliGRAM(s) Inhalation two times a day  chlorhexidine 2% Cloths 1 Application(s) Topical daily  cinacalcet 30 milliGRAM(s) Oral <User Schedule>  levETIRAcetam 500 milliGRAM(s) Oral two times a day  lidocaine   4% Patch 1 Patch Transdermal every 24 hours  midodrine. 30 milliGRAM(s) Oral <User Schedule>  pantoprazole  Injectable 40 milliGRAM(s) IV Push every 12 hours  pregabalin 100 milliGRAM(s) Oral two times a day  senna 2 Tablet(s) Oral at bedtime      LABS:  02-23    138  |  91<L>  |  27<H>  ----------------------------<  97  3.4<L>   |  30  |  7.74<H>    Ca    9.4      23 Feb 2023 16:58    TPro  8.6<H>  /  Alb  4.4  /  TBili  0.3  /  DBili      /  AST  20  /  ALT  5   /  AlkPhos  118  02-23    Creatinine Trend: 7.74 <--    Albumin, Serum: 4.4 g/dL (02-23 @ 16:58)  Iron Total, Serum: 52 ug/dL (02-23 @ 16:58)  Iron - Total Binding Capacity.: 188 ug/dL (02-23 @ 16:58)  Ferritin, Serum: 2429 ng/mL (02-23 @ 16:58)                              11.3   12.86 )-----------( 240      ( 23 Feb 2023 20:50 )             38.0     Urine Studies:      Iron 52, TIBC 188, %sat 28      [02-23-23 @ 16:58]  Ferritin 2429      [02-23-23 @ 16:58]  PTH -- (Ca --)      [01-20-23 @ 06:35]   630  TSH 1.31      [01-19-23 @ 17:57]    HIV Nonreact      [02-24-23 @ 05:42]      RADIOLOGY & ADDITIONAL STUDIES:

## 2023-02-24 NOTE — PHYSICAL THERAPY INITIAL EVALUATION ADULT - NSPTDISCHREC_GEN_A_CORE
anticipate discharge to return to long term care facility with resumption of previous services. please follow therapy for ongoing functional mobility performance.

## 2023-02-24 NOTE — PROGRESS NOTE ADULT - PROBLEM SELECTOR PLAN 1
Pt with 1 episode of bloody BM at his LTC facility. No melena.  - Hold home Coumadin which pt is on for LE DVTs   - Unlikely UGIB, received IV Protonix 80 mg x1 in ED; PPI IV   - CTA AP with IV contrast ordered to evaluate possible source of bleed plan for HD afterward   - Monitor stool count  - f/u full GI recs Pt with 1 episode of bloody BM at his LTC facility. No melena.  - Hold home Coumadin which pt is on for LE DVTs   - Unlikely UGIB, received IV Protonix 80 mg x1 in ED; PPI IV   - CTA AP with IV contrast ordered to evaluate possible source of bleed plan for HD afterward   - Monitor stool count  - GI no plans for scope currently on reg diet   - f/u full GI recs

## 2023-02-24 NOTE — CONSULT NOTE ADULT - ATTENDING COMMENTS
History/PE as above. Patient seen/examined. Admitted regarding episode of painless hematochezia. Apparently had bowel movement with red blood mixed with brown stool. Of note, patient indicates this has been an intermittent symptoms over the past year that he has attributed to hemorrhoids. Current episode occurred in association with straining. He denies abdominal pain, nausea or vomiting. PE/labs as noted. Hypotensive on admission but that appears to be related to his history of postural hypotension prompting maintenance admitted midodrine. As indicated, hemoglobin relatively stable in the 12-13 g range. CT without indication of active GI bleeding. Of note, CT noted for right hemicolectomy. Episode of painless hematochezia consistent with lower GI bleed. Possibly outlet bleeding from hemorrhoids although possibility of bleeding attributed to diverticulosis, neoplastic process or vascular ectasia also considered. At current time patient would not permit rectal examination and indicates he does not want to undergo colonoscopy or pursue any other diagnostic evaluation regarding this episode of hematochezia. Recommendations as noted-monitor serial hemoglobin/hematocrit. Observe for any increased severity of GI bleeding.

## 2023-02-24 NOTE — PROGRESS NOTE ADULT - PROBLEM SELECTOR PLAN 5
- S/p IVC filter. Pt on Coumadin 12 mg qHS per paper chart review, goal INR 2-3.  INR on admission 2.32.  - Hold Coumadin for now i/s/o GI bleed and anemia

## 2023-02-24 NOTE — ED ADULT NURSE REASSESSMENT NOTE - NS ED NURSE REASSESS COMMENT FT1
pt at baseline mental status respirations even and unlabored completing full sentences. pt BP noted to be 110/70, per MD Hand hold midodrine scheduled for 12AM. report given to 4S, awaiting transport. stretcher in lowest position siderails up call bell within reach. safety maintained.
report received from AM shift RN. pt A&Ox4 respirations even and unlabored completing full sentences. pt sitting in stretcher comfortable at this time, offers no new complaints at this time. pt VS as charted. MD made aware of pt BP. per MD no intervention to be done at this time for pt BP. stretcher in lowest position, siderails up call bell within reach. safety maintained.

## 2023-02-24 NOTE — PATIENT PROFILE ADULT - FALL HARM RISK - HARM RISK INTERVENTIONS
Assistance OOB with selected safe patient handling equipment/Communicate Risk of Fall with Harm to all staff/Discuss with provider need for PT consult/Monitor gait and stability/Provide patient with walking aids - walker, cane, crutches/Reinforce activity limits and safety measures with patient and family/Review medications for side effects contributing to fall risk/Sit up slowly, dangle for a short time, stand at bedside before walking/Tailored Fall Risk Interventions/Toileting schedule using arm’s reach rule for commode and bathroom/Visual Cue: Yellow wristband and red socks/Bed in lowest position, wheels locked, appropriate side rails in place/Call bell, personal items and telephone in reach/Instruct patient to call for assistance before getting out of bed or chair/Non-slip footwear when patient is out of bed/Roe to call system/Physically safe environment - no spills, clutter or unnecessary equipment/Purposeful Proactive Rounding/Room/bathroom lighting operational, light cord in reach

## 2023-02-24 NOTE — PROGRESS NOTE ADULT - SUBJECTIVE AND OBJECTIVE BOX
[facility] Division of Hospital Medicine  Vamshi Devlin MD  Pager (M-F, 8A-5P): [phone]  Other Times:  [id number]    Patient is a 77y old  Male who presents with a chief complaint of Rectal Bleed, Hypotension (24 Feb 2023 13:15)      SUBJECTIVE / OVERNIGHT EVENTS: Pt in NAD no further BM     MEDICATIONS  (STANDING):  acetaminophen     Tablet .. 650 milliGRAM(s) Oral every 6 hours  allopurinol 100 milliGRAM(s) Oral daily  buDESOnide    Inhalation Suspension 0.5 milliGRAM(s) Inhalation two times a day  chlorhexidine 2% Cloths 1 Application(s) Topical daily  cinacalcet 30 milliGRAM(s) Oral <User Schedule>  levETIRAcetam 500 milliGRAM(s) Oral two times a day  lidocaine   4% Patch 1 Patch Transdermal every 24 hours  midodrine. 30 milliGRAM(s) Oral <User Schedule>  pantoprazole  Injectable 40 milliGRAM(s) IV Push every 12 hours  pregabalin 100 milliGRAM(s) Oral two times a day  senna 2 Tablet(s) Oral at bedtime    MEDICATIONS  (PRN):  melatonin 3 milliGRAM(s) Oral at bedtime PRN Insomnia  midodrine. 20 milliGRAM(s) Oral <User Schedule> PRN GIVE 30MIN PRIOR TO HEMODIALYSIS SESSION  oxyCODONE    IR 5 milliGRAM(s) Oral every 6 hours PRN Severe Pain (7 - 10)  sodium chloride 0.9% Bolus. 100 milliLiter(s) IV Bolus every 5 minutes PRN SBP LESS THAN or EQUAL to 80 mmHg      CAPILLARY BLOOD GLUCOSE        I&O's Summary    23 Feb 2023 07:01  -  24 Feb 2023 07:00  --------------------------------------------------------  IN: 120 mL / OUT: 0 mL / NET: 120 mL        PHYSICAL EXAM:  Vital Signs Last 24 Hrs  T(C): 36.5 (24 Feb 2023 12:11), Max: 36.8 (23 Feb 2023 16:12)  T(F): 97.7 (24 Feb 2023 12:11), Max: 98.3 (23 Feb 2023 16:12)  HR: 61 (24 Feb 2023 12:11) (56 - 88)  BP: 99/57 (24 Feb 2023 12:11) (73/47 - 110/70)  BP(mean): 62 (24 Feb 2023 05:10) (52 - 75)  RR: 16 (24 Feb 2023 12:11) (16 - 19)  SpO2: 100% (24 Feb 2023 12:11) (97% - 100%)    Parameters below as of 24 Feb 2023 12:11  Patient On (Oxygen Delivery Method): nasal cannula      CONSTITUTIONAL: NAD  ENMT: Moist oral mucosa  RESPIRATORY: Normal respiratory effort; lungs are clear to auscultation bilaterally  CARDIOVASCULAR: Regular rate and rhythm, normal S1 and S2, no murmur/rub/gallop; No lower extremity edema; Peripheral pulses are 2+ bilaterally  ABDOMEN: +BS; obese abdomen NT  MUSCULOSKELETAL: moving all ext       LABS:                        12.1   10.17 )-----------( 269      ( 24 Feb 2023 13:07 )             41.2     02-24    137  |  92<L>  |  35<H>  ----------------------------<  76  4.4   |  28  |  9.61<H>    Ca    9.8      24 Feb 2023 13:07  Phos  7.1     02-24  Mg     2.20     02-24    TPro  8.8<H>  /  Alb  4.2  /  TBili  0.4  /  DBili  x   /  AST  23  /  ALT  5   /  AlkPhos  123<H>  02-24    PT/INR - ( 24 Feb 2023 13:07 )   PT: 28.1 sec;   INR: 2.40 ratio         PTT - ( 24 Feb 2023 13:07 )  PTT:43.9 sec            RADIOLOGY & ADDITIONAL TESTS:  Results Reviewed:   Imaging Personally Reviewed:  Electrocardiogram Personally Reviewed:    COORDINATION OF CARE:  Care Discussed with Consultants/Other Providers [Y/N]:  Prior or Outpatient Records Reviewed [Y/N]:

## 2023-02-24 NOTE — PHYSICAL THERAPY INITIAL EVALUATION ADULT - PERTINENT HX OF CURRENT PROBLEM, REHAB EVAL
77 year old male with history of severe orthostatic hypotension on midodrine, ESRD on HD via Left UE AVF, chronic LE DVTs since 8/2021 recently admitted for hypotension presents from long term care facility  for bloody BM and hypotension

## 2023-02-24 NOTE — PHYSICAL THERAPY INITIAL EVALUATION ADULT - LEVEL OF INDEPENDENCE: SIT/STAND, REHAB EVAL
pt deferring further functional mobility at this time, wishing to return to bed and rest will progress as feasible

## 2023-02-24 NOTE — PROGRESS NOTE ADULT - PROBLEM SELECTOR PLAN 9
Diet: regular diet   DVT PPx: No pharmacologic ppx for now given recent hematochezia. SCDs for now, otherwise as above in DVT problem.  Dispo: management of rectal bleed, hypotension    GOC: DNR/DNI, MOLST from prior in chart. Confirmed with patient on admission.

## 2023-02-24 NOTE — CHART NOTE - NSCHARTNOTEFT_GEN_A_CORE
made aware the private GI does not come to hospital anymore by [facility].    Emailed house GI for consult.    Day team to follow up    Tristian Hand MD  PGY2 Internal Medicine

## 2023-02-24 NOTE — CONSULT NOTE ADULT - SUBJECTIVE AND OBJECTIVE BOX
HPI:    Allergies:  baclofen (Other)  latex (Rash)      Home Medications:    Hospital Medications:  acetaminophen     Tablet .. 650 milliGRAM(s) Oral every 6 hours  allopurinol 100 milliGRAM(s) Oral daily  buDESOnide    Inhalation Suspension 0.5 milliGRAM(s) Inhalation two times a day  chlorhexidine 2% Cloths 1 Application(s) Topical daily  cinacalcet 30 milliGRAM(s) Oral <User Schedule>  levETIRAcetam 500 milliGRAM(s) Oral two times a day  lidocaine   4% Patch 1 Patch Transdermal every 24 hours  melatonin 3 milliGRAM(s) Oral at bedtime PRN  midodrine. 20 milliGRAM(s) Oral <User Schedule> PRN  midodrine. 30 milliGRAM(s) Oral <User Schedule>  oxyCODONE    IR 5 milliGRAM(s) Oral every 6 hours PRN  pantoprazole  Injectable 40 milliGRAM(s) IV Push every 12 hours  pregabalin 100 milliGRAM(s) Oral two times a day  senna 2 Tablet(s) Oral at bedtime  sodium chloride 0.9% Bolus. 100 milliLiter(s) IV Bolus every 5 minutes PRN      PMHX/PSHX:  Hypertension    Glomerular disease    CHF (congestive heart failure)    COPD (chronic obstructive pulmonary disease)    Pulmonary hypertension    Sleep apnea    Pulmonary edema    Peripheral edema    Gout    History of abdominal surgery    H/O right heart catheterization        Family history:  Family history of colon cancer (Father)    Family history of heart disease (Father)        Denies family history of colon cancer/polyps, stomach cancer/polyps, pancreatic cancer/masses, liver cancer/disease, ovarian cancer and endometrial cancer.    Social History:   Tob: Denies  EtOH: Denies  Illicit Drugs: Denies    ROS:     General:  No wt loss, fevers, chills, night sweats, fatigue  Eyes:  Good vision, no reported pain  ENT:  No sore throat, pain, runny nose, dysphagia  CV:  No pain, palpitations, hypo/hypertension  Pulm:  No dyspnea, cough, tachypnea, wheezing  GI:  see HPI  :  No pain, bleeding, incontinence, nocturia  Muscle:  No pain, weakness  Neuro:  No weakness, tingling, memory problems  Psych:  No fatigue, insomnia, mood problems, depression  Endocrine:  No polyuria, polydipsia, cold/heat intolerance  Heme:  No petechiae, ecchymosis, easy bruisability  Skin:  No rash, tattoos, scars, edema    PHYSICAL EXAM:     GENERAL:  No acute distress  HEENT:  NCAT, no scleral icterus   CHEST:  no respiratory distress  HEART:  Regular rate and rhythm  ABDOMEN:  Soft, non-tender, non-distended, normoactive bowel sounds,  no masses  EXTREMITIES: No edema  SKIN:  No rash/erythema/ecchymoses/petechiae/wounds/abscess/warm/dry  NEURO:  Alert and oriented x 3, no asterixis    Vital Signs:  Vital Signs Last 24 Hrs  T(C): 36.4 (24 Feb 2023 09:00), Max: 36.8 (23 Feb 2023 16:12)  T(F): 97.5 (24 Feb 2023 09:00), Max: 98.3 (23 Feb 2023 16:12)  HR: 56 (24 Feb 2023 09:00) (56 - 88)  BP: 98/55 (24 Feb 2023 09:00) (73/47 - 110/70)  BP(mean): 62 (24 Feb 2023 05:10) (52 - 75)  RR: 17 (24 Feb 2023 09:00) (16 - 19)  SpO2: 100% (24 Feb 2023 09:00) (97% - 100%)    Parameters below as of 24 Feb 2023 09:00  Patient On (Oxygen Delivery Method): nasal cannula  O2 Flow (L/min): 2    Daily Height in cm: 195.58 (23 Feb 2023 16:12)    Daily     LABS:                        11.3   12.86 )-----------( 240      ( 23 Feb 2023 20:50 )             38.0     Mean Cell Volume: 96.4 fL (02-23-23 @ 20:50)    02-23    138  |  91<L>  |  27<H>  ----------------------------<  97  3.4<L>   |  30  |  7.74<H>    Ca    9.4      23 Feb 2023 16:58    TPro  8.6<H>  /  Alb  4.4  /  TBili  0.3  /  DBili  x   /  AST  20  /  ALT  5   /  AlkPhos  118  02-23    LIVER FUNCTIONS - ( 23 Feb 2023 16:58 )  Alb: 4.4 g/dL / Pro: 8.6 g/dL / ALK PHOS: 118 U/L / ALT: 5 U/L / AST: 20 U/L / GGT: x           PT/INR - ( 23 Feb 2023 16:58 )   PT: 27.2 sec;   INR: 2.32 ratio         PTT - ( 23 Feb 2023 16:58 )  PTT:37.7 sec                            11.3   12.86 )-----------( 240      ( 23 Feb 2023 20:50 )             38.0                         11.4   13.03 )-----------( 255      ( 23 Feb 2023 16:58 )             38.3       Imaging:             HPI:    Mr. Rivers is a 77 yrs old male w/ hx of orthostatic hypotension on midodrine, ESRD on HD via LUE AVF (MWF), chronic LE DVTs since 8/2021 (s/p IVC filter and on Coumadin, INR goal 2-3), ?seizure disorder who presented w/ hypotension. Limited hx from the patient as he is getting agitated with multiple questions. He is from a long term facility, for bloody bowel movements. Patient reported that he has bright blood mixed w/ brown stool for the past one year. He has been on warfarin for several years for chronic DVTs. As per chart review, he only had one hematochezia episode in the facility and was brought down to the hospital. Denied abd pain, nausea, vomiting, fevers and chills. Pt. requested me to call his wife for further details. On admission, vitals showed blood pressure 76/53 w/ normal heart rates, which improved to 103/64 w/ no IV fluid boluses. GI consulted for hematochezia.     Allergies:  baclofen (Other)  latex (Rash)      Home Medications:  · 	midodrine 10 mg oral tablet: Last Dose Taken:  , 3 tab(s) orally 4 times a day (00:00, 06:00, 12:00, 18:00)  · 	midodrine 10 mg oral tablet: Last Dose Taken:  , 2 tab(s) orally Monday, Wednesday, and Friday (at 8 AM)  · 	warfarin 1 mg oral tablet: Last Dose Taken:  , 7 tab(s) orally once  · 	budesonide: Last Dose Taken:  , 2 puff(s) inhaled 2 times a day  · 	senna leaf extract oral tablet: Last Dose Taken:  , 2 tab(s) orally once a day (at bedtime)  · 	lidocaine 4% topical film: Last Dose Taken:  , Apply topically to affected area once a day  · 	famotidine 20 mg oral tablet: Last Dose Taken:  , 1 tab(s) orally once a day  · 	cinacalcet 30 mg oral tablet: Last Dose Taken:  , 1 tab(s) orally every 24 hrs at 8 AM on Tuesday, Thursday, Saturday  · 	allopurinol 100 mg oral tablet: Last Dose Taken:  , 1 tab(s) orally once a day  · 	pregabalin 100 mg oral capsule: Last Dose Taken:  , 1 cap(s) orally 2 times a day  · 	levETIRAcetam 500 mg oral tablet: Last Dose Taken:  , 1 tab(s) orally 2 times a day  · 	oxyCODONE 5 mg oral tablet: Last Dose Taken:  , 1 tab(s) orally every 6 hours, As needed, Severe Pain (7 - 10)  · 	acetaminophen 325 mg oral tablet: Last Dose Taken:  , 2 tab(s) orally every 6 hours, As needed, Temp greater or equal to 38C (100.4F), Mild Pain (1 - 3)    Hospital Medications:  acetaminophen     Tablet .. 650 milliGRAM(s) Oral every 6 hours  allopurinol 100 milliGRAM(s) Oral daily  buDESOnide    Inhalation Suspension 0.5 milliGRAM(s) Inhalation two times a day  chlorhexidine 2% Cloths 1 Application(s) Topical daily  cinacalcet 30 milliGRAM(s) Oral <User Schedule>  levETIRAcetam 500 milliGRAM(s) Oral two times a day  lidocaine   4% Patch 1 Patch Transdermal every 24 hours  melatonin 3 milliGRAM(s) Oral at bedtime PRN  midodrine. 20 milliGRAM(s) Oral <User Schedule> PRN  midodrine. 30 milliGRAM(s) Oral <User Schedule>  oxyCODONE    IR 5 milliGRAM(s) Oral every 6 hours PRN  pantoprazole  Injectable 40 milliGRAM(s) IV Push every 12 hours  pregabalin 100 milliGRAM(s) Oral two times a day  senna 2 Tablet(s) Oral at bedtime  sodium chloride 0.9% Bolus. 100 milliLiter(s) IV Bolus every 5 minutes PRN      PMHX/PSHX:  Hypertension    Glomerular disease    CHF (congestive heart failure)    COPD (chronic obstructive pulmonary disease)    Pulmonary hypertension    Sleep apnea    Pulmonary edema    Peripheral edema    Gout    History of abdominal surgery    H/O right heart catheterization    Family history:  colon cancer (Father), heart disease (Father)    Social History:   Tob: Denies  EtOH: Denies  Illicit Drugs: Denies    ROS:     General:  No wt loss, fevers, chills, night sweats, fatigue  Eyes:  Good vision, no reported pain  ENT:  No sore throat, pain, runny nose, dysphagia  CV:  No pain, palpitations, hypo/hypertension  Pulm:  No dyspnea, cough, tachypnea, wheezing  GI:  see HPI  :  No pain, bleeding, incontinence, nocturia  Muscle:  No pain, weakness  Neuro:  No weakness, tingling, memory problems  Psych:  No fatigue, insomnia, mood problems, depression  Endocrine:  No polyuria, polydipsia, cold/heat intolerance  Heme:  No petechiae, ecchymosis, easy bruisability  Skin:  No rash, tattoos, scars, edema    PHYSICAL EXAM:     GENERAL:  No acute distress, lying in bed, obese male. Appears mildly agitated but answers questions.   HEENT:  NCAT, no scleral icterus   CHEST:  no respiratory distress  HEART:  Regular rate and rhythm  ABDOMEN:  Soft, obese abdomen, well healed surgical scar, non tender, no masses  RECTUM: Pt. declined it.   EXTREMITIES: No LE edema  SKIN:  No rash/erythema/ecchymoses/petechiae/wounds/abscess/warm/dry  NEURO:  Alert and oriented x 2, unable to assess his capacity as he refused to answer all questions. Knows that he is in the hospital for rectal bleeding.     Vital Signs:  Vital Signs Last 24 Hrs  T(C): 36.4 (24 Feb 2023 09:00), Max: 36.8 (23 Feb 2023 16:12)  T(F): 97.5 (24 Feb 2023 09:00), Max: 98.3 (23 Feb 2023 16:12)  HR: 56 (24 Feb 2023 09:00) (56 - 88)  BP: 98/55 (24 Feb 2023 09:00) (73/47 - 110/70)  BP(mean): 62 (24 Feb 2023 05:10) (52 - 75)  RR: 17 (24 Feb 2023 09:00) (16 - 19)  SpO2: 100% (24 Feb 2023 09:00) (97% - 100%)    Parameters below as of 24 Feb 2023 09:00  Patient On (Oxygen Delivery Method): nasal cannula  O2 Flow (L/min): 2    Daily Height in cm: 195.58 (23 Feb 2023 16:12)    Daily     LABS:                        11.3   12.86 )-----------( 240      ( 23 Feb 2023 20:50 )             38.0     Mean Cell Volume: 96.4 fL (02-23-23 @ 20:50)    02-23    138  |  91<L>  |  27<H>  ----------------------------<  97  3.4<L>   |  30  |  7.74<H>    Ca    9.4      23 Feb 2023 16:58    TPro  8.6<H>  /  Alb  4.4  /  TBili  0.3  /  DBili  x   /  AST  20  /  ALT  5   /  AlkPhos  118  02-23    LIVER FUNCTIONS - ( 23 Feb 2023 16:58 )  Alb: 4.4 g/dL / Pro: 8.6 g/dL / ALK PHOS: 118 U/L / ALT: 5 U/L / AST: 20 U/L / GGT: x           PT/INR - ( 23 Feb 2023 16:58 )   PT: 27.2 sec;   INR: 2.32 ratio         PTT - ( 23 Feb 2023 16:58 )  PTT:37.7 sec                            11.3   12.86 )-----------( 240      ( 23 Feb 2023 20:50 )             38.0                         11.4   13.03 )-----------( 255      ( 23 Feb 2023 16:58 )             38.3       Imaging:    CT A/P     FINDINGS:  LOWER CHEST: Coronary artery calcifications. Left lower lobe 8 mm   calcified granuloma. Calcified mediastinal and hilar lymph nodes.   Bibasilar subsegmental atelectasis.    LIVER: Punctate right hepaticlobe calcified granuloma.  BILE DUCTS: Normal caliber.  GALLBLADDER: Within normal limits.  SPLEEN: Scattered calcified granulomas.  PANCREAS: Within normal limits.  ADRENALS: Nodular bilateral adrenal gland thickening, unchanged.  KIDNEYS/URETERS: Atrophic bilateral kidneys. Bilateral renal cysts and   subcentimeter hypodensities too small to characterize.    BLADDER: Minimally distended.  REPRODUCTIVE ORGANS: Prostate within normal limits.    BOWEL: Stomach is moderately distended. No bowel obstruction. Right   hemicolectomy.  PERITONEUM: No ascites. Surgical clips noted within the right upper   quadrant.  VESSELS: Atherosclerotic changes. Infrarenal IVC filter. Distal IVC is   atretic. Left upper extremity AV fistula.  RETROPERITONEUM/LYMPH NODES: No lymphadenopathy.  ABDOMINAL WALL: Rectus diastases of multiple fat-containing ventral   abdominal wall hernias. Small fat-containing bilateral inguinal hernias.  BONES: Degenerative changes.    IMPRESSION:  No acute findings to explain patient's elevated WBC. Stomach is   moderately distended. No evidence of bowel obstruction.         HPI:    Mr. Rivers is a 77 yrs old male w/ hx of orthostatic hypotension on midodrine, ESRD on HD via LUE AVF (MWF), chronic LE DVTs since 8/2021 (s/p IVC filter and on Coumadin, INR goal 2-3), ?seizure disorder who presented w/ hypotension. Limited hx from the patient as he is getting agitated with multiple questions. He is from a long term facility, for bloody bowel movements. Patient reported that he has bright blood mixed w/ brown stool for the past one year. He has been on warfarin for several years for chronic DVTs. As per chart review, he only had one hematochezia episode in the facility and was brought down to the hospital. Denied abd pain, nausea, vomiting, fevers and chills. Pt. requested me to call his wife for further details. On admission, vitals showed blood pressure 76/53 w/ normal heart rates, which improved to 103/64 w/ no IV fluid boluses. GI consulted for hematochezia.     Allergies:  baclofen (Other)  latex (Rash)      Home Medications:  · 	midodrine 10 mg oral tablet: Last Dose Taken:  , 3 tab(s) orally 4 times a day (00:00, 06:00, 12:00, 18:00)  · 	midodrine 10 mg oral tablet: Last Dose Taken:  , 2 tab(s) orally Monday, Wednesday, and Friday (at 8 AM)  · 	warfarin 1 mg oral tablet: Last Dose Taken:  , 7 tab(s) orally once  · 	budesonide: Last Dose Taken:  , 2 puff(s) inhaled 2 times a day  · 	senna leaf extract oral tablet: Last Dose Taken:  , 2 tab(s) orally once a day (at bedtime)  · 	lidocaine 4% topical film: Last Dose Taken:  , Apply topically to affected area once a day  · 	famotidine 20 mg oral tablet: Last Dose Taken:  , 1 tab(s) orally once a day  · 	cinacalcet 30 mg oral tablet: Last Dose Taken:  , 1 tab(s) orally every 24 hrs at 8 AM on Tuesday, Thursday, Saturday  · 	allopurinol 100 mg oral tablet: Last Dose Taken:  , 1 tab(s) orally once a day  · 	pregabalin 100 mg oral capsule: Last Dose Taken:  , 1 cap(s) orally 2 times a day  · 	levETIRAcetam 500 mg oral tablet: Last Dose Taken:  , 1 tab(s) orally 2 times a day  · 	oxyCODONE 5 mg oral tablet: Last Dose Taken:  , 1 tab(s) orally every 6 hours, As needed, Severe Pain (7 - 10)  · 	acetaminophen 325 mg oral tablet: Last Dose Taken:  , 2 tab(s) orally every 6 hours, As needed, Temp greater or equal to 38C (100.4F), Mild Pain (1 - 3)    Hospital Medications:  acetaminophen     Tablet .. 650 milliGRAM(s) Oral every 6 hours  allopurinol 100 milliGRAM(s) Oral daily  buDESOnide    Inhalation Suspension 0.5 milliGRAM(s) Inhalation two times a day  chlorhexidine 2% Cloths 1 Application(s) Topical daily  cinacalcet 30 milliGRAM(s) Oral <User Schedule>  levETIRAcetam 500 milliGRAM(s) Oral two times a day  lidocaine   4% Patch 1 Patch Transdermal every 24 hours  melatonin 3 milliGRAM(s) Oral at bedtime PRN  midodrine. 20 milliGRAM(s) Oral <User Schedule> PRN  midodrine. 30 milliGRAM(s) Oral <User Schedule>  oxyCODONE    IR 5 milliGRAM(s) Oral every 6 hours PRN  pantoprazole  Injectable 40 milliGRAM(s) IV Push every 12 hours  pregabalin 100 milliGRAM(s) Oral two times a day  senna 2 Tablet(s) Oral at bedtime  sodium chloride 0.9% Bolus. 100 milliLiter(s) IV Bolus every 5 minutes PRN      PMHX/PSHX:  Hypertension    Glomerular disease    CHF (congestive heart failure)    COPD (chronic obstructive pulmonary disease)    Pulmonary hypertension    Sleep apnea    Pulmonary edema    Peripheral edema    Gout    History of abdominal surgery    H/O right heart catheterization    Family history:  colon cancer (Father), heart disease (Father)    Social History:   Tob: Denies  EtOH: Denies  Illicit Drugs: Denies    ROS:     General:  No wt loss, fevers, chills, night sweats, fatigue  Eyes:  Good vision, no reported pain  ENT:  No sore throat, pain, runny nose, dysphagia  CV:  No pain, palpitations, hypo/hypertension  Pulm:  No dyspnea, cough, tachypnea, wheezing  GI:  see HPI  :  No pain, bleeding, incontinence, nocturia  Muscle:  No pain, weakness  Neuro:  No weakness, tingling, memory problems  Psych:  No fatigue, insomnia, mood problems, depression  Endocrine:  No polyuria, polydipsia, cold/heat intolerance  Heme:  No petechiae, ecchymosis, easy bruisability  Skin:  No rash, tattoos, scars, edema    PHYSICAL EXAM:     GENERAL:  No acute distress, lying in bed, obese male. Appears mildly agitated but answers questions.   HEENT:  NCAT, no scleral icterus   CHEST:  no respiratory distress  HEART:  Regular rate and rhythm  ABDOMEN:  Soft, obese abdomen, well healed surgical scar, non tender, no masses  RECTUM: Pt. declined it.   EXTREMITIES: No LE edema  SKIN:  No rash/erythema/ecchymoses/petechiae/wounds/abscess/warm/dry  NEURO:  Alert and oriented x 3, no tremors.     Vital Signs:  Vital Signs Last 24 Hrs  T(C): 36.4 (24 Feb 2023 09:00), Max: 36.8 (23 Feb 2023 16:12)  T(F): 97.5 (24 Feb 2023 09:00), Max: 98.3 (23 Feb 2023 16:12)  HR: 56 (24 Feb 2023 09:00) (56 - 88)  BP: 98/55 (24 Feb 2023 09:00) (73/47 - 110/70)  BP(mean): 62 (24 Feb 2023 05:10) (52 - 75)  RR: 17 (24 Feb 2023 09:00) (16 - 19)  SpO2: 100% (24 Feb 2023 09:00) (97% - 100%)    Parameters below as of 24 Feb 2023 09:00  Patient On (Oxygen Delivery Method): nasal cannula  O2 Flow (L/min): 2    Daily Height in cm: 195.58 (23 Feb 2023 16:12)    Daily     LABS:                        11.3   12.86 )-----------( 240      ( 23 Feb 2023 20:50 )             38.0     Mean Cell Volume: 96.4 fL (02-23-23 @ 20:50)    02-23    138  |  91<L>  |  27<H>  ----------------------------<  97  3.4<L>   |  30  |  7.74<H>    Ca    9.4      23 Feb 2023 16:58    TPro  8.6<H>  /  Alb  4.4  /  TBili  0.3  /  DBili  x   /  AST  20  /  ALT  5   /  AlkPhos  118  02-23    LIVER FUNCTIONS - ( 23 Feb 2023 16:58 )  Alb: 4.4 g/dL / Pro: 8.6 g/dL / ALK PHOS: 118 U/L / ALT: 5 U/L / AST: 20 U/L / GGT: x           PT/INR - ( 23 Feb 2023 16:58 )   PT: 27.2 sec;   INR: 2.32 ratio         PTT - ( 23 Feb 2023 16:58 )  PTT:37.7 sec                            11.3   12.86 )-----------( 240      ( 23 Feb 2023 20:50 )             38.0                         11.4   13.03 )-----------( 255      ( 23 Feb 2023 16:58 )             38.3       Imaging:    CT A/P     FINDINGS:  LOWER CHEST: Coronary artery calcifications. Left lower lobe 8 mm   calcified granuloma. Calcified mediastinal and hilar lymph nodes.   Bibasilar subsegmental atelectasis.    LIVER: Punctate right hepaticlobe calcified granuloma.  BILE DUCTS: Normal caliber.  GALLBLADDER: Within normal limits.  SPLEEN: Scattered calcified granulomas.  PANCREAS: Within normal limits.  ADRENALS: Nodular bilateral adrenal gland thickening, unchanged.  KIDNEYS/URETERS: Atrophic bilateral kidneys. Bilateral renal cysts and   subcentimeter hypodensities too small to characterize.    BLADDER: Minimally distended.  REPRODUCTIVE ORGANS: Prostate within normal limits.    BOWEL: Stomach is moderately distended. No bowel obstruction. Right   hemicolectomy.  PERITONEUM: No ascites. Surgical clips noted within the right upper   quadrant.  VESSELS: Atherosclerotic changes. Infrarenal IVC filter. Distal IVC is   atretic. Left upper extremity AV fistula.  RETROPERITONEUM/LYMPH NODES: No lymphadenopathy.  ABDOMINAL WALL: Rectus diastases of multiple fat-containing ventral   abdominal wall hernias. Small fat-containing bilateral inguinal hernias.  BONES: Degenerative changes.    IMPRESSION:  No acute findings to explain patient's elevated WBC. Stomach is   moderately distended. No evidence of bowel obstruction.

## 2023-02-24 NOTE — CONSULT NOTE ADULT - ASSESSMENT
Impression:     #Hematochezia  Difficult to obtain hx but hgb has been stable. 12- 13, currently hgb 11. Pt. declined SAHIL. So unclear if he has bloody stools, although reports that he has bright red blood per rectum mixed w/ brown stool.  - Differentials include likely lower GI bleed w/ hemorrhoids, diverticulosis, unlikely colitis w/ no abd pain, rectal ulcers (constipation), telangioectasia (in the setting of ESRD, however more likely to present w/ occult bleeding rather than brisk bright red blood), and malignancy.   - Reportedly had colonoscopy x2 yrs which had a polyp? unclear no records.   - CT A/P showed right hemicolectomy, could it be related to anastomotic marginal ulcerations?   - had EGD in 7/2022 which showed severe esophagitis and mild gastritis, path negative.     #Chronic DVT   - Warfarin held.     #CHF?   #Pulmonary hypertension?   Last TTE in 1/2023, showed no evidence of LV dysfunction or elevated pulmonary pressures.   Previously seen by cards in 1/2023, reported he has no cardiac dysfunction or CAD.     Recommendations:   - would recommend conservative management at this time w/ stable hgb levels and no overt signs of GI bleeding right now.   - will need to get more collateral information from his wife, tried to contact but unable to reach.   - No urgent indication for colonoscopy at this time.   - continue to hold warfarin for now.   - Monitor CBC Q12 hours and transfuse to maintain hgb > 7.     GI will continue to follow.     All recommendations are tentative until note is attested by an attending.     Ramakrishna Stein, PGY-4  Gastroenterology/Hepatology Fellow  Available on Microsoft Teams  [phone] (Short Range Pager)  [phone] (Long Range Pager)    After 5pm, please contact the on-call GI fellow. [phone] Impression:     #Hematochezia  Difficult to obtain hx but hgb has been stable. 12- 13, currently hgb 11. Pt. declined SAHIL. So unclear if he has bloody stools, although reports that he has bright red blood per rectum mixed w/ brown stool.  - Differentials include likely lower GI bleed w/ hemorrhoids, diverticulosis, unlikely colitis w/ no abd pain, rectal ulcers (constipation), telangioectasia (in the setting of ESRD, however more likely to present w/ occult bleeding rather than brisk bright red blood), and malignancy.   - Reportedly had colonoscopy x2 yrs which had a polyp? unclear no records.   - CT A/P showed right hemicolectomy, could it be related to anastomotic marginal ulcerations?   - had EGD in 7/2022 which showed severe esophagitis and mild gastritis, path negative.     #Chronic DVT   - Warfarin held.     #CHF?   #Pulmonary hypertension?   Last TTE in 1/2023, showed no evidence of LV dysfunction or elevated pulmonary pressures.   Previously seen by cards in 1/2023, reported he has no cardiac dysfunction or CAD.     Recommendations:   - would recommend conservative management at this time w/ stable hgb levels and no overt signs of GI bleeding right now.   - No urgent indication for colonoscopy at this time. Patient is refusing colonoscopy at this time.   - can consider resuming warfarin for now.   - Monitor CBC Q12 hours and transfuse to maintain hgb > 7.     Thank you for the consult. Please call us back if you have any questions or concerns.     All recommendations are tentative until note is attested by an attending.     Ramakrishna Stein, PGY-4  Gastroenterology/Hepatology Fellow  Available on Microsoft Teams  [phone] (Short Range Pager)  [phone] (Long Range Pager)    After 5pm, please contact the on-call GI fellow. [phone]

## 2023-02-25 LAB
ALBUMIN SERPL ELPH-MCNC: 3.9 G/DL — SIGNIFICANT CHANGE UP (ref 3.3–5)
ALBUMIN SERPL ELPH-MCNC: 3.9 G/DL — SIGNIFICANT CHANGE UP (ref 3.3–5)
ALP SERPL-CCNC: 113 U/L — SIGNIFICANT CHANGE UP (ref 40–120)
ALP SERPL-CCNC: 114 U/L — SIGNIFICANT CHANGE UP (ref 40–120)
ALT FLD-CCNC: 5 U/L — SIGNIFICANT CHANGE UP (ref 4–41)
ALT FLD-CCNC: 7 U/L — SIGNIFICANT CHANGE UP (ref 4–41)
ANION GAP SERPL CALC-SCNC: 15 MMOL/L — HIGH (ref 7–14)
ANION GAP SERPL CALC-SCNC: 18 MMOL/L — HIGH (ref 7–14)
APTT BLD: 39.6 SEC — HIGH (ref 27–36.3)
APTT BLD: 40.7 SEC — HIGH (ref 27–36.3)
AST SERPL-CCNC: 19 U/L — SIGNIFICANT CHANGE UP (ref 4–40)
AST SERPL-CCNC: 20 U/L — SIGNIFICANT CHANGE UP (ref 4–40)
BASOPHILS # BLD AUTO: 0.06 K/UL — SIGNIFICANT CHANGE UP (ref 0–0.2)
BASOPHILS NFR BLD AUTO: 0.5 % — SIGNIFICANT CHANGE UP (ref 0–2)
BILIRUB SERPL-MCNC: 0.2 MG/DL — SIGNIFICANT CHANGE UP (ref 0.2–1.2)
BILIRUB SERPL-MCNC: 0.3 MG/DL — SIGNIFICANT CHANGE UP (ref 0.2–1.2)
BUN SERPL-MCNC: 41 MG/DL — HIGH (ref 7–23)
BUN SERPL-MCNC: 50 MG/DL — HIGH (ref 7–23)
CALCIUM SERPL-MCNC: 8.5 MG/DL — SIGNIFICANT CHANGE UP (ref 8.4–10.5)
CALCIUM SERPL-MCNC: 9.2 MG/DL — SIGNIFICANT CHANGE UP (ref 8.4–10.5)
CHLORIDE SERPL-SCNC: 94 MMOL/L — LOW (ref 98–107)
CHLORIDE SERPL-SCNC: 95 MMOL/L — LOW (ref 98–107)
CO2 SERPL-SCNC: 25 MMOL/L — SIGNIFICANT CHANGE UP (ref 22–31)
CO2 SERPL-SCNC: 29 MMOL/L — SIGNIFICANT CHANGE UP (ref 22–31)
CREAT SERPL-MCNC: 11.2 MG/DL — HIGH (ref 0.5–1.3)
CREAT SERPL-MCNC: 9.47 MG/DL — HIGH (ref 0.5–1.3)
EGFR: 4 ML/MIN/1.73M2 — LOW
EGFR: 5 ML/MIN/1.73M2 — LOW
EOSINOPHIL # BLD AUTO: 0.89 K/UL — HIGH (ref 0–0.5)
EOSINOPHIL NFR BLD AUTO: 6.8 % — HIGH (ref 0–6)
GLUCOSE SERPL-MCNC: 94 MG/DL — SIGNIFICANT CHANGE UP (ref 70–99)
GLUCOSE SERPL-MCNC: 99 MG/DL — SIGNIFICANT CHANGE UP (ref 70–99)
HCT VFR BLD CALC: 37.7 % — LOW (ref 39–50)
HCT VFR BLD CALC: 40.7 % — SIGNIFICANT CHANGE UP (ref 39–50)
HGB BLD-MCNC: 11.2 G/DL — LOW (ref 13–17)
HGB BLD-MCNC: 11.8 G/DL — LOW (ref 13–17)
IANC: 9.72 K/UL — HIGH (ref 1.8–7.4)
IMM GRANULOCYTES NFR BLD AUTO: 0.4 % — SIGNIFICANT CHANGE UP (ref 0–0.9)
INR BLD: 2.51 RATIO — HIGH (ref 0.88–1.16)
INR BLD: 2.55 RATIO — HIGH (ref 0.88–1.16)
LYMPHOCYTES # BLD AUTO: 1.38 K/UL — SIGNIFICANT CHANGE UP (ref 1–3.3)
LYMPHOCYTES # BLD AUTO: 10.6 % — LOW (ref 13–44)
MAGNESIUM SERPL-MCNC: 2.2 MG/DL — SIGNIFICANT CHANGE UP (ref 1.6–2.6)
MAGNESIUM SERPL-MCNC: 2.3 MG/DL — SIGNIFICANT CHANGE UP (ref 1.6–2.6)
MCHC RBC-ENTMCNC: 28.4 PG — SIGNIFICANT CHANGE UP (ref 27–34)
MCHC RBC-ENTMCNC: 28.9 PG — SIGNIFICANT CHANGE UP (ref 27–34)
MCHC RBC-ENTMCNC: 29 GM/DL — LOW (ref 32–36)
MCHC RBC-ENTMCNC: 29.7 GM/DL — LOW (ref 32–36)
MCV RBC AUTO: 97.4 FL — SIGNIFICANT CHANGE UP (ref 80–100)
MCV RBC AUTO: 98.1 FL — SIGNIFICANT CHANGE UP (ref 80–100)
MONOCYTES # BLD AUTO: 0.92 K/UL — HIGH (ref 0–0.9)
MONOCYTES NFR BLD AUTO: 7.1 % — SIGNIFICANT CHANGE UP (ref 2–14)
NEUTROPHILS # BLD AUTO: 9.72 K/UL — HIGH (ref 1.8–7.4)
NEUTROPHILS NFR BLD AUTO: 74.6 % — SIGNIFICANT CHANGE UP (ref 43–77)
NRBC # BLD: 0 /100 WBCS — SIGNIFICANT CHANGE UP (ref 0–0)
NRBC # BLD: 0 /100 WBCS — SIGNIFICANT CHANGE UP (ref 0–0)
NRBC # FLD: 0 K/UL — SIGNIFICANT CHANGE UP (ref 0–0)
NRBC # FLD: 0 K/UL — SIGNIFICANT CHANGE UP (ref 0–0)
PHOSPHATE SERPL-MCNC: 6.5 MG/DL — HIGH (ref 2.5–4.5)
PHOSPHATE SERPL-MCNC: 7.3 MG/DL — HIGH (ref 2.5–4.5)
PLATELET # BLD AUTO: 244 K/UL — SIGNIFICANT CHANGE UP (ref 150–400)
PLATELET # BLD AUTO: 273 K/UL — SIGNIFICANT CHANGE UP (ref 150–400)
POTASSIUM SERPL-MCNC: 4.3 MMOL/L — SIGNIFICANT CHANGE UP (ref 3.5–5.3)
POTASSIUM SERPL-MCNC: 4.5 MMOL/L — SIGNIFICANT CHANGE UP (ref 3.5–5.3)
POTASSIUM SERPL-SCNC: 4.3 MMOL/L — SIGNIFICANT CHANGE UP (ref 3.5–5.3)
POTASSIUM SERPL-SCNC: 4.5 MMOL/L — SIGNIFICANT CHANGE UP (ref 3.5–5.3)
PROT SERPL-MCNC: 8 G/DL — SIGNIFICANT CHANGE UP (ref 6–8.3)
PROT SERPL-MCNC: 8.4 G/DL — HIGH (ref 6–8.3)
PROTHROM AB SERPL-ACNC: 29.4 SEC — HIGH (ref 10.5–13.4)
PROTHROM AB SERPL-ACNC: 29.9 SEC — HIGH (ref 10.5–13.4)
RBC # BLD: 3.87 M/UL — LOW (ref 4.2–5.8)
RBC # BLD: 4.15 M/UL — LOW (ref 4.2–5.8)
RBC # FLD: 15.3 % — HIGH (ref 10.3–14.5)
RBC # FLD: 15.3 % — HIGH (ref 10.3–14.5)
SODIUM SERPL-SCNC: 137 MMOL/L — SIGNIFICANT CHANGE UP (ref 135–145)
SODIUM SERPL-SCNC: 139 MMOL/L — SIGNIFICANT CHANGE UP (ref 135–145)
WBC # BLD: 13.02 K/UL — HIGH (ref 3.8–10.5)
WBC # BLD: 9.06 K/UL — SIGNIFICANT CHANGE UP (ref 3.8–10.5)
WBC # FLD AUTO: 13.02 K/UL — HIGH (ref 3.8–10.5)
WBC # FLD AUTO: 9.06 K/UL — SIGNIFICANT CHANGE UP (ref 3.8–10.5)

## 2023-02-25 PROCEDURE — 99233 SBSQ HOSP IP/OBS HIGH 50: CPT

## 2023-02-25 RX ORDER — POLYETHYLENE GLYCOL 3350 17 G/17G
17 POWDER, FOR SOLUTION ORAL
Refills: 0 | Status: DISCONTINUED | OUTPATIENT
Start: 2023-02-25 | End: 2023-02-28

## 2023-02-25 RX ADMIN — Medication 100 MILLIGRAM(S): at 18:01

## 2023-02-25 RX ADMIN — Medication 0.5 MILLIGRAM(S): at 20:09

## 2023-02-25 RX ADMIN — SENNA PLUS 2 TABLET(S): 8.6 TABLET ORAL at 00:05

## 2023-02-25 RX ADMIN — MIDODRINE HYDROCHLORIDE 30 MILLIGRAM(S): 2.5 TABLET ORAL at 18:02

## 2023-02-25 RX ADMIN — POLYETHYLENE GLYCOL 3350 17 GRAM(S): 17 POWDER, FOR SOLUTION ORAL at 18:03

## 2023-02-25 RX ADMIN — Medication 100 MILLIGRAM(S): at 06:48

## 2023-02-25 RX ADMIN — Medication 100 MILLIGRAM(S): at 12:42

## 2023-02-25 RX ADMIN — Medication 650 MILLIGRAM(S): at 13:42

## 2023-02-25 RX ADMIN — LIDOCAINE 1 PATCH: 4 CREAM TOPICAL at 12:46

## 2023-02-25 RX ADMIN — LEVETIRACETAM 500 MILLIGRAM(S): 250 TABLET, FILM COATED ORAL at 17:58

## 2023-02-25 RX ADMIN — LIDOCAINE 1 PATCH: 4 CREAM TOPICAL at 19:00

## 2023-02-25 RX ADMIN — PANTOPRAZOLE SODIUM 40 MILLIGRAM(S): 20 TABLET, DELAYED RELEASE ORAL at 18:20

## 2023-02-25 RX ADMIN — MIDODRINE HYDROCHLORIDE 30 MILLIGRAM(S): 2.5 TABLET ORAL at 12:44

## 2023-02-25 RX ADMIN — Medication 650 MILLIGRAM(S): at 00:03

## 2023-02-25 RX ADMIN — Medication 650 MILLIGRAM(S): at 07:18

## 2023-02-25 RX ADMIN — CINACALCET 30 MILLIGRAM(S): 30 TABLET, FILM COATED ORAL at 08:43

## 2023-02-25 RX ADMIN — CHLORHEXIDINE GLUCONATE 1 APPLICATION(S): 213 SOLUTION TOPICAL at 12:42

## 2023-02-25 RX ADMIN — LIDOCAINE 1 PATCH: 4 CREAM TOPICAL at 00:15

## 2023-02-25 RX ADMIN — MIDODRINE HYDROCHLORIDE 30 MILLIGRAM(S): 2.5 TABLET ORAL at 00:06

## 2023-02-25 RX ADMIN — Medication 650 MILLIGRAM(S): at 17:59

## 2023-02-25 RX ADMIN — Medication 650 MILLIGRAM(S): at 18:59

## 2023-02-25 RX ADMIN — Medication 650 MILLIGRAM(S): at 12:42

## 2023-02-25 RX ADMIN — Medication 650 MILLIGRAM(S): at 00:46

## 2023-02-25 RX ADMIN — Medication 0.5 MILLIGRAM(S): at 09:55

## 2023-02-25 NOTE — PROGRESS NOTE ADULT - PROBLEM SELECTOR PLAN 4
On HD MWF. chronically on midodrine with HD due to hypotension  - C/w Sensipar three times weekly  - C/w HD MWF  - Renal consulted, appreciate recs

## 2023-02-25 NOTE — PROGRESS NOTE ADULT - PROBLEM SELECTOR PLAN 9
Diet: regular diet   DVT PPx: No pharmacologic ppx for now given recent hematochezia. SCDs for now, otherwise as above in DVT problem.  Dispo: management of rectal bleed, hypotension - likely back to [facility]    GOC: DNR/DNI, MOLST from prior in chart. Confirmed with patient on admission.

## 2023-02-25 NOTE — PROGRESS NOTE ADULT - SUBJECTIVE AND OBJECTIVE BOX
ZUNIGA MAYE  77y  Patient is a 77y old  Male who presents with a chief complaint of Rectal Bleed, Hypotension (25 Feb 2023 11:20)    HPI:ESRD on HD, admitted for GI Bleed.    HEALTH ISSUES - PROBLEM Dx:  Hematochezia    Anemia due to acute blood loss    Hypotension    ESRD on dialysis    History of seizure disorder    At risk for inadequate pain control    DVT, lower extremity    Need for prophylactic measure    Leukocytosis          MEDICATIONS  (STANDING):  acetaminophen     Tablet .. 650 milliGRAM(s) Oral every 6 hours  allopurinol 100 milliGRAM(s) Oral daily  bisacodyl 5 milliGRAM(s) Oral at bedtime  buDESOnide    Inhalation Suspension 0.5 milliGRAM(s) Inhalation two times a day  chlorhexidine 2% Cloths 1 Application(s) Topical daily  cinacalcet 30 milliGRAM(s) Oral <User Schedule>  levETIRAcetam 500 milliGRAM(s) Oral two times a day  lidocaine   4% Patch 1 Patch Transdermal every 24 hours  midodrine. 30 milliGRAM(s) Oral <User Schedule>  pantoprazole  Injectable 40 milliGRAM(s) IV Push every 12 hours  polyethylene glycol 3350 17 Gram(s) Oral two times a day  pregabalin 100 milliGRAM(s) Oral two times a day  senna 2 Tablet(s) Oral at bedtime    MEDICATIONS  (PRN):  melatonin 3 milliGRAM(s) Oral at bedtime PRN Insomnia  midodrine. 20 milliGRAM(s) Oral <User Schedule> PRN GIVE 30MIN PRIOR TO HEMODIALYSIS SESSION  oxyCODONE    IR 5 milliGRAM(s) Oral every 6 hours PRN Severe Pain (7 - 10)  sodium chloride 0.9% Bolus. 100 milliLiter(s) IV Bolus every 5 minutes PRN SBP LESS THAN or EQUAL to 80 mmHg    Vital Signs Last 24 Hrs  T(C): 36.4 (25 Feb 2023 16:10), Max: 36.6 (25 Feb 2023 00:02)  T(F): 97.6 (25 Feb 2023 16:10), Max: 97.8 (25 Feb 2023 00:02)  HR: 67 (25 Feb 2023 18:00) (65 - 78)  BP: 102/54 (25 Feb 2023 18:00) (86/51 - 104/62)  BP(mean): --  RR: 17 (25 Feb 2023 16:10) (17 - 18)  SpO2: 99% (25 Feb 2023 16:10) (95% - 100%)    Parameters below as of 25 Feb 2023 16:10  Patient On (Oxygen Delivery Method): nasal cannula  O2 Flow (L/min): 2    Daily     Daily     PHYSICAL EXAM:  Constitutional:  He appears comfortable and not distressed. Not diaphoretic.    Neck:  The thyroid is normal. Trachea is midline.     Respiratory: The lungs are clear to auscultation. No dullness and expansion is normal.    Cardiovascular: S1 and S2 are normal. No mummurs, rubs or gallops are present.    Gastrointestinal: The abdomen is soft. No tenderness is present. No masses are present. Bowel sounds are normal.    Genitourinary: The bladder is not distended. No CVA tenderness is present.    Extremities: No edema is noted. No deformities are present.    Neurological: Cognition is normal. Tone, power and sensation are normal.     Skin: No lesions are seen  or palpated.    Lymph Nodes: No lymphadenopathy is present.                                11.8   13.02 )-----------( 244      ( 25 Feb 2023 00:48 )             40.7     02-25    139  |  95<L>  |  41<H>  ----------------------------<  99  4.5   |  29  |  9.47<H>    Ca    9.2      25 Feb 2023 00:48  Phos  6.5     02-25  Mg     2.20     02-25    TPro  8.4<H>  /  Alb  3.9  /  TBili  0.3  /  DBili  x   /  AST  20  /  ALT  7   /  AlkPhos  114  02-25

## 2023-02-25 NOTE — PROGRESS NOTE ADULT - PROBLEM SELECTOR PLAN 5
- S/p IVC filter. Pt on Coumadin 12 mg qHS per paper chart review, goal INR 2-3.  INR on admission 2.32.  - Hold Coumadin for now i/s/o GI bleed and anemia  - INR 2.55, continue to trend

## 2023-02-25 NOTE — PROGRESS NOTE ADULT - SUBJECTIVE AND OBJECTIVE BOX
Contact Information:  Suresh Clay II, MD, MPH  Chief Resident, Internal Medicine  Pager: [phone] ([facility]) /// [phone] ([facility])    MAYE ZUNIGA, MRN-[id number]    Patient is a 77y old  Male who presents with a chief complaint of Rectal Bleed, Hypotension (24 Feb 2023 13:15)      OVERNIGHT EVENTS/INTERVAL/SUBJECTIVE: Patient evaluated at bedside, denies any acute complaints, wanting to know when he is going to be leaving the hospital. He says he is without bloody stool, abdominal pain, SOB, lightheadedness, dizziness, CP, numbness, tingling, fatigue.    CONSTITUTIONAL: No weakness. No fatigue. No fever.  HEAD: No head trauma.   EYES: No vision changes.  ENT: No hearing changes or tinnitus. No ear pain. No changes in smell. No nasal congestion or discharge. No sore throat. No voice hoarseness.   NECK: No neck pain or stiffness. No lumps.  RESPIRATORY: No cough. No SOB. No wheezing. No hemoptysis.   CARDIOVASCULAR: No chest pain. No palpitations.   GASTROINTESTINAL: No dysphagia. No ABD pain. No distension. No constipation. No diarrhea. No pain with defecation. No hematemesis. No hematochezia or melena.  BACK: No back pain.  GENITOURINARY: No dysuria. No frequency or urgency. No hesitancy. No incontinence. No urinary retention. No suprapubic pain. No hematuria.  EXTREMITY: No swelling.  MUSCULOSKELETAL: No joint pain or swelling. No fractures. No stiffness.    SKIN: No rashes. No itching. No skin, hair, or nail changes.  NEUROLOGICAL: No weakness or paralysis. No lightheadedness or dizziness. No HA. No numbness or tingling.   PSYCHIATRIC: No depression.       OBJECTIVE:  Vital Signs Last 24 Hrs  T(C): 36.4 (25 Feb 2023 12:10), Max: 37 (24 Feb 2023 20:44)  T(F): 97.5 (25 Feb 2023 12:10), Max: 98.6 (24 Feb 2023 20:44)  HR: 65 (25 Feb 2023 12:10) (65 - 78)  BP: 86/51 (25 Feb 2023 12:10) (82/50 - 104/62)  BP(mean): --  RR: 17 (25 Feb 2023 12:10) (17 - 18)  SpO2: 100% (25 Feb 2023 12:10) (95% - 100%)    Parameters below as of 25 Feb 2023 12:10  Patient On (Oxygen Delivery Method): nasal cannula  O2 Flow (L/min): 2    I&O's Summary    24 Feb 2023 07:01  -  25 Feb 2023 07:00  --------------------------------------------------------  IN: 800 mL / OUT: 150 mL / NET: 650 mL        MEDICATIONS  (STANDING):  acetaminophen     Tablet .. 650 milliGRAM(s) Oral every 6 hours  allopurinol 100 milliGRAM(s) Oral daily  buDESOnide    Inhalation Suspension 0.5 milliGRAM(s) Inhalation two times a day  chlorhexidine 2% Cloths 1 Application(s) Topical daily  cinacalcet 30 milliGRAM(s) Oral <User Schedule>  levETIRAcetam 500 milliGRAM(s) Oral two times a day  lidocaine   4% Patch 1 Patch Transdermal every 24 hours  midodrine. 30 milliGRAM(s) Oral <User Schedule>  pantoprazole  Injectable 40 milliGRAM(s) IV Push every 12 hours  pregabalin 100 milliGRAM(s) Oral two times a day  senna 2 Tablet(s) Oral at bedtime    MEDICATIONS  (PRN):  melatonin 3 milliGRAM(s) Oral at bedtime PRN Insomnia  midodrine. 20 milliGRAM(s) Oral <User Schedule> PRN GIVE 30MIN PRIOR TO HEMODIALYSIS SESSION  oxyCODONE    IR 5 milliGRAM(s) Oral every 6 hours PRN Severe Pain (7 - 10)  sodium chloride 0.9% Bolus. 100 milliLiter(s) IV Bolus every 5 minutes PRN SBP LESS THAN or EQUAL to 80 mmHg    Allergies    baclofen (Other)  latex (Rash)    Intolerances        CONSTITUTIONAL: No acute distress. Awake and alert.  ENT: NC in place.  RESPIRATORY: CTAB. No wheezes, rales, or rhonchi. No accessory muscle use. No apparent respiratory distress.  CARDIOVASCULAR: +S1/S2. No audible S3/S4. Regular rate and rhythm. No murmurs, rubs, or gallops. No LE swelling or edema.  GASTROINTESTINAL: Obese but soft, nontender, nondistended ABD. +BS. No rebound or guarding.   MUSCULOSKELETAL: Spontaneous movement in all extremities.  DERMATOLOGICAL: No abnormal rashes or lesions.  NEUROLOGICAL: CN 2-12 grossly intact. No focal deficits. Sensation intact x 4EXT.   PSYCHIATRIC: Appropriate affect. A&Ox3 (oriented to person, place, and time).                              11.8   13.02 )-----------( 244      ( 25 Feb 2023 00:48 )             40.7     PT/INR - ( 25 Feb 2023 00:48 )   PT: 29.9 sec;   INR: 2.55 ratio         PTT - ( 25 Feb 2023 00:48 )  PTT:40.7 sec  02-25    139  |  95<L>  |  41<H>  ----------------------------<  99  4.5   |  29  |  9.47<H>    Ca    9.2      25 Feb 2023 00:48  Phos  6.5     02-25  Mg     2.20     02-25    TPro  8.4<H>  /  Alb  3.9  /  TBili  0.3  /  DBili  x   /  AST  20  /  ALT  7   /  AlkPhos  114  02-25    CAPILLARY BLOOD GLUCOSE        LIVER FUNCTIONS - ( 25 Feb 2023 00:48 )  Alb: 3.9 g/dL / Pro: 8.4 g/dL / ALK PHOS: 114 U/L / ALT: 7 U/L / AST: 20 U/L / GGT: x                       RADIOLOGY AND ADDITIONAL TESTS:    CONSULTANT NOTES REVIEWED:    CARE DISCUSSED WITH THE FOLLOWING CONSULTANTS/PROVIDERS:

## 2023-02-26 PROCEDURE — 99233 SBSQ HOSP IP/OBS HIGH 50: CPT

## 2023-02-26 RX ADMIN — Medication 650 MILLIGRAM(S): at 13:02

## 2023-02-26 RX ADMIN — LIDOCAINE 1 PATCH: 4 CREAM TOPICAL at 01:27

## 2023-02-26 RX ADMIN — MIDODRINE HYDROCHLORIDE 30 MILLIGRAM(S): 2.5 TABLET ORAL at 05:15

## 2023-02-26 RX ADMIN — LEVETIRACETAM 500 MILLIGRAM(S): 250 TABLET, FILM COATED ORAL at 18:16

## 2023-02-26 RX ADMIN — PANTOPRAZOLE SODIUM 40 MILLIGRAM(S): 20 TABLET, DELAYED RELEASE ORAL at 05:15

## 2023-02-26 RX ADMIN — Medication 5 MILLIGRAM(S): at 21:30

## 2023-02-26 RX ADMIN — Medication 650 MILLIGRAM(S): at 05:45

## 2023-02-26 RX ADMIN — PANTOPRAZOLE SODIUM 40 MILLIGRAM(S): 20 TABLET, DELAYED RELEASE ORAL at 18:16

## 2023-02-26 RX ADMIN — MIDODRINE HYDROCHLORIDE 30 MILLIGRAM(S): 2.5 TABLET ORAL at 18:16

## 2023-02-26 RX ADMIN — CHLORHEXIDINE GLUCONATE 1 APPLICATION(S): 213 SOLUTION TOPICAL at 12:05

## 2023-02-26 RX ADMIN — Medication 0.5 MILLIGRAM(S): at 21:07

## 2023-02-26 RX ADMIN — Medication 100 MILLIGRAM(S): at 05:15

## 2023-02-26 RX ADMIN — LIDOCAINE 1 PATCH: 4 CREAM TOPICAL at 12:02

## 2023-02-26 RX ADMIN — Medication 650 MILLIGRAM(S): at 12:02

## 2023-02-26 RX ADMIN — SENNA PLUS 2 TABLET(S): 8.6 TABLET ORAL at 21:29

## 2023-02-26 RX ADMIN — Medication 650 MILLIGRAM(S): at 01:18

## 2023-02-26 RX ADMIN — Medication 650 MILLIGRAM(S): at 01:50

## 2023-02-26 RX ADMIN — Medication 650 MILLIGRAM(S): at 05:15

## 2023-02-26 RX ADMIN — Medication 100 MILLIGRAM(S): at 18:16

## 2023-02-26 RX ADMIN — MIDODRINE HYDROCHLORIDE 30 MILLIGRAM(S): 2.5 TABLET ORAL at 01:17

## 2023-02-26 RX ADMIN — MIDODRINE HYDROCHLORIDE 30 MILLIGRAM(S): 2.5 TABLET ORAL at 11:59

## 2023-02-26 RX ADMIN — LEVETIRACETAM 500 MILLIGRAM(S): 250 TABLET, FILM COATED ORAL at 05:15

## 2023-02-26 RX ADMIN — Medication 5 MILLIGRAM(S): at 01:17

## 2023-02-26 RX ADMIN — Medication 650 MILLIGRAM(S): at 18:16

## 2023-02-26 RX ADMIN — Medication 100 MILLIGRAM(S): at 12:02

## 2023-02-26 RX ADMIN — SENNA PLUS 2 TABLET(S): 8.6 TABLET ORAL at 01:17

## 2023-02-26 RX ADMIN — POLYETHYLENE GLYCOL 3350 17 GRAM(S): 17 POWDER, FOR SOLUTION ORAL at 05:16

## 2023-02-26 NOTE — PROGRESS NOTE ADULT - PROBLEM SELECTOR PLAN 5
- S/p IVC filter. Pt on Coumadin 12 mg qHS per paper chart review, goal INR 2-3.  INR on admission 2.32.  - Hold Coumadin for now i/s/o GI bleed and anemia  - Continue to trend INR

## 2023-02-26 NOTE — PROGRESS NOTE ADULT - SUBJECTIVE AND OBJECTIVE BOX
ZUNIGA MAYE  77y  Patient is a 77y old  Male who presents with a chief complaint of Rectal Bleed, Hypotension (2023 15:34)    HPI:Seen examined. ESRD on HD, followed for ESRD. He has no new comp[laints.    HEALTH ISSUES - PROBLEM Dx:  Hematochezia    Anemia due to acute blood loss    Hypotension    ESRD on dialysis    History of seizure disorder    At risk for inadequate pain control    DVT, lower extremity    Need for prophylactic measure    Leukocytosis          MEDICATIONS  (STANDING):  acetaminophen     Tablet .. 650 milliGRAM(s) Oral every 6 hours  allopurinol 100 milliGRAM(s) Oral daily  bisacodyl 5 milliGRAM(s) Oral at bedtime  buDESOnide    Inhalation Suspension 0.5 milliGRAM(s) Inhalation two times a day  chlorhexidine 2% Cloths 1 Application(s) Topical daily  cinacalcet 30 milliGRAM(s) Oral <User Schedule>  levETIRAcetam 500 milliGRAM(s) Oral two times a day  lidocaine   4% Patch 1 Patch Transdermal every 24 hours  midodrine. 30 milliGRAM(s) Oral <User Schedule>  pantoprazole  Injectable 40 milliGRAM(s) IV Push every 12 hours  polyethylene glycol 3350 17 Gram(s) Oral two times a day  pregabalin 100 milliGRAM(s) Oral two times a day  senna 2 Tablet(s) Oral at bedtime    MEDICATIONS  (PRN):  melatonin 3 milliGRAM(s) Oral at bedtime PRN Insomnia  midodrine. 20 milliGRAM(s) Oral <User Schedule> PRN GIVE 30MIN PRIOR TO HEMODIALYSIS SESSION  oxyCODONE    IR 5 milliGRAM(s) Oral every 6 hours PRN Severe Pain (7 - 10)  sodium chloride 0.9% Bolus. 100 milliLiter(s) IV Bolus every 5 minutes PRN SBP LESS THAN or EQUAL to 80 mmHg    Vital Signs Last 24 Hrs  T(C): 36.6 (2023 18:07), Max: 36.7 (2023 14:07)  T(F): 97.8 (2023 18:07), Max: 98.1 (2023 14:07)  HR: 70 (2023 18:07) (63 - 74)  BP: 99/45 (2023 18:07) (87/47 - 116/55)  BP(mean): --  RR: 18 (2023 18:07) (18 - 18)  SpO2: 100% (2023 18:07) (95% - 100%)    Parameters below as of 2023 18:07  Patient On (Oxygen Delivery Method): nasal cannula  O2 Flow (L/min): 2    Daily     Daily Weight in k.2 (2023 01:00)    PHYSICAL EXAM:  Constitutional: He appears comfortable and not distressed. Not diaphoretic.    Neck:  The thyroid is normal. Trachea is midline.     Respiratory: The lungs are clear to auscultation. No dullness and expansion is normal.    Cardiovascular: S1 and S2 are normal. No mummurs, rubs or gallops are present.    Gastrointestinal: The abdomen is soft. No tenderness is present. No masses are present. Bowel sounds are normal.    Genitourinary: The bladder is not distended. No CVA tenderness is present.    Extremities: No edema is noted. No deformities are present.    Neurological: Cognition is normal. Tone, power and sensation are normal.     Skin: No lesions are seen  or palpated.    Lymph Nodes: No lymphadenopathy is present.                            11.2   9.06  )-----------( 273      ( 2023 21:54 )             37.7         137  |  94<L>  |  50<H>  ----------------------------<  94  4.3   |  25  |  11.20<H>    Ca    8.5      2023 21:54  Phos  7.3       Mg     2.30         TPro  8.0  /  Alb  3.9  /  TBili  0.2  /  DBili  x   /  AST  19  /  ALT  5   /  AlkPhos  113

## 2023-02-26 NOTE — PROGRESS NOTE ADULT - PROBLEM SELECTOR PLAN 1
Pt with 1 episode of bloody BM at his LTC facility. No melena.  - Hold home Coumadin which pt is on for LE DVTs   - Unlikely UGIB, received IV Protonix 80 mg x1 in ED; PPI IV   - CTA AP without any evidence of active bleed  - Patient states he has not had BM since 2/23 - C/w miralax, dulcolax, senna  - GI consulted, recs appreciated - conservative management at this time given HD stability and no overt sign of bleeding (patient also refuses colonoscopy). CBC once daily

## 2023-02-26 NOTE — PROGRESS NOTE ADULT - PROBLEM SELECTOR PLAN 9
Diet: regular diet   DVT PPx: No pharmacologic ppx for now given recent hematochezia. SCDs for now, otherwise as above in DVT problem.  Dispo: management of rectal bleed, hypotension - likely back to [facility]    GOC: DNR/DNI, MOLST from prior in chart. Confirmed with patient on admission.

## 2023-02-26 NOTE — PROVIDER CONTACT NOTE (OTHER) - ASSESSMENT
Patient remains A&O x4, multiple attempts made to obtain ordered lab work  but continues to refuse despite patient education.

## 2023-02-26 NOTE — PROVIDER CONTACT NOTE (OTHER) - RECOMMENDATIONS
Importance of ordered lab work explained to patient, but continues to refuse. Risks of refusal explained and verbalized understanding. Provider made aware

## 2023-02-26 NOTE — PROGRESS NOTE ADULT - SUBJECTIVE AND OBJECTIVE BOX
Contact Information:  Suresh Clay II, MD, MPH  Chief Resident, Internal Medicine  Pager: [phone] ([facility]) /// [phone] ([facility])    MAYE ZUNIGA, MRN-[id number]    Patient is a 77y old  Male who presents with a chief complaint of Rectal Bleed, Hypotension (25 Feb 2023 20:58)      OVERNIGHT EVENTS/INTERVAL/SUBJECTIVE: Patient evaluated at bedside, states that he has no complaints but wants to know when he is going to be discharged. He otherwise denies SOB, CP, ABD pain, lightheadedness, dizziness, numbness, tingling, nausea/vomiting/diarrhea.    CONSTITUTIONAL: No weakness. No fatigue. No fever.  HEAD: No head trauma.   EYES: No vision changes.  ENT: No hearing changes or tinnitus. No ear pain. No changes in smell. No nasal congestion or discharge. No sore throat. No voice hoarseness.   NECK: No neck pain or stiffness. No lumps.  RESPIRATORY: No cough. No SOB. No wheezing. No hemoptysis.   CARDIOVASCULAR: No chest pain. No palpitations.   GASTROINTESTINAL: No dysphagia. No ABD pain. No distension. No constipation. No diarrhea. No pain with defecation. No hematemesis. No hematochezia or melena.  BACK: No back pain.  GENITOURINARY: No dysuria. No frequency or urgency. No hesitancy. No incontinence. No urinary retention. No suprapubic pain. No hematuria.  EXTREMITY: No swelling.  MUSCULOSKELETAL: No joint pain or swelling. No fractures. No stiffness.    SKIN: No rashes. No itching. No skin, hair, or nail changes.  NEUROLOGICAL: No weakness or paralysis. No lightheadedness or dizziness. No HA. No numbness or tingling.   PSYCHIATRIC: No depression.       OBJECTIVE:  Vital Signs Last 24 Hrs  T(C): 36.7 (26 Feb 2023 14:07), Max: 36.7 (26 Feb 2023 14:07)  T(F): 98.1 (26 Feb 2023 14:07), Max: 98.1 (26 Feb 2023 14:07)  HR: 74 (26 Feb 2023 14:07) (63 - 74)  BP: 87/47 (26 Feb 2023 14:07) (87/47 - 116/55)  BP(mean): --  RR: 18 (26 Feb 2023 14:07) (17 - 18)  SpO2: 99% (26 Feb 2023 14:07) (95% - 100%)    Parameters below as of 26 Feb 2023 14:07  Patient On (Oxygen Delivery Method): nasal cannula  O2 Flow (L/min): 2    I&O's Summary    25 Feb 2023 07:01  -  26 Feb 2023 07:00  --------------------------------------------------------  IN: 400 mL / OUT: 1200 mL / NET: -800 mL        MEDICATIONS  (STANDING):  acetaminophen     Tablet .. 650 milliGRAM(s) Oral every 6 hours  allopurinol 100 milliGRAM(s) Oral daily  bisacodyl 5 milliGRAM(s) Oral at bedtime  buDESOnide    Inhalation Suspension 0.5 milliGRAM(s) Inhalation two times a day  chlorhexidine 2% Cloths 1 Application(s) Topical daily  cinacalcet 30 milliGRAM(s) Oral <User Schedule>  levETIRAcetam 500 milliGRAM(s) Oral two times a day  lidocaine   4% Patch 1 Patch Transdermal every 24 hours  midodrine. 30 milliGRAM(s) Oral <User Schedule>  pantoprazole  Injectable 40 milliGRAM(s) IV Push every 12 hours  polyethylene glycol 3350 17 Gram(s) Oral two times a day  pregabalin 100 milliGRAM(s) Oral two times a day  senna 2 Tablet(s) Oral at bedtime    MEDICATIONS  (PRN):  melatonin 3 milliGRAM(s) Oral at bedtime PRN Insomnia  midodrine. 20 milliGRAM(s) Oral <User Schedule> PRN GIVE 30MIN PRIOR TO HEMODIALYSIS SESSION  oxyCODONE    IR 5 milliGRAM(s) Oral every 6 hours PRN Severe Pain (7 - 10)  sodium chloride 0.9% Bolus. 100 milliLiter(s) IV Bolus every 5 minutes PRN SBP LESS THAN or EQUAL to 80 mmHg    Allergies    baclofen (Other)  latex (Rash)    Intolerances        CONSTITUTIONAL: No acute distress. Awake and alert.  ENT: NC in place.  RESPIRATORY: CTAB. No wheezes, rales, or rhonchi. No accessory muscle use. No apparent respiratory distress.  CARDIOVASCULAR: +S1/S2. No audible S3/S4. Regular rate and rhythm. No murmurs, rubs, or gallops. No LE swelling or edema.  GASTROINTESTINAL: Obese abdomen but soft, nontender, nondistended. +BS. No rebound or guarding.   MUSCULOSKELETAL: Spontaneous movement in all extremities.  DERMATOLOGICAL: No abnormal rashes or lesions.  NEUROLOGICAL: CN 2-12 grossly intact. No focal deficits. Sensation intact x 4EXT.   PSYCHIATRIC: Appropriate at times short affect. A&Ox3 (oriented to person, place, and time).                              11.2   9.06  )-----------( 273      ( 25 Feb 2023 21:54 )             37.7     PT/INR - ( 25 Feb 2023 21:54 )   PT: 29.4 sec;   INR: 2.51 ratio         PTT - ( 25 Feb 2023 21:54 )  PTT:39.6 sec  02-25    137  |  94<L>  |  50<H>  ----------------------------<  94  4.3   |  25  |  11.20<H>    Ca    8.5      25 Feb 2023 21:54  Phos  7.3     02-25  Mg     2.30     02-25    TPro  8.0  /  Alb  3.9  /  TBili  0.2  /  DBili  x   /  AST  19  /  ALT  5   /  AlkPhos  113  02-25    CAPILLARY BLOOD GLUCOSE        LIVER FUNCTIONS - ( 25 Feb 2023 21:54 )  Alb: 3.9 g/dL / Pro: 8.0 g/dL / ALK PHOS: 113 U/L / ALT: 5 U/L / AST: 19 U/L / GGT: x                       RADIOLOGY AND ADDITIONAL TESTS:    CONSULTANT NOTES REVIEWED:    CARE DISCUSSED WITH THE FOLLOWING CONSULTANTS/PROVIDERS:

## 2023-02-27 ENCOUNTER — TRANSCRIPTION ENCOUNTER (OUTPATIENT)
Age: 78
End: 2023-02-27

## 2023-02-27 LAB
ALBUMIN SERPL ELPH-MCNC: 3.6 G/DL — SIGNIFICANT CHANGE UP (ref 3.3–5)
ALP SERPL-CCNC: 111 U/L — SIGNIFICANT CHANGE UP (ref 40–120)
ALT FLD-CCNC: 5 U/L — SIGNIFICANT CHANGE UP (ref 4–41)
ANION GAP SERPL CALC-SCNC: 15 MMOL/L — HIGH (ref 7–14)
APTT BLD: 35.9 SEC — SIGNIFICANT CHANGE UP (ref 27–36.3)
AST SERPL-CCNC: 19 U/L — SIGNIFICANT CHANGE UP (ref 4–40)
BILIRUB SERPL-MCNC: 0.2 MG/DL — SIGNIFICANT CHANGE UP (ref 0.2–1.2)
BUN SERPL-MCNC: 43 MG/DL — HIGH (ref 7–23)
CALCIUM SERPL-MCNC: 8.9 MG/DL — SIGNIFICANT CHANGE UP (ref 8.4–10.5)
CHLORIDE SERPL-SCNC: 94 MMOL/L — LOW (ref 98–107)
CO2 SERPL-SCNC: 27 MMOL/L — SIGNIFICANT CHANGE UP (ref 22–31)
CREAT SERPL-MCNC: 10.36 MG/DL — HIGH (ref 0.5–1.3)
EGFR: 5 ML/MIN/1.73M2 — LOW
GLUCOSE SERPL-MCNC: 84 MG/DL — SIGNIFICANT CHANGE UP (ref 70–99)
HCT VFR BLD CALC: 39.6 % — SIGNIFICANT CHANGE UP (ref 39–50)
HGB BLD-MCNC: 11.5 G/DL — LOW (ref 13–17)
INR BLD: 1.9 RATIO — HIGH (ref 0.88–1.16)
LEVETIRACETAM SERPL-MCNC: 18.6 UG/ML — SIGNIFICANT CHANGE UP (ref 10–40)
MAGNESIUM SERPL-MCNC: 2.1 MG/DL — SIGNIFICANT CHANGE UP (ref 1.6–2.6)
MCHC RBC-ENTMCNC: 28.3 PG — SIGNIFICANT CHANGE UP (ref 27–34)
MCHC RBC-ENTMCNC: 29 GM/DL — LOW (ref 32–36)
MCV RBC AUTO: 97.3 FL — SIGNIFICANT CHANGE UP (ref 80–100)
NRBC # BLD: 0 /100 WBCS — SIGNIFICANT CHANGE UP (ref 0–0)
NRBC # FLD: 0 K/UL — SIGNIFICANT CHANGE UP (ref 0–0)
PHOSPHATE SERPL-MCNC: 6.7 MG/DL — HIGH (ref 2.5–4.5)
PLATELET # BLD AUTO: 230 K/UL — SIGNIFICANT CHANGE UP (ref 150–400)
POTASSIUM SERPL-MCNC: 4.2 MMOL/L — SIGNIFICANT CHANGE UP (ref 3.5–5.3)
POTASSIUM SERPL-SCNC: 4.2 MMOL/L — SIGNIFICANT CHANGE UP (ref 3.5–5.3)
PROT SERPL-MCNC: 7.9 G/DL — SIGNIFICANT CHANGE UP (ref 6–8.3)
PROTHROM AB SERPL-ACNC: 22.2 SEC — HIGH (ref 10.5–13.4)
RBC # BLD: 4.07 M/UL — LOW (ref 4.2–5.8)
RBC # FLD: 14.9 % — HIGH (ref 10.3–14.5)
SARS-COV-2 RNA SPEC QL NAA+PROBE: SIGNIFICANT CHANGE UP
SODIUM SERPL-SCNC: 136 MMOL/L — SIGNIFICANT CHANGE UP (ref 135–145)
WBC # BLD: 8.34 K/UL — SIGNIFICANT CHANGE UP (ref 3.8–10.5)
WBC # FLD AUTO: 8.34 K/UL — SIGNIFICANT CHANGE UP (ref 3.8–10.5)

## 2023-02-27 PROCEDURE — 99232 SBSQ HOSP IP/OBS MODERATE 35: CPT

## 2023-02-27 RX ORDER — SEVELAMER CARBONATE 2400 MG/1
800 POWDER, FOR SUSPENSION ORAL
Refills: 0 | Status: DISCONTINUED | OUTPATIENT
Start: 2023-02-27 | End: 2023-02-28

## 2023-02-27 RX ORDER — WARFARIN SODIUM 2.5 MG/1
1 TABLET ORAL ONCE
Refills: 0 | Status: DISCONTINUED | OUTPATIENT
Start: 2023-02-27 | End: 2023-02-27

## 2023-02-27 RX ORDER — WARFARIN SODIUM 2.5 MG/1
10 TABLET ORAL ONCE
Refills: 0 | Status: COMPLETED | OUTPATIENT
Start: 2023-02-27 | End: 2023-02-27

## 2023-02-27 RX ADMIN — Medication 650 MILLIGRAM(S): at 00:44

## 2023-02-27 RX ADMIN — Medication 100 MILLIGRAM(S): at 13:16

## 2023-02-27 RX ADMIN — MIDODRINE HYDROCHLORIDE 30 MILLIGRAM(S): 2.5 TABLET ORAL at 23:47

## 2023-02-27 RX ADMIN — Medication 650 MILLIGRAM(S): at 06:05

## 2023-02-27 RX ADMIN — PANTOPRAZOLE SODIUM 40 MILLIGRAM(S): 20 TABLET, DELAYED RELEASE ORAL at 05:45

## 2023-02-27 RX ADMIN — MIDODRINE HYDROCHLORIDE 30 MILLIGRAM(S): 2.5 TABLET ORAL at 20:55

## 2023-02-27 RX ADMIN — LEVETIRACETAM 500 MILLIGRAM(S): 250 TABLET, FILM COATED ORAL at 20:55

## 2023-02-27 RX ADMIN — CHLORHEXIDINE GLUCONATE 1 APPLICATION(S): 213 SOLUTION TOPICAL at 13:19

## 2023-02-27 RX ADMIN — SENNA PLUS 2 TABLET(S): 8.6 TABLET ORAL at 20:55

## 2023-02-27 RX ADMIN — Medication 100 MILLIGRAM(S): at 20:55

## 2023-02-27 RX ADMIN — MIDODRINE HYDROCHLORIDE 30 MILLIGRAM(S): 2.5 TABLET ORAL at 13:15

## 2023-02-27 RX ADMIN — MIDODRINE HYDROCHLORIDE 30 MILLIGRAM(S): 2.5 TABLET ORAL at 00:44

## 2023-02-27 RX ADMIN — PANTOPRAZOLE SODIUM 40 MILLIGRAM(S): 20 TABLET, DELAYED RELEASE ORAL at 20:56

## 2023-02-27 RX ADMIN — Medication 650 MILLIGRAM(S): at 01:14

## 2023-02-27 RX ADMIN — LIDOCAINE 1 PATCH: 4 CREAM TOPICAL at 13:16

## 2023-02-27 RX ADMIN — Medication 5 MILLIGRAM(S): at 20:55

## 2023-02-27 RX ADMIN — Medication 0.5 MILLIGRAM(S): at 23:13

## 2023-02-27 RX ADMIN — Medication 650 MILLIGRAM(S): at 13:17

## 2023-02-27 RX ADMIN — Medication 0.5 MILLIGRAM(S): at 09:14

## 2023-02-27 RX ADMIN — LIDOCAINE 1 PATCH: 4 CREAM TOPICAL at 00:50

## 2023-02-27 RX ADMIN — Medication 100 MILLIGRAM(S): at 05:46

## 2023-02-27 RX ADMIN — Medication 650 MILLIGRAM(S): at 23:47

## 2023-02-27 RX ADMIN — LEVETIRACETAM 500 MILLIGRAM(S): 250 TABLET, FILM COATED ORAL at 05:41

## 2023-02-27 RX ADMIN — MIDODRINE HYDROCHLORIDE 30 MILLIGRAM(S): 2.5 TABLET ORAL at 05:38

## 2023-02-27 RX ADMIN — WARFARIN SODIUM 10 MILLIGRAM(S): 2.5 TABLET ORAL at 21:40

## 2023-02-27 RX ADMIN — Medication 650 MILLIGRAM(S): at 13:55

## 2023-02-27 RX ADMIN — LIDOCAINE 1 PATCH: 4 CREAM TOPICAL at 19:00

## 2023-02-27 RX ADMIN — Medication 650 MILLIGRAM(S): at 05:45

## 2023-02-27 RX ADMIN — SEVELAMER CARBONATE 800 MILLIGRAM(S): 2400 POWDER, FOR SUSPENSION ORAL at 13:14

## 2023-02-27 NOTE — DISCHARGE NOTE PROVIDER - NSDCCPCAREPLAN_GEN_ALL_CORE_FT
PRINCIPAL DISCHARGE DIAGNOSIS  Diagnosis: Rectal bleeding  Assessment and Plan of Treatment: You were here for an episode of bloody BM at his LTC facility. You had a CTA AP without any evidence of active bleed. Your home med Coumadin was on  hold  since admission and was restarted on 2/27 as you had no further GIB and yoru hemoglobin was stable. You were seen by Gastroenterology and they deemed there was no  urgent indication for colonoscopy at this time. You also wanted to hold off on the colonoscopy at this time. Please follow up with Gastroenterology and your PMD as outpt      SECONDARY DISCHARGE DIAGNOSES  Diagnosis: Hypotension  Assessment and Plan of Treatment: You have history of chronic low Blood pressure. Continue to take your home medication-  Midodrine 30 mg q6hrs, additional 20 mg prior to HD.      Diagnosis: DVT, lower extremity  Assessment and Plan of Treatment: You have history of  DVT, lower extremity s/p IVC filter.  You are on long term  Coumadin, Your  goal INR 2-3. INR on admission 2.32. Counadin was on hold given GI bleed and anemia. SInce your hemoglobin was stable and since you did not have furhter episodes of hematochezia, Couamdain was restarted. Continue to monitor for dark or red stools, CBC and INR  check.  Please Follow up with your PMD within  1 week.    Diagnosis: History of seizure disorder  Assessment and Plan of Treatment: Continue taking your home med Keppra 500 mg BID.

## 2023-02-27 NOTE — DISCHARGE NOTE PROVIDER - HOSPITAL COURSE
77M PMHx severe orthostatic hypotension on midodrine, ESRD on HD via AV fistula LUE MWF, Chronic DVTs since 8/2021 (w IVC filter and on Coumadin, INR goal 2-3), ?seizure disorder, recently admitted for hypotension presents' from long term care facility for bloody BM and hypotension, admitted for anemia due to hematochezia. Hgb stable per observations thus far  # LBIG/ Hematochezia- Pt had 1 episode of bloody BM at his LTC facility. No melena. Episode of painless hematochezia consistent with lower GI bleed. Possibly outlet bleeding from hemorrhoids although possibility of bleeding attributed to diverticulosis, neoplastic process or vascular ectasia also considered. CTA AP without any evidence of active bleed. Home med Coumadin was on  hold  since admission, Was restarted on 2/27 as no further GIB and HH stable. Received IV Protonix 80 mg x1 in ED; PPI IV. pt was seen by GI- No urgent indication for colonoscopy at this time. Patient is refusing colonoscopy at this time. Can consider resuming warfarin for now.    # Anemia due to acute blood loss-Likely from hematochezia.  Hb 11.5 on admission, seems his baseline from quite recently is ~13-14. Hemodynamically, at baseline hypotension level. Not tachycardic, though of note on midodrine.  Hgb stable in the 11-12 range. Monitor Hb, transfuse to Hb > 7. Iron studies consistent with AOCD  folate- WNL, vitamin B12- WNL    #  Hypotension-  Takes Midodrine 30 mg q6hrs, additional 20 mg prior to HD.    # ESRD on dialysis- On HD MWF. chronically on midodrine with HD due to hypotension. c/w Sensipar three times weekly  Renal consulted, appreciate recs.    #H/o  DVT, lower extremity s/p IVC filter. Pt on Coumadin 12 mg qHS per paper chart review, goal INR 2-3.  INR on admission 2.32.Counadin was on hold given GI bleed and anemia. Appreciate GI recs- can consider resuming warfarin for now.  Dw Pt-risks/benefits of restarting Couamdin- Pt would like to continue Coumdain  Advised pt to monitor for GIB, CBC check. Resume Home dose Coumadin mg daily. Continue to trend INR.    #  Leukocytosis- leukocytosis to 13. Possibly reactive leukocytosis from GI bleed, hypotension is at baseline and has already responded to home dose midodrine. No fever or infectious signs/symptoms.  WBC currently at 8.  Monitor off antibiotics for now. Trend CBC.    #  History of seizure disorder- On Keppra 500 mg BID at LTC facility. C/w home Keppra, pharmacy recommended to decrease dose but kept same since has been receiving it as such (has been chronically ESRD on HD).  -F/u Keppra level. - Seizure precautions.   # Chronic pain-  Home Meds: Lyrica, Lidocaine patch, Tylenol, and Oxycodone PRN    # GOC: DNR/DNI, MOLST from prior in chart. Confirmed with patient on admission.  Called pts wife Patty  on 2/27 to provide updates- Left VM

## 2023-02-27 NOTE — PROGRESS NOTE ADULT - SUBJECTIVE AND OBJECTIVE BOX
[facility]  Hospitalist  Pager- [phone]      MAYE ZUNIGA, MRN-[id number]    Patient is a 77y old  Male who presents with a chief complaint of Rectal Bleed, Hypotension (25 Feb 2023 20:58)      OVERNIGHT EVENTS/INTERVAL/SUBJECTIVE: Patient evaluated at bedside      CONSTITUTIONAL: No weakness. No fatigue. No fever.  HEAD: No head trauma.   EYES: No vision changes.  ENT: No hearing changes or tinnitus. No ear pain. No changes in smell. No nasal congestion or discharge. No sore throat. No voice hoarseness.   NECK: No neck pain or stiffness. No lumps.  RESPIRATORY: No cough. No SOB. No wheezing. No hemoptysis.   CARDIOVASCULAR: No chest pain. No palpitations.   GASTROINTESTINAL: No dysphagia. No ABD pain. No distension. No constipation. No diarrhea. No pain with defecation. No hematemesis. No hematochezia or melena.  BACK: No back pain.  GENITOURINARY: No dysuria. No frequency or urgency. No hesitancy. No incontinence. No urinary retention. No suprapubic pain. No hematuria.  EXTREMITY: No swelling.  MUSCULOSKELETAL: No joint pain or swelling. No fractures. No stiffness.    SKIN: No rashes. No itching. No skin, hair, or nail changes.  NEUROLOGICAL: No weakness or paralysis. No lightheadedness or dizziness. No HA. No numbness or tingling.   PSYCHIATRIC: No depression.       OBJECTIVE:    Vital Signs Last 24 Hrs  T(C): 36.4 (27 Feb 2023 04:14), Max: 36.7 (26 Feb 2023 14:07)  T(F): 97.6 (27 Feb 2023 04:14), Max: 98.1 (26 Feb 2023 14:07)  HR: 60 (27 Feb 2023 09:26) (60 - 74)  BP: 101/52 (27 Feb 2023 05:38) (87/47 - 110/55)  BP(mean): --  RR: 18 (27 Feb 2023 04:14) (18 - 18)  SpO2: 96% (27 Feb 2023 09:26) (95% - 100%)    Parameters below as of 27 Feb 2023 09:26  Patient On (Oxygen Delivery Method): room air      I&O's Summary    25 Feb 2023 07:01  -  26 Feb 2023 07:00  --------------------------------------------------------  IN: 400 mL / OUT: 1200 mL / NET: -800 mL      MEDICATIONS  (STANDING):  acetaminophen     Tablet .. 650 milliGRAM(s) Oral every 6 hours  allopurinol 100 milliGRAM(s) Oral daily  bisacodyl 5 milliGRAM(s) Oral at bedtime  buDESOnide    Inhalation Suspension 0.5 milliGRAM(s) Inhalation two times a day  chlorhexidine 2% Cloths 1 Application(s) Topical daily  cinacalcet 30 milliGRAM(s) Oral <User Schedule>  levETIRAcetam 500 milliGRAM(s) Oral two times a day  lidocaine   4% Patch 1 Patch Transdermal every 24 hours  midodrine. 30 milliGRAM(s) Oral <User Schedule>  pantoprazole  Injectable 40 milliGRAM(s) IV Push every 12 hours  polyethylene glycol 3350 17 Gram(s) Oral two times a day  pregabalin 100 milliGRAM(s) Oral two times a day  senna 2 Tablet(s) Oral at bedtime    MEDICATIONS  (PRN):  melatonin 3 milliGRAM(s) Oral at bedtime PRN Insomnia  midodrine. 20 milliGRAM(s) Oral <User Schedule> PRN GIVE 30MIN PRIOR TO HEMODIALYSIS SESSION  oxyCODONE    IR 5 milliGRAM(s) Oral every 6 hours PRN Severe Pain (7 - 10)  sodium chloride 0.9% Bolus. 100 milliLiter(s) IV Bolus every 5 minutes PRN SBP LESS THAN or EQUAL to 80 mmHg    Allergies    baclofen (Other)  latex (Rash)    Intolerances        CONSTITUTIONAL: No acute distress. Awake and alert.  ENT: NC in place.  RESPIRATORY: CTAB. No wheezes, rales, or rhonchi. No accessory muscle use. No apparent respiratory distress.  CARDIOVASCULAR: +S1/S2. No audible S3/S4. Regular rate and rhythm. No murmurs, rubs, or gallops. No LE swelling or edema.  GASTROINTESTINAL: Obese abdomen but soft, nontender, nondistended. +BS. No rebound or guarding.   MUSCULOSKELETAL: Spontaneous movement in all extremities.  DERMATOLOGICAL: No abnormal rashes or lesions.  NEUROLOGICAL: CN 2-12 grossly intact. No focal deficits. Sensation intact x 4EXT.   PSYCHIATRIC: Appropriate at times short affect. A&Ox3 (oriented to person, place, and time).      LABS                                             11.5   8.34  )-----------( 230      ( 27 Feb 2023 07:00 )             39.6     02-27    136  |  94<L>  |  43<H>  ----------------------------<  84  4.2   |  27  |  10.36<H>    Ca    8.9      27 Feb 2023 07:00  Phos  6.7     02-27  Mg     2.10     02-27    TPro  7.9  /  Alb  3.6  /  TBili  0.2  /  DBili  x   /  AST  19  /  ALT  5   /  AlkPhos  111  02-27      CAPILLARY BLOOD GLUCOSE        LIVER FUNCTIONS - ( 25 Feb 2023 21:54 )  Alb: 3.9 g/dL / Pro: 8.0 g/dL / ALK PHOS: 113 U/L / ALT: 5 U/L / AST: 19 U/L / GGT: x                       RADIOLOGY AND ADDITIONAL TESTS:    CONSULTANT NOTES REVIEWED:    CARE DISCUSSED WITH THE FOLLOWING CONSULTANTS/PROVIDERS: [facility]  Hospitalist  Pager- [phone]      MAYE ZUNIGA, MRN-[id number]    Patient is a 77y old  Male who presents with a chief complaint of Rectal Bleed, Hypotension (25 Feb 2023 20:58)      OVERNIGHT EVENTS/INTERVAL/SUBJECTIVE: Patient evaluated at bedside   Asking to go back to his facility   Reported no further bloody BM since the initial episode. Had a BM yday which was non bloody       CONSTITUTIONAL: No weakness. No fatigue. No fever.  HEAD: No head trauma.   EYES: No vision changes.  ENT: No hearing changes or tinnitus. No ear pain. No changes in smell. No nasal congestion or discharge. No sore throat. No voice hoarseness.   NECK: No neck pain or stiffness. No lumps.  RESPIRATORY: No cough. No SOB. No wheezing. No hemoptysis.   CARDIOVASCULAR: No chest pain. No palpitations.   GASTROINTESTINAL: No dysphagia. No ABD pain. No distension. No constipation. No diarrhea. No pain with defecation. No hematemesis. No hematochezia or melena.  BACK: No back pain.  GENITOURINARY: No dysuria. No frequency or urgency. No hesitancy. No incontinence. No urinary retention. No suprapubic pain. No hematuria.  EXTREMITY: No swelling.  MUSCULOSKELETAL: No joint pain or swelling. No fractures. No stiffness.    SKIN: No rashes. No itching. No skin, hair, or nail changes.  NEUROLOGICAL: No weakness or paralysis. No lightheadedness or dizziness. No HA. No numbness or tingling.   PSYCHIATRIC: No depression.       OBJECTIVE:    Vital Signs Last 24 Hrs  T(C): 36.4 (27 Feb 2023 04:14), Max: 36.7 (26 Feb 2023 14:07)  T(F): 97.6 (27 Feb 2023 04:14), Max: 98.1 (26 Feb 2023 14:07)  HR: 60 (27 Feb 2023 09:26) (60 - 74)  BP: 101/52 (27 Feb 2023 05:38) (87/47 - 110/55)  BP(mean): --  RR: 18 (27 Feb 2023 04:14) (18 - 18)  SpO2: 96% (27 Feb 2023 09:26) (95% - 100%)    Parameters below as of 27 Feb 2023 09:26  Patient On (Oxygen Delivery Method): room air      I&O's Summary    25 Feb 2023 07:01  -  26 Feb 2023 07:00  --------------------------------------------------------  IN: 400 mL / OUT: 1200 mL / NET: -800 mL      MEDICATIONS  (STANDING):  acetaminophen     Tablet .. 650 milliGRAM(s) Oral every 6 hours  allopurinol 100 milliGRAM(s) Oral daily  bisacodyl 5 milliGRAM(s) Oral at bedtime  buDESOnide    Inhalation Suspension 0.5 milliGRAM(s) Inhalation two times a day  chlorhexidine 2% Cloths 1 Application(s) Topical daily  cinacalcet 30 milliGRAM(s) Oral <User Schedule>  levETIRAcetam 500 milliGRAM(s) Oral two times a day  lidocaine   4% Patch 1 Patch Transdermal every 24 hours  midodrine. 30 milliGRAM(s) Oral <User Schedule>  pantoprazole  Injectable 40 milliGRAM(s) IV Push every 12 hours  polyethylene glycol 3350 17 Gram(s) Oral two times a day  pregabalin 100 milliGRAM(s) Oral two times a day  senna 2 Tablet(s) Oral at bedtime    MEDICATIONS  (PRN):  melatonin 3 milliGRAM(s) Oral at bedtime PRN Insomnia  midodrine. 20 milliGRAM(s) Oral <User Schedule> PRN GIVE 30MIN PRIOR TO HEMODIALYSIS SESSION  oxyCODONE    IR 5 milliGRAM(s) Oral every 6 hours PRN Severe Pain (7 - 10)  sodium chloride 0.9% Bolus. 100 milliLiter(s) IV Bolus every 5 minutes PRN SBP LESS THAN or EQUAL to 80 mmHg    Allergies    baclofen (Other)  latex (Rash)    Intolerances        CONSTITUTIONAL: No acute distress. Awake and alert.  ENT: NC in place.  RESPIRATORY: CTAB. No wheezes, rales, or rhonchi. No accessory muscle use. No apparent respiratory distress.  CARDIOVASCULAR: +S1/S2. No audible S3/S4. Regular rate and rhythm. No murmurs, rubs, or gallops. No LE swelling or edema. AVF pressent  LUE   GASTROINTESTINAL: Obese abdomen but soft, nontender, nondistended. +BS. No rebound or guarding. Scar of previous surgery present   MUSCULOSKELETAL: Spontaneous movement in all extremities.  DERMATOLOGICAL: No abnormal rashes or lesions.  NEUROLOGICAL: CN 2-12 grossly intact. No focal deficits. Sensation intact x 4EXT.   PSYCHIATRIC: Appropriate at times short affect. A&Ox3 (oriented to person, place, and time).      LABS                                             11.5   8.34  )-----------( 230      ( 27 Feb 2023 07:00 )             39.6     02-27    136  |  94<L>  |  43<H>  ----------------------------<  84  4.2   |  27  |  10.36<H>    Ca    8.9      27 Feb 2023 07:00  Phos  6.7     02-27  Mg     2.10     02-27    TPro  7.9  /  Alb  3.6  /  TBili  0.2  /  DBili  x   /  AST  19  /  ALT  5   /  AlkPhos  111  02-27      CAPILLARY BLOOD GLUCOSE        LIVER FUNCTIONS - ( 25 Feb 2023 21:54 )  Alb: 3.9 g/dL / Pro: 8.0 g/dL / ALK PHOS: 113 U/L / ALT: 5 U/L / AST: 19 U/L / GGT: x                       RADIOLOGY AND ADDITIONAL TESTS:    CONSULTANT NOTES REVIEWED:Renal     CARE DISCUSSED WITH THE FOLLOWING CONSULTANTS/PROVIDERS: Renal

## 2023-02-27 NOTE — PROGRESS NOTE ADULT - PROBLEM SELECTOR PLAN 9
Diet: regular diet   DVT PPx: No pharmacologic ppx for now given recent hematochezia. SCDs for now, otherwise as above in DVT problem.  Dispo: management of rectal bleed, hypotension - likely back to [facility]    GOC: DNR/DNI, MOLST from prior in chart. Confirmed with patient on admission. Diet: regular diet   DVT PPx: No pharmacologic ppx for now given recent hematochezia. SCDs for now, otherwise as above in DVT problem.  Dispo: management of rectal bleed, hypotension - likely back to [facility]    GO: DNR/DNI, MOLST from prior in chart. Confirmed with patient on admission.  Called pts wife Patty  on 2/27 to provide updates- Left VM

## 2023-02-27 NOTE — PROGRESS NOTE ADULT - SUBJECTIVE AND OBJECTIVE BOX
[facility]   MD CHESTER DUEÑAS MD KRISTINE SOLTANPOUR, DO ANGELA WONG, PA    TEL:  OFFICE: [phone]  From 5pm-7am Answering Service [phone]    -- RENAL FOLLOW UP NOTE ---Date of Service 02-27-23 @ 11:55    Patient is a 77y old  Male who presents with a chief complaint of Rectal Bleed, Hypotension (27 Feb 2023 09:56)      Patient seen and examined at bedside. No chest pain/sob    VITALS:  T(F): 97.6 (02-27-23 @ 04:14), Max: 98.1 (02-26-23 @ 14:07)  HR: 60 (02-27-23 @ 09:26)  BP: 101/52 (02-27-23 @ 05:38)  RR: 18 (02-27-23 @ 04:14)  SpO2: 96% (02-27-23 @ 09:26)  Wt(kg): --        PHYSICAL EXAM:  General: NAD  Neck: No JVD  Respiratory: CTAB, no wheezes, rales or rhonchi  Cardiovascular: S1, S2, RRR  Gastrointestinal: BS+, soft, NT/ND  Extremities: No peripheral edema    Hospital Medications:   MEDICATIONS  (STANDING):  acetaminophen     Tablet .. 650 milliGRAM(s) Oral every 6 hours  allopurinol 100 milliGRAM(s) Oral daily  bisacodyl 5 milliGRAM(s) Oral at bedtime  buDESOnide    Inhalation Suspension 0.5 milliGRAM(s) Inhalation two times a day  chlorhexidine 2% Cloths 1 Application(s) Topical daily  cinacalcet 30 milliGRAM(s) Oral <User Schedule>  levETIRAcetam 500 milliGRAM(s) Oral two times a day  lidocaine   4% Patch 1 Patch Transdermal every 24 hours  midodrine. 30 milliGRAM(s) Oral <User Schedule>  pantoprazole  Injectable 40 milliGRAM(s) IV Push every 12 hours  polyethylene glycol 3350 17 Gram(s) Oral two times a day  pregabalin 100 milliGRAM(s) Oral two times a day  senna 2 Tablet(s) Oral at bedtime  warfarin 1 milliGRAM(s) Oral once      LABS:  02-27    136  |  94<L>  |  43<H>  ----------------------------<  84  4.2   |  27  |  10.36<H>    Ca    8.9      27 Feb 2023 07:00  Phos  6.7     02-27  Mg     2.10     02-27    TPro  7.9  /  Alb  3.6  /  TBili  0.2  /  DBili      /  AST  19  /  ALT  5   /  AlkPhos  111  02-27    Creatinine Trend: 10.36 <--, 11.20 <--, 9.47 <--, 9.61 <--, 7.74 <--    Albumin, Serum: 3.6 g/dL (02-27 @ 07:00)  Phosphorus Level, Serum: 6.7 mg/dL (02-27 @ 07:00)                              11.5   8.34  )-----------( 230      ( 27 Feb 2023 07:00 )             39.6     Urine Studies:      Iron 52, TIBC 188, %sat 28      [02-23-23 @ 16:58]  Ferritin 2429      [02-23-23 @ 16:58]  PTH -- (Ca --)      [01-20-23 @ 06:35]   630  TSH 0.92      [02-24-23 @ 13:07]    HIV Nonreact      [02-24-23 @ 05:42]      RADIOLOGY & ADDITIONAL STUDIES:

## 2023-02-27 NOTE — DISCHARGE NOTE PROVIDER - NSDCMRMEDTOKEN_GEN_ALL_CORE_FT
acetaminophen 325 mg oral tablet: 2 tab(s) orally every 6 hours, As needed, Temp greater or equal to 38C (100.4F), Mild Pain (1 - 3)  allopurinol 100 mg oral tablet: 1 tab(s) orally once a day  budesonide: 2 puff(s) inhaled 2 times a day  cinacalcet 30 mg oral tablet: 1 tab(s) orally every 24 hrs at 8 AM on Tuesday, Thursday, Saturday  famotidine 20 mg oral tablet: 1 tab(s) orally once a day  levETIRAcetam 500 mg oral tablet: 1 tab(s) orally 2 times a day  lidocaine 4% topical film: Apply topically to affected area once a day  midodrine 10 mg oral tablet: 2 tab(s) orally Monday, Wednesday, and Friday (at 8 AM)  midodrine 10 mg oral tablet: 3 tab(s) orally 4 times a day (00:00, 06:00, 12:00, 18:00)  oxyCODONE 5 mg oral tablet: 1 tab(s) orally every 6 hours, As needed, Severe Pain (7 - 10)  pregabalin 100 mg oral capsule: 1 cap(s) orally 2 times a day  senna leaf extract oral tablet: 2 tab(s) orally once a day (at bedtime)  warfarin 1 mg oral tablet: 7 tab(s) orally once   acetaminophen 325 mg oral tablet: 2 tab(s) orally every 6 hours, As needed, Temp greater or equal to 38C (100.4F), Mild Pain (1 - 3)  allopurinol 100 mg oral tablet: 1 tab(s) orally once a day  bisacodyl 5 mg oral delayed release tablet: 1 tab(s) orally once a day (at bedtime)  budesonide: 2 puff(s) inhaled 2 times a day  cinacalcet 30 mg oral tablet: 1 tab(s) orally every 24 hrs at 8 AM on Tuesday, Thursday, Saturday  levETIRAcetam 500 mg oral tablet: 1 tab(s) orally 2 times a day  lidocaine 4% topical film: Apply topically to affected area once a day  melatonin 3 mg oral tablet: 1 tab(s) orally once a day (at bedtime), As needed, Insomnia  midodrine 10 mg oral tablet: 3 tab(s) orally   midodrine 10 mg oral tablet: 2 tab(s) orally , As needed, GIVE 30MIN PRIOR TO HEMODIALYSIS SESSION  oxyCODONE 5 mg oral tablet: 1 tab(s) orally every 6 hours, As needed, Severe Pain (7 - 10)  polyethylene glycol 3350 oral powder for reconstitution: 17 gram(s) orally 2 times a day  pregabalin 100 mg oral capsule: 1 cap(s) orally 2 times a day  senna leaf extract oral tablet: 2 tab(s) orally once a day (at bedtime)  sevelamer carbonate 800 mg oral tablet: 1 tab(s) orally 3 times a day (with meals)  warfarin 1 mg oral tablet: 7 tab(s) orally once   acetaminophen 325 mg oral tablet: 2 tab(s) orally every 6 hours, As needed, Temp greater or equal to 38C (100.4F), Mild Pain (1 - 3)  allopurinol 100 mg oral tablet: 1 tab(s) orally once a day  bisacodyl 5 mg oral delayed release tablet: 1 tab(s) orally once a day (at bedtime)  budesonide: 2 puff(s) inhaled 2 times a day  cinacalcet 30 mg oral tablet: 1 tab(s) orally every 24 hrs at 8 AM on Tuesday, Thursday, Saturday  levETIRAcetam 500 mg oral tablet: 1 tab(s) orally 2 times a day  lidocaine 4% topical film: Apply topically to affected area once a day  melatonin 3 mg oral tablet: 1 tab(s) orally once a day (at bedtime), As needed, Insomnia  midodrine 10 mg oral tablet: 3 tab(s) orally every 6 hours  midodrine 10 mg oral tablet: 2 tab(s) orally 3 times a week, As Needed 30 MINUTES PRIOR TO DIALYSIS SESSION   oxyCODONE 5 mg oral tablet: 1 tab(s) orally every 6 hours, As needed, Severe Pain (7 - 10)  polyethylene glycol 3350 oral powder for reconstitution: 17 gram(s) orally 2 times a day  pregabalin 100 mg oral capsule: 1 cap(s) orally 2 times a day  senna leaf extract oral tablet: 2 tab(s) orally once a day (at bedtime)  sevelamer carbonate 800 mg oral tablet: 1 tab(s) orally 3 times a day (with meals)  warfarin 6 mg oral tablet: 2 tab(s) orally once a day

## 2023-02-27 NOTE — PROGRESS NOTE ADULT - PROBLEM SELECTOR PLAN 5
- S/p IVC filter. Pt on Coumadin 12 mg qHS per paper chart review, goal INR 2-3.  INR on admission 2.32.  - Hold Coumadin for now i/s/o GI bleed and anemia   - Appreciate GI recs- can consider resuming warfarin for now.   - Continue to trend INR - S/p IVC filter. Pt on Coumadin 12 mg qHS per paper chart review, goal INR 2-3.  INR on admission 2.32.  - Hold Coumadin for now i/s/o GI bleed and anemia   - Appreciate GI recs- can consider resuming warfarin for now.   _ Dw Pt-risks/benefits of restarting Couamdin- Pt would like to continue Coumdain  -Advsied pt to monitor for GIB, CBC check  - will resume Home dose Coumadin 1 mg daily   - Continue to trend INR

## 2023-02-27 NOTE — PROGRESS NOTE ADULT - PROBLEM SELECTOR PLAN 1
Pt with 1 episode of bloody BM at his LTC facility. No melena.  Episode of painless hematochezia consistent with lower GI bleed. Possibly outlet bleeding from hemorrhoids although possibility of bleeding attributed to diverticulosis, neoplastic process or vascular ectasia also considered  - CTA AP without any evidence of active bleed  - home Coumadin on hold  which pt is on for LE DVTs   -Received IV Protonix 80 mg x1 in ED; PPI IV   - Patient states he has not had BM since 2/23 - C/w miralax, dulcolax, senna  - GI consulted, recs appreciated - No urgent indication for colonoscopy at this time. Patient is refusing colonoscopy at this time. Can consider resuming warfarin for now. Pt with 1 episode of bloody BM at his LTC facility. No melena.  Episode of painless hematochezia consistent with lower GI bleed. Possibly outlet bleeding from hemorrhoids although possibility of bleeding attributed to diverticulosis, neoplastic process or vascular ectasia also considered  - CTA AP without any evidence of active bleed  - home Coumadin on hold  since admission - Will restart  -Received IV Protonix 80 mg x1 in ED; PPI IV   - Patient states he has not had BM since 2/23 - C/w miralax, dulcolax, senna  - GI consulted, recs appreciated - No urgent indication for colonoscopy at this time. Patient is refusing colonoscopy at this time. Can consider resuming warfarin for now.

## 2023-02-28 ENCOUNTER — TRANSCRIPTION ENCOUNTER (OUTPATIENT)
Age: 78
End: 2023-02-28

## 2023-02-28 VITALS
RESPIRATION RATE: 18 BRPM | OXYGEN SATURATION: 100 % | HEART RATE: 63 BPM | SYSTOLIC BLOOD PRESSURE: 106 MMHG | DIASTOLIC BLOOD PRESSURE: 64 MMHG | TEMPERATURE: 98 F

## 2023-02-28 LAB
ALBUMIN SERPL ELPH-MCNC: 3.7 G/DL — SIGNIFICANT CHANGE UP (ref 3.3–5)
ALP SERPL-CCNC: 111 U/L — SIGNIFICANT CHANGE UP (ref 40–120)
ALT FLD-CCNC: 5 U/L — SIGNIFICANT CHANGE UP (ref 4–41)
ANION GAP SERPL CALC-SCNC: 13 MMOL/L — SIGNIFICANT CHANGE UP (ref 7–14)
APTT BLD: 36.7 SEC — HIGH (ref 27–36.3)
AST SERPL-CCNC: 15 U/L — SIGNIFICANT CHANGE UP (ref 4–40)
BILIRUB SERPL-MCNC: 0.2 MG/DL — SIGNIFICANT CHANGE UP (ref 0.2–1.2)
BUN SERPL-MCNC: 39 MG/DL — HIGH (ref 7–23)
CALCIUM SERPL-MCNC: 9.5 MG/DL — SIGNIFICANT CHANGE UP (ref 8.4–10.5)
CHLORIDE SERPL-SCNC: 96 MMOL/L — LOW (ref 98–107)
CO2 SERPL-SCNC: 28 MMOL/L — SIGNIFICANT CHANGE UP (ref 22–31)
CREAT SERPL-MCNC: 8.39 MG/DL — HIGH (ref 0.5–1.3)
EGFR: 6 ML/MIN/1.73M2 — LOW
GLUCOSE SERPL-MCNC: 89 MG/DL — SIGNIFICANT CHANGE UP (ref 70–99)
HCT VFR BLD CALC: 40.5 % — SIGNIFICANT CHANGE UP (ref 39–50)
HGB BLD-MCNC: 11.9 G/DL — LOW (ref 13–17)
INR BLD: 1.7 RATIO — HIGH (ref 0.88–1.16)
MAGNESIUM SERPL-MCNC: 2 MG/DL — SIGNIFICANT CHANGE UP (ref 1.6–2.6)
MCHC RBC-ENTMCNC: 28.2 PG — SIGNIFICANT CHANGE UP (ref 27–34)
MCHC RBC-ENTMCNC: 29.4 GM/DL — LOW (ref 32–36)
MCV RBC AUTO: 96 FL — SIGNIFICANT CHANGE UP (ref 80–100)
NRBC # BLD: 0 /100 WBCS — SIGNIFICANT CHANGE UP (ref 0–0)
NRBC # FLD: 0 K/UL — SIGNIFICANT CHANGE UP (ref 0–0)
PHOSPHATE SERPL-MCNC: 6.5 MG/DL — HIGH (ref 2.5–4.5)
PLATELET # BLD AUTO: 194 K/UL — SIGNIFICANT CHANGE UP (ref 150–400)
POTASSIUM SERPL-MCNC: 4 MMOL/L — SIGNIFICANT CHANGE UP (ref 3.5–5.3)
POTASSIUM SERPL-SCNC: 4 MMOL/L — SIGNIFICANT CHANGE UP (ref 3.5–5.3)
PROT SERPL-MCNC: 8.3 G/DL — SIGNIFICANT CHANGE UP (ref 6–8.3)
PROTHROM AB SERPL-ACNC: 19.8 SEC — HIGH (ref 10.5–13.4)
RBC # BLD: 4.22 M/UL — SIGNIFICANT CHANGE UP (ref 4.2–5.8)
RBC # FLD: 14.6 % — HIGH (ref 10.3–14.5)
SODIUM SERPL-SCNC: 137 MMOL/L — SIGNIFICANT CHANGE UP (ref 135–145)
WBC # BLD: 7.76 K/UL — SIGNIFICANT CHANGE UP (ref 3.8–10.5)
WBC # FLD AUTO: 7.76 K/UL — SIGNIFICANT CHANGE UP (ref 3.8–10.5)

## 2023-02-28 PROCEDURE — 99239 HOSP IP/OBS DSCHRG MGMT >30: CPT

## 2023-02-28 RX ORDER — WARFARIN SODIUM 2.5 MG/1
12 TABLET ORAL ONCE
Refills: 0 | Status: DISCONTINUED | OUTPATIENT
Start: 2023-02-28 | End: 2023-02-28

## 2023-02-28 RX ORDER — WARFARIN SODIUM 2.5 MG/1
2 TABLET ORAL
Qty: 60 | Refills: 0
Start: 2023-02-28 | End: 2023-03-29

## 2023-02-28 RX ORDER — ALLOPURINOL 300 MG
1 TABLET ORAL
Qty: 0 | Refills: 0 | DISCHARGE
Start: 2023-02-28

## 2023-02-28 RX ORDER — SEVELAMER CARBONATE 2400 MG/1
1 POWDER, FOR SUSPENSION ORAL
Qty: 0 | Refills: 0 | DISCHARGE
Start: 2023-02-28

## 2023-02-28 RX ORDER — MIDODRINE HYDROCHLORIDE 2.5 MG/1
2 TABLET ORAL
Qty: 0 | Refills: 0 | DISCHARGE
Start: 2023-02-28

## 2023-02-28 RX ORDER — MIDODRINE HYDROCHLORIDE 2.5 MG/1
3 TABLET ORAL
Qty: 0 | Refills: 0 | DISCHARGE
Start: 2023-02-28

## 2023-02-28 RX ORDER — POLYETHYLENE GLYCOL 3350 17 G/17G
17 POWDER, FOR SOLUTION ORAL
Qty: 0 | Refills: 0 | DISCHARGE
Start: 2023-02-28

## 2023-02-28 RX ORDER — SENNA PLUS 8.6 MG/1
2 TABLET ORAL
Qty: 0 | Refills: 0 | DISCHARGE
Start: 2023-02-28

## 2023-02-28 RX ORDER — LEVETIRACETAM 250 MG/1
1 TABLET, FILM COATED ORAL
Qty: 0 | Refills: 0 | DISCHARGE
Start: 2023-02-28

## 2023-02-28 RX ORDER — LANOLIN ALCOHOL/MO/W.PET/CERES
1 CREAM (GRAM) TOPICAL
Qty: 0 | Refills: 0 | DISCHARGE
Start: 2023-02-28

## 2023-02-28 RX ADMIN — SEVELAMER CARBONATE 800 MILLIGRAM(S): 2400 POWDER, FOR SUSPENSION ORAL at 13:11

## 2023-02-28 RX ADMIN — Medication 650 MILLIGRAM(S): at 00:17

## 2023-02-28 RX ADMIN — LEVETIRACETAM 500 MILLIGRAM(S): 250 TABLET, FILM COATED ORAL at 05:08

## 2023-02-28 RX ADMIN — LIDOCAINE 1 PATCH: 4 CREAM TOPICAL at 01:40

## 2023-02-28 RX ADMIN — Medication 100 MILLIGRAM(S): at 05:08

## 2023-02-28 RX ADMIN — MIDODRINE HYDROCHLORIDE 30 MILLIGRAM(S): 2.5 TABLET ORAL at 05:07

## 2023-02-28 RX ADMIN — Medication 0.5 MILLIGRAM(S): at 09:15

## 2023-02-28 RX ADMIN — Medication 650 MILLIGRAM(S): at 13:13

## 2023-02-28 RX ADMIN — Medication 650 MILLIGRAM(S): at 05:08

## 2023-02-28 RX ADMIN — Medication 650 MILLIGRAM(S): at 05:34

## 2023-02-28 RX ADMIN — SEVELAMER CARBONATE 800 MILLIGRAM(S): 2400 POWDER, FOR SUSPENSION ORAL at 09:32

## 2023-02-28 RX ADMIN — CHLORHEXIDINE GLUCONATE 1 APPLICATION(S): 213 SOLUTION TOPICAL at 13:14

## 2023-02-28 RX ADMIN — Medication 100 MILLIGRAM(S): at 13:12

## 2023-02-28 RX ADMIN — LIDOCAINE 1 PATCH: 4 CREAM TOPICAL at 13:14

## 2023-02-28 RX ADMIN — MIDODRINE HYDROCHLORIDE 30 MILLIGRAM(S): 2.5 TABLET ORAL at 13:12

## 2023-02-28 RX ADMIN — CINACALCET 30 MILLIGRAM(S): 30 TABLET, FILM COATED ORAL at 09:24

## 2023-02-28 NOTE — PROGRESS NOTE ADULT - PROBLEM SELECTOR PLAN 2
Hb 11.5 on admission, seems his baseline from quite recently is ~13-14. Likely from hematochezia.  Hemodynamically, at baseline hypotension level. Not tachycardic, though of note on midodrine.  - Hgb stable in the 11-12 range  - Monitor Hb, transfuse to Hb > 7  - iron studies consistent with AOCD  folate- WNL, vitamin B12- WNL  - Monitor vitals Q4H  - CBC daily
Hb 11.5 on admission, seems his baseline from quite recently is ~13-14. Likely from hematochezia.  Hemodynamically, at baseline hypotension level. Not tachycardic, though of note on midodrine.    - Monitor Hb, transfuse to Hb > 7  - iron studies consistent with AOCD  folate- WNL, vitamin B12- WNL  - Monitor vitals q4hrs
Hb 11.5 on admission, seems his baseline from quite recently is ~13-14. Likely from hematochezia.  Hemodynamically, at baseline hypotension level. Not tachycardic, though of note on midodrine.  - Hgb stable in the 11-12 range  - Monitor Hb, transfuse to Hb > 7  - iron studies consistent with AOCD  folate- WNL, vitamin B12- WNL  - Monitor vitals Q4H  - CBC daily
Hb 11.5 on admission, seems his baseline from quite recently is ~13-14. Likely from hematochezia.  Hemodynamically, at baseline hypotension level. Not tachycardic, though of note on midodrine.  - Hgb stable in the 11-12 range  - Monitor Hb, transfuse to Hb > 7  - iron studies consistent with AOCD  folate- WNL, vitamin B12- WNL  - Monitor vitals q4hrs
Hb 11.5 on admission, seems his baseline from quite recently is ~13-14. Likely from hematochezia.  Hemodynamically, at baseline hypotension level. Not tachycardic, though of note on midodrine.  - Hgb stable in the 11-12 range  - Monitor Hb, transfuse to Hb > 7  - iron studies consistent with AOCD  folate- WNL, vitamin B12- WNL  - Monitor vitals Q4H  - CBC daily

## 2023-02-28 NOTE — PROGRESS NOTE ADULT - PROBLEM SELECTOR PLAN 4
On HD MWF. chronically on midodrine with HD due to hypotension  - C/w Sensipar three times weekly  - C/w HD MWF  - Renal consulted, appreciate recs On HD MWF. chronically on midodrine with HD due to hypotension  - C/w Sensipar three times weekly  - C/w HD MWF  - Renal consulted, appreciate recs      # Chronic Hypoxia- On home O2, Satting 100% on 2 L. Wean as tolerated

## 2023-02-28 NOTE — PROGRESS NOTE ADULT - PROBLEM SELECTOR PROBLEM 8
At risk for inadequate pain control

## 2023-02-28 NOTE — DISCHARGE NOTE NURSING/CASE MANAGEMENT/SOCIAL WORK - PATIENT PORTAL LINK FT
You can access the FollowMyHealth Patient Portal offered by [facility] by registering at the following website: [email or web address]. By joining [facility]’s FollowMyHealth portal, you will also be able to view your health information using other applications (apps) compatible with our system.

## 2023-02-28 NOTE — PROGRESS NOTE ADULT - ASSESSMENT
78 yo male w h/o severe orthostatic hypotension on midodrine, ESRD on HD via AV fistula NOE DELVALLE, who was [facility] for  for severe orthostatic hypotension. ptn did well during his hospitalization. His chronic NF is not comfortable dispensing 30 mg of Midodrine( his usual dose is 30 mg tid) Ptn was sent to the ED for further management Here his SBP is 103/64 used to be in the 70s  He was noted to have bloody BM and was admitted for rectal bleeding. Nephrology was consulted for dialysis needs.      ESRD on HD Monday, Wednesday and Friday   Via AVF at [facility]  Last HD was on on 2/27 uf 800cc  Nephrologist: Dr. Ty Riveraaches  midodrine as ordered   hd tmr  Consent in the chart  - Renal diet  - Dose all medications for GFR<10.   - Midodrine with HD.      Hypotension  Chronic and asymptomatic.   - Continue Midodrine      Rectal bleeding  Defer to primary   hb stable    CKD-MBD  Tertiary  hyperparathyroidism.   On Sensipar of 30 mg three times per week  phos elevated start renagel  Phosphorus and calcium daily.           
76 yo male w h/o severe orthostatic hypotension on midodrine, ESRD on HD via AV fistula NOE DELVALLE, who was [facility] for  for severe orthostatic hypotension. ptn did well during his hospitalization. His chronic NF is not comfortable dispensing 30 mg of Midodrine( his usual dose is 30 mg tid) Ptn was sent to the ED for further management Here his SBP is 103/64 used to be in the 70s  He was noted to have bloody BM and was admitted for rectal bleeding. Nephrology was consulted for dialysis needs.      ESRD on HD Monday, Wednesday and Friday   Via AVF at [facility]  Last HD was on on 2/24/23  Nephrologist: Dr. Ty Riveraaches  midodrine as ordered   hd today  Consent in the chart  - Renal diet  - Dose all medications for GFR<10.   - Midodrine with HD.      Hypotension  Chronic and asymptomatic.   - Continue Midodrine      Rectal bleeding  Defer to primary     CKD-MBD  Tertiary  hyperparathyroidism.   On Sensipar of 30 mg three times per week  phos elevated start renagel  Phosphorus and calcium daily.           
78 yo male w h/o severe orthostatic hypotension on midodrine, ESRD on HD via AV fistula NOE DELVALLE, who was [facility] for  for severe orthostatic hypotension. ptn did well during his hospitalization. His chronic NF is not comfortable dispensing 30 mg of Midodrine( his usual dose is 30 mg tid) Ptn was sent to the ED for further management Here his SBP is 103/64 used to be in the 70s  He was noted to have bloody BM and was admitted for rectal bleeding. Nephrology was consulted for dialysis needs.      ESRD on HD Monday, Wednesday and Friday   Via AVF at [facility]  Last HD was on Wednesday 2/21  Nephrologist: Dr. Crawford Desantoniaaches  HD today  midodrine please order  Consent in the chart  Renal diet  Renal dose all medications for GFR<10.   BP remains low. On midodrine      Hypotension  asymptomatic   Continue Midodrine  better    Rectal bleeding  Defer to primary     CKD-MBD  Tertiary  hyperparathyroidism.   On Sensipar of 30 mg three times per week  PTH elevated  Phosphorus and calcium daily.           
77M PMHx severe orthostatic hypotension on midodrine, ESRD on HD via AV fistula [facility] MWF, Chronic DVTs since 8/2021 (w IVC filter and on Coumadin, INR goal 2-3), ?seizure disorder, recently admitted for hypotension presents' from long term care facility for bloody BM and hypotension, admitted for anemia due to hematochezia. Hgb stable per observations thus far. Pending disposition back to [facility].
78 yo male w h/o severe orthostatic hypotension on midodrine, ESRD on HD via AV fistula NOE DELVALLE, who was [facility] for  for severe orthostatic hypotension. ptn did well during his hospitalization. His chronic NF is not comfortable dispensing 30 mg of Midodrine( his usual dose is 30 mg tid) Ptn was sent to the ED for further management Here his SBP is 103/64 used to be in the 70s  He was noted to have bloody BM and was admitted for rectal bleeding. Nephrology was consulted for dialysis needs.      ESRD on HD Monday, Wednesday and Friday   Via AVF at [facility]  Last HD was on on 2/24/23  Nephrologist: Dr. Crawford Desantoniaaches  HD as scheduled.  midodrine please order  Consent in the chart  Renal diet  Renal dose all medications for GFR<10.   BP remains low. On midodrine      Hypotension  asymptomatic   Continue Midodrine  better    Rectal bleeding  Defer to primary     CKD-MBD  Tertiary  hyperparathyroidism.   On Sensipar of 30 mg three times per week  PTH elevated  Phosphorus and calcium daily.           
77M PMHx severe orthostatic hypotension on midodrine, ESRD on HD via AV fistula [facility]UE MWF, Chronic DVTs since 8/2021 (w IVC filter and on Coumadin, INR goal 2-3), ?seizure disorder, recently admitted for hypotension presents' from long term care facility for bloody BM and hypotension, admitted for anemia due to hematochezia. Hgb stable per observations thus far. Pending disposition back to [facility].
78 yo male w h/o severe orthostatic hypotension on midodrine, ESRD on HD via AV fistula NOE DELVALLE, who was [facility] for  for severe orthostatic hypotension. ptn did well during his hospitalization. His chronic NF is not comfortable dispensing 30 mg of Midodrine( his usual dose is 30 mg tid) Ptn was sent to the ED for further management Here his SBP is 103/64 used to be in the 70s  He was noted to have bloody BM and was admitted for rectal bleeding. Nephrology was consulted for dialysis needs.      ESRD on HD Monday, Wednesday and Friday   Via AVF at [facility]  Last HD was on on 2/24/23  Nephrologist: Dr. Ty Riveraaches  HD as scheduled.  midodrine please order  Consent in the chart  - Renal diet  - Dose all medications for GFR<10.   - Midodrine with HD.      Hypotension  Chronic and asymptomatic.   - Continue Midodrine      Rectal bleeding  Defer to primary     CKD-MBD  Tertiary  hyperparathyroidism.   On Sensipar of 30 mg three times per week  PTH elevated  Phosphorus and calcium daily.           
77M PMHx severe orthostatic hypotension on midodrine, ESRD on HD via AV fistula LUE MWF, Chronic DVTs since 8/2021 (w IVC filter and on Coumadin, INR goal 2-3), ?seizure disorder, recently admitted for hypotension presents' from long term care facility for bloody BM and hypotension, admitted for anemia due to hematochezia. Hgb stable per observations thus far. Pending disposition back to [facility].
76 yo M w h/o severe orthostatic hypotension on midodrine, ESRD on HD via AV fistula LUE MWF, Chronic DVTs since 8/2021 (w IVC filter and on Coumadin, INR goal 2-3), ?seizure disorder, recently admitted for hypotension presents' from long term care facility for bloody BM and hypotension, admitted for anemia due to hematochezia. Hgb stable per observations thus far.
76 yo M w h/o severe orthostatic hypotension on midodrine, ESRD on HD via AV fistula LUE MWF, Chronic DVTs since 8/2021 (w IVC filter and on Coumadin, INR goal 2-3), ?seizure disorder, recently admitted for hypotension presents' from long term care facility for bloody BM and hypotension, admitted for anemia due to hematochezia.

## 2023-02-28 NOTE — PROGRESS NOTE ADULT - PROBLEM SELECTOR PLAN 7
Unclear if pt has seizure disorder, but on Keppra 500 mg BID at LTC facility.  - C/w home Keppra, pharmacy recommended to decrease dose but kept same since has been receiving it as such (has been chronically ESRD on HD).  - F/u Keppra level  - Seizure precautions
Unclear if pt has seizure disorder, but on Keppra 500 mg BID at LTC facility.  - C/w home Keppra, pharmacy recommended to decrease dose but kept same since has been receiving it as such (has been chronically ESRD on HD).  - ( ) Keppra level in lab  - Seizure precautions
Unclear if pt has seizure disorder, but on Keppra 500 mg BID at LTC facility.  - C/w home Keppra, pharmacy recommended to decrease dose but kept same since has been receiving it as such (has been chronically ESRD on HD).  - F/u Keppra level  - Seizure precautions

## 2023-02-28 NOTE — PROGRESS NOTE ADULT - SUBJECTIVE AND OBJECTIVE BOX
[facility]  Hospitalist  Pager- [phone]      MAYE ZUNIGA, MRN-[id number]    Patient is a 77y old  Male who presents with a chief complaint of Rectal Bleed, Hypotension (25 Feb 2023 20:58)      OVERNIGHT EVENTS/INTERVAL/SUBJECTIVE: Patient evaluated at bedside   Reported no further bloody BM since the initial episode.   no new complaints    CONSTITUTIONAL: No weakness. No fatigue. No fever.  HEAD: No head trauma.   EYES: No vision changes.  ENT: No hearing changes or tinnitus. No ear pain. No changes in smell. No nasal congestion or discharge. No sore throat. No voice hoarseness.   NECK: No neck pain or stiffness. No lumps.  RESPIRATORY: No cough. No SOB. No wheezing. No hemoptysis.   CARDIOVASCULAR: No chest pain. No palpitations.   GASTROINTESTINAL: No dysphagia. No ABD pain. No distension. No constipation. No diarrhea. No pain with defecation. No hematemesis. No hematochezia or melena.  BACK: No back pain.  GENITOURINARY: No dysuria. No frequency or urgency. No hesitancy. No incontinence. No urinary retention. No suprapubic pain. No hematuria.  EXTREMITY: No swelling.  MUSCULOSKELETAL: No joint pain or swelling. No fractures. No stiffness.    SKIN: No rashes. No itching. No skin, hair, or nail changes.  NEUROLOGICAL: No weakness or paralysis. No lightheadedness or dizziness. No HA. No numbness or tingling.   PSYCHIATRIC: No depression.       OBJECTIVE:    Vital Signs Last 24 Hrs  T(C): 36.7 (28 Feb 2023 04:40), Max: 36.7 (27 Feb 2023 09:59)  T(F): 98.1 (28 Feb 2023 04:40), Max: 98.1 (27 Feb 2023 09:59)  HR: 61 (28 Feb 2023 04:40) (60 - 76)  BP: 103/59 (28 Feb 2023 04:40) (98/43 - 128/85)  BP(mean): --  RR: 18 (28 Feb 2023 04:40) (18 - 18)  SpO2: 99% (28 Feb 2023 04:40) (95% - 100%)    Parameters below as of 28 Feb 2023 04:40  Patient On (Oxygen Delivery Method): nasal cannula  O2 Flow (L/min): 2    MEDICATIONS  (STANDING):  acetaminophen     Tablet .. 650 milliGRAM(s) Oral every 6 hours  allopurinol 100 milliGRAM(s) Oral daily  bisacodyl 5 milliGRAM(s) Oral at bedtime  buDESOnide    Inhalation Suspension 0.5 milliGRAM(s) Inhalation two times a day  chlorhexidine 2% Cloths 1 Application(s) Topical daily  cinacalcet 30 milliGRAM(s) Oral <User Schedule>  levETIRAcetam 500 milliGRAM(s) Oral two times a day  lidocaine   4% Patch 1 Patch Transdermal every 24 hours  midodrine. 30 milliGRAM(s) Oral <User Schedule>  pantoprazole  Injectable 40 milliGRAM(s) IV Push every 12 hours  polyethylene glycol 3350 17 Gram(s) Oral two times a day  pregabalin 100 milliGRAM(s) Oral two times a day  senna 2 Tablet(s) Oral at bedtime  sevelamer carbonate 800 milliGRAM(s) Oral three times a day with meals    MEDICATIONS  (PRN):  melatonin 3 milliGRAM(s) Oral at bedtime PRN Insomnia  midodrine. 20 milliGRAM(s) Oral <User Schedule> PRN GIVE 30MIN PRIOR TO HEMODIALYSIS SESSION  oxyCODONE    IR 5 milliGRAM(s) Oral every 6 hours PRN Severe Pain (7 - 10)  sodium chloride 0.9% Bolus. 100 milliLiter(s) IV Bolus every 5 minutes PRN SBP LESS THAN or EQUAL to 80 mmHg      CONSTITUTIONAL: No acute distress. Awake and alert.  ENT: NC in place.  RESPIRATORY: CTAB. No wheezes, rales, or rhonchi. No accessory muscle use. No apparent respiratory distress.  CARDIOVASCULAR: +S1/S2. No audible S3/S4. Regular rate and rhythm. No murmurs, rubs, or gallops. No LE swelling or edema. AVF pressent  LUE   GASTROINTESTINAL: Obese abdomen but soft, nontender, nondistended. +BS. No rebound or guarding. Scar of previous surgery present   MUSCULOSKELETAL: Spontaneous movement in all extremities.  DERMATOLOGICAL: No abnormal rashes or lesions.  NEUROLOGICAL: CN 2-12 grossly intact. No focal deficits. Sensation intact x 4EXT.   PSYCHIATRIC: Appropriate at times short affect. A&Ox3 (oriented to person, place, and time).      LABS                                             11.5   8.34  )-----------( 230      ( 27 Feb 2023 07:00 )             39.6       02-27    136  |  94<L>  |  43<H>  ----------------------------<  84  4.2   |  27  |  10.36<H>    Ca    8.9      27 Feb 2023 07:00  Phos  6.7     02-27  Mg     2.10     02-27    TPro  7.9  /  Alb  3.6  /  TBili  0.2  /  DBili  x   /  AST  19  /  ALT  5   /  AlkPhos  111  02-27    CAPILLARY BLOOD GLUCOSE        LIVER FUNCTIONS - ( 25 Feb 2023 21:54 )  Alb: 3.9 g/dL / Pro: 8.0 g/dL / ALK PHOS: 113 U/L / ALT: 5 U/L / AST: 19 U/L / GGT: x                       RADIOLOGY AND ADDITIONAL TESTS:    CONSULTANT NOTES REVIEWED:Renal     CARE DISCUSSED WITH THE FOLLOWING CONSULTANTS/PROVIDERS: Renal  [facility]  Hospitalist  Pager- [phone]      MAYE ZUNIGA, MRN-[id number]    Patient is a 77y old  Male who presents with a chief complaint of Rectal Bleed, Hypotension (25 Feb 2023 20:58)      OVERNIGHT EVENTS/INTERVAL/SUBJECTIVE: Patient evaluated at bedside   Reported no further bloody BM since the initial episode.   No new complaints  Happy to go back to his NH as he states he is not getting therapy here     CONSTITUTIONAL: No weakness. No fatigue. No fever.  HEAD: No head trauma.   EYES: No vision changes.  ENT: No hearing changes or tinnitus. No ear pain. No changes in smell. No nasal congestion or discharge. No sore throat. No voice hoarseness.   NECK: No neck pain or stiffness. No lumps.  RESPIRATORY: No cough. No SOB. No wheezing. No hemoptysis.   CARDIOVASCULAR: No chest pain. No palpitations.   GASTROINTESTINAL: No dysphagia. No ABD pain. No distension. No constipation. No diarrhea. No pain with defecation. No hematemesis. No hematochezia or melena.  BACK: No back pain.  GENITOURINARY: No dysuria. No frequency or urgency. No hesitancy. No incontinence. No urinary retention. No suprapubic pain. No hematuria.  EXTREMITY: No swelling.  MUSCULOSKELETAL: No joint pain or swelling. No fractures. No stiffness.    SKIN: No rashes. No itching. No skin, hair, or nail changes.  NEUROLOGICAL: No weakness or paralysis. No lightheadedness or dizziness. No HA. No numbness or tingling.   PSYCHIATRIC: No depression.       OBJECTIVE:    Vital Signs Last 24 Hrs  T(C): 36.7 (28 Feb 2023 04:40), Max: 36.7 (27 Feb 2023 09:59)  T(F): 98.1 (28 Feb 2023 04:40), Max: 98.1 (27 Feb 2023 09:59)  HR: 61 (28 Feb 2023 04:40) (60 - 76)  BP: 103/59 (28 Feb 2023 04:40) (98/43 - 128/85)  BP(mean): --  RR: 18 (28 Feb 2023 04:40) (18 - 18)  SpO2: 99% (28 Feb 2023 04:40) (95% - 100%)    Parameters below as of 28 Feb 2023 04:40  Patient On (Oxygen Delivery Method): nasal cannula  O2 Flow (L/min): 2    MEDICATIONS  (STANDING):  acetaminophen     Tablet .. 650 milliGRAM(s) Oral every 6 hours  allopurinol 100 milliGRAM(s) Oral daily  bisacodyl 5 milliGRAM(s) Oral at bedtime  buDESOnide    Inhalation Suspension 0.5 milliGRAM(s) Inhalation two times a day  chlorhexidine 2% Cloths 1 Application(s) Topical daily  cinacalcet 30 milliGRAM(s) Oral <User Schedule>  levETIRAcetam 500 milliGRAM(s) Oral two times a day  lidocaine   4% Patch 1 Patch Transdermal every 24 hours  midodrine. 30 milliGRAM(s) Oral <User Schedule>  pantoprazole  Injectable 40 milliGRAM(s) IV Push every 12 hours  polyethylene glycol 3350 17 Gram(s) Oral two times a day  pregabalin 100 milliGRAM(s) Oral two times a day  senna 2 Tablet(s) Oral at bedtime  sevelamer carbonate 800 milliGRAM(s) Oral three times a day with meals    MEDICATIONS  (PRN):  melatonin 3 milliGRAM(s) Oral at bedtime PRN Insomnia  midodrine. 20 milliGRAM(s) Oral <User Schedule> PRN GIVE 30MIN PRIOR TO HEMODIALYSIS SESSION  oxyCODONE    IR 5 milliGRAM(s) Oral every 6 hours PRN Severe Pain (7 - 10)  sodium chloride 0.9% Bolus. 100 milliLiter(s) IV Bolus every 5 minutes PRN SBP LESS THAN or EQUAL to 80 mmHg      CONSTITUTIONAL: No acute distress. Awake and alert.  ENT: NC in place.  RESPIRATORY: CTAB. No wheezes, rales, or rhonchi. No accessory muscle use. No apparent respiratory distress.  CARDIOVASCULAR: +S1/S2. No audible S3/S4. Regular rate and rhythm. No murmurs, rubs, or gallops. No LE swelling or edema. AVF pressent  LUE   GASTROINTESTINAL: Obese abdomen but soft, nontender, nondistended. +BS. No rebound or guarding. Scar of previous surgery present   MUSCULOSKELETAL: Spontaneous movement in all extremities.  DERMATOLOGICAL: No abnormal rashes or lesions.  NEUROLOGICAL: CN 2-12 grossly intact. No focal deficits. Sensation intact x 4EXT.   PSYCHIATRIC: Appropriate at times short affect. A&Ox3 (oriented to person, place, and time).      LABS                                             11.5   8.34  )-----------( 230      ( 27 Feb 2023 07:00 )             39.6       02-27    136  |  94<L>  |  43<H>  ----------------------------<  84  4.2   |  27  |  10.36<H>    Ca    8.9      27 Feb 2023 07:00  Phos  6.7     02-27  Mg     2.10     02-27    TPro  7.9  /  Alb  3.6  /  TBili  0.2  /  DBili  x   /  AST  19  /  ALT  5   /  AlkPhos  111  02-27    CAPILLARY BLOOD GLUCOSE        LIVER FUNCTIONS - ( 25 Feb 2023 21:54 )  Alb: 3.9 g/dL / Pro: 8.0 g/dL / ALK PHOS: 113 U/L / ALT: 5 U/L / AST: 19 U/L / GGT: x                       RADIOLOGY AND ADDITIONAL TESTS:    CONSULTANT NOTES REVIEWED:Renal     CARE DISCUSSED WITH THE FOLLOWING CONSULTANTS/PROVIDERS: Renal

## 2023-02-28 NOTE — PROGRESS NOTE ADULT - PROBLEM SELECTOR PLAN 3
At baseline, mentating well. Pt's home dose of midodrine 30 mg q6hrs, additional 20 mg prior to HD.  - C/w home dose of midodrine 30 mg q6hrs, additional 20 mg prior to HD  - Monitor vitals q4hrs

## 2023-02-28 NOTE — PROGRESS NOTE ADULT - PROBLEM SELECTOR PLAN 8
Home Meds: Lyrica, Lidocaine patch, Tylenol, and Oxycodone PRN  - C/w home regimen  - Pharmacy recommended decreasing Lyrica 2/2 ESRD, but kept the same for now since has been getting chronically

## 2023-02-28 NOTE — PROGRESS NOTE ADULT - SUBJECTIVE AND OBJECTIVE BOX
[facility]   MD CHESTER DUEÑAS MD KRISTINE SOLTANPOUR, DO ANGELA WONG, PA    TEL:  OFFICE: [phone]  From 5pm-7am Answering Service [phone]    -- RENAL FOLLOW UP NOTE ---Date of Service 02-28-23 @ 13:20    Patient is a 77y old  Male who presents with a chief complaint of Rectal Bleed, Hypotension (28 Feb 2023 09:34)      Patient seen and examined at bedside. No chest pain/sob    VITALS:  T(F): 97.5 (02-28-23 @ 08:40), Max: 98.1 (02-27-23 @ 14:00)  HR: 62 (02-28-23 @ 09:39)  BP: 110/59 (02-28-23 @ 08:40)  RR: 18 (02-28-23 @ 08:40)  SpO2: 96% (02-28-23 @ 09:39)  Wt(kg): --    02-27 @ 07:01  -  02-28 @ 07:00  --------------------------------------------------------  IN: 600 mL / OUT: 1400 mL / NET: -800 mL          PHYSICAL EXAM:  General: NAD  Neck: No JVD  Respiratory: CTAB, no wheezes, rales or rhonchi  Cardiovascular: S1, S2, RRR  Gastrointestinal: BS+, soft, NT/ND  Extremities: + peripheral edema    Hospital Medications:   MEDICATIONS  (STANDING):  acetaminophen     Tablet .. 650 milliGRAM(s) Oral every 6 hours  allopurinol 100 milliGRAM(s) Oral daily  bisacodyl 5 milliGRAM(s) Oral at bedtime  buDESOnide    Inhalation Suspension 0.5 milliGRAM(s) Inhalation two times a day  chlorhexidine 2% Cloths 1 Application(s) Topical daily  cinacalcet 30 milliGRAM(s) Oral <User Schedule>  levETIRAcetam 500 milliGRAM(s) Oral two times a day  lidocaine   4% Patch 1 Patch Transdermal every 24 hours  midodrine. 30 milliGRAM(s) Oral <User Schedule>  pantoprazole  Injectable 40 milliGRAM(s) IV Push every 12 hours  polyethylene glycol 3350 17 Gram(s) Oral two times a day  pregabalin 100 milliGRAM(s) Oral two times a day  senna 2 Tablet(s) Oral at bedtime  sevelamer carbonate 800 milliGRAM(s) Oral three times a day with meals  warfarin 12 milliGRAM(s) Oral once      LABS:  02-28    137  |  96<L>  |  39<H>  ----------------------------<  89  4.0   |  28  |  8.39<H>    Ca    9.5      28 Feb 2023 10:46  Phos  6.5     02-28  Mg     2.00     02-28    TPro  8.3  /  Alb  3.7  /  TBili  0.2  /  DBili      /  AST  15  /  ALT  5   /  AlkPhos  111  02-28    Creatinine Trend: 8.39 <--, 10.36 <--, 11.20 <--, 9.47 <--, 9.61 <--, 7.74 <--    Albumin, Serum: 3.7 g/dL (02-28 @ 10:46)  Phosphorus Level, Serum: 6.5 mg/dL (02-28 @ 10:46)                              11.9   7.76  )-----------( 194      ( 28 Feb 2023 10:46 )             40.5     Urine Studies:      Iron 52, TIBC 188, %sat 28      [02-23-23 @ 16:58]  Ferritin 2429      [02-23-23 @ 16:58]  PTH -- (Ca --)      [01-20-23 @ 06:35]   630  TSH 0.92      [02-24-23 @ 13:07]    HIV Nonreact      [02-24-23 @ 05:42]      RADIOLOGY & ADDITIONAL STUDIES:

## 2023-02-28 NOTE — PROGRESS NOTE ADULT - PROBLEM SELECTOR PLAN 6
Pt with leukocytosis to 13. Possibly reactive leukocytosis from GI bleed, hypotension is at baseline and has already responded to home dose midodrine. No fever or infectious signs/symptoms.  - WBC currently at 8  - Monitor off antibiotics for now  - Trend CBC
Pt with leukocytosis to 13. Possibly reactive leukocytosis from GI bleed, hypotension is at baseline and has already responded to home dose midodrine. No fever or infectious signs/symptoms.  - WBC currently at 8  - Monitor off antibiotics for now  - Trend CBC
Pt with leukocytosis to 13. Possibly reactive leukocytosis from GI bleed, hypotension is at baseline and has already responded to home dose midodrine. No fever or infectious signs/symptoms.  - WBC currently at 9  - Monitor off antibiotics for now  - Trend CBC
Pt with leukocytosis to 13. Possibly reactive leukocytosis from GI bleed, hypotension is at baseline and has already responded to home dose midodrine. No fever or infectious signs/symptoms.  - Monitor off antibiotics for now  - Trend CBC
Pt with leukocytosis to 13. Possibly reactive leukocytosis from GI bleed, hypotension is at baseline and has already responded to home dose midodrine. No fever or infectious signs/symptoms.  - Monitor off antibiotics for now  - WBC downtrended   - Trend CBC

## 2023-02-28 NOTE — PROGRESS NOTE ADULT - PROBLEM SELECTOR PLAN 5
- S/p IVC filter. Pt on Coumadin 12 mg qHS per paper chart review, goal INR 2-3.  INR on admission 2.32.  - Hold Coumadin for now i/s/o GI bleed and anemia   - Appreciate GI recs- can consider resuming warfarin for now.   _ Dw Pt-risks/benefits of restarting Couamdin- Pt would like to continue Coumdain  -Advised pt to monitor for GIB, CBC check  - will resume Home dose Coumadin. Received 11mg last night    - FU AM INR. Dose coumadin with cogaulopathy  - S/p IVC filter. Pt on Coumadin 12 mg qHS per paper chart review, goal INR 2-3.  INR on admission 2.32.  -Hold Coumadin for now i/s/o GI bleed and anemia  -Appreciate GI recs- can consider resuming warfarin for now.   - Dw Pt-risks/benefits of restarting Couamdin- Pt would like to continue Coumdain  -Advised pt to monitor for GIB, CBC check  - Will resume Home dose Coumadin. Received 11mg last night    - Dose Coumadin  as per INR

## 2023-02-28 NOTE — PROGRESS NOTE ADULT - PROBLEM SELECTOR PROBLEM 5
DVT, lower extremity

## 2023-02-28 NOTE — PROGRESS NOTE ADULT - PROBLEM SELECTOR PROBLEM 7
History of seizure disorder
[id number]
History of seizure disorder

## 2023-02-28 NOTE — PROGRESS NOTE ADULT - REASON FOR ADMISSION
Rectal Bleed, Hypotension

## 2023-02-28 NOTE — PROGRESS NOTE ADULT - PROBLEM SELECTOR PLAN 9
Diet: regular diet   DVT PPx: No pharmacologic ppx for now given recent hematochezia. SCDs for now, otherwise as above in DVT problem.  Dispo: management of rectal bleed, hypotension - likely back to [facility]    GO: DNR/DNI, MOLST from prior in chart. Confirmed with patient on admission.  Called pts wife Patty  on 2/27 to provide updates- Left VM Diet: regular diet   DVT PPx: No pharmacologic ppx for now given recent hematochezia. SCDs for now, otherwise as above in DVT problem.  Dispo: management of rectal bleed, hypotension - likely back to [facility] [place]    GOC: DNR/DNI, MOLST from prior in chart. Confirmed with patient on admission.  Called pts wife Patty  on 2/27 to provide updates- Left VM  DC Planning 33min

## 2023-02-28 NOTE — PROGRESS NOTE ADULT - PROBLEM SELECTOR PLAN 1
Pt with 1 episode of bloody BM at his LTC facility. No melena.  Episode of painless hematochezia consistent with lower GI bleed. Possibly outlet bleeding from hemorrhoids although possibility of bleeding attributed to diverticulosis, neoplastic process or vascular ectasia also considered  - CTA AP without any evidence of active bleed  - home Coumadin on hold  since admission - Will restart  -Received IV Protonix 80 mg x1 in ED; PPI IV   - Patient states he has not had BM since 2/23 - C/w miralax, dulcolax, senna  - GI consulted, recs appreciated - No urgent indication for colonoscopy at this time. Patient is refusing colonoscopy at this time. Can consider resuming warfarin for now. Pt with 1 episode of bloody BM at his LTC facility. No melena.  Episode of painless hematochezia consistent with lower GI bleed. Possibly outlet bleeding from hemorrhoids although possibility of bleeding attributed to diverticulosis, neoplastic process or vascular ectasia also considered  - CTA AP without any evidence of active bleed  -Received IV Protonix 80 mg x1 in ED; PPI IV   - Patient states he has not had BM since 2/23 - C/w miralax, dulcolax, senna  - GI consulted, recs appreciated - No urgent indication for colonoscopy at this time. Patient is refusing colonoscopy at this time. Can consider resuming warfarin for now  - home Coumadin on hold  since admission - Restarted on 12/27- No further bleeding, HH stable

## 2023-03-10 ENCOUNTER — INPATIENT (INPATIENT)
Facility: HOSPITAL | Age: 78
LOS: 4 days | Discharge: HOME HEALTH | End: 2023-03-15
Attending: INTERNAL MEDICINE | Admitting: INTERNAL MEDICINE
Payer: MEDICARE

## 2023-03-10 VITALS
HEART RATE: 106 BPM | OXYGEN SATURATION: 100 % | DIASTOLIC BLOOD PRESSURE: 50 MMHG | RESPIRATION RATE: 18 BRPM | TEMPERATURE: 98 F | SYSTOLIC BLOOD PRESSURE: 96 MMHG | HEIGHT: 77 IN

## 2023-03-10 DIAGNOSIS — Z98.89 OTHER SPECIFIED POSTPROCEDURAL STATES: Chronic | ICD-10-CM

## 2023-03-10 LAB
ALBUMIN SERPL ELPH-MCNC: 4.1 G/DL — SIGNIFICANT CHANGE UP (ref 3.3–5)
ALP SERPL-CCNC: 126 U/L — HIGH (ref 40–120)
ALT FLD-CCNC: 14 U/L — SIGNIFICANT CHANGE UP (ref 4–41)
ANION GAP SERPL CALC-SCNC: 17 MMOL/L — HIGH (ref 7–14)
AST SERPL-CCNC: 34 U/L — SIGNIFICANT CHANGE UP (ref 4–40)
BASOPHILS # BLD AUTO: 0.09 K/UL — SIGNIFICANT CHANGE UP (ref 0–0.2)
BASOPHILS NFR BLD AUTO: 0.9 % — SIGNIFICANT CHANGE UP (ref 0–2)
BILIRUB SERPL-MCNC: 0.3 MG/DL — SIGNIFICANT CHANGE UP (ref 0.2–1.2)
BUN SERPL-MCNC: 27 MG/DL — HIGH (ref 7–23)
CALCIUM SERPL-MCNC: 8.7 MG/DL — SIGNIFICANT CHANGE UP (ref 8.4–10.5)
CHLORIDE SERPL-SCNC: 89 MMOL/L — LOW (ref 98–107)
CO2 SERPL-SCNC: 29 MMOL/L — SIGNIFICANT CHANGE UP (ref 22–31)
CREAT SERPL-MCNC: 9.78 MG/DL — HIGH (ref 0.5–1.3)
EGFR: 5 ML/MIN/1.73M2 — LOW
EOSINOPHIL # BLD AUTO: 1.36 K/UL — HIGH (ref 0–0.5)
EOSINOPHIL NFR BLD AUTO: 14.1 % — HIGH (ref 0–6)
FLUAV AG NPH QL: SIGNIFICANT CHANGE UP
FLUBV AG NPH QL: SIGNIFICANT CHANGE UP
GLUCOSE SERPL-MCNC: 91 MG/DL — SIGNIFICANT CHANGE UP (ref 70–99)
HCT VFR BLD CALC: 45.4 % — SIGNIFICANT CHANGE UP (ref 39–50)
HGB BLD-MCNC: 13.3 G/DL — SIGNIFICANT CHANGE UP (ref 13–17)
IANC: 5.05 K/UL — SIGNIFICANT CHANGE UP (ref 1.8–7.4)
IMM GRANULOCYTES NFR BLD AUTO: 0.3 % — SIGNIFICANT CHANGE UP (ref 0–0.9)
LYMPHOCYTES # BLD AUTO: 2.19 K/UL — SIGNIFICANT CHANGE UP (ref 1–3.3)
LYMPHOCYTES # BLD AUTO: 22.7 % — SIGNIFICANT CHANGE UP (ref 13–44)
MAGNESIUM SERPL-MCNC: 2.1 MG/DL — SIGNIFICANT CHANGE UP (ref 1.6–2.6)
MCHC RBC-ENTMCNC: 28.5 PG — SIGNIFICANT CHANGE UP (ref 27–34)
MCHC RBC-ENTMCNC: 29.3 GM/DL — LOW (ref 32–36)
MCV RBC AUTO: 97.4 FL — SIGNIFICANT CHANGE UP (ref 80–100)
MONOCYTES # BLD AUTO: 0.92 K/UL — HIGH (ref 0–0.9)
MONOCYTES NFR BLD AUTO: 9.5 % — SIGNIFICANT CHANGE UP (ref 2–14)
NEUTROPHILS # BLD AUTO: 5.05 K/UL — SIGNIFICANT CHANGE UP (ref 1.8–7.4)
NEUTROPHILS NFR BLD AUTO: 52.5 % — SIGNIFICANT CHANGE UP (ref 43–77)
NRBC # BLD: 0 /100 WBCS — SIGNIFICANT CHANGE UP (ref 0–0)
NRBC # FLD: 0 K/UL — SIGNIFICANT CHANGE UP (ref 0–0)
PLATELET # BLD AUTO: 252 K/UL — SIGNIFICANT CHANGE UP (ref 150–400)
POTASSIUM SERPL-MCNC: 4.3 MMOL/L — SIGNIFICANT CHANGE UP (ref 3.5–5.3)
POTASSIUM SERPL-SCNC: 4.3 MMOL/L — SIGNIFICANT CHANGE UP (ref 3.5–5.3)
PROT SERPL-MCNC: 8.7 G/DL — HIGH (ref 6–8.3)
RBC # BLD: 4.66 M/UL — SIGNIFICANT CHANGE UP (ref 4.2–5.8)
RBC # FLD: 15 % — HIGH (ref 10.3–14.5)
RSV RNA NPH QL NAA+NON-PROBE: SIGNIFICANT CHANGE UP
SARS-COV-2 RNA SPEC QL NAA+PROBE: SIGNIFICANT CHANGE UP
SODIUM SERPL-SCNC: 135 MMOL/L — SIGNIFICANT CHANGE UP (ref 135–145)
TROPONIN T, HIGH SENSITIVITY RESULT: 191 NG/L — CRITICAL HIGH
WBC # BLD: 9.64 K/UL — SIGNIFICANT CHANGE UP (ref 3.8–10.5)
WBC # FLD AUTO: 9.64 K/UL — SIGNIFICANT CHANGE UP (ref 3.8–10.5)

## 2023-03-10 PROCEDURE — 99285 EMERGENCY DEPT VISIT HI MDM: CPT

## 2023-03-10 PROCEDURE — 71045 X-RAY EXAM CHEST 1 VIEW: CPT | Mod: 26

## 2023-03-10 RX ORDER — MIDODRINE HYDROCHLORIDE 2.5 MG/1
30 TABLET ORAL ONCE
Refills: 0 | Status: COMPLETED | OUTPATIENT
Start: 2023-03-10 | End: 2023-03-10

## 2023-03-10 RX ADMIN — MIDODRINE HYDROCHLORIDE 30 MILLIGRAM(S): 2.5 TABLET ORAL at 22:58

## 2023-03-10 NOTE — ED PROVIDER NOTE - PHYSICAL EXAMINATION
GENERAL: well appearing in no acute distress, non-toxic appearing  HEAD: normocephalic, atraumatic  HEENT: normal conjunctiva, oral mucosa moist, uvula midline, no tonsilar exudates, neck supple, no JVD  CARDIAC: regular rate and rhythm, normal S1S2, no appreciable murmurs,  PULM: insp/exp wheezing b/l no crackles.  GI: abdomen nondistended, soft, nontender, no guarding, rebound tenderness  NEURO: no focal motor or sensory deficits,   MSK: no peripheral edema, no calf tenderness b/l  SKIN: well-perfused, extremities warm, no visible rashes  PSYCH: appropriate mood and affect

## 2023-03-10 NOTE — ED ADULT TRIAGE NOTE - CHIEF COMPLAINT QUOTE
pt arrives A&Ox3-4 pt denies any complaints currently left a/v graft dialysis M/W/F( did not have dialysis today as per pt )

## 2023-03-10 NOTE — ED ADULT NURSE NOTE - OBJECTIVE STATEMENT
Heather RN: Pt received A&Ox4, wheelchair bound at baseline. Pt is a 78y/o M with PMH of COPD, pulmonary HTN, ESRD, on dialysis MWF, presents to ED for a syncopal episode. Pt reports he remembers the event in which he was being transferred from his bed to his wheelchair by his home health aids where he closed his eyes. Pt reports both aids were trying to wake him up for about a minute before he opened his eyes. Respirations even and unlabored, NAD, bilateral lower extremity edema noted. Arrives with left upper extremity fistula. Labs drawn and sent as per orders. Awaiting further orders.

## 2023-03-10 NOTE — ED PROVIDER NOTE - OBJECTIVE STATEMENT
76 yo m pmhx COPD, pulm htn, orthostatic hypotension on midodrine, ESRD on dialysis M/W/F at rehab facility for PT presents for a presyncopal episode. pt says he remembers the entire event, when he was being transferred from his bed to wheelchair he closed his eyes, said both aids were trying to wake up for about a minute and then he opened his eyes. denied any preceding sx of cp,dizziness, sob. pt said they checked his BP and FS which was normal. on arrival BP 94/53 afebrile 100% on RA.

## 2023-03-10 NOTE — ED PROVIDER NOTE - ATTENDING CONTRIBUTION TO CARE
Dr. Russo, Attending Physician-  I performed a face to face bedside interview with patient regarding history of present illness, review of symptoms and past medical history. I completed an independent physical exam.  I have discussed patient's plan of care with the resident.    77M, COPD, orthostasis on midodrine, ESRD (MWF), who presents after a potential syncopal episode. Earlier this evening, was being transferred from bed to wheelchair - does not remember anything that subsequently happened. Health aids note that they were trying to awake patient for about a minute because he had decreased responsiveness during this time. No preceding, chest pain, sob, dizziness, vertigo. Did not get HD today. No headaches, fevers, chills, cough, sputum, cp, sob, belly pain, nvd. Physical: afebrile, non-toxic appearing, rrr, ctabl, abdomen soft. Plan: labs, ekg, admit for HD and syncope eval.

## 2023-03-10 NOTE — ED PROVIDER NOTE - NS ED MD TWO NIGHTS YN
[FreeTextEntry1] : 26-year-old woman with a history of recurrent headaches now for the last month becoming more frequent now almost a daily basis, possibly transformed migraines.  Nonfocal exam.\par Would like to rule out any intracranial processes or abnormalities and cause her headaches.\par Plan: We will order an MRI of the brain with and without contrast.\par Electroencephalogram.\par Reviewed and discussed treatment management, treatment options.\par Plan: Start verapamil  mg p.o. nightly.\par Advised to increase sumatriptan to 100 mg p.o.  Headache likely repeat within 2 hours.  Maximum dose of 20 mg/day.  Avoid more than 2-3 times a week.\par Samples of Ubrelvy 100 mg tablets, 1 as needed for headache, can repeat within 2 hours.  Maximum dose of 20 mg/day.\par Samples of Nurtec 75 mg, take 1 as needed daily.  Headache.\par Patient to call back with an update any of the above samples work.\par Advised to maintain headache diary.\par Return to office, 2 months. Yes

## 2023-03-10 NOTE — ED PROVIDER NOTE - CLINICAL SUMMARY MEDICAL DECISION MAKING FREE TEXT BOX
sent in for presyncopal event, as per HPI. pt remembers entire event, denies prodromal sx. on arrival hypotensive 90s/50 on midodrine. has not gotten dialysis today. concern for electrolyte imbalance vs acs vs orthostatic hypotension.  labs, ekg for cardiac event, call facility for more info, call nephro for dialysis, admit.

## 2023-03-10 NOTE — CONSULT NOTE ADULT - SUBJECTIVE AND OBJECTIVE BOX
[facility]   MD CHESTER DUEÑAS MD KRISTINE SOLTANPOUR, DO ANGELA WONG, PA        TEL:  OFFICE: [phone]  From 5pm-7am answering service [phone]    --- INITIAL RENAL CONSULT NOTE ---date of service 03-10-23 @ 22:29    HPI:    HPI:  78 yo male w h/o severe orthostatic hypotension on midodrine, COPD (on 2L home O2), CHF, pHTN,, DVT pm coumadin  ESRD on HD at [facility] via AV fistula NOE DELVALLE, who was DCed on 3/01/2023 with orthostatic hypotension and also at  [facility] for severe orthostatic hypotension. Pt did well during his hospitalizations. His chronic NF is not comfortable dispensing 30 mg of Midodrine( his usual dose is 30 mg tid) S/P IVC filter presented for a presyncopal episode). Pt says he remembers the entire event, when he was being transferred from his bed to wheelchair he closed his eyes, said both aids were trying to wake up for about a minute and then he opened his eyes. Denied any preceding sx of chest pain, dizziness, sob. Pt said they checked his BP and FS which was normal. On arrival BP 94/53.  Nephrology consulted for dialysis needs.     Allergies:  baclofen (Other)  latex (Rash)      PAST MEDICAL & SURGICAL HISTORY:  Hypertension  Glomerular disease  Segmental glomerular sclerosis  CHF (congestive heart failure)  COPD (chronic obstructive pulmonary disease)  Pulmonary hypertension  Sleep apnea  Pulmonary edema  Peripheral edema  Gout  History of abdominal surgery polypectomy  H/O right heart catheterization        Home Medications:  acetaminophen 325 mg oral tablet: 2 tab(s) orally every 6 hours, As needed, Temp greater or equal to 38C (100.4F), Mild Pain (1 - 3) (23 Feb 2023 22:35)  allopurinol 100 mg oral tablet: 1 tab(s) orally once a day (28 Feb 2023 10:18)  bisacodyl 5 mg oral delayed release tablet: 1 tab(s) orally once a day (at bedtime) (28 Feb 2023 10:18)  budesonide: 2 puff(s) inhaled 2 times a day (23 Feb 2023 22:35)  cinacalcet 30 mg oral tablet: 1 tab(s) orally every 24 hrs at 8 AM on Tuesday, Thursday, Saturday (23 Feb 2023 22:35)  levETIRAcetam 500 mg oral tablet: 1 tab(s) orally 2 times a day (28 Feb 2023 10:18)  lidocaine 4% topical film: Apply topically to affected area once a day (23 Feb 2023 22:35)  melatonin 3 mg oral tablet: 1 tab(s) orally once a day (at bedtime), As needed, Insomnia (28 Feb 2023 10:18)  midodrine 10 mg oral tablet: 3 tab(s) orally every 6 hours (28 Feb 2023 12:42)  midodrine 10 mg oral tablet: 2 tab(s) orally 3 times a week, As Needed 30 MINUTES PRIOR TO DIALYSIS SESSION  (28 Feb 2023 12:42)  oxyCODONE 5 mg oral tablet: 1 tab(s) orally every 6 hours, As needed, Severe Pain (7 - 10) (23 Feb 2023 22:35)  polyethylene glycol 3350 oral powder for reconstitution: 17 gram(s) orally 2 times a day (28 Feb 2023 10:18)  pregabalin 100 mg oral capsule: 1 cap(s) orally 2 times a day (28 Feb 2023 10:18)  senna leaf extract oral tablet: 2 tab(s) orally once a day (at bedtime) (28 Feb 2023 10:18)  sevelamer carbonate 800 mg oral tablet: 1 tab(s) orally 3 times a day (with meals) (28 Feb 2023 10:18)    Home Medications Reviewed    Hospital Medications:   MEDICATIONS  (STANDING):      SOCIAL HISTORY:  Denies ETOh, Smoking,     FAMILY HISTORY:  Family history of colon cancer (Father)    Family history of heart disease (Father)        REVIEW OF SYSTEMS:  CONSTITUTIONAL: Has weakness, fevers or chills  EYES/ENT: No visual changes;  No vertigo or throat pain   NECK: No pain or stiffness  RESPIRATORY: No cough, wheezing, hemoptysis; No shortness of breath  CARDIOVASCULAR: No chest pain or palpitations.  GASTROINTESTINAL: No abdominal or epigastric pain. No nausea, vomiting, or hematemesis; No diarrhea or constipation. No melena or hematochezia.  GENITOURINARY: No dysuria, frequency, foamy urine, urinary urgency, incontinence or hematuria  NEUROLOGICAL: No numbness or weakness  SKIN: No itching, burning, rashes, or lesions   VASCULAR: No bilateral lower extremity edema.   All other review of systems is negative unless indicated above.    VITALS:  T(F): 98.2 (03-10-23 @ 23:02), Max: 98.2 (03-10-23 @ 23:02)  HR: 78 (03-11-23 @ 00:12)  BP: 82/50 (03-11-23 @ 00:12)  RR: 16 (03-11-23 @ 00:12)  SpO2: 99% (03-11-23 @ 00:12)  Wt(kg): --    Height (cm): 195.6 (03-10 @ 18:59)    PHYSICAL EXAM:  General: NAD  HEENT: anicteric sclera, oropharynx clear, MMM  Neck: No JVD  Respiratory: CTAB, no wheezes, rales or rhonchi  Cardiovascular: S1, S2, RRR  Gastrointestinal: BS+, soft, NT/ND  Extremities: No cyanosis or clubbing. No peripheral edema  Neurological: A/O x 3, no focal deficits  Psychiatric: Normal mood, normal affect  : No CVA tenderness. No moore.   Skin: No rashes  Vascular Access: AVF with positive thrill and bruit.     LABS:  03-10    135  |  89<L>  |  27<H>  ----------------------------<  91  4.3   |  29  |  9.78<H>    Ca    8.7      10 Mar 2023 21:48  Mg     2.10     03-10    TPro  8.7<H>  /  Alb  4.1  /  TBili  0.3  /  DBili      /  AST  34  /  ALT  14  /  AlkPhos  126<H>  03-10    Creatinine Trend: 9.78 <--                        13.3   9.64  )-----------( 252      ( 10 Mar 2023 21:48 )             45.4     Urine Studies:        RADIOLOGY & ADDITIONAL STUDIES:    ******PRELIMINARY REPORT******      ******PRELIMINARY REPORT******         ACC: [id number] EXAM:  XR CHEST AP OR PA 1V   ORDERED BY: MASTER CALDWELL     PROCEDURE DATE:  03/10/2023    ******PRELIMINARY REPORT******      ******PRELIMINARY REPORT******           INTERPRETATION:  EXAMINATION: XR CHEST    CLINICAL INDICATION: Wheezing    TECHNIQUE: Single frontal, portable view of the chest was obtained.    COMPARISON: Chest x-ray 1/17/2023.    FINDINGS:  The heart is normal in size.  Unchanged mild bilateral atelectasis.  There is no pneumothorax or pleural effusion.    IMPRESSION:  Unchanged mild bilateral atelectasis.        ******PRELIMINARY REPORT******      ******PRELIMINARY REPORT******       KOMAL JAVIER MD; Resident Radiologist  This document is a PRELIMINARY interpretation and is pending final   attending approval. Mar 10 2023 10:47PM

## 2023-03-11 DIAGNOSIS — R55 SYNCOPE AND COLLAPSE: ICD-10-CM

## 2023-03-11 LAB
ANION GAP SERPL CALC-SCNC: 17 MMOL/L — HIGH (ref 7–14)
BUN SERPL-MCNC: 40 MG/DL — HIGH (ref 7–23)
CALCIUM SERPL-MCNC: 8 MG/DL — LOW (ref 8.4–10.5)
CHLORIDE SERPL-SCNC: 89 MMOL/L — LOW (ref 98–107)
CK MB CFR SERPL CALC: 1.2 NG/ML — SIGNIFICANT CHANGE UP
CO2 SERPL-SCNC: 29 MMOL/L — SIGNIFICANT CHANGE UP (ref 22–31)
CREAT SERPL-MCNC: 11.82 MG/DL — HIGH (ref 0.5–1.3)
EGFR: 4 ML/MIN/1.73M2 — LOW
GLUCOSE SERPL-MCNC: 103 MG/DL — HIGH (ref 70–99)
HCT VFR BLD CALC: 43.8 % — SIGNIFICANT CHANGE UP (ref 39–50)
HGB BLD-MCNC: 12.9 G/DL — LOW (ref 13–17)
INR BLD: 2.76 RATIO — HIGH (ref 0.88–1.16)
MAGNESIUM SERPL-MCNC: 2 MG/DL — SIGNIFICANT CHANGE UP (ref 1.6–2.6)
MCHC RBC-ENTMCNC: 28.5 PG — SIGNIFICANT CHANGE UP (ref 27–34)
MCHC RBC-ENTMCNC: 29.5 GM/DL — LOW (ref 32–36)
MCV RBC AUTO: 96.7 FL — SIGNIFICANT CHANGE UP (ref 80–100)
MRSA PCR RESULT.: SIGNIFICANT CHANGE UP
NRBC # BLD: 0 /100 WBCS — SIGNIFICANT CHANGE UP (ref 0–0)
NRBC # FLD: 0 K/UL — SIGNIFICANT CHANGE UP (ref 0–0)
PHOSPHATE SERPL-MCNC: 5.9 MG/DL — HIGH (ref 2.5–4.5)
PLATELET # BLD AUTO: 259 K/UL — SIGNIFICANT CHANGE UP (ref 150–400)
POTASSIUM SERPL-MCNC: 3.8 MMOL/L — SIGNIFICANT CHANGE UP (ref 3.5–5.3)
POTASSIUM SERPL-SCNC: 3.8 MMOL/L — SIGNIFICANT CHANGE UP (ref 3.5–5.3)
PROTHROM AB SERPL-ACNC: 32.4 SEC — HIGH (ref 10.5–13.4)
RBC # BLD: 4.53 M/UL — SIGNIFICANT CHANGE UP (ref 4.2–5.8)
RBC # FLD: 14.9 % — HIGH (ref 10.3–14.5)
S AUREUS DNA NOSE QL NAA+PROBE: SIGNIFICANT CHANGE UP
SODIUM SERPL-SCNC: 135 MMOL/L — SIGNIFICANT CHANGE UP (ref 135–145)
TROPONIN T, HIGH SENSITIVITY RESULT: 193 NG/L — CRITICAL HIGH
WBC # BLD: 10.57 K/UL — HIGH (ref 3.8–10.5)
WBC # FLD AUTO: 10.57 K/UL — HIGH (ref 3.8–10.5)

## 2023-03-11 RX ORDER — CINACALCET 30 MG/1
90 TABLET, FILM COATED ORAL DAILY
Refills: 0 | Status: DISCONTINUED | OUTPATIENT
Start: 2023-03-11 | End: 2023-03-15

## 2023-03-11 RX ORDER — MIDODRINE HYDROCHLORIDE 2.5 MG/1
30 TABLET ORAL
Refills: 0 | Status: DISCONTINUED | OUTPATIENT
Start: 2023-03-11 | End: 2023-03-11

## 2023-03-11 RX ORDER — POLYETHYLENE GLYCOL 3350 17 G/17G
17 POWDER, FOR SOLUTION ORAL
Refills: 0 | Status: DISCONTINUED | OUTPATIENT
Start: 2023-03-11 | End: 2023-03-15

## 2023-03-11 RX ORDER — SEVELAMER CARBONATE 2400 MG/1
800 POWDER, FOR SUSPENSION ORAL
Refills: 0 | Status: DISCONTINUED | OUTPATIENT
Start: 2023-03-11 | End: 2023-03-15

## 2023-03-11 RX ORDER — SODIUM CHLORIDE 9 MG/ML
100 INJECTION INTRAMUSCULAR; INTRAVENOUS; SUBCUTANEOUS
Refills: 0 | Status: DISCONTINUED | OUTPATIENT
Start: 2023-03-11 | End: 2023-03-15

## 2023-03-11 RX ORDER — BNT162B2 ORIGINAL AND OMICRON BA.4/BA.5 .1125; .1125 MG/2.25ML; MG/2.25ML
0.3 INJECTION, SUSPENSION INTRAMUSCULAR ONCE
Refills: 0 | Status: DISCONTINUED | OUTPATIENT
Start: 2023-03-11 | End: 2023-03-15

## 2023-03-11 RX ORDER — MIDODRINE HYDROCHLORIDE 2.5 MG/1
30 TABLET ORAL
Refills: 0 | Status: DISCONTINUED | OUTPATIENT
Start: 2023-03-11 | End: 2023-03-15

## 2023-03-11 RX ORDER — WARFARIN SODIUM 2.5 MG/1
7 TABLET ORAL ONCE
Refills: 0 | Status: COMPLETED | OUTPATIENT
Start: 2023-03-11 | End: 2023-03-11

## 2023-03-11 RX ORDER — LANOLIN ALCOHOL/MO/W.PET/CERES
3 CREAM (GRAM) TOPICAL AT BEDTIME
Refills: 0 | Status: DISCONTINUED | OUTPATIENT
Start: 2023-03-11 | End: 2023-03-15

## 2023-03-11 RX ORDER — MIDODRINE HYDROCHLORIDE 2.5 MG/1
30 TABLET ORAL ONCE
Refills: 0 | Status: COMPLETED | OUTPATIENT
Start: 2023-03-11 | End: 2023-03-11

## 2023-03-11 RX ORDER — LEVETIRACETAM 250 MG/1
500 TABLET, FILM COATED ORAL
Refills: 0 | Status: DISCONTINUED | OUTPATIENT
Start: 2023-03-11 | End: 2023-03-15

## 2023-03-11 RX ORDER — SENNA PLUS 8.6 MG/1
2 TABLET ORAL AT BEDTIME
Refills: 0 | Status: DISCONTINUED | OUTPATIENT
Start: 2023-03-11 | End: 2023-03-15

## 2023-03-11 RX ORDER — ACETAMINOPHEN 500 MG
650 TABLET ORAL EVERY 6 HOURS
Refills: 0 | Status: DISCONTINUED | OUTPATIENT
Start: 2023-03-11 | End: 2023-03-15

## 2023-03-11 RX ORDER — OXYCODONE HYDROCHLORIDE 5 MG/1
5 TABLET ORAL EVERY 6 HOURS
Refills: 0 | Status: DISCONTINUED | OUTPATIENT
Start: 2023-03-11 | End: 2023-03-15

## 2023-03-11 RX ORDER — CHLORHEXIDINE GLUCONATE 213 G/1000ML
1 SOLUTION TOPICAL DAILY
Refills: 0 | Status: DISCONTINUED | OUTPATIENT
Start: 2023-03-11 | End: 2023-03-15

## 2023-03-11 RX ORDER — ALLOPURINOL 300 MG
100 TABLET ORAL DAILY
Refills: 0 | Status: DISCONTINUED | OUTPATIENT
Start: 2023-03-11 | End: 2023-03-15

## 2023-03-11 RX ORDER — BUDESONIDE, MICRONIZED 100 %
0.5 POWDER (GRAM) MISCELLANEOUS
Refills: 0 | Status: DISCONTINUED | OUTPATIENT
Start: 2023-03-11 | End: 2023-03-12

## 2023-03-11 RX ORDER — IPRATROPIUM/ALBUTEROL SULFATE 18-103MCG
3 AEROSOL WITH ADAPTER (GRAM) INHALATION EVERY 6 HOURS
Refills: 0 | Status: DISCONTINUED | OUTPATIENT
Start: 2023-03-11 | End: 2023-03-15

## 2023-03-11 RX ADMIN — Medication 3 MILLILITER(S): at 11:05

## 2023-03-11 RX ADMIN — CHLORHEXIDINE GLUCONATE 1 APPLICATION(S): 213 SOLUTION TOPICAL at 12:57

## 2023-03-11 RX ADMIN — Medication 5 MILLIGRAM(S): at 21:14

## 2023-03-11 RX ADMIN — CINACALCET 90 MILLIGRAM(S): 30 TABLET, FILM COATED ORAL at 11:05

## 2023-03-11 RX ADMIN — MIDODRINE HYDROCHLORIDE 30 MILLIGRAM(S): 2.5 TABLET ORAL at 09:01

## 2023-03-11 RX ADMIN — Medication 100 MILLIGRAM(S): at 11:05

## 2023-03-11 RX ADMIN — Medication 100 MILLIGRAM(S): at 17:06

## 2023-03-11 RX ADMIN — Medication 3 MILLILITER(S): at 16:26

## 2023-03-11 RX ADMIN — MIDODRINE HYDROCHLORIDE 30 MILLIGRAM(S): 2.5 TABLET ORAL at 17:06

## 2023-03-11 RX ADMIN — LEVETIRACETAM 500 MILLIGRAM(S): 250 TABLET, FILM COATED ORAL at 17:06

## 2023-03-11 RX ADMIN — POLYETHYLENE GLYCOL 3350 17 GRAM(S): 17 POWDER, FOR SOLUTION ORAL at 17:08

## 2023-03-11 RX ADMIN — Medication 0.5 MILLIGRAM(S): at 23:00

## 2023-03-11 RX ADMIN — WARFARIN SODIUM 7 MILLIGRAM(S): 2.5 TABLET ORAL at 21:16

## 2023-03-11 RX ADMIN — SENNA PLUS 2 TABLET(S): 8.6 TABLET ORAL at 21:15

## 2023-03-11 RX ADMIN — Medication 3 MILLILITER(S): at 23:00

## 2023-03-11 RX ADMIN — SEVELAMER CARBONATE 800 MILLIGRAM(S): 2400 POWDER, FOR SUSPENSION ORAL at 11:06

## 2023-03-11 RX ADMIN — SEVELAMER CARBONATE 800 MILLIGRAM(S): 2400 POWDER, FOR SUSPENSION ORAL at 17:06

## 2023-03-11 RX ADMIN — MIDODRINE HYDROCHLORIDE 30 MILLIGRAM(S): 2.5 TABLET ORAL at 21:14

## 2023-03-11 NOTE — PROVIDER CONTACT NOTE (OTHER) - ACTION/TREATMENT ORDERED:
ACP made aware. awaiting for pharmacy to send up Pts scheduled midodrine. awaiting on further orders from Provider

## 2023-03-11 NOTE — PATIENT PROFILE ADULT - PATIENT'S SEXUAL ORIENTATION
Detail Level: Zone
Detail Level: Generalized
Detail Level: Detailed
Detail Level: Simple
Heterosexual

## 2023-03-11 NOTE — H&P ADULT - HISTORY OF PRESENT ILLNESS
76 yo male w h/o severe orthostatic hypotension on midodrine, COPD (on 2L home O2), CHF, pHTN,, DVT pm coumadin  ESRD on HD at [facility] via AV fistula NOE DELVALLE, who has had multiple hospitalizations 2/2 syncope 2/2 orthostatic hypotension . Pt did well during his hospitalizations. His chronic NF is not comfortable dispensing 30 mg of Midodrine( his usual dose is 30 mg tid) S/P IVC filter presented for another syncopal episode. Pt says he remembers the entire event, when he was being transferred from his bed to wheelchair he closed his eyes, said both aids were trying to wake him up for about a minute and then he opened his eyes. Denied any preceding sx of chest pain, dizziness, sob. Pt said they checked his BP and FS which was normal. On arrival BP 94/53.  Nephrology and cardiology consulted

## 2023-03-11 NOTE — CONSULT NOTE ADULT - SUBJECTIVE AND OBJECTIVE BOX
CARDIOLOGY CONSULT NOTE - DR. SANCHEZ    HPI:  76 yo male w h/o severe orthostatic hypotension on midodrine, COPD (on 2L home O2), CHF, pHTN,, DVT pm coumadin  ESRD on HD at [facility] via AV fistula NOE DELVALLE, who has had multiple hospitalizations 2/2 syncope 2/2 orthostatic hypotension . Pt did well during his hospitalizations. His chronic NF is not comfortable dispensing 30 mg of Midodrine( his usual dose is 30 mg tid) S/P IVC filter presented for another syncopal episode. Pt says he remembers the entire event, when he was being transferred from his bed to wheelchair he closed his eyes, said both aids were trying to wake him up for about a minute and then he opened his eyes. Denied any preceding sx of chest pain, dizziness, sob. Pt said they checked his BP and FS which was normal. On arrival BP 94/53.  Nephrology and cardiology consulted  (11 Mar 2023 14:29)    no new complaints  no active complaints        PAST MEDICAL & SURGICAL HISTORY:  Hypertension      Glomerular disease  Segmental glomerular sclerosis      CHF (congestive heart failure)      COPD (chronic obstructive pulmonary disease)      Pulmonary hypertension      Sleep apnea      Pulmonary edema      Peripheral edema      Gout      History of abdominal surgery  polypectomy      H/O right heart catheterization            PREVIOUS DIAGNOSTIC TESTING:    [ ] Echocardiogram:  [ ]  Catheterization:  [ ] Stress Test:  	    MEDICATIONS:    Home Medications:  acetaminophen 325 mg oral tablet: 2 tab(s) orally every 6 hours, As needed, Temp greater or equal to 38C (100.4F), Mild Pain (1 - 3) (23 Feb 2023 22:35)  allopurinol 100 mg oral tablet: 1 tab(s) orally once a day (28 Feb 2023 10:18)  bisacodyl 5 mg oral delayed release tablet: 1 tab(s) orally once a day (at bedtime) (28 Feb 2023 10:18)  budesonide: 2 puff(s) inhaled 2 times a day (23 Feb 2023 22:35)  cinacalcet 30 mg oral tablet: 1 tab(s) orally every 24 hrs at 8 AM on Tuesday, Thursday, Saturday (23 Feb 2023 22:35)  levETIRAcetam 500 mg oral tablet: 1 tab(s) orally 2 times a day (28 Feb 2023 10:18)  lidocaine 4% topical film: Apply topically to affected area once a day (23 Feb 2023 22:35)  melatonin 3 mg oral tablet: 1 tab(s) orally once a day (at bedtime), As needed, Insomnia (28 Feb 2023 10:18)  midodrine 10 mg oral tablet: 3 tab(s) orally every 6 hours (28 Feb 2023 12:42)  midodrine 10 mg oral tablet: 2 tab(s) orally 3 times a week, As Needed 30 MINUTES PRIOR TO DIALYSIS SESSION  (28 Feb 2023 12:42)  oxyCODONE 5 mg oral tablet: 1 tab(s) orally every 6 hours, As needed, Severe Pain (7 - 10) (23 Feb 2023 22:35)  polyethylene glycol 3350 oral powder for reconstitution: 17 gram(s) orally 2 times a day (28 Feb 2023 10:18)  pregabalin 100 mg oral capsule: 1 cap(s) orally 2 times a day (28 Feb 2023 10:18)  senna leaf extract oral tablet: 2 tab(s) orally once a day (at bedtime) (28 Feb 2023 10:18)  sevelamer carbonate 800 mg oral tablet: 1 tab(s) orally 3 times a day (with meals) (28 Feb 2023 10:18)      MEDICATIONS  (STANDING):  albuterol/ipratropium for Nebulization 3 milliLiter(s) Nebulizer every 6 hours  allopurinol 100 milliGRAM(s) Oral daily  bisacodyl 5 milliGRAM(s) Oral at bedtime  buDESOnide    Inhalation Suspension 0.5 milliGRAM(s) Inhalation two times a day  chlorhexidine 2% Cloths 1 Application(s) Topical daily  cinacalcet 90 milliGRAM(s) Oral daily  coronavirus bivalent (EUA) Booster Vaccine (PFIZER) 0.3 milliLiter(s) IntraMuscular once  levETIRAcetam 500 milliGRAM(s) Oral two times a day  midodrine. 30 milliGRAM(s) Oral <User Schedule>  polyethylene glycol 3350 17 Gram(s) Oral two times a day  pregabalin 100 milliGRAM(s) Oral two times a day  senna 2 Tablet(s) Oral at bedtime  sevelamer carbonate 800 milliGRAM(s) Oral three times a day with meals  warfarin 7 milliGRAM(s) Oral once      FAMILY HISTORY:  Family history of colon cancer (Father)    Family history of heart disease (Father)        SOCIAL HISTORY:    [ ] Non-smoker  [ ] Smoker  [ ] Alcohol    Allergies    baclofen (Other)  latex (Rash)    Intolerances    	    REVIEW OF SYSTEMS:  CONSTITUTIONAL: No fever, weight loss, or fatigue  EYES: No eye pain, visual disturbances, or discharge  ENMT:  No difficulty hearing, tinnitus, vertigo; No sinus or throat pain  NECK: No pain or stiffness  RESPIRATORY: No cough, wheezing, chills or hemoptysis; No Shortness of Breath  CARDIOVASCULAR: as HPI  GASTROINTESTINAL: No abdominal or epigastric pain. No nausea, vomiting, or hematemesis; No diarrhea or constipation. No melena or hematochezia.  GENITOURINARY: No dysuria, frequency, hematuria, or incontinence  NEUROLOGICAL: No headaches, memory loss, loss of strength, numbness, or tremors  SKIN: No itching, burning, rashes, or lesions   	  [ ] All others negative	  [ ] Unable to obtain    PHYSICAL EXAM:    T(C): 36.7 (03-11-23 @ 13:04), Max: 36.8 (03-10-23 @ 23:02)  HR: 94 (03-11-23 @ 15:03) (70 - 106)  BP: 74/55 (03-11-23 @ 15:03) (74/55 - 96/50)  RR: 17 (03-11-23 @ 13:04) (16 - 18)  SpO2: 95% (03-11-23 @ 13:04) (95% - 100%)  Wt(kg): --  I&O's Summary    Daily Height in cm: 165.1 (11 Mar 2023 10:13)    Daily     Appearance: Normal	  Psychiatry: A & O x 3, Mood & affect appropriate  HEENT:   Normal oral mucosa, PERRL, EOMI	  Lymphatic: No lymphadenopathy  Cardiovascular: Normal S1 S2,RRR, No JVD, No murmurs  Respiratory: Lungs clear to auscultation	  Gastrointestinal:  Soft, Non-tender, + BS	  Skin: No rashes, No ecchymoses, No cyanosis	  Neurologic: Non-focal  Extremities: Normal range of motion, No clubbing, cyanosis or edema  Vascular: Peripheral pulses palpable 2+ bilaterally    TELEMETRY: 	    ECG:  	  RADIOLOGY:  OTHER: 	  	  LABS:	 	    CARDIAC MARKERS:        proBNP:     Lipid Profile:   HgA1c:   TSH:                           13.3   9.64  )-----------( 252      ( 10 Mar 2023 21:48 )             45.4     03-10    135  |  89<L>  |  27<H>  ----------------------------<  91  4.3   |  29  |  9.78<H>    Ca    8.7      10 Mar 2023 21:48  Mg     2.10     03-10    TPro  8.7<H>  /  Alb  4.1  /  TBili  0.3  /  DBili  x   /  AST  34  /  ALT  14  /  AlkPhos  126<H>  03-10        Creatinine, Serum: 9.78 mg/dL (03-10-23 @ 21:48)        ASSESSMENT/PLAN: 	               CARDIOLOGY CONSULT NOTE - DR. SANCHEZ    HPI:  76 yo male w h/o severe orthostatic hypotension on midodrine, COPD (on 2L home O2), CHF, pHTN,, DVT pm coumadin  ESRD on HD at [facility] via AV fistula NOE DELVALLE, who has had multiple hospitalizations 2/2 syncope 2/2 orthostatic hypotension . Pt did well during his hospitalizations. His chronic NF is not comfortable dispensing 30 mg of Midodrine( his usual dose is 30 mg tid) S/P IVC filter presented for another syncopal episode. Pt says he remembers the entire event, when he was being transferred from his bed to wheelchair he closed his eyes, said both aids were trying to wake him up for about a minute and then he opened his eyes. Denied any preceding sx of chest pain, dizziness, sob. Pt said they checked his BP and FS which was normal. On arrival BP 94/53.  Nephrology and cardiology consulted  (11 Mar 2023 14:29)    no new complaints  no active complaints        PAST MEDICAL & SURGICAL HISTORY:  Hypertension      Glomerular disease  Segmental glomerular sclerosis      CHF (congestive heart failure)      COPD (chronic obstructive pulmonary disease)      Pulmonary hypertension      Sleep apnea      Pulmonary edema      Peripheral edema      Gout      History of abdominal surgery  polypectomy      H/O right heart catheterization            PREVIOUS DIAGNOSTIC TESTING:    [ ] Echocardiogram:  [ ]  Catheterization:  [ ] Stress Test:  	    MEDICATIONS:    Home Medications:  acetaminophen 325 mg oral tablet: 2 tab(s) orally every 6 hours, As needed, Temp greater or equal to 38C (100.4F), Mild Pain (1 - 3) (23 Feb 2023 22:35)  allopurinol 100 mg oral tablet: 1 tab(s) orally once a day (28 Feb 2023 10:18)  bisacodyl 5 mg oral delayed release tablet: 1 tab(s) orally once a day (at bedtime) (28 Feb 2023 10:18)  budesonide: 2 puff(s) inhaled 2 times a day (23 Feb 2023 22:35)  cinacalcet 30 mg oral tablet: 1 tab(s) orally every 24 hrs at 8 AM on Tuesday, Thursday, Saturday (23 Feb 2023 22:35)  levETIRAcetam 500 mg oral tablet: 1 tab(s) orally 2 times a day (28 Feb 2023 10:18)  lidocaine 4% topical film: Apply topically to affected area once a day (23 Feb 2023 22:35)  melatonin 3 mg oral tablet: 1 tab(s) orally once a day (at bedtime), As needed, Insomnia (28 Feb 2023 10:18)  midodrine 10 mg oral tablet: 3 tab(s) orally every 6 hours (28 Feb 2023 12:42)  midodrine 10 mg oral tablet: 2 tab(s) orally 3 times a week, As Needed 30 MINUTES PRIOR TO DIALYSIS SESSION  (28 Feb 2023 12:42)  oxyCODONE 5 mg oral tablet: 1 tab(s) orally every 6 hours, As needed, Severe Pain (7 - 10) (23 Feb 2023 22:35)  polyethylene glycol 3350 oral powder for reconstitution: 17 gram(s) orally 2 times a day (28 Feb 2023 10:18)  pregabalin 100 mg oral capsule: 1 cap(s) orally 2 times a day (28 Feb 2023 10:18)  senna leaf extract oral tablet: 2 tab(s) orally once a day (at bedtime) (28 Feb 2023 10:18)  sevelamer carbonate 800 mg oral tablet: 1 tab(s) orally 3 times a day (with meals) (28 Feb 2023 10:18)      MEDICATIONS  (STANDING):  albuterol/ipratropium for Nebulization 3 milliLiter(s) Nebulizer every 6 hours  allopurinol 100 milliGRAM(s) Oral daily  bisacodyl 5 milliGRAM(s) Oral at bedtime  buDESOnide    Inhalation Suspension 0.5 milliGRAM(s) Inhalation two times a day  chlorhexidine 2% Cloths 1 Application(s) Topical daily  cinacalcet 90 milliGRAM(s) Oral daily  coronavirus bivalent (EUA) Booster Vaccine (PFIZER) 0.3 milliLiter(s) IntraMuscular once  levETIRAcetam 500 milliGRAM(s) Oral two times a day  midodrine. 30 milliGRAM(s) Oral <User Schedule>  polyethylene glycol 3350 17 Gram(s) Oral two times a day  pregabalin 100 milliGRAM(s) Oral two times a day  senna 2 Tablet(s) Oral at bedtime  sevelamer carbonate 800 milliGRAM(s) Oral three times a day with meals  warfarin 7 milliGRAM(s) Oral once      FAMILY HISTORY:  Family history of colon cancer (Father)    Family history of heart disease (Father)        SOCIAL HISTORY:    [ ] Non-smoker  [x ] Smoker  [ ] Alcohol    Allergies    baclofen (Other)  latex (Rash)    Intolerances    	    REVIEW OF SYSTEMS:  CONSTITUTIONAL: No fever, weight loss, or fatigue  EYES: No eye pain, visual disturbances, or discharge  ENMT:  No difficulty hearing, tinnitus, vertigo; No sinus or throat pain  NECK: No pain or stiffness  RESPIRATORY: No cough, wheezing, chills or hemoptysis; + Shortness of Breath  CARDIOVASCULAR: as HPI  GASTROINTESTINAL: No abdominal or epigastric pain. No nausea, vomiting, or hematemesis; No diarrhea or constipation. No melena or hematochezia.  GENITOURINARY: No dysuria, frequency, hematuria, or incontinence  NEUROLOGICAL: No headaches, memory loss, loss of strength, numbness, or tremors  SKIN: No itching, burning, rashes, or lesions   	  [ ] All others negative	  [ ] Unable to obtain    PHYSICAL EXAM:    T(C): 36.7 (03-11-23 @ 13:04), Max: 36.8 (03-10-23 @ 23:02)  HR: 94 (03-11-23 @ 15:03) (70 - 106)  BP: 74/55 (03-11-23 @ 15:03) (74/55 - 96/50)  RR: 17 (03-11-23 @ 13:04) (16 - 18)  SpO2: 95% (03-11-23 @ 13:04) (95% - 100%)  Wt(kg): --  I&O's Summary    Daily Height in cm: 165.1 (11 Mar 2023 10:13)    Daily     Appearance: Normal	  Psychiatry: A & O x 3, Mood & affect appropriate  HEENT:   Normal oral mucosa, PERRL, EOMI	  Lymphatic: No lymphadenopathy  Cardiovascular: Normal S1 S2,RRR, No JVD, No murmurs  Respiratory: Lungs clear to auscultation	  Gastrointestinal:  Soft, Non-tender, + BS	  Skin: No rashes, No ecchymoses, No cyanosis	  Neurologic: Non-focal  Extremities: Normal range of motion, No clubbing, cyanosis or edema  Vascular: Peripheral pulses palpable 2+ bilaterally    TELEMETRY: 	    ECG:  	sr no acute ischemic abln   RADIOLOGY:  OTHER: 	  	  LABS:	 	    CARDIAC MARKERS:        proBNP:     Lipid Profile:   HgA1c:   TSH:                           13.3   9.64  )-----------( 252      ( 10 Mar 2023 21:48 )             45.4     03-10    135  |  89<L>  |  27<H>  ----------------------------<  91  4.3   |  29  |  9.78<H>    Ca    8.7      10 Mar 2023 21:48  Mg     2.10     03-10    TPro  8.7<H>  /  Alb  4.1  /  TBili  0.3  /  DBili  x   /  AST  34  /  ALT  14  /  AlkPhos  126<H>  03-10        Creatinine, Serum: 9.78 mg/dL (03-10-23 @ 21:48)        ASSESSMENT/PLAN:

## 2023-03-11 NOTE — ED ADULT NURSE REASSESSMENT NOTE - NS ED NURSE REASSESS COMMENT FT1
Break coverage RN: pt A&Ox4 resting on stretcher. Respirations even and unlabored. Repeat labs sent per order. Pt offers no complaints at this time. No acute distress noted. bed in lowest position, side rails up, report given to ESSU1 RN.
BP noted to be in 80s/50s, Pt A&Ox4, pt denies cp, sob, dizziness, lightheadedness, nausea, HA, blurry vision, MD made aware, medicated patient as per orders, respirations are even and unlabored, NAD, VS noted, Safety precautions implemented as per protocol, awaiting further MD orders, will continue with plan of care.

## 2023-03-11 NOTE — PATIENT PROFILE ADULT - FALL HARM RISK - HARM RISK INTERVENTIONS
Assistance with ambulation/Assistance OOB with selected safe patient handling equipment/Communicate Risk of Fall with Harm to all staff/Discuss with provider need for PT consult/Monitor gait and stability/Provide patient with walking aids - walker, cane, crutches/Reinforce activity limits and safety measures with patient and family/Sit up slowly, dangle for a short time, stand at bedside before walking/Tailored Fall Risk Interventions/Visual Cue: Yellow wristband and red socks/Bed in lowest position, wheels locked, appropriate side rails in place/Call bell, personal items and telephone in reach/Instruct patient to call for assistance before getting out of bed or chair/Non-slip footwear when patient is out of bed/[place] to call system/Physically safe environment - no spills, clutter or unnecessary equipment/Purposeful Proactive Rounding/Room/bathroom lighting operational, light cord in reach

## 2023-03-11 NOTE — PATIENT PROFILE ADULT - STATED REASON FOR ADMISSION
Discharge Summary       PATIENT ID: Carla Acuna  MRN: [id number]   YOB: 1960    DATE OF ADMISSION: 9/5/2019  8:45 PM    DATE OF DISCHARGE: 9/13/2019   PRIMARY CARE PROVIDER: None     ATTENDING PHYSICIAN: Zeus Hernandez MD  DISCHARGING PROVIDER: Nicolás Marques MD    To contact this individual call [phone] and ask the  to page. If unavailable ask to be transferred the Adult Hospitalist Department. CONSULTATIONS: IP CONSULT TO INTENSIVIST  IP CONSULT TO NEUROLOGY  IP CONSULT TO NEUROLOGY  IP CONSULT TO CARDIOLOGY    PROCEDURES/SURGERIES: Procedure(s):  LEFT HEART CATH / CORONARY ANGIOGRAPHY    ADMITTING DIAGNOSES & HOSPITAL COURSE:        A 61-year-old white female with past medical history of COPD, obstructive sleep apnea, hypertension, morbid obesity, peripheral edema, presented to the emergency department via EMS with reported seizures and altered mental status. Recurrent  VT on 9/11 , now Normal Sinus Rhythm   -s/p cardiac cath on 9/13 normal LVEDP, Normal coronaries, Hypoxemia at presentation resolved with O2 likely from OHS  -continue metoprolol XL 25 mg po daily  -dizziness improved  -ekg normal sinus vent rate 87 bpm non specific st t wave long   -she said her father passed away at age of 52 due to massive MI  -cardiologist on board   Acute on chronic hypoxic hypercapnic respiratory failure due to acute COPD exacerbation, obesity hypoventilation and possible CORWIN   -stable, continue oxygen, pulmicort, brovana, duo neb, oxygen support   -chest x ray on 9/7 unchanged mild pulmonary edema. New left basilar atelectasis and small left pleural effusion.   -patient home oxygen dependent 2 l/m, monitor pulse ox  Acute COPD exacerbation   -improving, continue on duo neb, oxygen support, pulmicort and brovana, monitor pulse ox  -home oxygen dependent    Possible Seizure  -continue on keppra 500 mg bid  -no seizure since admission  -no driving for 6 months  -EEG there are no focal abnormalities, epileptiform discharges, or electrographic seizures seen. abnormal eeg due to diffuse beta activity  -Neurologist on board  Suspected acute CVA, left sided neglect s/p tpA  -continue on aspirin  -improved   -CT head no acute intracranial process identified  -CTA head no evidence of large vessel occlusion or perfusion abnormality, no hemodynamically significant carotid artery stenosis. Acute encephalopathy POA  -improving, patient is conscious and alert, oriented to place and person, has on and off confusion and she is forgetful  -no leukocytosis, fever  -alcohol <10  -continue neuro check and supportive care  HTN  -BP normal, continue on losartan, monitor BP  T2DM new diagnosis  -A1c 7.2  -continue sliding scale and monitor finger stick glucose  -continue metformin on discharge    Hypokalemia   -resolved  Hypernatremia   -improved  HTN  -BP not at goal, continue losartan, metoprolol is added, monitor BP  Hx of lower extremities edema  -continue on lasix   Hx of CORWIN  -continue CPAP q hs        Code status: Full Code  DVT prophylaxis: SCD      DISCHARGE DIAGNOSES / PLAN:      Recurrent  VT on 9/11 , now Normal Sinus Rhythm   -s/p cardiac cath on 9/13 normal LVEDP, Normal coronaries, Hypoxemia at presentation resolved with O2 likely from OHS  -continue metoprolol XL 25 mg po daily  -outpatient follow up with cardiologist  Acute on chronic hypoxic hypercapnic respiratory failure due to acute COPD exacerbation, obesity hypoventilation and possible CORWIN   -patient home oxygen dependent 2 l/m, prn duo neb and monitor pulse ox  Acute COPD exacerbation   -resolved, continue home oxygen dependent    Possible Seizure  -continue on keppra 500 mg bid  -no seizure since admission  -no driving for 6 months  -EEG there are no focal abnormalities, epileptiform discharges, or electrographic seizures seen.   abnormal eeg due to diffuse beta activity  -Neurologist on board  Suspected acute CVA, left sided neglect s/p tpA  -continue on aspirin  Acute encephalopathy POA  -improved, forgetful   -continue neuro check and supportive care  HTN  -continue on losartan, monitor BP  T2DM new diagnosis  -continue metformin on discharge    Hypokalemia   -resolved  Hypernatremia   -resolved  HTN  -continue losartan, metoprolol is added, monitor BP  Hx of lower extremities edema  -continue on lasix   Hx of CORWIN  -continue CPAP q hs         PENDING TEST RESULTS:   At the time of discharge the following test results are still pending: None    FOLLOW UP APPOINTMENTS:    Follow-up Information     Follow up With Specialties Details Why Contact Info    Kierra Matos MD Family Practice On 9/18/2019 Hospital f/u new PCP appointment Wednesday, 9/18/19 @ 10:30 a.m. Please arrive 15 minutes early with photo ID, insurance card and a listing of all medications. [address]  [address] [facility]  Skilled nursing, PT/OT once PCP approves [facility] services [address] [facility] [address] [phone]     2. Follow up with Dr. Fronie Lefort in clinic at next available (Jan or Feb). Please call for Keppra refills or with any questions. [phone] Earls, MD, Electrophysiology/Cardiology  [facility]  [address], [address] [place] [address]                                [phone]        4. Sridhar Gomez MD, Pulmonologist in one 1-2 weeks    DIET: Diabetic Diet    ACTIVITY: Activity as tolerated, Seizure precaution    WOUND CARE: None    EQUIPMENT needed: None      DISCHARGE MEDICATIONS:  Current Discharge Medication List      START taking these medications    Details   metoprolol succinate (TOPROL-XL) 25 mg XL tablet Take 1 Tab by mouth daily. Indications: VT  Qty: 30 Tab, Refills: 0      aspirin 81 mg chewable tablet Take 1 Tab by mouth daily.   Qty: 30 Tab, Refills: 0      levETIRAcetam (KEPPRA) 500 mg tablet Take 1 Tab by mouth two (2) times a day. Qty: 60 Tab, Refills: 0      metFORMIN (GLUCOPHAGE) 500 mg tablet Take 1 Tab by mouth two (2) times daily (with meals). Qty: 60 Tab, Refills: 0         CONTINUE these medications which have NOT CHANGED    Details   losartan (COZAAR) 25 mg tablet Take 25 mg by mouth daily. naproxen (NAPROSYN) 375 mg tablet Take 375 mg by mouth two (2) times daily (with meals). umeclidinium-vilanterol (ANORO ELLIPTA) 62.5-25 mcg/actuation inhaler Take 1 Puff by inhalation daily. fluticasone propionate (FLOVENT DISKUS) 250 mcg/actuation dsdv Take 250 mcg by inhalation two (2) times a day. albuterol (PROVENTIL HFA, VENTOLIN HFA, PROAIR HFA) 90 mcg/actuation inhaler Take 1 Puff by inhalation every six (6) hours as needed for Wheezing. furosemide (LASIX) 40 mg tablet Take 40 mg by mouth daily. NOTIFY YOUR PHYSICIAN FOR ANY OF THE FOLLOWING:   Fever over 101 degrees for 24 hours. Chest pain, shortness of breath, fever, chills, nausea, vomiting, diarrhea, change in mentation, falling, weakness, bleeding. Severe pain or pain not relieved by medications. Or, any other signs or symptoms that you may have questions about.     DISPOSITION:   x Home With:   OT x PT x HH  RN       Long term SNF/Inpatient Rehab    Independent/assisted living    Hospice    Other:       PATIENT CONDITION AT DISCHARGE:     Functional status    Poor    x Deconditioned     Independent      Cognition     Lucid    x Forgetful     Dementia      Catheters/lines (plus indication)    Lozano     PICC     PEG    x None      Code status   x  Full code     DNR      PHYSICAL EXAMINATION AT DISCHARGE:   Refer to Progress Note      CHRONIC MEDICAL DIAGNOSES:  Problem List as of 9/13/2019 Never Reviewed          Codes Class Noted - Resolved    Seizure ([facility] [address] ICD-10-CM: R56.9  ICD-9-CM: 780.39  9/5/2019 - Present        Altered mental status ICD-10-CM: R41.82  ICD-9-CM: 780.97 9/5/2019 - Present        Elevated serum creatinine ICD-10-CM: R79.89  ICD-9-CM: 790.99  9/5/2019 - Present        Left-sided weakness ICD-10-CM: R53.1  ICD-9-CM: 728.87  9/5/2019 - Present        Uncontrolled hypertension ICD-10-CM: I10  ICD-9-CM: 401.9  9/5/2019 - Present        * (Principal) [facility] ICD-10-CM: I47.2  ICD-9-CM: 427.1  9/5/2019 - Present    Overview Signed 9/12/2019  6:50 AM by Nat Trivedi MD     Added automatically from request for surgery [id number]                   Greater than 45 minutes were spent with the patient on counseling and coordination of care    Signed:   Tita Curtis MD  9/13/2019  10:51 AM sat in chair, closed eyes and passed out.

## 2023-03-11 NOTE — PATIENT PROFILE ADULT - FUNCTIONAL ASSESSMENT - BASIC MOBILITY 6.
3-calculated by average/Not able to assess (calculate score using [facility] averaging method)  2-calculated by average/Not able to assess (calculate score using [facility] averaging method)

## 2023-03-11 NOTE — PROVIDER CONTACT NOTE (OTHER) - ACTION/TREATMENT ORDERED:
ACP made aware, Pt asymptomatic, no complaints.  stat lab work drawn and sent lab. pending orders from ACP. ACP made aware, Pt asymptomatic, no complaints. blood pressure currently around patients baseline.   stat lab work drawn and sent lab. if Pts BP doesn't improve bolus of fluids will be ordered tonight

## 2023-03-11 NOTE — H&P ADULT - ASSESSMENT
78 yo male w h/o severe orthostatic hypotension on midodrine, COPD (on 2L home O2), CHF, pHTN,, DVT pm coumadin  ESRD on HD at [facility] via AV fistula NOE DELVALLE, who was DCed on 3/01/2023 with orthostatic hypotension and also at  [facility] for severe orthostatic hypotension. Pt did well during his hospitalizations. His chronic NF is not comfortable dispensing 30 mg of Midodrine( his usual dose is 30 mg tid) S/P IVC filter presented for a presyncopal episode). Pt says he remembers the entire event, when he was being transferred from his bed to wheelchair he closed his eyes, said both aids were trying to wake up for about a minute and then he opened his eyes. Denied any preceding sx of chest pain, dizziness, sob. Pt said they checked his BP and FS which was normal. On arrival BP 94/53.    orhtostatic Hypotension  - chronic  - resume outptn dose of 30 mg Midodrine   - cardiology called      ESRD on HD Monday, Wednesday and Friday   Via AVF  Last HD was on 03/08  Nephrologist: Dr. Ty Massey  No need for urgent HD tonight  HD tomorrow    resume AC w coumadin

## 2023-03-11 NOTE — H&P ADULT - NSHPPHYSICALEXAM_GEN_ALL_CORE
T(C): 36.7 (03-11-23 @ 13:04), Max: 36.8 (03-10-23 @ 23:02)  HR: 81 (03-11-23 @ 13:04) (70 - 106)  BP: 83/56 (03-11-23 @ 13:04) (78/53 - 96/50)  RR: 17 (03-11-23 @ 13:04) (16 - 18)  SpO2: 95% (03-11-23 @ 13:04) (95% - 100%)    PHYSICAL EXAM:  GENERAL: NAD, well-developed  HEAD:  Atraumatic, Normocephalic  EYES: EOMI, PERRLA, conjunctiva and sclera clear  NECK: Supple, No JVD  CHEST/LUNG: Clear to auscultation bilaterally; No wheeze  HEART: Regular rate and rhythm; No murmurs, rubs, or gallops  ABDOMEN: Soft, Nontender, Nondistended; Bowel sounds present  EXTREMITIES:  2+ Peripheral Pulses, No clubbing, cyanosis, or edema  PSYCH: AAOx3  NEUROLOGY: non-focal  SKIN: No rashes or lesions

## 2023-03-12 LAB
ANION GAP SERPL CALC-SCNC: 22 MMOL/L — HIGH (ref 7–14)
BUN SERPL-MCNC: 25 MG/DL — HIGH (ref 7–23)
CALCIUM SERPL-MCNC: 8.4 MG/DL — SIGNIFICANT CHANGE UP (ref 8.4–10.5)
CHLORIDE SERPL-SCNC: 94 MMOL/L — LOW (ref 98–107)
CO2 SERPL-SCNC: 19 MMOL/L — LOW (ref 22–31)
CREAT SERPL-MCNC: 8.05 MG/DL — HIGH (ref 0.5–1.3)
EGFR: 6 ML/MIN/1.73M2 — LOW
GLUCOSE SERPL-MCNC: 71 MG/DL — SIGNIFICANT CHANGE UP (ref 70–99)
HCT VFR BLD CALC: 39 % — SIGNIFICANT CHANGE UP (ref 39–50)
HGB BLD-MCNC: 11.8 G/DL — LOW (ref 13–17)
INR BLD: 2.63 RATIO — HIGH (ref 0.88–1.16)
MAGNESIUM SERPL-MCNC: 2.1 MG/DL — SIGNIFICANT CHANGE UP (ref 1.6–2.6)
MCHC RBC-ENTMCNC: 28.6 PG — SIGNIFICANT CHANGE UP (ref 27–34)
MCHC RBC-ENTMCNC: 30.3 GM/DL — LOW (ref 32–36)
MCV RBC AUTO: 94.4 FL — SIGNIFICANT CHANGE UP (ref 80–100)
NRBC # BLD: 0 /100 WBCS — SIGNIFICANT CHANGE UP (ref 0–0)
NRBC # FLD: 0 K/UL — SIGNIFICANT CHANGE UP (ref 0–0)
PHOSPHATE SERPL-MCNC: 4.4 MG/DL — SIGNIFICANT CHANGE UP (ref 2.5–4.5)
PLATELET # BLD AUTO: 213 K/UL — SIGNIFICANT CHANGE UP (ref 150–400)
POTASSIUM SERPL-MCNC: 4.7 MMOL/L — SIGNIFICANT CHANGE UP (ref 3.5–5.3)
POTASSIUM SERPL-SCNC: 4.7 MMOL/L — SIGNIFICANT CHANGE UP (ref 3.5–5.3)
PROTHROM AB SERPL-ACNC: 30.8 SEC — HIGH (ref 10.5–13.4)
RBC # BLD: 4.13 M/UL — LOW (ref 4.2–5.8)
RBC # FLD: 14.5 % — SIGNIFICANT CHANGE UP (ref 10.3–14.5)
SODIUM SERPL-SCNC: 135 MMOL/L — SIGNIFICANT CHANGE UP (ref 135–145)
WBC # BLD: 9.28 K/UL — SIGNIFICANT CHANGE UP (ref 3.8–10.5)
WBC # FLD AUTO: 9.28 K/UL — SIGNIFICANT CHANGE UP (ref 3.8–10.5)

## 2023-03-12 RX ORDER — BUDESONIDE AND FORMOTEROL FUMARATE DIHYDRATE 160; 4.5 UG/1; UG/1
2 AEROSOL RESPIRATORY (INHALATION)
Refills: 0 | Status: DISCONTINUED | OUTPATIENT
Start: 2023-03-12 | End: 2023-03-15

## 2023-03-12 RX ORDER — WARFARIN SODIUM 2.5 MG/1
6 TABLET ORAL ONCE
Refills: 0 | Status: COMPLETED | OUTPATIENT
Start: 2023-03-12 | End: 2023-03-12

## 2023-03-12 RX ADMIN — SENNA PLUS 2 TABLET(S): 8.6 TABLET ORAL at 21:06

## 2023-03-12 RX ADMIN — Medication 3 MILLILITER(S): at 08:05

## 2023-03-12 RX ADMIN — Medication 3 MILLILITER(S): at 20:46

## 2023-03-12 RX ADMIN — MIDODRINE HYDROCHLORIDE 30 MILLIGRAM(S): 2.5 TABLET ORAL at 21:05

## 2023-03-12 RX ADMIN — MIDODRINE HYDROCHLORIDE 30 MILLIGRAM(S): 2.5 TABLET ORAL at 08:53

## 2023-03-12 RX ADMIN — CINACALCET 90 MILLIGRAM(S): 30 TABLET, FILM COATED ORAL at 12:24

## 2023-03-12 RX ADMIN — SEVELAMER CARBONATE 800 MILLIGRAM(S): 2400 POWDER, FOR SUSPENSION ORAL at 17:52

## 2023-03-12 RX ADMIN — WARFARIN SODIUM 6 MILLIGRAM(S): 2.5 TABLET ORAL at 21:06

## 2023-03-12 RX ADMIN — SEVELAMER CARBONATE 800 MILLIGRAM(S): 2400 POWDER, FOR SUSPENSION ORAL at 12:24

## 2023-03-12 RX ADMIN — Medication 100 MILLIGRAM(S): at 12:24

## 2023-03-12 RX ADMIN — CHLORHEXIDINE GLUCONATE 1 APPLICATION(S): 213 SOLUTION TOPICAL at 12:24

## 2023-03-12 RX ADMIN — BUDESONIDE AND FORMOTEROL FUMARATE DIHYDRATE 2 PUFF(S): 160; 4.5 AEROSOL RESPIRATORY (INHALATION) at 21:14

## 2023-03-12 RX ADMIN — MIDODRINE HYDROCHLORIDE 30 MILLIGRAM(S): 2.5 TABLET ORAL at 14:37

## 2023-03-12 RX ADMIN — Medication 0.5 MILLIGRAM(S): at 08:06

## 2023-03-12 RX ADMIN — Medication 5 MILLIGRAM(S): at 21:06

## 2023-03-12 RX ADMIN — LEVETIRACETAM 500 MILLIGRAM(S): 250 TABLET, FILM COATED ORAL at 05:46

## 2023-03-12 RX ADMIN — Medication 3 MILLIGRAM(S): at 21:07

## 2023-03-12 RX ADMIN — SEVELAMER CARBONATE 800 MILLIGRAM(S): 2400 POWDER, FOR SUSPENSION ORAL at 08:52

## 2023-03-12 RX ADMIN — Medication 100 MILLIGRAM(S): at 17:52

## 2023-03-12 RX ADMIN — LEVETIRACETAM 500 MILLIGRAM(S): 250 TABLET, FILM COATED ORAL at 17:52

## 2023-03-12 RX ADMIN — Medication 100 MILLIGRAM(S): at 05:46

## 2023-03-12 RX ADMIN — MIDODRINE HYDROCHLORIDE 30 MILLIGRAM(S): 2.5 TABLET ORAL at 03:05

## 2023-03-12 NOTE — CONSULT NOTE ADULT - SUBJECTIVE AND OBJECTIVE BOX
03-12-23 @ 13:22    Patient is a 77y old  Male who presents with a chief complaint of syncope (12 Mar 2023 10:39)      HPI:  78 yo male w h/o severe orthostatic hypotension on midodrine, COPD (on 2L home O2), CHF, pHTN,, DVT pm coumadin  ESRD on HD at [facility] via AV fistula NOE DELVALLE, who has had multiple hospitalizations 2/2 syncope 2/2 orthostatic hypotension . Pt did well during his hospitalizations. His chronic NF is not comfortable dispensing 30 mg of Midodrine( his usual dose is 30 mg tid) S/P IVC filter presented for another syncopal episode. Pt says he remembers the entire event, when he was being transferred from his bed to wheelchair he closed his eyes, said both aids were trying to wake him up for about a minute and then he opened his eyes. Denied any preceding sx of chest pain, dizziness, sob. Pt said they checked his BP and FS which was normal. On arrival BP 94/53.  Nephrology and cardiology consulted  (11 Mar 2023 14:29)    he has copd and has been on home oxygen :  he says he is not interested in talking much and just wants to go home;   currently he is no sob: and has not zana wheezing:     ?FOLLOWING PRESENT  [x ] Hx of PE/DVT, [y ] Hx COPD, [ x] Hx of Asthma, [ y] Hx of Hospitalization, [ x]  Hx of BiPAP/CPAP use, [ x] Hx of SAADIA    Allergies    baclofen (Other)  latex (Rash)    Intolerances        PAST MEDICAL & SURGICAL HISTORY:  Hypertension      Glomerular disease  Segmental glomerular sclerosis      CHF (congestive heart failure)      COPD (chronic obstructive pulmonary disease)      Pulmonary hypertension      Sleep apnea      Pulmonary edema      Peripheral edema      Gout      History of abdominal surgery  polypectomy      H/O right heart catheterization          FAMILY HISTORY:  Family history of colon cancer (Father)    Family history of heart disease (Father)        Social History: [ former ] TOBACCO                  [  x] ETOH                                 [  x] IVDA/DRUGS    REVIEW OF SYSTEMS      General:	x    Skin/Breast:x  	  Ophthalmologic:  	  ENMT:	x    Respiratory and Thorax:x  	  Cardiovascular:	x    Gastrointestinal:	x    Genitourinary:	x    Musculoskeletal:	x    Neurological:	 Syncope    Psychiatric:	x    Hematology/Lymphatics:	x    Endocrine:	x    Allergic/Immunologic:	x    MEDICATIONS  (STANDING):  albuterol/ipratropium for Nebulization 3 milliLiter(s) Nebulizer every 6 hours  allopurinol 100 milliGRAM(s) Oral daily  bisacodyl 5 milliGRAM(s) Oral at bedtime  buDESOnide    Inhalation Suspension 0.5 milliGRAM(s) Inhalation two times a day  chlorhexidine 2% Cloths 1 Application(s) Topical daily  cinacalcet 90 milliGRAM(s) Oral daily  coronavirus bivalent (EUA) Booster Vaccine (PFIZER) 0.3 milliLiter(s) IntraMuscular once  levETIRAcetam 500 milliGRAM(s) Oral two times a day  midodrine. 30 milliGRAM(s) Oral <User Schedule>  polyethylene glycol 3350 17 Gram(s) Oral two times a day  pregabalin 100 milliGRAM(s) Oral two times a day  senna 2 Tablet(s) Oral at bedtime  sevelamer carbonate 800 milliGRAM(s) Oral three times a day with meals    MEDICATIONS  (PRN):  acetaminophen     Tablet .. 650 milliGRAM(s) Oral every 6 hours PRN Temp greater or equal to 38C (100.4F), Mild Pain (1 - 3)  melatonin 3 milliGRAM(s) Oral at bedtime PRN Insomnia  oxyCODONE    IR 5 milliGRAM(s) Oral every 6 hours PRN Severe Pain (7 - 10)  sodium chloride 0.9% Bolus. 100 milliLiter(s) IV Bolus every 5 minutes PRN SBP LESS THAN or EQUAL to 90 mmHg       Vital Signs Last 24 Hrs  T(C): 36.5 (12 Mar 2023 12:53), Max: 37.1 (11 Mar 2023 15:38)  T(F): 97.7 (12 Mar 2023 12:53), Max: 98.8 (11 Mar 2023 15:38)  HR: 85 (12 Mar 2023 12:53) (66 - 94)  BP: 75/42 (12 Mar 2023 12:53) (74/55 - 125/73)  BP(mean): --  RR: 18 (12 Mar 2023 12:53) (17 - 18)  SpO2: 96% (12 Mar 2023 12:53) (95% - 100%)    Parameters below as of 12 Mar 2023 12:53  Patient On (Oxygen Delivery Method): nasal cannula  O2 Flow (L/min): 2  Orthostatic VS          I&O's Summary    11 Mar 2023 06:01  -  12 Mar 2023 07:00  --------------------------------------------------------  IN: 620 mL / OUT: 300 mL / NET: 320 mL        Physical Exam:   GENERAL: obese  HEENT: JORDIN/   Atraumatic, Normocephalic  ENMT: No tonsillar erythema, exudates, or enlargement; Moist mucous membranes, Good dentition, No lesions  NECK: Supple, No JVD, Normal thyroid  CHEST/LUNG: Clear to auscultation bilaterally; No rales, rhonchi, wheezing, or rubs  CVS: Regular rate and rhythm; No murmurs, rubs, or gallops  GI: : Soft, Nontender, Nondistended; Bowel sounds present  NERVOUS SYSTEM:  Alert & Oriented X3  EXTREMITIES:  + edema with corrugated skin  LYMPH: No lymphadenopathy noted  SKIN: No rashes or lesions  ENDOCRINOLOGY: No Thyromegaly  PSYCH: Appropriate    Labs:  COVID-19 PCR: NotDetec (27 Feb 2023 11:30)  COVID-19 PCR: NotDetec (24 Jan 2023 11:09)    CARDIAC MARKERS ( 11 Mar 2023 00:08 )  x     / x     / x     / x     / 1.2 ng/mL                            11.8   9.28  )-----------( 213      ( 12 Mar 2023 03:15 )             39.0                         12.9   10.57 )-----------( 259      ( 11 Mar 2023 18:26 )             43.8                         13.3   9.64  )-----------( 252      ( 10 Mar 2023 21:48 )             45.4     03-12    135  |  94<L>  |  25<H>  ----------------------------<  71  4.7   |  19<L>  |  8.05<H>  03-11    135  |  89<L>  |  40<H>  ----------------------------<  103<H>  3.8   |  29  |  11.82<H>  03-10    135  |  89<L>  |  27<H>  ----------------------------<  91  4.3   |  29  |  9.78<H>    Ca    8.4      12 Mar 2023 07:00  Ca    8.0<L>      11 Mar 2023 18:26  Ca    8.7      10 Mar 2023 21:48  Phos  4.4     03-12  Phos  5.9     03-11  Mg     2.10     03-12  Mg     2.00     03-11  Mg     2.10     03-10    TPro  8.7<H>  /  Alb  4.1  /  TBili  0.3  /  DBili  x   /  AST  34  /  ALT  14  /  AlkPhos  126<H>  03-10    CAPILLARY BLOOD GLUCOSE        LIVER FUNCTIONS - ( 10 Mar 2023 21:48 )  Alb: 4.1 g/dL / Pro: 8.7 g/dL / ALK PHOS: 126 U/L / ALT: 14 U/L / AST: 34 U/L / GGT: x           PT/INR - ( 12 Mar 2023 03:15 )   PT: 30.8 sec;   INR: 2.63 ratio             D DImer      Studies  Chest X-RAY  CT SCAN Chest   CT Abdomen  Venous Dopplers: LE:   Others        r< from: Xray Chest 1 View AP/PA (03.10.23 @ 22:12) >    ACC: [id number] EXAM:  XR CHEST AP OR PA 1V   ORDERED BY: MASTER CALDWELL     PROCEDURE DATE:  03/10/2023          INTERPRETATION:  EXAMINATION: XR CHEST    CLINICAL INDICATION: Wheezing    TECHNIQUE: Single frontal, portable view of the chest was obtained.    COMPARISON: Chest x-ray 1/17/2023.    FINDINGS:  The heart is normal in size.  Unchanged mild bilateral atelectasis.  There is no pneumothorax or pleural effusion.    IMPRESSION:  Unchanged mild bilateral atelectasis.    --- End of Report ---          KOMAL JAVIER MD; Resident Radiologist  This document has been electronically signed.  MARYSOL FROST MD; Attending Radiologist  This document has been electronically signed. Mar 11 2023  8:05AM    < end of copied text >  < from: CT Angio Abdomen and Pelvis w/ IV Cont (02.24.23 @ 20:06) >  VESSELS: IVC filter. Atherosclerotic changes.  RETROPERITONEUM/LYMPH NODES: No lymphadenopathy.  ABDOMINAL WALL: Rectus diastasis with multiple small fat-containing   ventral abdominal wall hernias..  BONES: Degenerative changes.    IMPRESSION:  No evidence for active GI bleed.    No acute abdominal or pelvic pathology.    --- End of Report ---          < end of copied text >  < from: CT Chest No Cont (07.17.22 @ 22:03) >  Oral Contrast: NONE  Complications: None reported at time of study completion    PROCEDURE:  CT scan of the chest was obtained without intravenous contrast.    FINDINGS:    LYMPH NODES: Subcentimeter calcified mediastinal and hilar lymph nodes,   likely sequela of prior granulomatous infection.    HEART/VASCULATURE: The heart is normal in size. No pericardial effusion.   There are calcifications of the aorta and coronary arteries. The   pulmonary artery is enlarged to 3.7 cm, nonspecific although can be seen   with pulmonary hypertension. Mid ascending aorta measures 3.9 cm.    AIRWAYS/LUNGS/PLEURA: Central airways are patent. Bilateral lower lobe   linear subsegmental atelectasis. No consolidation. Multiple scattered   calcified granulomata bilaterally. No pleural effusion or pneumothorax.    UPPER ABDOMEN: Partially imaged left renal cyst. Calcified splenic and   hepatic granulomas. Thickening of the adrenal glands.    BONES/SOFT TISSUES: Degenerative changes of the spine.    IMPRESSION:  No pneumonia.    --- End of Report ---    < end of copied text >

## 2023-03-12 NOTE — DIETITIAN INITIAL EVALUATION ADULT - OTHER INFO
As per chart, pt is a 76 yo male w h/o severe orthostatic hypotension on midodrine, COPD (on 2L home O2), CHF, pHTN,, DVT pm coumadin  ESRD on HD at [facility] via AV fistula NOE DELVALLE. p/w ?syncope. Pt is being monitored by cardio and nephro.     Pt is A&O x4. Pt stated with fluctuated appetite related to GI discomfort. Completed 75% of breakfast as per observation. No reported GI issues (nausea/vomiting/diarrhea/constipation). Last BM 3/11 as per pt. Noticed bowel regimen in use. Denied swallowing/chewing difficulties. Pt is noticed with weight loss. Dry weight (3/11), 111kg. GI discomfort discussed with ACP. Encouraged po intake when tolerates. Pt declined use of protein supplement at this time. Pt is not a candidate for nutrition education. RD to remain available for further nutritional interventions as indicated.  As per chart, pt is a 76 yo male w h/o severe orthostatic hypotension on midodrine, COPD (on 2L home O2), CHF, pHTN,, DVT pm coumadin  ESRD on HD at [facility] via AV fistula NOE DELVALLE. p/w ?syncope. Pt is being monitored by cardio and nephro.     Pt is A&O x4. Pt stated with fluctuated appetite related to GI discomfort. Completed 75% of breakfast as per observation. No reported GI issues (nausea/vomiting/diarrhea/constipation). Last BM 3/11 as per pt. Noticed bowel regimen in use. Denied swallowing/chewing difficulties. Pt is noticed with weight loss. Dry weight (3/11), 111kg. GI discomfort discussed with ACP. Encouraged po intake when tolerates. Pt declined protein supplement at this time. Pt is not a candidate for nutrition education. RD to remain available for further nutritional interventions as indicated.

## 2023-03-12 NOTE — DIETITIAN INITIAL EVALUATION ADULT - NS FNS DIET ORDER
Diet, Renal Restrictions:   For patients receiving Renal Replacement - No Protein Restr, No Conc K, No Conc Phos, Low Sodium (03-11-23 @ 14:40) [Active]

## 2023-03-12 NOTE — DIETITIAN INITIAL EVALUATION ADULT - NS FNS WEIGHT CHANGE REASON
As per Ziggy MCCARTY, weight hx 1/22 116 kg, 1/9 113.8kg, (2022) 10/19 114.4kg, 7/19 124.7kg./unintentional

## 2023-03-12 NOTE — CHART NOTE - NSCHARTNOTEFT_GEN_A_CORE
Late Note Entry     Notified earlier that patients BP noted to be 75/42 while lying in bed.   Patient seen and examined immediately at bedside.   Found to he awake and alert, AAO x 3, and asymptomatic.   Patient denies any dizziness, light headedness, chest pain, or any new symptoms.   Of note, patient reports his baseline BP is 80s-90s. BP has been mostly in 80s-90s here with occasional reduction into 70s.   Continue midodrine 30mg QID. Given patient asymptomatic and E.S.R.D will hold off on fluid bolus at this time.   BP repeated, now back to baseline - 86/54.  Will continue to monitor closely.   Case and plan discussed with Dr. Hein.

## 2023-03-12 NOTE — DIETITIAN INITIAL EVALUATION ADULT - ORAL INTAKE PTA/DIET HISTORY
As per pt, aware of HD diet restriction. Pt was not eating well 2 weeks PTA r/t abd discomfort. Last weight 240 lbs ~1 week ago. Confirmed NKFA.

## 2023-03-12 NOTE — DIETITIAN INITIAL EVALUATION ADULT - PERTINENT MEDS FT
MEDICATIONS  (STANDING):  albuterol/ipratropium for Nebulization 3 milliLiter(s) Nebulizer every 6 hours  allopurinol 100 milliGRAM(s) Oral daily  bisacodyl 5 milliGRAM(s) Oral at bedtime  budesonide 160 MICROgram(s)/formoterol 4.5 MICROgram(s) Inhaler 2 Puff(s) Inhalation two times a day  chlorhexidine 2% Cloths 1 Application(s) Topical daily  cinacalcet 90 milliGRAM(s) Oral daily  coronavirus bivalent (EUA) Booster Vaccine (PFIZER) 0.3 milliLiter(s) IntraMuscular once  levETIRAcetam 500 milliGRAM(s) Oral two times a day  midodrine. 30 milliGRAM(s) Oral <User Schedule>  polyethylene glycol 3350 17 Gram(s) Oral two times a day  pregabalin 100 milliGRAM(s) Oral two times a day  senna 2 Tablet(s) Oral at bedtime  sevelamer carbonate 800 milliGRAM(s) Oral three times a day with meals    MEDICATIONS  (PRN):  acetaminophen     Tablet .. 650 milliGRAM(s) Oral every 6 hours PRN Temp greater or equal to 38C (100.4F), Mild Pain (1 - 3)  melatonin 3 milliGRAM(s) Oral at bedtime PRN Insomnia  oxyCODONE    IR 5 milliGRAM(s) Oral every 6 hours PRN Severe Pain (7 - 10)  sodium chloride 0.9% Bolus. 100 milliLiter(s) IV Bolus every 5 minutes PRN SBP LESS THAN or EQUAL to 90 mmHg

## 2023-03-12 NOTE — CONSULT NOTE ADULT - ASSESSMENT
78 yo male w h/o severe orthostatic hypotension on midodrine, COPD (on 2L home O2), CHF, pHTN,, DVT pm coumadin  ESRD on HD at [facility] via AV fistula NOE DELVALLE, who was DCed on 3/01/2023 with orthostatic hypotension and also at  [facility] for severe orthostatic hypotension. Pt did well during his hospitalizations. His chronic NF is not comfortable dispensing 30 mg of Midodrine( his usual dose is 30 mg tid) S/P IVC filter presented for a presyncopal episode). Pt says he remembers the entire event, when he was being transferred from his bed to wheelchair he closed his eyes, said both aids were trying to wake up for about a minute and then he opened his eyes. Denied any preceding sx of chest pain, dizziness, sob. Pt said they checked his BP and FS which was normal. On arrival BP 94/53.    Orhtostatic Hypotension  ESRD on HD Monday, Wednesday and Friday   DVT: resume AC w coumadin  copd:  esrd      3/12:  Orhtostatic Hypotension : on midodrine  he is very irritable rigth now and just wants to go home:  he is not sob:  on chr home oxygen : Still very orthostatic: check vbg in am    ESRD on HD Monday, Wednesday and Friday  : cont hD per primary team  DVT: resume AC w coumadin ; on coumadin:   Copd: change to Symbicort and duoneb:  previous ct scan chest from 2022: showed evidence of prior granulomatous disease:   esrd : on hd:    acosta bass            
78 yo male w h/o severe orthostatic hypotension on midodrine, COPD (on 2L home O2), CHF, pHTN,, DVT pm coumadin  ESRD on HD at [facility] via AV fistula NOE DELVALLE, who was DCed on 3/01/2023 with orthostatic hypotension and also at  [facility] for severe orthostatic hypotension. Pt did well during his hospitalizations. His chronic NF is not comfortable dispensing 30 mg of Midodrine( his usual dose is 30 mg tid) S/P IVC filter presented for a presyncopal episode). Pt says he remembers the entire event, when he was being transferred from his bed to wheelchair he closed his eyes, said both aids were trying to wake up for about a minute and then he opened his eyes. Denied any preceding sx of chest pain, dizziness, sob. Pt said they checked his BP and FS which was normal. On arrival BP 94/53.  Nephrology consulted for dialysis needs.       ESRD on HD Monday, Wednesday and Friday   Via AVF  Last HD was on 03/08  Nephrologist: Dr. Ty Massey  No need for urgent HD tonight  HD tomorrow  Consent in the chart  Renal diet  Renal dose all medications for GFR<10.   BP remains low. On midodrine      Hypotension  Currently acceptable  Continue Midodrine    Respiratory acidosis on VBG  Defer to primary.     CKD-MBD  Tertiary  hyperparathyroidism.   On Sensipar of 60 mg po daily  PTH once  Phosphorus and calcium daily.       
A/P    76 yo male w h/o severe orthostatic hypotension on midodrine, COPD (on 2L home O2), CHF, pHTN,, DVT pm coumadin  ESRD on HD at [facility] via AV fistula NOE DELVALLE, who was DCed on 3/01/2023 with orthostatic hypotension and also at  [facility] for severe orthostatic hypotension. Pt did well during his hospitalizations. His chronic NF is not comfortable dispensing 30 mg of Midodrine( his usual dose is 30 mg tid) S/P IVC filter presented for a presyncopal episode).       #presyncope, orthostatic hypotension  -chronic issue  -BP on arrival 94/53.  -cont mido 30 mg   -recent echo with nl lv fxn, valve fxn   -check limited echo r/o new lv dysfxn, effusion  -trend orthostatics     #ESRD  -HD per renal     #DVT hx  -cont a/c per inr     #COPD, PHTN  -stable  -cont o2     #elevated trop  -secondary to demand in setting of esrd  -no chest pain    70 minutes spent on total encounter; more than 50% of the visit was spent counseling and/or coordinating care by the attending physician.

## 2023-03-12 NOTE — DIETITIAN INITIAL EVALUATION ADULT - ADD RECOMMEND
"I don't remember" - Encouraged po intake.   - Continue to monitor po intake, GI distress and fluid status.

## 2023-03-12 NOTE — DIETITIAN INITIAL EVALUATION ADULT - PERTINENT LABORATORY DATA
03-12    135  |  94<L>  |  25<H>  ----------------------------<  71  4.7   |  19<L>  |  8.05<H>    Ca    8.4      12 Mar 2023 07:00  Phos  4.4     03-12  Mg     2.10     03-12    TPro  8.7<H>  /  Alb  4.1  /  TBili  0.3  /  DBili  x   /  AST  34  /  ALT  14  /  AlkPhos  126<H>  03-10  A1C with Estimated Average Glucose Result: 5.0 % (02-24-23 @ 13:07)  A1C with Estimated Average Glucose Result: 5.8 % (07-12-22 @ 11:22)

## 2023-03-13 LAB
ANION GAP SERPL CALC-SCNC: 16 MMOL/L — HIGH (ref 7–14)
BASE EXCESS BLDV CALC-SCNC: 2.3 MMOL/L — SIGNIFICANT CHANGE UP (ref -2–3)
BLOOD GAS VENOUS COMPREHENSIVE RESULT: SIGNIFICANT CHANGE UP
BUN SERPL-MCNC: 30 MG/DL — HIGH (ref 7–23)
CALCIUM SERPL-MCNC: 7.7 MG/DL — LOW (ref 8.4–10.5)
CHLORIDE BLDV-SCNC: 96 MMOL/L — SIGNIFICANT CHANGE UP (ref 96–108)
CHLORIDE SERPL-SCNC: 94 MMOL/L — LOW (ref 98–107)
CO2 BLDV-SCNC: 30.8 MMOL/L — HIGH (ref 22–26)
CO2 SERPL-SCNC: 26 MMOL/L — SIGNIFICANT CHANGE UP (ref 22–31)
CREAT SERPL-MCNC: 10.02 MG/DL — HIGH (ref 0.5–1.3)
EGFR: 5 ML/MIN/1.73M2 — LOW
GAS PNL BLDV: 131 MMOL/L — LOW (ref 136–145)
GAS PNL BLDV: SIGNIFICANT CHANGE UP
GLUCOSE BLDV-MCNC: 80 MG/DL — SIGNIFICANT CHANGE UP (ref 70–99)
GLUCOSE SERPL-MCNC: 86 MG/DL — SIGNIFICANT CHANGE UP (ref 70–99)
HCO3 BLDV-SCNC: 29 MMOL/L — SIGNIFICANT CHANGE UP (ref 22–29)
HCT VFR BLD CALC: 41.4 % — SIGNIFICANT CHANGE UP (ref 39–50)
HCT VFR BLDA CALC: 38 % — LOW (ref 39–51)
HGB BLD CALC-MCNC: 12.7 G/DL — SIGNIFICANT CHANGE UP (ref 12.6–17.4)
HGB BLD-MCNC: 12.2 G/DL — LOW (ref 13–17)
INR BLD: 2.43 RATIO — HIGH (ref 0.88–1.16)
LACTATE BLDV-MCNC: 1.3 MMOL/L — SIGNIFICANT CHANGE UP (ref 0.5–2)
MAGNESIUM SERPL-MCNC: 2.1 MG/DL — SIGNIFICANT CHANGE UP (ref 1.6–2.6)
MCHC RBC-ENTMCNC: 28.6 PG — SIGNIFICANT CHANGE UP (ref 27–34)
MCHC RBC-ENTMCNC: 29.5 GM/DL — LOW (ref 32–36)
MCV RBC AUTO: 97.2 FL — SIGNIFICANT CHANGE UP (ref 80–100)
NRBC # BLD: 0 /100 WBCS — SIGNIFICANT CHANGE UP (ref 0–0)
NRBC # FLD: 0 K/UL — SIGNIFICANT CHANGE UP (ref 0–0)
PCO2 BLDV: 54 MMHG — SIGNIFICANT CHANGE UP (ref 42–55)
PH BLDV: 7.34 — SIGNIFICANT CHANGE UP (ref 7.32–7.43)
PHOSPHATE SERPL-MCNC: 5.1 MG/DL — HIGH (ref 2.5–4.5)
PLATELET # BLD AUTO: 234 K/UL — SIGNIFICANT CHANGE UP (ref 150–400)
PO2 BLDV: 53 MMHG — HIGH (ref 25–45)
POTASSIUM BLDV-SCNC: 4 MMOL/L — SIGNIFICANT CHANGE UP (ref 3.5–5.1)
POTASSIUM SERPL-MCNC: 4 MMOL/L — SIGNIFICANT CHANGE UP (ref 3.5–5.3)
POTASSIUM SERPL-SCNC: 4 MMOL/L — SIGNIFICANT CHANGE UP (ref 3.5–5.3)
PROTHROM AB SERPL-ACNC: 28.5 SEC — HIGH (ref 10.5–13.4)
RBC # BLD: 4.26 M/UL — SIGNIFICANT CHANGE UP (ref 4.2–5.8)
RBC # FLD: 14.8 % — HIGH (ref 10.3–14.5)
SAO2 % BLDV: 83.9 % — SIGNIFICANT CHANGE UP (ref 67–88)
SODIUM SERPL-SCNC: 136 MMOL/L — SIGNIFICANT CHANGE UP (ref 135–145)
WBC # BLD: 8.64 K/UL — SIGNIFICANT CHANGE UP (ref 3.8–10.5)
WBC # FLD AUTO: 8.64 K/UL — SIGNIFICANT CHANGE UP (ref 3.8–10.5)

## 2023-03-13 PROCEDURE — 93306 TTE W/DOPPLER COMPLETE: CPT | Mod: 26

## 2023-03-13 RX ORDER — WARFARIN SODIUM 2.5 MG/1
9 TABLET ORAL ONCE
Refills: 0 | Status: COMPLETED | OUTPATIENT
Start: 2023-03-13 | End: 2023-03-13

## 2023-03-13 RX ORDER — WARFARIN SODIUM 2.5 MG/1
7 TABLET ORAL ONCE
Refills: 0 | Status: DISCONTINUED | OUTPATIENT
Start: 2023-03-13 | End: 2023-03-13

## 2023-03-13 RX ADMIN — Medication 100 MILLIGRAM(S): at 11:37

## 2023-03-13 RX ADMIN — Medication 5 MILLIGRAM(S): at 21:08

## 2023-03-13 RX ADMIN — BUDESONIDE AND FORMOTEROL FUMARATE DIHYDRATE 2 PUFF(S): 160; 4.5 AEROSOL RESPIRATORY (INHALATION) at 09:02

## 2023-03-13 RX ADMIN — SENNA PLUS 2 TABLET(S): 8.6 TABLET ORAL at 21:08

## 2023-03-13 RX ADMIN — SEVELAMER CARBONATE 800 MILLIGRAM(S): 2400 POWDER, FOR SUSPENSION ORAL at 09:02

## 2023-03-13 RX ADMIN — MIDODRINE HYDROCHLORIDE 30 MILLIGRAM(S): 2.5 TABLET ORAL at 03:16

## 2023-03-13 RX ADMIN — LEVETIRACETAM 500 MILLIGRAM(S): 250 TABLET, FILM COATED ORAL at 05:52

## 2023-03-13 RX ADMIN — CINACALCET 90 MILLIGRAM(S): 30 TABLET, FILM COATED ORAL at 11:37

## 2023-03-13 RX ADMIN — LEVETIRACETAM 500 MILLIGRAM(S): 250 TABLET, FILM COATED ORAL at 20:58

## 2023-03-13 RX ADMIN — SEVELAMER CARBONATE 800 MILLIGRAM(S): 2400 POWDER, FOR SUSPENSION ORAL at 11:37

## 2023-03-13 RX ADMIN — Medication 3 MILLILITER(S): at 15:38

## 2023-03-13 RX ADMIN — MIDODRINE HYDROCHLORIDE 30 MILLIGRAM(S): 2.5 TABLET ORAL at 09:02

## 2023-03-13 RX ADMIN — MIDODRINE HYDROCHLORIDE 30 MILLIGRAM(S): 2.5 TABLET ORAL at 21:57

## 2023-03-13 RX ADMIN — Medication 100 MILLIGRAM(S): at 20:57

## 2023-03-13 RX ADMIN — MIDODRINE HYDROCHLORIDE 30 MILLIGRAM(S): 2.5 TABLET ORAL at 15:06

## 2023-03-13 RX ADMIN — Medication 100 MILLIGRAM(S): at 05:48

## 2023-03-13 RX ADMIN — WARFARIN SODIUM 9 MILLIGRAM(S): 2.5 TABLET ORAL at 21:07

## 2023-03-13 RX ADMIN — CHLORHEXIDINE GLUCONATE 1 APPLICATION(S): 213 SOLUTION TOPICAL at 11:36

## 2023-03-13 NOTE — PROVIDER CONTACT NOTE (OTHER) - ASSESSMENT
Patient hypotensive, afebrile. Pt has asymptomatic, denies any pain or distress. Midodrine given in am.

## 2023-03-14 ENCOUNTER — TRANSCRIPTION ENCOUNTER (OUTPATIENT)
Age: 78
End: 2023-03-14

## 2023-03-14 LAB
ANION GAP SERPL CALC-SCNC: 14 MMOL/L — SIGNIFICANT CHANGE UP (ref 7–14)
APTT BLD: 39.9 SEC — HIGH (ref 27–36.3)
BASOPHILS # BLD AUTO: 0.04 K/UL — SIGNIFICANT CHANGE UP (ref 0–0.2)
BASOPHILS NFR BLD AUTO: 0.4 % — SIGNIFICANT CHANGE UP (ref 0–2)
BUN SERPL-MCNC: 23 MG/DL — SIGNIFICANT CHANGE UP (ref 7–23)
CALCIUM SERPL-MCNC: 7.7 MG/DL — LOW (ref 8.4–10.5)
CHLORIDE SERPL-SCNC: 95 MMOL/L — LOW (ref 98–107)
CO2 SERPL-SCNC: 28 MMOL/L — SIGNIFICANT CHANGE UP (ref 22–31)
CREAT SERPL-MCNC: 8.15 MG/DL — HIGH (ref 0.5–1.3)
EGFR: 6 ML/MIN/1.73M2 — LOW
EOSINOPHIL # BLD AUTO: 1.21 K/UL — HIGH (ref 0–0.5)
EOSINOPHIL NFR BLD AUTO: 13.4 % — HIGH (ref 0–6)
GLUCOSE SERPL-MCNC: 88 MG/DL — SIGNIFICANT CHANGE UP (ref 70–99)
HCT VFR BLD CALC: 41.2 % — SIGNIFICANT CHANGE UP (ref 39–50)
HGB BLD-MCNC: 12.2 G/DL — LOW (ref 13–17)
IANC: 5.65 K/UL — SIGNIFICANT CHANGE UP (ref 1.8–7.4)
IMM GRANULOCYTES NFR BLD AUTO: 0.3 % — SIGNIFICANT CHANGE UP (ref 0–0.9)
INR BLD: 2.76 RATIO — HIGH (ref 0.88–1.16)
LYMPHOCYTES # BLD AUTO: 1.23 K/UL — SIGNIFICANT CHANGE UP (ref 1–3.3)
LYMPHOCYTES # BLD AUTO: 13.6 % — SIGNIFICANT CHANGE UP (ref 13–44)
MAGNESIUM SERPL-MCNC: 2 MG/DL — SIGNIFICANT CHANGE UP (ref 1.6–2.6)
MCHC RBC-ENTMCNC: 28.5 PG — SIGNIFICANT CHANGE UP (ref 27–34)
MCHC RBC-ENTMCNC: 29.6 GM/DL — LOW (ref 32–36)
MCV RBC AUTO: 96.3 FL — SIGNIFICANT CHANGE UP (ref 80–100)
MONOCYTES # BLD AUTO: 0.86 K/UL — SIGNIFICANT CHANGE UP (ref 0–0.9)
MONOCYTES NFR BLD AUTO: 9.5 % — SIGNIFICANT CHANGE UP (ref 2–14)
NEUTROPHILS # BLD AUTO: 5.65 K/UL — SIGNIFICANT CHANGE UP (ref 1.8–7.4)
NEUTROPHILS NFR BLD AUTO: 62.8 % — SIGNIFICANT CHANGE UP (ref 43–77)
NRBC # BLD: 0 /100 WBCS — SIGNIFICANT CHANGE UP (ref 0–0)
NRBC # FLD: 0 K/UL — SIGNIFICANT CHANGE UP (ref 0–0)
PHOSPHATE SERPL-MCNC: 4 MG/DL — SIGNIFICANT CHANGE UP (ref 2.5–4.5)
PLATELET # BLD AUTO: 230 K/UL — SIGNIFICANT CHANGE UP (ref 150–400)
POTASSIUM SERPL-MCNC: 4.1 MMOL/L — SIGNIFICANT CHANGE UP (ref 3.5–5.3)
POTASSIUM SERPL-SCNC: 4.1 MMOL/L — SIGNIFICANT CHANGE UP (ref 3.5–5.3)
PROTHROM AB SERPL-ACNC: 32.4 SEC — HIGH (ref 10.5–13.4)
RBC # BLD: 4.28 M/UL — SIGNIFICANT CHANGE UP (ref 4.2–5.8)
RBC # FLD: 14.6 % — HIGH (ref 10.3–14.5)
SODIUM SERPL-SCNC: 137 MMOL/L — SIGNIFICANT CHANGE UP (ref 135–145)
WBC # BLD: 9.02 K/UL — SIGNIFICANT CHANGE UP (ref 3.8–10.5)
WBC # FLD AUTO: 9.02 K/UL — SIGNIFICANT CHANGE UP (ref 3.8–10.5)

## 2023-03-14 RX ORDER — WARFARIN SODIUM 2.5 MG/1
6 TABLET ORAL ONCE
Refills: 0 | Status: COMPLETED | OUTPATIENT
Start: 2023-03-14 | End: 2023-03-14

## 2023-03-14 RX ADMIN — MIDODRINE HYDROCHLORIDE 30 MILLIGRAM(S): 2.5 TABLET ORAL at 21:11

## 2023-03-14 RX ADMIN — WARFARIN SODIUM 6 MILLIGRAM(S): 2.5 TABLET ORAL at 21:12

## 2023-03-14 RX ADMIN — Medication 5 MILLIGRAM(S): at 21:12

## 2023-03-14 RX ADMIN — LEVETIRACETAM 500 MILLIGRAM(S): 250 TABLET, FILM COATED ORAL at 05:05

## 2023-03-14 RX ADMIN — POLYETHYLENE GLYCOL 3350 17 GRAM(S): 17 POWDER, FOR SOLUTION ORAL at 05:05

## 2023-03-14 RX ADMIN — SENNA PLUS 2 TABLET(S): 8.6 TABLET ORAL at 21:12

## 2023-03-14 RX ADMIN — POLYETHYLENE GLYCOL 3350 17 GRAM(S): 17 POWDER, FOR SOLUTION ORAL at 17:02

## 2023-03-14 RX ADMIN — Medication 100 MILLIGRAM(S): at 17:07

## 2023-03-14 RX ADMIN — SEVELAMER CARBONATE 800 MILLIGRAM(S): 2400 POWDER, FOR SUSPENSION ORAL at 17:03

## 2023-03-14 RX ADMIN — Medication 100 MILLIGRAM(S): at 11:32

## 2023-03-14 RX ADMIN — Medication 3 MILLILITER(S): at 10:03

## 2023-03-14 RX ADMIN — CHLORHEXIDINE GLUCONATE 1 APPLICATION(S): 213 SOLUTION TOPICAL at 11:32

## 2023-03-14 RX ADMIN — SEVELAMER CARBONATE 800 MILLIGRAM(S): 2400 POWDER, FOR SUSPENSION ORAL at 12:48

## 2023-03-14 RX ADMIN — Medication 100 MILLIGRAM(S): at 05:05

## 2023-03-14 RX ADMIN — BUDESONIDE AND FORMOTEROL FUMARATE DIHYDRATE 2 PUFF(S): 160; 4.5 AEROSOL RESPIRATORY (INHALATION) at 08:43

## 2023-03-14 RX ADMIN — Medication 3 MILLILITER(S): at 16:13

## 2023-03-14 RX ADMIN — MIDODRINE HYDROCHLORIDE 30 MILLIGRAM(S): 2.5 TABLET ORAL at 14:54

## 2023-03-14 RX ADMIN — LEVETIRACETAM 500 MILLIGRAM(S): 250 TABLET, FILM COATED ORAL at 17:03

## 2023-03-14 RX ADMIN — MIDODRINE HYDROCHLORIDE 30 MILLIGRAM(S): 2.5 TABLET ORAL at 08:43

## 2023-03-14 RX ADMIN — CINACALCET 90 MILLIGRAM(S): 30 TABLET, FILM COATED ORAL at 11:32

## 2023-03-14 RX ADMIN — BUDESONIDE AND FORMOTEROL FUMARATE DIHYDRATE 2 PUFF(S): 160; 4.5 AEROSOL RESPIRATORY (INHALATION) at 21:16

## 2023-03-14 RX ADMIN — SEVELAMER CARBONATE 800 MILLIGRAM(S): 2400 POWDER, FOR SUSPENSION ORAL at 08:43

## 2023-03-14 RX ADMIN — MIDODRINE HYDROCHLORIDE 30 MILLIGRAM(S): 2.5 TABLET ORAL at 02:51

## 2023-03-14 NOTE — DISCHARGE NOTE PROVIDER - NSDCFUADDAPPT_GEN_ALL_CORE_FT
Please follow up with Dr Rivera on 3/23 at 5pm. Call [phone] to confirm your apppointment.  Please follow up with Dr Rivera on 3/23 at 5pm. Call [phone] to confirm your apppointment. ]    Follow up with Nephrologist: Dr. Ty Massey

## 2023-03-14 NOTE — DISCHARGE NOTE PROVIDER - NSDCMRMEDTOKEN_GEN_ALL_CORE_FT
acetaminophen 325 mg oral tablet: 2 tab(s) orally every 6 hours, As needed, Temp greater or equal to 38C (100.4F), Mild Pain (1 - 3)  allopurinol 100 mg oral tablet: 1 tab(s) orally once a day  bisacodyl 5 mg oral delayed release tablet: 1 tab(s) orally once a day (at bedtime)  budesonide: 2 puff(s) inhaled 2 times a day  cinacalcet 30 mg oral tablet: 1 tab(s) orally every 24 hrs at 8 AM on Tuesday, Thursday, Saturday  levETIRAcetam 500 mg oral tablet: 1 tab(s) orally 2 times a day  lidocaine 4% topical film: Apply topically to affected area once a day  melatonin 3 mg oral tablet: 1 tab(s) orally once a day (at bedtime), As needed, Insomnia  midodrine 10 mg oral tablet: 3 tab(s) orally every 6 hours  midodrine 10 mg oral tablet: 2 tab(s) orally 3 times a week, As Needed 30 MINUTES PRIOR TO DIALYSIS SESSION   oxyCODONE 5 mg oral tablet: 1 tab(s) orally every 6 hours, As needed, Severe Pain (7 - 10)  polyethylene glycol 3350 oral powder for reconstitution: 17 gram(s) orally 2 times a day  pregabalin 100 mg oral capsule: 1 cap(s) orally 2 times a day  senna leaf extract oral tablet: 2 tab(s) orally once a day (at bedtime)  sevelamer carbonate 800 mg oral tablet: 1 tab(s) orally 3 times a day (with meals)  warfarin 6 mg oral tablet: 2 tab(s) orally once a day    acetaminophen 325 mg oral tablet: 2 tab(s) orally every 6 hours, As needed, Temp greater or equal to 38C (100.4F), Mild Pain (1 - 3)  allopurinol 100 mg oral tablet: 1 tab(s) orally once a day  bisacodyl 5 mg oral delayed release tablet: 1 tab(s) orally once a day (at bedtime)  budesonide: 2 puff(s) inhaled 2 times a day  cinacalcet 30 mg oral tablet: 1 tab(s) orally every 24 hrs at 8 AM on Tuesday, Thursday, Saturday  levETIRAcetam 500 mg oral tablet: 1 tab(s) orally 2 times a day  melatonin 3 mg oral tablet: 1 tab(s) orally once a day (at bedtime), As needed, Insomnia  oxyCODONE 5 mg oral tablet: 1 tab(s) orally every 6 hours, As needed, Severe Pain (7 - 10)  polyethylene glycol 3350 oral powder for reconstitution: 17 gram(s) orally 2 times a day  pregabalin 100 mg oral capsule: 1 cap(s) orally 2 times a day  senna leaf extract oral tablet: 2 tab(s) orally once a day (at bedtime)  sevelamer carbonate 800 mg oral tablet: 1 tab(s) orally 3 times a day (with meals)  warfarin 6 mg oral tablet: 2 tab(s) orally once a day    acetaminophen 325 mg oral tablet: 2 tab(s) orally every 6 hours, As needed, Temp greater or equal to 38C (100.4F), Mild Pain (1 - 3)  Albuterol (Eqv-ProAir HFA) 90 mcg/inh inhalation aerosol: 2 puff(s) inhaled every 6 hours as needed for bronchospasm  allopurinol 100 mg oral tablet: 1 tab(s) orally once a day  bisacodyl 5 mg oral delayed release tablet: 1 tab(s) orally once a day (at bedtime)  budesonide-formoterol 160 mcg-4.5 mcg/inh inhalation aerosol: 2 puff(s) inhaled 2 times a day   cinacalcet 90 mg oral tablet: 1 tab(s) orally once a day  levETIRAcetam 500 mg oral tablet: 1 tab(s) orally 2 times a day  melatonin 3 mg oral tablet: 1 tab(s) orally once a day (at bedtime), As needed, Insomnia  midodrine 10 mg oral tablet: 3 tab(s) orally 4 times a day at 3am, 9am, 3pm, 9pm  oxyCODONE 5 mg oral tablet: 1 tab(s) orally every 6 hours, As needed, Severe Pain (7 - 10)  polyethylene glycol 3350 oral powder for reconstitution: 17 gram(s) orally 2 times a day  pregabalin 100 mg oral capsule: 1 cap(s) orally 2 times a day MDD:200mg  senna leaf extract oral tablet: 2 tab(s) orally once a day (at bedtime)  sevelamer carbonate 800 mg oral tablet: 1 tab(s) orally 3 times a day (with meals)  warfarin 3 mg oral tablet: 2 tab(s) (6mg) orally once a day (at bedtime) every day except on Monay and thursdays take 3 tabs (9mg)

## 2023-03-14 NOTE — DISCHARGE NOTE PROVIDER - DETAILS OF MALNUTRITION DIAGNOSIS/DIAGNOSES
This patient has been assessed with a concern for Malnutrition and was treated during this hospitalization for the following Nutrition diagnosis/diagnoses:     -  03/12/2023: Severe protein-calorie malnutrition   -  03/12/2023: Morbid obesity (BMI > 40)

## 2023-03-14 NOTE — DISCHARGE NOTE PROVIDER - CARE PROVIDER_API CALL
Vilma Edmonds)  Internal Medicine  [address] [address]  Phone: [phone]  Fax: [phone]  Follow Up Time:

## 2023-03-14 NOTE — DISCHARGE NOTE PROVIDER - HOSPITAL COURSE
8 yo male w h/o severe orthostatic hypotension on midodrine, COPD (on 2L home O2), CHF, pHTN,, DVT pm coumadin  ESRD on HD at [facility] via AV fistula NOE DELVALLE, who was DCed on 3/01/2023 with orthostatic hypotension and also at [facility] for severe orthostatic hypotension. Pt did well during his hospitalizations. His chronic NF is not comfortable dispensing 30 mg of Midodrine( his usual dose is 30 mg tid) S/P IVC filter presented for a presyncopal episode). Pt says he remembers the entire event, when he was being transferred from his bed to wheelchair he closed his eyes, said both aids were trying to wake up for about a minute and then he opened his eyes. Denied any preceding sx of chest pain, dizziness, sob. Pt said they checked his BP and FS which was normal. On arrival BP 94/53.    Syncope with Orthostatic Hypotension  - chronic  - BP is stable, dc planning, home w outptn F/U on 3/23. Will cont coumadin 6mg daily except with 9 mg MON and THU, cont outptn PT/INR monitoring, goal INR 2-3, cont Midodrine standing dose as per inptn schedule    Pt was evaluated by physical therapy, pt and wife have decided on discharge home.  Outpatient HD facility with HD on [facility], discussed with nephrology will hold HD today and plan on session on Thursday.     Discussed case with Dr. Hein pt is cleared for discharge home today.

## 2023-03-14 NOTE — DISCHARGE NOTE PROVIDER - NSDCCPCAREPLAN_GEN_ALL_CORE_FT
PRINCIPAL DISCHARGE DIAGNOSIS  Diagnosis: Syncope  Assessment and Plan of Treatment: Likely in setting of low blood pressure and orthostatic hypotenion, please follow up with PCP as scheduled and continue taking midodrine.      SECONDARY DISCHARGE DIAGNOSES  Diagnosis: Orthostatic hypotension  Assessment and Plan of Treatment: Wear compression stockings, get up from lying and sitting positions very slowly to allow your body time to adjust to change in positions. Continue midodrine as prescribed. Follow up with PCP as scheduled.    Diagnosis: ESRD on dialysis  Assessment and Plan of Treatment: Please follow up with your nephrologist for management, and continue you schedule hemodialysis.     PRINCIPAL DISCHARGE DIAGNOSIS  Diagnosis: Syncope  Assessment and Plan of Treatment: Likely in setting of low blood pressure and orthostatic hypotenion, please follow up with PCP as scheduled and continue taking midodrine.      SECONDARY DISCHARGE DIAGNOSES  Diagnosis: Orthostatic hypotension  Assessment and Plan of Treatment: Wear compression stockings, get up from lying and sitting positions very slowly to allow your body time to adjust to change in positions. Continue midodrine as prescribed. Follow up with PCP as scheduled.    Diagnosis: ESRD on dialysis  Assessment and Plan of Treatment: Please follow up with your nephrologist for management, and continue you schedule hemodialysis.    Diagnosis: Deep vein thrombosis (DVT)  Assessment and Plan of Treatment: continue coumadin 6mg daily except with 9 mg MON and THU. Follow up with PCP for INR check    Diagnosis: COPD exacerbation  Assessment and Plan of Treatment: Continue inhalers and follow up with pulmonologist.

## 2023-03-15 ENCOUNTER — TRANSCRIPTION ENCOUNTER (OUTPATIENT)
Age: 78
End: 2023-03-15

## 2023-03-15 VITALS
RESPIRATION RATE: 18 BRPM | OXYGEN SATURATION: 98 % | SYSTOLIC BLOOD PRESSURE: 114 MMHG | DIASTOLIC BLOOD PRESSURE: 73 MMHG | TEMPERATURE: 98 F | HEART RATE: 78 BPM

## 2023-03-15 LAB
ANION GAP SERPL CALC-SCNC: 17 MMOL/L — HIGH (ref 7–14)
APTT BLD: 45.6 SEC — HIGH (ref 27–36.3)
BASOPHILS # BLD AUTO: 0.05 K/UL — SIGNIFICANT CHANGE UP (ref 0–0.2)
BASOPHILS NFR BLD AUTO: 0.6 % — SIGNIFICANT CHANGE UP (ref 0–2)
BUN SERPL-MCNC: 33 MG/DL — HIGH (ref 7–23)
CALCIUM SERPL-MCNC: 7.5 MG/DL — LOW (ref 8.4–10.5)
CHLORIDE SERPL-SCNC: 95 MMOL/L — LOW (ref 98–107)
CO2 SERPL-SCNC: 24 MMOL/L — SIGNIFICANT CHANGE UP (ref 22–31)
CREAT SERPL-MCNC: 10.11 MG/DL — HIGH (ref 0.5–1.3)
EGFR: 5 ML/MIN/1.73M2 — LOW
EOSINOPHIL # BLD AUTO: 1.56 K/UL — HIGH (ref 0–0.5)
EOSINOPHIL NFR BLD AUTO: 18.9 % — HIGH (ref 0–6)
GLUCOSE SERPL-MCNC: 103 MG/DL — HIGH (ref 70–99)
HCT VFR BLD CALC: 40.2 % — SIGNIFICANT CHANGE UP (ref 39–50)
HGB BLD-MCNC: 12 G/DL — LOW (ref 13–17)
IANC: 4.46 K/UL — SIGNIFICANT CHANGE UP (ref 1.8–7.4)
IMM GRANULOCYTES NFR BLD AUTO: 0.4 % — SIGNIFICANT CHANGE UP (ref 0–0.9)
INR BLD: 2.93 RATIO — HIGH (ref 0.88–1.16)
LYMPHOCYTES # BLD AUTO: 1.48 K/UL — SIGNIFICANT CHANGE UP (ref 1–3.3)
LYMPHOCYTES # BLD AUTO: 17.9 % — SIGNIFICANT CHANGE UP (ref 13–44)
MAGNESIUM SERPL-MCNC: 2 MG/DL — SIGNIFICANT CHANGE UP (ref 1.6–2.6)
MCHC RBC-ENTMCNC: 28.8 PG — SIGNIFICANT CHANGE UP (ref 27–34)
MCHC RBC-ENTMCNC: 29.9 GM/DL — LOW (ref 32–36)
MCV RBC AUTO: 96.6 FL — SIGNIFICANT CHANGE UP (ref 80–100)
MONOCYTES # BLD AUTO: 0.69 K/UL — SIGNIFICANT CHANGE UP (ref 0–0.9)
MONOCYTES NFR BLD AUTO: 8.3 % — SIGNIFICANT CHANGE UP (ref 2–14)
NEUTROPHILS # BLD AUTO: 4.46 K/UL — SIGNIFICANT CHANGE UP (ref 1.8–7.4)
NEUTROPHILS NFR BLD AUTO: 53.9 % — SIGNIFICANT CHANGE UP (ref 43–77)
NRBC # BLD: 0 /100 WBCS — SIGNIFICANT CHANGE UP (ref 0–0)
NRBC # FLD: 0 K/UL — SIGNIFICANT CHANGE UP (ref 0–0)
PHOSPHATE SERPL-MCNC: 4.8 MG/DL — HIGH (ref 2.5–4.5)
PLATELET # BLD AUTO: 220 K/UL — SIGNIFICANT CHANGE UP (ref 150–400)
POTASSIUM SERPL-MCNC: 4.1 MMOL/L — SIGNIFICANT CHANGE UP (ref 3.5–5.3)
POTASSIUM SERPL-SCNC: 4.1 MMOL/L — SIGNIFICANT CHANGE UP (ref 3.5–5.3)
PROTHROM AB SERPL-ACNC: 34.3 SEC — HIGH (ref 10.5–13.4)
RBC # BLD: 4.16 M/UL — LOW (ref 4.2–5.8)
RBC # FLD: 14.6 % — HIGH (ref 10.3–14.5)
SARS-COV-2 RNA SPEC QL NAA+PROBE: SIGNIFICANT CHANGE UP
SODIUM SERPL-SCNC: 136 MMOL/L — SIGNIFICANT CHANGE UP (ref 135–145)
WBC # BLD: 8.27 K/UL — SIGNIFICANT CHANGE UP (ref 3.8–10.5)
WBC # FLD AUTO: 8.27 K/UL — SIGNIFICANT CHANGE UP (ref 3.8–10.5)

## 2023-03-15 RX ORDER — WARFARIN SODIUM 2.5 MG/1
6 TABLET ORAL ONCE
Refills: 0 | Status: COMPLETED | OUTPATIENT
Start: 2023-03-15 | End: 2023-03-15

## 2023-03-15 RX ORDER — ALLOPURINOL 300 MG
1 TABLET ORAL
Qty: 30 | Refills: 0
Start: 2023-03-15 | End: 2023-04-13

## 2023-03-15 RX ORDER — MIDODRINE HYDROCHLORIDE 2.5 MG/1
3 TABLET ORAL
Qty: 360 | Refills: 0
Start: 2023-03-15 | End: 2023-04-13

## 2023-03-15 RX ORDER — BUDESONIDE AND FORMOTEROL FUMARATE DIHYDRATE 160; 4.5 UG/1; UG/1
2 AEROSOL RESPIRATORY (INHALATION)
Qty: 1 | Refills: 0
Start: 2023-03-15 | End: 2023-04-13

## 2023-03-15 RX ORDER — SEVELAMER CARBONATE 2400 MG/1
1 POWDER, FOR SUSPENSION ORAL
Qty: 90 | Refills: 0
Start: 2023-03-15 | End: 2023-04-13

## 2023-03-15 RX ORDER — WARFARIN SODIUM 2.5 MG/1
2 TABLET ORAL
Qty: 68 | Refills: 0
Start: 2023-03-15 | End: 2023-04-13

## 2023-03-15 RX ORDER — LEVETIRACETAM 250 MG/1
1 TABLET, FILM COATED ORAL
Qty: 60 | Refills: 0
Start: 2023-03-15 | End: 2023-04-13

## 2023-03-15 RX ORDER — ALBUTEROL 90 UG/1
2 AEROSOL, METERED ORAL
Qty: 1 | Refills: 0
Start: 2023-03-15 | End: 2023-04-13

## 2023-03-15 RX ORDER — CINACALCET 30 MG/1
1 TABLET, FILM COATED ORAL
Qty: 30 | Refills: 0
Start: 2023-03-15 | End: 2023-04-13

## 2023-03-15 RX ADMIN — SEVELAMER CARBONATE 800 MILLIGRAM(S): 2400 POWDER, FOR SUSPENSION ORAL at 08:23

## 2023-03-15 RX ADMIN — LEVETIRACETAM 500 MILLIGRAM(S): 250 TABLET, FILM COATED ORAL at 17:24

## 2023-03-15 RX ADMIN — SEVELAMER CARBONATE 800 MILLIGRAM(S): 2400 POWDER, FOR SUSPENSION ORAL at 11:58

## 2023-03-15 RX ADMIN — Medication 100 MILLIGRAM(S): at 11:58

## 2023-03-15 RX ADMIN — BUDESONIDE AND FORMOTEROL FUMARATE DIHYDRATE 2 PUFF(S): 160; 4.5 AEROSOL RESPIRATORY (INHALATION) at 08:22

## 2023-03-15 RX ADMIN — CINACALCET 90 MILLIGRAM(S): 30 TABLET, FILM COATED ORAL at 11:59

## 2023-03-15 RX ADMIN — Medication 100 MILLIGRAM(S): at 05:58

## 2023-03-15 RX ADMIN — Medication 3 MILLILITER(S): at 15:56

## 2023-03-15 RX ADMIN — WARFARIN SODIUM 6 MILLIGRAM(S): 2.5 TABLET ORAL at 17:31

## 2023-03-15 RX ADMIN — MIDODRINE HYDROCHLORIDE 30 MILLIGRAM(S): 2.5 TABLET ORAL at 14:24

## 2023-03-15 RX ADMIN — Medication 100 MILLIGRAM(S): at 17:30

## 2023-03-15 RX ADMIN — CHLORHEXIDINE GLUCONATE 1 APPLICATION(S): 213 SOLUTION TOPICAL at 11:57

## 2023-03-15 RX ADMIN — LEVETIRACETAM 500 MILLIGRAM(S): 250 TABLET, FILM COATED ORAL at 05:58

## 2023-03-15 RX ADMIN — MIDODRINE HYDROCHLORIDE 30 MILLIGRAM(S): 2.5 TABLET ORAL at 03:29

## 2023-03-15 RX ADMIN — MIDODRINE HYDROCHLORIDE 30 MILLIGRAM(S): 2.5 TABLET ORAL at 09:08

## 2023-03-15 RX ADMIN — Medication 3 MILLILITER(S): at 09:14

## 2023-03-15 RX ADMIN — POLYETHYLENE GLYCOL 3350 17 GRAM(S): 17 POWDER, FOR SOLUTION ORAL at 17:30

## 2023-03-15 NOTE — PROGRESS NOTE ADULT - ASSESSMENT
76 yo male w h/o severe orthostatic hypotension on midodrine, COPD (on 2L home O2), CHF, pHTN,, DVT pm coumadin  ESRD on HD at [facility] via AV fistula NOE DELVALLE, who was DCed on 3/01/2023 with orthostatic hypotension and also at  [facility] for severe orthostatic hypotension. Pt did well during his hospitalizations. His chronic NF is not comfortable dispensing 30 mg of Midodrine( his usual dose is 30 mg tid) S/P IVC filter presented for a presyncopal episode). Pt says he remembers the entire event, when he was being transferred from his bed to wheelchair he closed his eyes, said both aids were trying to wake up for about a minute and then he opened his eyes. Denied any preceding sx of chest pain, dizziness, sob. Pt said they checked his BP and FS which was normal. On arrival BP 94/53.    Orhtostatic Hypotension  ESRD on HD Monday, Wednesday and Friday   DVT: resume AC w coumadin  copd:  esrd      3/12:  Orhtostatic Hypotension : on midodrine  he is very irritable rigth now and just wants to go home:  he is not sob:  on chr home oxygen : Still very orthostatic: check vbg in am    ESRD on HD Monday, Wednesday and Friday  : cont hD per primary team  DVT: resume AC w coumadin ; on coumadin:   Copd: change to Symbicort and duoneb:  previous ct scan chest from 2022: showed evidence of prior granulomatous disease:   esrd : on hd:     acp        3/13:    Orhtostatic Hypotension : on midodrine  he is very irritable rigth now and just wants to go home:  he is not sob:  on chr home oxygen : Still very orthostatic: mild hypercarbic resp distress:   ESRD on HD Monday, Wednesday and Friday  : cont hD per primary team  DVT: resume AC w coumadin ; on coumadin:   Copd: change to Symbicort and duoneb:  previous ct scan chest from 2022: showed evidence of prior granulomatous disease:   esrd : on hd:    dw acp      3/14:    Orhtostatic Hypotension : on midodrine : has chr orthostatic hypotension:  cont current medications:  midodrine:   ESRD on HD Monday, Wednesday and Friday  : cont hD per primary team  DVT: resume AC w coumadin ; on coumadin:   Copd: change to Symbicort and duoneb:  previous ct scan chest from 2022: showed evidence of prior granulomatous disease:   esrd : on hd:    dw acp   : dc planning      
76 yo male w h/o severe orthostatic hypotension on midodrine, COPD (on 2L home O2), CHF, pHTN,, DVT pm coumadin  ESRD on HD at [facility] via AV fistula NOE DELVALLE, who was DCed on 3/01/2023 with orthostatic hypotension and also at  [facility] for severe orthostatic hypotension. Pt did well during his hospitalizations. His chronic NF is not comfortable dispensing 30 mg of Midodrine( his usual dose is 30 mg tid) S/P IVC filter presented for a presyncopal episode). Pt says he remembers the entire event, when he was being transferred from his bed to wheelchair he closed his eyes, said both aids were trying to wake up for about a minute and then he opened his eyes. Denied any preceding sx of chest pain, dizziness, sob. Pt said they checked his BP and FS which was normal. On arrival BP 94/53.  Nephrology consulted for dialysis needs.       ESRD on HD Monday, Wednesday and Friday   Via AVF  Completed HD on 03/11  Nephrologist: Dr. Ty Massey  Continue midodrine 30 qid  last hd 3/11 without uf  again 3/13 without UF. wife at bedside updated  Consent in the chart  Renal diet  Renal dose all medications for GFR<10.   BP remains low. On midodrine      Hypotension  Currently acceptable  Continue Midodrine  Monitor BP    Respir/atory acidosis on VBG   Defer to primary.     CKD-MBD  Tertiary  hyperparathyroidism.   On Sensipar of 60 mg po daily  PTH once  Phosphorus and calcium daily.       
78 yo male w h/o severe orthostatic hypotension on midodrine, COPD (on 2L home O2), CHF, pHTN,, DVT pm coumadin  ESRD on HD at [facility] via AV fistula NOE DELVALLE, who was DCed on 3/01/2023 with orthostatic hypotension and also at  [facility] for severe orthostatic hypotension. Pt did well during his hospitalizations. His chronic NF is not comfortable dispensing 30 mg of Midodrine( his usual dose is 30 mg tid) S/P IVC filter presented for a presyncopal episode). Pt says he remembers the entire event, when he was being transferred from his bed to wheelchair he closed his eyes, said both aids were trying to wake up for about a minute and then he opened his eyes. Denied any preceding sx of chest pain, dizziness, sob. Pt said they checked his BP and FS which was normal. On arrival BP 94/53.  Nephrology consulted for dialysis needs.       ESRD on HD Monday, Wednesday and Friday   Via AVF  Completed HD on 03/11  Nephrologist: Dr. Ty Massey  Continue midodrine 30 tid  Consent in the chart  Renal diet  Renal dose all medications for GFR<10.   BP remains low. On midodrine      Hypotension  Currently acceptable  Continue Midodrine  Monitor BP    Respiratory acidosis on VBG  Defer to primary.     CKD-MBD  Tertiary  hyperparathyroidism.   On Sensipar of 60 mg po daily  PTH once  Phosphorus and calcium daily.       
78 yo male w h/o severe orthostatic hypotension on midodrine, COPD (on 2L home O2), CHF, pHTN,, DVT pm coumadin  ESRD on HD at [facility] via AV fistula NOE DELVALLE, who was DCed on 3/01/2023 with orthostatic hypotension and also at  [facility] for severe orthostatic hypotension. Pt did well during his hospitalizations. His chronic NF is not comfortable dispensing 30 mg of Midodrine( his usual dose is 30 mg tid) S/P IVC filter presented for a presyncopal episode). Pt says he remembers the entire event, when he was being transferred from his bed to wheelchair he closed his eyes, said both aids were trying to wake up for about a minute and then he opened his eyes. Denied any preceding sx of chest pain, dizziness, sob. Pt said they checked his BP and FS which was normal. On arrival BP 94/53.  Nephrology consulted for dialysis needs.       ESRD on HD Monday, Wednesday and Friday   Via AVF  Completed HD on 03/11  Nephrologist: Dr. Ty Massey  Continue midodrine 30 qid  last hd 3/11 without uf  planning for hd today if bp >80.   Consent in the chart  Renal diet  Renal dose all medications for GFR<10.   BP remains low. On midodrine      Hypotension  Currently acceptable  Continue Midodrine  Monitor BP    Respir/atory acidosis on VBG   Defer to primary.     CKD-MBD  Tertiary  hyperparathyroidism.   On Sensipar of 60 mg po daily  PTH once  Phosphorus and calcium daily.       
78 yo male w h/o severe orthostatic hypotension on midodrine, COPD (on 2L home O2), CHF, pHTN,, DVT pm coumadin  ESRD on HD at [facility] via AV fistula NOE DELVALLE, who was DCed on 3/01/2023 with orthostatic hypotension and also at  [facility] for severe orthostatic hypotension. Pt did well during his hospitalizations. His chronic NF is not comfortable dispensing 30 mg of Midodrine( his usual dose is 30 mg tid) S/P IVC filter presented for a presyncopal episode). Pt says he remembers the entire event, when he was being transferred from his bed to wheelchair he closed his eyes, said both aids were trying to wake up for about a minute and then he opened his eyes. Denied any preceding sx of chest pain, dizziness, sob. Pt said they checked his BP and FS which was normal. On arrival BP 94/53.    orhtostatic Hypotension  - chronic  - BP is stable, dc planning, home w outptn F/U. ptn was provided an outptn appointment w me on 3/23. cont coumadin 6mg daily exc w 9 mg MON and THU, cont outptn PT/INR monitoring, cont Midodrine standing dose as per inptn schedule    ESRD on HD Monday, Wednesday and Friday   Via AVF  Last HD was on 03/08  Nephrologist: Dr. Ty Massey  No need for urgent HD tonight  HD tomorrow    cont  AC w coumadin with INR goal 2-3          
A/P    76 yo male w h/o severe orthostatic hypotension on midodrine, COPD (on 2L home O2), CHF, pHTN,, DVT pm coumadin  ESRD on HD at [facility] via AV fistula NOE DELVALLE, who was DCed on 3/01/2023 with orthostatic hypotension and also at  [facility] for severe orthostatic hypotension. Pt did well during his hospitalizations. His chronic NF is not comfortable dispensing 30 mg of Midodrine( his usual dose is 30 mg tid) S/P IVC filter presented for a presyncopal episode).       #presyncope, orthostatic hypotension  -chronic issue  -currently asx  -cont mido 30 mg   -recent echo with nl lv fxn, valve fxn   -recheck limited echo unchanged  -trend orthostatics     #ESRD  -HD per renal     #DVT hx  -cont a/c per inr     #COPD, PHTN  -stable  -cont o2     #elevated trop  -secondary to demand in setting of esrd  -no chest pain    35 minutes spent on total encounter; more than 50% of the visit was spent counseling and/or coordinating care by the attending physician.  
A/P    76 yo male w h/o severe orthostatic hypotension on midodrine, COPD (on 2L home O2), CHF, pHTN,, DVT pm coumadin  ESRD on HD at [facility] via AV fistula NOE DELVALLE, who was DCed on 3/01/2023 with orthostatic hypotension and also at  [facility] for severe orthostatic hypotension. Pt did well during his hospitalizations. His chronic NF is not comfortable dispensing 30 mg of Midodrine( his usual dose is 30 mg tid) S/P IVC filter presented for a presyncopal episode).       #presyncope, orthostatic hypotension  -chronic issue  -currently asx  -cont mido 30 mg   -recent echo with nl lv fxn, valve fxn   -check limited echo r/o new lv dysfxn, effusion  -trend orthostatics     #ESRD  -HD per renal     #DVT hx  -cont a/c per inr     #COPD, PHTN  -stable  -cont o2     #elevated trop  -secondary to demand in setting of esrd  -no chest pain    35 minutes spent on total encounter; more than 50% of the visit was spent counseling and/or coordinating care by the attending physician.  
A/P    78 yo male w h/o severe orthostatic hypotension on midodrine, COPD (on 2L home O2), CHF, pHTN,, DVT pm coumadin  ESRD on HD at [facility] via AV fistula NOE DELVALLE, who was DCed on 3/01/2023 with orthostatic hypotension and also at  [facility] for severe orthostatic hypotension. Pt did well during his hospitalizations. His chronic NF is not comfortable dispensing 30 mg of Midodrine( his usual dose is 30 mg tid) S/P IVC filter presented for a presyncopal episode).       #presyncope, orthostatic hypotension  -chronic issue  -currently asx  -cont mido 30 mg   -recent echo with nl lv fxn, valve fxn   -recheck limited echo unchanged  -trend orthostatics     #ESRD  -HD per renal     #DVT hx  -cont a/c per inr     #COPD, PHTN  -stable  -cont o2     #elevated trop  -secondary to demand in setting of esrd  -no chest pain    35 minutes spent on total encounter; more than 50% of the visit was spent counseling and/or coordinating care by the attending physician.  
76 yo male w h/o severe orthostatic hypotension on midodrine, COPD (on 2L home O2), CHF, pHTN,, DVT pm coumadin  ESRD on HD at [facility] via AV fistula NOE DELVALLE, who was DCed on 3/01/2023 with orthostatic hypotension and also at  [facility] for severe orthostatic hypotension. Pt did well during his hospitalizations. His chronic NF is not comfortable dispensing 30 mg of Midodrine( his usual dose is 30 mg tid) S/P IVC filter presented for a presyncopal episode). Pt says he remembers the entire event, when he was being transferred from his bed to wheelchair he closed his eyes, said both aids were trying to wake up for about a minute and then he opened his eyes. Denied any preceding sx of chest pain, dizziness, sob. Pt said they checked his BP and FS which was normal. On arrival BP 94/53.    Orhtostatic Hypotension  ESRD on HD Monday, Wednesday and Friday   DVT: resume AC w coumadin  copd:  esrd      3/12:  Orhtostatic Hypotension : on midodrine  he is very irritable rigth now and just wants to go home:  he is not sob:  on chr home oxygen : Still very orthostatic: check vbg in am    ESRD on HD Monday, Wednesday and Friday  : cont hD per primary team  DVT: resume AC w coumadin ; on coumadin:   Copd: change to Symbicort and duoneb:  previous ct scan chest from 2022: showed evidence of prior granulomatous disease:   esrd : on hd:     acp        3/13:    Orhtostatic Hypotension : on midodrine  he is very irritable rigth now and just wants to go home:  he is not sob:  on chr home oxygen : Still very orthostatic: mild hypercarbic resp distress:   ESRD on HD Monday, Wednesday and Friday  : cont hD per primary team  DVT: resume AC w coumadin ; on coumadin:   Copd: change to Symbicort and duoneb:  previous ct scan chest from 2022: showed evidence of prior granulomatous disease:   esrd : on hd:    dw acp      3/14:    Orhtostatic Hypotension : on midodrine : has chr orthostatic hypotension:  cont current medications:  midodrine:   ESRD on HD Monday, Wednesday and Friday  : cont hD per primary team  DVT: resume AC w coumadin ; on coumadin:   Copd: change to Symbicort and duoneb:  previous ct scan chest from 2022: showed evidence of prior granulomatous disease:   esrd : on hd:    dw acp   : dc planning      3/15:    Orhtostatic Hypotension : on midodrine : has chr orthostatic hypotension:  cont current medications:  midodrine:   ESRD on HD Monday, Wednesday and Friday  : cont hD per primary team  DVT: resume AC w coumadin ; on coumadin:   Copd: change to Symbicort and duoneb:  previous ct scan chest from 2022: showed evidence of prior granulomatous disease: no new events for resp events   esrd : on hd:    acosta acp   : dc planning    
78 yo male w h/o severe orthostatic hypotension on midodrine, COPD (on 2L home O2), CHF, pHTN,, DVT pm coumadin  ESRD on HD at [facility] via AV fistula NOE DELVALLE, who was DCed on 3/01/2023 with orthostatic hypotension and also at  [facility] for severe orthostatic hypotension. Pt did well during his hospitalizations. His chronic NF is not comfortable dispensing 30 mg of Midodrine( his usual dose is 30 mg tid) S/P IVC filter presented for a presyncopal episode). Pt says he remembers the entire event, when he was being transferred from his bed to wheelchair he closed his eyes, said both aids were trying to wake up for about a minute and then he opened his eyes. Denied any preceding sx of chest pain, dizziness, sob. Pt said they checked his BP and FS which was normal. On arrival BP 94/53.    orhtostatic Hypotension  - chronic  - BP is stable, dc planning, home w outptn F/U. ptn was provided an outptn appointment w me on 3/23. cont coumadin 6mg daily exc w 9 mg MON and THU, cont outptn PT/INR monitoring, cont Midodrine standing dose as per inptn schedule    ESRD on HD Monday, Wednesday and Friday   Via AVF  Last HD was on 03/08  Nephrologist: Dr. Ty Massey  No need for urgent HD tonight  HD tomorrow    cont  AC w coumadin with INR goal 2-3          
78 yo male w h/o severe orthostatic hypotension on midodrine, COPD (on 2L home O2), CHF, pHTN,, DVT pm coumadin  ESRD on HD at [facility] via AV fistula NOE DELVALLE, who was DCed on 3/01/2023 with orthostatic hypotension and also at  [facility] for severe orthostatic hypotension. Pt did well during his hospitalizations. His chronic NF is not comfortable dispensing 30 mg of Midodrine( his usual dose is 30 mg tid) S/P IVC filter presented for a presyncopal episode). Pt says he remembers the entire event, when he was being transferred from his bed to wheelchair he closed his eyes, said both aids were trying to wake up for about a minute and then he opened his eyes. Denied any preceding sx of chest pain, dizziness, sob. Pt said they checked his BP and FS which was normal. On arrival BP 94/53.    orhtostatic Hypotension  - chronic  - cont outptn dose of 30 mg Midodrine on a schedule  - cardiology following      ESRD on HD Monday, Wednesday and Friday   Via AVF  Last HD was on 03/08  Nephrologist: Dr. Ty Massey  No need for urgent HD tonight  HD tomorrow    cont  AC w coumadin with INR goal 2-3          
A/P    76 yo male w h/o severe orthostatic hypotension on midodrine, COPD (on 2L home O2), CHF, pHTN,, DVT pm coumadin  ESRD on HD at [facility] via AV fistula NOE DELVALLE, who was DCed on 3/01/2023 with orthostatic hypotension and also at  [facility] for severe orthostatic hypotension. Pt did well during his hospitalizations. His chronic NF is not comfortable dispensing 30 mg of Midodrine( his usual dose is 30 mg tid) S/P IVC filter presented for a presyncopal episode).       #presyncope, orthostatic hypotension  -chronic issue  -currently asx  -cont mido 30 mg   -recent echo with nl lv fxn, valve fxn   -check limited echo r/o new lv dysfxn, effusion  -trend orthostatics     #ESRD  -HD per renal     #DVT hx  -cont a/c per inr     #COPD, PHTN  -stable  -cont o2     #elevated trop  -secondary to demand in setting of esrd  -no chest pain    35 minutes spent on total encounter; more than 50% of the visit was spent counseling and/or coordinating care by the attending physician.  
76 yo male w h/o severe orthostatic hypotension on midodrine, COPD (on 2L home O2), CHF, pHTN,, DVT pm coumadin  ESRD on HD at [facility] via AV fistula NOE DELVALLE, who was DCed on 3/01/2023 with orthostatic hypotension and also at  [facility] for severe orthostatic hypotension. Pt did well during his hospitalizations. His chronic NF is not comfortable dispensing 30 mg of Midodrine( his usual dose is 30 mg tid) S/P IVC filter presented for a presyncopal episode). Pt says he remembers the entire event, when he was being transferred from his bed to wheelchair he closed his eyes, said both aids were trying to wake up for about a minute and then he opened his eyes. Denied any preceding sx of chest pain, dizziness, sob. Pt said they checked his BP and FS which was normal. On arrival BP 94/53.    orhtostatic Hypotension  - chronic  - cont outptn dose of 30 mg Midodrine on a schedule  - cardiology following      ESRD on HD Monday, Wednesday and Friday   Via AVF  Last HD was on 03/08  Nephrologist: Dr. Ty Massey  No need for urgent HD tonight  HD tomorrow    cont  AC w coumadin with INR goal 2-3          
76 yo male w h/o severe orthostatic hypotension on midodrine, COPD (on 2L home O2), CHF, pHTN,, DVT pm coumadin  ESRD on HD at [facility] via AV fistula NOE DELVALLE, who was DCed on 3/01/2023 with orthostatic hypotension and also at  [facility] for severe orthostatic hypotension. Pt did well during his hospitalizations. His chronic NF is not comfortable dispensing 30 mg of Midodrine( his usual dose is 30 mg tid) S/P IVC filter presented for a presyncopal episode). Pt says he remembers the entire event, when he was being transferred from his bed to wheelchair he closed his eyes, said both aids were trying to wake up for about a minute and then he opened his eyes. Denied any preceding sx of chest pain, dizziness, sob. Pt said they checked his BP and FS which was normal. On arrival BP 94/53.  Nephrology consulted for dialysis needs.       ESRD on HD Monday, Wednesday and Friday   Via AVF  Last HD was on 03/08  Nephrologist: Dr. Ty Massey  HD today, midodrine 30 tid  Consent in the chart  Renal diet  Renal dose all medications for GFR<10.   BP remains low. On midodrine      Hypotension  Currently acceptable  Continue Midodrine    Respiratory acidosis on VBG  Defer to primary.     CKD-MBD  Tertiary  hyperparathyroidism.   On Sensipar of 60 mg po daily  PTH once  Phosphorus and calcium daily.       
78 yo male w h/o severe orthostatic hypotension on midodrine, COPD (on 2L home O2), CHF, pHTN,, DVT pm coumadin  ESRD on HD at [facility] via AV fistula NOE DELVALLE, who was DCed on 3/01/2023 with orthostatic hypotension and also at  [facility] for severe orthostatic hypotension. Pt did well during his hospitalizations. His chronic NF is not comfortable dispensing 30 mg of Midodrine( his usual dose is 30 mg tid) S/P IVC filter presented for a presyncopal episode). Pt says he remembers the entire event, when he was being transferred from his bed to wheelchair he closed his eyes, said both aids were trying to wake up for about a minute and then he opened his eyes. Denied any preceding sx of chest pain, dizziness, sob. Pt said they checked his BP and FS which was normal. On arrival BP 94/53.    Orhtostatic Hypotension  ESRD on HD Monday, Wednesday and Friday   DVT: resume AC w coumadin  copd:  esrd      3/12:  Orhtostatic Hypotension : on midodrine  he is very irritable rigth now and just wants to go home:  he is not sob:  on chr home oxygen : Still very orthostatic: check vbg in am    ESRD on HD Monday, Wednesday and Friday  : cont hD per primary team  DVT: resume AC w coumadin ; on coumadin:   Copd: change to Symbicort and duoneb:  previous ct scan chest from 2022: showed evidence of prior granulomatous disease:   esrd : on hd:     acp        3/13:    Orhtostatic Hypotension : on midodrine  he is very irritable rigth now and just wants to go home:  he is not sob:  on chr home oxygen : Still very orthostatic: mild hypercarbic resp distress:   ESRD on HD Monday, Wednesday and Friday  : cont hD per primary team  DVT: resume AC w coumadin ; on coumadin:   Copd: change to Symbicort and duoneb:  previous ct scan chest from 2022: showed evidence of prior granulomatous disease:   esrd : on hd:    dw acp        
78 yo male w h/o severe orthostatic hypotension on midodrine, COPD (on 2L home O2), CHF, pHTN,, DVT pm coumadin  ESRD on HD at [facility] via AV fistula NOE DELVALLE, who was DCed on 3/01/2023 with orthostatic hypotension and also at  [facility] for severe orthostatic hypotension. Pt did well during his hospitalizations. His chronic NF is not comfortable dispensing 30 mg of Midodrine( his usual dose is 30 mg tid) S/P IVC filter presented for a presyncopal episode). Pt says he remembers the entire event, when he was being transferred from his bed to wheelchair he closed his eyes, said both aids were trying to wake up for about a minute and then he opened his eyes. Denied any preceding sx of chest pain, dizziness, sob. Pt said they checked his BP and FS which was normal. On arrival BP 94/53.  Nephrology consulted for dialysis needs.       ESRD on HD Monday, Wednesday and Friday   Via AVF  Completed HD on 03/11  Nephrologist: Dr. Ty Massey  Continue midodrine 30 qid  last hd 3/11 without uf  again 3/13 without UF. wife at bedside updated  planning for dc and start outpatient dialysis TTS. ok for dc today. resume hd tmr outpatient  Consent in the chart  Renal diet  Renal dose all medications for GFR<10.   BP remains low. On midodrine      Hypotension  Currently acceptable  Continue Midodrine  Monitor BP    Respir/atory acidosis on VBG   Defer to primary.     CKD-MBD  Tertiary  hyperparathyroidism.   On Sensipar of 60 mg po daily  PTH once  Phosphorus and calcium daily.

## 2023-03-15 NOTE — DISCHARGE NOTE NURSING/CASE MANAGEMENT/SOCIAL WORK - NSDCFUADDAPPT_GEN_ALL_CORE_FT
Please follow up with Dr Rivera on 3/23 at 5pm. Call [phone] to confirm your apppointment. ]    Follow up with Nephrologist: Dr. Ty Massey

## 2023-03-15 NOTE — PHYSICAL THERAPY INITIAL EVALUATION ADULT - PATIENT PROFILE REVIEW, REHAB EVAL
PT evaluate and treat orders received.  Activity Orders: Ambulate as Tolerated.  Consulted with RN Nohemy, pt cleared for PT evaluation./yes
No specific activity orders./yes

## 2023-03-15 NOTE — DISCHARGE NOTE NURSING/CASE MANAGEMENT/SOCIAL WORK - PATIENT PORTAL LINK FT
You can access the FollowMyHealth Patient Portal offered by [facility] by registering at the following website: [email or web address]. By joining [facility]’s FollowMyHealth portal, you will also be able to view your health information using other applications (apps) compatible with our system.

## 2023-03-15 NOTE — PHYSICAL THERAPY INITIAL EVALUATION ADULT - NSPTDISCHREC_GEN_A_CORE
Pt refusing further PT services at this time, wishes to be taken off program, states "PT didn't do anything for me last time, you won't do anything for me this time, don't come back." Pt will be removed from PT program, please reconsult if appropriate./No skilled PT needs
[facility]

## 2023-03-15 NOTE — PHYSICAL THERAPY INITIAL EVALUATION ADULT - GENERAL OBSERVATIONS, REHAB EVAL
Pt received semi-supine in bed, +nasal cannula, +telemetry, in NAD.  Pt A&Ox4.  Pt agreeable to participate in PT evaluation.  Pt left semi-supine in bed, all lines/tube intact, call bell within reach.
Pt encountered semireclined in bed in no distress, +Telemetry. Blood pressure = 102/64, heart rate = 68 bpm, SpO2 = 97%.

## 2023-03-15 NOTE — PHYSICAL THERAPY INITIAL EVALUATION ADULT - LEVEL OF INDEPENDENCE, REHAB EVAL
pt deferred bed mobility assessment and further functional activity following ROM and strength assessment./unable to perform
contact guard

## 2023-03-15 NOTE — PROVIDER CONTACT NOTE (OTHER) - RECOMMENDATIONS
As per PA - no intervention at this time
DAVID Quach aware. Will reassess BP again and continue to monitor.
PA aware, awaiting orders
Remove needle from AVF and send pt back to primary floor
As per PA - no intervention at this time, recheck in 1 hour

## 2023-03-15 NOTE — PHYSICAL THERAPY INITIAL EVALUATION ADULT - GAIT DISTANCE, PT EVAL
Pt ambulated bed to chair, took seated rest break, and ambulated additional 6 forward steps at bedside. Pt ambulated bed to chair, took seated rest break, and ambulated additional 6 forward steps to return to bed.

## 2023-03-15 NOTE — DISCHARGE NOTE NURSING/CASE MANAGEMENT/SOCIAL WORK - NSSCNAMETXT_GEN_ALL_CORE
Class I (easy) - visualization of the soft palate, fauces, uvula, and both anterior and posterior pillars
Visiting Nurse Service [facility]

## 2023-03-15 NOTE — PROGRESS NOTE ADULT - PROVIDER SPECIALTY LIST ADULT
Cardiology
Internal Medicine
Pulmonology
Cardiology
Nephrology
Pulmonology
Cardiology
Cardiology
Internal Medicine
Nephrology
Pulmonology
Internal Medicine
Internal Medicine

## 2023-03-15 NOTE — DISCHARGE NOTE NURSING/CASE MANAGEMENT/SOCIAL WORK - NSDCDMETYPESERV_GEN_ALL_CORE_FT
1-Tub transfer bench for home delivery  2-ASTRAL NIV machine for home delivery tomorrow Thursday 3/16/22

## 2023-03-15 NOTE — DISCHARGE NOTE NURSING/CASE MANAGEMENT/SOCIAL WORK - NSDCCRNAME_GEN_ALL_CORE_FT
[facility], [phone] , [place]

## 2023-03-15 NOTE — PROVIDER CONTACT NOTE (OTHER) - ASSESSMENT
Pt is cannulated for HD but primary team informed that pt is going to discharge today without HD treatment.

## 2023-03-15 NOTE — PROVIDER CONTACT NOTE (OTHER) - DATE AND TIME:
12-Mar-2023 13:00
13-Mar-2023 11:30
11-Mar-2023 02:32
11-Mar-2023 15:40
15-Mar-2023 11:20
11-Mar-2023 08:25
11-Mar-2023 21:20
11-Mar-2023 18:50
11-Mar-2023 22:30

## 2023-03-15 NOTE — PROVIDER CONTACT NOTE (OTHER) - BACKGROUND
patient admitted for syncope and hypotension, AOx4,
Pt admitted with syncope and collapse
patient admitted for syncope and hypotension, AOx4,
patient admitted for syncope and hypotension, AOx4,
patient admitted for syncope, AOx4,
patient admitted for syncope and hypotension, AOx4,
patient admitted for syncope, AOx4,
patient admitted for syncope and hypotension, AOx4,

## 2023-03-15 NOTE — PHYSICAL THERAPY INITIAL EVALUATION ADULT - MANUAL MUSCLE TESTING RESULTS, REHAB EVAL
Bilateral UE grossly 4/5, Bilateral LE grossly 3+/5.
Bilateral UE grossly 3+/5.  Bilateral  strength WFL.  Bilateral LE grossly 3+/5 assessed through sit to stand.

## 2023-03-15 NOTE — PROVIDER CONTACT NOTE (OTHER) - REASON
hypotension
BP 83/56
hypotension
hypotension
BP 77/45
hypotension
Canceled HD
hypotension
hypotension

## 2023-03-15 NOTE — PHYSICAL THERAPY INITIAL EVALUATION ADULT - PERTINENT HX OF CURRENT PROBLEM, REHAB EVAL
77 year old male presented for a presyncopal episode, says he remembers the entire event, states when he was being transferred from his bed to wheelchair he closed his eyes, said both aids were trying to wake up for about a minute and then he opened his eyes. Pt has had multiple hospitalizations secondary to syncope secondary to orthostatic hypotension.
Pt is a 77 year old male presenting from rehab facility with complaints of syncope.  Upon arrival to ED, blood pressure 94/53 mmHg.  Chest X-ray significant for unchanged mild bilateral atelectasis.  PMH significant for multiple hospitalizations for orthostatic hypotension, and Home O2 on 2L for COPD.  PMH listed below.

## 2023-03-15 NOTE — PROGRESS NOTE ADULT - NUTRITIONAL ASSESSMENT
This patient has been assessed with a concern for Malnutrition and has been determined to have a diagnosis/diagnoses of Severe protein-calorie malnutrition and Morbid obesity (BMI > 40).    This patient is being managed with:   Diet Renal Restrictions-  For patients receiving Renal Replacement - No Protein Restr No Conc K No Conc Phos Low Sodium  Entered: Mar 11 2023  2:39PM    

## 2023-03-15 NOTE — PROVIDER CONTACT NOTE (OTHER) - SITUATION
Provider made aware Pt arrived to floor  hypotensive, VS as documented in flow sheet
pt. hypotensive,  post midodrine. Pt has no complaints asymptomatic
patient hypotensive
pt. hypotensive, scheduled midodrine given. Pt has no complaints asymptomatic
Provider made aware of PT current VS,  as documented in flow sheet
BP 77/45
Pt BP 83/56
pt. hypotensive, afebrile. Pt has no complaints asymptomatic
Pt is going to discharge today without HD per primary team. Pt is already cannulated for HD treatment.

## 2023-03-15 NOTE — PROGRESS NOTE ADULT - SUBJECTIVE AND OBJECTIVE BOX
CARDIOLOGY FOLLOW UP - Dr. Livingston  Date of Service: 3/14/2023  CC: no events    Review of Systems:  Constitutional: No fever, weight loss, or fatigue  Respiratory: No cough, wheezing, or hemoptysis, no shortness of breath  Cardiovascular: No chest pain, palpitations, passing out, dizziness, or leg swelling  Gastrointestinal: No abd or epigastric pain. No nausea, vomiting, or hematemesis; no diarrhea or consiptaiton, no melena or hematochezia  Vascular: No edema     TELEMETRY:    PHYSICAL EXAM:  T(C): 36.7 (03-14-23 @ 08:40), Max: 37.1 (03-13-23 @ 16:33)  HR: 79 (03-14-23 @ 10:03) (65 - 84)  BP: 105/58 (03-14-23 @ 08:40) (90/55 - 107/64)  RR: 17 (03-14-23 @ 08:40) (17 - 19)  SpO2: 97% (03-14-23 @ 10:03) (97% - 100%)  Wt(kg): --  I&O's Summary    13 Mar 2023 07:01  -  14 Mar 2023 07:00  --------------------------------------------------------  IN: 680 mL / OUT: 400 mL / NET: 280 mL        Appearance: Normal	  Cardiovascular: Normal S1 S2,RRR, No JVD, No murmurs  Respiratory: Lungs clear to auscultation	  Gastrointestinal:  Soft, Non-tender, + BS	  Extremities: Normal range of motion, No clubbing, cyanosis or edema  Vascular: Peripheral pulses palpable 2+ bilaterally       Home Medications:  acetaminophen 325 mg oral tablet: 2 tab(s) orally every 6 hours, As needed, Temp greater or equal to 38C (100.4F), Mild Pain (1 - 3) (23 Feb 2023 22:35)  allopurinol 100 mg oral tablet: 1 tab(s) orally once a day (28 Feb 2023 10:18)  bisacodyl 5 mg oral delayed release tablet: 1 tab(s) orally once a day (at bedtime) (28 Feb 2023 10:18)  budesonide: 2 puff(s) inhaled 2 times a day (23 Feb 2023 22:35)  cinacalcet 30 mg oral tablet: 1 tab(s) orally every 24 hrs at 8 AM on Tuesday, Thursday, Saturday (23 Feb 2023 22:35)  levETIRAcetam 500 mg oral tablet: 1 tab(s) orally 2 times a day (28 Feb 2023 10:18)  lidocaine 4% topical film: Apply topically to affected area once a day (23 Feb 2023 22:35)  melatonin 3 mg oral tablet: 1 tab(s) orally once a day (at bedtime), As needed, Insomnia (28 Feb 2023 10:18)  midodrine 10 mg oral tablet: 3 tab(s) orally every 6 hours (28 Feb 2023 12:42)  midodrine 10 mg oral tablet: 2 tab(s) orally 3 times a week, As Needed 30 MINUTES PRIOR TO DIALYSIS SESSION  (28 Feb 2023 12:42)  oxyCODONE 5 mg oral tablet: 1 tab(s) orally every 6 hours, As needed, Severe Pain (7 - 10) (23 Feb 2023 22:35)  polyethylene glycol 3350 oral powder for reconstitution: 17 gram(s) orally 2 times a day (28 Feb 2023 10:18)  pregabalin 100 mg oral capsule: 1 cap(s) orally 2 times a day (28 Feb 2023 10:18)  senna leaf extract oral tablet: 2 tab(s) orally once a day (at bedtime) (28 Feb 2023 10:18)  sevelamer carbonate 800 mg oral tablet: 1 tab(s) orally 3 times a day (with meals) (28 Feb 2023 10:18)        MEDICATIONS  (STANDING):  albuterol/ipratropium for Nebulization 3 milliLiter(s) Nebulizer every 6 hours  allopurinol 100 milliGRAM(s) Oral daily  bisacodyl 5 milliGRAM(s) Oral at bedtime  budesonide 160 MICROgram(s)/formoterol 4.5 MICROgram(s) Inhaler 2 Puff(s) Inhalation two times a day  chlorhexidine 2% Cloths 1 Application(s) Topical daily  cinacalcet 90 milliGRAM(s) Oral daily  coronavirus bivalent (EUA) Booster Vaccine (PFIZER) 0.3 milliLiter(s) IntraMuscular once  levETIRAcetam 500 milliGRAM(s) Oral two times a day  midodrine. 30 milliGRAM(s) Oral <User Schedule>  polyethylene glycol 3350 17 Gram(s) Oral two times a day  pregabalin 100 milliGRAM(s) Oral two times a day  senna 2 Tablet(s) Oral at bedtime  sevelamer carbonate 800 milliGRAM(s) Oral three times a day with meals  warfarin 6 milliGRAM(s) Oral once        EKG:  RADIOLOGY:  DIAGNOSTIC TESTING:  [ ] Echocardiogram:  [ ] Catherterization:  [ ] Stress Test:  OTHER:     LABS:	 	                          12.2   9.02  )-----------( 230      ( 14 Mar 2023 06:00 )             41.2     03-14    137  |  95<L>  |  23  ----------------------------<  88  4.1   |  28  |  8.15<H>    Ca    7.7<L>      14 Mar 2023 06:00  Phos  4.0     03-14  Mg     2.00     03-14        PT/INR - ( 14 Mar 2023 06:00 )   PT: 32.4 sec;   INR: 2.76 ratio         PTT - ( 14 Mar 2023 06:00 )  PTT:39.9 sec    CARDIAC MARKERS:                  
CARDIOLOGY FOLLOW UP NOTE - DR. SANCHEZ    Patient Name: MAEY ZUNIGA  Date of Service: 03-13-23 @ 12:41    no new events  bp remains low       Subjective:    cv: denies chest pain, dyspnea, palpitations, dizziness  pulmonary: denies cough  GI: denies abdominal pain, nausea, vomiting  vascular/legs: no edema   skin: no rash  ROS: otherwise negative   overnight events:      PHYSICAL EXAM:  T(C): 36.7 (03-13-23 @ 11:30), Max: 36.7 (03-12-23 @ 21:04)  HR: 71 (03-13-23 @ 11:30) (67 - 85)  BP: 77/45 (03-13-23 @ 11:30) (75/42 - 96/56)  RR: 18 (03-13-23 @ 11:30) (18 - 18)  SpO2: 97% (03-13-23 @ 11:30) (95% - 98%)  Wt(kg): --  I&O's Summary    12 Mar 2023 07:01  -  13 Mar 2023 07:00  --------------------------------------------------------  IN: 460 mL / OUT: 0 mL / NET: 460 mL      Daily     Daily     Appearance: Normal	  Cardiovascular: Normal S1 S2,RRR, No JVD, No murmurs  Respiratory: Lungs clear to auscultation	  Gastrointestinal:  Soft, Non-tender, + BS	  Extremities: Normal range of motion, No clubbing, cyanosis or edema      Home Medications:  acetaminophen 325 mg oral tablet: 2 tab(s) orally every 6 hours, As needed, Temp greater or equal to 38C (100.4F), Mild Pain (1 - 3) (23 Feb 2023 22:35)  allopurinol 100 mg oral tablet: 1 tab(s) orally once a day (28 Feb 2023 10:18)  bisacodyl 5 mg oral delayed release tablet: 1 tab(s) orally once a day (at bedtime) (28 Feb 2023 10:18)  budesonide: 2 puff(s) inhaled 2 times a day (23 Feb 2023 22:35)  cinacalcet 30 mg oral tablet: 1 tab(s) orally every 24 hrs at 8 AM on Tuesday, Thursday, Saturday (23 Feb 2023 22:35)  levETIRAcetam 500 mg oral tablet: 1 tab(s) orally 2 times a day (28 Feb 2023 10:18)  lidocaine 4% topical film: Apply topically to affected area once a day (23 Feb 2023 22:35)  melatonin 3 mg oral tablet: 1 tab(s) orally once a day (at bedtime), As needed, Insomnia (28 Feb 2023 10:18)  midodrine 10 mg oral tablet: 3 tab(s) orally every 6 hours (28 Feb 2023 12:42)  midodrine 10 mg oral tablet: 2 tab(s) orally 3 times a week, As Needed 30 MINUTES PRIOR TO DIALYSIS SESSION  (28 Feb 2023 12:42)  oxyCODONE 5 mg oral tablet: 1 tab(s) orally every 6 hours, As needed, Severe Pain (7 - 10) (23 Feb 2023 22:35)  polyethylene glycol 3350 oral powder for reconstitution: 17 gram(s) orally 2 times a day (28 Feb 2023 10:18)  pregabalin 100 mg oral capsule: 1 cap(s) orally 2 times a day (28 Feb 2023 10:18)  senna leaf extract oral tablet: 2 tab(s) orally once a day (at bedtime) (28 Feb 2023 10:18)  sevelamer carbonate 800 mg oral tablet: 1 tab(s) orally 3 times a day (with meals) (28 Feb 2023 10:18)      MEDICATIONS  (STANDING):  albuterol/ipratropium for Nebulization 3 milliLiter(s) Nebulizer every 6 hours  allopurinol 100 milliGRAM(s) Oral daily  bisacodyl 5 milliGRAM(s) Oral at bedtime  budesonide 160 MICROgram(s)/formoterol 4.5 MICROgram(s) Inhaler 2 Puff(s) Inhalation two times a day  chlorhexidine 2% Cloths 1 Application(s) Topical daily  cinacalcet 90 milliGRAM(s) Oral daily  coronavirus bivalent (EUA) Booster Vaccine (PFIZER) 0.3 milliLiter(s) IntraMuscular once  levETIRAcetam 500 milliGRAM(s) Oral two times a day  midodrine. 30 milliGRAM(s) Oral <User Schedule>  polyethylene glycol 3350 17 Gram(s) Oral two times a day  pregabalin 100 milliGRAM(s) Oral two times a day  senna 2 Tablet(s) Oral at bedtime  sevelamer carbonate 800 milliGRAM(s) Oral three times a day with meals  warfarin 7 milliGRAM(s) Oral once      TELEMETRY: 	    ECG:  	  RADIOLOGY:   DIAGNOSTIC TESTING:  [ ] Echocardiogram:  [ ] Catheterization:  [ ] Stress Test:    OTHER: 	    LABS:	 	    CARDIAC MARKERS:        Troponin T, High Sensitivity Result: 193 ng/L (03-11 @ 00:08)  Troponin T, High Sensitivity Result: 191 ng/L (03-10 @ 21:48)                                12.2   8.64  )-----------( 234      ( 13 Mar 2023 03:25 )             41.4     03-13    136  |  94<L>  |  30<H>  ----------------------------<  86  4.0   |  26  |  10.02<H>    Ca    7.7<L>      13 Mar 2023 03:25  Phos  5.1     03-13  Mg     2.10     03-13      proBNP:   PT/INR - ( 13 Mar 2023 03:25 )   PT: 28.5 sec;   INR: 2.43 ratio           Lipid Profile:   HgA1c:     Creatinine, Serum: 10.02 mg/dL (03-13-23 @ 03:25)  Creatinine, Serum: 8.05 mg/dL (03-12-23 @ 07:00)  Creatinine, Serum: 11.82 mg/dL (03-11-23 @ 18:26)  Creatinine, Serum: 9.78 mg/dL (03-10-23 @ 21:48)            
Date of Service: 03-13-23 @ 16:34    Patient is a 77y old  Male who presents with a chief complaint of syncope (13 Mar 2023 12:45)      Any change in ROS: seems OK: on 2 L: seems very frustrated and just wants to leave:     MEDICATIONS  (STANDING):  albuterol/ipratropium for Nebulization 3 milliLiter(s) Nebulizer every 6 hours  allopurinol 100 milliGRAM(s) Oral daily  bisacodyl 5 milliGRAM(s) Oral at bedtime  budesonide 160 MICROgram(s)/formoterol 4.5 MICROgram(s) Inhaler 2 Puff(s) Inhalation two times a day  chlorhexidine 2% Cloths 1 Application(s) Topical daily  cinacalcet 90 milliGRAM(s) Oral daily  coronavirus bivalent (EUA) Booster Vaccine (PFIZER) 0.3 milliLiter(s) IntraMuscular once  levETIRAcetam 500 milliGRAM(s) Oral two times a day  midodrine. 30 milliGRAM(s) Oral <User Schedule>  polyethylene glycol 3350 17 Gram(s) Oral two times a day  pregabalin 100 milliGRAM(s) Oral two times a day  senna 2 Tablet(s) Oral at bedtime  sevelamer carbonate 800 milliGRAM(s) Oral three times a day with meals  warfarin 9 milliGRAM(s) Oral once    MEDICATIONS  (PRN):  acetaminophen     Tablet .. 650 milliGRAM(s) Oral every 6 hours PRN Temp greater or equal to 38C (100.4F), Mild Pain (1 - 3)  melatonin 3 milliGRAM(s) Oral at bedtime PRN Insomnia  oxyCODONE    IR 5 milliGRAM(s) Oral every 6 hours PRN Severe Pain (7 - 10)  sodium chloride 0.9% Bolus. 100 milliLiter(s) IV Bolus every 5 minutes PRN SBP LESS THAN or EQUAL to 90 mmHg    Vital Signs Last 24 Hrs  T(C): 36.6 (13 Mar 2023 15:00), Max: 36.7 (12 Mar 2023 21:04)  T(F): 97.9 (13 Mar 2023 15:00), Max: 98.1 (13 Mar 2023 11:30)  HR: 75 (13 Mar 2023 15:47) (65 - 76)  BP: 91/55 (13 Mar 2023 15:00) (77/45 - 96/56)  BP(mean): --  RR: 18 (13 Mar 2023 15:00) (18 - 18)  SpO2: 98% (13 Mar 2023 15:47) (95% - 98%)    Parameters below as of 13 Mar 2023 15:47  Patient On (Oxygen Delivery Method): nasal cannula        I&O's Summary    12 Mar 2023 07:01  -  13 Mar 2023 07:00  --------------------------------------------------------  IN: 460 mL / OUT: 0 mL / NET: 460 mL    13 Mar 2023 07:01  -  13 Mar 2023 16:34  --------------------------------------------------------  IN: 180 mL / OUT: 0 mL / NET: 180 mL          Physical Exam:   GENERAL: NAD, well-groomed, well-developed  HEENT: JORDIN/   Atraumatic, Normocephalic  ENMT: No tonsillar erythema, exudates, or enlargement; Moist mucous membranes, Good dentition, No lesions  NECK: Supple, No JVD, Normal thyroid  CHEST/LUNG: Clear to auscultaion  CVS: Regular rate and rhythm; No murmurs, rubs, or gallops  GI: : Soft, Nontender, Nondistended; Bowel sounds present  NERVOUS SYSTEM:  Alert & Oriented X3  EXTREMITIES: - edema  LYMPH: No lymphadenopathy noted  SKIN: No rashes or lesions  ENDOCRINOLOGY: No Thyromegaly  PSYCH: Appropriate    Labs:  29                            12.2   8.64  )-----------( 234      ( 13 Mar 2023 03:25 )             41.4                         11.8   9.28  )-----------( 213      ( 12 Mar 2023 03:15 )             39.0                         12.9   10.57 )-----------( 259      ( 11 Mar 2023 18:26 )             43.8                         13.3   9.64  )-----------( 252      ( 10 Mar 2023 21:48 )             45.4     03-13    136  |  94<L>  |  30<H>  ----------------------------<  86  4.0   |  26  |  10.02<H>  03-12    135  |  94<L>  |  25<H>  ----------------------------<  71  4.7   |  19<L>  |  8.05<H>  03-11    135  |  89<L>  |  40<H>  ----------------------------<  103<H>  3.8   |  29  |  11.82<H>  03-10    135  |  89<L>  |  27<H>  ----------------------------<  91  4.3   |  29  |  9.78<H>    Ca    7.7<L>      13 Mar 2023 03:25  Ca    8.4      12 Mar 2023 07:00  Ca    8.0<L>      11 Mar 2023 18:26  Phos  5.1     03-13  Phos  4.4     03-12  Phos  5.9     03-11  Mg     2.10     03-13  Mg     2.10     03-12  Mg     2.00     03-11    TPro  8.7<H>  /  Alb  4.1  /  TBili  0.3  /  DBili  x   /  AST  34  /  ALT  14  /  AlkPhos  126<H>  03-10    CAPILLARY BLOOD GLUCOSE            PT/INR - ( 13 Mar 2023 03:25 )   PT: 28.5 sec;   INR: 2.43 ratio           < from: Xray Chest 1 View AP/PA (03.10.23 @ 22:12) >  TECHNIQUE: Single frontal, portable view of the chest was obtained.    COMPARISON: Chest x-ray 1/17/2023.    FINDINGS:  The heart is normal in size.  Unchanged mild bilateral atelectasis.  There is no pneumothorax or pleural effusion.    IMPRESSION:  Unchanged mild bilateral atelectasis.    --- End of Report ---          KOMAL JAVIER MD; Resident Radiologist  This document has been electronically signed.  MARYSOL FROST MD; Attending Radiologist  This document has been electronically signed. Mar 11 2023  8:05AM    < end of copied text >          RECENT CULTURES:        RESPIRATORY CULTURES:          Studies  Chest X-RAY  CT SCAN Chest   Venous Dopplers: LE:   CT Abdomen  Others          rad< from: Xray Chest 1 View AP/PA (03.10.23 @ 22:12) >  Unchanged mild bilateral atelectasis.  There is no pneumothorax or pleural effusion.    IMPRESSION:  Unchanged mild bilateral atelectasis.    --- End of Report ---          KOMAL JAVIER MD; Resident Radiologist  This document has been electronically signed.  MARYSOL FROST MD; Attending Radiologist  This document has been electronically signed. Mar 11 2023  8:05AM    < end of copied text >      
Patient is a 77y old  Male who presents with a chief complaint of syncope (13 Mar 2023 16:34)      SUBJECTIVE / OVERNIGHT EVENTS: ptn has chronic hypotension w severe orthostasis, on HD midodrine. will dose w coumadin    MEDICATIONS  (STANDING):  albuterol/ipratropium for Nebulization 3 milliLiter(s) Nebulizer every 6 hours  allopurinol 100 milliGRAM(s) Oral daily  bisacodyl 5 milliGRAM(s) Oral at bedtime  budesonide 160 MICROgram(s)/formoterol 4.5 MICROgram(s) Inhaler 2 Puff(s) Inhalation two times a day  chlorhexidine 2% Cloths 1 Application(s) Topical daily  cinacalcet 90 milliGRAM(s) Oral daily  coronavirus bivalent (EUA) Booster Vaccine (PFIZER) 0.3 milliLiter(s) IntraMuscular once  levETIRAcetam 500 milliGRAM(s) Oral two times a day  midodrine. 30 milliGRAM(s) Oral <User Schedule>  polyethylene glycol 3350 17 Gram(s) Oral two times a day  pregabalin 100 milliGRAM(s) Oral two times a day  senna 2 Tablet(s) Oral at bedtime  sevelamer carbonate 800 milliGRAM(s) Oral three times a day with meals  warfarin 9 milliGRAM(s) Oral once    MEDICATIONS  (PRN):  acetaminophen     Tablet .. 650 milliGRAM(s) Oral every 6 hours PRN Temp greater or equal to 38C (100.4F), Mild Pain (1 - 3)  melatonin 3 milliGRAM(s) Oral at bedtime PRN Insomnia  oxyCODONE    IR 5 milliGRAM(s) Oral every 6 hours PRN Severe Pain (7 - 10)  sodium chloride 0.9% Bolus. 100 milliLiter(s) IV Bolus every 5 minutes PRN SBP LESS THAN or EQUAL to 90 mmHg      Vital Signs Last 24 Hrs  T(F): 97.4 (03-13-23 @ 20:40), Max: 98.8 (03-13-23 @ 16:33)  HR: 84 (03-13-23 @ 20:40) (65 - 84)  BP: 97/58 (03-13-23 @ 20:40) (77/45 - 107/64)  RR: 18 (03-13-23 @ 20:40) (18 - 19)  SpO2: 100% (03-13-23 @ 20:40) (95% - 100%)  Telemetry:   CAPILLARY BLOOD GLUCOSE        I&O's Summary    12 Mar 2023 07:01  -  13 Mar 2023 07:00  --------------------------------------------------------  IN: 460 mL / OUT: 0 mL / NET: 460 mL    13 Mar 2023 07:01  -  13 Mar 2023 21:00  --------------------------------------------------------  IN: 180 mL / OUT: 0 mL / NET: 180 mL        PHYSICAL EXAM:  GENERAL: NAD, well-developed  HEAD:  Atraumatic, Normocephalic  EYES: EOMI, PERRLA, conjunctiva and sclera clear  NECK: Supple, No JVD  CHEST/LUNG: Clear to auscultation bilaterally; No wheeze  HEART: Regular rate and rhythm; No murmurs, rubs, or gallops  ABDOMEN: Soft, Nontender, Nondistended; Bowel sounds present  EXTREMITIES:  2+ Peripheral Pulses, No clubbing, cyanosis, or edema  PSYCH: AAOx3  NEUROLOGY: non-focal  SKIN: No rashes or lesions    LABS:                        12.2   8.64  )-----------( 234      ( 13 Mar 2023 03:25 )             41.4     03-13    136  |  94<L>  |  30<H>  ----------------------------<  86  4.0   |  26  |  10.02<H>    Ca    7.7<L>      13 Mar 2023 03:25  Phos  5.1     03-13  Mg     2.10     03-13      PT/INR - ( 13 Mar 2023 03:25 )   PT: 28.5 sec;   INR: 2.43 ratio                   RADIOLOGY & ADDITIONAL TESTS:    Imaging Personally Reviewed:    Consultant(s) Notes Reviewed:      Care Discussed with Consultants/Other Providers:  
[facility] [place]   MD CHESTER DUEÑAS MD KRISTINE SOLTANPOUR, DAVID TRUJILLO    TEL:  OFFICE: [phone]  From 5pm-7am Answering Service [phone]    -- RENAL FOLLOW UP NOTE ---Date of Service 03-11-23 @ 16:33    Patient is a 77y old  Male who presents with a chief complaint of syncope (11 Mar 2023 15:11)      Patient seen and examined at bedside. No chest pain/sob    VITALS:  T(F): 98 (03-11-23 @ 13:04), Max: 98.2 (03-10-23 @ 23:02)  HR: 86 (03-11-23 @ 15:38)  BP: 86/51 (03-11-23 @ 15:38)  RR: 17 (03-11-23 @ 15:38)  SpO2: 95% (03-11-23 @ 15:38)  Wt(kg): --    Height (cm): 165.1 (03-11 @ 10:13)  Weight (kg): 109.316 (03-11 @ 10:13)  BMI (kg/m2): 40.1 (03-11 @ 10:13)  BSA (m2): 2.14 (03-11 @ 10:13)    PHYSICAL EXAM:  General: NAD  Neck: No JVD  Respiratory: CTAB, no wheezes, rales or rhonchi  Cardiovascular: S1, S2, RRR  Gastrointestinal: BS+, soft, NT/ND  Extremities: + peripheral edema    Hospital Medications:   MEDICATIONS  (STANDING):  albuterol/ipratropium for Nebulization 3 milliLiter(s) Nebulizer every 6 hours  allopurinol 100 milliGRAM(s) Oral daily  bisacodyl 5 milliGRAM(s) Oral at bedtime  buDESOnide    Inhalation Suspension 0.5 milliGRAM(s) Inhalation two times a day  chlorhexidine 2% Cloths 1 Application(s) Topical daily  cinacalcet 90 milliGRAM(s) Oral daily  coronavirus bivalent (EUA) Booster Vaccine (PFIZER) 0.3 milliLiter(s) IntraMuscular once  levETIRAcetam 500 milliGRAM(s) Oral two times a day  midodrine. 30 milliGRAM(s) Oral <User Schedule>  polyethylene glycol 3350 17 Gram(s) Oral two times a day  pregabalin 100 milliGRAM(s) Oral two times a day  senna 2 Tablet(s) Oral at bedtime  sevelamer carbonate 800 milliGRAM(s) Oral three times a day with meals  warfarin 7 milliGRAM(s) Oral once      LABS:  03-10    135  |  89<L>  |  27<H>  ----------------------------<  91  4.3   |  29  |  9.78<H>    Ca    8.7      10 Mar 2023 21:48  Mg     2.10     03-10    TPro  8.7<H>  /  Alb  4.1  /  TBili  0.3  /  DBili      /  AST  34  /  ALT  14  /  AlkPhos  126<H>  03-10    Creatinine Trend: 9.78 <--    Albumin, Serum: 4.1 g/dL (03-10 @ 21:48)                              13.3   9.64  )-----------( 252      ( 10 Mar 2023 21:48 )             45.4     Urine Studies:      Iron 52, TIBC 188, %sat 28      [02-23-23 @ 16:58]  Ferritin 2429      [02-23-23 @ 16:58]  PTH -- (Ca --)      [01-20-23 @ 06:35]   630  TSH 0.92      [02-24-23 @ 13:07]    HIV Nonreact      [02-24-23 @ 05:42]      RADIOLOGY & ADDITIONAL STUDIES:  
Patient is a 77y old  Male who presents with a chief complaint of Syncope and collapse     (12 Mar 2023 14:54)      SUBJECTIVE / OVERNIGHT EVENTS: SBP today in 70s at 1 pm, while asymptomatic, cont Midodrine as per schedule. ptn is euvolemic    MEDICATIONS  (STANDING):  albuterol/ipratropium for Nebulization 3 milliLiter(s) Nebulizer every 6 hours  allopurinol 100 milliGRAM(s) Oral daily  bisacodyl 5 milliGRAM(s) Oral at bedtime  budesonide 160 MICROgram(s)/formoterol 4.5 MICROgram(s) Inhaler 2 Puff(s) Inhalation two times a day  chlorhexidine 2% Cloths 1 Application(s) Topical daily  cinacalcet 90 milliGRAM(s) Oral daily  coronavirus bivalent (EUA) Booster Vaccine (PFIZER) 0.3 milliLiter(s) IntraMuscular once  levETIRAcetam 500 milliGRAM(s) Oral two times a day  midodrine. 30 milliGRAM(s) Oral <User Schedule>  polyethylene glycol 3350 17 Gram(s) Oral two times a day  pregabalin 100 milliGRAM(s) Oral two times a day  senna 2 Tablet(s) Oral at bedtime  sevelamer carbonate 800 milliGRAM(s) Oral three times a day with meals  warfarin 6 milliGRAM(s) Oral once    MEDICATIONS  (PRN):  acetaminophen     Tablet .. 650 milliGRAM(s) Oral every 6 hours PRN Temp greater or equal to 38C (100.4F), Mild Pain (1 - 3)  melatonin 3 milliGRAM(s) Oral at bedtime PRN Insomnia  oxyCODONE    IR 5 milliGRAM(s) Oral every 6 hours PRN Severe Pain (7 - 10)  sodium chloride 0.9% Bolus. 100 milliLiter(s) IV Bolus every 5 minutes PRN SBP LESS THAN or EQUAL to 90 mmHg      Vital Signs Last 24 Hrs  T(F): 97.7 (03-12-23 @ 17:50), Max: 98.5 (03-11-23 @ 21:11)  HR: 74 (03-12-23 @ 17:50) (66 - 85)  BP: 91/52 (03-12-23 @ 17:50) (75/42 - 125/73)  RR: 18 (03-12-23 @ 17:50) (18 - 18)  SpO2: 98% (03-12-23 @ 17:50) (95% - 100%)  Telemetry:   CAPILLARY BLOOD GLUCOSE        I&O's Summary    11 Mar 2023 06:01  -  12 Mar 2023 07:00  --------------------------------------------------------  IN: 620 mL / OUT: 300 mL / NET: 320 mL    12 Mar 2023 07:01  -  12 Mar 2023 20:02  --------------------------------------------------------  IN: 120 mL / OUT: 0 mL / NET: 120 mL        PHYSICAL EXAM:  GENERAL: NAD, well-developed  HEAD:  Atraumatic, Normocephalic  EYES: EOMI, PERRLA, conjunctiva and sclera clear  NECK: Supple, No JVD  CHEST/LUNG: Clear to auscultation bilaterally; No wheeze  HEART: Regular rate and rhythm; No murmurs, rubs, or gallops  ABDOMEN: Soft, Nontender, Nondistended; Bowel sounds present  EXTREMITIES:  2+ Peripheral Pulses, No clubbing, cyanosis, or edema  PSYCH: AAOx3  NEUROLOGY: non-focal  SKIN: No rashes or lesions    LABS:                        11.8   9.28  )-----------( 213      ( 12 Mar 2023 03:15 )             39.0     03-12    135  |  94<L>  |  25<H>  ----------------------------<  71  4.7   |  19<L>  |  8.05<H>    Ca    8.4      12 Mar 2023 07:00  Phos  4.4     03-12  Mg     2.10     03-12    TPro  8.7<H>  /  Alb  4.1  /  TBili  0.3  /  DBili  x   /  AST  34  /  ALT  14  /  AlkPhos  126<H>  03-10    PT/INR - ( 12 Mar 2023 03:15 )   PT: 30.8 sec;   INR: 2.63 ratio           CARDIAC MARKERS ( 11 Mar 2023 00:08 )  x     / x     / x     / x     / 1.2 ng/mL          RADIOLOGY & ADDITIONAL TESTS:    Imaging Personally Reviewed:    Consultant(s) Notes Reviewed:      Care Discussed with Consultants/Other Providers:  
[facility] [place] [facility]   MD CHESTER DUEÑAS MD KRISTINE SOLTANPOUR, DO ANGELA WONG, PA    TEL:  OFFICE: [phone]  From 5pm-7am Answering Service [phone]    -- RENAL FOLLOW UP NOTE ---Date of Service 03-13-23 @ 12:45    Patient is a 77y old  Male who presents with a chief complaint of syncope (13 Mar 2023 12:40)      Patient seen and examined at bedside. No chest pain/sob    VITALS:  T(F): 98.1 (03-13-23 @ 11:30), Max: 98.1 (03-13-23 @ 11:30)  HR: 71 (03-13-23 @ 11:30)  BP: 77/45 (03-13-23 @ 11:30)  RR: 18 (03-13-23 @ 11:30)  SpO2: 97% (03-13-23 @ 11:30)  Wt(kg): --    03-12 @ 07:01  -  03-13 @ 07:00  --------------------------------------------------------  IN: 460 mL / OUT: 0 mL / NET: 460 mL          PHYSICAL EXAM:  General: NAD  Neck: No JVD  Respiratory: CTAB, no wheezes, rales or rhonchi  Cardiovascular: S1, S2, RRR  Gastrointestinal: BS+, soft, NT/ND  Extremities: No peripheral edema    Hospital Medications:   MEDICATIONS  (STANDING):  albuterol/ipratropium for Nebulization 3 milliLiter(s) Nebulizer every 6 hours  allopurinol 100 milliGRAM(s) Oral daily  bisacodyl 5 milliGRAM(s) Oral at bedtime  budesonide 160 MICROgram(s)/formoterol 4.5 MICROgram(s) Inhaler 2 Puff(s) Inhalation two times a day  chlorhexidine 2% Cloths 1 Application(s) Topical daily  cinacalcet 90 milliGRAM(s) Oral daily  coronavirus bivalent (EUA) Booster Vaccine (PFIZER) 0.3 milliLiter(s) IntraMuscular once  levETIRAcetam 500 milliGRAM(s) Oral two times a day  midodrine. 30 milliGRAM(s) Oral <User Schedule>  polyethylene glycol 3350 17 Gram(s) Oral two times a day  pregabalin 100 milliGRAM(s) Oral two times a day  senna 2 Tablet(s) Oral at bedtime  sevelamer carbonate 800 milliGRAM(s) Oral three times a day with meals  warfarin 7 milliGRAM(s) Oral once      LABS:  03-13    136  |  94<L>  |  30<H>  ----------------------------<  86  4.0   |  26  |  10.02<H>    Ca    7.7<L>      13 Mar 2023 03:25  Phos  5.1     03-13  Mg     2.10     03-13      Creatinine Trend: 10.02 <--, 8.05 <--, 11.82 <--, 9.78 <--    Phosphorus Level, Serum: 5.1 mg/dL (03-13 @ 03:25)                              12.2   8.64  )-----------( 234      ( 13 Mar 2023 03:25 )             41.4     Urine Studies:      Iron 52, TIBC 188, %sat 28      [02-23-23 @ 16:58]  Ferritin 2429      [02-23-23 @ 16:58]  PTH -- (Ca --)      [01-20-23 @ 06:35]   630  TSH 0.92      [02-24-23 @ 13:07]    HIV Nonreact      [02-24-23 @ 05:42]      RADIOLOGY & ADDITIONAL STUDIES:  
[facility] [place] [facility]   MD CHESTER DUEÑAS MD KRISTINE SOLTANPOUR, DO ANGELA WONG, PA    TEL:  OFFICE: [phone]  From 5pm-7am Answering Service [phone]    -- RENAL FOLLOW UP NOTE ---Date of Service 03-15-23 @ 12:25    Patient is a 77y old  Male who presents with a chief complaint of syncope (15 Mar 2023 07:04)      Patient seen and examined at bedside. No chest pain/sob    VITALS:  T(F): 97.6 (03-15-23 @ 09:00), Max: 98.2 (03-15-23 @ 01:00)  HR: 85 (03-15-23 @ 10:31)  BP: 95/60 (03-15-23 @ 10:31)  RR: 18 (03-15-23 @ 09:00)  SpO2: 95% (03-15-23 @ 10:31)  Wt(kg): --        PHYSICAL EXAM:  General: NAD  Neck: No JVD  Respiratory: CTAB, no wheezes, rales or rhonchi  Cardiovascular: S1, S2, RRR  Gastrointestinal: BS+, soft, NT/ND  Extremities: No peripheral edema    Hospital Medications:   MEDICATIONS  (STANDING):  albuterol/ipratropium for Nebulization 3 milliLiter(s) Nebulizer every 6 hours  allopurinol 100 milliGRAM(s) Oral daily  bisacodyl 5 milliGRAM(s) Oral at bedtime  budesonide 160 MICROgram(s)/formoterol 4.5 MICROgram(s) Inhaler 2 Puff(s) Inhalation two times a day  chlorhexidine 2% Cloths 1 Application(s) Topical daily  cinacalcet 90 milliGRAM(s) Oral daily  coronavirus bivalent (EUA) Booster Vaccine (PFIZER) 0.3 milliLiter(s) IntraMuscular once  levETIRAcetam 500 milliGRAM(s) Oral two times a day  midodrine. 30 milliGRAM(s) Oral <User Schedule>  polyethylene glycol 3350 17 Gram(s) Oral two times a day  pregabalin 100 milliGRAM(s) Oral two times a day  senna 2 Tablet(s) Oral at bedtime  sevelamer carbonate 800 milliGRAM(s) Oral three times a day with meals      LABS:  03-14    137  |  95<L>  |  23  ----------------------------<  88  4.1   |  28  |  8.15<H>    Ca    7.7<L>      14 Mar 2023 06:00  Phos  4.0     03-14  Mg     2.00     03-14      Creatinine Trend: 8.15 <--, 10.02 <--, 8.05 <--, 11.82 <--, 9.78 <--                                12.2   9.02  )-----------( 230      ( 14 Mar 2023 06:00 )             41.2     Urine Studies:      Iron 52, TIBC 188, %sat 28      [02-23-23 @ 16:58]  Ferritin 2429      [02-23-23 @ 16:58]  PTH -- (Ca --)      [01-20-23 @ 06:35]   630  TSH 0.92      [02-24-23 @ 13:07]    HIV Nonreact      [02-24-23 @ 05:42]      RADIOLOGY & ADDITIONAL STUDIES:  
[facility] [place] [facility]   MD CHESTER DUEÑAS MD KRISTINE SOLTANPOUR, DO ANGELA WONG, PA    TEL:  OFFICE: [phone]  From 5pm-7am Answering Service [phone]    -- RENAL FOLLOW UP NOTE ---Date of Service 03-14-23 @ 13:52    Patient is a 77y old  Male who presents with a chief complaint of syncope (14 Mar 2023 12:29)      Patient seen and examined at bedside. No chest pain/sob    VITALS:  T(F): 98.1 (03-14-23 @ 12:40), Max: 98.8 (03-13-23 @ 16:33)  HR: 77 (03-14-23 @ 12:40)  BP: 100/51 (03-14-23 @ 12:40)  RR: 18 (03-14-23 @ 12:40)  SpO2: 99% (03-14-23 @ 12:40)  Wt(kg): --    03-13 @ 07:01  -  03-14 @ 07:00  --------------------------------------------------------  IN: 680 mL / OUT: 400 mL / NET: 280 mL          PHYSICAL EXAM:  General: NAD  Neck: No JVD  Respiratory: CTAB, no wheezes, rales or rhonchi  Cardiovascular: S1, S2, RRR  Gastrointestinal: BS+, soft, NT/ND  Extremities: + peripheral edema    Hospital Medications:   MEDICATIONS  (STANDING):  albuterol/ipratropium for Nebulization 3 milliLiter(s) Nebulizer every 6 hours  allopurinol 100 milliGRAM(s) Oral daily  bisacodyl 5 milliGRAM(s) Oral at bedtime  budesonide 160 MICROgram(s)/formoterol 4.5 MICROgram(s) Inhaler 2 Puff(s) Inhalation two times a day  chlorhexidine 2% Cloths 1 Application(s) Topical daily  cinacalcet 90 milliGRAM(s) Oral daily  coronavirus bivalent (EUA) Booster Vaccine (PFIZER) 0.3 milliLiter(s) IntraMuscular once  levETIRAcetam 500 milliGRAM(s) Oral two times a day  midodrine. 30 milliGRAM(s) Oral <User Schedule>  polyethylene glycol 3350 17 Gram(s) Oral two times a day  pregabalin 100 milliGRAM(s) Oral two times a day  senna 2 Tablet(s) Oral at bedtime  sevelamer carbonate 800 milliGRAM(s) Oral three times a day with meals  warfarin 6 milliGRAM(s) Oral once      LABS:  03-14    137  |  95<L>  |  23  ----------------------------<  88  4.1   |  28  |  8.15<H>    Ca    7.7<L>      14 Mar 2023 06:00  Phos  4.0     03-14  Mg     2.00     03-14      Creatinine Trend: 8.15 <--, 10.02 <--, 8.05 <--, 11.82 <--, 9.78 <--    Phosphorus Level, Serum: 4.0 mg/dL (03-14 @ 06:00)                              12.2   9.02  )-----------( 230      ( 14 Mar 2023 06:00 )             41.2     Urine Studies:      Iron 52, TIBC 188, %sat 28      [02-23-23 @ 16:58]  Ferritin 2429      [02-23-23 @ 16:58]  PTH -- (Ca --)      [01-20-23 @ 06:35]   630  TSH 0.92      [02-24-23 @ 13:07]    HIV Nonreact      [02-24-23 @ 05:42]      RADIOLOGY & ADDITIONAL STUDIES:  
CARDIOLOGY FOLLOW UP - Dr. Livingston  Date of Service: 3/15/2023  CC: no events    Review of Systems:  Constitutional: No fever, weight loss, or fatigue  Respiratory: No cough, wheezing, or hemoptysis, no shortness of breath  Cardiovascular: No chest pain, palpitations, passing out, dizziness, or leg swelling  Gastrointestinal: No abd or epigastric pain. No nausea, vomiting, or hematemesis; no diarrhea or consiptaiton, no melena or hematochezia  Vascular: No edema     TELEMETRY:    PHYSICAL EXAM:  T(C): 36.4 (03-15-23 @ 14:15), Max: 36.8 (03-15-23 @ 01:00)  HR: 74 (03-15-23 @ 14:15) (62 - 85)  BP: 115/61 (03-15-23 @ 14:15) (95/60 - 118/66)  RR: 18 (03-15-23 @ 14:15) (17 - 18)  SpO2: 98% (03-15-23 @ 14:15) (95% - 100%)  Wt(kg): --  I&O's Summary      Appearance: Normal	  Cardiovascular: Normal S1 S2,RRR, No JVD, No murmurs  Respiratory: Lungs clear to auscultation	  Gastrointestinal:  Soft, Non-tender, + BS	  Extremities: Normal range of motion, No clubbing, cyanosis or edema  Vascular: Peripheral pulses palpable 2+ bilaterally       Home Medications:  acetaminophen 325 mg oral tablet: 2 tab(s) orally every 6 hours, As needed, Temp greater or equal to 38C (100.4F), Mild Pain (1 - 3) (23 Feb 2023 22:35)  bisacodyl 5 mg oral delayed release tablet: 1 tab(s) orally once a day (at bedtime) (28 Feb 2023 10:18)  melatonin 3 mg oral tablet: 1 tab(s) orally once a day (at bedtime), As needed, Insomnia (28 Feb 2023 10:18)  oxyCODONE 5 mg oral tablet: 1 tab(s) orally every 6 hours, As needed, Severe Pain (7 - 10) (23 Feb 2023 22:35)  polyethylene glycol 3350 oral powder for reconstitution: 17 gram(s) orally 2 times a day (28 Feb 2023 10:18)  senna leaf extract oral tablet: 2 tab(s) orally once a day (at bedtime) (28 Feb 2023 10:18)        MEDICATIONS  (STANDING):  albuterol/ipratropium for Nebulization 3 milliLiter(s) Nebulizer every 6 hours  allopurinol 100 milliGRAM(s) Oral daily  bisacodyl 5 milliGRAM(s) Oral at bedtime  budesonide 160 MICROgram(s)/formoterol 4.5 MICROgram(s) Inhaler 2 Puff(s) Inhalation two times a day  chlorhexidine 2% Cloths 1 Application(s) Topical daily  cinacalcet 90 milliGRAM(s) Oral daily  coronavirus bivalent (EUA) Booster Vaccine (PFIZER) 0.3 milliLiter(s) IntraMuscular once  levETIRAcetam 500 milliGRAM(s) Oral two times a day  midodrine. 30 milliGRAM(s) Oral <User Schedule>  polyethylene glycol 3350 17 Gram(s) Oral two times a day  pregabalin 100 milliGRAM(s) Oral two times a day  senna 2 Tablet(s) Oral at bedtime  sevelamer carbonate 800 milliGRAM(s) Oral three times a day with meals  warfarin 6 milliGRAM(s) Oral once        EKG:  RADIOLOGY:  DIAGNOSTIC TESTING:  [ ] Echocardiogram:  [ ] Catherterization:  [ ] Stress Test:  OTHER:     LABS:	 	                          12.0   8.27  )-----------( 220      ( 15 Mar 2023 11:20 )             40.2     03-15    136  |  95<L>  |  33<H>  ----------------------------<  103<H>  4.1   |  24  |  10.11<H>    Ca    7.5<L>      15 Mar 2023 11:20  Phos  4.8     03-15  Mg     2.00     03-15        PT/INR - ( 15 Mar 2023 11:20 )   PT: 34.3 sec;   INR: 2.93 ratio         PTT - ( 15 Mar 2023 11:20 )  PTT:45.6 sec    CARDIAC MARKERS:                  
CARDIOLOGY FOLLOW UP NOTE - DR. SANCHEZ    Patient Name: MAYE ZUNIGA  Date of Service: 23 @ 14:40    Patient seen and examined  wants to be discharged  no new complaints      Subjective:    cv: denies chest pain, dyspnea, palpitations, dizziness  pulmonary: denies cough  GI: denies abdominal pain, nausea, vomiting  vascular/legs: no edema   skin: no rash  ROS: otherwise negative   overnight events:      PHYSICAL EXAM:  T(C): 36.5 (23 @ 12:53), Max: 37.1 (23 @ 15:38)  HR: 85 (23 @ 12:53) (66 - 94)  BP: 75/42 (23 @ 12:53) (74/55 - 125/73)  RR: 18 (23 @ 12:53) (17 - 18)  SpO2: 96% (23 @ 12:53) (95% - 100%)  Wt(kg): --  I&O's Summary    11 Mar 2023 06:01  -  12 Mar 2023 07:00  --------------------------------------------------------  IN: 620 mL / OUT: 300 mL / NET: 320 mL      Daily     Daily Weight in k.1 (12 Mar 2023 03:43)    Appearance: Normal	  Cardiovascular: Normal S1 S2,RRR, No JVD, No murmurs  Respiratory: Lungs clear to auscultation	  Gastrointestinal:  Soft, Non-tender, + BS	  Extremities: Normal range of motion, No clubbing, cyanosis or edema      Home Medications:  acetaminophen 325 mg oral tablet: 2 tab(s) orally every 6 hours, As needed, Temp greater or equal to 38C (100.4F), Mild Pain (1 - 3) (2023 22:35)  allopurinol 100 mg oral tablet: 1 tab(s) orally once a day (2023 10:18)  bisacodyl 5 mg oral delayed release tablet: 1 tab(s) orally once a day (at bedtime) (2023 10:18)  budesonide: 2 puff(s) inhaled 2 times a day (2023 22:35)  cinacalcet 30 mg oral tablet: 1 tab(s) orally every 24 hrs at 8 AM on Tuesday, Thursday, Saturday (2023 22:35)  levETIRAcetam 500 mg oral tablet: 1 tab(s) orally 2 times a day (2023 10:18)  lidocaine 4% topical film: Apply topically to affected area once a day (2023 22:35)  melatonin 3 mg oral tablet: 1 tab(s) orally once a day (at bedtime), As needed, Insomnia (2023 10:18)  midodrine 10 mg oral tablet: 3 tab(s) orally every 6 hours (2023 12:42)  midodrine 10 mg oral tablet: 2 tab(s) orally 3 times a week, As Needed 30 MINUTES PRIOR TO DIALYSIS SESSION  (2023 12:42)  oxyCODONE 5 mg oral tablet: 1 tab(s) orally every 6 hours, As needed, Severe Pain (7 - 10) (2023 22:35)  polyethylene glycol 3350 oral powder for reconstitution: 17 gram(s) orally 2 times a day (2023 10:18)  pregabalin 100 mg oral capsule: 1 cap(s) orally 2 times a day (2023 10:18)  senna leaf extract oral tablet: 2 tab(s) orally once a day (at bedtime) (2023 10:18)  sevelamer carbonate 800 mg oral tablet: 1 tab(s) orally 3 times a day (with meals) (2023 10:18)      MEDICATIONS  (STANDING):  albuterol/ipratropium for Nebulization 3 milliLiter(s) Nebulizer every 6 hours  allopurinol 100 milliGRAM(s) Oral daily  bisacodyl 5 milliGRAM(s) Oral at bedtime  budesonide 160 MICROgram(s)/formoterol 4.5 MICROgram(s) Inhaler 2 Puff(s) Inhalation two times a day  chlorhexidine 2% Cloths 1 Application(s) Topical daily  cinacalcet 90 milliGRAM(s) Oral daily  coronavirus bivalent (EUA) Booster Vaccine (PFIZER) 0.3 milliLiter(s) IntraMuscular once  levETIRAcetam 500 milliGRAM(s) Oral two times a day  midodrine. 30 milliGRAM(s) Oral <User Schedule>  polyethylene glycol 3350 17 Gram(s) Oral two times a day  pregabalin 100 milliGRAM(s) Oral two times a day  senna 2 Tablet(s) Oral at bedtime  sevelamer carbonate 800 milliGRAM(s) Oral three times a day with meals      TELEMETRY: 	    ECG:  	  RADIOLOGY:   DIAGNOSTIC TESTING:  [ ] Echocardiogram:  [ ] Catheterization:  [ ] Stress Test:    OTHER: 	    LABS:	 	    CARDIAC MARKERS:        Troponin T, High Sensitivity Result: 193 ng/L ( @ 00:08)  Troponin T, High Sensitivity Result: 191 ng/L (03-10 @ 21:48)                                11.8   9.28  )-----------( 213      ( 12 Mar 2023 03:15 )             39.0         135  |  94<L>  |  25<H>  ----------------------------<  71  4.7   |  19<L>  |  8.05<H>    Ca    8.4      12 Mar 2023 07:00  Phos  4.4       Mg     2.10         TPro  8.7<H>  /  Alb  4.1  /  TBili  0.3  /  DBili  x   /  AST  34  /  ALT  14  /  AlkPhos  126<H>  03-10    proBNP:   PT/INR - ( 12 Mar 2023 03:15 )   PT: 30.8 sec;   INR: 2.63 ratio           Lipid Profile:   HgA1c:     Creatinine, Serum: 8.05 mg/dL (23 @ 07:00)  Creatinine, Serum: 11.82 mg/dL (23 @ 18:26)  Creatinine, Serum: 9.78 mg/dL (03-10-23 @ 21:48)            
Date of Service: 03-14-23 @ 14:59    Patient is a 77y old  Male who presents with a chief complaint of syncope (14 Mar 2023 13:52)      Any change in ROS:  doing ok : o n 2 L ;   alert and awake:     MEDICATIONS  (STANDING):  albuterol/ipratropium for Nebulization 3 milliLiter(s) Nebulizer every 6 hours  allopurinol 100 milliGRAM(s) Oral daily  bisacodyl 5 milliGRAM(s) Oral at bedtime  budesonide 160 MICROgram(s)/formoterol 4.5 MICROgram(s) Inhaler 2 Puff(s) Inhalation two times a day  chlorhexidine 2% Cloths 1 Application(s) Topical daily  cinacalcet 90 milliGRAM(s) Oral daily  coronavirus bivalent (EUA) Booster Vaccine (PFIZER) 0.3 milliLiter(s) IntraMuscular once  levETIRAcetam 500 milliGRAM(s) Oral two times a day  midodrine. 30 milliGRAM(s) Oral <User Schedule>  polyethylene glycol 3350 17 Gram(s) Oral two times a day  pregabalin 100 milliGRAM(s) Oral two times a day  senna 2 Tablet(s) Oral at bedtime  sevelamer carbonate 800 milliGRAM(s) Oral three times a day with meals  warfarin 6 milliGRAM(s) Oral once    MEDICATIONS  (PRN):  acetaminophen     Tablet .. 650 milliGRAM(s) Oral every 6 hours PRN Temp greater or equal to 38C (100.4F), Mild Pain (1 - 3)  melatonin 3 milliGRAM(s) Oral at bedtime PRN Insomnia  oxyCODONE    IR 5 milliGRAM(s) Oral every 6 hours PRN Severe Pain (7 - 10)  sodium chloride 0.9% Bolus. 100 milliLiter(s) IV Bolus every 5 minutes PRN SBP LESS THAN or EQUAL to 90 mmHg    Vital Signs Last 24 Hrs  T(C): 36.7 (14 Mar 2023 12:40), Max: 37.1 (13 Mar 2023 16:33)  T(F): 98.1 (14 Mar 2023 12:40), Max: 98.8 (13 Mar 2023 16:33)  HR: 77 (14 Mar 2023 12:40) (73 - 84)  BP: 100/51 (14 Mar 2023 12:40) (90/55 - 107/64)  BP(mean): --  RR: 18 (14 Mar 2023 12:40) (17 - 19)  SpO2: 99% (14 Mar 2023 12:40) (97% - 100%)    Parameters below as of 14 Mar 2023 12:40  Patient On (Oxygen Delivery Method): nasal cannula  O2 Flow (L/min): 2      I&O's Summary    13 Mar 2023 07:01  -  14 Mar 2023 07:00  --------------------------------------------------------  IN: 680 mL / OUT: 400 mL / NET: 280 mL          Physical Exam:   GENERAL: Obese+  HEENT: JORDIN/   Atraumatic, Normocephalic  ENMT: No tonsillar erythema, exudates, or enlargement; Moist mucous membranes, Good dentition, No lesions  NECK: Supple, No JVD, Normal thyroid  CHEST/LUNG: Clear to auscultaion  CVS: Regular rate and rhythm; No murmurs, rubs, or gallops  GI: : Soft, Nontender, Nondistended; Bowel sounds present  NERVOUS SYSTEM:  Alert & Oriented X3  EXTREMITIES:-edema  LYMPH: No lymphadenopathy noted  SKIN: No rashes or lesions  ENDOCRINOLOGY: No Thyromegaly  PSYCH: Appropriate    Labs:  29                            12.2   9.02  )-----------( 230      ( 14 Mar 2023 06:00 )             41.2                         12.2   8.64  )-----------( 234      ( 13 Mar 2023 03:25 )             41.4                         11.8   9.28  )-----------( 213      ( 12 Mar 2023 03:15 )             39.0                         12.9   10.57 )-----------( 259      ( 11 Mar 2023 18:26 )             43.8                         13.3   9.64  )-----------( 252      ( 10 Mar 2023 21:48 )             45.4     03-14    137  |  95<L>  |  23  ----------------------------<  88  4.1   |  28  |  8.15<H>  03-13    136  |  94<L>  |  30<H>  ----------------------------<  86  4.0   |  26  |  10.02<H>  03-12    135  |  94<L>  |  25<H>  ----------------------------<  71  4.7   |  19<L>  |  8.05<H>  03-11    135  |  89<L>  |  40<H>  ----------------------------<  103<H>  3.8   |  29  |  11.82<H>  03-10    135  |  89<L>  |  27<H>  ----------------------------<  91  4.3   |  29  |  9.78<H>    Ca    7.7<L>      14 Mar 2023 06:00  Ca    7.7<L>      13 Mar 2023 03:25  Phos  4.0     03-14  Phos  5.1     03-13  Mg     2.00     03-14  Mg     2.10     03-13    TPro  8.7<H>  /  Alb  4.1  /  TBili  0.3  /  DBili  x   /  AST  34  /  ALT  14  /  AlkPhos  126<H>  03-10    CAPILLARY BLOOD GLUCOSE            PT/INR - ( 14 Mar 2023 06:00 )   PT: 32.4 sec;   INR: 2.76 ratio         PTT - ( 14 Mar 2023 06:00 )  PTT:39.9 sec          RECENT CULTURES:        RESPIRATORY CULTURES:      rad< from: Xray Chest 1 View AP/PA (03.10.23 @ 22:12) >  CLINICAL INDICATION: Wheezing    TECHNIQUE: Single frontal, portable view of the chest was obtained.    COMPARISON: Chest x-ray 1/17/2023.    FINDINGS:  The heart is normal in size.  Unchanged mild bilateral atelectasis.  There is no pneumothorax or pleural effusion.    IMPRESSION:  Unchanged mild bilateral atelectasis.    --- End of Report ---          KOMAL JAVIER MD; Resident Radiologist  This document has been electronically signed.  MARYSOL FROST MD; Attending Radiologist  This document has been electronically signed. Mar 11 2023  8:05AM    < end of copied text >  < from: CT Chest No Cont (07.17.22 @ 22:03) >  ACC: [id number] EXAM:  CT CHEST                          PROCEDURE DATE:  07/17/2022          INTERPRETATION:  CLINICAL INFORMATION: Leukocytosis, nausea and vomiting,   end-stage renal disease.    COMPARISON: Chest radiograph dated 7/17/2022. Pulmonary CTA dated   8/27/2021.    CONTRAST/COMPLICATIONS:  IV Contrast: NONE  Oral Contrast: NONE  Complications: None reported at time of study completion    PROCEDURE:  CT scan of the chest was obtained without intravenous contrast.    FINDINGS:    LYMPH NODES: Subcentimeter calcified mediastinal and hilar lymph nodes,   likely sequela of prior granulomatous infection.    HEART/VASCULATURE: The heart is normal in size. No pericardial effusion.   There are calcifications of the aorta and coronary arteries. The   pulmonary artery is enlarged to 3.7 cm, nonspecific although can be seen   with pulmonary hypertension. Mid ascending aorta measures 3.9 cm.    AIRWAYS/LUNGS/PLEURA: Central airways are patent. Bilateral lower lobe   linear subsegmental atelectasis. No consolidation. Multiple scattered   calcified granulomata bilaterally. No pleural effusion or pneumothorax.    UPPER ABDOMEN: Partially imaged left renal cyst. Calcified splenic and   hepatic granulomas. Thickening of the adrenal glands.    BONES/SOFT TISSUES: Degenerative changes of the spine.    IMPRESSION:  No pneumonia.    --- End of Report ---          FAITH JAMA MD; Resident Radiologist  This document has been electronically signed.  BETSY CRESPO M.D., Attending Radiologist  This document has been electronically signed. Jul 18 2022 10:51AM    < end of copied text >      Studies  Chest X-RAY  CT SCAN Chest   Venous Dopplers: LE:   CT Abdomen  Others              
Date of Service: 03-15-23 @ 13:51    Patient is a 77y old  Male who presents with a chief complaint of syncope (15 Mar 2023 12:25)      Any change in ROS: seems OK: no sob:  no cough:     MEDICATIONS  (STANDING):  albuterol/ipratropium for Nebulization 3 milliLiter(s) Nebulizer every 6 hours  allopurinol 100 milliGRAM(s) Oral daily  bisacodyl 5 milliGRAM(s) Oral at bedtime  budesonide 160 MICROgram(s)/formoterol 4.5 MICROgram(s) Inhaler 2 Puff(s) Inhalation two times a day  chlorhexidine 2% Cloths 1 Application(s) Topical daily  cinacalcet 90 milliGRAM(s) Oral daily  coronavirus bivalent (EUA) Booster Vaccine (PFIZER) 0.3 milliLiter(s) IntraMuscular once  levETIRAcetam 500 milliGRAM(s) Oral two times a day  midodrine. 30 milliGRAM(s) Oral <User Schedule>  polyethylene glycol 3350 17 Gram(s) Oral two times a day  pregabalin 100 milliGRAM(s) Oral two times a day  senna 2 Tablet(s) Oral at bedtime  sevelamer carbonate 800 milliGRAM(s) Oral three times a day with meals    MEDICATIONS  (PRN):  acetaminophen     Tablet .. 650 milliGRAM(s) Oral every 6 hours PRN Temp greater or equal to 38C (100.4F), Mild Pain (1 - 3)  melatonin 3 milliGRAM(s) Oral at bedtime PRN Insomnia  oxyCODONE    IR 5 milliGRAM(s) Oral every 6 hours PRN Severe Pain (7 - 10)  sodium chloride 0.9% Bolus. 100 milliLiter(s) IV Bolus every 5 minutes PRN SBP LESS THAN or EQUAL to 90 mmHg    Vital Signs Last 24 Hrs  T(C): 36.4 (15 Mar 2023 09:00), Max: 36.8 (15 Mar 2023 01:00)  T(F): 97.6 (15 Mar 2023 09:00), Max: 98.2 (15 Mar 2023 01:00)  HR: 85 (15 Mar 2023 10:31) (62 - 85)  BP: 95/60 (15 Mar 2023 10:31) (95/60 - 118/66)  BP(mean): --  RR: 18 (15 Mar 2023 09:00) (17 - 18)  SpO2: 95% (15 Mar 2023 10:31) (95% - 100%)    Parameters below as of 15 Mar 2023 10:31  Patient On (Oxygen Delivery Method): nasal cannula  O2 Flow (L/min): 2      I&O's Summary        Physical Exam:   GENERAL: NAD, well-groomed, well-developed  HEENT: JORDIN/   Atraumatic, Normocephalic  ENMT: No tonsillar erythema, exudates, or enlargement; Moist mucous membranes, Good dentition, No lesions  NECK: Supple, No JVD, Normal thyroid  CHEST/LUNG: Clear to auscultaion  CVS: Regular rate and rhythm; No murmurs, rubs, or gallops  GI: : Soft, Nontender, Nondistended; Bowel sounds present  NERVOUS SYSTEM:  Alert & Oriented X3  EXTREMITIES:  -edema  LYMPH: No lymphadenopathy noted  SKIN: No rashes or lesions  ENDOCRINOLOGY: No Thyromegaly  PSYCH: Appropriate    Labs:  29                            12.0   8.27  )-----------( 220      ( 15 Mar 2023 11:20 )             40.2                         12.2   9.02  )-----------( 230      ( 14 Mar 2023 06:00 )             41.2                         12.2   8.64  )-----------( 234      ( 13 Mar 2023 03:25 )             41.4                         11.8   9.28  )-----------( 213      ( 12 Mar 2023 03:15 )             39.0                         12.9   10.57 )-----------( 259      ( 11 Mar 2023 18:26 )             43.8     03-15    136  |  95<L>  |  33<H>  ----------------------------<  103<H>  4.1   |  24  |  10.11<H>  03-14    137  |  95<L>  |  23  ----------------------------<  88  4.1   |  28  |  8.15<H>  03-13    136  |  94<L>  |  30<H>  ----------------------------<  86  4.0   |  26  |  10.02<H>  03-12    135  |  94<L>  |  25<H>  ----------------------------<  71  4.7   |  19<L>  |  8.05<H>  03-11    135  |  89<L>  |  40<H>  ----------------------------<  103<H>  3.8   |  29  |  11.82<H>    Ca    7.5<L>      15 Mar 2023 11:20  Ca    7.7<L>      14 Mar 2023 06:00  Phos  4.8     03-15  Phos  4.0     03-14  Mg     2.00     03-15  Mg     2.00     03-14      CAPILLARY BLOOD GLUCOSE            PT/INR - ( 15 Mar 2023 11:20 )   PT: 34.3 sec;   INR: 2.93 ratio         PTT - ( 15 Mar 2023 11:20 )  PTT:45.6 sec    < from: Xray Chest 1 View AP/PA (03.10.23 @ 22:12) >    ACC: [id number] EXAM:  XR CHEST AP OR PA 1V   ORDERED BY: MASTER CALDWELL     PROCEDURE DATE:  03/10/2023          INTERPRETATION:  EXAMINATION: XR CHEST    CLINICAL INDICATION: Wheezing    TECHNIQUE: Single frontal, portable view of the chest was obtained.    COMPARISON: Chest x-ray 1/17/2023.    FINDINGS:  The heart is normal in size.  Unchanged mild bilateral atelectasis.  There is no pneumothorax or pleural effusion.    IMPRESSION:  Unchanged mild bilateral atelectasis.    --- End of Report ---          KOMAL JAVIER MD; Resident Radiologist  This document has been electronically signed.  MARYSOL FROST MD; Attending Radiologist  This document has been electronically signed. Mar 11 2023  8:05AM    < end of copied text >  < from: CT Angio Abdomen and Pelvis w/ IV Cont (02.24.23 @ 20:06) >  hemicolectomy.  PERITONEUM: No ascites. Surgical clips within the right upper quadrant.  VESSELS: IVC filter. Atherosclerotic changes.  RETROPERITONEUM/LYMPH NODES: No lymphadenopathy.  ABDOMINAL WALL: Rectus diastasis with multiple small fat-containing   ventral abdominal wall hernias..  BONES: Degenerative changes.    IMPRESSION:  No evidence for active GI bleed.    No acute abdominal or pelvic pathology.    --- End of Report ---        < end of copied text >        RECENT CULTURES:        RESPIRATORY CULTURES:          Studies  Chest X-RAY  CT SCAN Chest   Venous Dopplers: LE:   CT Abdomen  Others              
Patient is a 77y old  Male who presents with a chief complaint of syncope (14 Mar 2023 14:59)      SUBJECTIVE / OVERNIGHT EVENTS: BP is stable, dc planning, home w outptn F/U. ptn was provided an outptn appointment w me on 3/23. cont coumadin 6mg daily exc w 9 mg MON and THU, cont outptn PT/INR monitoring, cont Midodrine standing dose as per inptn schedule    MEDICATIONS  (STANDING):  albuterol/ipratropium for Nebulization 3 milliLiter(s) Nebulizer every 6 hours  allopurinol 100 milliGRAM(s) Oral daily  bisacodyl 5 milliGRAM(s) Oral at bedtime  budesonide 160 MICROgram(s)/formoterol 4.5 MICROgram(s) Inhaler 2 Puff(s) Inhalation two times a day  chlorhexidine 2% Cloths 1 Application(s) Topical daily  cinacalcet 90 milliGRAM(s) Oral daily  coronavirus bivalent (EUA) Booster Vaccine (PFIZER) 0.3 milliLiter(s) IntraMuscular once  levETIRAcetam 500 milliGRAM(s) Oral two times a day  midodrine. 30 milliGRAM(s) Oral <User Schedule>  polyethylene glycol 3350 17 Gram(s) Oral two times a day  pregabalin 100 milliGRAM(s) Oral two times a day  senna 2 Tablet(s) Oral at bedtime  sevelamer carbonate 800 milliGRAM(s) Oral three times a day with meals  warfarin 6 milliGRAM(s) Oral once    MEDICATIONS  (PRN):  acetaminophen     Tablet .. 650 milliGRAM(s) Oral every 6 hours PRN Temp greater or equal to 38C (100.4F), Mild Pain (1 - 3)  melatonin 3 milliGRAM(s) Oral at bedtime PRN Insomnia  oxyCODONE    IR 5 milliGRAM(s) Oral every 6 hours PRN Severe Pain (7 - 10)  sodium chloride 0.9% Bolus. 100 milliLiter(s) IV Bolus every 5 minutes PRN SBP LESS THAN or EQUAL to 90 mmHg      Vital Signs Last 24 Hrs  T(F): 98.1 (03-14-23 @ 17:00), Max: 98.1 (03-14-23 @ 04:40)  HR: 67 (03-14-23 @ 17:00) (67 - 84)  BP: 98/54 (03-14-23 @ 17:00) (90/55 - 107/64)  RR: 18 (03-14-23 @ 17:00) (17 - 19)  SpO2: 100% (03-14-23 @ 17:00) (97% - 100%)  Telemetry:   CAPILLARY BLOOD GLUCOSE        I&O's Summary    13 Mar 2023 07:01  -  14 Mar 2023 07:00  --------------------------------------------------------  IN: 680 mL / OUT: 400 mL / NET: 280 mL        PHYSICAL EXAM:  GENERAL: NAD, well-developed  HEAD:  Atraumatic, Normocephalic  EYES: EOMI, PERRLA, conjunctiva and sclera clear  NECK: Supple, No JVD  CHEST/LUNG: Clear to auscultation bilaterally; No wheeze  HEART: Regular rate and rhythm; No murmurs, rubs, or gallops  ABDOMEN: Soft, Nontender, Nondistended; Bowel sounds present  EXTREMITIES:  2+ Peripheral Pulses, No clubbing, cyanosis, or edema  PSYCH: AAOx3  NEUROLOGY: non-focal  SKIN: No rashes or lesions    LABS:                        12.2   9.02  )-----------( 230      ( 14 Mar 2023 06:00 )             41.2     03-14    137  |  95<L>  |  23  ----------------------------<  88  4.1   |  28  |  8.15<H>    Ca    7.7<L>      14 Mar 2023 06:00  Phos  4.0     03-14  Mg     2.00     03-14      PT/INR - ( 14 Mar 2023 06:00 )   PT: 32.4 sec;   INR: 2.76 ratio         PTT - ( 14 Mar 2023 06:00 )  PTT:39.9 sec          RADIOLOGY & ADDITIONAL TESTS:    Imaging Personally Reviewed:    Consultant(s) Notes Reviewed:      Care Discussed with Consultants/Other Providers:  
[facility]   MD CHESTER DUEÑAS MD RUORU WONG, PA KRISTINE SOLTANPOUR, DO    TEL:  OFFICE: [phone]      RENAL FOLLOW UP NOTE-- Date of Service ;; 03-12-23 @ 10:39  --------------------------------------------------------------------------------  HPI:  Pt seen and examined at bedside  Denies SOB, chest pain.         PAST HISTORY  --------------------------------------------------------------------------------  No significant changes to PMH, PSH, FHx, SHx, unless otherwise noted    ALLERGIES & MEDICATIONS  --------------------------------------------------------------------------------  Allergies    baclofen (Other)  latex (Rash)    Intolerances      Standing Inpatient Medications  albuterol/ipratropium for Nebulization 3 milliLiter(s) Nebulizer every 6 hours  allopurinol 100 milliGRAM(s) Oral daily  bisacodyl 5 milliGRAM(s) Oral at bedtime  buDESOnide    Inhalation Suspension 0.5 milliGRAM(s) Inhalation two times a day  chlorhexidine 2% Cloths 1 Application(s) Topical daily  cinacalcet 90 milliGRAM(s) Oral daily  coronavirus bivalent (EUA) Booster Vaccine (PFIZER) 0.3 milliLiter(s) IntraMuscular once  levETIRAcetam 500 milliGRAM(s) Oral two times a day  midodrine. 30 milliGRAM(s) Oral <User Schedule>  polyethylene glycol 3350 17 Gram(s) Oral two times a day  pregabalin 100 milliGRAM(s) Oral two times a day  senna 2 Tablet(s) Oral at bedtime  sevelamer carbonate 800 milliGRAM(s) Oral three times a day with meals    PRN Inpatient Medications  acetaminophen     Tablet .. 650 milliGRAM(s) Oral every 6 hours PRN  melatonin 3 milliGRAM(s) Oral at bedtime PRN  oxyCODONE    IR 5 milliGRAM(s) Oral every 6 hours PRN  sodium chloride 0.9% Bolus. 100 milliLiter(s) IV Bolus every 5 minutes PRN      REVIEW OF SYSTEMS  --------------------------------------------------------------------------------  General: no fever  CVS: no chest pain  RESP: no sob, no cough  ABD: no abdominal pain  : no dysuria,  MSK:  b/l LE edema     VITALS/PHYSICAL EXAM  --------------------------------------------------------------------------------  T(C): 36.6 (03-12-23 @ 08:53), Max: 37.1 (03-11-23 @ 15:38)  HR: 66 (03-12-23 @ 08:53) (66 - 94)  BP: 92/42 (03-12-23 @ 08:53) (74/55 - 125/73)  RR: 18 (03-12-23 @ 08:53) (17 - 18)  SpO2: 98% (03-12-23 @ 08:53) (95% - 100%)  Wt(kg): --  Height (cm): 165.1 (03-11-23 @ 10:13)  Weight (kg): 109.316 (03-11-23 @ 10:13)  BMI (kg/m2): 40.1 (03-11-23 @ 10:13)  BSA (m2): 2.14 (03-11-23 @ 10:13)      03-11-23 @ 06:01  -  03-12-23 @ 07:00  --------------------------------------------------------  IN: 620 mL / OUT: 300 mL / NET: 320 mL      Physical Exam:  	Gen: NAD  	HEENT: MMM  	Pulm: CTA B/L  	CV: S1S2  	Abd: Soft, +BS  	Ext: No LE edema B/L                      Neuro: Awake , alert  	Skin: Warm and Dry   	Vascular access: AVF           LABS/STUDIES  --------------------------------------------------------------------------------              11.8   9.28  >-----------<  213      [03-12-23 @ 03:15]              39.0     135  |  94  |  25  ----------------------------<  71      [03-12-23 @ 07:00]  4.7   |  19  |  8.05        Ca     8.4     [03-12-23 @ 07:00]      Mg     2.10     [03-12-23 @ 07:00]      Phos  4.4     [03-12-23 @ 07:00]    TPro  8.7  /  Alb  4.1  /  TBili  0.3  /  DBili  x   /  AST  34  /  ALT  14  /  AlkPhos  126  [03-10-23 @ 21:48]    PT/INR: PT 30.8 , INR 2.63       [03-12-23 @ 03:15]      Creatinine Trend:  SCr 8.05 [03-12 @ 07:00]  SCr 11.82 [03-11 @ 18:26]  SCr 9.78 [03-10 @ 21:48]  SCr 8.39 [02-28 @ 10:46]  SCr 10.36 [02-27 @ 07:00]        Iron 52, TIBC 188, %sat 28      [02-23-23 @ 16:58]  Ferritin 2429      [02-23-23 @ 16:58]  PTH -- (Ca --)      [01-20-23 @ 06:35]   630  TSH 0.92      [02-24-23 @ 13:07]    HIV Nonreact      [02-24-23 @ 05:42]    
Patient is a 77y old  Male who presents with a chief complaint of syncope (14 Mar 2023 18:23)      SUBJECTIVE / OVERNIGHT EVENTS: BP remains stable, DC planning    MEDICATIONS  (STANDING):  albuterol/ipratropium for Nebulization 3 milliLiter(s) Nebulizer every 6 hours  allopurinol 100 milliGRAM(s) Oral daily  bisacodyl 5 milliGRAM(s) Oral at bedtime  budesonide 160 MICROgram(s)/formoterol 4.5 MICROgram(s) Inhaler 2 Puff(s) Inhalation two times a day  chlorhexidine 2% Cloths 1 Application(s) Topical daily  cinacalcet 90 milliGRAM(s) Oral daily  coronavirus bivalent (EUA) Booster Vaccine (PFIZER) 0.3 milliLiter(s) IntraMuscular once  levETIRAcetam 500 milliGRAM(s) Oral two times a day  midodrine. 30 milliGRAM(s) Oral <User Schedule>  polyethylene glycol 3350 17 Gram(s) Oral two times a day  pregabalin 100 milliGRAM(s) Oral two times a day  senna 2 Tablet(s) Oral at bedtime  sevelamer carbonate 800 milliGRAM(s) Oral three times a day with meals    MEDICATIONS  (PRN):  acetaminophen     Tablet .. 650 milliGRAM(s) Oral every 6 hours PRN Temp greater or equal to 38C (100.4F), Mild Pain (1 - 3)  melatonin 3 milliGRAM(s) Oral at bedtime PRN Insomnia  oxyCODONE    IR 5 milliGRAM(s) Oral every 6 hours PRN Severe Pain (7 - 10)  sodium chloride 0.9% Bolus. 100 milliLiter(s) IV Bolus every 5 minutes PRN SBP LESS THAN or EQUAL to 90 mmHg      Vital Signs Last 24 Hrs  T(F): 97.4 (03-15-23 @ 05:55), Max: 98.2 (03-15-23 @ 01:00)  HR: 68 (03-15-23 @ 05:55) (62 - 81)  BP: 95/62 (03-15-23 @ 05:55) (95/62 - 107/60)  RR: 17 (03-15-23 @ 05:55) (17 - 18)  SpO2: 98% (03-15-23 @ 05:55) (97% - 100%)  Telemetry:   CAPILLARY BLOOD GLUCOSE        I&O's Summary      PHYSICAL EXAM:  GENERAL: NAD, well-developed  HEAD:  Atraumatic, Normocephalic  EYES: EOMI, PERRLA, conjunctiva and sclera clear  NECK: Supple, No JVD  CHEST/LUNG: Clear to auscultation bilaterally; No wheeze  HEART: Regular rate and rhythm; No murmurs, rubs, or gallops  ABDOMEN: Soft, Nontender, Nondistended; Bowel sounds present  EXTREMITIES:  2+ Peripheral Pulses, No clubbing, cyanosis, or edema  PSYCH: AAOx3  NEUROLOGY: non-focal  SKIN: No rashes or lesions    LABS:                        12.2   9.02  )-----------( 230      ( 14 Mar 2023 06:00 )             41.2     03-14    137  |  95<L>  |  23  ----------------------------<  88  4.1   |  28  |  8.15<H>    Ca    7.7<L>      14 Mar 2023 06:00  Phos  4.0     03-14  Mg     2.00     03-14      PT/INR - ( 14 Mar 2023 06:00 )   PT: 32.4 sec;   INR: 2.76 ratio         PTT - ( 14 Mar 2023 06:00 )  PTT:39.9 sec          RADIOLOGY & ADDITIONAL TESTS:    Imaging Personally Reviewed:    Consultant(s) Notes Reviewed:      Care Discussed with Consultants/Other Providers:

## 2023-03-29 NOTE — PROGRESS NOTE ADULT - GENITOURINARY
See telephone encounter from 03/29/2023. Patient f / u BP reading 04/12/2023.    'Patient states 'she gave blood yesterday 03/28/2023 and systolic readings were in 180s; she does not know diastolic readings. Patient had minor HA in the evening 03/28/2023 - described it as dull, not painful. BP readings in evening 03/28/2023 - 155/85 and 156/82. BP reading at 6:30 AM 03/29/2023 - 157/86; 8:30 AM -  163/90. Patient takes BP medication every night.'     Patient denies CP, SOB, blurry vision / vision changes, N / V / D, difficulty walking.     ED precautions reviewed. Patient verbalized understanding.'      
negative
negative

## 2023-04-06 NOTE — ED ADULT NURSE NOTE - NS ED NOTE ABUSE SUSPICION NEGLECT YN
April 12, 2023     Patient: Lourdes Hay   YOB: 1979   Date of Visit:        To Whom it May Concern:        Lourdes Hay may return to work on Monday 04/17/2023. Please excuse Lourdes for her absence from work.         Sincerely,     Michi Puckett MD   04/12/2023   9:00 AM        Medical information is confidential and cannot be disclosed without the written consent of the patient or her representative.      
No

## 2023-06-26 NOTE — ED ADULT NURSE NOTE - HOW OFTEN DO YOU HAVE A DRINK CONTAINING ALCOHOL?
Arvind Leong has Mild Sleep apnea. She is not tolerating PAP well and does not report adequate compliance with PAP therapy. Daytime symptoms are improved and does not report positive benefits with PAP use.   She is not consistently using PAP device due to inability to connect PAP device to O2. She has input adapter for O2 but does not have correct tubing to connect the O2 device to her PAP device.    Prescription provided for renewal of PAP supplies and educated patient and her mother on what equipment was needed to connect O2 to PAP device     Recommended him/her to continue using the CPAP regularly during sleep and instructed  to get  the supplies for the PAP replaced regularly.      Patient instructed to remember to bring PAP with him/her if hospitalized and if anticipating procedure that requires sedation/surgery to be sure to discuss with the provider/surgeon that he/she has sleep apnea and uses PAP therapy.    
Never

## 2023-07-15 ENCOUNTER — EMERGENCY (EMERGENCY)
Facility: HOSPITAL | Age: 78
LOS: 1 days | Discharge: HOME | End: 2023-07-15
Attending: STUDENT IN AN ORGANIZED HEALTH CARE EDUCATION/TRAINING PROGRAM | Admitting: INTERNAL MEDICINE
Payer: MEDICARE

## 2023-07-15 VITALS
SYSTOLIC BLOOD PRESSURE: 70 MMHG | WEIGHT: 246.92 LBS | DIASTOLIC BLOOD PRESSURE: 43 MMHG | HEIGHT: 68 IN | OXYGEN SATURATION: 94 % | TEMPERATURE: 97 F | HEART RATE: 71 BPM | RESPIRATION RATE: 24 BRPM

## 2023-07-15 DIAGNOSIS — R07.9 CHEST PAIN, UNSPECIFIED: ICD-10-CM

## 2023-07-15 DIAGNOSIS — Z98.89 OTHER SPECIFIED POSTPROCEDURAL STATES: Chronic | ICD-10-CM

## 2023-07-15 LAB
ALBUMIN SERPL ELPH-MCNC: 4.4 G/DL — SIGNIFICANT CHANGE UP (ref 3.3–5)
ALP SERPL-CCNC: 165 U/L — HIGH (ref 40–120)
ALT FLD-CCNC: 14 U/L — SIGNIFICANT CHANGE UP (ref 10–45)
ANION GAP SERPL CALC-SCNC: 18 MMOL/L — HIGH (ref 5–17)
APTT BLD: 48.9 SEC — HIGH (ref 27.5–35.5)
AST SERPL-CCNC: 32 U/L — SIGNIFICANT CHANGE UP (ref 10–40)
BASE EXCESS BLDV CALC-SCNC: 4.6 MMOL/L — HIGH (ref -2–3)
BASE EXCESS BLDV CALC-SCNC: 5.1 MMOL/L — HIGH (ref -2–3)
BASOPHILS # BLD AUTO: 0.05 K/UL — SIGNIFICANT CHANGE UP (ref 0–0.2)
BASOPHILS NFR BLD AUTO: 0.7 % — SIGNIFICANT CHANGE UP (ref 0–2)
BILIRUB SERPL-MCNC: 0.5 MG/DL — SIGNIFICANT CHANGE UP (ref 0.2–1.2)
BUN SERPL-MCNC: 16 MG/DL — SIGNIFICANT CHANGE UP (ref 7–23)
CA-I SERPL-SCNC: 1.13 MMOL/L — LOW (ref 1.15–1.33)
CA-I SERPL-SCNC: 1.18 MMOL/L — SIGNIFICANT CHANGE UP (ref 1.15–1.33)
CALCIUM SERPL-MCNC: 10.5 MG/DL — SIGNIFICANT CHANGE UP (ref 8.4–10.5)
CHLORIDE BLDV-SCNC: 97 MMOL/L — SIGNIFICANT CHANGE UP (ref 96–108)
CHLORIDE BLDV-SCNC: 98 MMOL/L — SIGNIFICANT CHANGE UP (ref 96–108)
CHLORIDE SERPL-SCNC: 95 MMOL/L — LOW (ref 96–108)
CK MB CFR SERPL CALC: 1.7 NG/ML — SIGNIFICANT CHANGE UP (ref 0–6.7)
CK SERPL-CCNC: 93 U/L — SIGNIFICANT CHANGE UP (ref 30–200)
CO2 BLDV-SCNC: 34 MMOL/L — HIGH (ref 22–26)
CO2 BLDV-SCNC: 35 MMOL/L — HIGH (ref 22–26)
CO2 SERPL-SCNC: 25 MMOL/L — SIGNIFICANT CHANGE UP (ref 22–31)
CREAT SERPL-MCNC: 5.91 MG/DL — HIGH (ref 0.5–1.3)
EGFR: 9 ML/MIN/1.73M2 — LOW
EOSINOPHIL # BLD AUTO: 0.36 K/UL — SIGNIFICANT CHANGE UP (ref 0–0.5)
EOSINOPHIL NFR BLD AUTO: 4.8 % — SIGNIFICANT CHANGE UP (ref 0–6)
GAS PNL BLDV: 132 MMOL/L — LOW (ref 136–145)
GAS PNL BLDV: 133 MMOL/L — LOW (ref 136–145)
GAS PNL BLDV: SIGNIFICANT CHANGE UP
GLUCOSE BLDV-MCNC: 86 MG/DL — SIGNIFICANT CHANGE UP (ref 70–99)
GLUCOSE BLDV-MCNC: 96 MG/DL — SIGNIFICANT CHANGE UP (ref 70–99)
GLUCOSE SERPL-MCNC: 85 MG/DL — SIGNIFICANT CHANGE UP (ref 70–99)
HCO3 BLDV-SCNC: 32 MMOL/L — HIGH (ref 22–29)
HCO3 BLDV-SCNC: 33 MMOL/L — HIGH (ref 22–29)
HCT VFR BLD CALC: 48.6 % — SIGNIFICANT CHANGE UP (ref 39–50)
HCT VFR BLDA CALC: 44 % — SIGNIFICANT CHANGE UP (ref 39–51)
HCT VFR BLDA CALC: 46 % — SIGNIFICANT CHANGE UP (ref 39–51)
HGB BLD CALC-MCNC: 14.7 G/DL — SIGNIFICANT CHANGE UP (ref 12.6–17.4)
HGB BLD CALC-MCNC: 15.3 G/DL — SIGNIFICANT CHANGE UP (ref 12.6–17.4)
HGB BLD-MCNC: 14.8 G/DL — SIGNIFICANT CHANGE UP (ref 13–17)
IMM GRANULOCYTES NFR BLD AUTO: 0.3 % — SIGNIFICANT CHANGE UP (ref 0–0.9)
INR BLD: 3.45 RATIO — HIGH (ref 0.88–1.16)
LACTATE BLDV-MCNC: 1.8 MMOL/L — SIGNIFICANT CHANGE UP (ref 0.5–2)
LACTATE BLDV-MCNC: 3.3 MMOL/L — HIGH (ref 0.5–2)
LIDOCAIN IGE QN: 97 U/L — HIGH (ref 7–60)
LYMPHOCYTES # BLD AUTO: 2.19 K/UL — SIGNIFICANT CHANGE UP (ref 1–3.3)
LYMPHOCYTES # BLD AUTO: 28.9 % — SIGNIFICANT CHANGE UP (ref 13–44)
MAGNESIUM SERPL-MCNC: 2.2 MG/DL — SIGNIFICANT CHANGE UP (ref 1.6–2.6)
MCHC RBC-ENTMCNC: 28.7 PG — SIGNIFICANT CHANGE UP (ref 27–34)
MCHC RBC-ENTMCNC: 30.5 GM/DL — LOW (ref 32–36)
MCV RBC AUTO: 94.4 FL — SIGNIFICANT CHANGE UP (ref 80–100)
MONOCYTES # BLD AUTO: 0.76 K/UL — SIGNIFICANT CHANGE UP (ref 0–0.9)
MONOCYTES NFR BLD AUTO: 10 % — SIGNIFICANT CHANGE UP (ref 2–14)
NEUTROPHILS # BLD AUTO: 4.19 K/UL — SIGNIFICANT CHANGE UP (ref 1.8–7.4)
NEUTROPHILS NFR BLD AUTO: 55.3 % — SIGNIFICANT CHANGE UP (ref 43–77)
NRBC # BLD: 0 /100 WBCS — SIGNIFICANT CHANGE UP (ref 0–0)
NT-PROBNP SERPL-SCNC: 313 PG/ML — HIGH (ref 0–300)
PCO2 BLDV: 60 MMHG — HIGH (ref 42–55)
PCO2 BLDV: 61 MMHG — HIGH (ref 42–55)
PH BLDV: 7.34 — SIGNIFICANT CHANGE UP (ref 7.32–7.43)
PH BLDV: 7.34 — SIGNIFICANT CHANGE UP (ref 7.32–7.43)
PLATELET # BLD AUTO: 180 K/UL — SIGNIFICANT CHANGE UP (ref 150–400)
PO2 BLDV: 24 MMHG — LOW (ref 25–45)
PO2 BLDV: 28 MMHG — SIGNIFICANT CHANGE UP (ref 25–45)
POTASSIUM BLDV-SCNC: 3.8 MMOL/L — SIGNIFICANT CHANGE UP (ref 3.5–5.1)
POTASSIUM BLDV-SCNC: 5.4 MMOL/L — HIGH (ref 3.5–5.1)
POTASSIUM SERPL-MCNC: 3.6 MMOL/L — SIGNIFICANT CHANGE UP (ref 3.5–5.3)
POTASSIUM SERPL-SCNC: 3.6 MMOL/L — SIGNIFICANT CHANGE UP (ref 3.5–5.3)
PROT SERPL-MCNC: 10.2 G/DL — HIGH (ref 6–8.3)
PROTHROM AB SERPL-ACNC: 40.5 SEC — HIGH (ref 10.5–13.4)
RBC # BLD: 5.15 M/UL — SIGNIFICANT CHANGE UP (ref 4.2–5.8)
RBC # FLD: 15.7 % — HIGH (ref 10.3–14.5)
SAO2 % BLDV: 25.9 % — LOW (ref 67–88)
SAO2 % BLDV: 35.5 % — LOW (ref 67–88)
SODIUM SERPL-SCNC: 138 MMOL/L — SIGNIFICANT CHANGE UP (ref 135–145)
TROPONIN T, HIGH SENSITIVITY RESULT: 193 NG/L — HIGH (ref 0–51)
TROPONIN T, HIGH SENSITIVITY RESULT: 205 NG/L — HIGH (ref 0–51)
WBC # BLD: 7.57 K/UL — SIGNIFICANT CHANGE UP (ref 3.8–10.5)
WBC # FLD AUTO: 7.57 K/UL — SIGNIFICANT CHANGE UP (ref 3.8–10.5)

## 2023-07-15 RX ORDER — MIDODRINE HYDROCHLORIDE 2.5 MG/1
30 TABLET ORAL ONCE
Refills: 0 | Status: DISCONTINUED | OUTPATIENT
Start: 2023-07-15 | End: 2023-07-15

## 2023-07-15 RX ORDER — SODIUM CHLORIDE 9 MG/ML
500 INJECTION INTRAMUSCULAR; INTRAVENOUS; SUBCUTANEOUS ONCE
Refills: 0 | Status: COMPLETED | OUTPATIENT
Start: 2023-07-15 | End: 2023-07-15

## 2023-07-15 RX ORDER — ACETAMINOPHEN 500 MG
650 TABLET ORAL ONCE
Refills: 0 | Status: COMPLETED | OUTPATIENT
Start: 2023-07-15 | End: 2023-07-15

## 2023-07-15 RX ADMIN — SODIUM CHLORIDE 500 MILLILITER(S): 9 INJECTION INTRAMUSCULAR; INTRAVENOUS; SUBCUTANEOUS at 21:22

## 2023-07-15 RX ADMIN — Medication 650 MILLIGRAM(S): at 21:04

## 2023-07-15 NOTE — ED ADULT NURSE NOTE - OBJECTIVE STATEMENT
Pt is a 78y male BIBEMS from home to [facility] ED c/o of SOB and other medical complaints. Pt endorses about an hour prior to coming to the ER he returned home from dialysis treatment when he was sitting in his chair and he began to have a sudden onset CP on the R side with no radiation, states this CP started on Wednesday and it comes and goes, pt also endorses Pt has a fistula on the L side, last received dialysis today prior to coming to the ER, gets dialysis every Tue, Thu, Sat. PMH of ESRD, hypotension(on PO midodrine x4 day), CHF, arthritis, COPD (2L o2 baseline), sleep apnea and no significant PSH. Pt is A&Ox4 currently denying any HA, visual deficits, CP, palpitations, abd pain,  n/v/d/c, fever/chills. Pt arrives to ED with a stage 2 pressure ulcer on the sacral ulcer which has been present for the last 1-2 months as per pt. Pt ambulates at baseline. Peripheral IV placed by ED RN, 20G in R AC. Pt on Warfarin. Pt is a 78y male BIBEMS from home to [facility] ED c/o of SOB and other medical complaints. As per EMS report, pt was 92% on RA, placed on 15L NRB with spo2 of 98%. Pt endorses about an hour prior to coming to the ER he returned home from dialysis treatment when he was sitting in a chair and he began to have a sudden onset CP on the R side with no radiation, states this CP started on Wednesday and it comes and goes, pt also endorses bk pain and pain in the buttocks region due to pressure ulcer. 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 PMH of ESRD, hypotension(on PO midodrine x4 day), CHF, arthritis, COPD (2L o2 baseline), sleep apnea and no significant PSH. Pt is A&Ox4 currently denying any HA, visual deficits, CP, palpitations, abd pain,  n/v/d/c, fever/chills. Pt arrives to ED with a stage 2 pressure ulcer on the sacral ulcer which has been present for the last 1-2 months as per pt, b/l edema of lower extremities. Pt ambulates at baseline. Peripheral IV placed by ED RN, 20G in R AC. Pt on Warfarin.

## 2023-07-15 NOTE — ED PROVIDER NOTE - ATTENDING CONTRIBUTION TO CARE
Attending (Jesús Singh D.O.): I have personally seen and examined this patient. I have performed a substantive portion of the visit including all aspects of the medical decision making. Resident, Fellow, and/or ACP note reviewed. I agree on the plan of care except where noted.    I have personally provided 31 minutes of critical care concurrently with the resident(s) and/or ACP(s), excluding time spent on separate procedures.    see mdm

## 2023-07-15 NOTE — H&P ADULT - ASSESSMENT
78Male well known to me from my outptn practice brought in by his wife 2/2 Right sided CP on and off for a week. pain is  nonexertional, nonradiating . no dyspnea, no diaphoresis, no N/V  PMH: ESRD on HD via LUE AVF T/Th/Sat, chronic severe orthostatic hypotension on midodrine 30mg q6h    Patient  has a  chronic back pain and a sacral ulcer, get wound care at home.   Patient has been having chest pain now for 4 days. BP is normally 80- 50/30-50.   This is being worked up outpt currently. Patient was supposed to have a CT chest  4 days ago but machine was not working. Patient completed full dialysis session today. Here patient is hypotensive to 70s/40s, rpt 80s/60s. +LUE avf with palpable thrill, + thrombophlebitis of bilateral LE with venous stasis changes. + abdominal scars but nontender. No cva ttp. has symmetric pulses all 4 extrem.   Ptn is being admitted for a work of his new CP.     A/P  Atypical CP but certainly w risk factors for CAD  ptn is well known to Dr. KIN Neal, consult called  check TTE  cont outptn meds

## 2023-07-15 NOTE — ED PROVIDER NOTE - PROGRESS NOTE DETAILS
Sue Luong MD; [facility] PGY3: Call from Dr. Hein stating that she would like to take patient prior to CTA with her that given low blood pressure and chest pain would like to rule out  PE with CTA states that she will follow-up results and that should not hold up admission.

## 2023-07-15 NOTE — ED PROVIDER NOTE - OBJECTIVE STATEMENT
78M hx of ESRD on Hd via LUE avf T/Th/Sat, hypotension on midodrine 30mg q6h here with new intermittent chest pain, pressure, sternal, nonexertional, nonradiating depsite triage note. Patient separately has chronic back pain and a sore near his glute. Patient has been having chest pain now for 4 days. BP is normally 50/30? per family at bedside so see mdm

## 2023-07-15 NOTE — H&P ADULT - NSHPPHYSICALEXAM_GEN_ALL_CORE
T(C): 36.8 (07-15-23 @ 19:44), Max: 36.8 (07-15-23 @ 19:44)  HR: 66 (07-15-23 @ 21:31) (66 - 93)  BP: 95/51 (07-15-23 @ 21:31) (70/43 - 95/51)  RR: 20 (07-15-23 @ 21:31) (20 - 24)  SpO2: 100% (07-15-23 @ 21:31) (94% - 100%)    PHYSICAL EXAM:  GENERAL: NAD, well-developed  HEAD:  Atraumatic, Normocephalic  EYES: EOMI, PERRLA, conjunctiva and sclera clear  NECK: Supple, No JVD  CHEST/LUNG: Clear to auscultation bilaterally; No wheeze  HEART: Regular rate and rhythm; No murmurs, rubs, or gallops  ABDOMEN: Soft, Nontender, Nondistended; Bowel sounds present  EXTREMITIES:  2+ Peripheral Pulses, No clubbing, cyanosis, or edema  PSYCH: AAOx3  NEUROLOGY: non-focal  SKIN: No rashes or lesions

## 2023-07-15 NOTE — ED PROVIDER NOTE - CLINICAL SUMMARY MEDICAL DECISION MAKING FREE TEXT BOX
Attending (Jesús Singh D.O.):   78M hx of ESRD on Hd via LUE avf T/Th/Sat, hypotension on midodrine 30mg q6h here with new intermittent chest pain, pressure, sternal, nonexertional, nonradiating depsite triage note. Patient separately has chronic back pain and a sore near his glute. Patient has been having chest pain now for 4 days. BP is normally 50/30? per family at bedside. This is being worked up outpt currently. Patient was supposed to have a CT? US of right chest? 4 days ago but machine was not working. Patient completed full dialysis session today. Here, patient hypotensive to 70s/40s, rpt 80s/60s. +LUE avf with palpable thrill, + thrombophlebitis of bilateral LE with venous stasis changes. + abdominal scars but nontender. No cva ttp. symm pulses all 4 extrem. Unclear etiology of hypotension and chest pain. Perhaps complication of ESRD such as uremia. Need to eval for pericarditis, lower suspicion for ACS given sxs are intermittent chest pain w/o other provk/mod factors. Does not appear to be in sepsis. Check for metabolic derangement. Hold fluid for now given BP around baseline. Check labs, cardiac biomarkers,, EKG, CXR, place on cardiac monitor for telemetry monitoring. vbg.

## 2023-07-15 NOTE — H&P ADULT - HISTORY OF PRESENT ILLNESS
78Male well known to me from my outptn practice brought in by his wife 2/2 Right sided CP on and off for a week. pain is  nonexertional, nonradiating . no dyspnea, no diaphoresis, no N/V  PMH: ESRD on HD via LUE AVF T/Th/Sat, chronic severe orthostatic hypotension on midodrine 30mg q6h    Patient  has a  chronic back pain and a sacral ulcer, get wound care at home.   Patient has been having chest pain now for 4 days. BP is normally 80- 50/30-50.   This is being worked up outpt currently. Patient was supposed to have a CT chest  4 days ago but machine was not working. Patient completed full dialysis session today. Here patient is hypotensive to 70s/40s, rpt 80s/60s. +LUE avf with palpable thrill, + thrombophlebitis of bilateral LE with venous stasis changes. + abdominal scars but nontender. No cva ttp. has symmetric pulses all 4 extrem.   Ptn is being admitted for a work of his new CP.    78Male well known to me from my outptn practice brought in by his wife 2/2 Right sided CP on and off for a week. pain is  nonexertional, nonradiating . 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 BP is normally 80- 50/30-50.   This is being worked up outpt currently. Patient was supposed to have a CT chest  4 days ago but machine was not working. Patient completed full dialysis session today. Here patient is hypotensive to 70s/40s, rpt 80s/60s. +LUE avf with palpable thrill, + thrombophlebitis of bilateral LE with venous stasis changes. + abdominal scars but nontender. No cva ttp. has symmetric pulses all 4 extrem.   Ptn is being admitted for a work of his new CP.   76 yo male w h/o severe orthostatic hypotension on midodrine, COPD (on 2L home O2), CHF, pHTN,, DVT pm coumadin  ESRD on HD at [facility] via AV fistula E MW, who has had multiple hospitalizations 2/2 syncope 2/2 orthostatic hypotension . Pt did well during his hospitalizations. His chronic NF is not comfortable dispensing 30 mg of Midodrine( his usual dose is 30 mg tid) S/P IVC filter presented for another syncopal episode. Pt says he remembers the entire event, when he was being transferred from his bed to wheelchair he closed his eyes, said both aids were trying to wake him up for about a minute and then he opened his eyes. Denied any preceding sx of chest pain, dizziness, sob. Pt said they checked his BP and FS which was normal. On arrival BP 94/53.  Nephrology and cardiology consulted  78Male well known to me from my outptn practice brought in by his wife 2/2 Right sided CP on and off for a week. pain is  nonexertional, nonradiating . 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 BP is normally 80- 50/30-50.   This is being worked up outpt currently. Patient was supposed to have a CT chest  4 days ago but machine was not working. Patient completed full dialysis session today. Here patient is hypotensive to 70s/40s, rpt 80s/60s. +LUE avf with palpable thrill, + thrombophlebitis of bilateral LE with venous stasis changes. + abdominal scars but nontender. No cva ttp. has symmetric pulses all 4 extrem.   Ptn is being admitted for a work of his new CP.

## 2023-07-16 ENCOUNTER — TRANSCRIPTION ENCOUNTER (OUTPATIENT)
Age: 78
End: 2023-07-16

## 2023-07-16 VITALS
RESPIRATION RATE: 19 BRPM | HEART RATE: 59 BPM | SYSTOLIC BLOOD PRESSURE: 102 MMHG | DIASTOLIC BLOOD PRESSURE: 54 MMHG | OXYGEN SATURATION: 96 % | TEMPERATURE: 98 F

## 2023-07-16 LAB — SARS-COV-2 RNA SPEC QL NAA+PROBE: SIGNIFICANT CHANGE UP

## 2023-07-16 PROCEDURE — 80053 COMPREHEN METABOLIC PANEL: CPT

## 2023-07-16 PROCEDURE — 71045 X-RAY EXAM CHEST 1 VIEW: CPT

## 2023-07-16 PROCEDURE — 82553 CREATINE MB FRACTION: CPT

## 2023-07-16 PROCEDURE — 84295 ASSAY OF SERUM SODIUM: CPT

## 2023-07-16 PROCEDURE — 87635 SARS-COV-2 COVID-19 AMP PRB: CPT

## 2023-07-16 PROCEDURE — 36415 COLL VENOUS BLD VENIPUNCTURE: CPT

## 2023-07-16 PROCEDURE — 83690 ASSAY OF LIPASE: CPT

## 2023-07-16 PROCEDURE — 71275 CT ANGIOGRAPHY CHEST: CPT | Mod: MD

## 2023-07-16 PROCEDURE — 83735 ASSAY OF MAGNESIUM: CPT

## 2023-07-16 PROCEDURE — 84132 ASSAY OF SERUM POTASSIUM: CPT

## 2023-07-16 PROCEDURE — 85018 HEMOGLOBIN: CPT

## 2023-07-16 PROCEDURE — 93005 ELECTROCARDIOGRAM TRACING: CPT

## 2023-07-16 PROCEDURE — 85610 PROTHROMBIN TIME: CPT

## 2023-07-16 PROCEDURE — 94640 AIRWAY INHALATION TREATMENT: CPT

## 2023-07-16 PROCEDURE — 82330 ASSAY OF CALCIUM: CPT

## 2023-07-16 PROCEDURE — 85730 THROMBOPLASTIN TIME PARTIAL: CPT

## 2023-07-16 PROCEDURE — 83880 ASSAY OF NATRIURETIC PEPTIDE: CPT

## 2023-07-16 PROCEDURE — 85025 COMPLETE CBC W/AUTO DIFF WBC: CPT

## 2023-07-16 PROCEDURE — 99291 CRITICAL CARE FIRST HOUR: CPT | Mod: 25

## 2023-07-16 PROCEDURE — 82435 ASSAY OF BLOOD CHLORIDE: CPT

## 2023-07-16 PROCEDURE — 93308 TTE F-UP OR LMTD: CPT

## 2023-07-16 PROCEDURE — 83605 ASSAY OF LACTIC ACID: CPT

## 2023-07-16 PROCEDURE — 82550 ASSAY OF CK (CPK): CPT

## 2023-07-16 PROCEDURE — 84484 ASSAY OF TROPONIN QUANT: CPT

## 2023-07-16 PROCEDURE — 82803 BLOOD GASES ANY COMBINATION: CPT

## 2023-07-16 PROCEDURE — 85014 HEMATOCRIT: CPT

## 2023-07-16 PROCEDURE — 82947 ASSAY GLUCOSE BLOOD QUANT: CPT

## 2023-07-16 RX ORDER — ALLOPURINOL 300 MG
100 TABLET ORAL DAILY
Refills: 0 | Status: DISCONTINUED | OUTPATIENT
Start: 2023-07-16 | End: 2023-07-16

## 2023-07-16 RX ORDER — MIDODRINE HYDROCHLORIDE 2.5 MG/1
30 TABLET ORAL
Refills: 0 | Status: DISCONTINUED | OUTPATIENT
Start: 2023-07-16 | End: 2023-07-16

## 2023-07-16 RX ORDER — WARFARIN SODIUM 2.5 MG/1
9 TABLET ORAL ONCE
Refills: 0 | Status: DISCONTINUED | OUTPATIENT
Start: 2023-07-16 | End: 2023-07-16

## 2023-07-16 RX ORDER — CINACALCET 30 MG/1
90 TABLET, FILM COATED ORAL
Refills: 0 | Status: DISCONTINUED | OUTPATIENT
Start: 2023-07-16 | End: 2023-07-16

## 2023-07-16 RX ORDER — LEVETIRACETAM 250 MG/1
500 TABLET, FILM COATED ORAL
Refills: 0 | Status: DISCONTINUED | OUTPATIENT
Start: 2023-07-16 | End: 2023-07-16

## 2023-07-16 RX ORDER — PANTOPRAZOLE SODIUM 20 MG/1
40 TABLET, DELAYED RELEASE ORAL
Refills: 0 | Status: DISCONTINUED | OUTPATIENT
Start: 2023-07-16 | End: 2023-07-16

## 2023-07-16 RX ORDER — POLYETHYLENE GLYCOL 3350 17 G/17G
17 POWDER, FOR SOLUTION ORAL DAILY
Refills: 0 | Status: DISCONTINUED | OUTPATIENT
Start: 2023-07-16 | End: 2023-07-16

## 2023-07-16 RX ORDER — LANOLIN ALCOHOL/MO/W.PET/CERES
5 CREAM (GRAM) TOPICAL AT BEDTIME
Refills: 0 | Status: DISCONTINUED | OUTPATIENT
Start: 2023-07-16 | End: 2023-07-16

## 2023-07-16 RX ORDER — BUDESONIDE AND FORMOTEROL FUMARATE DIHYDRATE 160; 4.5 UG/1; UG/1
2 AEROSOL RESPIRATORY (INHALATION)
Refills: 0 | Status: DISCONTINUED | OUTPATIENT
Start: 2023-07-16 | End: 2023-07-16

## 2023-07-16 RX ORDER — MIDODRINE HYDROCHLORIDE 2.5 MG/1
30 TABLET ORAL ONCE
Refills: 0 | Status: COMPLETED | OUTPATIENT
Start: 2023-07-16 | End: 2023-07-16

## 2023-07-16 RX ORDER — SEVELAMER CARBONATE 2400 MG/1
2400 POWDER, FOR SUSPENSION ORAL THREE TIMES A DAY
Refills: 0 | Status: DISCONTINUED | OUTPATIENT
Start: 2023-07-16 | End: 2023-07-16

## 2023-07-16 RX ORDER — SENNA PLUS 8.6 MG/1
2 TABLET ORAL AT BEDTIME
Refills: 0 | Status: DISCONTINUED | OUTPATIENT
Start: 2023-07-16 | End: 2023-07-16

## 2023-07-16 RX ORDER — IPRATROPIUM/ALBUTEROL SULFATE 18-103MCG
3 AEROSOL WITH ADAPTER (GRAM) INHALATION EVERY 6 HOURS
Refills: 0 | Status: DISCONTINUED | OUTPATIENT
Start: 2023-07-16 | End: 2023-07-16

## 2023-07-16 RX ADMIN — SEVELAMER CARBONATE 2400 MILLIGRAM(S): 2400 POWDER, FOR SUSPENSION ORAL at 06:43

## 2023-07-16 RX ADMIN — Medication 650 MILLIGRAM(S): at 00:41

## 2023-07-16 RX ADMIN — Medication 100 MILLIGRAM(S): at 17:05

## 2023-07-16 RX ADMIN — LEVETIRACETAM 500 MILLIGRAM(S): 250 TABLET, FILM COATED ORAL at 17:05

## 2023-07-16 RX ADMIN — BUDESONIDE AND FORMOTEROL FUMARATE DIHYDRATE 2 PUFF(S): 160; 4.5 AEROSOL RESPIRATORY (INHALATION) at 17:05

## 2023-07-16 RX ADMIN — SEVELAMER CARBONATE 2400 MILLIGRAM(S): 2400 POWDER, FOR SUSPENSION ORAL at 14:13

## 2023-07-16 RX ADMIN — MIDODRINE HYDROCHLORIDE 30 MILLIGRAM(S): 2.5 TABLET ORAL at 08:45

## 2023-07-16 RX ADMIN — PANTOPRAZOLE SODIUM 40 MILLIGRAM(S): 20 TABLET, DELAYED RELEASE ORAL at 06:44

## 2023-07-16 RX ADMIN — MIDODRINE HYDROCHLORIDE 30 MILLIGRAM(S): 2.5 TABLET ORAL at 03:30

## 2023-07-16 RX ADMIN — Medication 20 MILLIGRAM(S): at 13:09

## 2023-07-16 RX ADMIN — BUDESONIDE AND FORMOTEROL FUMARATE DIHYDRATE 2 PUFF(S): 160; 4.5 AEROSOL RESPIRATORY (INHALATION) at 05:49

## 2023-07-16 RX ADMIN — Medication 3 MILLILITER(S): at 13:18

## 2023-07-16 RX ADMIN — MIDODRINE HYDROCHLORIDE 30 MILLIGRAM(S): 2.5 TABLET ORAL at 14:12

## 2023-07-16 RX ADMIN — LEVETIRACETAM 500 MILLIGRAM(S): 250 TABLET, FILM COATED ORAL at 05:49

## 2023-07-16 RX ADMIN — Medication 3 MILLILITER(S): at 17:05

## 2023-07-16 RX ADMIN — Medication 100 MILLIGRAM(S): at 05:49

## 2023-07-16 RX ADMIN — Medication 100 MILLIGRAM(S): at 13:09

## 2023-07-16 RX ADMIN — Medication 20 MILLIGRAM(S): at 16:15

## 2023-07-16 RX ADMIN — Medication 20 MILLIGRAM(S): at 06:44

## 2023-07-16 RX ADMIN — Medication 3 MILLILITER(S): at 05:49

## 2023-07-16 NOTE — DISCHARGE NOTE PROVIDER - HOSPITAL COURSE
78 year old Male PMHx ESRD on HD via LUE AVF T/Th/Sat, chronic severe orthostatic hypotension on midodrine 30mg q6h, COPD on home O2, 2L, CHF, pHTN, PE/DVT on Coumadin, brought in by his wife 2/2 Right sided CP on and off for a week. pain is  nonexertional, non radiating . no dyspnea, no diaphoresis, no N/V  Patient  has a  chronic back pain and a sacral ulcer, get wound care at home. Patient has been having chest pain now for 4 days. BP is normally 80- 50/30-50.   This is being worked up outpatient currently. Atypical CP but due to risk factors for CAD he was admitted. seen by cardiology, CP resolved. TTE can be done on outpatient basis  cont outpatient meds.  [facility] 78 year old Male PMHx ESRD on HD via LUE AVF T/Th/Sat, chronic severe orthostatic hypotension on midodrine 30mg q6h, COPD on home O2, 2L, CHF, pHTN, PE/DVT on Coumadin, brought in by his wife 2/2 Right sided CP on and off for a week. pain is  nonexertional, non radiating . no dyspnea, no diaphoresis, no N/V  Patient  has a  chronic back pain and a sacral ulcer, get wound care at home. Patient has been having chest pain now for 4 days. BP is normally 80- 50/30-50.   This is being worked up outpatient currently. Atypical CP but due to risk factors for CAD he was admitted. seen by cardiology, CP resolved. TTE can be done on outpatient basis  cont outpatient meds. Sacral wound: Cavilon to periwound skin, Allevyn foam, change every 3 days and prn for soiling/drainage continue to monitor    DC home

## 2023-07-16 NOTE — CONSULT NOTE ADULT - ASSESSMENT
78 year old man with RUQ/R chest pain    1. R chest pain. Doubt reflux. Could be gas related, rule out gallstones.  -check Abd US  -try simethicone PRN    2. R/o ACS    3. Pulm HTN        Advanced care planning forms were discussed. Code status including forceful chest compressions, defibrillation and intubation were discussed. The risks benefits and alternatives to pertinent gastrointestinal procedures and interventions were discussed in detail and all questions were answered. Duration: 15 Minutes.    [facility]  Jin Castillo M.D.   [address], [place]  Office: [phone]

## 2023-07-16 NOTE — ED ADULT NURSE REASSESSMENT NOTE - NS ED NURSE REASSESS COMMENT FT1
Patient repositioned in stretcher. Patient lying comfortably breathing spontaneous and unlabored on nasal cannula 3L. No signs of respiratory distress @ this time. Patient denies chest pain. Left AV fistula is +Bruit/+Thrill. Right 20G PIV is C/D/I. Awaiting admission to TELEMETRY.

## 2023-07-16 NOTE — CONSULT NOTE ADULT - SUBJECTIVE AND OBJECTIVE BOX
[facility] [place] [facility]   MD CHESTER DUEÑAS MD RUORU WONG, PA    TEL:  FROM 9 AM to 5 PM--OFFICE: [phone]    FROM 5 PM- 9 AM PLEASE CALL ANSWERING SERVICE AT [phone]    -- INITIAL RENAL CONSULT NOTE --- Date Of service 07-16-23 @ 08:26  --------------------------------------------------------------------------------  HPI:  78Male well known to me from my outptn practice brought in by his wife 2/2 Right sided CP on and off for a week. pain is  nonexertional, nonradiating . no dyspnea, no diaphoresis, no N/V  PMH: ESRD on HD via LUE AVF T/Th/Sat, chronic severe orthostatic hypotension on midodrine 30mg q6h, COPD on home O2, 2L, CHF, pHTN, PE/DVT on COUMADIN, Sacral wound ulcer, chronic back pain with relatively recent get wound care at home, Morbid Obesity, wheelchair bound   Patient has been having chest pain now for 4 days. BP is normally 80- 50/30-50.   This is being worked up outpt currently. Patient was supposed to have a CT chest  4 days ago but machine was not working. Patient completed full dialysis session today. Here patient is hypotensive to 70s/40s, rpt 80s/60s. +LUE avf with palpable thrill, + thrombophlebitis of bilateral LE with venous stasis changes. + abdominal scars but nontender. No cva ttp. has symmetric pulses all 4 extrem.           PAST HISTORY  --------------------------------------------------------------------------------  PAST MEDICAL & SURGICAL HISTORY:  Hypertension      Glomerular disease  Segmental glomerular sclerosis      CHF (congestive heart failure)      COPD (chronic obstructive pulmonary disease)      Pulmonary hypertension      Sleep apnea      Pulmonary edema      Peripheral edema      Gout      History of abdominal surgery  polypectomy      H/O right heart catheterization        FAMILY HISTORY:  Family history of colon cancer (Father)    Family history of heart disease (Father)      PAST SOCIAL HISTORY:    ALLERGIES & MEDICATIONS  --------------------------------------------------------------------------------  Allergies    baclofen (Other)  latex (Rash)    Intolerances      Standing Inpatient Medications  albuterol/ipratropium for Nebulization 3 milliLiter(s) Nebulizer every 6 hours  allopurinol 100 milliGRAM(s) Oral daily  bisacodyl 5 milliGRAM(s) Oral at bedtime  budesonide 160 MICROgram(s)/formoterol 4.5 MICROgram(s) Inhaler 2 Puff(s) Inhalation two times a day  cinacalcet 90 milliGRAM(s) Oral <User Schedule>  dicyclomine 20 milliGRAM(s) Oral four times a day before meals  levETIRAcetam 500 milliGRAM(s) Oral two times a day  melatonin 5 milliGRAM(s) Oral at bedtime  midodrine. 30 milliGRAM(s) Oral <User Schedule>  pantoprazole    Tablet 40 milliGRAM(s) Oral before breakfast  polyethylene glycol 3350 17 Gram(s) Oral daily  pregabalin 100 milliGRAM(s) Oral two times a day  senna 2 Tablet(s) Oral at bedtime  sevelamer carbonate 2400 milliGRAM(s) Oral three times a day  warfarin 9 milliGRAM(s) Oral once    PRN Inpatient Medications      REVIEW OF SYSTEMS  --------------------------------------------------------------------------------  Gen: No fevers/chills  Skin: No rashes  Head/Eyes/Ears: Normal hearing,  Normal vision   Respiratory: No dyspnea, cough  CV: No chest pain  GI: No abdominal pain, diarrhea, constipation, nausea, vomiting  : No dysuria, hematuria  MSK: No  edema  Heme: No easy bruising or bleeding  Psych: No significant depression    All other systems were reviewed and are negative, except as noted.    VITALS/PHYSICAL EXAM  --------------------------------------------------------------------------------  T(C): 36.4 (07-16-23 @ 07:48), Max: 36.8 (07-15-23 @ 19:44)  HR: 65 (07-16-23 @ 07:48) (59 - 93)  BP: 91/58 (07-16-23 @ 07:48) (70/43 - 104/59)  RR: 18 (07-16-23 @ 07:48) (15 - 24)  SpO2: 100% (07-16-23 @ 07:48) (94% - 100%)  Wt(kg): --  Height (cm): 172.7 (07-15-23 @ 19:17)  Weight (kg): 112 (07-15-23 @ 19:17)  BMI (kg/m2): 37.6 (07-15-23 @ 19:17)  BSA (m2): 2.24 (07-15-23 @ 19:17)      Physical Exam:  	Gen: NAD  	HEENT: MMM  	Pulm: CTA B/L  	CV: S1S2  	Abd: Soft, +BS   	Ext: No LE edema B/L  	Neuro: Awake,   	Skin: Warm and dry  	Vascular access: avf          :  no teresa  LABS/STUDIES  --------------------------------------------------------------------------------              14.8   7.57  >-----------<  180      [07-15-23 @ 19:48]              48.6     138  |  95  |  16  ----------------------------<  85      [07-15-23 @ 19:48]  3.6   |  25  |  5.91        Ca     10.5     [07-15-23 @ 19:48]      Mg     2.2     [07-15-23 @ 19:48]    TPro  10.2  /  Alb  4.4  /  TBili  0.5  /  DBili  x   /  AST  32  /  ALT  14  /  AlkPhos  165  [07-15-23 @ 19:48]    PT/INR: PT 40.5 , INR 3.45       [07-15-23 @ 19:48]  PTT: 48.9       [07-15-23 @ 19:48]    CK 93      [07-15-23 @ 19:48]    Creatinine Trend:  SCr 5.91 [07-15 @ 19:48]    Urinalysis - [07-15-23 @ 19:48]      Color  / Appearance  / SG  / pH       Gluc 85 / Ketone   / Bili  / Urobili        Blood  / Protein  / Leuk Est  / Nitrite       RBC  / WBC  / Hyaline  / Gran  / Sq Epi  / Non Sq Epi  / Bacteria       Iron 52, TIBC 188, %sat 28      [02-23-23 @ 16:58]  Ferritin 2429      [02-23-23 @ 16:58]  PTH -- (Ca --)      [01-20-23 @ 06:35]   630  TSH 0.92      [02-24-23 @ 13:07]    HBsAb 46.9      [01-16-23 @ 21:38]  HBsAb Reactive      [08-24-21 @ 09:47]  HBsAg Nonreact      [01-16-23 @ 21:38]  HBcAb Nonreact      [01-16-23 @ 21:38]  HCV 0.35, Nonreact      [01-16-23 @ 21:38]  HIV Nonreact      [02-24-23 @ 05:42]

## 2023-07-16 NOTE — ED ADULT NURSE REASSESSMENT NOTE - NS ED NURSE REASSESS COMMENT FT1
Patient turned and repositioned in stretcher. Patient denies complaints @ this time. Updated on plan of care and awaiting admission to TELEMETRY.

## 2023-07-16 NOTE — CONSULT NOTE ADULT - ASSESSMENT
78Male well known to me from my outptn practice brought in by his wife 2/2 Right sided CP on and off for a week. pain is  nonexertional, nonradiating . no dyspnea, no diaphoresis, no N/VPMH: ESRD on HD via LUE AVF T/Th/Sat, chronic severe orthostatic hypotension on midodrine 30mg q6h  Patient  has a  chronic back pain and a sacral ulcer, get wound care at home.   Patient has been having chest pain now for 4 days. BP is normally 80- 50/30-50.   This is being worked up outpt currently. Patient was supposed to have a CT chest  4 days ago but machine was not working. Patient completed full dialysis session today. Here patient is hypotensive to 70s/40s, rpt 80s/60s. +LUE avf with palpable thrill, + thrombophlebitis of bilateral LE with venous stasis changes. + abdominal scars but nontender. No cva ttp. has symmetric pulses all 4 extrem.   Ptn is being admitted for a work of his new CP.     A/P    CHEST PAIN :  -seems atypical chest pain :  - His chest radiograph reveals: Left midlung field and right lower lung field linear airspace opacities  likely representing atelectasis.  - workup per primary cards team   -doubt VTE as he has supratherapeutic inr  -cta this admission showed: Suboptimal evaluation of the segmental/subsegmental pulmonary arteries.  No central pulmonary embolus or secondary signs of right heart strain.and some minor atelectasis  COPD:PULM HTN  -cont BD:   -had mild phtn on previous echo from march this year:   -not wheezing at this time:  -VBG shows mild ac on chr hypercapnic resp failure  -is he is on avaps ? athome : but pt is on clear : need to find out:  last time was not dced on avaps  HX OF DVT:  -on coumadin  ORTHOSTATIC HYPOTENSION  -on midodrine:   -defer to primary team   ESRD  -on HD :    acosta acp

## 2023-07-16 NOTE — CONSULT NOTE ADULT - REASON FOR ADMISSION
acs, orthostatic hypotension

## 2023-07-16 NOTE — CONSULT NOTE ADULT - SUBJECTIVE AND OBJECTIVE BOX
Chief Complaint:  Patient is a 78y old  Male who presents with a chief complaint of acs, orthostatic hypotension (16 Jul 2023 12:41)      Date of service: 07-16-23 @ 13:41    HPI:    The patient is a 78 year old man with COPD, pulm htn, afib on a/c, ESRD presenting with chest pain. The patient describes it as RUQ/R chest pain.    The patient denies dysphagia, nausea and vomiting, abdominal pain, diarrhea, unintentional weight loss, change in bowel habits or NSAID use.    The patient also has abdominal pain which is chronic and he takes levsin for this.  Allergies:  baclofen (Other)  latex (Rash)      Home Medications:    Hospital Medications:  albuterol/ipratropium for Nebulization 3 milliLiter(s) Nebulizer every 6 hours  allopurinol 100 milliGRAM(s) Oral daily  bisacodyl 5 milliGRAM(s) Oral at bedtime  budesonide 160 MICROgram(s)/formoterol 4.5 MICROgram(s) Inhaler 2 Puff(s) Inhalation two times a day  cinacalcet 90 milliGRAM(s) Oral <User Schedule>  dicyclomine 20 milliGRAM(s) Oral four times a day before meals  levETIRAcetam 500 milliGRAM(s) Oral two times a day  melatonin 5 milliGRAM(s) Oral at bedtime  midodrine. 30 milliGRAM(s) Oral <User Schedule>  pantoprazole    Tablet 40 milliGRAM(s) Oral before breakfast  polyethylene glycol 3350 17 Gram(s) Oral daily  pregabalin 100 milliGRAM(s) Oral two times a day  senna 2 Tablet(s) Oral at bedtime  sevelamer carbonate 2400 milliGRAM(s) Oral three times a day  warfarin 9 milliGRAM(s) Oral once      PMHX/PSHX:  Hypertension    Glomerular disease    CHF (congestive heart failure)    COPD (chronic obstructive pulmonary disease)    Pulmonary hypertension    Sleep apnea    Pulmonary edema    Peripheral edema    Gout    History of abdominal surgery    H/O right heart catheterization        Family history:  Family history of colon cancer (Father)    Family history of heart disease (Father)        Social History:   Denies ethanol use.  Denies illicit drug use.    ROS:     General:  No wt loss, fevers, chills, night sweats, fatigue,   Eyes:  Good vision, no reported pain  ENT:  No sore throat, pain, runny nose, dysphagia  CV:  No pain, palpitations, hypo/hypertension  Resp:  No dyspnea, cough, tachypnea, wheezing  GI:  See HPI  :  No pain, bleeding, incontinence, nocturia  Muscle:  No pain, weakness  Neuro:  No weakness, tingling, memory problems  Psych:  No fatigue, insomnia, mood problems, depression  Endocrine:  No polyuria, polydipsia, cold/heat intolerance  Heme:  No petechiae, ecchymosis, easy bruisability  Integumentary:  No rash, edema      PHYSICAL EXAM:     GENERAL:  Appears stated age, well-groomed, well-nourished, no distress  HEENT:  NC/AT,  conjunctivae anicteric, clear and pink,   NECK: supple, trachea midline  CHEST:  Full & symmetric excursion, no increased effort, breath sounds clear  HEART:  Regular rhythm, no JVD  ABDOMEN:  Soft, non-tender, non-distended, normoactive bowel sounds,  no masses , no hepatosplenomegaly  EXTREMITIES:  no cyanosis,clubbing or edema  SKIN:  No rash, erythema, or, ecchymoses, no jaundice  NEURO:  Alert, non-focal, no asterixis  PSYCH: Appropriate affect, oriented to place and time  RECTAL: Deferred      Vital Signs:  Vital Signs Last 24 Hrs  T(C): 36.4 (16 Jul 2023 07:48), Max: 36.8 (15 Jul 2023 19:44)  T(F): 97.6 (16 Jul 2023 07:48), Max: 98.2 (15 Jul 2023 19:44)  HR: 64 (16 Jul 2023 10:45) (59 - 93)  BP: 91/55 (16 Jul 2023 10:45) (70/43 - 104/59)  BP(mean): 67 (16 Jul 2023 10:45) (51 - 73)  RR: 19 (16 Jul 2023 10:45) (15 - 24)  SpO2: 100% (16 Jul 2023 10:45) (94% - 100%)    Parameters below as of 16 Jul 2023 10:45  Patient On (Oxygen Delivery Method): nasal cannula  O2 Flow (L/min): 3    Daily Height in cm: 172.72 (15 Jul 2023 19:17)    Daily     LABS: Labs personally reviewed by me:                        14.8   7.57  )-----------( 180      ( 15 Jul 2023 19:48 )             48.6     07-15    138  |  95<L>  |  16  ----------------------------<  85  3.6   |  25  |  5.91<H>    Ca    10.5      15 Jul 2023 19:48  Mg     2.2     07-15    TPro  10.2<H>  /  Alb  4.4  /  TBili  0.5  /  DBili  x   /  AST  32  /  ALT  14  /  AlkPhos  165<H>  07-15    LIVER FUNCTIONS - ( 15 Jul 2023 19:48 )  Alb: 4.4 g/dL / Pro: 10.2 g/dL / ALK PHOS: 165 U/L / ALT: 14 U/L / AST: 32 U/L / GGT: x           PT/INR - ( 15 Jul 2023 19:48 )   PT: 40.5 sec;   INR: 3.45 ratio         PTT - ( 15 Jul 2023 19:48 )  PTT:48.9 sec  Urinalysis Basic - ( 15 Jul 2023 19:48 )    Color: x / Appearance: x / SG: x / pH: x  Gluc: 85 mg/dL / Ketone: x  / Bili: x / Urobili: x   Blood: x / Protein: x / Nitrite: x   Leuk Esterase: x / RBC: x / WBC x   Sq Epi: x / Non Sq Epi: x / Bacteria: x      Amylase Serum--      Lipase serum97       Ammonia--      Imaging personally reviewed by me:

## 2023-07-16 NOTE — PROGRESS NOTE ADULT - ASSESSMENT
78Male well known to me from my outptn practice brought in by his wife 2/2 Right sided CP on and off for a week. pain is  nonexertional, nonradiating . no dyspnea, no diaphoresis, no N/V  PMH: ESRD on HD via LUE AVF T/Th/Sat, chronic severe orthostatic hypotension on midodrine 30mg q6h    Patient  has a  chronic back pain and a sacral ulcer, get wound care at home.   Patient has been having chest pain now for 4 days. BP is normally 80- 50/30-50.   This is being worked up outpt currently. Patient was supposed to have a CT chest  4 days ago but machine was not working. Patient completed full dialysis session today. Here patient is hypotensive to 70s/40s, rpt 80s/60s. +LUE avf with palpable thrill, + thrombophlebitis of bilateral LE with venous stasis changes. + abdominal scars but nontender. No cva ttp. has symmetric pulses all 4 extrem.   Ptn is being admitted for a work of his new CP.     A/P  Atypical CP but due to risk factors for CAD he was admitted. seen by cardiology, CP resolved  I presume it was 2/2 US probe after get chest US at a doctors office prior to onset of CP  TTE can be done on outptn basis  cont outptn meds  CT home

## 2023-07-16 NOTE — ED ADULT NURSE REASSESSMENT NOTE - NS ED NURSE REASSESS COMMENT FT1
RN contacted GEORGE Forrest @[id number] as pt fell asleep and repeat BP reading was low, 72/41(51), pt states he is due for his midodrine @3am, ACP to put stat dose for 3am.

## 2023-07-16 NOTE — CONSULT NOTE ADULT - ASSESSMENT
78Male PMH of  ESRD on HD via LUE AVF T/Th/Sat, chronic severe orthostatic hypotension on midodrine 30mg q6h, COPD on home O2, 2L, CHF, pHTN, PE/DVT on COUMADIN, Sacral wound ulcer, chronic back pain with relatively recent get wound care at home, Morbid Obesity, wheelchair bound   Patient has been having chest pain    ESRD on HD ( TTS) via AVF  Consent obtained in chart  Plan for HD on Tuesday  Monitor BMP     HTN  chronic severe orthostatic hypotension on midodrine 30mg q6h  MOnitor closely     CKD-MBD  Check PTH  Tertiary  hyperparathyroidism.   On Sensipar of 60 mg po daily  Phosphorus and calcium daily.    78Male PMH of  ESRD on HD via LUE AVF T/Th/Sat, chronic severe orthostatic hypotension on midodrine 30mg q6h, COPD on home O2, 2L, CHF, pHTN, PE/DVT on COUMADIN, Sacral wound ulcer, chronic back pain with relatively recent get wound care at home, Morbid Obesity, wheelchair bound   Patient has been having chest pain    ESRD on HD ( TTS) via AVF  Outpt unit [facility]  Consent obtained in chart  Plan for HD on Tuesday  Monitor BMP     HTN  chronic severe orthostatic hypotension on midodrine 30mg q6h  MOnitor closely     CKD-MBD  Check PTH  Tertiary  hyperparathyroidism.   On Sensipar of 60 mg po daily  Phosphorus and calcium daily.

## 2023-07-16 NOTE — CONSULT NOTE ADULT - TIME BILLING
Patient seen and examined, agree with the above assessment and plan by LEONA Zuluaga.  77 yo male w h/o severe orthostatic hypotension on midodrine, COPD (on 2L home O2), DCHF, pHTN, DVT, ESRD on HD, S/P IVC filter presented for chest pain    #atypical chest pain   -check ecg  -chronic elevated trop, nl CK CKMB  -secondary to demand in setting of esrd  -cta chest neg for pe  -can check echo     #orthostatic hypotension  -chronic issue  -cont mido 30 mg   -recent echo with nl lv fxn, valve fxn     #ESRD  -HD per renal     #DVT hx  -cont a/c per inr     #COPD, PHTN  -stable  -cont o2

## 2023-07-16 NOTE — CONSULT NOTE ADULT - SUBJECTIVE AND OBJECTIVE BOX
CARDIOLOGY CONSULT - Dr. Livingston         HPI:  78Male presenting with Right sided CP on and off for a week. pain is  nonexertional, nonradiating . no dyspnea, no diaphoresis, no N/V. pt is knwon from prior admissions  hx includes : ESRD on HD , chronic severe orthostatic hypotension on midodrine 30mg q6h, COPD on home O2, 2L, CHF, pHTN, PE/DVT on COUMADIN, Sacral wound ulcer, chronic back pain with relatively recentget wound care at home, Morbid Obesity, wheelchair bound.   Patient has been having chest pain now for 4 days. BP is normally 80- 50/30-50.   This is being worked up outpt currently. Patient was supposed to have a CT chest  4 days ago but machine was not working. Patient completed full dialysis .   POCUS ED TTE 2D F/U, Limited w/o Cont.  IMPRESSION:  Technically limited study  No Pericardial Effusion.  Limited evaluation of the right ventricle            PAST MEDICAL & SURGICAL HISTORY:  Hypertension      Glomerular disease  Segmental glomerular sclerosis      CHF (congestive heart failure)      COPD (chronic obstructive pulmonary disease)      Pulmonary hypertension      Sleep apnea      Pulmonary edema      Peripheral edema      Gout      History of abdominal surgery  polypectomy      H/O right heart catheterization              PREVIOUS DIAGNOSTIC TESTING:     Transthoracic Echocardiogram (03.13.23 @ 09:29) >  CONCLUSIONS:  1. Aortic valve not well visualized; appears calcified.  2. Endocardium not well visualized; grossly normal left  ventricular systolic function.  3. Normal right ventricular size and function.  Transthoracic Echocardiogram (01.20.23 @ 17:22) >  CONCLUSIONS:  1. Normal left ventricular internal dimensions and wall  thicknesses.  2. Normal left ventricular systolic function. No segmental  wall motionabnormalities.  3. The right ventricle is not well visualized; grossly  normal right ventricular systolic function.    < end of copied text >      MEDICATIONS:  Home Medications:  Albuterol (Eqv-ProAir HFA) 90 mcg/inh inhalation aerosol: 2 puff(s) inhaled every 6 hours as needed (15 Jul 2023 22:29)  albuterol nebulizer solution: 1 dose(s) every 6 hours as needed / dose unknown (15 Jul 2023 22:27)  allopurinol 100 mg oral tablet: 1 tab(s) orally once a day (15 Jul 2023 22:29)  bisacodyl 5 mg oral delayed release tablet: 1 tab(s) orally once a day (at bedtime) (15 Jul 2023 22:25)  cinacalcet 90 mg oral tablet: 1 tab(s) orally 3 times a week (on days of dialysis - Tues, Thurs &amp; Sat) (16 Jul 2023 00:15)  dicyclomine 20 mg oral tablet: 1 tab(s) orally every 6 hours as needed (15 Jul 2023 22:29)  levETIRAcetam 500 mg oral tablet: 1 tab(s) orally 2 times a day (15 Jul 2023 22:29)  lidocaine 5% topical film: Apply 1 patch to knee and 1 patch to lower back; remove after 12 hours (15 Jul 2023 22:29)  melatonin 5 mg oral tablet: 1 tab(s) orally once a day (at bedtime) as needed (15 Jul 2023 22:29)  midodrine 10 mg oral tablet: 1 tab(s) orally 4 times a day (3AM, 9AM, 3PM, 9PM) (15 Jul 2023 22:29)  MiraLax oral powder for reconstitution: 17 gram(s) orally every other day (15 Jul 2023 22:29)  pantoprazole 40 mg oral delayed release tablet: 1 tab(s) orally once a day (15 Jul 2023 22:29)  pregabalin 100 mg oral capsule: 1 cap(s) orally 2 times a day (15 Jul 2023 22:29)  Senna 8.6 mg oral tablet: 2 tab(s) orally once a day (at bedtime) (15 Jul 2023 22:25)  sevelamer carbonate 800 mg oral tablet: 3 tab(s) orally 3 times a day &amp; 1 additional tab(s) with snacks (15 Jul 2023 22:29)  Trelegy Ellipta 100 mcg-62.5 mcg-25 mcg/inh inhalation powder: 1 puff(s) inhaled once a day (15 Jul 2023 22:29)  warfarin: 9mg orally once daily alternating with 10mg as per INR (15 Jul 2023 22:30)      MEDICATIONS  (STANDING):  albuterol/ipratropium for Nebulization 3 milliLiter(s) Nebulizer every 6 hours  allopurinol 100 milliGRAM(s) Oral daily  bisacodyl 5 milliGRAM(s) Oral at bedtime  budesonide 160 MICROgram(s)/formoterol 4.5 MICROgram(s) Inhaler 2 Puff(s) Inhalation two times a day  cinacalcet 90 milliGRAM(s) Oral <User Schedule>  dicyclomine 20 milliGRAM(s) Oral four times a day before meals  levETIRAcetam 500 milliGRAM(s) Oral two times a day  melatonin 5 milliGRAM(s) Oral at bedtime  midodrine. 30 milliGRAM(s) Oral <User Schedule>  pantoprazole    Tablet 40 milliGRAM(s) Oral before breakfast  polyethylene glycol 3350 17 Gram(s) Oral daily  pregabalin 100 milliGRAM(s) Oral two times a day  senna 2 Tablet(s) Oral at bedtime  sevelamer carbonate 2400 milliGRAM(s) Oral three times a day  warfarin 9 milliGRAM(s) Oral once      FAMILY HISTORY:  Family history of colon cancer (Father)    Family history of heart disease (Father)        SOCIAL HISTORY:    [ ] Non-smoker  [ ] Smoker  [ ] Alcohol    Allergies    baclofen (Other)  latex (Rash)    Intolerances    	    REVIEW OF SYSTEMS:  CONSTITUTIONAL: No fever, weight loss, or fatigue  EYES: No eye pain, visual disturbances, or discharge  ENMT:  No difficulty hearing, tinnitus, vertigo; No sinus or throat pain  NECK: No pain or stiffness  RESPIRATORY: No cough, wheezing, chills or hemoptysis; No Shortness of Breath  CARDIOVASCULAR: No chest pain, palpitations, passing out, dizziness, or leg swelling  GASTROINTESTINAL: No abdominal or epigastric pain. No nausea, vomiting, or hematemesis; No diarrhea or constipation. No melena or hematochezia.  GENITOURINARY: No dysuria, frequency, hematuria, or incontinence  NEUROLOGICAL: No headaches, memory loss, loss of strength, numbness, or tremors  SKIN: No itching, burning, rashes, or lesions   	    [ ] All others negative	  [ ] Unable to obtain    PHYSICAL EXAM:  T(C): 36.4 (07-16-23 @ 07:48), Max: 36.8 (07-15-23 @ 19:44)  HR: 65 (07-16-23 @ 07:48) (59 - 93)  BP: 91/58 (07-16-23 @ 07:48) (70/43 - 104/59)  RR: 18 (07-16-23 @ 07:48) (15 - 24)  SpO2: 100% (07-16-23 @ 07:48) (94% - 100%)  Wt(kg): --  I&O's Summary      Appearance: Normal	  Psychiatry: A & O x 3, Mood & affect appropriate  HEENT:   Normal oral mucosa, PERRL, EOMI	  Lymphatic: No lymphadenopathy  Cardiovascular: Normal S1 S2,RRR, No JVD, No murmurs  Respiratory: Lungs clear to auscultation	  Gastrointestinal:  Soft, Non-tender, + BS	  Skin: No rashes, No ecchymoses, No cyanosis	  Neurologic: Non-focal  Extremities: Normal range of motion, No clubbing, cyanosis or edema  Vascular: Peripheral pulses palpable 2+ bilaterally    TELEMETRY: 	    ECG:  	  RADIOLOGY:  < from: CT Angio Chest PE Protocol w/ IV Cont (07.16.23 @ 01:03) >  IMPRESSION:    Suboptimal evaluation of the segmental/subsegmental pulmonary arteries.   No central pulmonary embolus or secondary signs of right heart strain.      OTHER: 	  	  LABS:	 	    CARDIAC MARKERS:  Troponin T, High Sensitivity Result: 193 ng/L (07-15 @ 21:29)  Troponin T, High Sensitivity Result: 205 ng/L (07-15 @ 19:48)                                  14.8   7.57  )-----------( 180      ( 15 Jul 2023 19:48 )             48.6     07-15    138  |  95<L>  |  16  ----------------------------<  85  3.6   |  25  |  5.91<H>    Ca    10.5      15 Jul 2023 19:48  Mg     2.2     07-15    TPro  10.2<H>  /  Alb  4.4  /  TBili  0.5  /  DBili  x   /  AST  32  /  ALT  14  /  AlkPhos  165<H>  07-15    PT/INR - ( 15 Jul 2023 19:48 )   PT: 40.5 sec;   INR: 3.45 ratio         PTT - ( 15 Jul 2023 19:48 )  PTT:48.9 sec  proBNP:   Lipid Profile:   HgA1c:   TSH:        CARDIOLOGY CONSULT - Dr. Livingston         HPI:  78Male presenting with Right sided CP on and off for a week. pain is  nonexertional, nonradiating . no dyspnea, no diaphoresis, no N/V. pt is knwon from prior admissions  hx includes : ESRD on HD , chronic severe orthostatic hypotension on midodrine 30mg q6h, COPD on home O2, 2L, CHF, pHTN, PE/DVT on COUMADIN, Sacral wound ulcer, chronic back pain with relatively recentget wound care at home, Morbid Obesity, wheelchair bound.   Patient has been having chest pain now for 4 days. BP is normally 80- 50/30-50.   This is being worked up outpt currently. Patient was supposed to have a CT chest  4 days ago but machine was not working. Patient completed full dialysis . pt reports intermittent Right sided cp - ROS Otherwise neg  POCUS ED TTE 2D F/U, Limited w/o Cont.  IMPRESSION:  Technically limited study  No Pericardial Effusion.  Limited evaluation of the right ventricle              PAST MEDICAL & SURGICAL HISTORY:  Hypertension      Glomerular disease  Segmental glomerular sclerosis      CHF (congestive heart failure)      COPD (chronic obstructive pulmonary disease)      Pulmonary hypertension      Sleep apnea      Pulmonary edema      Peripheral edema      Gout      History of abdominal surgery  polypectomy      H/O right heart catheterization              PREVIOUS DIAGNOSTIC TESTING:     Transthoracic Echocardiogram (03.13.23 @ 09:29) >  CONCLUSIONS:  1. Aortic valve not well visualized; appears calcified.  2. Endocardium not well visualized; grossly normal left  ventricular systolic function.  3. Normal right ventricular size and function.  Transthoracic Echocardiogram (01.20.23 @ 17:22) >  CONCLUSIONS:  1. Normal left ventricular internal dimensions and wall  thicknesses.  2. Normal left ventricular systolic function. No segmental  wall motionabnormalities.  3. The right ventricle is not well visualized; grossly  normal right ventricular systolic function.    < end of copied text >      MEDICATIONS:  Home Medications:  Albuterol (Eqv-ProAir HFA) 90 mcg/inh inhalation aerosol: 2 puff(s) inhaled every 6 hours as needed (15 Jul 2023 22:29)  albuterol nebulizer solution: 1 dose(s) every 6 hours as needed / dose unknown (15 Jul 2023 22:27)  allopurinol 100 mg oral tablet: 1 tab(s) orally once a day (15 Jul 2023 22:29)  bisacodyl 5 mg oral delayed release tablet: 1 tab(s) orally once a day (at bedtime) (15 Jul 2023 22:25)  cinacalcet 90 mg oral tablet: 1 tab(s) orally 3 times a week (on days of dialysis - Tues, Thurs &amp; Sat) (16 Jul 2023 00:15)  dicyclomine 20 mg oral tablet: 1 tab(s) orally every 6 hours as needed (15 Jul 2023 22:29)  levETIRAcetam 500 mg oral tablet: 1 tab(s) orally 2 times a day (15 Jul 2023 22:29)  lidocaine 5% topical film: Apply 1 patch to knee and 1 patch to lower back; remove after 12 hours (15 Jul 2023 22:29)  melatonin 5 mg oral tablet: 1 tab(s) orally once a day (at bedtime) as needed (15 Jul 2023 22:29)  midodrine 10 mg oral tablet: 1 tab(s) orally 4 times a day (3AM, 9AM, 3PM, 9PM) (15 Jul 2023 22:29)  MiraLax oral powder for reconstitution: 17 gram(s) orally every other day (15 Jul 2023 22:29)  pantoprazole 40 mg oral delayed release tablet: 1 tab(s) orally once a day (15 Jul 2023 22:29)  pregabalin 100 mg oral capsule: 1 cap(s) orally 2 times a day (15 Jul 2023 22:29)  Senna 8.6 mg oral tablet: 2 tab(s) orally once a day (at bedtime) (15 Jul 2023 22:25)  sevelamer carbonate 800 mg oral tablet: 3 tab(s) orally 3 times a day &amp; 1 additional tab(s) with snacks (15 Jul 2023 22:29)  Trelegy Ellipta 100 mcg-62.5 mcg-25 mcg/inh inhalation powder: 1 puff(s) inhaled once a day (15 Jul 2023 22:29)  warfarin: 9mg orally once daily alternating with 10mg as per INR (15 Jul 2023 22:30)      MEDICATIONS  (STANDING):  albuterol/ipratropium for Nebulization 3 milliLiter(s) Nebulizer every 6 hours  allopurinol 100 milliGRAM(s) Oral daily  bisacodyl 5 milliGRAM(s) Oral at bedtime  budesonide 160 MICROgram(s)/formoterol 4.5 MICROgram(s) Inhaler 2 Puff(s) Inhalation two times a day  cinacalcet 90 milliGRAM(s) Oral <User Schedule>  dicyclomine 20 milliGRAM(s) Oral four times a day before meals  levETIRAcetam 500 milliGRAM(s) Oral two times a day  melatonin 5 milliGRAM(s) Oral at bedtime  midodrine. 30 milliGRAM(s) Oral <User Schedule>  pantoprazole    Tablet 40 milliGRAM(s) Oral before breakfast  polyethylene glycol 3350 17 Gram(s) Oral daily  pregabalin 100 milliGRAM(s) Oral two times a day  senna 2 Tablet(s) Oral at bedtime  sevelamer carbonate 2400 milliGRAM(s) Oral three times a day  warfarin 9 milliGRAM(s) Oral once      FAMILY HISTORY:  Family history of colon cancer (Father)    Family history of heart disease (Father)        SOCIAL HISTORY:    [x ] Non-smoker  [ ] Smoker  [ ] Alcohol    Allergies    baclofen (Other)  latex (Rash)    Intolerances    	    REVIEW OF SYSTEMS:  CONSTITUTIONAL: No fever, weight loss, or fatigue  EYES: No eye pain, visual disturbances, or discharge  ENMT:  No difficulty hearing, tinnitus, vertigo; No sinus or throat pain  NECK: No pain or stiffness  RESPIRATORY: No cough, wheezing, chills or hemoptysis; No Shortness of Breath  CARDIOVASCULAR:  see hpi  GASTROINTESTINAL: No abdominal or epigastric pain. No nausea, vomiting, or hematemesis; No diarrhea or constipation. No melena or hematochezia.  GENITOURINARY: No dysuria, frequency, hematuria, or incontinence  NEUROLOGICAL: No headaches, memory loss, loss of strength, numbness, or tremors  SKIN: No itching, burning, rashes, or lesions   	    [x ] All others negative	  [ ] Unable to obtain    PHYSICAL EXAM:  T(C): 36.4 (07-16-23 @ 07:48), Max: 36.8 (07-15-23 @ 19:44)  HR: 65 (07-16-23 @ 07:48) (59 - 93)  BP: 91/58 (07-16-23 @ 07:48) (70/43 - 104/59)  RR: 18 (07-16-23 @ 07:48) (15 - 24)  SpO2: 100% (07-16-23 @ 07:48) (94% - 100%)  Wt(kg): --  I&O's Summary      Appearance: Normal	  Psychiatry: A & O x 3, Mood & affect appropriate  HEENT:   Normal oral mucosa, PERRL, EOMI	  Lymphatic: No lymphadenopathy  Cardiovascular: Normal S1 S2,RRR, No JVD, No murmurs  Respiratory: Lungs clear to auscultation	  Gastrointestinal:  Soft, Non-tender, + BS	  Skin: No rashes, No ecchymoses, No cyanosis	  Neurologic: Non-focal  Extremities: Normal range of motion, No clubbing, cyanosis or edema  Vascular: Peripheral pulses palpable 2+ bilaterally    TELEMETRY: 	    ECG:  	  RADIOLOGY:  < from: CT Angio Chest PE Protocol w/ IV Cont (07.16.23 @ 01:03) >  IMPRESSION:    Suboptimal evaluation of the segmental/subsegmental pulmonary arteries.   No central pulmonary embolus or secondary signs of right heart strain.      OTHER: 	  	  LABS:	 	    CARDIAC MARKERS:  Troponin T, High Sensitivity Result: 193 ng/L (07-15 @ 21:29)  Troponin T, High Sensitivity Result: 205 ng/L (07-15 @ 19:48)                                  14.8   7.57  )-----------( 180      ( 15 Jul 2023 19:48 )             48.6     07-15    138  |  95<L>  |  16  ----------------------------<  85  3.6   |  25  |  5.91<H>    Ca    10.5      15 Jul 2023 19:48  Mg     2.2     07-15    TPro  10.2<H>  /  Alb  4.4  /  TBili  0.5  /  DBili  x   /  AST  32  /  ALT  14  /  AlkPhos  165<H>  07-15    PT/INR - ( 15 Jul 2023 19:48 )   PT: 40.5 sec;   INR: 3.45 ratio         PTT - ( 15 Jul 2023 19:48 )  PTT:48.9 sec  proBNP:   Lipid Profile:   HgA1c:   TSH:

## 2023-07-16 NOTE — PROGRESS NOTE ADULT - SUBJECTIVE AND OBJECTIVE BOX
Patient is a 78y old  Male who presents with a chief complaint of acs, orthostatic hypotension (16 Jul 2023 12:22)      SUBJECTIVE / OVERNIGHT EVENTS: ptn feels well, CP resolved, appeared atypical in nature after a chest US, possibly from the probe manipulation. BP is stable for home. DC planning    MEDICATIONS  (STANDING):  albuterol/ipratropium for Nebulization 3 milliLiter(s) Nebulizer every 6 hours  allopurinol 100 milliGRAM(s) Oral daily  bisacodyl 5 milliGRAM(s) Oral at bedtime  budesonide 160 MICROgram(s)/formoterol 4.5 MICROgram(s) Inhaler 2 Puff(s) Inhalation two times a day  cinacalcet 90 milliGRAM(s) Oral <User Schedule>  dicyclomine 20 milliGRAM(s) Oral four times a day before meals  levETIRAcetam 500 milliGRAM(s) Oral two times a day  melatonin 5 milliGRAM(s) Oral at bedtime  midodrine. 30 milliGRAM(s) Oral <User Schedule>  pantoprazole    Tablet 40 milliGRAM(s) Oral before breakfast  polyethylene glycol 3350 17 Gram(s) Oral daily  pregabalin 100 milliGRAM(s) Oral two times a day  senna 2 Tablet(s) Oral at bedtime  sevelamer carbonate 2400 milliGRAM(s) Oral three times a day  warfarin 9 milliGRAM(s) Oral once    MEDICATIONS  (PRN):      Vital Signs Last 24 Hrs  T(F): 97.6 (07-16-23 @ 07:48), Max: 98.2 (07-15-23 @ 19:44)  HR: 64 (07-16-23 @ 10:45) (59 - 93)  BP: 91/55 (07-16-23 @ 10:45) (70/43 - 104/59)  RR: 19 (07-16-23 @ 10:45) (15 - 24)  SpO2: 100% (07-16-23 @ 10:45) (94% - 100%)  Telemetry:   CAPILLARY BLOOD GLUCOSE        I&O's Summary      PHYSICAL EXAM:  GENERAL: NAD, well-developed  HEAD:  Atraumatic, Normocephalic  EYES: EOMI, PERRLA, conjunctiva and sclera clear  NECK: Supple, No JVD  CHEST/LUNG: Clear to auscultation bilaterally; No wheeze  HEART: Regular rate and rhythm; No murmurs, rubs, or gallops  ABDOMEN: Soft, Nontender, Nondistended; Bowel sounds present  EXTREMITIES:  2+ Peripheral Pulses, No clubbing, cyanosis, or edema  PSYCH: AAOx3  NEUROLOGY: non-focal  SKIN: No rashes or lesions    LABS:                        14.8   7.57  )-----------( 180      ( 15 Jul 2023 19:48 )             48.6     07-15    138  |  95<L>  |  16  ----------------------------<  85  3.6   |  25  |  5.91<H>    Ca    10.5      15 Jul 2023 19:48  Mg     2.2     07-15    TPro  10.2<H>  /  Alb  4.4  /  TBili  0.5  /  DBili  x   /  AST  32  /  ALT  14  /  AlkPhos  165<H>  07-15    PT/INR - ( 15 Jul 2023 19:48 )   PT: 40.5 sec;   INR: 3.45 ratio         PTT - ( 15 Jul 2023 19:48 )  PTT:48.9 sec  CARDIAC MARKERS ( 15 Jul 2023 19:48 )  x     / x     / 93 U/L / x     / 1.7 ng/mL      Urinalysis Basic - ( 15 Jul 2023 19:48 )    Color: x / Appearance: x / SG: x / pH: x  Gluc: 85 mg/dL / Ketone: x  / Bili: x / Urobili: x   Blood: x / Protein: x / Nitrite: x   Leuk Esterase: x / RBC: x / WBC x   Sq Epi: x / Non Sq Epi: x / Bacteria: x        RADIOLOGY & ADDITIONAL TESTS:    Imaging Personally Reviewed:    Consultant(s) Notes Reviewed:      Care Discussed with Consultants/Other Providers:

## 2023-07-16 NOTE — DISCHARGE NOTE PROVIDER - CARE PROVIDER_API CALL
Kyree Gary  Cardiology  [address], [address]  [place] [address]  Phone: [phone]  Fax: [phone]  Established Patient  Follow Up Time:

## 2023-07-16 NOTE — CONSULT NOTE ADULT - ASSESSMENT
Transthoracic Echocardiogram (03.13.23 @ 09:29) >  CONCLUSIONS: nl LV sys fx   Transthoracic Echocardiogram (01.20.23 @ 17:22) >  CONCLUSIONS: NL LV sys fx     A/P    79 yo male w h/o severe orthostatic hypotension on midodrine, COPD (on 2L home O2), DCHF, pHTN, DVT, ESRD on HD, S/P IVC filter presented for chest pain    #atypical chest pain   -check ecg  -chronic elevated trop, nl CK CKMB  -secondary to demand in setting of esrd  -cta chest neg for pe  -can check echo     #orthostatic hypotension  -chronic issue  -cont mido 30 mg   -recent echo with nl lv fxn, valve fxn     #ESRD  -HD per renal     #DVT hx  -cont a/c per inr     #COPD, PHTN  -stable  -cont o2

## 2023-07-16 NOTE — DISCHARGE NOTE PROVIDER - NSDCCPCAREPLAN_GEN_ALL_CORE_FT
PRINCIPAL DISCHARGE DIAGNOSIS  Diagnosis: Chest pain  Assessment and Plan of Treatment: Resolved, Continue to follow up with cardiology and PCP  return of worsens. Take all meds as above      SECONDARY DISCHARGE DIAGNOSES  Diagnosis: COPD, moderate  Assessment and Plan of Treatment: Take meds as above and follow up with PCp within 1 week    Diagnosis: DVT, lower extremity  Assessment and Plan of Treatment: Take coumadin as directed, follow up with pcp in a few days for INR check    Diagnosis: ESRD on dialysis  Assessment and Plan of Treatment: resume dialysis schedule    Diagnosis: Orthostatic hypertension  Assessment and Plan of Treatment: Take meds as above and follow up with PCp within 1 week    Diagnosis: Sacral decubitus ulcer  Assessment and Plan of Treatment: Stage 2 Cavilon to periwound skin, Allevyn foam, change every 3 days and prn for soiling/drainage continue to monitor  Continue to encourage mobility

## 2023-07-16 NOTE — DISCHARGE NOTE NURSING/CASE MANAGEMENT/SOCIAL WORK - PATIENT PORTAL LINK FT
You can access the FollowMyHealth Patient Portal offered by [facility] by registering at the following website: [email or web address]. By joining [employer]’s FollowMyHealth portal, you will also be able to view your health information using other applications (apps) compatible with our system.

## 2023-07-16 NOTE — DISCHARGE NOTE PROVIDER - NSDCMRMEDTOKEN_GEN_ALL_CORE_FT
Albuterol (Eqv-ProAir HFA) 90 mcg/inh inhalation aerosol: 2 puff(s) inhaled every 6 hours as needed  albuterol nebulizer solution: 1 dose(s) every 6 hours as needed / dose unknown  allopurinol 100 mg oral tablet: 1 tab(s) orally once a day  bisacodyl 5 mg oral delayed release tablet: 1 tab(s) orally once a day (at bedtime)  cinacalcet 90 mg oral tablet: 1 tab(s) orally 3 times a week (on days of dialysis - Tues, Thurs &amp; Sat)  dicyclomine 20 mg oral tablet: 1 tab(s) orally every 6 hours as needed  levETIRAcetam 500 mg oral tablet: 1 tab(s) orally 2 times a day  lidocaine 5% topical film: Apply 1 patch to knee and 1 patch to lower back; remove after 12 hours  melatonin 5 mg oral tablet: 1 tab(s) orally once a day (at bedtime) as needed  midodrine 10 mg oral tablet: 1 tab(s) orally 4 times a day (3AM, 9AM, 3PM, 9PM)  MiraLax oral powder for reconstitution: 17 gram(s) orally every other day  pantoprazole 40 mg oral delayed release tablet: 1 tab(s) orally once a day  pregabalin 100 mg oral capsule: 1 cap(s) orally 2 times a day  Senna 8.6 mg oral tablet: 2 tab(s) orally once a day (at bedtime)  sevelamer carbonate 800 mg oral tablet: 3 tab(s) orally 3 times a day &amp; 1 additional tab(s) with snacks  Trelegy Ellipta 100 mcg-62.5 mcg-25 mcg/inh inhalation powder: 1 puff(s) inhaled once a day  warfarin: 9mg orally once daily alternating with 10mg as per INR

## 2023-07-16 NOTE — CONSULT NOTE ADULT - SUBJECTIVE AND OBJECTIVE BOX
07-16-23 @ 12:23    Patient is a 78y old  Male who presents with a chief complaint of acs, orthostatic hypotension (16 Jul 2023 10:31)      HPI:  78Male well known to me from my outptn practice brought in by his wife 2/2 Right sided CP on and off for a week. pain is  nonexertional, nonradiating . 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 BP is normally 80- 50/30-50.   This is being worked up outpt currently. Patient was supposed to have a CT chest  4 days ago but machine was not working. Patient completed full dialysis session today. Here patient is hypotensive to 70s/40s, rpt 80s/60s. +LUE avf with palpable thrill, + thrombophlebitis of bilateral LE with venous stasis changes. + abdominal scars but nontender. No cva ttp. has symmetric pulses all 4 extrem.   Ptn is being admitted for a work of his new CP.    (15 Jul 2023 21:26)    now pulm called t o see hime:  he has copd and SAADIA and has been using cpap athome:  also uses home oxygen : ex smoker:   currently being worked  up for orthostatic hypotension:   now here for chest pain as described above:   ?FOLLOWING PRESENT  [ ?] Hx of PE/DVT, [ y] Hx COPD, [ x] Hx of Asthma, [y ] Hx of Hospitalization, [y ]  Hx of BiPAP/CPAP use, [y ] Hx of SAADIA    Allergies    baclofen (Other)  latex (Rash)    Intolerances        PAST MEDICAL & SURGICAL HISTORY:  Hypertension      Glomerular disease  Segmental glomerular sclerosis      CHF (congestive heart failure)      COPD (chronic obstructive pulmonary disease)      Pulmonary hypertension      Sleep apnea      Pulmonary edema      Peripheral edema      Gout      History of abdominal surgery  polypectomy      H/O right heart catheterization          FAMILY HISTORY:  Family history of colon cancer (Father)    Family history of heart disease (Father)        Social History: [former smoker  ] TOBACCO                  [  x] ETOH                                 [ x ] IVDA/DRUGS    REVIEW OF SYSTEMS      General:	x    Skin/Breast:x  	  Ophthalmologic:x  	  ENMT:	x    Respiratory and Thorax:  chest pain , sob  	  Cardiovascular:	x    Gastrointestinal:	x    Genitourinary:	x    Musculoskeletal:	 leg edema    Neurological:	x    Psychiatric:	x    Hematology/Lymphatics:	x    Endocrine:	x    Allergic/Immunologic:	  x  MEDICATIONS  (STANDING):  albuterol/ipratropium for Nebulization 3 milliLiter(s) Nebulizer every 6 hours  allopurinol 100 milliGRAM(s) Oral daily  bisacodyl 5 milliGRAM(s) Oral at bedtime  budesonide 160 MICROgram(s)/formoterol 4.5 MICROgram(s) Inhaler 2 Puff(s) Inhalation two times a day  cinacalcet 90 milliGRAM(s) Oral <User Schedule>  dicyclomine 20 milliGRAM(s) Oral four times a day before meals  levETIRAcetam 500 milliGRAM(s) Oral two times a day  melatonin 5 milliGRAM(s) Oral at bedtime  midodrine. 30 milliGRAM(s) Oral <User Schedule>  pantoprazole    Tablet 40 milliGRAM(s) Oral before breakfast  polyethylene glycol 3350 17 Gram(s) Oral daily  pregabalin 100 milliGRAM(s) Oral two times a day  senna 2 Tablet(s) Oral at bedtime  sevelamer carbonate 2400 milliGRAM(s) Oral three times a day  warfarin 9 milliGRAM(s) Oral once    MEDICATIONS  (PRN):       Vital Signs Last 24 Hrs  T(C): 36.4 (16 Jul 2023 07:48), Max: 36.8 (15 Jul 2023 19:44)  T(F): 97.6 (16 Jul 2023 07:48), Max: 98.2 (15 Jul 2023 19:44)  HR: 64 (16 Jul 2023 10:45) (59 - 93)  BP: 91/55 (16 Jul 2023 10:45) (70/43 - 104/59)  BP(mean): 67 (16 Jul 2023 10:45) (51 - 73)  RR: 19 (16 Jul 2023 10:45) (15 - 24)  SpO2: 100% (16 Jul 2023 10:45) (94% - 100%)    Parameters below as of 16 Jul 2023 10:45  Patient On (Oxygen Delivery Method): nasal cannula  O2 Flow (L/min): 3  Orthostatic VS          I&O's Summary      Physical Exam:   GENERAL: Obese: on oxygen   HEENT: JORDIN/   Atraumatic, Normocephalic  ENMT: No tonsillar erythema, exudates, or enlargement; Moist mucous membranes, Good dentition, No lesions  NECK: Supple, No JVD, Normal thyroid  CHEST/LUNG: no wheezing  CVS: Regular rate and rhythm; No murmurs, rubs, or gallops  GI: : Soft, Nontender, Nondistended; Bowel sounds present  NERVOUS SYSTEM:  Alert & Oriented X3  EXTREMITIES: ++edema : chr venous stasis changes   LYMPH: No lymphadenopathy noted  SKIN: No rashes or lesions  ENDOCRINOLOGY: No Thyromegaly  PSYCH: calm     Labs:  Venous<61<4>>28<<7.345>>Venous<<3><<4><<5<<289>>, 4.6<60<4>>24<<7.345>>4.6<<3><<4><<5<<249>>COVID-19 PCR: NotDetec (15 Mar 2023 08:24)  COVID-19 PCR: NotDetec (27 Feb 2023 11:30)  COVID-19 PCR: NotDetec (24 Jan 2023 11:09)    CARDIAC MARKERS ( 15 Jul 2023 19:48 )  x     / x     / 93 U/L / x     / 1.7 ng/mL                            14.8   7.57  )-----------( 180      ( 15 Jul 2023 19:48 )             48.6     07-15    138  |  95<L>  |  16  ----------------------------<  85  3.6   |  25  |  5.91<H>    Ca    10.5      15 Jul 2023 19:48  Mg     2.2     07-15    TPro  10.2<H>  /  Alb  4.4  /  TBili  0.5  /  DBili  x   /  AST  32  /  ALT  14  /  AlkPhos  165<H>  07-15    CAPILLARY BLOOD GLUCOSE        LIVER FUNCTIONS - ( 15 Jul 2023 19:48 )  Alb: 4.4 g/dL / Pro: 10.2 g/dL / ALK PHOS: 165 U/L / ALT: 14 U/L / AST: 32 U/L / GGT: x           PT/INR - ( 15 Jul 2023 19:48 )   PT: 40.5 sec;   INR: 3.45 ratio         PTT - ( 15 Jul 2023 19:48 )  PTT:48.9 sec  Urinalysis Basic - ( 15 Jul 2023 19:48 )    Color: x / Appearance: x / SG: x / pH: x  Gluc: 85 mg/dL / Ketone: x  / Bili: x / Urobili: x   Blood: x / Protein: x / Nitrite: x   Leuk Esterase: x / RBC: x / WBC x   Sq Epi: x / Non Sq Epi: x / Bacteria: x      D DImer      Studies  Chest X-RAY  CT SCAN Chest   CT Abdomen  Venous Dopplers: LE:   Others      rad< from: CT Angio Chest PE Protocol w/ IV Cont (07.16.23 @ 01:03) >  PLEURA: No pleural effusion.  MEDIASTINUM AND PIOTR: No lymphadenopathy. Calcified mediastinal and hilar   lymph nodes.  VESSELS: Suboptimal contrast opacification of the peripheral pulmonary   arteries. No central pulmonary embolus or secondary signs of right heart   strain. Atherosclerosis. Normal caliber of the thoracic aorta.  HEART: Stable heart size. No pericardial effusion.  CHEST WALL AND LOWER NECK: Again noted, heterogeneous thyroid gland with   calcification.  VISUALIZED UPPER ABDOMEN: Calcified hepatic and splenic granulomas.   Stable bilateral adrenal thickening. Bilateral perinephric stranding,   renal cysts and subcentimeter hypodensities are again noted. Postsurgical   changes at the visualized right abdomen.  BONES: Diffuse chalky sclerosis of the visualized bones, reflecting renal   osteodystrophy. Degenerative changes/paravertebral ossification of the   spine. Stable anterior wedging of the spine.    IMPRESSION:    Suboptimal evaluation of the segmental/subsegmental pulmonary arteries.   No central pulmonary embolus or secondary signs of right heart strain.    --- End of Report ---           RAJESH DOMINIQUE MD; Resident Radiologist  This document has been electronically signed.  JENNIFER MOTA MD; Attending Radiologist  This document has been electronically signed. Jul 16 2023  2:07AM    < end of copied text >  < from: Xray Chest 1 View- PORTABLE-Urgent (07.15.23 @ 20:40) >    TECHNIQUE: Single frontal, portable view of the chest was obtained.    COMPARISON: Chest x-ray 3/10/2023.    FINDINGS:  The cardiomediastinal silhouette is  not accurately assessed in this AP   projection.  Left midlung field and right lower lung field linear airspace opacities   likely representing atelectasis.  There is no pneumothorax or pleural effusion.  No acute bony abnormality.    IMPRESSION:  Left midlung field and right lower lung field linear airspace opacities   likely representing atelectasis.    --- End of Report ---           RAJESH DOMINIQUE MD; Resident Radiologist  This document has been electronically signed.  MARYSOL FROST MD; Attending Radiologist  This document has been electronically signed. Jul 16 2023  9:23AM    < end of copied text >        < from: POCUS ED TTE 2D F/U, Limited w/o Cont. (07.15.23 @ 22:46) >          INTERPRETATION:  A focused transthoracic cardiac ultrasound examination   was performed.  Technically limited study  No pericardial effusion was present.  No global wall motion abnormality was identified  A line predominant lung fields    IMPRESSION:  Technically limited study  No Pericardial Effusion.  Limited evaluation of the right ventricle    --- End of Report ---            GISSELLE CARDENAS MD; Attending Emergency Medicine  This document has been electronically signed. Jul 15 2023 10:47PM    < end of copied text >  < from: Transthoracic Echocardiogram (03.13.23 @ 09:29) >  ventricular internal dimensions and wall thicknesses.  (DT:222 ms).  Right Heart: Normal right atrium. Normal right ventricular  size and function. Normal tricuspid valve.  Minimal  tricuspid regurgitation.  Pericardium/PleuraNormal pericardium with no pericardial  effusion.  Hemodynamic: Estimated right ventricular systolic pressure  equals 43 mm Hg, assuming right atrial pressure equals 10  mm Hg, consistent with mild pulmonary hypertension.  ------------------------------------------------------------------------  CONCLUSIONS:  1. Aortic valve not well visualized; appears calcified.  2. Endocardium not well visualized; grossly normal left  ventricular systolic function.  3. Normal right ventricular size and function.  ------------------------------------------------------------------------  Confirmed on  3/13/2023 - 14:31:04 by Elver Garcia M.D. [facility]  ------------------------------------------------------------------------    < end of copied text >      ct

## 2023-07-18 NOTE — PROGRESS NOTE ADULT - ASSESSMENT
76 y/o male PMHx of ESRD on HD via AV fistula LUE MWF, COPD (on 2L home O2), CHF, pHTN, hypotension on Midodrine, DVT S/P IVC filter who presented after his L knee gave out.     leukocytosis  remains afebrile for duration of hospital course  leukocytosis first noted 7/16  s/p CT chest w/o evidence of focal consolidation  s/p CT abd/pelvis 7/11- No small bowel obstruction. Stomach is moderately distended. Mild to moderate stool in the distal rectosigmoid colon.  no erythema or warmth or decreased ROM of either knee on exam  no evidence of SSTI on exam  s/p repeat CT abd/pelvis unrevealing  nausea and abdominal discomfort now resolved  leukocytosis likely reactive  patient w/o signs or symptoms of infection at this time  continue to monitor off antibiotics    R back pain  no evidence of SSTI  likely musculoskeletal  warm compress  advised pt to change position in bed    nausea- pt reports resolved   on scheduled reglan  GI following  s/p EGD 7/20- esophagitis, gastritis, gastric ulcers, duodenitis, s/p biopsies  on PPI, carafate, simethicone  f/u pathology    ESRD  HD per nephrology    Edmundo Olmstead M.D.  [facility], Division of Infectious Diseases  [phone]  After 5pm on weekdays and all day on weekends - please call [phone]  Called to patient bedside for blood in bowel movement. Patient was stable with no complaints of lightheadedness, shortness of breath, dizziness or pain. Patient's vitals were stable 98 HR, 164/100, 98 o2 on 2L NC. Stat CBC and Coag were sent, Heparin was held. Patient and wife at bedside were updated. From: Stephanie Mar  Sent: 7/2/2018 1:02 PM EDT  Subject: Medication Renewal Request    Stephanie Isabela would like a refill of the following medications:     vitamin D (ERGOCALCIFEROL) 78717 units CAPS capsule Hayley Starks MD]    Preferred pharmacy: [facility] [address] [place] [address] MARY. [place] Henry, [address] [place] [address]    Comment: Called to patient bedside for blood in bowel movement. Patient was stable with no complaints of lightheadedness, shortness of breath, dizziness or pain. Patient's vitals were stable 98 HR, 164/100, 98 o2 on 2L NC. Stat CBC and Coag were sent, Heparin was held. Patient and wife at bedside were updated. Chief resident and attending made aware. 76 y/o male PMHx of ESRD on HD via AV fistula LUE MWF, COPD (on 2L home O2), CHF, pHTN, hypotension on Midodrine, DVT S/P IVC filter who presented after his L knee gave out.     leukocytosis  remains afebrile for duration of hospital course  leukocytosis first noted 7/16  s/p CT chest w/o evidence of focal consolidation  s/p CT abd/pelvis 7/11- No small bowel obstruction. Stomach is moderately distended. Mild to moderate stool in the distal rectosigmoid colon.  no erythema or warmth or decreased ROM of either knee on exam  no evidence of SSTI on exam  s/p repeat CT abd/pelvis unrevealing  nausea and abdominal discomfort now resolved  leukocytosis likely reactive  patient w/o signs or symptoms of infection at this time  continue to monitor off antibiotics  trend wbc    R back pain  no evidence of SSTI  likely musculoskeletal  warm compress  advised pt to change position in bed    nausea- pt reports resolved   on scheduled reglan  GI following  s/p EGD 7/20- esophagitis, gastritis, gastric ulcers, duodenitis, s/p biopsies  on PPI, carafate, simethicone  f/u pathology    ESRD  HD per nephrology    Edmundo Olmstead M.D.  [facility], Division of Infectious Diseases  [phone]  After 5pm on weekdays and all day on weekends - please call [phone]

## 2023-09-13 ENCOUNTER — INPATIENT (INPATIENT)
Facility: HOSPITAL | Age: 78
LOS: 8 days | Discharge: HOME HEALTH | DRG: 393 | End: 2023-09-22
Attending: INTERNAL MEDICINE | Admitting: INTERNAL MEDICINE
Payer: MEDICARE

## 2023-09-13 VITALS — HEIGHT: 68 IN | DIASTOLIC BLOOD PRESSURE: 42 MMHG | SYSTOLIC BLOOD PRESSURE: 63 MMHG

## 2023-09-13 DIAGNOSIS — K92.2 GASTROINTESTINAL HEMORRHAGE, UNSPECIFIED: ICD-10-CM

## 2023-09-13 DIAGNOSIS — Z98.89 OTHER SPECIFIED POSTPROCEDURAL STATES: Chronic | ICD-10-CM

## 2023-09-13 DIAGNOSIS — J44.9 CHRONIC OBSTRUCTIVE PULMONARY DISEASE, UNSPECIFIED: ICD-10-CM

## 2023-09-13 DIAGNOSIS — G47.33 OBSTRUCTIVE SLEEP APNEA (ADULT) (PEDIATRIC): ICD-10-CM

## 2023-09-13 DIAGNOSIS — J96.21 ACUTE AND CHRONIC RESPIRATORY FAILURE WITH HYPOXIA: ICD-10-CM

## 2023-09-13 LAB
APTT BLD: 51.3 SEC — HIGH (ref 24.5–35.6)
BASE EXCESS BLDV CALC-SCNC: -0.7 MMOL/L — SIGNIFICANT CHANGE UP (ref -2–3)
BASOPHILS # BLD AUTO: 0.06 K/UL — SIGNIFICANT CHANGE UP (ref 0–0.2)
BASOPHILS NFR BLD AUTO: 0.9 % — SIGNIFICANT CHANGE UP (ref 0–2)
CA-I SERPL-SCNC: 1.21 MMOL/L — SIGNIFICANT CHANGE UP (ref 1.15–1.33)
CHLORIDE BLDV-SCNC: 97 MMOL/L — SIGNIFICANT CHANGE UP (ref 96–108)
CO2 BLDV-SCNC: 29 MMOL/L — HIGH (ref 22–26)
EOSINOPHIL # BLD AUTO: 0.27 K/UL — SIGNIFICANT CHANGE UP (ref 0–0.5)
EOSINOPHIL NFR BLD AUTO: 3.9 % — SIGNIFICANT CHANGE UP (ref 0–6)
GAS PNL BLDV: 133 MMOL/L — LOW (ref 136–145)
GAS PNL BLDV: SIGNIFICANT CHANGE UP
GLUCOSE BLDV-MCNC: 91 MG/DL — SIGNIFICANT CHANGE UP (ref 70–99)
HCO3 BLDV-SCNC: 28 MMOL/L — SIGNIFICANT CHANGE UP (ref 22–29)
HCT VFR BLD CALC: 49.5 % — SIGNIFICANT CHANGE UP (ref 39–50)
HCT VFR BLDA CALC: 43 % — SIGNIFICANT CHANGE UP (ref 39–51)
HGB BLD CALC-MCNC: 14.3 G/DL — SIGNIFICANT CHANGE UP (ref 12.6–17.4)
HGB BLD-MCNC: 14.5 G/DL — SIGNIFICANT CHANGE UP (ref 13–17)
IMM GRANULOCYTES NFR BLD AUTO: 0.3 % — SIGNIFICANT CHANGE UP (ref 0–0.9)
INR BLD: 3.96 RATIO — HIGH (ref 0.85–1.18)
LACTATE BLDV-MCNC: 3.5 MMOL/L — HIGH (ref 0.5–2)
LYMPHOCYTES # BLD AUTO: 1.34 K/UL — SIGNIFICANT CHANGE UP (ref 1–3.3)
LYMPHOCYTES # BLD AUTO: 19.5 % — SIGNIFICANT CHANGE UP (ref 13–44)
MCHC RBC-ENTMCNC: 27.6 PG — SIGNIFICANT CHANGE UP (ref 27–34)
MCHC RBC-ENTMCNC: 29.3 GM/DL — LOW (ref 32–36)
MCV RBC AUTO: 94.3 FL — SIGNIFICANT CHANGE UP (ref 80–100)
MONOCYTES # BLD AUTO: 0.55 K/UL — SIGNIFICANT CHANGE UP (ref 0–0.9)
MONOCYTES NFR BLD AUTO: 8 % — SIGNIFICANT CHANGE UP (ref 2–14)
NEUTROPHILS # BLD AUTO: 4.64 K/UL — SIGNIFICANT CHANGE UP (ref 1.8–7.4)
NEUTROPHILS NFR BLD AUTO: 67.4 % — SIGNIFICANT CHANGE UP (ref 43–77)
NRBC # BLD: 0 /100 WBCS — SIGNIFICANT CHANGE UP (ref 0–0)
NT-PROBNP SERPL-SCNC: 455 PG/ML — HIGH (ref 0–300)
PCO2 BLDV: 60 MMHG — HIGH (ref 42–55)
PH BLDV: 7.27 — LOW (ref 7.32–7.43)
PLATELET # BLD AUTO: 196 K/UL — SIGNIFICANT CHANGE UP (ref 150–400)
PO2 BLDV: 30 MMHG — SIGNIFICANT CHANGE UP (ref 25–45)
POTASSIUM BLDV-SCNC: 4 MMOL/L — SIGNIFICANT CHANGE UP (ref 3.5–5.1)
PROTHROM AB SERPL-ACNC: 41.8 SEC — HIGH (ref 9.5–13)
RBC # BLD: 5.25 M/UL — SIGNIFICANT CHANGE UP (ref 4.2–5.8)
RBC # FLD: 17.2 % — HIGH (ref 10.3–14.5)
SAO2 % BLDV: 40.9 % — LOW (ref 67–88)
TROPONIN T, HIGH SENSITIVITY RESULT: 175 NG/L — HIGH (ref 0–51)
WBC # BLD: 6.88 K/UL — SIGNIFICANT CHANGE UP (ref 3.8–10.5)
WBC # FLD AUTO: 6.88 K/UL — SIGNIFICANT CHANGE UP (ref 3.8–10.5)

## 2023-09-13 PROCEDURE — 74177 CT ABD & PELVIS W/CONTRAST: CPT | Mod: 26,MA

## 2023-09-13 PROCEDURE — 99291 CRITICAL CARE FIRST HOUR: CPT | Mod: GC

## 2023-09-13 PROCEDURE — 71045 X-RAY EXAM CHEST 1 VIEW: CPT | Mod: 26

## 2023-09-13 RX ORDER — SEVELAMER CARBONATE 2400 MG/1
800 POWDER, FOR SUSPENSION ORAL
Refills: 0 | Status: DISCONTINUED | OUTPATIENT
Start: 2023-09-13 | End: 2023-09-22

## 2023-09-13 RX ORDER — ALLOPURINOL 300 MG
100 TABLET ORAL DAILY
Refills: 0 | Status: DISCONTINUED | OUTPATIENT
Start: 2023-09-13 | End: 2023-09-22

## 2023-09-13 RX ORDER — SODIUM CHLORIDE 9 MG/ML
1000 INJECTION, SOLUTION INTRAVENOUS
Refills: 0 | Status: DISCONTINUED | OUTPATIENT
Start: 2023-09-13 | End: 2023-09-15

## 2023-09-13 RX ORDER — CINACALCET 30 MG/1
90 TABLET, FILM COATED ORAL
Refills: 0 | Status: DISCONTINUED | OUTPATIENT
Start: 2023-09-13 | End: 2023-09-22

## 2023-09-13 RX ORDER — WARFARIN SODIUM 2.5 MG/1
0 TABLET ORAL
Refills: 0 | DISCHARGE

## 2023-09-13 RX ORDER — BUDESONIDE AND FORMOTEROL FUMARATE DIHYDRATE 160; 4.5 UG/1; UG/1
2 AEROSOL RESPIRATORY (INHALATION)
Refills: 0 | Status: DISCONTINUED | OUTPATIENT
Start: 2023-09-13 | End: 2023-09-22

## 2023-09-13 RX ORDER — MIDODRINE HYDROCHLORIDE 2.5 MG/1
30 TABLET ORAL THREE TIMES A DAY
Refills: 0 | Status: DISCONTINUED | OUTPATIENT
Start: 2023-09-13 | End: 2023-09-13

## 2023-09-13 RX ORDER — HYDROCORTISONE 1 %
1 OINTMENT (GRAM) TOPICAL
Refills: 0 | Status: DISCONTINUED | OUTPATIENT
Start: 2023-09-13 | End: 2023-09-22

## 2023-09-13 RX ORDER — SODIUM CHLORIDE 9 MG/ML
250 INJECTION INTRAMUSCULAR; INTRAVENOUS; SUBCUTANEOUS ONCE
Refills: 0 | Status: COMPLETED | OUTPATIENT
Start: 2023-09-13 | End: 2023-09-13

## 2023-09-13 RX ORDER — ALBUTEROL 90 UG/1
2 AEROSOL, METERED ORAL EVERY 6 HOURS
Refills: 0 | Status: DISCONTINUED | OUTPATIENT
Start: 2023-09-13 | End: 2023-09-22

## 2023-09-13 RX ORDER — LANOLIN ALCOHOL/MO/W.PET/CERES
1 CREAM (GRAM) TOPICAL
Refills: 0 | DISCHARGE

## 2023-09-13 RX ORDER — IPRATROPIUM/ALBUTEROL SULFATE 18-103MCG
3 AEROSOL WITH ADAPTER (GRAM) INHALATION EVERY 6 HOURS
Refills: 0 | Status: DISCONTINUED | OUTPATIENT
Start: 2023-09-13 | End: 2023-09-13

## 2023-09-13 RX ORDER — PANTOPRAZOLE SODIUM 20 MG/1
40 TABLET, DELAYED RELEASE ORAL
Refills: 0 | Status: DISCONTINUED | OUTPATIENT
Start: 2023-09-13 | End: 2023-09-22

## 2023-09-13 RX ORDER — LEVETIRACETAM 250 MG/1
500 TABLET, FILM COATED ORAL
Refills: 0 | Status: DISCONTINUED | OUTPATIENT
Start: 2023-09-13 | End: 2023-09-22

## 2023-09-13 RX ORDER — POLYETHYLENE GLYCOL 3350 17 G/17G
17 POWDER, FOR SOLUTION ORAL
Refills: 0 | Status: DISCONTINUED | OUTPATIENT
Start: 2023-09-13 | End: 2023-09-22

## 2023-09-13 RX ORDER — SEVELAMER CARBONATE 2400 MG/1
2400 POWDER, FOR SUSPENSION ORAL THREE TIMES A DAY
Refills: 0 | Status: DISCONTINUED | OUTPATIENT
Start: 2023-09-13 | End: 2023-09-17

## 2023-09-13 RX ORDER — MIDODRINE HYDROCHLORIDE 2.5 MG/1
30 TABLET ORAL THREE TIMES A DAY
Refills: 0 | Status: DISCONTINUED | OUTPATIENT
Start: 2023-09-13 | End: 2023-09-22

## 2023-09-13 RX ORDER — SENNA PLUS 8.6 MG/1
2 TABLET ORAL
Refills: 0 | DISCHARGE

## 2023-09-13 RX ORDER — MIDODRINE HYDROCHLORIDE 2.5 MG/1
1 TABLET ORAL
Refills: 0 | DISCHARGE

## 2023-09-13 RX ORDER — TIOTROPIUM BROMIDE 18 UG/1
2 CAPSULE ORAL; RESPIRATORY (INHALATION) DAILY
Refills: 0 | Status: DISCONTINUED | OUTPATIENT
Start: 2023-09-13 | End: 2023-09-22

## 2023-09-13 RX ADMIN — Medication 100 MILLIGRAM(S): at 19:28

## 2023-09-13 RX ADMIN — MIDODRINE HYDROCHLORIDE 30 MILLIGRAM(S): 2.5 TABLET ORAL at 11:27

## 2023-09-13 RX ADMIN — LEVETIRACETAM 500 MILLIGRAM(S): 250 TABLET, FILM COATED ORAL at 19:29

## 2023-09-13 RX ADMIN — SODIUM CHLORIDE 125 MILLILITER(S): 9 INJECTION, SOLUTION INTRAVENOUS at 11:34

## 2023-09-13 RX ADMIN — MIDODRINE HYDROCHLORIDE 30 MILLIGRAM(S): 2.5 TABLET ORAL at 19:28

## 2023-09-13 RX ADMIN — SODIUM CHLORIDE 250 MILLILITER(S): 9 INJECTION INTRAMUSCULAR; INTRAVENOUS; SUBCUTANEOUS at 10:32

## 2023-09-13 NOTE — ED ADULT NURSE REASSESSMENT NOTE - NS ED NURSE REASSESS COMMENT FT1
Report received from KEM Sommers. Pt A&Ox4, in no acute distress at this time. Patient safety and comfort measures maintained.

## 2023-09-13 NOTE — CONSULT NOTE ADULT - SUBJECTIVE AND OBJECTIVE BOX
Chief Complaint:  Patient is a 78y old  Male who presents with a chief complaint of     Date of service: 09-13-23 @ 14:30    HPI:    The patient is a  78-year-old male history of ESRD on HD via left upper extremity AV fistula Tuesday Thursday Saturday, last dialysis yesterday, chronic severe orthostatic hypotension on midodrine 30 mg every 6 hours, COPD on 2 L home O2, CHF, pulmonary hypertension, PE and DVT on in the past on Coumadin, IVC filter, chronic sacral ulcer, history of hematochezia in the past, brought in by EMS from home for multiple episodes of bright red blood per rectum over the past week, lightheadedness, syncopal       EGD 07.2022:    The patient denies dysphagia, nausea and vomiting, abdominal pain, diarrhea, unintentional weight loss, change in bowel habits or NSAID use.      Allergies:  baclofen (Other)  latex (Rash)      Home Medications:    Hospital Medications:  dextrose 5% + sodium chloride 0.9%. 1000 milliLiter(s) IV Continuous <Continuous>  midodrine. 30 milliGRAM(s) Oral three times a day      PMHX/PSHX:  Hypertension    Glomerular disease    CHF (congestive heart failure)    COPD (chronic obstructive pulmonary disease)    Pulmonary hypertension    Sleep apnea    Pulmonary edema    Peripheral edema    Gout    History of abdominal surgery    H/O right heart catheterization        Family history:  Family history of colon cancer (Father)    Family history of heart disease (Father)        Social History:   Denies ethanol use.  Denies illicit drug use.    ROS:     General:  No wt loss, fevers, chills, night sweats, fatigue,   Eyes:  Good vision, no reported pain  ENT:  No sore throat, pain, runny nose, dysphagia  CV:  No pain, palpitations, hypo/hypertension  Resp:  No dyspnea, cough, tachypnea, wheezing  GI:  See HPI  :  No pain, bleeding, incontinence, nocturia  Muscle:  No pain, weakness  Neuro:  No weakness, tingling, memory problems  Psych:  No fatigue, insomnia, mood problems, depression  Endocrine:  No polyuria, polydipsia, cold/heat intolerance  Heme:  No petechiae, ecchymosis, easy bruisability  Integumentary:  No rash, edema      PHYSICAL EXAM:     GENERAL:  Appears stated age, well-groomed, well-nourished, no distress  HEENT:  NC/AT,  conjunctivae anicteric, clear and pink,   NECK: supple, trachea midline  CHEST:  Full & symmetric excursion, no increased effort, breath sounds clear  HEART:  Regular rhythm, no JVD  ABDOMEN:  Soft, non-tender, non-distended, normoactive bowel sounds,  no masses , no hepatosplenomegaly  EXTREMITIES:  no cyanosis,clubbing or edema  SKIN:  No rash, erythema, or, ecchymoses, no jaundice  NEURO:  Alert, non-focal, no asterixis  PSYCH: Appropriate affect, oriented to place and time  RECTAL: Deferred      Vital Signs:  Vital Signs Last 24 Hrs  T(C): 36.6 (13 Sep 2023 10:04), Max: 36.6 (13 Sep 2023 10:04)  T(F): 97.8 (13 Sep 2023 10:04), Max: 97.8 (13 Sep 2023 10:04)  HR: 60 (13 Sep 2023 12:15) (60 - 122)  BP: 85/40 (13 Sep 2023 12:15) (63/42 - 122/92)  BP(mean): 51 (13 Sep 2023 12:15) (51 - 100)  RR: 25 (13 Sep 2023 10:07) (25 - 30)  SpO2: 100% (13 Sep 2023 12:15) (100% - 100%)    Parameters below as of 13 Sep 2023 12:15  Patient On (Oxygen Delivery Method): nasal cannula  O2 Flow (L/min): 2    Daily Height in cm: 172.72 (13 Sep 2023 09:57)    Daily     LABS: Labs personally reviewed by me:                        14.5   6.88  )-----------( 196      ( 13 Sep 2023 10:17 )             49.5     09-13    137  |  95<L>  |  29<H>  ----------------------------<  91  3.8   |  17<L>  |  8.05<H>    Ca    10.5      13 Sep 2023 10:17    TPro  9.4<H>  /  Alb  3.9  /  TBili  0.4  /  DBili  x   /  AST  26  /  ALT  13  /  AlkPhos  144<H>  09-13    LIVER FUNCTIONS - ( 13 Sep 2023 10:17 )  Alb: 3.9 g/dL / Pro: 9.4 g/dL / ALK PHOS: 144 U/L / ALT: 13 U/L / AST: 26 U/L / GGT: x           PT/INR - ( 13 Sep 2023 10:17 )   PT: 41.8 sec;   INR: 3.96 ratio         PTT - ( 13 Sep 2023 10:17 )  PTT:51.3 sec  Urinalysis Basic - ( 13 Sep 2023 10:17 )    Color: x / Appearance: x / SG: x / pH: x  Gluc: 91 mg/dL / Ketone: x  / Bili: x / Urobili: x   Blood: x / Protein: x / Nitrite: x   Leuk Esterase: x / RBC: x / WBC x   Sq Epi: x / Non Sq Epi: x / Bacteria: x          Imaging personally reviewed by me:           Chief Complaint:  Patient is a 78y old  Male who presents with a chief complaint of     Date of service: 09-13-23 @ 14:30    HPI:    The patient is a 78 year old morbidly obese man with PMH of ESRD on HD, orthostatic hypotension (on high dose midodrine), COPD on home O2, SAADIA on CPAP, wheelchair bound, chronic diastolic CHF, pulmonary hypertension, PE and DVT on Coumadin, IVC filter, chronic sacral ulcer, hemorrhoids and IBS-C, brought in by EMS for rectal bleeding and pre-syncopal symptoms. He has been having intermittent episodes of BRBPR over the past week. Stool is brown. More constipated recently. No hematemesis, n/v, black stool, epigastric pain or recent weight loss. Per wife at bedside, he had a similar bleeding episode in the past, Colonoscopy at the time notable for hemorrhoids and diverticulosis. EGD 2022 showed esophagitis, gastritis, duodenitis.       Allergies:  baclofen (Other)  latex (Rash)      Home Medications:    Hospital Medications:  dextrose 5% + sodium chloride 0.9%. 1000 milliLiter(s) IV Continuous <Continuous>  midodrine. 30 milliGRAM(s) Oral three times a day      PMHX/PSHX:  Hypertension    Glomerular disease    CHF (congestive heart failure)    COPD (chronic obstructive pulmonary disease)    Pulmonary hypertension    Sleep apnea    Pulmonary edema    Peripheral edema    Gout    History of abdominal surgery    H/O right heart catheterization        Family history:  Family history of colon cancer (Father)    Family history of heart disease (Father)        Social History:   Denies ethanol use.  Denies illicit drug use.    ROS:     General:  No wt loss, fevers, chills, night sweats, fatigue,   Eyes:  Good vision, no reported pain  ENT:  No sore throat, pain, runny nose, dysphagia  CV:  No pain, palpitations, hypo/hypertension  Resp:  No dyspnea, cough, tachypnea, wheezing  GI:  See HPI  :  No pain, bleeding, incontinence, nocturia  Muscle:  No pain, weakness  Neuro:  No weakness, tingling, memory problems  Psych:  No fatigue, insomnia, mood problems, depression  Endocrine:  No polyuria, polydipsia, cold/heat intolerance  Heme:  No petechiae, ecchymosis, easy bruisability  Integumentary:  No rash, edema      PHYSICAL EXAM:     GENERAL:  Appears stated age, well-groomed, well-nourished, no distress  HEENT:  NC/AT,  conjunctivae anicteric, clear and pink,   NECK: supple, trachea midline  CHEST:  Full & symmetric excursion, no increased effort, breath sounds clear  HEART:  Regular rhythm, no JVD  ABDOMEN:  Soft, non-tender, non-distended, normoactive bowel sounds,  no masses , no hepatosplenomegaly  EXTREMITIES:  no cyanosis,clubbing or edema  SKIN:  No rash, erythema, or, ecchymoses, no jaundice  NEURO:  Alert, non-focal, no asterixis  PSYCH: Appropriate affect, oriented to place and time  RECTAL: Deferred      Vital Signs:  Vital Signs Last 24 Hrs  T(C): 36.6 (13 Sep 2023 10:04), Max: 36.6 (13 Sep 2023 10:04)  T(F): 97.8 (13 Sep 2023 10:04), Max: 97.8 (13 Sep 2023 10:04)  HR: 60 (13 Sep 2023 12:15) (60 - 122)  BP: 85/40 (13 Sep 2023 12:15) (63/42 - 122/92)  BP(mean): 51 (13 Sep 2023 12:15) (51 - 100)  RR: 25 (13 Sep 2023 10:07) (25 - 30)  SpO2: 100% (13 Sep 2023 12:15) (100% - 100%)    Parameters below as of 13 Sep 2023 12:15  Patient On (Oxygen Delivery Method): nasal cannula  O2 Flow (L/min): 2    Daily Height in cm: 172.72 (13 Sep 2023 09:57)    Daily     LABS: Labs personally reviewed by me:                        14.5   6.88  )-----------( 196      ( 13 Sep 2023 10:17 )             49.5     09-13    137  |  95<L>  |  29<H>  ----------------------------<  91  3.8   |  17<L>  |  8.05<H>    Ca    10.5      13 Sep 2023 10:17    TPro  9.4<H>  /  Alb  3.9  /  TBili  0.4  /  DBili  x   /  AST  26  /  ALT  13  /  AlkPhos  144<H>  09-13    LIVER FUNCTIONS - ( 13 Sep 2023 10:17 )  Alb: 3.9 g/dL / Pro: 9.4 g/dL / ALK PHOS: 144 U/L / ALT: 13 U/L / AST: 26 U/L / GGT: x           PT/INR - ( 13 Sep 2023 10:17 )   PT: 41.8 sec;   INR: 3.96 ratio         PTT - ( 13 Sep 2023 10:17 )  PTT:51.3 sec  Urinalysis Basic - ( 13 Sep 2023 10:17 )    Color: x / Appearance: x / SG: x / pH: x  Gluc: 91 mg/dL / Ketone: x  / Bili: x / Urobili: x   Blood: x / Protein: x / Nitrite: x   Leuk Esterase: x / RBC: x / WBC x   Sq Epi: x / Non Sq Epi: x / Bacteria: x          Imaging personally reviewed by me:

## 2023-09-13 NOTE — H&P ADULT - HISTORY OF PRESENT ILLNESS
78-year-old male history of ESRD on HD via left upper extremity AV fistula Tuesday Thursday Saturday, last dialysis yesterday, chronic severe orthostatic hypotension on midodrine 30 mg every 6 hours, COPD on 2 L home O2, wheelchair bound, chronic diastolic CHF, morbid obesity, pulmonary hypertension, PE and DVT on Coumadin, IVC filter, chronic sacral ulcer, chronic back pain, history of hematochezia in the past 2/2 hemorrhoids, chronic abd pain 2/2 IBS, chronic constipation,  brought in by EMS from home for multiple episodes of bright red blood per rectum over the past week, this am associated with lightheadedness and feeling near syncopal. otn hasnt been able to tolerate HD and sessions have been down to 2-2.5 liters out instead of 3 liters 2/2 Hypotension. compliant w meds, wife is the caretaker

## 2023-09-13 NOTE — ED PROVIDER NOTE - OBJECTIVE STATEMENT
78 Y M H/O ESRD on HD T/TH, Sat, chronic hypotension, on midodrin 30 mg q6 hr, 2 L NC, CHF, pHTN, PE/HTN, presenting with weakness, fatigue, persistent bleeding either rectal or from wound. + weakness, near syncope, persistent hypotension, unable to obtain dialysis due to soft BP. No other complaints aside from back pain

## 2023-09-13 NOTE — ED ADULT NURSE NOTE - NSFALLFACTORS_ED_ALL_ED
Impaired gait/Syncope

## 2023-09-13 NOTE — ED PROVIDER NOTE - ATTENDING CONTRIBUTION TO CARE
Dr. Pulido: I have personally performed a face to face bedside history and physical examination of this patient. I have discussed the history, examination, review of systems, assessment and plan of management with the fellow and student. I have reviewed the electronic medical record and amended it to reflect my history, review of systems, physical exam, assessment and plan.    Patient was critically ill with a high probability of imminent or life threatening deterioration.  I have performed direct patient care (not related to procedure), additional history taking, interpretation of diagnostic studies, documentation, consultation with other physicians, telephone consultation with the patient's family  I have personally and independently provided the amount of critical care time documented below excluding time spent on separate procedures.  32 mins      see mdm

## 2023-09-13 NOTE — ED PROVIDER NOTE - NS ED ROS FT
GENERAL: No fever or chills  EYES: No change in vision  HEENT: No trouble swallowing or speaking  CARDIAC: No chest pain  PULMONARY: No cough or SOB  GI: No abdominal pain, no nausea or no vomiting, no diarrhea or constipation  : No changes in urination  SKIN: + posterior sacral pain  NEURO: No headache, no numbness  MSK: No joint pain  Otherwise as HPI or negative.

## 2023-09-13 NOTE — ED PROVIDER NOTE - OTHER RECORDS SUMMARY FREE TEXT FOR MDM OBTAINED AND REVIEWED OLD RECORDS QUESTION
prior GOC conversations reviewed, prior hospitalization reviewed, pt found to have hematochezia on prior admission

## 2023-09-13 NOTE — CONSULT NOTE ADULT - TIME BILLING
agree with above  78 Y M H/O ESRD on HD T/TH, Sat, chronic hypotension, on midodrin 30 mg q6 hr, 2 L NC, CHF, pHTN, PE/HTN, presenting with weakness, fatigue, and rectal bleeding.      #Rectal Bleeding  -I/s/o supratherapeutic INR  -CT no e/o active bleeding  -GI consult  -Hold warfarin  -Trend INR    #orthostatic hypotension  -Chronic  -cont mido 30 mg every 6 hours   -Echo from 3/2023 with nl lv fxn, valve fxn     #ESRD  -HD per renal     #DVT hx  -Hold coumadin  -Consider switching to DOAC if no c/i    #COPD, PHTN  -stable

## 2023-09-13 NOTE — ED ADULT NURSE REASSESSMENT NOTE - NS ED NURSE REASSESS COMMENT FT1
Spoke to Luna Paulino regarding Pt need for bipap. GEORGE Paulino told about Pts low BP's. Lora states she is going to come assess the Pt further.

## 2023-09-13 NOTE — CONSULT NOTE ADULT - SUBJECTIVE AND OBJECTIVE BOX
[facility]   MD CHESTER DUEÑAS MD RUORU WONG, PA    TEL:  FROM 9 AM to 5 PM--OFFICE: [phone]    FROM 5 PM- 9 AM PLEASE CALL ANSWERING SERVICE AT [phone]    -- INITIAL RENAL CONSULT NOTE --- Date Of service 09-13-23 @ 13:28  --------------------------------------------------------------------------------  HPI:  : 78-year-old male history of ESRD on HD via left upper extremity AV fistula Tuesday Thursday Saturday, last dialysis yesterday, chronic severe orthostatic hypotension on midodrine 30 mg every 6 hours, COPD on 2 L home O2, CHF, pulmonary hypertension, PE and DVT on in the past on Coumadin, IVC filter, chronic sacral ulcer, history of hematochezia in the past, brought in by EMS from home for multiple episodes of bright red blood per rectum over the past week, lightheadedness, syncopal         PAST HISTORY  --------------------------------------------------------------------------------  PAST MEDICAL & SURGICAL HISTORY:  Hypertension      Glomerular disease  Segmental glomerular sclerosis      CHF (congestive heart failure)      COPD (chronic obstructive pulmonary disease)      Pulmonary hypertension      Sleep apnea      Pulmonary edema      Peripheral edema      Gout      History of abdominal surgery  polypectomy      H/O right heart catheterization        FAMILY HISTORY:  Family history of colon cancer (Father)    Family history of heart disease (Father)      PAST SOCIAL HISTORY:    ALLERGIES & MEDICATIONS  --------------------------------------------------------------------------------  Allergies    baclofen (Other)  latex (Rash)    Intolerances      Standing Inpatient Medications  dextrose 5% + sodium chloride 0.9%. 1000 milliLiter(s) IV Continuous <Continuous>  midodrine. 30 milliGRAM(s) Oral three times a day    PRN Inpatient Medications      REVIEW OF SYSTEMS  --------------------------------------------------------------------------------  Gen: No fevers/chills  Skin: No rashes  Head/Eyes/Ears: Normal hearing,  Normal vision   Respiratory: No dyspnea, cough  CV: No chest pain  GI: N+ rectal bleed  : No dysuria, hematuria  MSK: No  edema  Heme: No easy bruising or bleeding  Psych: No significant depression    All other systems were reviewed and are negative, except as noted.    VITALS/PHYSICAL EXAM  --------------------------------------------------------------------------------  T(C): 36.6 (09-13-23 @ 10:04), Max: 36.6 (09-13-23 @ 10:04)  HR: 60 (09-13-23 @ 12:15) (60 - 122)  BP: 85/40 (09-13-23 @ 12:15) (63/42 - 122/92)  RR: 25 (09-13-23 @ 10:07) (25 - 30)  SpO2: 100% (09-13-23 @ 12:15) (100% - 100%)  Wt(kg): --  Height (cm): 172.7 (09-13-23 @ 09:57)      Physical Exam:  	Gen: NAD  	HEENT: MMM  	Pulm: CTA B/L  	CV: S1S2  	Abd: Soft, +BS   	Ext: No LE edema B/L  	Neuro: Awake  	Skin: Warm and dry  	Vascular access: af          : no oley  LABS/STUDIES  --------------------------------------------------------------------------------              14.5   6.88  >-----------<  196      [09-13-23 @ 10:17]              49.5     137  |  95  |  29  ----------------------------<  91      [09-13-23 @ 10:17]  3.8   |  17  |  8.05        Ca     10.5     [09-13-23 @ 10:17]    TPro  9.4  /  Alb  3.9  /  TBili  0.4  /  DBili  x   /  AST  26  /  ALT  13  /  AlkPhos  144  [09-13-23 @ 10:17]    PT/INR: PT 41.8 , INR 3.96       [09-13-23 @ 10:17]  PTT: 51.3       [09-13-23 @ 10:17]      Creatinine Trend:  SCr 8.05 [09-13 @ 10:17]    Urinalysis - [09-13-23 @ 10:17]      Color  / Appearance  / SG  / pH       Gluc 91 / Ketone   / Bili  / Urobili        Blood  / Protein  / Leuk Est  / Nitrite       RBC  / WBC  / Hyaline  / Gran  / Sq Epi  / Non Sq Epi  / Bacteria       Iron 52, TIBC 188, %sat 28      [02-23-23 @ 16:58]  Ferritin 2429      [02-23-23 @ 16:58]  PTH -- (Ca --)      [01-20-23 @ 06:35]   630  TSH 0.92      [02-24-23 @ 13:07]    HBsAb 46.9      [01-16-23 @ 21:38]  HBsAb Reactive      [08-24-21 @ 09:47]  HBsAg Nonreact      [01-16-23 @ 21:38]  HBcAb Nonreact      [01-16-23 @ 21:38]  HCV 0.35, Nonreact      [01-16-23 @ 21:38]  HIV Nonreact      [02-24-23 @ 05:42]

## 2023-09-13 NOTE — ED ADULT NURSE NOTE - OBJECTIVE STATEMENT
79 y/o M PMHx Gout, COPD, HTN, and CHF. on warfarin for DVT BIBEMS from home. as per EMS wife stated pt was sitting on commode and when attempted to get up pt had a near syncope event witnessed by wife. EMS stated pt wife mentioned she noticed pt stool was bright red color. as per pt family he has been having blood in stool for 2 days that they have noticed. pt has fistula in left arm with palpable thrill, pt receives dialysis on Tuesdays, Thursdays and Saturdays. pt is A&Ox4 able to follow all commands, sensation present and equal in all 4 extremities,  Denies HA, dizziness, blurry vision. Respirations spontaneous and unlabored on 2L NC. Denies SOB, dyspnea, cough, CP, palpitations. EKG done. On CM, NSR. Denies  N/V/D/C. Abd distended at baseline with surgical scar.  Denies urinary symptoms. Denies fever, chills. No sick contacts. Skin intact, warm, dry, normal for race. Ambulates with walker or wheelchair at baseline.

## 2023-09-13 NOTE — H&P ADULT - NSHPPHYSICALEXAM_GEN_ALL_CORE
T(C): 36.6 (09-13-23 @ 15:27), Max: 36.6 (09-13-23 @ 10:04)  HR: 86 (09-13-23 @ 15:27) (60 - 122)  BP: 93/55 (09-13-23 @ 15:27) (63/42 - 122/92)  RR: 22 (09-13-23 @ 15:27) (22 - 30)  SpO2: 98% (09-13-23 @ 15:27) (98% - 100%)    PHYSICAL EXAM:  GENERAL: NAD, well-developed  HEAD:  Atraumatic, Normocephalic  EYES: EOMI, PERRLA, conjunctiva and sclera clear  NECK: Supple, No JVD  CHEST/LUNG: Clear to auscultation bilaterally; No wheeze  HEART: Regular rate and rhythm; No murmurs, rubs, or gallops  ABDOMEN: Soft, Nontender, Nondistended; Bowel sounds present  EXTREMITIES:  2+ Peripheral Pulses, No clubbing, cyanosis, or edema  PSYCH: AAOx3  NEUROLOGY: non-focal  SKIN: No rashes or lesions
No

## 2023-09-13 NOTE — CONSULT NOTE ADULT - SUBJECTIVE AND OBJECTIVE BOX
Pulmonary Consult   09-13-23 @ 16:07    Patient is a 78y old  Male who presents with a chief complaint of     HPI: 79 y/o M with PMH of ESRD on HD, orthostatic hypotension, COPD on home O2, SAADIA on CPAP, wheelchair bound, chronic diastolic CHF, morbid obesity, pulmonary hypertension, PE and DVT on Coumadin, IVC filter, chronic sacral ulcer, chronic back pain, history of hematochezia in the past 2/2 hemorrhoids, chronic abd pain 2/2 IBS, chronic constipation. Brought in by EMS from home for multiple episodes of bright red blood per rectum over the past week, this AM associated with lightheadedness and feeling near syncopal. Pulmonary called to consult for COPD, SAADIA.     ?FOLLOWING PRESENT  [ yes ] Hx of PE/DVT, [ yes ] Hx COPD, [ no ] Hx of Asthma, [ yes ] Hx of Hospitalization, [ yes ]  Hx of BiPAP/CPAP use, [ yes ] Hx of SAADIA    Allergies  baclofen (Other)  latex (Rash)    PAST MEDICAL & SURGICAL HISTORY:  Hypertension  Glomerular disease  Segmental glomerular sclerosis  CHF (congestive heart failure)  COPD (chronic obstructive pulmonary disease)  Pulmonary hypertension  Sleep apnea  Pulmonary edema  Peripheral edema  Gout  History of abdominal surgery - polypectomy  H/O right heart catheterization    FAMILY HISTORY:  Family history of colon cancer (Father)    Family history of heart disease (Father)    Social History: [  ] TOBACCO                  [  ] ETOH                                 [  ] IVDA/DRUGS    REVIEW OF SYSTEMS    General:	    Skin/Breast:  	  Ophthalmologic:  	  ENMT:	    Respiratory and Thorax:  	  Cardiovascular:	    Gastrointestinal:	    Genitourinary:	    Musculoskeletal:	    Neurological:	    Psychiatric:	    Hematology/Lymphatics:	    Endocrine:	    Allergic/Immunologic:	    MEDICATIONS  (STANDING):  albuterol/ipratropium for Nebulization 3 milliLiter(s) Nebulizer every 6 hours  allopurinol 100 milliGRAM(s) Oral daily  budesonide 160 MICROgram(s)/formoterol 4.5 MICROgram(s) Inhaler 2 Puff(s) Inhalation two times a day  cinacalcet 90 milliGRAM(s) Oral <User Schedule>  dextrose 5% + sodium chloride 0.9%. 1000 milliLiter(s) (125 mL/Hr) IV Continuous <Continuous>  dicyclomine 20 milliGRAM(s) Oral three times a day before meals  levETIRAcetam 500 milliGRAM(s) Oral two times a day  midodrine. 30 milliGRAM(s) Oral three times a day  pantoprazole    Tablet 40 milliGRAM(s) Oral before breakfast  polyethylene glycol 3350 17 Gram(s) Oral two times a day  pregabalin 100 milliGRAM(s) Oral two times a day  sevelamer carbonate 2400 milliGRAM(s) Oral three times a day  sevelamer carbonate 800 milliGRAM(s) Oral <User Schedule>  tiotropium 2.5 MICROgram(s) Inhaler 2 Puff(s) Inhalation daily    MEDICATIONS  (PRN):  albuterol    90 MICROgram(s) HFA Inhaler 2 Puff(s) Inhalation every 6 hours PRN Bronchospasm    Vital Signs Last 24 Hrs  T(C): 36.6 (13 Sep 2023 15:27), Max: 36.6 (13 Sep 2023 10:04)  T(F): 97.9 (13 Sep 2023 15:27), Max: 97.9 (13 Sep 2023 15:27)  HR: 86 (13 Sep 2023 15:27) (60 - 122)  BP: 93/55 (13 Sep 2023 15:27) (63/42 - 122/92)  BP(mean): 68 (13 Sep 2023 15:27) (51 - 100)  RR: 22 (13 Sep 2023 15:27) (22 - 30)  SpO2: 98% (13 Sep 2023 15:27) (98% - 100%)    Parameters below as of 13 Sep 2023 15:27  Patient On (Oxygen Delivery Method): nasal cannula  O2 Flow (L/min): 2  Orthostatic VS    Physical Exam:   GENERAL: NAD, well-groomed, well-developed  HEENT: JORDIN/   Atraumatic, Normocephalic  ENMT: No tonsillar erythema, exudates, or enlargement; Moist mucous membranes, Good dentition, No lesions  NECK: Supple, No JVD, Normal thyroid  CHEST/LUNG: Clear to auscultation bilaterally; No rales, rhonchi, wheezing, or rubs  CVS: Regular rate and rhythm; No murmurs, rubs, or gallops  GI: : Soft, Nontender, Nondistended; Bowel sounds present  NERVOUS SYSTEM:  Alert & Oriented X3, Good concentration; Motor Strength 5/5 B/L upper and lower extremities; DTRs 2+ intact and symmetric  EXTREMITIES:  2+ Peripheral Pulses, No clubbing, cyanosis, or edema  LYMPH: No lymphadenopathy noted  SKIN: No rashes or lesions  ENDOCRINOLOGY: No Thyromegaly  PSYCH: Appropriate    Labs:  Venous<60<4>>30<<7.275>>Venous<<3><<4><<5<<309>>, Venous<60<4>>31<<7.255>>Venous<<3><<4><<5<<319>>COVID-19 PCR: NotDetec (16 Jul 2023 15:23)                            14.5   6.88  )-----------( 196      ( 13 Sep 2023 10:17 )             49.5     09-13    137  |  95<L>  |  29<H>  ----------------------------<  91  3.8   |  17<L>  |  8.05<H>    Ca    10.5      13 Sep 2023 10:17    TPro  9.4<H>  /  Alb  3.9  /  TBili  0.4  /  DBili  x   /  AST  26  /  ALT  13  /  AlkPhos  144<H>  09-13    CAPILLARY BLOOD GLUCOSE      POCT Blood Glucose.: 71 mg/dL (13 Sep 2023 10:21)  POCT Blood Glucose.: 65 mg/dL (13 Sep 2023 10:20)    LIVER FUNCTIONS - ( 13 Sep 2023 10:17 )  Alb: 3.9 g/dL / Pro: 9.4 g/dL / ALK PHOS: 144 U/L / ALT: 13 U/L / AST: 26 U/L / GGT: x           PT/INR - ( 13 Sep 2023 10:17 )   PT: 41.8 sec;   INR: 3.96 ratio         PTT - ( 13 Sep 2023 10:17 )  PTT:51.3 sec  Urinalysis Basic - ( 13 Sep 2023 10:17 )    Color: x / Appearance: x / SG: x / pH: x  Gluc: 91 mg/dL / Ketone: x  / Bili: x / Urobili: x   Blood: x / Protein: x / Nitrite: x   Leuk Esterase: x / RBC: x / WBC x   Sq Epi: x / Non Sq Epi: x / Bacteria: x          Studies  Chest X-RAY< from: Xray Chest 1 View- PORTABLE-Urgent (Xray Chest 1 View- PORTABLE-Urgent .) (09.13.23 @ 12:07) >      INTERPRETATION:  TECHNIQUE: A single AP view of the chest was obtained.   Ordered time:   9/13/2023 12:07 PM    COMPARISON: 7/15/2023    CLINICAL INFORMATION: Abdominal pain. GI bleed on Coumadin.    FINDINGS:    The heart is magnified by technique.  There is linear atelectasis noted at both lung bases.  There are no pleural effusions.  There is no pneumothorax.    IMPRESSION:    Bibasilar linear atelectasis.    --- End of Report ---      < end of copied text >    CT SCAN Chest < from: CT Abdomen and Pelvis w/ IV Cont (09.13.23 @ 11:18) >  FINDINGS:  Study is limited due to streak artifact from overlying patient's upper   extremities.    LOWER CHEST: Left lower lobe calcified granuloma. Bilateral lower lobe   linear atelectasis. Coronary artery calcifications. Partially visualized   small calcified mediastinal nodes.    LIVER: Punctate calcified right hepatic lobe granuloma.  BILE DUCTS: Normal caliber.  GALLBLADDER: Within normal limits.  SPLEEN: Scattered calcified granulomas.  PANCREAS: Within normal limits.  ADRENALS: Similar bilateral adrenal gland thickening.  KIDNEYS/URETERS: Atrophic bilateral kidneys. Bilateral renal   hypodensities, indeterminate on this exam due to extensive streak   artifact. No hydronephrosis.    BLADDER: Underdistended  REPRODUCTIVE ORGANS: Prostate gland calcifications.    BOWEL: No bowel obstruction. Status post right hemicolectomy. No evidence   of intraluminal contrast extravasation.  PERITONEUM: No ascites.  VESSELS: IVC filter. Atherosclerotic changes.  RETROPERITONEUM/LYMPH NODES: No lymphadenopathy.  ABDOMINAL WALL: Rectus diastases with multiple small ventral hernias. One   of these hernias partially containing a loop of nonobstructed large   bowel, and the others contain fat. Small fat-containing right inguinal   hernia.  BONES: Degenerative changes.    IMPRESSION:  Limited study due to streak artifact.    No CT evidence of active GI bleed.    --- End of Report ---    < end of copied text >                     Pulmonary Consult   09-13-23 @ 16:07    Patient is a 78y old  Male who presents with a chief complaint of     HPI: 79 y/o M with PMH of ESRD on HD, orthostatic hypotension, COPD on home O2, SAADIA on CPAP, wheelchair bound, chronic diastolic CHF, morbid obesity, pulmonary hypertension, PE and DVT on Coumadin, IVC filter, chronic sacral ulcer, chronic back pain, history of hematochezia in the past 2/2 hemorrhoids, chronic abd pain 2/2 IBS, chronic constipation. Brought in by EMS from home for multiple episodes of bright red blood per rectum over the past week, this AM associated with lightheadedness and feeling near syncopal. Pulmonary called to consult for COPD, SAADIA. Former smoker. Outpatient pulm Dr. Freedman in [place]. Has home O2 (2LNC) and NIV for nightly use, though wife reports not always compliant. Hx of multiple PE/DVTs in the past, has IVC filter (? if unable to tolerate AC at one point), currently on AC with Coumadin. Home med Trelegy Ellipta. Denies CP, SOB, fever, chills. O2 sats 98% on 2LNC.     ?FOLLOWING PRESENT  [ yes ] Hx of PE/DVT, [ yes ] Hx COPD, [ no ] Hx of Asthma, [ yes ] Hx of Hospitalization, [ yes ]  Hx of BiPAP/CPAP use, [ yes ] Hx of SAADIA    Allergies  baclofen (Other)  latex (Rash)    PAST MEDICAL & SURGICAL HISTORY:  Hypertension  Glomerular disease  Segmental glomerular sclerosis  CHF (congestive heart failure)  COPD (chronic obstructive pulmonary disease)  Pulmonary hypertension  Sleep apnea  Pulmonary edema  Peripheral edema  Gout  History of abdominal surgery - polypectomy  H/O right heart catheterization    FAMILY HISTORY:  Family history of colon cancer (Father)    Family history of heart disease (Father)    Social History: [ former ] TOBACCO                  [ no ] ETOH                                 [ no ] IVDA/DRUGS    REVIEW OF SYSTEMS    General:	 as above    Skin/Breast: x  	  Ophthalmologic: x  	  ENMT:	x    Respiratory and Thorax: as above  	  Cardiovascular:	x    Gastrointestinal:	as above    Genitourinary:	 x    Musculoskeletal:	  x  Neurological:	    Psychiatric:	    Hematology/Lymphatics:	    Endocrine:	    Allergic/Immunologic:	    MEDICATIONS  (STANDING):  albuterol/ipratropium for Nebulization 3 milliLiter(s) Nebulizer every 6 hours  allopurinol 100 milliGRAM(s) Oral daily  budesonide 160 MICROgram(s)/formoterol 4.5 MICROgram(s) Inhaler 2 Puff(s) Inhalation two times a day  cinacalcet 90 milliGRAM(s) Oral <User Schedule>  dextrose 5% + sodium chloride 0.9%. 1000 milliLiter(s) (125 mL/Hr) IV Continuous <Continuous>  dicyclomine 20 milliGRAM(s) Oral three times a day before meals  levETIRAcetam 500 milliGRAM(s) Oral two times a day  midodrine. 30 milliGRAM(s) Oral three times a day  pantoprazole    Tablet 40 milliGRAM(s) Oral before breakfast  polyethylene glycol 3350 17 Gram(s) Oral two times a day  pregabalin 100 milliGRAM(s) Oral two times a day  sevelamer carbonate 2400 milliGRAM(s) Oral three times a day  sevelamer carbonate 800 milliGRAM(s) Oral <User Schedule>  tiotropium 2.5 MICROgram(s) Inhaler 2 Puff(s) Inhalation daily    MEDICATIONS  (PRN):  albuterol    90 MICROgram(s) HFA Inhaler 2 Puff(s) Inhalation every 6 hours PRN Bronchospasm    Vital Signs Last 24 Hrs  T(C): 36.6 (13 Sep 2023 15:27), Max: 36.6 (13 Sep 2023 10:04)  T(F): 97.9 (13 Sep 2023 15:27), Max: 97.9 (13 Sep 2023 15:27)  HR: 86 (13 Sep 2023 15:27) (60 - 122)  BP: 93/55 (13 Sep 2023 15:27) (63/42 - 122/92)  BP(mean): 68 (13 Sep 2023 15:27) (51 - 100)  RR: 22 (13 Sep 2023 15:27) (22 - 30)  SpO2: 98% (13 Sep 2023 15:27) (98% - 100%)    Parameters below as of 13 Sep 2023 15:27  Patient On (Oxygen Delivery Method): nasal cannula  O2 Flow (L/min): 2  Orthostatic VS    Physical Exam:   GENERAL: NAD, well-groomed, well-developed  HEENT: JORDIN/   Atraumatic, Normocephalic  ENMT: No tonsillar erythema, exudates, or enlargement; Moist mucous membranes, Good dentition, No lesions  NECK: Supple, No JVD, Normal thyroid  CHEST/LUNG: Clear to auscultation bilaterally; No rales, rhonchi, wheezing, or rubs  CVS: Regular rate and rhythm; No murmurs, rubs, or gallops  GI: : Soft, Nontender, Nondistended; Bowel sounds present  NERVOUS SYSTEM:  Alert & Oriented X3, Good concentration; Motor Strength 5/5 B/L upper and lower extremities; DTRs 2+ intact and symmetric  EXTREMITIES:  2+ Peripheral Pulses, No clubbing, cyanosis, or edema  LYMPH: No lymphadenopathy noted  SKIN: No rashes or lesions  ENDOCRINOLOGY: No Thyromegaly  PSYCH: Appropriate    Labs:  Venous<60<4>>30<<7.275>>Venous<<3><<4><<5<<309>>, Venous<60<4>>31<<7.255>>Venous<<3><<4><<5<<319>>COVID-19 PCR: NotDetec (16 Jul 2023 15:23)                            14.5   6.88  )-----------( 196      ( 13 Sep 2023 10:17 )             49.5     09-13    137  |  95<L>  |  29<H>  ----------------------------<  91  3.8   |  17<L>  |  8.05<H>    Ca    10.5      13 Sep 2023 10:17    TPro  9.4<H>  /  Alb  3.9  /  TBili  0.4  /  DBili  x   /  AST  26  /  ALT  13  /  AlkPhos  144<H>  09-13    CAPILLARY BLOOD GLUCOSE      POCT Blood Glucose.: 71 mg/dL (13 Sep 2023 10:21)  POCT Blood Glucose.: 65 mg/dL (13 Sep 2023 10:20)    LIVER FUNCTIONS - ( 13 Sep 2023 10:17 )  Alb: 3.9 g/dL / Pro: 9.4 g/dL / ALK PHOS: 144 U/L / ALT: 13 U/L / AST: 26 U/L / GGT: x           PT/INR - ( 13 Sep 2023 10:17 )   PT: 41.8 sec;   INR: 3.96 ratio         PTT - ( 13 Sep 2023 10:17 )  PTT:51.3 sec  Urinalysis Basic - ( 13 Sep 2023 10:17 )    Color: x / Appearance: x / SG: x / pH: x  Gluc: 91 mg/dL / Ketone: x  / Bili: x / Urobili: x   Blood: x / Protein: x / Nitrite: x   Leuk Esterase: x / RBC: x / WBC x   Sq Epi: x / Non Sq Epi: x / Bacteria: x          Studies  Chest X-RAY< from: Xray Chest 1 View- PORTABLE-Urgent (Xray Chest 1 View- PORTABLE-Urgent .) (09.13.23 @ 12:07) >      INTERPRETATION:  TECHNIQUE: A single AP view of the chest was obtained.   Ordered time:   9/13/2023 12:07 PM    COMPARISON: 7/15/2023    CLINICAL INFORMATION: Abdominal pain. GI bleed on Coumadin.    FINDINGS:    The heart is magnified by technique.  There is linear atelectasis noted at both lung bases.  There are no pleural effusions.  There is no pneumothorax.    IMPRESSION:    Bibasilar linear atelectasis.    --- End of Report ---      < end of copied text >    CT SCAN Chest < from: CT Abdomen and Pelvis w/ IV Cont (09.13.23 @ 11:18) >  FINDINGS:  Study is limited due to streak artifact from overlying patient's upper   extremities.    LOWER CHEST: Left lower lobe calcified granuloma. Bilateral lower lobe   linear atelectasis. Coronary artery calcifications. Partially visualized   small calcified mediastinal nodes.    LIVER: Punctate calcified right hepatic lobe granuloma.  BILE DUCTS: Normal caliber.  GALLBLADDER: Within normal limits.  SPLEEN: Scattered calcified granulomas.  PANCREAS: Within normal limits.  ADRENALS: Similar bilateral adrenal gland thickening.  KIDNEYS/URETERS: Atrophic bilateral kidneys. Bilateral renal   hypodensities, indeterminate on this exam due to extensive streak   artifact. No hydronephrosis.    BLADDER: Underdistended  REPRODUCTIVE ORGANS: Prostate gland calcifications.    BOWEL: No bowel obstruction. Status post right hemicolectomy. No evidence   of intraluminal contrast extravasation.  PERITONEUM: No ascites.  VESSELS: IVC filter. Atherosclerotic changes.  RETROPERITONEUM/LYMPH NODES: No lymphadenopathy.  ABDOMINAL WALL: Rectus diastases with multiple small ventral hernias. One   of these hernias partially containing a loop of nonobstructed large   bowel, and the others contain fat. Small fat-containing right inguinal   hernia.  BONES: Degenerative changes.    IMPRESSION:  Limited study due to streak artifact.    No CT evidence of active GI bleed.    --- End of Report ---    < end of copied text >

## 2023-09-13 NOTE — ED PROVIDER NOTE - CARE PLAN
1 Principal Discharge DX:	GIB (gastrointestinal bleeding)   Principal Discharge DX:	GIB (gastrointestinal bleeding)  Secondary Diagnosis:	Hypotension

## 2023-09-13 NOTE — CONSULT NOTE ADULT - NS ATTEND AMEND GEN_ALL_CORE FT
pt seen and examined:  has copd and is on avaps at home and is on ac: HAS HAD TWO PE AND ONE TIME DVT : HAS ivcf TOO: Now on admittted with gi bleed and is on coumadin : hypercarbic acidotic on vbg : start bipap tonight and all BD and check his ABG in am:  gi bleed per gi

## 2023-09-13 NOTE — CONSULT NOTE ADULT - ASSESSMENT
: 78-year-old male history of ESRD on HD via left upper extremity AV fistula Tuesday Thursday Saturday, last dialysis yesterday, chronic severe orthostatic hypotension on midodrine 30 mg every 6 hours, COPD on 2 L home O2, CHF, pulmonary hypertension, PE and DVT on in the past on Coumadin, IVC filter, chronic sacral ulcer, history of hematochezia in the past, brought in by EMS from home for multiple episodes of bright red blood per rectum over the past week, lightheadedness, syncopal     ESRD on HD ( TTS) via AVF  Outpt unit [facility]  Consent obtained in chart  Plan for HD on Thursday  Monitor BMP     HTN  chronic severe orthostatic hypotension on midodrine 30mg q6h  MOnitor closely     CKD-MBD  Check PTH  Tertiary  hyperparathyroidism.   On Sensipar of 60 mg po daily  Monitor Phosphorus and calcium daily.

## 2023-09-13 NOTE — CONSULT NOTE ADULT - PROBLEM SELECTOR RECOMMENDATION 9
Pt with hx of chronic respiratory failure 2nd to SAADIA, COPD  -Has home O2 (2LNC) and NIV (through [employer], wife unsure of settings  -CXR with basilar atelectasis  -VBG noted  -Suggest start Bipap 12/5/30%. Check ABG in AM  -Keep sats >90% with O2 PRN (baseline 2LNC continuous)  -Continue Symbicort/Spiriva (home med Trelegy Ellipta)  -Can make Duoneb q6h PRN.

## 2023-09-13 NOTE — ED ADULT NURSE NOTE - NSFALLRISKINTERV_ED_ALL_ED
Assistance OOB with selected safe patient handling equipment if applicable/Assistance with ambulation/Communicate fall risk and risk factors to all staff, patient, and family/Monitor gait and stability/Orthostatic vital signs/Provide visual cue: yellow wristband, yellow gown, etc/Reinforce activity limits and safety measures with patient and family/Call bell, personal items and telephone in reach/Instruct patient to call for assistance before getting out of bed/chair/stretcher/Non-slip footwear applied when patient is off stretcher/[place] to call system/Physically safe environment - no spills, clutter or unnecessary equipment/Purposeful Proactive Rounding/Room/bathroom lighting operational, light cord in reach

## 2023-09-13 NOTE — CONSULT NOTE ADULT - ASSESSMENT
Transthoracic Echocardiogram (03.13.23 @ 09:29) >  CONCLUSIONS: nl LV sys fx   Transthoracic Echocardiogram (01.20.23 @ 17:22) >  CONCLUSIONS: NL LV sys fx     A/p  78 Y M H/O ESRD on HD T/TH, Sat, chronic hypotension, on midodrin 30 mg q6 hr, 2 L NC, CHF, pHTN, PE/HTN, presenting with weakness, fatigue, persistent bleeding either rectal or from wound. + weakness, near syncope, persistent hypotension, unable to obtain dialysis due to soft BP.      #Rectal Bleeding  -I/s/o supratherapeutic INR  -CT no e/o active bleeding  -GI consult  -Hold warfarin  -Trend INR    #orthostatic hypotension  -Chronic  -cont mido 30 mg every 6 hours   -Echo from 3/2023 with nl lv fxn, valve fxn     #ESRD  -HD per renal     #DVT hx  -Hold coumadin  -Consider switching to DOAC if no c/i    #COPD, PHTN  -stable             Transthoracic Echocardiogram (03.13.23 @ 09:29) >  CONCLUSIONS: nl LV sys fx   Transthoracic Echocardiogram (01.20.23 @ 17:22) >  CONCLUSIONS: NL LV sys fx     A/p  78 Y M H/O ESRD on HD T/TH, Sat, chronic hypotension, on midodrin 30 mg q6 hr, 2 L NC, CHF, pHTN, PE/HTN, presenting with weakness, fatigue, and rectal bleeding.      #Rectal Bleeding  -I/s/o supratherapeutic INR  -CT no e/o active bleeding  -GI consult  -Hold warfarin  -Trend INR    #orthostatic hypotension  -Chronic  -cont mido 30 mg every 6 hours   -Echo from 3/2023 with nl lv fxn, valve fxn     #ESRD  -HD per renal     #DVT hx  -Hold coumadin  -Consider switching to DOAC if no c/i    #COPD, PHTN  -stable

## 2023-09-13 NOTE — ED PROVIDER NOTE - CLINICAL SUMMARY MEDICAL DECISION MAKING FREE TEXT BOX
Dr. Pulido: 78-year-old male history of ESRD on HD via left upper extremity AV fistula Tuesday Thursday Saturday, last dialysis yesterday, chronic severe orthostatic hypotension on midodrine 30 mg every 6 hours, COPD on 2 L home O2, CHF, pulmonary hypertension, PE and DVT on in the past on Coumadin, IVC filter, chronic sacral ulcer, history of hematochezia in the past, brought in by EMS from home for multiple episodes of bright red blood per rectum over the past week, lightheadedness, syncopal episodes.  No head trauma.  As per EMS patient was complaining of abdominal pain however currently denies.  Denies chest pain, shortness of breath, nausea, vomiting, cough patient, diarrhea, fevers, chills.    On exam patient is chronically ill-appearing, moaning due to pain from sacral wound, regular rate and rhythm, lungs clear to auscultation bilaterally, abdomen soft with well-healed laparotomy scar, nontender,  exam chaperoned Dr. Ortiz, healing sacral wound with no active bleeding, bright red blood per rectum noted, no melena, no active bleeding.  2+ pitting edema bilateral lower extremities.  Left AV fistula with thrill.    Initially patient hypotensive to the 60s in triage, emergent blood ordered, however in the room without intervention patient's blood pressure was up to the 100s, blood return.  Last midodrine dose was yesterday.  Repeat blood pressure in the 70s, will give home dose of midodrine, check H&H, consider emergent fluids if blood pressure does not improve after fluid bolus and midodrine.  We will get CT abdomen pelvis, reassess, likely admission given GI bleed on Coumadin. Dr. Pulido: 78-year-old male history of ESRD on HD via left upper extremity AV fistula Tuesday Thursday Saturday, last dialysis yesterday, chronic severe orthostatic hypotension on midodrine 30 mg every 6 hours, COPD on 2 L home O2, CHF, pulmonary hypertension, PE and DVT on in the past on Coumadin, IVC filter, chronic sacral ulcer, history of hematochezia in the past, brought in by EMS from home for multiple episodes of bright red blood per rectum over the past week, lightheadedness, syncopal episodes.  No head trauma.  As per EMS patient was complaining of abdominal pain however currently denies.  Denies chest pain, shortness of breath, nausea, vomiting, cough patient, diarrhea, fevers, chills.    On exam patient is chronically ill-appearing, moaning due to pain from sacral wound, regular rate and rhythm, lungs clear to auscultation bilaterally, abdomen soft with well-healed laparotomy scar, nontender,  exam chaperoned Dr. Ortiz, +large external hemorrhoid, healing sacral wound with no active bleeding, bright red blood per rectum noted, no melena, no active bleeding.  2+ pitting edema bilateral lower extremities.  Left AV fistula with thrill.    Initially patient hypotensive to the 60s in triage, emergent blood ordered, however in the room without intervention patient's blood pressure was up to the 100s, blood return.  Last midodrine dose was yesterday.  Repeat blood pressure in the 70s, will give home dose of midodrine, check H&H, consider emergent fluids if blood pressure does not improve after fluid bolus and midodrine.  We will get CT abdomen pelvis, reassess, likely admission given GI bleed on Coumadin.    Spoke to wife at bedside, pt's normal BP is systolic 50s-60s, will hold PRBC for now. Dr. Pulido: 78-year-old male history of ESRD on HD via left upper extremity AV fistula Tuesday Thursday Saturday, last dialysis yesterday, chronic severe orthostatic hypotension on midodrine 30 mg every 6 hours, COPD on 2 L home O2, CHF, pulmonary hypertension, PE and DVT on in the past on Coumadin, IVC filter, chronic sacral ulcer, history of hematochezia in the past, brought in by EMS from home for multiple episodes of bright red blood per rectum over the past week, lightheadedness, syncopal episodes.  No head trauma.  As per EMS patient was complaining of abdominal pain however currently denies.  Denies chest pain, shortness of breath, nausea, vomiting, cough patient, diarrhea, fevers, chills.    On exam patient is chronically ill-appearing, moaning due to pain from sacral wound, regular rate and rhythm, lungs clear to auscultation bilaterally, abdomen soft with well-healed laparotomy scar, nontender,  exam chaperoned Dr. Ortiz, +large external hemorrhoid, healing sacral wound with no active bleeding, bright red blood per rectum noted, no melena, no active bleeding.  2+ pitting edema bilateral lower extremities.  Left AV fistula with thrill.    Initially patient hypotensive to the 60s in triage, emergent blood ordered, however in the room without intervention patient's blood pressure was up to the 100s, blood return.  Last midodrine dose was yesterday.  Repeat blood pressure in the 70s, will give home dose of midodrine, check H&H, consider emergent fluids if blood pressure does not improve after fluid bolus and midodrine.  We will get CT abdomen pelvis, reassess, likely admission given GI bleed on Coumadin.    Spoke to wife at bedside, pt's normal BP is systolic 50s-60s, will hold PRBC for now. As per wife pt was hypoglycemic during previous admission but not on DM meds, no h/o DM. As pt currently NPO for imaging, will give D5. Mentating well at this time Dr. Pulido: 78-year-old male history of ESRD on HD via left upper extremity AV fistula Tuesday Thursday Saturday, last dialysis yesterday, chronic severe orthostatic hypotension on midodrine 30 mg every 6 hours, COPD on 2 L home O2, CHF, pulmonary hypertension, PE and DVT on in the past on Coumadin, IVC filter, chronic sacral ulcer, history of hematochezia in the past, brought in by EMS from home for multiple episodes of bright red blood per rectum over the past week, lightheadedness, syncopal episodes.  No head trauma.  As per EMS patient was complaining of abdominal pain however currently denies.  Denies chest pain, shortness of breath, nausea, vomiting, cough patient, diarrhea, fevers, chills.    On exam patient is chronically ill-appearing, moaning due to pain from sacral wound, regular rate and rhythm, lungs clear to auscultation bilaterally, abdomen soft with well-healed laparotomy scar, nontender,  exam chaperoned Dr. Ortiz, +large external hemorrhoid, healing sacral wound with no active bleeding, bright red blood per rectum noted, no melena, no active bleeding.  2+ pitting edema bilateral lower extremities.  Left AV fistula with thrill.    Initially patient hypotensive to the 60s in triage, emergent blood ordered, however in the room without intervention patient's blood pressure was up to the 100s, blood return.  Last midodrine dose was yesterday.  Repeat blood pressure in the 70s, will give home dose of midodrine, check H&H, consider emergent fluids if blood pressure does not improve after fluid bolus and midodrine.  We will get CT abdomen pelvis, reassess, likely admission given GI bleed on Coumadin.    Spoke to wife at bedside, pt's normal BP is systolic 50s-60s, will hold PRBC for now. As per wife pt was hypoglycemic during previous admission but not on DM meds, no h/o DM. As pt currently NPO for imaging, will give D5. Mentating well at this time.    Pt afebrile, no leukocytosis, lactic acidosis unlikely due to infection, likely due to ESRD, will repeat.

## 2023-09-13 NOTE — CONSULT NOTE ADULT - SUBJECTIVE AND OBJECTIVE BOX
CARDIOLOGY CONSULT - Dr. Livingston         HPI: 78 Y M H/O ESRD on HD T/TH, Sat, chronic hypotension, on midodrin 30 mg q6 hr, 2 L NC, CHF, pHTN, PE/HTN, presenting with weakness, fatigue, persistent bleeding either rectal or from wound. + weakness, near syncope, persistent hypotension, unable to obtain dialysis due to soft BP. No other complaints aside from back pain      PAST MEDICAL & SURGICAL HISTORY:  Hypertension      Glomerular disease  Segmental glomerular sclerosis      CHF (congestive heart failure)      COPD (chronic obstructive pulmonary disease)      Pulmonary hypertension      Sleep apnea      Pulmonary edema      Peripheral edema      Gout      History of abdominal surgery  polypectomy      H/O right heart catheterization        PREVIOUS DIAGNOSTIC TESTING:    [x] Echocardiogram:  < from: Transthoracic Echocardiogram (03.13.23 @ 09:29) >  CONCLUSIONS:  1. Aortic valve not well visualized; appears calcified.  2. Endocardium not well visualized; grossly normal left  ventricular systolic function.  3. Normal right ventricular size and function.    < end of copied text >    [ ]  Catheterization:  [ ] Stress Test:  	    MEDICATIONS:  Home Medications:  Albuterol (Eqv-ProAir HFA) 90 mcg/inh inhalation aerosol: 2 puff(s) inhaled every 6 hours as needed (19 Jul 2023 14:40)  albuterol nebulizer solution: 1 dose(s) every 6 hours as needed / dose unknown (19 Jul 2023 14:40)  allopurinol 100 mg oral tablet: 1 tab(s) orally once a day (19 Jul 2023 14:40)  bisacodyl 5 mg oral delayed release tablet: 1 tab(s) orally once a day (at bedtime) (19 Jul 2023 14:40)  cinacalcet 90 mg oral tablet: 1 tab(s) orally 3 times a week (on days of dialysis - Tues, Thurs &amp; Sat) (19 Jul 2023 14:40)  dicyclomine 20 mg oral tablet: 1 tab(s) orally every 6 hours as needed (19 Jul 2023 14:40)  levETIRAcetam 500 mg oral tablet: 1 tab(s) orally 2 times a day (19 Jul 2023 14:40)  lidocaine 5% topical film: Apply 1 patch to knee and 1 patch to lower back; remove after 12 hours (19 Jul 2023 14:40)  melatonin 5 mg oral tablet: 1 tab(s) orally once a day (at bedtime) as needed (19 Jul 2023 14:40)  midodrine 10 mg oral tablet: 1 tab(s) orally 4 times a day (3AM, 9AM, 3PM, 9PM) (19 Jul 2023 14:40)  MiraLax oral powder for reconstitution: 17 gram(s) orally every other day (19 Jul 2023 14:40)  pantoprazole 40 mg oral delayed release tablet: 1 tab(s) orally once a day (19 Jul 2023 14:40)  pregabalin 100 mg oral capsule: 1 cap(s) orally 2 times a day (19 Jul 2023 14:40)  Senna 8.6 mg oral tablet: 2 tab(s) orally once a day (at bedtime) (19 Jul 2023 14:40)  sevelamer carbonate 800 mg oral tablet: 3 tab(s) orally 3 times a day &amp; 1 additional tab(s) with snacks (19 Jul 2023 14:40)  Trelegy Ellipta 100 mcg-62.5 mcg-25 mcg/inh inhalation powder: 1 puff(s) inhaled once a day (19 Jul 2023 14:40)  warfarin: 9mg orally once daily alternating with 10mg as per INR (19 Jul 2023 14:40)      MEDICATIONS  (STANDING):  dextrose 5% + sodium chloride 0.9%. 1000 milliLiter(s) (125 mL/Hr) IV Continuous <Continuous>  midodrine. 30 milliGRAM(s) Oral three times a day      FAMILY HISTORY:  Family history of colon cancer (Father)    Family history of heart disease (Father)        SOCIAL HISTORY:    [x] Non-smoker  [ ] Smoker  [ ] Alcohol    Allergies    baclofen (Other)  latex (Rash)    Intolerances    	    REVIEW OF SYSTEMS:  CONSTITUTIONAL: No fever, weight loss, or fatigue  EYES: No eye pain, visual disturbances, or discharge  ENMT:  No difficulty hearing, tinnitus, vertigo; No sinus or throat pain  NECK: No pain or stiffness  RESPIRATORY: No cough, wheezing, chills or hemoptysis; No Shortness of Breath  CARDIOVASCULAR: No chest pain, palpitations, passing out, dizziness, or leg swelling  GASTROINTESTINAL: No abdominal or epigastric pain. No nausea, vomiting, or hematemesis; No diarrhea or constipation. +rectal bleeding  GENITOURINARY: No dysuria, frequency, hematuria, or incontinence  NEUROLOGICAL: No headaches, memory loss, loss of strength, numbness, or tremors  SKIN: No itching, burning, rashes, or lesions   	    [x] All others negative	  [ ] Unable to obtain    PHYSICAL EXAM:  T(C): 36.6 (09-13-23 @ 10:04), Max: 36.6 (09-13-23 @ 10:04)  HR: 60 (09-13-23 @ 12:15) (60 - 122)  BP: 85/40 (09-13-23 @ 12:15) (63/42 - 122/92)  RR: 25 (09-13-23 @ 10:07) (25 - 30)  SpO2: 100% (09-13-23 @ 12:15) (100% - 100%)  Wt(kg): --  I&O's Summary      Appearance: Normal	  Psychiatry: A & O x 3, Mood & affect appropriate  HEENT:   Normal oral mucosa, PERRL, EOMI	  Lymphatic: No lymphadenopathy  Cardiovascular: Normal S1 S2,RRR, No JVD, No murmurs  Respiratory: Lungs clear to auscultation	  Gastrointestinal:  Soft, Non-tender, + BS	  Skin: No rashes, No ecchymoses, No cyanosis	  Neurologic: Non-focal  Extremities: Normal range of motion, No clubbing, cyanosis or edema  Vascular: Peripheral pulses palpable 2+ bilaterally    TELEMETRY: 	    ECG:  	  RADIOLOGY:  < from: CT Abdomen and Pelvis w/ IV Cont (09.13.23 @ 11:18) >  IMPRESSION:  Limited study due to streak artifact.    No CT evidence of active GI bleed.    --- End of Report ---    < end of copied text >    OTHER: 	  	  LABS:	 	    CARDIAC MARKERS:  Troponin T, High Sensitivity Result: 175 ng/L (09-13 @ 10:17)                                  14.5   6.88  )-----------( 196      ( 13 Sep 2023 10:17 )             49.5     09-13    137  |  95<L>  |  29<H>  ----------------------------<  91  3.8   |  17<L>  |  8.05<H>    Ca    10.5      13 Sep 2023 10:17    TPro  9.4<H>  /  Alb  3.9  /  TBili  0.4  /  DBili  x   /  AST  26  /  ALT  13  /  AlkPhos  144<H>  09-13    PT/INR - ( 13 Sep 2023 10:17 )   PT: 41.8 sec;   INR: 3.96 ratio         PTT - ( 13 Sep 2023 10:17 )  PTT:51.3 sec  proBNP:   Lipid Profile:   HgA1c:   TSH:

## 2023-09-13 NOTE — CONSULT NOTE ADULT - ASSESSMENT
77 y/o M with PMH of ESRD on HD, orthostatic hypotension, COPD on home O2, SAADIA on CPAP, wheelchair bound, chronic diastolic CHF, morbid obesity, pulmonary hypertension, PE and DVT on Coumadin, IVC filter, chronic sacral ulcer, chronic back pain, history of hematochezia in the past 2/2 hemorrhoids, chronic abd pain 2/2 IBS, chronic constipation. Brought in by EMS from home for multiple episodes of bright red blood per rectum over the past week, this AM associated with lightheadedness and feeling near syncopal. Pulmonary called to consult for COPD, SAADIA.

## 2023-09-13 NOTE — H&P ADULT - ASSESSMENT
78-year-old male history of ESRD on HD via left upper extremity AV fistula Tuesday Thursday Saturday, last dialysis yesterday, chronic severe orthostatic hypotension on midodrine 30 mg every 6 hours, COPD on 2 L home O2, wheelchair bound, chronic diastolic CHF, morbid obesity, pulmonary hypertension, PE and DVT on Coumadin, IVC filter, chronic sacral ulcer, chronic back pain, history of hematochezia in the past 2/2 hemorrhoids, chronic abd pain 2/2 IBS, chronic constipation,  brought in by EMS from home for multiple episodes of bright red blood per rectum over the past week, this am associated with lightheadedness and feeling near syncopal. otn hasnt been able to tolerate HD and sessions have been down to 2-2.5 liters out instead of 3 liters 2/2 Hypotension. compliant w meds, wife is the caretaker    Ptn found to be orthostatic, stable H/H, lethargic possibly 2/2 uremia 2/2 unable to complete full sessions of HD. renal made aware  will check TTE, cont Midodrine,  card aware  h/o PE/DVT/thrmbophlebitis in LE, INR is supratherapeutic, will hold tonight's coumadin dose  CT A/P is benign, seen by GI, prob bleeding hemorrhoids, place on ANUSOL  ptn has a high debility index, he has been awaiting a special air mattress as part of wound care treatment and a motorized wheel chair.   cont the rest of outptn meds

## 2023-09-13 NOTE — CONSULT NOTE ADULT - ASSESSMENT
The patient is a 78 year old morbidly obese man with PMH of ESRD on HD, orthostatic hypotension (on high dose midodrine), COPD on home O2, SAADIA on CPAP, wheelchair bound, chronic diastolic CHF, pulmonary hypertension, PE and DVT on Coumadin, diverticulosis and hemorrhoids, admitted for rectal bleeding in the setting of supratherapeutic INR.     1. BRBPR. Clinical hx consistent with hemorrhoidal vs. diverticular bleed. Hgb remains normal.   No plans for endoscopic evaluation. Pt would be very high risk.   Anusol HC suppositories BID x 2 weeks  bowel regimen to prevent constipation   monitor H&H, transfuse PRN   avoid supratherapeutic INR    2. PE/DVT  on coumadin   PPI for GI ppx     3. Orthostatic hypotension     4. ESRD on HD    5. COPD on home 02    6. Morbid obesity       Advanced care planning forms were discussed. Code status including forceful chest compressions, defibrillation and intubation were discussed. The risks benefits and alternatives to pertinent gastrointestinal procedures and interventions were discussed in detail and all questions were answered. Duration: 15 Minutes.      Desiree Castillo M.D.   Gastroenterology and Hepatology  [address] [place], [place]  Office: [phone]  Cell: [phone]

## 2023-09-14 DIAGNOSIS — I26.99 OTHER PULMONARY EMBOLISM WITHOUT ACUTE COR PULMONALE: ICD-10-CM

## 2023-09-14 LAB
ALBUMIN SERPL ELPH-MCNC: 3.9 G/DL — SIGNIFICANT CHANGE UP (ref 3.3–5)
ALP SERPL-CCNC: 133 U/L — HIGH (ref 40–120)
ALT FLD-CCNC: 12 U/L — SIGNIFICANT CHANGE UP (ref 10–45)
ANION GAP SERPL CALC-SCNC: 20 MMOL/L — HIGH (ref 5–17)
AST SERPL-CCNC: 25 U/L — SIGNIFICANT CHANGE UP (ref 10–40)
BILIRUB SERPL-MCNC: 0.3 MG/DL — SIGNIFICANT CHANGE UP (ref 0.2–1.2)
BUN SERPL-MCNC: 33 MG/DL — HIGH (ref 7–23)
CALCIUM SERPL-MCNC: 8.8 MG/DL — SIGNIFICANT CHANGE UP (ref 8.4–10.5)
CALCIUM SERPL-MCNC: 9.5 MG/DL — SIGNIFICANT CHANGE UP (ref 8.4–10.5)
CHLORIDE SERPL-SCNC: 99 MMOL/L — SIGNIFICANT CHANGE UP (ref 96–108)
CK MB CFR SERPL CALC: 1.6 NG/ML — SIGNIFICANT CHANGE UP (ref 0–6.7)
CO2 SERPL-SCNC: 22 MMOL/L — SIGNIFICANT CHANGE UP (ref 22–31)
CREAT SERPL-MCNC: 9.14 MG/DL — HIGH (ref 0.5–1.3)
EGFR: 5 ML/MIN/1.73M2 — LOW
FERRITIN SERPL-MCNC: 476 NG/ML — HIGH (ref 30–400)
GLUCOSE SERPL-MCNC: 97 MG/DL — SIGNIFICANT CHANGE UP (ref 70–99)
HCT VFR BLD CALC: 46.2 % — SIGNIFICANT CHANGE UP (ref 39–50)
HGB BLD-MCNC: 13.6 G/DL — SIGNIFICANT CHANGE UP (ref 13–17)
INR BLD: 3.91 RATIO — HIGH (ref 0.85–1.18)
IRON SATN MFR SERPL: 36 UG/DL — LOW (ref 45–165)
MCHC RBC-ENTMCNC: 27.1 PG — SIGNIFICANT CHANGE UP (ref 27–34)
MCHC RBC-ENTMCNC: 29.4 GM/DL — LOW (ref 32–36)
MCV RBC AUTO: 92.2 FL — SIGNIFICANT CHANGE UP (ref 80–100)
NRBC # BLD: 0 /100 WBCS — SIGNIFICANT CHANGE UP (ref 0–0)
PHOSPHATE SERPL-MCNC: 6.7 MG/DL — HIGH (ref 2.5–4.5)
PLATELET # BLD AUTO: 176 K/UL — SIGNIFICANT CHANGE UP (ref 150–400)
POTASSIUM SERPL-MCNC: 4.3 MMOL/L — SIGNIFICANT CHANGE UP (ref 3.5–5.3)
POTASSIUM SERPL-SCNC: 4.3 MMOL/L — SIGNIFICANT CHANGE UP (ref 3.5–5.3)
PROT SERPL-MCNC: 8.1 G/DL — SIGNIFICANT CHANGE UP (ref 6–8.3)
PROTHROM AB SERPL-ACNC: 41.3 SEC — HIGH (ref 9.5–13)
PTH-INTACT FLD-MCNC: 955 PG/ML — HIGH (ref 15–65)
RBC # BLD: 5.01 M/UL — SIGNIFICANT CHANGE UP (ref 4.2–5.8)
RBC # FLD: 17.2 % — HIGH (ref 10.3–14.5)
SODIUM SERPL-SCNC: 141 MMOL/L — SIGNIFICANT CHANGE UP (ref 135–145)
WBC # BLD: 8.6 K/UL — SIGNIFICANT CHANGE UP (ref 3.8–10.5)
WBC # FLD AUTO: 8.6 K/UL — SIGNIFICANT CHANGE UP (ref 3.8–10.5)

## 2023-09-14 PROCEDURE — 93306 TTE W/DOPPLER COMPLETE: CPT | Mod: 26

## 2023-09-14 PROCEDURE — 99222 1ST HOSP IP/OBS MODERATE 55: CPT

## 2023-09-14 RX ORDER — MIDODRINE HYDROCHLORIDE 2.5 MG/1
10 TABLET ORAL ONCE
Refills: 0 | Status: COMPLETED | OUTPATIENT
Start: 2023-09-14 | End: 2023-09-14

## 2023-09-14 RX ADMIN — Medication 20 MILLIGRAM(S): at 17:27

## 2023-09-14 RX ADMIN — SEVELAMER CARBONATE 2400 MILLIGRAM(S): 2400 POWDER, FOR SUSPENSION ORAL at 22:50

## 2023-09-14 RX ADMIN — MIDODRINE HYDROCHLORIDE 30 MILLIGRAM(S): 2.5 TABLET ORAL at 22:54

## 2023-09-14 RX ADMIN — BUDESONIDE AND FORMOTEROL FUMARATE DIHYDRATE 2 PUFF(S): 160; 4.5 AEROSOL RESPIRATORY (INHALATION) at 06:31

## 2023-09-14 RX ADMIN — Medication 100 MILLIGRAM(S): at 13:05

## 2023-09-14 RX ADMIN — LEVETIRACETAM 500 MILLIGRAM(S): 250 TABLET, FILM COATED ORAL at 17:27

## 2023-09-14 RX ADMIN — Medication 1 SUPPOSITORY(S): at 06:24

## 2023-09-14 RX ADMIN — SEVELAMER CARBONATE 800 MILLIGRAM(S): 2400 POWDER, FOR SUSPENSION ORAL at 22:53

## 2023-09-14 RX ADMIN — PANTOPRAZOLE SODIUM 40 MILLIGRAM(S): 20 TABLET, DELAYED RELEASE ORAL at 06:23

## 2023-09-14 RX ADMIN — Medication 1 SUPPOSITORY(S): at 22:50

## 2023-09-14 RX ADMIN — Medication 20 MILLIGRAM(S): at 13:05

## 2023-09-14 RX ADMIN — SEVELAMER CARBONATE 2400 MILLIGRAM(S): 2400 POWDER, FOR SUSPENSION ORAL at 06:22

## 2023-09-14 RX ADMIN — Medication 100 MILLIGRAM(S): at 17:34

## 2023-09-14 RX ADMIN — LEVETIRACETAM 500 MILLIGRAM(S): 250 TABLET, FILM COATED ORAL at 06:23

## 2023-09-14 RX ADMIN — Medication 100 MILLIGRAM(S): at 06:23

## 2023-09-14 RX ADMIN — CINACALCET 90 MILLIGRAM(S): 30 TABLET, FILM COATED ORAL at 06:23

## 2023-09-14 RX ADMIN — SODIUM CHLORIDE 125 MILLILITER(S): 9 INJECTION, SOLUTION INTRAVENOUS at 01:48

## 2023-09-14 RX ADMIN — Medication 20 MILLIGRAM(S): at 06:22

## 2023-09-14 RX ADMIN — MIDODRINE HYDROCHLORIDE 30 MILLIGRAM(S): 2.5 TABLET ORAL at 06:23

## 2023-09-14 RX ADMIN — POLYETHYLENE GLYCOL 3350 17 GRAM(S): 17 POWDER, FOR SOLUTION ORAL at 17:27

## 2023-09-14 RX ADMIN — MIDODRINE HYDROCHLORIDE 10 MILLIGRAM(S): 2.5 TABLET ORAL at 11:04

## 2023-09-14 RX ADMIN — BUDESONIDE AND FORMOTEROL FUMARATE DIHYDRATE 2 PUFF(S): 160; 4.5 AEROSOL RESPIRATORY (INHALATION) at 17:36

## 2023-09-14 RX ADMIN — TIOTROPIUM BROMIDE 2 PUFF(S): 18 CAPSULE ORAL; RESPIRATORY (INHALATION) at 13:05

## 2023-09-14 RX ADMIN — MIDODRINE HYDROCHLORIDE 30 MILLIGRAM(S): 2.5 TABLET ORAL at 13:05

## 2023-09-14 RX ADMIN — SEVELAMER CARBONATE 2400 MILLIGRAM(S): 2400 POWDER, FOR SUSPENSION ORAL at 13:19

## 2023-09-14 NOTE — PROGRESS NOTE ADULT - ASSESSMENT
78-year-old male history of ESRD on HD via left upper extremity AV fistula Tuesday Thursday Saturday, last dialysis yesterday, chronic severe orthostatic hypotension on midodrine 30 mg every 6 hours, COPD on 2 L home O2, wheelchair bound, chronic diastolic CHF, morbid obesity, pulmonary hypertension, PE and DVT on Coumadin, IVC filter, chronic sacral ulcer, chronic back pain, history of hematochezia in the past 2/2 hemorrhoids, chronic abd pain 2/2 IBS, chronic constipation,  brought in by EMS from home for multiple episodes of bright red blood per rectum over the past week, this am associated with lightheadedness and feeling near syncopal. otn hasnt been able to tolerate HD and sessions have been down to 2-2.5 liters out instead of 3 liters 2/2 Hypotension. compliant w meds, wife is the caretaker    ptn has h/o orthostatic hypotension, on HD midodrine  H/H remains stable, blood per rectum 2/2 hemorrhoid, seen by GI. CT A/P is benign,  ptn completed HD session today without any complications, renal following  rpt  TTE is stable,   h/o PE/DVT/thrombophlebitis in LE, INR is supratherapeutic, will hold tonight's coumadin dose. once close to being subtherapeutic will switch to ELIQUIS 5 mg bid and dc coumadin   ptn has a high debility index, he has been awaiting a special air mattress as part of wound care treatment and a motorized wheel chair. seen by wound care here. needs the recs addressed prior to DC  PT eval

## 2023-09-14 NOTE — PROGRESS NOTE ADULT - SUBJECTIVE AND OBJECTIVE BOX
Chief Complaint:  Patient is a 78y old  Male who presents with a chief complaint of     Date of service 09-14-23 @ 11:49      Interval Events:  getting HD     Hospital Medications:  albuterol    90 MICROgram(s) HFA Inhaler 2 Puff(s) Inhalation every 6 hours PRN  allopurinol 100 milliGRAM(s) Oral daily  budesonide 160 MICROgram(s)/formoterol 4.5 MICROgram(s) Inhaler 2 Puff(s) Inhalation two times a day  cinacalcet 90 milliGRAM(s) Oral <User Schedule>  dextrose 5% + sodium chloride 0.9%. 1000 milliLiter(s) IV Continuous <Continuous>  dicyclomine 20 milliGRAM(s) Oral three times a day before meals  hydrocortisone hemorrhoidal Suppository 1 Suppository(s) Rectal two times a day  levETIRAcetam 500 milliGRAM(s) Oral two times a day  midodrine. 30 milliGRAM(s) Oral three times a day  pantoprazole    Tablet 40 milliGRAM(s) Oral before breakfast  polyethylene glycol 3350 17 Gram(s) Oral two times a day  pregabalin 100 milliGRAM(s) Oral two times a day  sevelamer carbonate 800 milliGRAM(s) Oral <User Schedule>  sevelamer carbonate 2400 milliGRAM(s) Oral three times a day  tiotropium 2.5 MICROgram(s) Inhaler 2 Puff(s) Inhalation daily        Review of Systems:  General:  No wt loss, fevers, chills, night sweats, fatigue,   Eyes:  Good vision, no reported pain  ENT:  No sore throat, pain, runny nose, dysphagia  CV:  No pain, palpitations, hypo/hypertension  Resp:  No dyspnea, cough, tachypnea, wheezing  GI:  See HPI  :  No pain, bleeding, incontinence, nocturia  Muscle:  No pain, weakness  Neuro:  No weakness, tingling, memory problems  Psych:  No fatigue, insomnia, mood problems, depression  Endocrine:  No polyuria, polydipsia, cold/heat intolerance  Heme:  No petechiae, ecchymosis, easy bruisability  Integumentary:  No rash, edema    PHYSICAL EXAM:   Vital Signs:  Vital Signs Last 24 Hrs  T(C): 36.1 (14 Sep 2023 09:26), Max: 36.6 (13 Sep 2023 15:27)  T(F): 97 (14 Sep 2023 09:26), Max: 97.9 (13 Sep 2023 15:27)  HR: 57 (14 Sep 2023 09:26) (57 - 86)  BP: 102/53 (14 Sep 2023 09:26) (73/41 - 102/53)  BP(mean): 57 (14 Sep 2023 00:00) (51 - 68)  RR: 18 (14 Sep 2023 09:26) (17 - 22)  SpO2: 98% (14 Sep 2023 09:26) (95% - 100%)    Parameters below as of 14 Sep 2023 09:26  Patient On (Oxygen Delivery Method): nasal cannula  O2 Flow (L/min): 2    Daily     Daily       PHYSICAL EXAM:     GENERAL:  Appears stated age, well-groomed, well-nourished, no distress  HEENT:  NC/AT,  conjunctivae anicteric, clear and pink,   NECK: supple, trachea midline  CHEST:  Full & symmetric excursion, no increased effort, breath sounds clear  HEART:  Regular rhythm, no JVD  ABDOMEN:  Soft, non-tender, non-distended, normoactive bowel sounds,  no masses , no hepatosplenomegaly  EXTREMITIES:  no cyanosis,clubbing or edema  SKIN:  No rash, erythema, or, ecchymoses, no jaundice  NEURO:  Alert, non-focal, no asterixis  PSYCH: Appropriate affect, oriented to place and time  RECTAL: Deferred      LABS Personally reviewed by me:                        13.6   8.60  )-----------( 176      ( 14 Sep 2023 07:07 )             46.2     Mean Cell Volume: 92.2 fl (09-14-23 @ 07:07)    09-14    141  |  99  |  33<H>  ----------------------------<  97  4.3   |  22  |  9.14<H>    Ca    9.5      14 Sep 2023 07:07  Phos  6.7     09-14    TPro  8.1  /  Alb  3.9  /  TBili  0.3  /  DBili  x   /  AST  25  /  ALT  12  /  AlkPhos  133<H>  09-14    LIVER FUNCTIONS - ( 14 Sep 2023 07:07 )  Alb: 3.9 g/dL / Pro: 8.1 g/dL / ALK PHOS: 133 U/L / ALT: 12 U/L / AST: 25 U/L / GGT: x           PT/INR - ( 14 Sep 2023 07:07 )   PT: 41.3 sec;   INR: 3.91 ratio         PTT - ( 13 Sep 2023 10:17 )  PTT:51.3 sec  Urinalysis Basic - ( 14 Sep 2023 07:07 )    Color: x / Appearance: x / SG: x / pH: x  Gluc: 97 mg/dL / Ketone: x  / Bili: x / Urobili: x   Blood: x / Protein: x / Nitrite: x   Leuk Esterase: x / RBC: x / WBC x   Sq Epi: x / Non Sq Epi: x / Bacteria: x                              13.6   8.60  )-----------( 176      ( 14 Sep 2023 07:07 )             46.2                         14.5   6.88  )-----------( 196      ( 13 Sep 2023 10:17 )             49.5       Imaging personally reviewed by me:

## 2023-09-14 NOTE — PROGRESS NOTE ADULT - SUBJECTIVE AND OBJECTIVE BOX
Patient is a 78y old  Male who presents with a chief complaint of     SUBJECTIVE / OVERNIGHT EVENTS: ptn tolerated HD well,     MEDICATIONS  (STANDING):  allopurinol 100 milliGRAM(s) Oral daily  budesonide 160 MICROgram(s)/formoterol 4.5 MICROgram(s) Inhaler 2 Puff(s) Inhalation two times a day  cinacalcet 90 milliGRAM(s) Oral <User Schedule>  dextrose 5% + sodium chloride 0.9%. 1000 milliLiter(s) (125 mL/Hr) IV Continuous <Continuous>  dicyclomine 20 milliGRAM(s) Oral three times a day before meals  hydrocortisone hemorrhoidal Suppository 1 Suppository(s) Rectal two times a day  levETIRAcetam 500 milliGRAM(s) Oral two times a day  midodrine. 30 milliGRAM(s) Oral three times a day  pantoprazole    Tablet 40 milliGRAM(s) Oral before breakfast  polyethylene glycol 3350 17 Gram(s) Oral two times a day  pregabalin 100 milliGRAM(s) Oral two times a day  sevelamer carbonate 2400 milliGRAM(s) Oral three times a day  sevelamer carbonate 800 milliGRAM(s) Oral <User Schedule>  tiotropium 2.5 MICROgram(s) Inhaler 2 Puff(s) Inhalation daily    MEDICATIONS  (PRN):  albuterol    90 MICROgram(s) HFA Inhaler 2 Puff(s) Inhalation every 6 hours PRN Bronchospasm      Vital Signs Last 24 Hrs  T(F): 97.9 (09-14-23 @ 14:22), Max: 97.9 (09-14-23 @ 14:22)  HR: 60 (09-14-23 @ 14:22) (57 - 67)  BP: 91/54 (09-14-23 @ 14:22) (73/41 - 102/53)  RR: 18 (09-14-23 @ 14:22) (17 - 19)  SpO2: 96% (09-14-23 @ 14:22) (95% - 99%)  Telemetry:   CAPILLARY BLOOD GLUCOSE        I&O's Summary    14 Sep 2023 07:01  -  14 Sep 2023 16:10  --------------------------------------------------------  IN: 0 mL / OUT: 1500 mL / NET: -1500 mL        PHYSICAL EXAM:  GENERAL: NAD, well-developed  HEAD:  Atraumatic, Normocephalic  EYES: EOMI, PERRLA, conjunctiva and sclera clear  NECK: Supple, No JVD  CHEST/LUNG: Clear to auscultation bilaterally; No wheeze  HEART: Regular rate and rhythm; No murmurs, rubs, or gallops  ABDOMEN: Soft, Nontender, Nondistended; Bowel sounds present  EXTREMITIES:  2+ Peripheral Pulses, No clubbing, cyanosis, or edema  PSYCH: AAOx3  NEUROLOGY: non-focal  SKIN: No rashes or lesions    LABS:                        13.6   8.60  )-----------( 176      ( 14 Sep 2023 07:07 )             46.2     09-14    141  |  99  |  33<H>  ----------------------------<  97  4.3   |  22  |  9.14<H>    Ca    9.5      14 Sep 2023 07:07  Phos  6.7     09-14    TPro  8.1  /  Alb  3.9  /  TBili  0.3  /  DBili  x   /  AST  25  /  ALT  12  /  AlkPhos  133<H>  09-14    PT/INR - ( 14 Sep 2023 07:07 )   PT: 41.3 sec;   INR: 3.91 ratio         PTT - ( 13 Sep 2023 10:17 )  PTT:51.3 sec  CARDIAC MARKERS ( 14 Sep 2023 07:07 )  x     / x     / x     / x     / 1.6 ng/mL      Urinalysis Basic - ( 14 Sep 2023 07:07 )    Color: x / Appearance: x / SG: x / pH: x  Gluc: 97 mg/dL / Ketone: x  / Bili: x / Urobili: x   Blood: x / Protein: x / Nitrite: x   Leuk Esterase: x / RBC: x / WBC x   Sq Epi: x / Non Sq Epi: x / Bacteria: x        RADIOLOGY & ADDITIONAL TESTS:    Imaging Personally Reviewed:    Consultant(s) Notes Reviewed:      Care Discussed with Consultants/Other Providers:

## 2023-09-14 NOTE — PROGRESS NOTE ADULT - SUBJECTIVE AND OBJECTIVE BOX
[facility] NEPHROLOGY   Dr Vonda Eduardo      OFFICE: [phone] ( 9 AM to 5PM )  Answering Service ( 5pm - 7 am) [phone]     Chief Complaint: ESRD/Ongoing hemodialysis requirement  date of service 09-14-23 @ 09:31    24 hour events/subjective:  PT seen and examined on dialysis , tolerating well. BP stable, Access working well      PAST HISTORY  --------------------------------------------------------------------------------  No significant changes to PMH, PSH, FHx, SHx, unless otherwise noted    ALLERGIES & MEDICATIONS  --------------------------------------------------------------------------------  Allergies    baclofen (Other)  latex (Rash)    Intolerances      Standing Inpatient Medications  allopurinol 100 milliGRAM(s) Oral daily  budesonide 160 MICROgram(s)/formoterol 4.5 MICROgram(s) Inhaler 2 Puff(s) Inhalation two times a day  cinacalcet 90 milliGRAM(s) Oral <User Schedule>  dextrose 5% + sodium chloride 0.9%. 1000 milliLiter(s) IV Continuous <Continuous>  dicyclomine 20 milliGRAM(s) Oral three times a day before meals  hydrocortisone hemorrhoidal Suppository 1 Suppository(s) Rectal two times a day  levETIRAcetam 500 milliGRAM(s) Oral two times a day  midodrine. 10 milliGRAM(s) Oral once  midodrine. 30 milliGRAM(s) Oral three times a day  pantoprazole    Tablet 40 milliGRAM(s) Oral before breakfast  polyethylene glycol 3350 17 Gram(s) Oral two times a day  pregabalin 100 milliGRAM(s) Oral two times a day  sevelamer carbonate 800 milliGRAM(s) Oral <User Schedule>  sevelamer carbonate 2400 milliGRAM(s) Oral three times a day  tiotropium 2.5 MICROgram(s) Inhaler 2 Puff(s) Inhalation daily    PRN Inpatient Medications  albuterol    90 MICROgram(s) HFA Inhaler 2 Puff(s) Inhalation every 6 hours PRN      REVIEW OF SYSTEMS  --------------------------------------------------------------------------------  As above.    VITALS/PHYSICAL EXAM  --------------------------------------------------------------------------------  T(C): 36.4 (09-14-23 @ 08:05), Max: 36.6 (09-13-23 @ 10:04)  HR: 62 (09-14-23 @ 08:05) (58 - 122)  BP: 92/51 (09-14-23 @ 08:05) (63/42 - 122/92)  RR: 17 (09-14-23 @ 08:05) (17 - 30)  SpO2: 98% (09-14-23 @ 08:05) (95% - 100%)  Wt(kg): --  Height (cm): 172.7 (09-13-23 @ 09:57)      Physical Exam:  	Gen: NAD  	HEENT: PERRL  	Pulm: CTA B/L  	CV: S1S2  	Abd: Soft  	Ext: No LE edema B/L  	Neuro: Awake  	Skin: Warm and Dry   	Vascular access: avf           ALEXANDRE moore  LABS/STUDIES  --------------------------------------------------------------------------------              13.6   8.60  >-----------<  176      [09-14-23 @ 07:07]              46.2     141  |  99  |  33  ----------------------------<  97      [09-14-23 @ 07:07]  4.3   |  22  |  9.14        Ca     9.5     [09-14-23 @ 07:07]      Phos  6.7     [09-14-23 @ 07:07]    TPro  8.1  /  Alb  3.9  /  TBili  0.3  /  DBili  x   /  AST  25  /  ALT  12  /  AlkPhos  133  [09-14-23 @ 07:07]    PT/INR: PT 41.3 , INR 3.91       [09-14-23 @ 07:07]  PTT: 51.3       [09-13-23 @ 10:17]      Iron 52, TIBC 188, %sat 28      [02-23-23 @ 16:58]  Ferritin 2429      [02-23-23 @ 16:58]  PTH -- (Ca --)      [01-20-23 @ 06:35]   630  TSH 0.92      [02-24-23 @ 13:07]

## 2023-09-14 NOTE — PATIENT PROFILE ADULT - FALL HARM RISK - HARM RISK INTERVENTIONS
Assistance with ambulation/Assistance OOB with selected safe patient handling equipment/Communicate Risk of Fall with Harm to all staff/Discuss with provider need for PT consult/Monitor gait and stability/Provide patient with walking aids - walker, cane, crutches/Reinforce activity limits and safety measures with patient and family/Sit up slowly, dangle for a short time, stand at bedside before walking/Tailored Fall Risk Interventions/Visual Cue: Yellow wristband and red socks/Bed in lowest position, wheels locked, appropriate side rails in place/Call bell, personal items and telephone in reach/Instruct patient to call for assistance before getting out of bed or chair/Non-slip footwear when patient is out of bed/[place] to call system/Physically safe environment - no spills, clutter or unnecessary equipment/Purposeful Proactive Rounding/Room/bathroom lighting operational, light cord in reach

## 2023-09-14 NOTE — CONSULT NOTE ADULT - SUBJECTIVE AND OBJECTIVE BOX
Wound SURGERY CONSULT NOTE    HPI:   78-year-old male history of ESRD on HD via left upper extremity AV fistula Tuesday Thursday Saturday, last dialysis yesterday, chronic severe orthostatic hypotension on midodrine 30 mg every 6 hours, COPD on 2 L home O2, wheelchair bound, chronic diastolic CHF, morbid obesity, pulmonary hypertension, PE and DVT on Coumadin, IVC filter, chronic sacral ulcer, chronic back pain, history of hematochezia in the past 2/2 hemorrhoids, chronic abd pain 2/2 IBS, chronic constipation,  brought in by EMS from home for multiple episodes of bright red blood per rectum over the past week, this am associated with lightheadedness and feeling near syncopal. otn hasnt been able to tolerate HD and sessions have been down to 2-2.5 liters out instead of 3 liters 2/2 Hypotension. compliant w meds, wife is the caretaker (13 Sep 2023 15:18)        N/V/D,  BM/ Flatus,   NGT,     palp/ sob/dyspnea/ cp,       F/C/S  Wound consult requested by team to assist w/ management of      wound/ pressure injury.   Pt (unable to)  c/o pain, drainage, odor, color change,  or worsening swelling. Offloading and pericare initiated upon admission as pt Increasingly sedentary 2/2 to illness. Pt is Incontinent of urine & stool. (+)moore/ ostomy.   No h/o bites, scratches, falls, trauma.  Pt seen by Wound RN  [place] Advance/  Gale,TRIAD/ Aquacell/ medihoney/ Allevyn foam/ dakins/ Adaptic/ DSD recommended used at home/ while awaiting consult.  Appetite good/ decreased.  weight loss.  S&S / RD consult appreciated All questions asked and answered to pt's and family's expressed understanding and satisfaction.    Current Diet: Diet, DASH/TLC:   Sodium & Cholesterol Restricted  For patients receiving Renal Replacement - No Protein Restr, No Conc K, No Conc Phos, Low Sodium (RENAL) (09-13-23 @ 15:48)      PAST MEDICAL & SURGICAL HISTORY:  Hypertension      Glomerular disease  Segmental glomerular sclerosis      CHF (congestive heart failure)      COPD (chronic obstructive pulmonary disease)      Pulmonary hypertension      Sleep apnea      Pulmonary edema      Peripheral edema      Gout      History of abdominal surgery  polypectomy      H/O right heart catheterization          REVIEW OF SYSTEMS: Pt unable to offer  General/ Breast/ Skin/Vasc/ Neuro/ MSK: see HPI  All other systems negative    MEDICATIONS  (STANDING):  allopurinol 100 milliGRAM(s) Oral daily  budesonide 160 MICROgram(s)/formoterol 4.5 MICROgram(s) Inhaler 2 Puff(s) Inhalation two times a day  cinacalcet 90 milliGRAM(s) Oral <User Schedule>  dextrose 5% + sodium chloride 0.9%. 1000 milliLiter(s) (125 mL/Hr) IV Continuous <Continuous>  dicyclomine 20 milliGRAM(s) Oral three times a day before meals  hydrocortisone hemorrhoidal Suppository 1 Suppository(s) Rectal two times a day  levETIRAcetam 500 milliGRAM(s) Oral two times a day  midodrine. 30 milliGRAM(s) Oral three times a day  pantoprazole    Tablet 40 milliGRAM(s) Oral before breakfast  polyethylene glycol 3350 17 Gram(s) Oral two times a day  pregabalin 100 milliGRAM(s) Oral two times a day  sevelamer carbonate 800 milliGRAM(s) Oral <User Schedule>  sevelamer carbonate 2400 milliGRAM(s) Oral three times a day  tiotropium 2.5 MICROgram(s) Inhaler 2 Puff(s) Inhalation daily    MEDICATIONS  (PRN):  albuterol    90 MICROgram(s) HFA Inhaler 2 Puff(s) Inhalation every 6 hours PRN Bronchospasm      Allergies    baclofen (Other)  latex (Rash)    Intolerances        SOCIAL HISTORY:  / /single/ ; (+)HHA/ lives in [facility]; Former smoker, No current/ Denies smoking, ETOH, drugs    FAMILY HISTORY:  Family history of colon cancer (Father)    Family history of heart disease (Father)     no h/o PVD or wound healing or skin/ significant problems    PHYSICAL EXAM:  Vital Signs Last 24 Hrs  T(C): 36.6 (14 Sep 2023 14:22), Max: 36.6 (14 Sep 2023 14:22)  T(F): 97.9 (14 Sep 2023 14:22), Max: 97.9 (14 Sep 2023 14:22)  HR: 60 (14 Sep 2023 14:22) (57 - 67)  BP: 91/54 (14 Sep 2023 14:22) (73/41 - 102/53)  BP(mean): 57 (14 Sep 2023 00:00) (52 - 57)  RR: 18 (14 Sep 2023 14:22) (17 - 19)  SpO2: 96% (14 Sep 2023 14:22) (95% - 99%)    Parameters below as of 14 Sep 2023 14:22  Patient On (Oxygen Delivery Method): nasal cannula  O2 Flow (L/min): 2      NAD, Guarded but stable,  A&Ox3/ Alert/ Confused  cachectic/ thin, MO/ Obese, frail,  WD/ WN/ WG,  Disheveled  Total Care Sport/ Versa Care P500 / Envella Progressa bed     HEENT:  NC/AT, PERRL, EOMI, sclera clear, mucosa moist, throat clear, trachea midline, neck supple, trach  Respiratory: nonlabored w/ equal chest rise  Gastrointestinal: soft NT/ND (+)BS  (+)PEG (+)ostomy (+)NGT  : (+)moore/ purewick/ condom cath  Neurology:  weakened strength & sensation grossly intact, paraesthesia  nonverbal, no follow commands, paraplegic  Psych: calm/ appropriate/ flat affect/ easily agitated/ restless/ anxious/ difficult to assess  Musculoskeletal:  limited stiff / p/FROM, no deformities/ contractures  Vascular: BLE equally warm/ cool,  no cyanosis, clubbing, edema nor acute ischemia           >LE //BLE edema equal           BLE DP/PT pulses palpable          BLE hemosiderin staining/ varicose veins  Skin:  moist w/ good turgor  thin, dry, pale, frail,  ecchymosis w/o hematoma  blistering  or serosanguinous drainage  No odor, erythema, increased warmth, tenderness, induration, fluctuance, nor crepitus    LABS/ CULTURES/ RADIOLOGY:                        13.6   8.60  )-----------( 176      ( 14 Sep 2023 07:07 )             46.2       141  |  99  |  33  ----------------------------<  97      [09-14-23 @ 07:07]  4.3   |  22  |  9.14        Ca     9.5     [09-14-23 @ 07:07]      Phos  6.7     [09-14-23 @ 07:07]    TPro  8.1  /  Alb  3.9  /  TBili  0.3  /  DBili  x   /  AST  25  /  ALT  12  /  AlkPhos  133  [09-14-23 @ 07:07]    PT/INR: PT 41.3 , INR 3.91       [09-14-23 @ 07:07]  PTT: 51.3       [09-13-23 @ 10:17]                              A/P:    Wound Consult requested to assist w/ management of    BLE elevation & Compression  Consider RASHIDA/PVR, Duplex, Xray, A/P BLE CT or MRI  Abx per Medicine/ ID  Moisturize intact skin w/ SWEEN cream BID  Nutrition Consult for optimization in pt w/ Severe Protein Calorie Malnutrition,        Inadequate PO intake, & Increased nutritional needs            encourage high quality protein, yarely/ prosource, MVI & Vit C to promote wound healing  Hyperglycemia - improving w/ ADA diet and Lantus/ NPH & FS w/ ISS, consider Endo Consult, consider HgA1c  Anemia- improving w/ transfusions, Fe studies, protonix  Continue turning and positioning w/ offloading assistive devices as per protocol  Buttocks/ Sacrum Gale/ TRIAD BID /CAV[place]ON ADVANCE TIW and prn soiling        Continue w/ attends under pads and Pericare w/ moore/ condom cath maintenance / purewick care as per protocol  Waffle Cushion to chair when oob to chair  Continue w/ low air loss pressure redistribution bed surface   Pt will need Group 2 mattress on hospital bed and ROHO cushion for wheel chair upon discharge home  Care as per medicine, will follow w/ you/ remain available as requested  Upon discharge f/u as outpatient at [facility] [address] [place] [address] [phone]  Seen w/ attng & RN and D/w team & RN  Thank you for this consult  Sandee Castorena PA-C [id number]  Nights/ Weekends/ Holidays please call:  General Surgery Consult pager [phone]) for emergencies  Wound PT for multilayer leg wrapping or VAC issues (x [phone])      Wound SURGERY CONSULT NOTE    HPI:   78-year-old male history of ESRD on HD via left upper extremity AV fistula Tuesday Thursday Saturday, last dialysis yesterday, chronic severe orthostatic hypotension on midodrine 30 mg every 6 hours, COPD on 2 L home O2, wheelchair bound, chronic diastolic CHF, morbid obesity, pulmonary hypertension, PE and DVT on Coumadin, IVC filter, chronic sacral ulcer, chronic back pain, history of hematochezia in the past 2/2 hemorrhoids, chronic abd pain 2/2 IBS, chronic constipation,  brought in by EMS from home for multiple episodes of bright red blood per rectum over the past week, this am associated with lightheadedness and feeling near syncopal. otn hasnt been able to tolerate HD and sessions have been down to 2-2.5 liters out instead of 3 liters 2/2 Hypotension. compliant w meds, wife is the caretaker    Wound consult requested by team to assist w/ management of buttocks wound. Pt doesn't have real c/o pain, drainage, odor, color change, or worsening swelling.  He said his wife was concerned about a sore when she was helping wash him up. Offloading and pericare initiated upon admission as pt Increasingly sedentary 2/2 to illness. Pt is +/-continent of urine & stool.  No h/o bites, scratches, falls, trauma.   Appetite good w/o weight loss.  All questions asked and answered to pt's and family's expressed understanding and satisfaction.    Current Diet: Diet, DASH/TLC:   Sodium & Cholesterol Restricted  For patients receiving Renal Replacement - No Protein Restr, No Conc K, No Conc Phos, Low Sodium (RENAL) (09-13-23 @ 15:48)      PAST MEDICAL & SURGICAL HISTORY:  Hypertension    Glomerular disease  Segmental glomerular sclerosis    CHF (congestive heart failure)    COPD (chronic obstructive pulmonary disease)    Pulmonary hypertension    Sleep apnea    Pulmonary edema    Peripheral edema    Gout    s/p abdominal surgery,polypectomy    s/p right heart catheterization      REVIEW OF SYSTEMS:General/  Skin/Vasc/ MSK: see HPI  All other systems negative    MEDICATIONS  (STANDING):  allopurinol 100 milliGRAM(s) Oral daily  budesonide 160 MICROgram(s)/formoterol 4.5 MICROgram(s) Inhaler 2 Puff(s) Inhalation two times a day  cinacalcet 90 milliGRAM(s) Oral <User Schedule>  dextrose 5% + sodium chloride 0.9%. 1000 milliLiter(s) (125 mL/Hr) IV Continuous <Continuous>  dicyclomine 20 milliGRAM(s) Oral three times a day before meals  hydrocortisone hemorrhoidal Suppository 1 Suppository(s) Rectal two times a day  levETIRAcetam 500 milliGRAM(s) Oral two times a day  midodrine. 30 milliGRAM(s) Oral three times a day  pantoprazole    Tablet 40 milliGRAM(s) Oral before breakfast  polyethylene glycol 3350 17 Gram(s) Oral two times a day  pregabalin 100 milliGRAM(s) Oral two times a day  sevelamer carbonate 800 milliGRAM(s) Oral <User Schedule>  sevelamer carbonate 2400 milliGRAM(s) Oral three times a day  tiotropium 2.5 MICROgram(s) Inhaler 2 Puff(s) Inhalation daily    MEDICATIONS  (PRN):  albuterol  90 MICROgram(s) HFA Inhaler 2 Puff(s) Inhalation every 6 hours PRN Bronchospasm      Allergies  baclofen (Other)  latex (Rash)      SOCIAL HISTORY:  ; Former smoker, No current smoking, ETOH, drugs    FAMILY HISTORY:  Family history of colon cancer (Father)    Family history of heart disease (Father)     no h/o PVD or wound healing or skin problems    PHYSICAL EXAM:  Vital Signs Last 24 Hrs  T(C): 36.6 (14 Sep 2023 14:22), Max: 36.6 (14 Sep 2023 14:22)  T(F): 97.9 (14 Sep 2023 14:22), Max: 97.9 (14 Sep 2023 14:22)  HR: 60 (14 Sep 2023 14:22) (57 - 67)  BP: 91/54 (14 Sep 2023 14:22) (73/41 - 102/53)  BP(mean): 57 (14 Sep 2023 00:00) (52 - 57)  RR: 18 (14 Sep 2023 14:22) (17 - 19)  SpO2: 96% (14 Sep 2023 14:22) (95% - 99%)    Parameters below as of 14 Sep 2023 14:22  Patient On (Oxygen Delivery Method): nasal cannula  O2 Flow (L/min): 2      NAD,  A&Ox3,Obese,   WD/ WN/ WG  Versa Care P500 bed  HEENT:  NC/AT, EOMI, sclera clear, mucosa moist, throat clear, trachea midline, neck supple  Respiratory: nonlabored w/ equal chest rise  Gastrointestinal: soft NT/ND  Neurology:  weakened strength & sensation grossly intact,   Psych: calm/ appropriate  Musculoskeletal:  FROM, no deformities/ contractures  Vascular: BLE equally warm,  no cyanosis, clubbing, edema nor acute ischemia  LUE (+)AVF w/ thrill   Skin:  moist w/ good turgor  bilateral buttocks w/ hyperpigmented skin of moisture     w/ thin linear denuded skin in gluteal cleft  no blistering  or drainage  No odor, erythema, increased warmth, tenderness, induration, fluctuance, nor crepitus    LABS/ CULTURES/ RADIOLOGY:                        13.6   8.60  )-----------( 176      ( 14 Sep 2023 07:07 )             46.2       141  |  99  |  33  ----------------------------<  97      [09-14-23 @ 07:07]  4.3   |  22  |  9.14        Ca     9.5     [09-14-23 @ 07:07]      Phos  6.7     [09-14-23 @ 07:07]    TPro  8.1  /  Alb  3.9  /  TBili  0.3  /  DBili  x   /  AST  25  /  ALT  12  /  AlkPhos  133  [09-14-23 @ 07:07]    PT/INR: PT 41.3 , INR 3.91       [09-14-23 @ 07:07]  PTT: 51.3       [09-13-23 @ 10:17]

## 2023-09-14 NOTE — PROGRESS NOTE ADULT - ASSESSMENT
79 y/o M with PMH of ESRD on HD, orthostatic hypotension, COPD on home O2, SAADIA on CPAP, wheelchair bound, chronic diastolic CHF, morbid obesity, pulmonary hypertension, PE and DVT on Coumadin, IVC filter, chronic sacral ulcer, chronic back pain, history of hematochezia in the past 2/2 hemorrhoids, chronic abd pain 2/2 IBS, chronic constipation. Brought in by EMS from home for multiple episodes of bright red blood per rectum over the past week, this AM associated with lightheadedness and feeling near syncopal. Pulmonary called to consult for COPD, SAADIA.

## 2023-09-14 NOTE — PROGRESS NOTE ADULT - ASSESSMENT
The patient is a 78 year old morbidly obese man with PMH of ESRD on HD, orthostatic hypotension (on high dose midodrine), COPD on home O2, SAADIA on CPAP, wheelchair bound, chronic diastolic CHF, pulmonary hypertension, PE and DVT on Coumadin, diverticulosis and hemorrhoids, admitted for rectal bleeding in the setting of supratherapeutic INR.     1. BRBPR. Clinical hx consistent with hemorrhoidal vs. diverticular bleed. Hgb remains normal.   No plans for endoscopic evaluation. Pt would be very high risk.   cont Anusol HC suppositories BID x 2 weeks  bowel regimen to prevent constipation   monitor H&H, transfuse PRN   needs tighter control of INR    2. PE/DVT  on coumadin   PPI for GI ppx     3. Orthostatic hypotension     4. ESRD on HD    5. COPD on home 02      Desiree Castillo M.D.   Gastroenterology and Hepatology  [address] [place], [place]  Office: [phone]  Cell: [phone]

## 2023-09-14 NOTE — PROGRESS NOTE ADULT - SUBJECTIVE AND OBJECTIVE BOX
Date of Service: 09-14-23 @ 13:22    Patient is a 78y old  Male who presents with a chief complaint of     Any change in ROS:  seems OK: no sob:  getting HD  on room air:     MEDICATIONS  (STANDING):  allopurinol 100 milliGRAM(s) Oral daily  budesonide 160 MICROgram(s)/formoterol 4.5 MICROgram(s) Inhaler 2 Puff(s) Inhalation two times a day  cinacalcet 90 milliGRAM(s) Oral <User Schedule>  dextrose 5% + sodium chloride 0.9%. 1000 milliLiter(s) (125 mL/Hr) IV Continuous <Continuous>  dicyclomine 20 milliGRAM(s) Oral three times a day before meals  hydrocortisone hemorrhoidal Suppository 1 Suppository(s) Rectal two times a day  levETIRAcetam 500 milliGRAM(s) Oral two times a day  midodrine. 30 milliGRAM(s) Oral three times a day  pantoprazole    Tablet 40 milliGRAM(s) Oral before breakfast  polyethylene glycol 3350 17 Gram(s) Oral two times a day  pregabalin 100 milliGRAM(s) Oral two times a day  sevelamer carbonate 2400 milliGRAM(s) Oral three times a day  sevelamer carbonate 800 milliGRAM(s) Oral <User Schedule>  tiotropium 2.5 MICROgram(s) Inhaler 2 Puff(s) Inhalation daily    MEDICATIONS  (PRN):  albuterol    90 MICROgram(s) HFA Inhaler 2 Puff(s) Inhalation every 6 hours PRN Bronchospasm    Vital Signs Last 24 Hrs  T(C): 36.1 (14 Sep 2023 12:30), Max: 36.6 (13 Sep 2023 15:27)  T(F): 97 (14 Sep 2023 12:30), Max: 97.9 (13 Sep 2023 15:27)  HR: 67 (14 Sep 2023 12:30) (57 - 86)  BP: 96/48 (14 Sep 2023 12:30) (73/41 - 102/53)  BP(mean): 57 (14 Sep 2023 00:00) (52 - 68)  RR: 17 (14 Sep 2023 12:30) (17 - 22)  SpO2: 96% (14 Sep 2023 12:30) (95% - 99%)    Parameters below as of 14 Sep 2023 12:30  Patient On (Oxygen Delivery Method): nasal cannula  O2 Flow (L/min): 2      I&O's Summary    14 Sep 2023 07:01  -  14 Sep 2023 13:22  --------------------------------------------------------  IN: 0 mL / OUT: 1500 mL / NET: -1500 mL          Physical Exam:   GENERAL: NAD, well-groomed, well-developed  HEENT: JORDIN/   Atraumatic, Normocephalic  ENMT: No tonsillar erythema, exudates, or enlargement; Moist mucous membranes, Good dentition, No lesions  NECK: Supple, No JVD, Normal thyroid  CHEST/LUNG: Clear to auscultaion  CVS: Regular rate and rhythm; No murmurs, rubs, or gallops  GI: : Soft, Nontender, Nondistended; Bowel sounds present  NERVOUS SYSTEM:  Alert & Oriented X3  EXTREMITIES:  - edema  LYMPH: No lymphadenopathy noted  SKIN: No rashes or lesions  ENDOCRINOLOGY: No Thyromegaly  PSYCH: Appropriate    Labs:  28, 26  CARDIAC MARKERS ( 14 Sep 2023 07:07 )  x     / x     / x     / x     / 1.6 ng/mL                            13.6   8.60  )-----------( 176      ( 14 Sep 2023 07:07 )             46.2                         14.5   6.88  )-----------( 196      ( 13 Sep 2023 10:17 )             49.5     09-14    141  |  99  |  33<H>  ----------------------------<  97  4.3   |  22  |  9.14<H>  09-13    137  |  95<L>  |  29<H>  ----------------------------<  91  3.8   |  17<L>  |  8.05<H>    Ca    9.5      14 Sep 2023 07:07  Ca    10.5      13 Sep 2023 10:17  Phos  6.7     09-14    TPro  8.1  /  Alb  3.9  /  TBili  0.3  /  DBili  x   /  AST  25  /  ALT  12  /  AlkPhos  133<H>  09-14  TPro  9.4<H>  /  Alb  3.9  /  TBili  0.4  /  DBili  x   /  AST  26  /  ALT  13  /  AlkPhos  144<H>  09-13    CAPILLARY BLOOD GLUCOSE          LIVER FUNCTIONS - ( 14 Sep 2023 07:07 )  Alb: 3.9 g/dL / Pro: 8.1 g/dL / ALK PHOS: 133 U/L / ALT: 12 U/L / AST: 25 U/L / GGT: x           PT/INR - ( 14 Sep 2023 07:07 )   PT: 41.3 sec;   INR: 3.91 ratio         PTT - ( 13 Sep 2023 10:17 )  PTT:51.3 sec  Urinalysis Basic - ( 14 Sep 2023 07:07 )    Color: x / Appearance: x / SG: x / pH: x  Gluc: 97 mg/dL / Ketone: x  / Bili: x / Urobili: x   Blood: x / Protein: x / Nitrite: x   Leuk Esterase: x / RBC: x / WBC x   Sq Epi: x / Non Sq Epi: x / Bacteria: x            RECENT CULTURES:        RESPIRATORY CULTURES:  rad< from: Xray Chest 1 View- PORTABLE-Urgent (Xray Chest 1 View- PORTABLE-Urgent .) (09.13.23 @ 12:07) >  PROCEDURE DATE:  09/13/2023          INTERPRETATION:  TECHNIQUE: A single AP view of the chest was obtained.   Ordered time:   9/13/2023 12:07 PM    COMPARISON: 7/15/2023    CLINICAL INFORMATION: Abdominal pain. GI bleed on Coumadin.    FINDINGS:    The heart is magnified by technique.  There is linear atelectasis noted at both lung bases.  There are no pleural effusions.  There is no pneumothorax.    IMPRESSION:    Bibasilar linear atelectasis.    --- End of Report ---            HANY FORD MD; Attending Radiologist  This document has been electronically signed. Sep 13 2023  2:40PM    < end of copied text >  < from: CT Angio Chest PE Protocol w/ IV Cont (07.16.23 @ 01:03) >  degrades images.    LUNGS AND AIRWAYS: Patent central airways. Scattered linear opacities,   likely atelectasis. Calcified granulomas.  PLEURA: No pleural effusion.  MEDIASTINUM AND PIOTR: No lymphadenopathy. Calcified mediastinal and hilar   lymph nodes.  VESSELS: Suboptimal contrast opacification of the peripheral pulmonary   arteries. No central pulmonary embolus or secondary signs of right heart   strain. Atherosclerosis. Normal caliber of the thoracic aorta.  HEART: Stable heart size. No pericardial effusion.  CHEST WALL AND LOWER NECK: Again noted, heterogeneous thyroid gland with   calcification.  VISUALIZED UPPER ABDOMEN: Calcified hepatic and splenic granulomas.   Stable bilateral adrenal thickening. Bilateral perinephric stranding,   renal cysts and subcentimeter hypodensities are again noted. Postsurgical   changes at the visualized right abdomen.  BONES: Diffuse chalky sclerosis of the visualized bones, reflecting renal   osteodystrophy. Degenerative changes/paravertebral ossification of the   spine. Stable anterior wedging of the spine.    IMPRESSION:    Suboptimal evaluation of the segmental/subsegmental pulmonary arteries.   No central pulmonary embolus or secondary signs of right heart strain.    --- End of Report ---           RAJESH DOMINIQUE MD; Resident Radiologist  This document has been electronically signed.  JENNIFER MOTA MD; Attending Radiologist    < end of copied text >  < from: CT Abdomen and Pelvis w/ IV Cont (09.13.23 @ 11:18) >  ADRENALS: Similar bilateral adrenal gland thickening.  KIDNEYS/URETERS: Atrophic bilateral kidneys. Bilateral renal   hypodensities, indeterminate on this exam due to extensive streak   artifact. No hydronephrosis.    BLADDER: Underdistended  REPRODUCTIVE ORGANS: Prostate gland calcifications.    BOWEL: No bowel obstruction. Status post right hemicolectomy. No evidence   of intraluminal contrast extravasation.  PERITONEUM: No ascites.  VESSELS: IVC filter. Atherosclerotic changes.  RETROPERITONEUM/LYMPH NODES: No lymphadenopathy.  ABDOMINAL WALL: Rectus diastases with multiple small ventral hernias. One   of these hernias partially containing a loop of nonobstructed large   bowel, and the others contain fat. Small fat-containing right inguinal   hernia.  BONES: Degenerative changes.    IMPRESSION:  Limited study due to streak artifact.    No CT evidence of active GI bleed.    --- End of Report ---    < end of copied text >          Studies  Chest X-RAY  CT SCAN Chest   Venous Dopplers: LE:   CT Abdomen  Others

## 2023-09-14 NOTE — PROGRESS NOTE ADULT - SUBJECTIVE AND OBJECTIVE BOX
CARDIOLOGY FOLLOW UP - Dr. Livingston  DATE OF SERVICE: 9/14/23    CC  No CV complaints  Seen in HD    REVIEW OF SYSTEMS:  CONSTITUTIONAL: No fever, weight loss, or fatigue  RESPIRATORY: No cough, wheezing, chills or hemoptysis; No Shortness of Breath  CARDIOVASCULAR: No chest pain, palpitations, passing out, dizziness, or leg swelling  GASTROINTESTINAL: No abdominal or epigastric pain. No nausea, vomiting, or hematemesis; No diarrhea or constipation. No melena or hematochezia.  VASCULAR: No edema     PHYSICAL EXAM:  T(C): 36.1 (09-14-23 @ 09:26), Max: 36.6 (09-13-23 @ 15:27)  HR: 57 (09-14-23 @ 09:26) (57 - 86)  BP: 102/53 (09-14-23 @ 09:26) (73/41 - 102/53)  RR: 18 (09-14-23 @ 09:26) (17 - 22)  SpO2: 98% (09-14-23 @ 09:26) (95% - 99%)  Wt(kg): --  I&O's Summary      Appearance: Elderly male	  Cardiovascular: Normal S1 S2,RRR, No JVD, No murmurs  Respiratory: Lungs clear to auscultation b/l  Gastrointestinal:  Soft, Non-tender, + BS	  Extremities: Normal range of motion, No clubbing, cyanosis or edema      Home Medications:  Albuterol (Eqv-ProAir HFA) 90 mcg/inh inhalation aerosol: 2 puff(s) inhaled every 6 hours as needed (13 Sep 2023 13:52)  albuterol 2.5 mg/3 mL (0.083%) inhalation solution: 3 milliliter(s) by nebulizer every 6 hours as needed (13 Sep 2023 13:57)  allopurinol 100 mg oral tablet: 1 tab(s) orally once a day (13 Sep 2023 13:52)  cinacalcet 90 mg oral tablet: 1 tab(s) orally 3 times a week (on days of dialysis - Tues, Thurs &amp; Sat) (13 Sep 2023 13:52)  Colace 100 mg oral capsule: 1 cap(s) orally once a day (at bedtime) (13 Sep 2023 13:54)  dicyclomine 20 mg oral tablet: 1 tab(s) orally every 6 hours as needed (13 Sep 2023 13:52)  levETIRAcetam 500 mg oral tablet: 1 tab(s) orally 2 times a day (13 Sep 2023 13:52)  lidocaine 5% topical film: Apply 1 patch to knee and 1 patch to lower back; remove after 12 hours (13 Sep 2023 13:52)  midodrine 10 mg oral tablet: 3 tab(s) orally 4 times a day note: pharmacy has 3 times a day. (13 Sep 2023 13:54)  MiraLax oral powder for reconstitution: 17 gram(s) orally every other day (13 Sep 2023 13:52)  pantoprazole 40 mg oral delayed release tablet: 1 tab(s) orally once a day (13 Sep 2023 13:52)  pregabalin 100 mg oral capsule: 1 cap(s) orally 2 times a day (13 Sep 2023 13:52)  sevelamer carbonate 800 mg oral tablet: 3 tab(s) orally 3 times a day &amp; 1 additional tab(s) with snacks (13 Sep 2023 13:52)  Trelegy Ellipta 100 mcg-62.5 mcg-25 mcg/inh inhalation powder: 1 puff(s) inhaled once a day (13 Sep 2023 13:52)  WARFARIN: Patient alternates between 9mg &amp; 10mg as per INR as per wife. (13 Sep 2023 13:54)      MEDICATIONS  (STANDING):  allopurinol 100 milliGRAM(s) Oral daily  budesonide 160 MICROgram(s)/formoterol 4.5 MICROgram(s) Inhaler 2 Puff(s) Inhalation two times a day  cinacalcet 90 milliGRAM(s) Oral <User Schedule>  dextrose 5% + sodium chloride 0.9%. 1000 milliLiter(s) (125 mL/Hr) IV Continuous <Continuous>  dicyclomine 20 milliGRAM(s) Oral three times a day before meals  hydrocortisone hemorrhoidal Suppository 1 Suppository(s) Rectal two times a day  levETIRAcetam 500 milliGRAM(s) Oral two times a day  midodrine. 30 milliGRAM(s) Oral three times a day  pantoprazole    Tablet 40 milliGRAM(s) Oral before breakfast  polyethylene glycol 3350 17 Gram(s) Oral two times a day  pregabalin 100 milliGRAM(s) Oral two times a day  sevelamer carbonate 2400 milliGRAM(s) Oral three times a day  sevelamer carbonate 800 milliGRAM(s) Oral <User Schedule>  tiotropium 2.5 MICROgram(s) Inhaler 2 Puff(s) Inhalation daily      TELEMETRY: 	    ECG:  	  RADIOLOGY:   DIAGNOSTIC TESTING:  [ ] Echocardiogram:  [ ]  Catheterization:  [ ] Stress Test:    OTHER: 	    LABS:	 	    CKMB Units: 1.6 ng/mL [0.0 - 6.7] (09-14 @ 07:07)  Troponin T, High Sensitivity Result: 175 ng/L [0 - 51] (09-13 @ 10:17)                          13.6   8.60  )-----------( 176      ( 14 Sep 2023 07:07 )             46.2     09-14    141  |  99  |  33<H>  ----------------------------<  97  4.3   |  22  |  9.14<H>    Ca    9.5      14 Sep 2023 07:07  Phos  6.7     09-14    TPro  8.1  /  Alb  3.9  /  TBili  0.3  /  DBili  x   /  AST  25  /  ALT  12  /  AlkPhos  133<H>  09-14    PT/INR - ( 14 Sep 2023 07:07 )   PT: 41.3 sec;   INR: 3.91 ratio         PTT - ( 13 Sep 2023 10:17 )  PTT:51.3 sec

## 2023-09-14 NOTE — PROGRESS NOTE ADULT - ASSESSMENT
: 78-year-old male history of ESRD on HD via left upper extremity AV fistula Tuesday Thursday Saturday, last dialysis yesterday, chronic severe orthostatic hypotension on midodrine 30 mg every 6 hours, COPD on 2 L home O2, CHF, pulmonary hypertension, PE and DVT on in the past on Coumadin, IVC filter, chronic sacral ulcer, history of hematochezia in the past, brought in by EMS from home for multiple episodes of bright red blood per rectum over the past week, lightheadedness, syncopal     ESRD on HD ( TTS) via AVF  Outpt unit [facility]  Consent obtained in chart  PT seen and examined on dialysis , tolerating well. BP stable, Access working well  Monitor BMP     HTN  chronic severe orthostatic hypotension on midodrine 30mg q6h  MOnitor closely     CKD-MBD  Check PTH  Tertiary  hyperparathyroidism.   On Sensipar of 60 mg po daily  Monitor Phosphorus and calcium daily.

## 2023-09-14 NOTE — PROGRESS NOTE ADULT - ASSESSMENT
Transthoracic Echocardiogram (03.13.23 @ 09:29) >  CONCLUSIONS: nl LV sys fx   Transthoracic Echocardiogram (01.20.23 @ 17:22) >  CONCLUSIONS: NL LV sys fx     A/p  78 Y M H/O ESRD on HD T/TH, Sat, chronic hypotension, on midodrin 30 mg q6 hr, 2 L NC, CHF, pHTN, PE/HTN, presenting with weakness, fatigue, and rectal bleeding.      #Rectal Bleeding  -I/s/o supratherapeutic INR  -CT no e/o active bleeding  -Per GI, No plans for endoscopic evaluation. Pt would be very high risk.   -Cont to hold warfarin  -Trend INR    #orthostatic hypotension  -Chronic  -cont mido 30 mg every 6 hours   -Echo from 3/2023 with nl lv fxn, valve fxn     #ESRD  -HD per renal     #DVT hx  -Hold coumadin  -Consider switching to DOAC if no c/i    #COPD, PHTN  -stable

## 2023-09-14 NOTE — PATIENT PROFILE ADULT - DO YOU FEEL THREATENED BY OTHERS?
Was the patient seen in the last year in this department? Yes     Does patient have an active prescription for medications requested? Yes     Received Request Via: Pharmacy       FYI: When sending script to pharmacy please put fax # on so they can update their files.     no

## 2023-09-14 NOTE — CONSULT NOTE ADULT - ASSESSMENT
A/P:  78-year-old male history of ESRD on HD via left upper extremity AV fistula Tuesday Thursday Saturday, last dialysis yesterday, chronic severe orthostatic hypotension on midodrine 30 mg every 6 hours, COPD on 2 L home O2, wheelchair bound, chronic diastolic CHF, morbid obesity, pulmonary hypertension, PE and DVT on Coumadin, IVC filter, chronic sacral ulcer, chronic back pain, history of hematochezia in the past 2/2 hemorrhoids, chronic abd pain 2/2 IBS, chronic constipation,  brought in by EMS from home for multiple episodes of bright red blood per rectum over the past week, this am associated with lightheadedness and feeling near syncopal. otn hasnt been able to tolerate HD and sessions have been down to 2-2.5 liters out instead of 3 liters 2/2 Hypotension    Wound Consult requested to assist w/ management of Moisture Dermatitis of Buttocks    Buttocks/ Sacrum  TRIAD BID and prn soiling        Continue w/ attends under pads and Pericare as per protocol  Moisturize intact skin w/ SWEEN cream BID  Nutrition Consult for optimizatio        encourage high quality protein,MVI & Vit C to promote wound healing  Continue turning and positioning w/ offloading assistive devices as per protocol  Waffle Cushion to chair when oob to chair  Continue w/ low air loss pressure redistribution bed surface   Care as per medicine,  remain available as requested  Upon discharge f/u as outpatient at [facility] [address] [place] [address] [phone]  D/w team & RN & attng  Thank you for this consult  Sandee Castorena PA-C [id number]  Nights/ Weekends/ Holidays please call:  General Surgery Consult pager ([phone]) for emergencies  Wound PT for multilayer leg wrapping or VAC issues ([phone])   I spent 55 minutes face to face w/ this pt of which more than 50% of the time was spent counseling & coordinating care of this pt.

## 2023-09-14 NOTE — PROGRESS NOTE ADULT - NUTRITIONAL ASSESSMENT
Problem: Acute on chronic respiratory failure with hypoxia and hypercapnia.   ·  Recommendation:     Problem/Recommendation - 2:  ·  Problem: Chronic obstructive pulmonary disease (COPD).   ·  Recommendation:     Problem/Recommendation - 3:  ·  Problem: SAADIA (obstructive sleep apnea).   ·  Recommendation:     Problem/Recommendation - 4:  ·  Problem: GI bleed.   ·  Recommendation:

## 2023-09-15 DIAGNOSIS — N18.6 END STAGE RENAL DISEASE: ICD-10-CM

## 2023-09-15 LAB
ANION GAP SERPL CALC-SCNC: 15 MMOL/L — SIGNIFICANT CHANGE UP (ref 5–17)
ANION GAP SERPL CALC-SCNC: 24 MMOL/L — HIGH (ref 5–17)
BUN SERPL-MCNC: 29 MG/DL — HIGH (ref 7–23)
BUN SERPL-MCNC: 36 MG/DL — HIGH (ref 7–23)
CALCIUM SERPL-MCNC: 9 MG/DL — SIGNIFICANT CHANGE UP (ref 8.4–10.5)
CALCIUM SERPL-MCNC: 9 MG/DL — SIGNIFICANT CHANGE UP (ref 8.4–10.5)
CHLORIDE SERPL-SCNC: 98 MMOL/L — SIGNIFICANT CHANGE UP (ref 96–108)
CHLORIDE SERPL-SCNC: 99 MMOL/L — SIGNIFICANT CHANGE UP (ref 96–108)
CO2 SERPL-SCNC: 13 MMOL/L — LOW (ref 22–31)
CO2 SERPL-SCNC: 25 MMOL/L — SIGNIFICANT CHANGE UP (ref 22–31)
CREAT SERPL-MCNC: 7.87 MG/DL — HIGH (ref 0.5–1.3)
CREAT SERPL-MCNC: 8.92 MG/DL — HIGH (ref 0.5–1.3)
EGFR: 6 ML/MIN/1.73M2 — LOW
EGFR: 7 ML/MIN/1.73M2 — LOW
GLUCOSE SERPL-MCNC: 83 MG/DL — SIGNIFICANT CHANGE UP (ref 70–99)
GLUCOSE SERPL-MCNC: 84 MG/DL — SIGNIFICANT CHANGE UP (ref 70–99)
HCT VFR BLD CALC: 49.1 % — SIGNIFICANT CHANGE UP (ref 39–50)
HGB BLD-MCNC: 14.5 G/DL — SIGNIFICANT CHANGE UP (ref 13–17)
INR BLD: 2.42 RATIO — HIGH (ref 0.85–1.18)
MCHC RBC-ENTMCNC: 27.4 PG — SIGNIFICANT CHANGE UP (ref 27–34)
MCHC RBC-ENTMCNC: 29.5 GM/DL — LOW (ref 32–36)
MCV RBC AUTO: 92.8 FL — SIGNIFICANT CHANGE UP (ref 80–100)
MRSA PCR RESULT.: SIGNIFICANT CHANGE UP
NRBC # BLD: 0 /100 WBCS — SIGNIFICANT CHANGE UP (ref 0–0)
PLATELET # BLD AUTO: 198 K/UL — SIGNIFICANT CHANGE UP (ref 150–400)
POTASSIUM SERPL-MCNC: 4.6 MMOL/L — SIGNIFICANT CHANGE UP (ref 3.5–5.3)
POTASSIUM SERPL-MCNC: 5.5 MMOL/L — HIGH (ref 3.5–5.3)
POTASSIUM SERPL-SCNC: 4.6 MMOL/L — SIGNIFICANT CHANGE UP (ref 3.5–5.3)
POTASSIUM SERPL-SCNC: 5.5 MMOL/L — HIGH (ref 3.5–5.3)
PROTHROM AB SERPL-ACNC: 25.9 SEC — HIGH (ref 9.5–13)
RBC # BLD: 5.29 M/UL — SIGNIFICANT CHANGE UP (ref 4.2–5.8)
RBC # FLD: 17.4 % — HIGH (ref 10.3–14.5)
S AUREUS DNA NOSE QL NAA+PROBE: SIGNIFICANT CHANGE UP
SODIUM SERPL-SCNC: 135 MMOL/L — SIGNIFICANT CHANGE UP (ref 135–145)
SODIUM SERPL-SCNC: 139 MMOL/L — SIGNIFICANT CHANGE UP (ref 135–145)
WBC # BLD: 8.07 K/UL — SIGNIFICANT CHANGE UP (ref 3.8–10.5)
WBC # FLD AUTO: 8.07 K/UL — SIGNIFICANT CHANGE UP (ref 3.8–10.5)

## 2023-09-15 RX ORDER — CHLORHEXIDINE GLUCONATE 213 G/1000ML
1 SOLUTION TOPICAL
Refills: 0 | Status: DISCONTINUED | OUTPATIENT
Start: 2023-09-15 | End: 2023-09-22

## 2023-09-15 RX ADMIN — CHLORHEXIDINE GLUCONATE 1 APPLICATION(S): 213 SOLUTION TOPICAL at 12:31

## 2023-09-15 RX ADMIN — SODIUM CHLORIDE 125 MILLILITER(S): 9 INJECTION, SOLUTION INTRAVENOUS at 06:33

## 2023-09-15 RX ADMIN — LEVETIRACETAM 500 MILLIGRAM(S): 250 TABLET, FILM COATED ORAL at 05:29

## 2023-09-15 RX ADMIN — SEVELAMER CARBONATE 2400 MILLIGRAM(S): 2400 POWDER, FOR SUSPENSION ORAL at 13:28

## 2023-09-15 RX ADMIN — POLYETHYLENE GLYCOL 3350 17 GRAM(S): 17 POWDER, FOR SOLUTION ORAL at 05:29

## 2023-09-15 RX ADMIN — Medication 100 MILLIGRAM(S): at 05:28

## 2023-09-15 RX ADMIN — MIDODRINE HYDROCHLORIDE 30 MILLIGRAM(S): 2.5 TABLET ORAL at 05:28

## 2023-09-15 RX ADMIN — Medication 100 MILLIGRAM(S): at 12:30

## 2023-09-15 RX ADMIN — Medication 20 MILLIGRAM(S): at 05:30

## 2023-09-15 RX ADMIN — TIOTROPIUM BROMIDE 2 PUFF(S): 18 CAPSULE ORAL; RESPIRATORY (INHALATION) at 12:30

## 2023-09-15 RX ADMIN — SEVELAMER CARBONATE 2400 MILLIGRAM(S): 2400 POWDER, FOR SUSPENSION ORAL at 05:29

## 2023-09-15 RX ADMIN — PANTOPRAZOLE SODIUM 40 MILLIGRAM(S): 20 TABLET, DELAYED RELEASE ORAL at 05:29

## 2023-09-15 RX ADMIN — BUDESONIDE AND FORMOTEROL FUMARATE DIHYDRATE 2 PUFF(S): 160; 4.5 AEROSOL RESPIRATORY (INHALATION) at 17:06

## 2023-09-15 RX ADMIN — Medication 20 MILLIGRAM(S): at 12:30

## 2023-09-15 RX ADMIN — Medication 20 MILLIGRAM(S): at 16:47

## 2023-09-15 RX ADMIN — SEVELAMER CARBONATE 800 MILLIGRAM(S): 2400 POWDER, FOR SUSPENSION ORAL at 17:09

## 2023-09-15 RX ADMIN — MIDODRINE HYDROCHLORIDE 30 MILLIGRAM(S): 2.5 TABLET ORAL at 17:00

## 2023-09-15 RX ADMIN — SODIUM CHLORIDE 125 MILLILITER(S): 9 INJECTION, SOLUTION INTRAVENOUS at 12:31

## 2023-09-15 RX ADMIN — BUDESONIDE AND FORMOTEROL FUMARATE DIHYDRATE 2 PUFF(S): 160; 4.5 AEROSOL RESPIRATORY (INHALATION) at 05:29

## 2023-09-15 RX ADMIN — MIDODRINE HYDROCHLORIDE 30 MILLIGRAM(S): 2.5 TABLET ORAL at 12:30

## 2023-09-15 RX ADMIN — Medication 1 SUPPOSITORY(S): at 06:24

## 2023-09-15 RX ADMIN — LEVETIRACETAM 500 MILLIGRAM(S): 250 TABLET, FILM COATED ORAL at 17:06

## 2023-09-15 RX ADMIN — Medication 100 MILLIGRAM(S): at 17:05

## 2023-09-15 NOTE — PROGRESS NOTE ADULT - SUBJECTIVE AND OBJECTIVE BOX
Chief Complaint:  Patient is a 78y old  Male who presents with a chief complaint of     Date of service 09-15-23 @ 13:35      Interval Events:   H&H stable    Hospital Medications:  albuterol    90 MICROgram(s) HFA Inhaler 2 Puff(s) Inhalation every 6 hours PRN  allopurinol 100 milliGRAM(s) Oral daily  budesonide 160 MICROgram(s)/formoterol 4.5 MICROgram(s) Inhaler 2 Puff(s) Inhalation two times a day  chlorhexidine 2% Cloths 1 Application(s) Topical <User Schedule>  cinacalcet 90 milliGRAM(s) Oral <User Schedule>  dextrose 5% + sodium chloride 0.9%. 1000 milliLiter(s) IV Continuous <Continuous>  dicyclomine 20 milliGRAM(s) Oral three times a day before meals  hydrocortisone hemorrhoidal Suppository 1 Suppository(s) Rectal two times a day  levETIRAcetam 500 milliGRAM(s) Oral two times a day  midodrine. 30 milliGRAM(s) Oral three times a day  pantoprazole    Tablet 40 milliGRAM(s) Oral before breakfast  polyethylene glycol 3350 17 Gram(s) Oral two times a day  pregabalin 100 milliGRAM(s) Oral two times a day  sevelamer carbonate 2400 milliGRAM(s) Oral three times a day  sevelamer carbonate 800 milliGRAM(s) Oral <User Schedule>  tiotropium 2.5 MICROgram(s) Inhaler 2 Puff(s) Inhalation daily        Review of Systems:  General:  No wt loss, fevers, chills, night sweats, fatigue,   Eyes:  Good vision, no reported pain  ENT:  No sore throat, pain, runny nose, dysphagia  CV:  No pain, palpitations, hypo/hypertension  Resp:  No dyspnea, cough, tachypnea, wheezing  GI:  See HPI  :  No pain, bleeding, incontinence, nocturia  Muscle:  No pain, weakness  Neuro:  No weakness, tingling, memory problems  Psych:  No fatigue, insomnia, mood problems, depression  Endocrine:  No polyuria, polydipsia, cold/heat intolerance  Heme:  No petechiae, ecchymosis, easy bruisability  Integumentary:  No rash, edema    PHYSICAL EXAM:   Vital Signs:  Vital Signs Last 24 Hrs  T(C): 36.3 (15 Sep 2023 09:08), Max: 36.8 (14 Sep 2023 18:32)  T(F): 97.4 (15 Sep 2023 09:08), Max: 98.2 (14 Sep 2023 18:32)  HR: 71 (15 Sep 2023 09:46) (60 - 71)  BP: 90/49 (15 Sep 2023 09:08) (90/49 - 107/66)  BP(mean): --  RR: 18 (15 Sep 2023 09:08) (17 - 18)  SpO2: 100% (15 Sep 2023 09:46) (92% - 100%)    Parameters below as of 15 Sep 2023 09:08  Patient On (Oxygen Delivery Method): nasal cannula  O2 Flow (L/min): 2    Daily     Daily       PHYSICAL EXAM:     GENERAL:  Appears stated age, well-groomed, well-nourished, no distress  HEENT:  NC/AT,  conjunctivae anicteric, clear and pink,   NECK: supple, trachea midline  CHEST:  Full & symmetric excursion, no increased effort, breath sounds clear  HEART:  Regular rhythm, no JVD  ABDOMEN:  Soft, non-tender, non-distended, normoactive bowel sounds,  no masses , no hepatosplenomegaly  EXTREMITIES:  no cyanosis,clubbing or edema  SKIN:  No rash, erythema, or, ecchymoses, no jaundice  NEURO:  Alert, non-focal, no asterixis  PSYCH: Appropriate affect, oriented to place and time  RECTAL: Deferred      LABS Personally reviewed by me:                        14.5   8.07  )-----------( 198      ( 15 Sep 2023 09:50 )             49.1     Mean Cell Volume: 92.8 fl (09-15-23 @ 09:50)    09-15    135  |  98  |  29<H>  ----------------------------<  84  5.5<H>   |  13<L>  |  7.87<H>    Ca    9.0      15 Sep 2023 09:50  Phos  6.7     09-14    TPro  8.1  /  Alb  3.9  /  TBili  0.3  /  DBili  x   /  AST  25  /  ALT  12  /  AlkPhos  133<H>  09-14    LIVER FUNCTIONS - ( 14 Sep 2023 07:07 )  Alb: 3.9 g/dL / Pro: 8.1 g/dL / ALK PHOS: 133 U/L / ALT: 12 U/L / AST: 25 U/L / GGT: x           PT/INR - ( 15 Sep 2023 09:50 )   PT: 25.9 sec;   INR: 2.42 ratio           Urinalysis Basic - ( 15 Sep 2023 09:50 )    Color: x / Appearance: x / SG: x / pH: x  Gluc: 84 mg/dL / Ketone: x  / Bili: x / Urobili: x   Blood: x / Protein: x / Nitrite: x   Leuk Esterase: x / RBC: x / WBC x   Sq Epi: x / Non Sq Epi: x / Bacteria: x                              14.5   8.07  )-----------( 198      ( 15 Sep 2023 09:50 )             49.1                         13.6   8.60  )-----------( 176      ( 14 Sep 2023 07:07 )             46.2                         14.5   6.88  )-----------( 196      ( 13 Sep 2023 10:17 )             49.5       Imaging personally reviewed by me:             Chief Complaint:  Patient is a 78y old  Male who presents with a chief complaint of     Date of service 09-15-23 @ 13:35      Interval Events:   H&H stable, no rectal bleeding    Hospital Medications:  albuterol    90 MICROgram(s) HFA Inhaler 2 Puff(s) Inhalation every 6 hours PRN  allopurinol 100 milliGRAM(s) Oral daily  budesonide 160 MICROgram(s)/formoterol 4.5 MICROgram(s) Inhaler 2 Puff(s) Inhalation two times a day  chlorhexidine 2% Cloths 1 Application(s) Topical <User Schedule>  cinacalcet 90 milliGRAM(s) Oral <User Schedule>  dextrose 5% + sodium chloride 0.9%. 1000 milliLiter(s) IV Continuous <Continuous>  dicyclomine 20 milliGRAM(s) Oral three times a day before meals  hydrocortisone hemorrhoidal Suppository 1 Suppository(s) Rectal two times a day  levETIRAcetam 500 milliGRAM(s) Oral two times a day  midodrine. 30 milliGRAM(s) Oral three times a day  pantoprazole    Tablet 40 milliGRAM(s) Oral before breakfast  polyethylene glycol 3350 17 Gram(s) Oral two times a day  pregabalin 100 milliGRAM(s) Oral two times a day  sevelamer carbonate 2400 milliGRAM(s) Oral three times a day  sevelamer carbonate 800 milliGRAM(s) Oral <User Schedule>  tiotropium 2.5 MICROgram(s) Inhaler 2 Puff(s) Inhalation daily        Review of Systems:  General:  No wt loss, fevers, chills, night sweats, fatigue,   Eyes:  Good vision, no reported pain  ENT:  No sore throat, pain, runny nose, dysphagia  CV:  No pain, palpitations, hypo/hypertension  Resp:  No dyspnea, cough, tachypnea, wheezing  GI:  See HPI  :  No pain, bleeding, incontinence, nocturia  Muscle:  No pain, weakness  Neuro:  No weakness, tingling, memory problems  Psych:  No fatigue, insomnia, mood problems, depression  Endocrine:  No polyuria, polydipsia, cold/heat intolerance  Heme:  No petechiae, ecchymosis, easy bruisability  Integumentary:  No rash, edema    PHYSICAL EXAM:   Vital Signs:  Vital Signs Last 24 Hrs  T(C): 36.3 (15 Sep 2023 09:08), Max: 36.8 (14 Sep 2023 18:32)  T(F): 97.4 (15 Sep 2023 09:08), Max: 98.2 (14 Sep 2023 18:32)  HR: 71 (15 Sep 2023 09:46) (60 - 71)  BP: 90/49 (15 Sep 2023 09:08) (90/49 - 107/66)  BP(mean): --  RR: 18 (15 Sep 2023 09:08) (17 - 18)  SpO2: 100% (15 Sep 2023 09:46) (92% - 100%)    Parameters below as of 15 Sep 2023 09:08  Patient On (Oxygen Delivery Method): nasal cannula  O2 Flow (L/min): 2    Daily     Daily       PHYSICAL EXAM:     GENERAL:  Appears stated age, well-groomed, well-nourished, no distress  HEENT:  NC/AT,  conjunctivae anicteric, clear and pink,   NECK: supple, trachea midline  CHEST:  Full & symmetric excursion, no increased effort, breath sounds clear  HEART:  Regular rhythm, no JVD  ABDOMEN:  Soft, non-tender, non-distended, normoactive bowel sounds,  no masses , no hepatosplenomegaly  EXTREMITIES:  no cyanosis,clubbing or edema  SKIN:  No rash, erythema, or, ecchymoses, no jaundice  NEURO:  Alert, non-focal, no asterixis  PSYCH: Appropriate affect, oriented to place and time  RECTAL: Deferred      LABS Personally reviewed by me:                        14.5   8.07  )-----------( 198      ( 15 Sep 2023 09:50 )             49.1     Mean Cell Volume: 92.8 fl (09-15-23 @ 09:50)    09-15    135  |  98  |  29<H>  ----------------------------<  84  5.5<H>   |  13<L>  |  7.87<H>    Ca    9.0      15 Sep 2023 09:50  Phos  6.7     09-14    TPro  8.1  /  Alb  3.9  /  TBili  0.3  /  DBili  x   /  AST  25  /  ALT  12  /  AlkPhos  133<H>  09-14    LIVER FUNCTIONS - ( 14 Sep 2023 07:07 )  Alb: 3.9 g/dL / Pro: 8.1 g/dL / ALK PHOS: 133 U/L / ALT: 12 U/L / AST: 25 U/L / GGT: x           PT/INR - ( 15 Sep 2023 09:50 )   PT: 25.9 sec;   INR: 2.42 ratio           Urinalysis Basic - ( 15 Sep 2023 09:50 )    Color: x / Appearance: x / SG: x / pH: x  Gluc: 84 mg/dL / Ketone: x  / Bili: x / Urobili: x   Blood: x / Protein: x / Nitrite: x   Leuk Esterase: x / RBC: x / WBC x   Sq Epi: x / Non Sq Epi: x / Bacteria: x                              14.5   8.07  )-----------( 198      ( 15 Sep 2023 09:50 )             49.1                         13.6   8.60  )-----------( 176      ( 14 Sep 2023 07:07 )             46.2                         14.5   6.88  )-----------( 196      ( 13 Sep 2023 10:17 )             49.5       Imaging personally reviewed by me:

## 2023-09-15 NOTE — PROGRESS NOTE ADULT - ASSESSMENT
: 78-year-old male history of ESRD on HD via left upper extremity AV fistula Tuesday Thursday Saturday, last dialysis yesterday, chronic severe orthostatic hypotension on midodrine 30 mg every 6 hours, COPD on 2 L home O2, CHF, pulmonary hypertension, PE and DVT on in the past on Coumadin, IVC filter, chronic sacral ulcer, history of hematochezia in the past, brought in by EMS from home for multiple episodes of bright red blood per rectum over the past week, lightheadedness, syncopal     ESRD on HD ( TTS) via AVF  Outpt unit [facility]  Consent obtained in chart  HD 9/14/23, UF 1.5L.  BP marginal during HD - pt asymptomatic and baseline BP.  Midodrine pre HD to allow UF.  Monitor BMP     HTN  Marginal  chronic severe orthostatic hypotension on midodrine 30mg q6h  Monitor closely     Hyperkalemmia:  K high today.  In setting of RF and acidosis.  S/P HD yesterday.  Low K diet.  Lokelma 10gm x1 dose.    CKD-MBD    Tertiary  hyperparathyroidism.   On Sensipar of 60 mg po daily  Ca WNL.  PO4 high.  Low PO4 diet.  Continue Renvela with meals.  Monitor Phosphorus and calcium daily.     BRRB  GI following.  No plans for endoscopy at this time. : 78-year-old male history of ESRD on HD via left upper extremity AV fistula Tuesday Thursday Saturday, last dialysis yesterday, chronic severe orthostatic hypotension on midodrine 30 mg every 6 hours, COPD on 2 L home O2, CHF, pulmonary hypertension, PE and DVT on in the past on Coumadin, IVC filter, chronic sacral ulcer, history of hematochezia in the past, brought in by EMS from home for multiple episodes of bright red blood per rectum over the past week, lightheadedness, syncopal     ESRD on HD ( TTS) via AVF  Outpt unit [facility]  Consent obtained in chart  HD 9/14/23, UF 1.5L.  BP marginal during HD - pt asymptomatic and baseline BP.  Midodrine pre HD to allow UF.  Monitor BMP     HTN  Marginal  chronic severe orthostatic hypotension on midodrine 30mg q6h  Monitor closely     Hyperkalemmia:  K high today.--hemolyzed sample--repeat BMP . Please call nephrology if still hyperkalemic  In setting of RF and acidosis.  S/P HD yesterday.  Low K diet.      CKD-MBD    Tertiary  hyperparathyroidism.   On Sensipar of 60 mg po daily  Ca WNL.  PO4 high.  Low PO4 diet.  Continue Renvela with meals.  Monitor Phosphorus and calcium daily.     BRRB  GI following.  No plans for endoscopy at this time.

## 2023-09-15 NOTE — PHYSICAL THERAPY INITIAL EVALUATION ADULT - SITTING BALANCE: DYNAMIC
EXAM DESCRIPTION: 

Head



CLINICAL HISTORY: 

expressive aphasia x 24 hours



COMPARISON: 

None



TECHNIQUE: 

Noncontrast transaxial CT images of the head are obtained from

base to vertex. 

This exam was performed according to our departmental

dose-optimization program, which includes automated exposure

control, adjustment of the mA and/or kV according to patient size

and/or use of iterative reconstruction technique.



FINDINGS: 

The midline structures are not displaced. Sulci are

age-appropriate. There are areas of decreased attenuation in the

periventricular white matter and the white matter of the centrum

semiovale.

There is no evidence of mass, mass-effect, hydrocephalus, or

acute intracranial hemorrhage. No abnormal extra axial fluid

collection is seen.

Bone windows show no evidence of depressed skull fracture. 

The visualized paranasal sinuses are unremarkable.



IMPRESSION: 

1.  Age-appropriate atrophy with evidence of old small vessel

ischemic type changes seen.

2.  No acute abnormality is seen on noncontrast CT of the head.



Electronically signed by:  Rory Saldaña MD  9/26/2019 12:57 PM CDT

Workstation: [id number]
good balance

## 2023-09-15 NOTE — PROGRESS NOTE ADULT - ASSESSMENT
Transthoracic Echocardiogram (03.13.23 @ 09:29) >  CONCLUSIONS: nl LV sys fx   Transthoracic Echocardiogram (01.20.23 @ 17:22) >  CONCLUSIONS: NL LV sys fx     A/p  78 Y M H/O ESRD on HD T/TH, Sat, chronic hypotension, on midodrin 30 mg q6 hr, 2 L NC, CHF, pHTN, PE/HTN, presenting with weakness, fatigue, and rectal bleeding.      #Rectal Bleeding  -I/s/o supratherapeutic INR  -CT no e/o active bleeding  -Per GI, No plans for endoscopic evaluation. Pt would be very high risk.   -Cont to hold warfarin  -Trend INR    #orthostatic hypotension  -Chronic  -cont mido 30 mg every 6 hours   -Echo from 3/2023 with nl lv fxn, valve fxn     #ESRD  -HD per renal     #DVT hx  -Hold coumadin  -Patient not interested in switching to DOAC    #COPD, PHTN  -stable

## 2023-09-15 NOTE — PROGRESS NOTE ADULT - SUBJECTIVE AND OBJECTIVE BOX
[facility]   MD CHESTER DUEÑAS MD KRISTINE SOLTANPOUR, DO ANGELA WONG, PA    TEL:  OFFICE: [phone]  From 5pm-7am Answering Service [phone]    -- RENAL FOLLOW UP NOTE ---Date of Service 09-15-23 @ 16:21    Patient is a 78y old  Male who presents with a chief complaint of     Patient seen and examined at bedside. No chest pain/sob    VITALS:  T(F): 97.5 (09-15-23 @ 16:00), Max: 98.2 (09-14-23 @ 18:32)  HR: 71 (09-15-23 @ 16:00)  BP: 92/49 (09-15-23 @ 16:00)  RR: 18 (09-15-23 @ 16:00)  SpO2: 93% (09-15-23 @ 16:00)  Wt(kg): --    09-14 @ 07:01  -  09-15 @ 07:00  --------------------------------------------------------  IN: 0 mL / OUT: 1500 mL / NET: -1500 mL          PHYSICAL EXAM:  General: NAD  Neck: No JVD  Respiratory: CTAB, no wheezes, rales or rhonchi  Cardiovascular: S1, S2, RRR  Gastrointestinal: BS+, soft, NT/ND  Extremities: + trace b/l LE [facility] Medications:   MEDICATIONS  (STANDING):  allopurinol 100 milliGRAM(s) Oral daily  budesonide 160 MICROgram(s)/formoterol 4.5 MICROgram(s) Inhaler 2 Puff(s) Inhalation two times a day  chlorhexidine 2% Cloths 1 Application(s) Topical <User Schedule>  cinacalcet 90 milliGRAM(s) Oral <User Schedule>  dextrose 5% + sodium chloride 0.9%. 1000 milliLiter(s) (125 mL/Hr) IV Continuous <Continuous>  dicyclomine 20 milliGRAM(s) Oral three times a day before meals  hydrocortisone hemorrhoidal Suppository 1 Suppository(s) Rectal two times a day  levETIRAcetam 500 milliGRAM(s) Oral two times a day  midodrine. 30 milliGRAM(s) Oral three times a day  pantoprazole    Tablet 40 milliGRAM(s) Oral before breakfast  polyethylene glycol 3350 17 Gram(s) Oral two times a day  pregabalin 100 milliGRAM(s) Oral two times a day  sevelamer carbonate 2400 milliGRAM(s) Oral three times a day  sevelamer carbonate 800 milliGRAM(s) Oral <User Schedule>  tiotropium 2.5 MICROgram(s) Inhaler 2 Puff(s) Inhalation daily      LABS:  09-15    135  |  98  |  29<H>  ----------------------------<  84  5.5<H>   |  13<L>  |  7.87<H>    Ca    9.0      15 Sep 2023 09:50  Phos  6.7     09-14    TPro  8.1  /  Alb  3.9  /  TBili  0.3  /  DBili      /  AST  25  /  ALT  12  /  AlkPhos  133<H>  09-14    Creatinine Trend: 7.87 <--, 9.14 <--, 8.05 <--                                14.5   8.07  )-----------( 198      ( 15 Sep 2023 09:50 )             49.1     Urine Studies:  Urinalysis - [09-15-23 @ 09:50]      Color  / Appearance  / SG  / pH       Gluc 84 / Ketone   / Bili  / Urobili        Blood  / Protein  / Leuk Est  / Nitrite       RBC  / WBC  / Hyaline  / Gran  / Sq Epi  / Non Sq Epi  / Bacteria       Iron 36, TIBC --, %sat --      [09-14-23 @ 07:07]  Ferritin 476      [09-14-23 @ 07:07]  PTH -- (Ca 8.8)      [09-14-23 @ 07:07]   955  PTH -- (Ca --)      [01-20-23 @ 06:35]   630  TSH 0.92      [02-24-23 @ 13:07]        RADIOLOGY & ADDITIONAL STUDIES:

## 2023-09-15 NOTE — PROGRESS NOTE ADULT - SUBJECTIVE AND OBJECTIVE BOX
Date of Service: 09-15-23 @ 11:23    Patient is a 78y old  Male who presents with a chief complaint of     Any change in ROS:   No new respiratory events overnight. Denies SOB/CP.  O2 sats 95% 2LNC    MEDICATIONS  (STANDING):  allopurinol 100 milliGRAM(s) Oral daily  budesonide 160 MICROgram(s)/formoterol 4.5 MICROgram(s) Inhaler 2 Puff(s) Inhalation two times a day  cinacalcet 90 milliGRAM(s) Oral <User Schedule>  dextrose 5% + sodium chloride 0.9%. 1000 milliLiter(s) (125 mL/Hr) IV Continuous <Continuous>  dicyclomine 20 milliGRAM(s) Oral three times a day before meals  hydrocortisone hemorrhoidal Suppository 1 Suppository(s) Rectal two times a day  levETIRAcetam 500 milliGRAM(s) Oral two times a day  midodrine. 30 milliGRAM(s) Oral three times a day  pantoprazole    Tablet 40 milliGRAM(s) Oral before breakfast  polyethylene glycol 3350 17 Gram(s) Oral two times a day  pregabalin 100 milliGRAM(s) Oral two times a day  sevelamer carbonate 2400 milliGRAM(s) Oral three times a day  sevelamer carbonate 800 milliGRAM(s) Oral <User Schedule>  tiotropium 2.5 MICROgram(s) Inhaler 2 Puff(s) Inhalation daily    MEDICATIONS  (PRN):  albuterol    90 MICROgram(s) HFA Inhaler 2 Puff(s) Inhalation every 6 hours PRN Bronchospasm    Vital Signs Last 24 Hrs  T(C): 36.3 (15 Sep 2023 09:08), Max: 36.8 (14 Sep 2023 18:32)  T(F): 97.4 (15 Sep 2023 09:08), Max: 98.2 (14 Sep 2023 18:32)  HR: 69 (15 Sep 2023 09:08) (60 - 70)  BP: 90/49 (15 Sep 2023 09:08) (90/49 - 107/66)  BP(mean): --  RR: 18 (15 Sep 2023 09:08) (17 - 18)  SpO2: 92% (15 Sep 2023 09:08) (92% - 100%)    Parameters below as of 15 Sep 2023 09:08  Patient On (Oxygen Delivery Method): nasal cannula  O2 Flow (L/min): 2      I&O's Summary    14 Sep 2023 07:01  -  15 Sep 2023 07:00  --------------------------------------------------------  IN: 0 mL / OUT: 1500 mL / NET: -1500 mL      Physical Exam:   GENERAL: NAD  HEENT: JORDIN  ENMT: No tonsillar erythema, exudates, or enlargement  NECK: Supple, No JVD  CHEST/LUNG: Decreased BS   CVS: Regular rate and rhythm  GI: : Soft, Nontender, Nondistended  NERVOUS SYSTEM:  Alert & Oriented X3  EXTREMITIES:  2+ Peripheral Pulses, No clubbing, cyanosis, or edema  SKIN: No rashes or lesions  PSYCH: Appropriate    Labs:  28, 26  CARDIAC MARKERS ( 14 Sep 2023 07:07 )  x     / x     / x     / x     / 1.6 ng/mL                            14.5   8.07  )-----------( 198      ( 15 Sep 2023 09:50 )             49.1                         13.6   8.60  )-----------( 176      ( 14 Sep 2023 07:07 )             46.2                         14.5   6.88  )-----------( 196      ( 13 Sep 2023 10:17 )             49.5     09-15    135  |  98  |  29<H>  ----------------------------<  84  5.5<H>   |  13<L>  |  7.87<H>  09-14    141  |  99  |  33<H>  ----------------------------<  97  4.3   |  22  |  9.14<H>  09-13    137  |  95<L>  |  29<H>  ----------------------------<  91  3.8   |  17<L>  |  8.05<H>    Ca    9.0      15 Sep 2023 09:50  Ca    9.5      14 Sep 2023 07:07  Phos  6.7     09-14    TPro  8.1  /  Alb  3.9  /  TBili  0.3  /  DBili  x   /  AST  25  /  ALT  12  /  AlkPhos  133<H>  09-14  TPro  9.4<H>  /  Alb  3.9  /  TBili  0.4  /  DBili  x   /  AST  26  /  ALT  13  /  AlkPhos  144<H>  09-13    CAPILLARY BLOOD GLUCOSE          LIVER FUNCTIONS - ( 14 Sep 2023 07:07 )  Alb: 3.9 g/dL / Pro: 8.1 g/dL / ALK PHOS: 133 U/L / ALT: 12 U/L / AST: 25 U/L / GGT: x           PT/INR - ( 15 Sep 2023 09:50 )   PT: 25.9 sec;   INR: 2.42 ratio           Urinalysis Basic - ( 15 Sep 2023 09:50 )    Color: x / Appearance: x / SG: x / pH: x  Gluc: 84 mg/dL / Ketone: x  / Bili: x / Urobili: x   Blood: x / Protein: x / Nitrite: x   Leuk Esterase: x / RBC: x / WBC x   Sq Epi: x / Non Sq Epi: x / Bacteria: x    Studies  Chest X-RAY< from: Xray Chest 1 View- PORTABLE-Urgent (Xray Chest 1 View- PORTABLE-Urgent .) (09.13.23 @ 12:07) >      INTERPRETATION:  TECHNIQUE: A single AP view of the chest was obtained.   Ordered time:   9/13/2023 12:07 PM    COMPARISON: 7/15/2023    CLINICAL INFORMATION: Abdominal pain. GI bleed on Coumadin.    FINDINGS:    The heart is magnified by technique.  There is linear atelectasis noted at both lung bases.  There are no pleural effusions.  There is no pneumothorax.    IMPRESSION:    Bibasilar linear atelectasis.    --- End of Report ---        < end of copied text >    < from: CT Abdomen and Pelvis w/ IV Cont (09.13.23 @ 11:18) >  FINDINGS:  Study is limited due to streak artifact from overlying patient's upper   extremities.    LOWER CHEST: Left lower lobe calcified granuloma. Bilateral lower lobe   linear atelectasis. Coronary artery calcifications. Partially visualized   small calcified mediastinal nodes.    LIVER: Punctate calcified right hepatic lobe granuloma.  BILE DUCTS: Normal caliber.  GALLBLADDER: Within normal limits.  SPLEEN: Scattered calcified granulomas.  PANCREAS: Within normal limits.  ADRENALS: Similar bilateral adrenal gland thickening.  KIDNEYS/URETERS: Atrophic bilateral kidneys. Bilateral renal   hypodensities, indeterminate on this exam due to extensive streak   artifact. No hydronephrosis.    BLADDER: Underdistended  REPRODUCTIVE ORGANS: Prostate gland calcifications.    BOWEL: No bowel obstruction. Status post right hemicolectomy. No evidence   of intraluminal contrast extravasation.  PERITONEUM: No ascites.  VESSELS: IVC filter. Atherosclerotic changes.  RETROPERITONEUM/LYMPH NODES: No lymphadenopathy.  ABDOMINAL WALL: Rectus diastases with multiple small ventral hernias. One   of these hernias partially containing a loop of nonobstructed large   bowel, and the others contain fat. Small fat-containing right inguinal   hernia.  BONES: Degenerative changes.    IMPRESSION:  Limited study due to streak artifact.    No CT evidence of active GI bleed.    --- End of Report ---    < end of copied text >

## 2023-09-15 NOTE — PROGRESS NOTE ADULT - ASSESSMENT
78-year-old male history of ESRD on HD via left upper extremity AV fistula Tuesday Thursday Saturday, last dialysis yesterday, chronic severe orthostatic hypotension on midodrine 30 mg every 6 hours, COPD on 2 L home O2, wheelchair bound, chronic diastolic CHF, morbid obesity, pulmonary hypertension, PE and DVT on Coumadin, IVC filter, chronic sacral ulcer, chronic back pain, history of hematochezia in the past 2/2 hemorrhoids, chronic abd pain 2/2 IBS, chronic constipation,  brought in by EMS from home for multiple episodes of bright red blood per rectum over the past week, this am associated with lightheadedness and feeling near syncopal. otn hasnt been able to tolerate HD and sessions have been down to 2-2.5 liters out instead of 3 liters 2/2 Hypotension. compliant w meds, wife is the caretaker    ptn has h/o orthostatic hypotension, on HD midodrine  would NOT give IVF, he os NOT hypovolemic  H/H remains stable, blood per rectum 2/2 hemorrhoid, seen by GI. CT A/P is benign,  ptn completed HD session on [facility]  without any complications, renal following  rpt  TTE is stable,   h/o PE/DVT/thrombophlebitis in LE, INR is therapeutic, will hold tonight's coumadin dose. once close to being subtherapeutic will switch to ELIQUIS 5 mg bid and dc coumadin at DC  ptn has a high debility index, he has been awaiting a special air mattress as part of wound care treatment and a motorized wheel chair.  seen by wound care here. needs the recs addressed prior to DC  PT eval

## 2023-09-15 NOTE — PHYSICAL THERAPY INITIAL EVALUATION ADULT - ADDITIONAL COMMENTS
Patient reports he lives with his spouse in a house with ramp to enter. He ambulates very short distances with a rollator and also owns a W/C. He reports he has a HHA 3hours/3 days a week and his spouse also assists.

## 2023-09-15 NOTE — PHYSICAL THERAPY INITIAL EVALUATION ADULT - PERTINENT HX OF CURRENT PROBLEM, REHAB EVAL
78-year-old male history of ESRD on HD via left upper extremity AV, last dialysis yesterday, chronic severe orthostatic hypotension on midodrine 30 mg every 6 hours, COPD on 2 L home O2, wheelchair bound, chronic diastolic CHF, morbid obesity, pulmonary hypertension, PE and DVT on Coumadin, IVC filter, chronic sacral ulcer, chronic back pain, history of hematochezia in the past 2/2 hemorrhoids, chronic abd pain 2/2 IBS, chronic constipation,  brought in by EMS from home for multiple episodes of bright red blood per rectum over the past week, this am associated with lightheadedness and feeling near syncopal. otn hasnt been able to tolerate HD and sessions have been down to 2-2.5 liters out instead of 3 liters 2/2 Hypotension. compliant w meds, wife is the caretaker

## 2023-09-15 NOTE — PROGRESS NOTE ADULT - SUBJECTIVE AND OBJECTIVE BOX
Patient is a 78y old  Male who presents with a chief complaint of     SUBJECTIVE / OVERNIGHT EVENTS: no new events, HD T, TH, Sa, next HD jean-claude. INR has improved, now therapeutic, once subtherapeutic will start ELIQUIS 5 mg bid    MEDICATIONS  (STANDING):  allopurinol 100 milliGRAM(s) Oral daily  budesonide 160 MICROgram(s)/formoterol 4.5 MICROgram(s) Inhaler 2 Puff(s) Inhalation two times a day  chlorhexidine 2% Cloths 1 Application(s) Topical <User Schedule>  cinacalcet 90 milliGRAM(s) Oral <User Schedule>  dextrose 5% + sodium chloride 0.9%. 1000 milliLiter(s) (125 mL/Hr) IV Continuous <Continuous>  dicyclomine 20 milliGRAM(s) Oral three times a day before meals  hydrocortisone hemorrhoidal Suppository 1 Suppository(s) Rectal two times a day  levETIRAcetam 500 milliGRAM(s) Oral two times a day  midodrine. 30 milliGRAM(s) Oral three times a day  pantoprazole    Tablet 40 milliGRAM(s) Oral before breakfast  polyethylene glycol 3350 17 Gram(s) Oral two times a day  pregabalin 100 milliGRAM(s) Oral two times a day  sevelamer carbonate 800 milliGRAM(s) Oral <User Schedule>  sevelamer carbonate 2400 milliGRAM(s) Oral three times a day  tiotropium 2.5 MICROgram(s) Inhaler 2 Puff(s) Inhalation daily    MEDICATIONS  (PRN):  albuterol    90 MICROgram(s) HFA Inhaler 2 Puff(s) Inhalation every 6 hours PRN Bronchospasm      Vital Signs Last 24 Hrs  T(F): 97.5 (09-15-23 @ 16:00), Max: 98.2 (09-14-23 @ 18:32)  HR: 68 (09-15-23 @ 17:00) (60 - 71)  BP: 92/49 (09-15-23 @ 16:00) (90/49 - 107/66)  RR: 18 (09-15-23 @ 16:00) (17 - 18)  SpO2: 100% (09-15-23 @ 17:00) (92% - 100%)  Telemetry:   CAPILLARY BLOOD GLUCOSE        I&O's Summary    14 Sep 2023 07:01  -  15 Sep 2023 07:00  --------------------------------------------------------  IN: 0 mL / OUT: 1500 mL / NET: -1500 mL        PHYSICAL EXAM:  GENERAL: NAD, well-developed  HEAD:  Atraumatic, Normocephalic  EYES: EOMI, PERRLA, conjunctiva and sclera clear  NECK: Supple, No JVD  CHEST/LUNG: Clear to auscultation bilaterally; No wheeze  HEART: Regular rate and rhythm; No murmurs, rubs, or gallops  ABDOMEN: Soft, Nontender, Nondistended; Bowel sounds present  EXTREMITIES:  2+ Peripheral Pulses, No clubbing, cyanosis, or edema  PSYCH: AAOx3  NEUROLOGY: non-focal  SKIN: No rashes or lesions    LABS:                        14.5   8.07  )-----------( 198      ( 15 Sep 2023 09:50 )             49.1     09-15    135  |  98  |  29<H>  ----------------------------<  84  5.5<H>   |  13<L>  |  7.87<H>    Ca    9.0      15 Sep 2023 09:50  Phos  6.7     09-14    TPro  8.1  /  Alb  3.9  /  TBili  0.3  /  DBili  x   /  AST  25  /  ALT  12  /  AlkPhos  133<H>  09-14    PT/INR - ( 15 Sep 2023 09:50 )   PT: 25.9 sec;   INR: 2.42 ratio           CARDIAC MARKERS ( 14 Sep 2023 07:07 )  x     / x     / x     / x     / 1.6 ng/mL      Urinalysis Basic - ( 15 Sep 2023 09:50 )    Color: x / Appearance: x / SG: x / pH: x  Gluc: 84 mg/dL / Ketone: x  / Bili: x / Urobili: x   Blood: x / Protein: x / Nitrite: x   Leuk Esterase: x / RBC: x / WBC x   Sq Epi: x / Non Sq Epi: x / Bacteria: x        RADIOLOGY & ADDITIONAL TESTS:    Imaging Personally Reviewed:    Consultant(s) Notes Reviewed:      Care Discussed with Consultants/Other Providers:

## 2023-09-15 NOTE — PROGRESS NOTE ADULT - ASSESSMENT
The patient is a 78 year old morbidly obese man with PMH of ESRD on HD, orthostatic hypotension (on high dose midodrine), COPD on home O2, SAADIA on CPAP, wheelchair bound, chronic diastolic CHF, pulmonary hypertension, PE and DVT on Coumadin, diverticulosis and hemorrhoids, admitted for rectal bleeding in the setting of supratherapeutic INR.     1. BRBPR. Likely hemorrhoidal vs. diverticular bleed. Hgb remains normal.   No plans for endoscopic evaluation. Pt would be very high risk.   cont Anusol HC suppositories BID x 2 weeks  bowel regimen to prevent constipation   monitor H&H, transfuse PRN   needs tighter control of INR    2. PE/DVT  on coumadin   PPI for GI ppx     3. Orthostatic hypotension     4. ESRD on HD    5. COPD on home 02      Desiree Castillo M.D.   Gastroenterology and Hepatology  [address] [place], [place]  Office: [phone]  Cell: [phone]     The patient is a 78 year old morbidly obese man with PMH of ESRD on HD, orthostatic hypotension (on high dose midodrine), COPD on home O2, SAADIA on CPAP, wheelchair bound, chronic diastolic CHF, pulmonary hypertension, PE and DVT on Coumadin, diverticulosis and hemorrhoids, admitted for rectal bleeding in the setting of supratherapeutic INR.     1. BRBPR, resolved. Likely hemorrhoidal vs. diverticular bleed. Hgb remains normal.   No plans for endoscopic evaluation. Pt would be very high risk.   cont Anusol HC suppositories BID x 2 weeks  bowel regimen to prevent constipation   monitor H&H, transfuse PRN   needs tighter control of INR    2. PE/DVT  on coumadin   PPI for GI ppx     3. Orthostatic hypotension     4. ESRD on HD    5. COPD on home 02      Desiree Castillo M.D.   Gastroenterology and Hepatology  [address] [place], [place]  Office: [phone]  Cell: [phone]

## 2023-09-15 NOTE — PROGRESS NOTE ADULT - SUBJECTIVE AND OBJECTIVE BOX
CARDIOLOGY FOLLOW UP - Dr. Livingston  DATE OF SERVICE: 9/15/23    CC  No CV complaints    REVIEW OF SYSTEMS:  CONSTITUTIONAL: No fever, weight loss, or fatigue  RESPIRATORY: No cough, wheezing, chills or hemoptysis; No Shortness of Breath  CARDIOVASCULAR: No chest pain, palpitations, passing out, dizziness, or leg swelling  GASTROINTESTINAL: No abdominal or epigastric pain. No nausea, vomiting, or hematemesis; No diarrhea or constipation. No melena or hematochezia.  VASCULAR: No edema     PHYSICAL EXAM:  T(C): 36.6 (09-15-23 @ 00:10), Max: 36.8 (09-14-23 @ 18:32)  HR: 62 (09-15-23 @ 06:25) (57 - 70)  BP: 107/66 (09-15-23 @ 00:10) (91/54 - 107/66)  RR: 18 (09-15-23 @ 00:10) (17 - 18)  SpO2: 97% (09-15-23 @ 06:25) (95% - 100%)  Wt(kg): --  I&O's Summary    14 Sep 2023 07:01  -  15 Sep 2023 07:00  --------------------------------------------------------  IN: 0 mL / OUT: 1500 mL / NET: -1500 mL        Appearance: Elderly male	  Cardiovascular: Normal S1 S2,RRR, No JVD, No murmurs  Respiratory: Lungs clear to auscultation b/l  Gastrointestinal:  Soft, Non-tender, + BS	  Extremities: Normal range of motion, No clubbing, cyanosis or edema      Home Medications:  Albuterol (Eqv-ProAir HFA) 90 mcg/inh inhalation aerosol: 2 puff(s) inhaled every 6 hours as needed (13 Sep 2023 13:52)  albuterol 2.5 mg/3 mL (0.083%) inhalation solution: 3 milliliter(s) by nebulizer every 6 hours as needed (13 Sep 2023 13:57)  allopurinol 100 mg oral tablet: 1 tab(s) orally once a day (13 Sep 2023 13:52)  cinacalcet 90 mg oral tablet: 1 tab(s) orally 3 times a week (on days of dialysis - Tues, Thurs &amp; Sat) (13 Sep 2023 13:52)  Colace 100 mg oral capsule: 1 cap(s) orally once a day (at bedtime) (13 Sep 2023 13:54)  dicyclomine 20 mg oral tablet: 1 tab(s) orally every 6 hours as needed (13 Sep 2023 13:52)  levETIRAcetam 500 mg oral tablet: 1 tab(s) orally 2 times a day (13 Sep 2023 13:52)  lidocaine 5% topical film: Apply 1 patch to knee and 1 patch to lower back; remove after 12 hours (13 Sep 2023 13:52)  midodrine 10 mg oral tablet: 3 tab(s) orally 4 times a day note: pharmacy has 3 times a day. (13 Sep 2023 13:54)  MiraLax oral powder for reconstitution: 17 gram(s) orally every other day (13 Sep 2023 13:52)  pantoprazole 40 mg oral delayed release tablet: 1 tab(s) orally once a day (13 Sep 2023 13:52)  pregabalin 100 mg oral capsule: 1 cap(s) orally 2 times a day (13 Sep 2023 13:52)  sevelamer carbonate 800 mg oral tablet: 3 tab(s) orally 3 times a day &amp; 1 additional tab(s) with snacks (13 Sep 2023 13:52)  Trelegy Ellipta 100 mcg-62.5 mcg-25 mcg/inh inhalation powder: 1 puff(s) inhaled once a day (13 Sep 2023 13:52)  WARFARIN: Patient alternates between 9mg &amp; 10mg as per INR as per wife. (13 Sep 2023 13:54)      MEDICATIONS  (STANDING):  allopurinol 100 milliGRAM(s) Oral daily  budesonide 160 MICROgram(s)/formoterol 4.5 MICROgram(s) Inhaler 2 Puff(s) Inhalation two times a day  cinacalcet 90 milliGRAM(s) Oral <User Schedule>  dextrose 5% + sodium chloride 0.9%. 1000 milliLiter(s) (125 mL/Hr) IV Continuous <Continuous>  dicyclomine 20 milliGRAM(s) Oral three times a day before meals  hydrocortisone hemorrhoidal Suppository 1 Suppository(s) Rectal two times a day  levETIRAcetam 500 milliGRAM(s) Oral two times a day  midodrine. 30 milliGRAM(s) Oral three times a day  pantoprazole    Tablet 40 milliGRAM(s) Oral before breakfast  polyethylene glycol 3350 17 Gram(s) Oral two times a day  pregabalin 100 milliGRAM(s) Oral two times a day  sevelamer carbonate 800 milliGRAM(s) Oral <User Schedule>  sevelamer carbonate 2400 milliGRAM(s) Oral three times a day  tiotropium 2.5 MICROgram(s) Inhaler 2 Puff(s) Inhalation daily      TELEMETRY: 	    ECG:  	  RADIOLOGY:   DIAGNOSTIC TESTING:  [ ] Echocardiogram:  [ ]  Catheterization:  [ ] Stress Test:    OTHER: 	    LABS:	 	    CKMB Units: 1.6 ng/mL [0.0 - 6.7] (09-14 @ 07:07)  Troponin T, High Sensitivity Result: 175 ng/L [0 - 51] (09-13 @ 10:17)                          13.6   8.60  )-----------( 176      ( 14 Sep 2023 07:07 )             46.2     09-14    141  |  99  |  33<H>  ----------------------------<  97  4.3   |  22  |  9.14<H>    Ca    9.5      14 Sep 2023 07:07  Phos  6.7     09-14    TPro  8.1  /  Alb  3.9  /  TBili  0.3  /  DBili  x   /  AST  25  /  ALT  12  /  AlkPhos  133<H>  09-14    PT/INR - ( 14 Sep 2023 07:07 )   PT: 41.3 sec;   INR: 3.91 ratio         PTT - ( 13 Sep 2023 10:17 )  PTT:51.3 sec

## 2023-09-16 LAB
ANION GAP SERPL CALC-SCNC: 19 MMOL/L — HIGH (ref 5–17)
BASE EXCESS BLDV CALC-SCNC: 0.6 MMOL/L — SIGNIFICANT CHANGE UP (ref -2–3)
BUN SERPL-MCNC: 28 MG/DL — HIGH (ref 7–23)
CALCIUM SERPL-MCNC: 8.9 MG/DL — SIGNIFICANT CHANGE UP (ref 8.4–10.5)
CHLORIDE SERPL-SCNC: 97 MMOL/L — SIGNIFICANT CHANGE UP (ref 96–108)
CO2 BLDV-SCNC: 28 MMOL/L — HIGH (ref 22–26)
CO2 SERPL-SCNC: 21 MMOL/L — LOW (ref 22–31)
CREAT SERPL-MCNC: 7.58 MG/DL — HIGH (ref 0.5–1.3)
EGFR: 7 ML/MIN/1.73M2 — LOW
GAS PNL BLDV: SIGNIFICANT CHANGE UP
GLUCOSE SERPL-MCNC: 122 MG/DL — HIGH (ref 70–99)
HCO3 BLDV-SCNC: 27 MMOL/L — SIGNIFICANT CHANGE UP (ref 22–29)
HCT VFR BLD CALC: 45.2 % — SIGNIFICANT CHANGE UP (ref 39–50)
HGB BLD-MCNC: 13.8 G/DL — SIGNIFICANT CHANGE UP (ref 13–17)
INR BLD: 1.96 RATIO — HIGH (ref 0.85–1.18)
MCHC RBC-ENTMCNC: 27.3 PG — SIGNIFICANT CHANGE UP (ref 27–34)
MCHC RBC-ENTMCNC: 30.5 GM/DL — LOW (ref 32–36)
MCV RBC AUTO: 89.5 FL — SIGNIFICANT CHANGE UP (ref 80–100)
NRBC # BLD: 0 /100 WBCS — SIGNIFICANT CHANGE UP (ref 0–0)
PCO2 BLDV: 47 MMHG — SIGNIFICANT CHANGE UP (ref 42–55)
PH BLDV: 7.36 — SIGNIFICANT CHANGE UP (ref 7.32–7.43)
PLATELET # BLD AUTO: 233 K/UL — SIGNIFICANT CHANGE UP (ref 150–400)
PO2 BLDV: 99 MMHG — HIGH (ref 25–45)
POTASSIUM SERPL-MCNC: 3.9 MMOL/L — SIGNIFICANT CHANGE UP (ref 3.5–5.3)
POTASSIUM SERPL-SCNC: 3.9 MMOL/L — SIGNIFICANT CHANGE UP (ref 3.5–5.3)
PROTHROM AB SERPL-ACNC: 20.2 SEC — HIGH (ref 9.5–13)
RBC # BLD: 5.05 M/UL — SIGNIFICANT CHANGE UP (ref 4.2–5.8)
RBC # FLD: 16.9 % — HIGH (ref 10.3–14.5)
SAO2 % BLDV: 98.2 % — HIGH (ref 67–88)
SODIUM SERPL-SCNC: 137 MMOL/L — SIGNIFICANT CHANGE UP (ref 135–145)
WBC # BLD: 7.43 K/UL — SIGNIFICANT CHANGE UP (ref 3.8–10.5)
WBC # FLD AUTO: 7.43 K/UL — SIGNIFICANT CHANGE UP (ref 3.8–10.5)

## 2023-09-16 RX ORDER — APIXABAN 2.5 MG/1
5 TABLET, FILM COATED ORAL EVERY 12 HOURS
Refills: 0 | Status: DISCONTINUED | OUTPATIENT
Start: 2023-09-16 | End: 2023-09-22

## 2023-09-16 RX ADMIN — APIXABAN 5 MILLIGRAM(S): 2.5 TABLET, FILM COATED ORAL at 17:27

## 2023-09-16 RX ADMIN — SEVELAMER CARBONATE 800 MILLIGRAM(S): 2400 POWDER, FOR SUSPENSION ORAL at 21:11

## 2023-09-16 RX ADMIN — MIDODRINE HYDROCHLORIDE 30 MILLIGRAM(S): 2.5 TABLET ORAL at 11:35

## 2023-09-16 RX ADMIN — LEVETIRACETAM 500 MILLIGRAM(S): 250 TABLET, FILM COATED ORAL at 06:08

## 2023-09-16 RX ADMIN — Medication 100 MILLIGRAM(S): at 06:08

## 2023-09-16 RX ADMIN — MIDODRINE HYDROCHLORIDE 30 MILLIGRAM(S): 2.5 TABLET ORAL at 06:09

## 2023-09-16 RX ADMIN — CINACALCET 90 MILLIGRAM(S): 30 TABLET, FILM COATED ORAL at 06:08

## 2023-09-16 RX ADMIN — Medication 1 SUPPOSITORY(S): at 06:13

## 2023-09-16 RX ADMIN — Medication 20 MILLIGRAM(S): at 11:35

## 2023-09-16 RX ADMIN — SEVELAMER CARBONATE 2400 MILLIGRAM(S): 2400 POWDER, FOR SUSPENSION ORAL at 06:08

## 2023-09-16 RX ADMIN — Medication 1 SUPPOSITORY(S): at 17:24

## 2023-09-16 RX ADMIN — SEVELAMER CARBONATE 2400 MILLIGRAM(S): 2400 POWDER, FOR SUSPENSION ORAL at 17:23

## 2023-09-16 RX ADMIN — LEVETIRACETAM 500 MILLIGRAM(S): 250 TABLET, FILM COATED ORAL at 17:07

## 2023-09-16 RX ADMIN — TIOTROPIUM BROMIDE 2 PUFF(S): 18 CAPSULE ORAL; RESPIRATORY (INHALATION) at 11:36

## 2023-09-16 RX ADMIN — BUDESONIDE AND FORMOTEROL FUMARATE DIHYDRATE 2 PUFF(S): 160; 4.5 AEROSOL RESPIRATORY (INHALATION) at 17:17

## 2023-09-16 RX ADMIN — SEVELAMER CARBONATE 2400 MILLIGRAM(S): 2400 POWDER, FOR SUSPENSION ORAL at 00:12

## 2023-09-16 RX ADMIN — CHLORHEXIDINE GLUCONATE 1 APPLICATION(S): 213 SOLUTION TOPICAL at 06:13

## 2023-09-16 RX ADMIN — BUDESONIDE AND FORMOTEROL FUMARATE DIHYDRATE 2 PUFF(S): 160; 4.5 AEROSOL RESPIRATORY (INHALATION) at 06:12

## 2023-09-16 RX ADMIN — PANTOPRAZOLE SODIUM 40 MILLIGRAM(S): 20 TABLET, DELAYED RELEASE ORAL at 06:09

## 2023-09-16 RX ADMIN — MIDODRINE HYDROCHLORIDE 30 MILLIGRAM(S): 2.5 TABLET ORAL at 17:06

## 2023-09-16 RX ADMIN — Medication 20 MILLIGRAM(S): at 17:06

## 2023-09-16 RX ADMIN — Medication 100 MILLIGRAM(S): at 11:38

## 2023-09-16 RX ADMIN — Medication 20 MILLIGRAM(S): at 06:08

## 2023-09-16 NOTE — PROGRESS NOTE ADULT - ASSESSMENT
Transthoracic Echocardiogram (03.13.23 @ 09:29) >  CONCLUSIONS: nl LV sys fx   Transthoracic Echocardiogram (01.20.23 @ 17:22) >  CONCLUSIONS: NL LV sys fx     A/p  78 Y M H/O ESRD on HD T/TH, Sat, chronic hypotension, on midodrin 30 mg q6 hr, 2 L NC, CHF, pHTN, PE/HTN, presenting with weakness, fatigue, and rectal bleeding.      #Rectal Bleeding  -I/s/o supratherapeutic INR  -CT no e/o active bleeding  -Per GI, No plans for endoscopic evaluation. Pt would be very high risk.   -Cont to hold warfarin  -Trend INR    #orthostatic hypotension  -Chronic  -cont mido 30 mg every 6 hours   -Echo from 3/2023 with nl lv fxn, valve fxn     #ESRD  -HD per renal     #DVT hx  -Hold coumadin  -Patient not interested in switching to DOAC    #COPD, PHTN  -stable          35 minutes spent on total encounter; more than 50% of the visit was spent counseling and/or coordinating care by the attending physician.

## 2023-09-16 NOTE — PROGRESS NOTE ADULT - SUBJECTIVE AND OBJECTIVE BOX
[facility]   MD CHESTER DUEÑAS MD RUORU WONG, PA    TEL:  FROM 9 AM to 5 PM ---OFFICE: [phone]    FROM 5 PM - 9 AM PLEASE CALL ANSWERING SERVICE: [phone]    RENAL FOLLOW UP NOTE--Date of Service 09-16-23 @ 07:47  --------------------------------------------------------------------------------  HPI:      Pt seen and examined at bedside.       PAST HISTORY  --------------------------------------------------------------------------------  No significant changes to PMH, PSH, FHx, SHx, unless otherwise noted    ALLERGIES & MEDICATIONS  --------------------------------------------------------------------------------  Allergies    baclofen (Other)  latex (Rash)    Intolerances      Standing Inpatient Medications  allopurinol 100 milliGRAM(s) Oral daily  budesonide 160 MICROgram(s)/formoterol 4.5 MICROgram(s) Inhaler 2 Puff(s) Inhalation two times a day  chlorhexidine 2% Cloths 1 Application(s) Topical <User Schedule>  cinacalcet 90 milliGRAM(s) Oral <User Schedule>  dicyclomine 20 milliGRAM(s) Oral three times a day before meals  hydrocortisone hemorrhoidal Suppository 1 Suppository(s) Rectal two times a day  levETIRAcetam 500 milliGRAM(s) Oral two times a day  midodrine. 30 milliGRAM(s) Oral three times a day  pantoprazole    Tablet 40 milliGRAM(s) Oral before breakfast  polyethylene glycol 3350 17 Gram(s) Oral two times a day  pregabalin 100 milliGRAM(s) Oral two times a day  sevelamer carbonate 800 milliGRAM(s) Oral <User Schedule>  sevelamer carbonate 2400 milliGRAM(s) Oral three times a day  tiotropium 2.5 MICROgram(s) Inhaler 2 Puff(s) Inhalation daily    PRN Inpatient Medications  albuterol    90 MICROgram(s) HFA Inhaler 2 Puff(s) Inhalation every 6 hours PRN      REVIEW OF SYSTEMS  --------------------------------------------------------------------------------  General: no fever  MSK: no edema     VITALS/PHYSICAL EXAM  --------------------------------------------------------------------------------  T(C): 36.9 (09-15-23 @ 23:47), Max: 36.9 (09-15-23 @ 23:47)  HR: 69 (09-16-23 @ 05:55) (68 - 83)  BP: 73/43 (09-15-23 @ 23:47) (73/43 - 100/51)  RR: 18 (09-15-23 @ 23:47) (18 - 18)  SpO2: 97% (09-16-23 @ 05:55) (92% - 100%)  Wt(kg): --        09-15-23 @ 07:01  -  09-16-23 @ 07:00  --------------------------------------------------------  IN: 0 mL / OUT: 2000 mL / NET: -2000 mL      Physical Exam:  	Gen: NAD  	HEENT: MMM  	Pulm: CTA B/L  	CV: S1S2  	Abd: Soft, +BS  	Ext: No LE edema B/L                      Neuro: Awake   	Skin: Warm and Dry   	Vascular access: avf           no  teresa  LABS/STUDIES  --------------------------------------------------------------------------------              14.5   8.07  >-----------<  198      [09-15-23 @ 09:50]              49.1     139  |  99  |  36  ----------------------------<  83      [09-15-23 @ 20:07]  4.6   |  25  |  8.92        Ca     9.0     [09-15-23 @ 20:07]      PT/INR: PT 25.9 , INR 2.42       [09-15-23 @ 09:50]      Creatinine Trend:  SCr 8.92 [09-15 @ 20:07]  SCr 7.87 [09-15 @ 09:50]  SCr 9.14 [09-14 @ 07:07]  SCr 8.05 [09-13 @ 10:17]    Urinalysis - [09-15-23 @ 20:07]      Color  / Appearance  / SG  / pH       Gluc 83 / Ketone   / Bili  / Urobili        Blood  / Protein  / Leuk Est  / Nitrite       RBC  / WBC  / Hyaline  / Gran  / Sq Epi  / Non Sq Epi  / Bacteria       Iron 36, TIBC --, %sat --      [09-14-23 @ 07:07]  Ferritin 476      [09-14-23 @ 07:07]  PTH -- (Ca 8.8)      [09-14-23 @ 07:07]   955  PTH -- (Ca --)      [01-20-23 @ 06:35]   630  TSH 0.92      [02-24-23 @ 13:07]

## 2023-09-16 NOTE — PROGRESS NOTE ADULT - SUBJECTIVE AND OBJECTIVE BOX
Patient is a 78y old  Male who presents with a chief complaint of blood in stool (16 Sep 2023 09:25)      SUBJECTIVE / OVERNIGHT EVENTS: no new events, wife consented to switch to ELIQUIS for full AC, will need to arrange POWER WHEELCHAIR set up prior to DC: send notes along with Seating and Mobility eval to fax [phone] or email to [email or web address])  ptn also needs air mattress delivered prior to DC.     MEDICATIONS  (STANDING):  allopurinol 100 milliGRAM(s) Oral daily  apixaban 5 milliGRAM(s) Oral every 12 hours  budesonide 160 MICROgram(s)/formoterol 4.5 MICROgram(s) Inhaler 2 Puff(s) Inhalation two times a day  chlorhexidine 2% Cloths 1 Application(s) Topical <User Schedule>  cinacalcet 90 milliGRAM(s) Oral <User Schedule>  dicyclomine 20 milliGRAM(s) Oral three times a day before meals  hydrocortisone hemorrhoidal Suppository 1 Suppository(s) Rectal two times a day  levETIRAcetam 500 milliGRAM(s) Oral two times a day  midodrine. 30 milliGRAM(s) Oral three times a day  pantoprazole    Tablet 40 milliGRAM(s) Oral before breakfast  polyethylene glycol 3350 17 Gram(s) Oral two times a day  pregabalin 100 milliGRAM(s) Oral two times a day  sevelamer carbonate 800 milliGRAM(s) Oral <User Schedule>  sevelamer carbonate 2400 milliGRAM(s) Oral three times a day  tiotropium 2.5 MICROgram(s) Inhaler 2 Puff(s) Inhalation daily    MEDICATIONS  (PRN):  albuterol    90 MICROgram(s) HFA Inhaler 2 Puff(s) Inhalation every 6 hours PRN Bronchospasm      Vital Signs Last 24 Hrs  T(F): 97.6 (09-16-23 @ 07:15), Max: 98.4 (09-15-23 @ 23:47)  HR: 71 (09-16-23 @ 10:13) (64 - 83)  BP: 88/56 (09-16-23 @ 08:01) (73/43 - 100/51)  RR: 18 (09-16-23 @ 07:15) (18 - 18)  SpO2: 98% (09-16-23 @ 10:13) (93% - 100%)  Telemetry:   CAPILLARY BLOOD GLUCOSE        I&O's Summary    15 Sep 2023 07:01  -  16 Sep 2023 07:00  --------------------------------------------------------  IN: 0 mL / OUT: 2000 mL / NET: -2000 mL    16 Sep 2023 07:01  -  16 Sep 2023 14:10  --------------------------------------------------------  IN: 350 mL / OUT: 0 mL / NET: 350 mL        PHYSICAL EXAM:  GENERAL: NAD, well-developed  HEAD:  Atraumatic, Normocephalic  EYES: EOMI, PERRLA, conjunctiva and sclera clear  NECK: Supple, No JVD  CHEST/LUNG: Clear to auscultation bilaterally; No wheeze  HEART: Regular rate and rhythm; No murmurs, rubs, or gallops  ABDOMEN: Soft, Nontender, Nondistended; Bowel sounds present  EXTREMITIES:  2+ Peripheral Pulses, No clubbing, cyanosis, or edema  PSYCH: AAOx3  NEUROLOGY: non-focal  SKIN: No rashes or lesions    LABS:                        14.5   8.07  )-----------( 198      ( 15 Sep 2023 09:50 )             49.1     09-15    139  |  99  |  36<H>  ----------------------------<  83  4.6   |  25  |  8.92<H>    Ca    9.0      15 Sep 2023 20:07      PT/INR - ( 15 Sep 2023 09:50 )   PT: 25.9 sec;   INR: 2.42 ratio               Urinalysis Basic - ( 15 Sep 2023 20:07 )    Color: x / Appearance: x / SG: x / pH: x  Gluc: 83 mg/dL / Ketone: x  / Bili: x / Urobili: x   Blood: x / Protein: x / Nitrite: x   Leuk Esterase: x / RBC: x / WBC x   Sq Epi: x / Non Sq Epi: x / Bacteria: x        RADIOLOGY & ADDITIONAL TESTS:    Imaging Personally Reviewed:    Consultant(s) Notes Reviewed:      Care Discussed with Consultants/Other Providers:

## 2023-09-16 NOTE — PROGRESS NOTE ADULT - SUBJECTIVE AND OBJECTIVE BOX
Date of Service: 09-16-23 @ 15:35    Patient is a 78y old  Male who presents with a chief complaint of blood in stool (16 Sep 2023 09:25)      Any change in ROS:   No new respiratory events overnight. Denies SOB/CP.    MEDICATIONS  (STANDING):  allopurinol 100 milliGRAM(s) Oral daily  apixaban 5 milliGRAM(s) Oral every 12 hours  budesonide 160 MICROgram(s)/formoterol 4.5 MICROgram(s) Inhaler 2 Puff(s) Inhalation two times a day  chlorhexidine 2% Cloths 1 Application(s) Topical <User Schedule>  cinacalcet 90 milliGRAM(s) Oral <User Schedule>  dicyclomine 20 milliGRAM(s) Oral three times a day before meals  hydrocortisone hemorrhoidal Suppository 1 Suppository(s) Rectal two times a day  levETIRAcetam 500 milliGRAM(s) Oral two times a day  midodrine. 30 milliGRAM(s) Oral three times a day  pantoprazole    Tablet 40 milliGRAM(s) Oral before breakfast  polyethylene glycol 3350 17 Gram(s) Oral two times a day  pregabalin 100 milliGRAM(s) Oral two times a day  sevelamer carbonate 2400 milliGRAM(s) Oral three times a day  sevelamer carbonate 800 milliGRAM(s) Oral <User Schedule>  tiotropium 2.5 MICROgram(s) Inhaler 2 Puff(s) Inhalation daily    MEDICATIONS  (PRN):  albuterol    90 MICROgram(s) HFA Inhaler 2 Puff(s) Inhalation every 6 hours PRN Bronchospasm    Vital Signs Last 24 Hrs  T(C): 36.4 (16 Sep 2023 13:37), Max: 36.9 (15 Sep 2023 23:47)  T(F): 97.5 (16 Sep 2023 13:37), Max: 98.4 (15 Sep 2023 23:47)  HR: 58 (16 Sep 2023 13:37) (58 - 83)  BP: 94/42 (16 Sep 2023 13:37) (73/43 - 100/51)  BP(mean): --  RR: 18 (16 Sep 2023 13:37) (18 - 18)  SpO2: 100% (16 Sep 2023 13:37) (93% - 100%)    Parameters below as of 16 Sep 2023 13:37  Patient On (Oxygen Delivery Method): nasal cannula  O2 Flow (L/min): 2      I&O's Summary    15 Sep 2023 07:01  -  16 Sep 2023 07:00  --------------------------------------------------------  IN: 0 mL / OUT: 2000 mL / NET: -2000 mL    16 Sep 2023 07:01  -  16 Sep 2023 15:35  --------------------------------------------------------  IN: 350 mL / OUT: 0 mL / NET: 350 mL          Physical Exam:   GENERAL: NAD  HEENT: JORDIN  ENMT: No tonsillar erythema, exudates, or enlargement  NECK: Supple, No JVD  CHEST/LUNG: Decreased at bases  CVS: Regular rate and rhythm  GI: : Soft, Nontender, Nondistended  NERVOUS SYSTEM:  Alert & Oriented X3  EXTREMITIES:  2+ Peripheral Pulses, No clubbing, cyanosis, or edema  SKIN: No rashes or lesions  PSYCH: Appropriate    Labs:  27, 28, 26                            13.8   7.43  )-----------( 233      ( 16 Sep 2023 14:21 )             45.2                         14.5   8.07  )-----------( 198      ( 15 Sep 2023 09:50 )             49.1                         13.6   8.60  )-----------( 176      ( 14 Sep 2023 07:07 )             46.2                         14.5   6.88  )-----------( 196      ( 13 Sep 2023 10:17 )             49.5     09-16    137  |  97  |  28<H>  ----------------------------<  122<H>  3.9   |  21<L>  |  7.58<H>  09-15    139  |  99  |  36<H>  ----------------------------<  83  4.6   |  25  |  8.92<H>  09-15    135  |  98  |  29<H>  ----------------------------<  84  5.5<H>   |  13<L>  |  7.87<H>  09-14    141  |  99  |  33<H>  ----------------------------<  97  4.3   |  22  |  9.14<H>  09-13    137  |  95<L>  |  29<H>  ----------------------------<  91  3.8   |  17<L>  |  8.05<H>    Ca    8.9      16 Sep 2023 14:21  Ca    9.0      15 Sep 2023 20:07  Ca    9.0      15 Sep 2023 09:50    TPro  8.1  /  Alb  3.9  /  TBili  0.3  /  DBili  x   /  AST  25  /  ALT  12  /  AlkPhos  133<H>  09-14  TPro  9.4<H>  /  Alb  3.9  /  TBili  0.4  /  DBili  x   /  AST  26  /  ALT  13  /  AlkPhos  144<H>  09-13    CAPILLARY BLOOD GLUCOSE    PT/INR - ( 16 Sep 2023 14:21 )   PT: 20.2 sec;   INR: 1.96 ratio           Urinalysis Basic - ( 16 Sep 2023 14:21 )    Color: x / Appearance: x / SG: x / pH: x  Gluc: 122 mg/dL / Ketone: x  / Bili: x / Urobili: x   Blood: x / Protein: x / Nitrite: x   Leuk Esterase: x / RBC: x / WBC x   Sq Epi: x / Non Sq Epi: x / Bacteria: x      Studies  Chest X-RAY< from: Xray Chest 1 View- PORTABLE-Urgent (Xray Chest 1 View- PORTABLE-Urgent .) (09.13.23 @ 12:07) >      INTERPRETATION:  TECHNIQUE: A single AP view of the chest was obtained.   Ordered time:   9/13/2023 12:07 PM    COMPARISON: 7/15/2023    CLINICAL INFORMATION: Abdominal pain. GI bleed on Coumadin.    FINDINGS:    The heart is magnified by technique.  There is linear atelectasis noted at both lung bases.  There are no pleural effusions.  There is no pneumothorax.    IMPRESSION:    Bibasilar linear atelectasis.    --- End of Report ---        < end of copied text >    < from: CT Abdomen and Pelvis w/ IV Cont (09.13.23 @ 11:18) >  FINDINGS:  Study is limited due to streak artifact from overlying patient's upper   extremities.    LOWER CHEST: Left lower lobe calcified granuloma. Bilateral lower lobe   linear atelectasis. Coronary artery calcifications. Partially visualized   small calcified mediastinal nodes.    LIVER: Punctate calcified right hepatic lobe granuloma.  BILE DUCTS: Normal caliber.  GALLBLADDER: Within normal limits.  SPLEEN: Scattered calcified granulomas.  PANCREAS: Within normal limits.  ADRENALS: Similar bilateral adrenal gland thickening.  KIDNEYS/URETERS: Atrophic bilateral kidneys. Bilateral renal   hypodensities, indeterminate on this exam due to extensive streak   artifact. No hydronephrosis.    BLADDER: Underdistended  REPRODUCTIVE ORGANS: Prostate gland calcifications.    BOWEL: No bowel obstruction. Status post right hemicolectomy. No evidence   of intraluminal contrast extravasation.  PERITONEUM: No ascites.  VESSELS: IVC filter. Atherosclerotic changes.  RETROPERITONEUM/LYMPH NODES: No lymphadenopathy.  ABDOMINAL WALL: Rectus diastases with multiple small ventral hernias. One   of these hernias partially containing a loop of nonobstructed large   bowel, and the others contain fat. Small fat-containing right inguinal   hernia.  BONES: Degenerative changes.    IMPRESSION:  Limited study due to streak artifact.    No CT evidence of active GI bleed.    --- End of Report ---    < end of copied text >

## 2023-09-16 NOTE — PROGRESS NOTE ADULT - SUBJECTIVE AND OBJECTIVE BOX
Patient is a 78y Male     Patient is a 78y old  Male who presents with a chief complaint of     HPI:   78-year-old male history of ESRD on HD via left upper extremity AV fistula Tuesday Thursday Saturday, last dialysis yesterday, chronic severe orthostatic hypotension on midodrine 30 mg every 6 hours, COPD on 2 L home O2, wheelchair bound, chronic diastolic CHF, morbid obesity, pulmonary hypertension, PE and DVT on Coumadin, IVC filter, chronic sacral ulcer, chronic back pain, history of hematochezia in the past 2/2 hemorrhoids, chronic abd pain 2/2 IBS, chronic constipation,  brought in by EMS from home for multiple episodes of bright red blood per rectum over the past week, this am associated with lightheadedness and feeling near syncopal. otn hasnt been able to tolerate HD and sessions have been down to 2-2.5 liters out instead of 3 liters 2/2 Hypotension. compliant w meds, wife is the caretaker (13 Sep 2023 15:18)      PAST MEDICAL & SURGICAL HISTORY:  Hypertension      Glomerular disease  Segmental glomerular sclerosis      CHF (congestive heart failure)      COPD (chronic obstructive pulmonary disease)      Pulmonary hypertension      Sleep apnea      Pulmonary edema      Peripheral edema      Gout      History of abdominal surgery  polypectomy      H/O right heart catheterization          MEDICATIONS  (STANDING):  allopurinol 100 milliGRAM(s) Oral daily  budesonide 160 MICROgram(s)/formoterol 4.5 MICROgram(s) Inhaler 2 Puff(s) Inhalation two times a day  chlorhexidine 2% Cloths 1 Application(s) Topical <User Schedule>  cinacalcet 90 milliGRAM(s) Oral <User Schedule>  dicyclomine 20 milliGRAM(s) Oral three times a day before meals  hydrocortisone hemorrhoidal Suppository 1 Suppository(s) Rectal two times a day  levETIRAcetam 500 milliGRAM(s) Oral two times a day  midodrine. 30 milliGRAM(s) Oral three times a day  pantoprazole    Tablet 40 milliGRAM(s) Oral before breakfast  polyethylene glycol 3350 17 Gram(s) Oral two times a day  pregabalin 100 milliGRAM(s) Oral two times a day  sevelamer carbonate 2400 milliGRAM(s) Oral three times a day  sevelamer carbonate 800 milliGRAM(s) Oral <User Schedule>  tiotropium 2.5 MICROgram(s) Inhaler 2 Puff(s) Inhalation daily      Allergies    baclofen (Other)  latex (Rash)    Intolerances        SOCIAL HISTORY:  Denies ETOh,Smoking,     FAMILY HISTORY:  Family history of colon cancer (Father)    Family history of heart disease (Father)        REVIEW OF SYSTEMS:    CONSTITUTIONAL: No weakness, fevers or chills  EYES/ENT: No visual changes;  No vertigo or throat pain   NECK: No pain or stiffness  RESPIRATORY: No cough, wheezing, hemoptysis; No shortness of breath  CARDIOVASCULAR: No chest pain or palpitations  GASTROINTESTINAL: No abdominal or epigastric pain. No nausea, vomiting, or hematemesis; No diarrhea or constipation. No melena or hematochezia.  GENITOURINARY: No dysuria, frequency or hematuria  NEUROLOGICAL: No numbness or weakness  SKIN: No itching, burning, rashes, or lesions   All other review of systems is negative unless indicated above.    VITAL:  T(C): , Max: 36.9 (09-15-23 @ 23:47)  T(F): , Max: 98.4 (09-15-23 @ 23:47)  HR: 64 (09-16-23 @ 07:15)  BP: 88/56 (09-16-23 @ 08:01)  BP(mean): --  RR: 18 (09-16-23 @ 07:15)  SpO2: 98% (09-16-23 @ 07:15)  Wt(kg): --    I and O's:    09-14 @ 07:01  -  09-15 @ 07:00  --------------------------------------------------------  IN: 0 mL / OUT: 1500 mL / NET: -1500 mL    09-15 @ 07:01  -  09-16 @ 07:00  --------------------------------------------------------  IN: 0 mL / OUT: 2000 mL / NET: -2000 mL          PHYSICAL EXAM:    Constitutional: NAD  HEENT: PERRLA,   Neck: No JVD  Respiratory: CTA B/L  Cardiovascular: S1 and S2  Gastrointestinal: BS+, soft, NT/ND  Extremities: No peripheral edema  Neurological: A/O x 3, no focal deficits  Psychiatric: Normal mood, normal affect  : No Herring  Skin: No rashes  Access: Not applicable  Back: No CVA tenderness    LABS:                        14.5   8.07  )-----------( 198      ( 15 Sep 2023 09:50 )             49.1     09-15    139  |  99  |  36<H>  ----------------------------<  83  4.6   |  25  |  8.92<H>    Ca    9.0      15 Sep 2023 20:07            RADIOLOGY & ADDITIONAL STUDIES:

## 2023-09-16 NOTE — PROGRESS NOTE ADULT - SUBJECTIVE AND OBJECTIVE BOX
CARDIOLOGY FOLLOW UP - Dr. Livingston  Date of Service: 9/16/2023  CC: no events    Review of Systems:  Constitutional: No fever, weight loss, or fatigue  Respiratory: No cough, wheezing, or hemoptysis, no shortness of breath  Cardiovascular: No chest pain, palpitations, passing out, dizziness, or leg swelling  Gastrointestinal: No abd or epigastric pain. No nausea, vomiting, or hematemesis; no diarrhea or consiptaiton, no melena or hematochezia  Vascular: No edema     TELEMETRY:    PHYSICAL EXAM:  T(C): 36.9 (09-15-23 @ 23:47), Max: 36.9 (09-15-23 @ 23:47)  HR: 69 (09-16-23 @ 05:55) (68 - 83)  BP: 73/43 (09-15-23 @ 23:47) (73/43 - 100/51)  RR: 18 (09-15-23 @ 23:47) (18 - 18)  SpO2: 97% (09-16-23 @ 05:55) (92% - 100%)  Wt(kg): --  I&O's Summary    15 Sep 2023 07:01  -  16 Sep 2023 07:00  --------------------------------------------------------  IN: 0 mL / OUT: 2000 mL / NET: -2000 mL        Appearance: Normal	  Cardiovascular: Normal S1 S2,RRR, No JVD, No murmurs  Respiratory: Lungs clear to auscultation	  Gastrointestinal:  Soft, Non-tender, + BS	  Extremities: Normal range of motion, No clubbing, cyanosis or edema  Vascular: Peripheral pulses palpable 2+ bilaterally       Home Medications:  Albuterol (Eqv-ProAir HFA) 90 mcg/inh inhalation aerosol: 2 puff(s) inhaled every 6 hours as needed (13 Sep 2023 13:52)  albuterol 2.5 mg/3 mL (0.083%) inhalation solution: 3 milliliter(s) by nebulizer every 6 hours as needed (13 Sep 2023 13:57)  allopurinol 100 mg oral tablet: 1 tab(s) orally once a day (13 Sep 2023 13:52)  cinacalcet 90 mg oral tablet: 1 tab(s) orally 3 times a week (on days of dialysis - Tues, Thurs &amp; Sat) (13 Sep 2023 13:52)  Colace 100 mg oral capsule: 1 cap(s) orally once a day (at bedtime) (13 Sep 2023 13:54)  dicyclomine 20 mg oral tablet: 1 tab(s) orally every 6 hours as needed (13 Sep 2023 13:52)  levETIRAcetam 500 mg oral tablet: 1 tab(s) orally 2 times a day (13 Sep 2023 13:52)  lidocaine 5% topical film: Apply 1 patch to knee and 1 patch to lower back; remove after 12 hours (13 Sep 2023 13:52)  midodrine 10 mg oral tablet: 3 tab(s) orally 4 times a day note: pharmacy has 3 times a day. (13 Sep 2023 13:54)  MiraLax oral powder for reconstitution: 17 gram(s) orally every other day (13 Sep 2023 13:52)  pantoprazole 40 mg oral delayed release tablet: 1 tab(s) orally once a day (13 Sep 2023 13:52)  pregabalin 100 mg oral capsule: 1 cap(s) orally 2 times a day (13 Sep 2023 13:52)  sevelamer carbonate 800 mg oral tablet: 3 tab(s) orally 3 times a day &amp; 1 additional tab(s) with snacks (13 Sep 2023 13:52)  Trelegy Ellipta 100 mcg-62.5 mcg-25 mcg/inh inhalation powder: 1 puff(s) inhaled once a day (13 Sep 2023 13:52)  WARFARIN: Patient alternates between 9mg &amp; 10mg as per INR as per wife. (13 Sep 2023 13:54)        MEDICATIONS  (STANDING):  allopurinol 100 milliGRAM(s) Oral daily  budesonide 160 MICROgram(s)/formoterol 4.5 MICROgram(s) Inhaler 2 Puff(s) Inhalation two times a day  chlorhexidine 2% Cloths 1 Application(s) Topical <User Schedule>  cinacalcet 90 milliGRAM(s) Oral <User Schedule>  dicyclomine 20 milliGRAM(s) Oral three times a day before meals  hydrocortisone hemorrhoidal Suppository 1 Suppository(s) Rectal two times a day  levETIRAcetam 500 milliGRAM(s) Oral two times a day  midodrine. 30 milliGRAM(s) Oral three times a day  pantoprazole    Tablet 40 milliGRAM(s) Oral before breakfast  polyethylene glycol 3350 17 Gram(s) Oral two times a day  pregabalin 100 milliGRAM(s) Oral two times a day  sevelamer carbonate 2400 milliGRAM(s) Oral three times a day  sevelamer carbonate 800 milliGRAM(s) Oral <User Schedule>  tiotropium 2.5 MICROgram(s) Inhaler 2 Puff(s) Inhalation daily        EKG:  RADIOLOGY:  DIAGNOSTIC TESTING:  [ ] Echocardiogram:  [ ] Catherterization:  [ ] Stress Test:  OTHER:     LABS:	 	                          14.5   8.07  )-----------( 198      ( 15 Sep 2023 09:50 )             49.1     09-15    139  |  99  |  36<H>  ----------------------------<  83  4.6   |  25  |  8.92<H>    Ca    9.0      15 Sep 2023 20:07        PT/INR - ( 15 Sep 2023 09:50 )   PT: 25.9 sec;   INR: 2.42 ratio             CARDIAC MARKERS:

## 2023-09-16 NOTE — PROGRESS NOTE ADULT - ASSESSMENT
: 78-year-old male history of ESRD on HD via left upper extremity AV fistula Tuesday Thursday Saturday, last dialysis yesterday, chronic severe orthostatic hypotension on midodrine 30 mg every 6 hours, COPD on 2 L home O2, CHF, pulmonary hypertension, PE and DVT on in the past on Coumadin, IVC filter, chronic sacral ulcer, history of hematochezia in the past, brought in by EMS from home for multiple episodes of bright red blood per rectum over the past week, lightheadedness, syncopal     ESRD on HD ( TTS) via AVF  Outpt unit [facility]  Consent obtained in chart  HD 9/15  Plan for m aintainence HD today   Midodrine pre HD to allow UF.  Monitor BMP     HTN  Marginal  chronic severe orthostatic hypotension on midodrine 30mg q6h  Monitor closely     Hyperkalemmia:  In setting of RF and acidosis.  Low K diet.      CKD-MBD    Tertiary  hyperparathyroidism.   On Sensipar of 60 mg po daily  Low PO4 diet.  Continue Renvela with meals.  Monitor Phosphorus and calcium daily.     BRRB  GI following.  Monitor Hb

## 2023-09-16 NOTE — PROGRESS NOTE ADULT - ASSESSMENT
78-year-old male history of ESRD on HD via left upper extremity AV fistula Tuesday Thursday Saturday, last dialysis yesterday, chronic severe orthostatic hypotension on midodrine 30 mg every 6 hours, COPD on 2 L home O2, wheelchair bound, chronic diastolic CHF, morbid obesity, pulmonary hypertension, PE and DVT on Coumadin, IVC filter, chronic sacral ulcer, chronic back pain, history of hematochezia in the past 2/2 hemorrhoids, chronic abd pain 2/2 IBS, chronic constipation,  brought in by EMS from home for multiple episodes of bright red blood per rectum over the past week, this am associated with lightheadedness and feeling near syncopal. otn hasnt been able to tolerate HD and sessions have been down to 2-2.5 liters out instead of 3 liters 2/2 Hypotension. compliant w meds, wife is the caretaker    ptn has h/o orthostatic hypotension, on HD midodrine  would NOT give IVF, he os NOT hypovolemic  H/H remains stable, blood per rectum 2/2 hemorrhoid in a setting of supratherapeutic INR, seen by GI. CT A/P is benign,  HD TThSa as per renal. so far HD without any complications, renal following  rpt  TTE is stable,   h/o PE/DVT/thrombophlebitis in LE, INR is therapeutic on 9/15  wife consented to switch to [facility] for full AC,   ptn has a high debility index, he has been awaiting a special air mattress as part of wound care treatment and a motorized wheel chair. will need to arrange POWER WHEELCHAIR set up prior to DC: send notes along with Seating and Mobility eval to fax [phone] or email to [email or web address]" ( [employer])  ptn also needs air mattress delivered prior to DC.   ptn is bedbound and wheelchair bound, he is morbidly obese  seen by wound care here. needs the recs addressed prior to DC  PT eval

## 2023-09-17 LAB
ANION GAP SERPL CALC-SCNC: 19 MMOL/L — HIGH (ref 5–17)
BUN SERPL-MCNC: 29 MG/DL — HIGH (ref 7–23)
CALCIUM SERPL-MCNC: 9.4 MG/DL — SIGNIFICANT CHANGE UP (ref 8.4–10.5)
CHLORIDE SERPL-SCNC: 95 MMOL/L — LOW (ref 96–108)
CO2 SERPL-SCNC: 24 MMOL/L — SIGNIFICANT CHANGE UP (ref 22–31)
CREAT SERPL-MCNC: 7.87 MG/DL — HIGH (ref 0.5–1.3)
EGFR: 7 ML/MIN/1.73M2 — LOW
GLUCOSE SERPL-MCNC: 90 MG/DL — SIGNIFICANT CHANGE UP (ref 70–99)
HCT VFR BLD CALC: 47.2 % — SIGNIFICANT CHANGE UP (ref 39–50)
HGB BLD-MCNC: 14 G/DL — SIGNIFICANT CHANGE UP (ref 13–17)
MCHC RBC-ENTMCNC: 27.2 PG — SIGNIFICANT CHANGE UP (ref 27–34)
MCHC RBC-ENTMCNC: 29.7 GM/DL — LOW (ref 32–36)
MCV RBC AUTO: 91.8 FL — SIGNIFICANT CHANGE UP (ref 80–100)
NRBC # BLD: 0 /100 WBCS — SIGNIFICANT CHANGE UP (ref 0–0)
PLATELET # BLD AUTO: 225 K/UL — SIGNIFICANT CHANGE UP (ref 150–400)
POTASSIUM SERPL-MCNC: 3.7 MMOL/L — SIGNIFICANT CHANGE UP (ref 3.5–5.3)
POTASSIUM SERPL-SCNC: 3.7 MMOL/L — SIGNIFICANT CHANGE UP (ref 3.5–5.3)
RBC # BLD: 5.14 M/UL — SIGNIFICANT CHANGE UP (ref 4.2–5.8)
RBC # FLD: 17 % — HIGH (ref 10.3–14.5)
SODIUM SERPL-SCNC: 138 MMOL/L — SIGNIFICANT CHANGE UP (ref 135–145)
WBC # BLD: 6.84 K/UL — SIGNIFICANT CHANGE UP (ref 3.8–10.5)
WBC # FLD AUTO: 6.84 K/UL — SIGNIFICANT CHANGE UP (ref 3.8–10.5)

## 2023-09-17 RX ORDER — SEVELAMER CARBONATE 2400 MG/1
2400 POWDER, FOR SUSPENSION ORAL
Refills: 0 | Status: DISCONTINUED | OUTPATIENT
Start: 2023-09-17 | End: 2023-09-22

## 2023-09-17 RX ADMIN — Medication 20 MILLIGRAM(S): at 05:37

## 2023-09-17 RX ADMIN — LEVETIRACETAM 500 MILLIGRAM(S): 250 TABLET, FILM COATED ORAL at 05:35

## 2023-09-17 RX ADMIN — Medication 100 MILLIGRAM(S): at 22:00

## 2023-09-17 RX ADMIN — Medication 100 MILLIGRAM(S): at 11:11

## 2023-09-17 RX ADMIN — SEVELAMER CARBONATE 2400 MILLIGRAM(S): 2400 POWDER, FOR SUSPENSION ORAL at 17:18

## 2023-09-17 RX ADMIN — BUDESONIDE AND FORMOTEROL FUMARATE DIHYDRATE 2 PUFF(S): 160; 4.5 AEROSOL RESPIRATORY (INHALATION) at 17:19

## 2023-09-17 RX ADMIN — MIDODRINE HYDROCHLORIDE 30 MILLIGRAM(S): 2.5 TABLET ORAL at 21:24

## 2023-09-17 RX ADMIN — APIXABAN 5 MILLIGRAM(S): 2.5 TABLET, FILM COATED ORAL at 05:34

## 2023-09-17 RX ADMIN — CHLORHEXIDINE GLUCONATE 1 APPLICATION(S): 213 SOLUTION TOPICAL at 05:39

## 2023-09-17 RX ADMIN — MIDODRINE HYDROCHLORIDE 30 MILLIGRAM(S): 2.5 TABLET ORAL at 05:33

## 2023-09-17 RX ADMIN — MIDODRINE HYDROCHLORIDE 30 MILLIGRAM(S): 2.5 TABLET ORAL at 11:07

## 2023-09-17 RX ADMIN — BUDESONIDE AND FORMOTEROL FUMARATE DIHYDRATE 2 PUFF(S): 160; 4.5 AEROSOL RESPIRATORY (INHALATION) at 05:35

## 2023-09-17 RX ADMIN — LEVETIRACETAM 500 MILLIGRAM(S): 250 TABLET, FILM COATED ORAL at 17:19

## 2023-09-17 RX ADMIN — SEVELAMER CARBONATE 2400 MILLIGRAM(S): 2400 POWDER, FOR SUSPENSION ORAL at 05:34

## 2023-09-17 RX ADMIN — SEVELAMER CARBONATE 2400 MILLIGRAM(S): 2400 POWDER, FOR SUSPENSION ORAL at 12:34

## 2023-09-17 RX ADMIN — Medication 100 MILLIGRAM(S): at 05:34

## 2023-09-17 RX ADMIN — TIOTROPIUM BROMIDE 2 PUFF(S): 18 CAPSULE ORAL; RESPIRATORY (INHALATION) at 11:08

## 2023-09-17 RX ADMIN — Medication 20 MILLIGRAM(S): at 11:07

## 2023-09-17 RX ADMIN — PANTOPRAZOLE SODIUM 40 MILLIGRAM(S): 20 TABLET, DELAYED RELEASE ORAL at 05:36

## 2023-09-17 RX ADMIN — Medication 1 SUPPOSITORY(S): at 17:19

## 2023-09-17 RX ADMIN — APIXABAN 5 MILLIGRAM(S): 2.5 TABLET, FILM COATED ORAL at 17:19

## 2023-09-17 RX ADMIN — Medication 1 SUPPOSITORY(S): at 05:35

## 2023-09-17 RX ADMIN — Medication 20 MILLIGRAM(S): at 17:20

## 2023-09-17 NOTE — PROGRESS NOTE ADULT - ASSESSMENT
78-year-old male history of ESRD on HD via left upper extremity AV fistula Tuesday Thursday Saturday, last dialysis yesterday, chronic severe orthostatic hypotension on midodrine 30 mg every 6 hours, COPD on 2 L home O2, wheelchair bound, chronic diastolic CHF, morbid obesity, pulmonary hypertension, PE and DVT on Coumadin, IVC filter, chronic sacral ulcer, chronic back pain, history of hematochezia in the past 2/2 hemorrhoids, chronic abd pain 2/2 IBS, chronic constipation,  brought in by EMS from home for multiple episodes of bright red blood per rectum over the past week, this am associated with lightheadedness and feeling near syncopal. otn hasnt been able to tolerate HD and sessions have been down to 2-2.5 liters out instead of 3 liters 2/2 Hypotension. compliant w meds, wife is the caretaker    ptn has h/o orthostatic hypotension, on HD midodrine  would NOT give IVF, he os NOT hypovolemic  H/H remains stable, blood per rectum 2/2 hemorrhoid in a setting of supratherapeutic INR, seen by GI. CT A/P is benign,  HD TThSa as per renal. so far HD without any complications, renal following  rpt  TTE is stable,   h/o PE/DVT/thrombophlebitis in LE, INR is therapeutic on 9/15  wife consented to switch to ELIQUIS for full AC, his outptn nephrologist Dr. Whalen agrees  ptn has a high debility index, he has been awaiting a special air mattress as part of wound care treatment and a motorized wheel chair. will need to arrange POWER WHEELCHAIR set up prior to DC: send notes along with Seating and Mobility eval to fax [phone] or email to [email or web address] ( [email or web address])  ptn also needs air mattress delivered prior to DC.   ptn is bedbound and wheelchair bound, he is morbidly obese  seen by wound care here. needs the recs addressed prior to DC  PT eval

## 2023-09-17 NOTE — PROGRESS NOTE ADULT - ASSESSMENT
covering for edelmira/ vineet  no further gib likely hemorrhoidal  follow hgb stable  no actue gi complaitns  discharge plan per medicine

## 2023-09-17 NOTE — PROGRESS NOTE ADULT - SUBJECTIVE AND OBJECTIVE BOX
Patient is a 78y Male     Patient is a 78y old  Male who presents with a chief complaint of blood in stool (16 Sep 2023 09:25)      HPI:   78-year-old male history of ESRD on HD via left upper extremity AV fistula Tuesday Thursday Saturday, last dialysis yesterday, chronic severe orthostatic hypotension on midodrine 30 mg every 6 hours, COPD on 2 L home O2, wheelchair bound, chronic diastolic CHF, morbid obesity, pulmonary hypertension, PE and DVT on Coumadin, IVC filter, chronic sacral ulcer, chronic back pain, history of hematochezia in the past 2/2 hemorrhoids, chronic abd pain 2/2 IBS, chronic constipation,  brought in by EMS from home for multiple episodes of bright red blood per rectum over the past week, this am associated with lightheadedness and feeling near syncopal. otn hasnt been able to tolerate HD and sessions have been down to 2-2.5 liters out instead of 3 liters 2/2 Hypotension. compliant w meds, wife is the caretaker (13 Sep 2023 15:18)      PAST MEDICAL & SURGICAL HISTORY:  Hypertension      Glomerular disease  Segmental glomerular sclerosis      CHF (congestive heart failure)      COPD (chronic obstructive pulmonary disease)      Pulmonary hypertension      Sleep apnea      Pulmonary edema      Peripheral edema      Gout      History of abdominal surgery  polypectomy      H/O right heart catheterization          MEDICATIONS  (STANDING):  allopurinol 100 milliGRAM(s) Oral daily  apixaban 5 milliGRAM(s) Oral every 12 hours  budesonide 160 MICROgram(s)/formoterol 4.5 MICROgram(s) Inhaler 2 Puff(s) Inhalation two times a day  chlorhexidine 2% Cloths 1 Application(s) Topical <User Schedule>  cinacalcet 90 milliGRAM(s) Oral <User Schedule>  dicyclomine 20 milliGRAM(s) Oral three times a day before meals  hydrocortisone hemorrhoidal Suppository 1 Suppository(s) Rectal two times a day  levETIRAcetam 500 milliGRAM(s) Oral two times a day  midodrine. 30 milliGRAM(s) Oral three times a day  pantoprazole    Tablet 40 milliGRAM(s) Oral before breakfast  polyethylene glycol 3350 17 Gram(s) Oral two times a day  pregabalin 100 milliGRAM(s) Oral two times a day  sevelamer carbonate 2400 milliGRAM(s) Oral three times a day  sevelamer carbonate 800 milliGRAM(s) Oral <User Schedule>  tiotropium 2.5 MICROgram(s) Inhaler 2 Puff(s) Inhalation daily      Allergies    baclofen (Other)  latex (Rash)    Intolerances        SOCIAL HISTORY:  Denies ETOh,Smoking,     FAMILY HISTORY:  Family history of colon cancer (Father)    Family history of heart disease (Father)        REVIEW OF SYSTEMS:    CONSTITUTIONAL: No weakness, fevers or chills  EYES/ENT: No visual changes;  No vertigo or throat pain   NECK: No pain or stiffness  RESPIRATORY: No cough, wheezing, hemoptysis; No shortness of breath  CARDIOVASCULAR: No chest pain or palpitations  GASTROINTESTINAL: No abdominal or epigastric pain. No nausea, vomiting, or hematemesis; No diarrhea or constipation. No melena or hematochezia.  GENITOURINARY: No dysuria, frequency or hematuria  NEUROLOGICAL: No numbness or weakness  SKIN: No itching, burning, rashes, or lesions   All other review of systems is negative unless indicated above.    VITAL:  T(C): , Max: 36.9 (09-17-23 @ 00:57)  T(F): , Max: 98.5 (09-17-23 @ 00:57)  HR: 72 (09-17-23 @ 05:50)  BP: 88/48 (09-17-23 @ 00:57)  BP(mean): --  RR: 18 (09-17-23 @ 00:57)  SpO2: 98% (09-17-23 @ 05:50)  Wt(kg): --    I and O's:    09-15 @ 07:01  -  09-16 @ 07:00  --------------------------------------------------------  IN: 0 mL / OUT: 2000 mL / NET: -2000 mL    09-16 @ 07:01  -  09-17 @ 07:00  --------------------------------------------------------  IN: 650 mL / OUT: 1000 mL / NET: -350 mL          PHYSICAL EXAM:    Constitutional: NAD  HEENT: PERRLA,   Neck: No JVD  Respiratory: CTA B/L  Cardiovascular: S1 and S2  Gastrointestinal: BS+, soft, NT/ND  Extremities: No peripheral edema  Neurological: A/O x 3, no focal deficits  Psychiatric: Normal mood, normal affect  : No Herring  Skin: No rashes  Access: Not applicable  Back: No CVA tenderness    LABS:                        13.8   7.43  )-----------( 233      ( 16 Sep 2023 14:21 )             45.2     09-16    137  |  97  |  28<H>  ----------------------------<  122<H>  3.9   |  21<L>  |  7.58<H>    Ca    8.9      16 Sep 2023 14:21            RADIOLOGY & ADDITIONAL STUDIES:

## 2023-09-17 NOTE — PROVIDER CONTACT NOTE (MEDICATION) - SITUATION
Patient refused dose of Renvela at 2 am. Provider notified and will look to readjust dosing schedule. Patient requested to match medication administration times to be with meals.

## 2023-09-17 NOTE — PROGRESS NOTE ADULT - SUBJECTIVE AND OBJECTIVE BOX
CARDIOLOGY FOLLOW UP - Dr. Livingston  Date of Service: 9/17/2023  CC: no events    Review of Systems:  Constitutional: No fever, weight loss, or fatigue  Respiratory: No cough, wheezing, or hemoptysis, no shortness of breath  Cardiovascular: No chest pain, palpitations, passing out, dizziness, or leg swelling  Gastrointestinal: No abd or epigastric pain. No nausea, vomiting, or hematemesis; no diarrhea or consiptaiton, no melena or hematochezia  Vascular: No edema     TELEMETRY:    PHYSICAL EXAM:  T(C): 36.9 (09-17-23 @ 00:57), Max: 36.9 (09-17-23 @ 00:57)  HR: 72 (09-17-23 @ 05:50) (58 - 83)  BP: 88/48 (09-17-23 @ 00:57) (74/42 - 109/56)  RR: 18 (09-17-23 @ 00:57) (18 - 18)  SpO2: 98% (09-17-23 @ 05:50) (94% - 100%)  Wt(kg): --  I&O's Summary    16 Sep 2023 07:01  -  17 Sep 2023 07:00  --------------------------------------------------------  IN: 650 mL / OUT: 1000 mL / NET: -350 mL        Appearance: Normal	  Cardiovascular: Normal S1 S2,RRR, No JVD, No murmurs  Respiratory: Lungs clear to auscultation	  Gastrointestinal:  Soft, Non-tender, + BS	  Extremities: Normal range of motion, No clubbing, cyanosis or edema  Vascular: Peripheral pulses palpable 2+ bilaterally       Home Medications:  Albuterol (Eqv-ProAir HFA) 90 mcg/inh inhalation aerosol: 2 puff(s) inhaled every 6 hours as needed (13 Sep 2023 13:52)  albuterol 2.5 mg/3 mL (0.083%) inhalation solution: 3 milliliter(s) by nebulizer every 6 hours as needed (13 Sep 2023 13:57)  allopurinol 100 mg oral tablet: 1 tab(s) orally once a day (13 Sep 2023 13:52)  cinacalcet 90 mg oral tablet: 1 tab(s) orally 3 times a week (on days of dialysis - Tues, Thurs &amp; Sat) (13 Sep 2023 13:52)  Colace 100 mg oral capsule: 1 cap(s) orally once a day (at bedtime) (13 Sep 2023 13:54)  dicyclomine 20 mg oral tablet: 1 tab(s) orally every 6 hours as needed (13 Sep 2023 13:52)  levETIRAcetam 500 mg oral tablet: 1 tab(s) orally 2 times a day (13 Sep 2023 13:52)  lidocaine 5% topical film: Apply 1 patch to knee and 1 patch to lower back; remove after 12 hours (13 Sep 2023 13:52)  midodrine 10 mg oral tablet: 3 tab(s) orally 4 times a day note: pharmacy has 3 times a day. (13 Sep 2023 13:54)  MiraLax oral powder for reconstitution: 17 gram(s) orally every other day (13 Sep 2023 13:52)  pantoprazole 40 mg oral delayed release tablet: 1 tab(s) orally once a day (13 Sep 2023 13:52)  pregabalin 100 mg oral capsule: 1 cap(s) orally 2 times a day (13 Sep 2023 13:52)  sevelamer carbonate 800 mg oral tablet: 3 tab(s) orally 3 times a day &amp; 1 additional tab(s) with snacks (13 Sep 2023 13:52)  Trelegy Ellipta 100 mcg-62.5 mcg-25 mcg/inh inhalation powder: 1 puff(s) inhaled once a day (13 Sep 2023 13:52)  WARFARIN: Patient alternates between 9mg &amp; 10mg as per INR as per wife. (13 Sep 2023 13:54)        MEDICATIONS  (STANDING):  allopurinol 100 milliGRAM(s) Oral daily  apixaban 5 milliGRAM(s) Oral every 12 hours  budesonide 160 MICROgram(s)/formoterol 4.5 MICROgram(s) Inhaler 2 Puff(s) Inhalation two times a day  chlorhexidine 2% Cloths 1 Application(s) Topical <User Schedule>  cinacalcet 90 milliGRAM(s) Oral <User Schedule>  dicyclomine 20 milliGRAM(s) Oral three times a day before meals  hydrocortisone hemorrhoidal Suppository 1 Suppository(s) Rectal two times a day  levETIRAcetam 500 milliGRAM(s) Oral two times a day  midodrine. 30 milliGRAM(s) Oral three times a day  pantoprazole    Tablet 40 milliGRAM(s) Oral before breakfast  polyethylene glycol 3350 17 Gram(s) Oral two times a day  pregabalin 100 milliGRAM(s) Oral two times a day  sevelamer carbonate 2400 milliGRAM(s) Oral three times a day  sevelamer carbonate 800 milliGRAM(s) Oral <User Schedule>  tiotropium 2.5 MICROgram(s) Inhaler 2 Puff(s) Inhalation daily        EKG:  RADIOLOGY:  DIAGNOSTIC TESTING:  [ ] Echocardiogram:  [ ] Catherterization:  [ ] Stress Test:  OTHER:     LABS:	 	                          13.8   7.43  )-----------( 233      ( 16 Sep 2023 14:21 )             45.2     09-16    137  |  97  |  28<H>  ----------------------------<  122<H>  3.9   |  21<L>  |  7.58<H>    Ca    8.9      16 Sep 2023 14:21        PT/INR - ( 16 Sep 2023 14:21 )   PT: 20.2 sec;   INR: 1.96 ratio             CARDIAC MARKERS:

## 2023-09-17 NOTE — PROGRESS NOTE ADULT - ASSESSMENT
: 78-year-old male history of ESRD on HD via left upper extremity AV fistula Tuesday Thursday Saturday, last dialysis yesterday, chronic severe orthostatic hypotension on midodrine 30 mg every 6 hours, COPD on 2 L home O2, CHF, pulmonary hypertension, PE and DVT on in the past on Coumadin, IVC filter, chronic sacral ulcer, history of hematochezia in the past, brought in by EMS from home for multiple episodes of bright red blood per rectum over the past week, lightheadedness, syncopal     ESRD on HD ( TTS) via AVF  Outpt unit [facility]  Consent obtained in chart  s/p HD on 9/16   Continue TTS schedule  Midodrine pre HD to allow UF.  Monitor BMP     HTN  Marginal  chronic severe orthostatic hypotension on midodrine 30mg q6h  Monitor closely     Hyperkalemmia:  In setting of RF and acidosis.  Low K diet.      CKD-MBD    Tertiary  hyperparathyroidism.   On Sensipar of 60 mg po daily  Low PO4 diet.  Continue Renvela with meals.  Monitor Phosphorus and calcium daily.     BRRB  GI following.  Monitor Hb

## 2023-09-17 NOTE — PROGRESS NOTE ADULT - SUBJECTIVE AND OBJECTIVE BOX
[facility] [place] [facility]   MD CHESTER DUEÑAS MD RUORU WONG, PA    TEL:  FROM 9 AM to 5 PM ---OFFICE: [phone]    FROM 5 PM - 9 AM PLEASE CALL ANSWERING SERVICE: [phone]    RENAL FOLLOW UP NOTE--Date of Service 09-17-23 @ 07:58  --------------------------------------------------------------------------------  HPI:    pt  seen and examined at bedside.        PAST HISTORY  --------------------------------------------------------------------------------  No significant changes to PMH, PSH, FHx, SHx, unless otherwise noted    ALLERGIES & MEDICATIONS  --------------------------------------------------------------------------------  Allergies    baclofen (Other)  latex (Rash)    Intolerances      Standing Inpatient Medications  allopurinol 100 milliGRAM(s) Oral daily  apixaban 5 milliGRAM(s) Oral every 12 hours  budesonide 160 MICROgram(s)/formoterol 4.5 MICROgram(s) Inhaler 2 Puff(s) Inhalation two times a day  chlorhexidine 2% Cloths 1 Application(s) Topical <User Schedule>  cinacalcet 90 milliGRAM(s) Oral <User Schedule>  dicyclomine 20 milliGRAM(s) Oral three times a day before meals  hydrocortisone hemorrhoidal Suppository 1 Suppository(s) Rectal two times a day  levETIRAcetam 500 milliGRAM(s) Oral two times a day  midodrine. 30 milliGRAM(s) Oral three times a day  pantoprazole    Tablet 40 milliGRAM(s) Oral before breakfast  polyethylene glycol 3350 17 Gram(s) Oral two times a day  pregabalin 100 milliGRAM(s) Oral two times a day  sevelamer carbonate 2400 milliGRAM(s) Oral three times a day  sevelamer carbonate 800 milliGRAM(s) Oral <User Schedule>  tiotropium 2.5 MICROgram(s) Inhaler 2 Puff(s) Inhalation daily    PRN Inpatient Medications  albuterol    90 MICROgram(s) HFA Inhaler 2 Puff(s) Inhalation every 6 hours PRN      REVIEW OF SYSTEMS  --------------------------------------------------------------------------------  General: no fever  MSK: no edema     VITALS/PHYSICAL EXAM  --------------------------------------------------------------------------------  T(C): 36.9 (09-17-23 @ 00:57), Max: 36.9 (09-17-23 @ 00:57)  HR: 72 (09-17-23 @ 05:50) (58 - 83)  BP: 88/48 (09-17-23 @ 00:57) (74/42 - 109/56)  RR: 18 (09-17-23 @ 00:57) (18 - 18)  SpO2: 98% (09-17-23 @ 05:50) (94% - 100%)  Wt(kg): --        09-16-23 @ 07:01  -  09-17-23 @ 07:00  --------------------------------------------------------  IN: 650 mL / OUT: 1000 mL / NET: -350 mL      Physical Exam:  	Gen: NAD  	HEENT: MMM  	Pulm: CTA B/L  	CV: S1S2  	Abd: Soft, +BS  	Ext: No LE edema B/L                      Neuro: Awake   	Skin: Warm and Dry   	Vascular access: avf           no  teresa  LABS/STUDIES  --------------------------------------------------------------------------------              13.8   7.43  >-----------<  233      [09-16-23 @ 14:21]              45.2     137  |  97  |  28  ----------------------------<  122      [09-16-23 @ 14:21]  3.9   |  21  |  7.58        Ca     8.9     [09-16-23 @ 14:21]      PT/INR: PT 20.2 , INR 1.96       [09-16-23 @ 14:21]      Creatinine Trend:  SCr 7.58 [09-16 @ 14:21]  SCr 8.92 [09-15 @ 20:07]  SCr 7.87 [09-15 @ 09:50]  SCr 9.14 [09-14 @ 07:07]  SCr 8.05 [09-13 @ 10:17]    Urinalysis - [09-16-23 @ 14:21]      Color  / Appearance  / SG  / pH       Gluc 122 / Ketone   / Bili  / Urobili        Blood  / Protein  / Leuk Est  / Nitrite       RBC  / WBC  / Hyaline  / Gran  / Sq Epi  / Non Sq Epi  / Bacteria       Iron 36, TIBC --, %sat --      [09-14-23 @ 07:07]  Ferritin 476      [09-14-23 @ 07:07]  PTH -- (Ca 8.8)      [09-14-23 @ 07:07]   955  PTH -- (Ca --)      [01-20-23 @ 06:35]   630  TSH 0.92      [02-24-23 @ 13:07]

## 2023-09-17 NOTE — PROGRESS NOTE ADULT - SUBJECTIVE AND OBJECTIVE BOX
Patient is a 78y old  Male who presents with a chief complaint of rectal bleeding (17 Sep 2023 09:41)      SUBJECTIVE / OVERNIGHT EVENTS: no new events, tolerating eliquis well    MEDICATIONS  (STANDING):  allopurinol 100 milliGRAM(s) Oral daily  apixaban 5 milliGRAM(s) Oral every 12 hours  budesonide 160 MICROgram(s)/formoterol 4.5 MICROgram(s) Inhaler 2 Puff(s) Inhalation two times a day  chlorhexidine 2% Cloths 1 Application(s) Topical <User Schedule>  cinacalcet 90 milliGRAM(s) Oral <User Schedule>  dicyclomine 20 milliGRAM(s) Oral three times a day before meals  hydrocortisone hemorrhoidal Suppository 1 Suppository(s) Rectal two times a day  levETIRAcetam 500 milliGRAM(s) Oral two times a day  midodrine. 30 milliGRAM(s) Oral three times a day  pantoprazole    Tablet 40 milliGRAM(s) Oral before breakfast  polyethylene glycol 3350 17 Gram(s) Oral two times a day  pregabalin 100 milliGRAM(s) Oral two times a day  sevelamer carbonate 2400 milliGRAM(s) Oral three times a day with meals  sevelamer carbonate 800 milliGRAM(s) Oral <User Schedule>  tiotropium 2.5 MICROgram(s) Inhaler 2 Puff(s) Inhalation daily    MEDICATIONS  (PRN):  albuterol    90 MICROgram(s) HFA Inhaler 2 Puff(s) Inhalation every 6 hours PRN Bronchospasm      Vital Signs Last 24 Hrs  T(F): 98.1 (09-17-23 @ 16:14), Max: 98.5 (09-17-23 @ 00:57)  HR: 69 (09-17-23 @ 16:14) (69 - 82)  BP: 91/52 (09-17-23 @ 16:14) (88/46 - 91/52)  RR: 17 (09-17-23 @ 16:14) (17 - 18)  SpO2: 95% (09-17-23 @ 16:14) (93% - 98%)  Telemetry:   CAPILLARY BLOOD GLUCOSE        I&O's Summary    16 Sep 2023 07:01  -  17 Sep 2023 07:00  --------------------------------------------------------  IN: 650 mL / OUT: 1000 mL / NET: -350 mL    17 Sep 2023 07:01  -  17 Sep 2023 20:12  --------------------------------------------------------  IN: 350 mL / OUT: 0 mL / NET: 350 mL        PHYSICAL EXAM:  GENERAL: NAD, well-developed  HEAD:  Atraumatic, Normocephalic  EYES: EOMI, PERRLA, conjunctiva and sclera clear  NECK: Supple, No JVD  CHEST/LUNG: Clear to auscultation bilaterally; No wheeze  HEART: Regular rate and rhythm; No murmurs, rubs, or gallops  ABDOMEN: Soft, Nontender, Nondistended; Bowel sounds present  EXTREMITIES:  2+ Peripheral Pulses, No clubbing, cyanosis, or edema  PSYCH: AAOx3  NEUROLOGY: non-focal  SKIN: No rashes or lesions    LABS:                        14.0   6.84  )-----------( 225      ( 17 Sep 2023 09:36 )             47.2     09-17    138  |  95<L>  |  29<H>  ----------------------------<  90  3.7   |  24  |  7.87<H>    Ca    9.4      17 Sep 2023 09:36      PT/INR - ( 16 Sep 2023 14:21 )   PT: 20.2 sec;   INR: 1.96 ratio               Urinalysis Basic - ( 17 Sep 2023 09:36 )    Color: x / Appearance: x / SG: x / pH: x  Gluc: 90 mg/dL / Ketone: x  / Bili: x / Urobili: x   Blood: x / Protein: x / Nitrite: x   Leuk Esterase: x / RBC: x / WBC x   Sq Epi: x / Non Sq Epi: x / Bacteria: x        RADIOLOGY & ADDITIONAL TESTS:    Imaging Personally Reviewed:    Consultant(s) Notes Reviewed:      Care Discussed with Consultants/Other Providers:

## 2023-09-18 ENCOUNTER — TRANSCRIPTION ENCOUNTER (OUTPATIENT)
Age: 78
End: 2023-09-18

## 2023-09-18 DIAGNOSIS — I95.9 HYPOTENSION, UNSPECIFIED: ICD-10-CM

## 2023-09-18 LAB
ANION GAP SERPL CALC-SCNC: 20 MMOL/L — HIGH (ref 5–17)
BUN SERPL-MCNC: 52 MG/DL — HIGH (ref 7–23)
CALCIUM SERPL-MCNC: 9.7 MG/DL — SIGNIFICANT CHANGE UP (ref 8.4–10.5)
CHLORIDE SERPL-SCNC: 95 MMOL/L — LOW (ref 96–108)
CO2 SERPL-SCNC: 22 MMOL/L — SIGNIFICANT CHANGE UP (ref 22–31)
CREAT SERPL-MCNC: 10.5 MG/DL — HIGH (ref 0.5–1.3)
EGFR: 5 ML/MIN/1.73M2 — LOW
GLUCOSE SERPL-MCNC: 78 MG/DL — SIGNIFICANT CHANGE UP (ref 70–99)
POTASSIUM SERPL-MCNC: 5.4 MMOL/L — HIGH (ref 3.5–5.3)
POTASSIUM SERPL-SCNC: 5.4 MMOL/L — HIGH (ref 3.5–5.3)
SODIUM SERPL-SCNC: 137 MMOL/L — SIGNIFICANT CHANGE UP (ref 135–145)

## 2023-09-18 RX ORDER — APIXABAN 2.5 MG/1
1 TABLET, FILM COATED ORAL
Qty: 60 | Refills: 0
Start: 2023-09-18 | End: 2023-10-17

## 2023-09-18 RX ADMIN — LEVETIRACETAM 500 MILLIGRAM(S): 250 TABLET, FILM COATED ORAL at 06:27

## 2023-09-18 RX ADMIN — SEVELAMER CARBONATE 2400 MILLIGRAM(S): 2400 POWDER, FOR SUSPENSION ORAL at 18:17

## 2023-09-18 RX ADMIN — SEVELAMER CARBONATE 2400 MILLIGRAM(S): 2400 POWDER, FOR SUSPENSION ORAL at 09:02

## 2023-09-18 RX ADMIN — APIXABAN 5 MILLIGRAM(S): 2.5 TABLET, FILM COATED ORAL at 06:26

## 2023-09-18 RX ADMIN — MIDODRINE HYDROCHLORIDE 30 MILLIGRAM(S): 2.5 TABLET ORAL at 06:26

## 2023-09-18 RX ADMIN — Medication 100 MILLIGRAM(S): at 06:23

## 2023-09-18 RX ADMIN — TIOTROPIUM BROMIDE 2 PUFF(S): 18 CAPSULE ORAL; RESPIRATORY (INHALATION) at 12:47

## 2023-09-18 RX ADMIN — MIDODRINE HYDROCHLORIDE 30 MILLIGRAM(S): 2.5 TABLET ORAL at 12:46

## 2023-09-18 RX ADMIN — SEVELAMER CARBONATE 2400 MILLIGRAM(S): 2400 POWDER, FOR SUSPENSION ORAL at 12:48

## 2023-09-18 RX ADMIN — Medication 100 MILLIGRAM(S): at 18:19

## 2023-09-18 RX ADMIN — Medication 1 SUPPOSITORY(S): at 18:19

## 2023-09-18 RX ADMIN — Medication 20 MILLIGRAM(S): at 06:23

## 2023-09-18 RX ADMIN — Medication 1 SUPPOSITORY(S): at 06:25

## 2023-09-18 RX ADMIN — BUDESONIDE AND FORMOTEROL FUMARATE DIHYDRATE 2 PUFF(S): 160; 4.5 AEROSOL RESPIRATORY (INHALATION) at 06:27

## 2023-09-18 RX ADMIN — BUDESONIDE AND FORMOTEROL FUMARATE DIHYDRATE 2 PUFF(S): 160; 4.5 AEROSOL RESPIRATORY (INHALATION) at 18:18

## 2023-09-18 RX ADMIN — Medication 20 MILLIGRAM(S): at 12:45

## 2023-09-18 RX ADMIN — PANTOPRAZOLE SODIUM 40 MILLIGRAM(S): 20 TABLET, DELAYED RELEASE ORAL at 06:28

## 2023-09-18 RX ADMIN — SEVELAMER CARBONATE 800 MILLIGRAM(S): 2400 POWDER, FOR SUSPENSION ORAL at 21:51

## 2023-09-18 RX ADMIN — Medication 100 MILLIGRAM(S): at 12:47

## 2023-09-18 RX ADMIN — Medication 20 MILLIGRAM(S): at 18:16

## 2023-09-18 RX ADMIN — MIDODRINE HYDROCHLORIDE 30 MILLIGRAM(S): 2.5 TABLET ORAL at 18:22

## 2023-09-18 RX ADMIN — APIXABAN 5 MILLIGRAM(S): 2.5 TABLET, FILM COATED ORAL at 18:19

## 2023-09-18 RX ADMIN — LEVETIRACETAM 500 MILLIGRAM(S): 250 TABLET, FILM COATED ORAL at 18:17

## 2023-09-18 NOTE — DISCHARGE NOTE PROVIDER - CARE PROVIDERS DIRECT ADDRESSES
,DirectAddress_Unknown ,DirectAddress_Unknown,[email or web address],DirectAddress_Unknown,DirectAddress_Unknown

## 2023-09-18 NOTE — PROGRESS NOTE ADULT - ASSESSMENT
Transthoracic Echocardiogram (03.13.23 @ 09:29) >  CONCLUSIONS: nl LV sys fx   Transthoracic Echocardiogram (01.20.23 @ 17:22) >  CONCLUSIONS: NL LV sys fx     A/p  78 Y M H/O ESRD on HD T/TH, Sat, chronic hypotension, on midodrin 30 mg q6 hr, 2 L NC, CHF, pHTN, PE/HTN, presenting with weakness, fatigue, and rectal bleeding.      #Rectal Bleeding  -I/s/o supratherapeutic INR  -CT no e/o active bleeding  -Per GI, No plans for endoscopic evaluation. Pt would be very high risk.   -No further bleeding, now on eliquis    #orthostatic hypotension  -Chronic  -cont mido 30 mg every 6 hours   -Echo from 3/2023 with nl lv fxn, valve fxn     #ESRD  -HD per renal     #DVT hx  -On apixaban    #COPD, PHTN  -stable

## 2023-09-18 NOTE — PROGRESS NOTE ADULT - ASSESSMENT
The patient is a 78 year old morbidly obese man with PMH of ESRD on HD, orthostatic hypotension (on high dose midodrine), COPD on home O2, SAADIA on CPAP, wheelchair bound, chronic diastolic CHF, pulmonary hypertension, PE and DVT on Coumadin, diverticulosis and hemorrhoids, admitted for rectal bleeding in the setting of supratherapeutic INR.     1. BRBPR, resolved. Likely hemorrhoidal vs. diverticular bleed. Hgb remains normal.   No plans for endoscopic evaluation. Pt would be very high risk.   cont Anusol HC suppositories BID x 2 weeks  bowel regimen to prevent constipation   monitor H&H, transfuse PRN   needs tighter control of INR    2. PE/DVT  on coumadin   PPI for GI ppx     3. Orthostatic hypotension     4. ESRD on HD    5. COPD on home 02      Desiree Castillo M.D.   Gastroenterology and Hepatology  [address] [place], [place]  Office: [phone]  Cell: [phone]

## 2023-09-18 NOTE — PROGRESS NOTE ADULT - SUBJECTIVE AND OBJECTIVE BOX
[facility] [place] [facility]   MD CHESTER DUEÑAS MD RUORU WONG, PA    TEL:  FROM 9 AM to 5 PM ---OFFICE: [phone]    FROM 5 PM - 9 AM PLEASE CALL ANSWERING SERVICE: [phone]    RENAL FOLLOW UP NOTE--Date of Service 09-18-23 @ 10:07  --------------------------------------------------------------------------------  HPI:      Pt seen and examined at bedside.       PAST HISTORY  --------------------------------------------------------------------------------  No significant changes to PMH, PSH, FHx, SHx, unless otherwise noted    ALLERGIES & MEDICATIONS  --------------------------------------------------------------------------------  Allergies    baclofen (Other)  latex (Rash)    Intolerances      Standing Inpatient Medications  allopurinol 100 milliGRAM(s) Oral daily  apixaban 5 milliGRAM(s) Oral every 12 hours  budesonide 160 MICROgram(s)/formoterol 4.5 MICROgram(s) Inhaler 2 Puff(s) Inhalation two times a day  chlorhexidine 2% Cloths 1 Application(s) Topical <User Schedule>  cinacalcet 90 milliGRAM(s) Oral <User Schedule>  dicyclomine 20 milliGRAM(s) Oral three times a day before meals  hydrocortisone hemorrhoidal Suppository 1 Suppository(s) Rectal two times a day  levETIRAcetam 500 milliGRAM(s) Oral two times a day  midodrine. 30 milliGRAM(s) Oral three times a day  pantoprazole    Tablet 40 milliGRAM(s) Oral before breakfast  polyethylene glycol 3350 17 Gram(s) Oral two times a day  pregabalin 100 milliGRAM(s) Oral two times a day  sevelamer carbonate 800 milliGRAM(s) Oral <User Schedule>  sevelamer carbonate 2400 milliGRAM(s) Oral three times a day with meals  tiotropium 2.5 MICROgram(s) Inhaler 2 Puff(s) Inhalation daily    PRN Inpatient Medications  albuterol    90 MICROgram(s) HFA Inhaler 2 Puff(s) Inhalation every 6 hours PRN      REVIEW OF SYSTEMS  --------------------------------------------------------------------------------  General: no fever  MSK: no edema     VITALS/PHYSICAL EXAM  --------------------------------------------------------------------------------  T(C): 36.3 (09-18-23 @ 09:17), Max: 36.7 (09-17-23 @ 16:14)  HR: 73 (09-18-23 @ 09:17) (58 - 87)  BP: 97/59 (09-18-23 @ 09:17) (71/42 - 97/59)  RR: 18 (09-18-23 @ 09:17) (17 - 18)  SpO2: 96% (09-18-23 @ 09:17) (93% - 99%)  Wt(kg): --        09-17-23 @ 07:01  -  09-18-23 @ 07:00  --------------------------------------------------------  IN: 350 mL / OUT: 0 mL / NET: 350 mL      Physical Exam:  	Gen: NAD  	HEENT: MMM  	Pulm: CTA B/L  	CV: S1S2  	Abd: Soft, +BS  	Ext: No LE edema B/L                      Neuro: Awake   	Skin: Warm and Dry   	Vascular access: avf           no  teresa  LABS/STUDIES  --------------------------------------------------------------------------------              14.0   6.84  >-----------<  225      [09-17-23 @ 09:36]              47.2     138  |  95  |  29  ----------------------------<  90      [09-17-23 @ 09:36]  3.7   |  24  |  7.87        Ca     9.4     [09-17-23 @ 09:36]      PT/INR: PT 20.2 , INR 1.96       [09-16-23 @ 14:21]      Creatinine Trend:  SCr 7.87 [09-17 @ 09:36]  SCr 7.58 [09-16 @ 14:21]  SCr 8.92 [09-15 @ 20:07]  SCr 7.87 [09-15 @ 09:50]  SCr 9.14 [09-14 @ 07:07]    Urinalysis - [09-17-23 @ 09:36]      Color  / Appearance  / SG  / pH       Gluc 90 / Ketone   / Bili  / Urobili        Blood  / Protein  / Leuk Est  / Nitrite       RBC  / WBC  / Hyaline  / Gran  / Sq Epi  / Non Sq Epi  / Bacteria       Iron 36, TIBC --, %sat --      [09-14-23 @ 07:07]  Ferritin 476      [09-14-23 @ 07:07]  PTH -- (Ca 8.8)      [09-14-23 @ 07:07]   955  PTH -- (Ca --)      [01-20-23 @ 06:35]   630  TSH 0.92      [02-24-23 @ 13:07]

## 2023-09-18 NOTE — PROGRESS NOTE ADULT - SUBJECTIVE AND OBJECTIVE BOX
CARDIOLOGY FOLLOW UP - Dr. Livingston  DATE OF SERVICE: 9/18/23    CC  No CV complaints    REVIEW OF SYSTEMS:  CONSTITUTIONAL: No fever, weight loss, or fatigue  RESPIRATORY: No cough, wheezing, chills or hemoptysis; No Shortness of Breath  CARDIOVASCULAR: No chest pain, palpitations, passing out, dizziness, or leg swelling  GASTROINTESTINAL: No abdominal or epigastric pain. No nausea, vomiting, or hematemesis; No diarrhea or constipation. No melena or hematochezia.  VASCULAR: No edema     PHYSICAL EXAM:  T(C): 36.4 (09-17-23 @ 23:04), Max: 36.7 (09-17-23 @ 16:14)  HR: 58 (09-18-23 @ 06:20) (58 - 87)  BP: 91/43 (09-18-23 @ 06:20) (71/42 - 91/52)  RR: 18 (09-17-23 @ 23:04) (17 - 18)  SpO2: 99% (09-18-23 @ 05:25) (93% - 99%)  Wt(kg): --  I&O's Summary    17 Sep 2023 07:01  -  18 Sep 2023 07:00  --------------------------------------------------------  IN: 350 mL / OUT: 0 mL / NET: 350 mL        Appearance: Elderly male	  Cardiovascular: Normal S1 S2,RRR, No JVD, No murmurs  Respiratory: Lungs clear to auscultation b/l  Gastrointestinal:  Soft, Non-tender, + BS	  Extremities: Normal range of motion, No clubbing, cyanosis or edema      Home Medications:  Albuterol (Eqv-ProAir HFA) 90 mcg/inh inhalation aerosol: 2 puff(s) inhaled every 6 hours as needed (13 Sep 2023 13:52)  albuterol 2.5 mg/3 mL (0.083%) inhalation solution: 3 milliliter(s) by nebulizer every 6 hours as needed (13 Sep 2023 13:57)  allopurinol 100 mg oral tablet: 1 tab(s) orally once a day (13 Sep 2023 13:52)  cinacalcet 90 mg oral tablet: 1 tab(s) orally 3 times a week (on days of dialysis - Tues, Thurs &amp; Sat) (13 Sep 2023 13:52)  Colace 100 mg oral capsule: 1 cap(s) orally once a day (at bedtime) (13 Sep 2023 13:54)  dicyclomine 20 mg oral tablet: 1 tab(s) orally every 6 hours as needed (13 Sep 2023 13:52)  levETIRAcetam 500 mg oral tablet: 1 tab(s) orally 2 times a day (13 Sep 2023 13:52)  lidocaine 5% topical film: Apply 1 patch to knee and 1 patch to lower back; remove after 12 hours (13 Sep 2023 13:52)  midodrine 10 mg oral tablet: 3 tab(s) orally 4 times a day note: pharmacy has 3 times a day. (13 Sep 2023 13:54)  MiraLax oral powder for reconstitution: 17 gram(s) orally every other day (13 Sep 2023 13:52)  pantoprazole 40 mg oral delayed release tablet: 1 tab(s) orally once a day (13 Sep 2023 13:52)  pregabalin 100 mg oral capsule: 1 cap(s) orally 2 times a day (13 Sep 2023 13:52)  sevelamer carbonate 800 mg oral tablet: 3 tab(s) orally 3 times a day &amp; 1 additional tab(s) with snacks (13 Sep 2023 13:52)  Trelegy Ellipta 100 mcg-62.5 mcg-25 mcg/inh inhalation powder: 1 puff(s) inhaled once a day (13 Sep 2023 13:52)  WARFARIN: Patient alternates between 9mg &amp; 10mg as per INR as per wife. (13 Sep 2023 13:54)      MEDICATIONS  (STANDING):  allopurinol 100 milliGRAM(s) Oral daily  apixaban 5 milliGRAM(s) Oral every 12 hours  budesonide 160 MICROgram(s)/formoterol 4.5 MICROgram(s) Inhaler 2 Puff(s) Inhalation two times a day  chlorhexidine 2% Cloths 1 Application(s) Topical <User Schedule>  cinacalcet 90 milliGRAM(s) Oral <User Schedule>  dicyclomine 20 milliGRAM(s) Oral three times a day before meals  hydrocortisone hemorrhoidal Suppository 1 Suppository(s) Rectal two times a day  levETIRAcetam 500 milliGRAM(s) Oral two times a day  midodrine. 30 milliGRAM(s) Oral three times a day  pantoprazole    Tablet 40 milliGRAM(s) Oral before breakfast  polyethylene glycol 3350 17 Gram(s) Oral two times a day  pregabalin 100 milliGRAM(s) Oral two times a day  sevelamer carbonate 2400 milliGRAM(s) Oral three times a day with meals  sevelamer carbonate 800 milliGRAM(s) Oral <User Schedule>  tiotropium 2.5 MICROgram(s) Inhaler 2 Puff(s) Inhalation daily      TELEMETRY: SR 90s	    ECG:  	  RADIOLOGY:   DIAGNOSTIC TESTING:  [ ] Echocardiogram:  [ ]  Catheterization:  [ ] Stress Test:    OTHER: 	    LABS:	 	    CKMB Units: 1.6 ng/mL [0.0 - 6.7] (09-14 @ 07:07)  Troponin T, High Sensitivity Result: 175 ng/L [0 - 51] (09-13 @ 10:17)                          14.0   6.84  )-----------( 225      ( 17 Sep 2023 09:36 )             47.2     09-17    138  |  95<L>  |  29<H>  ----------------------------<  90  3.7   |  24  |  7.87<H>    Ca    9.4      17 Sep 2023 09:36      PT/INR - ( 16 Sep 2023 14:21 )   PT: 20.2 sec;   INR: 1.96 ratio

## 2023-09-18 NOTE — PROGRESS NOTE ADULT - ASSESSMENT
: 78-year-old male history of ESRD on HD via left upper extremity AV fistula Tuesday Thursday Saturday, last dialysis yesterday, chronic severe orthostatic hypotension on midodrine 30 mg every 6 hours, COPD on 2 L home O2, CHF, pulmonary hypertension, PE and DVT on in the past on Coumadin, IVC filter, chronic sacral ulcer, history of hematochezia in the past, brought in by EMS from home for multiple episodes of bright red blood per rectum over the past week, lightheadedness, syncopal     ESRD on HD ( TTS) via AVF  Outpt unit [facility]   Consent obtained in chart  s/p HD on 9/16   Continue TTS schedule  Discussed with outpt nephrologist, no renal objection to eliquis.   Midodrine pre HD to allow UF.  Monitor BMP     HTN  Marginal  chronic severe orthostatic hypotension on midodrine 30mg q6h  Monitor closely     Hyperkalemmia:  In setting of RF and acidosis.  Low K diet.      CKD-MBD    Tertiary  hyperparathyroidism.   On Sensipar of 60 mg po daily  Low PO4 diet.  Continue Renvela with meals.  Monitor Phosphorus and calcium daily.     BRRB  GI following.  Monitor Hb

## 2023-09-18 NOTE — DISCHARGE NOTE PROVIDER - NSDCMRMEDTOKEN_GEN_ALL_CORE_FT
Albuterol (Eqv-ProAir HFA) 90 mcg/inh inhalation aerosol: 2 puff(s) inhaled every 6 hours as needed  albuterol 2.5 mg/3 mL (0.083%) inhalation solution: 3 milliliter(s) by nebulizer every 6 hours as needed  allopurinol 100 mg oral tablet: 1 tab(s) orally once a day  apixaban 5 mg oral tablet: 1 tab(s) orally every 12 hours  cinacalcet 90 mg oral tablet: 1 tab(s) orally 3 times a week (on days of dialysis - Tues, Thurs &amp; Sat)  Colace 100 mg oral capsule: 1 cap(s) orally once a day (at bedtime)  dicyclomine 20 mg oral tablet: 1 tab(s) orally every 6 hours as needed  levETIRAcetam 500 mg oral tablet: 1 tab(s) orally 2 times a day  lidocaine 5% topical film: Apply 1 patch to knee and 1 patch to lower back; remove after 12 hours  midodrine 10 mg oral tablet: 3 tab(s) orally 4 times a day note: pharmacy has 3 times a day.  MiraLax oral powder for reconstitution: 17 gram(s) orally every other day  pantoprazole 40 mg oral delayed release tablet: 1 tab(s) orally once a day  pregabalin 100 mg oral capsule: 1 cap(s) orally 2 times a day  sevelamer carbonate 800 mg oral tablet: 3 tab(s) orally 3 times a day &amp; 1 additional tab(s) with snacks  Trelegy Ellipta 100 mcg-62.5 mcg-25 mcg/inh inhalation powder: 1 puff(s) inhaled once a day  WARFARIN: Patient alternates between 9mg &amp; 10mg as per INR as per wife.   Albuterol (Eqv-ProAir HFA) 90 mcg/inh inhalation aerosol: 2 puff(s) inhaled every 6 hours as needed  allopurinol 100 mg oral tablet: 1 tab(s) orally once a day  apixaban 5 mg oral tablet: 1 tab(s) orally every 12 hours  cinacalcet 90 mg oral tablet: 1 tab(s) orally 3 times a week (on days of dialysis - Tues, Thurs &amp; Sat)  dicyclomine 10 mg oral capsule: 2 cap(s) orally 3 times a day (before meals)  levETIRAcetam 500 mg oral tablet: 1 tab(s) orally 2 times a day  midodrine 10 mg oral tablet: 3 tab(s) orally 3 times a day  MiraLax oral powder for reconstitution: 17 gram(s) orally every other day  pantoprazole 40 mg oral delayed release tablet: 1 tab(s) orally once a day (before a meal)  pregabalin 100 mg oral capsule: 1 cap(s) orally 2 times a day  sevelamer carbonate 800 mg oral tablet: 3 tab(s) orally 3 times a day (with meals)  Trelegy Ellipta 100 mcg-62.5 mcg-25 mcg/inh inhalation powder: 1 puff(s) inhaled once a day   Albuterol (Eqv-ProAir HFA) 90 mcg/inh inhalation aerosol: 2 puff(s) inhaled every 6 hours as needed  allopurinol 100 mg oral tablet: 1 tab(s) orally once a day  apixaban 5 mg oral tablet: 1 tab(s) orally every 12 hours  budesonide-formoterol 160 mcg-4.5 mcg/inh inhalation aerosol: 2 puff(s) inhaled 2 times a day  cinacalcet 90 mg oral tablet: 1 tab(s) orally 3 times a week (on days of dialysis - Tues, Thurs &amp; Sat)  dicyclomine 10 mg oral capsule: 2 cap(s) orally 3 times a day (before meals)  hydrocortisone 25 mg rectal suppository: 1 suppository(ies) rectal 2 times a day  levETIRAcetam 500 mg oral tablet: 1 tab(s) orally 2 times a day  midodrine 10 mg oral tablet: 3 tab(s) orally 3 times a day  MiraLax oral powder for reconstitution: 17 gram(s) orally every other day  pantoprazole 40 mg oral delayed release tablet: 1 tab(s) orally once a day (before a meal)  pregabalin 100 mg oral capsule: 1 cap(s) orally 2 times a day  sevelamer carbonate 800 mg oral tablet: 3 tab(s) orally 3 times a day (with meals)  tiotropium 2.5 mcg/inh inhalation aerosol: 2 puff(s) inhaled once a day  Trelegy Ellipta 100 mcg-62.5 mcg-25 mcg/inh inhalation powder: 1 puff(s) inhaled once a day

## 2023-09-18 NOTE — DISCHARGE NOTE PROVIDER - PROVIDER TOKENS
PROVIDER:[TOKEN:[[id number]]] PROVIDER:[TOKEN:[[id number]]],PROVIDER:[TOKEN:[[id number]]],PROVIDER:[TOKEN:[[id number]]],PROVIDER:[TOKEN:[[id number]]]

## 2023-09-18 NOTE — PROVIDER CONTACT NOTE (OTHER) - SITUATION
patient had low blood pressure at 21:21 71/42 and at 23:05 80/43. provider notified at respective times about low blood pressure.
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
## 2023-09-18 NOTE — PROGRESS NOTE ADULT - SUBJECTIVE AND OBJECTIVE BOX
Patient is a 78y old  Male who presents with a chief complaint of rectal bleeding (17 Sep 2023 09:41)      SUBJECTIVE / OVERNIGHT EVENTS: ptn feels well, tolerating Eliquis without any issues, awaiting auth, also awaiting auth from Medicare for air mattress and awaiting power wheelchair to be delivered    MEDICATIONS  (STANDING):  allopurinol 100 milliGRAM(s) Oral daily  apixaban 5 milliGRAM(s) Oral every 12 hours  budesonide 160 MICROgram(s)/formoterol 4.5 MICROgram(s) Inhaler 2 Puff(s) Inhalation two times a day  chlorhexidine 2% Cloths 1 Application(s) Topical <User Schedule>  cinacalcet 90 milliGRAM(s) Oral <User Schedule>  dicyclomine 20 milliGRAM(s) Oral three times a day before meals  hydrocortisone hemorrhoidal Suppository 1 Suppository(s) Rectal two times a day  levETIRAcetam 500 milliGRAM(s) Oral two times a day  midodrine. 30 milliGRAM(s) Oral three times a day  pantoprazole    Tablet 40 milliGRAM(s) Oral before breakfast  polyethylene glycol 3350 17 Gram(s) Oral two times a day  pregabalin 100 milliGRAM(s) Oral two times a day  sevelamer carbonate 800 milliGRAM(s) Oral <User Schedule>  sevelamer carbonate 2400 milliGRAM(s) Oral three times a day with meals  tiotropium 2.5 MICROgram(s) Inhaler 2 Puff(s) Inhalation daily    MEDICATIONS  (PRN):  albuterol    90 MICROgram(s) HFA Inhaler 2 Puff(s) Inhalation every 6 hours PRN Bronchospasm      Vital Signs Last 24 Hrs  T(F): 97.6 (09-18-23 @ 15:04), Max: 97.6 (09-17-23 @ 23:04)  HR: 71 (09-18-23 @ 15:04) (58 - 87)  BP: 90/52 (09-18-23 @ 15:04) (71/42 - 97/59)  RR: 18 (09-18-23 @ 15:04) (18 - 18)  SpO2: 94% (09-18-23 @ 15:04) (93% - 99%)  Telemetry:   CAPILLARY BLOOD GLUCOSE        I&O's Summary    17 Sep 2023 07:01  -  18 Sep 2023 07:00  --------------------------------------------------------  IN: 350 mL / OUT: 0 mL / NET: 350 mL        PHYSICAL EXAM:  GENERAL: NAD, well-developed  HEAD:  Atraumatic, Normocephalic  EYES: EOMI, PERRLA, conjunctiva and sclera clear  NECK: Supple, No JVD  CHEST/LUNG: Clear to auscultation bilaterally; No wheeze  HEART: Regular rate and rhythm; No murmurs, rubs, or gallops  ABDOMEN: Soft, Nontender, Nondistended; Bowel sounds present  EXTREMITIES:  2+ Peripheral Pulses, No clubbing, cyanosis, or edema  PSYCH: AAOx3  NEUROLOGY: non-focal  SKIN: No rashes or lesions    LABS:                        14.0   6.84  )-----------( 225      ( 17 Sep 2023 09:36 )             47.2     09-18    137  |  95<L>  |  52<H>  ----------------------------<  78  5.4<H>   |  22  |  10.50<H>    Ca    9.7      18 Sep 2023 10:02            Urinalysis Basic - ( 18 Sep 2023 10:02 )    Color: x / Appearance: x / SG: x / pH: x  Gluc: 78 mg/dL / Ketone: x  / Bili: x / Urobili: x   Blood: x / Protein: x / Nitrite: x   Leuk Esterase: x / RBC: x / WBC x   Sq Epi: x / Non Sq Epi: x / Bacteria: x        RADIOLOGY & ADDITIONAL TESTS:    Imaging Personally Reviewed:    Consultant(s) Notes Reviewed:      Care Discussed with Consultants/Other Providers:

## 2023-09-18 NOTE — DISCHARGE NOTE PROVIDER - NSDCCPCAREPLAN_GEN_ALL_CORE_FT
PRINCIPAL DISCHARGE DIAGNOSIS  Diagnosis: GIB (gastrointestinal bleeding)  Assessment and Plan of Treatment: resolved      SECONDARY DISCHARGE DIAGNOSES  Diagnosis: ESRD on dialysis  Assessment and Plan of Treatment: cont HD as scheduled, f/up with Nephrologist    Diagnosis: Pulmonary embolism  Assessment and Plan of Treatment: no more warfarin, take Eliquis as ordered, f/up with PCP    Diagnosis: Acute on chronic respiratory failure with hypoxia and hypercapnia  Assessment and Plan of Treatment: resolved    Diagnosis: Chronic obstructive pulmonary disease (COPD)  Assessment and Plan of Treatment: stable, cont current meds    Diagnosis: Hypotension  Assessment and Plan of Treatment: stable

## 2023-09-18 NOTE — PROGRESS NOTE ADULT - SUBJECTIVE AND OBJECTIVE BOX
Chief Complaint:  Patient is a 78y old  Male who presents with a chief complaint of     Date of service 09-15-23 @ 13:35      Interval Events:   H&H stable, no rectal bleeding    Hospital Medications:  albuterol    90 MICROgram(s) HFA Inhaler 2 Puff(s) Inhalation every 6 hours PRN  allopurinol 100 milliGRAM(s) Oral daily  budesonide 160 MICROgram(s)/formoterol 4.5 MICROgram(s) Inhaler 2 Puff(s) Inhalation two times a day  chlorhexidine 2% Cloths 1 Application(s) Topical <User Schedule>  cinacalcet 90 milliGRAM(s) Oral <User Schedule>  dextrose 5% + sodium chloride 0.9%. 1000 milliLiter(s) IV Continuous <Continuous>  dicyclomine 20 milliGRAM(s) Oral three times a day before meals  hydrocortisone hemorrhoidal Suppository 1 Suppository(s) Rectal two times a day  levETIRAcetam 500 milliGRAM(s) Oral two times a day  midodrine. 30 milliGRAM(s) Oral three times a day  pantoprazole    Tablet 40 milliGRAM(s) Oral before breakfast  polyethylene glycol 3350 17 Gram(s) Oral two times a day  pregabalin 100 milliGRAM(s) Oral two times a day  sevelamer carbonate 2400 milliGRAM(s) Oral three times a day  sevelamer carbonate 800 milliGRAM(s) Oral <User Schedule>  tiotropium 2.5 MICROgram(s) Inhaler 2 Puff(s) Inhalation daily        Review of Systems:  General:  No wt loss, fevers, chills, night sweats, fatigue,   Eyes:  Good vision, no reported pain  ENT:  No sore throat, pain, runny nose, dysphagia  CV:  No pain, palpitations, hypo/hypertension  Resp:  No dyspnea, cough, tachypnea, wheezing  GI:  See HPI  :  No pain, bleeding, incontinence, nocturia  Muscle:  No pain, weakness  Neuro:  No weakness, tingling, memory problems  Psych:  No fatigue, insomnia, mood problems, depression  Endocrine:  No polyuria, polydipsia, cold/heat intolerance  Heme:  No petechiae, ecchymosis, easy bruisability  Integumentary:  No rash, edema    PHYSICAL EXAM:   Vital Signs:  Vital Signs Last 24 Hrs  T(C): 36.3 (15 Sep 2023 09:08), Max: 36.8 (14 Sep 2023 18:32)  T(F): 97.4 (15 Sep 2023 09:08), Max: 98.2 (14 Sep 2023 18:32)  HR: 71 (15 Sep 2023 09:46) (60 - 71)  BP: 90/49 (15 Sep 2023 09:08) (90/49 - 107/66)  BP(mean): --  RR: 18 (15 Sep 2023 09:08) (17 - 18)  SpO2: 100% (15 Sep 2023 09:46) (92% - 100%)    Parameters below as of 15 Sep 2023 09:08  Patient On (Oxygen Delivery Method): nasal cannula  O2 Flow (L/min): 2    Daily     Daily       PHYSICAL EXAM:     GENERAL:  Appears stated age, well-groomed, well-nourished, no distress  HEENT:  NC/AT,  conjunctivae anicteric, clear and pink,   NECK: supple, trachea midline  CHEST:  Full & symmetric excursion, no increased effort, breath sounds clear  HEART:  Regular rhythm, no JVD  ABDOMEN:  Soft, non-tender, non-distended, normoactive bowel sounds,  no masses , no hepatosplenomegaly  EXTREMITIES:  no cyanosis,clubbing or edema  SKIN:  No rash, erythema, or, ecchymoses, no jaundice  NEURO:  Alert, non-focal, no asterixis  PSYCH: Appropriate affect, oriented to place and time  RECTAL: Deferred      LABS Personally reviewed by me:                        14.5   8.07  )-----------( 198      ( 15 Sep 2023 09:50 )             49.1     Mean Cell Volume: 92.8 fl (09-15-23 @ 09:50)    09-15    135  |  98  |  29<H>  ----------------------------<  84  5.5<H>   |  13<L>  |  7.87<H>    Ca    9.0      15 Sep 2023 09:50  Phos  6.7     09-14    TPro  8.1  /  Alb  3.9  /  TBili  0.3  /  DBili  x   /  AST  25  /  ALT  12  /  AlkPhos  133<H>  09-14    LIVER FUNCTIONS - ( 14 Sep 2023 07:07 )  Alb: 3.9 g/dL / Pro: 8.1 g/dL / ALK PHOS: 133 U/L / ALT: 12 U/L / AST: 25 U/L / GGT: x           PT/INR - ( 15 Sep 2023 09:50 )   PT: 25.9 sec;   INR: 2.42 ratio           Urinalysis Basic - ( 15 Sep 2023 09:50 )    Color: x / Appearance: x / SG: x / pH: x  Gluc: 84 mg/dL / Ketone: x  / Bili: x / Urobili: x   Blood: x / Protein: x / Nitrite: x   Leuk Esterase: x / RBC: x / WBC x   Sq Epi: x / Non Sq Epi: x / Bacteria: x                              14.5   8.07  )-----------( 198      ( 15 Sep 2023 09:50 )             49.1                         13.6   8.60  )-----------( 176      ( 14 Sep 2023 07:07 )             46.2                         14.5   6.88  )-----------( 196      ( 13 Sep 2023 10:17 )             49.5       Imaging personally reviewed by me:

## 2023-09-18 NOTE — DISCHARGE NOTE PROVIDER - CARE PROVIDER_API CALL
Vilma Espinoza  Internal Medicine  [address] [place] [address], [place] [address]  Phone: [phone]  Fax: [phone]  Follow Up Time:    Vilma Espinoza  Internal Medicine  [address], [address]  [place] [address]  Phone: [phone]  Fax: [phone]  Follow Up Time:     Chivo Eduardo  Nephrology  [address] [place] [address]  Phone: [phone]  Fax: [phone]  Follow Up Time:     Eugene Jauregui  Pulmonary Disease  [address] [place] [address]  Phone: [phone]  Fax: [phone]  Follow Up Time:     Jin Castillo  Gastroenterology  [address] [place] [address]  Phone: [phone]  Fax: [phone]  Follow Up Time:

## 2023-09-18 NOTE — DISCHARGE NOTE PROVIDER - NSDCFUSCHEDAPPT_GEN_ALL_CORE_FT
Devon Purdy  [facility]  [facility] [address] [place] [address]  Scheduled Appointment: 09/27/2023

## 2023-09-18 NOTE — PROGRESS NOTE ADULT - SUBJECTIVE AND OBJECTIVE BOX
Date of Service: 09-18-23 @ 11:38    Patient is a 78y old  Male who presents with a chief complaint of rectal bleeding (17 Sep 2023 09:41)    Any change in ROS:   No new respiratory events overnight. Denies SOB/CP.  Reports respiratory status is at baseline    MEDICATIONS  (STANDING):  allopurinol 100 milliGRAM(s) Oral daily  apixaban 5 milliGRAM(s) Oral every 12 hours  budesonide 160 MICROgram(s)/formoterol 4.5 MICROgram(s) Inhaler 2 Puff(s) Inhalation two times a day  chlorhexidine 2% Cloths 1 Application(s) Topical <User Schedule>  cinacalcet 90 milliGRAM(s) Oral <User Schedule>  dicyclomine 20 milliGRAM(s) Oral three times a day before meals  hydrocortisone hemorrhoidal Suppository 1 Suppository(s) Rectal two times a day  levETIRAcetam 500 milliGRAM(s) Oral two times a day  midodrine. 30 milliGRAM(s) Oral three times a day  pantoprazole    Tablet 40 milliGRAM(s) Oral before breakfast  polyethylene glycol 3350 17 Gram(s) Oral two times a day  pregabalin 100 milliGRAM(s) Oral two times a day  sevelamer carbonate 800 milliGRAM(s) Oral <User Schedule>  sevelamer carbonate 2400 milliGRAM(s) Oral three times a day with meals  tiotropium 2.5 MICROgram(s) Inhaler 2 Puff(s) Inhalation daily    MEDICATIONS  (PRN):  albuterol    90 MICROgram(s) HFA Inhaler 2 Puff(s) Inhalation every 6 hours PRN Bronchospasm    Vital Signs Last 24 Hrs  T(C): 36.3 (18 Sep 2023 09:17), Max: 36.7 (17 Sep 2023 16:14)  T(F): 97.4 (18 Sep 2023 09:17), Max: 98.1 (17 Sep 2023 16:14)  HR: 85 (18 Sep 2023 10:17) (58 - 87)  BP: 97/59 (18 Sep 2023 09:17) (71/42 - 97/59)  BP(mean): --  RR: 18 (18 Sep 2023 09:17) (17 - 18)  SpO2: 93% (18 Sep 2023 10:17) (93% - 99%)    Parameters below as of 18 Sep 2023 09:17  Patient On (Oxygen Delivery Method): nasal cannula  O2 Flow (L/min): 2      I&O's Summary    17 Sep 2023 07:01  -  18 Sep 2023 07:00  --------------------------------------------------------  IN: 350 mL / OUT: 0 mL / NET: 350 mL          Physical Exam:   GENERAL: NAD, well-groomed  HEENT: JORDIN/   Atraumatic  ENMT: No tonsillar erythema, exudates, or enlargement  NECK: Supple, No JVD  CHEST/LUNG: Decreased at bases  CVS: Regular rate and rhythm  GI: : Soft, Nontender, Nondistended  NERVOUS SYSTEM:  Alert & Oriented X3  EXTREMITIES:  2+ Peripheral Pulses, No clubbing, cyanosis, or edema  SKIN: No rashes or lesions  PSYCH: Appropriate    Labs:  27, 28, 26                            14.0   6.84  )-----------( 225      ( 17 Sep 2023 09:36 )             47.2                         13.8   7.43  )-----------( 233      ( 16 Sep 2023 14:21 )             45.2                         14.5   8.07  )-----------( 198      ( 15 Sep 2023 09:50 )             49.1     09-18    137  |  95<L>  |  52<H>  ----------------------------<  78  5.4<H>   |  22  |  10.50<H>  09-17    138  |  95<L>  |  29<H>  ----------------------------<  90  3.7   |  24  |  7.87<H>  09-16    137  |  97  |  28<H>  ----------------------------<  122<H>  3.9   |  21<L>  |  7.58<H>  09-15    139  |  99  |  36<H>  ----------------------------<  83  4.6   |  25  |  8.92<H>  09-15    135  |  98  |  29<H>  ----------------------------<  84  5.5<H>   |  13<L>  |  7.87<H>    Ca    9.7      18 Sep 2023 10:02  Ca    9.4      17 Sep 2023 09:36  Ca    8.9      16 Sep 2023 14:21      PT/INR - ( 16 Sep 2023 14:21 )   PT: 20.2 sec;   INR: 1.96 ratio       Urinalysis Basic - ( 18 Sep 2023 10:02 )    Color: x / Appearance: x / SG: x / pH: x  Gluc: 78 mg/dL / Ketone: x  / Bili: x / Urobili: x   Blood: x / Protein: x / Nitrite: x   Leuk Esterase: x / RBC: x / WBC x   Sq Epi: x / Non Sq Epi: x / Bacteria: x    Studies  CXR  < from: Xray Chest 1 View- PORTABLE-Urgent (Xray Chest 1 View- PORTABLE-Urgent .) (09.13.23 @ 12:07) >  INTERPRETATION:  TECHNIQUE: A single AP view of the chest was obtained.   Ordered time:   9/13/2023 12:07 PM    COMPARISON: 7/15/2023    CLINICAL INFORMATION: Abdominal pain. GI bleed on Coumadin.    FINDINGS:    The heart is magnified by technique.  There is linear atelectasis noted at both lung bases.  There are no pleural effusions.  There is no pneumothorax.    IMPRESSION:    Bibasilar linear atelectasis.    --- End of Report ---    < end of copied text >

## 2023-09-18 NOTE — PROGRESS NOTE ADULT - ASSESSMENT
78-year-old male history of ESRD on HD via left upper extremity AV fistula Tuesday Thursday Saturday, last dialysis yesterday, chronic severe orthostatic hypotension on midodrine 30 mg every 6 hours, COPD on 2 L home O2, wheelchair bound, chronic diastolic CHF, morbid obesity, pulmonary hypertension, PE and DVT on Coumadin, IVC filter, chronic sacral ulcer, chronic back pain, history of hematochezia in the past 2/2 hemorrhoids, chronic abd pain 2/2 IBS, chronic constipation,  brought in by EMS from home for multiple episodes of bright red blood per rectum over the past week, this am associated with lightheadedness and feeling near syncopal. otn hasnt been able to tolerate HD and sessions have been down to 2-2.5 liters out instead of 3 liters 2/2 Hypotension. compliant w meds, wife is the caretaker    ptn has h/o orthostatic hypotension, on HD midodrine  would NOT give IVF, he os NOT hypovolemic  H/H remains stable, blood per rectum 2/2 hemorrhoid in a setting of supratherapeutic INR, seen by GI. CT A/P is benign,  HD TThSa as per renal. so far HD without any complications, renal following  rpt  TTE is stable,   h/o PE/DVT/thrombophlebitis in LE, INR is therapeutic on 9/15  wife consented to switch to ELIQUIS for full AC, his outptn nephrologist Dr. Whalen agrees  ptn has a high debility index, he has been awaiting a special air mattress as part of wound care treatment and a motorized wheel chair. will need to arrange POWER WHEELCHAIR set up prior to DC: send notes along with Seating and Mobility eval to fax [phone] or email to [email or web address] ( [employer])  ptn also needs air mattress delivered prior to DC.   ptn is bedbound and wheelchair bound, he is morbidly obese  seen by wound care here. needs the recs addressed prior to DC  PT eval

## 2023-09-18 NOTE — DISCHARGE NOTE PROVIDER - HOSPITAL COURSE
HPI:   78-year-old male history of ESRD on HD via left upper extremity AV fistula Tuesday Thursday Saturday, last dialysis yesterday, chronic severe orthostatic hypotension on midodrine 30 mg every 6 hours, COPD on 2 L home O2, wheelchair bound, chronic diastolic CHF, morbid obesity, pulmonary hypertension, PE and DVT on Coumadin, IVC filter, chronic sacral ulcer, chronic back pain, history of hematochezia in the past 2/2 hemorrhoids, chronic abd pain 2/2 IBS, chronic constipation,  brought in by EMS from home for multiple episodes of bright red blood per rectum over the past week, this am associated with lightheadedness and feeling near syncopal. otn hasnt been able to tolerate HD and sessions have been down to 2-2.5 liters out instead of 3 liters 2/2 Hypotension. compliant w meds, wife is the caretaker (13 Sep 2023 15:18)    Hospital Course: Admitted with GIB assoiated with lightheadedness syncopal possible d/t  h/o orthostatic hypotension, continue with HD midodrine; would NOT give IVF, he is NOT hypovolemic; H/H remains stable, blood per rectum 2/2 hemorrhoid in a setting of supra therapeutic INR, seen by GI. CT A/P is benign; TTE is stable, -- GI consulted. h/o PE/DVT/thrombophlebitis in LE, INR is therapeutic on 9/15--- coumadin discontinued and Eliquis started. PT with ESRD --c/w HD TThSa as per renal. so far HD without any complications, renal following      Important Medication Changes and Reason:    Active or Pending Issues Requiring Follow-up:    Advanced Directives:   [ ] Full code  [ ] DNR  [ ] Hospice    Discharge Diagnoses:  GIB  Syncope   Orthostatic HTN

## 2023-09-19 RX ADMIN — MIDODRINE HYDROCHLORIDE 30 MILLIGRAM(S): 2.5 TABLET ORAL at 16:58

## 2023-09-19 RX ADMIN — PANTOPRAZOLE SODIUM 40 MILLIGRAM(S): 20 TABLET, DELAYED RELEASE ORAL at 06:12

## 2023-09-19 RX ADMIN — Medication 1 SUPPOSITORY(S): at 06:13

## 2023-09-19 RX ADMIN — MIDODRINE HYDROCHLORIDE 30 MILLIGRAM(S): 2.5 TABLET ORAL at 11:04

## 2023-09-19 RX ADMIN — Medication 20 MILLIGRAM(S): at 06:12

## 2023-09-19 RX ADMIN — CINACALCET 90 MILLIGRAM(S): 30 TABLET, FILM COATED ORAL at 06:11

## 2023-09-19 RX ADMIN — SEVELAMER CARBONATE 2400 MILLIGRAM(S): 2400 POWDER, FOR SUSPENSION ORAL at 07:45

## 2023-09-19 RX ADMIN — TIOTROPIUM BROMIDE 2 PUFF(S): 18 CAPSULE ORAL; RESPIRATORY (INHALATION) at 11:05

## 2023-09-19 RX ADMIN — LEVETIRACETAM 500 MILLIGRAM(S): 250 TABLET, FILM COATED ORAL at 17:13

## 2023-09-19 RX ADMIN — Medication 20 MILLIGRAM(S): at 11:05

## 2023-09-19 RX ADMIN — APIXABAN 5 MILLIGRAM(S): 2.5 TABLET, FILM COATED ORAL at 06:14

## 2023-09-19 RX ADMIN — Medication 100 MILLIGRAM(S): at 16:59

## 2023-09-19 RX ADMIN — APIXABAN 5 MILLIGRAM(S): 2.5 TABLET, FILM COATED ORAL at 17:12

## 2023-09-19 RX ADMIN — Medication 1 SUPPOSITORY(S): at 17:13

## 2023-09-19 RX ADMIN — BUDESONIDE AND FORMOTEROL FUMARATE DIHYDRATE 2 PUFF(S): 160; 4.5 AEROSOL RESPIRATORY (INHALATION) at 17:13

## 2023-09-19 RX ADMIN — Medication 20 MILLIGRAM(S): at 16:59

## 2023-09-19 RX ADMIN — BUDESONIDE AND FORMOTEROL FUMARATE DIHYDRATE 2 PUFF(S): 160; 4.5 AEROSOL RESPIRATORY (INHALATION) at 06:16

## 2023-09-19 RX ADMIN — LEVETIRACETAM 500 MILLIGRAM(S): 250 TABLET, FILM COATED ORAL at 06:15

## 2023-09-19 RX ADMIN — Medication 100 MILLIGRAM(S): at 17:12

## 2023-09-19 RX ADMIN — SEVELAMER CARBONATE 2400 MILLIGRAM(S): 2400 POWDER, FOR SUSPENSION ORAL at 16:58

## 2023-09-19 RX ADMIN — Medication 100 MILLIGRAM(S): at 06:11

## 2023-09-19 RX ADMIN — MIDODRINE HYDROCHLORIDE 30 MILLIGRAM(S): 2.5 TABLET ORAL at 06:24

## 2023-09-19 NOTE — PROGRESS NOTE ADULT - SUBJECTIVE AND OBJECTIVE BOX
CARDIOLOGY FOLLOW UP - Dr. Livingston  DATE OF SERVICE: 9/19/23    CC  No CV complaints    REVIEW OF SYSTEMS:  CONSTITUTIONAL: No fever, weight loss, or fatigue  RESPIRATORY: No cough, wheezing, chills or hemoptysis; No Shortness of Breath  CARDIOVASCULAR: No chest pain, palpitations, passing out, dizziness, or leg swelling  GASTROINTESTINAL: No abdominal or epigastric pain. No nausea, vomiting, or hematemesis; No diarrhea or constipation. No melena or hematochezia.  VASCULAR: No edema     PHYSICAL EXAM:  T(C): 36.8 (09-19-23 @ 00:54), Max: 36.8 (09-19-23 @ 00:54)  HR: 57 (09-19-23 @ 06:09) (57 - 85)  BP: 109/57 (09-19-23 @ 06:09) (90/52 - 109/57)  RR: 18 (09-19-23 @ 00:54) (18 - 18)  SpO2: 96% (09-19-23 @ 00:54) (93% - 97%)  Wt(kg): --  I&O's Summary      Appearance: Normal	  Cardiovascular: Normal S1 S2,RRR, No JVD, No murmurs  Respiratory: Lungs clear to auscultation b/l  Gastrointestinal:  Soft, Non-tender, + BS	  Extremities: Normal range of motion, No clubbing, cyanosis or edema      Home Medications:  Albuterol (Eqv-ProAir HFA) 90 mcg/inh inhalation aerosol: 2 puff(s) inhaled every 6 hours as needed (13 Sep 2023 13:52)  albuterol 2.5 mg/3 mL (0.083%) inhalation solution: 3 milliliter(s) by nebulizer every 6 hours as needed (13 Sep 2023 13:57)  allopurinol 100 mg oral tablet: 1 tab(s) orally once a day (13 Sep 2023 13:52)  cinacalcet 90 mg oral tablet: 1 tab(s) orally 3 times a week (on days of dialysis - Tues, Thurs &amp; Sat) (13 Sep 2023 13:52)  Colace 100 mg oral capsule: 1 cap(s) orally once a day (at bedtime) (13 Sep 2023 13:54)  dicyclomine 20 mg oral tablet: 1 tab(s) orally every 6 hours as needed (13 Sep 2023 13:52)  levETIRAcetam 500 mg oral tablet: 1 tab(s) orally 2 times a day (13 Sep 2023 13:52)  lidocaine 5% topical film: Apply 1 patch to knee and 1 patch to lower back; remove after 12 hours (13 Sep 2023 13:52)  midodrine 10 mg oral tablet: 3 tab(s) orally 4 times a day note: pharmacy has 3 times a day. (13 Sep 2023 13:54)  MiraLax oral powder for reconstitution: 17 gram(s) orally every other day (13 Sep 2023 13:52)  pantoprazole 40 mg oral delayed release tablet: 1 tab(s) orally once a day (13 Sep 2023 13:52)  pregabalin 100 mg oral capsule: 1 cap(s) orally 2 times a day (13 Sep 2023 13:52)  sevelamer carbonate 800 mg oral tablet: 3 tab(s) orally 3 times a day &amp; 1 additional tab(s) with snacks (13 Sep 2023 13:52)  Trelegy Ellipta 100 mcg-62.5 mcg-25 mcg/inh inhalation powder: 1 puff(s) inhaled once a day (13 Sep 2023 13:52)  WARFARIN: Patient alternates between 9mg &amp; 10mg as per INR as per wife. (13 Sep 2023 13:54)      MEDICATIONS  (STANDING):  allopurinol 100 milliGRAM(s) Oral daily  apixaban 5 milliGRAM(s) Oral every 12 hours  budesonide 160 MICROgram(s)/formoterol 4.5 MICROgram(s) Inhaler 2 Puff(s) Inhalation two times a day  chlorhexidine 2% Cloths 1 Application(s) Topical <User Schedule>  cinacalcet 90 milliGRAM(s) Oral <User Schedule>  dicyclomine 20 milliGRAM(s) Oral three times a day before meals  hydrocortisone hemorrhoidal Suppository 1 Suppository(s) Rectal two times a day  levETIRAcetam 500 milliGRAM(s) Oral two times a day  midodrine. 30 milliGRAM(s) Oral three times a day  pantoprazole    Tablet 40 milliGRAM(s) Oral before breakfast  polyethylene glycol 3350 17 Gram(s) Oral two times a day  pregabalin 100 milliGRAM(s) Oral two times a day  sevelamer carbonate 800 milliGRAM(s) Oral <User Schedule>  sevelamer carbonate 2400 milliGRAM(s) Oral three times a day with meals  tiotropium 2.5 MICROgram(s) Inhaler 2 Puff(s) Inhalation daily      TELEMETRY: 	    ECG:  	  RADIOLOGY:   DIAGNOSTIC TESTING:  [ ] Echocardiogram:  [ ]  Catheterization:  [ ] Stress Test:    OTHER: 	    LABS:	 	    CKMB Units: 1.6 ng/mL [0.0 - 6.7] (09-14 @ 07:07)  Troponin T, High Sensitivity Result: 175 ng/L [0 - 51] (09-13 @ 10:17)                          14.0   6.84  )-----------( 225      ( 17 Sep 2023 09:36 )             47.2     09-18    137  |  95<L>  |  52<H>  ----------------------------<  78  5.4<H>   |  22  |  10.50<H>    Ca    9.7      18 Sep 2023 10:02

## 2023-09-19 NOTE — PROGRESS NOTE ADULT - ASSESSMENT
The patient is a 78 year old morbidly obese man with PMH of ESRD on HD, orthostatic hypotension (on high dose midodrine), COPD on home O2, SAADIA on CPAP, wheelchair bound, chronic diastolic CHF, pulmonary hypertension, PE and DVT on Coumadin, diverticulosis and hemorrhoids, admitted for rectal bleeding in the setting of supratherapeutic INR.     1. BRBPR, resolved. Likely hemorrhoidal vs. diverticular bleed. Hgb remains normal.   No plans for endoscopic evaluation, pt would be very high risk.   cont Anusol HC suppositories BID x 2 weeks  bowel regimen to prevent constipation   needs tighter control of INR    2. PE/DVT  on coumadin   PPI for GI ppx     3. Orthostatic hypotension     4. ESRD on HD    5. COPD on home 02      Desiree Castillo M.D.   Gastroenterology and Hepatology  [address] [place], [place]  Office: [phone]  Cell: [phone]

## 2023-09-19 NOTE — PROGRESS NOTE ADULT - ASSESSMENT
Transthoracic Echocardiogram (03.13.23 @ 09:29) >  CONCLUSIONS: nl LV sys fx   Transthoracic Echocardiogram (01.20.23 @ 17:22) >  CONCLUSIONS: NL LV sys fx     A/p  78 Y M H/O ESRD on HD T/TH, Sat, chronic hypotension, on midodrin 30 mg q6 hr, 2 L NC, CHF, pHTN, PE/HTN, presenting with weakness, fatigue, and rectal bleeding.      #Rectal Bleeding  -I/s/o supratherapeutic INR  -CT no e/o active bleeding  -Per GI, No plans for endoscopic evaluation. Pt would be very high risk.   -No further bleeding, now on eliquis    #orthostatic hypotension  -Chronic  -cont mido 30 mg every 6 hours   -Echo from 3/2023 with nl lv fxn, valve fxn     #ESRD  -HD per renal     #DVT hx  -On apixaban    #COPD, PHTN  -stable      dcp

## 2023-09-19 NOTE — PROGRESS NOTE ADULT - SUBJECTIVE AND OBJECTIVE BOX
Quality 111:Pneumonia Vaccination Status For Older Adults: Pneumococcal Vaccination Previously Received Detail Level: Detailed Quality 130: Documentation Of Current Medications In The Medical Record: Current Medications Documented Quality 110: Preventive Care And Screening: Influenza Immunization: Influenza Immunization Administered during Influenza season Date of Service: 09-19-23 @ 11:41    Patient is a 78y old  Male who presents with a chief complaint of rectal bleeding (17 Sep 2023 09:41)    Any change in ROS:   No new respiratory events overnight. Denies SOB/CP.    MEDICATIONS  (STANDING):  allopurinol 100 milliGRAM(s) Oral daily  apixaban 5 milliGRAM(s) Oral every 12 hours  budesonide 160 MICROgram(s)/formoterol 4.5 MICROgram(s) Inhaler 2 Puff(s) Inhalation two times a day  chlorhexidine 2% Cloths 1 Application(s) Topical <User Schedule>  cinacalcet 90 milliGRAM(s) Oral <User Schedule>  dicyclomine 20 milliGRAM(s) Oral three times a day before meals  hydrocortisone hemorrhoidal Suppository 1 Suppository(s) Rectal two times a day  levETIRAcetam 500 milliGRAM(s) Oral two times a day  midodrine. 30 milliGRAM(s) Oral three times a day  pantoprazole    Tablet 40 milliGRAM(s) Oral before breakfast  polyethylene glycol 3350 17 Gram(s) Oral two times a day  pregabalin 100 milliGRAM(s) Oral two times a day  sevelamer carbonate 2400 milliGRAM(s) Oral three times a day with meals  sevelamer carbonate 800 milliGRAM(s) Oral <User Schedule>  tiotropium 2.5 MICROgram(s) Inhaler 2 Puff(s) Inhalation daily    MEDICATIONS  (PRN):  albuterol    90 MICROgram(s) HFA Inhaler 2 Puff(s) Inhalation every 6 hours PRN Bronchospasm    Vital Signs Last 24 Hrs  T(C): 36.4 (19 Sep 2023 08:54), Max: 36.8 (19 Sep 2023 00:54)  T(F): 97.6 (19 Sep 2023 08:54), Max: 98.3 (19 Sep 2023 00:54)  HR: 57 (19 Sep 2023 10:51) (57 - 71)  BP: 109/55 (19 Sep 2023 10:51) (90/52 - 109/57)  BP(mean): --  RR: 18 (19 Sep 2023 08:54) (18 - 18)  SpO2: 96% (19 Sep 2023 10:10) (94% - 98%)    Parameters below as of 19 Sep 2023 08:54  Patient On (Oxygen Delivery Method): nasal cannula  O2 Flow (L/min): 2      I&O's Summary        Physical Exam:   GENERAL: NAD  HEENT: JORDIN  ENMT: No tonsillar erythema, exudates, or enlargement  NECK: Supple, No JVD  CHEST/LUNG: Decreased BS   CVS: Regular rate and rhythm  GI: : Soft, Nontender, Nondistended  NERVOUS SYSTEM:  Alert & Oriented X3  EXTREMITIES:  2+ Peripheral Pulses, No clubbing, cyanosis, or edema  SKIN: No rashes or lesions  PSYCH: Appropriate    Labs:  27, 28, 26                            14.0   6.84  )-----------( 225      ( 17 Sep 2023 09:36 )             47.2                         13.8   7.43  )-----------( 233      ( 16 Sep 2023 14:21 )             45.2     09-18    137  |  95<L>  |  52<H>  ----------------------------<  78  5.4<H>   |  22  |  10.50<H>  09-17    138  |  95<L>  |  29<H>  ----------------------------<  90  3.7   |  24  |  7.87<H>  09-16    137  |  97  |  28<H>  ----------------------------<  122<H>  3.9   |  21<L>  |  7.58<H>  09-15    139  |  99  |  36<H>  ----------------------------<  83  4.6   |  25  |  8.92<H>    Ca    9.7      18 Sep 2023 10:02      CAPILLARY BLOOD GLUCOSE              Urinalysis Basic - ( 18 Sep 2023 10:02 )    Color: x / Appearance: x / SG: x / pH: x  Gluc: 78 mg/dL / Ketone: x  / Bili: x / Urobili: x   Blood: x / Protein: x / Nitrite: x   Leuk Esterase: x / RBC: x / WBC x   Sq Epi: x / Non Sq Epi: x / Bacteria: x    Studies  CXR  < from: Xray Chest 1 View- PORTABLE-Urgent (Xray Chest 1 View- PORTABLE-Urgent .) (09.13.23 @ 12:07) >  INTERPRETATION:  TECHNIQUE: A single AP view of the chest was obtained.   Ordered time:   9/13/2023 12:07 PM    COMPARISON: 7/15/2023    CLINICAL INFORMATION: Abdominal pain. GI bleed on Coumadin.    FINDINGS:    The heart is magnified by technique.  There is linear atelectasis noted at both lung bases.  There are no pleural effusions.  There is no pneumothorax.    IMPRESSION:    Bibasilar linear atelectasis.    --- End of Report ---    < end of copied text >

## 2023-09-19 NOTE — PROGRESS NOTE ADULT - SUBJECTIVE AND OBJECTIVE BOX
Patient is a 78y old  Male who presents with a chief complaint of rectal bleeding (17 Sep 2023 09:41)      SUBJECTIVE / OVERNIGHT EVENTS: eliquis not authorized by insurance company. sent a letter of explanation via fax w confirmation of reciept, copy in the chart. fax number: [phone], file: [id number]. ptn is getting HD today    MEDICATIONS  (STANDING):  allopurinol 100 milliGRAM(s) Oral daily  apixaban 5 milliGRAM(s) Oral every 12 hours  budesonide 160 MICROgram(s)/formoterol 4.5 MICROgram(s) Inhaler 2 Puff(s) Inhalation two times a day  chlorhexidine 2% Cloths 1 Application(s) Topical <User Schedule>  cinacalcet 90 milliGRAM(s) Oral <User Schedule>  dicyclomine 20 milliGRAM(s) Oral three times a day before meals  hydrocortisone hemorrhoidal Suppository 1 Suppository(s) Rectal two times a day  levETIRAcetam 500 milliGRAM(s) Oral two times a day  midodrine. 30 milliGRAM(s) Oral three times a day  pantoprazole    Tablet 40 milliGRAM(s) Oral before breakfast  polyethylene glycol 3350 17 Gram(s) Oral two times a day  pregabalin 100 milliGRAM(s) Oral two times a day  sevelamer carbonate 2400 milliGRAM(s) Oral three times a day with meals  sevelamer carbonate 800 milliGRAM(s) Oral <User Schedule>  tiotropium 2.5 MICROgram(s) Inhaler 2 Puff(s) Inhalation daily    MEDICATIONS  (PRN):  albuterol    90 MICROgram(s) HFA Inhaler 2 Puff(s) Inhalation every 6 hours PRN Bronchospasm      Vital Signs Last 24 Hrs  T(F): 97.7 (09-19-23 @ 12:15), Max: 98.3 (09-19-23 @ 00:54)  HR: 62 (09-19-23 @ 17:07) (57 - 66)  BP: 109/64 (09-19-23 @ 17:07) (93/44 - 109/64)  RR: 18 (09-19-23 @ 17:07) (18 - 18)  SpO2: 97% (09-19-23 @ 16:40) (94% - 98%)  Telemetry:   CAPILLARY BLOOD GLUCOSE        I&O's Summary    19 Sep 2023 07:01  -  19 Sep 2023 17:20  --------------------------------------------------------  IN: 150 mL / OUT: 0 mL / NET: 150 mL        PHYSICAL EXAM:  GENERAL: NAD, well-developed  HEAD:  Atraumatic, Normocephalic  EYES: EOMI, PERRLA, conjunctiva and sclera clear  NECK: Supple, No JVD  CHEST/LUNG: Clear to auscultation bilaterally; No wheeze  HEART: Regular rate and rhythm; No murmurs, rubs, or gallops  ABDOMEN: Soft, Nontender, Nondistended; Bowel sounds present  EXTREMITIES:  2+ Peripheral Pulses, No clubbing, cyanosis, or edema  PSYCH: AAOx3  NEUROLOGY: non-focal  SKIN: No rashes or lesions    LABS:    09-18    137  |  95<L>  |  52<H>  ----------------------------<  78  5.4<H>   |  22  |  10.50<H>    Ca    9.7      18 Sep 2023 10:02            Urinalysis Basic - ( 18 Sep 2023 10:02 )    Color: x / Appearance: x / SG: x / pH: x  Gluc: 78 mg/dL / Ketone: x  / Bili: x / Urobili: x   Blood: x / Protein: x / Nitrite: x   Leuk Esterase: x / RBC: x / WBC x   Sq Epi: x / Non Sq Epi: x / Bacteria: x        RADIOLOGY & ADDITIONAL TESTS:    Imaging Personally Reviewed:    Consultant(s) Notes Reviewed:      Care Discussed with Consultants/Other Providers:

## 2023-09-19 NOTE — PROGRESS NOTE ADULT - SUBJECTIVE AND OBJECTIVE BOX
[facility]   MD CHESTER DUEÑAS MD RUORU WONG, PA    TEL:  FROM 9 AM to 5 PM ---OFFICE: [phone]    FROM 5 PM - 9 AM PLEASE CALL ANSWERING SERVICE: [phone]    RENAL FOLLOW UP NOTE--Date of Service 09-19-23 @ 10:51  --------------------------------------------------------------------------------  HPI:      Pt seen and examined at bedside.   Margie SOB, chest pain     PAST HISTORY  --------------------------------------------------------------------------------  No significant changes to PMH, PSH, FHx, SHx, unless otherwise noted    ALLERGIES & MEDICATIONS  --------------------------------------------------------------------------------  Allergies    baclofen (Other)  latex (Rash)    Intolerances      Standing Inpatient Medications  allopurinol 100 milliGRAM(s) Oral daily  apixaban 5 milliGRAM(s) Oral every 12 hours  budesonide 160 MICROgram(s)/formoterol 4.5 MICROgram(s) Inhaler 2 Puff(s) Inhalation two times a day  chlorhexidine 2% Cloths 1 Application(s) Topical <User Schedule>  cinacalcet 90 milliGRAM(s) Oral <User Schedule>  dicyclomine 20 milliGRAM(s) Oral three times a day before meals  hydrocortisone hemorrhoidal Suppository 1 Suppository(s) Rectal two times a day  levETIRAcetam 500 milliGRAM(s) Oral two times a day  midodrine. 30 milliGRAM(s) Oral three times a day  pantoprazole    Tablet 40 milliGRAM(s) Oral before breakfast  polyethylene glycol 3350 17 Gram(s) Oral two times a day  pregabalin 100 milliGRAM(s) Oral two times a day  sevelamer carbonate 800 milliGRAM(s) Oral <User Schedule>  sevelamer carbonate 2400 milliGRAM(s) Oral three times a day with meals  tiotropium 2.5 MICROgram(s) Inhaler 2 Puff(s) Inhalation daily    PRN Inpatient Medications  albuterol    90 MICROgram(s) HFA Inhaler 2 Puff(s) Inhalation every 6 hours PRN      REVIEW OF SYSTEMS  --------------------------------------------------------------------------------  General: no fever  CVS: no chest pain  MSK: no edema     VITALS/PHYSICAL EXAM  --------------------------------------------------------------------------------  T(C): 36.4 (09-19-23 @ 08:54), Max: 36.8 (09-19-23 @ 00:54)  HR: 62 (09-19-23 @ 10:10) (57 - 71)  BP: 93/44 (09-19-23 @ 08:54) (90/52 - 109/57)  RR: 18 (09-19-23 @ 08:54) (18 - 18)  SpO2: 96% (09-19-23 @ 10:10) (94% - 98%)  Wt(kg): --        Physical Exam:  	Gen: NAD  	HEENT: MMM  	Pulm: CTA B/L  	CV: S1S2  	Abd: Soft, +BS  	Ext: No LE edema B/L                      Neuro: Awake   	Skin: Warm and Dry   	Vascular access: avf            no teresa  LABS/STUDIES  --------------------------------------------------------------------------------    137  |  95  |  52  ----------------------------<  78      [09-18-23 @ 10:02]  5.4   |  22  |  10.50        Ca     9.7     [09-18-23 @ 10:02]            Creatinine Trend:  SCr 10.50 [09-18 @ 10:02]  SCr 7.87 [09-17 @ 09:36]  SCr 7.58 [09-16 @ 14:21]  SCr 8.92 [09-15 @ 20:07]  SCr 7.87 [09-15 @ 09:50]    Urinalysis - [09-18-23 @ 10:02]      Color  / Appearance  / SG  / pH       Gluc 78 / Ketone   / Bili  / Urobili        Blood  / Protein  / Leuk Est  / Nitrite       RBC  / WBC  / Hyaline  / Gran  / Sq Epi  / Non Sq Epi  / Bacteria       Iron 36, TIBC --, %sat --      [09-14-23 @ 07:07]  Ferritin 476      [09-14-23 @ 07:07]  PTH -- (Ca 8.8)      [09-14-23 @ 07:07]   955  PTH -- (Ca --)      [01-20-23 @ 06:35]   630  TSH 0.92      [02-24-23 @ 13:07]

## 2023-09-19 NOTE — PROGRESS NOTE ADULT - ASSESSMENT
: 78-year-old male history of ESRD on HD via left upper extremity AV fistula Tuesday Thursday Saturday, last dialysis yesterday, chronic severe orthostatic hypotension on midodrine 30 mg every 6 hours, COPD on 2 L home O2, CHF, pulmonary hypertension, PE and DVT on in the past on Coumadin, IVC filter, chronic sacral ulcer, history of hematochezia in the past, brought in by EMS from home for multiple episodes of bright red blood per rectum over the past week, lightheadedness, syncopal     ESRD on HD ( TTS) via AVF  Outpt unit [facility]   Consent obtained in chart  s/p HD on 9/16   Continue TTS schedule  Discussed with outpt nephrologist, no renal objection to eliquis.   Midodrine pre HD to allow UF.  Monitor BMP     HTN  Marginal  chronic severe orthostatic hypotension on midodrine 30mg q6h  Monitor closely     Hyperkalemmia:  In setting of RF and acidosis.  Low K diet.  HD with low K bath today      CKD-MBD    Tertiary  hyperparathyroidism.   On Sensipar of 60 mg po daily  Low PO4 diet.  Continue Renvela with meals.  Monitor Phosphorus and calcium daily.     BRRB  GI following.  Monitor Hb

## 2023-09-19 NOTE — PROGRESS NOTE ADULT - SUBJECTIVE AND OBJECTIVE BOX
Chief Complaint:  Patient is a 78y old  Male who presents with a chief complaint of rectal bleeding (17 Sep 2023 09:41)      Date of service 23 @ 13:08      Interval Events:   no rectal bleeding     Hospital Medications:  albuterol    90 MICROgram(s) HFA Inhaler 2 Puff(s) Inhalation every 6 hours PRN  allopurinol 100 milliGRAM(s) Oral daily  apixaban 5 milliGRAM(s) Oral every 12 hours  budesonide 160 MICROgram(s)/formoterol 4.5 MICROgram(s) Inhaler 2 Puff(s) Inhalation two times a day  chlorhexidine 2% Cloths 1 Application(s) Topical <User Schedule>  cinacalcet 90 milliGRAM(s) Oral <User Schedule>  dicyclomine 20 milliGRAM(s) Oral three times a day before meals  hydrocortisone hemorrhoidal Suppository 1 Suppository(s) Rectal two times a day  levETIRAcetam 500 milliGRAM(s) Oral two times a day  midodrine. 30 milliGRAM(s) Oral three times a day  pantoprazole    Tablet 40 milliGRAM(s) Oral before breakfast  polyethylene glycol 3350 17 Gram(s) Oral two times a day  pregabalin 100 milliGRAM(s) Oral two times a day  sevelamer carbonate 800 milliGRAM(s) Oral <User Schedule>  sevelamer carbonate 2400 milliGRAM(s) Oral three times a day with meals  tiotropium 2.5 MICROgram(s) Inhaler 2 Puff(s) Inhalation daily        Review of Systems:  General:  No wt loss, fevers, chills, night sweats, fatigue,   Eyes:  Good vision, no reported pain  ENT:  No sore throat, pain, runny nose, dysphagia  CV:  No pain, palpitations, hypo/hypertension  Resp:  No dyspnea, cough, tachypnea, wheezing  GI:  See HPI  :  No pain, bleeding, incontinence, nocturia  Muscle:  No pain, weakness  Neuro:  No weakness, tingling, memory problems  Psych:  No fatigue, insomnia, mood problems, depression  Endocrine:  No polyuria, polydipsia, cold/heat intolerance  Heme:  No petechiae, ecchymosis, easy bruisability  Integumentary:  No rash, edema    PHYSICAL EXAM:   Vital Signs:  Vital Signs Last 24 Hrs  T(C): 36.5 (19 Sep 2023 12:15), Max: 36.8 (19 Sep 2023 00:54)  T(F): 97.7 (19 Sep 2023 12:15), Max: 98.3 (19 Sep 2023 00:54)  HR: 60 (19 Sep 2023 12:15) (57 - 71)  BP: 95/54 (19 Sep 2023 12:15) (90/52 - 109/57)  BP(mean): --  RR: 18 (19 Sep 2023 12:15) (18 - 18)  SpO2: 98% (19 Sep 2023 12:15) (94% - 98%)    Parameters below as of 19 Sep 2023 12:15  Patient On (Oxygen Delivery Method): nasal cannula  O2 Flow (L/min): 2    Daily     Daily Weight in k (19 Sep 2023 12:15)      PHYSICAL EXAM:     GENERAL:  Appears stated age, well-groomed, well-nourished, no distress  HEENT:  NC/AT,  conjunctivae anicteric, clear and pink,   NECK: supple, trachea midline  CHEST:  Full & symmetric excursion, no increased effort, breath sounds clear  HEART:  Regular rhythm, no JVD  ABDOMEN:  Soft, non-tender, non-distended, normoactive bowel sounds,  no masses , no hepatosplenomegaly  EXTREMITIES:  no cyanosis,clubbing or edema  SKIN:  No rash, erythema, or, ecchymoses, no jaundice  NEURO:  Alert, non-focal, no asterixis  PSYCH: Appropriate affect, oriented to place and time  RECTAL: Deferred      LABS Personally reviewed by me:          137  |  95<L>  |  52<H>  ----------------------------<  78  5.4<H>   |  22  |  10.50<H>    Ca    9.7      18 Sep 2023 10:02          Urinalysis Basic - ( 18 Sep 2023 10:02 )    Color: x / Appearance: x / SG: x / pH: x  Gluc: 78 mg/dL / Ketone: x  / Bili: x / Urobili: x   Blood: x / Protein: x / Nitrite: x   Leuk Esterase: x / RBC: x / WBC x   Sq Epi: x / Non Sq Epi: x / Bacteria: x                              14.0   6.84  )-----------( 225      ( 17 Sep 2023 09:36 )             47.2                         13.8   7.43  )-----------( 233      ( 16 Sep 2023 14:21 )             45.2       Imaging personally reviewed by me:

## 2023-09-19 NOTE — CHART NOTE - NSCHARTNOTEFT_GEN_A_CORE
BP low after HD 74/42. Pt sitting comfortably in bed. No dizziness or lightheadedness. AOx4 enjoying his dinner.  Instructed RN to administer midodrine (which was due) and recheck BP in 1 hr.  Repeat BP 83/50. Pt continues to be asymptomatic. Pt's SBP is in the low 90s at baseline. Will continue to monitor BPs per routine.
Pt's Eliquis has been denied per pt's insurance and will need form to be filled via appeal as well as note stating why Xarelto cannot be used instead. Formed/ letter will need to be faxed to [phone] with case # -> [id number]. D/w Dr. Hein.  DEVAUGHN Raymond NP
Vital Signs Last 24 Hrs  T(C): 36.7 (17 Sep 2023 16:14), Max: 36.9 (17 Sep 2023 00:57)  T(F): 98.1 (17 Sep 2023 16:14), Max: 98.5 (17 Sep 2023 00:57)  HR: 69 (17 Sep 2023 16:14) (69 - 82)  BP: 91/52 (17 Sep 2023 16:14) (88/46 - 91/52)  BP(mean): --  RR: 17 (17 Sep 2023 16:14) (17 - 18)  SpO2: 95% (17 Sep 2023 16:14) (93% - 98%)    Parameters below as of 17 Sep 2023 16:14  Patient On (Oxygen Delivery Method): nasal cannula  O2 Flow (L/min): 2

## 2023-09-19 NOTE — PROGRESS NOTE ADULT - ASSESSMENT
78-year-old male history of ESRD on HD via left upper extremity AV fistula Tuesday Thursday Saturday, last dialysis yesterday, chronic severe orthostatic hypotension on midodrine 30 mg every 6 hours, COPD on 2 L home O2, wheelchair bound, chronic diastolic CHF, morbid obesity, pulmonary hypertension, PE and DVT on Coumadin, IVC filter, chronic sacral ulcer, chronic back pain, history of hematochezia in the past 2/2 hemorrhoids, chronic abd pain 2/2 IBS, chronic constipation,  brought in by EMS from home for multiple episodes of bright red blood per rectum over the past week, this am associated with lightheadedness and feeling near syncopal. otn hasnt been able to tolerate HD and sessions have been down to 2-2.5 liters out instead of 3 liters 2/2 Hypotension. compliant w meds, wife is the caretaker    ptn has h/o orthostatic hypotension, on HD midodrine  would NOT give IVF, he os NOT hypovolemic  H/H remains stable, blood per rectum 2/2 hemorrhoid in a setting of supratherapeutic INR, seen by GI. CT A/P is benign,  HD TThSa as per renal. so far HD without any complications, renal following  rpt  TTE is stable,   h/o PE/DVT/thrombophlebitis in LE, INR is therapeutic on 9/15    wife consented to switch to ELIQUIS for full AC, his outptn nephrologist Dr. Whalen agrees    eliquis not authorized by insurance company. sent a letter of explanation via fax w confirmation of reciept, copy in the chart. fax number: [phone], file: [id number]. ptn is getting HD today    ptn has a high debility index, he has been awaiting a special air mattress as part of wound care treatment and a motorized wheel chair. will need to arrange POWER WHEELCHAIR set up prior to DC: send notes along with Seating and Mobility eval to fax [phone] or email to [email or web address] ( [employer])  ptn also needs air mattress delivered prior to DC.   ptn is bedbound and wheelchair bound, he is morbidly obese  seen by wound care here. needs the recs addressed prior to DC  PT eval

## 2023-09-20 ENCOUNTER — TRANSCRIPTION ENCOUNTER (OUTPATIENT)
Age: 78
End: 2023-09-20

## 2023-09-20 RX ADMIN — Medication 20 MILLIGRAM(S): at 12:03

## 2023-09-20 RX ADMIN — Medication 1 SUPPOSITORY(S): at 17:17

## 2023-09-20 RX ADMIN — PANTOPRAZOLE SODIUM 40 MILLIGRAM(S): 20 TABLET, DELAYED RELEASE ORAL at 05:55

## 2023-09-20 RX ADMIN — APIXABAN 5 MILLIGRAM(S): 2.5 TABLET, FILM COATED ORAL at 17:14

## 2023-09-20 RX ADMIN — Medication 20 MILLIGRAM(S): at 17:14

## 2023-09-20 RX ADMIN — LEVETIRACETAM 500 MILLIGRAM(S): 250 TABLET, FILM COATED ORAL at 17:14

## 2023-09-20 RX ADMIN — BUDESONIDE AND FORMOTEROL FUMARATE DIHYDRATE 2 PUFF(S): 160; 4.5 AEROSOL RESPIRATORY (INHALATION) at 05:59

## 2023-09-20 RX ADMIN — Medication 100 MILLIGRAM(S): at 17:14

## 2023-09-20 RX ADMIN — SEVELAMER CARBONATE 800 MILLIGRAM(S): 2400 POWDER, FOR SUSPENSION ORAL at 22:22

## 2023-09-20 RX ADMIN — Medication 20 MILLIGRAM(S): at 05:56

## 2023-09-20 RX ADMIN — SEVELAMER CARBONATE 2400 MILLIGRAM(S): 2400 POWDER, FOR SUSPENSION ORAL at 17:14

## 2023-09-20 RX ADMIN — Medication 1 SUPPOSITORY(S): at 06:02

## 2023-09-20 RX ADMIN — BUDESONIDE AND FORMOTEROL FUMARATE DIHYDRATE 2 PUFF(S): 160; 4.5 AEROSOL RESPIRATORY (INHALATION) at 17:14

## 2023-09-20 RX ADMIN — MIDODRINE HYDROCHLORIDE 30 MILLIGRAM(S): 2.5 TABLET ORAL at 05:58

## 2023-09-20 RX ADMIN — LEVETIRACETAM 500 MILLIGRAM(S): 250 TABLET, FILM COATED ORAL at 05:56

## 2023-09-20 RX ADMIN — TIOTROPIUM BROMIDE 2 PUFF(S): 18 CAPSULE ORAL; RESPIRATORY (INHALATION) at 12:03

## 2023-09-20 RX ADMIN — Medication 100 MILLIGRAM(S): at 12:03

## 2023-09-20 RX ADMIN — SEVELAMER CARBONATE 2400 MILLIGRAM(S): 2400 POWDER, FOR SUSPENSION ORAL at 08:55

## 2023-09-20 RX ADMIN — MIDODRINE HYDROCHLORIDE 30 MILLIGRAM(S): 2.5 TABLET ORAL at 12:03

## 2023-09-20 RX ADMIN — Medication 100 MILLIGRAM(S): at 05:57

## 2023-09-20 RX ADMIN — APIXABAN 5 MILLIGRAM(S): 2.5 TABLET, FILM COATED ORAL at 05:57

## 2023-09-20 RX ADMIN — MIDODRINE HYDROCHLORIDE 30 MILLIGRAM(S): 2.5 TABLET ORAL at 17:14

## 2023-09-20 RX ADMIN — SEVELAMER CARBONATE 2400 MILLIGRAM(S): 2400 POWDER, FOR SUSPENSION ORAL at 12:03

## 2023-09-20 NOTE — PROGRESS NOTE ADULT - ASSESSMENT
The patient is a 78 year old morbidly obese man with PMH of ESRD on HD, orthostatic hypotension (on high dose midodrine), COPD on home O2, SAADIA on CPAP, wheelchair bound, chronic diastolic CHF, pulmonary hypertension, PE and DVT on Coumadin, diverticulosis and hemorrhoids, admitted for rectal bleeding in the setting of supratherapeutic INR.     1. BRBPR, resolved. Likely hemorrhoidal   cont Anusol HC suppositories BID x 2 weeks  bowel regimen to prevent constipation       2. PE/DVT  on coumadin   PPI for GI ppx     3. Orthostatic hypotension     4. ESRD on HD    5. COPD on home 02      Desiree Castillo M.D.   Gastroenterology and Hepatology  [address] [place], [place]  Office: [phone]  Cell: [phone]

## 2023-09-20 NOTE — PROGRESS NOTE ADULT - ASSESSMENT
: 78-year-old male history of ESRD on HD via left upper extremity AV fistula Tuesday Thursday Saturday, last dialysis yesterday, chronic severe orthostatic hypotension on midodrine 30 mg every 6 hours, COPD on 2 L home O2, CHF, pulmonary hypertension, PE and DVT on in the past on Coumadin, IVC filter, chronic sacral ulcer, history of hematochezia in the past, brought in by EMS from home for multiple episodes of bright red blood per rectum over the past week, lightheadedness, syncopal     ESRD on HD ( TTS) via AVF  Outpt unit [facility]   Consent obtained in chart  s/p HD on 9/18   Continue TTS schedule  Discussed with outpt nephrologist, no renal objection to eliquis.   Midodrine pre HD to allow UF.  Monitor BMP     HTN  Marginal  chronic severe orthostatic hypotension on midodrine 30mg q6h  Monitor closely     Hyperkalemmia:  In setting of RF and acidosis.  Low K diet.  HD with low K bath       CKD-MBD    Tertiary  hyperparathyroidism.   On Sensipar of 60 mg po daily  Low PO4 diet.  Continue Renvela with meals.  Monitor Phosphorus and calcium daily.     BRRB  GI following.  Monitor Hb

## 2023-09-20 NOTE — DISCHARGE NOTE NURSING/CASE MANAGEMENT/SOCIAL WORK - PATIENT PORTAL LINK FT
You can access the FollowMyHealth Patient Portal offered by [facility] by registering at the following website: [email or web address]. By joining [employer]’s FollowMyHealth portal, you will also be able to view your health information using other applications (apps) compatible with our system.

## 2023-09-20 NOTE — PROGRESS NOTE ADULT - SUBJECTIVE AND OBJECTIVE BOX
CARDIOLOGY FOLLOW UP - Dr. Livingston  DATE OF SERVICE: 9/20/23    CC  No CV complaints    REVIEW OF SYSTEMS:  CONSTITUTIONAL: No fever, weight loss, or fatigue  RESPIRATORY: No cough, wheezing, chills or hemoptysis; No Shortness of Breath  CARDIOVASCULAR: No chest pain, palpitations, passing out, dizziness, or leg swelling  GASTROINTESTINAL: No abdominal or epigastric pain. No nausea, vomiting, or hematemesis; No diarrhea or constipation. No melena or hematochezia.  VASCULAR: No edema     PHYSICAL EXAM:  T(C): 36.4 (09-20-23 @ 08:34), Max: 36.5 (09-19-23 @ 12:15)  HR: 56 (09-20-23 @ 05:53) (56 - 95)  BP: 80/42 (09-20-23 @ 05:53) (80/42 - 109/64)  RR: 18 (09-20-23 @ 00:33) (18 - 18)  SpO2: 95% (09-20-23 @ 00:33) (94% - 100%)  Wt(kg): --  I&O's Summary    19 Sep 2023 07:01  -  20 Sep 2023 07:00  --------------------------------------------------------  IN: 150 mL / OUT: 0 mL / NET: 150 mL        Appearance: Normal	  Cardiovascular: Normal S1 S2,RRR, No JVD, No murmurs  Respiratory: Lungs clear to auscultation b/l	  Gastrointestinal:  Soft, Non-tender, + BS	  Extremities: Normal range of motion, No clubbing, cyanosis or edema      Home Medications:  Albuterol (Eqv-ProAir HFA) 90 mcg/inh inhalation aerosol: 2 puff(s) inhaled every 6 hours as needed (13 Sep 2023 13:52)  albuterol 2.5 mg/3 mL (0.083%) inhalation solution: 3 milliliter(s) by nebulizer every 6 hours as needed (13 Sep 2023 13:57)  allopurinol 100 mg oral tablet: 1 tab(s) orally once a day (13 Sep 2023 13:52)  cinacalcet 90 mg oral tablet: 1 tab(s) orally 3 times a week (on days of dialysis - Tues, Thurs &amp; Sat) (13 Sep 2023 13:52)  Colace 100 mg oral capsule: 1 cap(s) orally once a day (at bedtime) (13 Sep 2023 13:54)  dicyclomine 20 mg oral tablet: 1 tab(s) orally every 6 hours as needed (13 Sep 2023 13:52)  levETIRAcetam 500 mg oral tablet: 1 tab(s) orally 2 times a day (13 Sep 2023 13:52)  lidocaine 5% topical film: Apply 1 patch to knee and 1 patch to lower back; remove after 12 hours (13 Sep 2023 13:52)  midodrine 10 mg oral tablet: 3 tab(s) orally 4 times a day note: pharmacy has 3 times a day. (13 Sep 2023 13:54)  MiraLax oral powder for reconstitution: 17 gram(s) orally every other day (13 Sep 2023 13:52)  pantoprazole 40 mg oral delayed release tablet: 1 tab(s) orally once a day (13 Sep 2023 13:52)  pregabalin 100 mg oral capsule: 1 cap(s) orally 2 times a day (13 Sep 2023 13:52)  sevelamer carbonate 800 mg oral tablet: 3 tab(s) orally 3 times a day &amp; 1 additional tab(s) with snacks (13 Sep 2023 13:52)  Trelegy Ellipta 100 mcg-62.5 mcg-25 mcg/inh inhalation powder: 1 puff(s) inhaled once a day (13 Sep 2023 13:52)  WARFARIN: Patient alternates between 9mg &amp; 10mg as per INR as per wife. (13 Sep 2023 13:54)      MEDICATIONS  (STANDING):  allopurinol 100 milliGRAM(s) Oral daily  apixaban 5 milliGRAM(s) Oral every 12 hours  budesonide 160 MICROgram(s)/formoterol 4.5 MICROgram(s) Inhaler 2 Puff(s) Inhalation two times a day  chlorhexidine 2% Cloths 1 Application(s) Topical <User Schedule>  cinacalcet 90 milliGRAM(s) Oral <User Schedule>  dicyclomine 20 milliGRAM(s) Oral three times a day before meals  hydrocortisone hemorrhoidal Suppository 1 Suppository(s) Rectal two times a day  levETIRAcetam 500 milliGRAM(s) Oral two times a day  midodrine. 30 milliGRAM(s) Oral three times a day  pantoprazole    Tablet 40 milliGRAM(s) Oral before breakfast  polyethylene glycol 3350 17 Gram(s) Oral two times a day  pregabalin 100 milliGRAM(s) Oral two times a day  sevelamer carbonate 2400 milliGRAM(s) Oral three times a day with meals  sevelamer carbonate 800 milliGRAM(s) Oral <User Schedule>  tiotropium 2.5 MICROgram(s) Inhaler 2 Puff(s) Inhalation daily      TELEMETRY: 	    ECG:  	  RADIOLOGY:   DIAGNOSTIC TESTING:  [ ] Echocardiogram:  [ ]  Catheterization:  [ ] Stress Test:    OTHER: 	    LABS:	 	    CKMB Units: 1.6 ng/mL [0.0 - 6.7] (09-14 @ 07:07)  Troponin T, High Sensitivity Result: 175 ng/L [0 - 51] (09-13 @ 10:17)      09-18    137  |  95<L>  |  52<H>  ----------------------------<  78  5.4<H>   |  22  |  10.50<H>    Ca    9.7      18 Sep 2023 10:02

## 2023-09-20 NOTE — DISCHARGE NOTE NURSING/CASE MANAGEMENT/SOCIAL WORK - NSDCPEFALRISK_GEN_ALL_CORE
For information on Fall & Injury Prevention, visit: [email or web address] OR  [email or web address] OR  https://www.cdc.gov/steadi/patient.html

## 2023-09-20 NOTE — PROGRESS NOTE ADULT - SUBJECTIVE AND OBJECTIVE BOX
Date of Service: 09-20-23 @ 10:58    Patient is a 78y old  Male who presents with a chief complaint of rectal bleeding (17 Sep 2023 09:41)    Any change in ROS:   No new respiratory events overnight. Denies SOB/CP.  O2 sats 95% 2LNC      MEDICATIONS  (STANDING):  allopurinol 100 milliGRAM(s) Oral daily  apixaban 5 milliGRAM(s) Oral every 12 hours  budesonide 160 MICROgram(s)/formoterol 4.5 MICROgram(s) Inhaler 2 Puff(s) Inhalation two times a day  chlorhexidine 2% Cloths 1 Application(s) Topical <User Schedule>  cinacalcet 90 milliGRAM(s) Oral <User Schedule>  dicyclomine 20 milliGRAM(s) Oral three times a day before meals  hydrocortisone hemorrhoidal Suppository 1 Suppository(s) Rectal two times a day  levETIRAcetam 500 milliGRAM(s) Oral two times a day  midodrine. 30 milliGRAM(s) Oral three times a day  pantoprazole    Tablet 40 milliGRAM(s) Oral before breakfast  polyethylene glycol 3350 17 Gram(s) Oral two times a day  pregabalin 100 milliGRAM(s) Oral two times a day  sevelamer carbonate 2400 milliGRAM(s) Oral three times a day with meals  sevelamer carbonate 800 milliGRAM(s) Oral <User Schedule>  tiotropium 2.5 MICROgram(s) Inhaler 2 Puff(s) Inhalation daily    MEDICATIONS  (PRN):  albuterol    90 MICROgram(s) HFA Inhaler 2 Puff(s) Inhalation every 6 hours PRN Bronchospasm    Vital Signs Last 24 Hrs  T(C): 36.4 (20 Sep 2023 08:34), Max: 36.5 (19 Sep 2023 12:15)  T(F): 97.6 (20 Sep 2023 08:34), Max: 97.7 (19 Sep 2023 12:15)  HR: 55 (20 Sep 2023 10:47) (54 - 95)  BP: 87/45 (20 Sep 2023 08:34) (80/42 - 109/64)  BP(mean): --  RR: 18 (20 Sep 2023 08:34) (18 - 18)  SpO2: 95% (20 Sep 2023 10:47) (95% - 100%)    Parameters below as of 20 Sep 2023 08:34  Patient On (Oxygen Delivery Method): nasal cannula  O2 Flow (L/min): 2      I&O's Summary    19 Sep 2023 07:01  -  20 Sep 2023 07:00  --------------------------------------------------------  IN: 150 mL / OUT: 0 mL / NET: 150 mL    Physical Exam:   GENERAL: NAD  HEENT: JORDIN  ENMT: No tonsillar erythema, exudates, or enlargement  NECK: Supple, No JVD  CHEST/LUNG: Decreased BS at bases  CVS: Regular rate and rhythm  GI: : Soft, Nontender, Nondistended  NERVOUS SYSTEM:  Alert & Oriented X3  EXTREMITIES:  2+ Peripheral Pulses, No clubbing, cyanosis, or edema  SKIN: No rashes or lesions  PSYCH: Appropriate    Labs:  27, 28                            14.0   6.84  )-----------( 225      ( 17 Sep 2023 09:36 )             47.2                         13.8   7.43  )-----------( 233      ( 16 Sep 2023 14:21 )             45.2     09-18    137  |  95<L>  |  52<H>  ----------------------------<  78  5.4<H>   |  22  |  10.50<H>  09-17    138  |  95<L>  |  29<H>  ----------------------------<  90  3.7   |  24  |  7.87<H>  09-16    137  |  97  |  28<H>  ----------------------------<  122<H>  3.9   |  21<L>  |  7.58<H>      Studies  Chest X-RAY  < from: Xray Chest 1 View- PORTABLE-Urgent (Xray Chest 1 View- PORTABLE-Urgent .) (09.13.23 @ 12:07) >      INTERPRETATION:  TECHNIQUE: A single AP view of the chest was obtained.   Ordered time:   9/13/2023 12:07 PM    COMPARISON: 7/15/2023    CLINICAL INFORMATION: Abdominal pain. GI bleed on Coumadin.    FINDINGS:    The heart is magnified by technique.  There is linear atelectasis noted at both lung bases.  There are no pleural effusions.  There is no pneumothorax.    IMPRESSION:    Bibasilar linear atelectasis.    --- End of Report ---    < end of copied text >    < from: CT Abdomen and Pelvis w/ IV Cont (09.13.23 @ 11:18) >    INTERPRETATION:  CLINICAL INFORMATION: Abdominal pain. GI bleed. On   Coumadin.    COMPARISON: CT abdomen pelvis 2/24/2023    CONTRAST/COMPLICATIONS:  IV Contrast: Omnipaque 350  90 cc administered   10 cc discarded  Oral Contrast: NONE  Complications: None reported at time of study completion    PROCEDURE:  CT of the Abdomen and Pelvis was performed.  Precontrast, Arterial and Delayedphases were performed.  Sagittal and coronal reformats were performed.    FINDINGS:  Study is limited due to streak artifact from overlying patient's upper   extremities.    LOWER CHEST: Left lower lobe calcified granuloma. Bilateral lower lobe   linear atelectasis. Coronary artery calcifications. Partially visualized   small calcified mediastinal nodes.    LIVER: Punctate calcified right hepatic lobe granuloma.  BILE DUCTS: Normal caliber.  GALLBLADDER: Within normal limits.  SPLEEN: Scattered calcified granulomas.  PANCREAS: Within normal limits.  ADRENALS: Similar bilateral adrenal gland thickening.  KIDNEYS/URETERS: Atrophic bilateral kidneys. Bilateral renal   hypodensities, indeterminate on this exam due to extensive streak   artifact. No hydronephrosis.    BLADDER: Underdistended  REPRODUCTIVE ORGANS: Prostate gland calcifications.    BOWEL: No bowel obstruction. Status post right hemicolectomy. No evidence   of intraluminal contrast extravasation.  PERITONEUM: No ascites.  VESSELS: IVC filter. Atherosclerotic changes.  RETROPERITONEUM/LYMPH NODES: No lymphadenopathy.  ABDOMINAL WALL: Rectus diastases with multiple small ventral hernias. One   of these hernias partially containing a loop of nonobstructed large   bowel, and the others contain fat. Small fat-containing right inguinal   hernia.  BONES: Degenerative changes.    IMPRESSION:  Limited study due to streak artifact.    No CT evidence of active GI bleed.    --- End of Report ---    < end of copied text >

## 2023-09-20 NOTE — PROGRESS NOTE ADULT - ASSESSMENT
78-year-old male history of ESRD on HD via left upper extremity AV fistula Tuesday Thursday Saturday, last dialysis yesterday, chronic severe orthostatic hypotension on midodrine 30 mg every 6 hours, COPD on 2 L home O2, wheelchair bound, chronic diastolic CHF, morbid obesity, pulmonary hypertension, PE and DVT on Coumadin, IVC filter, chronic sacral ulcer, chronic back pain, history of hematochezia in the past 2/2 hemorrhoids, chronic abd pain 2/2 IBS, chronic constipation,  brought in by EMS from home for multiple episodes of bright red blood per rectum over the past week, this am associated with lightheadedness and feeling near syncopal. otn hasnt been able to tolerate HD and sessions have been down to 2-2.5 liters out instead of 3 liters 2/2 Hypotension. compliant w meds, wife is the caretaker    ptn has h/o orthostatic hypotension, on HD midodrine  would NOT give IVF, he os NOT hypovolemic  H/H remains stable, blood per rectum 2/2 hemorrhoid in a setting of supratherapeutic INR, seen by GI. CT A/P is benign,  HD TThSa as per renal. so far HD without any complications, renal following  rpt  TTE is stable,   h/o PE/DVT/thrombophlebitis in LE, INR is therapeutic on 9/15    wife consented to switch to ELIQUIS for full AC, his outptn nephrologist Dr. Whalen agrees    eliquis not authorized by insurance company. on 9/19 I sent a letter of explanation via fax w confirmation of receipt,  copy in the chart. fax number: [phone], file: [id number]. ptn's insurance company assured us they received my letter of medical necessity for ELIQUIS. we were told it may take a week to reach a decision. we requested this to be expedited. hopefully a decision will be reached tomorrow. ptn and wife are very frustrated by these events but understand that ELiquis is the safest medication for him.     HD as per renal    ptn has a high debility index, he has been awaiting a special air mattress as part of wound care treatment and a motorized wheel chair. will need to arrange POWER WHEELCHAIR set up prior to DC: send notes along with Seating and Mobility eval to fax [phone] or email to [email or web address] ( [employer])  ptn also needs air mattress delivered prior to DC.   ptn is bedbound and wheelchair bound, he is morbidly obese  seen by wound care here. needs the recs addressed prior to DC  PT eval

## 2023-09-20 NOTE — PROGRESS NOTE ADULT - ASSESSMENT
Transthoracic Echocardiogram (03.13.23 @ 09:29) >  CONCLUSIONS: nl LV sys fx   Transthoracic Echocardiogram (01.20.23 @ 17:22) >  CONCLUSIONS: NL LV sys fx     A/p  78 Y M H/O ESRD on HD T/TH, Sat, chronic hypotension, on midodrin 30 mg q6 hr, 2 L NC, CHF, pHTN, PE/HTN, presenting with weakness, fatigue, and rectal bleeding.      #Rectal Bleeding  -I/s/o supratherapeutic INR  -CT no e/o active bleeding  -Per GI, No plans for endoscopic evaluation. Pt would be very high risk.   -No further bleeding, now on eliquis    #orthostatic hypotension  -Chronic  -cont mido 30 mg every 6 hours   -Echo from 3/2023 with nl lv fxn, valve fxn     #ESRD  -HD per renal     #DVT hx  -On apixaban  -If apixaban not approved by insurance, will need to resume warfarin    #COPD, PHTN  -stable

## 2023-09-20 NOTE — PROGRESS NOTE ADULT - SUBJECTIVE AND OBJECTIVE BOX
[facility] [place] [facility]   MD CHESTER DUEÑAS MD RUORU WONG, PA    TEL:  FROM 9 AM to 5 PM ---OFFICE: [phone]    FROM 5 PM - 9 AM PLEASE CALL ANSWERING SERVICE: [phone]    RENAL FOLLOW UP NOTE--Date of Service 09-20-23 @ 10:31  --------------------------------------------------------------------------------  HPI:      Pt seen and examined at bedside.   Margie SOB, chest pain     PAST HISTORY  --------------------------------------------------------------------------------  No significant changes to PMH, PSH, FHx, SHx, unless otherwise noted    ALLERGIES & MEDICATIONS  --------------------------------------------------------------------------------  Allergies    baclofen (Other)  latex (Rash)    Intolerances      Standing Inpatient Medications  allopurinol 100 milliGRAM(s) Oral daily  apixaban 5 milliGRAM(s) Oral every 12 hours  budesonide 160 MICROgram(s)/formoterol 4.5 MICROgram(s) Inhaler 2 Puff(s) Inhalation two times a day  chlorhexidine 2% Cloths 1 Application(s) Topical <User Schedule>  cinacalcet 90 milliGRAM(s) Oral <User Schedule>  dicyclomine 20 milliGRAM(s) Oral three times a day before meals  hydrocortisone hemorrhoidal Suppository 1 Suppository(s) Rectal two times a day  levETIRAcetam 500 milliGRAM(s) Oral two times a day  midodrine. 30 milliGRAM(s) Oral three times a day  pantoprazole    Tablet 40 milliGRAM(s) Oral before breakfast  polyethylene glycol 3350 17 Gram(s) Oral two times a day  pregabalin 100 milliGRAM(s) Oral two times a day  sevelamer carbonate 800 milliGRAM(s) Oral <User Schedule>  sevelamer carbonate 2400 milliGRAM(s) Oral three times a day with meals  tiotropium 2.5 MICROgram(s) Inhaler 2 Puff(s) Inhalation daily    PRN Inpatient Medications  albuterol    90 MICROgram(s) HFA Inhaler 2 Puff(s) Inhalation every 6 hours PRN      REVIEW OF SYSTEMS  --------------------------------------------------------------------------------  General: no fever  MSK: no edema     VITALS/PHYSICAL EXAM  --------------------------------------------------------------------------------  T(C): 36.4 (09-20-23 @ 08:34), Max: 36.5 (09-19-23 @ 12:15)  HR: 54 (09-20-23 @ 08:34) (54 - 95)  BP: 87/45 (09-20-23 @ 08:34) (80/42 - 109/64)  RR: 18 (09-20-23 @ 08:34) (18 - 18)  SpO2: 97% (09-20-23 @ 08:34) (95% - 100%)  Wt(kg): --        09-19-23 @ 07:01  -  09-20-23 @ 07:00  --------------------------------------------------------  IN: 150 mL / OUT: 0 mL / NET: 150 mL      Physical Exam:  	Gen: NAD  	HEENT: MMM  	Pulm: CTA B/L  	CV: S1S2  	Abd: Soft, +BS  	Ext: No LE edema B/L                      Neuro: Awake   	Skin: Warm and Dry   	Vascular access: avf           no  teresa  LABS/STUDIES  --------------------------------------------------------------------------------                Creatinine Trend:  SCr 10.50 [09-18 @ 10:02]  SCr 7.87 [09-17 @ 09:36]  SCr 7.58 [09-16 @ 14:21]  SCr 8.92 [09-15 @ 20:07]  SCr 7.87 [09-15 @ 09:50]    Urinalysis - [09-18-23 @ 10:02]      Color  / Appearance  / SG  / pH       Gluc 78 / Ketone   / Bili  / Urobili        Blood  / Protein  / Leuk Est  / Nitrite       RBC  / WBC  / Hyaline  / Gran  / Sq Epi  / Non Sq Epi  / Bacteria       Iron 36, TIBC --, %sat --      [09-14-23 @ 07:07]  Ferritin 476      [09-14-23 @ 07:07]  PTH -- (Ca 8.8)      [09-14-23 @ 07:07]   955  PTH -- (Ca --)      [01-20-23 @ 06:35]   630  TSH 0.92      [02-24-23 @ 13:07]

## 2023-09-20 NOTE — PROGRESS NOTE ADULT - SUBJECTIVE AND OBJECTIVE BOX
Patient is a 78y old  Male who presents with a chief complaint of rectal bleeding (17 Sep 2023 09:41)      SUBJECTIVE / OVERNIGHT EVENTS: ptn's insurance company assured us they received my letter of medical necessity for ELIQUIS. we were told it may take a week to reach a decision. we requested this to be expedited. hopefully a decision will be reached tomorrow. ptn and wife are very frustrated by these events but understand that ELiquis is the safest medication for him. HD as per renal    MEDICATIONS  (STANDING):  allopurinol 100 milliGRAM(s) Oral daily  apixaban 5 milliGRAM(s) Oral every 12 hours  budesonide 160 MICROgram(s)/formoterol 4.5 MICROgram(s) Inhaler 2 Puff(s) Inhalation two times a day  chlorhexidine 2% Cloths 1 Application(s) Topical <User Schedule>  cinacalcet 90 milliGRAM(s) Oral <User Schedule>  dicyclomine 20 milliGRAM(s) Oral three times a day before meals  hydrocortisone hemorrhoidal Suppository 1 Suppository(s) Rectal two times a day  levETIRAcetam 500 milliGRAM(s) Oral two times a day  midodrine. 30 milliGRAM(s) Oral three times a day  pantoprazole    Tablet 40 milliGRAM(s) Oral before breakfast  polyethylene glycol 3350 17 Gram(s) Oral two times a day  pregabalin 100 milliGRAM(s) Oral two times a day  sevelamer carbonate 800 milliGRAM(s) Oral <User Schedule>  sevelamer carbonate 2400 milliGRAM(s) Oral three times a day with meals  tiotropium 2.5 MICROgram(s) Inhaler 2 Puff(s) Inhalation daily    MEDICATIONS  (PRN):  albuterol    90 MICROgram(s) HFA Inhaler 2 Puff(s) Inhalation every 6 hours PRN Bronchospasm      Vital Signs Last 24 Hrs  T(F): 97.6 (09-20-23 @ 18:45), Max: 97.6 (09-20-23 @ 00:33)  HR: 76 (09-20-23 @ 18:45) (54 - 95)  BP: 82/45 (09-20-23 @ 19:15) (72/41 - 97/57)  RR: 18 (09-20-23 @ 18:45) (18 - 18)  SpO2: 95% (09-20-23 @ 18:45) (95% - 97%)  Telemetry:   CAPILLARY BLOOD GLUCOSE        I&O's Summary    19 Sep 2023 07:01  -  20 Sep 2023 07:00  --------------------------------------------------------  IN: 150 mL / OUT: 0 mL / NET: 150 mL        PHYSICAL EXAM:  GENERAL: NAD, well-developed  HEAD:  Atraumatic, Normocephalic  EYES: EOMI, PERRLA, conjunctiva and sclera clear  NECK: Supple, No JVD  CHEST/LUNG: Clear to auscultation bilaterally; No wheeze  HEART: Regular rate and rhythm; No murmurs, rubs, or gallops  ABDOMEN: Soft, Nontender, Nondistended; Bowel sounds present  EXTREMITIES:  2+ Peripheral Pulses, No clubbing, cyanosis, or edema  PSYCH: AAOx3  NEUROLOGY: non-focal  SKIN: No rashes or lesions    LABS:                    RADIOLOGY & ADDITIONAL TESTS:    Imaging Personally Reviewed:    Consultant(s) Notes Reviewed:      Care Discussed with Consultants/Other Providers:

## 2023-09-20 NOTE — DISCHARGE NOTE PROVIDER - NSDCQMAMI_CARD_ALL_CORE
Received child health report form from [facility]. Form completed and faxed back to [phone].  
No

## 2023-09-21 LAB
ANION GAP SERPL CALC-SCNC: 20 MMOL/L — HIGH (ref 5–17)
BUN SERPL-MCNC: 64 MG/DL — HIGH (ref 7–23)
CALCIUM SERPL-MCNC: 9.1 MG/DL — SIGNIFICANT CHANGE UP (ref 8.4–10.5)
CHLORIDE SERPL-SCNC: 89 MMOL/L — LOW (ref 96–108)
CO2 SERPL-SCNC: 22 MMOL/L — SIGNIFICANT CHANGE UP (ref 22–31)
CREAT SERPL-MCNC: 11.59 MG/DL — HIGH (ref 0.5–1.3)
EGFR: 4 ML/MIN/1.73M2 — LOW
GLUCOSE SERPL-MCNC: 107 MG/DL — HIGH (ref 70–99)
POTASSIUM SERPL-MCNC: 5.4 MMOL/L — HIGH (ref 3.5–5.3)
POTASSIUM SERPL-SCNC: 5.4 MMOL/L — HIGH (ref 3.5–5.3)
SODIUM SERPL-SCNC: 131 MMOL/L — LOW (ref 135–145)

## 2023-09-21 RX ADMIN — SEVELAMER CARBONATE 800 MILLIGRAM(S): 2400 POWDER, FOR SUSPENSION ORAL at 21:12

## 2023-09-21 RX ADMIN — Medication 20 MILLIGRAM(S): at 14:24

## 2023-09-21 RX ADMIN — MIDODRINE HYDROCHLORIDE 30 MILLIGRAM(S): 2.5 TABLET ORAL at 14:24

## 2023-09-21 RX ADMIN — Medication 100 MILLIGRAM(S): at 05:30

## 2023-09-21 RX ADMIN — LEVETIRACETAM 500 MILLIGRAM(S): 250 TABLET, FILM COATED ORAL at 18:29

## 2023-09-21 RX ADMIN — APIXABAN 5 MILLIGRAM(S): 2.5 TABLET, FILM COATED ORAL at 18:30

## 2023-09-21 RX ADMIN — Medication 20 MILLIGRAM(S): at 05:34

## 2023-09-21 RX ADMIN — SEVELAMER CARBONATE 2400 MILLIGRAM(S): 2400 POWDER, FOR SUSPENSION ORAL at 08:00

## 2023-09-21 RX ADMIN — POLYETHYLENE GLYCOL 3350 17 GRAM(S): 17 POWDER, FOR SOLUTION ORAL at 05:39

## 2023-09-21 RX ADMIN — PANTOPRAZOLE SODIUM 40 MILLIGRAM(S): 20 TABLET, DELAYED RELEASE ORAL at 05:34

## 2023-09-21 RX ADMIN — BUDESONIDE AND FORMOTEROL FUMARATE DIHYDRATE 2 PUFF(S): 160; 4.5 AEROSOL RESPIRATORY (INHALATION) at 05:39

## 2023-09-21 RX ADMIN — Medication 1 SUPPOSITORY(S): at 18:30

## 2023-09-21 RX ADMIN — SEVELAMER CARBONATE 2400 MILLIGRAM(S): 2400 POWDER, FOR SUSPENSION ORAL at 18:28

## 2023-09-21 RX ADMIN — LEVETIRACETAM 500 MILLIGRAM(S): 250 TABLET, FILM COATED ORAL at 05:32

## 2023-09-21 RX ADMIN — Medication 100 MILLIGRAM(S): at 14:24

## 2023-09-21 RX ADMIN — MIDODRINE HYDROCHLORIDE 30 MILLIGRAM(S): 2.5 TABLET ORAL at 18:42

## 2023-09-21 RX ADMIN — CHLORHEXIDINE GLUCONATE 1 APPLICATION(S): 213 SOLUTION TOPICAL at 05:39

## 2023-09-21 RX ADMIN — Medication 1 SUPPOSITORY(S): at 05:38

## 2023-09-21 RX ADMIN — TIOTROPIUM BROMIDE 2 PUFF(S): 18 CAPSULE ORAL; RESPIRATORY (INHALATION) at 14:24

## 2023-09-21 RX ADMIN — Medication 20 MILLIGRAM(S): at 18:28

## 2023-09-21 RX ADMIN — SEVELAMER CARBONATE 2400 MILLIGRAM(S): 2400 POWDER, FOR SUSPENSION ORAL at 14:25

## 2023-09-21 RX ADMIN — Medication 100 MILLIGRAM(S): at 18:42

## 2023-09-21 RX ADMIN — MIDODRINE HYDROCHLORIDE 30 MILLIGRAM(S): 2.5 TABLET ORAL at 05:31

## 2023-09-21 RX ADMIN — APIXABAN 5 MILLIGRAM(S): 2.5 TABLET, FILM COATED ORAL at 05:31

## 2023-09-21 RX ADMIN — BUDESONIDE AND FORMOTEROL FUMARATE DIHYDRATE 2 PUFF(S): 160; 4.5 AEROSOL RESPIRATORY (INHALATION) at 18:29

## 2023-09-21 RX ADMIN — CINACALCET 90 MILLIGRAM(S): 30 TABLET, FILM COATED ORAL at 05:31

## 2023-09-21 NOTE — PROGRESS NOTE ADULT - ASSESSMENT
78-year-old male history of ESRD on HD via left upper extremity AV fistula Tuesday Thursday Saturday, last dialysis yesterday, chronic severe orthostatic hypotension on midodrine 30 mg every 6 hours, COPD on 2 L home O2, wheelchair bound, chronic diastolic CHF, morbid obesity, pulmonary hypertension, PE and DVT on Coumadin, IVC filter, chronic sacral ulcer, chronic back pain, history of hematochezia in the past 2/2 hemorrhoids, chronic abd pain 2/2 IBS, chronic constipation,  brought in by EMS from home for multiple episodes of bright red blood per rectum over the past week, this am associated with lightheadedness and feeling near syncopal. otn hasnt been able to tolerate HD and sessions have been down to 2-2.5 liters out instead of 3 liters 2/2 Hypotension. compliant w meds, wife is the caretaker    ptn has h/o orthostatic hypotension, on HD midodrine  would NOT give IVF, he os NOT hypovolemic  H/H remains stable, blood per rectum 2/2 hemorrhoid in a setting of supratherapeutic INR, seen by GI. CT A/P is benign,  HD TThSa as per renal. so far HD without any complications, renal following  rpt  TTE is stable,   h/o PE/DVT/thrombophlebitis in LE, INR is therapeutic on 9/15    wife consented to switch to ELIQUIS for full AC, his outptn nephrologist Dr. Whalen agrees    eliquis not authorized by insurance company. on 9/19 I sent a letter of explanation via fax w confirmation of receipt,  copy in the chart. fax number: [phone], file: [id number]. ptn's insurance company assured us they received my letter of medical necessity for ELIQUIS. we were told it may take a week to reach a decision. we requested this to be expedited. hopefully a decision will be reached tomorrow. ptn and wife are very frustrated by these events but understand that ELiquis is the safest medication for him.     HD as per renal. apply pressure to AVF post HD    ptn has a high debility index, he has been awaiting a special air mattress as part of wound care treatment and a motorized wheel chair. will need to arrange POWER WHEELCHAIR set up prior to DC: send notes along with Seating and Mobility eval to fax [phone] or email to [email or web address] ( [employer])  ptn also needs air mattress delivered prior to DC.   ptn is bedbound and wheelchair bound, he is morbidly obese  seen by wound care here. needs the recs addressed prior to DC  PT eval

## 2023-09-21 NOTE — PROGRESS NOTE ADULT - SUBJECTIVE AND OBJECTIVE BOX
[facility] [place] [facility]   MD CHESTER DUEÑAS MD KRISTINE SOLTANPOUR, DO ANGELA WONG, PA    TEL:  OFFICE: [phone]  From 5pm-7am Answering Service [phone]    -- RENAL FOLLOW UP NOTE ---Date of Service 09-21-23 @ 11:28    Patient is a 78y old  Male who presents with a chief complaint of rectal bleeding (17 Sep 2023 09:41)      Patient seen and examined at bedside. No chest pain/sob    VITALS:  T(F): 97.2 (09-21-23 @ 09:20), Max: 97.6 (09-20-23 @ 18:45)  HR: 60 (09-21-23 @ 10:16)  BP: 99/60 (09-21-23 @ 09:20)  RR: 20 (09-21-23 @ 10:16)  SpO2: 94% (09-21-23 @ 09:20)  Wt(kg): --        PHYSICAL EXAM:  General: NAD  Neck: No JVD  Respiratory: CTAB, no wheezes, rales or rhonchi  Cardiovascular: S1, S2, RRR  Gastrointestinal: BS+, soft, NT/ND  Extremities: No peripheral edema    Hospital Medications:   MEDICATIONS  (STANDING):  allopurinol 100 milliGRAM(s) Oral daily  apixaban 5 milliGRAM(s) Oral every 12 hours  budesonide 160 MICROgram(s)/formoterol 4.5 MICROgram(s) Inhaler 2 Puff(s) Inhalation two times a day  chlorhexidine 2% Cloths 1 Application(s) Topical <User Schedule>  cinacalcet 90 milliGRAM(s) Oral <User Schedule>  dicyclomine 20 milliGRAM(s) Oral three times a day before meals  hydrocortisone hemorrhoidal Suppository 1 Suppository(s) Rectal two times a day  levETIRAcetam 500 milliGRAM(s) Oral two times a day  midodrine. 30 milliGRAM(s) Oral three times a day  pantoprazole    Tablet 40 milliGRAM(s) Oral before breakfast  polyethylene glycol 3350 17 Gram(s) Oral two times a day  pregabalin 100 milliGRAM(s) Oral two times a day  sevelamer carbonate 800 milliGRAM(s) Oral <User Schedule>  sevelamer carbonate 2400 milliGRAM(s) Oral three times a day with meals  tiotropium 2.5 MICROgram(s) Inhaler 2 Puff(s) Inhalation daily      LABS:  09-21    131<L>  |  89<L>  |  64<H>  ----------------------------<  107<H>  5.4<H>   |  22  |  11.59<H>    Ca    9.1      21 Sep 2023 10:01      Creatinine Trend: 11.59 <--, 10.50 <--, 7.87 <--, 7.58 <--, 8.92 <--, 7.87 <--            Urine Studies:  Urinalysis - [09-21-23 @ 10:01]      Color  / Appearance  / SG  / pH       Gluc 107 / Ketone   / Bili  / Urobili        Blood  / Protein  / Leuk Est  / Nitrite       RBC  / WBC  / Hyaline  / Gran  / Sq Epi  / Non Sq Epi  / Bacteria       Iron 36, TIBC --, %sat --      [09-14-23 @ 07:07]  Ferritin 476      [09-14-23 @ 07:07]  PTH -- (Ca 8.8)      [09-14-23 @ 07:07]   955  PTH -- (Ca --)      [01-20-23 @ 06:35]   630  TSH 0.92      [02-24-23 @ 13:07]        RADIOLOGY & ADDITIONAL STUDIES:

## 2023-09-21 NOTE — CONSULT NOTE ADULT - SUBJECTIVE AND OBJECTIVE BOX
[facility]), [facility]        Patient is a 78y old  Male who presents with a chief complaint of rectal bleeding (17 Sep 2023 09:41)    Excerpt from H&P,"    78-year-old male history of ESRD on HD via left upper extremity AV fistula , last dialysis yesterday, chronic severe orthostatic hypotension on midodrine 30 mg every 6 hours, COPD on 2 L home O2, wheelchair bound, chronic diastolic CHF, morbid obesity, pulmonary hypertension, PE and DVT on Coumadin, IVC filter, chronic sacral ulcer, chronic back pain, history of hematochezia in the past 2/2 hemorrhoids, chronic abd pain 2/2 IBS, chronic constipation,  brought in by EMS from home for multiple episodes of bright red blood per rectum over the past week, this am associated with lightheadedness and feeling near syncopal. otn hasnt been able to tolerate HD and sessions have been down to 2-2.5 liters out instead of 3 liters 2/2 Hypotension. compliant w meds, wife is the caretaker   pt with dizziness            *****PAST MEDICAL / Surgical  HISTORY:  PAST MEDICAL & SURGICAL HISTORY:  Hypertension      Glomerular disease  Segmental glomerular sclerosis      CHF (congestive heart failure)      COPD (chronic obstructive pulmonary disease)      Pulmonary hypertension      Sleep apnea      Pulmonary edema      Peripheral edema      Gout      History of abdominal surgery  polypectomy      H/O right heart catheterization               *****FAMILY HISTORY:  FAMILY HISTORY:  Family history of colon cancer (Father)    Family history of heart disease (Father)             *****SOCIAL HISTORY:  Alcohol: None  Smoking: None         *****ALLERGIES:   Allergies    baclofen (Other)  latex (Rash)    Intolerances             *****MEDICATIONS: current medication reviewed and documented.   MEDICATIONS  (STANDING):  allopurinol 100 milliGRAM(s) Oral daily  apixaban 5 milliGRAM(s) Oral every 12 hours  budesonide 160 MICROgram(s)/formoterol 4.5 MICROgram(s) Inhaler 2 Puff(s) Inhalation two times a day  chlorhexidine 2% Cloths 1 Application(s) Topical <User Schedule>  cinacalcet 90 milliGRAM(s) Oral <User Schedule>  dicyclomine 20 milliGRAM(s) Oral three times a day before meals  hydrocortisone hemorrhoidal Suppository 1 Suppository(s) Rectal two times a day  levETIRAcetam 500 milliGRAM(s) Oral two times a day  midodrine. 30 milliGRAM(s) Oral three times a day  pantoprazole    Tablet 40 milliGRAM(s) Oral before breakfast  polyethylene glycol 3350 17 Gram(s) Oral two times a day  pregabalin 100 milliGRAM(s) Oral two times a day  sevelamer carbonate 800 milliGRAM(s) Oral <User Schedule>  sevelamer carbonate 2400 milliGRAM(s) Oral three times a day with meals  tiotropium 2.5 MICROgram(s) Inhaler 2 Puff(s) Inhalation daily    MEDICATIONS  (PRN):  albuterol    90 MICROgram(s) HFA Inhaler 2 Puff(s) Inhalation every 6 hours PRN Bronchospasm           *****REVIEW OF SYSTEM:  GEN: no fever, no chills, no pain  RESP: no SOB, no cough, no sputum  CVS: no chest pain, no palpitations, no edema  GI: no abdominal pain, no nausea, no vomiting, no constipation, no diarrhea  : no dysurea, no frequency, no hematurea  Neuro: no headache, no dizziness  PSYCH: no anxiety, no depression  Derm : no itching, no rash         *****VITAL SIGNS:  T(C): 36.7 (23 @ 07:10), Max: 36.7 (23 @ 07:10)  HR: 50 (23 @ 10:57) (50 - 80)  BP: 94/56 (23 @ 07:10) (80/49 - 94/56)  RR: 18 (23 @ 07:10) (18 - 22)  SpO2: 100% (23 @ 10:57) (93% - 100%)  Wt(kg): --     @ 07:01  -   @ 07:00  --------------------------------------------------------  IN: 0 mL / OUT: 0 mL / NET: 0 mL             *****PHYSICAL EXAM:   Alert oriented x3  Attention comprehension are  fair Able to name, repeat,  without any difficulty.   Able to follow 1 step commands.     EOMI fundi not visualized,  blinks to threat     No facial asymmetry   Tongue is midline      Moving all 4 ext symmetrically antigravity     Reflexes are diminished  throughout   sensation is grossly symmetric  Gait : not assessed.  B/L down going toes               *****LAB AND IMAGIN-22    129<L>  |  87<L>  |  43<H>  ----------------------------<  74  5.1   |  24  |  8.97<H>    Ca    9.4      22 Sep 2023 07:12                              Urinalysis Basic - ( 22 Sep 2023 07:12 )    Color: x / Appearance: x / SG: x / pH: x  Gluc: 74 mg/dL / Ketone: x  / Bili: x / Urobili: x   Blood: x / Protein: x / Nitrite: x   Leuk Esterase: x / RBC: x / WBC x   Sq Epi: x / Non Sq Epi: x / Bacteria: x        [All pertinent recent Imaging reports reviewed]         *****A S S E S S M E N T   A N D   P L A N :     Excerpt from H&P,"    78-year-old male history of ESRD on HD via left upper extremity AV fistula , last dialysis yesterday, chronic severe orthostatic hypotension on midodrine 30 mg every 6 hours, COPD on 2 L home O2, wheelchair bound, chronic diastolic CHF, morbid obesity, pulmonary hypertension, PE and DVT on Coumadin, IVC filter, chronic sacral ulcer, chronic back pain, history of hematochezia in the past 2/2 hemorrhoids, chronic abd pain 2/2 IBS, chronic constipation,  brought in by EMS from home for multiple episodes of bright red blood per rectum over the past week, this am associated with lightheadedness and feeling near syncopal. otn hasnt been able to tolerate HD and sessions have been down to 2-2.5 liters out instead of 3 liters 2/2 Hypotension. compliant w meds, wife is the caretaker   pt with dizziness            Problem/Recommendations 1: dizziness of unclear etiology   likely secondary to hyponatremia +/-  orthostatic hypotension   midodrine     consider compression stockings  pt eval   oob to chair as tolerated     avoid concurrent nsaid use  would lower lyrica 50 tid             Problem/Recommendations 2:  rectal bleeding with supratherapeutic inr   continue to monitor ac closely   h/h stable      ___________________________  Will follow with you.  Thank you,  Tatiana Metcalf MD  Diplomate of the American Board of Neurology and Psychiatry.  Diplomate of the American Board of Vascular Neurology.   [facility] ([facility]), [facility]   Ph: [phone]    Differential diagnosis and plan of care discussed with patient after the evaluation.   Advanced care planning options discussed.   Pain assessed and judicious use of narcotics when appropriate was discussed.  Importance of Fall prevention discussed.  Counseling on Smoking and Alcohol cessation was offered when appropriate.  Counseling on Diet, exercise, and medication compliance was done.   83 minutes spent on the total encounter;  more than 50 % of the visit was spent on counseling  and or coordinating care by the attending physician.    Thank you for allowing me to participate in the care of this aung patient. Please do not hesitate to call me if you have any questions.     This and subsequent notes  will  inherently be subject to errors including those of syntax and substitutions which may escape proofreading. In such instances original meaning may be extrapolated by contextual derivation.

## 2023-09-21 NOTE — PROGRESS NOTE ADULT - SUBJECTIVE AND OBJECTIVE BOX
Patient is a 78y old  Male who presents with a chief complaint of rectal bleeding (17 Sep 2023 09:41)      SUBJECTIVE / OVERNIGHT EVENTS: no new events, exc bleeding at the AVF site    MEDICATIONS  (STANDING):  allopurinol 100 milliGRAM(s) Oral daily  apixaban 5 milliGRAM(s) Oral every 12 hours  budesonide 160 MICROgram(s)/formoterol 4.5 MICROgram(s) Inhaler 2 Puff(s) Inhalation two times a day  chlorhexidine 2% Cloths 1 Application(s) Topical <User Schedule>  cinacalcet 90 milliGRAM(s) Oral <User Schedule>  dicyclomine 20 milliGRAM(s) Oral three times a day before meals  hydrocortisone hemorrhoidal Suppository 1 Suppository(s) Rectal two times a day  levETIRAcetam 500 milliGRAM(s) Oral two times a day  midodrine. 30 milliGRAM(s) Oral three times a day  pantoprazole    Tablet 40 milliGRAM(s) Oral before breakfast  polyethylene glycol 3350 17 Gram(s) Oral two times a day  pregabalin 100 milliGRAM(s) Oral two times a day  sevelamer carbonate 800 milliGRAM(s) Oral <User Schedule>  sevelamer carbonate 2400 milliGRAM(s) Oral three times a day with meals  tiotropium 2.5 MICROgram(s) Inhaler 2 Puff(s) Inhalation daily    MEDICATIONS  (PRN):  albuterol    90 MICROgram(s) HFA Inhaler 2 Puff(s) Inhalation every 6 hours PRN Bronchospasm      Vital Signs Last 24 Hrs  T(F): 97.2 (09-21-23 @ 09:20), Max: 97.6 (09-20-23 @ 18:45)  HR: 59 (09-21-23 @ 14:30) (55 - 76)  BP: 87/55 (09-21-23 @ 14:30) (72/41 - 108/60)  RR: 18 (09-21-23 @ 14:30) (17 - 20)  SpO2: 94% (09-21-23 @ 09:20) (94% - 97%)  Telemetry:   CAPILLARY BLOOD GLUCOSE        I&O's Summary      PHYSICAL EXAM:  GENERAL: NAD, well-developed  HEAD:  Atraumatic, Normocephalic  EYES: EOMI, PERRLA, conjunctiva and sclera clear  NECK: Supple, No JVD  CHEST/LUNG: Clear to auscultation bilaterally; No wheeze  HEART: Regular rate and rhythm; No murmurs, rubs, or gallops  ABDOMEN: Soft, Nontender, Nondistended; Bowel sounds present  EXTREMITIES:  2+ Peripheral Pulses, No clubbing, cyanosis, or edema  PSYCH: AAOx3  NEUROLOGY: non-focal  SKIN: No rashes or lesions    LABS:    09-21    131<L>  |  89<L>  |  64<H>  ----------------------------<  107<H>  5.4<H>   |  22  |  11.59<H>    Ca    9.1      21 Sep 2023 10:01            Urinalysis Basic - ( 21 Sep 2023 10:01 )    Color: x / Appearance: x / SG: x / pH: x  Gluc: 107 mg/dL / Ketone: x  / Bili: x / Urobili: x   Blood: x / Protein: x / Nitrite: x   Leuk Esterase: x / RBC: x / WBC x   Sq Epi: x / Non Sq Epi: x / Bacteria: x        RADIOLOGY & ADDITIONAL TESTS:    Imaging Personally Reviewed:    Consultant(s) Notes Reviewed:      Care Discussed with Consultants/Other Providers:

## 2023-09-21 NOTE — PROGRESS NOTE ADULT - SUBJECTIVE AND OBJECTIVE BOX
CARDIOLOGY FOLLOW UP - Dr. Livingston  DATE OF SERVICE: 9/21/23    CC  No CV complaints    REVIEW OF SYSTEMS:  CONSTITUTIONAL: No fever, weight loss, or fatigue  RESPIRATORY: No cough, wheezing, chills or hemoptysis; No Shortness of Breath  CARDIOVASCULAR: No chest pain, palpitations, passing out, dizziness, or leg swelling  GASTROINTESTINAL: No abdominal or epigastric pain. No nausea, vomiting, or hematemesis; No diarrhea or constipation. No melena or hematochezia.  VASCULAR: No edema     PHYSICAL EXAM:  T(C): 36.2 (09-21-23 @ 09:20), Max: 36.4 (09-20-23 @ 18:45)  HR: 60 (09-21-23 @ 10:16) (55 - 89)  BP: 99/60 (09-21-23 @ 09:20) (72/41 - 108/60)  RR: 20 (09-21-23 @ 10:16) (17 - 20)  SpO2: 94% (09-21-23 @ 09:20) (94% - 97%)  Wt(kg): --  I&O's Summary      Appearance: Normal	  Cardiovascular: Normal S1 S2,RRR, No JVD, No murmurs  Respiratory: Lungs clear to auscultation b/l  Gastrointestinal:  Soft, Non-tender, + BS	  Extremities: Normal range of motion, No clubbing, cyanosis or edema      Home Medications:  Albuterol (Eqv-ProAir HFA) 90 mcg/inh inhalation aerosol: 2 puff(s) inhaled every 6 hours as needed (13 Sep 2023 13:52)  albuterol 2.5 mg/3 mL (0.083%) inhalation solution: 3 milliliter(s) by nebulizer every 6 hours as needed (13 Sep 2023 13:57)  allopurinol 100 mg oral tablet: 1 tab(s) orally once a day (13 Sep 2023 13:52)  cinacalcet 90 mg oral tablet: 1 tab(s) orally 3 times a week (on days of dialysis - Tues, Thurs &amp; Sat) (13 Sep 2023 13:52)  Colace 100 mg oral capsule: 1 cap(s) orally once a day (at bedtime) (13 Sep 2023 13:54)  dicyclomine 20 mg oral tablet: 1 tab(s) orally every 6 hours as needed (13 Sep 2023 13:52)  levETIRAcetam 500 mg oral tablet: 1 tab(s) orally 2 times a day (13 Sep 2023 13:52)  lidocaine 5% topical film: Apply 1 patch to knee and 1 patch to lower back; remove after 12 hours (13 Sep 2023 13:52)  midodrine 10 mg oral tablet: 3 tab(s) orally 4 times a day note: pharmacy has 3 times a day. (13 Sep 2023 13:54)  MiraLax oral powder for reconstitution: 17 gram(s) orally every other day (13 Sep 2023 13:52)  pantoprazole 40 mg oral delayed release tablet: 1 tab(s) orally once a day (13 Sep 2023 13:52)  pregabalin 100 mg oral capsule: 1 cap(s) orally 2 times a day (13 Sep 2023 13:52)  sevelamer carbonate 800 mg oral tablet: 3 tab(s) orally 3 times a day &amp; 1 additional tab(s) with snacks (13 Sep 2023 13:52)  Trelegy Ellipta 100 mcg-62.5 mcg-25 mcg/inh inhalation powder: 1 puff(s) inhaled once a day (13 Sep 2023 13:52)  WARFARIN: Patient alternates between 9mg &amp; 10mg as per INR as per wife. (13 Sep 2023 13:54)      MEDICATIONS  (STANDING):  allopurinol 100 milliGRAM(s) Oral daily  apixaban 5 milliGRAM(s) Oral every 12 hours  budesonide 160 MICROgram(s)/formoterol 4.5 MICROgram(s) Inhaler 2 Puff(s) Inhalation two times a day  chlorhexidine 2% Cloths 1 Application(s) Topical <User Schedule>  cinacalcet 90 milliGRAM(s) Oral <User Schedule>  dicyclomine 20 milliGRAM(s) Oral three times a day before meals  hydrocortisone hemorrhoidal Suppository 1 Suppository(s) Rectal two times a day  levETIRAcetam 500 milliGRAM(s) Oral two times a day  midodrine. 30 milliGRAM(s) Oral three times a day  pantoprazole    Tablet 40 milliGRAM(s) Oral before breakfast  polyethylene glycol 3350 17 Gram(s) Oral two times a day  pregabalin 100 milliGRAM(s) Oral two times a day  sevelamer carbonate 2400 milliGRAM(s) Oral three times a day with meals  sevelamer carbonate 800 milliGRAM(s) Oral <User Schedule>  tiotropium 2.5 MICROgram(s) Inhaler 2 Puff(s) Inhalation daily      TELEMETRY: 	    ECG:  	  RADIOLOGY:   DIAGNOSTIC TESTING:  [ ] Echocardiogram:  [ ]  Catheterization:  [ ] Stress Test:    OTHER: 	    LABS:

## 2023-09-21 NOTE — PROGRESS NOTE ADULT - ASSESSMENT
: 78-year-old male history of ESRD on HD via left upper extremity AV fistula Tuesday Thursday Saturday, last dialysis yesterday, chronic severe orthostatic hypotension on midodrine 30 mg every 6 hours, COPD on 2 L home O2, CHF, pulmonary hypertension, PE and DVT on in the past on Coumadin, IVC filter, chronic sacral ulcer, history of hematochezia in the past, brought in by EMS from home for multiple episodes of bright red blood per rectum over the past week, lightheadedness, syncopal     ESRD on HD ( TTS) via AVF  Outpt unit [facility]   Consent obtained in chart  Pt seen during HD today - UF goal 2L.  Continue TTS schedule  Discussed with outpt nephrologist, no renal objection to Eliquis.  Pt made aware.  Midodrine pre HD to allow UF.  Monitor BMP.    HTN  Marginal  chronic severe orthostatic hypotension on midodrine 30mg q6h  Monitor closely     Hyperkalemia:  In setting of RF and acidosis.  Low K diet.  HD with low K bath     Hyponatremia:  Na worsened.  In setting of RF.  HD as ordered.  Fluid restriction to 1L/day.  Monitor closely.    CKD-MBD    Tertiary  hyperparathyroidism.   On Sensipar of 60 mg po daily  Low PO4 diet.  Continue Renvela with meals.  Monitor Phosphorus and calcium daily.     BRRB  GI following.  Monitor Hb

## 2023-09-21 NOTE — CONSULT NOTE ADULT - CONSULT REQUESTED DATE/TIME
13-Sep-2023 14:00
13-Sep-2023 13:28
13-Sep-2023 13:37
14-Sep-2023 15:32
22-Sep-2023 11:56
13-Sep-2023 16:07

## 2023-09-21 NOTE — PROGRESS NOTE ADULT - SUBJECTIVE AND OBJECTIVE BOX
Date of Service: 09-21-23 @ 09:53    Patient is a 78y old  Male who presents with a chief complaint of rectal bleeding (17 Sep 2023 09:41)      Any change in ROS:  No new respiratory events overnight. Denies SOB/CP.    MEDICATIONS  (STANDING):  allopurinol 100 milliGRAM(s) Oral daily  apixaban 5 milliGRAM(s) Oral every 12 hours  budesonide 160 MICROgram(s)/formoterol 4.5 MICROgram(s) Inhaler 2 Puff(s) Inhalation two times a day  chlorhexidine 2% Cloths 1 Application(s) Topical <User Schedule>  cinacalcet 90 milliGRAM(s) Oral <User Schedule>  dicyclomine 20 milliGRAM(s) Oral three times a day before meals  hydrocortisone hemorrhoidal Suppository 1 Suppository(s) Rectal two times a day  levETIRAcetam 500 milliGRAM(s) Oral two times a day  midodrine. 30 milliGRAM(s) Oral three times a day  pantoprazole    Tablet 40 milliGRAM(s) Oral before breakfast  polyethylene glycol 3350 17 Gram(s) Oral two times a day  pregabalin 100 milliGRAM(s) Oral two times a day  sevelamer carbonate 800 milliGRAM(s) Oral <User Schedule>  sevelamer carbonate 2400 milliGRAM(s) Oral three times a day with meals  tiotropium 2.5 MICROgram(s) Inhaler 2 Puff(s) Inhalation daily    MEDICATIONS  (PRN):  albuterol    90 MICROgram(s) HFA Inhaler 2 Puff(s) Inhalation every 6 hours PRN Bronchospasm    Vital Signs Last 24 Hrs  T(C): 36.4 (21 Sep 2023 08:24), Max: 36.4 (20 Sep 2023 18:45)  T(F): 97.6 (21 Sep 2023 08:24), Max: 97.6 (20 Sep 2023 18:45)  HR: 58 (21 Sep 2023 08:24) (55 - 89)  BP: 108/60 (21 Sep 2023 08:24) (72/41 - 108/60)  BP(mean): --  RR: 18 (21 Sep 2023 08:24) (18 - 18)  SpO2: 96% (21 Sep 2023 08:24) (94% - 97%)    Parameters below as of 21 Sep 2023 08:24  Patient On (Oxygen Delivery Method): nasal cannula  O2 Flow (L/min): 2    Physical Exam:   GENERAL: NAD  HEENT: JORDIN  ENMT: No tonsillar erythema, exudates, or enlargement  NECK: Supple, No JVD  CHEST/LUNG: Clear to auscultation b/l  CVS: Regular rate and rhythm  GI: : Soft, Nontender, Nondistended  NERVOUS SYSTEM:  Alert & Oriented X3  EXTREMITIES:  2+ Peripheral Pulses, No clubbing, cyanosis, or edema  SKIN: No rashes or lesions  PSYCH: Appropriate    Labs:  27 09-18    137  |  95<L>  |  52<H>  ----------------------------<  78  5.4<H>   |  22  |  10.50<H>          Studies  Chest X-RAY  < from: Xray Chest 1 View- PORTABLE-Urgent (Xray Chest 1 View- PORTABLE-Urgent .) (09.13.23 @ 12:07) >      INTERPRETATION:  TECHNIQUE: A single AP view of the chest was obtained.   Ordered time:   9/13/2023 12:07 PM    COMPARISON: 7/15/2023    CLINICAL INFORMATION: Abdominal pain. GI bleed on Coumadin.    FINDINGS:    The heart is magnified by technique.  There is linear atelectasis noted at both lung bases.  There are no pleural effusions.  There is no pneumothorax.    IMPRESSION:    Bibasilar linear atelectasis.    --- End of Report ---    < end of copied text >    < from: CT Abdomen and Pelvis w/ IV Cont (09.13.23 @ 11:18) >    INTERPRETATION:  CLINICAL INFORMATION: Abdominal pain. GI bleed. On   Coumadin.    COMPARISON: CT abdomen pelvis 2/24/2023    CONTRAST/COMPLICATIONS:  IV Contrast: Omnipaque 350  90 cc administered   10 cc discarded  Oral Contrast: NONE  Complications: None reported at time of study completion    PROCEDURE:  CT of the Abdomen and Pelvis was performed.  Precontrast, Arterial and Delayedphases were performed.  Sagittal and coronal reformats were performed.    FINDINGS:  Study is limited due to streak artifact from overlying patient's upper   extremities.    LOWER CHEST: Left lower lobe calcified granuloma. Bilateral lower lobe   linear atelectasis. Coronary artery calcifications. Partially visualized   small calcified mediastinal nodes.    LIVER: Punctate calcified right hepatic lobe granuloma.  BILE DUCTS: Normal caliber.  GALLBLADDER: Within normal limits.  SPLEEN: Scattered calcified granulomas.  PANCREAS: Within normal limits.  ADRENALS: Similar bilateral adrenal gland thickening.  KIDNEYS/URETERS: Atrophic bilateral kidneys. Bilateral renal   hypodensities, indeterminate on this exam due to extensive streak   artifact. No hydronephrosis.    BLADDER: Underdistended  REPRODUCTIVE ORGANS: Prostate gland calcifications.    BOWEL: No bowel obstruction. Status post right hemicolectomy. No evidence   of intraluminal contrast extravasation.  PERITONEUM: No ascites.  VESSELS: IVC filter. Atherosclerotic changes.  RETROPERITONEUM/LYMPH NODES: No lymphadenopathy.  ABDOMINAL WALL: Rectus diastases with multiple small ventral hernias. One   of these hernias partially containing a loop of nonobstructed large   bowel, and the others contain fat. Small fat-containing right inguinal   hernia.  BONES: Degenerative changes.    IMPRESSION:  Limited study due to streak artifact.    No CT evidence of active GI bleed.    --- End of Report ---    < end of copied text >

## 2023-09-22 VITALS
HEART RATE: 83 BPM | OXYGEN SATURATION: 94 % | DIASTOLIC BLOOD PRESSURE: 58 MMHG | RESPIRATION RATE: 18 BRPM | SYSTOLIC BLOOD PRESSURE: 100 MMHG

## 2023-09-22 LAB
ANION GAP SERPL CALC-SCNC: 18 MMOL/L — HIGH (ref 5–17)
BUN SERPL-MCNC: 43 MG/DL — HIGH (ref 7–23)
CALCIUM SERPL-MCNC: 9.4 MG/DL — SIGNIFICANT CHANGE UP (ref 8.4–10.5)
CHLORIDE SERPL-SCNC: 87 MMOL/L — LOW (ref 96–108)
CO2 SERPL-SCNC: 24 MMOL/L — SIGNIFICANT CHANGE UP (ref 22–31)
CREAT SERPL-MCNC: 8.97 MG/DL — HIGH (ref 0.5–1.3)
EGFR: 6 ML/MIN/1.73M2 — LOW
GLUCOSE SERPL-MCNC: 74 MG/DL — SIGNIFICANT CHANGE UP (ref 70–99)
POTASSIUM SERPL-MCNC: 5.1 MMOL/L — SIGNIFICANT CHANGE UP (ref 3.5–5.3)
POTASSIUM SERPL-SCNC: 5.1 MMOL/L — SIGNIFICANT CHANGE UP (ref 3.5–5.3)
SODIUM SERPL-SCNC: 129 MMOL/L — LOW (ref 135–145)

## 2023-09-22 RX ORDER — PANTOPRAZOLE SODIUM 20 MG/1
1 TABLET, DELAYED RELEASE ORAL
Qty: 0 | Refills: 0 | DISCHARGE
Start: 2023-09-22

## 2023-09-22 RX ORDER — LEVETIRACETAM 250 MG/1
1 TABLET, FILM COATED ORAL
Qty: 0 | Refills: 0 | DISCHARGE
Start: 2023-09-22

## 2023-09-22 RX ORDER — SEVELAMER CARBONATE 2400 MG/1
3 POWDER, FOR SUSPENSION ORAL
Refills: 0 | DISCHARGE

## 2023-09-22 RX ORDER — ALBUTEROL 90 UG/1
3 AEROSOL, METERED ORAL
Refills: 0 | DISCHARGE

## 2023-09-22 RX ORDER — PANTOPRAZOLE SODIUM 20 MG/1
1 TABLET, DELAYED RELEASE ORAL
Refills: 0 | DISCHARGE

## 2023-09-22 RX ORDER — WARFARIN SODIUM 2.5 MG/1
0 TABLET ORAL
Refills: 0 | DISCHARGE

## 2023-09-22 RX ORDER — DOCUSATE SODIUM 100 MG
1 CAPSULE ORAL
Refills: 0 | DISCHARGE

## 2023-09-22 RX ORDER — LEVETIRACETAM 250 MG/1
1 TABLET, FILM COATED ORAL
Refills: 0 | DISCHARGE

## 2023-09-22 RX ORDER — SEVELAMER CARBONATE 2400 MG/1
3 POWDER, FOR SUSPENSION ORAL
Qty: 0 | Refills: 0 | DISCHARGE
Start: 2023-09-22

## 2023-09-22 RX ORDER — LIDOCAINE 4 G/100G
0 CREAM TOPICAL
Refills: 0 | DISCHARGE

## 2023-09-22 RX ORDER — BUDESONIDE AND FORMOTEROL FUMARATE DIHYDRATE 160; 4.5 UG/1; UG/1
2 AEROSOL RESPIRATORY (INHALATION)
Qty: 1 | Refills: 0
Start: 2023-09-22 | End: 2023-10-21

## 2023-09-22 RX ORDER — TIOTROPIUM BROMIDE 18 UG/1
2 CAPSULE ORAL; RESPIRATORY (INHALATION)
Qty: 30 | Refills: 0
Start: 2023-09-22 | End: 2023-10-21

## 2023-09-22 RX ORDER — ALLOPURINOL 300 MG
1 TABLET ORAL
Refills: 0 | DISCHARGE

## 2023-09-22 RX ORDER — HYDROCORTISONE 1 %
1 OINTMENT (GRAM) TOPICAL
Qty: 8 | Refills: 0
Start: 2023-09-22 | End: 2023-09-25

## 2023-09-22 RX ORDER — MIDODRINE HYDROCHLORIDE 2.5 MG/1
3 TABLET ORAL
Refills: 0 | DISCHARGE

## 2023-09-22 RX ORDER — ALLOPURINOL 300 MG
1 TABLET ORAL
Qty: 0 | Refills: 0 | DISCHARGE
Start: 2023-09-22

## 2023-09-22 RX ORDER — BUDESONIDE AND FORMOTEROL FUMARATE DIHYDRATE 160; 4.5 UG/1; UG/1
2 AEROSOL RESPIRATORY (INHALATION)
Qty: 30 | Refills: 0
Start: 2023-09-22 | End: 2023-10-21

## 2023-09-22 RX ORDER — MIDODRINE HYDROCHLORIDE 2.5 MG/1
3 TABLET ORAL
Qty: 0 | Refills: 0 | DISCHARGE
Start: 2023-09-22

## 2023-09-22 RX ADMIN — Medication 100 MILLIGRAM(S): at 05:27

## 2023-09-22 RX ADMIN — POLYETHYLENE GLYCOL 3350 17 GRAM(S): 17 POWDER, FOR SOLUTION ORAL at 05:29

## 2023-09-22 RX ADMIN — BUDESONIDE AND FORMOTEROL FUMARATE DIHYDRATE 2 PUFF(S): 160; 4.5 AEROSOL RESPIRATORY (INHALATION) at 05:30

## 2023-09-22 RX ADMIN — APIXABAN 5 MILLIGRAM(S): 2.5 TABLET, FILM COATED ORAL at 05:27

## 2023-09-22 RX ADMIN — MIDODRINE HYDROCHLORIDE 30 MILLIGRAM(S): 2.5 TABLET ORAL at 12:30

## 2023-09-22 RX ADMIN — Medication 20 MILLIGRAM(S): at 18:00

## 2023-09-22 RX ADMIN — APIXABAN 5 MILLIGRAM(S): 2.5 TABLET, FILM COATED ORAL at 18:04

## 2023-09-22 RX ADMIN — CHLORHEXIDINE GLUCONATE 1 APPLICATION(S): 213 SOLUTION TOPICAL at 05:29

## 2023-09-22 RX ADMIN — LEVETIRACETAM 500 MILLIGRAM(S): 250 TABLET, FILM COATED ORAL at 05:27

## 2023-09-22 RX ADMIN — Medication 1 SUPPOSITORY(S): at 18:04

## 2023-09-22 RX ADMIN — Medication 100 MILLIGRAM(S): at 12:30

## 2023-09-22 RX ADMIN — MIDODRINE HYDROCHLORIDE 30 MILLIGRAM(S): 2.5 TABLET ORAL at 18:17

## 2023-09-22 RX ADMIN — SEVELAMER CARBONATE 2400 MILLIGRAM(S): 2400 POWDER, FOR SUSPENSION ORAL at 18:03

## 2023-09-22 RX ADMIN — TIOTROPIUM BROMIDE 2 PUFF(S): 18 CAPSULE ORAL; RESPIRATORY (INHALATION) at 12:31

## 2023-09-22 RX ADMIN — Medication 1 SUPPOSITORY(S): at 05:27

## 2023-09-22 RX ADMIN — MIDODRINE HYDROCHLORIDE 30 MILLIGRAM(S): 2.5 TABLET ORAL at 05:28

## 2023-09-22 RX ADMIN — Medication 20 MILLIGRAM(S): at 12:29

## 2023-09-22 RX ADMIN — SEVELAMER CARBONATE 2400 MILLIGRAM(S): 2400 POWDER, FOR SUSPENSION ORAL at 12:32

## 2023-09-22 RX ADMIN — LEVETIRACETAM 500 MILLIGRAM(S): 250 TABLET, FILM COATED ORAL at 18:04

## 2023-09-22 RX ADMIN — BUDESONIDE AND FORMOTEROL FUMARATE DIHYDRATE 2 PUFF(S): 160; 4.5 AEROSOL RESPIRATORY (INHALATION) at 18:04

## 2023-09-22 RX ADMIN — Medication 20 MILLIGRAM(S): at 05:28

## 2023-09-22 RX ADMIN — PANTOPRAZOLE SODIUM 40 MILLIGRAM(S): 20 TABLET, DELAYED RELEASE ORAL at 05:27

## 2023-09-22 RX ADMIN — SEVELAMER CARBONATE 2400 MILLIGRAM(S): 2400 POWDER, FOR SUSPENSION ORAL at 08:50

## 2023-09-22 RX ADMIN — Medication 100 MILLIGRAM(S): at 18:06

## 2023-09-22 NOTE — PROGRESS NOTE ADULT - PROBLEM SELECTOR PLAN 1
Pt with hx of chronic respiratory failure 2nd to SAADIA, COPD  -Has home O2 (2LNC) and NIV (through [employer], wife unsure of settings)  -CXR with basilar atelectasis  -Continue Bipap 12/5/30% qHS while inpatient, can resume NIV with home settings on discharge   -Keep sats >90% with O2 PRN (baseline 2LNC continuous)  -Continue Symbicort/Spiriva while inpatient. On discharge can d/c Symbicort/Spiriva and resume home med Trelegy Ellipta  -Albuterol q6h PRN for SOB/wheezing  -VBG 9/16 acceptable  -D/c planning per primary team.
Pt with hx of chronic respiratory failure 2nd to SAADIA, COPD  -Has home O2 (2LNC) and NIV (through [employer] wife unsure of settings)  -CXR with basilar atelectasis  -Continue Bipap 12/5/30% qHS while inpatient, can resume NIV with home settings on discharge   -Keep sats >90% with O2 PRN (baseline 2LNC continuous)  -Continue Symbicort/Spiriva while inpatient. On discharge can d/c Symbicort/Spiriva and resume home med Trelegy Ellipta  -Albuterol q6h PRN for SOB/wheezing  -VBG 9/16 acceptable  -D/c planning per primary team.
Pt with hx of chronic respiratory failure 2nd to SAADIA, COPD  -Has home O2 (2LNC) and NIV (through [employer], wife unsure of settings)  -CXR with basilar atelectasis  -Continue Bipap 12/5/30% qHS while inpatient, can resume NIV with home settings on discharge   -Keep sats >90% with O2 PRN (baseline 2LNC continuous)  -Continue Symbicort/Spiriva while inpatient. On discharge can d/c Symbicort/Spiriva and resume home med Trelegy Ellipta  -Albuterol q6h PRN for SOB/wheezing  -VBG 9/16 acceptable  -D/c planning per primary team.
Pt with hx of chronic respiratory failure 2nd to [facility]ADIA, COPD  -Has home O2 (2LNC) and NIV (through [employer], wife unsure of settings)  -CXR with basilar atelectasis  -Continue Bipap 12/5/30% qHS  -Keep sats >90% with O2 PRN (baseline 2LNC continuous)  -Continue Symbicort/Spiriva (home med Trelegy Ellipta)  -Albuterol q6h PRN for SOB/wheezing  -Can resume NIV at home settings on discharge  -VBG this AM acceptable.
Pt with hx of chronic respiratory failure 2nd to [facility], COPD  -Has home O2 (2LNC) and NIV (through [employer], wife unsure of settings)  -CXR with basilar atelectasis  -Continue Bipap 12/5/30% qHS  -Check VBG in AM   -Keep sats >90% with O2 PRN (baseline 2LNC continuous)  -Continue Symbicort/Spiriva (home med Trelegy Ellipta)  -Albuterol q6h PRN for SOB/wheezing  -Can resume NIV at home settings on discharge.
Pt with hx of chronic respiratory failure 2nd to [facility], COPD  -Has home O2 (2LNC) and NIV (through [employer], wife unsure of settings)  -CXR with basilar atelectasis  -Continue Bipap 12/5/30% qHS while inpatient, can resume NIV with home settings on discharge   -Keep sats >90% with O2 PRN (baseline 2LNC continuous)  -Continue Symbicort/Spiriva while inpatient. On discharge can d/c Symbicort/Spiriva and resume home med Trelegy Ellipta  -Albuterol q6h PRN for SOB/wheezing  -VBG 9/16 acceptable  -D/c planning per primary team.
Pt with hx of chronic respiratory failure 2nd to [facility], COPD  -Has home O2 (2LNC) and NIV (through [employer], wife unsure of settings)  -CXR with basilar atelectasis   -Continue Bipap 12/5/30% qHS while inpatient, can resume NIV with home settings on discharge   -Keep sats >90% with O2 PRN (baseline 2LNC continuous)  -Continue Symbicort/Spiriva while inpatient. On discharge can d/c Symbicort/Spiriva and resume home med Trelegy Ellipta  -Albuterol q6h PRN for SOB/wheezing  -VBG 9/16 acceptable  -D/c planning per primary team.
Pt with hx of chronic respiratory failure 2nd to [facility], COPD  -Has home O2 (2LNC) and NIV (through [employer], wife unsure of settings  -CXR with basilar atelectasis  -VBG noted  -Suggest start Bipap 12/5/30%. Check ABG in AM  -Keep sats >90% with O2 PRN (baseline 2LNC continuous)  -Continue Symbicort/Spiriva (home med Trelegy Ellipta)  -Can make Duoneb q6h PRN.

## 2023-09-22 NOTE — PROGRESS NOTE ADULT - PROBLEM SELECTOR PROBLEM 2
Chronic obstructive pulmonary disease (COPD)

## 2023-09-22 NOTE — PROGRESS NOTE ADULT - TIME BILLING
Agree with above PA note.  cv stable  now on eliquis  heme stable  med f/u  cont mido
Agree with above PA note.  cv stable  warfarin on hold   trend inr  heme stable  med f/u  cont mido
Agree with above PA note.  cv stable  warfarin on hold   trend inr  heme stable  med f/u  cont mido
Agree with above PA note.  cv stable  now on eliquis  heme stable  med f/u  cont mido
Agree with above PA note.  cv stable  warfarin on hold   trend inr  heme stable  med f/u  cont mido

## 2023-09-22 NOTE — PROGRESS NOTE ADULT - SUBJECTIVE AND OBJECTIVE BOX
[facility]    ** please note this is a delayed entry note**   Seen earlier   and examined. pt feels better asking to go home   - Today, patient is without complaints.           *****MEDICATIONS: Current medication reviewed and documented.    MEDICATIONS  (STANDING):    MEDICATIONS  (PRN):          ***** VITAL SIGNS:   VSS         *****PHYSICAL EXAM:    alert oriented x 3 attention comprehension are fair.  Able to name, repeat.   EOmi fundi not visualized   no nystagmus VFF to confrontation  Tongue is midline  Palate elevates symmetrically   Moving all 4 ext spontaneously no drift appreciated    Gait not assessed.            *****LAB AND IMAGIN-22    129<L>  |  87<L>  |  43<H>  ----------------------------<  74  5.1   |  24  |  8.97<H>    Ca    9.4      22 Sep 2023 07:12                         Urinalysis Basic - ( 22 Sep 2023 07:12 )    Color: x / Appearance: x / SG: x / pH: x  Gluc: 74 mg/dL / Ketone: x  / Bili: x / Urobili: x   Blood: x / Protein: x / Nitrite: x   Leuk Esterase: x / RBC: x / WBC x   Sq Epi: x / Non Sq Epi: x / Bacteria: x      [All pertinent recent Imaging/Reports reviewed]           *****A S S E S S M E N T   A N D   P L A N :    Excerpt from H&P,"    78-year-old male history of ESRD on HD via left upper extremity AV fistula , last dialysis yesterday, chronic severe orthostatic hypotension on midodrine 30 mg every 6 hours, COPD on 2 L home O2, wheelchair bound, chronic diastolic CHF, morbid obesity, pulmonary hypertension, PE and DVT on Coumadin, IVC filter, chronic sacral ulcer, chronic back pain, history of hematochezia in the past 2/2 hemorrhoids, chronic abd pain 2/2 IBS, chronic constipation,  brought in by EMS from home for multiple episodes of bright red blood per rectum over the past week, this am associated with lightheadedness and feeling near syncopal. otn hasnt been able to tolerate HD and sessions have been down to 2-2.5 liters out instead of 3 liters 2/2 Hypotension. compliant w meds, wife is the caretaker   pt with dizziness            Problem/Recommendations 1: dizziness of unclear etiology   likely secondary to hyponatremia +/-  orthostatic hypotension   midodrine     consider compression stockings  pt eval   oob to chair as tolerated     avoid concurrent nsaid use             Problem/Recommendations 2:  rectal bleeding with supratherapeutic inr   continue to monitor ac closely   h/h stable      discharge planning     Thank you for allowing me to participate in the care of this patient. Will continue to follow patient periodically. Please do not hesitate to call me if you have any  questions or if there has been a change in patients neurological status     ________________  Tatiana Metcalf MD  [facility] (PN)[facility]  [phone]      33 minutes spent on total encounter; more than 50 % of the visit was  spent counseling about plan of care, compliance to diet/exercise and medication regimen and or  coordinating care by the attending physician.      It is advised that stroke patients follow up with LEONA Sanz @ [phone] in 1- 2 weeks.   Others please follow up with Dr. Michael Nissenbaum [phone]

## 2023-09-22 NOTE — PROGRESS NOTE ADULT - ASSESSMENT
78-year-old male history of ESRD on HD via left upper extremity AV fistula Tuesday Thursday Saturday, last dialysis yesterday, chronic severe orthostatic hypotension on midodrine 30 mg every 6 hours, COPD on 2 L home O2, wheelchair bound, chronic diastolic CHF, morbid obesity, pulmonary hypertension, PE and DVT on Coumadin, IVC filter, chronic sacral ulcer, chronic back pain, history of hematochezia in the past 2/2 hemorrhoids, chronic abd pain 2/2 IBS, chronic constipation,  brought in by EMS from home for multiple episodes of bright red blood per rectum over the past week, this am associated with lightheadedness and feeling near syncopal. otn hasnt been able to tolerate HD and sessions have been down to 2-2.5 liters out instead of 3 liters 2/2 Hypotension. compliant w meds, wife is the caretaker    ptn has h/o orthostatic hypotension, on HD midodrine  would NOT give IVF, he os NOT hypovolemic  H/H remains stable, blood per rectum 2/2 hemorrhoid in a setting of supratherapeutic INR, seen by GI. CT A/P is benign,  HD TThSa as per renal. so far HD without any complications, renal following  rpt  TTE is stable,   h/o PE/DVT/thrombophlebitis in LE, INR is therapeutic on 9/15    wife consented to switch to ELIQUIS for full AC, his outptn nephrologist Dr. Whalen agrees    no new c/o, dc home today. ELIQUIS got approved by [facility]      HD as per renal. apply pressure to AVF post HD    ptn has a high debility index, he has been awaiting a special air mattress as part of wound care treatment and a motorized wheel chair. will need to arrange POWER WHEELCHAIR set up prior to DC: send notes along with Seating and Mobility eval to fax [phone] or email to [email or web address] ( [employer])  ptn also needs air mattress delivered prior to DC.   ptn is bedbound and wheelchair bound, he is morbidly obese  seen by wound care here. needs the recs addressed prior to DC  PT eval

## 2023-09-22 NOTE — PROGRESS NOTE ADULT - PROBLEM SELECTOR PLAN 3
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 Trend INR.
By hx  -Bipap as above  -Can resume home NIV on discharge.
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 Trend INR.
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

## 2023-09-22 NOTE — PROGRESS NOTE ADULT - SUBJECTIVE AND OBJECTIVE BOX
CARDIOLOGY FOLLOW UP - Dr. Livingston  DATE OF SERVICE: 9/22/23    CC  No acute cv complaints    REVIEW OF SYSTEMS:  CONSTITUTIONAL: No fever, weight loss, or fatigue  RESPIRATORY: No cough, wheezing, chills or hemoptysis; No Shortness of Breath  CARDIOVASCULAR: No chest pain, palpitations, passing out, dizziness, or leg swelling  GASTROINTESTINAL: No abdominal or epigastric pain. No nausea, vomiting, or hematemesis; No diarrhea or constipation. No melena or hematochezia.  VASCULAR: No edema     PHYSICAL EXAM:  T(C): 36.7 (09-22-23 @ 07:10), Max: 36.7 (09-22-23 @ 07:10)  HR: 55 (09-22-23 @ 07:10) (55 - 80)  BP: 94/56 (09-22-23 @ 07:10) (80/49 - 94/56)  RR: 18 (09-22-23 @ 07:10) (18 - 22)  SpO2: 99% (09-22-23 @ 07:10) (93% - 99%)  Wt(kg): --  I&O's Summary    21 Sep 2023 07:01  -  22 Sep 2023 07:00  --------------------------------------------------------  IN: 0 mL / OUT: 0 mL / NET: 0 mL        Appearance: Normal	  Cardiovascular: Normal S1 S2,RRR, No JVD, No murmurs  Respiratory: Lungs clear to auscultation b/l	  Gastrointestinal:  Soft, Non-tender, + BS	  Extremities: Normal range of motion, No clubbing, cyanosis or edema      Home Medications:  Albuterol (Eqv-ProAir HFA) 90 mcg/inh inhalation aerosol: 2 puff(s) inhaled every 6 hours as needed (13 Sep 2023 13:52)  albuterol 2.5 mg/3 mL (0.083%) inhalation solution: 3 milliliter(s) by nebulizer every 6 hours as needed (13 Sep 2023 13:57)  allopurinol 100 mg oral tablet: 1 tab(s) orally once a day (13 Sep 2023 13:52)  cinacalcet 90 mg oral tablet: 1 tab(s) orally 3 times a week (on days of dialysis - Tues, Thurs &amp; Sat) (13 Sep 2023 13:52)  Colace 100 mg oral capsule: 1 cap(s) orally once a day (at bedtime) (13 Sep 2023 13:54)  dicyclomine 20 mg oral tablet: 1 tab(s) orally every 6 hours as needed (13 Sep 2023 13:52)  levETIRAcetam 500 mg oral tablet: 1 tab(s) orally 2 times a day (13 Sep 2023 13:52)  lidocaine 5% topical film: Apply 1 patch to knee and 1 patch to lower back; remove after 12 hours (13 Sep 2023 13:52)  midodrine 10 mg oral tablet: 3 tab(s) orally 4 times a day note: pharmacy has 3 times a day. (13 Sep 2023 13:54)  MiraLax oral powder for reconstitution: 17 gram(s) orally every other day (13 Sep 2023 13:52)  pantoprazole 40 mg oral delayed release tablet: 1 tab(s) orally once a day (13 Sep 2023 13:52)  pregabalin 100 mg oral capsule: 1 cap(s) orally 2 times a day (13 Sep 2023 13:52)  sevelamer carbonate 800 mg oral tablet: 3 tab(s) orally 3 times a day &amp; 1 additional tab(s) with snacks (13 Sep 2023 13:52)  Trelegy Ellipta 100 mcg-62.5 mcg-25 mcg/inh inhalation powder: 1 puff(s) inhaled once a day (13 Sep 2023 13:52)  WARFARIN: Patient alternates between 9mg &amp; 10mg as per INR as per wife. (13 Sep 2023 13:54)      MEDICATIONS  (STANDING):  allopurinol 100 milliGRAM(s) Oral daily  apixaban 5 milliGRAM(s) Oral every 12 hours  budesonide 160 MICROgram(s)/formoterol 4.5 MICROgram(s) Inhaler 2 Puff(s) Inhalation two times a day  chlorhexidine 2% Cloths 1 Application(s) Topical <User Schedule>  cinacalcet 90 milliGRAM(s) Oral <User Schedule>  dicyclomine 20 milliGRAM(s) Oral three times a day before meals  hydrocortisone hemorrhoidal Suppository 1 Suppository(s) Rectal two times a day  levETIRAcetam 500 milliGRAM(s) Oral two times a day  midodrine. 30 milliGRAM(s) Oral three times a day  pantoprazole    Tablet 40 milliGRAM(s) Oral before breakfast  polyethylene glycol 3350 17 Gram(s) Oral two times a day  pregabalin 100 milliGRAM(s) Oral two times a day  sevelamer carbonate 2400 milliGRAM(s) Oral three times a day with meals  sevelamer carbonate 800 milliGRAM(s) Oral <User Schedule>  tiotropium 2.5 MICROgram(s) Inhaler 2 Puff(s) Inhalation daily      TELEMETRY: 	    ECG:  	  RADIOLOGY:   DIAGNOSTIC TESTING:  [ ] Echocardiogram:  [ ]  Catheterization:  [ ] Stress Test:    OTHER: 	    LABS:	 	        09-22    129<L>  |  87<L>  |  43<H>  ----------------------------<  74  5.1   |  24  |  8.97<H>    Ca    9.4      22 Sep 2023 07:12

## 2023-09-22 NOTE — PROGRESS NOTE ADULT - ASSESSMENT
The patient is a 78 year old morbidly obese man with PMH of ESRD on HD, orthostatic hypotension (on high dose midodrine), COPD on home O2, SAADIA on CPAP, wheelchair bound, chronic diastolic CHF, pulmonary hypertension, PE and DVT on Coumadin, diverticulosis and hemorrhoids, admitted for rectal bleeding in the setting of supratherapeutic INR.     1. BRBPR, resolved. Likely hemorrhoidal   cont Anusol HC suppositories BID x 2 weeks  bowel regimen to prevent constipation     2. PE/DVT  AC per primary team   PPI for GI ppx     3. Orthostatic hypotension     4. ESRD on HD    5. COPD on home 02    6. Hyponatremia       Desiree Castillo M.D.   Gastroenterology and Hepatology  [address] [place], [place]  Office: [phone]  Cell: [phone]

## 2023-09-22 NOTE — PROGRESS NOTE ADULT - PROBLEM SELECTOR PROBLEM 3
SAADIA (obstructive sleep apnea)
Pulmonary embolism

## 2023-09-22 NOTE — PROGRESS NOTE ADULT - PROBLEM SELECTOR PLAN 5
HD per renal.
doing  ok : he he has hx of multiple pe:  atleast two times and one dvt:  on on coumadin: evaluated by gi  : INR is suprthaerapeutic

## 2023-09-22 NOTE — PROGRESS NOTE ADULT - SUBJECTIVE AND OBJECTIVE BOX
SUBJECTIVE: no new c/o, dc home today. ELIQUIS got approved by [facility]    VITALS:  T(F): 98 (09-22-23 @ 07:10), Max: 98 (09-22-23 @ 07:10)  HR: 50 (09-22-23 @ 10:57)  BP: 94/56 (09-22-23 @ 07:10)  RR: 18 (09-22-23 @ 07:10)  SpO2: 100% (09-22-23 @ 10:57)      PHYSICAL EXAM:  General: NAD  Neck: No JVD  Respiratory: CTAB, no wheezes, rales or rhonchi  Cardiovascular: S1, S2, RRR  Gastrointestinal: BS+, soft, NT/ND  Extremities: No peripheral edema    MEDICATIONS  (STANDING):  allopurinol 100 milliGRAM(s) Oral daily  apixaban 5 milliGRAM(s) Oral every 12 hours  budesonide 160 MICROgram(s)/formoterol 4.5 MICROgram(s) Inhaler 2 Puff(s) Inhalation two times a day  chlorhexidine 2% Cloths 1 Application(s) Topical <User Schedule>  cinacalcet 90 milliGRAM(s) Oral <User Schedule>  dicyclomine 20 milliGRAM(s) Oral three times a day before meals  hydrocortisone hemorrhoidal Suppository 1 Suppository(s) Rectal two times a day  levETIRAcetam 500 milliGRAM(s) Oral two times a day  midodrine. 30 milliGRAM(s) Oral three times a day  pantoprazole    Tablet 40 milliGRAM(s) Oral before breakfast  polyethylene glycol 3350 17 Gram(s) Oral two times a day  pregabalin 100 milliGRAM(s) Oral two times a day  sevelamer carbonate 2400 milliGRAM(s) Oral three times a day with meals  sevelamer carbonate 800 milliGRAM(s) Oral <User Schedule>  tiotropium 2.5 MICROgram(s) Inhaler 2 Puff(s) Inhalation daily      LABS:  09-22    129<L>  |  87<L>  |  43<H>  ----------------------------<  74  5.1   |  24  |  8.97<H>    Ca    9.4      22 Sep 2023 07:12      Urine Studies:  Urinalysis - [09-22-23 @ 07:12]      Color  / Appearance  / SG  / pH       Gluc 74 / Ketone   / Bili  / Urobili        Blood  / Protein  / Leuk Est  / Nitrite       RBC  / WBC  / Hyaline  / Gran  / Sq Epi  / Non Sq Epi  / Bacteria

## 2023-09-22 NOTE — PROGRESS NOTE ADULT - NS ATTEND OPT1 GEN_ALL_CORE
I attest my time as attending is greater than 50% of the total combined time spent on qualifying patient care activities by the PA/NP and attending.
I independently performed the documented:
(2) cough or sneeze

## 2023-09-22 NOTE — PROGRESS NOTE ADULT - PROVIDER SPECIALTY LIST ADULT
Cardiology
Gastroenterology
Nephrology
Nephrology
Neurology
Cardiology
Cardiology
Gastroenterology
Internal Medicine
Nephrology
Cardiology
Gastroenterology
Internal Medicine
Nephrology
Pulmonology

## 2023-09-22 NOTE — PROGRESS NOTE ADULT - PROBLEM SELECTOR PLAN 2
By hx  -Bronchodilators as above  -On home O2.

## 2023-09-22 NOTE — PROGRESS NOTE ADULT - ASSESSMENT
: 78-year-old male history of ESRD on HD via left upper extremity AV fistula Tuesday Thursday Saturday, last dialysis yesterday, chronic severe orthostatic hypotension on midodrine 30 mg every 6 hours, COPD on 2 L home O2, CHF, pulmonary hypertension, PE and DVT on in the past on Coumadin, IVC filter, chronic sacral ulcer, history of hematochezia in the past, brought in by EMS from home for multiple episodes of bright red blood per rectum over the past week, lightheadedness, syncopal     ESRD on HD ( TTS) via AVF  Outpt unit [facility]   Consent obtained in chart  HD 9/21 - UF'd 2L.  Extra tx today x2hrs d/t hyponatremia.  Pt to resume HD tomorrow outpatient -  aware.  Discussed with outpt nephrologist, no renal objection to Eliquis.  Pt made aware.  Midodrine pre HD to allow UF.  Monitor BMP.    HTN  Marginal  chronic severe orthostatic hypotension on midodrine 30mg q6h  Monitor closely     Hyperkalemia:  K better today.  In setting of RF and acidosis.  Low K diet.  HD with low K bath     Hyponatremia:  Na worsened.  In setting of RF.  HD again today.  Fluid restriction to 1L/day.  Monitor closely.    CKD-MBD    Tertiary  hyperparathyroidism.   On Sensipar of 60 mg po daily  Low PO4 diet.  Continue Renvela with meals.  Monitor Phosphorus and calcium daily.     BRRB  GI following.  Monitor Hb

## 2023-09-22 NOTE — PROGRESS NOTE ADULT - PROBLEM SELECTOR PLAN 4
By hx  -Bipap as above  -Can resume home NIV on discharge.
-Per GI.
By hx  -Bipap as above  -Can resume home NIV on discharge.

## 2023-09-22 NOTE — PROGRESS NOTE ADULT - PROBLEM SELECTOR PLAN 6
-Per GI.
-Bleeding possibly 2nd to hemorrhoids  -GI f/u.
-Per GI.

## 2023-09-22 NOTE — PROGRESS NOTE ADULT - PROBLEM SELECTOR PROBLEM 5
ESRD on dialysis
Pulmonary embolism
ESRD on dialysis

## 2023-09-22 NOTE — PROGRESS NOTE ADULT - NS ATTEND AMEND GEN_ALL_CORE FT
as above
repeat BMP  as sample hemolyzed  if still hyperkalemic , pleaase call nephrology
HD today
seems  ok: no sob:  no cough ; no phlegm : he wants to home:  he  used bipap last night  ; abg ordered today  but still pending:  acosta bass

## 2023-09-22 NOTE — PROGRESS NOTE ADULT - PROBLEM SELECTOR PLAN 7
BP soft  -Continue midodrine.
BP soft  -Pt reports low BP at baseline  -Continue midodrine.
BP soft  -Continue midodrine.
BP soft  -Improving today  -Continue midodrine.
BP soft  -Continue midodrine.

## 2023-09-22 NOTE — PROGRESS NOTE ADULT - SUBJECTIVE AND OBJECTIVE BOX
Date of Service: 09-22-23 @ 09:34    Patient is a 78y old  Male who presents with a chief complaint of rectal bleeding (17 Sep 2023 09:41)    Any change in ROS:   No new respiratory events overnight. Denies SOB/CP.    MEDICATIONS  (STANDING):  allopurinol 100 milliGRAM(s) Oral daily  apixaban 5 milliGRAM(s) Oral every 12 hours  budesonide 160 MICROgram(s)/formoterol 4.5 MICROgram(s) Inhaler 2 Puff(s) Inhalation two times a day  chlorhexidine 2% Cloths 1 Application(s) Topical <User Schedule>  cinacalcet 90 milliGRAM(s) Oral <User Schedule>  dicyclomine 20 milliGRAM(s) Oral three times a day before meals  hydrocortisone hemorrhoidal Suppository 1 Suppository(s) Rectal two times a day  levETIRAcetam 500 milliGRAM(s) Oral two times a day  midodrine. 30 milliGRAM(s) Oral three times a day  pantoprazole    Tablet 40 milliGRAM(s) Oral before breakfast  polyethylene glycol 3350 17 Gram(s) Oral two times a day  pregabalin 100 milliGRAM(s) Oral two times a day  sevelamer carbonate 800 milliGRAM(s) Oral <User Schedule>  sevelamer carbonate 2400 milliGRAM(s) Oral three times a day with meals  tiotropium 2.5 MICROgram(s) Inhaler 2 Puff(s) Inhalation daily    MEDICATIONS  (PRN):  albuterol    90 MICROgram(s) HFA Inhaler 2 Puff(s) Inhalation every 6 hours PRN Bronchospasm    Vital Signs Last 24 Hrs  T(C): 36.7 (22 Sep 2023 07:10), Max: 36.7 (22 Sep 2023 07:10)  T(F): 98 (22 Sep 2023 07:10), Max: 98 (22 Sep 2023 07:10)  HR: 55 (22 Sep 2023 07:10) (55 - 80)  BP: 94/56 (22 Sep 2023 07:10) (80/49 - 94/56)  BP(mean): --  RR: 18 (22 Sep 2023 07:10) (18 - 22)  SpO2: 99% (22 Sep 2023 07:10) (93% - 99%)    Parameters below as of 22 Sep 2023 07:10  Patient On (Oxygen Delivery Method): nasal cannula  O2 Flow (L/min): 2    I&O's Summary    21 Sep 2023 07:01  -  22 Sep 2023 07:00  --------------------------------------------------------  IN: 0 mL / OUT: 0 mL / NET: 0 mL    Physical Exam:   GENERAL: NAD  HEENT: JORDIN  ENMT: No tonsillar erythema, exudates, or enlargement  NECK: Supple, No JVD  CHEST/LUNG: Clear to auscultation b/l   CVS: Regular rate and rhythm  GI: : Soft, Nontender, Nondistended  NERVOUS SYSTEM:  Alert & Oriented X3  EXTREMITIES:  2+ Peripheral Pulses, No clubbing, cyanosis, or edema  SKIN: No rashes or lesions  PSYCH: Appropriate    Labs:  27 09-22    129<L>  |  87<L>  |  43<H>  ----------------------------<  74  5.1   |  24  |  8.97<H>  09-21    131<L>  |  89<L>  |  64<H>  ----------------------------<  107<H>  5.4<H>   |  22  |  11.59<H>  09-18    137  |  95<L>  |  52<H>  ----------------------------<  78  5.4<H>   |  22  |  10.50<H>    Ca    9.4      22 Sep 2023 07:12  Ca    9.1      21 Sep 2023 10:01    Urinalysis Basic - ( 22 Sep 2023 07:12 )    Color: x / Appearance: x / SG: x / pH: x  Gluc: 74 mg/dL / Ketone: x  / Bili: x / Urobili: x   Blood: x / Protein: x / Nitrite: x   Leuk Esterase: x / RBC: x / WBC x   Sq Epi: x / Non Sq Epi: x / Bacteria: x    Studies      Studies  Chest X-RAY  < from: Xray Chest 1 View- PORTABLE-Urgent (Xray Chest 1 View- PORTABLE-Urgent .) (09.13.23 @ 12:07) >      INTERPRETATION:  TECHNIQUE: A single AP view of the chest was obtained.   Ordered time:   9/13/2023 12:07 PM    COMPARISON: 7/15/2023    CLINICAL INFORMATION: Abdominal pain. GI bleed on Coumadin.    FINDINGS:    The heart is magnified by technique.  There is linear atelectasis noted at both lung bases.  There are no pleural effusions.  There is no pneumothorax.    IMPRESSION:    Bibasilar linear atelectasis.    --- End of Report ---    < end of copied text >    < from: CT Abdomen and Pelvis w/ IV Cont (09.13.23 @ 11:18) >    INTERPRETATION:  CLINICAL INFORMATION: Abdominal pain. GI bleed. On   Coumadin.    COMPARISON: CT abdomen pelvis 2/24/2023    CONTRAST/COMPLICATIONS:  IV Contrast: Omnipaque 350  90 cc administered   10 cc discarded  Oral Contrast: NONE  Complications: None reported at time of study completion    PROCEDURE:  CT of the Abdomen and Pelvis was performed.  Precontrast, Arterial and Delayedphases were performed.  Sagittal and coronal reformats were performed.    FINDINGS:  Study is limited due to streak artifact from overlying patient's upper   extremities.    LOWER CHEST: Left lower lobe calcified granuloma. Bilateral lower lobe   linear atelectasis. Coronary artery calcifications. Partially visualized   small calcified mediastinal nodes.    LIVER: Punctate calcified right hepatic lobe granuloma.  BILE DUCTS: Normal caliber.  GALLBLADDER: Within normal limits.  SPLEEN: Scattered calcified granulomas.  PANCREAS: Within normal limits.  ADRENALS: Similar bilateral adrenal gland thickening.  KIDNEYS/URETERS: Atrophic bilateral kidneys. Bilateral renal   hypodensities, indeterminate on this exam due to extensive streak   artifact. No hydronephrosis.    BLADDER: Underdistended  REPRODUCTIVE ORGANS: Prostate gland calcifications.    BOWEL: No bowel obstruction. Status post right hemicolectomy. No evidence   of intraluminal contrast extravasation.  PERITONEUM: No ascites.  VESSELS: IVC filter. Atherosclerotic changes.  RETROPERITONEUM/LYMPH NODES: No lymphadenopathy.  ABDOMINAL WALL: Rectus diastases with multiple small ventral hernias. One   of these hernias partially containing a loop of nonobstructed large   bowel, and the others contain fat. Small fat-containing right inguinal   hernia.  BONES: Degenerative changes.    IMPRESSION:  Limited study due to streak artifact.    No CT evidence of active GI bleed.    --- End of Report ---    < end of copied text >

## 2023-09-22 NOTE — PROGRESS NOTE ADULT - PROBLEM SELECTOR PROBLEM 4
SAADIA (obstructive sleep apnea)
GI bleed
SAADIA (obstructive sleep apnea)

## 2023-09-22 NOTE — PROGRESS NOTE ADULT - SUBJECTIVE AND OBJECTIVE BOX
[facility] [place] NEPHROLOGY PRACTICE   MD CHESTER DUEÑAS MD KRISTINE SOLTANPOUR, DO ANGELA WONG, PA    TEL:  OFFICE: [phone]  From 5pm-7am Answering Service [phone]    -- RENAL FOLLOW UP NOTE ---Date of Service 09-22-23 @ 11:51    Patient is a 78y old  Male who presents with a chief complaint of rectal bleeding (17 Sep 2023 09:41)      Patient seen and examined at bedside. No chest pain/sob    VITALS:  T(F): 98 (09-22-23 @ 07:10), Max: 98 (09-22-23 @ 07:10)  HR: 50 (09-22-23 @ 10:57)  BP: 94/56 (09-22-23 @ 07:10)  RR: 18 (09-22-23 @ 07:10)  SpO2: 100% (09-22-23 @ 10:57)  Wt(kg): --    09-21 @ 07:01  -  09-22 @ 07:00  --------------------------------------------------------  IN: 0 mL / OUT: 0 mL / NET: 0 mL          PHYSICAL EXAM:  General: NAD  Neck: No JVD  Respiratory: CTAB, no wheezes, rales or rhonchi  Cardiovascular: S1, S2, RRR  Gastrointestinal: BS+, soft, NT/ND  Extremities: No peripheral edema    Hospital Medications:   MEDICATIONS  (STANDING):  allopurinol 100 milliGRAM(s) Oral daily  apixaban 5 milliGRAM(s) Oral every 12 hours  budesonide 160 MICROgram(s)/formoterol 4.5 MICROgram(s) Inhaler 2 Puff(s) Inhalation two times a day  chlorhexidine 2% Cloths 1 Application(s) Topical <User Schedule>  cinacalcet 90 milliGRAM(s) Oral <User Schedule>  dicyclomine 20 milliGRAM(s) Oral three times a day before meals  hydrocortisone hemorrhoidal Suppository 1 Suppository(s) Rectal two times a day  levETIRAcetam 500 milliGRAM(s) Oral two times a day  midodrine. 30 milliGRAM(s) Oral three times a day  pantoprazole    Tablet 40 milliGRAM(s) Oral before breakfast  polyethylene glycol 3350 17 Gram(s) Oral two times a day  pregabalin 100 milliGRAM(s) Oral two times a day  sevelamer carbonate 2400 milliGRAM(s) Oral three times a day with meals  sevelamer carbonate 800 milliGRAM(s) Oral <User Schedule>  tiotropium 2.5 MICROgram(s) Inhaler 2 Puff(s) Inhalation daily      LABS:  09-22    129<L>  |  87<L>  |  43<H>  ----------------------------<  74  5.1   |  24  |  8.97<H>    Ca    9.4      22 Sep 2023 07:12      Creatinine Trend: 8.97 <--, 11.59 <--, 10.50 <--, 7.87 <--, 7.58 <--, 8.92 <--            Urine Studies:  Urinalysis - [09-22-23 @ 07:12]      Color  / Appearance  / SG  / pH       Gluc 74 / Ketone   / Bili  / Urobili        Blood  / Protein  / Leuk Est  / Nitrite       RBC  / WBC  / Hyaline  / Gran  / Sq Epi  / Non Sq Epi  / Bacteria       Iron 36, TIBC --, %sat --      [09-14-23 @ 07:07]  Ferritin 476      [09-14-23 @ 07:07]  PTH -- (Ca 8.8)      [09-14-23 @ 07:07]   955  PTH -- (Ca --)      [01-20-23 @ 06:35]   630  TSH 0.92      [02-24-23 @ 13:07]        RADIOLOGY & ADDITIONAL STUDIES:

## 2023-09-22 NOTE — PROGRESS NOTE ADULT - SUBJECTIVE AND OBJECTIVE BOX
Chief Complaint:  Patient is a 78y old  Male who presents with a chief complaint of rectal bleeding (17 Sep 2023 09:41)      Date of service 09-22-23 @ 11:14      Interval Events:   no acute GI events     Hospital Medications:  albuterol    90 MICROgram(s) HFA Inhaler 2 Puff(s) Inhalation every 6 hours PRN  allopurinol 100 milliGRAM(s) Oral daily  apixaban 5 milliGRAM(s) Oral every 12 hours  budesonide 160 MICROgram(s)/formoterol 4.5 MICROgram(s) Inhaler 2 Puff(s) Inhalation two times a day  chlorhexidine 2% Cloths 1 Application(s) Topical <User Schedule>  cinacalcet 90 milliGRAM(s) Oral <User Schedule>  dicyclomine 20 milliGRAM(s) Oral three times a day before meals  hydrocortisone hemorrhoidal Suppository 1 Suppository(s) Rectal two times a day  levETIRAcetam 500 milliGRAM(s) Oral two times a day  midodrine. 30 milliGRAM(s) Oral three times a day  pantoprazole    Tablet 40 milliGRAM(s) Oral before breakfast  polyethylene glycol 3350 17 Gram(s) Oral two times a day  pregabalin 100 milliGRAM(s) Oral two times a day  sevelamer carbonate 2400 milliGRAM(s) Oral three times a day with meals  sevelamer carbonate 800 milliGRAM(s) Oral <User Schedule>  tiotropium 2.5 MICROgram(s) Inhaler 2 Puff(s) Inhalation daily        Review of Systems:  General:  No wt loss, fevers, chills, night sweats, fatigue,   Eyes:  Good vision, no reported pain  ENT:  No sore throat, pain, runny nose, dysphagia  CV:  No pain, palpitations, hypo/hypertension  Resp:  No dyspnea, cough, tachypnea, wheezing  GI:  See HPI  :  No pain, bleeding, incontinence, nocturia  Muscle:  No pain, weakness  Neuro:  No weakness, tingling, memory problems  Psych:  No fatigue, insomnia, mood problems, depression  Endocrine:  No polyuria, polydipsia, cold/heat intolerance  Heme:  No petechiae, ecchymosis, easy bruisability  Integumentary:  No rash, edema    PHYSICAL EXAM:   Vital Signs:  Vital Signs Last 24 Hrs  T(C): 36.7 (22 Sep 2023 07:10), Max: 36.7 (22 Sep 2023 07:10)  T(F): 98 (22 Sep 2023 07:10), Max: 98 (22 Sep 2023 07:10)  HR: 50 (22 Sep 2023 10:57) (50 - 80)  BP: 94/56 (22 Sep 2023 07:10) (80/49 - 94/56)  BP(mean): --  RR: 18 (22 Sep 2023 07:10) (18 - 22)  SpO2: 100% (22 Sep 2023 10:57) (93% - 100%)    Parameters below as of 22 Sep 2023 07:10  Patient On (Oxygen Delivery Method): nasal cannula  O2 Flow (L/min): 2    Daily     Daily       PHYSICAL EXAM:     GENERAL:  Appears stated age, well-groomed, well-nourished, no distress  HEENT:  NC/AT,  conjunctivae anicteric, clear and pink,   NECK: supple, trachea midline  CHEST:  Full & symmetric excursion, no increased effort, breath sounds clear  HEART:  Regular rhythm, no JVD  ABDOMEN:  Soft, non-tender, non-distended, normoactive bowel sounds,  no masses , no hepatosplenomegaly  EXTREMITIES:  no cyanosis,clubbing or edema  SKIN:  No rash, erythema, or, ecchymoses, no jaundice  NEURO:  Alert, non-focal, no asterixis  PSYCH: Appropriate affect, oriented to place and time  RECTAL: Deferred      LABS Personally reviewed by me:      09-22    129<L>  |  87<L>  |  43<H>  ----------------------------<  74  5.1   |  24  |  8.97<H>    Ca    9.4      22 Sep 2023 07:12          Urinalysis Basic - ( 22 Sep 2023 07:12 )    Color: x / Appearance: x / SG: x / pH: x  Gluc: 74 mg/dL / Ketone: x  / Bili: x / Urobili: x   Blood: x / Protein: x / Nitrite: x   Leuk Esterase: x / RBC: x / WBC x   Sq Epi: x / Non Sq Epi: x / Bacteria: x            Imaging personally reviewed by me:

## 2023-09-27 ENCOUNTER — APPOINTMENT (OUTPATIENT)
Dept: OTOLARYNGOLOGY | Facility: CLINIC | Age: 78
End: 2023-09-27
Payer: MEDICARE

## 2023-09-27 VITALS
TEMPERATURE: 98 F | WEIGHT: 249.12 LBS | HEART RATE: 100 BPM | BODY MASS INDEX: 36.9 KG/M2 | DIASTOLIC BLOOD PRESSURE: 42 MMHG | HEIGHT: 69 IN | SYSTOLIC BLOOD PRESSURE: 60 MMHG

## 2023-09-27 PROCEDURE — 99203 OFFICE O/P NEW LOW 30 MIN: CPT | Mod: 25

## 2023-09-27 PROCEDURE — 69210 REMOVE IMPACTED EAR WAX UNI: CPT

## 2023-11-13 PROCEDURE — 87641 MR-STAPH DNA AMP PROBE: CPT

## 2023-11-13 PROCEDURE — 99261: CPT

## 2023-11-13 PROCEDURE — 83605 ASSAY OF LACTIC ACID: CPT

## 2023-11-13 PROCEDURE — 87640 STAPH A DNA AMP PROBE: CPT

## 2023-11-13 PROCEDURE — 99291 CRITICAL CARE FIRST HOUR: CPT

## 2023-11-13 PROCEDURE — 82330 ASSAY OF CALCIUM: CPT

## 2023-11-13 PROCEDURE — 83880 ASSAY OF NATRIURETIC PEPTIDE: CPT

## 2023-11-13 PROCEDURE — 85730 THROMBOPLASTIN TIME PARTIAL: CPT

## 2023-11-13 PROCEDURE — 86850 RBC ANTIBODY SCREEN: CPT

## 2023-11-13 PROCEDURE — 82310 ASSAY OF CALCIUM: CPT

## 2023-11-13 PROCEDURE — 84100 ASSAY OF PHOSPHORUS: CPT

## 2023-11-13 PROCEDURE — 97162 PT EVAL MOD COMPLEX 30 MIN: CPT

## 2023-11-13 PROCEDURE — 85014 HEMATOCRIT: CPT

## 2023-11-13 PROCEDURE — 83540 ASSAY OF IRON: CPT

## 2023-11-13 PROCEDURE — 82962 GLUCOSE BLOOD TEST: CPT

## 2023-11-13 PROCEDURE — 94660 CPAP INITIATION&MGMT: CPT

## 2023-11-13 PROCEDURE — 82553 CREATINE MB FRACTION: CPT

## 2023-11-13 PROCEDURE — 80053 COMPREHEN METABOLIC PANEL: CPT

## 2023-11-13 PROCEDURE — 82947 ASSAY GLUCOSE BLOOD QUANT: CPT

## 2023-11-13 PROCEDURE — 84132 ASSAY OF SERUM POTASSIUM: CPT

## 2023-11-13 PROCEDURE — 83970 ASSAY OF PARATHORMONE: CPT

## 2023-11-13 PROCEDURE — 85018 HEMOGLOBIN: CPT

## 2023-11-13 PROCEDURE — 86901 BLOOD TYPING SEROLOGIC RH(D): CPT

## 2023-11-13 PROCEDURE — 71045 X-RAY EXAM CHEST 1 VIEW: CPT

## 2023-11-13 PROCEDURE — 97110 THERAPEUTIC EXERCISES: CPT

## 2023-11-13 PROCEDURE — 97116 GAIT TRAINING THERAPY: CPT

## 2023-11-13 PROCEDURE — 82435 ASSAY OF BLOOD CHLORIDE: CPT

## 2023-11-13 PROCEDURE — 85025 COMPLETE CBC W/AUTO DIFF WBC: CPT

## 2023-11-13 PROCEDURE — 80048 BASIC METABOLIC PNL TOTAL CA: CPT

## 2023-11-13 PROCEDURE — 74177 CT ABD & PELVIS W/CONTRAST: CPT | Mod: MA

## 2023-11-13 PROCEDURE — 36415 COLL VENOUS BLD VENIPUNCTURE: CPT

## 2023-11-13 PROCEDURE — 85610 PROTHROMBIN TIME: CPT

## 2023-11-13 PROCEDURE — 85027 COMPLETE CBC AUTOMATED: CPT

## 2023-11-13 PROCEDURE — 82803 BLOOD GASES ANY COMBINATION: CPT

## 2023-11-13 PROCEDURE — 84295 ASSAY OF SERUM SODIUM: CPT

## 2023-11-13 PROCEDURE — 82728 ASSAY OF FERRITIN: CPT

## 2023-11-13 PROCEDURE — 84484 ASSAY OF TROPONIN QUANT: CPT

## 2023-11-13 PROCEDURE — 86900 BLOOD TYPING SEROLOGIC ABO: CPT

## 2023-11-13 PROCEDURE — C8929: CPT

## 2023-11-13 PROCEDURE — 94640 AIRWAY INHALATION TREATMENT: CPT

## 2023-12-13 NOTE — PHYSICAL THERAPY INITIAL EVALUATION ADULT - PLANNED THERAPY INTERVENTIONS, PT EVAL
"Speech Language Pathology Treatment    Patient Name:  Krystina Washington   MRN:  [id number]  Admitting Diagnosis: TIA (transient ischemic attack)    Recommendations:                 General Recommendations:  Dysphagia therapy  Diet recommendations:  Puree Diet - IDDSI Level 4, Thin liquids - IDDSI Level 0   Aspiration Precautions: 1 bite/sip at a time, Alternating bites/sips, Assistance with meals, Frequent oral care, HOB to 90 degrees, Meds whole 1 at a time, Remain upright 30 minutes post meal, Small bites/sips, and Standard aspiration precautions   General Precautions: Standard, fall  Communication strategies:  none    Assessment:     Krystina Washington is a 89 y.o. female  admitted with TIA who presents with an SLP diagnosis of improved deglutition and able to advance to [facility] soft and thin liquids.    Subjective     Pt seen in room, she was watching tv.   Patient goals: "its morning time."    Pain/Comfort:  Pain Rating 1: 0/10    Respiratory Status: room air     Objective:     Has the patient been evaluated by SLP for swallowing?   Yes  Keep patient NPO? No     Swallowing: Pt seen in room, she is awake and oriented to self, year and place. Pt tolerated sips of juice and pudding. Simon cracker given to pt and demonstrated oral residue on tongue with poor clearance despite verbal cues. Pt required liquid rinse and verbal cues to remove off tongue. Will advance pt to [facility] soft and thin liquids and monitor intake and tolerance.     Cognition/Communication: Pt awake, oriented to self, place, year. Pt did follow commands appropriately Pt appears to be somewhat Crow when answering questions. NO stuttering noted this session.     Goals:   Multidisciplinary Problems       SLP Goals          Problem: SLP    Goal Priority Disciplines Outcome   SLP Goal     SLP Ongoing, Progressing   Description: Updated Short Term Goals:  1. Pt will participate in a clinical swallow eval to determine least restrictive diet. -MET   2. Pt will " safely tolerate >75% of Pureed with no overt s/s of aspiration.-MET   3. Pt will consume mech soft diet and thin liquids with no overt s/sx aspiration.                        Plan:     Patient to be seen:  2 x/week, 3 x/week   Plan of Care expires:  01/11/24  Plan of Care reviewed with:  patient   SLP Follow-Up:  Yes       Discharge recommendations:   (TBD)   Barriers to Discharge:  none    Time Tracking:     SLP Treatment Date:   12/13/23  Speech Start Time:  1101  Speech Stop Time:  1122     Speech Total Time (min):  21 min    Billable Minutes: Speech Therapy Individual 9 and Treatment Swallowing Dysfunction 12    12/13/2023     balance training/bed mobility training/gait training/strengthening/transfer training

## 2024-01-03 ENCOUNTER — OUTPATIENT (OUTPATIENT)
Dept: OUTPATIENT SERVICES | Facility: HOSPITAL | Age: 79
LOS: 1 days | End: 2024-01-03
Payer: MEDICARE

## 2024-01-03 ENCOUNTER — APPOINTMENT (OUTPATIENT)
Dept: CT IMAGING | Facility: IMAGING CENTER | Age: 79
End: 2024-01-03
Payer: MEDICARE

## 2024-01-03 DIAGNOSIS — R42 DIZZINESS AND GIDDINESS: ICD-10-CM

## 2024-01-03 DIAGNOSIS — Z98.89 OTHER SPECIFIED POSTPROCEDURAL STATES: Chronic | ICD-10-CM

## 2024-01-03 DIAGNOSIS — Z00.8 ENCOUNTER FOR OTHER GENERAL EXAMINATION: ICD-10-CM

## 2024-01-03 PROCEDURE — 70450 CT HEAD/BRAIN W/O DYE: CPT | Mod: 26,MH

## 2024-01-03 PROCEDURE — 70450 CT HEAD/BRAIN W/O DYE: CPT | Mod: MH

## 2024-01-11 NOTE — ED ADULT NURSE NOTE - CAS TRG GENERAL NORM CIRC DET
Group Topic: [id number] Check-in/Symptom Rating    Date: 1/11/2024  Start Time: 0830  End Time: 0915  Facilitators: Obdulia Izaguirre MS    Focus: Symptoms/goals  Number in attendance: 6  Pt encouraged to rate and share progress of symptoms and set daily goals. Pt was recruited for group but did not attend. Efforts to encourage participation in programming on the unit will continue.   Obdulia Izaguirre, [id number] [facility] Strong peripheral pulses

## 2024-01-17 ENCOUNTER — APPOINTMENT (OUTPATIENT)
Dept: OTOLARYNGOLOGY | Facility: CLINIC | Age: 79
End: 2024-01-17
Payer: MEDICARE

## 2024-01-17 VITALS
HEIGHT: 69 IN | TEMPERATURE: 97.8 F | BODY MASS INDEX: 38.86 KG/M2 | DIASTOLIC BLOOD PRESSURE: 52 MMHG | WEIGHT: 262.35 LBS | HEART RATE: 80 BPM | SYSTOLIC BLOOD PRESSURE: 87 MMHG

## 2024-01-17 PROCEDURE — 69210 REMOVE IMPACTED EAR WAX UNI: CPT

## 2024-01-17 PROCEDURE — 99213 OFFICE O/P EST LOW 20 MIN: CPT | Mod: 25

## 2024-01-17 RX ORDER — NEOMYCIN SULFATE, POLYMYXIN B SULFATE AND HYDROCORTISONE 3.5; 10000; 1 MG/ML; [IU]/ML; MG/ML
3.5-10000-1 SOLUTION AURICULAR (OTIC)
Qty: 1 | Refills: 5 | Status: ACTIVE | COMMUNITY
Start: 2024-01-17 | End: 1900-01-01

## 2024-01-18 NOTE — ASSESSMENT
[FreeTextEntry1] : wax- Rx:  Debrox was prescribed and  is to be placed in both ears on a routine basis to keep them free of wax. Routine debridement suggested to keep the ears free of wax.  consider HAE

## 2024-01-18 NOTE — HISTORY OF PRESENT ILLNESS
[de-identified] : 79 yo Patient complains of bilateral ear clogging here for ear cleaning. He notes that he doesn't hear well, however he doesn't want to proceed with a hearing test. Pt has no ear pain, ear drainage, tinnitus, vertigo, nasal congestion, nasal discharge, epistaxis, sinus infections, facial pain, facial pressure, throat pain, dysphagia or fevers  [FreeTextEntry1] : [birth year] [Hearing Loss] : hearing loss [Nasal Congestion] : no nasal congestion [Chills] : no chills

## 2024-01-22 NOTE — ED ADULT NURSE NOTE - NS_SISCREENINGSR_GEN_ALL_ED
- Try Tylenol for your back. Check the bottle at home. If it is 325 mg, consider taking 325mg before and after your work shift. You can increase it to 2 tablets (650 mg) if 325 is not enough. Maximum daily dose would be 3000 mg/day and try to avoid taking more than 1000 mg at a time 2-3 times/day.   - Try ice/heat at variable times to see which one may be beneficial. 15-20 minutes at a time, though do not fall asleep with either on to avoid skin damage.  -  Physical therapy ordered.  - If no relief with physical therapy and home exercise program we can consider the injections as discussed.   - Follow-up in 2 months to see how things are going. Call us in the mean time if you need anything.    Negative

## 2024-01-26 ENCOUNTER — APPOINTMENT (OUTPATIENT)
Dept: ORTHOPEDIC SURGERY | Facility: CLINIC | Age: 79
End: 2024-01-26
Payer: MEDICARE

## 2024-01-26 VITALS — HEIGHT: 69 IN | WEIGHT: 262 LBS | BODY MASS INDEX: 38.8 KG/M2

## 2024-01-26 DIAGNOSIS — M17.0 BILATERAL PRIMARY OSTEOARTHRITIS OF KNEE: ICD-10-CM

## 2024-01-26 PROCEDURE — 99203 OFFICE O/P NEW LOW 30 MIN: CPT

## 2024-01-26 PROCEDURE — 73562 X-RAY EXAM OF KNEE 3: CPT | Mod: 50

## 2024-01-26 NOTE — DISCUSSION/SUMMARY
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 Patient will start with conservative treatment and treatment options were discussed including NSAIDs, ice, physical therapy, corticosteroid injections. I recommended a course of physical therapy for the bilateral knee to improve ROM and strength. A script was provided today. I advised he continue with  hmoe care provided.

## 2024-01-26 NOTE — HISTORY OF PRESENT ILLNESS
[de-identified] : 78 year old male presents for evaluation of chronic bilateral knee pain. His mobility is significantly limited. He has used a wheelchair for several years. He presents today for consultation for a power wheelchair. He is able to walk only a few feet at home with a walker. He was treated with HA injections to both knees with minimal improvement in pain.  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

## 2024-01-31 ENCOUNTER — APPOINTMENT (OUTPATIENT)
Dept: OTOLARYNGOLOGY | Facility: CLINIC | Age: 79
End: 2024-01-31
Payer: MEDICARE

## 2024-01-31 VITALS
WEIGHT: 262 LBS | TEMPERATURE: 97.5 F | SYSTOLIC BLOOD PRESSURE: 97 MMHG | HEART RATE: 65 BPM | BODY MASS INDEX: 38.8 KG/M2 | DIASTOLIC BLOOD PRESSURE: 56 MMHG | HEIGHT: 69 IN

## 2024-01-31 DIAGNOSIS — H60.90 UNSPECIFIED OTITIS EXTERNA, UNSPECIFIED EAR: ICD-10-CM

## 2024-01-31 PROCEDURE — 99213 OFFICE O/P EST LOW 20 MIN: CPT | Mod: 25

## 2024-01-31 PROCEDURE — 92557 COMPREHENSIVE HEARING TEST: CPT

## 2024-01-31 PROCEDURE — 92567 TYMPANOMETRY: CPT

## 2024-01-31 PROCEDURE — 69210 REMOVE IMPACTED EAR WAX UNI: CPT

## 2024-01-31 RX ORDER — APIXABAN 5 MG/1
5 TABLET, FILM COATED ORAL
Refills: 0 | Status: ACTIVE | COMMUNITY

## 2024-01-31 RX ORDER — SEVELAMER CARBONATE 800 MG/1
800 TABLET, FILM COATED ORAL
Refills: 0 | Status: ACTIVE | COMMUNITY

## 2024-01-31 RX ORDER — PANTOPRAZOLE SODIUM 40 MG/1
40 GRANULE, DELAYED RELEASE ORAL
Refills: 0 | Status: ACTIVE | COMMUNITY

## 2024-01-31 RX ORDER — LIDOCAINE 5% 700 MG/1
5 PATCH TOPICAL
Refills: 0 | Status: ACTIVE | COMMUNITY

## 2024-01-31 RX ORDER — PREGABALIN 150 MG/1
150 CAPSULE ORAL
Refills: 0 | Status: ACTIVE | COMMUNITY

## 2024-01-31 RX ORDER — MIDODRINE HYDROCHLORIDE 10 MG/1
10 TABLET ORAL
Refills: 0 | Status: ACTIVE | COMMUNITY

## 2024-01-31 RX ORDER — OMEPRAZOLE 40 MG/1
40 CAPSULE, DELAYED RELEASE ORAL
Refills: 0 | Status: ACTIVE | COMMUNITY

## 2024-01-31 RX ORDER — OXYCODONE 5 MG/1
5 TABLET ORAL
Refills: 0 | Status: ACTIVE | COMMUNITY

## 2024-01-31 RX ORDER — LEVETIRACETAM 500 MG/1
500 TABLET, FILM COATED ORAL
Refills: 0 | Status: ACTIVE | COMMUNITY

## 2024-01-31 RX ORDER — ALBUTEROL SULFATE 2.5 MG/3ML
(2.5 MG/3ML) SOLUTION RESPIRATORY (INHALATION)
Refills: 0 | Status: ACTIVE | COMMUNITY

## 2024-01-31 RX ORDER — FLUTICASONE FUROATE, UMECLIDINIUM BROMIDE AND VILANTEROL TRIFENATATE 200; 62.5; 25 UG/1; UG/1; UG/1
POWDER RESPIRATORY (INHALATION)
Refills: 0 | Status: ACTIVE | COMMUNITY

## 2024-01-31 NOTE — PROCEDURE
[FreeTextEntry3] : After informed verbal consent is obtained, cerumen is removed from the right  ear canal with a curette and suction.

## 2024-01-31 NOTE — HISTORY OF PRESENT ILLNESS
[Hearing Loss] : hearing loss [de-identified] : 77 yo Patient complains of bilateral ear clogging here for ear cleaning. He notes that he doesn't hear well, however he doesn't want to proceed with a hearing test. Pt has no ear pain, ear drainage, tinnitus, vertigo, nasal congestion, nasal discharge, epistaxis, sinus infections, facial pain, facial pressure, throat pain, dysphagia or fevers  [FreeTextEntry1] : 1/31 /2024 f/u after using drops for OE still w/ HL no other modifying factors no other nasal or throat complaints [Nasal Congestion] : no nasal congestion [Chills] : no chills

## 2024-01-31 NOTE — DATA REVIEWED
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

## 2024-01-31 NOTE — PHYSICAL EXAM
[Midline] : trachea located in midline position [Normal] : no rashes [de-identified] : right wax removed

## 2024-02-07 ENCOUNTER — APPOINTMENT (OUTPATIENT)
Dept: ORTHOPEDIC SURGERY | Facility: CLINIC | Age: 79
End: 2024-02-07
Payer: MEDICARE

## 2024-02-07 VITALS
DIASTOLIC BLOOD PRESSURE: 65 MMHG | TEMPERATURE: 97.3 F | HEIGHT: 69 IN | HEART RATE: 77 BPM | BODY MASS INDEX: 39.25 KG/M2 | WEIGHT: 265 LBS | SYSTOLIC BLOOD PRESSURE: 112 MMHG

## 2024-02-07 DIAGNOSIS — M70.22 OLECRANON BURSITIS, LEFT ELBOW: ICD-10-CM

## 2024-02-07 PROCEDURE — 73080 X-RAY EXAM OF ELBOW: CPT | Mod: LT

## 2024-02-07 PROCEDURE — 99213 OFFICE O/P EST LOW 20 MIN: CPT

## 2024-03-16 NOTE — PROGRESS NOTE ADULT - PROVIDER SPECIALTY LIST ADULT
Nephrology
Internal Medicine
Hospitalist
Hospitalist
Internal Medicine
Hospitalist
No (0)

## 2024-05-08 ENCOUNTER — APPOINTMENT (OUTPATIENT)
Dept: WOUND CARE | Facility: CLINIC | Age: 79
End: 2024-05-08
Payer: MEDICARE

## 2024-05-08 ENCOUNTER — OUTPATIENT (OUTPATIENT)
Dept: OUTPATIENT SERVICES | Facility: HOSPITAL | Age: 79
LOS: 1 days | End: 2024-05-08
Payer: MEDICARE

## 2024-05-08 DIAGNOSIS — Z98.89 OTHER SPECIFIED POSTPROCEDURAL STATES: Chronic | ICD-10-CM

## 2024-05-08 DIAGNOSIS — L98.429 NON-PRESSURE CHRONIC ULCER OF BACK WITH UNSPECIFIED SEVERITY: ICD-10-CM

## 2024-05-08 PROCEDURE — G0463: CPT

## 2024-05-08 PROCEDURE — 99205 OFFICE O/P NEW HI 60 MIN: CPT

## 2024-05-08 NOTE — REVIEW OF SYSTEMS
[Abdominal Pain] : abdominal pain [Arthralgias] : arthralgias [Joint Stiffness] : joint stiffness [Skin Wound] : skin wound [Difficulty Walking] : difficulty walking [Negative] : Psychiatric [FreeTextEntry6] : on home O2 [FreeTextEntry8] : roman

## 2024-05-08 NOTE — PLAN
[FreeTextEntry1] : 5/8/24 Plan: sacral stage 3 pressure injury Apply Triad to affected area BID 1-2x/wk use baby oil to fully remove Triad, then cleanse w soap and water Will order Roho pillow for wheelchair Discussed gel matrix cushion as well Follow up in 3 weeks

## 2024-05-08 NOTE — PHYSICAL EXAM
[Skin Ulcer] : ulcer [Alert] : alert [Oriented to Person] : oriented to person [Oriented to Place] : oriented to place [Oriented to Time] : oriented to time [Calm] : calm [Please See PDF for [employer] Analytics] : Please See PDF for [employer]. [de-identified] : elderly man, NAD [de-identified] : atraumatic [de-identified] : supple [de-identified] : on supplemental O2, no shortness of breath [de-identified] : soft, mildly tender (chronic as per wife) [de-identified] : No gross deformities [de-identified] : Buttock wounds

## 2024-05-08 NOTE — HISTORY OF PRESENT ILLNESS
[FreeTextEntry1] : 5-8-24 Mr. MAYE ZUNIGA is a 78 year male w ESRD on HD, on home O2, hx of PE and DVT currently on Eliquis, who presents to the office with a wound for 6-8 mo duration. The wound is located on the buttocks and gluteal cleft. Patient has limited mobility due to osteoarthritis, and is sitting for prolonged periods of time at dialysis.  Patient has low air loss mattress due to history of stage 3 sacral ulcer, but does not have roho pillow. He does not make urine, though still is noted to have increased moisture. Uses a jose f on his wheelchair, and wife noteices that it is usually damp. They have tried desitin and A&D ointment. Patient complains of burning in the area. No fevers or chills. No other complaints.

## 2024-05-08 NOTE — ASSESSMENT
[FreeTextEntry1] : 5/8/24 Patient here for evaluation of buttock wounds. Accompanied by wife. Does not have home nursing, but does have home health aide several days per week. Patient has low air loss mattress due to history of stage 3 sacral ulcer, but does not have roho pillow. Primarily using wheelchair. No fevers, no other complaints On exam: On bilateral buttocks, skin breakdown consistent with reopened stage 3 sacral wound. Periwound skin hypopigmentation consistent with moisture associated dermatitis. In gluteal cleft, macerated skin with breakdown. Mechanical debridement performed. No evidence of infection.

## 2024-05-29 ENCOUNTER — APPOINTMENT (OUTPATIENT)
Dept: OTOLARYNGOLOGY | Facility: CLINIC | Age: 79
End: 2024-05-29
Payer: MEDICARE

## 2024-05-29 DIAGNOSIS — H61.23 IMPACTED CERUMEN, BILATERAL: ICD-10-CM

## 2024-05-29 DIAGNOSIS — H90.3 SENSORINEURAL HEARING LOSS, BILATERAL: ICD-10-CM

## 2024-05-29 DIAGNOSIS — Z01.10 ENCOUNTER FOR EXAMINATION OF EARS AND HEARING W/OUT ABNORMAL FINDINGS: ICD-10-CM

## 2024-05-29 PROCEDURE — G2211 COMPLEX E/M VISIT ADD ON: CPT

## 2024-05-29 PROCEDURE — 99214 OFFICE O/P EST MOD 30 MIN: CPT

## 2024-05-29 RX ORDER — DICLOFENAC SODIUM 1 %
1 KIT TOPICAL
Refills: 0 | Status: ACTIVE | COMMUNITY

## 2024-05-29 RX ORDER — NEOMYCIN SULFATE, POLYMYXIN B SULFATE AND HYDROCORTISONE 3.5; 10000; 1 MG/ML; [IU]/ML; MG/ML
3.5-10000-1 SOLUTION AURICULAR (OTIC)
Qty: 2 | Refills: 0 | Status: ACTIVE | COMMUNITY
Start: 2024-05-29 | End: 1900-01-01

## 2024-05-29 NOTE — ASSESSMENT
[FreeTextEntry1] : wax-unable to remove 2/2 pain Rx:  Cortisporin soln was prescribed and  is to be placed in both ears  Routine debridement suggested to keep the ears free of wax.  f/u 2 weeks

## 2024-05-29 NOTE — DATA REVIEWED
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

## 2024-05-30 DIAGNOSIS — L30.8 OTHER SPECIFIED DERMATITIS: ICD-10-CM

## 2024-05-30 DIAGNOSIS — L89.153 PRESSURE ULCER OF SACRAL REGION, STAGE 3: ICD-10-CM

## 2024-06-05 ENCOUNTER — APPOINTMENT (OUTPATIENT)
Dept: WOUND CARE | Facility: HOSPITAL | Age: 79
End: 2024-06-05
Payer: MEDICARE

## 2024-06-05 ENCOUNTER — NON-APPOINTMENT (OUTPATIENT)
Age: 79
End: 2024-06-05

## 2024-06-05 VITALS
OXYGEN SATURATION: 95 % | RESPIRATION RATE: 18 BRPM | HEART RATE: 74 BPM | TEMPERATURE: 97.9 F | DIASTOLIC BLOOD PRESSURE: 37 MMHG | SYSTOLIC BLOOD PRESSURE: 67 MMHG

## 2024-06-05 DIAGNOSIS — I95.9 HYPOTENSION, UNSPECIFIED: ICD-10-CM

## 2024-06-05 DIAGNOSIS — L30.8 OTHER SPECIFIED DERMATITIS: ICD-10-CM

## 2024-06-05 DIAGNOSIS — L89.153 PRESSURE ULCER OF SACRAL REGION, STAGE 3: ICD-10-CM

## 2024-06-05 PROCEDURE — 99214 OFFICE O/P EST MOD 30 MIN: CPT

## 2024-06-05 NOTE — PLAN
[FreeTextEntry1] : 5/8/24 Plan: sacral stage 3 pressure injury Apply Triad to affected area BID 1-2x/wk use baby oil to fully remove Triad, then cleanse w soap and water Will order Roho pillow for wheelchair Discussed gel matrix cushion as well Follow up in 3 weeks  6-5-24:  Plan: sacral stage 3 pressure injury wife would like to use desitin BID instead of TRIAD due to cost use roho Rec ed eval for BP Pt A&O x3 at end of visit w/o complaints. f/u 3-4 weeks

## 2024-06-05 NOTE — ASSESSMENT
[FreeTextEntry1] : 5/8/24 Patient here for evaluation of buttock wounds. Accompanied by wife. Does not have home nursing, but does have home health aide several days per week. Patient has low air loss mattress due to history of stage 3 sacral ulcer, but does not have roho pillow. Primarily using wheelchair. No fevers, no other complaints On exam: On bilateral buttocks, skin breakdown consistent with reopened stage 3 sacral wound. Periwound skin hypopigmentation consistent with moisture associated dermatitis. In gluteal cleft, macerated skin with breakdown. Mechanical debridement performed. No evidence of infection.   6-5-24: Pt here for f/u Accompanied by wife wound still causing discomfort.  Using TRIAD.  Just received Roho Pt had HD yesterday.-- only 1.9 liters taken off as per wife.   As per wife BP is always low and pt takes midodrine.   Pt with low BP at time of exam 58/38 (manual) and HR 77.  Pt A&Ox3 w/o complaints.  Advised pt to go to ED and offered EMS for transport.  Pt refusing EMS transfer.  Continue to recommend to pt and wife that the pt go to the ER for eval or at the very least be seen by PMD today. Wife and pt expressed understandning.  On exam: stage 3 sacral wound:   small wound. s/p mechanical debridement No s/s of infection. dry skin in periwound

## 2024-06-05 NOTE — PHYSICAL EXAM
[Skin Ulcer] : ulcer [Alert] : alert [Oriented to Person] : oriented to person [Oriented to Place] : oriented to place [Oriented to Time] : oriented to time [Calm] : calm [Please See PDF for [employer] Analytics] : Please See PDF for [employer]. [de-identified] : elderly man, NAD [de-identified] : atraumatic [de-identified] : supple [de-identified] : on supplemental O2, no shortness of breath [de-identified] : soft, mildly tender (chronic as per wife) [de-identified] : No gross deformities [de-identified] : Buttock wounds

## 2024-06-12 ENCOUNTER — INPATIENT (INPATIENT)
Facility: HOSPITAL | Age: 79
LOS: 8 days | Discharge: HOME | DRG: 189 | End: 2024-06-21
Attending: INTERNAL MEDICINE | Admitting: INTERNAL MEDICINE
Payer: MEDICARE

## 2024-06-12 ENCOUNTER — RESULT REVIEW (OUTPATIENT)
Age: 79
End: 2024-06-12

## 2024-06-12 VITALS
OXYGEN SATURATION: 100 % | DIASTOLIC BLOOD PRESSURE: 88 MMHG | RESPIRATION RATE: 22 BRPM | HEART RATE: 105 BPM | SYSTOLIC BLOOD PRESSURE: 125 MMHG | TEMPERATURE: 99 F

## 2024-06-12 DIAGNOSIS — Z98.89 OTHER SPECIFIED POSTPROCEDURAL STATES: Chronic | ICD-10-CM

## 2024-06-12 DIAGNOSIS — J96.01 ACUTE RESPIRATORY FAILURE WITH HYPOXIA: ICD-10-CM

## 2024-06-12 LAB
ALBUMIN SERPL ELPH-MCNC: 3.9 G/DL — SIGNIFICANT CHANGE UP (ref 3.3–5)
ALP SERPL-CCNC: 261 U/L — HIGH (ref 40–120)
ALT FLD-CCNC: 6 U/L — LOW (ref 10–45)
ANION GAP SERPL CALC-SCNC: 23 MMOL/L — HIGH (ref 5–17)
AST SERPL-CCNC: 14 U/L — SIGNIFICANT CHANGE UP (ref 10–40)
BASE EXCESS BLDV CALC-SCNC: -4.2 MMOL/L — LOW (ref -2–3)
BASOPHILS # BLD AUTO: 0.05 K/UL — SIGNIFICANT CHANGE UP (ref 0–0.2)
BASOPHILS # BLD AUTO: 0.08 K/UL — SIGNIFICANT CHANGE UP (ref 0–0.2)
BASOPHILS NFR BLD AUTO: 0.5 % — SIGNIFICANT CHANGE UP (ref 0–2)
BASOPHILS NFR BLD AUTO: 0.8 % — SIGNIFICANT CHANGE UP (ref 0–2)
BILIRUB SERPL-MCNC: 0.4 MG/DL — SIGNIFICANT CHANGE UP (ref 0.2–1.2)
BUN SERPL-MCNC: 25 MG/DL — HIGH (ref 7–23)
CA-I SERPL-SCNC: 1.17 MMOL/L — SIGNIFICANT CHANGE UP (ref 1.15–1.33)
CALCIUM SERPL-MCNC: 10.2 MG/DL — SIGNIFICANT CHANGE UP (ref 8.4–10.5)
CHLORIDE BLDV-SCNC: 97 MMOL/L — SIGNIFICANT CHANGE UP (ref 96–108)
CHLORIDE SERPL-SCNC: 95 MMOL/L — LOW (ref 96–108)
CO2 BLDV-SCNC: 25 MMOL/L — SIGNIFICANT CHANGE UP (ref 22–26)
CO2 SERPL-SCNC: 19 MMOL/L — LOW (ref 22–31)
CREAT SERPL-MCNC: 7.33 MG/DL — HIGH (ref 0.5–1.3)
EGFR: 7 ML/MIN/1.73M2 — LOW
EOSINOPHIL # BLD AUTO: 0.11 K/UL — SIGNIFICANT CHANGE UP (ref 0–0.5)
EOSINOPHIL # BLD AUTO: 0.2 K/UL — SIGNIFICANT CHANGE UP (ref 0–0.5)
EOSINOPHIL NFR BLD AUTO: 1.1 % — SIGNIFICANT CHANGE UP (ref 0–6)
EOSINOPHIL NFR BLD AUTO: 2 % — SIGNIFICANT CHANGE UP (ref 0–6)
GAS PNL BLDA: SIGNIFICANT CHANGE UP
GAS PNL BLDV: 134 MMOL/L — LOW (ref 136–145)
GAS PNL BLDV: SIGNIFICANT CHANGE UP
GLUCOSE BLDV-MCNC: 78 MG/DL — SIGNIFICANT CHANGE UP (ref 70–99)
GLUCOSE SERPL-MCNC: 74 MG/DL — SIGNIFICANT CHANGE UP (ref 70–99)
HCO3 BLDV-SCNC: 24 MMOL/L — SIGNIFICANT CHANGE UP (ref 22–29)
HCT VFR BLD CALC: 39.2 % — SIGNIFICANT CHANGE UP (ref 39–50)
HCT VFR BLD CALC: 42.6 % — SIGNIFICANT CHANGE UP (ref 39–50)
HCT VFR BLDA CALC: 40 % — SIGNIFICANT CHANGE UP (ref 39–51)
HGB BLD CALC-MCNC: 13.4 G/DL — SIGNIFICANT CHANGE UP (ref 12.6–17.4)
HGB BLD-MCNC: 11.7 G/DL — LOW (ref 13–17)
HGB BLD-MCNC: 12.1 G/DL — LOW (ref 13–17)
IMM GRANULOCYTES NFR BLD AUTO: 0.6 % — SIGNIFICANT CHANGE UP (ref 0–0.9)
IMM GRANULOCYTES NFR BLD AUTO: 0.7 % — SIGNIFICANT CHANGE UP (ref 0–0.9)
LACTATE BLDV-MCNC: 7.6 MMOL/L — CRITICAL HIGH (ref 0.5–2)
LYMPHOCYTES # BLD AUTO: 1.64 K/UL — SIGNIFICANT CHANGE UP (ref 1–3.3)
LYMPHOCYTES # BLD AUTO: 1.75 K/UL — SIGNIFICANT CHANGE UP (ref 1–3.3)
LYMPHOCYTES # BLD AUTO: 16.6 % — SIGNIFICANT CHANGE UP (ref 13–44)
LYMPHOCYTES # BLD AUTO: 17.3 % — SIGNIFICANT CHANGE UP (ref 13–44)
MCHC RBC-ENTMCNC: 25.5 PG — LOW (ref 27–34)
MCHC RBC-ENTMCNC: 25.5 PG — LOW (ref 27–34)
MCHC RBC-ENTMCNC: 28.4 GM/DL — LOW (ref 32–36)
MCHC RBC-ENTMCNC: 29.8 GM/DL — LOW (ref 32–36)
MCV RBC AUTO: 85.6 FL — SIGNIFICANT CHANGE UP (ref 80–100)
MCV RBC AUTO: 89.9 FL — SIGNIFICANT CHANGE UP (ref 80–100)
MONOCYTES # BLD AUTO: 0.69 K/UL — SIGNIFICANT CHANGE UP (ref 0–0.9)
MONOCYTES # BLD AUTO: 1.01 K/UL — HIGH (ref 0–0.9)
MONOCYTES NFR BLD AUTO: 10 % — SIGNIFICANT CHANGE UP (ref 2–14)
MONOCYTES NFR BLD AUTO: 7 % — SIGNIFICANT CHANGE UP (ref 2–14)
NEUTROPHILS # BLD AUTO: 7.05 K/UL — SIGNIFICANT CHANGE UP (ref 1.8–7.4)
NEUTROPHILS # BLD AUTO: 7.32 K/UL — SIGNIFICANT CHANGE UP (ref 1.8–7.4)
NEUTROPHILS NFR BLD AUTO: 69.5 % — SIGNIFICANT CHANGE UP (ref 43–77)
NEUTROPHILS NFR BLD AUTO: 73.9 % — SIGNIFICANT CHANGE UP (ref 43–77)
NRBC # BLD: 0 /100 WBCS — SIGNIFICANT CHANGE UP (ref 0–0)
NRBC # BLD: 0 /100 WBCS — SIGNIFICANT CHANGE UP (ref 0–0)
NT-PROBNP SERPL-SCNC: 1518 PG/ML — HIGH (ref 0–300)
PCO2 BLDV: 54 MMHG — SIGNIFICANT CHANGE UP (ref 42–55)
PH BLDV: 7.25 — LOW (ref 7.32–7.43)
PLATELET # BLD AUTO: 229 K/UL — SIGNIFICANT CHANGE UP (ref 150–400)
PLATELET # BLD AUTO: 230 K/UL — SIGNIFICANT CHANGE UP (ref 150–400)
PO2 BLDV: 22 MMHG — LOW (ref 25–45)
POTASSIUM BLDV-SCNC: 4 MMOL/L — SIGNIFICANT CHANGE UP (ref 3.5–5.1)
POTASSIUM SERPL-MCNC: 4 MMOL/L — SIGNIFICANT CHANGE UP (ref 3.5–5.3)
POTASSIUM SERPL-SCNC: 4 MMOL/L — SIGNIFICANT CHANGE UP (ref 3.5–5.3)
PROT SERPL-MCNC: 9.4 G/DL — HIGH (ref 6–8.3)
RBC # BLD: 4.58 M/UL — SIGNIFICANT CHANGE UP (ref 4.2–5.8)
RBC # BLD: 4.74 M/UL — SIGNIFICANT CHANGE UP (ref 4.2–5.8)
RBC # FLD: 17.4 % — HIGH (ref 10.3–14.5)
RBC # FLD: 17.5 % — HIGH (ref 10.3–14.5)
SAO2 % BLDV: 25 % — LOW (ref 67–88)
SODIUM SERPL-SCNC: 137 MMOL/L — SIGNIFICANT CHANGE UP (ref 135–145)
TROPONIN T, HIGH SENSITIVITY RESULT: 145 NG/L — HIGH (ref 0–51)
TROPONIN T, HIGH SENSITIVITY RESULT: 162 NG/L — HIGH (ref 0–51)
WBC # BLD: 10.13 K/UL — SIGNIFICANT CHANGE UP (ref 3.8–10.5)
WBC # BLD: 9.9 K/UL — SIGNIFICANT CHANGE UP (ref 3.8–10.5)
WBC # FLD AUTO: 10.13 K/UL — SIGNIFICANT CHANGE UP (ref 3.8–10.5)
WBC # FLD AUTO: 9.9 K/UL — SIGNIFICANT CHANGE UP (ref 3.8–10.5)

## 2024-06-12 PROCEDURE — 93325 DOPPLER ECHO COLOR FLOW MAPG: CPT | Mod: 26

## 2024-06-12 PROCEDURE — 93308 TTE F-UP OR LMTD: CPT | Mod: 26

## 2024-06-12 PROCEDURE — 93010 ELECTROCARDIOGRAM REPORT: CPT

## 2024-06-12 PROCEDURE — 71275 CT ANGIOGRAPHY CHEST: CPT | Mod: 26

## 2024-06-12 PROCEDURE — 93321 DOPPLER ECHO F-UP/LMTD STD: CPT | Mod: 26

## 2024-06-12 PROCEDURE — 99291 CRITICAL CARE FIRST HOUR: CPT

## 2024-06-12 PROCEDURE — 99291 CRITICAL CARE FIRST HOUR: CPT | Mod: 25

## 2024-06-12 PROCEDURE — 71045 X-RAY EXAM CHEST 1 VIEW: CPT | Mod: 26

## 2024-06-12 PROCEDURE — 74177 CT ABD & PELVIS W/CONTRAST: CPT | Mod: 26

## 2024-06-12 RX ORDER — SEVELAMER CARBONATE 800 MG/930MG
2400 FOR SUSPENSION ORAL
Refills: 0 | Status: DISCONTINUED | OUTPATIENT
Start: 2024-06-12 | End: 2024-06-21

## 2024-06-12 RX ORDER — SODIUM CHLORIDE 0.9 % (FLUSH) 0.9 %
500 SYRINGE (ML) INJECTION ONCE
Refills: 0 | Status: COMPLETED | OUTPATIENT
Start: 2024-06-12 | End: 2024-06-12

## 2024-06-12 RX ORDER — PREGABALIN 50 MG/1
100 CAPSULE ORAL
Refills: 0 | Status: DISCONTINUED | OUTPATIENT
Start: 2024-06-12 | End: 2024-06-19

## 2024-06-12 RX ORDER — LEVETIRACETAM 100 MG/ML
500 INJECTION INTRAVENOUS
Refills: 0 | Status: DISCONTINUED | OUTPATIENT
Start: 2024-06-12 | End: 2024-06-21

## 2024-06-12 RX ORDER — DICYCLOMINE HCL 10 MG
20 CAPSULE ORAL
Refills: 0 | Status: DISCONTINUED | OUTPATIENT
Start: 2024-06-12 | End: 2024-06-21

## 2024-06-12 RX ORDER — POLYETHYLENE GLYCOL 3350 17 G/17G
17 POWDER, FOR SOLUTION ORAL
Refills: 0 | DISCHARGE

## 2024-06-12 RX ORDER — MIDODRINE HYDROCHLORIDE 10 MG/1
30 TABLET ORAL ONCE
Refills: 0 | Status: COMPLETED | OUTPATIENT
Start: 2024-06-12 | End: 2024-06-12

## 2024-06-12 RX ORDER — PANTOPRAZOLE SODIUM 40 MG/10ML
40 INJECTION, POWDER, FOR SOLUTION INTRAVENOUS
Refills: 0 | Status: DISCONTINUED | OUTPATIENT
Start: 2024-06-12 | End: 2024-06-21

## 2024-06-12 RX ORDER — NOREPINEPHRINE BITARTRATE 1 MG/ML
0.04 INJECTION INTRAVENOUS
Qty: 8 | Refills: 0 | Status: DISCONTINUED | OUTPATIENT
Start: 2024-06-12 | End: 2024-06-13

## 2024-06-12 RX ORDER — ALBUTEROL 90 UG/1
2 AEROSOL, METERED ORAL
Refills: 0 | DISCHARGE

## 2024-06-12 RX ORDER — CINACALCET 30 MG/1
1 TABLET, FILM COATED ORAL
Refills: 0 | DISCHARGE

## 2024-06-12 RX ORDER — FLUTICASONE FUROATE, UMECLIDINIUM BROMIDE AND VILANTEROL TRIFENATATE 200; 62.5; 25 UG/1; UG/1; UG/1
1 POWDER RESPIRATORY (INHALATION)
Refills: 0 | DISCHARGE

## 2024-06-12 RX ADMIN — NOREPINEPHRINE BITARTRATE 10.7 MICROGRAM(S)/KG/MIN: 1 INJECTION INTRAVENOUS at 16:22

## 2024-06-12 RX ADMIN — MIDODRINE HYDROCHLORIDE 30 MILLIGRAM(S): 10 TABLET ORAL at 17:20

## 2024-06-12 RX ADMIN — Medication 500 MILLILITER(S): at 15:33

## 2024-06-13 DIAGNOSIS — Z51.5 ENCOUNTER FOR PALLIATIVE CARE: ICD-10-CM

## 2024-06-13 DIAGNOSIS — Z71.89 OTHER SPECIFIED COUNSELING: ICD-10-CM

## 2024-06-13 DIAGNOSIS — R57.0 CARDIOGENIC SHOCK: ICD-10-CM

## 2024-06-13 DIAGNOSIS — I50.32 CHRONIC DIASTOLIC (CONGESTIVE) HEART FAILURE: ICD-10-CM

## 2024-06-13 DIAGNOSIS — R53.2 FUNCTIONAL QUADRIPLEGIA: ICD-10-CM

## 2024-06-13 LAB
A1C WITH ESTIMATED AVERAGE GLUCOSE RESULT: 5.5 % — SIGNIFICANT CHANGE UP (ref 4–5.6)
ALBUMIN SERPL ELPH-MCNC: 3.2 G/DL — LOW (ref 3.3–5)
ALBUMIN SERPL ELPH-MCNC: 3.4 G/DL — SIGNIFICANT CHANGE UP (ref 3.3–5)
ALP SERPL-CCNC: 210 U/L — HIGH (ref 40–120)
ALP SERPL-CCNC: 239 U/L — HIGH (ref 40–120)
ALT FLD-CCNC: 10 U/L — SIGNIFICANT CHANGE UP (ref 10–45)
ALT FLD-CCNC: 5 U/L — LOW (ref 10–45)
ANION GAP SERPL CALC-SCNC: 17 MMOL/L — SIGNIFICANT CHANGE UP (ref 5–17)
ANION GAP SERPL CALC-SCNC: 22 MMOL/L — HIGH (ref 5–17)
APTT BLD: 32.3 SEC — SIGNIFICANT CHANGE UP (ref 24.5–35.6)
APTT BLD: 43.3 SEC — HIGH (ref 24.5–35.6)
AST SERPL-CCNC: 11 U/L — SIGNIFICANT CHANGE UP (ref 10–40)
AST SERPL-CCNC: 27 U/L — SIGNIFICANT CHANGE UP (ref 10–40)
BILIRUB SERPL-MCNC: 0.3 MG/DL — SIGNIFICANT CHANGE UP (ref 0.2–1.2)
BILIRUB SERPL-MCNC: 0.4 MG/DL — SIGNIFICANT CHANGE UP (ref 0.2–1.2)
BLD GP AB SCN SERPL QL: NEGATIVE — SIGNIFICANT CHANGE UP
BUN SERPL-MCNC: 27 MG/DL — HIGH (ref 7–23)
BUN SERPL-MCNC: 28 MG/DL — HIGH (ref 7–23)
CALCIUM SERPL-MCNC: 10.1 MG/DL — SIGNIFICANT CHANGE UP (ref 8.4–10.5)
CALCIUM SERPL-MCNC: 9 MG/DL — SIGNIFICANT CHANGE UP (ref 8.4–10.5)
CHLORIDE SERPL-SCNC: 101 MMOL/L — SIGNIFICANT CHANGE UP (ref 96–108)
CHLORIDE SERPL-SCNC: 96 MMOL/L — SIGNIFICANT CHANGE UP (ref 96–108)
CHOLEST SERPL-MCNC: 125 MG/DL — SIGNIFICANT CHANGE UP
CO2 SERPL-SCNC: 18 MMOL/L — LOW (ref 22–31)
CO2 SERPL-SCNC: 21 MMOL/L — LOW (ref 22–31)
CREAT SERPL-MCNC: 7.37 MG/DL — HIGH (ref 0.5–1.3)
CREAT SERPL-MCNC: 7.63 MG/DL — HIGH (ref 0.5–1.3)
EGFR: 7 ML/MIN/1.73M2 — LOW
EGFR: 7 ML/MIN/1.73M2 — LOW
ESTIMATED AVERAGE GLUCOSE: 111 MG/DL — SIGNIFICANT CHANGE UP (ref 68–114)
GLUCOSE SERPL-MCNC: 102 MG/DL — HIGH (ref 70–99)
GLUCOSE SERPL-MCNC: 125 MG/DL — HIGH (ref 70–99)
HDLC SERPL-MCNC: 43 MG/DL — SIGNIFICANT CHANGE UP
INR BLD: 1.28 RATIO — HIGH (ref 0.85–1.18)
INR BLD: 1.49 RATIO — HIGH (ref 0.85–1.18)
LACTATE SERPL-SCNC: 1.7 MMOL/L — SIGNIFICANT CHANGE UP (ref 0.5–2)
LACTATE SERPL-SCNC: 2.7 MMOL/L — HIGH (ref 0.5–2)
LIPID PNL WITH DIRECT LDL SERPL: 53 MG/DL — SIGNIFICANT CHANGE UP
MAGNESIUM SERPL-MCNC: 2 MG/DL — SIGNIFICANT CHANGE UP (ref 1.6–2.6)
MAGNESIUM SERPL-MCNC: 2.3 MG/DL — SIGNIFICANT CHANGE UP (ref 1.6–2.6)
NON HDL CHOLESTEROL: 82 MG/DL — SIGNIFICANT CHANGE UP
PHOSPHATE SERPL-MCNC: 4.4 MG/DL — SIGNIFICANT CHANGE UP (ref 2.5–4.5)
PHOSPHATE SERPL-MCNC: 5.2 MG/DL — HIGH (ref 2.5–4.5)
POTASSIUM SERPL-MCNC: 3.8 MMOL/L — SIGNIFICANT CHANGE UP (ref 3.5–5.3)
POTASSIUM SERPL-MCNC: 6.4 MMOL/L — CRITICAL HIGH (ref 3.5–5.3)
POTASSIUM SERPL-SCNC: 3.8 MMOL/L — SIGNIFICANT CHANGE UP (ref 3.5–5.3)
POTASSIUM SERPL-SCNC: 6.4 MMOL/L — CRITICAL HIGH (ref 3.5–5.3)
PROT SERPL-MCNC: 7.8 G/DL — SIGNIFICANT CHANGE UP (ref 6–8.3)
PROT SERPL-MCNC: 9.2 G/DL — HIGH (ref 6–8.3)
PROTHROM AB SERPL-ACNC: 14 SEC — HIGH (ref 9.5–13)
PROTHROM AB SERPL-ACNC: 15.5 SEC — HIGH (ref 9.5–13)
RH IG SCN BLD-IMP: POSITIVE — SIGNIFICANT CHANGE UP
SODIUM SERPL-SCNC: 136 MMOL/L — SIGNIFICANT CHANGE UP (ref 135–145)
SODIUM SERPL-SCNC: 139 MMOL/L — SIGNIFICANT CHANGE UP (ref 135–145)
TRIGL SERPL-MCNC: 173 MG/DL — HIGH
TSH SERPL-MCNC: 0.85 UIU/ML — SIGNIFICANT CHANGE UP (ref 0.27–4.2)

## 2024-06-13 PROCEDURE — 93010 ELECTROCARDIOGRAM REPORT: CPT

## 2024-06-13 PROCEDURE — 99223 1ST HOSP IP/OBS HIGH 75: CPT

## 2024-06-13 PROCEDURE — 99292 CRITICAL CARE ADDL 30 MIN: CPT | Mod: FS

## 2024-06-13 PROCEDURE — 99291 CRITICAL CARE FIRST HOUR: CPT

## 2024-06-13 PROCEDURE — 99497 ADVNCD CARE PLAN 30 MIN: CPT | Mod: 25

## 2024-06-13 RX ORDER — HEPARIN SODIUM 50 [USP'U]/ML
1200 INJECTION, SOLUTION INTRAVENOUS
Qty: 25000 | Refills: 0 | Status: DISCONTINUED | OUTPATIENT
Start: 2024-06-13 | End: 2024-06-13

## 2024-06-13 RX ORDER — MIDODRINE HYDROCHLORIDE 10 MG/1
30 TABLET ORAL EVERY 8 HOURS
Refills: 0 | Status: DISCONTINUED | OUTPATIENT
Start: 2024-06-13 | End: 2024-06-14

## 2024-06-13 RX ORDER — APIXABAN 5 MG/1
5 TABLET, FILM COATED ORAL
Refills: 0 | Status: DISCONTINUED | OUTPATIENT
Start: 2024-06-13 | End: 2024-06-21

## 2024-06-13 RX ORDER — NOREPINEPHRINE BITARTRATE 1 MG/ML
0.04 INJECTION INTRAVENOUS
Qty: 8 | Refills: 0 | Status: DISCONTINUED | OUTPATIENT
Start: 2024-06-13 | End: 2024-06-15

## 2024-06-13 RX ORDER — MIDODRINE HYDROCHLORIDE 10 MG/1
30 TABLET ORAL EVERY 8 HOURS
Refills: 0 | Status: DISCONTINUED | OUTPATIENT
Start: 2024-06-13 | End: 2024-06-13

## 2024-06-13 RX ORDER — DOBUTAMINE HCL 250MG/20ML
2.5 VIAL (ML) INTRAVENOUS
Qty: 500 | Refills: 0 | Status: DISCONTINUED | OUTPATIENT
Start: 2024-06-13 | End: 2024-06-13

## 2024-06-13 RX ORDER — APIXABAN 5 MG/1
5 TABLET, FILM COATED ORAL
Refills: 0 | Status: DISCONTINUED | OUTPATIENT
Start: 2024-06-13 | End: 2024-06-13

## 2024-06-13 RX ORDER — MIDODRINE HYDROCHLORIDE 10 MG/1
20 TABLET ORAL ONCE
Refills: 0 | Status: COMPLETED | OUTPATIENT
Start: 2024-06-13 | End: 2024-06-13

## 2024-06-13 RX ORDER — SODIUM CHLORIDE 0.9 % (FLUSH) 0.9 %
100 SYRINGE (ML) INJECTION
Refills: 0 | Status: DISCONTINUED | OUTPATIENT
Start: 2024-06-13 | End: 2024-06-21

## 2024-06-13 RX ORDER — MIDODRINE HYDROCHLORIDE 10 MG/1
10 TABLET ORAL EVERY 8 HOURS
Refills: 0 | Status: DISCONTINUED | OUTPATIENT
Start: 2024-06-13 | End: 2024-06-13

## 2024-06-13 RX ADMIN — Medication 20 MILLIGRAM(S): at 11:49

## 2024-06-13 RX ADMIN — Medication 20 MILLIGRAM(S): at 08:54

## 2024-06-13 RX ADMIN — MIDODRINE HYDROCHLORIDE 30 MILLIGRAM(S): 10 TABLET ORAL at 22:26

## 2024-06-13 RX ADMIN — APIXABAN 5 MILLIGRAM(S): 5 TABLET, FILM COATED ORAL at 20:24

## 2024-06-13 RX ADMIN — HEPARIN SODIUM 14 UNIT(S)/HR: 50 INJECTION, SOLUTION INTRAVENOUS at 08:53

## 2024-06-13 RX ADMIN — PANTOPRAZOLE SODIUM 40 MILLIGRAM(S): 40 INJECTION, POWDER, FOR SOLUTION INTRAVENOUS at 05:04

## 2024-06-13 RX ADMIN — HEPARIN SODIUM 12 UNIT(S)/HR: 50 INJECTION, SOLUTION INTRAVENOUS at 01:05

## 2024-06-13 RX ADMIN — Medication 8.28 MICROGRAM(S)/KG/MIN: at 04:35

## 2024-06-13 RX ADMIN — SEVELAMER CARBONATE 2400 MILLIGRAM(S): 800 FOR SUSPENSION ORAL at 08:55

## 2024-06-13 RX ADMIN — PREGABALIN 100 MILLIGRAM(S): 50 CAPSULE ORAL at 05:04

## 2024-06-13 RX ADMIN — NOREPINEPHRINE BITARTRATE 10.7 MICROGRAM(S)/KG/MIN: 1 INJECTION INTRAVENOUS at 08:53

## 2024-06-13 RX ADMIN — MIDODRINE HYDROCHLORIDE 20 MILLIGRAM(S): 10 TABLET ORAL at 10:41

## 2024-06-13 RX ADMIN — SEVELAMER CARBONATE 2400 MILLIGRAM(S): 800 FOR SUSPENSION ORAL at 16:36

## 2024-06-13 RX ADMIN — PREGABALIN 100 MILLIGRAM(S): 50 CAPSULE ORAL at 17:34

## 2024-06-13 RX ADMIN — APIXABAN 5 MILLIGRAM(S): 5 TABLET, FILM COATED ORAL at 10:52

## 2024-06-13 RX ADMIN — LEVETIRACETAM 500 MILLIGRAM(S): 100 INJECTION INTRAVENOUS at 05:04

## 2024-06-13 RX ADMIN — LEVETIRACETAM 500 MILLIGRAM(S): 100 INJECTION INTRAVENOUS at 17:34

## 2024-06-13 RX ADMIN — NOREPINEPHRINE BITARTRATE 8.54 MICROGRAM(S)/KG/MIN: 1 INJECTION INTRAVENOUS at 19:52

## 2024-06-13 RX ADMIN — Medication 1 APPLICATION(S): at 22:29

## 2024-06-13 RX ADMIN — Medication 20 MILLIGRAM(S): at 16:30

## 2024-06-13 RX ADMIN — SEVELAMER CARBONATE 2400 MILLIGRAM(S): 800 FOR SUSPENSION ORAL at 11:49

## 2024-06-13 RX ADMIN — MIDODRINE HYDROCHLORIDE 30 MILLIGRAM(S): 10 TABLET ORAL at 13:22

## 2024-06-13 RX ADMIN — MIDODRINE HYDROCHLORIDE 10 MILLIGRAM(S): 10 TABLET ORAL at 08:58

## 2024-06-14 LAB
ALBUMIN SERPL ELPH-MCNC: 3.6 G/DL — SIGNIFICANT CHANGE UP (ref 3.3–5)
ALP SERPL-CCNC: 217 U/L — HIGH (ref 40–120)
ALT FLD-CCNC: <5 U/L — LOW (ref 10–45)
ANION GAP SERPL CALC-SCNC: 15 MMOL/L — SIGNIFICANT CHANGE UP (ref 5–17)
AST SERPL-CCNC: 11 U/L — SIGNIFICANT CHANGE UP (ref 10–40)
BASOPHILS # BLD AUTO: 0.06 K/UL — SIGNIFICANT CHANGE UP (ref 0–0.2)
BASOPHILS NFR BLD AUTO: 0.7 % — SIGNIFICANT CHANGE UP (ref 0–2)
BILIRUB SERPL-MCNC: 0.3 MG/DL — SIGNIFICANT CHANGE UP (ref 0.2–1.2)
BUN SERPL-MCNC: 17 MG/DL — SIGNIFICANT CHANGE UP (ref 7–23)
CALCIUM SERPL-MCNC: 9.9 MG/DL — SIGNIFICANT CHANGE UP (ref 8.4–10.5)
CHLORIDE SERPL-SCNC: 100 MMOL/L — SIGNIFICANT CHANGE UP (ref 96–108)
CO2 SERPL-SCNC: 25 MMOL/L — SIGNIFICANT CHANGE UP (ref 22–31)
CORTIS AM PEAK SERPL-MCNC: 19.6 UG/DL — HIGH (ref 6–18.4)
CREAT SERPL-MCNC: 5.9 MG/DL — HIGH (ref 0.5–1.3)
EGFR: 9 ML/MIN/1.73M2 — LOW
EOSINOPHIL # BLD AUTO: 0.32 K/UL — SIGNIFICANT CHANGE UP (ref 0–0.5)
EOSINOPHIL NFR BLD AUTO: 3.7 % — SIGNIFICANT CHANGE UP (ref 0–6)
GLUCOSE SERPL-MCNC: 107 MG/DL — HIGH (ref 70–99)
HCT VFR BLD CALC: 38.1 % — LOW (ref 39–50)
HGB BLD-MCNC: 11 G/DL — LOW (ref 13–17)
IMM GRANULOCYTES NFR BLD AUTO: 0.7 % — SIGNIFICANT CHANGE UP (ref 0–0.9)
LYMPHOCYTES # BLD AUTO: 1.52 K/UL — SIGNIFICANT CHANGE UP (ref 1–3.3)
LYMPHOCYTES # BLD AUTO: 17.4 % — SIGNIFICANT CHANGE UP (ref 13–44)
MAGNESIUM SERPL-MCNC: 2.1 MG/DL — SIGNIFICANT CHANGE UP (ref 1.6–2.6)
MCHC RBC-ENTMCNC: 25.4 PG — LOW (ref 27–34)
MCHC RBC-ENTMCNC: 28.9 GM/DL — LOW (ref 32–36)
MCV RBC AUTO: 88 FL — SIGNIFICANT CHANGE UP (ref 80–100)
MONOCYTES # BLD AUTO: 1.12 K/UL — HIGH (ref 0–0.9)
MONOCYTES NFR BLD AUTO: 12.8 % — SIGNIFICANT CHANGE UP (ref 2–14)
NEUTROPHILS # BLD AUTO: 5.68 K/UL — SIGNIFICANT CHANGE UP (ref 1.8–7.4)
NEUTROPHILS NFR BLD AUTO: 64.7 % — SIGNIFICANT CHANGE UP (ref 43–77)
NRBC # BLD: 0 /100 WBCS — SIGNIFICANT CHANGE UP (ref 0–0)
PHOSPHATE SERPL-MCNC: 3.7 MG/DL — SIGNIFICANT CHANGE UP (ref 2.5–4.5)
PLATELET # BLD AUTO: 179 K/UL — SIGNIFICANT CHANGE UP (ref 150–400)
POTASSIUM SERPL-MCNC: 4 MMOL/L — SIGNIFICANT CHANGE UP (ref 3.5–5.3)
POTASSIUM SERPL-SCNC: 4 MMOL/L — SIGNIFICANT CHANGE UP (ref 3.5–5.3)
PROT SERPL-MCNC: 8.6 G/DL — HIGH (ref 6–8.3)
RBC # BLD: 4.33 M/UL — SIGNIFICANT CHANGE UP (ref 4.2–5.8)
RBC # FLD: 17.5 % — HIGH (ref 10.3–14.5)
SODIUM SERPL-SCNC: 140 MMOL/L — SIGNIFICANT CHANGE UP (ref 135–145)
WBC # BLD: 8.76 K/UL — SIGNIFICANT CHANGE UP (ref 3.8–10.5)
WBC # FLD AUTO: 8.76 K/UL — SIGNIFICANT CHANGE UP (ref 3.8–10.5)

## 2024-06-14 PROCEDURE — 93010 ELECTROCARDIOGRAM REPORT: CPT

## 2024-06-14 PROCEDURE — 99292 CRITICAL CARE ADDL 30 MIN: CPT | Mod: FS

## 2024-06-14 PROCEDURE — 99291 CRITICAL CARE FIRST HOUR: CPT

## 2024-06-14 RX ORDER — DROXIDOPA 200 MG/1
1 CAPSULE ORAL
Qty: 90 | Refills: 0
Start: 2024-06-14 | End: 2024-07-13

## 2024-06-14 RX ORDER — MIDODRINE HYDROCHLORIDE 10 MG/1
40 TABLET ORAL EVERY 8 HOURS
Refills: 0 | Status: DISCONTINUED | OUTPATIENT
Start: 2024-06-14 | End: 2024-06-21

## 2024-06-14 RX ORDER — MIDODRINE HYDROCHLORIDE 10 MG/1
10 TABLET ORAL ONCE
Refills: 0 | Status: COMPLETED | OUTPATIENT
Start: 2024-06-14 | End: 2024-06-14

## 2024-06-14 RX ORDER — MIDODRINE HYDROCHLORIDE 10 MG/1
30 TABLET ORAL EVERY 8 HOURS
Refills: 0 | Status: DISCONTINUED | OUTPATIENT
Start: 2024-06-14 | End: 2024-06-14

## 2024-06-14 RX ORDER — DROXIDOPA 200 MG/1
100 CAPSULE ORAL THREE TIMES A DAY
Refills: 0 | Status: DISCONTINUED | OUTPATIENT
Start: 2024-06-14 | End: 2024-06-15

## 2024-06-14 RX ORDER — MIDODRINE HYDROCHLORIDE 10 MG/1
40 TABLET ORAL EVERY 8 HOURS
Refills: 0 | Status: DISCONTINUED | OUTPATIENT
Start: 2024-06-14 | End: 2024-06-14

## 2024-06-14 RX ADMIN — Medication 20 MILLIGRAM(S): at 11:40

## 2024-06-14 RX ADMIN — NOREPINEPHRINE BITARTRATE 8.54 MICROGRAM(S)/KG/MIN: 1 INJECTION INTRAVENOUS at 05:44

## 2024-06-14 RX ADMIN — PREGABALIN 100 MILLIGRAM(S): 50 CAPSULE ORAL at 17:05

## 2024-06-14 RX ADMIN — Medication 20 MILLIGRAM(S): at 09:56

## 2024-06-14 RX ADMIN — DROXIDOPA 100 MILLIGRAM(S): 200 CAPSULE ORAL at 13:52

## 2024-06-14 RX ADMIN — Medication 1 APPLICATION(S): at 21:35

## 2024-06-14 RX ADMIN — NOREPINEPHRINE BITARTRATE 8.54 MICROGRAM(S)/KG/MIN: 1 INJECTION INTRAVENOUS at 18:37

## 2024-06-14 RX ADMIN — SEVELAMER CARBONATE 2400 MILLIGRAM(S): 800 FOR SUSPENSION ORAL at 09:58

## 2024-06-14 RX ADMIN — NOREPINEPHRINE BITARTRATE 8.54 MICROGRAM(S)/KG/MIN: 1 INJECTION INTRAVENOUS at 21:09

## 2024-06-14 RX ADMIN — APIXABAN 5 MILLIGRAM(S): 5 TABLET, FILM COATED ORAL at 17:05

## 2024-06-14 RX ADMIN — DROXIDOPA 100 MILLIGRAM(S): 200 CAPSULE ORAL at 17:57

## 2024-06-14 RX ADMIN — Medication 1 TABLET(S): at 16:37

## 2024-06-14 RX ADMIN — MIDODRINE HYDROCHLORIDE 40 MILLIGRAM(S): 10 TABLET ORAL at 21:09

## 2024-06-14 RX ADMIN — LEVETIRACETAM 500 MILLIGRAM(S): 100 INJECTION INTRAVENOUS at 05:44

## 2024-06-14 RX ADMIN — PREGABALIN 100 MILLIGRAM(S): 50 CAPSULE ORAL at 05:43

## 2024-06-14 RX ADMIN — PANTOPRAZOLE SODIUM 40 MILLIGRAM(S): 40 INJECTION, POWDER, FOR SOLUTION INTRAVENOUS at 05:45

## 2024-06-14 RX ADMIN — APIXABAN 5 MILLIGRAM(S): 5 TABLET, FILM COATED ORAL at 05:44

## 2024-06-14 RX ADMIN — SEVELAMER CARBONATE 2400 MILLIGRAM(S): 800 FOR SUSPENSION ORAL at 11:40

## 2024-06-14 RX ADMIN — MIDODRINE HYDROCHLORIDE 30 MILLIGRAM(S): 10 TABLET ORAL at 05:44

## 2024-06-14 RX ADMIN — LEVETIRACETAM 500 MILLIGRAM(S): 100 INJECTION INTRAVENOUS at 17:05

## 2024-06-14 RX ADMIN — MIDODRINE HYDROCHLORIDE 10 MILLIGRAM(S): 10 TABLET ORAL at 10:35

## 2024-06-14 RX ADMIN — SEVELAMER CARBONATE 2400 MILLIGRAM(S): 800 FOR SUSPENSION ORAL at 16:28

## 2024-06-14 RX ADMIN — Medication 20 MILLIGRAM(S): at 16:28

## 2024-06-14 RX ADMIN — MIDODRINE HYDROCHLORIDE 30 MILLIGRAM(S): 10 TABLET ORAL at 13:33

## 2024-06-15 LAB
ALBUMIN SERPL ELPH-MCNC: 3.4 G/DL — SIGNIFICANT CHANGE UP (ref 3.3–5)
ALP SERPL-CCNC: 213 U/L — HIGH (ref 40–120)
ALT FLD-CCNC: <5 U/L — LOW (ref 10–45)
ANION GAP SERPL CALC-SCNC: 14 MMOL/L — SIGNIFICANT CHANGE UP (ref 5–17)
AST SERPL-CCNC: 8 U/L — LOW (ref 10–40)
BASOPHILS # BLD AUTO: 0.04 K/UL — SIGNIFICANT CHANGE UP (ref 0–0.2)
BASOPHILS NFR BLD AUTO: 0.5 % — SIGNIFICANT CHANGE UP (ref 0–2)
BILIRUB SERPL-MCNC: 0.2 MG/DL — SIGNIFICANT CHANGE UP (ref 0.2–1.2)
BUN SERPL-MCNC: 29 MG/DL — HIGH (ref 7–23)
CALCIUM SERPL-MCNC: 9.5 MG/DL — SIGNIFICANT CHANGE UP (ref 8.4–10.5)
CHLORIDE SERPL-SCNC: 102 MMOL/L — SIGNIFICANT CHANGE UP (ref 96–108)
CO2 SERPL-SCNC: 24 MMOL/L — SIGNIFICANT CHANGE UP (ref 22–31)
CREAT SERPL-MCNC: 8.2 MG/DL — HIGH (ref 0.5–1.3)
EGFR: 6 ML/MIN/1.73M2 — LOW
EOSINOPHIL # BLD AUTO: 0.47 K/UL — SIGNIFICANT CHANGE UP (ref 0–0.5)
EOSINOPHIL NFR BLD AUTO: 5.8 % — SIGNIFICANT CHANGE UP (ref 0–6)
GLUCOSE SERPL-MCNC: 97 MG/DL — SIGNIFICANT CHANGE UP (ref 70–99)
HCT VFR BLD CALC: 36.6 % — LOW (ref 39–50)
HGB BLD-MCNC: 10.7 G/DL — LOW (ref 13–17)
IMM GRANULOCYTES NFR BLD AUTO: 0.2 % — SIGNIFICANT CHANGE UP (ref 0–0.9)
LYMPHOCYTES # BLD AUTO: 1.82 K/UL — SIGNIFICANT CHANGE UP (ref 1–3.3)
LYMPHOCYTES # BLD AUTO: 22.3 % — SIGNIFICANT CHANGE UP (ref 13–44)
MAGNESIUM SERPL-MCNC: 2 MG/DL — SIGNIFICANT CHANGE UP (ref 1.6–2.6)
MCHC RBC-ENTMCNC: 25.7 PG — LOW (ref 27–34)
MCHC RBC-ENTMCNC: 29.2 GM/DL — LOW (ref 32–36)
MCV RBC AUTO: 87.8 FL — SIGNIFICANT CHANGE UP (ref 80–100)
MONOCYTES # BLD AUTO: 0.72 K/UL — SIGNIFICANT CHANGE UP (ref 0–0.9)
MONOCYTES NFR BLD AUTO: 8.8 % — SIGNIFICANT CHANGE UP (ref 2–14)
NEUTROPHILS # BLD AUTO: 5.09 K/UL — SIGNIFICANT CHANGE UP (ref 1.8–7.4)
NEUTROPHILS NFR BLD AUTO: 62.4 % — SIGNIFICANT CHANGE UP (ref 43–77)
NRBC # BLD: 0 /100 WBCS — SIGNIFICANT CHANGE UP (ref 0–0)
PHOSPHATE SERPL-MCNC: 4.2 MG/DL — SIGNIFICANT CHANGE UP (ref 2.5–4.5)
PLATELET # BLD AUTO: 211 K/UL — SIGNIFICANT CHANGE UP (ref 150–400)
POTASSIUM SERPL-MCNC: 4.1 MMOL/L — SIGNIFICANT CHANGE UP (ref 3.5–5.3)
POTASSIUM SERPL-SCNC: 4.1 MMOL/L — SIGNIFICANT CHANGE UP (ref 3.5–5.3)
PROT SERPL-MCNC: 8.1 G/DL — SIGNIFICANT CHANGE UP (ref 6–8.3)
RBC # BLD: 4.17 M/UL — LOW (ref 4.2–5.8)
RBC # FLD: 17.6 % — HIGH (ref 10.3–14.5)
SODIUM SERPL-SCNC: 140 MMOL/L — SIGNIFICANT CHANGE UP (ref 135–145)
WBC # BLD: 8.16 K/UL — SIGNIFICANT CHANGE UP (ref 3.8–10.5)
WBC # FLD AUTO: 8.16 K/UL — SIGNIFICANT CHANGE UP (ref 3.8–10.5)

## 2024-06-15 PROCEDURE — 99291 CRITICAL CARE FIRST HOUR: CPT

## 2024-06-15 PROCEDURE — 93010 ELECTROCARDIOGRAM REPORT: CPT

## 2024-06-15 RX ORDER — DROXIDOPA 200 MG/1
200 CAPSULE ORAL THREE TIMES A DAY
Refills: 0 | Status: DISCONTINUED | OUTPATIENT
Start: 2024-06-15 | End: 2024-06-21

## 2024-06-15 RX ORDER — DROXIDOPA 200 MG/1
100 CAPSULE ORAL ONCE
Refills: 0 | Status: COMPLETED | OUTPATIENT
Start: 2024-06-15 | End: 2024-06-15

## 2024-06-15 RX ADMIN — PANTOPRAZOLE SODIUM 40 MILLIGRAM(S): 40 INJECTION, POWDER, FOR SOLUTION INTRAVENOUS at 07:57

## 2024-06-15 RX ADMIN — MIDODRINE HYDROCHLORIDE 40 MILLIGRAM(S): 10 TABLET ORAL at 06:05

## 2024-06-15 RX ADMIN — APIXABAN 5 MILLIGRAM(S): 5 TABLET, FILM COATED ORAL at 06:04

## 2024-06-15 RX ADMIN — SEVELAMER CARBONATE 2400 MILLIGRAM(S): 800 FOR SUSPENSION ORAL at 17:11

## 2024-06-15 RX ADMIN — APIXABAN 5 MILLIGRAM(S): 5 TABLET, FILM COATED ORAL at 17:12

## 2024-06-15 RX ADMIN — LEVETIRACETAM 500 MILLIGRAM(S): 100 INJECTION INTRAVENOUS at 06:04

## 2024-06-15 RX ADMIN — PREGABALIN 100 MILLIGRAM(S): 50 CAPSULE ORAL at 06:05

## 2024-06-15 RX ADMIN — SEVELAMER CARBONATE 2400 MILLIGRAM(S): 800 FOR SUSPENSION ORAL at 11:36

## 2024-06-15 RX ADMIN — Medication 20 MILLIGRAM(S): at 11:36

## 2024-06-15 RX ADMIN — Medication 20 MILLIGRAM(S): at 17:11

## 2024-06-15 RX ADMIN — PREGABALIN 100 MILLIGRAM(S): 50 CAPSULE ORAL at 17:11

## 2024-06-15 RX ADMIN — LEVETIRACETAM 500 MILLIGRAM(S): 100 INJECTION INTRAVENOUS at 17:12

## 2024-06-15 RX ADMIN — DROXIDOPA 100 MILLIGRAM(S): 200 CAPSULE ORAL at 09:12

## 2024-06-15 RX ADMIN — SEVELAMER CARBONATE 2400 MILLIGRAM(S): 800 FOR SUSPENSION ORAL at 07:58

## 2024-06-15 RX ADMIN — DROXIDOPA 200 MILLIGRAM(S): 200 CAPSULE ORAL at 14:45

## 2024-06-15 RX ADMIN — Medication 1 TABLET(S): at 11:36

## 2024-06-15 RX ADMIN — MIDODRINE HYDROCHLORIDE 40 MILLIGRAM(S): 10 TABLET ORAL at 14:45

## 2024-06-15 RX ADMIN — Medication 20 MILLIGRAM(S): at 07:57

## 2024-06-15 RX ADMIN — DROXIDOPA 100 MILLIGRAM(S): 200 CAPSULE ORAL at 06:04

## 2024-06-16 LAB
ALBUMIN SERPL ELPH-MCNC: 3.4 G/DL — SIGNIFICANT CHANGE UP (ref 3.3–5)
ALP SERPL-CCNC: 217 U/L — HIGH (ref 40–120)
ALT FLD-CCNC: 5 U/L — LOW (ref 10–45)
ANION GAP SERPL CALC-SCNC: 15 MMOL/L — SIGNIFICANT CHANGE UP (ref 5–17)
AST SERPL-CCNC: 13 U/L — SIGNIFICANT CHANGE UP (ref 10–40)
BILIRUB SERPL-MCNC: 0.3 MG/DL — SIGNIFICANT CHANGE UP (ref 0.2–1.2)
BUN SERPL-MCNC: 24 MG/DL — HIGH (ref 7–23)
CALCIUM SERPL-MCNC: 10.1 MG/DL — SIGNIFICANT CHANGE UP (ref 8.4–10.5)
CHLORIDE SERPL-SCNC: 97 MMOL/L — SIGNIFICANT CHANGE UP (ref 96–108)
CO2 SERPL-SCNC: 25 MMOL/L — SIGNIFICANT CHANGE UP (ref 22–31)
CREAT SERPL-MCNC: 6.6 MG/DL — HIGH (ref 0.5–1.3)
EGFR: 8 ML/MIN/1.73M2 — LOW
GLUCOSE SERPL-MCNC: 80 MG/DL — SIGNIFICANT CHANGE UP (ref 70–99)
HCT VFR BLD CALC: 36.4 % — LOW (ref 39–50)
HGB BLD-MCNC: 10.4 G/DL — LOW (ref 13–17)
MAGNESIUM SERPL-MCNC: 2 MG/DL — SIGNIFICANT CHANGE UP (ref 1.6–2.6)
MCHC RBC-ENTMCNC: 25.8 PG — LOW (ref 27–34)
MCHC RBC-ENTMCNC: 28.6 GM/DL — LOW (ref 32–36)
MCV RBC AUTO: 90.3 FL — SIGNIFICANT CHANGE UP (ref 80–100)
NRBC # BLD: 0 /100 WBCS — SIGNIFICANT CHANGE UP (ref 0–0)
PHOSPHATE SERPL-MCNC: 3.9 MG/DL — SIGNIFICANT CHANGE UP (ref 2.5–4.5)
PLATELET # BLD AUTO: 169 K/UL — SIGNIFICANT CHANGE UP (ref 150–400)
POTASSIUM SERPL-MCNC: 3.9 MMOL/L — SIGNIFICANT CHANGE UP (ref 3.5–5.3)
POTASSIUM SERPL-SCNC: 3.9 MMOL/L — SIGNIFICANT CHANGE UP (ref 3.5–5.3)
PROT SERPL-MCNC: 8.5 G/DL — HIGH (ref 6–8.3)
RBC # BLD: 4.03 M/UL — LOW (ref 4.2–5.8)
RBC # FLD: 17.4 % — HIGH (ref 10.3–14.5)
SODIUM SERPL-SCNC: 137 MMOL/L — SIGNIFICANT CHANGE UP (ref 135–145)
WBC # BLD: 9.17 K/UL — SIGNIFICANT CHANGE UP (ref 3.8–10.5)
WBC # FLD AUTO: 9.17 K/UL — SIGNIFICANT CHANGE UP (ref 3.8–10.5)

## 2024-06-16 RX ORDER — MIDODRINE HYDROCHLORIDE 10 MG/1
10 TABLET ORAL
Refills: 0 | Status: DISCONTINUED | OUTPATIENT
Start: 2024-06-16 | End: 2024-06-21

## 2024-06-16 RX ADMIN — PREGABALIN 100 MILLIGRAM(S): 50 CAPSULE ORAL at 18:46

## 2024-06-16 RX ADMIN — PANTOPRAZOLE SODIUM 40 MILLIGRAM(S): 40 INJECTION, POWDER, FOR SOLUTION INTRAVENOUS at 06:33

## 2024-06-16 RX ADMIN — LEVETIRACETAM 500 MILLIGRAM(S): 100 INJECTION INTRAVENOUS at 18:42

## 2024-06-16 RX ADMIN — MIDODRINE HYDROCHLORIDE 40 MILLIGRAM(S): 10 TABLET ORAL at 06:33

## 2024-06-16 RX ADMIN — Medication 20 MILLIGRAM(S): at 18:41

## 2024-06-16 RX ADMIN — DROXIDOPA 200 MILLIGRAM(S): 200 CAPSULE ORAL at 18:42

## 2024-06-16 RX ADMIN — Medication 1 TABLET(S): at 12:11

## 2024-06-16 RX ADMIN — SEVELAMER CARBONATE 2400 MILLIGRAM(S): 800 FOR SUSPENSION ORAL at 18:42

## 2024-06-16 RX ADMIN — Medication 20 MILLIGRAM(S): at 08:09

## 2024-06-16 RX ADMIN — SEVELAMER CARBONATE 2400 MILLIGRAM(S): 800 FOR SUSPENSION ORAL at 12:11

## 2024-06-16 RX ADMIN — MIDODRINE HYDROCHLORIDE 40 MILLIGRAM(S): 10 TABLET ORAL at 13:55

## 2024-06-16 RX ADMIN — Medication 20 MILLIGRAM(S): at 12:12

## 2024-06-16 RX ADMIN — DROXIDOPA 200 MILLIGRAM(S): 200 CAPSULE ORAL at 12:11

## 2024-06-16 RX ADMIN — DROXIDOPA 200 MILLIGRAM(S): 200 CAPSULE ORAL at 06:33

## 2024-06-16 RX ADMIN — PREGABALIN 100 MILLIGRAM(S): 50 CAPSULE ORAL at 06:33

## 2024-06-16 RX ADMIN — SEVELAMER CARBONATE 2400 MILLIGRAM(S): 800 FOR SUSPENSION ORAL at 08:09

## 2024-06-16 RX ADMIN — APIXABAN 5 MILLIGRAM(S): 5 TABLET, FILM COATED ORAL at 06:33

## 2024-06-16 RX ADMIN — MIDODRINE HYDROCHLORIDE 40 MILLIGRAM(S): 10 TABLET ORAL at 21:47

## 2024-06-16 RX ADMIN — APIXABAN 5 MILLIGRAM(S): 5 TABLET, FILM COATED ORAL at 18:42

## 2024-06-16 RX ADMIN — LEVETIRACETAM 500 MILLIGRAM(S): 100 INJECTION INTRAVENOUS at 06:33

## 2024-06-17 RX ADMIN — Medication 20 MILLIGRAM(S): at 09:32

## 2024-06-17 RX ADMIN — MIDODRINE HYDROCHLORIDE 40 MILLIGRAM(S): 10 TABLET ORAL at 05:37

## 2024-06-17 RX ADMIN — LEVETIRACETAM 500 MILLIGRAM(S): 100 INJECTION INTRAVENOUS at 17:39

## 2024-06-17 RX ADMIN — MIDODRINE HYDROCHLORIDE 10 MILLIGRAM(S): 10 TABLET ORAL at 10:00

## 2024-06-17 RX ADMIN — DROXIDOPA 200 MILLIGRAM(S): 200 CAPSULE ORAL at 19:52

## 2024-06-17 RX ADMIN — PREGABALIN 100 MILLIGRAM(S): 50 CAPSULE ORAL at 17:42

## 2024-06-17 RX ADMIN — APIXABAN 5 MILLIGRAM(S): 5 TABLET, FILM COATED ORAL at 17:39

## 2024-06-17 RX ADMIN — DROXIDOPA 200 MILLIGRAM(S): 200 CAPSULE ORAL at 05:37

## 2024-06-17 RX ADMIN — APIXABAN 5 MILLIGRAM(S): 5 TABLET, FILM COATED ORAL at 05:36

## 2024-06-17 RX ADMIN — MIDODRINE HYDROCHLORIDE 40 MILLIGRAM(S): 10 TABLET ORAL at 14:10

## 2024-06-17 RX ADMIN — Medication 20 MILLIGRAM(S): at 17:39

## 2024-06-17 RX ADMIN — PREGABALIN 100 MILLIGRAM(S): 50 CAPSULE ORAL at 05:36

## 2024-06-17 RX ADMIN — LEVETIRACETAM 500 MILLIGRAM(S): 100 INJECTION INTRAVENOUS at 05:36

## 2024-06-17 RX ADMIN — DROXIDOPA 200 MILLIGRAM(S): 200 CAPSULE ORAL at 14:10

## 2024-06-17 RX ADMIN — SEVELAMER CARBONATE 2400 MILLIGRAM(S): 800 FOR SUSPENSION ORAL at 09:32

## 2024-06-17 RX ADMIN — Medication 1 TABLET(S): at 09:31

## 2024-06-17 RX ADMIN — PANTOPRAZOLE SODIUM 40 MILLIGRAM(S): 40 INJECTION, POWDER, FOR SOLUTION INTRAVENOUS at 05:36

## 2024-06-17 RX ADMIN — MIDODRINE HYDROCHLORIDE 40 MILLIGRAM(S): 10 TABLET ORAL at 21:40

## 2024-06-17 RX ADMIN — Medication 20 MILLIGRAM(S): at 14:11

## 2024-06-17 RX ADMIN — SEVELAMER CARBONATE 2400 MILLIGRAM(S): 800 FOR SUSPENSION ORAL at 17:39

## 2024-06-17 RX ADMIN — SEVELAMER CARBONATE 2400 MILLIGRAM(S): 800 FOR SUSPENSION ORAL at 14:11

## 2024-06-18 RX ADMIN — DROXIDOPA 200 MILLIGRAM(S): 200 CAPSULE ORAL at 12:27

## 2024-06-18 RX ADMIN — MIDODRINE HYDROCHLORIDE 40 MILLIGRAM(S): 10 TABLET ORAL at 13:38

## 2024-06-18 RX ADMIN — Medication 1 TABLET(S): at 12:27

## 2024-06-18 RX ADMIN — PREGABALIN 100 MILLIGRAM(S): 50 CAPSULE ORAL at 05:43

## 2024-06-18 RX ADMIN — LEVETIRACETAM 500 MILLIGRAM(S): 100 INJECTION INTRAVENOUS at 17:33

## 2024-06-18 RX ADMIN — Medication 20 MILLIGRAM(S): at 17:33

## 2024-06-18 RX ADMIN — SEVELAMER CARBONATE 2400 MILLIGRAM(S): 800 FOR SUSPENSION ORAL at 17:32

## 2024-06-18 RX ADMIN — Medication 20 MILLIGRAM(S): at 12:26

## 2024-06-18 RX ADMIN — PANTOPRAZOLE SODIUM 40 MILLIGRAM(S): 40 INJECTION, POWDER, FOR SOLUTION INTRAVENOUS at 05:42

## 2024-06-18 RX ADMIN — LEVETIRACETAM 500 MILLIGRAM(S): 100 INJECTION INTRAVENOUS at 05:43

## 2024-06-18 RX ADMIN — PREGABALIN 100 MILLIGRAM(S): 50 CAPSULE ORAL at 17:36

## 2024-06-18 RX ADMIN — APIXABAN 5 MILLIGRAM(S): 5 TABLET, FILM COATED ORAL at 17:32

## 2024-06-18 RX ADMIN — DROXIDOPA 200 MILLIGRAM(S): 200 CAPSULE ORAL at 17:33

## 2024-06-18 RX ADMIN — MIDODRINE HYDROCHLORIDE 40 MILLIGRAM(S): 10 TABLET ORAL at 21:39

## 2024-06-18 RX ADMIN — MIDODRINE HYDROCHLORIDE 10 MILLIGRAM(S): 10 TABLET ORAL at 11:20

## 2024-06-18 RX ADMIN — SEVELAMER CARBONATE 2400 MILLIGRAM(S): 800 FOR SUSPENSION ORAL at 12:27

## 2024-06-18 RX ADMIN — MIDODRINE HYDROCHLORIDE 40 MILLIGRAM(S): 10 TABLET ORAL at 08:00

## 2024-06-18 RX ADMIN — SEVELAMER CARBONATE 2400 MILLIGRAM(S): 800 FOR SUSPENSION ORAL at 08:05

## 2024-06-18 RX ADMIN — APIXABAN 5 MILLIGRAM(S): 5 TABLET, FILM COATED ORAL at 05:43

## 2024-06-18 RX ADMIN — Medication 20 MILLIGRAM(S): at 08:05

## 2024-06-19 LAB
ANION GAP SERPL CALC-SCNC: 15 MMOL/L — SIGNIFICANT CHANGE UP (ref 5–17)
BASOPHILS # BLD AUTO: 0.05 K/UL — SIGNIFICANT CHANGE UP (ref 0–0.2)
BASOPHILS NFR BLD AUTO: 0.5 % — SIGNIFICANT CHANGE UP (ref 0–2)
BUN SERPL-MCNC: 33 MG/DL — HIGH (ref 7–23)
CALCIUM SERPL-MCNC: 10 MG/DL — SIGNIFICANT CHANGE UP (ref 8.4–10.5)
CHLORIDE SERPL-SCNC: 95 MMOL/L — LOW (ref 96–108)
CO2 SERPL-SCNC: 28 MMOL/L — SIGNIFICANT CHANGE UP (ref 22–31)
CREAT SERPL-MCNC: 7.15 MG/DL — HIGH (ref 0.5–1.3)
EGFR: 7 ML/MIN/1.73M2 — LOW
EOSINOPHIL # BLD AUTO: 0.6 K/UL — HIGH (ref 0–0.5)
EOSINOPHIL NFR BLD AUTO: 6.3 % — HIGH (ref 0–6)
GLUCOSE SERPL-MCNC: 81 MG/DL — SIGNIFICANT CHANGE UP (ref 70–99)
HCT VFR BLD CALC: 38.8 % — LOW (ref 39–50)
HGB BLD-MCNC: 10.9 G/DL — LOW (ref 13–17)
IMM GRANULOCYTES NFR BLD AUTO: 0.5 % — SIGNIFICANT CHANGE UP (ref 0–0.9)
LYMPHOCYTES # BLD AUTO: 1.64 K/UL — SIGNIFICANT CHANGE UP (ref 1–3.3)
LYMPHOCYTES # BLD AUTO: 17.3 % — SIGNIFICANT CHANGE UP (ref 13–44)
MCHC RBC-ENTMCNC: 25.7 PG — LOW (ref 27–34)
MCHC RBC-ENTMCNC: 28.1 GM/DL — LOW (ref 32–36)
MCV RBC AUTO: 91.5 FL — SIGNIFICANT CHANGE UP (ref 80–100)
MONOCYTES # BLD AUTO: 0.74 K/UL — SIGNIFICANT CHANGE UP (ref 0–0.9)
MONOCYTES NFR BLD AUTO: 7.8 % — SIGNIFICANT CHANGE UP (ref 2–14)
NEUTROPHILS # BLD AUTO: 6.4 K/UL — SIGNIFICANT CHANGE UP (ref 1.8–7.4)
NEUTROPHILS NFR BLD AUTO: 67.6 % — SIGNIFICANT CHANGE UP (ref 43–77)
NRBC # BLD: 0 /100 WBCS — SIGNIFICANT CHANGE UP (ref 0–0)
PLATELET # BLD AUTO: 188 K/UL — SIGNIFICANT CHANGE UP (ref 150–400)
POTASSIUM SERPL-MCNC: 4.3 MMOL/L — SIGNIFICANT CHANGE UP (ref 3.5–5.3)
POTASSIUM SERPL-SCNC: 4.3 MMOL/L — SIGNIFICANT CHANGE UP (ref 3.5–5.3)
RBC # BLD: 4.24 M/UL — SIGNIFICANT CHANGE UP (ref 4.2–5.8)
RBC # FLD: 17.6 % — HIGH (ref 10.3–14.5)
SODIUM SERPL-SCNC: 138 MMOL/L — SIGNIFICANT CHANGE UP (ref 135–145)
WBC # BLD: 9.48 K/UL — SIGNIFICANT CHANGE UP (ref 3.8–10.5)
WBC # FLD AUTO: 9.48 K/UL — SIGNIFICANT CHANGE UP (ref 3.8–10.5)

## 2024-06-19 RX ADMIN — PREGABALIN 100 MILLIGRAM(S): 50 CAPSULE ORAL at 17:15

## 2024-06-19 RX ADMIN — APIXABAN 5 MILLIGRAM(S): 5 TABLET, FILM COATED ORAL at 17:13

## 2024-06-19 RX ADMIN — PANTOPRAZOLE SODIUM 40 MILLIGRAM(S): 40 INJECTION, POWDER, FOR SOLUTION INTRAVENOUS at 05:10

## 2024-06-19 RX ADMIN — Medication 20 MILLIGRAM(S): at 12:44

## 2024-06-19 RX ADMIN — MIDODRINE HYDROCHLORIDE 40 MILLIGRAM(S): 10 TABLET ORAL at 16:56

## 2024-06-19 RX ADMIN — Medication 20 MILLIGRAM(S): at 17:12

## 2024-06-19 RX ADMIN — MIDODRINE HYDROCHLORIDE 40 MILLIGRAM(S): 10 TABLET ORAL at 21:51

## 2024-06-19 RX ADMIN — LEVETIRACETAM 500 MILLIGRAM(S): 100 INJECTION INTRAVENOUS at 05:10

## 2024-06-19 RX ADMIN — PREGABALIN 100 MILLIGRAM(S): 50 CAPSULE ORAL at 05:10

## 2024-06-19 RX ADMIN — APIXABAN 5 MILLIGRAM(S): 5 TABLET, FILM COATED ORAL at 05:10

## 2024-06-19 RX ADMIN — DROXIDOPA 200 MILLIGRAM(S): 200 CAPSULE ORAL at 05:10

## 2024-06-19 RX ADMIN — Medication 1 TABLET(S): at 12:44

## 2024-06-19 RX ADMIN — SEVELAMER CARBONATE 2400 MILLIGRAM(S): 800 FOR SUSPENSION ORAL at 17:12

## 2024-06-19 RX ADMIN — DROXIDOPA 200 MILLIGRAM(S): 200 CAPSULE ORAL at 12:44

## 2024-06-19 RX ADMIN — SEVELAMER CARBONATE 2400 MILLIGRAM(S): 800 FOR SUSPENSION ORAL at 12:44

## 2024-06-19 RX ADMIN — LEVETIRACETAM 500 MILLIGRAM(S): 100 INJECTION INTRAVENOUS at 17:12

## 2024-06-19 RX ADMIN — MIDODRINE HYDROCHLORIDE 40 MILLIGRAM(S): 10 TABLET ORAL at 05:10

## 2024-06-19 RX ADMIN — SEVELAMER CARBONATE 2400 MILLIGRAM(S): 800 FOR SUSPENSION ORAL at 08:55

## 2024-06-19 RX ADMIN — Medication 20 MILLIGRAM(S): at 08:55

## 2024-06-19 RX ADMIN — DROXIDOPA 200 MILLIGRAM(S): 200 CAPSULE ORAL at 17:12

## 2024-06-20 ENCOUNTER — TRANSCRIPTION ENCOUNTER (OUTPATIENT)
Age: 79
End: 2024-06-20

## 2024-06-20 RX ORDER — DROXIDOPA 200 MG/1
2 CAPSULE ORAL
Qty: 180 | Refills: 0
Start: 2024-06-20 | End: 2024-07-19

## 2024-06-20 RX ORDER — MIDODRINE HYDROCHLORIDE 10 MG/1
4 TABLET ORAL
Qty: 360 | Refills: 0
Start: 2024-06-20 | End: 2024-07-19

## 2024-06-20 RX ORDER — PREGABALIN 50 MG/1
100 CAPSULE ORAL
Refills: 0 | Status: DISCONTINUED | OUTPATIENT
Start: 2024-06-20 | End: 2024-06-20

## 2024-06-20 RX ORDER — PREGABALIN 50 MG/1
75 CAPSULE ORAL DAILY
Refills: 0 | Status: DISCONTINUED | OUTPATIENT
Start: 2024-06-20 | End: 2024-06-21

## 2024-06-20 RX ORDER — PREGABALIN 50 MG/1
1 CAPSULE ORAL
Qty: 5 | Refills: 0
Start: 2024-06-20 | End: 2024-06-24

## 2024-06-20 RX ADMIN — LEVETIRACETAM 500 MILLIGRAM(S): 100 INJECTION INTRAVENOUS at 17:40

## 2024-06-20 RX ADMIN — APIXABAN 5 MILLIGRAM(S): 5 TABLET, FILM COATED ORAL at 05:11

## 2024-06-20 RX ADMIN — DROXIDOPA 200 MILLIGRAM(S): 200 CAPSULE ORAL at 05:11

## 2024-06-20 RX ADMIN — MIDODRINE HYDROCHLORIDE 40 MILLIGRAM(S): 10 TABLET ORAL at 13:44

## 2024-06-20 RX ADMIN — PANTOPRAZOLE SODIUM 40 MILLIGRAM(S): 40 INJECTION, POWDER, FOR SOLUTION INTRAVENOUS at 05:11

## 2024-06-20 RX ADMIN — DROXIDOPA 200 MILLIGRAM(S): 200 CAPSULE ORAL at 17:41

## 2024-06-20 RX ADMIN — MIDODRINE HYDROCHLORIDE 10 MILLIGRAM(S): 10 TABLET ORAL at 12:43

## 2024-06-20 RX ADMIN — MIDODRINE HYDROCHLORIDE 40 MILLIGRAM(S): 10 TABLET ORAL at 21:48

## 2024-06-20 RX ADMIN — Medication 20 MILLIGRAM(S): at 13:43

## 2024-06-20 RX ADMIN — MIDODRINE HYDROCHLORIDE 40 MILLIGRAM(S): 10 TABLET ORAL at 05:15

## 2024-06-20 RX ADMIN — Medication 20 MILLIGRAM(S): at 17:40

## 2024-06-20 RX ADMIN — DROXIDOPA 200 MILLIGRAM(S): 200 CAPSULE ORAL at 13:43

## 2024-06-20 RX ADMIN — LEVETIRACETAM 500 MILLIGRAM(S): 100 INJECTION INTRAVENOUS at 05:12

## 2024-06-20 RX ADMIN — SEVELAMER CARBONATE 2400 MILLIGRAM(S): 800 FOR SUSPENSION ORAL at 13:43

## 2024-06-20 RX ADMIN — PREGABALIN 75 MILLIGRAM(S): 50 CAPSULE ORAL at 21:48

## 2024-06-20 RX ADMIN — Medication 1 TABLET(S): at 13:43

## 2024-06-20 RX ADMIN — APIXABAN 5 MILLIGRAM(S): 5 TABLET, FILM COATED ORAL at 17:41

## 2024-06-20 NOTE — PRE-ANESTHESIA EVALUATION ADULT - WEIGHT IN LBS
Blood Sugar Goals:  -Fasting AM:    -Before lunch/dinner:    -2 hours after eating/at bedtime: 100-180    2. If you get a low alarm at night, you need to treat the low sugar!  Treatment of hypoglycemia (low blood sugars):   -If sugar is below 80 mg/dL, treat with 15 grams of simple sugar/rapid acting carb (3-4 glucose tablets, 4 oz of regular juice, 1/2 can of pop, 5 sugar-containing candies, 1 tablespoon of honey, 13-15 sweet tarts).   -Recheck in 15 minutes. If above 80 mg/dL, have small meal or snack. If not above 80 mg/dL, repeat the 15 grams of simple carbs until above 80 mg/dL.    3. Get back to your regular exercise!  Go to gym, or use resistance bands at home.  Aim for 30 min of exercise 3 times per week.     4. Time to schedule your annual eye exam    5. Change Toujeo to 36 units twice per day as instructed at last visit.  Keep Humalog at 30 units three times per day prior to meals as you have been using.      274.9

## 2024-06-21 ENCOUNTER — TRANSCRIPTION ENCOUNTER (OUTPATIENT)
Age: 79
End: 2024-06-21

## 2024-06-21 VITALS
DIASTOLIC BLOOD PRESSURE: 56 MMHG | RESPIRATION RATE: 18 BRPM | TEMPERATURE: 98 F | SYSTOLIC BLOOD PRESSURE: 95 MMHG | HEART RATE: 76 BPM | OXYGEN SATURATION: 99 %

## 2024-06-21 PROCEDURE — 83036 HEMOGLOBIN GLYCOSYLATED A1C: CPT

## 2024-06-21 PROCEDURE — 85014 HEMATOCRIT: CPT

## 2024-06-21 PROCEDURE — 83605 ASSAY OF LACTIC ACID: CPT

## 2024-06-21 PROCEDURE — 99291 CRITICAL CARE FIRST HOUR: CPT

## 2024-06-21 PROCEDURE — 71275 CT ANGIOGRAPHY CHEST: CPT | Mod: MC

## 2024-06-21 PROCEDURE — 82330 ASSAY OF CALCIUM: CPT

## 2024-06-21 PROCEDURE — 82533 TOTAL CORTISOL: CPT

## 2024-06-21 PROCEDURE — 80048 BASIC METABOLIC PNL TOTAL CA: CPT

## 2024-06-21 PROCEDURE — 97110 THERAPEUTIC EXERCISES: CPT

## 2024-06-21 PROCEDURE — 93321 DOPPLER ECHO F-UP/LMTD STD: CPT

## 2024-06-21 PROCEDURE — 83880 ASSAY OF NATRIURETIC PEPTIDE: CPT

## 2024-06-21 PROCEDURE — 84484 ASSAY OF TROPONIN QUANT: CPT

## 2024-06-21 PROCEDURE — 80053 COMPREHEN METABOLIC PANEL: CPT

## 2024-06-21 PROCEDURE — 85018 HEMOGLOBIN: CPT

## 2024-06-21 PROCEDURE — 82803 BLOOD GASES ANY COMBINATION: CPT

## 2024-06-21 PROCEDURE — 93308 TTE F-UP OR LMTD: CPT

## 2024-06-21 PROCEDURE — 85025 COMPLETE CBC W/AUTO DIFF WBC: CPT

## 2024-06-21 PROCEDURE — 86850 RBC ANTIBODY SCREEN: CPT

## 2024-06-21 PROCEDURE — 74177 CT ABD & PELVIS W/CONTRAST: CPT | Mod: MC

## 2024-06-21 PROCEDURE — 85520 HEPARIN ASSAY: CPT

## 2024-06-21 PROCEDURE — 84132 ASSAY OF SERUM POTASSIUM: CPT

## 2024-06-21 PROCEDURE — 83735 ASSAY OF MAGNESIUM: CPT

## 2024-06-21 PROCEDURE — 85027 COMPLETE CBC AUTOMATED: CPT

## 2024-06-21 PROCEDURE — 97530 THERAPEUTIC ACTIVITIES: CPT

## 2024-06-21 PROCEDURE — 36600 WITHDRAWAL OF ARTERIAL BLOOD: CPT

## 2024-06-21 PROCEDURE — 86901 BLOOD TYPING SEROLOGIC RH(D): CPT

## 2024-06-21 PROCEDURE — 84443 ASSAY THYROID STIM HORMONE: CPT

## 2024-06-21 PROCEDURE — 84100 ASSAY OF PHOSPHORUS: CPT

## 2024-06-21 PROCEDURE — 82947 ASSAY GLUCOSE BLOOD QUANT: CPT

## 2024-06-21 PROCEDURE — 71045 X-RAY EXAM CHEST 1 VIEW: CPT

## 2024-06-21 PROCEDURE — 82435 ASSAY OF BLOOD CHLORIDE: CPT

## 2024-06-21 PROCEDURE — 93005 ELECTROCARDIOGRAM TRACING: CPT

## 2024-06-21 PROCEDURE — 36415 COLL VENOUS BLD VENIPUNCTURE: CPT

## 2024-06-21 PROCEDURE — 94660 CPAP INITIATION&MGMT: CPT

## 2024-06-21 PROCEDURE — 85610 PROTHROMBIN TIME: CPT

## 2024-06-21 PROCEDURE — 93325 DOPPLER ECHO COLOR FLOW MAPG: CPT

## 2024-06-21 PROCEDURE — 86900 BLOOD TYPING SEROLOGIC ABO: CPT

## 2024-06-21 PROCEDURE — 99261: CPT

## 2024-06-21 PROCEDURE — 80061 LIPID PANEL: CPT

## 2024-06-21 PROCEDURE — 85730 THROMBOPLASTIN TIME PARTIAL: CPT

## 2024-06-21 PROCEDURE — 84295 ASSAY OF SERUM SODIUM: CPT

## 2024-06-21 PROCEDURE — 97163 PT EVAL HIGH COMPLEX 45 MIN: CPT

## 2024-06-21 RX ADMIN — LEVETIRACETAM 500 MILLIGRAM(S): 100 INJECTION INTRAVENOUS at 05:31

## 2024-06-21 RX ADMIN — PANTOPRAZOLE SODIUM 40 MILLIGRAM(S): 40 INJECTION, POWDER, FOR SOLUTION INTRAVENOUS at 05:31

## 2024-06-21 RX ADMIN — APIXABAN 5 MILLIGRAM(S): 5 TABLET, FILM COATED ORAL at 05:31

## 2024-06-21 RX ADMIN — DROXIDOPA 200 MILLIGRAM(S): 200 CAPSULE ORAL at 05:31

## 2024-06-21 RX ADMIN — DROXIDOPA 200 MILLIGRAM(S): 200 CAPSULE ORAL at 12:47

## 2024-06-21 RX ADMIN — SEVELAMER CARBONATE 2400 MILLIGRAM(S): 800 FOR SUSPENSION ORAL at 08:48

## 2024-06-21 RX ADMIN — Medication 1 TABLET(S): at 12:47

## 2024-06-21 RX ADMIN — Medication 20 MILLIGRAM(S): at 08:48

## 2024-06-21 RX ADMIN — Medication 20 MILLIGRAM(S): at 12:47

## 2024-06-21 RX ADMIN — MIDODRINE HYDROCHLORIDE 40 MILLIGRAM(S): 10 TABLET ORAL at 05:32

## 2024-06-21 RX ADMIN — MIDODRINE HYDROCHLORIDE 40 MILLIGRAM(S): 10 TABLET ORAL at 12:47

## 2024-06-21 RX ADMIN — SEVELAMER CARBONATE 2400 MILLIGRAM(S): 800 FOR SUSPENSION ORAL at 12:47

## 2024-07-03 ENCOUNTER — APPOINTMENT (OUTPATIENT)
Dept: WOUND CARE | Facility: HOSPITAL | Age: 79
End: 2024-07-03

## 2024-08-20 ENCOUNTER — EMERGENCY (EMERGENCY)
Facility: HOSPITAL | Age: 79
LOS: 1 days | Discharge: HOME | End: 2024-08-20
Payer: MEDICARE

## 2024-08-20 VITALS
DIASTOLIC BLOOD PRESSURE: 66 MMHG | HEIGHT: 67 IN | OXYGEN SATURATION: 99 % | HEART RATE: 80 BPM | SYSTOLIC BLOOD PRESSURE: 105 MMHG | RESPIRATION RATE: 16 BRPM | TEMPERATURE: 98 F | WEIGHT: 238.1 LBS

## 2024-08-20 DIAGNOSIS — Z98.89 OTHER SPECIFIED POSTPROCEDURAL STATES: Chronic | ICD-10-CM

## 2024-08-20 LAB
ALBUMIN SERPL ELPH-MCNC: 3.9 G/DL — SIGNIFICANT CHANGE UP (ref 3.3–5)
ALBUMIN SERPL ELPH-MCNC: 4 G/DL — SIGNIFICANT CHANGE UP (ref 3.3–5)
ALP SERPL-CCNC: 281 U/L — HIGH (ref 40–120)
ALP SERPL-CCNC: 282 U/L — HIGH (ref 40–120)
ALT FLD-CCNC: 63 U/L — HIGH (ref 10–45)
ALT FLD-CCNC: 9 U/L — LOW (ref 10–45)
ANION GAP SERPL CALC-SCNC: 15 MMOL/L — SIGNIFICANT CHANGE UP (ref 5–17)
ANION GAP SERPL CALC-SCNC: 19 MMOL/L — HIGH (ref 5–17)
AST SERPL-CCNC: 17 U/L — SIGNIFICANT CHANGE UP (ref 10–40)
AST SERPL-CCNC: 20 U/L — SIGNIFICANT CHANGE UP (ref 10–40)
BASOPHILS # BLD AUTO: 0.08 K/UL — SIGNIFICANT CHANGE UP (ref 0–0.2)
BASOPHILS NFR BLD AUTO: 0.7 % — SIGNIFICANT CHANGE UP (ref 0–2)
BILIRUB SERPL-MCNC: 0.4 MG/DL — SIGNIFICANT CHANGE UP (ref 0.2–1.2)
BILIRUB SERPL-MCNC: 0.6 MG/DL — SIGNIFICANT CHANGE UP (ref 0.2–1.2)
BUN SERPL-MCNC: 26 MG/DL — HIGH (ref 7–23)
BUN SERPL-MCNC: 29 MG/DL — HIGH (ref 7–23)
CALCIUM SERPL-MCNC: 8.6 MG/DL — SIGNIFICANT CHANGE UP (ref 8.4–10.5)
CALCIUM SERPL-MCNC: 8.9 MG/DL — SIGNIFICANT CHANGE UP (ref 8.4–10.5)
CHLORIDE SERPL-SCNC: 89 MMOL/L — LOW (ref 96–108)
CHLORIDE SERPL-SCNC: 93 MMOL/L — LOW (ref 96–108)
CO2 SERPL-SCNC: 21 MMOL/L — LOW (ref 22–31)
CO2 SERPL-SCNC: 27 MMOL/L — SIGNIFICANT CHANGE UP (ref 22–31)
CREAT SERPL-MCNC: 4.98 MG/DL — HIGH (ref 0.5–1.3)
CREAT SERPL-MCNC: 5.49 MG/DL — HIGH (ref 0.5–1.3)
EGFR: 10 ML/MIN/1.73M2 — LOW
EGFR: 11 ML/MIN/1.73M2 — LOW
EOSINOPHIL # BLD AUTO: 0.12 K/UL — SIGNIFICANT CHANGE UP (ref 0–0.5)
EOSINOPHIL NFR BLD AUTO: 1 % — SIGNIFICANT CHANGE UP (ref 0–6)
GLUCOSE SERPL-MCNC: 103 MG/DL — HIGH (ref 70–99)
GLUCOSE SERPL-MCNC: 106 MG/DL — HIGH (ref 70–99)
HCT VFR BLD CALC: 42.4 % — SIGNIFICANT CHANGE UP (ref 39–50)
HGB BLD-MCNC: 12 G/DL — LOW (ref 13–17)
IMM GRANULOCYTES NFR BLD AUTO: 0.5 % — SIGNIFICANT CHANGE UP (ref 0–0.9)
LYMPHOCYTES # BLD AUTO: 1.18 K/UL — SIGNIFICANT CHANGE UP (ref 1–3.3)
LYMPHOCYTES # BLD AUTO: 9.9 % — LOW (ref 13–44)
MCHC RBC-ENTMCNC: 25.1 PG — LOW (ref 27–34)
MCHC RBC-ENTMCNC: 28.3 GM/DL — LOW (ref 32–36)
MCV RBC AUTO: 88.5 FL — SIGNIFICANT CHANGE UP (ref 80–100)
MONOCYTES # BLD AUTO: 0.86 K/UL — SIGNIFICANT CHANGE UP (ref 0–0.9)
MONOCYTES NFR BLD AUTO: 7.2 % — SIGNIFICANT CHANGE UP (ref 2–14)
NEUTROPHILS # BLD AUTO: 9.61 K/UL — HIGH (ref 1.8–7.4)
NEUTROPHILS NFR BLD AUTO: 80.7 % — HIGH (ref 43–77)
NRBC # BLD: 0 /100 WBCS — SIGNIFICANT CHANGE UP (ref 0–0)
PLATELET # BLD AUTO: 179 K/UL — SIGNIFICANT CHANGE UP (ref 150–400)
POTASSIUM SERPL-MCNC: 3.7 MMOL/L — SIGNIFICANT CHANGE UP (ref 3.5–5.3)
POTASSIUM SERPL-MCNC: SIGNIFICANT CHANGE UP (ref 3.5–5.3)
POTASSIUM SERPL-SCNC: 3.7 MMOL/L — SIGNIFICANT CHANGE UP (ref 3.5–5.3)
POTASSIUM SERPL-SCNC: SIGNIFICANT CHANGE UP (ref 3.5–5.3)
PROT SERPL-MCNC: 10.3 G/DL — HIGH (ref 6–8.3)
PROT SERPL-MCNC: 9.1 G/DL — HIGH (ref 6–8.3)
RBC # BLD: 4.79 M/UL — SIGNIFICANT CHANGE UP (ref 4.2–5.8)
RBC # FLD: 17.7 % — HIGH (ref 10.3–14.5)
SODIUM SERPL-SCNC: 129 MMOL/L — LOW (ref 135–145)
SODIUM SERPL-SCNC: 135 MMOL/L — SIGNIFICANT CHANGE UP (ref 135–145)
WBC # BLD: 11.91 K/UL — HIGH (ref 3.8–10.5)
WBC # FLD AUTO: 11.91 K/UL — HIGH (ref 3.8–10.5)

## 2024-08-20 PROCEDURE — 73080 X-RAY EXAM OF ELBOW: CPT | Mod: 26,LT

## 2024-08-20 PROCEDURE — 80053 COMPREHEN METABOLIC PANEL: CPT

## 2024-08-20 PROCEDURE — 73020 X-RAY EXAM OF SHOULDER: CPT

## 2024-08-20 PROCEDURE — 73030 X-RAY EXAM OF SHOULDER: CPT

## 2024-08-20 PROCEDURE — 73030 X-RAY EXAM OF SHOULDER: CPT | Mod: 26,LT

## 2024-08-20 PROCEDURE — 73080 X-RAY EXAM OF ELBOW: CPT

## 2024-08-20 PROCEDURE — 73090 X-RAY EXAM OF FOREARM: CPT | Mod: 26,LT

## 2024-08-20 PROCEDURE — 85025 COMPLETE CBC W/AUTO DIFF WBC: CPT

## 2024-08-20 PROCEDURE — 73060 X-RAY EXAM OF HUMERUS: CPT | Mod: 26,LT

## 2024-08-20 PROCEDURE — 73090 X-RAY EXAM OF FOREARM: CPT

## 2024-08-20 PROCEDURE — 99284 EMERGENCY DEPT VISIT MOD MDM: CPT | Mod: 25

## 2024-08-20 PROCEDURE — 73060 X-RAY EXAM OF HUMERUS: CPT

## 2024-08-20 PROCEDURE — 73020 X-RAY EXAM OF SHOULDER: CPT | Mod: 26,XE,LT

## 2024-08-20 PROCEDURE — 99284 EMERGENCY DEPT VISIT MOD MDM: CPT | Mod: GC

## 2024-08-20 RX ORDER — MIDODRINE HYDROCHLORIDE 2.5 MG/1
40 TABLET ORAL ONCE
Refills: 0 | Status: COMPLETED | OUTPATIENT
Start: 2024-08-20 | End: 2024-08-20

## 2024-08-20 RX ORDER — DROXIDOPA 100 MG/1
200 CAPSULE ORAL ONCE
Refills: 0 | Status: COMPLETED | OUTPATIENT
Start: 2024-08-20 | End: 2024-08-20

## 2024-08-20 RX ORDER — ACETAMINOPHEN 500 MG
975 TABLET ORAL ONCE
Refills: 0 | Status: COMPLETED | OUTPATIENT
Start: 2024-08-20 | End: 2024-08-20

## 2024-08-20 RX ADMIN — MIDODRINE HYDROCHLORIDE 40 MILLIGRAM(S): 2.5 TABLET ORAL at 20:01

## 2024-08-20 RX ADMIN — Medication 975 MILLIGRAM(S): at 20:02

## 2024-08-20 RX ADMIN — DROXIDOPA 200 MILLIGRAM(S): 100 CAPSULE ORAL at 21:20

## 2024-08-20 NOTE — ED ADULT NURSE NOTE - OBJECTIVE STATEMENT
78 y/o male BIB EMS with complaints of left elbow pain. Was being hoyered lifted at home and was dropped to the floor landing on his left arm. States he heard a "Pop". No visible deformities. Left AV fistula and patient and family is concerned because it is the arm with the fistula.. Received dialysis today ([facility], Sat), Denies CP, SOB, fevers, chills, numbness, tingling, weakness.

## 2024-08-20 NOTE — ED PROVIDER NOTE - PATIENT PORTAL LINK FT
You can access the FollowMyHealth Patient Portal offered by [facility] by registering at the following website: [email or web address]. By joining [employer]’s FollowMyHealth portal, you will also be able to view your health information using other applications (apps) compatible with our system. You can access the FollowMyHealth Patient Portal offered by [facility] by registering at the following website: [email or web address]. By joining [employer]’s FollowMyHealth portal, you will also be able to view your health information using other applications (apps) compatible with our system.

## 2024-08-20 NOTE — ED PROVIDER NOTE - PROGRESS NOTE DETAILS
4 = No assist / stand by assistance Patient given sling. Home medications ordered. Blood pressure rechecked and stable. Lab called with report Potassium was hemolyzed. Repeat comprehensive metabolic panel.

## 2024-08-20 NOTE — ED PROVIDER NOTE - CLINICAL SUMMARY MEDICAL DECISION MAKING FREE TEXT BOX
Patient is a 78 yo male history of chronic obstructive pulmonary disease, esrd, phtn, pulmonary embolism, sleep apnea, gout, szs, arthritis presenting with left elbow pain after be slowly lowered to ground due to weakness during wheelchair>bed transfer. Patient was laying on left elbow and pushed himself to turn when he heard a pop. Patient states his weakness that caused him to be unable to transfer is his baseline. He denied any head strike or loss of consciousness. Patient has fistula in left forearm, on exam fistula had normal thrill. Xrays revealed ***. Patient is a 78 yo male history of chronic obstructive pulmonary disease, esrd, phtn, pulmonary embolism, sleep apnea, gout, szs, arthritis presenting with left elbow pain after be slowly lowered to ground due to weakness during wheelchair>bed transfer. Patient was laying on left elbow and pushed himself to turn when he heard a pop. Patient states his weakness that caused him to be unable to transfer is his baseline. He denied any head strike or loss of consciousness. Patient has fistula in left forearm, on exam fistula had normal thrill. Xrays revealed no acute fracture or dislocation. Patient ok for discharge home with sling. Patient is a 78 yo male history of chronic obstructive pulmonary disease, esrd, phtn, pulmonary embolism, sleep apnea, gout, szs, arthritis presenting with left elbow pain after be slowly lowered to ground due to weakness during wheelchair>bed transfer. Patient was laying on left elbow and pushed himself to turn when he heard a pop. Patient states his weakness that caused him to be unable to transfer is his baseline. He denied any head strike or loss of consciousness. Patient has fistula in left forearm, on exam fistula had normal thrill. Xrays revealed no acute fracture or dislocation. Patient ok for discharge home with sling. Nonemergent ambulance transport organized for patient transport home. Patient is a 78 yo male history of chronic obstructive pulmonary disease, esrd, phtn, pulmonary embolism, sleep apnea, gout, szs, arthritis presenting with left elbow pain after be slowly lowered to ground due to weakness during wheelchair>bed transfer. Patient was laying on left elbow and pushed himself to turn when he heard a pop. Patient states his weakness that caused him to be unable to transfer is his baseline. He denied any head strike or loss of consciousness. Patient has fistula in left forearm, on exam fistula had normal thrill. Xrays revealed no acute fracture or dislocation. Lab work unchanged from baseline. Patient ok for discharge home with sling. Nonemergent ambulance transport organized for patient transport home.

## 2024-08-20 NOTE — ED ADULT NURSE NOTE - CAS DISCH TRANSFER METHOD
[FreeTextEntry1] : Pt sees Dr. Carolyn Davenport as her PCP at [facility]. Pt denied any significant medical problems or ongoing medications taken. 
Ambulance

## 2024-08-20 NOTE — ED PROVIDER NOTE - NSFOLLOWUPINSTRUCTIONS_ED_ALL_ED_FT
You were seen today for Left arm pain. Xrays of your forearm, elbow, humerus and shoulder were taken and showed no fracture or dislocation. You are provided a sling to use at home for pain control. Please wear it as tolerated. You may take it off to shower. As there is no acute injury, you may stop wearing it as your pain decreases. Take tylenol as needed for pain.    Try to avoid using the affected extremity whenever possible. You may apply ice to the affected area for no more than 15 minutes as needed for comfort. You may apply ice every 2-4 times a day as needed. Try to keep the affected extremity elevated whenever you are able to do so, as it will help to reduce swelling.     SEEK IMMEDIATE MEDICAL CARE IF YOU HAVE ANY OF THE FOLLOWING SYMPTOMS: numbness, tingling, pain, weakness, or skin color/temperature change in any part of your body distal to the injury.

## 2024-08-20 NOTE — ED PROVIDER NOTE - OBJECTIVE STATEMENT
Patient is a 78yo male with history of chronic obstructive pulmonary disease, esrd on dialysis, phtn, pulmonary embolism, sleep apnea, gout, seizures, arthritis presenting to ED with left elbow pain. Patient and wife state he was transferring from wheelchair to bed and his legs were too weak to hold himself, so his wife and daughter helped lower him slowly to the floor. Patient was lying on his left elbow, then turned and heard a pop. Patient is unable to extend arm.  Patient denies any headstrike, LOC. States this weakness is normal for him after dialysis. Patient has fistula on his left forearm. Patient is a 78yo male with history of chronic obstructive pulmonary disease, esrd on dialysis, phtn, pulmonary embolism, sleep apnea, gout, seizures, arthritis presenting to ED with left elbow pain. Patient and wife state he was transferring from wheelchair to bed and his legs were too weak to hold himself, so his wife and daughter helped lower him slowly to the floor. Patient was lying on his left elbow, then turned and heard a pop. Patient is unable to extend arm.  Patient denies any head strike, LOC. States this weakness is normal for him after dialysis. Patient has fistula on his left forearm, denies any harm to fistula.

## 2024-08-20 NOTE — ED PROVIDER NOTE - PHYSICAL EXAMINATION
General: alert, oriented to person, time, place  Psych: mood appropriate  Head: normocephalic; atraumatic  Eyes: PERRLA, EOMI, conjunctivae clear bilaterally, sclerae anicteric  ENT: no nasal flaring, patent nares  Cardio: RRR, no m/r/g, pulses 2+ b/l  Resp: CATB, no w/r/r  GI: abdomen soft, baseline distention, nontender.  : no CVA tenderness  Neuro: normal sensation, moving all four extremities equally  Skin: No evidence of rash or bruising  MSK: Pain to palpation lateral and medial L elbow, decreased ROM in extension of elbow. Normal ROM of shoulder no tenderness to palpation. No posterior prominence noted.   Lymph/Vasc: AV fistula thrill present, no UE/LE edema

## 2024-08-20 NOTE — ED PROVIDER NOTE - ATTENDING CONTRIBUTION TO CARE
Emergency Medicine Attending MD Vang:  patient seen and evaluated with the resident.  I was present for key portions of the History & Physical, and I agree with the Impression & Plan.    Patient is a 79-year-old male brought in by EMS after complaining of left elbow pain status post injury.  Patient has a medical history of ESRD, underwent full round hemodialysis today.  Typically feels globally weak after dialysis.  Was sitting in a chair at home and slowly started to slump out of the chair assisted to the floor by family onto his left side.  In the process of getting up he put a lot of pressure on the left arm and felt a pop in his elbow with immediate pain.    VS: wnl  Gen: Adult male, chronically ill-appearing  Head: NC/AT  Neck: trachea midline  Resp:  No distress  CV: RRR, no RMG  Abd: obese, soft, nontender to palpation  Ext: + Left upper extremity fistula with positive thrill.  Left elbow grossly within normal limits.  Decreased ROM to extension.  Full range of motion left shoulder  Neuro:  A&Ox4 appears non focal  Skin:  Warm and dry as visualized  Psych: appropriate    Medical Decision Making / Differential Diagnosis:  HPI concerning for possible left elbow fracture versus dislocation.  Difficult to determine clinically given habitus.  Family endorses that the patient is chronically weak after dialysis and is at his normal baseline.  As such, basic labs are not indicated at this time.

## 2024-08-20 NOTE — ED PROVIDER NOTE - NS ED ROS FT
GENERAL: No fever or chills  EYES: No change in vision  HEENT: No trouble swallowing or speaking  CARDIAC: No chest pain  PULMONARY: No cough or SOB  GI: No abdominal pain, no nausea or no vomiting, no diarrhea or constipation  : No changes in urination  SKIN: No rashes, bruising, bleeding  NEURO: No headache, no numbness  MSK: Elbow pain L  Otherwise as HPI or negative.

## 2024-08-21 ENCOUNTER — APPOINTMENT (OUTPATIENT)
Dept: OTOLARYNGOLOGY | Facility: CLINIC | Age: 79
End: 2024-08-21

## 2024-08-21 VITALS
RESPIRATION RATE: 20 BRPM | OXYGEN SATURATION: 95 % | SYSTOLIC BLOOD PRESSURE: 92 MMHG | HEART RATE: 89 BPM | DIASTOLIC BLOOD PRESSURE: 53 MMHG | TEMPERATURE: 98 F

## 2024-09-13 ENCOUNTER — APPOINTMENT (OUTPATIENT)
Dept: ORTHOPEDIC SURGERY | Facility: CLINIC | Age: 79
End: 2024-09-13
Payer: MEDICARE

## 2024-09-13 VITALS — HEIGHT: 69 IN | WEIGHT: 260 LBS | BODY MASS INDEX: 38.51 KG/M2

## 2024-09-13 DIAGNOSIS — M25.522 PAIN IN LEFT ELBOW: ICD-10-CM

## 2024-09-13 DIAGNOSIS — M67.922 UNSPECIFIED DISORDER OF SYNOVIUM AND TENDON, LEFT UPPER ARM: ICD-10-CM

## 2024-09-13 PROCEDURE — 99213 OFFICE O/P EST LOW 20 MIN: CPT

## 2024-10-23 ENCOUNTER — INPATIENT (INPATIENT)
Facility: HOSPITAL | Age: 79
LOS: 6 days | Discharge: HOME | DRG: 392 | End: 2024-10-30
Attending: INTERNAL MEDICINE | Admitting: INTERNAL MEDICINE
Payer: MEDICARE

## 2024-10-23 VITALS
TEMPERATURE: 98 F | WEIGHT: 244.93 LBS | RESPIRATION RATE: 20 BRPM | SYSTOLIC BLOOD PRESSURE: 95 MMHG | DIASTOLIC BLOOD PRESSURE: 60 MMHG | OXYGEN SATURATION: 99 % | HEIGHT: 67 IN | HEART RATE: 68 BPM

## 2024-10-23 DIAGNOSIS — Z98.89 OTHER SPECIFIED POSTPROCEDURAL STATES: Chronic | ICD-10-CM

## 2024-10-23 LAB
ALBUMIN SERPL ELPH-MCNC: 3.8 G/DL — SIGNIFICANT CHANGE UP (ref 3.3–5)
ALP SERPL-CCNC: 360 U/L — HIGH (ref 40–120)
ALT FLD-CCNC: 6 U/L — LOW (ref 10–45)
ANION GAP SERPL CALC-SCNC: 16 MMOL/L — SIGNIFICANT CHANGE UP (ref 5–17)
AST SERPL-CCNC: 10 U/L — SIGNIFICANT CHANGE UP (ref 10–40)
BASOPHILS # BLD AUTO: 0.08 K/UL — SIGNIFICANT CHANGE UP (ref 0–0.2)
BASOPHILS NFR BLD AUTO: 0.8 % — SIGNIFICANT CHANGE UP (ref 0–2)
BILIRUB SERPL-MCNC: 0.4 MG/DL — SIGNIFICANT CHANGE UP (ref 0.2–1.2)
BUN SERPL-MCNC: 35 MG/DL — HIGH (ref 7–23)
CALCIUM SERPL-MCNC: 8.2 MG/DL — LOW (ref 8.4–10.5)
CHLORIDE SERPL-SCNC: 93 MMOL/L — LOW (ref 96–108)
CO2 SERPL-SCNC: 23 MMOL/L — SIGNIFICANT CHANGE UP (ref 22–31)
CREAT SERPL-MCNC: 6.73 MG/DL — HIGH (ref 0.5–1.3)
EGFR: 8 ML/MIN/1.73M2 — LOW
EOSINOPHIL # BLD AUTO: 0.42 K/UL — SIGNIFICANT CHANGE UP (ref 0–0.5)
EOSINOPHIL NFR BLD AUTO: 4 % — SIGNIFICANT CHANGE UP (ref 0–6)
GAS PNL BLDV: SIGNIFICANT CHANGE UP
GLUCOSE SERPL-MCNC: 91 MG/DL — SIGNIFICANT CHANGE UP (ref 70–99)
HCT VFR BLD CALC: 36 % — LOW (ref 39–50)
HGB BLD-MCNC: 10.6 G/DL — LOW (ref 13–17)
IMM GRANULOCYTES NFR BLD AUTO: 0.3 % — SIGNIFICANT CHANGE UP (ref 0–0.9)
LIDOCAIN IGE QN: 99 U/L — HIGH (ref 7–60)
LYMPHOCYTES # BLD AUTO: 19.7 % — SIGNIFICANT CHANGE UP (ref 13–44)
LYMPHOCYTES # BLD AUTO: 2.06 K/UL — SIGNIFICANT CHANGE UP (ref 1–3.3)
MCHC RBC-ENTMCNC: 27 PG — SIGNIFICANT CHANGE UP (ref 27–34)
MCHC RBC-ENTMCNC: 29.4 GM/DL — LOW (ref 32–36)
MCV RBC AUTO: 91.8 FL — SIGNIFICANT CHANGE UP (ref 80–100)
MONOCYTES # BLD AUTO: 0.8 K/UL — SIGNIFICANT CHANGE UP (ref 0–0.9)
MONOCYTES NFR BLD AUTO: 7.7 % — SIGNIFICANT CHANGE UP (ref 2–14)
NEUTROPHILS # BLD AUTO: 7.05 K/UL — SIGNIFICANT CHANGE UP (ref 1.8–7.4)
NEUTROPHILS NFR BLD AUTO: 67.5 % — SIGNIFICANT CHANGE UP (ref 43–77)
NRBC # BLD: 0 /100 WBCS — SIGNIFICANT CHANGE UP (ref 0–0)
PLATELET # BLD AUTO: 219 K/UL — SIGNIFICANT CHANGE UP (ref 150–400)
POTASSIUM SERPL-MCNC: 4.5 MMOL/L — SIGNIFICANT CHANGE UP (ref 3.5–5.3)
POTASSIUM SERPL-SCNC: 4.5 MMOL/L — SIGNIFICANT CHANGE UP (ref 3.5–5.3)
PROT SERPL-MCNC: 8.7 G/DL — HIGH (ref 6–8.3)
RBC # BLD: 3.92 M/UL — LOW (ref 4.2–5.8)
RBC # FLD: 16.9 % — HIGH (ref 10.3–14.5)
SODIUM SERPL-SCNC: 132 MMOL/L — LOW (ref 135–145)
WBC # BLD: 10.44 K/UL — SIGNIFICANT CHANGE UP (ref 3.8–10.5)
WBC # FLD AUTO: 10.44 K/UL — SIGNIFICANT CHANGE UP (ref 3.8–10.5)

## 2024-10-23 PROCEDURE — 99285 EMERGENCY DEPT VISIT HI MDM: CPT

## 2024-10-23 PROCEDURE — 74177 CT ABD & PELVIS W/CONTRAST: CPT | Mod: 26,MC

## 2024-10-23 RX ORDER — PREGABALIN 150 MG/1
150 CAPSULE ORAL DAILY
Refills: 0 | Status: DISCONTINUED | OUTPATIENT
Start: 2024-10-23 | End: 2024-10-26

## 2024-10-23 RX ORDER — APIXABAN 5 MG/1
5 TABLET, FILM COATED ORAL
Refills: 0 | Status: DISCONTINUED | OUTPATIENT
Start: 2024-10-23 | End: 2024-10-30

## 2024-10-23 RX ORDER — MIDODRINE HYDROCHLORIDE 2.5 MG/1
40 TABLET ORAL THREE TIMES A DAY
Refills: 0 | Status: DISCONTINUED | OUTPATIENT
Start: 2024-10-23 | End: 2024-10-30

## 2024-10-23 RX ORDER — LIDOCAINE HYDROCHLORIDE 40 MG/ML
1 SOLUTION TOPICAL
Refills: 0 | DISCHARGE

## 2024-10-23 RX ORDER — ALLOPURINOL 100 MG
100 TABLET ORAL DAILY
Refills: 0 | Status: DISCONTINUED | OUTPATIENT
Start: 2024-10-23 | End: 2024-10-30

## 2024-10-23 RX ORDER — B-COMPLEX WITH VITAMIN C
1 VIAL (ML) INJECTION DAILY
Refills: 0 | Status: DISCONTINUED | OUTPATIENT
Start: 2024-10-23 | End: 2024-10-30

## 2024-10-23 RX ORDER — SIMETHICONE 80 MG/1
80 TABLET, CHEWABLE ORAL EVERY 6 HOURS
Refills: 0 | Status: COMPLETED | OUTPATIENT
Start: 2024-10-23 | End: 2024-10-26

## 2024-10-23 RX ORDER — POLYETHYLENE GLYCOL 3350 17 G/17G
17 POWDER, FOR SOLUTION ORAL DAILY
Refills: 0 | Status: DISCONTINUED | OUTPATIENT
Start: 2024-10-23 | End: 2024-10-30

## 2024-10-23 RX ORDER — CINACALCET 30 MG/1
90 TABLET, FILM COATED ORAL
Refills: 0 | Status: DISCONTINUED | OUTPATIENT
Start: 2024-10-23 | End: 2024-10-30

## 2024-10-23 RX ORDER — NYSTATIN 100000 U/G
1 POWDER TOPICAL
Refills: 0 | DISCHARGE

## 2024-10-23 RX ORDER — DICYCLOMINE HYDROCHLORIDE 20 MG/1
20 TABLET ORAL
Refills: 0 | Status: DISCONTINUED | OUTPATIENT
Start: 2024-10-23 | End: 2024-10-30

## 2024-10-23 RX ORDER — MIDODRINE HYDROCHLORIDE 2.5 MG/1
40 TABLET ORAL ONCE
Refills: 0 | Status: DISCONTINUED | OUTPATIENT
Start: 2024-10-23 | End: 2024-10-23

## 2024-10-23 RX ORDER — INFLUENZ VIR VAC TV P-SURF2003 15MCG/.5ML
0.5 SYRINGE (ML) INTRAMUSCULAR ONCE
Refills: 0 | Status: DISCONTINUED | OUTPATIENT
Start: 2024-10-23 | End: 2024-10-30

## 2024-10-23 RX ORDER — DICLOFENAC SODIUM AND BENZALKONIUM CHLORIDE
2 KIT
Refills: 0 | DISCHARGE

## 2024-10-23 RX ORDER — DROXIDOPA 100 MG/1
1 CAPSULE ORAL
Refills: 0 | DISCHARGE

## 2024-10-23 RX ORDER — ERYTHROPOIETIN 10000 [IU]/ML
10000 INJECTION, SOLUTION INTRAVENOUS; SUBCUTANEOUS
Refills: 0 | Status: DISCONTINUED | OUTPATIENT
Start: 2024-10-23 | End: 2024-10-30

## 2024-10-23 RX ORDER — ALBUTEROL 90 MCG
2 AEROSOL (GRAM) INHALATION EVERY 6 HOURS
Refills: 0 | Status: DISCONTINUED | OUTPATIENT
Start: 2024-10-23 | End: 2024-10-30

## 2024-10-23 RX ORDER — SEVELAMER CARBONATE 800 MG/1
2400 TABLET, FILM COATED ORAL
Refills: 0 | Status: DISCONTINUED | OUTPATIENT
Start: 2024-10-23 | End: 2024-10-30

## 2024-10-23 RX ORDER — PANTOPRAZOLE SODIUM 40 MG/1
40 TABLET, DELAYED RELEASE ORAL
Refills: 0 | Status: DISCONTINUED | OUTPATIENT
Start: 2024-10-23 | End: 2024-10-30

## 2024-10-23 RX ORDER — LEVETIRACETAM 500 MG/1
500 TABLET, FILM COATED ORAL
Refills: 0 | Status: DISCONTINUED | OUTPATIENT
Start: 2024-10-23 | End: 2024-10-30

## 2024-10-23 RX ADMIN — LEVETIRACETAM 500 MILLIGRAM(S): 500 TABLET, FILM COATED ORAL at 17:56

## 2024-10-23 RX ADMIN — SIMETHICONE 80 MILLIGRAM(S): 80 TABLET, CHEWABLE ORAL at 22:43

## 2024-10-23 RX ADMIN — SEVELAMER CARBONATE 2400 MILLIGRAM(S): 800 TABLET, FILM COATED ORAL at 17:55

## 2024-10-23 RX ADMIN — DICYCLOMINE HYDROCHLORIDE 20 MILLIGRAM(S): 20 TABLET ORAL at 17:55

## 2024-10-23 RX ADMIN — MIDODRINE HYDROCHLORIDE 40 MILLIGRAM(S): 2.5 TABLET ORAL at 22:43

## 2024-10-23 RX ADMIN — MIDODRINE HYDROCHLORIDE 40 MILLIGRAM(S): 2.5 TABLET ORAL at 14:28

## 2024-10-23 RX ADMIN — APIXABAN 5 MILLIGRAM(S): 5 TABLET, FILM COATED ORAL at 17:56

## 2024-10-23 NOTE — ED PROVIDER NOTE - PHYSICAL EXAMINATION
abdomen is soft w/o ttp rebound or guarding, umbilical hernia without overlying skin changes or ttp  breathing comfortably on RA  there is no BLE edema  warm and well perfused on exam

## 2024-10-23 NOTE — PATIENT PROFILE ADULT - FUNCTIONAL ASSESSMENT - BASIC MOBILITY 6.
1-calculated by average/Not able to assess (calculate score using [facility] averaging method)

## 2024-10-23 NOTE — H&P ADULT - NSHPPHYSICALEXAM_GEN_ALL_CORE
T(C): 36.6 (10-23-24 @ 14:15), Max: 36.6 (10-23-24 @ 11:59)  HR: 66 (10-23-24 @ 14:15) (61 - 68)  BP: 93/47 (10-23-24 @ 14:15) (93/47 - 104/58)  RR: 18 (10-23-24 @ 14:15) (18 - 20)  SpO2: 98% (10-23-24 @ 14:15) (98% - 100%)    PHYSICAL EXAM:  GENERAL: NAD, well-developed  HEAD:  Atraumatic, Normocephalic  EYES: EOMI, PERRLA, conjunctiva and sclera clear  NECK: Supple, No JVD  CHEST/LUNG: Clear to auscultation bilaterally; No wheeze  HEART: Regular rate and rhythm; No murmurs, rubs, or gallops  ABDOMEN: Soft, Nontender, Nondistended; Bowel sounds present  EXTREMITIES:  2+ Peripheral Pulses, No clubbing, cyanosis, or edema  PSYCH: AAOx3  NEUROLOGY: non-focal  SKIN: No rashes or lesions

## 2024-10-23 NOTE — CONSULT NOTE ADULT - ASSESSMENT
79 y/o M with PMH of ESRD on HDTuThSat- last yesterday, orthostatic hypotension on midodrine baseline BP 80-90 systolic, COPD reportedly on home O2 2L, SAADIA on CPAP, wheelchair bound, chronic diastolic CHF, morbid obesity, pulmonary hypertension, PE and DVT on Eliquis, IVC filter, chronic sacral ulcer, chronic back pain, history of hematochezia in the past 2/2 hemorrhoids, chronic abd pain 2/2 IBS, chronic constipation presenting for severe abdominal pain for few weeks. Nephro on board for ESRD on HD    ESRD   On HD TTS via AV[facility]  Nephrologist: Dr. Whalen   Outpt unit [facility]   Consent obtained in chart witnessed  Last HD on 10/23  Plan for HD tomorrow. orders placed, no urgent needs today  Renal diet.  Monitor BMP     Anemia   MELISSA with HD for hgb <11  transfuse for hgb <7  monitor Hgb     HTN/Hypotension  bp fluctuating; generally low.  On midodrine 40 mg TID and Droxidopa 200 mg TID  Monitor closely     CKD-MBD  check PTH  monitor P04 and Ca daily

## 2024-10-23 NOTE — CONSULT NOTE ADULT - ASSESSMENT
A/P  78 y/o Male with PMH of ESRD on HD TuThSat- last HD was 10/22, has baseline orthostatic hypotension on Droxidopa AND midodrine baseline BP 60-80 systolic, COPD,  on home O2 2L, SAADIA on CPAP, wheelchair bound, IBS w chronic abd pain, chronic diastolic CHF, morbid obesity, sacral decub ulcer, pulmonary hypertension, hemorrhoids, IBS,  PE and DVT on Eliquis who presents with worsening abd pain. worse in upright position or when moves, stable in a supine position when still  Ptn was last admitted in June 2024 w worsening RV  Heart failure, respiratory failure,  Patient has MOLST stating DNR/DNI and patient does not want central access but is open to short trial of peripheral pressors.  Since DC his orthostasis had gotten worse and most recently he is unable to bear the severity of his abdominal pain. He knows use of narcotics for abd pain will be difficult 2/2 severe orthostatic hypotension. no vomiting, fevers, chills, diarrhea, chest pain, sob. tolerating po intake    #Abdominal pain  -no associated CP or SOB  -CT A/P shows small hiatal hernia; no acute intra-abdominal findings   -med and GI f/u    #ESRD on HD  -renal eval  -HD per renal    #Hx of Orthostatic Hypotension  -cont midodrine  -pt takes droxidopa at home - resume as needed     #Chronic diastolic CHF  -TTE 6/2024 shows LVSF appears grossly normal; mildly enlarged RV; mild TR  -volume status stable  -no decomp HF on exam  -cont volume removal with HD per renal    #Hx of PE, DVT  -cont Eliquis    DVT ppx

## 2024-10-23 NOTE — ED PROVIDER NOTE - CLINICAL SUMMARY MEDICAL DECISION MAKING FREE TEXT BOX
Fellow Note Uriel: abdominal pain. will evaluate for pancreatitis, hernia, intraabdominal infection with labs and ct a/p. Will order for pts 2pm midodrine. pt and daughter in agreement with this plan

## 2024-10-23 NOTE — H&P ADULT - ASSESSMENT
80 y/o Male, my office ptn,  with PMH of ESRD on HD TuThSat- last HD was 10/22, has baseline orthostatic hypotension on Droxidopa AND midodrine baseline BP 60-80 systolic, COPD,  on home O2 2L, SAADIA on CPAP, wheelchair bound, IBS w chronic abd pain, chronic diastolic CHF, morbid obesity, sacral decub ulcer, pulmonary hypertension, hemorrhoids, IBS,  PE and DVT on Eliquis who presents with worsening abd pain. worse in upright position or when moves, stable in a supine position when still  Ptn was last admitted in June 2024 w worsening RV  Heart failure, respiratory failure,  Patient has MOLST stating DNR/DNI and patient does not want central access but is open to short trial of peripheral pressors.  Since DC his orthostasis had gotten worse and most recently he is unable to bear the severity of his abdominal pain. He knows use of narcotics for abd pain will be difficult 2/2 severe orthostatic hypotension.     no vomiting, fevers, chills, diarrhea, chest pain, sob. tolerating po intake    GI consulted for abdominal pain, pt known to our service from previous admissions The patients wife bedside aided in history.     Over the last week or so he has c/o increased abdominal "gnawing" pain while he is in the seated position, when he is laying down the pain subsides, however, it is still somewhat gnawing.     He typically feels his worst after HD sessions, however, finds it has been occurring even on his non-hd days.      He has no associated nausea or vomiting, no diarrheal episodes. He has no appetite changes. About 4 days ago he had an increased amount of soft stools with some blood tinge to it, however, this occurs at times d/t his hemorrhoids (has been treated w/anusol supp with success)   Colonoscopy notable for hemorrhoids and diverticulosis. EGD 2022 showed esophagitis, gastritis, duodenitis. Ptn has not been hemodynamically stable for any additional scopes.    His pain is prob 2/2 low flow state in the mesentery 2/2 hypotension. he is already on max dosing of droxi dopa and Midodrine.     Hospice was d/w ptn and his wife prior to arrival. he wasn't ready to stop HD.     Acute on Chronic Abdominal Pain   seems positional and most likely Blood pressure dependent 2/2 low flow state in the mesentery  chronic mesenteric ischemia d/t chronic low flow state (hypotension)  CT A/P w/IV contrast : no new findings  seen by GI  palliative team called for pain control  GOC brought up w the ptn, he wants to cont HD. he is DNR/DNI, no artificial nutrition    Orthostatic Hypotension  - severe, cont DroxiDOpa and Midodrine     ESRD on HD  - cont TUe, THu, Sat    CHF    COPD on home 02    Morbid obesity     Neuropathy    IBS

## 2024-10-23 NOTE — CONSULT NOTE ADULT - ASSESSMENT
78yo morbidly obese man h/o of ESRD on HD, orthostatic hypotension (on midodrine), COPD on home O2, SAADIA on CPAP, wheelchair bound, chronic diastolic CHF, pulmonary hypertension, PE and DVT (believes on Eliquis), chronic sacral ulcer, hemorrhoids and IBS-C presents with worsening abdominal pain x 5 days    1. Acute on Chronic Abdominal Pain   seems positional; r/o pancreatitis, constipation, colitis/diverticulitis  ?hypoperfusion r/t hypotension   check CT A/P w/contrast   pain control as needed   may consider trial of Levsin tid   simethicone q6h x 3 days   start bowel regimen once obstruction ruled out  further recs pending above    2. Hemorrhoids   anusol supp qhs as needed  keep stools soft to prevent irritation     3. h/o PE/DVT  PPI ppx on a/c     4. Orthostatic hypotension   cont midodrine     5. ESRD on HD    6. CHF    The plan of care was discussed with the physician assistant and modifications were made to the notation where appropriate.   Differential diagnosis and plan of care discussed with patient after the evaluation  35 minutes spent on total encounter of which more than fifty percent of the encounter was spent counseling and/or coordinating care by the attending physician.    [facility]  Jin Castillo M.D.   [address], [place]  Office: [phone]       6. COPD on home 02    6. Morbid obesity    80yo morbidly obese man h/o of ESRD on HD, orthostatic hypotension (on midodrine), COPD on home O2, SAADIA on CPAP, wheelchair bound, chronic diastolic CHF, pulmonary hypertension, PE and DVT (believes on Eliquis), chronic sacral ulcer, hemorrhoids and IBS-C presents with worsening abdominal pain x 5 days    1. Acute on Chronic Abdominal Pain   seems positional; r/o pancreatitis, constipation, colitis/diverticulitis  ?chronic mesenteric ischemia d/t chronic low flow state (hypotension)  check CT A/P w/IV contrast   pain control as needed   may consider trial of Levsin tid   simethicone q6h x 3 days   start bowel regimen once obstruction ruled out  further recs pending above    2. Hemorrhoids   anusol supp qhs as needed  keep stools soft to prevent irritation     3. h/o PE/DVT  PPI ppx on a/c     4. Orthostatic hypotension   cont midodrine     5. ESRD on HD    6. CHF    The plan of care was discussed with the physician assistant and modifications were made to the notation where appropriate.   Differential diagnosis and plan of care discussed with patient after the evaluation  35 minutes spent on total encounter of which more than fifty percent of the encounter was spent counseling and/or coordinating care by the attending physician.    [facility]  Jin Castillo M.D.   [address], [place]  Office: [phone]       6. COPD on home 02    6. Morbid obesity    78yo morbidly obese man h/o of ESRD on HD, orthostatic hypotension (on midodrine), COPD on home O2, SAADIA on CPAP, wheelchair bound, chronic diastolic CHF, pulmonary hypertension, PE and DVT (believes on Eliquis), chronic sacral ulcer, hemorrhoids and IBS-C presents with worsening abdominal pain x 5 days    1. Acute on Chronic Abdominal Pain   seems positional; r/o pancreatitis, constipation, colitis/diverticulitis  ?chronic mesenteric ischemia d/t chronic low flow state (hypotension)  check CT A/P w/IV contrast   pain control as needed   may consider trial of Levsin tid   simethicone q6h x 3 days   further recs pending above    2. Hemorrhoids   anusol supp qhs as needed  start bowel regimen once obstruction ruled out    3. h/o PE/DVT  PPI ppx on a/c     4. Orthostatic hypotension   cont midodrine     5. ESRD on HD    6. CHF    The plan of care was discussed with the physician assistant and modifications were made to the notation where appropriate.   Differential diagnosis and plan of care discussed with patient after the evaluation  35 minutes spent on total encounter of which more than fifty percent of the encounter was spent counseling and/or coordinating care by the attending physician.    [facility]  Jin Castillo M.D.   [address], [place]  Office: [phone]       6. COPD on home 02    6. Morbid obesity

## 2024-10-23 NOTE — PATIENT PROFILE ADULT - TRANSPORTATION
[facility] Call Center    Phone Message    May a detailed message be left on voicemail: yes     Reason for Call: Other: Pt wife calling and has questions about upcoming procedure on 6/17 that she would like answered. Please return her call at [phone] or [phone]. Thank you.     Action Taken: Message routed to:  [facility] cardio    Travel Screening: Not Applicable                                                                      
Spoke with Fide. She was asking if having PFT's s/p angiogram day 1 would ok.    I explained that the only restriction that structural team will have for Bill is no heavy lifting for 7 days nothing greater than 10 pounds.    All questions answered.Fide stated understanding.  
no

## 2024-10-23 NOTE — ED ADULT NURSE NOTE - OBJECTIVE STATEMENT
79y male coming from home c/o worsening back and abdominal pain for the past week. PMH CKD, COPD on 2 liters O2 at baseline, Pulmonary HTN, neuropathy, IBS and bone spurs. Pt states he has chronic back and abdominal pain but has been worsening. Bowel sounds present, denies pain on palpation, no tenderness or distention noted. Denies fever, SOB, chest pain, N/V/D. Bowel movements are regular and is on dialysis 3x week, last dialysis yesterday 10/22. Has left [facility] for HD. As per wife, pt is primarily bed bound, can use wheelchair for doctor appoints and dialysis.

## 2024-10-23 NOTE — H&P ADULT - HISTORY OF PRESENT ILLNESS
80 y/o Male, my office ptn,  with PMH of ESRD on HD TuThSat- last HD was 10/22, has baseline orthostatic hypotension on Droxidopa AND midodrine baseline BP 60-80 systolic, COPD,  on home O2 2L, SAADIA on CPAP, wheelchair bound, IBS w chronic abd pain, chronic diastolic CHF, morbid obesity, sacral decub ulcer, pulmonary hypertension, hemorrhoids, IBS,  PE and DVT on Eliquis who presents with worsening abd pain. worse in upright position or when moves, stable in a supine position when still  Ptn was last admitted in June 2024 w worsening RV  Heart failure, respiratory failure,  Patient has MOLST stating DNR/DNI and patient does not want central access but is open to short trial of peripheral pressors.  Since DC his orthostasis had gotten worse and most recently he is unable to bear the severity of his abdominal pain. He knows use of narcotics for abd pain will be difficult 2/2 severe orthostatic hypotension.     no vomiting, fevers, chills, diarrhea, chest pain, sob. tolerating po intake    GI consulted for abdominal pain, pt known to our service from previous admissions The patients wife bedside aided in history.     Over the last week or so he has c/o increased abdominal "gnawing" pain while he is in the seated position, when he is laying down the pain subsides, however, it is still somewhat gnawing.     He typically feels his worst after HD sessions, however, finds it has been occurring even on his non-hd days.      He has no associated nausea or vomiting, no diarrheal episodes. He has no appetite changes. About 4 days ago he had an increased amount of soft stools with some blood tinge to it, however, this occurs at times d/t his hemorrhoids (has been treated w/anusol supp with success)   Colonoscopy notable for hemorrhoids and diverticulosis. EGD 2022 showed esophagitis, gastritis, duodenitis. Ptn has not been hemodynamically stable for any additional scopes.    His pain is prob 2/2 low flow state in the mesentery 2/2 hypotension. he is already on max dosing of droxi dopa and Midodrine.     Hospice was d/w ptn and his wife prior to arrival. he wasn't ready to stop HD.

## 2024-10-23 NOTE — ED PROVIDER NOTE - OBJECTIVE STATEMENT
Fellow Note: 79M hx CKD(last dialysis session 10.22), htn, copd (on 2L), chronic midodrine use presenting with worsening chronic abdominal pain a/w nausea. no vomiting, fevers, chills, diarrhea, chest pain, sob.

## 2024-10-23 NOTE — ED ADULT NURSE NOTE - DRUG PRE-SCREENING (DAST -1)
UTI:  - UTI: course of bactrim and sending urine for culture. 2nd or 3rd UTI in the past year. Likely related to the urinary retention and possibly hygiene issue. Diagnosis Orders   1. Acute cystitis with hematuria  POCT Urinalysis No Micro (Auto)    sulfamethoxazole-trimethoprim (BACTRIM DS;SEPTRA DS) 800-160 MG per tablet    Urine Culture        Return if symptoms worsen or fail to improve.     Ena Joseph MD Statement Selected

## 2024-10-23 NOTE — ED PROVIDER NOTE - ATTENDING CONTRIBUTION TO CARE
Elderly male with extensive PMHx p/w abdominal pain. Patient is of poor baseline health; previous notes state DNR/DNI. Will need labwork and imaging; low threshold for admission. Etiologies include diverticulitis, appendicitis, SBO vs. gastritis, constipation or colitis.

## 2024-10-23 NOTE — PATIENT PROFILE ADULT - FALL HARM RISK - HARM RISK INTERVENTIONS
Assistance with ambulation/Assistance OOB with selected safe patient handling equipment/Communicate Risk of Fall with Harm to all staff/Discuss with provider need for PT consult/Monitor gait and stability/Provide patient with walking aids - walker, cane, crutches/Reinforce activity limits and safety measures with patient and family/Sit up slowly, dangle for a short time, stand at bedside before walking/Tailored Fall Risk Interventions/Visual Cue: Yellow wristband and red socks/Bed in lowest position, wheels locked, appropriate side rails in place/Call bell, personal items and telephone in reach/Instruct patient to call for assistance before getting out of bed or chair/Non-slip footwear when patient is out of bed/[place] to call system/Physically safe environment - no spills, clutter or unnecessary equipment/Purposeful Proactive Rounding/Room/bathroom lighting operational, light cord in reach

## 2024-10-24 RX ORDER — DROXIDOPA 100 MG/1
200 CAPSULE ORAL THREE TIMES A DAY
Refills: 0 | Status: DISCONTINUED | OUTPATIENT
Start: 2024-10-24 | End: 2024-10-30

## 2024-10-24 RX ADMIN — POLYETHYLENE GLYCOL 3350 17 GRAM(S): 17 POWDER, FOR SOLUTION ORAL at 17:22

## 2024-10-24 RX ADMIN — DICYCLOMINE HYDROCHLORIDE 20 MILLIGRAM(S): 20 TABLET ORAL at 07:58

## 2024-10-24 RX ADMIN — SIMETHICONE 80 MILLIGRAM(S): 80 TABLET, CHEWABLE ORAL at 06:36

## 2024-10-24 RX ADMIN — ERYTHROPOIETIN 10000 UNIT(S): 10000 INJECTION, SOLUTION INTRAVENOUS; SUBCUTANEOUS at 13:09

## 2024-10-24 RX ADMIN — Medication 1 TABLET(S): at 17:22

## 2024-10-24 RX ADMIN — DICYCLOMINE HYDROCHLORIDE 20 MILLIGRAM(S): 20 TABLET ORAL at 17:19

## 2024-10-24 RX ADMIN — Medication 100 MILLIGRAM(S): at 17:23

## 2024-10-24 RX ADMIN — LEVETIRACETAM 500 MILLIGRAM(S): 500 TABLET, FILM COATED ORAL at 17:19

## 2024-10-24 RX ADMIN — MIDODRINE HYDROCHLORIDE 40 MILLIGRAM(S): 2.5 TABLET ORAL at 06:35

## 2024-10-24 RX ADMIN — APIXABAN 5 MILLIGRAM(S): 5 TABLET, FILM COATED ORAL at 17:20

## 2024-10-24 RX ADMIN — SEVELAMER CARBONATE 2400 MILLIGRAM(S): 800 TABLET, FILM COATED ORAL at 07:58

## 2024-10-24 RX ADMIN — PANTOPRAZOLE SODIUM 40 MILLIGRAM(S): 40 TABLET, DELAYED RELEASE ORAL at 06:36

## 2024-10-24 RX ADMIN — APIXABAN 5 MILLIGRAM(S): 5 TABLET, FILM COATED ORAL at 06:35

## 2024-10-24 RX ADMIN — SIMETHICONE 80 MILLIGRAM(S): 80 TABLET, CHEWABLE ORAL at 17:19

## 2024-10-24 RX ADMIN — CINACALCET 90 MILLIGRAM(S): 30 TABLET, FILM COATED ORAL at 06:36

## 2024-10-24 RX ADMIN — MIDODRINE HYDROCHLORIDE 40 MILLIGRAM(S): 2.5 TABLET ORAL at 17:23

## 2024-10-24 RX ADMIN — SEVELAMER CARBONATE 2400 MILLIGRAM(S): 800 TABLET, FILM COATED ORAL at 17:19

## 2024-10-24 RX ADMIN — LEVETIRACETAM 500 MILLIGRAM(S): 500 TABLET, FILM COATED ORAL at 06:35

## 2024-10-24 RX ADMIN — DROXIDOPA 200 MILLIGRAM(S): 100 CAPSULE ORAL at 22:29

## 2024-10-24 NOTE — ADVANCED PRACTICE NURSE CONSULT - ASSESSMENT
Arrived on unit, patient was found lying in a low air loss pressure redistribution support surface style bed. The patient  is unable to turn independently and staff assistance x 1was provided. Once turned,  able to view his skin. Patient is anuric.   Ayla rectal area with erythema. Gluteal Cleft  with partial thickness /denuded consistent with moisture associated dermatitis.  bilateral  sacrum /coccyxbuttocks non-blanchable maroon discoloration, induration and hyperpigmentation approximately 16 cm x 10cm x 0 cm ,consistent with a deep tissue injury.  present on admission. Patient stated " had been to wound care specialist and putting cream  prior to hospitalization. patient  was educated  on the importance  for turning  and positioning  every 2 hours. The use of waffle cushion when out of bed  to chair,  and to shift her Weight every 2 hours while in chair. The importance a keeping her skin clean and dry and  to offload feet/heels , and optimal nutrition. Arrived on unit, patient was found lying in a low air loss pressure redistribution support surface style bed. The patient  is unable to turn independently and staff assistance x 1was provided. Once turned,  able to view his skin. Patient is anuric.   Ayla rectal area with erythema. Gluteal Cleft  with partial thickness /denuded consistent with moisture associated dermatitis.  bilateral  sacrum /coccyx/ buttocks non-blanchable maroon discoloration, induration and hyperpigmentation approximately 16 cm x 10cm x 0 cm ,consistent with a deep tissue injury.  present on admission. Patient stated " had been to wound care specialist and putting cream  prior to hospitalization. patient  was educated  on the importance  for turning  and positioning  every 2 hours. The use of waffle cushion when out of bed  to chair,  and to shift her Weight every 2 hours while in chair. The importance a keeping her skin clean and dry and  to offload feet/heels , and optimal nutrition.

## 2024-10-24 NOTE — PROGRESS NOTE ADULT - SUBJECTIVE AND OBJECTIVE BOX
CARDIOLOGY FOLLOW UP - Dr. Livingston  DATE OF SERVICE: 10/24/24    CC no CP or SOB      REVIEW OF SYSTEMS:  CONSTITUTIONAL: No fever, weight loss, or fatigue  RESPIRATORY: No cough, wheezing, chills or hemoptysis; No Shortness of Breath  CARDIOVASCULAR: No chest pain, palpitations, passing out, dizziness  GASTROINTESTINAL: No abdominal or epigastric pain. No nausea, vomiting, or hematemesis; No diarrhea or constipation. No melena or hematochezia.      PHYSICAL EXAM:  T(C): 36.6 (10-24-24 @ 05:43), Max: 36.9 (10-23-24 @ 18:20)  HR: 73 (10-24-24 @ 05:43) (61 - 78)  BP: 107/66 (10-24-24 @ 05:43) (93/47 - 109/60)  RR: 18 (10-24-24 @ 05:43) (18 - 20)  SpO2: 95% (10-24-24 @ 05:43) (95% - 100%)  Wt(kg): --  I&O's Summary      Appearance: Normal	  Cardiovascular: Normal S1 S2,RRR, No murmurs  Respiratory: Lungs clear to auscultation	  Gastrointestinal:  Soft, Non-tender, + BS	  Extremities: Normal range of motion, No clubbing, cyanosis or edema      Home Medications:  Albuterol (Eqv-ProAir HFA) 90 mcg/inh inhalation aerosol: 2 puff(s) inhaled every 6 hours as needed (23 Oct 2024 16:15)  allopurinol 100 mg oral tablet: 1 tab(s) orally once a day (23 Oct 2024 16:15)  cinacalcet 90 mg oral tablet: 1 tab(s) orally 3 times a week (on days of dialysis - Tues, Thurs &amp; Sat) (23 Oct 2024 16:15)  droxidopa 200 mg oral capsule: 1 cap(s) orally 3 times a day (23 Oct 2024 16:15)  levETIRAcetam 500 mg oral tablet: 1 tab(s) orally 2 times a day (23 Oct 2024 16:15)  lidocaine 4% topical film: Apply topically to affected area once a day (both knees and lower back) (23 Oct 2024 16:15)  MiraLax oral powder for reconstitution: 17 gram(s) orally every other day (23 Oct 2024 16:15)  nystatin 100,000 units/g topical cream: Apply topically to affected area 2 times a day (buttocks) (23 Oct 2024 16:15)  nystatin 100,000 units/g topical powder: Apply topically to affected area 2 times a day (buttocks) (23 Oct 2024 16:15)  pantoprazole 40 mg oral delayed release tablet: 1 tab(s) orally once a day (before a meal) (23 Oct 2024 16:15)  sevelamer carbonate 800 mg oral tablet: 3 tab(s) orally 3 times a day (with meals) (23 Oct 2024 16:15)  Trelegy Ellipta 100 mcg-62.5 mcg-25 mcg/inh inhalation powder: 1 puff(s) inhaled once a day (23 Oct 2024 16:15)  Voltaren 1% topical gel: Apply topically to affected area 2 times a day (both knees) (23 Oct 2024 16:15)      MEDICATIONS  (STANDING):  allopurinol 100 milliGRAM(s) Oral daily  apixaban 5 milliGRAM(s) Oral two times a day  cinacalcet 90 milliGRAM(s) Oral <User Schedule>  dicyclomine 20 milliGRAM(s) Oral three times a day before meals  epoetin vinh (PROCRIT) Injectable 94184 Unit(s) IV Push <User Schedule>  influenza  Vaccine (HIGH DOSE) 0.5 milliLiter(s) IntraMuscular once  levETIRAcetam 500 milliGRAM(s) Oral two times a day  midodrine. 40 milliGRAM(s) Oral three times a day  Nephro-eliana 1 Tablet(s) Oral daily  pantoprazole    Tablet 40 milliGRAM(s) Oral before breakfast  polyethylene glycol 3350 17 Gram(s) Oral daily  pregabalin 150 milliGRAM(s) Oral daily  sevelamer carbonate 2400 milliGRAM(s) Oral three times a day with meals  simethicone 80 milliGRAM(s) Chew every 6 hours      TELEMETRY: 	    ECG:  	  RADIOLOGY:   DIAGNOSTIC TESTING:  [ ] Echocardiogram:  [ ]  Catheterization:  [ ] Stress Test:    OTHER: 	    LABS:	 	                            10.6   10.44 )-----------( 219      ( 23 Oct 2024 12:01 )             36.0     10-23    132[L]  |  93[L]  |  35[H]  ----------------------------<  91  4.5   |  23  |  6.73[H]    Ca    8.2[L]      23 Oct 2024 12:01    TPro  8.7[H]  /  Alb  3.8  /  TBili  0.4  /  DBili  x   /  AST  10  /  ALT  6[L]  /  AlkPhos  360[H]  10-23

## 2024-10-24 NOTE — ADVANCED PRACTICE NURSE CONSULT - RECOMMEDATIONS
Impression  Gluteal cleft moisture  associated dermatitis  bilateral sacrum/coccyx/buttock deep tissue injury present on admission    Recommendations   1. Bilateral sacrum/coccyx/buttock deep tissue injury       Gluteal cleft moisture associated dermatitis  Topical therapy- sacral/bilateral buttocks- cleanse w/incontinent cleanser, pat dry & apply Tria cream  twice daily & prn soiling . Monitor for changes .  2- Right and left heel  Elevate heels; apply Complete Cair air fluidized boots; ensure that the soles of the feet are not resting on the foot board of the bed.  3. Maintain on an alternating air with low air loss surface   4. Turn & reposition every 2 hr; Use positioning pillow to turn and reposition, soft pillow between bony prominences; continue measures to decrease friction/shear/pressure.  5. Nutrition optimization.  6. Place  waffle cushion when out of bed to chair .   Plan of care reviewed with covering staff Jai Cerda Impression  Gluteal cleft moisture  associated dermatitis  bilateral sacrum/coccyx/buttock deep tissue injury present on admission    Recommendations   1. Bilateral sacrum/coccyx/buttock deep tissue injury       Gluteal cleft moisture associated dermatitis  Topical therapy- sacral/bilateral buttocks- cleanse w/incontinent cleanser, pat dry & apply Triad cream  twice daily & prn soiling . Monitor for changes .  2- Right and left heel  Elevate heels; apply Complete Cair air fluidized boots; ensure that the soles of the feet are not resting on the foot board of the bed.  3. Maintain on an alternating air with low air loss surface   4. Turn & reposition every 2 hr; Use positioning pillow to turn and reposition, soft pillow between bony prominences; continue measures to decrease friction/shear/pressure.  5. Nutrition optimization.  6. Place  waffle cushion when out of bed to chair .   Plan of care reviewed with covering staff Jai Cerda

## 2024-10-24 NOTE — PROGRESS NOTE ADULT - SUBJECTIVE AND OBJECTIVE BOX
[facility]    Dr. Vonda Rosario     Office [phone] (9 am to 5 pm)  Service: [phone] (5pm to 9am)  Also Available on TEAMS      RENAL PROGRESS NOTE: DATE OF SERVICE 10-24-24 @ 09:46    Patient is a 79y old  Male who presents with a chief complaint of abdominal pain (24 Oct 2024 07:55)      Patient seen and examined at bedside. No chest pain/sob    VITALS:  T(F): 97.8 (10-24-24 @ 05:43), Max: 98.5 (10-23-24 @ 18:20)  HR: 73 (10-24-24 @ 05:43)  BP: 107/66 (10-24-24 @ 05:43)  RR: 18 (10-24-24 @ 05:43)  SpO2: 95% (10-24-24 @ 05:43)  Wt(kg): --    Height (cm): 170.2 (10-23 @ 10:48)  Weight (kg): 111.1 (10-23 @ 10:48)  BMI (kg/m2): 38.4 (10-23 @ 10:48)  BSA (m2): 2.2 (10-23 @ 10:48)    PHYSICAL EXAM:  Constitutional: NAD  Neck: No JVD  Respiratory: CTAB, no wheezes, rales or rhonchi  Cardiovascular: S1, S2, RRR  Gastrointestinal: BS+, soft, NT/ND  Extremities: No peripheral edema    Hospital Medications:   MEDICATIONS  (STANDING):  allopurinol 100 milliGRAM(s) Oral daily  apixaban 5 milliGRAM(s) Oral two times a day  cinacalcet 90 milliGRAM(s) Oral <User Schedule>  dicyclomine 20 milliGRAM(s) Oral three times a day before meals  epoetin vinh (PROCRIT) Injectable 77168 Unit(s) IV Push <User Schedule>  influenza  Vaccine (HIGH DOSE) 0.5 milliLiter(s) IntraMuscular once  levETIRAcetam 500 milliGRAM(s) Oral two times a day  midodrine. 40 milliGRAM(s) Oral three times a day  Nephro-eliana 1 Tablet(s) Oral daily  pantoprazole    Tablet 40 milliGRAM(s) Oral before breakfast  polyethylene glycol 3350 17 Gram(s) Oral daily  pregabalin 150 milliGRAM(s) Oral daily  sevelamer carbonate 2400 milliGRAM(s) Oral three times a day with meals  simethicone 80 milliGRAM(s) Chew every 6 hours      LABS:  10-23    132[L]  |  93[L]  |  35[H]  ----------------------------<  91  4.5   |  23  |  6.73[H]    Ca    8.2[L]      23 Oct 2024 12:01    TPro  8.7[H]  /  Alb  3.8  /  TBili  0.4  /  DBili      /  AST  10  /  ALT  6[L]  /  AlkPhos  360[H]  10-23    Creatinine Trend: 6.73 <--    Albumin: 3.8 g/dL (10-23 @ 12:01)                              10.6   10.44 )-----------( 219      ( 23 Oct 2024 12:01 )             36.0     Urine Studies:  Urinalysis - [10-23-24 @ 12:01]      Color  / Appearance  / SG  / pH       Gluc 91 / Ketone   / Bili  / Urobili        Blood  / Protein  / Leuk Est  / Nitrite       RBC  / WBC  / Hyaline  / Gran  / Sq Epi  / Non Sq Epi  / Bacteria       TSH 0.85      [06-12-24 @ 23:49]  Lipid: chol 125, , HDL 43, LDL --      [06-12-24 @ 23:49]        RADIOLOGY & ADDITIONAL STUDIES:

## 2024-10-24 NOTE — ADVANCED PRACTICE NURSE CONSULT - REASON FOR CONSULT
Consult to assess patient skin initiated by RN for skintear  on sacrum  present on admission.    Reason for Admission: abdominal pain  History of Present Illness:   78 y/o Male, my office ptn,  with PMH of ESRD on HD TuThSat- last HD was 10/22, has baseline orthostatic hypotension on Droxidopa AND midodrine baseline BP 60-80 systolic, COPD,  on home O2 2L, SAADIA on CPAP, wheelchair bound, IBS w chronic abd pain, chronic diastolic CHF, morbid obesity, sacral decub ulcer, pulmonary hypertension, hemorrhoids, IBS,  PE and DVT on Eliquis who presents with worsening abd pain. worse in upright position or when moves, stable in a supine position when still  Ptn was last admitted in June 2024 w worsening RV  Heart failure, respiratory failure,  Patient has MOLST stating DNR/DNI and patient does not want central access but is open to short trial of peripheral pressors.  Since DC his orthostasis had gotten worse and most recently he is unable to bear the severity of his abdominal pain. He knows use of narcotics for abd pain will be difficult 2/2 severe orthostatic hypotension.     no vomiting, fevers, chills, diarrhea, chest pain, sob. tolerating po intake    GI consulted for abdominal pain, pt known to our service from previous admissions The patients wife bedside aided in history.     Over the last week or so he has c/o increased abdominal "gnawing" pain while he is in the seated position, when he is laying down the pain subsides, however, it is still somewhat gnawing.     He typically feels his worst after HD sessions, however, finds it has been occurring even on his non-hd days.      He has no associated nausea or vomiting, no diarrheal episodes. He has no appetite changes. About 4 days ago he had an increased amount of soft stools with some blood tinge to it, however, this occurs at times d/t his hemorrhoids (has been treated w/anusol supp with success)   Colonoscopy notable for hemorrhoids and diverticulosis. EGD 2022 showed esophagitis, gastritis, duodenitis. Ptn has not been hemodynamically stable for any additional scopes.    His pain is prob 2/2 low flow state in the mesentery 2/2 hypotension. he is already on max dosing of droxi dopa and Midodrine.     Hospice was d/w ptn and his wife prior to arrival. he wasn't ready to stop HD.

## 2024-10-24 NOTE — PROGRESS NOTE ADULT - SUBJECTIVE AND OBJECTIVE BOX
Patient is a 79y Male     Patient is a 79y old  Male who presents with a chief complaint of abdominal pain (23 Oct 2024 20:20)      HPI:  78 y/o Male, my office ptn,  with PMH of ESRD on HD TuThSat- last HD was 10/22, has baseline orthostatic hypotension on Droxidopa AND midodrine baseline BP 60-80 systolic, COPD,  on home O2 2L, SAAIDA on CPAP, wheelchair bound, IBS w chronic abd pain, chronic diastolic CHF, morbid obesity, sacral decub ulcer, pulmonary hypertension, hemorrhoids, IBS,  PE and DVT on Eliquis who presents with worsening abd pain. worse in upright position or when moves, stable in a supine position when still  Ptn was last admitted in June 2024 w worsening RV  Heart failure, respiratory failure,  Patient has MOLST stating DNR/DNI and patient does not want central access but is open to short trial of peripheral pressors.  Since DC his orthostasis had gotten worse and most recently he is unable to bear the severity of his abdominal pain. He knows use of narcotics for abd pain will be difficult 2/2 severe orthostatic hypotension.     no vomiting, fevers, chills, diarrhea, chest pain, sob. tolerating po intake    GI consulted for abdominal pain, pt known to our service from previous admissions The patients wife bedside aided in history.     Over the last week or so he has c/o increased abdominal "gnawing" pain while he is in the seated position, when he is laying down the pain subsides, however, it is still somewhat gnawing.     He typically feels his worst after HD sessions, however, finds it has been occurring even on his non-hd days.      He has no associated nausea or vomiting, no diarrheal episodes. He has no appetite changes. About 4 days ago he had an increased amount of soft stools with some blood tinge to it, however, this occurs at times d/t his hemorrhoids (has been treated w/anusol supp with success)   Colonoscopy notable for hemorrhoids and diverticulosis. EGD 2022 showed esophagitis, gastritis, duodenitis. Ptn has not been hemodynamically stable for any additional scopes.    His pain is prob 2/2 low flow state in the mesentery 2/2 hypotension. he is already on max dosing of droxi dopa and Midodrine.     Hospice was d/w ptn and his wife prior to arrival. he wasn't ready to stop HD.      (23 Oct 2024 15:20)      PAST MEDICAL & SURGICAL HISTORY:  Hypertension      Glomerular disease  Segmental glomerular sclerosis      CHF (congestive heart failure)      COPD (chronic obstructive pulmonary disease)      Pulmonary hypertension      Sleep apnea      Pulmonary edema      Peripheral edema      Gout      History of abdominal surgery  polypectomy      H/O right heart catheterization          MEDICATIONS  (STANDING):  allopurinol 100 milliGRAM(s) Oral daily  apixaban 5 milliGRAM(s) Oral two times a day  cinacalcet 90 milliGRAM(s) Oral <User Schedule>  dicyclomine 20 milliGRAM(s) Oral three times a day before meals  epoetin vinh (PROCRIT) Injectable 77634 Unit(s) IV Push <User Schedule>  influenza  Vaccine (HIGH DOSE) 0.5 milliLiter(s) IntraMuscular once  levETIRAcetam 500 milliGRAM(s) Oral two times a day  midodrine. 40 milliGRAM(s) Oral three times a day  Nephro-eliana 1 Tablet(s) Oral daily  pantoprazole    Tablet 40 milliGRAM(s) Oral before breakfast  polyethylene glycol 3350 17 Gram(s) Oral daily  pregabalin 150 milliGRAM(s) Oral daily  sevelamer carbonate 2400 milliGRAM(s) Oral three times a day with meals  simethicone 80 milliGRAM(s) Chew every 6 hours      Allergies    baclofen (Other)  latex (Rash)    Intolerances        SOCIAL HISTORY:  Denies ETOh,Smoking,     FAMILY HISTORY:  Family history of colon cancer (Father)    Family history of heart disease (Father)        REVIEW OF SYSTEMS:    CONSTITUTIONAL: No weakness, fevers or chills  EYES/ENT: No visual changes;  No vertigo or throat pain   NECK: No pain or stiffness  RESPIRATORY: No cough, wheezing, hemoptysis; No shortness of breath  CARDIOVASCULAR: No chest pain or palpitations  GASTROINTESTINAL: No abdominal or epigastric pain. No nausea, vomiting, or hematemesis; No diarrhea or constipation. No melena or hematochezia.  GENITOURINARY: No dysuria, frequency or hematuria  NEUROLOGICAL: No numbness or weakness  SKIN: No itching, burning, rashes, or lesions   All other review of systems is negative unless indicated above.    VITAL:  T(C): , Max: 36.9 (10-23-24 @ 18:20)  T(F): , Max: 98.5 (10-23-24 @ 18:20)  HR: 73 (10-24-24 @ 05:43)  BP: 107/66 (10-24-24 @ 05:43)  BP(mean): --  RR: 18 (10-24-24 @ 05:43)  SpO2: 95% (10-24-24 @ 05:43)  Wt(kg): --    I and O's:    Height (cm): 170.2 (10-23 @ 10:48)  Weight (kg): 111.1 (10-23 @ 10:48)  BMI (kg/m2): 38.4 (10-23 @ 10:48)  BSA (m2): 2.2 (10-23 @ 10:48)    PHYSICAL EXAM:    Constitutional: NAD  HEENT: PERRLA,   Neck: No JVD  Respiratory: CTA B/L  Cardiovascular: S1 and S2  Gastrointestinal: BS+, soft, NT/ND  Extremities: No peripheral edema  Neurological: A/O x 3, no focal deficits  Psychiatric: Normal mood, normal affect  : No Herring  Skin: No rashes  Access: Not applicable  Back: No CVA tenderness    LABS:                        10.6   10.44 )-----------( 219      ( 23 Oct 2024 12:01 )             36.0     10-23    132[L]  |  93[L]  |  35[H]  ----------------------------<  91  4.5   |  23  |  6.73[H]    Ca    8.2[L]      23 Oct 2024 12:01    TPro  8.7[H]  /  Alb  3.8  /  TBili  0.4  /  DBili  x   /  AST  10  /  ALT  6[L]  /  AlkPhos  360[H]  10-23          RADIOLOGY & ADDITIONAL STUDIES:

## 2024-10-24 NOTE — PROGRESS NOTE ADULT - SUBJECTIVE AND OBJECTIVE BOX
Patient is a 79y old  Male who presents with a chief complaint of abdominal pain (24 Oct 2024 10:01)      SUBJECTIVE / OVERNIGHT EVENTS: pharmacy to approve resuming of droxiDopa today. palliaitve to see the ptn re [facility]. abdominal pain is controlled in supine position. HD as per renal    MEDICATIONS  (STANDING):  allopurinol 100 milliGRAM(s) Oral daily  apixaban 5 milliGRAM(s) Oral two times a day  cinacalcet 90 milliGRAM(s) Oral <User Schedule>  dicyclomine 20 milliGRAM(s) Oral three times a day before meals  epoetin vinh (PROCRIT) Injectable 92709 Unit(s) IV Push <User Schedule>  influenza  Vaccine (HIGH DOSE) 0.5 milliLiter(s) IntraMuscular once  levETIRAcetam 500 milliGRAM(s) Oral two times a day  midodrine. 40 milliGRAM(s) Oral three times a day  Nephro-eliana 1 Tablet(s) Oral daily  pantoprazole    Tablet 40 milliGRAM(s) Oral before breakfast  polyethylene glycol 3350 17 Gram(s) Oral daily  pregabalin 150 milliGRAM(s) Oral daily  sevelamer carbonate 2400 milliGRAM(s) Oral three times a day with meals  simethicone 80 milliGRAM(s) Chew every 6 hours    MEDICATIONS  (PRN):  albuterol    90 MICROgram(s) HFA Inhaler 2 Puff(s) Inhalation every 6 hours PRN Bronchospasm      Vital Signs Last 24 Hrs  T(F): 97 (10-24-24 @ 11:30), Max: 98.5 (10-23-24 @ 18:20)  HR: 67 (10-24-24 @ 11:30) (67 - 78)  BP: 109/62 (10-24-24 @ 11:30) (101/62 - 109/62)  RR: 18 (10-24-24 @ 11:30) (18 - 18)  SpO2: 99% (10-24-24 @ 11:30) (95% - 99%)  Telemetry:   CAPILLARY BLOOD GLUCOSE        I&O's Summary      PHYSICAL EXAM:  GENERAL: NAD, well-developed  HEAD:  Atraumatic, Normocephalic  EYES: EOMI, PERRLA, conjunctiva and sclera clear  NECK: Supple, No JVD  CHEST/LUNG: Clear to auscultation bilaterally; No wheeze  HEART: Regular rate and rhythm; No murmurs, rubs, or gallops  ABDOMEN: Soft, Nontender, Nondistended; Bowel sounds present  EXTREMITIES:  2+ Peripheral Pulses, No clubbing, cyanosis, or edema  PSYCH: AAOx3  NEUROLOGY: non-focal  SKIN: No rashes or lesions    LABS:                        10.6   10.44 )-----------( 219      ( 23 Oct 2024 12:01 )             36.0     10-23    132[L]  |  93[L]  |  35[H]  ----------------------------<  91  4.5   |  23  |  6.73[H]    Ca    8.2[L]      23 Oct 2024 12:01    TPro  8.7[H]  /  Alb  3.8  /  TBili  0.4  /  DBili  x   /  AST  10  /  ALT  6[L]  /  AlkPhos  360[H]  10-23

## 2024-10-24 NOTE — PROGRESS NOTE ADULT - ASSESSMENT
80 y/o Male, my office ptn,  with PMH of ESRD on HD TuThSat- last HD was 10/22, has baseline orthostatic hypotension on Droxidopa AND midodrine baseline BP 60-80 systolic, COPD,  on home O2 2L, SAADIA on CPAP, wheelchair bound, IBS w chronic abd pain, chronic diastolic CHF, morbid obesity, sacral decub ulcer, pulmonary hypertension, hemorrhoids, IBS,  PE and DVT on Eliquis who presents with worsening abd pain. worse in upright position or when moves, stable in a supine position when still  Ptn was last admitted in June 2024 w worsening RV  Heart failure, respiratory failure,  Patient has MOLST stating DNR/DNI and patient does not want central access but is open to short trial of peripheral pressors.  Since DC his orthostasis had gotten worse and most recently he is unable to bear the severity of his abdominal pain. He knows use of narcotics for abd pain will be difficult 2/2 severe orthostatic hypotension.     no vomiting, fevers, chills, diarrhea, chest pain, sob. tolerating po intake    GI consulted for abdominal pain, pt known to our service from previous admissions The patients wife bedside aided in history.     Over the last week or so he has c/o increased abdominal "gnawing" pain while he is in the seated position, when he is laying down the pain subsides, however, it is still somewhat gnawing.     He typically feels his worst after HD sessions, however, finds it has been occurring even on his non-hd days.      He has no associated nausea or vomiting, no diarrheal episodes. He has no appetite changes. About 4 days ago he had an increased amount of soft stools with some blood tinge to it, however, this occurs at times d/t his hemorrhoids (has been treated w/anusol supp with success)   Colonoscopy notable for hemorrhoids and diverticulosis. EGD 2022 showed esophagitis, gastritis, duodenitis. Ptn has not been hemodynamically stable for any additional scopes.    His pain is prob 2/2 low flow state in the mesentery 2/2 hypotension. he is already on max dosing of droxi dopa and Midodrine.     Hospice was d/w ptn and his wife prior to arrival. he wasn't ready to stop HD.     Acute on Chronic Abdominal Pain   seems positional and most likely Blood pressure dependent 2/2 low flow state in the mesentery  chronic mesenteric ischemia d/t chronic low flow state (hypotension)  CT A/P w/IV contrast : no new findings  seen by GI  palliative team called for pain control and GOC  GOC brought up w the ptn, he wants to cont HD.   he is DNR/DNI, no artificial nutrition    Orthostatic Hypotension  - severe, cont DroxiDOpa and Midodrine     ESRD on HD  - cont TUe, THu, Sat    CHF    COPD on home 02    Morbid obesity     Neuropathy    IBS

## 2024-10-24 NOTE — PROGRESS NOTE ADULT - ASSESSMENT
79 y/o M with PMH of ESRD on HDTuThSat- last yesterday, orthostatic hypotension on midodrine baseline BP 80-90 systolic, COPD reportedly on home O2 2L, ASADIA on CPAP, wheelchair bound, chronic diastolic CHF, morbid obesity, pulmonary hypertension, PE and DVT on Eliquis, IVC filter, chronic sacral ulcer, chronic back pain, history of hematochezia in the past 2/2 hemorrhoids, chronic abd pain 2/2 IBS, chronic constipation presenting for severe abdominal pain for few weeks. Nephro on board for ESRD on HD    ESRD   On HD TTS via [facility]  Nephrologist: Dr. Whalen   Outpt unit [facility]   Consent obtained in chart witnessed  Last HD on 10/23  HD today  Renal diet.  Monitor BMP     Anemia   MELISSA with HD for hgb <11  transfuse for hgb <7  monitor Hgb     HTN/Hypotension  bp fluctuating; generally low.  On midodrine 40 mg TID and Droxidopa 200 mg TID  Monitor closely     CKD-MBD  check PTH  monitor P04 and Ca daily   Continue with renvela and continue with cinacalcet

## 2024-10-24 NOTE — CHART NOTE - NSCHARTNOTEFT_GEN_A_CORE
Spoke with primary attending. Primary reason for consult is GOC. As there is no malignancy related pain recommend pain management by chronic pain if indicated. Multiple attempts to meet with pt but pt in HD. Will attempt initial consult tomorrow. Can be reached by TEAMS M-F 9-5 Nadia Orellana Any other time please page [phone] if needed

## 2024-10-25 DIAGNOSIS — Z71.89 OTHER SPECIFIED COUNSELING: ICD-10-CM

## 2024-10-25 DIAGNOSIS — I95.9 HYPOTENSION, UNSPECIFIED: ICD-10-CM

## 2024-10-25 DIAGNOSIS — Z51.5 ENCOUNTER FOR PALLIATIVE CARE: ICD-10-CM

## 2024-10-25 DIAGNOSIS — N18.6 END STAGE RENAL DISEASE: ICD-10-CM

## 2024-10-25 DIAGNOSIS — K58.9 IRRITABLE BOWEL SYNDROME, UNSPECIFIED: ICD-10-CM

## 2024-10-25 PROCEDURE — 99497 ADVNCD CARE PLAN 30 MIN: CPT | Mod: 25

## 2024-10-25 PROCEDURE — 99223 1ST HOSP IP/OBS HIGH 75: CPT

## 2024-10-25 RX ADMIN — DICYCLOMINE HYDROCHLORIDE 20 MILLIGRAM(S): 20 TABLET ORAL at 17:24

## 2024-10-25 RX ADMIN — LEVETIRACETAM 500 MILLIGRAM(S): 500 TABLET, FILM COATED ORAL at 17:22

## 2024-10-25 RX ADMIN — SIMETHICONE 80 MILLIGRAM(S): 80 TABLET, CHEWABLE ORAL at 17:22

## 2024-10-25 RX ADMIN — APIXABAN 5 MILLIGRAM(S): 5 TABLET, FILM COATED ORAL at 05:57

## 2024-10-25 RX ADMIN — Medication 1 TABLET(S): at 12:26

## 2024-10-25 RX ADMIN — PREGABALIN 150 MILLIGRAM(S): 150 CAPSULE ORAL at 12:29

## 2024-10-25 RX ADMIN — DROXIDOPA 200 MILLIGRAM(S): 100 CAPSULE ORAL at 12:33

## 2024-10-25 RX ADMIN — DROXIDOPA 200 MILLIGRAM(S): 100 CAPSULE ORAL at 17:24

## 2024-10-25 RX ADMIN — APIXABAN 5 MILLIGRAM(S): 5 TABLET, FILM COATED ORAL at 17:19

## 2024-10-25 RX ADMIN — POLYETHYLENE GLYCOL 3350 17 GRAM(S): 17 POWDER, FOR SOLUTION ORAL at 12:27

## 2024-10-25 RX ADMIN — LEVETIRACETAM 500 MILLIGRAM(S): 500 TABLET, FILM COATED ORAL at 05:58

## 2024-10-25 RX ADMIN — MIDODRINE HYDROCHLORIDE 40 MILLIGRAM(S): 2.5 TABLET ORAL at 00:33

## 2024-10-25 RX ADMIN — SIMETHICONE 80 MILLIGRAM(S): 80 TABLET, CHEWABLE ORAL at 00:33

## 2024-10-25 RX ADMIN — DICYCLOMINE HYDROCHLORIDE 20 MILLIGRAM(S): 20 TABLET ORAL at 05:57

## 2024-10-25 RX ADMIN — SEVELAMER CARBONATE 2400 MILLIGRAM(S): 800 TABLET, FILM COATED ORAL at 09:10

## 2024-10-25 RX ADMIN — MIDODRINE HYDROCHLORIDE 40 MILLIGRAM(S): 2.5 TABLET ORAL at 09:14

## 2024-10-25 RX ADMIN — SEVELAMER CARBONATE 2400 MILLIGRAM(S): 800 TABLET, FILM COATED ORAL at 17:21

## 2024-10-25 RX ADMIN — MIDODRINE HYDROCHLORIDE 40 MILLIGRAM(S): 2.5 TABLET ORAL at 17:19

## 2024-10-25 RX ADMIN — PANTOPRAZOLE SODIUM 40 MILLIGRAM(S): 40 TABLET, DELAYED RELEASE ORAL at 05:57

## 2024-10-25 RX ADMIN — Medication 100 MILLIGRAM(S): at 12:26

## 2024-10-25 RX ADMIN — SIMETHICONE 80 MILLIGRAM(S): 80 TABLET, CHEWABLE ORAL at 05:57

## 2024-10-25 RX ADMIN — DROXIDOPA 200 MILLIGRAM(S): 100 CAPSULE ORAL at 05:58

## 2024-10-25 RX ADMIN — DICYCLOMINE HYDROCHLORIDE 20 MILLIGRAM(S): 20 TABLET ORAL at 12:28

## 2024-10-25 RX ADMIN — SIMETHICONE 80 MILLIGRAM(S): 80 TABLET, CHEWABLE ORAL at 12:28

## 2024-10-25 NOTE — PROGRESS NOTE ADULT - SUBJECTIVE AND OBJECTIVE BOX
Patient is a 79y old  Male who presents with a chief complaint of abdominal pain (25 Oct 2024 14:28)      SUBJECTIVE / OVERNIGHT EVENTS: GOC w palliative done, ptn confirms he wants to cont getting HD. BP has been stable. abd pain has been stable    MEDICATIONS  (STANDING):  allopurinol 100 milliGRAM(s) Oral daily  apixaban 5 milliGRAM(s) Oral two times a day  cinacalcet 90 milliGRAM(s) Oral <User Schedule>  dicyclomine 20 milliGRAM(s) Oral three times a day before meals  droxidopa 200 milliGRAM(s) Oral three times a day  epoetin vinh (PROCRIT) Injectable 59697 Unit(s) IV Push <User Schedule>  influenza  Vaccine (HIGH DOSE) 0.5 milliLiter(s) IntraMuscular once  levETIRAcetam 500 milliGRAM(s) Oral two times a day  midodrine. 40 milliGRAM(s) Oral three times a day  Nephro-eliana 1 Tablet(s) Oral daily  pantoprazole    Tablet 40 milliGRAM(s) Oral before breakfast  polyethylene glycol 3350 17 Gram(s) Oral daily  pregabalin 150 milliGRAM(s) Oral daily  sevelamer carbonate 2400 milliGRAM(s) Oral three times a day with meals  simethicone 80 milliGRAM(s) Chew every 6 hours    MEDICATIONS  (PRN):  albuterol    90 MICROgram(s) HFA Inhaler 2 Puff(s) Inhalation every 6 hours PRN Bronchospasm      Vital Signs Last 24 Hrs  T(F): 98.2 (10-25-24 @ 16:17), Max: 99.5 (10-25-24 @ 04:40)  HR: 83 (10-25-24 @ 16:17) (74 - 92)  BP: 117/66 (10-25-24 @ 16:17) (92/50 - 126/67)  RR: 18 (10-25-24 @ 16:17) (18 - 18)  SpO2: 96% (10-25-24 @ 16:17) (92% - 97%)  Telemetry:   CAPILLARY BLOOD GLUCOSE        I&O's Summary    24 Oct 2024 07:01  -  25 Oct 2024 07:00  --------------------------------------------------------  IN: 240 mL / OUT: 2000 mL / NET: -1760 mL        PHYSICAL EXAM:  GENERAL: NAD, well-developed  HEAD:  Atraumatic, Normocephalic  EYES: EOMI, PERRLA, conjunctiva and sclera clear  NECK: Supple, No JVD  CHEST/LUNG: Clear to auscultation bilaterally; No wheeze  HEART: Regular rate and rhythm; No murmurs, rubs, or gallops  ABDOMEN: Soft, Nontender, Nondistended; Bowel sounds present  EXTREMITIES:  2+ Peripheral Pulses, No clubbing, cyanosis, or edema  PSYCH: AAOx3  NEUROLOGY: non-focal  SKIN: No rashes or lesions    LABS:                    RADIOLOGY & ADDITIONAL TESTS:    Imaging Personally Reviewed:    Consultant(s) Notes Reviewed:      Care Discussed with Consultants/Other Providers:

## 2024-10-25 NOTE — PROGRESS NOTE ADULT - SUBJECTIVE AND OBJECTIVE BOX
Patient is a 79y Male     Patient is a 79y old  Male who presents with a chief complaint of abdominal pain (24 Oct 2024 14:22)      HPI:  80 y/o Male, my office ptn,  with PMH of ESRD on HD TuThSat- last HD was 10/22, has baseline orthostatic hypotension on Droxidopa AND midodrine baseline BP 60-80 systolic, COPD,  on home O2 2L, SAADIA on CPAP, wheelchair bound, IBS w chronic abd pain, chronic diastolic CHF, morbid obesity, sacral decub ulcer, pulmonary hypertension, hemorrhoids, IBS,  PE and DVT on Eliquis who presents with worsening abd pain. worse in upright position or when moves, stable in a supine position when still  Ptn was last admitted in June 2024 w worsening RV  Heart failure, respiratory failure,  Patient has MOLST stating DNR/DNI and patient does not want central access but is open to short trial of peripheral pressors.  Since DC his orthostasis had gotten worse and most recently he is unable to bear the severity of his abdominal pain. He knows use of narcotics for abd pain will be difficult 2/2 severe orthostatic hypotension.     no vomiting, fevers, chills, diarrhea, chest pain, sob. tolerating po intake    GI consulted for abdominal pain, pt known to our service from previous admissions The patients wife bedside aided in history.     Over the last week or so he has c/o increased abdominal "gnawing" pain while he is in the seated position, when he is laying down the pain subsides, however, it is still somewhat gnawing.     He typically feels his worst after HD sessions, however, finds it has been occurring even on his non-hd days.      He has no associated nausea or vomiting, no diarrheal episodes. He has no appetite changes. About 4 days ago he had an increased amount of soft stools with some blood tinge to it, however, this occurs at times d/t his hemorrhoids (has been treated w/anusol supp with success)   Colonoscopy notable for hemorrhoids and diverticulosis. EGD 2022 showed esophagitis, gastritis, duodenitis. Ptn has not been hemodynamically stable for any additional scopes.    His pain is prob 2/2 low flow state in the mesentery 2/2 hypotension. he is already on max dosing of droxi dopa and Midodrine.     Hospice was d/w ptn and his wife prior to arrival. he wasn't ready to stop HD.      (23 Oct 2024 15:20)      PAST MEDICAL & SURGICAL HISTORY:  Hypertension      Glomerular disease  Segmental glomerular sclerosis      CHF (congestive heart failure)      COPD (chronic obstructive pulmonary disease)      Pulmonary hypertension      Sleep apnea      Pulmonary edema      Peripheral edema      Gout      History of abdominal surgery  polypectomy      H/O right heart catheterization          MEDICATIONS  (STANDING):  allopurinol 100 milliGRAM(s) Oral daily  apixaban 5 milliGRAM(s) Oral two times a day  cinacalcet 90 milliGRAM(s) Oral <User Schedule>  dicyclomine 20 milliGRAM(s) Oral three times a day before meals  droxidopa 200 milliGRAM(s) Oral three times a day  epoetin vinh (PROCRIT) Injectable 49470 Unit(s) IV Push <User Schedule>  influenza  Vaccine (HIGH DOSE) 0.5 milliLiter(s) IntraMuscular once  levETIRAcetam 500 milliGRAM(s) Oral two times a day  midodrine. 40 milliGRAM(s) Oral three times a day  Nephro-eliana 1 Tablet(s) Oral daily  pantoprazole    Tablet 40 milliGRAM(s) Oral before breakfast  polyethylene glycol 3350 17 Gram(s) Oral daily  pregabalin 150 milliGRAM(s) Oral daily  sevelamer carbonate 2400 milliGRAM(s) Oral three times a day with meals  simethicone 80 milliGRAM(s) Chew every 6 hours      Allergies    baclofen (Other)  latex (Rash)    Intolerances        SOCIAL HISTORY:  Denies ETOh,Smoking,     FAMILY HISTORY:  Family history of colon cancer (Father)    Family history of heart disease (Father)        REVIEW OF SYSTEMS:    CONSTITUTIONAL: No weakness, fevers or chills  EYES/ENT: No visual changes;  No vertigo or throat pain   NECK: No pain or stiffness  RESPIRATORY: No cough, wheezing, hemoptysis; No shortness of breath  CARDIOVASCULAR: No chest pain or palpitations  GASTROINTESTINAL: No abdominal or epigastric pain. No nausea, vomiting, or hematemesis; No diarrhea or constipation. No melena or hematochezia.  GENITOURINARY: No dysuria, frequency or hematuria  NEUROLOGICAL: No numbness or weakness  SKIN: No itching, burning, rashes, or lesions   All other review of systems is negative unless indicated above.    VITAL:  T(C): , Max: 37.5 (10-25-24 @ 04:40)  T(F): , Max: 99.5 (10-25-24 @ 04:40)  HR: 74 (10-25-24 @ 04:40)  BP: 99/61 (10-25-24 @ 04:40)  BP(mean): --  RR: 18 (10-25-24 @ 04:40)  SpO2: 95% (10-25-24 @ 04:40)  Wt(kg): --    I and O's:    10-24 @ 07:01  -  10-25 @ 07:00  --------------------------------------------------------  IN: 240 mL / OUT: 2000 mL / NET: -1760 mL          PHYSICAL EXAM:    Constitutional: NAD  HEENT: PERRLA,   Neck: No JVD  Respiratory: CTA B/L  Cardiovascular: S1 and S2  Gastrointestinal: BS+, soft, NT/ND  Extremities: No peripheral edema  Neurological: A/O x 3, no focal deficits  Psychiatric: Normal mood, normal affect  : No Herring  Skin: No rashes  Access: Not applicable  Back: No CVA tenderness    LABS:                        10.6   10.44 )-----------( 219      ( 23 Oct 2024 12:01 )             36.0     10-23    132[L]  |  93[L]  |  35[H]  ----------------------------<  91  4.5   |  23  |  6.73[H]    Ca    8.2[L]      23 Oct 2024 12:01    TPro  8.7[H]  /  Alb  3.8  /  TBili  0.4  /  DBili  x   /  AST  10  /  ALT  6[L]  /  AlkPhos  360[H]  10-23          RADIOLOGY & ADDITIONAL STUDIES:

## 2024-10-25 NOTE — PROGRESS NOTE ADULT - ASSESSMENT
80 y/o Male, my office ptn,  with PMH of ESRD on HD TuThSat- last HD was 10/22, has baseline orthostatic hypotension on Droxidopa AND midodrine baseline BP 60-80 systolic, COPD,  on home O2 2L, SAADIA on CPAP, wheelchair bound, IBS w chronic abd pain, chronic diastolic CHF, morbid obesity, sacral decub ulcer, pulmonary hypertension, hemorrhoids, IBS,  PE and DVT on Eliquis who presents with worsening abd pain. worse in upright position or when moves, stable in a supine position when still  Ptn was last admitted in June 2024 w worsening RV  Heart failure, respiratory failure,  Patient has MOLST stating DNR/DNI and patient does not want central access but is open to short trial of peripheral pressors.  Since DC his orthostasis had gotten worse and most recently he is unable to bear the severity of his abdominal pain. He knows use of narcotics for abd pain will be difficult 2/2 severe orthostatic hypotension.     no vomiting, fevers, chills, diarrhea, chest pain, sob. tolerating po intake    GI consulted for abdominal pain, pt known to our service from previous admissions The patients wife bedside aided in history.     Over the last week or so he has c/o increased abdominal "gnawing" pain while he is in the seated position, when he is laying down the pain subsides, however, it is still somewhat gnawing.     He typically feels his worst after HD sessions, however, finds it has been occurring even on his non-hd days.      He has no associated nausea or vomiting, no diarrheal episodes. He has no appetite changes. About 4 days ago he had an increased amount of soft stools with some blood tinge to it, however, this occurs at times d/t his hemorrhoids (has been treated w/anusol supp with success)   Colonoscopy notable for hemorrhoids and diverticulosis. EGD 2022 showed esophagitis, gastritis, duodenitis. Ptn has not been hemodynamically stable for any additional scopes.    His pain is prob 2/2 low flow state in the mesentery 2/2 hypotension. he is already on max dosing of droxi dopa and Midodrine.     Hospice was d/w ptn and his wife prior to arrival. he wasn't ready to stop HD.     Acute on Chronic Abdominal Pain   seems positional and most likely Blood pressure dependent 2/2 low flow state in the mesentery  chronic mesenteric ischemia d/t chronic low flow state (hypotension)  CT A/P w/IV contrast : no new findings  seen by GI  palliative team called for pain control and GOC  GO brought up w the ptn, he wants to cont HD.   he is DNR/DNI, no artificial nutrition    Orthostatic Hypotension  - severe, cont DroxiDOpa and Midodrine     ESRD on HD  - cont TUe, THu, Sat  - ptn wants to cont getting HD    CHF    COPD on home 02    Morbid obesity     Neuropathy    IBS

## 2024-10-25 NOTE — PHYSICAL THERAPY INITIAL EVALUATION ADULT - ADDITIONAL COMMENTS
Pt lives in house with ramp to enter. Pt performed ADL/IADLs  with assistance. Uses wheelchair as primary mode of mobility. Owns DME: wheelchair

## 2024-10-25 NOTE — PROGRESS NOTE ADULT - ASSESSMENT
79 y/o M with PMH of ESRD on HDTuThSat- last yesterday, orthostatic hypotension on midodrine baseline BP 80-90 systolic, COPD reportedly on home O2 2L, SAADIA on CPAP, wheelchair bound, chronic diastolic CHF, morbid obesity, pulmonary hypertension, PE and DVT on Eliquis, IVC filter, chronic sacral ulcer, chronic back pain, history of hematochezia in the past 2/2 hemorrhoids, chronic abd pain 2/2 IBS, chronic constipation presenting for severe abdominal pain for few weeks. Nephro on board for ESRD on HD    ESRD   On HD TTS via AVF  Nephrologist: Dr. Whalen   Outpt unit [facility]   Consent obtained in chart witnessed  Last HD on 10/23  HD in hosp on 10/24 UF 2 L removed, no labs since 10/23  Will continue TTS scheduele while hospitalized  Renal diet.  Monitor BMP     Anemia   MELISSA with HD for hgb <11  transfuse for hgb <7  monitor Hgb     HTN/Hypotension  bp fluctuating; generally low.  On midodrine 40 mg TID and Droxidopa 200 mg TID  Monitor closely     CKD-MBD  check PTH  monitor P04 and Ca daily   Continue with renvela and continue with cinacalcet

## 2024-10-25 NOTE — CONSULT NOTE ADULT - ASSESSMENT
77 y/o M with PMH of ESRD on HDTuThSat- last yesterday, orthostatic hypotension on midodrine baseline BP 80-90 systolic, COPD reportedly on home O2 2L, SAADIA on CPAP, wheelchair bound, chronic diastolic CHF, morbid obesity, pulmonary hypertension, PE and DVT on Eliquis, IVC filter, chronic sacral ulcer, chronic back pain, history of hematochezia in the past 2/2 hemorrhoids, chronic abd pain 2/2 IBS, chronic constipation presenting for severe abdominal pain for few weeks. Palliative consulted for assistance with GOC.

## 2024-10-25 NOTE — CONSULT NOTE ADULT - PROBLEM SELECTOR RECOMMENDATION 5
will sign off as goals established.   case discussed with primary team  Can be reached by TEAMS M-F 9-5 Nadia Orellana Any other time please page [phone] if needed

## 2024-10-25 NOTE — PHYSICAL THERAPY INITIAL EVALUATION ADULT - NSPTDISCHREC_GEN_A_CORE
If pt d/c home would require home PT, assist with all mobility, & DME: RW, mechanical pt lift device (george), polyfly w/c, & 3:1 commode./[facility]

## 2024-10-25 NOTE — PROGRESS NOTE ADULT - SUBJECTIVE AND OBJECTIVE BOX
Patient is a 79y Male     Patient is a 79y old  Male who presents with a chief complaint of abdominal pain (25 Oct 2024 07:20)      HPI:  78 y/o Male, my office ptn,  with PMH of ESRD on HD TuThSat- last HD was 10/22, has baseline orthostatic hypotension on Droxidopa AND midodrine baseline BP 60-80 systolic, COPD,  on home O2 2L, SAADIA on CPAP, wheelchair bound, IBS w chronic abd pain, chronic diastolic CHF, morbid obesity, sacral decub ulcer, pulmonary hypertension, hemorrhoids, IBS,  PE and DVT on Eliquis who presents with worsening abd pain. worse in upright position or when moves, stable in a supine position when still  Ptn was last admitted in June 2024 w worsening RV  Heart failure, respiratory failure,  Patient has MOLST stating DNR/DNI and patient does not want central access but is open to short trial of peripheral pressors.  Since DC his orthostasis had gotten worse and most recently he is unable to bear the severity of his abdominal pain. He knows use of narcotics for abd pain will be difficult 2/2 severe orthostatic hypotension.     no vomiting, fevers, chills, diarrhea, chest pain, sob. tolerating po intake    GI consulted for abdominal pain, pt known to our service from previous admissions The patients wife bedside aided in history.     Over the last week or so he has c/o increased abdominal "gnawing" pain while he is in the seated position, when he is laying down the pain subsides, however, it is still somewhat gnawing.     He typically feels his worst after HD sessions, however, finds it has been occurring even on his non-hd days.      He has no associated nausea or vomiting, no diarrheal episodes. He has no appetite changes. About 4 days ago he had an increased amount of soft stools with some blood tinge to it, however, this occurs at times d/t his hemorrhoids (has been treated w/anusol supp with success)   Colonoscopy notable for hemorrhoids and diverticulosis. EGD 2022 showed esophagitis, gastritis, duodenitis. Ptn has not been hemodynamically stable for any additional scopes.    His pain is prob 2/2 low flow state in the mesentery 2/2 hypotension. he is already on max dosing of droxi dopa and Midodrine.     Hospice was d/w ptn and his wife prior to arrival. he wasn't ready to stop HD.      (23 Oct 2024 15:20)      PAST MEDICAL & SURGICAL HISTORY:  Hypertension      Glomerular disease  Segmental glomerular sclerosis      CHF (congestive heart failure)      COPD (chronic obstructive pulmonary disease)      Pulmonary hypertension      Sleep apnea      Pulmonary edema      Peripheral edema      Gout      History of abdominal surgery  polypectomy      H/O right heart catheterization          MEDICATIONS  (STANDING):  allopurinol 100 milliGRAM(s) Oral daily  apixaban 5 milliGRAM(s) Oral two times a day  cinacalcet 90 milliGRAM(s) Oral <User Schedule>  dicyclomine 20 milliGRAM(s) Oral three times a day before meals  droxidopa 200 milliGRAM(s) Oral three times a day  epoetin vinh (PROCRIT) Injectable 03464 Unit(s) IV Push <User Schedule>  influenza  Vaccine (HIGH DOSE) 0.5 milliLiter(s) IntraMuscular once  levETIRAcetam 500 milliGRAM(s) Oral two times a day  midodrine. 40 milliGRAM(s) Oral three times a day  Nephro-eliana 1 Tablet(s) Oral daily  pantoprazole    Tablet 40 milliGRAM(s) Oral before breakfast  polyethylene glycol 3350 17 Gram(s) Oral daily  pregabalin 150 milliGRAM(s) Oral daily  sevelamer carbonate 2400 milliGRAM(s) Oral three times a day with meals  simethicone 80 milliGRAM(s) Chew every 6 hours      Allergies    baclofen (Other)  latex (Rash)    Intolerances        SOCIAL HISTORY:  Denies ETOh,Smoking,     FAMILY HISTORY:  Family history of colon cancer (Father)    Family history of heart disease (Father)        REVIEW OF SYSTEMS:    CONSTITUTIONAL: No weakness, fevers or chills  EYES/ENT: No visual changes;  No vertigo or throat pain   NECK: No pain or stiffness  RESPIRATORY: No cough, wheezing, hemoptysis; No shortness of breath  CARDIOVASCULAR: No chest pain or palpitations  GASTROINTESTINAL: No abdominal or epigastric pain. No nausea, vomiting, or hematemesis; No diarrhea or constipation. No melena or hematochezia.  GENITOURINARY: No dysuria, frequency or hematuria  NEUROLOGICAL: No numbness or weakness  SKIN: No itching, burning, rashes, or lesions   All other review of systems is negative unless indicated above.    VITAL:  T(C): , Max: 37.5 (10-25-24 @ 04:40)  T(F): , Max: 99.5 (10-25-24 @ 04:40)  HR: 74 (10-25-24 @ 04:40)  BP: 99/61 (10-25-24 @ 04:40)  BP(mean): --  RR: 18 (10-25-24 @ 04:40)  SpO2: 95% (10-25-24 @ 04:40)  Wt(kg): --    I and O's:    10-24 @ 07:01  -  10-25 @ 07:00  --------------------------------------------------------  IN: 240 mL / OUT: 2000 mL / NET: -1760 mL          PHYSICAL EXAM:    Constitutional: NAD  HEENT: PERRLA,   Neck: No JVD  Respiratory: CTA B/L  Cardiovascular: S1 and S2  Gastrointestinal: BS+, soft, NT/ND  Extremities: No peripheral edema  Neurological: A/O x 3, no focal deficits  Psychiatric: Normal mood, normal affect  : No Herring  Skin: No rashes  Access: Not applicable  Back: No CVA tenderness    LABS:                        10.6   10.44 )-----------( 219      ( 23 Oct 2024 12:01 )             36.0     10-23    132[L]  |  93[L]  |  35[H]  ----------------------------<  91  4.5   |  23  |  6.73[H]    Ca    8.2[L]      23 Oct 2024 12:01    TPro  8.7[H]  /  Alb  3.8  /  TBili  0.4  /  DBili  x   /  AST  10  /  ALT  6[L]  /  AlkPhos  360[H]  10-23          RADIOLOGY & ADDITIONAL STUDIES:

## 2024-10-25 NOTE — CONSULT NOTE ADULT - CONVERSATION DETAILS
Met with pt's wife and pt at bedside. Introduced myself and the [facility] team. Discussed current hospitalization and care prior to hospital stay. Pt lives at home with his wife, his daughter is his CDPAP with supplemental aides. he goes too and from HD in [facility]. Discussed importance of safety in the home and reducing risk of falls.  Discussed advanced directives. Mr. Rivers confirmed he has a HCP completed identifying his wife Jodee as his HCP and completed a MOLST reflecting DNR/I Trial of NIV. Paper copy placed in chart.   Discussed difficulties with HD due to pain/ and BP. He stated he does not mind going to HD because that "allows him to live". At this time he is not prepared to deescalate HD. Explored that at sometime HD may no longer be offered due to condition or it may not be in line with his goc there are alternative options. Discussed hospice benefit extensively and the supports provided. Pt and family not interested in hospice service at this time as he cannot continue HD, but was appreciative of the information in the future.   Reinforced the importance of ongoing discussions amongst him and his wife, as well as his healthcare providers to discuss goals of care and ensure the medical care he is receiving are in line with his GOC.

## 2024-10-25 NOTE — PHYSICAL THERAPY INITIAL EVALUATION ADULT - BALANCE TRAINING, PT EVAL
GOAL: pt will increase standing dynamic/static balance to fair plus in order to improve safety with functional mobility in 2 weeks

## 2024-10-25 NOTE — PROGRESS NOTE ADULT - SUBJECTIVE AND OBJECTIVE BOX
CARDIOLOGY FOLLOW UP - Dr. Livingston  DATE OF SERVICE:    CC      REVIEW OF SYSTEMS:  CONSTITUTIONAL: No fever, weight loss, or fatigue  RESPIRATORY: No cough, wheezing, chills or hemoptysis; No Shortness of Breath  CARDIOVASCULAR: No chest pain, palpitations, passing out, dizziness  GASTROINTESTINAL: No abdominal or epigastric pain. No nausea, vomiting, or hematemesis; No diarrhea or constipation. No melena or hematochezia.      PHYSICAL EXAM:  T(C): 36.7 (10-25-24 @ 12:35), Max: 37.5 (10-25-24 @ 04:40)  HR: 92 (10-25-24 @ 13:19) (66 - 92)  BP: 126/67 (10-25-24 @ 13:19) (92/50 - 126/67)  RR: 18 (10-25-24 @ 12:35) (17 - 18)  SpO2: 94% (10-25-24 @ 13:19) (92% - 98%)  Wt(kg): --  I&O's Summary    24 Oct 2024 07:01  -  25 Oct 2024 07:00  --------------------------------------------------------  IN: 240 mL / OUT: 2000 mL / NET: -1760 mL        Appearance: Normal	  Cardiovascular: Normal S1 S2,RRR, No JVD, No murmurs  Respiratory: Lungs clear to auscultation	  Gastrointestinal:  Soft, Non-tender, + BS	  Extremities: Normal range of motion, No clubbing, cyanosis or edema      Home Medications:  Albuterol (Eqv-ProAir HFA) 90 mcg/inh inhalation aerosol: 2 puff(s) inhaled every 6 hours as needed (23 Oct 2024 16:15)  allopurinol 100 mg oral tablet: 1 tab(s) orally once a day (23 Oct 2024 16:15)  cinacalcet 90 mg oral tablet: 1 tab(s) orally 3 times a week (on days of dialysis - Tues, Thurs &amp; Sat) (23 Oct 2024 16:15)  droxidopa 200 mg oral capsule: 1 cap(s) orally 3 times a day (23 Oct 2024 16:15)  levETIRAcetam 500 mg oral tablet: 1 tab(s) orally 2 times a day (23 Oct 2024 16:15)  lidocaine 4% topical film: Apply topically to affected area once a day (both knees and lower back) (23 Oct 2024 16:15)  MiraLax oral powder for reconstitution: 17 gram(s) orally every other day (23 Oct 2024 16:15)  nystatin 100,000 units/g topical cream: Apply topically to affected area 2 times a day (buttocks) (23 Oct 2024 16:15)  nystatin 100,000 units/g topical powder: Apply topically to affected area 2 times a day (buttocks) (23 Oct 2024 16:15)  pantoprazole 40 mg oral delayed release tablet: 1 tab(s) orally once a day (before a meal) (23 Oct 2024 16:15)  sevelamer carbonate 800 mg oral tablet: 3 tab(s) orally 3 times a day (with meals) (23 Oct 2024 16:15)  Trelegy Ellipta 100 mcg-62.5 mcg-25 mcg/inh inhalation powder: 1 puff(s) inhaled once a day (23 Oct 2024 16:15)  Voltaren 1% topical gel: Apply topically to affected area 2 times a day (both knees) (23 Oct 2024 16:15)      MEDICATIONS  (STANDING):  allopurinol 100 milliGRAM(s) Oral daily  apixaban 5 milliGRAM(s) Oral two times a day  cinacalcet 90 milliGRAM(s) Oral <User Schedule>  dicyclomine 20 milliGRAM(s) Oral three times a day before meals  droxidopa 200 milliGRAM(s) Oral three times a day  epoetin vinh (PROCRIT) Injectable 96191 Unit(s) IV Push <User Schedule>  influenza  Vaccine (HIGH DOSE) 0.5 milliLiter(s) IntraMuscular once  levETIRAcetam 500 milliGRAM(s) Oral two times a day  midodrine. 40 milliGRAM(s) Oral three times a day  Nephro-eliana 1 Tablet(s) Oral daily  pantoprazole    Tablet 40 milliGRAM(s) Oral before breakfast  polyethylene glycol 3350 17 Gram(s) Oral daily  pregabalin 150 milliGRAM(s) Oral daily  sevelamer carbonate 2400 milliGRAM(s) Oral three times a day with meals  simethicone 80 milliGRAM(s) Chew every 6 hours      TELEMETRY: 	    ECG:  	  RADIOLOGY:   DIAGNOSTIC TESTING:  [ ] Echocardiogram:  [ ]  Catheterization:  [ ] Stress Test:    OTHER: 	    LABS:	 	                       CARDIOLOGY FOLLOW UP - Dr. Livingston  DATE OF SERVICE: 10/25/24    CC      REVIEW OF SYSTEMS:  CONSTITUTIONAL: No fever, weight loss, or fatigue  RESPIRATORY: No cough, wheezing, chills or hemoptysis; No Shortness of Breath  CARDIOVASCULAR: No chest pain, palpitations, passing out, dizziness  GASTROINTESTINAL: No abdominal or epigastric pain. No nausea, vomiting, or hematemesis; No diarrhea or constipation. No melena or hematochezia.      PHYSICAL EXAM:  T(C): 36.7 (10-25-24 @ 12:35), Max: 37.5 (10-25-24 @ 04:40)  HR: 92 (10-25-24 @ 13:19) (66 - 92)  BP: 126/67 (10-25-24 @ 13:19) (92/50 - 126/67)  RR: 18 (10-25-24 @ 12:35) (17 - 18)  SpO2: 94% (10-25-24 @ 13:19) (92% - 98%)  Wt(kg): --  I&O's Summary    24 Oct 2024 07:01  -  25 Oct 2024 07:00  --------------------------------------------------------  IN: 240 mL / OUT: 2000 mL / NET: -1760 mL        Appearance: Normal	  Cardiovascular: Normal S1 S2,RRR, No JVD, No murmurs  Respiratory: Lungs clear to auscultation	  Gastrointestinal:  Soft, Non-tender, + BS	  Extremities: Normal range of motion, No clubbing, cyanosis or edema      Home Medications:  Albuterol (Eqv-ProAir HFA) 90 mcg/inh inhalation aerosol: 2 puff(s) inhaled every 6 hours as needed (23 Oct 2024 16:15)  allopurinol 100 mg oral tablet: 1 tab(s) orally once a day (23 Oct 2024 16:15)  cinacalcet 90 mg oral tablet: 1 tab(s) orally 3 times a week (on days of dialysis - Tues, Thurs &amp; Sat) (23 Oct 2024 16:15)  droxidopa 200 mg oral capsule: 1 cap(s) orally 3 times a day (23 Oct 2024 16:15)  levETIRAcetam 500 mg oral tablet: 1 tab(s) orally 2 times a day (23 Oct 2024 16:15)  lidocaine 4% topical film: Apply topically to affected area once a day (both knees and lower back) (23 Oct 2024 16:15)  MiraLax oral powder for reconstitution: 17 gram(s) orally every other day (23 Oct 2024 16:15)  nystatin 100,000 units/g topical cream: Apply topically to affected area 2 times a day (buttocks) (23 Oct 2024 16:15)  nystatin 100,000 units/g topical powder: Apply topically to affected area 2 times a day (buttocks) (23 Oct 2024 16:15)  pantoprazole 40 mg oral delayed release tablet: 1 tab(s) orally once a day (before a meal) (23 Oct 2024 16:15)  sevelamer carbonate 800 mg oral tablet: 3 tab(s) orally 3 times a day (with meals) (23 Oct 2024 16:15)  Trelegy Ellipta 100 mcg-62.5 mcg-25 mcg/inh inhalation powder: 1 puff(s) inhaled once a day (23 Oct 2024 16:15)  Voltaren 1% topical gel: Apply topically to affected area 2 times a day (both knees) (23 Oct 2024 16:15)      MEDICATIONS  (STANDING):  allopurinol 100 milliGRAM(s) Oral daily  apixaban 5 milliGRAM(s) Oral two times a day  cinacalcet 90 milliGRAM(s) Oral <User Schedule>  dicyclomine 20 milliGRAM(s) Oral three times a day before meals  droxidopa 200 milliGRAM(s) Oral three times a day  epoetin vinh (PROCRIT) Injectable 83019 Unit(s) IV Push <User Schedule>  influenza  Vaccine (HIGH DOSE) 0.5 milliLiter(s) IntraMuscular once  levETIRAcetam 500 milliGRAM(s) Oral two times a day  midodrine. 40 milliGRAM(s) Oral three times a day  Nephro-eliana 1 Tablet(s) Oral daily  pantoprazole    Tablet 40 milliGRAM(s) Oral before breakfast  polyethylene glycol 3350 17 Gram(s) Oral daily  pregabalin 150 milliGRAM(s) Oral daily  sevelamer carbonate 2400 milliGRAM(s) Oral three times a day with meals  simethicone 80 milliGRAM(s) Chew every 6 hours      TELEMETRY: 	    ECG:  	  RADIOLOGY:   DIAGNOSTIC TESTING:  [ ] Echocardiogram:  [ ]  Catheterization:  [ ] Stress Test:    OTHER: 	    LABS:

## 2024-10-25 NOTE — PROGRESS NOTE ADULT - ASSESSMENT
A/P  80 y/o Male with PMH of ESRD on HD TuThSat- last HD was 10/22, has baseline orthostatic hypotension on Droxidopa AND midodrine baseline BP 60-80 systolic, COPD,  on home O2 2L, SAADIA on CPAP, wheelchair bound, IBS w chronic abd pain, chronic diastolic CHF, morbid obesity, sacral decub ulcer, pulmonary hypertension, hemorrhoids, IBS,  PE and DVT on Eliquis who presents with worsening abd pain.     #Abdominal pain  -no associated CP or SOB  -CT A/P shows small hiatal hernia; no acute intra-abdominal findings   -med and GI f/u  -per GI conservatve gi managment    #ESRD on HD  -renal fu   -HD per renal    #Hx of Orthostatic Hypotension  -On midodrine 40 mg TID and Droxidopa 200 mg TID    #Chronic diastolic CHF  -TTE 6/2024 shows LVSF appears grossly normal; mildly enlarged RV; mild TR  -volume status stable  -no decomp HF on exam  -cont volume removal with HD per renal    #Hx of PE, DVT  -cont Eliquis    DVT ppx

## 2024-10-25 NOTE — PROGRESS NOTE ADULT - SUBJECTIVE AND OBJECTIVE BOX
[facility]    Dr. Vonda Rosario     Office [phone] (9 am to 5 pm)  Service: [phone] (5pm to 9am)  Also Available on TEAMS      RENAL PROGRESS NOTE: DATE OF SERVICE 10-25-24 @ 09:55    Patient is a 79y old  Male who presents with a chief complaint of abdominal pain (25 Oct 2024 08:32)      Patient seen and examined at bedside. No chest pain/sob    VITALS:  T(F): 97.9 (10-25-24 @ 07:31), Max: 99.5 (10-25-24 @ 04:40)  HR: 84 (10-25-24 @ 07:31)  BP: 101/56 (10-25-24 @ 07:31)  RR: 18 (10-25-24 @ 07:31)  SpO2: 92% (10-25-24 @ 07:31)  Wt(kg): --    10-24 @ 07:01  -  10-25 @ 07:00  --------------------------------------------------------  IN: 240 mL / OUT: 2000 mL / NET: -1760 mL          PHYSICAL EXAM:  Constitutional: NAD  Neck: No JVD  Respiratory: CTAB, no wheezes, rales or rhonchi  Cardiovascular: S1, S2, RRR  Gastrointestinal: BS+, soft, NT/ND  Extremities: No peripheral edema    Hospital Medications:   MEDICATIONS  (STANDING):  allopurinol 100 milliGRAM(s) Oral daily  apixaban 5 milliGRAM(s) Oral two times a day  cinacalcet 90 milliGRAM(s) Oral <User Schedule>  dicyclomine 20 milliGRAM(s) Oral three times a day before meals  droxidopa 200 milliGRAM(s) Oral three times a day  epoetin vinh (PROCRIT) Injectable 28159 Unit(s) IV Push <User Schedule>  influenza  Vaccine (HIGH DOSE) 0.5 milliLiter(s) IntraMuscular once  levETIRAcetam 500 milliGRAM(s) Oral two times a day  midodrine. 40 milliGRAM(s) Oral three times a day  Nephro-eliana 1 Tablet(s) Oral daily  pantoprazole    Tablet 40 milliGRAM(s) Oral before breakfast  polyethylene glycol 3350 17 Gram(s) Oral daily  pregabalin 150 milliGRAM(s) Oral daily  sevelamer carbonate 2400 milliGRAM(s) Oral three times a day with meals  simethicone 80 milliGRAM(s) Chew every 6 hours      LABS:  10-23    132[L]  |  93[L]  |  35[H]  ----------------------------<  91  4.5   |  23  |  6.73[H]    Ca    8.2[L]      23 Oct 2024 12:01    TPro  8.7[H]  /  Alb  3.8  /  TBili  0.4  /  DBili      /  AST  10  /  ALT  6[L]  /  AlkPhos  360[H]  10-23    Creatinine Trend: 6.73 <--                                10.6   10.44 )-----------( 219      ( 23 Oct 2024 12:01 )             36.0     Urine Studies:  Urinalysis - [10-23-24 @ 12:01]      Color  / Appearance  / SG  / pH       Gluc 91 / Ketone   / Bili  / Urobili        Blood  / Protein  / Leuk Est  / Nitrite       RBC  / WBC  / Hyaline  / Gran  / Sq Epi  / Non Sq Epi  / Bacteria       TSH 0.85      [06-12-24 @ 23:49]  Lipid: chol 125, , HDL 43, LDL --      [06-12-24 @ 23:49]        RADIOLOGY & ADDITIONAL STUDIES:

## 2024-10-25 NOTE — PROGRESS NOTE ADULT - ASSESSMENT
conservatve gi managment  no acute gi complatins  continue current regimen  pt with no report of pain  covering forberkowriz

## 2024-10-25 NOTE — PHYSICAL THERAPY INITIAL EVALUATION ADULT - PERTINENT HX OF CURRENT PROBLEM, REHAB EVAL
78 y/o Male, my office ptn,  with PMH of ESRD on HD TuThSat- last HD was 10/22, has baseline orthostatic hypotension on Droxidopa AND midodrine baseline BP 60-80 systolic, COPD,  on home O2 2L, SAADIA on CPAP, wheelchair bound, IBS w chronic abd pain, chronic diastolic CHF, morbid obesity, sacral decub ulcer, pulmonary hypertension, hemorrhoids, IBS,  PE and DVT on Eliquis who presents with worsening abd pain. worse in upright position or when moves, stable in a supine position when stillPtn was last admitted in June 2024 w worsening RV  Heart failure, respiratory failure,  Patient has MOLST stating DNR/DNI and patient does not want central access but is open to short trial of peripheral pressors.Since DC his orthostasis had gotten worse and most recently he is unable to bear the severity of his abdominal pain. He knows use of narcotics for abd pain will be difficult 2/2 severe orthostatic hypotension. no vomiting, fevers, chills, diarrhea, chest pain, sob. tolerating po intake. GI consulted for abdominal pain, pt known to our service from previous admissions  Over the last week or so he has c/o increased abdominal "gnawing" pain while he is in the seated position, when he is laying down the pain subsides, however, it is still somewhat gnawing. He typically feels his worst after HD sessions, however, finds it has been occurring even on his non-hd days. He has no associated nausea or vomiting, no diarrheal episodes. He has no appetite changes. About 4 days ago he had an increased amount of soft stools with some blood tinge to it, however, this occurs at times d/t his hemorrhoids (has been treated w/anusol supp with success)   Colonoscopy notable for hemorrhoids and diverticulosis. EGD 2022 showed esophagitis, gastritis, duodenitis. Ptn has not been hemodynamically stable for any additional scopes.His pain is prob 2/2 low flow state in the mesentery 2/2 hypotension. he is already on max dosing of droxi dopa and Midodrine. Hospice was d/w ptn and his wife prior to arrival. he wasn't ready to stop HD.

## 2024-10-26 ENCOUNTER — TRANSCRIPTION ENCOUNTER (OUTPATIENT)
Age: 79
End: 2024-10-26

## 2024-10-26 PROCEDURE — 72128 CT CHEST SPINE W/O DYE: CPT | Mod: 26

## 2024-10-26 PROCEDURE — 72131 CT LUMBAR SPINE W/O DYE: CPT | Mod: 26

## 2024-10-26 PROCEDURE — 72125 CT NECK SPINE W/O DYE: CPT | Mod: 26

## 2024-10-26 RX ORDER — TIZANIDINE 2 MG/1
4 TABLET ORAL
Refills: 0 | Status: DISCONTINUED | OUTPATIENT
Start: 2024-10-26 | End: 2024-10-30

## 2024-10-26 RX ORDER — PREGABALIN 150 MG/1
75 CAPSULE ORAL DAILY
Refills: 0 | Status: DISCONTINUED | OUTPATIENT
Start: 2024-10-27 | End: 2024-10-30

## 2024-10-26 RX ADMIN — APIXABAN 5 MILLIGRAM(S): 5 TABLET, FILM COATED ORAL at 17:36

## 2024-10-26 RX ADMIN — DICYCLOMINE HYDROCHLORIDE 20 MILLIGRAM(S): 20 TABLET ORAL at 05:08

## 2024-10-26 RX ADMIN — LEVETIRACETAM 500 MILLIGRAM(S): 500 TABLET, FILM COATED ORAL at 17:36

## 2024-10-26 RX ADMIN — DROXIDOPA 200 MILLIGRAM(S): 100 CAPSULE ORAL at 13:03

## 2024-10-26 RX ADMIN — CINACALCET 90 MILLIGRAM(S): 30 TABLET, FILM COATED ORAL at 05:10

## 2024-10-26 RX ADMIN — SIMETHICONE 80 MILLIGRAM(S): 80 TABLET, CHEWABLE ORAL at 13:08

## 2024-10-26 RX ADMIN — TIZANIDINE 4 MILLIGRAM(S): 2 TABLET ORAL at 21:25

## 2024-10-26 RX ADMIN — DICYCLOMINE HYDROCHLORIDE 20 MILLIGRAM(S): 20 TABLET ORAL at 17:35

## 2024-10-26 RX ADMIN — Medication 100 MILLIGRAM(S): at 13:02

## 2024-10-26 RX ADMIN — Medication 1 TABLET(S): at 13:04

## 2024-10-26 RX ADMIN — SIMETHICONE 80 MILLIGRAM(S): 80 TABLET, CHEWABLE ORAL at 17:36

## 2024-10-26 RX ADMIN — MIDODRINE HYDROCHLORIDE 40 MILLIGRAM(S): 2.5 TABLET ORAL at 18:05

## 2024-10-26 RX ADMIN — SEVELAMER CARBONATE 2400 MILLIGRAM(S): 800 TABLET, FILM COATED ORAL at 18:04

## 2024-10-26 RX ADMIN — SIMETHICONE 80 MILLIGRAM(S): 80 TABLET, CHEWABLE ORAL at 05:08

## 2024-10-26 RX ADMIN — MIDODRINE HYDROCHLORIDE 40 MILLIGRAM(S): 2.5 TABLET ORAL at 06:49

## 2024-10-26 RX ADMIN — PREGABALIN 150 MILLIGRAM(S): 150 CAPSULE ORAL at 13:02

## 2024-10-26 RX ADMIN — ERYTHROPOIETIN 10000 UNIT(S): 10000 INJECTION, SOLUTION INTRAVENOUS; SUBCUTANEOUS at 07:35

## 2024-10-26 RX ADMIN — SEVELAMER CARBONATE 2400 MILLIGRAM(S): 800 TABLET, FILM COATED ORAL at 13:01

## 2024-10-26 RX ADMIN — POLYETHYLENE GLYCOL 3350 17 GRAM(S): 17 POWDER, FOR SOLUTION ORAL at 13:03

## 2024-10-26 RX ADMIN — DICYCLOMINE HYDROCHLORIDE 20 MILLIGRAM(S): 20 TABLET ORAL at 13:01

## 2024-10-26 RX ADMIN — LEVETIRACETAM 500 MILLIGRAM(S): 500 TABLET, FILM COATED ORAL at 05:08

## 2024-10-26 RX ADMIN — SEVELAMER CARBONATE 2400 MILLIGRAM(S): 800 TABLET, FILM COATED ORAL at 13:44

## 2024-10-26 RX ADMIN — PANTOPRAZOLE SODIUM 40 MILLIGRAM(S): 40 TABLET, DELAYED RELEASE ORAL at 05:08

## 2024-10-26 RX ADMIN — MIDODRINE HYDROCHLORIDE 40 MILLIGRAM(S): 2.5 TABLET ORAL at 13:03

## 2024-10-26 RX ADMIN — DROXIDOPA 200 MILLIGRAM(S): 100 CAPSULE ORAL at 17:36

## 2024-10-26 RX ADMIN — APIXABAN 5 MILLIGRAM(S): 5 TABLET, FILM COATED ORAL at 05:09

## 2024-10-26 NOTE — DISCHARGE NOTE PROVIDER - NSDCFUADDAPPT_GEN_ALL_CORE_FT
APPTS ARE READY TO BE MADE: [x] YES    Best Family or Patient Contact (if needed):    Additional Information about above appointments (if needed):    1: PCP- Dr. Hein  2:   3:     Other comments or requests:    APPTS ARE READY TO BE MADE: [x] YES    Best Family or Patient Contact (if needed):    Additional Information about above appointments (if needed):    1: PCP- Dr. Hein  2: Gastroenterologist   APPTS ARE READY TO BE MADE: [x] YES    Best Family or Patient Contact (if needed):    Additional Information about above appointments (if needed):    1: PCP- Dr. Hein  2: Gastroenterologist      Met with patient face to face and provided the patient with provider referral information, however patient prefers to schedule the appointments on their own.  As per patient his wife will schedule appointment.  Call Inpatient Referral Program for assistance with follow up appointments [phone] Please follow up with your primary care provider and nephrologist.     APPTS ARE READY TO BE MADE: [x] YES    Best Family or Patient Contact (if needed):    Additional Information about above appointments (if needed):    1: PCP- Dr. Hein  2: Gastroenterologist      Met with patient face to face and provided the patient with provider referral information, however patient prefers to schedule the appointments on their own.  As per patient his wife will schedule appointment.  Call Inpatient Referral Program for assistance with follow up appointments [phone]

## 2024-10-26 NOTE — PROGRESS NOTE ADULT - SUBJECTIVE AND OBJECTIVE BOX
CARDIOLOGY FOLLOW UP NOTE - DR. SANCHEZ    Patient Name: MAYE ZUNIGA    Date of Service: 10-26-24 @ 13:04    Patient seen and examined  no new complaints    Subjective:    cv: denies chest pain, dyspnea, palpitations, dizziness  pulmonary: denies cough  GI: denies abdominal pain, nausea, vomiting  vascular/legs: no edema   skin: no rash  ROS: otherwise negative   overnight events:      PHYSICAL EXAM:  T(C): 36.2 (10-26-24 @ 10:27), Max: 36.9 (10-25-24 @ 19:44)  HR: 74 (10-26-24 @ 10:27) (69 - 92)  BP: 100/53 (10-26-24 @ 10:27) (96/50 - 126/67)  RR: 18 (10-26-24 @ 10:27) (17 - 18)  SpO2: 98% (10-26-24 @ 10:27) (94% - 98%)  Wt(kg): --  I&O's Summary    25 Oct 2024 07:  -  26 Oct 2024 07:00  --------------------------------------------------------  IN: 480 mL / OUT: 0 mL / NET: 480 mL    26 Oct 2024 07:  -  26 Oct 2024 13:04  --------------------------------------------------------  IN: 0 mL / OUT: 1900 mL / NET: -1900 mL      Daily     Daily Weight in k (26 Oct 2024 10:27)    Appearance: Normal	  Cardiovascular: Normal S1 S2,RRR, No JVD, No murmurs  Respiratory: Lungs clear to auscultation	  Gastrointestinal:  Soft, Non-tender, + BS	  Extremities: Normal range of motion, No clubbing, cyanosis or edema      Home Medications:  Albuterol (Eqv-ProAir HFA) 90 mcg/inh inhalation aerosol: 2 puff(s) inhaled every 6 hours as needed (23 Oct 2024 16:15)  allopurinol 100 mg oral tablet: 1 tab(s) orally once a day (23 Oct 2024 16:15)  cinacalcet 90 mg oral tablet: 1 tab(s) orally 3 times a week (on days of dialysis - Tues, Thurs &amp; Sat) (23 Oct 2024 16:15)  droxidopa 200 mg oral capsule: 1 cap(s) orally 3 times a day (23 Oct 2024 16:15)  levETIRAcetam 500 mg oral tablet: 1 tab(s) orally 2 times a day (23 Oct 2024 16:15)  lidocaine 4% topical film: Apply topically to affected area once a day (both knees and lower back) (23 Oct 2024 16:15)  MiraLax oral powder for reconstitution: 17 gram(s) orally every other day (23 Oct 2024 16:15)  nystatin 100,000 units/g topical cream: Apply topically to affected area 2 times a day (buttocks) (23 Oct 2024 16:15)  nystatin 100,000 units/g topical powder: Apply topically to affected area 2 times a day (buttocks) (23 Oct 2024 16:15)  pantoprazole 40 mg oral delayed release tablet: 1 tab(s) orally once a day (before a meal) (23 Oct 2024 16:15)  sevelamer carbonate 800 mg oral tablet: 3 tab(s) orally 3 times a day (with meals) (23 Oct 2024 16:15)  Trelegy Ellipta 100 mcg-62.5 mcg-25 mcg/inh inhalation powder: 1 puff(s) inhaled once a day (23 Oct 2024 16:15)  Voltaren 1% topical gel: Apply topically to affected area 2 times a day (both knees) (23 Oct 2024 16:15)      MEDICATIONS  (STANDING):  allopurinol 100 milliGRAM(s) Oral daily  apixaban 5 milliGRAM(s) Oral two times a day  cinacalcet 90 milliGRAM(s) Oral <User Schedule>  dicyclomine 20 milliGRAM(s) Oral three times a day before meals  droxidopa 200 milliGRAM(s) Oral three times a day  epoetin vinh (PROCRIT) Injectable 67797 Unit(s) IV Push <User Schedule>  influenza  Vaccine (HIGH DOSE) 0.5 milliLiter(s) IntraMuscular once  levETIRAcetam 500 milliGRAM(s) Oral two times a day  midodrine. 40 milliGRAM(s) Oral three times a day  Nephro-eliana 1 Tablet(s) Oral daily  pantoprazole    Tablet 40 milliGRAM(s) Oral before breakfast  polyethylene glycol 3350 17 Gram(s) Oral daily  pregabalin 150 milliGRAM(s) Oral daily  sevelamer carbonate 2400 milliGRAM(s) Oral three times a day with meals  simethicone 80 milliGRAM(s) Chew every 6 hours      TELEMETRY: 	    ECG:  	  RADIOLOGY:   DIAGNOSTIC TESTING:  [ ] Echocardiogram:  [ ] Catheterization:  [ ] Stress Test:    OTHER: 	    LABS:	 	    CARDIAC MARKERS:                        proBNP:     Lipid Profile:   HgA1c:

## 2024-10-26 NOTE — PROGRESS NOTE ADULT - ASSESSMENT
79 y/o M with PMH of ESRD on HDTuThSat- last yesterday, orthostatic hypotension on midodrine baseline BP 80-90 systolic, COPD reportedly on home O2 2L, SAADIA on CPAP, wheelchair bound, chronic diastolic CHF, morbid obesity, pulmonary hypertension, PE and DVT on Eliquis, IVC filter, chronic sacral ulcer, chronic back pain, history of hematochezia in the past 2/2 hemorrhoids, chronic abd pain 2/2 IBS, chronic constipation presenting for severe abdominal pain for few weeks. Nephro on board for ESRD on HD    ESRD   On HD TTS via AVF  Nephrologist: Dr. Whalen   Outpt unit [facility]   Consent obtained in chart witnessed  Last HD on 10/23  HD in hosp on 10/24 UF 2 L removed, no labs since 10/23  Will continue TTS scheduele while hospitalized Dialyzed today UF -1.9 L net negative  Renal diet.  Monitor BMP     Anemia   MELISSA with HD for hgb <11  transfuse for hgb <7  monitor Hgb     HTN/Hypotension  bp fluctuating; generally low.  On midodrine 40 mg TID and Droxidopa 200 mg TID  Monitor closely     CKD-MBD  check PTH  monitor P04 and Ca daily   Continue with renvela and continue with cinacalcet

## 2024-10-26 NOTE — DISCHARGE NOTE PROVIDER - NSDCHHHOMEBOUND_GEN_ALL_CORE
Vaccine Information Statement(s) for was given today. This has been reviewed, questions answered, and verbal consent given by Patient for injection(s) and administration of Influenza (Inactivated).    1. Does the patient have a moderate to severe fever?  No  2. Has the patient had a serious reaction to a flu shot before?   No  3. Has the patient ever had [place]illian [place] Syndrome within 6 weeks of a previous flu shot?  No  4. Does the patient have a serious allergy to eggs?  No  5. Is the patient less that 6 months of age?  No    Patient is eligible to receive the vaccine based on all questions being answered as 'No'.          Patient tolerated without incident. See immunization grid for documentation.   Fall risk

## 2024-10-26 NOTE — DISCHARGE NOTE PROVIDER - NSDCMRMEDTOKEN_GEN_ALL_CORE_FT
Albuterol (Eqv-ProAir HFA) 90 mcg/inh inhalation aerosol: 2 puff(s) inhaled every 6 hours as needed  allopurinol 100 mg oral tablet: 1 tab(s) orally once a day  apixaban 5 mg oral tablet: 1 tab(s) orally every 12 hours  cinacalcet 90 mg oral tablet: 1 tab(s) orally 3 times a week (on days of dialysis - Tues, Thurs &amp; Sat)  Daily Gita oral tablet: 1 tab(s) orally once a day  droxidopa 200 mg oral capsule: 1 cap(s) orally 3 times a day  Home Physical Therapy: Evaluate and treat.  levETIRAcetam 500 mg oral tablet: 1 tab(s) orally 2 times a day  lidocaine 4% topical film: Apply topically to affected area once a day (both knees and lower back)  midodrine 10 mg oral tablet: 4 tab(s) orally 3 times a day  MiraLax oral powder for reconstitution: 17 gram(s) orally every other day  nystatin 100,000 units/g topical cream: Apply topically to affected area 2 times a day (buttocks)  nystatin 100,000 units/g topical powder: Apply topically to affected area 2 times a day (buttocks)  pantoprazole 40 mg oral delayed release tablet: 1 tab(s) orally once a day (before a meal)  pregabalin 75 mg oral capsule: 1 cap(s) orally once a day MDD: 75 mg  sevelamer carbonate 800 mg oral tablet: 3 tab(s) orally 3 times a day (with meals)  Trelegy Ellipta 100 mcg-62.5 mcg-25 mcg/inh inhalation powder: 1 puff(s) inhaled once a day  Voltaren 1% topical gel: Apply topically to affected area 2 times a day (both knees)   Albuterol (Eqv-ProAir HFA) 90 mcg/inh inhalation aerosol: 2 puff(s) inhaled every 6 hours as needed  allopurinol 100 mg oral tablet: 1 tab(s) orally once a day  apixaban 5 mg oral tablet: 1 tab(s) orally every 12 hours  cinacalcet 90 mg oral tablet: 1 tab(s) orally 3 times a week (on days of dialysis - Tues, Thurs &amp; Sat)  Daily Gita oral tablet: 1 tab(s) orally once a day  dicyclomine 10 mg oral capsule: 2 cap(s) orally 3 times a day (before meals)  droxidopa 200 mg oral capsule: 1 cap(s) orally 3 times a day  levETIRAcetam 500 mg oral tablet: 1 tab(s) orally 2 times a day  lidocaine 4% topical film: Apply topically to affected area once a day (both knees and lower back)  midodrine 10 mg oral tablet: 4 tab(s) orally 3 times a day  MiraLax oral powder for reconstitution: 17 gram(s) orally every other day  nystatin 100,000 units/g topical cream: Apply topically to affected area 2 times a day (buttocks)  pantoprazole 40 mg oral delayed release tablet: 1 tab(s) orally once a day (before a meal)  pregabalin 75 mg oral capsule: 1 cap(s) orally once a day MDD: 75 mg  sevelamer carbonate 800 mg oral tablet: 3 tab(s) orally 3 times a day (with meals)  tiZANidine 4 mg oral tablet: 1 tab(s) orally 2 times a day  traMADol 50 mg oral tablet: 1 tab(s) orally every 8 hours Take 0.5 tab or 1 tab every 8 hours as needed for pain MDD: 3  Trelegy Ellipta 100 mcg-62.5 mcg-25 mcg/inh inhalation powder: 1 puff(s) inhaled once a day  Voltaren 1% topical gel: Apply topically to affected area 2 times a day (both knees)

## 2024-10-26 NOTE — PROGRESS NOTE ADULT - SUBJECTIVE AND OBJECTIVE BOX
pt resting in bed, no distress       albuterol    90 MICROgram(s) HFA Inhaler 2 Puff(s) Inhalation every 6 hours PRN  allopurinol 100 milliGRAM(s) Oral daily  apixaban 5 milliGRAM(s) Oral two times a day  cinacalcet 90 milliGRAM(s) Oral <User Schedule>  dicyclomine 20 milliGRAM(s) Oral three times a day before meals  droxidopa 200 milliGRAM(s) Oral three times a day  epoetin vinh (PROCRIT) Injectable 16430 Unit(s) IV Push <User Schedule>  influenza  Vaccine (HIGH DOSE) 0.5 milliLiter(s) IntraMuscular once  levETIRAcetam 500 milliGRAM(s) Oral two times a day  midodrine. 40 milliGRAM(s) Oral three times a day  Nephro-eliana 1 Tablet(s) Oral daily  pantoprazole    Tablet 40 milliGRAM(s) Oral before breakfast  polyethylene glycol 3350 17 Gram(s) Oral daily  pregabalin 150 milliGRAM(s) Oral daily  sevelamer carbonate 2400 milliGRAM(s) Oral three times a day with meals  simethicone 80 milliGRAM(s) Chew every 6 hours          Hemoglobin: 10.6 g/dL (10-23 @ 12:01)            Creatinine Trend: 6.73<--    COAGS:           T(C): 36.2 (10-26-24 @ 10:27), Max: 36.9 (10-25-24 @ 19:44)  HR: 74 (10-26-24 @ 10:27) (69 - 92)  BP: 100/53 (10-26-24 @ 10:27) (96/50 - 126/67)  RR: 18 (10-26-24 @ 10:27) (17 - 18)  SpO2: 98% (10-26-24 @ 10:27) (94% - 98%)  Wt(kg): --    I&O's Summary    25 Oct 2024 07:01  -  26 Oct 2024 07:00  --------------------------------------------------------  IN: 480 mL / OUT: 0 mL / NET: 480 mL    26 Oct 2024 07:01  -  26 Oct 2024 12:45  --------------------------------------------------------  IN: 0 mL / OUT: 1900 mL / NET: -1900 mL      Allergies    baclofen (Other)  latex (Rash)    Intolerances        SOCIAL HISTORY:  Denies ETOh,Smoking,     FAMILY HISTORY:  Family history of colon cancer (Father)    Family history of heart disease (Father)        REVIEW OF SYSTEMS:    CONSTITUTIONAL: No weakness, fevers or chills  EYES/ENT: No visual changes;  No vertigo or throat pain   NECK: No pain or stiffness  RESPIRATORY: No cough, wheezing, hemoptysis; No shortness of breath  CARDIOVASCULAR: No chest pain or palpitations  GASTROINTESTINAL: No abdominal or epigastric pain. No nausea, vomiting, or hematemesis; No diarrhea or constipation. No melena or hematochezia.  GENITOURINARY: No dysuria, frequency or hematuria  NEUROLOGICAL: No numbness or weakness  SKIN: No itching, burning, rashes, or lesions   All other review of systems is negative unless indicated above.              PHYSICAL EXAM:    Constitutional: NAD  HEENT: PERRLA,   Neck: No JVD  Respiratory: CTA B/L  Cardiovascular: S1 and S2  Gastrointestinal: BS+, soft, NT/ND  Extremities: No peripheral edema  Neurological: A/O x 3, no focal deficits  Psychiatric: Normal mood, normal affect  : No Herring  Skin: No rashes  Access: Not applicable  Back: No CVA tenderness

## 2024-10-26 NOTE — PROGRESS NOTE ADULT - ASSESSMENT
A/P  80 y/o Male with PMH of ESRD on HD TuThSat- last HD was 10/22, has baseline orthostatic hypotension on Droxidopa AND midodrine baseline BP 60-80 systolic, COPD,  on home O2 2L, SAADIA on CPAP, wheelchair bound, IBS w chronic abd pain, chronic diastolic CHF, morbid obesity, sacral decub ulcer, pulmonary hypertension, hemorrhoids, IBS,  PE and DVT on Eliquis who presents with worsening abd pain.     #Abdominal pain  -no associated CP or SOB  -CT A/P shows small hiatal hernia; no acute intra-abdominal findings   -med and GI f/u  -per GI conservative gi managment    #ESRD on HD  -renal fu   -HD per renal    #Hx of Orthostatic Hypotension  -On midodrine 40 mg TID and Droxidopa 200 mg TID    #Chronic diastolic CHF  -TTE 6/2024 shows LVSF appears grossly normal; mildly enlarged RV; mild TR  -volume status stable  -no decomp HF on exam  -cont volume removal with HD per renal    #Hx of PE, DVT  -cont Eliquis    55 minutes spent on total encounter; more than 50% of the visit was spent counseling and/or coordinating care by the attending physician.

## 2024-10-26 NOTE — DISCHARGE NOTE PROVIDER - NSDCCPCAREPLAN_GEN_ALL_CORE_FT
PRINCIPAL DISCHARGE DIAGNOSIS  Diagnosis: Abdominal pain  Assessment and Plan of Treatment: Improved, continue tramadol as prescribed  No acute findings on Cat scan      SECONDARY DISCHARGE DIAGNOSES  Diagnosis: End stage renal disease  Assessment and Plan of Treatment: Continue current dialysis schedule  Avoid taking (NSAIDs) - (ex: Ibuprofen, Advil, Celebrex, Naprosyn)  Avoid taking any nephrotoxic agents (can harm kidneys) - Intravenous contrast for diagnostic testing, combination cold medications.  Have all medications adjusted for your renal function by your Health Care Provider.  Blood pressure control is important.  Take all medication as prescribed.      Diagnosis: Hypotension  Assessment and Plan of Treatment: Continue current medication regimen

## 2024-10-26 NOTE — DISCHARGE NOTE PROVIDER - HOSPITAL COURSE
HPI:  78 y/o Male, my office ptn,  with PMH of ESRD on HD TuThSat- last HD was 10/22, has baseline orthostatic hypotension on Droxidopa AND midodrine baseline BP 60-80 systolic, COPD,  on home O2 2L, SAADIA on CPAP, wheelchair bound, IBS w chronic abd pain, chronic diastolic CHF, morbid obesity, sacral decub ulcer, pulmonary hypertension, hemorrhoids, IBS,  PE and DVT on Eliquis who presents with worsening abd pain. worse in upright position or when moves, stable in a supine position when still  Ptn was last admitted in June 2024 w worsening RV  Heart failure, respiratory failure,  Patient has MOLST stating DNR/DNI and patient does not want central access but is open to short trial of peripheral pressors.  Since DC his orthostasis had gotten worse and most recently he is unable to bear the severity of his abdominal pain. He knows use of narcotics for abd pain will be difficult 2/2 severe orthostatic hypotension.     no vomiting, fevers, chills, diarrhea, chest pain, sob. tolerating po intake    GI consulted for abdominal pain, pt known to our service from previous admissions The patients wife bedside aided in history.     Over the last week or so he has c/o increased abdominal "gnawing" pain while he is in the seated position, when he is laying down the pain subsides, however, it is still somewhat gnawing.     He typically feels his worst after HD sessions, however, finds it has been occurring even on his non-hd days.      He has no associated nausea or vomiting, no diarrheal episodes. He has no appetite changes. About 4 days ago he had an increased amount of soft stools with some blood tinge to it, however, this occurs at times d/t his hemorrhoids (has been treated w/anusol supp with success)   Colonoscopy notable for hemorrhoids and diverticulosis. EGD 2022 showed esophagitis, gastritis, duodenitis. Ptn has not been hemodynamically stable for any additional scopes.    His pain is prob 2/2 low flow state in the mesentery 2/2 hypotension. he is already on max dosing of droxi dopa and Midodrine.     Hospice was d/w ptn and his wife prior to arrival. he wasn't ready to stop HD.      (23 Oct 2024 15:20)    Hospital Course:  Acute on Chronic Abdominal Pain   seems positional and most likely Blood pressure dependent 2/2 low flow state in the mesentery  chronic mesenteric ischemia d/t chronic low flow state (hypotension)  CT A/P w/IV contrast : no new findings  seen by GI  palliative team called for pain control and GOC  GOC brought up w the ptn, he wants to cont HD.   he is DNR/DNI, no artificial nutrition    Orthostatic Hypotension  - severe, cont DroxiDOpa and Midodrine     ESRD on HD  - cont TUe, THu, Sat  - ptn wants to cont getting HD    CHF    COPD on home 02    Morbid obesity     Neuropathy    IBS    Important Medication Changes and Reason:    Active or Pending Issues Requiring Follow-up:    Advanced Directives:   [] Full code  [x] DNR  [ ] Hospice    Discharge Diagnoses:         HPI:  78 y/o Male, my office ptn,  with PMH of ESRD on HD TuThSat- last HD was 10/22, has baseline orthostatic hypotension on Droxidopa AND midodrine baseline BP 60-80 systolic, COPD,  on home O2 2L, SAADIA on CPAP, wheelchair bound, IBS w chronic abd pain, chronic diastolic CHF, morbid obesity, sacral decub ulcer, pulmonary hypertension, hemorrhoids, IBS,  PE and DVT on Eliquis who presents with worsening abd pain. worse in upright position or when moves, stable in a supine position when still  Ptn was last admitted in June 2024 w worsening RV  Heart failure, respiratory failure,  Patient has MOLST stating DNR/DNI and patient does not want central access but is open to short trial of peripheral pressors.  Since DC his orthostasis had gotten worse and most recently he is unable to bear the severity of his abdominal pain. He knows use of narcotics for abd pain will be difficult 2/2 severe orthostatic hypotension.     no vomiting, fevers, chills, diarrhea, chest pain, sob. tolerating po intake    GI consulted for abdominal pain, pt known to our service from previous admissions The patients wife bedside aided in history.     Over the last week or so he has c/o increased abdominal "gnawing" pain while he is in the seated position, when he is laying down the pain subsides, however, it is still somewhat gnawing.     He typically feels his worst after HD sessions, however, finds it has been occurring even on his non-hd days.      He has no associated nausea or vomiting, no diarrheal episodes. He has no appetite changes. About 4 days ago he had an increased amount of soft stools with some blood tinge to it, however, this occurs at times d/t his hemorrhoids (has been treated w/anusol supp with success)   Colonoscopy notable for hemorrhoids and diverticulosis. EGD 2022 showed esophagitis, gastritis, duodenitis. Ptn has not been hemodynamically stable for any additional scopes.    His pain is prob 2/2 low flow state in the mesentery 2/2 hypotension. he is already on max dosing of droxidopa and Midodrine.     Hospice was d/w ptn and his wife prior to arrival. he wasn't ready to stop HD.      (23 Oct 2024 15:20)    Hospital Course:  Acute on Chronic Abdominal Pain   seems positional and most likely Blood pressure dependent 2/2 low flow state in the mesentery  chronic mesenteric ischemia d/t chronic low flow state (hypotension)  CT A/P w/IV contrast : no new findings  seen by GI  palliative team called for pain control and GOC  GOC brought up w the ptn, he wants to cont HD.   he is DNR/DNI, no artificial nutrition    Orthostatic Hypotension  - severe, cont DroxiDOpa and Midodrine     ESRD on HD  - cont TUe, THu, Sat  - ptn wants to cont getting HD    CHF    COPD on home 02    Morbid obesity     Neuropathy    IBS    Important Medication Changes and Reason:    Active or Pending Issues Requiring Follow-up:    Advanced Directives:   [] Full code  [x] DNR  [ ] Hospice    Discharge Diagnoses:

## 2024-10-26 NOTE — PROGRESS NOTE ADULT - SUBJECTIVE AND OBJECTIVE BOX
[facility]    Dr. Vonda Rosario     Office [phone] (9 am to 5 pm)  Service: [phone] (5pm to 9am)  Also Available on TEAMS      RENAL PROGRESS NOTE: DATE OF SERVICE 10-26-24 @ 13:35    Patient is a 79y old  Male who presents with a chief complaint of abdominal pain (26 Oct 2024 13:25)      Patient seen and examined at bedside. No chest pain/sob    VITALS:  T(F): 97.2 (10-26-24 @ 10:27), Max: 98.5 (10-25-24 @ 19:44)  HR: 74 (10-26-24 @ 10:27)  BP: 100/53 (10-26-24 @ 10:27)  RR: 18 (10-26-24 @ 10:27)  SpO2: 98% (10-26-24 @ 10:27)  Wt(kg): --    10-25 @ 07:01  -  10-26 @ 07:00  --------------------------------------------------------  IN: 480 mL / OUT: 0 mL / NET: 480 mL    10-26 @ 07:01  -  10-26 @ 13:35  --------------------------------------------------------  IN: 0 mL / OUT: 1900 mL / NET: -1900 mL          PHYSICAL EXAM:  Constitutional: NAD  Neck: No JVD  Respiratory: CTAB, no wheezes, rales or rhonchi  Cardiovascular: S1, S2, RRR  Gastrointestinal: BS+, soft, NT/ND  Extremities: No peripheral edema    Hospital Medications:   MEDICATIONS  (STANDING):  allopurinol 100 milliGRAM(s) Oral daily  apixaban 5 milliGRAM(s) Oral two times a day  cinacalcet 90 milliGRAM(s) Oral <User Schedule>  dicyclomine 20 milliGRAM(s) Oral three times a day before meals  droxidopa 200 milliGRAM(s) Oral three times a day  epoetin vinh (PROCRIT) Injectable 47125 Unit(s) IV Push <User Schedule>  influenza  Vaccine (HIGH DOSE) 0.5 milliLiter(s) IntraMuscular once  levETIRAcetam 500 milliGRAM(s) Oral two times a day  midodrine. 40 milliGRAM(s) Oral three times a day  Nephro-eliana 1 Tablet(s) Oral daily  pantoprazole    Tablet 40 milliGRAM(s) Oral before breakfast  polyethylene glycol 3350 17 Gram(s) Oral daily  pregabalin 150 milliGRAM(s) Oral daily  sevelamer carbonate 2400 milliGRAM(s) Oral three times a day with meals  simethicone 80 milliGRAM(s) Chew every 6 hours      LABS:        Creatinine Trend: 6.73 <--            Urine Studies:  Urinalysis - [10-23-24 @ 12:01]      Color  / Appearance  / SG  / pH       Gluc 91 / Ketone   / Bili  / Urobili        Blood  / Protein  / Leuk Est  / Nitrite       RBC  / WBC  / Hyaline  / Gran  / Sq Epi  / Non Sq Epi  / Bacteria       TSH 0.85      [06-12-24 @ 23:49]  Lipid: chol 125, , HDL 43, LDL --      [06-12-24 @ 23:49]        RADIOLOGY & ADDITIONAL STUDIES:

## 2024-10-26 NOTE — PROGRESS NOTE ADULT - SUBJECTIVE AND OBJECTIVE BOX
Patient is a 79y old  Male who presents with a chief complaint of abdominal pain (26 Oct 2024 13:35)      SUBJECTIVE / OVERNIGHT EVENTS: ptn was refusing imaging of the spine but now agrees to have CT spine, refuses MRI. being done 2/2 abd pain when sitting up , with immediate relief once lays down. tolerating HD, BP has been stable. ptn has no relief from a higher dose LYRICA, will drop to the baseline dose of 75 mg daily.     MEDICATIONS  (STANDING):  allopurinol 100 milliGRAM(s) Oral daily  apixaban 5 milliGRAM(s) Oral two times a day  cinacalcet 90 milliGRAM(s) Oral <User Schedule>  dicyclomine 20 milliGRAM(s) Oral three times a day before meals  droxidopa 200 milliGRAM(s) Oral three times a day  epoetin vinh (PROCRIT) Injectable 25970 Unit(s) IV Push <User Schedule>  influenza  Vaccine (HIGH DOSE) 0.5 milliLiter(s) IntraMuscular once  levETIRAcetam 500 milliGRAM(s) Oral two times a day  midodrine. 40 milliGRAM(s) Oral three times a day  Nephro-eliana 1 Tablet(s) Oral daily  pantoprazole    Tablet 40 milliGRAM(s) Oral before breakfast  polyethylene glycol 3350 17 Gram(s) Oral daily  sevelamer carbonate 2400 milliGRAM(s) Oral three times a day with meals  simethicone 80 milliGRAM(s) Chew every 6 hours  tiZANidine 4 milliGRAM(s) Oral <User Schedule>    MEDICATIONS  (PRN):  albuterol    90 MICROgram(s) HFA Inhaler 2 Puff(s) Inhalation every 6 hours PRN Bronchospasm      Vital Signs Last 24 Hrs  T(F): 97.2 (10-26-24 @ 10:27), Max: 98.5 (10-25-24 @ 19:44)  HR: 74 (10-26-24 @ 10:27) (69 - 86)  BP: 100/53 (10-26-24 @ 10:27) (96/50 - 110/64)  RR: 18 (10-26-24 @ 10:27) (17 - 18)  SpO2: 98% (10-26-24 @ 10:27) (97% - 98%)  Telemetry:   CAPILLARY BLOOD GLUCOSE        I&O's Summary    25 Oct 2024 07:01  -  26 Oct 2024 07:00  --------------------------------------------------------  IN: 480 mL / OUT: 0 mL / NET: 480 mL    26 Oct 2024 07:01  -  26 Oct 2024 16:56  --------------------------------------------------------  IN: 480 mL / OUT: 1900 mL / NET: -1420 mL        PHYSICAL EXAM:  GENERAL: NAD, well-developed  HEAD:  Atraumatic, Normocephalic  EYES: EOMI, PERRLA, conjunctiva and sclera clear  NECK: Supple, No JVD  CHEST/LUNG: Clear to auscultation bilaterally; No wheeze  HEART: Regular rate and rhythm; No murmurs, rubs, or gallops  ABDOMEN: Soft, Nontender, Nondistended; Bowel sounds present  EXTREMITIES:  2+ Peripheral Pulses, No clubbing, cyanosis, or edema  PSYCH: AAOx3  NEUROLOGY: non-focal  SKIN: No rashes or lesions

## 2024-10-26 NOTE — PROGRESS NOTE ADULT - ASSESSMENT
78 y/o Male, my office ptn,  with PMH of ESRD on HD TuThSat- last HD was 10/22, has baseline orthostatic hypotension on Droxidopa AND midodrine baseline BP 60-80 systolic, COPD,  on home O2 2L, SAADIA on CPAP, wheelchair bound, IBS w chronic abd pain, chronic diastolic CHF, morbid obesity, sacral decub ulcer, pulmonary hypertension, hemorrhoids, IBS,  PE and DVT on Eliquis who presents with worsening abd pain. worse in upright position or when moves, stable in a supine position when still  Ptn was last admitted in June 2024 w worsening RV  Heart failure, respiratory failure,  Patient has MOLST stating DNR/DNI and patient does not want central access but is open to short trial of peripheral pressors.  Since DC his orthostasis had gotten worse and most recently he is unable to bear the severity of his abdominal pain. He knows use of narcotics for abd pain will be difficult 2/2 severe orthostatic hypotension.     no vomiting, fevers, chills, diarrhea, chest pain, sob. tolerating po intake    GI consulted for abdominal pain, pt known to our service from previous admissions The patients wife bedside aided in history.     Over the last week or so he has c/o increased abdominal "gnawing" pain while he is in the seated position, when he is laying down the pain subsides, however, it is still somewhat gnawing.     He typically feels his worst after HD sessions, however, finds it has been occurring even on his non-hd days.      He has no associated nausea or vomiting, no diarrheal episodes. He has no appetite changes. About 4 days ago he had an increased amount of soft stools with some blood tinge to it, however, this occurs at times d/t his hemorrhoids (has been treated w/anusol supp with success)   Colonoscopy notable for hemorrhoids and diverticulosis. EGD 2022 showed esophagitis, gastritis, duodenitis. Ptn has not been hemodynamically stable for any additional scopes.    His pain is prob 2/2 low flow state in the mesentery 2/2 hypotension. he is already on max dosing of droxi dopa and Midodrine.     Hospice was d/w ptn and his wife prior to arrival. he wasn't ready to stop HD.     Acute on Chronic Abdominal Pain   ptn was refusing imaging of the spine but now agrees to have CT spine, refuses MRI.   being done 2/2 abd pain when sitting up , with immediate relief once lays down.   ptn has no relief from a higher dose LYRICA, will drop to the baseline dose of 75 mg daily.   doubt pain is from poor mesenteric blood flow since pain resolved immediately once he lays down.   CT A/P w/IV contrast : no new findings  seen by GI  palliative team called for pain control and GOC  GOC brought up w the ptn, he wants to cont HD.   he is DNR/DNI, no artificial nutrition    Orthostatic Hypotension  - severe, cont DroxiDOpa and Midodrine  - tolerating HD, BP has been stable.      ESRD on HD  - cont TUe, THu, Sat  - ptn wants to cont getting HD    CHF    COPD on home 02    Morbid obesity     Neuropathy    IBS

## 2024-10-26 NOTE — PROGRESS NOTE ADULT - ASSESSMENT
78 y/o Male with PMH of ESRD on HD TuThSat- last HD was 10/22, has baseline orthostatic hypotension on Droxidopa AND midodrine baseline BP 60-80 systolic, COPD,  on home O2 2L, SAADIA on CPAP, wheelchair bound, IBS w chronic abd pain, chronic diastolic CHF, morbid obesity, sacral decub ulcer, pulmonary hypertension, hemorrhoids, IBS,  PE and DVT on Eliquis who presents with worsening abd pain.     conservative gi management  no acute gi complaints  continue current regimen

## 2024-10-26 NOTE — DISCHARGE NOTE PROVIDER - CARE PROVIDER_API CALL
Vilma Espinoza  Internal Medicine  [address] [place] [address], [address] [address]  Phone: [phone]  Fax: [phone]  Established Patient  Follow Up Time:

## 2024-10-27 LAB
ALBUMIN SERPL ELPH-MCNC: 3.8 G/DL — SIGNIFICANT CHANGE UP (ref 3.3–5)
ALP SERPL-CCNC: 335 U/L — HIGH (ref 40–120)
ALT FLD-CCNC: <5 U/L — LOW (ref 10–45)
ANION GAP SERPL CALC-SCNC: 15 MMOL/L — SIGNIFICANT CHANGE UP (ref 5–17)
AST SERPL-CCNC: 8 U/L — LOW (ref 10–40)
BILIRUB SERPL-MCNC: 0.3 MG/DL — SIGNIFICANT CHANGE UP (ref 0.2–1.2)
BUN SERPL-MCNC: 31 MG/DL — HIGH (ref 7–23)
CALCIUM SERPL-MCNC: 8.6 MG/DL — SIGNIFICANT CHANGE UP (ref 8.4–10.5)
CHLORIDE SERPL-SCNC: 95 MMOL/L — LOW (ref 96–108)
CO2 SERPL-SCNC: 27 MMOL/L — SIGNIFICANT CHANGE UP (ref 22–31)
CREAT SERPL-MCNC: 7.08 MG/DL — HIGH (ref 0.5–1.3)
EGFR: 7 ML/MIN/1.73M2 — LOW
GLUCOSE SERPL-MCNC: 80 MG/DL — SIGNIFICANT CHANGE UP (ref 70–99)
POTASSIUM SERPL-MCNC: 4.4 MMOL/L — SIGNIFICANT CHANGE UP (ref 3.5–5.3)
POTASSIUM SERPL-SCNC: 4.4 MMOL/L — SIGNIFICANT CHANGE UP (ref 3.5–5.3)
PROT SERPL-MCNC: 8.2 G/DL — SIGNIFICANT CHANGE UP (ref 6–8.3)
SODIUM SERPL-SCNC: 137 MMOL/L — SIGNIFICANT CHANGE UP (ref 135–145)

## 2024-10-27 RX ADMIN — Medication 100 MILLIGRAM(S): at 12:16

## 2024-10-27 RX ADMIN — SEVELAMER CARBONATE 2400 MILLIGRAM(S): 800 TABLET, FILM COATED ORAL at 08:51

## 2024-10-27 RX ADMIN — APIXABAN 5 MILLIGRAM(S): 5 TABLET, FILM COATED ORAL at 05:17

## 2024-10-27 RX ADMIN — MIDODRINE HYDROCHLORIDE 40 MILLIGRAM(S): 2.5 TABLET ORAL at 12:16

## 2024-10-27 RX ADMIN — DROXIDOPA 200 MILLIGRAM(S): 100 CAPSULE ORAL at 12:16

## 2024-10-27 RX ADMIN — TIZANIDINE 4 MILLIGRAM(S): 2 TABLET ORAL at 21:36

## 2024-10-27 RX ADMIN — APIXABAN 5 MILLIGRAM(S): 5 TABLET, FILM COATED ORAL at 17:35

## 2024-10-27 RX ADMIN — DROXIDOPA 200 MILLIGRAM(S): 100 CAPSULE ORAL at 05:17

## 2024-10-27 RX ADMIN — DICYCLOMINE HYDROCHLORIDE 20 MILLIGRAM(S): 20 TABLET ORAL at 12:16

## 2024-10-27 RX ADMIN — SEVELAMER CARBONATE 2400 MILLIGRAM(S): 800 TABLET, FILM COATED ORAL at 12:47

## 2024-10-27 RX ADMIN — LEVETIRACETAM 500 MILLIGRAM(S): 500 TABLET, FILM COATED ORAL at 05:18

## 2024-10-27 RX ADMIN — DICYCLOMINE HYDROCHLORIDE 20 MILLIGRAM(S): 20 TABLET ORAL at 05:17

## 2024-10-27 RX ADMIN — Medication 1 TABLET(S): at 12:16

## 2024-10-27 RX ADMIN — DROXIDOPA 200 MILLIGRAM(S): 100 CAPSULE ORAL at 17:34

## 2024-10-27 RX ADMIN — LEVETIRACETAM 500 MILLIGRAM(S): 500 TABLET, FILM COATED ORAL at 17:34

## 2024-10-27 RX ADMIN — DICYCLOMINE HYDROCHLORIDE 20 MILLIGRAM(S): 20 TABLET ORAL at 17:35

## 2024-10-27 RX ADMIN — POLYETHYLENE GLYCOL 3350 17 GRAM(S): 17 POWDER, FOR SOLUTION ORAL at 12:17

## 2024-10-27 RX ADMIN — MIDODRINE HYDROCHLORIDE 40 MILLIGRAM(S): 2.5 TABLET ORAL at 18:05

## 2024-10-27 RX ADMIN — TIZANIDINE 4 MILLIGRAM(S): 2 TABLET ORAL at 09:01

## 2024-10-27 RX ADMIN — SEVELAMER CARBONATE 2400 MILLIGRAM(S): 800 TABLET, FILM COATED ORAL at 17:35

## 2024-10-27 RX ADMIN — MIDODRINE HYDROCHLORIDE 40 MILLIGRAM(S): 2.5 TABLET ORAL at 05:17

## 2024-10-27 RX ADMIN — PANTOPRAZOLE SODIUM 40 MILLIGRAM(S): 40 TABLET, DELAYED RELEASE ORAL at 05:17

## 2024-10-27 RX ADMIN — PREGABALIN 75 MILLIGRAM(S): 150 CAPSULE ORAL at 12:20

## 2024-10-27 NOTE — PROGRESS NOTE ADULT - ASSESSMENT
80 y/o Male with PMH of ESRD on HD TuThSat- last HD was 10/22, has baseline orthostatic hypotension on Droxidopa AND midodrine baseline BP 60-80 systolic, COPD,  on home O2 2L, SAADIA on CPAP, wheelchair bound, IBS w chronic abd pain, chronic diastolic CHF, morbid obesity, sacral decub ulcer, pulmonary hypertension, hemorrhoids, IBS,  PE and DVT on Eliquis who presents with worsening abd pain.     consider MRI of the spine, I think referred pain from the spine is a possibility

## 2024-10-27 NOTE — PROGRESS NOTE ADULT - ASSESSMENT
A/P  78 y/o Male with PMH of ESRD on HD TuThSat- last HD was 10/22, has baseline orthostatic hypotension on Droxidopa AND midodrine baseline BP 60-80 systolic, COPD,  on home O2 2L, SAADIA on CPAP, wheelchair bound, IBS w chronic abd pain, chronic diastolic CHF, morbid obesity, sacral decub ulcer, pulmonary hypertension, hemorrhoids, IBS,  PE and DVT on Eliquis who presents with worsening abd pain.     #Abdominal pain  -no associated CP or SOB  -CT A/P shows small hiatal hernia; no acute intra-abdominal findings   -med and GI f/u  -per GI conservative gi managment    #ESRD on HD  -renal fu   -HD per renal    #Hx of Orthostatic Hypotension  -On midodrine 40 mg TID and Droxidopa 200 mg TID    #Chronic diastolic CHF  -TTE 6/2024 shows LVSF appears grossly normal; mildly enlarged RV; mild TR  -volume status stable  -no decomp HF on exam  -cont volume removal with HD per renal    #Hx of PE, DVT  -cont Eliquis    55 minutes spent on total encounter; more than 50% of the visit was spent counseling and/or coordinating care by the attending physician.

## 2024-10-27 NOTE — PROGRESS NOTE ADULT - SUBJECTIVE AND OBJECTIVE BOX
CARDIOLOGY FOLLOW UP NOTE - DR. SANCHEZ    Patient Name: MAYE ZUNIGA    Date of Service: 10-27-24 @ 08:48    Patient seen and examined    Subjective:    cv: denies chest pain, dyspnea, palpitations, dizziness  pulmonary: denies cough  GI: denies abdominal pain, nausea, vomiting  vascular/legs: no edema   skin: no rash  ROS: otherwise negative   overnight events:      PHYSICAL EXAM:  T(C): 37 (10-27-24 @ 04:44), Max: 37.3 (10-27-24 @ 00:16)  HR: 68 (10-27-24 @ 04:44) (68 - 80)  BP: 97/55 (10-27-24 @ 04:44) (96/60 - 116/59)  RR: 18 (10-27-24 @ 04:44) (18 - 18)  SpO2: 98% (10-27-24 @ 04:44) (96% - 98%)  Wt(kg): --  I&O's Summary    26 Oct 2024 07:01  -  27 Oct 2024 07:00  --------------------------------------------------------  IN: 840 mL / OUT: 1900 mL / NET: -1060 mL      Daily     Daily Weight in k (26 Oct 2024 10:27)    Appearance: Normal	  Cardiovascular: Normal S1 S2,RRR, No JVD, No murmurs  Respiratory: Lungs clear to auscultation	  Gastrointestinal:  Soft, Non-tender, + BS	  Extremities: Normal range of motion, No clubbing, cyanosis or edema      Home Medications:  Albuterol (Eqv-ProAir HFA) 90 mcg/inh inhalation aerosol: 2 puff(s) inhaled every 6 hours as needed (23 Oct 2024 16:15)  allopurinol 100 mg oral tablet: 1 tab(s) orally once a day (23 Oct 2024 16:15)  cinacalcet 90 mg oral tablet: 1 tab(s) orally 3 times a week (on days of dialysis - Tu, Th &amp; Sat) (23 Oct 2024 16:15)  droxidopa 200 mg oral capsule: 1 cap(s) orally 3 times a day (23 Oct 2024 16:15)  levETIRAcetam 500 mg oral tablet: 1 tab(s) orally 2 times a day (23 Oct 2024 16:15)  lidocaine 4% topical film: Apply topically to affected area once a day (both knees and lower back) (23 Oct 2024 16:15)  MiraLax oral powder for reconstitution: 17 gram(s) orally every other day (23 Oct 2024 16:15)  nystatin 100,000 units/g topical cream: Apply topically to affected area 2 times a day (buttocks) (23 Oct 2024 16:15)  nystatin 100,000 units/g topical powder: Apply topically to affected area 2 times a day (buttocks) (23 Oct 2024 16:15)  pantoprazole 40 mg oral delayed release tablet: 1 tab(s) orally once a day (before a meal) (23 Oct 2024 16:15)  sevelamer carbonate 800 mg oral tablet: 3 tab(s) orally 3 times a day (with meals) (23 Oct 2024 16:15)  Trelegy Ellipta 100 mcg-62.5 mcg-25 mcg/inh inhalation powder: 1 puff(s) inhaled once a day (23 Oct 2024 16:15)  Voltaren 1% topical gel: Apply topically to affected area 2 times a day (both knees) (23 Oct 2024 16:15)      MEDICATIONS  (STANDING):  allopurinol 100 milliGRAM(s) Oral daily  apixaban 5 milliGRAM(s) Oral two times a day  cinacalcet 90 milliGRAM(s) Oral <User Schedule>  dicyclomine 20 milliGRAM(s) Oral three times a day before meals  droxidopa 200 milliGRAM(s) Oral three times a day  epoetin vinh (PROCRIT) Injectable 61895 Unit(s) IV Push <User Schedule>  influenza  Vaccine (HIGH DOSE) 0.5 milliLiter(s) IntraMuscular once  levETIRAcetam 500 milliGRAM(s) Oral two times a day  midodrine. 40 milliGRAM(s) Oral three times a day  Nephro-eliana 1 Tablet(s) Oral daily  pantoprazole    Tablet 40 milliGRAM(s) Oral before breakfast  polyethylene glycol 3350 17 Gram(s) Oral daily  pregabalin 75 milliGRAM(s) Oral daily  sevelamer carbonate 2400 milliGRAM(s) Oral three times a day with meals  tiZANidine 4 milliGRAM(s) Oral <User Schedule>      TELEMETRY: 	    ECG:  	  RADIOLOGY:   DIAGNOSTIC TESTING:  [ ] Echocardiogram:  [ ] Catheterization:  [ ] Stress Test:    OTHER: 	    LABS:	 	    CARDIAC MARKERS:                  10-27    137  |  95[L]  |  31[H]  ----------------------------<  80  4.4   |  27  |  7.08[H]    Ca    8.6      27 Oct 2024 07:20    TPro  8.2  /  Alb  3.8  /  TBili  0.3  /  DBili  x   /  AST  8[L]  /  ALT  <5[L]  /  AlkPhos  335[H]  10-27    proBNP:     Lipid Profile:   HgA1c:     Creatinine: 7.08 mg/dL (10-27-24 @ 07:20)

## 2024-10-27 NOTE — PROGRESS NOTE ADULT - ASSESSMENT
78 y/o Male, my office ptn,  with PMH of ESRD on HD TuThSat- last HD was 10/22, has baseline orthostatic hypotension on Droxidopa AND midodrine baseline BP 60-80 systolic, COPD,  on home O2 2L, SAADIA on CPAP, wheelchair bound, IBS w chronic abd pain, chronic diastolic CHF, morbid obesity, sacral decub ulcer, pulmonary hypertension, hemorrhoids, IBS,  PE and DVT on Eliquis who presents with worsening abd pain. worse in upright position or when moves, stable in a supine position when still  Ptn was last admitted in June 2024 w worsening RV  Heart failure, respiratory failure,  Patient has [facility]OLST stating DNR/DNI and patient does not want central access but is open to short trial of peripheral pressors.  Since DC his orthostasis had gotten worse and most recently he is unable to bear the severity of his abdominal pain. He knows use of narcotics for abd pain will be difficult 2/2 severe orthostatic hypotension.     no vomiting, fevers, chills, diarrhea, chest pain, sob. tolerating po intake    GI consulted for abdominal pain, pt known to our service from previous admissions The patients wife bedside aided in history.     Over the last week or so he has c/o increased abdominal "gnawing" pain while he is in the seated position, when he is laying down the pain subsides, however, it is still somewhat gnawing.     He typically feels his worst after HD sessions, however, finds it has been occurring even on his non-hd days.      He has no associated nausea or vomiting, no diarrheal episodes. He has no appetite changes. About 4 days ago he had an increased amount of soft stools with some blood tinge to it, however, this occurs at times d/t his hemorrhoids (has been treated w/anusol supp with success)   Colonoscopy notable for hemorrhoids and diverticulosis. EGD 2022 showed esophagitis, gastritis, duodenitis. Ptn has not been hemodynamically stable for any additional scopes.    His pain is prob 2/2 low flow state in the mesentery 2/2 hypotension. he is already on max dosing of droxi dopa and Midodrine.     Hospice was d/w ptn and his wife prior to arrival. he wasn't ready to stop HD.     Acute on Chronic Abdominal Pain   ptn was refusing imaging of the spine but now agrees to have CT spine, refuses MRI.   ct spine: severe spinal stenosis, DJD. seen by NSx, no intervention offered. cont lyriuca, tizanidine. ptn is pain free when not sitting up. dc planning   being done 2/2 abd pain when sitting up , with immediate relief once lays down.   ptn has no relief from a higher dose LYRICA, will drop to the baseline dose of 75 mg daily.   doubt pain is from poor mesenteric blood flow since pain resolved immediately once he lays down.   CT A/P w/IV contrast : no new findings  seen by GI  palliative team called for pain control and GOC  GOC brought up w the ptn, he wants to cont HD.   he is DNR/DNI, no artificial nutrition    Orthostatic Hypotension  - severe, cont DroxiDOpa and Midodrine  - tolerating HD, BP has been stable.      ESRD on HD  - cont TUe, THu, Sat  - ptn wants to cont getting HD    CHF    COPD on home 02    Morbid obesity     Neuropathy    IBS

## 2024-10-27 NOTE — CONSULT NOTE ADULT - ASSESSMENT
-no neurosurgical intervention  -pain managment per primary  -f/u outpatient with Dr. Siria DOUGHERTY

## 2024-10-27 NOTE — PROGRESS NOTE ADULT - ASSESSMENT
77 y/o M with PMH of ESRD on HDTuThSat- last yesterday, orthostatic hypotension on midodrine baseline BP 80-90 systolic, COPD reportedly on home O2 2L, SAADIA on CPAP, wheelchair bound, chronic diastolic CHF, morbid obesity, pulmonary hypertension, PE and DVT on Eliquis, IVC filter, chronic sacral ulcer, chronic back pain, history of hematochezia in the past 2/2 hemorrhoids, chronic abd pain 2/2 IBS, chronic constipation presenting for severe abdominal pain for few weeks. Nephro on board for ESRD on HD    ESRD   On HD TTS via AVF  Nephrologist: Dr. Whalen   Outpt unit [facility]   Consent obtained in chart witnessed  Last HD on 10/23  HD in hosp on 10/24 UF 2 L removed, and on 10/26  Will continue TTS scheduele while hospitalized Dialyzed today UF -1.9 L net negative  Renal diet.  Monitor BMP     Anemia   MELISSA with HD for hgb <11  transfuse for hgb <7  monitor Hgb     HTN/Hypotension  bp fluctuating; generally low.  On midodrine 40 mg TID and Droxidopa 200 mg TID  Monitor closely     CKD-MBD  check PTH  monitor P04 and Ca daily   Continue with renvela and continue with cinacalcet   77 y/o M with PMH of ESRD on HDTuThSat- last yesterday, orthostatic hypotension on midodrine baseline BP 80-90 systolic, COPD reportedly on home O2 2L, SAADIA on CPAP, wheelchair bound, chronic diastolic CHF, morbid obesity, pulmonary hypertension, PE and DVT on Eliquis, IVC filter, chronic sacral ulcer, chronic back pain, history of hematochezia in the past 2/2 hemorrhoids, chronic abd pain 2/2 IBS, chronic constipation presenting for severe abdominal pain for few weeks. Nephro on board for ESRD on HD    ESRD   On HD TTS via AVF  Nephrologist: Dr. Whalen   Outpt unit [facility]   Consent obtained in chart witnessed  Last HD on 10/23  HD in hosp on 10/24 UF 2 L removed, and on 10/26  Will continue TTS scheduele while hospitalized  Renal diet.  Monitor BMP     Anemia   MELISSA with HD for hgb <11  transfuse for hgb <7  monitor Hgb     HTN/Hypotension  bp fluctuating; generally low.  On midodrine 40 mg TID and Droxidopa 200 mg TID  Monitor closely     CKD-MBD  check PTH  monitor P04 and Ca daily   Continue with renvela and continue with cinacalcet

## 2024-10-27 NOTE — CONSULT NOTE ADULT - SUBJECTIVE AND OBJECTIVE BOX
[facility]    Dr. Vonda Rosario     Office [phone] (9 am to 5 pm)  Service : [phone] ( 5pm to 9 am)  Also Available on Teams        RENAL INITIAL CONSULT NOTE: DATE OF SERVICE 10-23-24 @ 20:21    HPI:  78 y/o Male, my office ptn,  with PMH of ESRD on HD TuThSat- last HD was 10/22, has baseline orthostatic hypotension on Droxidopa AND midodrine baseline BP 60-80 systolic, COPD,  on home O2 2L, SAADIA on CPAP, wheelchair bound, IBS w chronic abd pain, chronic diastolic CHF, morbid obesity, sacral decub ulcer, pulmonary hypertension, hemorrhoids, IBS,  PE and DVT on Eliquis who presents with worsening abd pain. worse in upright position or when moves, stable in a supine position when still  Ptn was last admitted in June 2024 w worsening RV  Heart failure, respiratory failure,  Patient has MOLST stating DNR/DNI and patient does not want central access but is open to short trial of peripheral pressors.  Since DC his orthostasis had gotten worse and most recently he is unable to bear the severity of his abdominal pain. He knows use of narcotics for abd pain will be difficult 2/2 severe orthostatic hypotension.     no vomiting, fevers, chills, diarrhea, chest pain, sob. tolerating po intake    GI consulted for abdominal pain, pt known to our service from previous admissions The patients wife bedside aided in history.     Over the last week or so he has c/o increased abdominal "gnawing" pain while he is in the seated position, when he is laying down the pain subsides, however, it is still somewhat gnawing.     He typically feels his worst after HD sessions, however, finds it has been occurring even on his non-hd days.      He has no associated nausea or vomiting, no diarrheal episodes. He has no appetite changes. About 4 days ago he had an increased amount of soft stools with some blood tinge to it, however, this occurs at times d/t his hemorrhoids (has been treated w/anusol supp with success)   Colonoscopy notable for hemorrhoids and diverticulosis. EGD 2022 showed esophagitis, gastritis, duodenitis. Ptn has not been hemodynamically stable for any additional scopes.    His pain is prob 2/2 low flow state in the mesentery 2/2 hypotension. he is already on max dosing of droxi dopa and Midodrine.     Hospice was d/w ptn and his wife prior to arrival. he wasn't ready to stop HD.      (23 Oct 2024 15:20)    Nephrology called for ESRD on HD. Last HD on 10/22      Allergies:  baclofen (Other)  latex (Rash)      PAST MEDICAL & SURGICAL HISTORY:  Hypertension      Glomerular disease  Segmental glomerular sclerosis      CHF (congestive heart failure)      COPD (chronic obstructive pulmonary disease)      Pulmonary hypertension      Sleep apnea      Pulmonary edema      Peripheral edema      Gout      History of abdominal surgery  polypectomy      H/O right heart catheterization          Home Medications Reviewed    Hospital Medications:   MEDICATIONS  (STANDING):  allopurinol 100 milliGRAM(s) Oral daily  apixaban 5 milliGRAM(s) Oral two times a day  cinacalcet 90 milliGRAM(s) Oral <User Schedule>  dicyclomine 20 milliGRAM(s) Oral three times a day before meals  levETIRAcetam 500 milliGRAM(s) Oral two times a day  midodrine. 40 milliGRAM(s) Oral three times a day  Nephro-eliana 1 Tablet(s) Oral daily  pantoprazole    Tablet 40 milliGRAM(s) Oral before breakfast  polyethylene glycol 3350 17 Gram(s) Oral daily  pregabalin 150 milliGRAM(s) Oral daily  sevelamer carbonate 2400 milliGRAM(s) Oral three times a day with meals  simethicone 80 milliGRAM(s) Chew every 6 hours      SOCIAL HISTORY:  Denies ETOh, Smoking,     FAMILY HISTORY:  Family history of colon cancer (Father)    Family history of heart disease (Father)        REVIEW OF SYSTEMS:  CONSTITUTIONAL: No weakness, fevers or chills  EYES/ENT: No visual changes;  No vertigo or throat pain   NECK: No pain or stiffness  RESPIRATORY: No cough, wheezing, hemoptysis; No shortness of breath  CARDIOVASCULAR: No chest pain or palpitations.  GASTROINTESTINAL: No abdominal or epigastric pain. No nausea, vomiting, or hematemesis; No diarrhea or constipation. No melena or hematochezia.  GENITOURINARY: No dysuria, frequency, foamy urine, urinary urgency, incontinence or hematuria  NEUROLOGICAL: No numbness or weakness  SKIN: No itching, burning, rashes, or lesions   VASCULAR: No bilateral lower extremity edema.   All other review of systems is negative unless indicated above.    VITALS:  T(F): 98.5 (10-23-24 @ 18:20), Max: 98.5 (10-23-24 @ 18:20)  HR: 78 (10-23-24 @ 18:20)  BP: 102/49 (10-23-24 @ 18:20)  RR: 18 (10-23-24 @ 18:20)  SpO2: 99% (10-23-24 @ 18:20)  Wt(kg): --    Height (cm): 170.2 (10-23 @ 10:48)  Weight (kg): 111.1 (10-23 @ 10:48)  BMI (kg/m2): 38.4 (10-23 @ 10:48)  BSA (m2): 2.2 (10-23 @ 10:48)    PHYSICAL EXAM:  Constitutional: NAD  HEENT: anicteric sclera, oropharynx clear, MMM  Neck: No JVD  Respiratory: CTAB, no wheezes, rales or rhonchi  Cardiovascular: S1, S2, RRR  Gastrointestinal: BS+, soft, NT/ND  Extremities: No cyanosis or clubbing. No peripheral edema  Neurological: A/O x 3, no focal deficits  Psychiatric: Normal mood, normal affect  : No CVA tenderness. No moore.   Skin: No rashes  Vascular Access:    LABS:  10-23    132[L]  |  93[L]  |  35[H]  ----------------------------<  91  4.5   |  23  |  6.73[H]    Ca    8.2[L]      23 Oct 2024 12:01    TPro  8.7[H]  /  Alb  3.8  /  TBili  0.4  /  DBili      /  AST  10  /  ALT  6[L]  /  AlkPhos  360[H]  10-23    Creatinine Trend: 6.73 <--                        10.6   10.44 )-----------( 219      ( 23 Oct 2024 12:01 )             36.0     Urine Studies:  Urinalysis Basic - ( 23 Oct 2024 12:01 )    Color:  / Appearance:  / SG:  / pH:   Gluc: 91 mg/dL / Ketone:   / Bili:  / Urobili:    Blood:  / Protein:  / Nitrite:    Leuk Esterase:  / RBC:  / WBC    Sq Epi:  / Non Sq Epi:  / Bacteria:           RADIOLOGY & ADDITIONAL STUDIES:                
p (1480)     HPI: 79M DNR/DNI, on eliquis for PEs, h/o ESRD on HD, chronic hypotension on droxidopa/midodrine baseline SBP 60-80, COPD, morbid obesity, SAADIA, chronic diastolic HF, chronic decubitus ulcers, pulmonary hypertension, wheelchair bound at baseline. Presened w/ abdominal pain 2/2 mesenteric ischemia. Also c/o low back pain, dull, nonradiating. No recent changes in LBP. No new numbness/weakness, bowel/bladder sx, saddle anesthesia. Afebrile, WBCs wnl. CT T/L spine with diffuse sclerotic changes c/w known renal osteodystrophy, worst at L2/3 and L3/4.    Exam: Awake, Ox3, BUE delts 5/5, bis/mitul 5/5, HG 5/5, BLE HF 5/5, KF/KE 5/5, PF/DF 5/5. SILT.       =====================  PAST MEDICAL HISTORY   Hypertension    Glomerular disease    CHF (congestive heart failure)    COPD (chronic obstructive pulmonary disease)    Pulmonary hypertension    Sleep apnea    Pulmonary edema    Peripheral edema    Gout      PAST SURGICAL HISTORY   History of abdominal surgery    H/O right heart catheterization      baclofen (Other)  latex (Rash)      MEDICATIONS:  Antibiotics:    Neuro:  levETIRAcetam 500 milliGRAM(s) Oral two times a day  pregabalin 75 milliGRAM(s) Oral daily  tiZANidine 4 milliGRAM(s) Oral <User Schedule>    Other:  albuterol    90 MICROgram(s) HFA Inhaler 2 Puff(s) Inhalation every 6 hours PRN  allopurinol 100 milliGRAM(s) Oral daily  cinacalcet 90 milliGRAM(s) Oral <User Schedule>  dicyclomine 20 milliGRAM(s) Oral three times a day before meals  droxidopa 200 milliGRAM(s) Oral three times a day  epoetin vinh (PROCRIT) Injectable 70505 Unit(s) IV Push <User Schedule>  influenza  Vaccine (HIGH DOSE) 0.5 milliLiter(s) IntraMuscular once  midodrine. 40 milliGRAM(s) Oral three times a day  Nephro-eliana 1 Tablet(s) Oral daily  pantoprazole    Tablet 40 milliGRAM(s) Oral before breakfast  polyethylene glycol 3350 17 Gram(s) Oral daily      SOCIAL HISTORY:   Occupation:   Marital Status:     FAMILY HISTORY:  Family history of colon cancer (Father)    Family history of heart disease (Father)        ROS: Negative except per HPI    LABS:      10-27    137  |  95[L]  |  31[H]  ----------------------------<  80  4.4   |  27  |  7.08[H]    Ca    8.6      27 Oct 2024 07:20    TPro  8.2  /  Alb  3.8  /  TBili  0.3  /  DBili  x   /  AST  8[L]  /  ALT  <5[L]  /  AlkPhos  335[H]  10-27      
CARDIOLOGY CONSULT - Dr. Livingston         HPI:  78 y/o Male with PMH of ESRD on HD TuThSat- last HD was 10/22, has baseline orthostatic hypotension on Droxidopa AND midodrine baseline BP 60-80 systolic, COPD,  on home O2 2L, SAADIA on CPAP, wheelchair bound, IBS w chronic abd pain, chronic diastolic CHF, morbid obesity, sacral decub ulcer, pulmonary hypertension, hemorrhoids, IBS,  PE and DVT on Eliquis who presents with worsening abd pain. worse in upright position or when moves, stable in a supine position when still  Ptn was last admitted in June 2024 w worsening RV  Heart failure, respiratory failure,  Patient has MOLST stating DNR/DNI and patient does not want central access but is open to short trial of peripheral pressors.  Since DC his orthostasis had gotten worse and most recently he is unable to bear the severity of his abdominal pain. He knows use of narcotics for abd pain will be difficult 2/2 severe orthostatic hypotension.     no vomiting, fevers, chills, diarrhea, chest pain, sob. tolerating po intake    GI consulted for abdominal pain, pt known to our service from previous admissions The patients wife bedside aided in history.     Over the last week or so he has c/o increased abdominal "gnawing" pain while he is in the seated position, when he is laying down the pain subsides, however, it is still somewhat gnawing.     He typically feels his worst after HD sessions, however, finds it has been occurring even on his non-hd days.      He has no associated nausea or vomiting, no diarrheal episodes. He has no appetite changes. About 4 days ago he had an increased amount of soft stools with some blood tinge to it, however, this occurs at times d/t his hemorrhoids (has been treated w/anusol supp with success)   Colonoscopy notable for hemorrhoids and diverticulosis. EGD 2022 showed esophagitis, gastritis, duodenitis. Ptn has not been hemodynamically stable for any additional scopes.    His pain is prob 2/2 low flow state in the mesentery 2/2 hypotension. he is already on max dosing of droxi dopa and Midodrine.     Hospice was d/w ptn and his wife prior to arrival. he wasn't ready to stop HD.      (23 Oct 2024 15:20)      PAST MEDICAL & SURGICAL HISTORY:  Hypertension      Glomerular disease  Segmental glomerular sclerosis      CHF (congestive heart failure)      COPD (chronic obstructive pulmonary disease)      Pulmonary hypertension      Sleep apnea      Pulmonary edema      Peripheral edema      Gout      History of abdominal surgery  polypectomy      H/O right heart catheterization              PREVIOUS DIAGNOSTIC TESTING:    [ x] Echocardiogram:  < from: TTE W or WO Ultrasound Enhancing Agent (06.12.24 @ 17:30) >     CONCLUSIONS:      1. Left ventricular endocardium is not well visualized; however, the left ventricular systolicfunction appears grossly normal.   2. Mildly enlarged right ventricular cavity size and normal systolic function.   3. Mild tricuspid regurgitation.   4. Estimated pulmonary artery systolic pressure is 57 mmHg.   5. Compared to the transthoracic echocardiogram performed on 6/12/2024, the RV findings are new.   6. Technically difficult image quality.    < end of copied text >      [ ]  Catheterization:  [ ] Stress Test:  	    MEDICATIONS:  Home Medications:  Albuterol (Eqv-ProAir HFA) 90 mcg/inh inhalation aerosol: 2 puff(s) inhaled every 6 hours as needed (23 Oct 2024 16:15)  allopurinol 100 mg oral tablet: 1 tab(s) orally once a day (23 Oct 2024 16:15)  cinacalcet 90 mg oral tablet: 1 tab(s) orally 3 times a week (on days of dialysis - Tues, Thurs &amp; Sat) (23 Oct 2024 16:15)  droxidopa 200 mg oral capsule: 1 cap(s) orally 3 times a day (23 Oct 2024 16:15)  levETIRAcetam 500 mg oral tablet: 1 tab(s) orally 2 times a day (23 Oct 2024 16:15)  lidocaine 4% topical film: Apply topically to affected area once a day (both knees and lower back) (23 Oct 2024 16:15)  MiraLax oral powder for reconstitution: 17 gram(s) orally every other day (23 Oct 2024 16:15)  nystatin 100,000 units/g topical cream: Apply topically to affected area 2 times a day (buttocks) (23 Oct 2024 16:15)  nystatin 100,000 units/g topical powder: Apply topically to affected area 2 times a day (buttocks) (23 Oct 2024 16:15)  pantoprazole 40 mg oral delayed release tablet: 1 tab(s) orally once a day (before a meal) (23 Oct 2024 16:15)  sevelamer carbonate 800 mg oral tablet: 3 tab(s) orally 3 times a day (with meals) (23 Oct 2024 16:15)  Trelegy Ellipta 100 mcg-62.5 mcg-25 mcg/inh inhalation powder: 1 puff(s) inhaled once a day (23 Oct 2024 16:15)  Voltaren 1% topical gel: Apply topically to affected area 2 times a day (both knees) (23 Oct 2024 16:15)      MEDICATIONS  (STANDING):  allopurinol 100 milliGRAM(s) Oral daily  apixaban 5 milliGRAM(s) Oral two times a day  cinacalcet 90 milliGRAM(s) Oral <User Schedule>  dicyclomine 20 milliGRAM(s) Oral three times a day before meals  levETIRAcetam 500 milliGRAM(s) Oral two times a day  midodrine. 40 milliGRAM(s) Oral three times a day  Nephro-eliana 1 Tablet(s) Oral daily  pantoprazole    Tablet 40 milliGRAM(s) Oral before breakfast  polyethylene glycol 3350 17 Gram(s) Oral daily  pregabalin 150 milliGRAM(s) Oral daily  sevelamer carbonate 2400 milliGRAM(s) Oral three times a day with meals      FAMILY HISTORY:  Family history of colon cancer (Father)    Family history of heart disease (Father)        SOCIAL HISTORY:    [x ] Non-smoker  [ ] Smoker  [ ] Alcohol    Allergies    baclofen (Other)  latex (Rash)    Intolerances    	    REVIEW OF SYSTEMS:  CONSTITUTIONAL: No fever, weight loss, or fatigue  EYES: No eye pain, visual disturbances, or discharge  ENMT:  No difficulty hearing, tinnitus, vertigo; No sinus or throat pain  NECK: No pain or stiffness  RESPIRATORY: No cough, wheezing, chills or hemoptysis; No Shortness of Breath  CARDIOVASCULAR: No chest pain, palpitations, passing out, dizziness, or leg swelling  GASTROINTESTINAL: +abdominal pain +nausea. No vomiting, or hematemesis; No diarrhea or constipation. No melena or hematochezia.  GENITOURINARY: No dysuria, frequency, hematuria, or incontinence  NEUROLOGICAL: No headaches, memory loss, loss of strength, numbness, or tremors  SKIN: No itching, burning, rashes, or lesions   	    [ x] All others negative	  [ ] Unable to obtain    PHYSICAL EXAM:  T(C): 36.6 (10-23-24 @ 14:15), Max: 36.6 (10-23-24 @ 11:59)  HR: 66 (10-23-24 @ 14:15) (61 - 68)  BP: 93/47 (10-23-24 @ 14:15) (93/47 - 104/58)  RR: 18 (10-23-24 @ 14:15) (18 - 20)  SpO2: 98% (10-23-24 @ 14:15) (98% - 100%)  Wt(kg): --  I&O's Summary      Appearance: Normal	  Psychiatry: A & O x 3, Mood & affect appropriate  HEENT:   Normal oral mucosa, PERRL, EOMI	  Lymphatic: No lymphadenopathy  Cardiovascular: Normal S1 S2,RRR, No murmurs  Respiratory: Lungs clear to auscultation	  Gastrointestinal:  Soft, Non-tender, + BS	  Skin: No rashes, No ecchymoses, No cyanosis	  Neurologic: Non-focal  Extremities: Normal range of motion, No clubbing, cyanosis or edema  Vascular: Peripheral pulses palpable 2+ bilaterally    TELEMETRY: 	  NSR 60  ECG:  	NSR with PACs HR 67 no acute ischemic changes   RADIOLOGY:  < from: CT Abdomen and Pelvis w/ IV Cont (10.23.24 @ 14:21) >  IMPRESSION:  Noacute intra-abdominal findings.    < end of copied text >    OTHER: 	  	  LABS:	 	    CARDIAC MARKERS:                                  10.6   10.44 )-----------( 219      ( 23 Oct 2024 12:01 )             36.0     10-23    132[L]  |  93[L]  |  35[H]  ----------------------------<  91  4.5   |  23  |  6.73[H]    Ca    8.2[L]      23 Oct 2024 12:01    TPro  8.7[H]  /  Alb  3.8  /  TBili  0.4  /  DBili  x   /  AST  10  /  ALT  6[L]  /  AlkPhos  360[H]  10-23      proBNP:   Lipid Profile:   HgA1c:   TSH:       
  Chief Complaint:  Patient is a 79y old  Male who presents with a chief complaint of abdominal pain    Hypertension  Glomerular disease  CHF (congestive heart failure)  COPD (chronic obstructive pulmonary disease)  Pulmonary hypertension  Sleep apnea  Pulmonary edema  Peripheral edema  Gout  History of abdominal surgery  H/O right heart catheterization      HPI:  80yo morbidly obese man h/o of ESRD on HD, orthostatic hypotension (on midodrine), COPD on home O2, SAADIA on CPAP, wheelchair bound, chronic diastolic CHF, pulmonary hypertension, PE and DVT (believes on Eliquis), chronic sacral ulcer, hemorrhoids and IBS-C presents with worsening abdominal pain x 5 days. GI consulted for abdominal pain, pt known to our service from previous admissions The patients wife bedside aided in history. Over the last week or so he has c/o increased abdominal "gnawing" pain while he is in the seated position, when he is laying down the pain subsides, however, it is still somewhat gnawing. He typically feels his worst after HD sessions, however, finds it has been occurring even on his non-hd days.  He has no associated nausea or vomiting, no diarrheal episodes. He has no appetite changes. About 4 days ago he had an increased amount of soft stools with some blood tinge to it, however, this occurs at times d/t his hemorrhoids (has been treated w/anusol supp with success) Colonoscopy notable for hemorrhoids and diverticulosis. EGD 2022 showed esophagitis, gastritis, duodenitis.              baclofen (Other)  latex (Rash)      FAMILY HISTORY:  Family history of colon cancer (Father)    Family history of heart disease (Father)          Review of Systems:    General:  No wt loss, fevers, chills, night sweats, fatigue  Eyes:  Good vision, no reported pain  ENT:  No sore throat, pain, runny nose, dysphagia  CV:  No pain, palpitations, no lightheadedness  Resp:  No dyspnea, cough, tachypnea, wheezing  GI: per HPI   :  No pain, bleeding, incontinence, nocturia  Muscle:  No pain, weakness  Neuro:  No weakness, tingling, memory problems  Psych:  No fatigue, insomnia, mood problems, depression  Endocrine:  No polyuria, polydypsia, cold/heat intolerance  Heme:  No petechiae, ecchymosis, easy bruisability  Skin:  No rash, tattoos, scars, edema    Relevant Family History:   n/c    Relevant Social History: n/c      Physical Exam:    Vital Signs:  Vital Signs Last 24 Hrs  T(C): 36.6 (23 Oct 2024 11:59), Max: 36.6 (23 Oct 2024 11:59)  T(F): 97.9 (23 Oct 2024 11:59), Max: 97.9 (23 Oct 2024 11:59)  HR: 61 (23 Oct 2024 11:59) (61 - 68)  BP: 104/58 (23 Oct 2024 11:59) (95/60 - 104/58)  BP(mean): --  RR: 18 (23 Oct 2024 11:59) (18 - 20)  SpO2: 100% (23 Oct 2024 11:59) (99% - 100%)    Parameters below as of 23 Oct 2024 11:59  Patient On (Oxygen Delivery Method): nasal cannula  O2 Flow (L/min): 2    Daily Height in cm: 170.18 (23 Oct 2024 10:48)    Daily     General:  Appears stated age, well-groomed, nad  HEENT:  NC/AT,  conjunctivae clear and pink, no thyromegaly, nodules, adenopathy, no JVD  Chest:  Full & symmetric excursion, no increased effort, breath sounds clear  Cardiovascular:  Regular rhythm, S1, S2, no murmur/rub/S3/S4, no abdominal bruit, no edema  Abdomen:  obese, Soft, non-tender, non-distended, normoactive bowel sounds,  no masses ,no hepatosplenomeagaly, no signs of chronic liver disease  Extremities:  no cyanosis,clubbing or edema  Skin:  No rash/erythema/ecchymoses/petechiae/wounds/abscess/warm/dry  Neuro/Psych:  A&Ox3 (forgetful at times) , no asterixis, no tremor, no encephalopathy    Laboratory:                            10.6   10.44 )-----------( 219      ( 23 Oct 2024 12:01 )             36.0     10-23    132[L]  |  93[L]  |  35[H]  ----------------------------<  91  4.5   |  23  |  6.73[H]    Ca    8.2[L]      23 Oct 2024 12:01    TPro  8.7[H]  /  Alb  3.8  /  TBili  0.4  /  DBili  x   /  AST  10  /  ALT  6[L]  /  AlkPhos  360[H]  10-23    LIVER FUNCTIONS - ( 23 Oct 2024 12:01 )  Alb: 3.8 g/dL / Pro: 8.7 g/dL / ALK PHOS: 360 U/L / ALT: 6 U/L / AST: 10 U/L / GGT: x             Urinalysis Basic - ( 23 Oct 2024 12:01 )    Color: x / Appearance: x / SG: x / pH: x  Gluc: 91 mg/dL / Ketone: x  / Bili: x / Urobili: x   Blood: x / Protein: x / Nitrite: x   Leuk Esterase: x / RBC: x / WBC x   Sq Epi: x / Non Sq Epi: x / Bacteria: x      Amylase Serum--      Lipase serum99       Ammonia--    Imaging:      CT A/P -  Pending  
Date of Service: 10-25-24 @ 14:29    HPI:  79 y/o M with PMH of ESRD on HDTuThSat- last yesterday, orthostatic hypotension on midodrine baseline BP 80-90 systolic, COPD reportedly on home O2 2L, SAADIA on CPAP, wheelchair bound, chronic diastolic CHF, morbid obesity, pulmonary hypertension, PE and DVT on Eliquis, IVC filter, chronic sacral ulcer, chronic back pain, history of hematochezia in the past 2/2 hemorrhoids, chronic abd pain 2/2 IBS, chronic constipation presenting for severe abdominal pain for few weeks. Palliative consulted for assistance with GOC.      (23 Oct 2024 15:20)    PERTINENT PM/SXH:   Hypertension    Glomerular disease    CHF (congestive heart failure)    COPD (chronic obstructive pulmonary disease)    Pulmonary hypertension    Sleep apnea    Pulmonary edema    Peripheral edema    Gout      History of abdominal surgery    H/O right heart catheterization      FAMILY HISTORY:  Family history of colon cancer (Father)    Family history of heart disease (Father)        ITEMS NOT CHECKED ARE NOT PRESENT    SOCIAL HISTORY:   Significant other/partner[ x]  Children[x ]  Anglican/Spirituality:  Substance hx:  [ ]   Tobacco hx:  [ ]   Alcohol hx: [ ]   Home Opioid hx:  [x ] I-Stop Reference No: [id number]  Living Situation: [ x]Home  [ ]Long term care  [ ]Rehab [ ]Other    ADVANCE DIRECTIVES:    DNR/MOLST  [ x]  Living Will  [ ]   DECISION MAKER(s):  [ x] Health Care Proxy(s)  [ ] Surrogate(s)  [ ] Guardian           Name(s): Phone Number(s): Jodee Rivers     BASELINE (I)ADL(s) (prior to admission):  Riley: [ ]Total  [ ] Moderate [x ]Dependent    Allergies    baclofen (Other)  latex (Rash)    Intolerances    MEDICATIONS  (STANDING):  allopurinol 100 milliGRAM(s) Oral daily  apixaban 5 milliGRAM(s) Oral two times a day  cinacalcet 90 milliGRAM(s) Oral <User Schedule>  dicyclomine 20 milliGRAM(s) Oral three times a day before meals  droxidopa 200 milliGRAM(s) Oral three times a day  epoetin vinh (PROCRIT) Injectable 61941 Unit(s) IV Push <User Schedule>  influenza  Vaccine (HIGH DOSE) 0.5 milliLiter(s) IntraMuscular once  levETIRAcetam 500 milliGRAM(s) Oral two times a day  midodrine. 40 milliGRAM(s) Oral three times a day  Nephro-eliana 1 Tablet(s) Oral daily  pantoprazole    Tablet 40 milliGRAM(s) Oral before breakfast  polyethylene glycol 3350 17 Gram(s) Oral daily  pregabalin 150 milliGRAM(s) Oral daily  sevelamer carbonate 2400 milliGRAM(s) Oral three times a day with meals  simethicone 80 milliGRAM(s) Chew every 6 hours    MEDICATIONS  (PRN):  albuterol    90 MICROgram(s) HFA Inhaler 2 Puff(s) Inhalation every 6 hours PRN Bronchospasm    PRESENT SYMPTOMS: [ ]Unable to self-report see CPOT, PAINADs, RDOS  Source if other than patient:  [ ]Family   [ ]Team     Pain: [ x]yes [ ]no  QOL impact -   Location -   back/abdomen                  Aggravating factors - movement   Quality - stabbing   Radiation -  Timing- constant   Severity (0-10 scale): 8   Minimal acceptable level (0-10 scale): 4      Dyspnea:                           [ ]Mild [ ]Moderate [ ]Severe none   Anxiety:                             [ ]Mild [ ]Moderate [ ]Severe  Fatigue:                             [x ]Mild [ ]Moderate [ ]Severe  Nausea:                             [ ]Mild [ ]Moderate [ ]Severe  Loss of appetite:              [ ]Mild [ ]Moderate [ ]Severe  Constipation:                    [ ]Mild [ ]Moderate [ ]Severe    PCSSQ [Palliative Care Spiritual Screening Question]   Severity (0-10):  Score of 4 or > indicate consideration of Chaplaincy referral.  Chaplaincy Referral: [ ] yes [ ] refused [ ] following    Caregiver [place]? : [ ] yes [ ] no [ ] deferred:  Social work referral [ ] Patient & Family Centered Care Referral [ ]     Anticipatory Grief Present?: [ ] yes [ ] no  [ ] deferred: Palliative Social work referral [ ]  Patient & Family Centered Care Referral [ ]       Other Symptoms:  [x ]All other review of systems negative   [ ] Unable to obtain due to poor mentation    PHYSICAL EXAM:  Vital Signs Last 24 Hrs  T(C): 36.7 (25 Oct 2024 12:35), Max: 37.5 (25 Oct 2024 04:40)  T(F): 98.1 (25 Oct 2024 12:35), Max: 99.5 (25 Oct 2024 04:40)  HR: 92 (25 Oct 2024 13:19) (66 - 92)  BP: 126/67 (25 Oct 2024 13:19) (92/50 - 126/67)  BP(mean): --  RR: 18 (25 Oct 2024 12:35) (17 - 18)  SpO2: 94% (25 Oct 2024 13:19) (92% - 98%)    Parameters below as of 25 Oct 2024 13:19  Patient On (Oxygen Delivery Method): nasal cannula  O2 Flow (L/min): 2   I&O's Summary    24 Oct 2024 07:01  -  25 Oct 2024 07:00  --------------------------------------------------------  IN: 240 mL / OUT: 2000 mL / NET: -1760 mL        GENERAL:  [x]Alert  [x]Oriented x 3  [ ]Lethargic  [ ]Cachexia  [ ]Unarousable  [x]Verbal  [ ]Non-Verbal  Behavioral:   [ ]Anxiety  [ ]Delirium [ ]Agitation [ ]Other  HEENT:  [x]Normal   [ ]Dry mouth   [ ]ET Tube/Trach  [ ]Oral lesions  PULMONARY:   [ ]Clear [ ]Tachypnea  [ ]Audible excessive secretions   [ ]Rhonchi        [ ]Right [ ]Left [ ]Bilateral  [ ]Crackles        [ ]Right [ ]Left [ ]Bilateral  [ ]Wheezing     [ ]Right [ ]Left [ ]Bilateral  [ x]Diminished BS [ ] Right [ ]Left [x ]Bilateral  CARDIOVASCULAR:    [x]Regular [ ]Irregular [ ]Tachy  [ ]Jasvir [ ]Murmur [ ]Other  GASTROINTESTINAL:  [x]Soft  [ ]Distended   [x]+BS  [x]Non tender [ ]Tender  [ ]PEG [ ]OGT/ NGT   Last BM:    GENITOURINARY:  [ ]Normal [ ]Incontinent   [ x]Oliguria/Anuria   [ ]Herring  MUSCULOSKELETAL:   [ ]Normal   [x]Weakness  [x ]Bed/Wheelchair bound [ ]Edema  NEUROLOGIC:   [x]No focal deficits  [ ] Cognitive impairment  [ ] Dysphagia [ ]Dysarthria [ ] Paresis [ ]Other   SKIN:   [x]Normal  [ ]Rash   [ ]Pressure ulcer(s) [ ]y [ ]n present on admission    CRITICAL CARE:  [ ] Shock Present  [ ]Septic [ ]Cardiogenic [ ]Neurologic [ ]Hypovolemic  [ ]  Vasopressors [ ]  Inotropes   [ ]Respiratory failure present [ ]Mechanical ventilation [ ]Non-invasive ventilatory support [ ]High flow    [ ]Acute  [ ]Chronic [ ]Hypoxic  [ ]Hypercarbic [ ]Other  [ ]Other organ failure     LABS:            RADIOLOGY & ADDITIONAL STUDIES:  < from: CT Abdomen and Pelvis w/ IV Cont (10.23.24 @ 14:21) >    ACC: [id number] EXAM:  CT ABDOMEN AND PELVIS IC   ORDERED BY: MONICA GUILLAUME     PROCEDURE DATE:  10/23/2024          INTERPRETATION:  CLINICAL INFORMATION: End-stage renal disease on   dialysis. Abdominal pain.    COMPARISON: CT abdomen pelvis 6/12/2024.    CONTRAST/COMPLICATIONS:  IV Contrast: Omnipaque 350  90 cc administered   10 cc discarded  Oral Contrast: NONE  Complications: None reported at time of study completion    PROCEDURE:  CT of the Abdomen and Pelvis was performed.  Sagittal and coronal reformats were performed.    FINDINGS:  LOWER CHEST: Left lower lobe calcified granuloma. Bibasilar subsegmental   atelectasis.    LIVER: Calcified granulomas.  BILE DUCTS: Normal caliber.  GALLBLADDER: Within normal limits.  SPLEEN: Calcified granulomas.  PANCREAS: Within normal limits.  ADRENALS: Within normal limits.  KIDNEYS/URETERS: Atrophic kidneys. Bilateral renal cysts and   indeterminate hypodense lesions, unchanged.    BLADDER: Underdistended.  REPRODUCTIVE ORGANS: Prostate is enlarged.    BOWEL: No bowel obstruction. Right ileocolic anastomosis. Small hiatal   hernia.  PERITONEUM/RETROPERITONEUM: Within normal limits.  VESSELS: Atherosclerotic changes. IVC filter. Atretic/chronically   occluded distal IVC and iliac veins withabdominal wall collaterals.  LYMPH NODES: No lymphadenopathy.  ABDOMINAL WALL: Rectus diastases with fat-containing abdominal wall   hernias.  BONES: Renal osteodystrophy. Similar erosive changes at the L2-L3   vertebral endplates.    IMPRESSION:  Noacute intra-abdominal findings.        --- End of Report ---            ARCENIO VELÁSQUEZ MD; Attending Radiologist  This document has been electronically signed. Oct 23 2024  2:50PM    < end of copied text >    PROTEIN CALORIE MALNUTRITION PRESENT: [ ]mild [ ]moderate [ ]severe [ ]underweight [ ]morbid obesity  https://www.andeal.org/vault/4030/web/files/ONC/Table_Clinical%20Characteristics%20to%20Document%20Malnutrition-White%20JV%20et%20al%202012.pdf    Height (cm): 170.2 (10-23-24 @ 10:48), 170.2 (08-20-24 @ 18:11), 175.3 (06-12-24 @ 19:10)  Weight (kg): 111.1 (10-23-24 @ 10:48), 108 (08-20-24 @ 18:11), 110.4 (06-12-24 @ 19:10)  BMI (kg/m2): 38.4 (10-23-24 @ 10:48), 37.3 (08-20-24 @ 18:11), 35.9 (06-12-24 @ 19:10)    [ ]PPSV2 < or = to 30% [ ]significant weight loss  [ ]poor nutritional intake  [ ]anasarca[ ]Artificial Nutrition      Other REFERRALS:  [ ]Hospice  [ ]Child Life  [ ]Social Work  [ ]Case management [ ]Holistic Therapy     Care Coordination Assessment 201 [C. Provider] (10-25-24 @ 13:15)      Palliative Performance Scale:  http://npcrc.org/files/news/palliative_performance_scale_ppsv2.pdf  (Ctrl +  left click to view)  Respiratory Distress Observation Tool:  https://homecareinformation.net/handouts/hen/Respiratory_Distress_Observation_Scale.pdf (Ctrl +  left click to view)  PAINAD Score:  http://geriatrictoolkit.[place].[facility]/cog/painad.pdf (Ctrl +  left click to view)

## 2024-10-27 NOTE — PROGRESS NOTE ADULT - SUBJECTIVE AND OBJECTIVE BOX
Patient is a 79y old  Male who presents with a chief complaint of abdominal pain (27 Oct 2024 17:39)      SUBJECTIVE / OVERNIGHT EVENTS: ptn refused MR spine. ct spine: severe spinal stenosis, DJD. seen by NSx, no intervention offered. cont lyriuca, tizanidine. ptn is pain free when not sitting up. dc planning     MEDICATIONS  (STANDING):  allopurinol 100 milliGRAM(s) Oral daily  apixaban 5 milliGRAM(s) Oral two times a day  cinacalcet 90 milliGRAM(s) Oral <User Schedule>  dicyclomine 20 milliGRAM(s) Oral three times a day before meals  droxidopa 200 milliGRAM(s) Oral three times a day  epoetin vinh (PROCRIT) Injectable 23299 Unit(s) IV Push <User Schedule>  influenza  Vaccine (HIGH DOSE) 0.5 milliLiter(s) IntraMuscular once  levETIRAcetam 500 milliGRAM(s) Oral two times a day  midodrine. 40 milliGRAM(s) Oral three times a day  Nephro-eliana 1 Tablet(s) Oral daily  pantoprazole    Tablet 40 milliGRAM(s) Oral before breakfast  polyethylene glycol 3350 17 Gram(s) Oral daily  pregabalin 75 milliGRAM(s) Oral daily  sevelamer carbonate 2400 milliGRAM(s) Oral three times a day with meals  tiZANidine 4 milliGRAM(s) Oral <User Schedule>    MEDICATIONS  (PRN):  albuterol    90 MICROgram(s) HFA Inhaler 2 Puff(s) Inhalation every 6 hours PRN Bronchospasm      Vital Signs Last 24 Hrs  T(F): 98.8 (10-27-24 @ 20:12), Max: 99.1 (10-27-24 @ 00:16)  HR: 65 (10-27-24 @ 20:12) (65 - 80)  BP: 100/60 (10-27-24 @ 20:12) (97/55 - 105/64)  RR: 18 (10-27-24 @ 20:12) (18 - 18)  SpO2: 98% (10-27-24 @ 20:12) (97% - 99%)  Telemetry:   CAPILLARY BLOOD GLUCOSE        I&O's Summary    26 Oct 2024 07:01  -  27 Oct 2024 07:00  --------------------------------------------------------  IN: 840 mL / OUT: 1900 mL / NET: -1060 mL    27 Oct 2024 07:01  -  27 Oct 2024 22:08  --------------------------------------------------------  IN: 960 mL / OUT: 0 mL / NET: 960 mL        PHYSICAL EXAM:  GENERAL: NAD, well-developed  HEAD:  Atraumatic, Normocephalic  EYES: EOMI, PERRLA, conjunctiva and sclera clear  NECK: Supple, No JVD  CHEST/LUNG: Clear to auscultation bilaterally; No wheeze  HEART: Regular rate and rhythm; No murmurs, rubs, or gallops  ABDOMEN: Soft, Nontender, Nondistended; Bowel sounds present  EXTREMITIES:  2+ Peripheral Pulses, No clubbing, cyanosis, or edema  PSYCH: AAOx3  NEUROLOGY: non-focal  SKIN: No rashes or lesions    LABS:    10-27    137  |  95[L]  |  31[H]  ----------------------------<  80  4.4   |  27  |  7.08[H]    Ca    8.6      27 Oct 2024 07:20    TPro  8.2  /  Alb  3.8  /  TBili  0.3  /  DBili  x   /  AST  8[L]  /  ALT  <5[L]  /  AlkPhos  335[H]  10-27          Urinalysis Basic - ( 27 Oct 2024 07:20 )    Color: x / Appearance: x / SG: x / pH: x  Gluc: 80 mg/dL / Ketone: x  / Bili: x / Urobili: x   Blood: x / Protein: x / Nitrite: x   Leuk Esterase: x / RBC: x / WBC x   Sq Epi: x / Non Sq Epi: x / Bacteria: x        RADIOLOGY & ADDITIONAL TESTS:    Imaging Personally Reviewed:    Consultant(s) Notes Reviewed:      Care Discussed with Consultants/Other Providers:

## 2024-10-27 NOTE — PROGRESS NOTE ADULT - SUBJECTIVE AND OBJECTIVE BOX
pt resting in bed, no distress       albuterol    90 MICROgram(s) HFA Inhaler 2 Puff(s) Inhalation every 6 hours PRN  allopurinol 100 milliGRAM(s) Oral daily  apixaban 5 milliGRAM(s) Oral two times a day  cinacalcet 90 milliGRAM(s) Oral <User Schedule>  dicyclomine 20 milliGRAM(s) Oral three times a day before meals  droxidopa 200 milliGRAM(s) Oral three times a day  epoetin vinh (PROCRIT) Injectable 85506 Unit(s) IV Push <User Schedule>  influenza  Vaccine (HIGH DOSE) 0.5 milliLiter(s) IntraMuscular once  levETIRAcetam 500 milliGRAM(s) Oral two times a day  midodrine. 40 milliGRAM(s) Oral three times a day  Nephro-eliana 1 Tablet(s) Oral daily  pantoprazole    Tablet 40 milliGRAM(s) Oral before breakfast  polyethylene glycol 3350 17 Gram(s) Oral daily  pregabalin 150 milliGRAM(s) Oral daily  sevelamer carbonate 2400 milliGRAM(s) Oral three times a day with meals  simethicone 80 milliGRAM(s) Chew every 6 hours          Hemoglobin: 10.6 g/dL (10-23 @ 12:01)            Creatinine Trend: 6.73<--    COAGS:           T(C): 36.2 (10-26-24 @ 10:27), Max: 36.9 (10-25-24 @ 19:44)  HR: 74 (10-26-24 @ 10:27) (69 - 92)  BP: 100/53 (10-26-24 @ 10:27) (96/50 - 126/67)  RR: 18 (10-26-24 @ 10:27) (17 - 18)  SpO2: 98% (10-26-24 @ 10:27) (94% - 98%)  Wt(kg): --    I&O's Summary    25 Oct 2024 07:01  -  26 Oct 2024 07:00  --------------------------------------------------------  IN: 480 mL / OUT: 0 mL / NET: 480 mL    26 Oct 2024 07:01  -  26 Oct 2024 12:45  --------------------------------------------------------  IN: 0 mL / OUT: 1900 mL / NET: -1900 mL      Allergies    baclofen (Other)  latex (Rash)    Intolerances        SOCIAL HISTORY:  Denies ETOh,Smoking,     FAMILY HISTORY:  Family history of colon cancer (Father)    Family history of heart disease (Father)        REVIEW OF SYSTEMS:    CONSTITUTIONAL: No weakness, fevers or chills  EYES/ENT: No visual changes;  No vertigo or throat pain   NECK: No pain or stiffness  RESPIRATORY: No cough, wheezing, hemoptysis; No shortness of breath  CARDIOVASCULAR: No chest pain or palpitations  GASTROINTESTINAL: No abdominal or epigastric pain. No nausea, vomiting, or hematemesis; No diarrhea or constipation. No melena or hematochezia.  GENITOURINARY: No dysuria, frequency or hematuria  NEUROLOGICAL: No numbness or weakness  SKIN: No itching, burning, rashes, or lesions   All other review of systems is negative unless indicated above.              PHYSICAL EXAM:    Constitutional: NAD  HEENT: PERRLA,   Neck: No JVD  Respiratory: CTA B/L  Cardiovascular: S1 and S2  Gastrointestinal: BS+, soft, NT/ND  Extremities: No peripheral edema  Neurological: A/O x 3, no focal deficits  Psychiatric: Normal mood, normal affect  : No Herring  Skin: No rashes  Access: Not applicable  Back: No CVA tenderness

## 2024-10-27 NOTE — PROGRESS NOTE ADULT - SUBJECTIVE AND OBJECTIVE BOX
[facility]    Dr. Vonda Rosario     Office [phone] (9 am to 5 pm)  Service: [phone] (5pm to 9am)  Also Available on TEAMS      RENAL PROGRESS NOTE: DATE OF SERVICE 10-27-24 @ 13:27    Patient is a 79y old  Male who presents with a chief complaint of abdominal pain (27 Oct 2024 09:41)      Patient seen and examined at bedside. No chest pain/sob    VITALS:  T(F): 98.2 (10-27-24 @ 10:41), Max: 99.1 (10-27-24 @ 00:16)  HR: 70 (10-27-24 @ 10:41)  BP: 100/62 (10-27-24 @ 10:41)  RR: 18 (10-27-24 @ 10:41)  SpO2: 99% (10-27-24 @ 10:41)  Wt(kg): --    10-26 @ 07:01  -  10-27 @ 07:00  --------------------------------------------------------  IN: 840 mL / OUT: 1900 mL / NET: -1060 mL    10-27 @ 07:01  -  10-27 @ 13:27  --------------------------------------------------------  IN: 960 mL / OUT: 0 mL / NET: 960 mL          PHYSICAL EXAM:  Constitutional: NAD  Neck: No JVD  Respiratory: CTAB, no wheezes, rales or rhonchi  Cardiovascular: S1, S2, RRR  Gastrointestinal: BS+, soft, NT/ND  Extremities: No peripheral edema    Hospital Medications:   MEDICATIONS  (STANDING):  allopurinol 100 milliGRAM(s) Oral daily  apixaban 5 milliGRAM(s) Oral two times a day  cinacalcet 90 milliGRAM(s) Oral <User Schedule>  dicyclomine 20 milliGRAM(s) Oral three times a day before meals  droxidopa 200 milliGRAM(s) Oral three times a day  epoetin vinh (PROCRIT) Injectable 91642 Unit(s) IV Push <User Schedule>  influenza  Vaccine (HIGH DOSE) 0.5 milliLiter(s) IntraMuscular once  levETIRAcetam 500 milliGRAM(s) Oral two times a day  midodrine. 40 milliGRAM(s) Oral three times a day  Nephro-eliana 1 Tablet(s) Oral daily  pantoprazole    Tablet 40 milliGRAM(s) Oral before breakfast  polyethylene glycol 3350 17 Gram(s) Oral daily  pregabalin 75 milliGRAM(s) Oral daily  sevelamer carbonate 2400 milliGRAM(s) Oral three times a day with meals  tiZANidine 4 milliGRAM(s) Oral <User Schedule>      LABS:  10-27    137  |  95[L]  |  31[H]  ----------------------------<  80  4.4   |  27  |  7.08[H]    Ca    8.6      27 Oct 2024 07:20    TPro  8.2  /  Alb  3.8  /  TBili  0.3  /  DBili      /  AST  8[L]  /  ALT  <5[L]  /  AlkPhos  335[H]  10-27    Creatinine Trend: 7.08 <--, 6.73 <--    Albumin: 3.8 g/dL (10-27 @ 07:20)          Urine Studies:  Urinalysis - [10-27-24 @ 07:20]      Color  / Appearance  / SG  / pH       Gluc 80 / Ketone   / Bili  / Urobili        Blood  / Protein  / Leuk Est  / Nitrite       RBC  / WBC  / Hyaline  / Gran  / Sq Epi  / Non Sq Epi  / Bacteria       TSH 0.85      [06-12-24 @ 23:49]  Lipid: chol 125, , HDL 43, LDL --      [06-12-24 @ 23:49]        RADIOLOGY & ADDITIONAL STUDIES:

## 2024-10-28 RX ORDER — TRAMADOL HYDROCHLORIDE 50 MG/1
25 TABLET, COATED ORAL EVERY 12 HOURS
Refills: 0 | Status: DISCONTINUED | OUTPATIENT
Start: 2024-10-28 | End: 2024-10-30

## 2024-10-28 RX ADMIN — APIXABAN 5 MILLIGRAM(S): 5 TABLET, FILM COATED ORAL at 05:32

## 2024-10-28 RX ADMIN — DICYCLOMINE HYDROCHLORIDE 20 MILLIGRAM(S): 20 TABLET ORAL at 05:34

## 2024-10-28 RX ADMIN — DICYCLOMINE HYDROCHLORIDE 20 MILLIGRAM(S): 20 TABLET ORAL at 11:16

## 2024-10-28 RX ADMIN — DROXIDOPA 200 MILLIGRAM(S): 100 CAPSULE ORAL at 05:33

## 2024-10-28 RX ADMIN — PANTOPRAZOLE SODIUM 40 MILLIGRAM(S): 40 TABLET, DELAYED RELEASE ORAL at 05:32

## 2024-10-28 RX ADMIN — DICYCLOMINE HYDROCHLORIDE 20 MILLIGRAM(S): 20 TABLET ORAL at 18:05

## 2024-10-28 RX ADMIN — MIDODRINE HYDROCHLORIDE 40 MILLIGRAM(S): 2.5 TABLET ORAL at 18:04

## 2024-10-28 RX ADMIN — APIXABAN 5 MILLIGRAM(S): 5 TABLET, FILM COATED ORAL at 18:05

## 2024-10-28 RX ADMIN — TRAMADOL HYDROCHLORIDE 25 MILLIGRAM(S): 50 TABLET, COATED ORAL at 11:16

## 2024-10-28 RX ADMIN — MIDODRINE HYDROCHLORIDE 40 MILLIGRAM(S): 2.5 TABLET ORAL at 05:32

## 2024-10-28 RX ADMIN — DROXIDOPA 200 MILLIGRAM(S): 100 CAPSULE ORAL at 11:17

## 2024-10-28 RX ADMIN — MIDODRINE HYDROCHLORIDE 40 MILLIGRAM(S): 2.5 TABLET ORAL at 11:16

## 2024-10-28 RX ADMIN — LEVETIRACETAM 500 MILLIGRAM(S): 500 TABLET, FILM COATED ORAL at 18:04

## 2024-10-28 RX ADMIN — Medication 100 MILLIGRAM(S): at 11:16

## 2024-10-28 RX ADMIN — LEVETIRACETAM 500 MILLIGRAM(S): 500 TABLET, FILM COATED ORAL at 05:32

## 2024-10-28 RX ADMIN — SEVELAMER CARBONATE 2400 MILLIGRAM(S): 800 TABLET, FILM COATED ORAL at 18:04

## 2024-10-28 RX ADMIN — TRAMADOL HYDROCHLORIDE 25 MILLIGRAM(S): 50 TABLET, COATED ORAL at 12:16

## 2024-10-28 RX ADMIN — POLYETHYLENE GLYCOL 3350 17 GRAM(S): 17 POWDER, FOR SOLUTION ORAL at 11:17

## 2024-10-28 RX ADMIN — TIZANIDINE 4 MILLIGRAM(S): 2 TABLET ORAL at 09:05

## 2024-10-28 RX ADMIN — TIZANIDINE 4 MILLIGRAM(S): 2 TABLET ORAL at 21:04

## 2024-10-28 RX ADMIN — DROXIDOPA 200 MILLIGRAM(S): 100 CAPSULE ORAL at 18:04

## 2024-10-28 RX ADMIN — SEVELAMER CARBONATE 2400 MILLIGRAM(S): 800 TABLET, FILM COATED ORAL at 09:05

## 2024-10-28 RX ADMIN — SEVELAMER CARBONATE 2400 MILLIGRAM(S): 800 TABLET, FILM COATED ORAL at 12:09

## 2024-10-28 RX ADMIN — PREGABALIN 75 MILLIGRAM(S): 150 CAPSULE ORAL at 11:16

## 2024-10-28 RX ADMIN — Medication 1 TABLET(S): at 11:17

## 2024-10-28 NOTE — PROGRESS NOTE ADULT - SUBJECTIVE AND OBJECTIVE BOX
[facility]    Dr. Vonda Rosario     Office [phone] (9 am to 5 pm)  Service: [phone] (5pm to 9am)  Also Available on TEAMS      RENAL PROGRESS NOTE: DATE OF SERVICE 10-28-24 @ 11:23    Patient is a 79y old  Male who presents with a chief complaint of abdominal pain (27 Oct 2024 22:08)      Patient seen and examined at bedside. No chest pain/sob    VITALS:  T(F): 98.2 (10-28-24 @ 09:03), Max: 98.8 (10-27-24 @ 20:12)  HR: 69 (10-28-24 @ 09:03)  BP: 109/62 (10-28-24 @ 09:03)  RR: 18 (10-28-24 @ 09:03)  SpO2: 99% (10-28-24 @ 09:03)  Wt(kg): --    10-27 @ 07:01  -  10-28 @ 07:00  --------------------------------------------------------  IN: 960 mL / OUT: 0 mL / NET: 960 mL          PHYSICAL EXAM:  Constitutional: NAD  Neck: No JVD  Respiratory: CTAB, no wheezes, rales or rhonchi  Cardiovascular: S1, S2, RRR  Gastrointestinal: BS+, soft, NT/ND  Extremities: No peripheral edema    Hospital Medications:   MEDICATIONS  (STANDING):  allopurinol 100 milliGRAM(s) Oral daily  apixaban 5 milliGRAM(s) Oral two times a day  cinacalcet 90 milliGRAM(s) Oral <User Schedule>  dicyclomine 20 milliGRAM(s) Oral three times a day before meals  droxidopa 200 milliGRAM(s) Oral three times a day  epoetin vinh (PROCRIT) Injectable 82538 Unit(s) IV Push <User Schedule>  influenza  Vaccine (HIGH DOSE) 0.5 milliLiter(s) IntraMuscular once  levETIRAcetam 500 milliGRAM(s) Oral two times a day  midodrine. 40 milliGRAM(s) Oral three times a day  Nephro-eliana 1 Tablet(s) Oral daily  pantoprazole    Tablet 40 milliGRAM(s) Oral before breakfast  polyethylene glycol 3350 17 Gram(s) Oral daily  pregabalin 75 milliGRAM(s) Oral daily  sevelamer carbonate 2400 milliGRAM(s) Oral three times a day with meals  tiZANidine 4 milliGRAM(s) Oral <User Schedule>      LABS:  10-27    137  |  95[L]  |  31[H]  ----------------------------<  80  4.4   |  27  |  7.08[H]    Ca    8.6      27 Oct 2024 07:20    TPro  8.2  /  Alb  3.8  /  TBili  0.3  /  DBili      /  AST  8[L]  /  ALT  <5[L]  /  AlkPhos  335[H]  10-27    Creatinine Trend: 7.08 <--, 6.73 <--            Urine Studies:  Urinalysis - [10-27-24 @ 07:20]      Color  / Appearance  / SG  / pH       Gluc 80 / Ketone   / Bili  / Urobili        Blood  / Protein  / Leuk Est  / Nitrite       RBC  / WBC  / Hyaline  / Gran  / Sq Epi  / Non Sq Epi  / Bacteria       TSH 0.85      [06-12-24 @ 23:49]  Lipid: chol 125, , HDL 43, LDL --      [06-12-24 @ 23:49]        RADIOLOGY & ADDITIONAL STUDIES:

## 2024-10-28 NOTE — PROGRESS NOTE ADULT - SUBJECTIVE AND OBJECTIVE BOX
Patient is a 79y old  Male who presents with a chief complaint of abdominal pain (28 Oct 2024 19:59)      SUBJECTIVE / OVERNIGHT EVENTS: spoke w ptn and wife. will place on tramadol prior to moving him and adjust meds as necessary. dc planning post HD tomorrow    MEDICATIONS  (STANDING):  allopurinol 100 milliGRAM(s) Oral daily  apixaban 5 milliGRAM(s) Oral two times a day  cinacalcet 90 milliGRAM(s) Oral <User Schedule>  dicyclomine 20 milliGRAM(s) Oral three times a day before meals  droxidopa 200 milliGRAM(s) Oral three times a day  epoetin vinh (PROCRIT) Injectable 99665 Unit(s) IV Push <User Schedule>  influenza  Vaccine (HIGH DOSE) 0.5 milliLiter(s) IntraMuscular once  levETIRAcetam 500 milliGRAM(s) Oral two times a day  midodrine. 40 milliGRAM(s) Oral three times a day  Nephro-eliana 1 Tablet(s) Oral daily  pantoprazole    Tablet 40 milliGRAM(s) Oral before breakfast  polyethylene glycol 3350 17 Gram(s) Oral daily  pregabalin 75 milliGRAM(s) Oral daily  sevelamer carbonate 2400 milliGRAM(s) Oral three times a day with meals  tiZANidine 4 milliGRAM(s) Oral <User Schedule>    MEDICATIONS  (PRN):  albuterol    90 MICROgram(s) HFA Inhaler 2 Puff(s) Inhalation every 6 hours PRN Bronchospasm  traMADol 25 milliGRAM(s) Oral every 12 hours PRN Severe Pain (7 - 10)      Vital Signs Last 24 Hrs  T(F): 98.4 (10-28-24 @ 19:58), Max: 98.8 (10-27-24 @ 20:12)  HR: 76 (10-28-24 @ 19:58) (65 - 76)  BP: 100/59 (10-28-24 @ 19:58) (91/52 - 109/62)  RR: 18 (10-28-24 @ 19:58) (18 - 18)  SpO2: 94% (10-28-24 @ 19:58) (91% - 99%)  Telemetry:   CAPILLARY BLOOD GLUCOSE        I&O's Summary    27 Oct 2024 07:01  -  28 Oct 2024 07:00  --------------------------------------------------------  IN: 960 mL / OUT: 0 mL / NET: 960 mL        PHYSICAL EXAM:  GENERAL: NAD, well-developed  HEAD:  Atraumatic, Normocephalic  EYES: EOMI, PERRLA, conjunctiva and sclera clear  NECK: Supple, No JVD  CHEST/LUNG: Clear to auscultation bilaterally; No wheeze  HEART: Regular rate and rhythm; No murmurs, rubs, or gallops  ABDOMEN: Soft, Nontender, Nondistended; Bowel sounds present  EXTREMITIES:  2+ Peripheral Pulses, No clubbing, cyanosis, or edema  PSYCH: AAOx3  NEUROLOGY: non-focal  SKIN: No rashes or lesions    LABS:    10-27    137  |  95[L]  |  31[H]  ----------------------------<  80  4.4   |  27  |  7.08[H]    Ca    8.6      27 Oct 2024 07:20    TPro  8.2  /  Alb  3.8  /  TBili  0.3  /  DBili  x   /  AST  8[L]  /  ALT  <5[L]  /  AlkPhos  335[H]  10-27          Urinalysis Basic - ( 27 Oct 2024 07:20 )    Color: x / Appearance: x / SG: x / pH: x  Gluc: 80 mg/dL / Ketone: x  / Bili: x / Urobili: x   Blood: x / Protein: x / Nitrite: x   Leuk Esterase: x / RBC: x / WBC x   Sq Epi: x / Non Sq Epi: x / Bacteria: x        RADIOLOGY & ADDITIONAL TESTS:    Imaging Personally Reviewed:    Consultant(s) Notes Reviewed:      Care Discussed with Consultants/Other Providers:

## 2024-10-28 NOTE — PROGRESS NOTE ADULT - ASSESSMENT
77 y/o M with PMH of ESRD on HDTuThSat- last yesterday, orthostatic hypotension on midodrine baseline BP 80-90 systolic, COPD reportedly on home O2 2L, SAADIA on CPAP, wheelchair bound, chronic diastolic CHF, morbid obesity, pulmonary hypertension, PE and DVT on Eliquis, IVC filter, chronic sacral ulcer, chronic back pain, history of hematochezia in the past 2/2 hemorrhoids, chronic abd pain 2/2 IBS, chronic constipation presenting for severe abdominal pain for few weeks. Nephro on board for ESRD on HD    ESRD   On HD TTS via AVF  Nephrologist: Dr. Whalen   Outpt unit [facility]   Consent obtained in chart witnessed  Last HD on 10/23  HD in hosp on 10/24 UF 2 L removed, and on 10/26  Will continue TTS scheduele while hospitalized due tomm   Renal diet.  Monitor BMP     Anemia   MELISSA with HD for hgb <11  transfuse for hgb <7  monitor Hgb     HTN/Hypotension  bp fluctuating; generally low.  On midodrine 40 mg TID and Droxidopa 200 mg TID  Monitor closely     CKD-MBD  check PTH  monitor P04 and Ca daily   Continue with renvela and continue with cinacalcet

## 2024-10-28 NOTE — PROGRESS NOTE ADULT - ASSESSMENT
80 y/o Male, my office ptn,  with PMH of ESRD on HD TuThSat- last HD was 10/22, has baseline orthostatic hypotension on Droxidopa AND midodrine baseline BP 60-80 systolic, COPD,  on home O2 2L, SAADIA on CPAP, wheelchair bound, IBS w chronic abd pain, chronic diastolic CHF, morbid obesity, sacral decub ulcer, pulmonary hypertension, hemorrhoids, IBS,  PE and DVT on Eliquis who presents with worsening abd pain. worse in upright position or when moves, stable in a supine position when still  Ptn was last admitted in June 2024 w worsening RV  Heart failure, respiratory failure,  Patient has MOLST stating DNR/DNI and patient does not want central access but is open to short trial of peripheral pressors.  Since DC his orthostasis had gotten worse and most recently he is unable to bear the severity of his abdominal pain. He knows use of narcotics for abd pain will be difficult 2/2 severe orthostatic hypotension.     no vomiting, fevers, chills, diarrhea, chest pain, sob. tolerating po intake    GI consulted for abdominal pain, pt known to our service from previous admissions The patients wife bedside aided in history.     Over the last week or so he has c/o increased abdominal "gnawing" pain while he is in the seated position, when he is laying down the pain subsides, however, it is still somewhat gnawing.     He typically feels his worst after HD sessions, however, finds it has been occurring even on his non-hd days.      He has no associated nausea or vomiting, no diarrheal episodes. He has no appetite changes. About 4 days ago he had an increased amount of soft stools with some blood tinge to it, however, this occurs at times d/t his hemorrhoids (has been treated w/anusol supp with success)   Colonoscopy notable for hemorrhoids and diverticulosis. EGD 2022 showed esophagitis, gastritis, duodenitis. Ptn has not been hemodynamically stable for any additional scopes.    His pain is prob 2/2 low flow state in the mesentery 2/2 hypotension. he is already on max dosing of droxi dopa and Midodrine.     Hospice was d/w ptn and his wife prior to arrival. he wasn't ready to stop HD.     Acute on Chronic Abdominal Pain   ptn was refusing imaging of the spine but now agrees to have CT spine, refuses MRI.   ct spine: severe spinal stenosis, DJD. seen by NSx, no intervention offered. cont lyriuca, tizanidine. ptn is pain free when not sitting up. dc planning   being done 2/2 abd pain when sitting up , with immediate relief once lays down.   ptn has no relief from a higher dose LYRICA, will drop to the baseline dose of 75 mg daily.   spoke w ptn and wife. will place on tramadol prior to moving him and adjust meds as necessary. dc planning post HD tomorrow  CT A/P w/IV contrast : no new findings  seen by GI  palliative team called for pain control and GOC  GOC brought up w the ptn, he wants to cont HD.   he is DNR/DNI, no artificial nutrition    Orthostatic Hypotension  - severe, cont DroxiDOpa and Midodrine  - tolerating HD, BP has been stable.      ESRD on HD  - cont TUe, THu, Sat  - ptn wants to cont getting HD    CHF    COPD on home 02    Morbid obesity     Neuropathy    IBS

## 2024-10-28 NOTE — PROGRESS NOTE ADULT - SUBJECTIVE AND OBJECTIVE BOX
CARDIOLOGY FOLLOW UP - Dr. Livingston  Date of Service: 10-28-24 @ 12:06    CC: no events    Review of Systems:  Constitutional: No fever, weight loss, or fatigue  Respiratory: No cough, wheezing, or hemoptysis, no shortness of breath  Cardiovascular: No chest pain, palpitations, passing out, dizziness, or leg swelling  Gastrointestinal: No abd or epigastric pain. No nausea, vomiting, or hematemesis; no diarrhea or consiptaiton, no melena or hematochezia  Vascular: No edema     TELEMETRY:    PHYSICAL EXAM:  T(C): 36.8 (10-28-24 @ 09:03), Max: 37.1 (10-27-24 @ 20:12)  HR: 69 (10-28-24 @ 09:03) (65 - 76)  BP: 109/62 (10-28-24 @ 09:03) (91/52 - 109/62)  RR: 18 (10-28-24 @ 09:03) (18 - 18)  SpO2: 99% (10-28-24 @ 09:03) (91% - 99%)  Wt(kg): --  I&O's Summary    27 Oct 2024 07:01  -  28 Oct 2024 07:00  --------------------------------------------------------  IN: 960 mL / OUT: 0 mL / NET: 960 mL        Appearance: Normal	  Cardiovascular: Normal S1 S2,RRR, No JVD, No murmurs  Respiratory: Lungs clear to auscultation	  Gastrointestinal:  Soft, Non-tender, + BS	  Extremities: Normal range of motion, No clubbing, cyanosis or edema  Vascular: Peripheral pulses palpable 2+ bilaterally       Home Medications:  Albuterol (Eqv-ProAir HFA) 90 mcg/inh inhalation aerosol: 2 puff(s) inhaled every 6 hours as needed (23 Oct 2024 16:15)  allopurinol 100 mg oral tablet: 1 tab(s) orally once a day (23 Oct 2024 16:15)  cinacalcet 90 mg oral tablet: 1 tab(s) orally 3 times a week (on days of dialysis - Tues, Thurs &amp; Sat) (23 Oct 2024 16:15)  droxidopa 200 mg oral capsule: 1 cap(s) orally 3 times a day (23 Oct 2024 16:15)  levETIRAcetam 500 mg oral tablet: 1 tab(s) orally 2 times a day (23 Oct 2024 16:15)  lidocaine 4% topical film: Apply topically to affected area once a day (both knees and lower back) (23 Oct 2024 16:15)  MiraLax oral powder for reconstitution: 17 gram(s) orally every other day (23 Oct 2024 16:15)  nystatin 100,000 units/g topical cream: Apply topically to affected area 2 times a day (buttocks) (23 Oct 2024 16:15)  nystatin 100,000 units/g topical powder: Apply topically to affected area 2 times a day (buttocks) (23 Oct 2024 16:15)  pantoprazole 40 mg oral delayed release tablet: 1 tab(s) orally once a day (before a meal) (23 Oct 2024 16:15)  sevelamer carbonate 800 mg oral tablet: 3 tab(s) orally 3 times a day (with meals) (23 Oct 2024 16:15)  Trelegy Ellipta 100 mcg-62.5 mcg-25 mcg/inh inhalation powder: 1 puff(s) inhaled once a day (23 Oct 2024 16:15)  Voltaren 1% topical gel: Apply topically to affected area 2 times a day (both knees) (23 Oct 2024 16:15)        MEDICATIONS  (STANDING):  allopurinol 100 milliGRAM(s) Oral daily  apixaban 5 milliGRAM(s) Oral two times a day  cinacalcet 90 milliGRAM(s) Oral <User Schedule>  dicyclomine 20 milliGRAM(s) Oral three times a day before meals  droxidopa 200 milliGRAM(s) Oral three times a day  epoetin vinh (PROCRIT) Injectable 02948 Unit(s) IV Push <User Schedule>  influenza  Vaccine (HIGH DOSE) 0.5 milliLiter(s) IntraMuscular once  levETIRAcetam 500 milliGRAM(s) Oral two times a day  midodrine. 40 milliGRAM(s) Oral three times a day  Nephro-eliana 1 Tablet(s) Oral daily  pantoprazole    Tablet 40 milliGRAM(s) Oral before breakfast  polyethylene glycol 3350 17 Gram(s) Oral daily  pregabalin 75 milliGRAM(s) Oral daily  sevelamer carbonate 2400 milliGRAM(s) Oral three times a day with meals  tiZANidine 4 milliGRAM(s) Oral <User Schedule>        EKG:  RADIOLOGY:  DIAGNOSTIC TESTING:  [ ] Echocardiogram:  [ ] Catherterization:  [ ] Stress Test:  OTHER:     LABS:	 	      10-27    137  |  95[L]  |  31[H]  ----------------------------<  80  4.4   |  27  |  7.08[H]    Ca    8.6      27 Oct 2024 07:20    TPro  8.2  /  Alb  3.8  /  TBili  0.3  /  DBili  x   /  AST  8[L]  /  ALT  <5[L]  /  AlkPhos  335[H]  10-27          CARDIAC MARKERS:

## 2024-10-28 NOTE — PROGRESS NOTE ADULT - SUBJECTIVE AND OBJECTIVE BOX
pt resting in bed, no distress       albuterol    90 MICROgram(s) HFA Inhaler 2 Puff(s) Inhalation every 6 hours PRN  allopurinol 100 milliGRAM(s) Oral daily  apixaban 5 milliGRAM(s) Oral two times a day  cinacalcet 90 milliGRAM(s) Oral <User Schedule>  dicyclomine 20 milliGRAM(s) Oral three times a day before meals  droxidopa 200 milliGRAM(s) Oral three times a day  epoetin vinh (PROCRIT) Injectable 61699 Unit(s) IV Push <User Schedule>  influenza  Vaccine (HIGH DOSE) 0.5 milliLiter(s) IntraMuscular once  levETIRAcetam 500 milliGRAM(s) Oral two times a day  midodrine. 40 milliGRAM(s) Oral three times a day  Nephro-elinaa 1 Tablet(s) Oral daily  pantoprazole    Tablet 40 milliGRAM(s) Oral before breakfast  polyethylene glycol 3350 17 Gram(s) Oral daily  pregabalin 150 milliGRAM(s) Oral daily  sevelamer carbonate 2400 milliGRAM(s) Oral three times a day with meals  simethicone 80 milliGRAM(s) Chew every 6 hours          Hemoglobin: 10.6 g/dL (10-23 @ 12:01)            Creatinine Trend: 6.73<--    COAGS:           T(C): 36.2 (10-26-24 @ 10:27), Max: 36.9 (10-25-24 @ 19:44)  HR: 74 (10-26-24 @ 10:27) (69 - 92)  BP: 100/53 (10-26-24 @ 10:27) (96/50 - 126/67)  RR: 18 (10-26-24 @ 10:27) (17 - 18)  SpO2: 98% (10-26-24 @ 10:27) (94% - 98%)  Wt(kg): --    I&O's Summary    25 Oct 2024 07:01  -  26 Oct 2024 07:00  --------------------------------------------------------  IN: 480 mL / OUT: 0 mL / NET: 480 mL    26 Oct 2024 07:01  -  26 Oct 2024 12:45  --------------------------------------------------------  IN: 0 mL / OUT: 1900 mL / NET: -1900 mL      Allergies    baclofen (Other)  latex (Rash)    Intolerances        SOCIAL HISTORY:  Denies ETOh,Smoking,     FAMILY HISTORY:  Family history of colon cancer (Father)    Family history of heart disease (Father)        REVIEW OF SYSTEMS:    CONSTITUTIONAL: No weakness, fevers or chills  EYES/ENT: No visual changes;  No vertigo or throat pain   NECK: No pain or stiffness  RESPIRATORY: No cough, wheezing, hemoptysis; No shortness of breath  CARDIOVASCULAR: No chest pain or palpitations  GASTROINTESTINAL: No abdominal or epigastric pain. No nausea, vomiting, or hematemesis; No diarrhea or constipation. No melena or hematochezia.  GENITOURINARY: No dysuria, frequency or hematuria  NEUROLOGICAL: No numbness or weakness  SKIN: No itching, burning, rashes, or lesions   All other review of systems is negative unless indicated above.              PHYSICAL EXAM:    Constitutional: NAD  HEENT: PERRLA,   Neck: No JVD  Respiratory: CTA B/L  Cardiovascular: S1 and S2  Gastrointestinal: BS+, soft, NT/ND  Extremities: No peripheral edema  Neurological: A/O x 3, no focal deficits  Psychiatric: Normal mood, normal affect  : No Herring  Skin: No rashes  Access: Not applicable  Back: No CVA tenderness

## 2024-10-29 LAB
ALBUMIN SERPL ELPH-MCNC: 4 G/DL — SIGNIFICANT CHANGE UP (ref 3.3–5)
ALP SERPL-CCNC: 351 U/L — HIGH (ref 40–120)
ALT FLD-CCNC: 6 U/L — LOW (ref 10–45)
ANION GAP SERPL CALC-SCNC: 15 MMOL/L — SIGNIFICANT CHANGE UP (ref 5–17)
ANION GAP SERPL CALC-SCNC: 20 MMOL/L — HIGH (ref 5–17)
AST SERPL-CCNC: 8 U/L — LOW (ref 10–40)
BILIRUB SERPL-MCNC: 0.3 MG/DL — SIGNIFICANT CHANGE UP (ref 0.2–1.2)
BUN SERPL-MCNC: 43 MG/DL — HIGH (ref 7–23)
BUN SERPL-MCNC: 85 MG/DL — HIGH (ref 7–23)
CALCIUM SERPL-MCNC: 7.8 MG/DL — LOW (ref 8.4–10.5)
CALCIUM SERPL-MCNC: 8 MG/DL — LOW (ref 8.4–10.5)
CHLORIDE SERPL-SCNC: 91 MMOL/L — LOW (ref 96–108)
CHLORIDE SERPL-SCNC: 93 MMOL/L — LOW (ref 96–108)
CO2 SERPL-SCNC: 23 MMOL/L — SIGNIFICANT CHANGE UP (ref 22–31)
CO2 SERPL-SCNC: 26 MMOL/L — SIGNIFICANT CHANGE UP (ref 22–31)
CREAT SERPL-MCNC: 11.84 MG/DL — HIGH (ref 0.5–1.3)
CREAT SERPL-MCNC: 7.65 MG/DL — HIGH (ref 0.5–1.3)
EGFR: 4 ML/MIN/1.73M2 — LOW
EGFR: 7 ML/MIN/1.73M2 — LOW
GLUCOSE SERPL-MCNC: 100 MG/DL — HIGH (ref 70–99)
GLUCOSE SERPL-MCNC: 91 MG/DL — SIGNIFICANT CHANGE UP (ref 70–99)
HCT VFR BLD CALC: 35.1 % — LOW (ref 39–50)
HGB BLD-MCNC: 10.4 G/DL — LOW (ref 13–17)
MCHC RBC-ENTMCNC: 27.9 PG — SIGNIFICANT CHANGE UP (ref 27–34)
MCHC RBC-ENTMCNC: 29.6 G/DL — LOW (ref 32–36)
MCV RBC AUTO: 94.1 FL — SIGNIFICANT CHANGE UP (ref 80–100)
NRBC # BLD: 0 /100 WBCS — SIGNIFICANT CHANGE UP (ref 0–0)
PLATELET # BLD AUTO: 308 K/UL — SIGNIFICANT CHANGE UP (ref 150–400)
POTASSIUM SERPL-MCNC: 4.6 MMOL/L — SIGNIFICANT CHANGE UP (ref 3.5–5.3)
POTASSIUM SERPL-MCNC: 6.3 MMOL/L — CRITICAL HIGH (ref 3.5–5.3)
POTASSIUM SERPL-SCNC: 4.6 MMOL/L — SIGNIFICANT CHANGE UP (ref 3.5–5.3)
POTASSIUM SERPL-SCNC: 6.3 MMOL/L — CRITICAL HIGH (ref 3.5–5.3)
PROT SERPL-MCNC: 8.4 G/DL — HIGH (ref 6–8.3)
RBC # BLD: 3.73 M/UL — LOW (ref 4.2–5.8)
RBC # FLD: 16.6 % — HIGH (ref 10.3–14.5)
SODIUM SERPL-SCNC: 134 MMOL/L — LOW (ref 135–145)
SODIUM SERPL-SCNC: 134 MMOL/L — LOW (ref 135–145)
WBC # BLD: 9.59 K/UL — SIGNIFICANT CHANGE UP (ref 3.8–10.5)
WBC # FLD AUTO: 9.59 K/UL — SIGNIFICANT CHANGE UP (ref 3.8–10.5)

## 2024-10-29 RX ADMIN — DROXIDOPA 200 MILLIGRAM(S): 100 CAPSULE ORAL at 18:22

## 2024-10-29 RX ADMIN — MIDODRINE HYDROCHLORIDE 40 MILLIGRAM(S): 2.5 TABLET ORAL at 18:21

## 2024-10-29 RX ADMIN — MIDODRINE HYDROCHLORIDE 40 MILLIGRAM(S): 2.5 TABLET ORAL at 05:18

## 2024-10-29 RX ADMIN — SEVELAMER CARBONATE 2400 MILLIGRAM(S): 800 TABLET, FILM COATED ORAL at 13:25

## 2024-10-29 RX ADMIN — TIZANIDINE 4 MILLIGRAM(S): 2 TABLET ORAL at 21:28

## 2024-10-29 RX ADMIN — Medication 1 TABLET(S): at 12:20

## 2024-10-29 RX ADMIN — SEVELAMER CARBONATE 2400 MILLIGRAM(S): 800 TABLET, FILM COATED ORAL at 18:22

## 2024-10-29 RX ADMIN — TIZANIDINE 4 MILLIGRAM(S): 2 TABLET ORAL at 09:13

## 2024-10-29 RX ADMIN — TRAMADOL HYDROCHLORIDE 25 MILLIGRAM(S): 50 TABLET, COATED ORAL at 18:21

## 2024-10-29 RX ADMIN — APIXABAN 5 MILLIGRAM(S): 5 TABLET, FILM COATED ORAL at 18:22

## 2024-10-29 RX ADMIN — ERYTHROPOIETIN 10000 UNIT(S): 10000 INJECTION, SOLUTION INTRAVENOUS; SUBCUTANEOUS at 14:59

## 2024-10-29 RX ADMIN — TRAMADOL HYDROCHLORIDE 25 MILLIGRAM(S): 50 TABLET, COATED ORAL at 19:00

## 2024-10-29 RX ADMIN — LEVETIRACETAM 500 MILLIGRAM(S): 500 TABLET, FILM COATED ORAL at 05:19

## 2024-10-29 RX ADMIN — DICYCLOMINE HYDROCHLORIDE 20 MILLIGRAM(S): 20 TABLET ORAL at 18:21

## 2024-10-29 RX ADMIN — DROXIDOPA 200 MILLIGRAM(S): 100 CAPSULE ORAL at 05:19

## 2024-10-29 RX ADMIN — APIXABAN 5 MILLIGRAM(S): 5 TABLET, FILM COATED ORAL at 05:18

## 2024-10-29 RX ADMIN — DICYCLOMINE HYDROCHLORIDE 20 MILLIGRAM(S): 20 TABLET ORAL at 12:20

## 2024-10-29 RX ADMIN — SEVELAMER CARBONATE 2400 MILLIGRAM(S): 800 TABLET, FILM COATED ORAL at 09:13

## 2024-10-29 RX ADMIN — LEVETIRACETAM 500 MILLIGRAM(S): 500 TABLET, FILM COATED ORAL at 18:22

## 2024-10-29 RX ADMIN — PREGABALIN 75 MILLIGRAM(S): 150 CAPSULE ORAL at 12:20

## 2024-10-29 RX ADMIN — CINACALCET 90 MILLIGRAM(S): 30 TABLET, FILM COATED ORAL at 05:19

## 2024-10-29 RX ADMIN — PANTOPRAZOLE SODIUM 40 MILLIGRAM(S): 40 TABLET, DELAYED RELEASE ORAL at 05:19

## 2024-10-29 RX ADMIN — Medication 100 MILLIGRAM(S): at 12:20

## 2024-10-29 RX ADMIN — DROXIDOPA 200 MILLIGRAM(S): 100 CAPSULE ORAL at 12:20

## 2024-10-29 RX ADMIN — MIDODRINE HYDROCHLORIDE 40 MILLIGRAM(S): 2.5 TABLET ORAL at 12:20

## 2024-10-29 RX ADMIN — DICYCLOMINE HYDROCHLORIDE 20 MILLIGRAM(S): 20 TABLET ORAL at 05:19

## 2024-10-29 NOTE — PROGRESS NOTE ADULT - SUBJECTIVE AND OBJECTIVE BOX
CARDIOLOGY FOLLOW UP - Dr. Livingston  Date of Service: 10-29-24 @ 12:53    CC: no events    Review of Systems:  Constitutional: No fever, weight loss, or fatigue  Respiratory: No cough, wheezing, or hemoptysis, no shortness of breath  Cardiovascular: No chest pain, palpitations, passing out, dizziness, or leg swelling  Gastrointestinal: No abd or epigastric pain. No nausea, vomiting, or hematemesis; no diarrhea or consiptaiton, no melena or hematochezia  Vascular: No edema     TELEMETRY:    PHYSICAL EXAM:  T(C): 36.7 (10-29-24 @ 05:17), Max: 36.9 (10-28-24 @ 19:58)  HR: 67 (10-29-24 @ 05:17) (67 - 76)  BP: 105/61 (10-29-24 @ 05:17) (100/59 - 105/61)  RR: 18 (10-29-24 @ 05:17) (18 - 18)  SpO2: 96% (10-29-24 @ 05:17) (94% - 96%)  Wt(kg): --  I&O's Summary      Appearance: Normal	  Cardiovascular: Normal S1 S2,RRR, No JVD, No murmurs  Respiratory: Lungs clear to auscultation	  Gastrointestinal:  Soft, Non-tender, + BS	  Extremities: Normal range of motion, No clubbing, cyanosis or edema  Vascular: Peripheral pulses palpable 2+ bilaterally       Home Medications:  Albuterol (Eqv-ProAir HFA) 90 mcg/inh inhalation aerosol: 2 puff(s) inhaled every 6 hours as needed (23 Oct 2024 16:15)  allopurinol 100 mg oral tablet: 1 tab(s) orally once a day (23 Oct 2024 16:15)  cinacalcet 90 mg oral tablet: 1 tab(s) orally 3 times a week (on days of dialysis - Tues, Thurs &amp; Sat) (23 Oct 2024 16:15)  droxidopa 200 mg oral capsule: 1 cap(s) orally 3 times a day (23 Oct 2024 16:15)  levETIRAcetam 500 mg oral tablet: 1 tab(s) orally 2 times a day (23 Oct 2024 16:15)  lidocaine 4% topical film: Apply topically to affected area once a day (both knees and lower back) (23 Oct 2024 16:15)  MiraLax oral powder for reconstitution: 17 gram(s) orally every other day (23 Oct 2024 16:15)  nystatin 100,000 units/g topical cream: Apply topically to affected area 2 times a day (buttocks) (23 Oct 2024 16:15)  nystatin 100,000 units/g topical powder: Apply topically to affected area 2 times a day (buttocks) (23 Oct 2024 16:15)  pantoprazole 40 mg oral delayed release tablet: 1 tab(s) orally once a day (before a meal) (23 Oct 2024 16:15)  sevelamer carbonate 800 mg oral tablet: 3 tab(s) orally 3 times a day (with meals) (23 Oct 2024 16:15)  Trelegy Ellipta 100 mcg-62.5 mcg-25 mcg/inh inhalation powder: 1 puff(s) inhaled once a day (23 Oct 2024 16:15)  Voltaren 1% topical gel: Apply topically to affected area 2 times a day (both knees) (23 Oct 2024 16:15)        MEDICATIONS  (STANDING):  allopurinol 100 milliGRAM(s) Oral daily  apixaban 5 milliGRAM(s) Oral two times a day  cinacalcet 90 milliGRAM(s) Oral <User Schedule>  dicyclomine 20 milliGRAM(s) Oral three times a day before meals  droxidopa 200 milliGRAM(s) Oral three times a day  epoetin vinh (PROCRIT) Injectable 58308 Unit(s) IV Push <User Schedule>  influenza  Vaccine (HIGH DOSE) 0.5 milliLiter(s) IntraMuscular once  levETIRAcetam 500 milliGRAM(s) Oral two times a day  midodrine. 40 milliGRAM(s) Oral three times a day  Nephro-eliana 1 Tablet(s) Oral daily  pantoprazole    Tablet 40 milliGRAM(s) Oral before breakfast  polyethylene glycol 3350 17 Gram(s) Oral daily  pregabalin 75 milliGRAM(s) Oral daily  sevelamer carbonate 2400 milliGRAM(s) Oral three times a day with meals  tiZANidine 4 milliGRAM(s) Oral <User Schedule>        EKG:  RADIOLOGY:  DIAGNOSTIC TESTING:  [ ] Echocardiogram:  [ ] Catherterization:  [ ] Stress Test:  OTHER:     LABS:	 	                  CARDIAC MARKERS:

## 2024-10-29 NOTE — PROGRESS NOTE ADULT - SUBJECTIVE AND OBJECTIVE BOX
[facility]   MD CHESTER DUEÑAS MD SAKIL BHUIYAN, MD MARIA SANTIAGO, NP    TEL:  OFFICE: [phone]  From 5pm-7am Answering Service [phone]    -- RENAL FOLLOW UP NOTE ---Date of Service 10-29-24 @ 12:33    Patient is a 79y old  Male who presents with a chief complaint of abdominal pain       Patient seen and examined at bedside. No chest pain/sob    VITALS:  T(F): 98 (10-29-24 @ 05:17), Max: 98.4 (10-28-24 @ 19:58)  HR: 67 (10-29-24 @ 05:17)  BP: 105/61 (10-29-24 @ 05:17)  RR: 18 (10-29-24 @ 05:17)  SpO2: 96% (10-29-24 @ 05:17)  Wt(kg): --        PHYSICAL EXAM:  General: NAD  Neck: No JVD  Respiratory: CTAB, no wheezes, rales or rhonchi  Cardiovascular: S1, S2, RRR  Gastrointestinal: BS+, soft, NT/ND  Extremities: Non pitting edema    Hospital Medications:   MEDICATIONS  (STANDING):  allopurinol 100 milliGRAM(s) Oral daily  apixaban 5 milliGRAM(s) Oral two times a day  cinacalcet 90 milliGRAM(s) Oral <User Schedule>  dicyclomine 20 milliGRAM(s) Oral three times a day before meals  droxidopa 200 milliGRAM(s) Oral three times a day  epoetin vinh (PROCRIT) Injectable 76086 Unit(s) IV Push <User Schedule>  influenza  Vaccine (HIGH DOSE) 0.5 milliLiter(s) IntraMuscular once  levETIRAcetam 500 milliGRAM(s) Oral two times a day  midodrine. 40 milliGRAM(s) Oral three times a day  Nephro-eliana 1 Tablet(s) Oral daily  pantoprazole    Tablet 40 milliGRAM(s) Oral before breakfast  polyethylene glycol 3350 17 Gram(s) Oral daily  pregabalin 75 milliGRAM(s) Oral daily  sevelamer carbonate 2400 milliGRAM(s) Oral three times a day with meals  tiZANidine 4 milliGRAM(s) Oral <User Schedule>      LABS:        Creatinine Trend: 7.08 <--, 6.73 <--            Urine Studies:  Urinalysis - [10-27-24 @ 07:20]      Color  / Appearance  / SG  / pH       Gluc 80 / Ketone   / Bili  / Urobili        Blood  / Protein  / Leuk Est  / Nitrite       RBC  / WBC  / Hyaline  / Gran  / Sq Epi  / Non Sq Epi  / Bacteria       TSH 0.85      [06-12-24 @ 23:49]  Lipid: chol 125, , HDL 43, LDL --      [06-12-24 @ 23:49]        RADIOLOGY & ADDITIONAL STUDIES:

## 2024-10-29 NOTE — PROGRESS NOTE ADULT - SUBJECTIVE AND OBJECTIVE BOX
Patient is a 79y old  Male who presents with a chief complaint of abdominal pain (29 Oct 2024 12:53)      SUBJECTIVE / OVERNIGHT EVENTS: no new events    MEDICATIONS  (STANDING):  allopurinol 100 milliGRAM(s) Oral daily  apixaban 5 milliGRAM(s) Oral two times a day  cinacalcet 90 milliGRAM(s) Oral <User Schedule>  dicyclomine 20 milliGRAM(s) Oral three times a day before meals  droxidopa 200 milliGRAM(s) Oral three times a day  epoetin vinh (PROCRIT) Injectable 27699 Unit(s) IV Push <User Schedule>  influenza  Vaccine (HIGH DOSE) 0.5 milliLiter(s) IntraMuscular once  levETIRAcetam 500 milliGRAM(s) Oral two times a day  midodrine. 40 milliGRAM(s) Oral three times a day  Nephro-eliana 1 Tablet(s) Oral daily  pantoprazole    Tablet 40 milliGRAM(s) Oral before breakfast  polyethylene glycol 3350 17 Gram(s) Oral daily  pregabalin 75 milliGRAM(s) Oral daily  sevelamer carbonate 2400 milliGRAM(s) Oral three times a day with meals  tiZANidine 4 milliGRAM(s) Oral <User Schedule>    MEDICATIONS  (PRN):  albuterol    90 MICROgram(s) HFA Inhaler 2 Puff(s) Inhalation every 6 hours PRN Bronchospasm  traMADol 25 milliGRAM(s) Oral every 12 hours PRN Severe Pain (7 - 10)      Vital Signs Last 24 Hrs  T(F): 98.1 (10-29-24 @ 19:52), Max: 98.2 (10-29-24 @ 13:51)  HR: 95 (10-29-24 @ 19:52) (67 - 95)  BP: 100/60 (10-29-24 @ 19:52) (100/60 - 109/67)  RR: 18 (10-29-24 @ 19:52) (18 - 18)  SpO2: 93% (10-29-24 @ 19:52) (93% - 97%)  Telemetry:   CAPILLARY BLOOD GLUCOSE        I&O's Summary    29 Oct 2024 07:01  -  29 Oct 2024 21:14  --------------------------------------------------------  IN: 950 mL / OUT: 1500 mL / NET: -550 mL        PHYSICAL EXAM:  GENERAL: NAD, well-developed  HEAD:  Atraumatic, Normocephalic  EYES: EOMI, PERRLA, conjunctiva and sclera clear  NECK: Supple, No JVD  CHEST/LUNG: Clear to auscultation bilaterally; No wheeze  HEART: Regular rate and rhythm; No murmurs, rubs, or gallops  ABDOMEN: Soft, Nontender, Nondistended; Bowel sounds present  EXTREMITIES:  2+ Peripheral Pulses, No clubbing, cyanosis, or edema  PSYCH: AAOx3  NEUROLOGY: non-focal  SKIN: No rashes or lesions    LABS:                        10.4   9.59  )-----------( 308      ( 29 Oct 2024 15:02 )             35.1     10-29    134[L]  |  91[L]  |  85[H]  ----------------------------<  91  6.3[HH]   |  23  |  11.84[H]    Ca    7.8[L]      29 Oct 2024 15:02    TPro  8.4[H]  /  Alb  4.0  /  TBili  0.3  /  DBili  x   /  AST  8[L]  /  ALT  6[L]  /  AlkPhos  351[H]  10-29          Urinalysis Basic - ( 29 Oct 2024 15:02 )    Color: x / Appearance: x / SG: x / pH: x  Gluc: 91 mg/dL / Ketone: x  / Bili: x / Urobili: x   Blood: x / Protein: x / Nitrite: x   Leuk Esterase: x / RBC: x / WBC x   Sq Epi: x / Non Sq Epi: x / Bacteria: x        RADIOLOGY & ADDITIONAL TESTS:    Imaging Personally Reviewed:    Consultant(s) Notes Reviewed:      Care Discussed with Consultants/Other Providers:

## 2024-10-29 NOTE — PROGRESS NOTE ADULT - ASSESSMENT
79 y/o M with PMH of ESRD on HDTuThSat- last yesterday, orthostatic hypotension on midodrine baseline BP 80-90 systolic, COPD reportedly on home O2 2L, SAADIA on CPAP, wheelchair bound, chronic diastolic CHF, morbid obesity, pulmonary hypertension, PE and DVT on Eliquis, IVC filter, chronic sacral ulcer, chronic back pain, history of hematochezia in the past 2/2 hemorrhoids, chronic abd pain 2/2 IBS, chronic constipation presenting for severe abdominal pain for few weeks. Nephro on board for ESRD on HD    ESRD   On HD TTS via [facility]  Nephrologist: Dr. Whalen   Outpt unit [facility]   Consent obtained in chart witnessed  Last HD on 10/23  HD in hosp on 10/24 UF 2 L removed, and on 10/26  Will continue TTS schedule while hospitalized, HD today   Renal diet.  Monitor BMP     Anemia   MELISSA with HD for hgb <11  transfuse for hgb <7  monitor Hgb     HTN/Hypotension  bp fluctuating; generally low.  On midodrine 40 mg TID and Droxidopa 200 mg TID  Monitor closely     CKD-MBD  check PTH  monitor P04 and Ca daily   Continue with renvela and continue with cinacalcet

## 2024-10-29 NOTE — PROGRESS NOTE ADULT - ASSESSMENT
80 y/o Male with PMH of ESRD on HD TuThSat- last HD was 10/22, has baseline orthostatic hypotension on Droxidopa AND midodrine baseline BP 60-80 systolic, COPD,  on home O2 2L, SAADIA on CPAP, wheelchair bound, IBS w chronic abd pain, chronic diastolic CHF, morbid obesity, sacral decub ulcer, pulmonary hypertension, hemorrhoids, IBS,  PE and DVT on Eliquis who presents with worsening abd pain.       1. Acute on Chronic Abdominal Pain  no acute gi etiology, possibly referred pain from spine  cont w/conservative management  cont current regimen   palliative input appreciate   no gi objection to d/c planning per primary team    2. ESRD  on HD

## 2024-10-29 NOTE — PROGRESS NOTE ADULT - SUBJECTIVE AND OBJECTIVE BOX
INTERVAL HPI/OVERNIGHT EVENTS:    comfortable when seen earlier, no new gi complaints     MEDICATIONS  (STANDING):  allopurinol 100 milliGRAM(s) Oral daily  apixaban 5 milliGRAM(s) Oral two times a day  cinacalcet 90 milliGRAM(s) Oral <User Schedule>  dicyclomine 20 milliGRAM(s) Oral three times a day before meals  droxidopa 200 milliGRAM(s) Oral three times a day  epoetin vinh (PROCRIT) Injectable 67369 Unit(s) IV Push <User Schedule>  influenza  Vaccine (HIGH DOSE) 0.5 milliLiter(s) IntraMuscular once  levETIRAcetam 500 milliGRAM(s) Oral two times a day  midodrine. 40 milliGRAM(s) Oral three times a day  Nephro-eliana 1 Tablet(s) Oral daily  pantoprazole    Tablet 40 milliGRAM(s) Oral before breakfast  polyethylene glycol 3350 17 Gram(s) Oral daily  pregabalin 75 milliGRAM(s) Oral daily  sevelamer carbonate 2400 milliGRAM(s) Oral three times a day with meals  tiZANidine 4 milliGRAM(s) Oral <User Schedule>    MEDICATIONS  (PRN):  albuterol    90 MICROgram(s) HFA Inhaler 2 Puff(s) Inhalation every 6 hours PRN Bronchospasm  traMADol 25 milliGRAM(s) Oral every 12 hours PRN Severe Pain (7 - 10)      Allergies    baclofen (Other)  latex (Rash)    Intolerances        Review of Systems:    General:  No wt loss, fevers, chills, night sweats, fatigue   Eyes:  Good vision, no reported pain  ENT:  No sore throat, pain, runny nose, dysphagia  CV:  No pain, palpitations, hypo/hypertension  Resp:  No dyspnea, cough, tachypnea, wheezing  GI:  No pain, No nausea, No vomiting, No diarrhea, No constipation, No weight loss, No fever, No pruritis, No rectal bleeding, No melena, No dysphagia  :  No pain, bleeding, incontinence, nocturia  Muscle:  No pain, weakness  Neuro:  No weakness, tingling, memory problems  Psych:  No fatigue, insomnia, mood problems, depression  Endocrine:  No polyuria, polydypsia, cold/heat intolerance  Heme:  No petechiae, ecchymosis, easy bruisability  Skin:  No rash, tattoos, scars, edema      Vital Signs Last 24 Hrs  T(C): 36.7 (29 Oct 2024 05:17), Max: 36.9 (28 Oct 2024 19:58)  T(F): 98 (29 Oct 2024 05:17), Max: 98.4 (28 Oct 2024 19:58)  HR: 67 (29 Oct 2024 05:17) (67 - 76)  BP: 105/61 (29 Oct 2024 05:17) (100/59 - 105/61)  BP(mean): --  RR: 18 (29 Oct 2024 05:17) (18 - 18)  SpO2: 96% (29 Oct 2024 05:17) (94% - 96%)    Parameters below as of 29 Oct 2024 05:17  Patient On (Oxygen Delivery Method): nasal cannula  O2 Flow (L/min): 2      PHYSICAL EXAM:    Constitutional: NAD  HEENT: EOMI, throat clear  Neck: No LAD, supple  Respiratory: CTA and P  Cardiovascular: S1 and S2, RRR, no M  Gastrointestinal: BS+, soft, NT/ND, neg HSM,  Extremities: No peripheral edema, neg clubbing, cyanosis  Vascular: 2+ peripheral pulses  Neurological: A/O   Psychiatric: Normal mood, normal affect  Skin: No rashes      LABS:                RADIOLOGY & ADDITIONAL TESTS:

## 2024-10-30 ENCOUNTER — TRANSCRIPTION ENCOUNTER (OUTPATIENT)
Age: 79
End: 2024-10-30

## 2024-10-30 VITALS — WEIGHT: 253.53 LBS

## 2024-10-30 RX ORDER — TRAMADOL HYDROCHLORIDE 50 MG/1
1 TABLET, COATED ORAL
Qty: 21 | Refills: 0
Start: 2024-10-30 | End: 2024-11-05

## 2024-10-30 RX ORDER — DICYCLOMINE HYDROCHLORIDE 20 MG/1
2 TABLET ORAL
Qty: 180 | Refills: 0
Start: 2024-10-30 | End: 2024-11-28

## 2024-10-30 RX ORDER — NYSTATIN 100000 U/G
1 POWDER TOPICAL
Refills: 0 | DISCHARGE

## 2024-10-30 RX ORDER — TIZANIDINE 2 MG/1
1 TABLET ORAL
Qty: 20 | Refills: 0
Start: 2024-10-30 | End: 2024-11-08

## 2024-10-30 RX ADMIN — MIDODRINE HYDROCHLORIDE 40 MILLIGRAM(S): 2.5 TABLET ORAL at 05:42

## 2024-10-30 RX ADMIN — APIXABAN 5 MILLIGRAM(S): 5 TABLET, FILM COATED ORAL at 17:12

## 2024-10-30 RX ADMIN — SEVELAMER CARBONATE 2400 MILLIGRAM(S): 800 TABLET, FILM COATED ORAL at 11:55

## 2024-10-30 RX ADMIN — TIZANIDINE 4 MILLIGRAM(S): 2 TABLET ORAL at 08:45

## 2024-10-30 RX ADMIN — Medication 1 TABLET(S): at 11:38

## 2024-10-30 RX ADMIN — DROXIDOPA 200 MILLIGRAM(S): 100 CAPSULE ORAL at 17:12

## 2024-10-30 RX ADMIN — APIXABAN 5 MILLIGRAM(S): 5 TABLET, FILM COATED ORAL at 05:43

## 2024-10-30 RX ADMIN — Medication 100 MILLIGRAM(S): at 11:38

## 2024-10-30 RX ADMIN — DICYCLOMINE HYDROCHLORIDE 20 MILLIGRAM(S): 20 TABLET ORAL at 17:12

## 2024-10-30 RX ADMIN — MIDODRINE HYDROCHLORIDE 40 MILLIGRAM(S): 2.5 TABLET ORAL at 11:39

## 2024-10-30 RX ADMIN — PANTOPRAZOLE SODIUM 40 MILLIGRAM(S): 40 TABLET, DELAYED RELEASE ORAL at 05:44

## 2024-10-30 RX ADMIN — MIDODRINE HYDROCHLORIDE 40 MILLIGRAM(S): 2.5 TABLET ORAL at 17:12

## 2024-10-30 RX ADMIN — SEVELAMER CARBONATE 2400 MILLIGRAM(S): 800 TABLET, FILM COATED ORAL at 17:17

## 2024-10-30 RX ADMIN — LEVETIRACETAM 500 MILLIGRAM(S): 500 TABLET, FILM COATED ORAL at 17:13

## 2024-10-30 RX ADMIN — LEVETIRACETAM 500 MILLIGRAM(S): 500 TABLET, FILM COATED ORAL at 05:43

## 2024-10-30 RX ADMIN — PREGABALIN 75 MILLIGRAM(S): 150 CAPSULE ORAL at 11:38

## 2024-10-30 RX ADMIN — DICYCLOMINE HYDROCHLORIDE 20 MILLIGRAM(S): 20 TABLET ORAL at 11:38

## 2024-10-30 RX ADMIN — DICYCLOMINE HYDROCHLORIDE 20 MILLIGRAM(S): 20 TABLET ORAL at 05:43

## 2024-10-30 RX ADMIN — DROXIDOPA 200 MILLIGRAM(S): 100 CAPSULE ORAL at 05:42

## 2024-10-30 RX ADMIN — DROXIDOPA 200 MILLIGRAM(S): 100 CAPSULE ORAL at 11:39

## 2024-10-30 RX ADMIN — SEVELAMER CARBONATE 2400 MILLIGRAM(S): 800 TABLET, FILM COATED ORAL at 08:45

## 2024-10-30 RX ADMIN — POLYETHYLENE GLYCOL 3350 17 GRAM(S): 17 POWDER, FOR SOLUTION ORAL at 11:39

## 2024-10-30 NOTE — PROGRESS NOTE ADULT - SUBJECTIVE AND OBJECTIVE BOX
INTERVAL HPI/OVERNIGHT EVENTS:    no new gi complaints   same abd discomfort usually has after hd    MEDICATIONS  (STANDING):  allopurinol 100 milliGRAM(s) Oral daily  apixaban 5 milliGRAM(s) Oral two times a day  cinacalcet 90 milliGRAM(s) Oral <User Schedule>  dicyclomine 20 milliGRAM(s) Oral three times a day before meals  droxidopa 200 milliGRAM(s) Oral three times a day  epoetin vinh (PROCRIT) Injectable 18392 Unit(s) IV Push <User Schedule>  influenza  Vaccine (HIGH DOSE) 0.5 milliLiter(s) IntraMuscular once  levETIRAcetam 500 milliGRAM(s) Oral two times a day  midodrine. 40 milliGRAM(s) Oral three times a day  Nephro-eliana 1 Tablet(s) Oral daily  pantoprazole    Tablet 40 milliGRAM(s) Oral before breakfast  polyethylene glycol 3350 17 Gram(s) Oral daily  pregabalin 75 milliGRAM(s) Oral daily  sevelamer carbonate 2400 milliGRAM(s) Oral three times a day with meals  tiZANidine 4 milliGRAM(s) Oral <User Schedule>    MEDICATIONS  (PRN):  albuterol    90 MICROgram(s) HFA Inhaler 2 Puff(s) Inhalation every 6 hours PRN Bronchospasm  traMADol 25 milliGRAM(s) Oral every 12 hours PRN Severe Pain (7 - 10)      Allergies    baclofen (Other)  latex (Rash)    Intolerances        Review of Systems:    General:  No wt loss, fevers, chills, night sweats, fatigue   Eyes:  Good vision, no reported pain  ENT:  No sore throat, pain, runny nose, dysphagia  CV:  No pain, palpitations, hypo/hypertension  Resp:  No dyspnea, cough, tachypnea, wheezing  GI:  No pain, No nausea, No vomiting, No diarrhea, No constipation, No weight loss, No fever, No pruritis, No rectal bleeding, No melena, No dysphagia  :  No pain, bleeding, incontinence, nocturia  Muscle:  No pain, weakness  Neuro:  No weakness, tingling, memory problems  Psych:  No fatigue, insomnia, mood problems, depression  Endocrine:  No polyuria, polydypsia, cold/heat intolerance  Heme:  No petechiae, ecchymosis, easy bruisability  Skin:  No rash, tattoos, scars, edema      Vital Signs Last 24 Hrs  T(C): 37.1 (30 Oct 2024 04:51), Max: 37.1 (30 Oct 2024 04:51)  T(F): 98.8 (30 Oct 2024 04:51), Max: 98.8 (30 Oct 2024 04:51)  HR: 82 (30 Oct 2024 04:51) (71 - 95)  BP: 98/60 (30 Oct 2024 04:51) (98/60 - 109/67)  BP(mean): --  RR: 18 (30 Oct 2024 04:51) (18 - 18)  SpO2: 94% (30 Oct 2024 04:51) (93% - 97%)    Parameters below as of 30 Oct 2024 04:51  Patient On (Oxygen Delivery Method): nasal cannula  O2 Flow (L/min): 2      PHYSICAL EXAM:    Constitutional: NAD  HEENT: EOMI, throat clear  Neck: No LAD, supple  Respiratory: CTA and P  Cardiovascular: S1 and S2, RRR, no M  Gastrointestinal: BS+, soft, NT/ND, neg HSM,  Extremities: No peripheral edema, neg clubbing, cyanosis  Vascular: 2+ peripheral pulses  Neurological: A/O   Psychiatric: Normal mood, normal affect  Skin: No rashes      LABS:                        10.4   9.59  )-----------( 308      ( 29 Oct 2024 15:02 )             35.1     10-29    134[L]  |  93[L]  |  43[H]  ----------------------------<  100[H]  4.6   |  26  |  7.65[H]    Ca    8.0[L]      29 Oct 2024 21:06    TPro  8.4[H]  /  Alb  4.0  /  TBili  0.3  /  DBili  x   /  AST  8[L]  /  ALT  6[L]  /  AlkPhos  351[H]  10-29      Urinalysis Basic - ( 29 Oct 2024 21:06 )    Color: x / Appearance: x / SG: x / pH: x  Gluc: 100 mg/dL / Ketone: x  / Bili: x / Urobili: x   Blood: x / Protein: x / Nitrite: x   Leuk Esterase: x / RBC: x / WBC x   Sq Epi: x / Non Sq Epi: x / Bacteria: x        RADIOLOGY & ADDITIONAL TESTS:

## 2024-10-30 NOTE — PROGRESS NOTE ADULT - PROVIDER SPECIALTY LIST ADULT
Cardiology
Gastroenterology
Gastroenterology
Internal Medicine
Internal Medicine
Nephrology
Nephrology
Cardiology
Cardiology
Gastroenterology
Gastroenterology
Internal Medicine
Nephrology
Nephrology
Cardiology
Cardiology
Gastroenterology
Internal Medicine
Nephrology
Nephrology
Cardiology
Cardiology
Gastroenterology
Internal Medicine
Nephrology

## 2024-10-30 NOTE — PROGRESS NOTE ADULT - TIME BILLING
agree with above  cv stable  cont current meds
Agree with above ACP note.  cv stable  cont current tx
Agree with above ACP note.  cv stable  cont current tx

## 2024-10-30 NOTE — DISCHARGE NOTE NURSING/CASE MANAGEMENT/SOCIAL WORK - FINANCIAL ASSISTANCE
[facility] provides services at a reduced cost to those who are determined to be eligible through [facility]’s financial assistance program. Information regarding [facility]’s financial assistance program can be found by going to [email or web address] or by calling [phone].

## 2024-10-30 NOTE — PROGRESS NOTE ADULT - ASSESSMENT
A/P  78 y/o Male with PMH of ESRD on HD TuThSat- last HD was 10/22, has baseline orthostatic hypotension on Droxidopa AND midodrine baseline BP 60-80 systolic, COPD,  on home O2 2L, SAADIA on CPAP, wheelchair bound, IBS w chronic abd pain, chronic diastolic CHF, morbid obesity, sacral decub ulcer, pulmonary hypertension, hemorrhoids, IBS,  PE and DVT on Eliquis who presents with worsening abd pain.     #Abdominal pain  -no associated CP or SOB  -CT A/P shows small hiatal hernia; no acute intra-abdominal findings   -med and GI f/u  -per GI conservative gi management    #ESRD on HD  -renal fu   -HD per renal    #Hx of Orthostatic Hypotension  -On midodrine 40 mg TID and Droxidopa 200 mg TID    #Chronic diastolic CHF  -TTE 6/2024 shows LVSF appears grossly normal; mildly enlarged RV; mild TR  -volume status stable  -no decomp HF on exam  -cont volume removal with HD per renal    #Hx of PE, DVT  -cont Eliquis

## 2024-10-30 NOTE — DISCHARGE NOTE NURSING/CASE MANAGEMENT/SOCIAL WORK - NSDCFUADDAPPT_GEN_ALL_CORE_FT
APPTS ARE READY TO BE MADE: [x] YES    Best Family or Patient Contact (if needed):    Additional Information about above appointments (if needed):    1: PCP- Dr. Hein  2: Gastroenterologist      Met with patient face to face and provided the patient with provider referral information, however patient prefers to schedule the appointments on their own.  As per patient his wife will schedule appointment.  Call Inpatient Referral Program for assistance with follow up appointments [phone]

## 2024-10-30 NOTE — PROGRESS NOTE ADULT - SUBJECTIVE AND OBJECTIVE BOX
[facility]    Dr. Vonda Rosario     Office [phone] (9 am to 5 pm)  Service: [phone] (5pm to 9am)  Also Available on TEAMS      RENAL PROGRESS NOTE: DATE OF SERVICE 10-30-24 @ 12:55    Patient is a 79y old  Male who presents with a chief complaint of abdominal pain (30 Oct 2024 10:31)      Patient seen and examined at bedside. No chest pain/sob    VITALS:  T(F): 98.8 (10-30-24 @ 04:51), Max: 98.8 (10-30-24 @ 04:51)  HR: 82 (10-30-24 @ 04:51)  BP: 98/60 (10-30-24 @ 04:51)  RR: 18 (10-30-24 @ 04:51)  SpO2: 94% (10-30-24 @ 04:51)  Wt(kg): --    10-29 @ 07:01  -  10-30 @ 07:00  --------------------------------------------------------  IN: 950 mL / OUT: 1500 mL / NET: -550 mL          PHYSICAL EXAM:  Constitutional: NAD  Neck: No JVD  Respiratory: CTAB, no wheezes, rales or rhonchi  Cardiovascular: S1, S2, RRR  Gastrointestinal: BS+, soft, NT/ND  Extremities: No peripheral edema    Hospital Medications:   MEDICATIONS  (STANDING):  allopurinol 100 milliGRAM(s) Oral daily  apixaban 5 milliGRAM(s) Oral two times a day  cinacalcet 90 milliGRAM(s) Oral <User Schedule>  dicyclomine 20 milliGRAM(s) Oral three times a day before meals  droxidopa 200 milliGRAM(s) Oral three times a day  epoetin vinh (PROCRIT) Injectable 46334 Unit(s) IV Push <User Schedule>  influenza  Vaccine (HIGH DOSE) 0.5 milliLiter(s) IntraMuscular once  levETIRAcetam 500 milliGRAM(s) Oral two times a day  midodrine. 40 milliGRAM(s) Oral three times a day  Nephro-eliana 1 Tablet(s) Oral daily  pantoprazole    Tablet 40 milliGRAM(s) Oral before breakfast  polyethylene glycol 3350 17 Gram(s) Oral daily  pregabalin 75 milliGRAM(s) Oral daily  sevelamer carbonate 2400 milliGRAM(s) Oral three times a day with meals  tiZANidine 4 milliGRAM(s) Oral <User Schedule>      LABS:  10-29    134[L]  |  93[L]  |  43[H]  ----------------------------<  100[H]  4.6   |  26  |  7.65[H]    Ca    8.0[L]      29 Oct 2024 21:06    TPro  8.4[H]  /  Alb  4.0  /  TBili  0.3  /  DBili      /  AST  8[L]  /  ALT  6[L]  /  AlkPhos  351[H]  10-29    Creatinine Trend: 7.65 <--, 11.84 <--, 7.08 <--    Albumin: 4.0 g/dL (10-29 @ 15:02)                              10.4   9.59  )-----------( 308      ( 29 Oct 2024 15:02 )             35.1     Urine Studies:  Urinalysis - [10-29-24 @ 21:06]      Color  / Appearance  / SG  / pH       Gluc 100 / Ketone   / Bili  / Urobili        Blood  / Protein  / Leuk Est  / Nitrite       RBC  / WBC  / Hyaline  / Gran  / Sq Epi  / Non Sq Epi  / Bacteria       TSH 0.85      [06-12-24 @ 23:49]  Lipid: chol 125, , HDL 43, LDL --      [06-12-24 @ 23:49]        RADIOLOGY & ADDITIONAL STUDIES:

## 2024-10-30 NOTE — PROGRESS NOTE ADULT - SUBJECTIVE AND OBJECTIVE BOX
CARDIOLOGY FOLLOW UP - Dr. Livingston  DATE OF SERVICE: 10/30/24    CC no CP or SOB      REVIEW OF SYSTEMS:  CONSTITUTIONAL: No fever, weight loss, or fatigue  RESPIRATORY: No cough, wheezing, chills or hemoptysis; No Shortness of Breath  CARDIOVASCULAR: No chest pain, palpitations, passing out, dizziness  GASTROINTESTINAL: No abdominal or epigastric pain. No nausea, vomiting, or hematemesis; No diarrhea or constipation. No melena or hematochezia.      PHYSICAL EXAM:  T(C): 36.4 (10-30-24 @ 12:57), Max: 37.1 (10-30-24 @ 04:51)  HR: 74 (10-30-24 @ 12:57) (71 - 95)  BP: 101/62 (10-30-24 @ 12:57) (98/60 - 109/67)  RR: 18 (10-30-24 @ 12:57) (18 - 18)  SpO2: 96% (10-30-24 @ 12:57) (93% - 97%)  Wt(kg): --  I&O's Summary    29 Oct 2024 07:01  -  30 Oct 2024 07:00  --------------------------------------------------------  IN: 950 mL / OUT: 1500 mL / NET: -550 mL        Appearance: Normal	  Cardiovascular: Normal S1 S2,RRR, No JVD, No murmurs  Respiratory: Lungs clear to auscultation	  Gastrointestinal:  Soft, Non-tender, + BS	  Extremities: Normal range of motion, No clubbing, cyanosis or edema      Home Medications:  Albuterol (Eqv-ProAir HFA) 90 mcg/inh inhalation aerosol: 2 puff(s) inhaled every 6 hours as needed (23 Oct 2024 16:15)  allopurinol 100 mg oral tablet: 1 tab(s) orally once a day (23 Oct 2024 16:15)  cinacalcet 90 mg oral tablet: 1 tab(s) orally 3 times a week (on days of dialysis - Tues, Thurs &amp; Sat) (23 Oct 2024 16:15)  droxidopa 200 mg oral capsule: 1 cap(s) orally 3 times a day (23 Oct 2024 16:15)  levETIRAcetam 500 mg oral tablet: 1 tab(s) orally 2 times a day (23 Oct 2024 16:15)  lidocaine 4% topical film: Apply topically to affected area once a day (both knees and lower back) (23 Oct 2024 16:15)  MiraLax oral powder for reconstitution: 17 gram(s) orally every other day (23 Oct 2024 16:15)  nystatin 100,000 units/g topical cream: Apply topically to affected area 2 times a day (buttocks) (23 Oct 2024 16:15)  nystatin 100,000 units/g topical powder: Apply topically to affected area 2 times a day (buttocks) (23 Oct 2024 16:15)  pantoprazole 40 mg oral delayed release tablet: 1 tab(s) orally once a day (before a meal) (23 Oct 2024 16:15)  sevelamer carbonate 800 mg oral tablet: 3 tab(s) orally 3 times a day (with meals) (23 Oct 2024 16:15)  Trelegy Ellipta 100 mcg-62.5 mcg-25 mcg/inh inhalation powder: 1 puff(s) inhaled once a day (23 Oct 2024 16:15)  Voltaren 1% topical gel: Apply topically to affected area 2 times a day (both knees) (23 Oct 2024 16:15)      MEDICATIONS  (STANDING):  allopurinol 100 milliGRAM(s) Oral daily  apixaban 5 milliGRAM(s) Oral two times a day  cinacalcet 90 milliGRAM(s) Oral <User Schedule>  dicyclomine 20 milliGRAM(s) Oral three times a day before meals  droxidopa 200 milliGRAM(s) Oral three times a day  epoetin vinh (PROCRIT) Injectable 92019 Unit(s) IV Push <User Schedule>  influenza  Vaccine (HIGH DOSE) 0.5 milliLiter(s) IntraMuscular once  levETIRAcetam 500 milliGRAM(s) Oral two times a day  midodrine. 40 milliGRAM(s) Oral three times a day  Nephro-eliana 1 Tablet(s) Oral daily  pantoprazole    Tablet 40 milliGRAM(s) Oral before breakfast  polyethylene glycol 3350 17 Gram(s) Oral daily  pregabalin 75 milliGRAM(s) Oral daily  sevelamer carbonate 2400 milliGRAM(s) Oral three times a day with meals  tiZANidine 4 milliGRAM(s) Oral <User Schedule>      TELEMETRY: 	    ECG:  	  RADIOLOGY:   DIAGNOSTIC TESTING:  [ ] Echocardiogram:  [ ]  Catheterization:  [ ] Stress Test:    OTHER: 	    LABS:	 	                            10.4   9.59  )-----------( 308      ( 29 Oct 2024 15:02 )             35.1     10-29    134[L]  |  93[L]  |  43[H]  ----------------------------<  100[H]  4.6   |  26  |  7.65[H]    Ca    8.0[L]      29 Oct 2024 21:06    TPro  8.4[H]  /  Alb  4.0  /  TBili  0.3  /  DBili  x   /  AST  8[L]  /  ALT  6[L]  /  AlkPhos  351[H]  10-29

## 2024-10-30 NOTE — PROGRESS NOTE ADULT - ASSESSMENT
80 y/o Male with PMH of ESRD on HD TuThSat- last HD was 10/22, has baseline orthostatic hypotension on Droxidopa AND midodrine baseline BP 60-80 systolic, COPD,  on home O2 2L, SAADIA on CPAP, wheelchair bound, IBS w chronic abd pain, chronic diastolic CHF, morbid obesity, sacral decub ulcer, pulmonary hypertension, hemorrhoids, IBS,  PE and DVT on Eliquis who presents with worsening abd pain.       1. Acute on Chronic Abdominal Pain  no acute gi etiology, possibly referred pain vs hypoperfusion   cont w/conservative management  cont current regimen   palliative input appreciate, pain control per their recs   no gi objection to d/c planning per primary team    2. ESRD  on HD

## 2024-10-30 NOTE — PROGRESS NOTE ADULT - ASSESSMENT
78 y/o Male, my office ptn,  with PMH of ESRD on HD TuThSat- last HD was 10/22, has baseline orthostatic hypotension on Droxidopa AND midodrine baseline BP 60-80 systolic, COPD,  on home O2 2L, SAADIA on CPAP, wheelchair bound, IBS w chronic abd pain, chronic diastolic CHF, morbid obesity, sacral decub ulcer, pulmonary hypertension, hemorrhoids, IBS,  PE and DVT on Eliquis who presents with worsening abd pain. worse in upright position or when moves, stable in a supine position when still  Ptn was last admitted in June 2024 w worsening RV  Heart failure, respiratory failure,  Patient has MOLST stating DNR/DNI and patient does not want central access but is open to short trial of peripheral pressors.  Since DC his orthostasis had gotten worse and most recently he is unable to bear the severity of his abdominal pain. He knows use of narcotics for abd pain will be difficult 2/2 severe orthostatic hypotension.     no vomiting, fevers, chills, diarrhea, chest pain, sob. tolerating po intake    GI consulted for abdominal pain, pt known to our service from previous admissions The patients wife bedside aided in history.     Over the last week or so he has c/o increased abdominal "gnawing" pain while he is in the seated position, when he is laying down the pain subsides, however, it is still somewhat gnawing.     He typically feels his worst after HD sessions, however, finds it has been occurring even on his non-hd days.      He has no associated nausea or vomiting, no diarrheal episodes. He has no appetite changes. About 4 days ago he had an increased amount of soft stools with some blood tinge to it, however, this occurs at times d/t his hemorrhoids (has been treated w/anusol supp with success)   Colonoscopy notable for hemorrhoids and diverticulosis. EGD 2022 showed esophagitis, gastritis, duodenitis. Ptn has not been hemodynamically stable for any additional scopes.    His pain is prob 2/2 low flow state in the mesentery 2/2 hypotension. he is already on max dosing of droxi dopa and Midodrine.     Hospice was d/w ptn and his wife prior to arrival. he wasn't ready to stop HD.     Acute on Chronic Abdominal Pain   ptn was refusing imaging of the spine but now agrees to have CT spine, refuses MRI.   ct spine: severe spinal stenosis, DJD. seen by NSx, no intervention offered. cont lyriuca, tizanidine. ptn is pain free when not sitting up.   being done 2/2 abd pain when sitting up , with immediate relief once lays down.   ptn has no relief from a higher dose LYRICA, will drop to the baseline dose of 75 mg daily.   spoke w ptn and wife. will place on tramadol prior to moving him and adjust meds as necessary. dc planning home today. feels better on TRAMADOL  CT A/P w/IV contrast : no new findings  seen by GI  palliative team called for pain control and GOC  GOC brought up w the ptn, he wants to cont HD.   he is DNR/DNI, no artificial nutrition    Orthostatic Hypotension  - severe, cont DroxiDOpa and Midodrine  - tolerating HD, BP has been stable.      ESRD on HD  - cont TUe, THu, Sat  - ptn wants to cont getting HD    CHF    COPD on home 02    Morbid obesity     Neuropathy    IBS

## 2024-10-30 NOTE — PROGRESS NOTE ADULT - SUBJECTIVE AND OBJECTIVE BOX
Patient is a 79y old  Male who presents with a chief complaint of abdominal pain (30 Oct 2024 13:04)      SUBJECTIVE / OVERNIGHT EVENTS: dc home today, pain improved w Tramadol     MEDICATIONS  (STANDING):  allopurinol 100 milliGRAM(s) Oral daily  apixaban 5 milliGRAM(s) Oral two times a day  cinacalcet 90 milliGRAM(s) Oral <User Schedule>  dicyclomine 20 milliGRAM(s) Oral three times a day before meals  droxidopa 200 milliGRAM(s) Oral three times a day  epoetin vinh (PROCRIT) Injectable 06178 Unit(s) IV Push <User Schedule>  influenza  Vaccine (HIGH DOSE) 0.5 milliLiter(s) IntraMuscular once  levETIRAcetam 500 milliGRAM(s) Oral two times a day  midodrine. 40 milliGRAM(s) Oral three times a day  Nephro-eliana 1 Tablet(s) Oral daily  pantoprazole    Tablet 40 milliGRAM(s) Oral before breakfast  polyethylene glycol 3350 17 Gram(s) Oral daily  pregabalin 75 milliGRAM(s) Oral daily  sevelamer carbonate 2400 milliGRAM(s) Oral three times a day with meals  tiZANidine 4 milliGRAM(s) Oral <User Schedule>    MEDICATIONS  (PRN):  albuterol    90 MICROgram(s) HFA Inhaler 2 Puff(s) Inhalation every 6 hours PRN Bronchospasm  traMADol 25 milliGRAM(s) Oral every 12 hours PRN Severe Pain (7 - 10)      Vital Signs Last 24 Hrs  T(F): 97.6 (10-30-24 @ 12:57), Max: 98.8 (10-30-24 @ 04:51)  HR: 74 (10-30-24 @ 12:57) (74 - 95)  BP: 101/62 (10-30-24 @ 12:57) (98/60 - 109/67)  RR: 18 (10-30-24 @ 12:57) (18 - 18)  SpO2: 96% (10-30-24 @ 12:57) (93% - 96%)  Telemetry:   CAPILLARY BLOOD GLUCOSE        I&O's Summary    29 Oct 2024 07:01  -  30 Oct 2024 07:00  --------------------------------------------------------  IN: 950 mL / OUT: 1500 mL / NET: -550 mL    30 Oct 2024 07:01  -  30 Oct 2024 16:29  --------------------------------------------------------  IN: 480 mL / OUT: 0 mL / NET: 480 mL        PHYSICAL EXAM:  GENERAL: NAD, well-developed  HEAD:  Atraumatic, Normocephalic  EYES: EOMI, PERRLA, conjunctiva and sclera clear  NECK: Supple, No JVD  CHEST/LUNG: Clear to auscultation bilaterally; No wheeze  HEART: Regular rate and rhythm; No murmurs, rubs, or gallops  ABDOMEN: Soft, Nontender, Nondistended; Bowel sounds present  EXTREMITIES:  2+ Peripheral Pulses, No clubbing, cyanosis, or edema  PSYCH: AAOx3  NEUROLOGY: non-focal  SKIN: No rashes or lesions    LABS:                        10.4   9.59  )-----------( 308      ( 29 Oct 2024 15:02 )             35.1     10-29    134[L]  |  93[L]  |  43[H]  ----------------------------<  100[H]  4.6   |  26  |  7.65[H]    Ca    8.0[L]      29 Oct 2024 21:06    TPro  8.4[H]  /  Alb  4.0  /  TBili  0.3  /  DBili  x   /  AST  8[L]  /  ALT  6[L]  /  AlkPhos  351[H]  10-29          Urinalysis Basic - ( 29 Oct 2024 21:06 )    Color: x / Appearance: x / SG: x / pH: x  Gluc: 100 mg/dL / Ketone: x  / Bili: x / Urobili: x   Blood: x / Protein: x / Nitrite: x   Leuk Esterase: x / RBC: x / WBC x   Sq Epi: x / Non Sq Epi: x / Bacteria: x        RADIOLOGY & ADDITIONAL TESTS:    Imaging Personally Reviewed:    Consultant(s) Notes Reviewed:      Care Discussed with Consultants/Other Providers:

## 2024-10-30 NOTE — DISCHARGE NOTE NURSING/CASE MANAGEMENT/SOCIAL WORK - NSDCPEFALRISK_GEN_ALL_CORE
For information on Fall & Injury Prevention, visit: [email or web address] OR  [email or web address] OR  https://www.cdc.gov/steadi/patient.html

## 2024-10-30 NOTE — DISCHARGE NOTE NURSING/CASE MANAGEMENT/SOCIAL WORK - PATIENT PORTAL LINK FT
You can access the FollowMyHealth Patient Portal offered by [facility] by registering at the following website: [email or web address]. By joining [facility]’s FollowMyHealth portal, you will also be able to view your health information using other applications (apps) compatible with our system.

## 2024-10-30 NOTE — PROGRESS NOTE ADULT - REASON FOR ADMISSION
abdominal pain

## 2024-10-30 NOTE — PROGRESS NOTE ADULT - ASSESSMENT
77 y/o M with PMH of ESRD on HDTuThSat- last yesterday, orthostatic hypotension on midodrine baseline BP 80-90 systolic, COPD reportedly on home O2 2L, SAADIA on CPAP, wheelchair bound, chronic diastolic CHF, morbid obesity, pulmonary hypertension, PE and DVT on Eliquis, IVC filter, chronic sacral ulcer, chronic back pain, history of hematochezia in the past 2/2 hemorrhoids, chronic abd pain 2/2 IBS, chronic constipation presenting for severe abdominal pain for few weeks. Nephro on board for ESRD on HD    ESRD   On HD TTS via AVF  Nephrologist: Dr. Whalen   Outpt unit [facility]   Consent obtained in chart witnessed  Last HD on 10/23  HD in hosp on 10/24 UF 2 L removed, and on 10/26, 10/28  Will continue TTS schedule while hospitalized, HD tommorow  Renal diet.  Monitor BMP     Anemia   MELISSA with HD for hgb <11  transfuse for hgb <7  monitor Hgb     HTN/Hypotension  bp fluctuating; generally low.  On midodrine 40 mg TID and Droxidopa 200 mg TID  Monitor closely     CKD-MBD  check PTH  monitor P04 and Ca daily   Continue with renvela and continue with cinacalcet

## 2024-11-01 NOTE — ED PROVIDER NOTE - NS ED MD DISPO ADMIT [facility]
Patient calling back, states unsure if this appointment is really necessary. States she has seen Dr in hospital. Please call.    MEDICINE

## 2024-11-26 PROCEDURE — 83605 ASSAY OF LACTIC ACID: CPT

## 2024-11-26 PROCEDURE — 85018 HEMOGLOBIN: CPT

## 2024-11-26 PROCEDURE — 85025 COMPLETE CBC W/AUTO DIFF WBC: CPT

## 2024-11-26 PROCEDURE — 82435 ASSAY OF BLOOD CHLORIDE: CPT

## 2024-11-26 PROCEDURE — 85014 HEMATOCRIT: CPT

## 2024-11-26 PROCEDURE — 72125 CT NECK SPINE W/O DYE: CPT | Mod: MC

## 2024-11-26 PROCEDURE — 82803 BLOOD GASES ANY COMBINATION: CPT

## 2024-11-26 PROCEDURE — 74177 CT ABD & PELVIS W/CONTRAST: CPT | Mod: MC

## 2024-11-26 PROCEDURE — 82947 ASSAY GLUCOSE BLOOD QUANT: CPT

## 2024-11-26 PROCEDURE — 72128 CT CHEST SPINE W/O DYE: CPT | Mod: MC

## 2024-11-26 PROCEDURE — 80048 BASIC METABOLIC PNL TOTAL CA: CPT

## 2024-11-26 PROCEDURE — 83690 ASSAY OF LIPASE: CPT

## 2024-11-26 PROCEDURE — 99261: CPT

## 2024-11-26 PROCEDURE — 97162 PT EVAL MOD COMPLEX 30 MIN: CPT

## 2024-11-26 PROCEDURE — 82330 ASSAY OF CALCIUM: CPT

## 2024-11-26 PROCEDURE — 85027 COMPLETE CBC AUTOMATED: CPT

## 2024-11-26 PROCEDURE — 80053 COMPREHEN METABOLIC PANEL: CPT

## 2024-11-26 PROCEDURE — 84132 ASSAY OF SERUM POTASSIUM: CPT

## 2024-11-26 PROCEDURE — 84295 ASSAY OF SERUM SODIUM: CPT

## 2024-11-26 PROCEDURE — 99285 EMERGENCY DEPT VISIT HI MDM: CPT | Mod: 25

## 2024-11-26 PROCEDURE — 72131 CT LUMBAR SPINE W/O DYE: CPT | Mod: MC

## 2025-01-01 ENCOUNTER — INPATIENT (INPATIENT)
Facility: HOSPITAL | Age: 80
LOS: 0 days | End: 2025-05-07
Attending: HOSPITALIST | Admitting: HOSPITALIST
Payer: MEDICARE

## 2025-01-01 ENCOUNTER — TRANSCRIPTION ENCOUNTER (OUTPATIENT)
Age: 80
End: 2025-01-01

## 2025-01-01 VITALS
OXYGEN SATURATION: 93 % | TEMPERATURE: 98 F | DIASTOLIC BLOOD PRESSURE: 94 MMHG | HEART RATE: 93 BPM | WEIGHT: 210.1 LBS | RESPIRATION RATE: 20 BRPM | SYSTOLIC BLOOD PRESSURE: 178 MMHG

## 2025-01-01 VITALS
HEART RATE: 49 BPM | OXYGEN SATURATION: 96 % | DIASTOLIC BLOOD PRESSURE: 83 MMHG | TEMPERATURE: 98 F | SYSTOLIC BLOOD PRESSURE: 113 MMHG | RESPIRATION RATE: 19 BRPM

## 2025-01-01 DIAGNOSIS — Z98.89 OTHER SPECIFIED POSTPROCEDURAL STATES: Chronic | ICD-10-CM

## 2025-01-01 DIAGNOSIS — N18.6 END STAGE RENAL DISEASE: ICD-10-CM

## 2025-01-01 DIAGNOSIS — A41.9 SEPSIS, UNSPECIFIED ORGANISM: ICD-10-CM

## 2025-01-01 DIAGNOSIS — Z51.5 ENCOUNTER FOR PALLIATIVE CARE: ICD-10-CM

## 2025-01-01 DIAGNOSIS — I26.99 OTHER PULMONARY EMBOLISM WITHOUT ACUTE COR PULMONALE: ICD-10-CM

## 2025-01-01 DIAGNOSIS — R06.00 DYSPNEA, UNSPECIFIED: ICD-10-CM

## 2025-01-01 DIAGNOSIS — R52 PAIN, UNSPECIFIED: ICD-10-CM

## 2025-01-01 DIAGNOSIS — Z71.89 OTHER SPECIFIED COUNSELING: ICD-10-CM

## 2025-01-01 DIAGNOSIS — J44.9 CHRONIC OBSTRUCTIVE PULMONARY DISEASE, UNSPECIFIED: ICD-10-CM

## 2025-01-01 DIAGNOSIS — I50.33 ACUTE ON CHRONIC DIASTOLIC (CONGESTIVE) HEART FAILURE: ICD-10-CM

## 2025-01-01 DIAGNOSIS — R56.9 UNSPECIFIED CONVULSIONS: ICD-10-CM

## 2025-01-01 DIAGNOSIS — I10 ESSENTIAL (PRIMARY) HYPERTENSION: ICD-10-CM

## 2025-01-01 LAB
ADD ON TEST-SPECIMEN IN LAB: SIGNIFICANT CHANGE UP
ALBUMIN SERPL ELPH-MCNC: 3.3 G/DL — SIGNIFICANT CHANGE UP (ref 3.3–5)
ALP SERPL-CCNC: 384 U/L — HIGH (ref 40–120)
ALT FLD-CCNC: 5 U/L — SIGNIFICANT CHANGE UP (ref 4–41)
ANION GAP SERPL CALC-SCNC: 15 MMOL/L — HIGH (ref 7–14)
ANISOCYTOSIS BLD QL: SIGNIFICANT CHANGE UP
APTT BLD: 34.4 SEC — SIGNIFICANT CHANGE UP (ref 26.1–36.8)
AST SERPL-CCNC: 10 U/L — SIGNIFICANT CHANGE UP (ref 4–40)
BASOPHILS # BLD AUTO: 0 K/UL — SIGNIFICANT CHANGE UP (ref 0–0.2)
BASOPHILS NFR BLD AUTO: 0 % — SIGNIFICANT CHANGE UP (ref 0–2)
BILIRUB SERPL-MCNC: 0.2 MG/DL — SIGNIFICANT CHANGE UP (ref 0.2–1.2)
BLOOD GAS VENOUS COMPREHENSIVE RESULT: SIGNIFICANT CHANGE UP
BUN SERPL-MCNC: 37 MG/DL — HIGH (ref 7–23)
CALCIUM SERPL-MCNC: 6.8 MG/DL — LOW (ref 8.4–10.5)
CHLORIDE SERPL-SCNC: 99 MMOL/L — SIGNIFICANT CHANGE UP (ref 98–107)
CO2 SERPL-SCNC: 25 MMOL/L — SIGNIFICANT CHANGE UP (ref 22–31)
CREAT SERPL-MCNC: 8.64 MG/DL — HIGH (ref 0.5–1.3)
DACRYOCYTES BLD QL SMEAR: SLIGHT — SIGNIFICANT CHANGE UP
EGFR: 6 ML/MIN/1.73M2 — LOW
EGFR: 6 ML/MIN/1.73M2 — LOW
EOSINOPHIL # BLD AUTO: 0.99 K/UL — HIGH (ref 0–0.5)
EOSINOPHIL NFR BLD AUTO: 11.3 % — HIGH (ref 0–6)
FLUAV AG NPH QL: SIGNIFICANT CHANGE UP
FLUBV AG NPH QL: SIGNIFICANT CHANGE UP
GIANT PLATELETS BLD QL SMEAR: PRESENT — SIGNIFICANT CHANGE UP
GLUCOSE SERPL-MCNC: 100 MG/DL — HIGH (ref 70–99)
HCT VFR BLD CALC: 33 % — LOW (ref 39–50)
HGB BLD-MCNC: 9 G/DL — LOW (ref 13–17)
HYPOCHROMIA BLD QL: SLIGHT — SIGNIFICANT CHANGE UP
IANC: 5.49 K/UL — SIGNIFICANT CHANGE UP (ref 1.8–7.4)
INR BLD: 1.31 RATIO — HIGH (ref 0.85–1.16)
LYMPHOCYTES # BLD AUTO: 1.14 K/UL — SIGNIFICANT CHANGE UP (ref 1–3.3)
LYMPHOCYTES # BLD AUTO: 13 % — SIGNIFICANT CHANGE UP (ref 13–44)
MACROCYTES BLD QL: SLIGHT — SIGNIFICANT CHANGE UP
MANUAL SMEAR VERIFICATION: SIGNIFICANT CHANGE UP
MCHC RBC-ENTMCNC: 25.4 PG — LOW (ref 27–34)
MCHC RBC-ENTMCNC: 27.3 G/DL — LOW (ref 32–36)
MCV RBC AUTO: 93 FL — SIGNIFICANT CHANGE UP (ref 80–100)
MICROCYTES BLD QL: SLIGHT — SIGNIFICANT CHANGE UP
MONOCYTES # BLD AUTO: 0.76 K/UL — SIGNIFICANT CHANGE UP (ref 0–0.9)
MONOCYTES NFR BLD AUTO: 8.7 % — SIGNIFICANT CHANGE UP (ref 2–14)
MYELOCYTES NFR BLD: 0.9 % — HIGH (ref 0–0)
NEUTROPHILS # BLD AUTO: 5.48 K/UL — SIGNIFICANT CHANGE UP (ref 1.8–7.4)
NEUTROPHILS NFR BLD AUTO: 62.6 % — SIGNIFICANT CHANGE UP (ref 43–77)
NRBC # BLD: 1 /100 WBCS — HIGH (ref 0–0)
NRBC BLD-RTO: 1 /100 WBCS — HIGH (ref 0–0)
OVALOCYTES BLD QL SMEAR: SLIGHT — SIGNIFICANT CHANGE UP
PLAT MORPH BLD: ABNORMAL
PLATELET # BLD AUTO: 202 K/UL — SIGNIFICANT CHANGE UP (ref 150–400)
PLATELET COUNT - ESTIMATE: NORMAL — SIGNIFICANT CHANGE UP
POIKILOCYTOSIS BLD QL AUTO: SLIGHT — SIGNIFICANT CHANGE UP
POLYCHROMASIA BLD QL SMEAR: SLIGHT — SIGNIFICANT CHANGE UP
POTASSIUM SERPL-MCNC: 4.5 MMOL/L — SIGNIFICANT CHANGE UP (ref 3.5–5.3)
POTASSIUM SERPL-SCNC: 4.5 MMOL/L — SIGNIFICANT CHANGE UP (ref 3.5–5.3)
PROT SERPL-MCNC: 7.5 G/DL — SIGNIFICANT CHANGE UP (ref 6–8.3)
PROTHROM AB SERPL-ACNC: 15.1 SEC — HIGH (ref 9.9–13.4)
RBC # BLD: 3.55 M/UL — LOW (ref 4.2–5.8)
RBC # FLD: 17.2 % — HIGH (ref 10.3–14.5)
RBC BLD AUTO: SIGNIFICANT CHANGE UP
RSV RNA NPH QL NAA+NON-PROBE: SIGNIFICANT CHANGE UP
SARS-COV-2 RNA SPEC QL NAA+PROBE: SIGNIFICANT CHANGE UP
SODIUM SERPL-SCNC: 139 MMOL/L — SIGNIFICANT CHANGE UP (ref 135–145)
SOURCE RESPIRATORY: SIGNIFICANT CHANGE UP
TROPONIN T, HIGH SENSITIVITY RESULT: 173 NG/L — CRITICAL HIGH
VARIANT LYMPHS # BLD: 3.5 % — SIGNIFICANT CHANGE UP (ref 0–6)
VARIANT LYMPHS NFR BLD MANUAL: 3.5 % — SIGNIFICANT CHANGE UP (ref 0–6)
WBC # BLD: 8.76 K/UL — SIGNIFICANT CHANGE UP (ref 3.8–10.5)
WBC # FLD AUTO: 8.76 K/UL — SIGNIFICANT CHANGE UP (ref 3.8–10.5)

## 2025-01-01 PROCEDURE — 70450 CT HEAD/BRAIN W/O DYE: CPT | Mod: 26,XU

## 2025-01-01 PROCEDURE — 99233 SBSQ HOSP IP/OBS HIGH 50: CPT

## 2025-01-01 PROCEDURE — 99284 EMERGENCY DEPT VISIT MOD MDM: CPT

## 2025-01-01 PROCEDURE — 99497 ADVNCD CARE PLAN 30 MIN: CPT

## 2025-01-01 PROCEDURE — 70498 CT ANGIOGRAPHY NECK: CPT | Mod: 26

## 2025-01-01 PROCEDURE — 99223 1ST HOSP IP/OBS HIGH 75: CPT

## 2025-01-01 PROCEDURE — 99239 HOSP IP/OBS DSCHRG MGMT >30: CPT

## 2025-01-01 PROCEDURE — 99291 CRITICAL CARE FIRST HOUR: CPT

## 2025-01-01 PROCEDURE — 70496 CT ANGIOGRAPHY HEAD: CPT | Mod: 26

## 2025-01-01 PROCEDURE — 71045 X-RAY EXAM CHEST 1 VIEW: CPT | Mod: 26

## 2025-01-01 RX ORDER — LIDOCAINE HYDROCHLORIDE 20 MG/ML
1 JELLY TOPICAL
Qty: 0 | Refills: 0 | DISCHARGE
Start: 2025-01-01

## 2025-01-01 RX ORDER — MIDAZOLAM IN 0.9 % SOD.CHLORID 1 MG/ML
5 PLASTIC BAG, INJECTION (ML) INTRAVENOUS ONCE
Refills: 0 | Status: DISCONTINUED | OUTPATIENT
Start: 2025-01-01 | End: 2025-01-01

## 2025-01-01 RX ORDER — LEVETIRACETAM 10 MG/ML
1500 INJECTION, SOLUTION INTRAVENOUS ONCE
Refills: 0 | Status: DISCONTINUED | OUTPATIENT
Start: 2025-01-01 | End: 2025-01-01

## 2025-01-01 RX ORDER — LORAZEPAM 4 MG/ML
1 VIAL (ML) INJECTION ONCE
Refills: 0 | Status: DISCONTINUED | OUTPATIENT
Start: 2025-01-01 | End: 2025-01-01

## 2025-01-01 RX ORDER — GLYCOPYRROLATE 0.2 MG/ML
0.4 INJECTION INTRAMUSCULAR; INTRAVENOUS EVERY 6 HOURS
Refills: 0 | Status: DISCONTINUED | OUTPATIENT
Start: 2025-01-01 | End: 2025-01-01

## 2025-01-01 RX ORDER — LEVETIRACETAM 10 MG/ML
1000 INJECTION, SOLUTION INTRAVENOUS EVERY 12 HOURS
Refills: 0 | Status: DISCONTINUED | OUTPATIENT
Start: 2025-01-01 | End: 2025-01-01

## 2025-01-01 RX ORDER — LEVETIRACETAM 10 MG/ML
10 INJECTION, SOLUTION INTRAVENOUS
Qty: 0 | Refills: 0 | DISCHARGE
Start: 2025-01-01

## 2025-01-01 RX ORDER — NOREPINEPHRINE BITARTRATE 8 MG
0.05 SOLUTION INTRAVENOUS
Qty: 8 | Refills: 0 | Status: DISCONTINUED | OUTPATIENT
Start: 2025-01-01 | End: 2025-01-01

## 2025-01-01 RX ORDER — LIDOCAINE HYDROCHLORIDE 20 MG/ML
1 JELLY TOPICAL EVERY 24 HOURS
Refills: 0 | Status: DISCONTINUED | OUTPATIENT
Start: 2025-01-01 | End: 2025-01-01

## 2025-01-01 RX ORDER — ACETAMINOPHEN 500 MG/5ML
1000 LIQUID (ML) ORAL ONCE
Refills: 0 | Status: COMPLETED | OUTPATIENT
Start: 2025-01-01 | End: 2025-01-01

## 2025-01-01 RX ORDER — LORAZEPAM 4 MG/ML
1 VIAL (ML) INJECTION
Qty: 0 | Refills: 0 | DISCHARGE
Start: 2025-01-01

## 2025-01-01 RX ORDER — LORAZEPAM 4 MG/ML
1 VIAL (ML) INJECTION
Refills: 0 | Status: DISCONTINUED | OUTPATIENT
Start: 2025-01-01 | End: 2025-01-01

## 2025-01-01 RX ORDER — CEFTRIAXONE 500 MG/1
1000 INJECTION, POWDER, FOR SOLUTION INTRAMUSCULAR; INTRAVENOUS ONCE
Refills: 0 | Status: COMPLETED | OUTPATIENT
Start: 2025-01-01 | End: 2025-01-01

## 2025-01-01 RX ORDER — AZITHROMYCIN 250 MG
500 CAPSULE ORAL ONCE
Refills: 0 | Status: COMPLETED | OUTPATIENT
Start: 2025-01-01 | End: 2025-01-01

## 2025-01-01 RX ORDER — GLYCOPYRROLATE 0.2 MG/ML
0.4 INJECTION INTRAMUSCULAR; INTRAVENOUS
Qty: 0 | Refills: 0 | DISCHARGE
Start: 2025-01-01

## 2025-01-01 RX ORDER — IPRATROPIUM BROMIDE AND ALBUTEROL SULFATE .5; 2.5 MG/3ML; MG/3ML
3 SOLUTION RESPIRATORY (INHALATION) ONCE
Refills: 0 | Status: COMPLETED | OUTPATIENT
Start: 2025-01-01 | End: 2025-01-01

## 2025-01-01 RX ORDER — HYDROMORPHONE/SOD CHLOR,ISO/PF 2 MG/10 ML
0.5 SYRINGE (ML) INJECTION
Refills: 0 | Status: DISCONTINUED | OUTPATIENT
Start: 2025-01-01 | End: 2025-01-01

## 2025-01-01 RX ORDER — PHENYLEPHRINE HCL IN 0.9% NACL 0.5 MG/5ML
300 SYRINGE (ML) INTRAVENOUS ONCE
Refills: 0 | Status: COMPLETED | OUTPATIENT
Start: 2025-01-01 | End: 2025-01-01

## 2025-01-01 RX ORDER — HYDROMORPHONE/SOD CHLOR,ISO/PF 2 MG/10 ML
0.5 SYRINGE (ML) INJECTION
Qty: 0 | Refills: 0 | DISCHARGE
Start: 2025-01-01

## 2025-01-01 RX ADMIN — CEFTRIAXONE 100 MILLIGRAM(S): 500 INJECTION, POWDER, FOR SOLUTION INTRAMUSCULAR; INTRAVENOUS at 08:03

## 2025-01-01 RX ADMIN — Medication 1 MILLIGRAM(S): at 12:49

## 2025-01-01 RX ADMIN — Medication 500 MILLILITER(S): at 07:17

## 2025-01-01 RX ADMIN — NOREPINEPHRINE BITARTRATE 9.84 MICROGRAM(S)/KG/MIN: 8 SOLUTION at 07:50

## 2025-01-01 RX ADMIN — NOREPINEPHRINE BITARTRATE 0.05 MICROGRAM(S)/KG/MIN: 8 SOLUTION at 07:40

## 2025-01-01 RX ADMIN — Medication 400 MILLIGRAM(S): at 08:21

## 2025-01-01 RX ADMIN — IPRATROPIUM BROMIDE AND ALBUTEROL SULFATE 3 MILLILITER(S): .5; 2.5 SOLUTION RESPIRATORY (INHALATION) at 09:51

## 2025-01-01 RX ADMIN — Medication 300 MICROGRAM(S): at 07:10

## 2025-01-01 RX ADMIN — LEVETIRACETAM 1000 MILLIGRAM(S): 10 INJECTION, SOLUTION INTRAVENOUS at 17:32

## 2025-01-01 RX ADMIN — Medication 0.5 MILLIGRAM(S): at 09:36

## 2025-01-01 RX ADMIN — Medication 1 MILLIGRAM(S): at 09:45

## 2025-01-01 RX ADMIN — Medication 2 MILLIGRAM(S): at 12:49

## 2025-01-01 RX ADMIN — LEVETIRACETAM 1000 MILLIGRAM(S): 10 INJECTION, SOLUTION INTRAVENOUS at 06:07

## 2025-01-01 RX ADMIN — Medication 0.5 MILLIGRAM(S): at 06:42

## 2025-01-01 RX ADMIN — LEVETIRACETAM 1000 MILLIGRAM(S): 10 INJECTION, SOLUTION INTRAVENOUS at 07:51

## 2025-01-01 RX ADMIN — Medication 250 MILLIGRAM(S): at 08:39

## 2025-01-01 RX ADMIN — Medication 0.5 MILLIGRAM(S): at 16:08

## 2025-01-01 RX ADMIN — Medication 0.5 MILLIGRAM(S): at 06:06

## 2025-01-01 RX ADMIN — Medication 5 MILLIGRAM(S): at 06:52

## 2025-01-29 NOTE — PHYSICAL THERAPY INITIAL EVALUATION ADULT - ASR WT BEARING STATUS EVAL
Anesthesia Post Evaluation    Patient: Patricia Ramirez    Procedure(s) Performed: Procedure(s) (LRB):  ERCP (ENDOSCOPIC RETROGRADE CHOLANGIOPANCREATOGRAPHY) (N/A)    Final Anesthesia Type: general      Patient location during evaluation: PACU  Patient participation: Yes- Able to Participate  Level of consciousness: awake and alert and oriented  Post-procedure vital signs: reviewed and stable  Pain management: adequate  Airway patency: patent  JAMAR mitigation strategies: Multimodal analgesia, Extubation while patient is awake and Verification of full reversal of neuromuscular block  PONV status at discharge: No PONV  Anesthetic complications: no      Cardiovascular status: hemodynamically stable  Respiratory status: spontaneous ventilation and unassisted  Hydration status: euvolemic  Follow-up not needed.              Vitals Value Taken Time   /80 01/29/25 1200   Temp 36.8 °C (98.2 °F) 01/29/25 1130   Pulse 91 01/29/25 1200   Resp 20 01/29/25 1200   SpO2 98 % 01/29/25 1200         Event Time   Out of Recovery 11:26:51         Pain/Ede Score: Ede Score: 10 (1/29/2025 12:00 PM)          
no weight-bearing restrictions

## 2025-02-19 NOTE — DISCHARGE NOTE PROVIDER - NSDCCAREPROVSEEN_GEN_ALL_CORE_FT
Called patient, and left a message, to convey normal mammogram results. Radiologist recommends continuation of routine screenings.   Vilma Rivera

## 2025-03-19 RX ORDER — NEOMYCIN AND POLYMYXIN B SULFATES AND HYDROCORTISONE OTIC 10; 3.5; 1 MG/ML; MG/ML; [USP'U]/ML
3.5-10000-1 SUSPENSION AURICULAR (OTIC)
Qty: 2 | Refills: 2 | Status: ACTIVE | COMMUNITY
Start: 2025-03-19 | End: 1900-01-01

## 2025-03-28 NOTE — ED ADULT NURSE NOTE - NIH STROKE SCALE: 4. FACIAL PALSY, QM
Refill:    amphetamine-dextroamphetamine (ADDERALL) 20 MG tablet (02/28/2025)    Upcoming APPT  Appointment with Willy Wright MD (04/02/2025)       LOV:  Appointment with Willy Wright MD (03/25/2025)     Consent:  CONTROLLED SUBSTANCE AGREEMENT - SCAN - CONTROLLED SUBSTANCE AGREEMENT, 03/29/2023 (03/29/2023)     Last Urine:  Urine Drug Screen - Urine, Clean Catch (12/19/2024 14:38)   
(0) Normal symmetrical movements

## 2025-04-02 ENCOUNTER — APPOINTMENT (OUTPATIENT)
Dept: OTOLARYNGOLOGY | Facility: CLINIC | Age: 80
End: 2025-04-02
Payer: MEDICARE

## 2025-04-02 DIAGNOSIS — H61.23 IMPACTED CERUMEN, BILATERAL: ICD-10-CM

## 2025-04-02 PROCEDURE — 69210 REMOVE IMPACTED EAR WAX UNI: CPT

## 2025-05-06 NOTE — ED ADULT NURSE REASSESSMENT NOTE - NS ED NURSE REASSESS COMMENT FT1
24-Mar-2023 22:00 Float RN  Received pt in bed, unresponsive, withdraws extremities to painful stimulation, on monitor with sinus arrythmia noted, VS as noted, Spoke with Rakan Ace, plan to restart Norepinephrine, and possible admission for palliative care. IV in place 18 G right AC, flushed well, no SSx of infection or infiltration noted, Norepinephrine started to 18 G  right AC at 0.05 mcg/kg/min via pump, 20 G right hand in place, flushed well, medications administered as per order via  IV pump, no tonic clonic or seizure like activities noted. Pending further plan, disposition. Float RN  Received pt in bed, unresponsive, withdraws extremities to painful stimulation, on monitor with sinus arrythmia noted, VS as noted, Spoke with Rakan Ace, plan to restart Norepinephrine, and possible admission for palliative care. IV in place 18 G right AC, flushed well, no SSx of infection or infiltration noted, Norepinephrine started to 18 G  right AC at 0.05 mcg/kg/min via pump with BP goal of MAP 60 mmHg. IV 20 G right hand in place, flushed well, medications administered as per order via  IV pump, no tonic clonic or seizure like activities noted. Pending further plan, disposition.

## 2025-05-06 NOTE — H&P ADULT - CONVERSATION DETAILS
Discussed goals of care with wife and 2 children at bedside. Patient found to have acute seizures, septic shock, acute respiratory failure with acidosis, NSTEMI superimposed on chronic COPD, CHF, epilepsy, PE/DVT. Patient has been bedbound/wheelchair bound for 1 year. Patient has previously stated many times he does not want to be hooked up to machines. Patient was initially code stroke and started on norepinephrine for ICU admission. However goals of care were changed and patient is comfort care only. Family would like patient to go to inpatient hospice, but staff believe patient is too unstable for inpatient hospice. Patient will be admitted to medicine for comfort directed care and possible transition to hospice.

## 2025-05-06 NOTE — ED PROVIDER NOTE - ATTENDING CONTRIBUTION TO CARE
79y m hx ESRD on HD TuThSat- last HD was 10/22, has baseline orthostatic hypotension on Droxidopa AND midodrine baseline BP 60-80 systolic, COPD,  on home O2 2L, SAADIA on CPAP, wheelchair bound, IBS w chronic abd pain, chronic diastolic CHF, morbid obesity, sacral decub ulcer, pulmonary hypertension, hemorrhoids, IBS,  PE and DVT on Eliquis presents for ams as per son, around 3 am son came home saw him fidgeting and confused on phone. concern for seizure. prior seizure like activity in setting of first baclofen which he is no longer on, and then has had focal seizures since on keppra wife does not know etiology. as per wife since sunday pt has been more tired, sleeping a lot, and had a productive cough. denies any fevers, n/v, diarrhea. patient came in as code stroke eval by Dr. Mike given versed on arrival for presumptive seizure (twitching of UE) 79y m hx ESRD on HD TuThSat- last HD was 10/22, has baseline orthostatic hypotension on Droxidopa AND midodrine baseline BP 60-80 systolic, COPD,  on home O2 2L, SAADIA on CPAP, wheelchair bound, IBS w chronic abd pain, chronic diastolic CHF, morbid obesity, sacral decub ulcer, pulmonary hypertension, hemorrhoids, IBS,  PE and DVT on Eliquis presents for ams as per son, around 3 am son came home saw him fidgeting and confused on phone. concern for seizure. prior seizure like activity in setting of first baclofen which he is no longer on, and then has had focal seizures since on keppra wife does not know etiology. as per wife since sunday pt has been more tired, sleeping a lot, and had a productive cough. denies any fevers, n/v, diarrhea. patient came in as code stroke eval by Dr. Mike given versed on arrival for presumptive seizure (twitching of UE)  pt acutely ill with low bp, lower then usual baseline, with recurrent focal seizure like activity. pt became very drowsy with intervention of versed. pt sat dropped but improved with o2, placed on end tidal. pt DNR/DNI, at this point noclinical indication for intubation but pt critically ill with potential for acute decompensation.

## 2025-05-06 NOTE — ED ADULT NURSE REASSESSMENT NOTE - NS ED NURSE REASSESS COMMENT FT1
Patient is lethargic at this time, hypotensive, NSR on CM,(+) wet cough, (+) b/l pitting edema to b/l lower extremities, IV patent, does not appear to be in pain, float RN Allen at bedside, pending further directions regarding levophed drip, wife at bedside, safety maintained

## 2025-05-06 NOTE — CONSULT NOTE ADULT - PROBLEM SELECTOR RECOMMENDATION 7
In the event of newly developing, evolving, or worsening symptoms, please contact the Palliative Medicine team via pager (if the patient is at [facility] #[phone] or if the patient is at [facility] #[phone]) The Geriatric and Palliative Medicine service has coverage 24 hours a day/ 7 days a week to provide medical recommendations regarding symptom management needs via telephone.

## 2025-05-06 NOTE — ED PROVIDER NOTE - CLINICAL SUMMARY MEDICAL DECISION MAKING FREE TEXT BOX
79y m hx ESRD on HD TuThSat- last HD was 10/22, has baseline orthostatic hypotension on Droxidopa AND midodrine baseline BP 60-80 systolic, COPD,  on home O2 2L, SAADIA on CPAP, wheelchair bound, IBS w chronic abd pain, chronic diastolic CHF, morbid obesity, sacral decub ulcer, pulmonary hypertension, hemorrhoids, IBS,  PE and DVT on Eliquis presents for ams as per son, around 3 am son came home saw him fidgeting and confused on phone.  patient came in as code stroke eval by Dr. Mike given versed on arrival for presumptive seizure (twitching of UE). pt in room was hypotensive systolics in 60s/30s, pt on droxi and midodrine at home did not take this morning, HR 80s regular, saturating 93% on NC which he is on at home, pt appears lethargic, responsive to pain, pt placed on end tidal and given push dose pressors, pt still hypotensive so bedside US performed showing a line predominant b/l, reduced squeeze no pericardial effusion, large RV, and flat IVC, will trial slow ivc given concern for infection dehydration and potential sepsis given sx of lethargy ams and productive cough, think pt hypotension is likely 2/2 to not getting meds in conjunction w sepsis, rather then other etiologies for shock. cth looks grossly unremarkable, do not think this was seizure but rather hypoperfusion etiology for shaking, but will proceed w working up for ams likely in setting of infection. 79y m hx ESRD on HD TuThSat- last HD was 10/22, has baseline orthostatic hypotension on Droxidopa AND midodrine baseline BP 60-80 systolic, COPD,  on home O2 2L, SAADIA on CPAP, wheelchair bound, IBS w chronic abd pain, chronic diastolic CHF, morbid obesity, sacral decub ulcer, pulmonary hypertension, hemorrhoids, IBS,  PE and DVT on Eliquis presents for ams as per son, around 3 am son came home saw him fidgeting and confused on phone.  patient came in as code stroke eval by Dr. Mike given versed on arrival for presumptive seizure (twitching of UE and face) pt has hx of seizures first 2/2 to baclofen, and then after wife does not know why but is on keppra. pt in room was hypotensive systolics in 60s/30s, pt on droxi and midodrine at home did not take this morning, HR 80s regular, saturating 93% on NC which he is on at home, pt appears lethargic, responsive to pain, pt placed on end tidal and given push dose pressors, pt still hypotensive so bedside US performed showing a line predominant b/l, reduced squeeze no pericardial effusion, large RV, and flat IVC, concern for toxic metabolic etiology vs neurologic, potential sepsis given sx of lethargy ams and productive cough, think pt hypotension is likely 2/2 to not getting meds in conjunction w infection rather then other etiologies for shock vs uremia although unlikely last dilaysis was saturday (full session). cth looks grossly unremarkable, do not think this was seizure but rather hypoperfusion etiology for shaking, but will proceed w working up for ams likely in setting of infection.

## 2025-05-06 NOTE — ED ADULT TRIAGE NOTE - CHIEF COMPLAINT QUOTE
pt c/o left sided facial drop and tremors since 3am. LKN midnight. + Left sided facial droop per sob. Per son. at baseline pt weaker on left side. Hx: copd(on O2), HTN, TIA, Seizures, ESRD(T,TH, SAT), L AV Fistula

## 2025-05-06 NOTE — H&P ADULT - PROBLEM SELECTOR PLAN 2
- cw IV keppra - cw IV keppra 500 q12h  - prn ativan  - seizure precautions  - monitor for seizures - cw IV keppra 1g q12h  - prn ativan  - seizure precautions  - monitor for seizures

## 2025-05-06 NOTE — CONSULT NOTE ADULT - PROBLEM SELECTOR RECOMMENDATION 3
Patient has been on HD since 2017. Per discussion with family, he has been having more difficulty tolerating HD due to hypotension.   > Family in agreement with stopping Levophed and they understand that he is not going to receive further HD   > COMFORT CARE Patient has been on HD since 2017. Per discussion with family, he has been having more difficulty tolerating HD due to hypotension.   > Family in agreement with stopping Levophed and they understand that he is not going to receive further HD   > COMFORT CARE only

## 2025-05-06 NOTE — CONSULT NOTE ADULT - CRITICAL CARE ATTENDING COMMENT
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 Not tnk or thrombectomy candidate.   Noted to have multiple seizures in the ED. Got versed and 1.5g Keppra load with resolution of seizures  Currently on pressors, very tachypneic and remains hypotensive - was 60/30s prior to pressors  Diffusely weak, speech is severely dysarthric  CTH unrevealing for acute pathology   Pt was made comfort care by wife earlier this morning, has MOLST with DNR/I  Will remain available as needed

## 2025-05-06 NOTE — ED ADULT NURSE NOTE - NSFALLRISKINTERV_ED_ALL_ED
Assistance OOB with selected safe patient handling equipment if applicable/Assistance with ambulation/Communicate fall risk and risk factors to all staff, patient, and family/Monitor gait and stability/Monitor for mental status changes and reorient to person, place, and time, as needed/Provide visual cue: yellow wristband, yellow gown, etc/Reinforce activity limits and safety measures with patient and family/Toileting schedule using arm’s reach rule for commode and bathroom/Use of alarms - bed, stretcher, chair and/or video monitoring/Call bell, personal items and telephone in reach/Instruct patient to call for assistance before getting out of bed/chair/stretcher/Non-slip footwear applied when patient is off stretcher/[place] to call system/Physically safe environment - no spills, clutter or unnecessary equipment/Purposeful Proactive Rounding/Room/bathroom lighting operational, light cord in reach

## 2025-05-06 NOTE — ED ADULT NURSE NOTE - OBJECTIVE STATEMENT
Heather RN: Pt received at CT scan. Pt alert and oriented x 0. Hx of COPD, HTN, TIA, seizures, ESRD (T,Th, Sat) left AV fistula. Son at bedside reports pt began to have a left sided facial droop and tremors around 3am. LKW midnight. Per son, pt is weaker on the left side at baseline. Heather RN Rimi at bedside. On assessment  pt tremulous in upper extremities, Rashid HUGO at bedside, Pt received on 3L NC, respirations even and unlabored. NSR on the monitor, hypotensive to 60s /30s, Rashid HUGO aware. 18G IV in the right AC and 20G IV in the right hand, labs drawn per MD orders. Heather RN: Pt received at CT scan. Pt alert and oriented x 0. Hx of COPD, HTN, TIA, seizures, ESRD (T,Th, Sat) left AV fistula. Son at bedside reports pt began to have a left sided facial droop and tremors around 3am. LKW midnight. Per son, pt is weaker on the left side at baseline. Heather RN Rimi at bedside. On assessment  pt tremulous in upper extremities, Rashid HUGO at bedside. Pt received on 3L NC, respirations even and unlabored. NSR on the monitor, hypotensive to 60s /30s, Rashid HUGO aware. 18G IV in the right AC and 20G IV in the right hand, labs drawn per MD orders.

## 2025-05-06 NOTE — CONSULT NOTE ADULT - ASSESSMENT
LKW: 12:00AM 5/6  NIHSS: 23  Baseline mRS: 4  Not a Teneceteplase candidate due to out of window, concern for seizure   Not a thrombectomy candidate due to no LVO     Impression:  Reported to have few weeks of left sided weakness, now presenting with acute left facial droop, facial twitching and RUE jerking in patient with hx of seizure disorder- similar seizure semiology per wife. Abnormal movements terminated with administration of midazolam. Symptoms localize to right brain dysfunction. 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 Differential includes ischemic infarct with superimposed seizure. Patient is also significantly hypotensive in the ED. He has chronic hx of hypotension, however should evaluate for possible infectious etiology     Recommendations: INCOMPLETE    - MR brain w/o to rule out acute infarct   - 24hr EEG   - Treat any underlying infectious/metabolic abnormalities per ED/primary team   - Allow for permissive HTN up to   - Continue home Eliquis   - Seizure, fall, and aspiration precautions     Discussed with stroke fellow Dr. Peters under supervision of attending Dr. Libman regarding decision against candidacy for Tenecteplase / thrombectomy. Will be formally staffed on morning rounds with attending Dr. Webster. Recommendations will be complete once signed by attending.     MAYE ZUNIGA is a 79y (1945) man with a PMH significant for ESRD on HD, orthostatic hypotension on midodrine baseline BP 80-90 systolic, COPD reportedly on home O2 2L, SAADIA on CPAP, wheelchair bound, chronic diastolic CHF, morbid obesity, pulmonary hypertension, PE and DVT on Eliquis, h/o TIA, neuropathy, lumbar stenosis, seizure disorder on Keppra, who presents with left facial weakness, left facial twitching and left arm jerking., Code stroke called in ED.     LKW: 12:00AM 5/6  NIHSS: 23  Baseline mRS: 4  Not a Tenecteplase candidate due to out of window, concern for seizure   Not a thrombectomy candidate due to no LVO     Impression:  Reported to have few weeks of left sided weakness, now presenting with acute left facial droop, facial twitching and RUE jerking in patient with hx of seizure disorder- similar seizure semiology per wife. Abnormal movements terminated with administration of midazolam initially but now with focal motor status. Symptoms localize to right brain dysfunction. 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 Differential includes ischemic infarct with superimposed seizure. Patient is DNR/DNI per wife. Given complex medical co-morbidities, risk/benefit discussion was held with spouse regarding treatment with additional anti-seizure medication which may worsen respiratory status and hypotension. He was transitioned to comfort care     Recommendations:     - As patient is comfort care, consider trial of Ativan to possibly control seizure. Spouse aware of risk of respiratory depression   - Continue Keppra 500mg BID  - Treat underlying infectious/metabolic abnormalities per ED/primary team   - Continue home Eliquis   - Seizure, fall, and aspiration precautions   - Palliative consult     Case discussed with Epilepsy fellow Dr. Valadez.   Discussed with stroke fellow Dr. Peters under supervision of attending Dr. Libman regarding decision against candidacy for Tenecteplase / thrombectomy. Will be formally staffed on morning rounds with attending Dr. Webster. Recommendations will be complete once signed by attending.     MAYE ZUNIGA is a 79y (1945) man with a PMH significant for ESRD on HD, orthostatic hypotension on midodrine baseline BP 80-90 systolic, COPD reportedly on home O2 2L, SAADIA on CPAP, wheelchair bound, chronic diastolic CHF, morbid obesity, pulmonary hypertension, PE and DVT on Eliquis, h/o TIA, neuropathy, lumbar stenosis, seizure disorder on Keppra, who presents with left facial weakness, left facial twitching and left arm jerking., Code stroke called in ED.     LKW: 12:00AM 5/6  NIHSS: 23  Baseline mRS: 4  Not a Tenecteplase candidate due to out of window, concern for seizure   Not a thrombectomy candidate due to no LVO     Impression:  Reported to have few weeks of left sided weakness, now presenting with acute left facial droop, facial twitching and RUE jerking in patient with hx of seizure disorder- similar seizure semiology per wife. Abnormal movements terminated with administration of midazolam initially but now with focal motor status. Symptoms localize to right brain dysfunction. 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 Differential includes ischemic infarct with superimposed seizure. Patient is DNR/DNI per wife. Given complex medical co-morbidities, risk/benefit discussion was held with spouse regarding treatment with additional anti-seizure medication which may worsen respiratory status and hypotension. He was transitioned to comfort care     Recommendations:     - As patient is comfort care, consider trial of Ativan to possibly control seizure. Spouse aware of risk of respiratory depression   - Continue Keppra 500mg BID  - Treat underlying infectious/metabolic abnormalities per ED/primary team   - Continue home Eliquis   - Seizure, fall, and aspiration precautions   - Palliative consult     Case discussed with Epilepsy fellow Dr. Valadez.   Discussed with stroke fellow Dr. Peters under supervision of attending Dr. Webster regarding decision against candidacy for Tenecteplase / thrombectomy.

## 2025-05-06 NOTE — CONSULT NOTE ADULT - SUBJECTIVE AND OBJECTIVE BOX
Neurology - Consult Note    -  Spectra: [phone] ([facility]), [phone] ([facility])  -    HPI: Patient MAYE ZUNIGA is a 79y (1945) man with a PMH significant for     79 y/o M with ESRD on HDTuThSat, orthostatic hypotension on midodrine baseline BP 80-90 systolic, COPD reportedly on home O2 2L, SAADIA on CPAP, wheelchair bound, chronic diastolic CHF, morbid obesity, pulmonary hypertension, PE and DVT on Eliquis, h/o TIA, neuropathy who presents with worsening abd pain and SOB.  now out of ICU         Review of Systems:  All other review of systems is negative unless indicated above.    Allergies:  baclofen (Other)  latex (Rash)      PMHx/PSHx/Family Hx: As above, otherwise see below   Hypertension  Glomerular disease  CHF (congestive heart failure)  COPD (chronic obstructive pulmonary disease)  Pulmonary hypertension  Sleep apnea  Pulmonary edema  Gout      Social Hx:  No current use of tobacco, alcohol, or illicit drugs    Medications:  MEDICATIONS  (STANDING):  midazolam Injectable 5 milliGRAM(s) IV Push Once  sodium chloride 0.9% Bolus 500 milliLiter(s) IV Bolus once    MEDICATIONS  (PRN):      Vitals:  T(C): 36.8 (05-06-25 @ 06:32), Max: 36.8 (05-06-25 @ 06:32)  HR: 93 (05-06-25 @ 06:32) (93 - 93)  BP: 178/94 (05-06-25 @ 06:32) (178/94 - 178/94)  RR: 20 (05-06-25 @ 06:32) (20 - 20)  SpO2: 93% (05-06-25 @ 06:32) (93% - 93%)    Physical Examination: INCOMPLETE  General - NAD  Cardiovascular - Peripheral pulses palpable, no edema  Eyes - Fundoscopy with flat, sharp optic discs and no hemorrhage or exudates; Fundoscopy not well visualized; Fundoscopy not performed due to safety precautions in the setting of the COVID-19 pandemic    Neurologic Exam:  Mental status - Awake, Alert, Oriented to person, place, and time. Speech fluent, repetition and naming intact. Follows simple and complex commands. Attention/concentration, recent and remote memory (including registration and recall), and fund of knowledge intact    Cranial nerves - PERRLA, VFF, EOMI, face sensation (V1-V3) intact b/l, facial strength intact without asymmetry b/l, hearing intact b/l, palate with symmetric elevation, trapezius OR sternocleidomastiod 5/5 strength b/l, tongue midline on protrusion with full lateral movement    Motor - Normal bulk and tone throughout. No pronator drift.  Strength testing            Deltoid      Biceps      Triceps     Wrist Extension    Wrist Flexion     Interossei         R            5                 5               5                     5                              5                        5                 5  L             5                 5               5                     5                              5                        5                 5              Hip Flexion    Hip Extension    Knee Flexion    Knee Extension    Dorsiflexion    Plantar Flexion  R              5                           5                       5                           5                            5                          5  L              5                           5                        5                           5                            5                          5    Sensation - Light touch/temperature OR pain/vibration intact throughout    DTR's -             Biceps      Triceps     Brachioradialis      Patellar    Ankle    Toes/plantar response  R             2+             2+                  2+                       2+            2+                 Down  L              2+             2+                 2+                        2+           2+                 Down    Coordination - Finger to Nose intact b/l. No tremors appreciated    Gait and station - Normal casual gait. Romberg (-)    Labs:                        9.0    8.76  )-----------( 202      ( 06 May 2025 06:51 )             33.0     05-06    139  |  99  |  37[H]  ----------------------------<  100[H]  4.5   |  25  |  8.64[H]    Ca    6.8[L]      06 May 2025 06:51    TPro  7.5  /  Alb  3.3  /  TBili  0.2  /  DBili  x   /  AST  10  /  ALT  5   /  AlkPhos  384[H]  05-06    CAPILLARY BLOOD GLUCOSE      POCT Blood Glucose.: 99 mg/dL (06 May 2025 06:31)    LIVER FUNCTIONS - ( 06 May 2025 06:51 )  Alb: 3.3 g/dL / Pro: 7.5 g/dL / ALK PHOS: 384 U/L / ALT: 5 U/L / AST: 10 U/L / GGT: x             PT/INR - ( 06 May 2025 06:51 )   PT: 15.1 sec;   INR: 1.31 ratio         PTT - ( 06 May 2025 06:51 )  PTT:34.4 sec      Radiology:      CT Brain Stroke Protocol (05.06.25 @ 07:10)   FINDINGS:    BRAIN:  The brain  demonstrates largely symmetric indistinct abnormal   diminished attenuation within the deep cerebral hemispheric white matter.    This finding is most evident in the periatrial and periventricular white   matter.  No cerebral cortical lesion is recognized.   Cerebral cortical   gray-white matter differentiation is maintained.  No acute cerebral   cortical infarct is seen.  No intracranial hemorrhage is found.  No mass   effectis found in the brain.    CSF SPACES:  The ventricles, sulci and basal cisterns appear mild to   moderately dilated reflecting diffuse brain volume loss.  Persistence of   a cavum septum pellucidum is an incidental developmental variant.  No   differential cerebral cortical atrophy is recognized.    VESSELS:   Intracranial vasculature demonstrates no asymmetric or   segmental hyperdensity that is suspicious for embolus.  Atherosclerotic   calcifications are evident in the cavernous and clinoid segments of the   internal carotid arteries on the right extending to the middle cerebral   arterial distribution. There is also involvement of also each vertebral   artery.    HEAD AND NECK STRUCTURES:  The orbits are unremarkable.  The paranasal   sinuses  are clear.  The nasal cavity appears intact.  The nasopharynx is   symmetric.  The central skull base is intact.  The temporal bones   demonstrate largely opacified petrous air cells. On the right there   appears to be coalescent disease involving mastoid air cells and the   mastoid antrum that is contiguous with disease in the right middle ear   cavity and epitympanum. On the left there is also extensive mastoid   petrous and middle ear opacification. The calvarium appears unremarkable.    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      IMPRESSION:    1. No intracranial hemorrhage is appreciated.    2. No intracranial mass lesion is appreciated.    3.  ischemic white matter disease and atrophy typical for age. Extensive   intracranial atherosclerosis. Bilateral mastoid effusions and middle ear   disease    4. MR may provide higher sensitivity imaging evaluation for the clinical   indication of acute infarction.       Neurology - Consult Note    -  Spectra: [phone] ([facility]), [phone] ([facility])  -    HPI: Patient MAYE ZUNIGA is a 79y (1945) man with a PMH significant for ESRD on HD, orthostatic hypotension on midodrine baseline BP 80-90 systolic, COPD reportedly on home O2 2L, SAADIA on CPAP, wheelchair bound, chronic diastolic CHF, morbid obesity, pulmonary hypertension, PE and DVT on Eliquis, h/o TIA, neuropathy, lumbar stenosis, seizure disorder on Keppra, who presents with left facial weakness, left facial twitching and left arm jerking., Code stroke called in ED.     Patient is bedbound at baseline for the past year due to lumbar stenosis and A&Ox3. LKW was 12AM on 5/6, in the morning he was noted to have left facial droop and abnormal movements of left face and left arm. Per wife this is similar to his prior seizures but the facial droop is new. She also states he has been leaning to the left side for the past few weeks. He takes Keppra 500mg BID. Wife gave him the morning dose early at 3AM when she noticed this event. In the ED, patient received IV 5mg midazolam which terminated the seizure. He was noted to be significantly hypotensive to SBP 60s. Active medical issues include hypotension, renal failure, respiratory acidosis/failure. EKG with . Patient had recurrent focal seizure about 45 minutes later which did not resolve after administration of 1g IV Keppra. Wife reports patient is DNR/DNI. Additional antiseizure medications were discussed with ED and patient's spouse due to complicated medical co-morbidities and risk/benefits of using antiseizure medications in setting of respiratory failure, hypotension, cardiac problems and renal failure. Spouse electing to transition to comfort care and would like to trial Ativan understanding the risk of respiratory depression.       Review of Systems:  All other review of systems is negative unless indicated above.    Allergies:  baclofen (Other)  latex (Rash)      PMHx/PSHx/Family Hx: As above, otherwise see below   Hypertension  Glomerular disease  CHF (congestive heart failure)  COPD (chronic obstructive pulmonary disease)  Pulmonary hypertension  Sleep apnea  Pulmonary edema  Gout      Social Hx:  No current use of tobacco, alcohol, or illicit drugs    Medications:  MEDICATIONS  (STANDING):  midazolam Injectable 5 milliGRAM(s) IV Push Once  sodium chloride 0.9% Bolus 500 milliLiter(s) IV Bolus once    MEDICATIONS  (PRN):      Vitals:  T(C): 36.8 (05-06-25 @ 06:32), Max: 36.8 (05-06-25 @ 06:32)  HR: 93 (05-06-25 @ 06:32) (93 - 93)  BP: 178/94 (05-06-25 @ 06:32) (178/94 - 178/94)  RR: 20 (05-06-25 @ 06:32) (20 - 20)  SpO2: 93% (05-06-25 @ 06:32) (93% - 93%)    Physical Examination: INCOMPLETE  General - NAD  Cardiovascular - Peripheral pulses palpable, no edema  Eyes - Fundoscopy with flat, sharp optic discs and no hemorrhage or exudates; Fundoscopy not well visualized; Fundoscopy not performed due to safety precautions in the setting of the COVID-19 pandemic    Neurologic Exam:  Mental status - Awake, Alert, Oriented to person, place, and time. Speech fluent, repetition and naming intact. Follows simple and complex commands. Attention/concentration, recent and remote memory (including registration and recall), and fund of knowledge intact    Cranial nerves - PERRLA, VFF, EOMI, face sensation (V1-V3) intact b/l, facial strength intact without asymmetry b/l, hearing intact b/l, palate with symmetric elevation, trapezius OR sternocleidomastiod 5/5 strength b/l, tongue midline on protrusion with full lateral movement    Motor - Normal bulk and tone throughout. No pronator drift.  Strength testing            Deltoid      Biceps      Triceps     Wrist Extension    Wrist Flexion     Interossei         R            5                 5               5                     5                              5                        5                 5  L             5                 5               5                     5                              5                        5                 5              Hip Flexion    Hip Extension    Knee Flexion    Knee Extension    Dorsiflexion    Plantar Flexion  R              5                           5                       5                           5                            5                          5  L              5                           5                        5                           5                            5                          5    Sensation - Light touch/temperature OR pain/vibration intact throughout    DTR's -             Biceps      Triceps     Brachioradialis      Patellar    Ankle    Toes/plantar response  R             2+             2+                  2+                       2+            2+                 Down  L              2+             2+                 2+                        2+           2+                 Down    Coordination - Finger to Nose intact b/l. No tremors appreciated    Gait and station - Normal casual gait. Romberg (-)    Labs:                        9.0    8.76  )-----------( 202      ( 06 May 2025 06:51 )             33.0     05-06    139  |  99  |  37[H]  ----------------------------<  100[H]  4.5   |  25  |  8.64[H]    Ca    6.8[L]      06 May 2025 06:51    TPro  7.5  /  Alb  3.3  /  TBili  0.2  /  DBili  x   /  AST  10  /  ALT  5   /  AlkPhos  384[H]  05-06    CAPILLARY BLOOD GLUCOSE      POCT Blood Glucose.: 99 mg/dL (06 May 2025 06:31)    LIVER FUNCTIONS - ( 06 May 2025 06:51 )  Alb: 3.3 g/dL / Pro: 7.5 g/dL / ALK PHOS: 384 U/L / ALT: 5 U/L / AST: 10 U/L / GGT: x             PT/INR - ( 06 May 2025 06:51 )   PT: 15.1 sec;   INR: 1.31 ratio         PTT - ( 06 May 2025 06:51 )  PTT:34.4 sec      Radiology:      CT Brain Stroke Protocol (05.06.25 @ 07:10)   FINDINGS:    BRAIN:  The brain  demonstrates largely symmetric indistinct abnormal   diminished attenuation within the deep cerebral hemispheric white matter.    This finding is most evident in the periatrial and periventricular white   matter.  No cerebral cortical lesion is recognized.   Cerebral cortical   gray-white matter differentiation is maintained.  No acute cerebral   cortical infarct is seen.  No intracranial hemorrhage is found.  No mass   effectis found in the brain.    CSF SPACES:  The ventricles, sulci and basal cisterns appear mild to   moderately dilated reflecting diffuse brain volume loss.  Persistence of   a cavum septum pellucidum is an incidental developmental variant.  No   differential cerebral cortical atrophy is recognized.    VESSELS:   Intracranial vasculature demonstrates no asymmetric or   segmental hyperdensity that is suspicious for embolus.  Atherosclerotic   calcifications are evident in the cavernous and clinoid segments of the   internal carotid arteries on the right extending to the middle cerebral   arterial distribution. There is also involvement of also each vertebral   artery.    HEAD AND NECK STRUCTURES:  The orbits are unremarkable.  The paranasal   sinuses  are clear.  The nasal cavity appears intact.  The nasopharynx is   symmetric.  The central skull base is intact.  The temporal bones   demonstrate largely opacified petrous air cells. On the right there   appears to be coalescent disease involving mastoid air cells and the   mastoid antrum that is contiguous with disease in the right middle ear   cavity and epitympanum. On the left there is also extensive mastoid   petrous and middle ear opacification. The calvarium appears unremarkable.    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      IMPRESSION:    1. No intracranial hemorrhage is appreciated.    2. No intracranial mass lesion is appreciated.    3.  ischemic white matter disease and atrophy typical for age. Extensive   intracranial atherosclerosis. Bilateral mastoid effusions and middle ear   disease    4. MR may provide higher sensitivity imaging evaluation for the clinical   indication of acute infarction.       Neurology - Consult Note    -  Spectra: [phone] ([facility]), [phone] ([facility])  -    HPI: Patient MAYE ZUNIGA is a 79y (1945) man with a PMH significant for ESRD on HD, orthostatic hypotension on midodrine baseline BP 80-90 systolic, COPD reportedly on home O2 2L, SAADIA on CPAP, wheelchair bound, chronic diastolic CHF, morbid obesity, pulmonary hypertension, PE and DVT on Eliquis, h/o TIA, neuropathy, lumbar stenosis, seizure disorder on Keppra, who presents with left facial weakness, left facial twitching and left arm jerking., Code stroke called in ED.     Patient is bedbound at baseline for the past year due to lumbar stenosis and A&Ox3. LKW was 12AM on 5/6, in the morning he was noted to have left facial droop and abnormal movements of left face and left arm. Per wife this is similar to his prior seizures but the facial droop is new. She also states he has been leaning to the left side for the past few weeks. He takes Keppra 500mg BID. Wife gave him the morning dose early at 3AM when she noticed this event. In the ED, patient received IV 5mg midazolam which terminated the seizure. He was noted to be significantly hypotensive to SBP 60s. Active medical issues include hypotension, renal failure, respiratory acidosis/failure. EKG with . Patient had recurrent focal seizure about 45 minutes later which did not resolve after administration of 1g IV Keppra. Wife reports patient is DNR/DNI. Additional antiseizure medications were discussed with ED and patient's spouse due to complicated medical co-morbidities and risk/benefits of using antiseizure medications in setting of respiratory failure, hypotension, cardiac problems and renal failure. Spouse electing to transition to comfort care and would like to trial Ativan understanding the risk of respiratory depression.       Review of Systems:  All other review of systems is negative unless indicated above.    Allergies:  baclofen (Other)  latex (Rash)      PMHx/PSHx/Family Hx: As above, otherwise see below   Hypertension  Glomerular disease  CHF (congestive heart failure)  COPD (chronic obstructive pulmonary disease)  Pulmonary hypertension  Sleep apnea  Pulmonary edema  Gout      Social Hx:  No current use of tobacco, alcohol, or illicit drugs    Medications:  MEDICATIONS  (STANDING):  midazolam Injectable 5 milliGRAM(s) IV Push Once  sodium chloride 0.9% Bolus 500 milliLiter(s) IV Bolus once    MEDICATIONS  (PRN):      Vitals:  T(C): 36.8 (05-06-25 @ 06:32), Max: 36.8 (05-06-25 @ 06:32)  HR: 93 (05-06-25 @ 06:32) (93 - 93)  BP: 178/94 (05-06-25 @ 06:32) (178/94 - 178/94)  RR: 20 (05-06-25 @ 06:32) (20 - 20)  SpO2: 93% (05-06-25 @ 06:32) (93% - 93%)    Physical Examination:  General: Continuous left facial twitching, jerking of left arm and twitching of fingers in left hand     Neurologic Exam:  Mental status - Eyes open, intermittent attends to examiner. Does not follow commands, no verbal output   Cranial nerves -  PERRL (3>2mm). Primary gaze midline. Moderate left facial weakness   Motor -  withdraws to noxious stimuli in B/L UE   Sensation - withdraws to noxious stimuli in UE   DTR's - Plantars mute   Coordination - TICO due to mental status  Gait and station - TICO due to mental status     Labs:                        9.0    8.76  )-----------( 202      ( 06 May 2025 06:51 )             33.0     05-06    139  |  99  |  37[H]  ----------------------------<  100[H]  4.5   |  25  |  8.64[H]    Ca    6.8[L]      06 May 2025 06:51    TPro  7.5  /  Alb  3.3  /  TBili  0.2  /  DBili  x   /  AST  10  /  ALT  5   /  AlkPhos  384[H]  05-06    CAPILLARY BLOOD GLUCOSE      POCT Blood Glucose.: 99 mg/dL (06 May 2025 06:31)    LIVER FUNCTIONS - ( 06 May 2025 06:51 )  Alb: 3.3 g/dL / Pro: 7.5 g/dL / ALK PHOS: 384 U/L / ALT: 5 U/L / AST: 10 U/L / GGT: x             PT/INR - ( 06 May 2025 06:51 )   PT: 15.1 sec;   INR: 1.31 ratio         PTT - ( 06 May 2025 06:51 )  PTT:34.4 sec      Radiology:      CT Brain Stroke Protocol (05.06.25 @ 07:10)   FINDINGS:    BRAIN:  The brain  demonstrates largely symmetric indistinct abnormal   diminished attenuation within the deep cerebral hemispheric white matter.    This finding is most evident in the periatrial and periventricular white   matter.  No cerebral cortical lesion is recognized.   Cerebral cortical   gray-white matter differentiation is maintained.  No acute cerebral   cortical infarct is seen.  No intracranial hemorrhage is found.  No mass   effectis found in the brain.    CSF SPACES:  The ventricles, sulci and basal cisterns appear mild to   moderately dilated reflecting diffuse brain volume loss.  Persistence of   a cavum septum pellucidum is an incidental developmental variant.  No   differential cerebral cortical atrophy is recognized.    VESSELS:   Intracranial vasculature demonstrates no asymmetric or   segmental hyperdensity that is suspicious for embolus.  Atherosclerotic   calcifications are evident in the cavernous and clinoid segments of the   internal carotid arteries on the right extending to the middle cerebral   arterial distribution. There is also involvement of also each vertebral   artery.    HEAD AND NECK STRUCTURES:  The orbits are unremarkable.  The paranasal   sinuses  are clear.  The nasal cavity appears intact.  The nasopharynx is   symmetric.  The central skull base is intact.  The temporal bones   demonstrate largely opacified petrous air cells. On the right there   appears to be coalescent disease involving mastoid air cells and the   mastoid antrum that is contiguous with disease in the right middle ear   cavity and epitympanum. On the left there is also extensive mastoid   petrous and middle ear opacification. The calvarium appears unremarkable.    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      IMPRESSION:    1. No intracranial hemorrhage is appreciated.    2. No intracranial mass lesion is appreciated.    3.  ischemic white matter disease and atrophy typical for age. Extensive   intracranial atherosclerosis. Bilateral mastoid effusions and middle ear   disease    4. MR may provide higher sensitivity imaging evaluation for the clinical   indication of acute infarction.

## 2025-05-06 NOTE — H&P ADULT - PROBLEM SELECTOR PLAN 3
- comfort directed care only. no aggressive measures. Norepinephrine DCed - comfort directed care only. no aggressive measures. Norepinephrine DCed. no abx

## 2025-05-06 NOTE — CONSULT NOTE ADULT - PROBLEM SELECTOR RECOMMENDATION 5
Added IV dilaudid 0.5mg q2h prn dyspnea/severe pain   bowel regimen while on opioids   COMFORT MEASURES ONLY

## 2025-05-06 NOTE — CONSULT NOTE ADULT - SUBJECTIVE AND OBJECTIVE BOX
[facility]  Estefania Lara Palliative Care Attending  Contact Info: Page [phone] (including Nights/Weekends), message on Microsoft Teams (Estefania Lara), or leave  at Palliative Office [phone] (non-urgent)     Date of [place]35-19-24 @ 12:08  HPI:    PERTINENT PM/SXH:   Hypertension    Glomerular disease    CHF (congestive heart failure)    COPD (chronic obstructive pulmonary disease)    Pulmonary hypertension    Sleep apnea    Pulmonary edema    Peripheral edema    Gout      History of abdominal surgery    H/O right heart catheterization      FAMILY HISTORY:  Family history of colon cancer (Father)    Family history of heart disease (Father)      Family Hx substance abuse [ ]yes [ ]no  ITEMS NOT CHECKED ARE NOT PRESENT    SOCIAL HISTORY:   Significant other/partner[ ]  Children[ ]  Druze/Spirituality:  Substance hx:  [ ]   Tobacco hx:  [ ]   Alcohol hx: [ ]   Home Opioid hx:  [ ] I-Stop Reference No:  Living Situation: [ ]Home  [ ]Long term care  [ ]Rehab [ ]Other    ADVANCE DIRECTIVES:    DNR/MOLST  [ ]  Living Will  [ ]   DECISION MAKER(s):  [ ] Health Care Proxy(s)  [ ] Surrogate(s)  [ ] Guardian           Name(s): Phone Number(s):    BASELINE (I)ADL(s) (prior to admission):  [facility]: [ ]Total  [ ] Moderate [ ]Dependent    Allergies    baclofen (Other)  latex (Rash)    Intolerances    MEDICATIONS  (STANDING):  levETIRAcetam   Injectable 1000 milliGRAM(s) IV Push every 12 hours  norepinephrine Infusion 0.05 MICROgram(s)/kG/Min (9.84 mL/Hr) IV Continuous <Continuous>    MEDICATIONS  (PRN):    PRESENT SYMPTOMS: [ ]Unable to self-report  [ ] CPOT [ ] PAINADs [ ] RDOS  Source if other than patient:  [ ]Family   [ ]Team     Pain: [ ]yes [ ]no  QOL impact -   Location -                    Aggravating factors -  Quality -  Radiation -  Timing-  Severity (0-10 scale):  Minimal acceptable level/pain goal (0-10 scale):     CPOT:    https://www.[facility].org/getattachment/[email or web address]-[email or web address]/Critical-Care-Pain-Observation-Tool-(CPOT)    Dyspnea:                           [ ]Mild [ ]Moderate [ ]Severe  Anxiety:                             [ ]Mild [ ]Moderate [ ]Severe  Fatigue:                             [ ]Mild [ ]Moderate [ ]Severe  Nausea:                             [ ]Mild [ ]Moderate [ ]Severe  Loss of appetite:              [ ]Mild [ ]Moderate [ ]Severe  Constipation:                    [ ]Mild [ ]Moderate [ ]Severe    Other Symptoms:  [ ]All other review of systems negative     PCSSQ[Palliative Care Spiritual Screening Question]   Severity (0-10):  Chaplaincy Referral: [ ] yes [ ] refused [ ] following [ ] deferred     Caregiver [place]? : [ ] yes [ ] no [ ] Deferred [ ] Declined             Social work referral [ ] Patient & Family Centered Care Referral [ ]  Anticipatory Grief present?:  [ ] yes [ ] no  [ ] Deferred                  Social work referral [ ] Patient & Family Centered Care Referral [ ]    PHYSICAL EXAM:  Vital Signs Last 24 Hrs  T(C): 37.3 (06 May 2025 07:25), Max: 37.3 (06 May 2025 07:25)  T(F): 99.1 (06 May 2025 07:25), Max: 99.1 (06 May 2025 07:25)  HR: 88 (06 May 2025 11:30) (87 - 96)  BP: 106/51 (06 May 2025 11:30) (69/35 - 178/94)  BP(mean): 67 (06 May 2025 11:30) (44 - 74)  RR: 18 (06 May 2025 11:30) (12 - 22)  SpO2: 100% (06 May 2025 11:30) (93% - 100%)    Parameters below as of 06 May 2025 11:30  Patient On (Oxygen Delivery Method): mask, nonrebreather  O2 Flow (L/min): 10  O2 Concentration (%): 64 I&O's Summary    GENERAL: [ ]Cachexia    [ ]Alert  [ ]Oriented x   [ ]Lethargic  [ ]Unarousable  [ ]Verbal  [ ]Non-Verbal  Behavioral:   [ ] Anxiety  [ ] Delirium [ ] Agitation [ ] Other  HEENT:  [ ]Normal   [ ]Dry mouth   [ ]ET Tube/Trach  [ ]Oral lesions  PULMONARY:   [ ]Clear [ ]Tachypnea  [ ]Audible excessive secretions   [ ]Rhonchi        [ ]Right [ ]Left [ ]Bilateral  [ ]Crackles        [ ]Right [ ]Left [ ]Bilateral  [ ]Wheezing     [ ]Right [ ]Left [ ]Bilateral  [ ]Diminished breath sounds [ ]right [ ]left [ ]bilateral  CARDIOVASCULAR:    [ ]Regular [ ]Irregular [ ]Tachy  [ ]Jasvir [ ]Murmur [ ]Other  GASTROINTESTINAL:  [ ]Soft  [ ]Distended   [ ]+BS  [ ]Non tender [ ]Tender  [ ]Other [ ]PEG [ ]OGT/ NGT  Last BM:  GENITOURINARY:  [ ]Normal [ ] Incontinent   [ ]Oliguria/Anuria   [ ]Herring  MUSCULOSKELETAL:   [ ]Normal   [ ]Weakness  [ ]Bed/Wheelchair bound [ ]Edema  NEUROLOGIC:   [ ]No focal deficits  [ ]Cognitive impairment  [ ]Dysphagia [ ]Dysarthria [ ]Paresis [ ]Other   SKIN: Please see flowsheets   [ ]Normal  [ ]Rash  [ ]Other  [ ]Pressure ulcer(s)       Present on admission [ ]y [ ]n    CRITICAL CARE:  [ ] Shock Present  [ ]Septic [ ]Cardiogenic [ ]Neurologic [ ]Hypovolemic  [ ]  Vasopressors [ ]  Inotropes   [ ]Respiratory failure present [ ]Mechanical ventilation [ ]Non-invasive ventilatory support [ ]High flow    [ ]Acute  [ ]Chronic [ ]Hypoxic  [ ]Hypercarbic [ ]Other  [ ]Other organ failure     LABS:                        9.0    8.76  )-----------( 202      ( 06 May 2025 06:51 )             33.0   05-06    139  |  99  |  37[H]  ----------------------------<  100[H]  4.5   |  25  |  8.64[H]    Ca    6.8[L]      06 May 2025 06:51  Phos  6.3     05-06  Mg     2.10     05-06    TPro  7.5  /  Alb  3.3  /  TBili  0.2  /  DBili  x   /  AST  10  /  ALT  5   /  AlkPhos  384[H]  05-06  PT/INR - ( 06 May 2025 06:51 )   PT: 15.1 sec;   INR: 1.31 ratio         PTT - ( 06 May 2025 06:51 )  PTT:34.4 sec    Urinalysis Basic - ( 06 May 2025 06:51 )    Color: x / Appearance: x / SG: x / pH: x  Gluc: 100 mg/dL / Ketone: x  / Bili: x / Urobili: x   Blood: x / Protein: x / Nitrite: x   Leuk Esterase: x / RBC: x / WBC x   Sq Epi: x / Non Sq Epi: x / Bacteria: x      RADIOLOGY & ADDITIONAL STUDIES: < from: CT Angio Neck Stroke Protocol w/ IV Cont (05.06.25 @ 07:12) >  IMPRESSION:    1.   Right carotid system:    Atherosclerotic plaque carotid bulb without    hemodynamically significant stenosis.    2.   Left carotid system:     Atherosclerotic plaque carotid bulb without    hemodynamically significant stenosis.    3.   Vertebral circulation:    Atherosclerotic plaque right vertebral   ostium and preforaminal V1 segment without hemodynamically significant   stenosis    4.  Anterior intracranial circulation:     Intracranial atherosclerosis   cavernous and clinoid segments of the internal carotid arteries, at least   moderate, and right middle cerebral arterial M2 segment, mild-to-moderate.    5.  Posterior intracranial circulation:    Intracranial atherosclerosis   each vertebral artery, mild to moderate on the right and severe on the   left    6.  No large vessel occlusion.    7. Consider MR evaluation    < end of copied text >      PROTEIN CALORIE MALNUTRITION PRESENT: [ ]mild [ ]moderate [ ]severe [ ]underweight [ ]morbid obesity  https://www.andeal.org/vault/2440/web/files/ONC/Table_Clinical%20Characteristics%20to%20Document%20Malnutrition-White%20JV%20et%20al%726248.pdf    Height (cm): 170.2 (10-23-24 @ 10:48), 170.2 (08-20-24 @ 18:11), 175.3 (06-12-24 @ 19:10)  Weight (kg): 105 (05-06-25 @ 07:00), 115 (10-30-24 @ 15:25), 108 (08-20-24 @ 18:11)  BMI (kg/m2): 36.2 (05-06-25 @ 07:00), 39.7 (10-30-24 @ 15:25), 37.3 (10-23-24 @ 10:48)    [ ]PPSV2 < or = to 30% [ ]significant weight loss  [ ]poor nutritional intake  [ ]anasarca[ ]Artificial Nutrition      Other REFERRALS:  [ ]Hospice  [ ]Child Life  [ ]Social Work  [ ]Case management [ ]Holistic Therapy    [facility]  Estefania Lara, Palliative Care Attending  Contact Info: Page [phone] (including Nights/Weekends), message on Microsoft Teams (Estefania Lara), or leave  at Palliative Office [phone] (non-urgent)     Date of [id number] @ 12:08  HPI: Patient MAYE ZUNIGA is a 79y (1945) man with a PMH significant for ESRD on HD, orthostatic hypotension on midodrine baseline BP 80-90 systolic, COPD reportedly on home O2 2L, SAADIA on CPAP, wheelchair bound, chronic diastolic CHF, morbid obesity, pulmonary hypertension, PE and DVT on Eliquis, h/o TIA, neuropathy, lumbar stenosis, seizure disorder on Keppra, who presents with left facial weakness, left facial twitching and left arm jerking., Code stroke called in ED.     Patient is bedbound at baseline for the past year due to lumbar stenosis and A&Ox3. LKW was 12AM on 5/6, in the morning he was noted to have left facial droop and abnormal movements of left face and left arm. Per wife this is similar to his prior seizures but the facial droop is new. She also states he has been leaning to the left side for the past few weeks. He takes Keppra 500mg BID. Wife gave him the morning dose early at 3AM when she noticed this event. In the ED, patient received IV 5mg midazolam which terminated the seizure. He was noted to be significantly hypotensive to SBP 60s. Active medical issues include hypotension, renal failure, respiratory acidosis/failure. EKG with . Patient had recurrent focal seizure about 45 minutes later which did not resolve after administration of 1g IV Keppra. Wife reports patient is DNR/DNI. Additional antiseizure medications were discussed with ED and patient's spouse due to complicated medical co-morbidities and risk/benefits of using antiseizure medications in setting of respiratory failure, hypotension, cardiac problems and renal failure. Spouse electing to transition to comfort care and would like to trial Ativan understanding the risk of respiratory depression.     PERTINENT PM/SXH:   Hypertension  Glomerular disease  CHF (congestive heart failure)  COPD (chronic obstructive pulmonary disease)  Pulmonary hypertension  Sleep apnea  Pulmonary edema  Peripheral edema  Gout  History of abdominal surgery  H/O right heart catheterization    FAMILY HISTORY:  Family history of colon cancer (Father)    Family history of heart disease (Father)    Family Hx substance abuse [ ]yes [ ]no  ITEMS NOT CHECKED ARE NOT PRESENT    SOCIAL HISTORY:   Significant other/partner[x ]  Children[x ]  Shinto/Spirituality:  Substance hx:  [ ]   Tobacco hx:  [ ]   Alcohol hx: [ ]   Home Opioid hx:  [ ] I-Stop Reference No: This report was requested by: Estefania Lara | Reference #: [id number]    Practitioner Count: 2  Pharmacy Count: 1  Current Opioid Prescriptions: 0  Current Benzodiazepine Prescriptions: 0  Current Stimulant Prescriptions: 0      Patient Demographic Information (PDI)       PDI	First Name	Last Name	Birth Date	Gender	Street Address	[facility] Code  SHARLENE Zuniga	1945	Male	[address] [facility]	[id number]    Prescription Information      PDI Filter:    PDI	My Rx	Current Rx	Drug Type	Rx Written	Rx Dispensed	Drug	Quantity	Days Supply	Prescriber Name	Prescriber LEÓN #	Payment Method	Dispenser  A	N	Y		04/23/2025	04/29/2025	pregabalin 75 mg capsule	60	30	Luke Childers D	[id number]	Insurance	[facility]  A	N	N		03/28/2025	03/30/2025	pregabalin 75 mg capsule	60	30	Luke Childers D	[id number]	Insurance	[facility]  A	N	N		02/26/2025	02/28/2025	pregabalin 75 mg capsule	30	30	Luke Childers D	[id number]	Insurance	[facility]  A	N	N	O	01/30/2025	02/19/2025	tramadol hcl 50 mg tablet	90	30	Vilma Espinoza MD	[id number]	Insurance	[facility]  A	N	N	O	01/22/2025	02/06/2025	tramadol hcl 50 mg tablet	21	7	Vilma Espinoza MD	[id number]	Insurance	[facility]  A	N	N		01/16/2025	01/26/2025	pregabalin 75 mg capsule	30	30	Luke Childers D	[id number]	Insurance	[facility]  A	N	N		12/16/2024	12/18/2024	pregabalin 75 mg capsule	30	30	Luke Childers D	[id number]	Insurance	[facility]  A	N	N		11/18/2024	11/19/2024	pregabalin 75 mg capsule	30	30	Kalikstein, Rodrigo	[id number]	Insurance	[facility]  A	N	N	O	10/30/2024	10/30/2024	tramadol hcl 50 mg tablet	21	7	Evie Aguilar	[id number]	Insurance	[facility]  A	N	N		10/02/2024	10/08/2024	pregabalin 75 mg capsule	30	30	Luke Childers D	[id number]	Insurance	[facility]  A	N	N		09/05/2024	09/08/2024	pregabalin 75 mg capsule	30	30	Luke Childers D	[id number]	Insurance	[facility]  A	N	N		08/06/2024	08/11/2024	pregabalin 75 mg capsule	30	30	Luke Childers D	[id number]	Insurance	[facility]  A	N	N		07/08/2024	07/11/2024	pregabalin 75 mg capsule	30	30	Luke Childers D	[id number]	Insurance	[facility]  A	N	N		06/20/2024	06/21/2024	pregabalin 75 mg capsule	5	5	Shelton, Shinamol	[id number]	Insurance	[facility]  A	N	N		05/23/2024	05/28/2024	pregabalin 150 mg capsule	60	30	Luke Childers D	[id number]	Insurance	[facility]  Living Situation: [ x]Home  [ ]Long term care  [ ]Rehab [ ]Other    ADVANCE DIRECTIVES:    DNR/MOLST  [ ]  Living Will  [ ]   DECISION MAKER(s):   [ ] Health Care Proxy(s)  [x ] Surrogate(s)  [ ] Guardian           Name(s): Patty Zuniga  Phone Number(s): [phone]    BASELINE (I)ADL(s) (prior to admission):  [place]: [ ]Total  [ ] Moderate [ x]Dependent    Allergies    baclofen (Other)  latex (Rash)    Intolerances    MEDICATIONS  (STANDING):  levETIRAcetam   Injectable 1000 milliGRAM(s) IV Push every 12 hours  norepinephrine Infusion 0.05 MICROgram(s)/kG/Min (9.84 mL/Hr) IV Continuous <Continuous>    MEDICATIONS  (PRN):    PRESENT SYMPTOMS: [x ]Unable to self-report  [ ] CPOT [x ] PAINADs [ x] RDOS  Source if other than patient:  [ ]Family   [ ]Team     Pain: [ ]yes [ ]no  QOL impact -   Location -                    Aggravating factors -  Quality -  Radiation -  Timing-  Severity (0-10 scale):  Minimal acceptable level/pain goal (0-10 scale):     CPOT:    https://www.[facility].org/getattachment/[email or web address]/Critical-Care-Pain-Observation-Tool-(CPOT)    Dyspnea:                           [ ]Mild [ ]Moderate [ ]Severe  Anxiety:                             [ ]Mild [ ]Moderate [ ]Severe  Fatigue:                             [ ]Mild [ ]Moderate [ ]Severe  Nausea:                             [ ]Mild [ ]Moderate [ ]Severe  Loss of appetite:              [ ]Mild [ ]Moderate [ ]Severe  Constipation:                    [ ]Mild [ ]Moderate [ ]Severe    Other Symptoms:  [ ]All other review of systems negative     PCSSQ[Palliative Care Spiritual Screening Question]   Severity (0-10):  Chaplaincy Referral: [ x] yes [ ] refused [ ] following [ ] deferred     Caregiver [place]? : [ ] yes [x ] no [ ] Deferred [ ] Declined             Social work referral [ ] Patient & Family Centered Care Referral [ ]  Anticipatory Grief present?:  [ ] yes [x ] no  [ ] Deferred                  Social work referral [ ] Patient & Family Centered Care Referral [ ]    PHYSICAL EXAM:  Vital Signs Last 24 Hrs  T(C): 37.3 (06 May 2025 07:25), Max: 37.3 (06 May 2025 07:25)  T(F): 99.1 (06 May 2025 07:25), Max: 99.1 (06 May 2025 07:25)  HR: 88 (06 May 2025 11:30) (87 - 96)  BP: 106/51 (06 May 2025 11:30) (69/35 - 178/94)  BP(mean): 67 (06 May 2025 11:30) (44 - 74)  RR: 18 (06 May 2025 11:30) (12 - 22)  SpO2: 100% (06 May 2025 11:30) (93% - 100%)    Parameters below as of 06 May 2025 11:30  Patient On (Oxygen Delivery Method): mask, nonrebreather  O2 Flow (L/min): 10  O2 Concentration (%): 64 I&O's Summary    GENERAL: [ ]Cachexia    [ ]Alert  [ ]Oriented x   [ ]Lethargic  [ x]Unarousable  [ ]Verbal  [ ]Non-Verbal  Behavioral:   [ ] Anxiety  [ ] Delirium [ ] Agitation [x ] Other  HEENT:  [x ]Normal   [ ]Dry mouth   [ ]ET Tube/Trach  [ ]Oral lesions  PULMONARY:   [ ]Clear [ ]Tachypnea  [ ]Audible excessive secretions   [ ]Rhonchi        [ ]Right [ ]Left [ ]Bilateral  [ ]Crackles        [ ]Right [ ]Left [ ]Bilateral  [ ]Wheezing     [ ]Right [ ]Left [ ]Bilateral  [x ]Diminished breath sounds [ ]right [ ]left [x ]bilateral  CARDIOVASCULAR:    [x ]Regular [ ]Irregular [ ]Tachy  [ ]Jasvir [ ]Murmur [ ]Other  GASTROINTESTINAL:  [ x]Soft  [ ]Distended   [ ]+BS  [ ]Non tender [ ]Tender  [ ]Other [ ]PEG [ ]OGT/ NGT  Last BM: ?  GENITOURINARY:  [ ]Normal [ ] Incontinent   [ x]Oliguria/Anuria   [ ]Herring  MUSCULOSKELETAL:   [ ]Normal   [ x]Weakness  [ ]Bed/Wheelchair bound [ ]Edema  NEUROLOGIC:   [ ]No focal deficits  [ ]Cognitive impairment  [ ]Dysphagia [ ]Dysarthria [ ]Paresis [x ]Other   SKIN: Please see flowsheets   [ ]Normal  [ ]Rash  x[ ]Other  [ ]Pressure ulcer(s)       Present on admission [ ]y [ ]n    CRITICAL CARE:  [ ] Shock Present  [ ]Septic [ ]Cardiogenic [ ]Neurologic [ ]Hypovolemic  [ ]  Vasopressors [ ]  Inotropes   [ ]Respiratory failure present [ ]Mechanical ventilation [ ]Non-invasive ventilatory support [ ]High flow    [ ]Acute  [ ]Chronic [ ]Hypoxic  [ ]Hypercarbic [ ]Other  [x ]Other organ failure     LABS:                        9.0    8.76  )-----------( 202      ( 06 May 2025 06:51 )             33.0   05-06    139  |  99  |  37[H]  ----------------------------<  100[H]  4.5   |  25  |  8.64[H]    Ca    6.8[L]      06 May 2025 06:51  Phos  6.3     05-06  Mg     2.10     05-06    TPro  7.5  /  Alb  3.3  /  TBili  0.2  /  DBili  x   /  AST  10  /  ALT  5   /  AlkPhos  384[H]  05-06  PT/INR - ( 06 May 2025 06:51 )   PT: 15.1 sec;   INR: 1.31 ratio         PTT - ( 06 May 2025 06:51 )  PTT:34.4 sec    Urinalysis Basic - ( 06 May 2025 06:51 )    Color: x / Appearance: x / SG: x / pH: x  Gluc: 100 mg/dL / Ketone: x  / Bili: x / Urobili: x   Blood: x / Protein: x / Nitrite: x   Leuk Esterase: x / RBC: x / WBC x   Sq Epi: x / Non Sq Epi: x / Bacteria: x      RADIOLOGY & ADDITIONAL STUDIES: < from: CT Angio Neck Stroke Protocol w/ IV Cont (05.06.25 @ 07:12) >  IMPRESSION:    1.   Right carotid system:    Atherosclerotic plaque carotid bulb without    hemodynamically significant stenosis.    2.   Left carotid system:     Atherosclerotic plaque carotid bulb without    hemodynamically significant stenosis.    3.   Vertebral circulation:    Atherosclerotic plaque right vertebral   ostium and preforaminal V1 segment without hemodynamically significant   stenosis    4.  Anterior intracranial circulation:     Intracranial atherosclerosis   cavernous and clinoid segments of the internal carotid arteries, at least   moderate, and right middle cerebral arterial M2 segment, mild-to-moderate.    5.  Posterior intracranial circulation:    Intracranial atherosclerosis   each vertebral artery, mild to moderate on the right and severe on the   left    6.  No large vessel occlusion.    7. Consider MR evaluation    < end of copied text >      PROTEIN CALORIE MALNUTRITION PRESENT: [ ]mild [ ]moderate [ ]severe [ ]underweight [ ]morbid obesity  https://www.andeal.org/vault/2440/web/files/ONC/Table_Clinical%20Characteristics%20to%20Document%20Malnutrition-White%20JV%20et%20al%202012.pdf    Height (cm): 170.2 (10-23-24 @ 10:48), 170.2 (08-20-24 @ 18:11), 175.3 (06-12-24 @ 19:10)  Weight (kg): 105 (05-06-25 @ 07:00), 115 (10-30-24 @ 15:25), 108 (08-20-24 @ 18:11)  BMI (kg/m2): 36.2 (05-06-25 @ 07:00), 39.7 (10-30-24 @ 15:25), 37.3 (10-23-24 @ 10:48)    [x ]PPSV2 < or = to 30% [ ]significant weight loss  [ ]poor nutritional intake  [ ]anasarca[ ]Artificial Nutrition      Other REFERRALS:  [ ]Hospice  [ ]Child Life  [ ]Social Work  [ ]Case management [ ]Holistic Therapy

## 2025-05-06 NOTE — CONSULT NOTE ADULT - NSPROBSELRECBLANK_7_GEN
-- DO NOT REPLY / DO NOT REPLY ALL --  -- Message is from the [facility]--    General Patient Message      Reason for Call: patients mom says children both have shots but if they dont will they be administered during appointment     Caller Information       Type Contact Phone/Fax    06/24/2022 02:44 PM CDT Phone (Incoming) RADHKIA HERRERA (Mother) [phone] (M)          Alternative phone number: none    Turnaround time given to caller:   \"This message will be sent to [state Provider's name]. The clinical team will fulfill your request as soon as they review your message.\"     DISPLAY PLAN FREE TEXT

## 2025-05-06 NOTE — CONSULT NOTE ADULT - PROBLEM SELECTOR RECOMMENDATION 6
DNR/DNI- MOLST form completed prior to palliative consult  Family is aware that patient is at EOL and HD is not going to be continued. Family in agreement with stopping IV pressor support and transitioning to comfort care only.   If patient is medically stable off IV pressors, family is amenable to inpatient hospice referral.     Chaplaincy following

## 2025-05-06 NOTE — H&P ADULT - TIME-BASED BILLING (NON-CRITICAL CARE)
Patient: Susan Zhang Date of Service: 10/9/2020   : 1992 MRN: [id number]     Patient presents unaccompanied.  An  is not needed.  Infection prevention recommendations have been followed in regards to COVID.    SUBJECTIVE:     HISTORY OF PRESENT ILLNESS:  Susan Zhang is a 28 year old female who presents today for bladder cramping.    No burning with urination. No fevers.    Urgency but not a lot comes out.    Worse after drinking coffee.    Never used AZO.    Had something similar and went on an antibiotic which helped.      PAST MEDICAL HISTORY:  Past Medical History:   Diagnosis Date   • Stomach ulcer      Patient Active Problem List   Diagnosis   • Health maintenance examination       MEDICATIONS:  No current outpatient medications on file.     No current facility-administered medications for this visit.        ALLERGIES:  ALLERGIES:  No Known Allergies    PAST SURGICAL HISTORY:  No past surgical history on file.    FAMILY HISTORY:  No family history on file.    SOCIAL HISTORY:  Social History     Tobacco Use   • Smoking status: Never Smoker   • Smokeless tobacco: Never Used   Substance Use Topics   • Alcohol use: Yes   • Drug use: Not on file       Review of Systems   Constitutional: Negative.  Negative for appetite change, fatigue and fever.   HENT: Negative.    Eyes: Negative.    Respiratory: Negative.  Negative for shortness of breath.    Cardiovascular: Negative.  Negative for chest pain.   Gastrointestinal: Negative.    Endocrine: Negative.    Genitourinary: Negative.    Musculoskeletal: Negative.    Skin: Negative.    Allergic/Immunologic: Negative.    Neurological: Negative.    Hematological: Negative.    Psychiatric/Behavioral: Negative.          OBJECTIVE:     Visit Vitals  /74   Pulse 82   Temp 98.4 °F (36.9 °C)   Resp 16   Ht 5' 5\" (1.651 m)   Wt 62.1 kg (137 lb)   SpO2 98%   BMI 22.80 kg/m²       Wt Readings from Last 1 Encounters:   10/09/20 62.1 kg (137 lb)         Physical Exam   Constitutional: She is oriented to person, place, and time. Vital signs are normal. She is cooperative. She does not appear ill. No distress.   HENT:   Head: Normocephalic and atraumatic.   Right Ear: Hearing, tympanic membrane, external ear and ear canal normal.   Left Ear: Hearing, tympanic membrane, external ear and ear canal normal.   Nose: Nose normal.   Mouth/Throat: Uvula is midline, oropharynx is clear and moist and mucous membranes are normal.   Eyes: Pupils are equal, round, and reactive to light. Conjunctivae, EOM and lids are normal.   Neck: Trachea normal, normal range of motion, full passive range of motion without pain and phonation normal. No thyroid mass and no thyromegaly present.   Cardiovascular: Normal rate, regular rhythm, S1 normal, S2 normal and normal heart sounds.   Pulmonary/Chest: Effort normal and breath sounds normal.   Abdominal: Soft. Normal appearance and bowel sounds are normal. There is no abdominal tenderness.   Musculoskeletal: Normal range of motion.   Neurological: She is alert and oriented to person, place, and time. She has normal strength.   Skin: Skin is warm and dry.   Psychiatric: She has a normal mood and affect. Her speech is normal and behavior is normal. Judgment and thought content normal. Cognition and memory are normal.   Nursing note and vitals reviewed.        ASSESSMENT AND PLAN:     Diagnoses and all orders for this visit:  Cystitis  -     nitrofurantoin, macrocrystal-monohydrate, (Macrobid) 100 MG capsule; Take 1 capsule by mouth 2 times daily for 5 days.  Urine dip negative, but due to symptoms and previous symptoms and resolution, will do macrobid as ordered above.  Advised to take AZO for urinary pain (and that urine will turn orange  Educated on dietary sources of bladder inflammation- coffee seems to be her likely cause. Recommended to avoid and see if symptoms are relieved.   UTI symptoms  -     POCT URINE DIP AUTO  -     URINE,  BACTERIAL CULTURE     Return if symptoms worsen or fail to improve.    The patient indicated understanding of the diagnosis and agreed with the plan of care.      Lucie Villela, APRN  10/9/2020   Time-based billing (NON-critical care)

## 2025-05-06 NOTE — PROVIDER CONTACT NOTE (OTHER) - ASSESSMENT
SW met with patient and family at bedside.  provided emotional support to family. Currently, family denies further SW needs/requests.

## 2025-05-06 NOTE — ED PROVIDER NOTE - PROGRESS NOTE DETAILS
pt seizure like activity oncern for focal seizure, pt no longer hypotensie, will load w keppra, if we gie any more benzo pt will not be able to maintain airway, and is DNR/DNI, dw family at bedside and will call neuro elizabeth; pt exhibiting focal seizure like activity, pt no longer hypotensive likely 2/2 to sympathetic from seizure, will load w keppra, if we gie any more benzo pt will not be able to maintain airway, and is DNR/DNI which I confirmed with family at bedside.  neuro consulted. elizabeth neuro bedside requesting ekg for pr interval prior starting second AED for focal status elizabeht call from lab ph 7.12 co2 80 w bicarb 13 mixed picture in setting of hypercarbic retention from copd which we are unable to place pt on bipap given he is seizing, and metabolic from ESRD for which he needs dialysis today. nephro melani. will speak w family again elizabeth signed molst form w daughter for comfort care s/p extensive discussion regarding multiorgan dysfunction and limited interventions able to be offered w out significant risk dominguez: Patient received in signout from Dr. BEE.  Patient is 79-year-old man came in as a code stroke no stroke on CT but was actively seizing with some focal activity history of COPD CHF and a seizure disorder chronically low blood pressure on midodrine also on Keppra and Eliquis he also has end-stage renal disease on dialysis Tuesday Thursday Saturday usually although this last week he had 4 episodes of dialysis.  He is on droxidopa and midodrine with a blood pressure of 6-80 systolic chronically on home oxygen 2 L SAADIA on CPAP.  He is also wheelchair-bound with a sacral decubital ulcer pulmonary hypertension IBS and PEs and DVTs in the past which is why he is on Eliquis..  Patient has a MOLST stating DNR/DNI.  Does not want central access but open to short trial of peripheral pressors    Patient presented with this focal seizure-like activity CT of head was unrevealing of any acute process his lab work had returned and he had a creatinine of 8.64 with potassium of 4.5 anion gap of 15 calcium 6.8 troponin of 173 pH 7.12 pCO2 is 80 pO2 is 51.    Vital signs on my arrival included blood pressure of 92/51 respiratory rate 15 heart rate 96 temp 99.1 O2 sat 100 patient is already received antibiotics for the possibility of sepsis he had also received some benzo (which did not break the seizures) and Keppra neuro was at the bedside given patient continued to have the seizure activities discussion was had of the benefit of additional benzo versus Vimpat versus fosphenytoin.    EKG had a heart rate of 92 with sinus rhythm but has a QTc of 467 and a WI of 206    The challenges any of the medications used for seizure control would either have cardiac or respiratory effects patient also has a long WI no long QTc and an elevated pCO2.  We began a long conversation regarding the challenge of being between a rock and a hard place in treating him one of our primary goals is to try to consider his goals.  We discussed comfort care and after extensive conversation it seems that comfort care would be consistent with his goals.  We have a MOLST filled out with comfort care.  Of note she additionally had spoken to her primary had spoken to his primary care doctor who was suggesting hospice as an option after we have spoken she felt comforted.

## 2025-05-06 NOTE — CONSULT NOTE ADULT - CONVERSATION DETAILS
Referral for complex decision making and symptom management in setting of advanced illness. Introduced role of GaP team to patient's family who was amenable to speaking to this provider. Reviewed patient's clinical course and current clinical condition.   Family understands that patient has multiple comorbidities that are chronic and progressive, now c/b seizures and hypotension. Wife re-affirmed goal for comfort care, DNR/DNI, stopping IV pressor support. I also did share that HD would not be beneficial at this time.  Family was in agreement that he had been having difficulty tolerating HD more recently in the setting of hypotension. Introduced the potential for a transition of care to hospice philosophy of care if patient is medically stable off of IV pressor support. Discussed the services provided under hospice services emphasizing the goal of elevating patient's quality of life and optimizing symptom management and avoiding unnecessary future hospitalizations. I did explain that he would likely require inpatient hospice facility if he is medically stable off IV pressor support. Family was in agreement with a transition of care to hospice but do understand that he will continue EOL care inpatient for now.     Family stated chaplaincy has visited and provided support.

## 2025-05-06 NOTE — H&P ADULT - NSHPADDITIONALINFOADULT_GEN_ALL_CORE
Code - comfort care status only    Dispo - admit to medicine for comfort care. possible transfer to inpatient hospice Code - comfort care status only    Dispo - admit to medicine for comfort care. possible transfer to inpatient hospice. consult hospice

## 2025-05-06 NOTE — CONSULT NOTE ADULT - ASSESSMENT
79y man with a PMH significant for ESRD on HD, orthostatic hypotension on midodrine baseline BP 80-90 systolic, COPD reportedly on home O2 2L, SAADIA on CPAP, wheelchair bound, chronic diastolic CHF, morbid obesity, pulmonary hypertension, PE and DVT on Eliquis, h/o TIA, neuropathy, lumbar stenosis, seizure disorder on Keppra, who presents with left facial weakness, left facial twitching and left arm jerking. Palliative consulted for sx management and goc in setting of advanced illness.

## 2025-05-06 NOTE — H&P ADULT - HISTORY OF PRESENT ILLNESS
78 yo M PMH ESRD on HD, orthostatic hypotension on midodrine baseline BP 80-90 systolic, COPD reportedly on home O2 2L, SAADIA on CPAP, wheelchair bound, chronic diastolic CHF, morbid obesity, pulmonary hypertension, PE and DVT on Eliquis, h/o TIA, neuropathy, lumbar stenosis, seizure disorder on Keppra presents to ED for AMS. History obtained from wife at bedside. Wife reports patient was conversing this morning and then developed left sided facial weakness, left facial twitching and left arm jerking. Patient arrived in ED as a code stroke LKN 12am.  He was then treated for seizure, which resolved with IV keppra. He was treated for severe hypotension with IV norepinephrine. Then decision was made to transition to comfort care.    Wife states patient has been mostly wheelchair/bedbound for the last 1 year. His quality of life has suffered, but he is able to answer questions, converse and watch TV at baseline. Wife and children in the room state patient has previously stated he does not want to be hooked up to machines or be on life support. Family made decision to transition to comfort care and hospice. They wish for patient to be transferred to inpatient hospice, but decision was patient was too unstable for transfer to inpatient hospice. Thus patient will be admitted to medicine and then transition to hospice if he stabilizes.

## 2025-05-06 NOTE — ED PROVIDER NOTE - OBJECTIVE STATEMENT
79y m hx ESRD on HD TuThSat- last HD was 10/22, has baseline orthostatic hypotension on Droxidopa AND midodrine baseline BP 60-80 systolic, COPD,  on home O2 2L, SAADIA on CPAP, wheelchair bound, IBS w chronic abd pain, chronic diastolic CHF, morbid obesity, sacral decub ulcer, pulmonary hypertension, hemorrhoids, IBS,  PE and DVT on Eliquis presents for ams as per son, around 3 am son came home saw him fidgeting and confused on phone. concern for seizure. no prior history of seizures, not on aed. as per wife since sunday pt has been more tired, sleeping a lot, and had a productive cough. denies any fevers, n/v, diarrhea. patient came in as code stroke eval by Dr. Mike given versed on arrival for presumptive seizure (twitching of UE) 79y m hx ESRD on HD TuThSat- last HD was 10/22, has baseline orthostatic hypotension on Droxidopa AND midodrine baseline BP 60-80 systolic, COPD,  on home O2 2L, SAADIA on CPAP, wheelchair bound, IBS w chronic abd pain, chronic diastolic CHF, morbid obesity, sacral decub ulcer, pulmonary hypertension, hemorrhoids, IBS,  PE and DVT on Eliquis presents for ams as per son, around 3 am son came home saw him fidgeting and confused on phone. concern for seizure. prior seizure like activity in setting of first baclofen which he is no longer on, and then has had focal seizures since on keppra wife does not know etiology. as per wife since sunday pt has been more tired, sleeping a lot, and had a productive cough. denies any fevers, n/v, diarrhea. patient came in as code stroke eval by Dr. Mike given versed on arrival for presumptive seizure (twitching of UE)

## 2025-05-06 NOTE — ED ADULT NURSE REASSESSMENT NOTE - NS ED NURSE REASSESS COMMENT FT1
Patient is resting comfortably at this time, NAD, (+) tachypnea and hypotension, NSR on CM patient is comfort measures at this time, family at bedside with patient, family is aware of plan of care, safety maintained.

## 2025-05-06 NOTE — H&P ADULT - PROBLEM SELECTOR PLAN 1
- comfort directed care only  - no vital sign checks  - PRN ativan, hydromorphone, glycopyrrolate  - goals of care discussed  - palliative care recs appreciated  - consult hospice

## 2025-05-06 NOTE — CONSULT NOTE ADULT - PROBLEM SELECTOR RECOMMENDATION 4
Added IV dilaudid 0.5mg q2h prn dyspnea/severe pain   supplemental oxygen as tolerated   Added IV glyco 0.4mg q6h prn secretions  COMFORT MEASURES ONLY

## 2025-05-06 NOTE — H&P ADULT - ASSESSMENT
80 yo M PMH ESRD on HD, orthostatic hypotension on midodrine baseline BP 80-90 systolic, COPD reportedly on home O2 2L, SAADIA on CPAP, wheelchair bound, chronic diastolic CHF, morbid obesity, pulmonary hypertension, PE and DVT on Eliquis, h/o TIA, neuropathy, lumbar stenosis, seizure disorder on Keppra presents to ED for AMS. Pt was code stroke in ED. Actively seizing, severely hypotensive and with severe respriatory acidosis. Family decided to transition to comfort care and prioritize comfort. 78 yo M PMH ESRD on HD, orthostatic hypotension on midodrine baseline BP 80-90 systolic, COPD reportedly on home O2 2L, SAADIA on CPAP, wheelchair bound, chronic diastolic CHF, morbid obesity, pulmonary hypertension, PE and DVT on Eliquis, h/o TIA, neuropathy, lumbar stenosis, seizure disorder on Keppra presents to ED for AMS. Pt was code stroke in ED. Actively seizing, severely hypotensive and with severe respiratory acidosis. Family decided to transition to comfort care and prioritize comfort.

## 2025-05-06 NOTE — ED ADULT NURSE NOTE - FINAL NURSING ELECTRONIC SIGNATURE
BETTINA VARGHESE IN SCHEDULING PT SCHEDULED 03/20/2025 ARRIVING AT 0700AM EGD PREP INFORMATION MAILED TO ADDRESS ON FILE VERIFIED BY PT OK FOR HUB TO RELAY  
06-May-2025 13:51

## 2025-05-06 NOTE — ED PROVIDER NOTE - PHYSICAL EXAMINATION
GENERAL: well appearing in no acute distress, non-toxic appearing  HEAD: normocephalic, atraumatic  HEENT no JVD  CARDIAC: regular rate and rhythm, normal S1S2, no appreciable murmurs, 2+ pulses in UE/LE b/l  PULM: diminished b/l bases, abd retractions  GI: abdomen nondistended, soft, nontender, no guarding, rebound tenderness  NEURO: L torticolis, L facial droop, responsive to pain all four extremities  SKIN: well-perfused, extremities warm, no visible rashes

## 2025-05-06 NOTE — H&P ADULT - NSHPPHYSICALEXAM_GEN_ALL_CORE
PHYSICAL EXAM:  GENERAL: acutely ill, lethargic, GCS 3  HEAD:  Atraumatic, Normocephalic  EYES: EOMI, PERRL, conjunctiva and sclera clear  NECK: Supple, No JVD  CHEST/LUNG: poor respiratory effort  HEART: Regular rate and rhythm; No murmurs;   ABDOMEN: Soft, Nontender, Nondistended; Bowel sounds present; No guarding  EXTREMITIES:  4+ edema BL LE  PSYCH: unable to assess  NEUROLOGY: AOx0 GCS 3 unable to assess  SKIN: No rashes or lesions

## 2025-05-06 NOTE — ED ADULT NURSE NOTE - SUICIDE SCREENING QUESTION 3
Rx Refill Note  Requested Prescriptions     Pending Prescriptions Disp Refills    carvedilol (COREG) 3.125 MG tablet [Pharmacy Med Name: CARVEDILOL 3.125 MG TABLET] 180 tablet 3     Sig: TAKE 1 TABLET BY MOUTH TWICE A DAY      Last office visit with prescribing clinician: 12/11/2024     Next office visit with prescribing clinician: 3/30/2025                             Prudence Weathers MA  03/31/25, 09:28 EDT  
Patient unable to complete

## 2025-05-06 NOTE — CONSULT NOTE ADULT - PROBLEM SELECTOR RECOMMENDATION 2
Appreciate neuro recs  - s/p 5mg versed x1  - c/w IV keppra   - added 1mg IV ativan q2h prn seizures  - goal is for comfort

## 2025-05-06 NOTE — CONSULT NOTE ADULT - PROBLEM SELECTOR RECOMMENDATION 9
-TTE 6/2024 shows LVSF appears grossly normal; mildly enlarged RV; mild TR  - reviewed cardio recs from prior hospitalizations  - at this time goal is for comfort care. Patient at EOL

## 2025-05-07 NOTE — DISCHARGE NOTE FOR THE EXPIRED PATIENT - HOSPITAL COURSE
79M PMHx ESRD on HD, orthostatic hypotension on midodrine baseline BP 80-90 systolic, COPD reportedly on home O2 2L, SAADIA on CPAP, wheelchair bound, chronic diastolic CHF, morbid obesity, pulmonary hypertension, PE and DVT on Eliquis, TIA, neuropathy, lumbar stenosis, seizure p/w AMS, seizure, hypoxia on NRB, hypotension now made comfort care. Was awaiting inpatient hospice however passed prior to transport. On physical exam patient did not respond to verbal or physical stimuli. No spontaneous respirations.  Absent heart and breath sounds. Absent radial, femoral, and carotid pulses.  Pupils are fixed and dilated absent JORDIN, no corneal reflex. Pt pronounced 12:21pm. Family at beside made aware. Dr. Sanchez made aware. 79M PMHx ESRD on HD, orthostatic hypotension on midodrine baseline BP 80-90 systolic, COPD reportedly on home O2 2L, SAADIA on CPAP, wheelchair bound, chronic diastolic CHF, morbid obesity, pulmonary hypertension, PE and DVT on Eliquis, TIA, neuropathy, lumbar stenosis, seizure p/w AMS, seizure, hypoxia on NRB, hypotension now made comfort care. Was awaiting inpatient hospice however passed away prior to transport. On physical exam patient did not respond to verbal or physical stimuli. No spontaneous respirations.  Absent heart and breath sounds. Absent radial, femoral, and carotid pulses.  Pupils are fixed and dilated absent JORDIN, no corneal reflex. Pt pronounced 12:21pm. Family at beside made aware. Dr. Sanchez made aware.

## 2025-05-07 NOTE — PROGRESS NOTE ADULT - PROBLEM SELECTOR PLAN 2
Appreciate neuro recs  - s/p 5mg versed x1  - c/w IV keppra   - added 1mg IV ativan q2h prn seizures  - goal is for comfort. Appreciate neuro recs  - s/p 5mg versed x1 on 5/6.   - c/w IV keppra   - 1mg IV ativan q2h prn seizures  - goal is for comfort.

## 2025-05-07 NOTE — PROGRESS NOTE ADULT - PROBLEM SELECTOR PROBLEM 3
Speech Therapy    Visit Type: Treatment  Born at Gestational Age: 36w1d and now corrected age 40w 6d    Nursing comments: RN consented to this session.  Present at bedside: RN and OT  Subjective reported by: RN  Precautions: per guidelines and aspiration    SUBJECTIVE  RN in agreement to work with patient for therapy session.  Patient seen bedside for dysphagia therapy.    HOSPITAL PROBLEMS:  Principal Problem:    RDS (respiratory distress syndrome in the )  Active Problems:    TEF (tracheoesophageal fistula) (CMD)    Tetralogy of Fallot    On total parenteral nutrition (TPN)    Need for observation and evaluation of  for sepsis  Resolved Problems:    Complete atelectasis of left lung    Video Fluoroscopic Swallow Study 24:  Laryngeal penetration and silent aspiration appreciated during a total intake of 7ml thin liquid (IDDSI 0) via preemie and ultra-preemie nipples.  Slightly thick liquid (IDDSI 1) then presented via Dr. Michele's level 2 nipple with mild increase in oral effort and similar outcome.  Laryngeal penetration and silent aspiration also observed with this consistency    3/4/24:  surgery for g-tube planned      Patient / Family Goals: maximize function and discharge to home    Pain   RN completed pain assessment during care time    OBJECTIVE    Diet:  Non-oral:  IV and NG tube  Schedule: bolus feeds   - 60 ml every 3  Environment and Equipment:   [facility]   - Lights: low   - Sound: environmental noise present  Room air       Infant Feeding  Onset of Session   - Interventions prior to feeding: continuous touch, patting, containment and facilitative tuck    Pre-Feeding Session   - Infant received in active-alert state during RN cares; feeding cues appreciated.  He calmed with being held, readily accepted pacifier then tastes x10 with no signs of distress or aspiration.  Patient maintained a quiet-alert state through out session.                 ASSESSMENT                    
                                                       Skilled therapy is required to facilitate transition to a safe oral feeding plan.   Infant presented strong feeding cues and immediate latch to pacifier.  He calmed with being held then accepted tastes/pacifier dips x10 with no signs of distress.  Therapeutic oral trials to be assess with Speech Therapy following g-tube surgery.  The infant will continue to benefit from ongoing skilled speech therapy services to provide neuroprotective cares, early cognitive stimulation, ongoing assessment of pre-feeding development, safe oral progression, and ongoing parent/caregiver education.    Requires SLP Follow Up: Yes    Discharge Recommendations  SLP Referrals/Discharge Recommendations: Refer to NICU follow up clinic, Refer to outpatient, Refer to early intervention    PLAN     Frequency: 2-3 x/week      Interventions:  Dysphagia therapy    Plan/Goal Agreement: will attempt to meet with parents at bedside    RECOMMENDATIONS  Diet: tastes only  Aspiration risk: significant  Factors impacting feeding: comorbidities, cough with oral feeding attempt(s) and congestion after feedings  Infant Feeding Plan:    - trials at dry breast and tastes via drops on pacifier at onset of tube feedings    GOALS  Review Date: 2024  Short Term Goals:   1. Patient will tolerate neuroprotective cares and handling to support neurodevelopment with physiologic stability - MET  2. Patient will maintain physiologic stability during oral stimulation - MET   3. Caregiver will participate in 2-3 sessions prior to discharge (x2)   4. Infant will orally feed with no signs of aspiration in 3/3 feeds - NOT MET  5. Infant will participate in Video Fluoroscopic Swallow Study for objective assessment of swallow function MET    Long Term Goals: (to be met by time of discharge from hospital)  1. Infant will safely accept the least restrictive diet orally without exhibiting overt clinical s/s of 
ESRD on dialysis
aspiration.  2. Parent/caregiver will implement the recommended supports/strategies to support safe and pleasurable oral feedings in the home environment.     Documented in the chart in the following areas:    Assessment.    Patient at End of Session:   Location: [facility] bed  Safety measures: equipment intact  Handoff to: nurse      Therapy procedure time and total treatment time can be found documented on the Time Entry flowsheet.  
Septic shock

## 2025-05-07 NOTE — PROGRESS NOTE ADULT - PROBLEM SELECTOR PLAN 5
Added IV dilaudid 0.5mg q2h prn dyspnea/severe pain   bowel regimen while on opioids   COMFORT MEASURES ONLY. c/w IV dilaudid 0.5mg q2h prn dyspnea/severe pain   bowel regimen while on opioids   COMFORT MEASURES ONLY.

## 2025-05-07 NOTE — DISCHARGE NOTE PROVIDER - HOSPITAL COURSE
79M PMHx ESRD on HD, orthostatic hypotension on midodrine baseline BP 80-90 systolic, COPD reportedly on home O2 2L, SAADIA on CPAP, wheelchair bound, chronic diastolic CHF, morbid obesity, pulmonary hypertension, PE and DVT on Eliquis, TIA, neuropathy, lumbar stenosis, seizure p/w AMS, seizure, hypoxia on NRB, hypotension now made comfort care. Pt being discharged to inpatient hospice.    Case discussed with Dr. Sanchez, pt medically stable for discharge to inpatient hospice. 79M PMHx ESRD on HD, orthostatic hypotension on midodrine baseline BP 80-90 systolic, COPD reportedly on home O2 2L, SAADIA on CPAP, wheelchair bound, chronic diastolic CHF, morbid obesity, pulmonary hypertension, PE and DVT on Eliquis, TIA, neuropathy, lumbar stenosis, seizure p/w AMS, seizure, hypoxia on NRB, hypotension now made comfort care. Was awaiting inpatient hospice however passed prior to transport. On physical exam patient did not respond to verbal or physical stimuli. No spontaneous respirations.  Absent heart and breath sounds. Absent radial, femoral, and carotid pulses.  Pupils are fixed and dilated absent JORDIN, no corneal reflex. Pt pronounced 12:21pm. Family at beside made aware. Dr. Sanchez made aware. 79M PMHx ESRD on HD, orthostatic hypotension on midodrine baseline BP 80-90 systolic, COPD reportedly on home O2 2L, SAADIA on CPAP, wheelchair bound, chronic diastolic CHF, morbid obesity, pulmonary hypertension, PE and DVT on Eliquis, TIA, neuropathy, lumbar stenosis, seizure p/w AMS, seizure, hypoxia on NRB, hypotension was made comfort care. Was awaiting inpatient hospice however passed prior to transport. On physical exam patient did not respond to verbal or physical stimuli. No spontaneous respirations.  Absent heart and breath sounds. Absent radial, femoral, and carotid pulses.  Pupils are fixed and dilated absent JORDIN, no corneal reflex. Pt pronounced 12:21pm. Family at beside made aware. Dr. Sanchez made aware.

## 2025-05-07 NOTE — PROGRESS NOTE ADULT - PROBLEM SELECTOR PROBLEM 5
Pt remains on TPN. Paracentesis today. Must be off of TPN when d/c'd.  TE    D/c plan is [facility] when medically ready. Rapid Covid needed on day of d/c.      D/C INSTRUCTIONS ARE ON FRONT OF PACKET LOCATED WITH THE SOFT CHART.  
Acute pain
ESRD on dialysis

## 2025-05-07 NOTE — CHART NOTE - NSCHARTNOTEFT_GEN_A_CORE
Pt seen by Palliative and made comfort care. Was awaiting inpatient hospice however passed prior to transport. On physical exam patient did not respond to verbal or physical stimuli. No spontaneous respirations.  Absent heart and breath sounds. Absent radial, femoral, and carotid pulses.  Pupils are fixed and dilated absent JORDIN, no corneal reflex. Pt pronounced 12:21pm. Family at beside made aware. Dr. Sanchez made aware.

## 2025-05-07 NOTE — PROGRESS NOTE ADULT - PROBLEM SELECTOR PLAN 1
- comfort directed care only  - no vital sign checks  - PRN ativan, hydromorphone, glycopyrrolate  - goals of care discussed  - palliative care recs appreciated  - consulted hospice
-TTE 6/2024 shows LVSF appears grossly normal; mildly enlarged RV; mild TR  - reviewed cardio recs from prior hospitalizations  - at this time goal is for comfort care. Patient at EOL.

## 2025-05-07 NOTE — PROGRESS NOTE ADULT - PROBLEM SELECTOR PLAN 3
Patient has been on HD since 2017. Per discussion with family, he has been having more difficulty tolerating HD due to hypotension.   > Family in agreement with stopping Levophed and they understand that he is not going to receive further HD   > COMFORT CARE only.
- comfort directed care only. no aggressive measures. Norepinephrine DCed. no abx

## 2025-05-07 NOTE — DISCHARGE NOTE PROVIDER - NSDCCPCAREPLAN_GEN_ALL_CORE_FT
PRINCIPAL DISCHARGE DIAGNOSIS  Diagnosis: Focal seizure  Assessment and Plan of Treatment: You were admitted with possible seizure however further workup deferred due to comfort care. You were seen by Palliative and decision amongst family was made for focusing on comfort measures at this time with no aggressive workup.      SECONDARY DISCHARGE DIAGNOSES  Diagnosis: Counseling regarding goals of care  Assessment and Plan of Treatment: You were seen by Palliative and decision amongst family was made for focusing on comfort measures at this time with no aggressive workup.    Diagnosis: Acute pain  Assessment and Plan of Treatment: Continue Dilaudid as needed for pain.    Diagnosis: Dyspnea  Assessment and Plan of Treatment: Continue Dilaudid as needed for trouble breathing.    Diagnosis: Pulmonary embolism  Assessment and Plan of Treatment: No blood thinner due to comfort care.    Diagnosis: ESRD on dialysis  Assessment and Plan of Treatment: No further dialysis due to comfort care.     PRINCIPAL DISCHARGE DIAGNOSIS  Diagnosis: Focal seizure  Assessment and Plan of Treatment: You were admitted with possible seizure however further workup deferred due to comfort care. You were seen by Palliative and decision amongst family was made for focusing on comfort measures at this time with no aggressive workup.      SECONDARY DISCHARGE DIAGNOSES  Diagnosis: Counseling regarding goals of care  Assessment and Plan of Treatment: You were seen by Palliative and decision amongst family was made for focusing on comfort measures at this time with no aggressive workup.

## 2025-05-07 NOTE — DISCHARGE NOTE PROVIDER - ATTENDING ATTESTATION STATEMENT
Detail Level: Zone Initiate Treatment: Triamcinolone cream BId x 2 weeks Render In Strict Bullet Format?: No Initiate Treatment: Triamcinolone cream BID x 2-3 weeks. Doxycycline 100mg BID x 10 days. I have personally seen and examined the patient. I have collaborated with and supervised the

## 2025-05-07 NOTE — PROGRESS NOTE ADULT - PROBLEM SELECTOR PLAN 6
DNR/DNI- MOLST form completed prior to palliative consult  Family is aware that patient is at EOL and HD is not going to be continued. Family in agreement with stopping IV pressor support and transitioning to comfort care only.   If patient is medically stable off IV pressors, family is amenable to inpatient hospice referral.     Chaplaincy following. DNR/DNI- MOLST form completed prior to palliative consult  Family is aware that patient is at EOL and HD is not going to be continued. Family in agreement with stopping IV pressor support and transitioning to comfort care only.   If patient is medically stable off IV pressors, family is amenable to inpatient hospice referral.   > 5/7: Patient is medically stable for transition of care to inpatient hospice facility     Chaplaincy following.

## 2025-05-07 NOTE — PROGRESS NOTE ADULT - SUBJECTIVE AND OBJECTIVE BOX
[facility] Geriatrics and Palliative Care  Estefania Lara Palliative Care Attending  Contact Info: Page [phone] (including Nights/Weekends), message on Microsoft Teams (Estefania Lara), or leave VM at Palliative Office [phone] (non-urgent)   Date of [id number] @ 08:55    SUBJECTIVE AND OBJECTIVE:    Indication for Geriatrics and Palliative Care Services/INTERVAL HPI: sx management and goc in setting of advanced illness     OVERNIGHT EVENTS:  > 5/7: Over the past 24 hours, patient required PRNs of 0.5mg IV dilaudid x2.     DNR on chart:DNI  DNI      Allergies    baclofen (Other)  latex (Rash)    Intolerances    MEDICATIONS  (STANDING):  levETIRAcetam   Injectable 1000 milliGRAM(s) IV Push every 12 hours  lidocaine   4% Patch 1 Patch Transdermal every 24 hours    MEDICATIONS  (PRN):  glycopyrrolate Injectable 0.4 milliGRAM(s) IV Push every 6 hours PRN secretions  HYDROmorphone  Injectable 0.5 milliGRAM(s) IV Push every 2 hours PRN dyspnea or severe pain  LORazepam   Injectable 1 milliGRAM(s) IV Push every 2 hours PRN Agitation      ITEMS UNCHECKED ARE NOT PRESENT    PRESENT SYMPTOMS: [ x]Unable to self-report - see [ ] CPOT [ x] PAINADS [ x] RDOS  Source if other than patient:  [ ]Family   [ ]Team     Pain:  [ ]yes [ ]no  QOL impact -   Location -                    Aggravating factors -  Quality -  Radiation -  Timing-  Severity (0-10 scale):  Minimal acceptable level/ pain goal (0-10 scale):     CPOT:    https://www.[facility].org/getattachment/[email or web address]/Critical-Care-Pain-Observation-Tool-(CPOT)    Dyspnea:                           [ ]Mild [ ]Moderate [ ]Severe  Anxiety:                             [ ]Mild [ ]Moderate [ ]Severe  Fatigue:                             [ ]Mild [ ]Moderate [ ]Severe  Nausea:                             [ ]Mild [ ]Moderate [ ]Severe  Loss of appetite:              [ ]Mild [ ]Moderate [ ]Severe  Constipation:                    [ ]Mild [ ]Moderate [ ]Severe  Other Symptoms:  [ ]All other review of systems negative     PCSSQ[Palliative Care Spiritual Screening Question]   Severity (0-10):  Score of 4 or > indicate consideration of Chaplaincy referral.  Chaplaincy Referral: [ ] yes [ ] refused [x ] following [ ] deferred    Caregiver [place]? : [ ] yes [ ] no [ x] Deferred [ ] Declined             Social work referral [ ] Patient & Family Centered Care Referral [ ]  Anticipatory Grief present?:  [ ] yes [ ] no  [ x] Deferred                  Social work referral [ ] Patient & Family Centered Care Referral [ ]      PHYSICAL EXAM:  Vital Signs Last 24 Hrs  T(C): 36.6 (07 May 2025 08:11), Max: 36.6 (07 May 2025 08:11)  T(F): 97.9 (07 May 2025 08:11), Max: 97.9 (07 May 2025 08:11)  HR: 49 (07 May 2025 08:11) (49 - 96)  BP: 113/83 (07 May 2025 08:11) (70/39 - 113/83)  BP(mean): 49 (06 May 2025 15:18) (49 - 67)  RR: 19 (07 May 2025 08:11) (15 - 20)  SpO2: 96% (07 May 2025 08:11) (96% - 100%)    Parameters below as of 07 May 2025 08:11  Patient On (Oxygen Delivery Method): mask, nonrebreather  O2 Flow (L/min): 10   I&O's Summary     GENERAL: [ ]Cachexia    [ ]Alert  [ ]Oriented x   [ ]Lethargic  [ x]Unarousable  [ ]Verbal  [ ]Non-Verbal  Behavioral:   [ ] Anxiety  [ ] Delirium [ ] Agitation [x ] Other  HEENT:  [x ]Normal   [ ]Dry mouth   [ ]ET Tube/Trach  [ ]Oral lesions  PULMONARY:   [ ]Clear [ ]Tachypnea  [ ]Audible excessive secretions   [ ]Rhonchi        [ ]Right [ ]Left [ ]Bilateral  [ ]Crackles        [ ]Right [ ]Left [ ]Bilateral  [ ]Wheezing     [ ]Right [ ]Left [ ]Bilateral  [x ]Diminished breath sounds [ ]right [ ]left [x ]bilateral  CARDIOVASCULAR:    [x ]Regular [ ]Irregular [ ]Tachy  [ ]Jasvir [ ]Murmur [ ]Other  GASTROINTESTINAL:  [ x]Soft  [ ]Distended   [ ]+BS  [ ]Non tender [ ]Tender  [ ]Other [ ]PEG [ ]OGT/ NGT  Last BM: fecal incontinence   GENITOURINARY:  [ ]Normal [ ] Incontinent   [ x]Oliguria/Anuria   [ ]Herring  MUSCULOSKELETAL:   [ ]Normal   [ x]Weakness  [ ]Bed/Wheelchair bound [ ]Edema  NEUROLOGIC:   [ ]No focal deficits  [ ]Cognitive impairment  [ ]Dysphagia [ ]Dysarthria [ ]Paresis [x ]Other   SKIN: Please see flowsheets   [ ]Normal  [ ]Rash  x[ ]Other  [ ]Pressure ulcer(s)       Present on admission [ ]y [ ]n    CRITICAL CARE:  [ ]Shock Present  [ ]Septic [ ]Cardiogenic [ ]Neurologic [ ]Hypovolemic  [ ]Vasopressors [ ]Inotropes  [ ]Respiratory failure present [ ]Mechanical Ventilation [ ]Non-invasive ventilatory support [ ]High-Flow   [ ]Acute  [ ]Chronic [ ]Hypoxic  [ ]Hypercarbic [ ]Other  [ ]Other organ failure     LABS:                        9.0    8.76  )-----------( 202      ( 06 May 2025 06:51 )             33.0   05-06    139  |  99  |  37[H]  ----------------------------<  100[H]  4.5   |  25  |  8.64[H]    Ca    6.8[L]      06 May 2025 06:51  Phos  6.3     05-06  Mg     2.10     05-06    TPro  7.5  /  Alb  3.3  /  TBili  0.2  /  DBili  x   /  AST  10  /  ALT  5   /  AlkPhos  384[H]  05-06  PT/INR - ( 06 May 2025 06:51 )   PT: 15.1 sec;   INR: 1.31 ratio         PTT - ( 06 May 2025 06:51 )  PTT:34.4 sec    Urinalysis Basic - ( 06 May 2025 06:51 )    Color: x / Appearance: x / SG: x / pH: x  Gluc: 100 mg/dL / Ketone: x  / Bili: x / Urobili: x   Blood: x / Protein: x / Nitrite: x   Leuk Esterase: x / RBC: x / WBC x   Sq Epi: x / Non Sq Epi: x / Bacteria: x      RADIOLOGY & ADDITIONAL STUDIES: no new     Protein Calorie Malnutrition Present: [ ]mild [ ]moderate [ ]severe [ ]underweight [ ]morbid obesity  https://www.andeal.org/vault/2440/web/files/ONC/Table_Clinical%20Characteristics%20to%20Document%20Malnutrition-White%20JV%20et%20al%202012.pdf    Height (cm): 175 (05-06-25 @ 14:07), 170.2 (10-23-24 @ 10:48), 170.2 (08-20-24 @ 18:11)  Weight (kg): 105 (05-06-25 @ 07:00), 115 (10-30-24 @ 15:25), 108 (08-20-24 @ 18:11)  BMI (kg/m2): 34.3 (05-06-25 @ 14:07), 36.2 (05-06-25 @ 07:00), 39.7 (10-30-24 @ 15:25)    [ ]PPSV2 < or = 30%  [ ]significant weight loss [ ]poor nutritional intake [ ]anasarca[ ]Artificial Nutrition    Other REFERRALS:  [ ]Hospice  [ ]Child Life  [ ]Social Work  [ ]Case management [ ]Holistic Therapy     Goals of Care Document: [facility] Geriatrics and Palliative Care  Estefania Lara Palliative Care Attending  Contact Info: Page [phone] (including Nights/Weekends), message on Microsoft Teams (Estefania Lara), or leave  at Palliative Office [phone] (non-urgent)   Date of [id number] @ 08:55    SUBJECTIVE AND OBJECTIVE: Patient seen this AM with wife at bedside. He was unresponsive for this provider but per wife, he opened his eyes around 6am and said the word "home" but he fell back asleep. Wife also states he was noted to have jerking motion of his head this AM and required symptom medication.     Indication for Geriatrics and Palliative Care Services/INTERVAL HPI: sx management and goc in setting of advanced illness     OVERNIGHT EVENTS:  > 5/7: Over the past 24 hours, patient required PRNs of 0.5mg IV dilaudid x2.     DNR on chart:DNI  DNI      Allergies    baclofen (Other)  latex (Rash)    Intolerances    MEDICATIONS  (STANDING):  levETIRAcetam   Injectable 1000 milliGRAM(s) IV Push every 12 hours  lidocaine   4% Patch 1 Patch Transdermal every 24 hours    MEDICATIONS  (PRN):  glycopyrrolate Injectable 0.4 milliGRAM(s) IV Push every 6 hours PRN secretions  HYDROmorphone  Injectable 0.5 milliGRAM(s) IV Push every 2 hours PRN dyspnea or severe pain  LORazepam   Injectable 1 milliGRAM(s) IV Push every 2 hours PRN Agitation      ITEMS UNCHECKED ARE NOT PRESENT    PRESENT SYMPTOMS: [ x]Unable to self-report - see [ ] CPOT [ x] PAINADS [ x] RDOS  Source if other than patient:  [ ]Family   [ ]Team     Pain:  [ ]yes [ ]no  QOL impact -   Location -                    Aggravating factors -  Quality -  Radiation -  Timing-  Severity (0-10 scale):  Minimal acceptable level/ pain goal (0-10 scale):     CPOT:    https://www.[facility].org/getattachment/mdj70r97-9d8m-6x4n-5l6r-9563f5765o4y/Critical-Care-Pain-Observation-Tool-(CPOT)    Dyspnea:                           [ ]Mild [ ]Moderate [ ]Severe  Anxiety:                             [ ]Mild [ ]Moderate [ ]Severe  Fatigue:                             [ ]Mild [ ]Moderate [ ]Severe  Nausea:                             [ ]Mild [ ]Moderate [ ]Severe  Loss of appetite:              [ ]Mild [ ]Moderate [ ]Severe  Constipation:                    [ ]Mild [ ]Moderate [ ]Severe  Other Symptoms:  [ ]All other review of systems negative     PCSSQ[Palliative Care Spiritual Screening Question]   Severity (0-10):  Score of 4 or > indicate consideration of Chaplaincy referral.  Chaplaincy Referral: [ ] yes [ ] refused [x ] following [ ] deferred    Caregiver [place]? : [ ] yes [ ] no [ x] Deferred [ ] Declined             Social work referral [ ] Patient & Family Centered Care Referral [ ]  Anticipatory Grief present?:  [ ] yes [ ] no  [ x] Deferred                  Social work referral [ ] Patient & Family Centered Care Referral [ ]      PHYSICAL EXAM:  Vital Signs Last 24 Hrs  T(C): 36.6 (07 May 2025 08:11), Max: 36.6 (07 May 2025 08:11)  T(F): 97.9 (07 May 2025 08:11), Max: 97.9 (07 May 2025 08:11)  HR: 49 (07 May 2025 08:11) (49 - 96)  BP: 113/83 (07 May 2025 08:11) (70/39 - 113/83)  BP(mean): 49 (06 May 2025 15:18) (49 - 67)  RR: 19 (07 May 2025 08:11) (15 - 20)  SpO2: 96% (07 May 2025 08:11) (96% - 100%)    Parameters below as of 07 May 2025 08:11  Patient On (Oxygen Delivery Method): mask, nonrebreather  O2 Flow (L/min): 10   I&O's Summary     GENERAL: [ ]Cachexia    [ ]Alert  [ ]Oriented x   [ ]Lethargic  [ x]Unarousable  [ ]Verbal  [ ]Non-Verbal  Behavioral:   [ ] Anxiety  [ ] Delirium [ ] Agitation [x ] Other  HEENT:  [x ]Normal   [ ]Dry mouth   [ ]ET Tube/Trach  [ ]Oral lesions  PULMONARY:   [ ]Clear [ ]Tachypnea  [ ]Audible excessive secretions   [ ]Rhonchi        [ ]Right [ ]Left [ ]Bilateral  [ ]Crackles        [ ]Right [ ]Left [ ]Bilateral  [ ]Wheezing     [ ]Right [ ]Left [ ]Bilateral  [x ]Diminished breath sounds [ ]right [ ]left [x ]bilateral  CARDIOVASCULAR:    [x ]Regular [ ]Irregular [ ]Tachy  [ ]Jasvir [ ]Murmur [ ]Other  GASTROINTESTINAL:  [ x]Soft  [ ]Distended   [ ]+BS  [ ]Non tender [ ]Tender  [ ]Other [ ]PEG [ ]OGT/ NGT  Last BM: fecal incontinence   GENITOURINARY:  [ ]Normal [ ] Incontinent   [ x]Oliguria/Anuria   [ ]Herring  MUSCULOSKELETAL:   [ ]Normal   [ x]Weakness  [ ]Bed/Wheelchair bound [ ]Edema  NEUROLOGIC:   [ ]No focal deficits  [ ]Cognitive impairment  [ ]Dysphagia [ ]Dysarthria [ ]Paresis [x ]Other   SKIN: Please see flowsheets   [ ]Normal  [ ]Rash  x[ ]Other  [ ]Pressure ulcer(s)       Present on admission [ ]y [ ]n    CRITICAL CARE:  [ ]Shock Present  [ ]Septic [ ]Cardiogenic [ ]Neurologic [ ]Hypovolemic  [ ]Vasopressors [ ]Inotropes  [ ]Respiratory failure present [ ]Mechanical Ventilation [ ]Non-invasive ventilatory support [ ]High-Flow   [ ]Acute  [ ]Chronic [ ]Hypoxic  [ ]Hypercarbic [ ]Other  [ ]Other organ failure     LABS:                        9.0    8.76  )-----------( 202      ( 06 May 2025 06:51 )             33.0   05-06    139  |  99  |  37[H]  ----------------------------<  100[H]  4.5   |  25  |  8.64[H]    Ca    6.8[L]      06 May 2025 06:51  Phos  6.3     05-06  Mg     2.10     05-06    TPro  7.5  /  Alb  3.3  /  TBili  0.2  /  DBili  x   /  AST  10  /  ALT  5   /  AlkPhos  384[H]  05-06  PT/INR - ( 06 May 2025 06:51 )   PT: 15.1 sec;   INR: 1.31 ratio         PTT - ( 06 May 2025 06:51 )  PTT:34.4 sec    Urinalysis Basic - ( 06 May 2025 06:51 )    Color: x / Appearance: x / SG: x / pH: x  Gluc: 100 mg/dL / Ketone: x  / Bili: x / Urobili: x   Blood: x / Protein: x / Nitrite: x   Leuk Esterase: x / RBC: x / WBC x   Sq Epi: x / Non Sq Epi: x / Bacteria: x      RADIOLOGY & ADDITIONAL STUDIES: no new     Protein Calorie Malnutrition Present: [ ]mild [ ]moderate [ ]severe [ ]underweight [ ]morbid obesity  https://www.andeal.org/vault/2440/web/files/ONC/Table_Clinical%20Characteristics%20to%20Document%20Malnutrition-White%20JV%20et%20al%875680.pdf    Height (cm): 175 (05-06-25 @ 14:07), 170.2 (10-23-24 @ 10:48), 170.2 (08-20-24 @ 18:11)  Weight (kg): 105 (05-06-25 @ 07:00), 115 (10-30-24 @ 15:25), 108 (08-20-24 @ 18:11)  BMI (kg/m2): 34.3 (05-06-25 @ 14:07), 36.2 (05-06-25 @ 07:00), 39.7 (10-30-24 @ 15:25)    [ ]PPSV2 < or = 30%  [ ]significant weight loss [ ]poor nutritional intake [ ]anasarca[ ]Artificial Nutrition    Other REFERRALS:  [ ]Hospice  [ ]Child Life  [ ]Social Work  [ ]Case management [ ]Holistic Therapy

## 2025-05-07 NOTE — PROGRESS NOTE ADULT - NS ATTEST RISK PROBLEM GEN_ALL_CORE FT
1. Number and complexity of problems addressed for this patient:    1.1 Moderate (At least 1)  [ ] 1 or more chronic illnesses with exacerbation, progression, or side effects of treatment  [ ] 2 or more stable chronic illnesses  [ ] 1 undiagnosed new problem with uncertain prognosis  [ ] 1 acute illness with systemic symptoms  [ ] 1 acute complicated injury  1.2 High (At least 1)   [ ] 1 or more chronic illnesses with severe exacerbation, progression, or side effects of treatment  [ x] 1 acute or chronic illnesses or injuries that may pose a threat to life or bodily function    2. Amount and/or Complexity of Data that was Reviewed and Analyzed for this case:       Moderate (1 out of 3)       High (2 out of 3)  2.1. (Any combination of 3 of the following)   [x ] Prior External notes were reviewed  [ ] Each test result was reviewed (see "LABS" and "RADIOLOGY & ADDITIONAL STUDIES" above)  [ ] The following tests were ordered and/or reviewed (Only count 1 point for ordering or reviewing a unique test):  	[ ]CBC  	[ ] Chemistry   	[ ] Imaging   	[ ] Other:   [ ] Assessment requiring an independent historian   		Name of historian and relationship:   2.2  [ ] Personally review and interpretation of  image or testing   2.3  [x ] Discussion of management or test interpretation with external physician/other qualified health care professional\appropriate source (not separately reported)    3. Risk of Complications and/or Morbidity or Mortality of for this Patient’s Management:  3.1 Moderate risk of morbidity from additional diagnostic testing or treatment (At least 1):   [ ] Prescription drug management   [ ] Decision regarding minor surgery, treatment, or procedure with identified patient or procedure risk factors  [ ] Decision regarding elective major surgery, treatment, or procedure without identified patient or procedure risk factors   [ ] Diagnosis or treatment significantly limited by social determinants of health   [ ] Other:   3.2 High risk of morbidity from additional diagnostic testing or treatment (At least 1):   [ ] Drug therapy requiring intensive monitoring for toxicity   [ ] Decision regarding elective major surgery, treatment, or procedure with identified patient or procedure risk factors   [ ] Decision regarding emergency major surgery, treatment, or procedure   [ ] Decision regarding hospitalization or escalation of hospital-level of care  [ ] Decision not to resuscitate, not to intubate, or to de-escalate care because of poor prognosis   [ ] Decision to proceed or not with artificial nutrition   [ x] Parenteral controlled substance  [ ] Other:     [x] 16 mins spent discussing goc 1. Number and complexity of problems addressed for this patient:    1.1 Moderate (At least 1)  [ ] 1 or more chronic illnesses with exacerbation, progression, or side effects of treatment  [ ] 2 or more stable chronic illnesses  [ ] 1 undiagnosed new problem with uncertain prognosis  [ ] 1 acute illness with systemic symptoms  [ ] 1 acute complicated injury  1.2 High (At least 1)   [ ] 1 or more chronic illnesses with severe exacerbation, progression, or side effects of treatment  [ x] 1 acute or chronic illnesses or injuries that may pose a threat to life or bodily function    2. Amount and/or Complexity of Data that was Reviewed and Analyzed for this case:       Moderate (1 out of 3)       High (2 out of 3)  2.1. (Any combination of 3 of the following)   [x ] Prior External notes were reviewed  [ ] Each test result was reviewed (see "LABS" and "RADIOLOGY & ADDITIONAL STUDIES" above)  [ ] The following tests were ordered and/or reviewed (Only count 1 point for ordering or reviewing a unique test):  	[ ]CBC  	[ ] Chemistry   	[ ] Imaging   	[ ] Other:   [ ] Assessment requiring an independent historian   		Name of historian and relationship:   2.2  [ ] Personally review and interpretation of  image or testing   2.3  [x ] Discussion of management or test interpretation with external physician/other qualified health care professional\appropriate source (not separately reported)    3. Risk of Complications and/or Morbidity or Mortality of for this Patient’s Management:  3.1 Moderate risk of morbidity from additional diagnostic testing or treatment (At least 1):   [ ] Prescription drug management   [ ] Decision regarding minor surgery, treatment, or procedure with identified patient or procedure risk factors  [ ] Decision regarding elective major surgery, treatment, or procedure without identified patient or procedure risk factors   [ ] Diagnosis or treatment significantly limited by social determinants of health   [ ] Other:   3.2 High risk of morbidity from additional diagnostic testing or treatment (At least 1):   [ ] Drug therapy requiring intensive monitoring for toxicity   [ ] Decision regarding elective major surgery, treatment, or procedure with identified patient or procedure risk factors   [ ] Decision regarding emergency major surgery, treatment, or procedure   [ ] Decision regarding hospitalization or escalation of hospital-level of care  [ ] Decision not to resuscitate, not to intubate, or to de-escalate care because of poor prognosis   [ ] Decision to proceed or not with artificial nutrition   [ x] Parenteral controlled substance  [ ] Other:

## 2025-05-07 NOTE — DISCHARGE NOTE NURSING/CASE MANAGEMENT/SOCIAL WORK - NSDCPEFALRISK_GEN_ALL_CORE
For information on Fall & Injury Prevention, visit: [email or web address] OR  [email or web address] OR  https://www.cdc.gov/steadi/patient.html

## 2025-05-07 NOTE — DISCHARGE NOTE PROVIDER - NSDCMRMEDTOKEN_GEN_ALL_CORE_FT
glycopyrrolate 0.2 mg/mL injectable solution: 0.4 milligram(s) injectable every 6 hours as needed for secretions  HYDROmorphone: 0.5 milligram(s) intravenous every 2 hours as needed for  severe pain  levETIRAcetam 100 mg/mL intravenous solution: 10 milliliter(s) intravenous every 12 hours  lidocaine 4% topical film: Apply topically to affected area once a day  LORazepam 1 mg/mL-NaCl 0.9% intravenous solution: 1 milliliter(s) intravenous every 2 hours As needed Agitation

## 2025-05-07 NOTE — DISCHARGE NOTE NURSING/CASE MANAGEMENT/SOCIAL WORK - FINANCIAL ASSISTANCE
[facility] provides services at a reduced cost to those who are determined to be eligible through [facility]’s financial assistance program. Information regarding [facility]’s financial assistance program can be found by going to [email or web address] or by calling [phone].

## 2025-05-07 NOTE — PROGRESS NOTE ADULT - PROBLEM SELECTOR PLAN 4
Added IV dilaudid 0.5mg q2h prn dyspnea/severe pain   supplemental oxygen as tolerated   Added IV glyco 0.4mg q6h prn secretions  COMFORT MEASURES ONLY. c/w IV dilaudid 0.5mg q2h prn dyspnea/severe pain   supplemental oxygen as tolerated   c/w IV glyco 0.4mg q6h prn secretions  COMFORT MEASURES ONLY.

## 2025-05-07 NOTE — PROGRESS NOTE ADULT - SUBJECTIVE AND OBJECTIVE BOX
Patient is a 79y old  Male who presents with a chief complaint of AMS (07 May 2025 11:21)      SUBJECTIVE / OVERNIGHT EVENTS: Pt seen and examined at 10:26am, no overnight events, pt on non rebreather mask oxygen, appears comfortable, wife at bedside.  Addedum: Pt  later in the day.    MEDICATIONS  (STANDING):  levETIRAcetam   Injectable 1000 milliGRAM(s) IV Push every 12 hours  lidocaine   4% Patch 1 Patch Transdermal every 24 hours    MEDICATIONS  (PRN):  glycopyrrolate Injectable 0.4 milliGRAM(s) IV Push every 6 hours PRN secretions  HYDROmorphone  Injectable 0.5 milliGRAM(s) IV Push every 2 hours PRN dyspnea or severe pain  LORazepam   Injectable 1 milliGRAM(s) IV Push every 2 hours PRN Agitation      Vital Signs Last 24 Hrs  T(C): 36.6 (07 May 2025 08:11), Max: 36.6 (07 May 2025 08:11)  T(F): 97.9 (07 May 2025 08:11), Max: 97.9 (07 May 2025 08:11)  HR: 49 (07 May 2025 08:11) (49 - 82)  BP: 113/83 (07 May 2025 08:11) (70/39 - 113/83)  BP(mean): 49 (06 May 2025 15:18) (49 - 49)  RR: 19 (07 May 2025 08:11) (19 - 20)  SpO2: 96% (07 May 2025 08:11) (96% - 100%)    Parameters below as of 07 May 2025 08:11  Patient On (Oxygen Delivery Method): mask, nonrebreather  O2 Flow (L/min): 10    CAPILLARY BLOOD GLUCOSE        I&O's Summary      PHYSICAL EXAM:  GENERAL: appears comfortable  CHEST/LUNG: decreased bs b/l  HEART: Regular rate and rhythm  ABDOMEN: Soft, Nontender, Nondistended  EXTREMITIES: +LE edema  PSYCH: calm  NEUROLOGY: unresponsive      LABS:                        9.0    8.76  )-----------(       ( 06 May 2025 06:51 )             33.0     05-06    139  |  99  |  37[H]  ----------------------------<  100[H]  4.5   |  25  |  8.64[H]    Ca    6.8[L]      06 May 2025 06:51  Phos  6.3     -  Mg     2.10     -    TPro  7.5  /  Alb  3.3  /  TBili  0.2  /  DBili  x   /  AST  10  /  ALT  5   /  AlkPhos  384[H]      PT/INR - ( 06 May 2025 06:51 )   PT: 15.1 sec;   INR: 1.31 ratio         PTT - ( 06 May 2025 06:51 )  PTT:34.4 sec      Urinalysis Basic - ( 06 May 2025 06:51 )    Color: x / Appearance: x / SG: x / pH: x  Gluc: 100 mg/dL / Ketone: x  / Bili: x / Urobili: x   Blood: x / Protein: x / Nitrite: x   Leuk Esterase: x / RBC: x / WBC x   Sq Epi: x / Non Sq Epi: x / Bacteria: x        RADIOLOGY & ADDITIONAL TESTS:    Imaging Personally Reviewed:    Consultant(s) Notes Reviewed:      Care Discussed with Consultants/Other Providers:

## 2025-05-07 NOTE — PROGRESS NOTE ADULT - PROBLEM SELECTOR PLAN 9
- comfort directed care only.  - PRN duonebs    Wife at bedside, emotional support offered.    Plan discussed with ACP

## 2025-05-07 NOTE — DISCHARGE NOTE NURSING/CASE MANAGEMENT/SOCIAL WORK - PATIENT PORTAL LINK FT
You can access the FollowMyHealth Patient Portal offered by [facility] by registering at the following website: [email or web address]. By joining [employer]’s FollowMyHealth portal, you will also be able to view your health information using other applications (apps) compatible with our system.

## 2025-05-07 NOTE — DISCHARGE NOTE PROVIDER - NSDCFUSCHEDAPPT_GEN_ALL_CORE_FT
Devon Purdy  [facility]  [facility] [address] [place] [address]  Scheduled Appointment: 07/02/2025

## 2025-05-07 NOTE — DISCHARGE NOTE NURSING/CASE MANAGEMENT/SOCIAL WORK - NSDCCRNAME_GEN_ALL_CORE_FT
[facility] ([address] [facility], [place] [address])

## 2025-05-07 NOTE — PROGRESS NOTE ADULT - PROBLEM SELECTOR PLAN 7
- comfort directed care only  - holding PO meds
In the event of newly developing, evolving, or worsening symptoms, please contact the Palliative Medicine team via pager (if the patient is at [facility] #[phone] or if the patient is at [facility] #[phone]) The Geriatric and Palliative Medicine service has coverage 24 hours a day/ 7 days a week to provide medical recommendations regarding symptom management needs via telephone.

## 2025-05-08 LAB — LEVETIRACETAM SERPL-MCNC: 49.3 UG/ML — HIGH (ref 10–40)

## 2025-05-11 LAB
CULTURE RESULTS: SIGNIFICANT CHANGE UP
CULTURE RESULTS: SIGNIFICANT CHANGE UP
SPECIMEN SOURCE: SIGNIFICANT CHANGE UP
SPECIMEN SOURCE: SIGNIFICANT CHANGE UP

## 2025-06-25 NOTE — PHYSICAL THERAPY INITIAL EVALUATION ADULT - ADDITIONAL COMMENTS
Patient has been cleared safe for discharge. He has been cleared safe for discharge by PT/OT and has voided on his own. Pain is well controlled and he is tolerating food and beverage. Will follow patient postoperatively.   
Pt admitted from a rehab center, states he was ambulating minimally with a rolling walker. Previously pt states he lived with his wife in a house, owns a cane and a rollator, ambulated independently and was independent in ADLs.    Following evaluation, pt was left semireclined in bed in no distress, all lines in tact, call bangura in reach. KEM thomas.
Pt was admitted to [facility] from rehab facility.  Prior to stay at rehab facility pt lives in a house.  Pt ambulates using a rollator or a cane, only able to ambulate short distances.  Pt is independent in performing ADLs.

## 2025-07-02 ENCOUNTER — APPOINTMENT (OUTPATIENT)
Dept: OTOLARYNGOLOGY | Facility: CLINIC | Age: 80
End: 2025-07-02

## (undated) DEVICE — SOL INJ NS 0.9% 500ML 2 PORT

## (undated) DEVICE — CATH IV SAFE BC 22G X 1" (BLUE)

## (undated) DEVICE — TUBING SUCTION 20FT

## (undated) DEVICE — TUBING SUCTION CONN 6FT STERILE

## (undated) DEVICE — SYR ALLIANCE II INFLATION 60ML

## (undated) DEVICE — BIOPSY FORCEP RADIAL JAW 4 STANDARD WITH NEEDLE

## (undated) DEVICE — SUCTION YANKAUER NO CONTROL VENT

## (undated) DEVICE — BITE BLOCK ADULT 20 X 27MM (GREEN)

## (undated) DEVICE — TUBING IV SET GRAVITY 3Y 100" MACRO

## (undated) DEVICE — CATH IV SAFE BC 20G X 1.16" (PINK)

## (undated) DEVICE — SENSOR O2 FINGER ADULT

## (undated) DEVICE — FOLEY HOLDER STATLOCK 2 WAY ADULT

## (undated) DEVICE — BALLOON US ENDO

## (undated) DEVICE — PACK IV START WITH CHG